# Patient Record
Sex: FEMALE | Race: WHITE | NOT HISPANIC OR LATINO | Employment: OTHER | URBAN - METROPOLITAN AREA
[De-identification: names, ages, dates, MRNs, and addresses within clinical notes are randomized per-mention and may not be internally consistent; named-entity substitution may affect disease eponyms.]

---

## 2023-08-23 ENCOUNTER — HOSPITAL ENCOUNTER (OUTPATIENT)
Dept: DATA CONVERSION | Facility: HOSPITAL | Age: 68
Discharge: HOME | End: 2023-08-23

## 2023-08-23 DIAGNOSIS — E11.65 TYPE 2 DIABETES MELLITUS WITH HYPERGLYCEMIA (MULTI): ICD-10-CM

## 2023-08-23 DIAGNOSIS — E78.5 HYPERLIPIDEMIA, UNSPECIFIED: ICD-10-CM

## 2023-08-23 LAB
25(OH)D3 SERPL-MCNC: 55 NG/ML (ref 31–100)
ALBUMIN SERPL-MCNC: 4.2 GM/DL (ref 3.5–5)
ALBUMIN/GLOB SERPL: 1.4 RATIO (ref 1.5–3)
ALP BLD-CCNC: 170 U/L (ref 35–125)
ALT SERPL-CCNC: 29 U/L (ref 5–40)
ANION GAP SERPL CALCULATED.3IONS-SCNC: 10 MMOL/L (ref 0–19)
APPEARANCE PLAS: CLEAR
AST SERPL-CCNC: 19 U/L (ref 5–40)
BASOPHILS # BLD AUTO: 0.05 K/UL (ref 0–0.22)
BASOPHILS NFR BLD AUTO: 1 % (ref 0–1)
BILIRUB SERPL-MCNC: 0.3 MG/DL (ref 0.1–1.2)
BUN SERPL-MCNC: 22 MG/DL (ref 8–25)
BUN/CREAT SERPL: 27.5 RATIO (ref 8–21)
CALCIUM SERPL-MCNC: 9.6 MG/DL (ref 8.5–10.4)
CHLORIDE SERPL-SCNC: 101 MMOL/L (ref 97–107)
CHOLEST SERPL-MCNC: 266 MG/DL (ref 133–200)
CHOLEST/HDLC SERPL: 4.6 RATIO
CO2 SERPL-SCNC: 26 MMOL/L (ref 24–31)
COLOR SPUN FLD: YELLOW
CREAT SERPL-MCNC: 0.8 MG/DL (ref 0.4–1.6)
DEPRECATED RDW RBC AUTO: 47 FL (ref 37–54)
DIFFERENTIAL METHOD BLD: ABNORMAL
EOSINOPHIL # BLD AUTO: 0.13 K/UL (ref 0–0.45)
EOSINOPHIL NFR BLD: 2.6 % (ref 0–3)
ERYTHROCYTE [DISTWIDTH] IN BLOOD BY AUTOMATED COUNT: 13.3 % (ref 11.7–15)
FASTING STATUS PATIENT QL REPORTED: ABNORMAL
GFR SERPL CREATININE-BSD FRML MDRD: 81 ML/MIN/1.73 M2
GLOBULIN SER-MCNC: 3 G/DL (ref 1.9–3.7)
GLUCOSE SERPL-MCNC: 86 MG/DL (ref 65–99)
HCT VFR BLD AUTO: 37.4 % (ref 36–44)
HDLC SERPL-MCNC: 58 MG/DL
HGB BLD-MCNC: 13 GM/DL (ref 12–15)
IMM GRANULOCYTES # BLD AUTO: 0.02 K/UL (ref 0–0.1)
LDLC SERPL CALC-MCNC: 165 MG/DL (ref 65–130)
LYMPHOCYTES # BLD AUTO: 1.81 K/UL (ref 1.2–3.2)
LYMPHOCYTES NFR BLD MANUAL: 36.5 % (ref 20–40)
MCH RBC QN AUTO: 33.2 PG (ref 26–34)
MCHC RBC AUTO-ENTMCNC: 34.8 % (ref 31–37)
MCV RBC AUTO: 95.4 FL (ref 80–100)
MONOCYTES # BLD AUTO: 0.38 K/UL (ref 0–0.8)
MONOCYTES NFR BLD MANUAL: 7.7 % (ref 0–8)
NEUTROPHILS # BLD AUTO: 2.57 K/UL
NEUTROPHILS # BLD AUTO: 2.57 K/UL (ref 1.8–7.7)
NEUTROPHILS.IMMATURE NFR BLD: 0.4 % (ref 0–1)
NEUTS SEG NFR BLD: 51.8 % (ref 50–70)
NRBC BLD-RTO: 0 /100 WBC
PLATELET # BLD AUTO: 254 K/UL (ref 150–450)
PMV BLD AUTO: 10.8 CU (ref 7–12.6)
POTASSIUM SERPL-SCNC: 5.7 MMOL/L (ref 3.4–5.1)
PROT SERPL-MCNC: 7.2 G/DL (ref 5.9–7.9)
RBC # BLD AUTO: 3.92 M/UL (ref 4–4.9)
SODIUM SERPL-SCNC: 137 MMOL/L (ref 133–145)
TRIGL SERPL-MCNC: 213 MG/DL (ref 40–150)
VIT B12 SERPL-MCNC: 2000 PG/ML (ref 211–946)
WBC # BLD AUTO: 5 K/UL (ref 4.5–11)

## 2023-09-05 ENCOUNTER — HOSPITAL ENCOUNTER (OUTPATIENT)
Dept: DATA CONVERSION | Facility: HOSPITAL | Age: 68
Discharge: HOME | End: 2023-09-05

## 2023-09-05 DIAGNOSIS — E11.65 TYPE 2 DIABETES MELLITUS WITH HYPERGLYCEMIA (MULTI): ICD-10-CM

## 2023-09-05 LAB
ALBUMIN SERPL-MCNC: 3.7 GM/DL (ref 3.5–5)
ALBUMIN/GLOB SERPL: 1.3 RATIO (ref 1.5–3)
ALP BLD-CCNC: 153 U/L (ref 35–125)
ALT SERPL-CCNC: 25 U/L (ref 5–40)
ANION GAP SERPL CALCULATED.3IONS-SCNC: 9 MMOL/L (ref 0–19)
AST SERPL-CCNC: 16 U/L (ref 5–40)
BILIRUB SERPL-MCNC: 0.2 MG/DL (ref 0.1–1.2)
BUN SERPL-MCNC: 27 MG/DL (ref 8–25)
BUN/CREAT SERPL: 38.6 RATIO (ref 8–21)
CALCIUM SERPL-MCNC: 9.2 MG/DL (ref 8.5–10.4)
CHLORIDE SERPL-SCNC: 103 MMOL/L (ref 97–107)
CO2 SERPL-SCNC: 26 MMOL/L (ref 24–31)
CREAT SERPL-MCNC: 0.7 MG/DL (ref 0.4–1.6)
GFR SERPL CREATININE-BSD FRML MDRD: 95 ML/MIN/1.73 M2
GGT SERPL-CCNC: 293 U/L (ref 5–55)
GLOBULIN SER-MCNC: 2.9 G/DL (ref 1.9–3.7)
GLUCOSE SERPL-MCNC: 149 MG/DL (ref 65–99)
POTASSIUM SERPL-SCNC: 5 MMOL/L (ref 3.4–5.1)
PROT SERPL-MCNC: 6.6 G/DL (ref 5.9–7.9)
SODIUM SERPL-SCNC: 138 MMOL/L (ref 133–145)

## 2023-09-08 LAB
ALP BONE SERPL-CCNC: ABNORMAL U/L
ALP ISOS SERPL HS-CCNC: ABNORMAL U/L
ALP LIVER SERPL-CCNC: ABNORMAL U/L
ALP SERPL-CCNC: ABNORMAL U/L

## 2023-09-15 ENCOUNTER — HOSPITAL ENCOUNTER (OUTPATIENT)
Dept: DATA CONVERSION | Facility: HOSPITAL | Age: 68
Discharge: HOME | End: 2023-09-15

## 2023-09-15 DIAGNOSIS — R74.01 ELEVATION OF LEVELS OF LIVER TRANSAMINASE LEVELS: ICD-10-CM

## 2023-09-18 PROBLEM — T85.9XXA: Status: ACTIVE | Noted: 2023-09-18

## 2023-09-18 PROBLEM — M19.90 ARTHRITIS: Status: ACTIVE | Noted: 2023-09-18

## 2023-09-18 PROBLEM — D86.0 PULMONARY SARCOIDOSIS (MULTI): Status: ACTIVE | Noted: 2023-09-18

## 2023-09-18 PROBLEM — E66.9 OBESITY WITH BODY MASS INDEX 30 OR GREATER: Status: ACTIVE | Noted: 2023-09-18

## 2023-09-18 PROBLEM — H04.129 TEAR FILM INSUFFICIENCY: Status: ACTIVE | Noted: 2023-09-18

## 2023-09-18 PROBLEM — E11.9 DM W/O COMPLICATION TYPE II (MULTI): Status: ACTIVE | Noted: 2023-09-18

## 2023-09-18 PROBLEM — I10 ESSENTIAL HYPERTENSION: Status: ACTIVE | Noted: 2023-09-18

## 2023-09-18 PROBLEM — G31.84 MCI (MILD COGNITIVE IMPAIRMENT): Status: ACTIVE | Noted: 2023-09-18

## 2023-09-18 PROBLEM — G25.0 ESSENTIAL TREMOR: Status: ACTIVE | Noted: 2023-09-18

## 2023-09-18 PROBLEM — E78.5 DYSLIPIDEMIA: Status: ACTIVE | Noted: 2023-09-18

## 2023-09-18 PROBLEM — M51.26 DISPLACEMENT OF LUMBAR INTERVERTEBRAL DISC WITHOUT MYELOPATHY: Status: ACTIVE | Noted: 2023-09-18

## 2023-09-18 PROBLEM — H40.9 GLAUCOMA: Status: ACTIVE | Noted: 2023-09-18

## 2023-09-18 PROBLEM — H40.1133 PRIMARY OPEN ANGLE GLAUCOMA (POAG) OF BOTH EYES, SEVERE STAGE: Status: ACTIVE | Noted: 2023-09-18

## 2023-09-18 PROBLEM — H02.831 DERMATOCHALASIS OF RIGHT UPPER EYELID: Status: ACTIVE | Noted: 2023-09-18

## 2023-09-18 PROBLEM — G47.00 INSOMNIA: Status: ACTIVE | Noted: 2023-09-18

## 2023-09-18 PROBLEM — F41.8 DEPRESSION WITH ANXIETY: Status: ACTIVE | Noted: 2023-09-18

## 2023-09-18 PROBLEM — E11.65 TYPE 2 DIABETES MELLITUS WITH HYPERGLYCEMIA (MULTI): Status: ACTIVE | Noted: 2023-09-18

## 2023-09-18 PROBLEM — E78.5 HYPERLIPIDEMIA: Status: ACTIVE | Noted: 2023-09-18

## 2023-09-18 PROBLEM — E11.3599 PROLIFERATIVE DIABETIC RETINOPATHY (MULTI): Status: ACTIVE | Noted: 2023-09-18

## 2023-09-18 PROBLEM — E11.3553: Status: ACTIVE | Noted: 2023-09-18

## 2023-09-18 PROBLEM — H02.834 DERMATOCHALASIS OF LEFT UPPER EYELID: Status: ACTIVE | Noted: 2023-09-18

## 2023-09-18 PROBLEM — Q15.9 EYE ABNORMALITIES: Status: ACTIVE | Noted: 2023-09-18

## 2023-09-18 PROBLEM — Z96.1 ARTIFICIAL LENS PRESENT: Status: ACTIVE | Noted: 2023-09-18

## 2023-09-18 RX ORDER — RIFAMPIN 300 MG/1
CAPSULE ORAL
COMMUNITY

## 2023-09-18 RX ORDER — CHLORHEXIDINE GLUCONATE ORAL RINSE 1.2 MG/ML
SOLUTION DENTAL
COMMUNITY
Start: 2022-11-11

## 2023-09-18 RX ORDER — CALCIUM CARBONATE 600 MG
TABLET ORAL
COMMUNITY

## 2023-09-18 RX ORDER — DULAGLUTIDE 1.5 MG/.5ML
INJECTION, SOLUTION SUBCUTANEOUS
COMMUNITY
Start: 2023-01-25

## 2023-09-18 RX ORDER — TALC
POWDER (GRAM) TOPICAL
COMMUNITY

## 2023-09-18 RX ORDER — OXYCODONE HYDROCHLORIDE 5 MG/1
TABLET ORAL
COMMUNITY
Start: 2018-08-10

## 2023-09-18 RX ORDER — QUINAPRIL 40 MG/1
TABLET ORAL
COMMUNITY
Start: 2016-04-01

## 2023-09-18 RX ORDER — LATANOPROST 50 UG/ML
SOLUTION/ DROPS OPHTHALMIC
COMMUNITY
End: 2023-12-14 | Stop reason: SDUPTHER

## 2023-09-18 RX ORDER — ASPIRIN 81 MG/1
1 TABLET ORAL DAILY
COMMUNITY

## 2023-09-18 RX ORDER — INSULIN DEGLUDEC 100 U/ML
INJECTION, SOLUTION SUBCUTANEOUS
COMMUNITY
End: 2024-06-04

## 2023-09-18 RX ORDER — HYDROCHLOROTHIAZIDE 25 MG/1
1 TABLET ORAL DAILY
COMMUNITY
Start: 2016-06-06

## 2023-09-18 RX ORDER — BLOOD-GLUCOSE METER
KIT MISCELLANEOUS
COMMUNITY
Start: 2023-06-29 | End: 2024-02-13 | Stop reason: SDUPTHER

## 2023-09-18 RX ORDER — LISINOPRIL 40 MG/1
20 TABLET ORAL DAILY
COMMUNITY
Start: 2023-02-10

## 2023-09-18 RX ORDER — DORZOLAMIDE HYDROCHLORIDE AND TIMOLOL MALEATE 20; 5 MG/ML; MG/ML
SOLUTION/ DROPS OPHTHALMIC
COMMUNITY
Start: 2023-08-09 | End: 2024-04-04 | Stop reason: SDUPTHER

## 2023-09-18 RX ORDER — MULTIVITAMIN
1 TABLET ORAL DAILY
COMMUNITY

## 2023-09-18 RX ORDER — DULAGLUTIDE 3 MG/.5ML
INJECTION, SOLUTION SUBCUTANEOUS
COMMUNITY

## 2023-09-18 RX ORDER — INSULIN DETEMIR 100 [IU]/ML
INJECTION, SOLUTION SUBCUTANEOUS
COMMUNITY
Start: 2016-06-14

## 2023-09-18 RX ORDER — PROPRANOLOL HYDROCHLORIDE 20 MG/1
TABLET ORAL
COMMUNITY
Start: 2023-08-30

## 2023-09-18 RX ORDER — GABAPENTIN 100 MG/1
CAPSULE ORAL
COMMUNITY
Start: 2019-05-17

## 2023-09-18 RX ORDER — BRIMONIDINE TARTRATE AND TIMOLOL MALEATE 2; 5 MG/ML; MG/ML
SOLUTION OPHTHALMIC
COMMUNITY
Start: 2018-03-06

## 2023-09-18 RX ORDER — ASCORBIC ACID 500 MG
TABLET ORAL
COMMUNITY

## 2023-09-18 RX ORDER — HYDROCODONE BITARTRATE AND ACETAMINOPHEN 5; 325 MG/1; MG/1
1 TABLET ORAL EVERY 6 HOURS PRN
COMMUNITY
Start: 2022-11-11

## 2023-09-18 RX ORDER — DORZOLAMIDE HCL 20 MG/ML
SOLUTION/ DROPS OPHTHALMIC
COMMUNITY

## 2023-09-18 RX ORDER — INSULIN LISPRO 100 [IU]/ML
INJECTION, SOLUTION INTRAVENOUS; SUBCUTANEOUS
COMMUNITY
Start: 2016-05-16

## 2023-09-18 RX ORDER — PRIMIDONE 250 MG/1
250 TABLET ORAL 3 TIMES DAILY
COMMUNITY

## 2023-09-18 RX ORDER — NAPROXEN SODIUM 220 MG
TABLET ORAL
COMMUNITY

## 2023-09-18 RX ORDER — PEN NEEDLE, DIABETIC 32GX 5/32"
NEEDLE, DISPOSABLE MISCELLANEOUS
COMMUNITY
Start: 2023-05-22

## 2023-12-14 DIAGNOSIS — H40.1133 PRIMARY OPEN-ANGLE GLAUCOMA, BILATERAL, SEVERE STAGE: Primary | ICD-10-CM

## 2023-12-14 RX ORDER — LATANOPROST 50 UG/ML
1 SOLUTION/ DROPS OPHTHALMIC NIGHTLY
Qty: 2.5 ML | Refills: 5 | Status: SHIPPED | OUTPATIENT
Start: 2023-12-14

## 2024-02-13 ENCOUNTER — TELEPHONE (OUTPATIENT)
Dept: ENDOCRINOLOGY | Facility: CLINIC | Age: 69
End: 2024-02-13
Payer: MEDICARE

## 2024-02-13 DIAGNOSIS — E11.3553: Primary | ICD-10-CM

## 2024-02-13 RX ORDER — BLOOD-GLUCOSE METER
KIT MISCELLANEOUS
Qty: 300 EACH | Refills: 2 | Status: SHIPPED | OUTPATIENT
Start: 2024-02-13

## 2024-02-13 NOTE — TELEPHONE ENCOUNTER
Requesting prior authorization for patient's Freestyle Lite Test strips to [facility]

## 2024-03-04 ENCOUNTER — APPOINTMENT (OUTPATIENT)
Dept: OPHTHALMOLOGY | Facility: CLINIC | Age: 69
End: 2024-03-04
Payer: MEDICARE

## 2024-03-06 ENCOUNTER — APPOINTMENT (OUTPATIENT)
Dept: OPHTHALMOLOGY | Facility: CLINIC | Age: 69
End: 2024-03-06
Payer: MEDICARE

## 2024-03-13 ENCOUNTER — CLINICAL SUPPORT (OUTPATIENT)
Dept: OPHTHALMOLOGY | Facility: CLINIC | Age: 69
End: 2024-03-13
Payer: MEDICARE

## 2024-03-13 ENCOUNTER — OFFICE VISIT (OUTPATIENT)
Dept: OPHTHALMOLOGY | Facility: CLINIC | Age: 69
End: 2024-03-13
Payer: MEDICARE

## 2024-03-13 DIAGNOSIS — H40.1133 PRIMARY OPEN-ANGLE GLAUCOMA, BILATERAL, SEVERE STAGE: Primary | ICD-10-CM

## 2024-03-13 DIAGNOSIS — H04.123 INSUFFICIENCY OF TEAR FILM OF BOTH EYES: ICD-10-CM

## 2024-03-13 DIAGNOSIS — Z96.1 ARTIFICIAL LENS PRESENT: ICD-10-CM

## 2024-03-13 DIAGNOSIS — E11.3553 TYPE 2 DIABETES MELLITUS WITH STABLE PROLIFERATIVE RETINOPATHY OF BOTH EYES, WITHOUT LONG-TERM CURRENT USE OF INSULIN (MULTI): ICD-10-CM

## 2024-03-13 DIAGNOSIS — H40.1133 PRIMARY OPEN ANGLE GLAUCOMA (POAG) OF BOTH EYES, SEVERE STAGE: ICD-10-CM

## 2024-03-13 DIAGNOSIS — E11.3553: ICD-10-CM

## 2024-03-13 PROBLEM — H40.9 GLAUCOMA: Status: RESOLVED | Noted: 2023-09-18 | Resolved: 2024-03-13

## 2024-03-13 PROBLEM — H02.831 DERMATOCHALASIS OF RIGHT UPPER EYELID: Status: RESOLVED | Noted: 2023-09-18 | Resolved: 2024-03-13

## 2024-03-13 PROBLEM — H02.831 DERMATOCHALASIS OF BOTH UPPER EYELIDS: Status: ACTIVE | Noted: 2023-09-18

## 2024-03-13 PROCEDURE — 99213 OFFICE O/P EST LOW 20 MIN: CPT | Performed by: OPHTHALMOLOGY

## 2024-03-13 PROCEDURE — 92134 CPTRZ OPH DX IMG PST SGM RTA: CPT | Performed by: OPHTHALMOLOGY

## 2024-03-13 ASSESSMENT — ENCOUNTER SYMPTOMS
ALLERGIC/IMMUNOLOGIC NEGATIVE: 0
PSYCHIATRIC NEGATIVE: 0
NEUROLOGICAL NEGATIVE: 0
HEMATOLOGIC/LYMPHATIC NEGATIVE: 0
DEPRESSION: 0
GASTROINTESTINAL NEGATIVE: 0
RESPIRATORY NEGATIVE: 0
LOSS OF SENSATION IN FEET: 0
MUSCULOSKELETAL NEGATIVE: 0
ENDOCRINE NEGATIVE: 0
CONSTITUTIONAL NEGATIVE: 0
CARDIOVASCULAR NEGATIVE: 0
EYES NEGATIVE: 0
OCCASIONAL FEELINGS OF UNSTEADINESS: 1

## 2024-03-13 ASSESSMENT — EXTERNAL EXAM - RIGHT EYE: OD_EXAM: BROW PTOSIS

## 2024-03-13 ASSESSMENT — EXTERNAL EXAM - LEFT EYE: OS_EXAM: BROW PTOSIS

## 2024-03-13 ASSESSMENT — SLIT LAMP EXAM - LIDS
COMMENTS: 1+ BLEPHARITIS, 2+ DERMATOCHALASIS - UPPER LID
COMMENTS: 1+ BLEPHARITIS, 2+ DERMATOCHALASIS - UPPER LID

## 2024-03-13 ASSESSMENT — VISUAL ACUITY
METHOD: SNELLEN - LINEAR
OD_SC+: -1
OS_PH_SC+: -3
OS_SC+: +1
OD_SC: 20/40
OS_SC: 20/100
OS_PH_SC: 20/40

## 2024-03-13 ASSESSMENT — PACHYMETRY
OS_CT(UM): 536
OD_CT(UM): 542

## 2024-03-13 ASSESSMENT — PATIENT HEALTH QUESTIONNAIRE - PHQ9
SUM OF ALL RESPONSES TO PHQ9 QUESTIONS 1 AND 2: 0
2. FEELING DOWN, DEPRESSED OR HOPELESS: NOT AT ALL
1. LITTLE INTEREST OR PLEASURE IN DOING THINGS: NOT AT ALL

## 2024-03-13 ASSESSMENT — PAIN SCALES - GENERAL: PAINLEVEL: 0-NO PAIN

## 2024-03-13 ASSESSMENT — TONOMETRY
OD_IOP_MMHG: 19
OS_IOP_MMHG: 18
IOP_METHOD: GOLDMANN APPLANATION

## 2024-03-13 NOTE — PROGRESS NOTES
Subjective   Patient ID: Narda Malloy is a 68 y.o. female.    Chief Complaint    Follow-up       HPI    Pt is unable to do [place] visual field (HVF) test today because she's not feeling well.    Glaucoma check.  Not feeling well GI and does not want to do a [place] visual field (HVF).  Vision unchanged.  Using drops (gtts) as directed.  No new problems or complaints.    Last edited by Jose Juan MD on 3/13/2024 11:04 AM.        Current Outpatient Medications (Ophthalmology pharm classes)   Medication Sig Dispense Refill    dorzolamide (Trusopt) 2 % ophthalmic solution 1 drop into affected eye Ophthalmic bid      latanoprost (Xalatan) 0.005 % ophthalmic solution Administer 1 drop into both eyes once daily at bedtime. 2.5 mL 5    brimonidine-timoloL (Combigan) 0.2-0.5 % ophthalmic solution INSTILL 1 DROP Twice daily      dorzolamide-timoloL (Cosopt) 22.3-6.8 mg/mL ophthalmic solution INSTILL ONE DROP IN BOTH EYES TWICE A DAY       Current Outpatient Medications (Other)   Medication Sig Dispense Refill    ascorbic acid (Vitamin C) 500 mg tablet as directed Orally      calcium carbonate 600 mg calcium (1,500 mg) tablet 1 tablet with meals Orally Twice a day      chlorhexidine (Peridex) 0.12 % solution RINSE MOUTH WITH 1/2 OUNCE THREE TIMES A DAY      dulaglutide (Trulicity) 1.5 mg/0.5 mL pen injector injection inject 1.5 mg Subcutaneously Once a week for 30 day(s)      FreeStyle Lite Strips strip USE TO TEST 3 TIMES A DAY AS DIRECTED 300 each 2    lisinopril 40 mg tablet Take 0.5 tablets (20 mg) by mouth once daily.      melatonin 3 mg tablet 1 tablet at bedtime as needed Orally Once a day for 30 day(s)      multivitamin tablet Take 1 tablet by mouth once daily.      naproxen sodium (Aleve) 220 mg tablet 1 tablet as needed Orally every 12 hrs      pen needle, diabetic (PEN NEEDLE MISC) BD Altagracia- 4 mm X 32 G needle - as directed 4x a day sc 4 times per day      primidone (Mysoline) 250 mg tablet Take 1 tablet  "(250 mg) by mouth 3 times a day.      propranolol (Inderal) 20 mg tablet TAKE 1 TABLET BY MOUTH ONCE A DAY FOR 2 WEEKS THEN INCREASE TO TWICE A DAY      Sure Comfort Pen Needle 32 gauge x 5/32\" needle AS DIRECTED DAILY FOR 90 DAYS      Tresiba FlexTouch U-100 100 unit/mL (3 mL) injection START WITH INJECTING 20 UNITS SUBCUTANEOUSLY DAILY  UP TO 60 UNITS DAILY AS DIRECTED      aspirin 81 mg EC tablet Take 1 tablet (81 mg) by mouth once daily.      gabapentin (Neurontin) 100 mg capsule take 1 cap po TID for one week, WEEK 2 take 2 cap po TID, WEEK 3 take 3 cap po TID and continue      hydroCHLOROthiazide (HYDRODiuril) 25 mg tablet Take 1 tablet (25 mg) by mouth once daily.      HYDROcodone-acetaminophen (Norco) 5-325 mg tablet Take 1 tablet by mouth every 6 hours if needed (FOR PAIN).      insulin detemir (Levemir FlexTouch U100 Insulin) 100 unit/mL (3 mL) pen INJECT 20 UNITS AT BEDTIME      insulin lispro (HumaLOG KwikPen Insulin) 100 unit/mL injection iNJECT 20 UNITS BEFORE BREAKFAST AND LUNCH AND 30 UNITS BEFORE DINNER.      oxyCODONE (Roxicodone) 5 mg immediate release tablet 1 tab(s) orally every 4 hours, As needed, Pain - Mod (4-6)T81.4      quinapril (Accupril) 40 mg tablet TAKE ONE TABLET BY MOUTH EVERY DAY for 90 days      rifAMPin (Rifadin) 300 mg capsule 2 cap(s) orally once a day, As Needed      Trulicity 3 mg/0.5 mL pen injector INJECT THE CONTENTS OF 1 PEN SUBCUTANEOUSLY EVERY WEEK for 28         Objective   Base Eye Exam       Visual Acuity (Snellen - Linear)         Right Left    Dist sc 20/40 -1 20/100 +1    Dist ph sc NI 20/40 -3              Tonometry (Goldmann Applanation, 11:12 AM)         Right Left    Pressure 19 18   Holding and squeezing OU.              Pupils         Dark Shape React APD    Right 5 Round Minimal None    Left 5 Round 2 None              Extraocular Movement         Right Left     Full Full                  Slit Lamp and Fundus Exam       External Exam         Right Left    " External Brow ptosis Brow ptosis              Slit Lamp Exam         Right Left    Lids/Lashes 1+ Blepharitis, 2+ Dermatochalasis - upper lid 1+ Blepharitis, 2+ Dermatochalasis - upper lid    Conjunctiva/Sclera normal bulbar and palepbral conjunctiva normal bulbar and palepbral conjunctiva    Cornea normal epi/stroma/endo and tear film normal epi/stroma/endo and tear film    Anterior Chamber ant. chamber deep and quiet ant. chamber deep and quiet    Iris iris normal iris normal    Lens PC IOL centered w/clear capsule S/P YAG capsulotomy, Centered posterior chamber intraocular lens    Anterior Vitreous Vitreous syneresis Vitreous syneresis                    Assessment/Plan   Problem List Items Addressed This Visit          Eye/Vision problems    Artificial lens present    Controlled type 2 diabetes mellitus with stable proliferative retinopathy of both eyes, without long-term current use of insulin (CMS/HCC)    Primary open-angle glaucoma, bilateral, severe stage - Primary     Cont current drops (gtts).  F/u 6 mos full with oct nerves.           Tear film insufficiency     Other Visit Diagnoses       Type 2 diabetes mellitus with stable proliferative retinopathy of both eyes, without long-term current use of insulin (CMS/HCC)        Relevant Orders    OCT, Retina - OU - Both Eyes (Completed)

## 2024-03-15 ENCOUNTER — APPOINTMENT (OUTPATIENT)
Dept: OPHTHALMOLOGY | Facility: CLINIC | Age: 69
End: 2024-03-15

## 2024-03-26 ENCOUNTER — APPOINTMENT (OUTPATIENT)
Dept: OBSTETRICS AND GYNECOLOGY | Facility: CLINIC | Age: 69
End: 2024-03-26
Payer: MEDICARE

## 2024-04-04 DIAGNOSIS — H40.1131 PRIMARY OPEN-ANGLE GLAUCOMA, BILATERAL, MILD STAGE: Primary | ICD-10-CM

## 2024-04-04 RX ORDER — DORZOLAMIDE HYDROCHLORIDE AND TIMOLOL MALEATE 20; 5 MG/ML; MG/ML
1 SOLUTION/ DROPS OPHTHALMIC 2 TIMES DAILY
Qty: 10 ML | Refills: 5 | Status: SHIPPED | OUTPATIENT
Start: 2024-04-04

## 2024-04-17 ENCOUNTER — APPOINTMENT (OUTPATIENT)
Dept: ENDOCRINOLOGY | Facility: CLINIC | Age: 69
End: 2024-04-17
Payer: MEDICARE

## 2024-04-19 ENCOUNTER — APPOINTMENT (OUTPATIENT)
Dept: ORTHOPEDIC SURGERY | Facility: CLINIC | Age: 69
End: 2024-04-19
Payer: MEDICARE

## 2024-04-22 ENCOUNTER — APPOINTMENT (OUTPATIENT)
Dept: OBSTETRICS AND GYNECOLOGY | Facility: CLINIC | Age: 69
End: 2024-04-22
Payer: MEDICARE

## 2024-04-23 ENCOUNTER — TELEPHONE (OUTPATIENT)
Dept: ENDOCRINOLOGY | Facility: CLINIC | Age: 69
End: 2024-04-23

## 2024-04-23 NOTE — TELEPHONE ENCOUNTER
Narda Malloy   1955   [id number]   [phone]       Called and spoke to patient in regards to moving down appt on 5/23/24 1015am to 3pm.

## 2024-04-25 ENCOUNTER — TRANSCRIBE ORDERS (OUTPATIENT)
Dept: ORTHOPEDIC SURGERY | Facility: HOSPITAL | Age: 69
End: 2024-04-25
Payer: MEDICARE

## 2024-04-25 DIAGNOSIS — H02.834 DERMATOCHALASIS OF BOTH UPPER EYELIDS: ICD-10-CM

## 2024-04-25 DIAGNOSIS — R29.701 NIHSS SCORE 1: ICD-10-CM

## 2024-04-25 DIAGNOSIS — H02.831 DERMATOCHALASIS OF BOTH UPPER EYELIDS: ICD-10-CM

## 2024-04-25 DIAGNOSIS — Z01.812 ENCOUNTER FOR PRE-OPERATIVE LABORATORY TESTING: ICD-10-CM

## 2024-04-25 DIAGNOSIS — M51.26 DISPLACEMENT OF LUMBAR INTERVERTEBRAL DISC WITHOUT MYELOPATHY: ICD-10-CM

## 2024-04-26 DIAGNOSIS — M51.26 DISPLACEMENT OF LUMBAR INTERVERTEBRAL DISC WITHOUT MYELOPATHY: Primary | ICD-10-CM

## 2024-04-26 DIAGNOSIS — F41.1 ANXIETY STATE: Primary | ICD-10-CM

## 2024-04-26 RX ORDER — DIAZEPAM 5 MG/1
TABLET ORAL
Qty: 2 TABLET | Refills: 0 | Status: SHIPPED | OUTPATIENT
Start: 2024-04-26

## 2024-05-02 ENCOUNTER — LAB (OUTPATIENT)
Dept: LAB | Facility: LAB | Age: 69
End: 2024-05-02
Payer: MEDICARE

## 2024-05-02 DIAGNOSIS — M51.26 DISPLACEMENT OF LUMBAR INTERVERTEBRAL DISC WITHOUT MYELOPATHY: ICD-10-CM

## 2024-05-02 DIAGNOSIS — Z01.812 ENCOUNTER FOR PRE-OPERATIVE LABORATORY TESTING: ICD-10-CM

## 2024-05-02 DIAGNOSIS — R29.701 NIHSS SCORE 1: ICD-10-CM

## 2024-05-02 LAB
CREAT SERPL-MCNC: 0.7 MG/DL (ref 0.4–1.6)
EGFRCR SERPLBLD CKD-EPI 2021: >90 ML/MIN/1.73M*2
ERYTHROCYTE [DISTWIDTH] IN BLOOD BY AUTOMATED COUNT: 13 % (ref 11.5–14.5)
HCT VFR BLD AUTO: 37.9 % (ref 36–46)
HGB BLD-MCNC: 12.4 G/DL (ref 12–16)
INR PPP: 1 (ref 0.9–1.2)
MCH RBC QN AUTO: 32.4 PG (ref 26–34)
MCHC RBC AUTO-ENTMCNC: 32.7 G/DL (ref 32–36)
MCV RBC AUTO: 99 FL (ref 80–100)
NRBC BLD-RTO: 0 /100 WBCS (ref 0–0)
PLATELET # BLD AUTO: 201 X10*3/UL (ref 150–450)
PROTHROMBIN TIME: 10.9 SECONDS (ref 9.3–12.7)
RBC # BLD AUTO: 3.83 X10*6/UL (ref 4–5.2)
WBC # BLD AUTO: 6.4 X10*3/UL (ref 4.4–11.3)

## 2024-05-02 PROCEDURE — 36415 COLL VENOUS BLD VENIPUNCTURE: CPT

## 2024-05-02 PROCEDURE — 85610 PROTHROMBIN TIME: CPT

## 2024-05-02 PROCEDURE — 82565 ASSAY OF CREATININE: CPT

## 2024-05-02 PROCEDURE — 85027 COMPLETE CBC AUTOMATED: CPT

## 2024-05-03 ENCOUNTER — APPOINTMENT (OUTPATIENT)
Dept: ORTHOPEDIC SURGERY | Facility: CLINIC | Age: 69
End: 2024-05-03
Payer: MEDICARE

## 2024-05-14 ENCOUNTER — HOSPITAL ENCOUNTER (OUTPATIENT)
Dept: RADIOLOGY | Facility: HOSPITAL | Age: 69
Discharge: HOME | End: 2024-05-14
Payer: MEDICARE

## 2024-05-14 ENCOUNTER — APPOINTMENT (OUTPATIENT)
Dept: RADIOLOGY | Facility: HOSPITAL | Age: 69
End: 2024-05-14
Payer: MEDICARE

## 2024-05-20 ENCOUNTER — HOSPITAL ENCOUNTER (OUTPATIENT)
Dept: RADIOLOGY | Facility: HOSPITAL | Age: 69
Discharge: HOME | End: 2024-05-20
Payer: MEDICARE

## 2024-05-20 ENCOUNTER — APPOINTMENT (OUTPATIENT)
Dept: OBSTETRICS AND GYNECOLOGY | Facility: CLINIC | Age: 69
End: 2024-05-20
Payer: MEDICARE

## 2024-05-20 VITALS
SYSTOLIC BLOOD PRESSURE: 133 MMHG | RESPIRATION RATE: 16 BRPM | OXYGEN SATURATION: 97 % | DIASTOLIC BLOOD PRESSURE: 56 MMHG | HEART RATE: 65 BPM | TEMPERATURE: 98.1 F

## 2024-05-20 DIAGNOSIS — M51.26 DISPLACEMENT OF LUMBAR INTERVERTEBRAL DISC WITHOUT MYELOPATHY: ICD-10-CM

## 2024-05-20 PROCEDURE — 72132 CT LUMBAR SPINE W/DYE: CPT | Performed by: RADIOLOGY

## 2024-05-20 PROCEDURE — 7100000009 HC PHASE TWO TIME - INITIAL BASE CHARGE

## 2024-05-20 PROCEDURE — 62304 MYELOGRAPHY LUMBAR INJECTION: CPT

## 2024-05-20 PROCEDURE — 72132 CT LUMBAR SPINE W/DYE: CPT

## 2024-05-20 PROCEDURE — 2550000001 HC RX 255 CONTRASTS: Performed by: ORTHOPAEDIC SURGERY

## 2024-05-20 PROCEDURE — 7100000010 HC PHASE TWO TIME - EACH INCREMENTAL 1 MINUTE

## 2024-05-20 PROCEDURE — 2500000005 HC RX 250 GENERAL PHARMACY W/O HCPCS: Performed by: ORTHOPAEDIC SURGERY

## 2024-05-20 PROCEDURE — 62304 MYELOGRAPHY LUMBAR INJECTION: CPT | Performed by: RADIOLOGY

## 2024-05-20 RX ORDER — LIDOCAINE HYDROCHLORIDE 10 MG/ML
1 INJECTION, SOLUTION EPIDURAL; INFILTRATION; INTRACAUDAL; PERINEURAL ONCE
Status: COMPLETED | OUTPATIENT
Start: 2024-05-20 | End: 2024-05-20

## 2024-05-20 RX ADMIN — IOHEXOL 10 ML: 240 INJECTION, SOLUTION INTRATHECAL; INTRAVASCULAR; INTRAVENOUS; ORAL at 10:25

## 2024-05-20 RX ADMIN — LIDOCAINE HYDROCHLORIDE 10 MG: 10 INJECTION, SOLUTION EPIDURAL; INFILTRATION; INTRACAUDAL; PERINEURAL at 11:00

## 2024-05-20 ASSESSMENT — PAIN - FUNCTIONAL ASSESSMENT
PAIN_FUNCTIONAL_ASSESSMENT: 0-10

## 2024-05-20 ASSESSMENT — PAIN SCALES - GENERAL
PAINLEVEL_OUTOF10: 0 - NO PAIN

## 2024-05-20 NOTE — DISCHARGE INSTRUCTIONS
You received moderate sedation:  - Do not drive a car, or operate any machinery or power tools of any kind.  - Do not drink any alcoholic drinks.  - Do not take any over the counter medications that may cause drowsiness.  - Do not make any important decisions or sign any legal documents.  - You need to have a responsible adult accompany you home.  - You may resume your normal diet.  - We strongly suggest that a responsible adult be with you for the rest of the day and also during the night. This is for your protection and safety.     For questions related to your procedure:  Please call [phone] between the hours of 7:00am-5:00pm Monday through Friday.  Please call [phone] after 5:00pm and on weekends and holidays.     In the event of an emergency call 911 or go to your nearest emergency room.

## 2024-05-23 ENCOUNTER — APPOINTMENT (OUTPATIENT)
Dept: ENDOCRINOLOGY | Facility: CLINIC | Age: 69
End: 2024-05-23
Payer: MEDICARE

## 2024-05-23 DIAGNOSIS — I10 ESSENTIAL HYPERTENSION: ICD-10-CM

## 2024-05-23 DIAGNOSIS — E11.65 TYPE 2 DIABETES MELLITUS WITH HYPERGLYCEMIA, UNSPECIFIED WHETHER LONG TERM INSULIN USE (MULTI): Primary | ICD-10-CM

## 2024-05-23 DIAGNOSIS — E78.5 DYSLIPIDEMIA: ICD-10-CM

## 2024-05-23 NOTE — PROGRESS NOTES
HPI             Current Outpatient Medications:     ascorbic acid (Vitamin C) 500 mg tablet, as directed Orally, Disp: , Rfl:     aspirin 81 mg EC tablet, Take 1 tablet (81 mg) by mouth once daily., Disp: , Rfl:     brimonidine-timoloL (Combigan) 0.2-0.5 % ophthalmic solution, INSTILL 1 DROP Twice daily, Disp: , Rfl:     calcium carbonate 600 mg calcium (1,500 mg) tablet, 1 tablet with meals Orally Twice a day, Disp: , Rfl:     chlorhexidine (Peridex) 0.12 % solution, RINSE MOUTH WITH 1/2 OUNCE THREE TIMES A DAY, Disp: , Rfl:     diazePAM (Valium) 5 mg tablet, Take 1 tab po 45min prior to testing; take 2nd tab as needed, Disp: 2 tablet, Rfl: 0    dorzolamide (Trusopt) 2 % ophthalmic solution, 1 drop into affected eye Ophthalmic bid, Disp: , Rfl:     dorzolamide-timoloL (Cosopt) 22.3-6.8 mg/mL ophthalmic solution, Administer 1 drop into both eyes 2 times a day., Disp: 10 mL, Rfl: 5    dulaglutide (Trulicity) 1.5 mg/0.5 mL pen injector injection, inject 1.5 mg Subcutaneously Once a week for 30 day(s), Disp: , Rfl:     FreeStyle Lite Strips strip, USE TO TEST 3 TIMES A DAY AS DIRECTED, Disp: 300 each, Rfl: 2    gabapentin (Neurontin) 100 mg capsule, take 1 cap po TID for one week, WEEK 2 take 2 cap po TID, WEEK 3 take 3 cap po TID and continue, Disp: , Rfl:     hydroCHLOROthiazide (HYDRODiuril) 25 mg tablet, Take 1 tablet (25 mg) by mouth once daily., Disp: , Rfl:     HYDROcodone-acetaminophen (Norco) 5-325 mg tablet, Take 1 tablet by mouth every 6 hours if needed (FOR PAIN)., Disp: , Rfl:     insulin detemir (Levemir FlexTouch U100 Insulin) 100 unit/mL (3 mL) pen, INJECT 20 UNITS AT BEDTIME, Disp: , Rfl:     insulin lispro (HumaLOG KwikPen Insulin) 100 unit/mL injection, iNJECT 20 UNITS BEFORE BREAKFAST AND LUNCH AND 30 UNITS BEFORE DINNER., Disp: , Rfl:     latanoprost (Xalatan) 0.005 % ophthalmic solution, Administer 1 drop into both eyes once daily at bedtime., Disp: 2.5 mL, Rfl: 5    lisinopril 40 mg tablet, Take  "0.5 tablets (20 mg) by mouth once daily., Disp: , Rfl:     melatonin 3 mg tablet, 1 tablet at bedtime as needed Orally Once a day for 30 day(s), Disp: , Rfl:     multivitamin tablet, Take 1 tablet by mouth once daily., Disp: , Rfl:     naproxen sodium (Aleve) 220 mg tablet, 1 tablet as needed Orally every 12 hrs, Disp: , Rfl:     oxyCODONE (Roxicodone) 5 mg immediate release tablet, 1 tab(s) orally every 4 hours, As needed, Pain - Mod (4-6)T81.4, Disp: , Rfl:     pen needle, diabetic (PEN NEEDLE MISC), BD Altagracia- 4 mm X 32 G needle - as directed 4x a day sc 4 times per day, Disp: , Rfl:     primidone (Mysoline) 250 mg tablet, Take 1 tablet (250 mg) by mouth 3 times a day., Disp: , Rfl:     propranolol (Inderal) 20 mg tablet, TAKE 1 TABLET BY MOUTH ONCE A DAY FOR 2 WEEKS THEN INCREASE TO TWICE A DAY, Disp: , Rfl:     quinapril (Accupril) 40 mg tablet, TAKE ONE TABLET BY MOUTH EVERY DAY for 90 days, Disp: , Rfl:     rifAMPin (Rifadin) 300 mg capsule, 2 cap(s) orally once a day, As Needed, Disp: , Rfl:     Sure Comfort Pen Needle 32 gauge x 5/32\" needle, AS DIRECTED DAILY FOR 90 DAYS, Disp: , Rfl:     Tresiba FlexTouch U-100 100 unit/mL (3 mL) injection, START WITH INJECTING 20 UNITS SUBCUTANEOUSLY DAILY  UP TO 60 UNITS DAILY AS DIRECTED, Disp: , Rfl:     Trulicity 3 mg/0.5 mL pen injector, INJECT THE CONTENTS OF 1 PEN SUBCUTANEOUSLY EVERY WEEK for 28, Disp: , Rfl:       Allergies as of 05/23/2024 - Reviewed 03/13/2024   Allergen Reaction Noted    Erythromycin Nausea And Vomiting 09/18/2023    Morphine Unknown 09/18/2023    Rosuvastatin Other 09/18/2023         Review of Systems   Cardiology: Lightheadedness-denies.  Chest pain-denies.  Leg edema-denies.  Palpitations-denies.  Respiratory: Cough-denies. Shortness of breath-denies.  Wheezing-denies.  Gastroenterology: Constipation-denies.  Diarrhea-denies.  Heartburn-denies.  Endocrinology: Cold intolerance-denies.  Heat intolerance-denies.  Sweats-denies.  Neurology: " Headache-denies.  Tremor-denies.  Neuropathy in extremities-denies.  Psychology: Low energy-denies.  Irritability-denies.  Sleep disturbances-denies.      There were no vitals taken for this visit.      Labs:  Lab Results   Component Value Date    WBC 6.4 05/02/2024    NRBC 0.0 05/02/2024    RBC 3.83 (L) 05/02/2024    HGB 12.4 05/02/2024    HCT 37.9 05/02/2024     05/02/2024     Lab Results   Component Value Date    CALCIUM 9.2 09/05/2023    AST 16 09/05/2023    ALKPHOS (H) 09/05/2023     152  Reference range: 40 to 120  Unit: U/L    Test performed at:  [address] [address]      ALKPHOS 153 (H) 09/05/2023    BILITOT 0.2 09/05/2023    PROT 6.6 09/05/2023    ALBUMIN 3.7 09/05/2023    GLOB 2.9 09/05/2023    AGR 1.3 (L) 09/05/2023     09/05/2023    K 5.0 09/05/2023     09/05/2023    CO2 26 09/05/2023    ANIONGAP 9 09/05/2023    BUN 27 (H) 09/05/2023    CREATININE 0.70 05/02/2024    UREACREAUR 38.6 (H) 09/05/2023    GLUCOSE 149 (H) 09/05/2023    ALT 25 09/05/2023    EGFR >90 05/02/2024     Lab Results   Component Value Date    CHOL 266 (H) 08/23/2023    TRIG 213 (H) 08/23/2023    HDL 58 08/23/2023    LDLCALC 165 (H) 08/23/2023     Lab Results   Component Value Date    MICROALBCREA 66.4 (H) 03/31/2022     Lab Results   Component Value Date    TSH 1.63 06/24/2022     Lab Results   Component Value Date    KJDCBTMZ07 2,000 (H) 08/23/2023     Lab Results   Component Value Date    HGBA1C 6.3 (H) 03/31/2022         Physical Exam   General Appearance: pleasant, cooperative, no acute distress  HEENT: no chemosis, no proptosis, no lid lag, no lid retraction  Neck: no lymphadenopathy, no thyromegaly, no dominant thyroid nodules  Heart: no murmurs, regular rate and rhythm, S1 and S2  Lungs: no wheezes, no rhonci, no rales  Extremities: no lower extremity swelling      Assessment/Plan   1. Type 2 diabetes mellitus with hyperglycemia, unspecified whether long term insulin use (Multi)  ***    2.  Essential hypertension  ***    3. Dyslipidemia  ***         Follow Up:      -labs/tests/notes reviewed  -reviewed and counseled patient on medication monitoring and side effects

## 2024-05-31 ENCOUNTER — OFFICE VISIT (OUTPATIENT)
Dept: ORTHOPEDIC SURGERY | Facility: CLINIC | Age: 69
End: 2024-05-31
Payer: MEDICARE

## 2024-05-31 ENCOUNTER — HOSPITAL ENCOUNTER (OUTPATIENT)
Dept: RADIOLOGY | Facility: CLINIC | Age: 69
Discharge: HOME | End: 2024-05-31
Payer: MEDICARE

## 2024-05-31 VITALS — HEIGHT: 69 IN | BODY MASS INDEX: 22.66 KG/M2 | WEIGHT: 153 LBS

## 2024-05-31 DIAGNOSIS — M41.9 SCOLIOSIS OF LUMBAR SPINE, UNSPECIFIED SCOLIOSIS TYPE: ICD-10-CM

## 2024-05-31 DIAGNOSIS — M40.15 OTHER SECONDARY KYPHOSIS, THORACOLUMBAR REGION: Primary | ICD-10-CM

## 2024-05-31 PROCEDURE — 1159F MED LIST DOCD IN RCRD: CPT | Performed by: ORTHOPAEDIC SURGERY

## 2024-05-31 PROCEDURE — 1157F ADVNC CARE PLAN IN RCRD: CPT | Performed by: ORTHOPAEDIC SURGERY

## 2024-05-31 PROCEDURE — 99215 OFFICE O/P EST HI 40 MIN: CPT | Performed by: ORTHOPAEDIC SURGERY

## 2024-05-31 PROCEDURE — 72081 X-RAY EXAM ENTIRE SPI 1 VW: CPT

## 2024-05-31 PROCEDURE — 72081 X-RAY EXAM ENTIRE SPI 1 VW: CPT | Performed by: RADIOLOGY

## 2024-05-31 PROCEDURE — 4010F ACE/ARB THERAPY RXD/TAKEN: CPT | Performed by: ORTHOPAEDIC SURGERY

## 2024-05-31 ASSESSMENT — PAIN - FUNCTIONAL ASSESSMENT: PAIN_FUNCTIONAL_ASSESSMENT: NO/DENIES PAIN

## 2024-06-03 NOTE — PROGRESS NOTES
HPI:Narda Malloy is a 69-year-old woman with past medical history significant for prior L3-S1 fusion performed by another surgeon.  She comes in today with increasing difficulty with being able to stand erect.  She finds herself falling forward.  She has as result, constant back pain.  She has had to use a walker more recently to help hold her self semierect.  At this point her quality of life has become increasingly intolerable.      ROS:  Reviewed on EMR and patient intake sheet.    PMH/SH:  Reviewed on EMR and patient intake sheet.    Exam:  Physical Exam    Constitutional: Well appearing; no acute distress  Eyes: pupils are equal and round  Psych: normal affect  Respiratory: non-labored breathing  Cardiovascular: regular rate and rhythm  GI: non-distended abdomen  Musculoskeletal: no pain with range of motion of the hips bilaterally  Neurologic: [4]/5 strength in the lower extremities bilaterally]; [negative] straight leg raise    Radiology:     CT myelogram demonstrates prior L3-S1 fusion.  Patient has developed some junctional narrowing at L2-3.  More impressive, is the junctional thoracolumbar kyphosis.  Patient does not have standing x-rays today.    CT scan demonstrates autofusion at the L2-3 disc space.  Severe degeneration at the L1-2 disc space.  Lumbar hardware from L3-S1 appears well fixed and fusion solid.    Standing scoliosis films were personally reviewed.  Patient has a 40 degree coronal curve from T12-L4 and a compensatory 38 degree curve from T4-T12.  She has 11 degrees of lumbar kyphosis and a pelvic incidence minus lumbar lordosis of +75 degrees.  She has 13 cm positive SVA.    Diagnosis:    Thoracolumbar kyphosis    Assessment and Plan:   69-year-old woman with a profound thoracolumbar kyphosis which is producing inability to stand erect as well as intractable pain.  This is a very difficult problem.  We had a long discussion about the potential for surgical intervention for this kind of  problem.  It would be a massive procedure requiring an osteotomy at the L1-2 level followed by revision thoracolumbar fusion.  We talked about the potential for perioperative complications in the excess of 30 to 40% with such a potential procedure.  Certainly, the surgery is not an option until the patient is completely nicotine free.  She did admit to some occasional smoking at this time.    An extended discussion was given on the importance of smoking cessation in regards to the overall disease course in the spine.  The patient expresses understanding that smoking affects the degenerative process in the spine, accentuating degenerative disease and the symptoms there of.  The patient understands the vital role smoking plays in the post operative healing course and that smoking places the patient at extremely high risk for non-union and wound healing problems.  The patient understands that smoking is a personal, patient specific, and disease modifiable choice and that any untoward consequences in regard to further, ongoing symptoms or problems with post-operative healing, in any way attributable to nicotine use, are the sole responsibility of the patient.  The patient expressed gratitude in regards to the education given today on this topic.    Nicotine testing was performed on July 17, 2024.  Lab testing is consistent with smoking cessation.  Patient is now a non-smoker    Patient will also need standing scoliosis films to assess her global sagittal and coronal alignment.  Those were ordered today.  If she is able to stop smoking, I can see her back to go over the x-rays and talk about the surgical intervention at more length.    The patient was in agreement with the plan. At the end of the visit today, the patient felt that all questions had been answered satisfactorily.  The patient was pleased with the visit and very appreciative for the care rendered.     Thank you very much for the kind referral.  It is a  privilege, and a pleasure, to partner with you in the care of your patients.  I would be delighted to assist you with any further consultations as needed.          Kermit Ventura MD    Chief of Spine Surgery, [facility]  Director of Spine Service, [facility]  , Department of Orthopaedics  [employer] [employer]  [address] [place] [address]  P: [phone]  [email or web address]    This note was dictated with voice recognition software.  It has not been proofread for grammatical errors, typographical mistakes or other semantic inconsistencies.    Addendum: Patient is now nicotine free.  Standing scoliosis films were obtained and measurements are listed above.  Patient at this time has been in a skilled nursing facility secondary to her inability to ambulate because of her deformity.  Her quality of life is terrible and she would like to proceed with surgery understanding the high risk potential of this potential operation.  The patient would be at risk for a variety of complications most notably wound complications, nonunion, and the risks of the surgery itself.  We discussed surgery today at length, including the specifics of the actual proposed procedure, the projected hospital course and post-operative recovery or rehabilitation, and the expected results of this surgery.  The potential benefits and risks of the proposed surgery include, but are not limited to those of infection, spinal fluid leaks, nerve injury or paralysis, whether that be complete or partial paralysis, foot drop, instrumentation failure, non-union or latent instability, future adjacent segment disease, epidural hematoma, wound dehiscence, reherniation, persistent pain or paresthesias, and the general risks of anaesthesia including, but not limited to those of stroke, heart attack, respiratory difficulties and death.  The  patient understands that there are no guarantees in regard to outcome or potential complications associated with the proposed procedure.  After this discussion, the patient articulated understanding of the topics covered and felt that all questions had been covered and answered satisfactorily.    Patient would require an L1 and L3 revision laminectomy, CPT codes 77480 followed by 11125 followed by an L1-L2 osteotomy, CPT codes 20465 and 59258 followed by revision thoracolumbar fusion T4-S1, and an interbody fusion at L1/L2, relative CPT codes including 33520 for the fusion at L2-3 followed by 19324 for the interbody fusion at L1-2, 87482 for the intervertebral expandable cage followed by 22614 x 7 for the posterior fusion from T4 down to L1 followed by revision instrumentation from T4-S1 CPT code 89638 followed by iliac crest bone graft to 13275.

## 2024-06-04 DIAGNOSIS — E11.3553: Primary | ICD-10-CM

## 2024-06-04 RX ORDER — INSULIN DEGLUDEC 100 U/ML
INJECTION, SOLUTION SUBCUTANEOUS
Qty: 15 ML | Refills: 0 | Status: SHIPPED | OUTPATIENT
Start: 2024-06-04

## 2024-06-10 ENCOUNTER — APPOINTMENT (OUTPATIENT)
Dept: OBSTETRICS AND GYNECOLOGY | Facility: CLINIC | Age: 69
End: 2024-06-10
Payer: MEDICARE

## 2024-06-14 NOTE — PROGRESS NOTES
HPI   70 yo with of Diabetes 2 (dx late 30's) (retinopathy/neuropathy/nephropathy), HTN, Dyslipidemia, tremor presents for follow up, last seen nearly 1 year ago. Last A1c-6.6% today.            Pt is testing sugars 3 times per day. Pt is having low sugars <1 times/week. Pt's typical blood sugars 100-130 on waking, 100-150 at lunch, 150-160's at dinner, low 100's bedtime. Pt is following a carb controlled diet and knows reasonable carb allowances. Pt is able to afford their medications. Pt is exercising, planning PT.           Tresiba 14 units every day.  Doesn't tolerate metformin, swelling with pioglitazone, gi upset trulicity.  -Sglt2-i/miller are future options           Taking lisinopril 40 (1/2) mg for htn and tolerating, no longer on hydrochlorothiazide.  -started propranolol with neurology           Not able to tolerate a statin due to aches/pains.           Taking vitamin D3 5,000IU over the counter given lower levels and slightly elevated alk phos level, seen GI and gen surgery.      Current Outpatient Medications:     propranolol LA (Inderal LA) 60 mg 24 hr capsule, Take 1 capsule (60 mg) by mouth early in the morning.., Disp: , Rfl:     ascorbic acid (Vitamin C) 500 mg tablet, as directed Orally, Disp: , Rfl:     aspirin 81 mg EC tablet, Take 1 tablet (81 mg) by mouth once daily., Disp: , Rfl:     brimonidine-timoloL (Combigan) 0.2-0.5 % ophthalmic solution, INSTILL 1 DROP Twice daily, Disp: , Rfl:     calcium carbonate 600 mg calcium (1,500 mg) tablet, 1 tablet with meals Orally Twice a day, Disp: , Rfl:     chlorhexidine (Peridex) 0.12 % solution, RINSE MOUTH WITH 1/2 OUNCE THREE TIMES A DAY, Disp: , Rfl:     diazePAM (Valium) 5 mg tablet, Take 1 tab po 45min prior to testing; take 2nd tab as needed, Disp: 2 tablet, Rfl: 0    dorzolamide (Trusopt) 2 % ophthalmic solution, 1 drop into affected eye Ophthalmic bid, Disp: , Rfl:     dorzolamide-timoloL (Cosopt) 22.3-6.8 mg/mL ophthalmic solution, Administer 1  "drop into both eyes 2 times a day., Disp: 10 mL, Rfl: 5    dulaglutide (Trulicity) 1.5 mg/0.5 mL pen injector injection, inject 1.5 mg Subcutaneously Once a week for 30 day(s), Disp: , Rfl:     FreeStyle Lite Strips strip, USE TO TEST 3 TIMES A DAY AS DIRECTED, Disp: 300 each, Rfl: 2    gabapentin (Neurontin) 100 mg capsule, take 1 cap po TID for one week, WEEK 2 take 2 cap po TID, WEEK 3 take 3 cap po TID and continue, Disp: , Rfl:     HYDROcodone-acetaminophen (Norco) 5-325 mg tablet, Take 1 tablet by mouth every 6 hours if needed (FOR PAIN)., Disp: , Rfl:     latanoprost (Xalatan) 0.005 % ophthalmic solution, Administer 1 drop into both eyes once daily at bedtime., Disp: 2.5 mL, Rfl: 5    lisinopril 40 mg tablet, Take 0.5 tablets (20 mg) by mouth once daily., Disp: , Rfl:     melatonin 3 mg tablet, 1 tablet at bedtime as needed Orally Once a day for 30 day(s), Disp: , Rfl:     multivitamin tablet, Take 1 tablet by mouth once daily., Disp: , Rfl:     naproxen sodium (Aleve) 220 mg tablet, 1 tablet as needed Orally every 12 hrs, Disp: , Rfl:     oxyCODONE (Roxicodone) 5 mg immediate release tablet, 1 tab(s) orally every 4 hours, As needed, Pain - Mod (4-6)    T81.4, Disp: , Rfl:     pen needle, diabetic (PEN NEEDLE MISC), BD Altagracia- 4 mm X 32 G needle - as directed 4x a day sc 4 times per day, Disp: , Rfl:     primidone (Mysoline) 250 mg tablet, Take 1 tablet (250 mg) by mouth 3 times a day., Disp: , Rfl:     propranolol (Inderal) 20 mg tablet, TAKE 1 TABLET BY MOUTH ONCE A DAY FOR 2 WEEKS THEN INCREASE TO TWICE A DAY, Disp: , Rfl:     quinapril (Accupril) 40 mg tablet, TAKE ONE TABLET BY MOUTH EVERY DAY for 90 days, Disp: , Rfl:     rifAMPin (Rifadin) 300 mg capsule, 2 cap(s) orally once a day, As Needed, Disp: , Rfl:     Sure Comfort Pen Needle 32 gauge x 5/32\" needle, AS DIRECTED DAILY FOR 90 DAYS, Disp: , Rfl:     Tresiba FlexTouch U-100 100 unit/mL (3 mL) injection, START WITH INJECTING 20 UNITS SUBCUTANEOUSLY " DAILY, UP TO 60 UNITS DAILY AS DIRECTED, Disp: 15 mL, Rfl: 0      Allergies as of 06/18/2024 - Reviewed 06/18/2024   Allergen Reaction Noted    Erythromycin Nausea And Vomiting 09/18/2023    Morphine Unknown 09/18/2023    Rosuvastatin Other and Myalgia 09/18/2023         Review of Systems   Cardiology: Lightheadedness-denies.  Chest pain-denies.  Leg edema-denies.  Palpitations-denies.  Respiratory: Cough-denies. Shortness of breath-denies.  Wheezing-denies.  Gastroenterology: Constipation-denies.  Diarrhea-denies.  Heartburn-denies.  Endocrinology: Cold intolerance-denies.  Heat intolerance-denies.  Sweats-denies.  Neurology: Headache-denies.  Tremor-denies.  Neuropathy in extremities-denies.  Psychology: Low energy-denies.  Irritability +  Sleep disturbances-denies.      /54 (BP Location: Right arm, Patient Position: Sitting)   Pulse 68   Wt 75.8 kg (167 lb 3.2 oz)   LMP  (LMP Unknown)   BMI 24.69 kg/m²       Labs:  Lab Results   Component Value Date    WBC 6.4 05/02/2024    NRBC 0.0 05/02/2024    RBC 3.83 (L) 05/02/2024    HGB 12.4 05/02/2024    HCT 37.9 05/02/2024     05/02/2024     Lab Results   Component Value Date    CALCIUM 9.2 09/05/2023    AST 16 09/05/2023    ALKPHOS (H) 09/05/2023     152  Reference range: 40 to 120  Unit: U/L    Test performed at:  [address] [address]      ALKPHOS 153 (H) 09/05/2023    BILITOT 0.2 09/05/2023    PROT 6.6 09/05/2023    ALBUMIN 3.7 09/05/2023    GLOB 2.9 09/05/2023    AGR 1.3 (L) 09/05/2023     09/05/2023    K 5.0 09/05/2023     09/05/2023    CO2 26 09/05/2023    ANIONGAP 9 09/05/2023    BUN 27 (H) 09/05/2023    CREATININE 0.70 05/02/2024    UREACREAUR 38.6 (H) 09/05/2023    GLUCOSE 149 (H) 09/05/2023    ALT 25 09/05/2023    EGFR >90 05/02/2024     Lab Results   Component Value Date    CHOL 266 (H) 08/23/2023    TRIG 213 (H) 08/23/2023    HDL 58 08/23/2023    LDLCALC 165 (H) 08/23/2023     Lab Results   Component Value Date     MICROALBCREA 66.4 (H) 03/31/2022     Lab Results   Component Value Date    TSH 1.63 06/24/2022     Lab Results   Component Value Date    JMTFWUDO23 2,000 (H) 08/23/2023     Lab Results   Component Value Date    HGBA1C 6.6 (A) 06/18/2024         Physical Exam   General Appearance: pleasant, cooperative, no acute distress  HEENT: no chemosis, no proptosis, no lid lag, no lid retraction  Neck: no lymphadenopathy, no thyromegaly, no dominant thyroid nodules  Heart: no murmurs, regular rate and rhythm, S1 and S2  Lungs: no wheezes, no rhonci, no rales  Extremities: no lower extremity swelling      Assessment/Plan   1. Type 2 diabetes mellitus with hyperglycemia, unspecified whether long term insulin use (Multi)  -A1c ordered and reviewed  -labs reviewed  -glycemic log reviewed    -titrate insulin in 2 unit intervals q3 days for am sugars 100-130 range  -prior to back surgery lower insulin by 4 units    2. Mixed hyperlipidemia  -has not tolerated statin, labs reviewed    3. Essential hypertension  -at target on therapy after resting, no change for now will follow         Follow Up:  Indra 6 months    Medical Decision Making  Complexity of problem: Chronic illness of diabetes mellitus uncontrolled, progressing  Data analyzed and reviewed: Reviewed prior notes, blood glucose data, labs including HgbA1c, lipids, serum chemistries.  Ordered tests.   Risk of complications and morbidities: Is definite because of use of insulin and risk of hypoglycemia.  Prescription medications reviewed and modifications made.  Compliance assessed.  Addressed social determinants of health including food insecurity.

## 2024-06-18 ENCOUNTER — APPOINTMENT (OUTPATIENT)
Dept: ENDOCRINOLOGY | Facility: CLINIC | Age: 69
End: 2024-06-18
Payer: MEDICARE

## 2024-06-18 VITALS
WEIGHT: 167.2 LBS | SYSTOLIC BLOOD PRESSURE: 112 MMHG | DIASTOLIC BLOOD PRESSURE: 54 MMHG | HEART RATE: 68 BPM | BODY MASS INDEX: 24.69 KG/M2

## 2024-06-18 DIAGNOSIS — I10 ESSENTIAL HYPERTENSION: ICD-10-CM

## 2024-06-18 DIAGNOSIS — E78.2 MIXED HYPERLIPIDEMIA: ICD-10-CM

## 2024-06-18 DIAGNOSIS — E11.65 TYPE 2 DIABETES MELLITUS WITH HYPERGLYCEMIA, UNSPECIFIED WHETHER LONG TERM INSULIN USE (MULTI): Primary | ICD-10-CM

## 2024-06-18 PROBLEM — Z79.4 TYPE 2 DIABETES MELLITUS WITH HYPERGLYCEMIA, WITH LONG-TERM CURRENT USE OF INSULIN (MULTI): Status: ACTIVE | Noted: 2024-06-18

## 2024-06-18 LAB — POC HEMOGLOBIN A1C: 6.6 % (ref 4.2–6.5)

## 2024-06-18 PROCEDURE — 1159F MED LIST DOCD IN RCRD: CPT | Performed by: INTERNAL MEDICINE

## 2024-06-18 PROCEDURE — 4010F ACE/ARB THERAPY RXD/TAKEN: CPT | Performed by: INTERNAL MEDICINE

## 2024-06-18 PROCEDURE — 99214 OFFICE O/P EST MOD 30 MIN: CPT | Performed by: INTERNAL MEDICINE

## 2024-06-18 PROCEDURE — 3074F SYST BP LT 130 MM HG: CPT | Performed by: INTERNAL MEDICINE

## 2024-06-18 PROCEDURE — 83036 HEMOGLOBIN GLYCOSYLATED A1C: CPT | Performed by: INTERNAL MEDICINE

## 2024-06-18 PROCEDURE — 3078F DIAST BP <80 MM HG: CPT | Performed by: INTERNAL MEDICINE

## 2024-06-18 PROCEDURE — 1125F AMNT PAIN NOTED PAIN PRSNT: CPT | Performed by: INTERNAL MEDICINE

## 2024-06-18 PROCEDURE — 1036F TOBACCO NON-USER: CPT | Performed by: INTERNAL MEDICINE

## 2024-06-18 PROCEDURE — 1157F ADVNC CARE PLAN IN RCRD: CPT | Performed by: INTERNAL MEDICINE

## 2024-06-18 RX ORDER — PEN NEEDLE, DIABETIC 32GX 5/32"
NEEDLE, DISPOSABLE MISCELLANEOUS
Qty: 100 EACH | Refills: 11 | Status: SHIPPED | OUTPATIENT
Start: 2024-06-18

## 2024-06-18 RX ORDER — PROPRANOLOL HYDROCHLORIDE 60 MG/1
1 CAPSULE, EXTENDED RELEASE ORAL
COMMUNITY
Start: 2024-04-22

## 2024-06-18 ASSESSMENT — ENCOUNTER SYMPTOMS: DEPRESSION: 0

## 2024-06-18 ASSESSMENT — PAIN SCALES - GENERAL: PAINLEVEL: 4

## 2024-06-24 ENCOUNTER — APPOINTMENT (OUTPATIENT)
Dept: OBSTETRICS AND GYNECOLOGY | Facility: CLINIC | Age: 69
End: 2024-06-24
Payer: MEDICARE

## 2024-06-26 DIAGNOSIS — E11.65 TYPE 2 DIABETES MELLITUS WITH HYPERGLYCEMIA, UNSPECIFIED WHETHER LONG TERM INSULIN USE (MULTI): ICD-10-CM

## 2024-06-26 RX ORDER — PEN NEEDLE, DIABETIC 32GX 5/32"
NEEDLE, DISPOSABLE MISCELLANEOUS
Qty: 100 EACH | Refills: 11 | Status: SHIPPED | OUTPATIENT
Start: 2024-06-26

## 2024-07-05 ENCOUNTER — TELEPHONE (OUTPATIENT)
Dept: ENDOCRINOLOGY | Facility: CLINIC | Age: 69
End: 2024-07-05
Payer: MEDICARE

## 2024-07-05 NOTE — TELEPHONE ENCOUNTER
Patient taking lisinopril while she has been on it she has a tingling in her upper arm she stopped for a week and started taking 20mg and she is experiencing weakness and tingling in her upper arm she wants to know if these are side effects

## 2024-07-11 ENCOUNTER — TELEPHONE (OUTPATIENT)
Dept: ENDOCRINOLOGY | Facility: CLINIC | Age: 69
End: 2024-07-11
Payer: MEDICARE

## 2024-07-11 NOTE — TELEPHONE ENCOUNTER
Patient called after hours 07/09/2024 questioning her leg weakness and if it was from her Lisinopril. She did go on the share that she is having back issues and anticipating surgery. I recommended she follow up with her Ortho/Neuro Surgery provider in the morning.

## 2024-07-12 ENCOUNTER — TELEPHONE (OUTPATIENT)
Dept: ENDOCRINOLOGY | Facility: CLINIC | Age: 69
End: 2024-07-12
Payer: MEDICARE

## 2024-07-12 ENCOUNTER — TRANSCRIBE ORDERS (OUTPATIENT)
Dept: ORTHOPEDIC SURGERY | Facility: HOSPITAL | Age: 69
End: 2024-07-12
Payer: MEDICARE

## 2024-07-12 DIAGNOSIS — Z01.812 ENCOUNTER FOR PRE-OPERATIVE LABORATORY TESTING: ICD-10-CM

## 2024-07-12 NOTE — TELEPHONE ENCOUNTER
Pharmacy wanting to know why she is not on a statin (has a lot of aches and pain) said to use G72.9 in note

## 2024-07-15 ENCOUNTER — HOSPITAL ENCOUNTER (OUTPATIENT)
Facility: HOSPITAL | Age: 69
Setting detail: OBSERVATION
Discharge: NURSING, REHAB OR LONG-TERM CARE | End: 2024-07-17
Attending: INTERNAL MEDICINE | Admitting: INTERNAL MEDICINE
Payer: MEDICARE

## 2024-07-15 ENCOUNTER — APPOINTMENT (OUTPATIENT)
Dept: RADIOLOGY | Facility: HOSPITAL | Age: 69
End: 2024-07-15
Payer: MEDICARE

## 2024-07-15 ENCOUNTER — TELEPHONE (OUTPATIENT)
Dept: PRIMARY CARE | Facility: CLINIC | Age: 69
End: 2024-07-15
Payer: MEDICARE

## 2024-07-15 ENCOUNTER — TELEPHONE (OUTPATIENT)
Dept: ORTHOPEDIC SURGERY | Facility: HOSPITAL | Age: 69
End: 2024-07-15
Payer: MEDICARE

## 2024-07-15 DIAGNOSIS — I10 ESSENTIAL HYPERTENSION: ICD-10-CM

## 2024-07-15 DIAGNOSIS — E11.3553: ICD-10-CM

## 2024-07-15 DIAGNOSIS — M48.061 SPINAL STENOSIS OF LUMBAR REGION, UNSPECIFIED WHETHER NEUROGENIC CLAUDICATION PRESENT: Primary | ICD-10-CM
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
## 2024-07-15 LAB
ALBUMIN SERPL-MCNC: 4 G/DL (ref 3.5–5)
ALP BLD-CCNC: 154 U/L (ref 35–125)
ALT SERPL-CCNC: 24 U/L (ref 5–40)
ANION GAP SERPL CALC-SCNC: 10 MMOL/L
APPEARANCE UR: CLEAR
AST SERPL-CCNC: 17 U/L (ref 5–40)
BASOPHILS # BLD AUTO: 0.03 X10*3/UL (ref 0–0.1)
BASOPHILS NFR BLD AUTO: 0.5 %
BILIRUB SERPL-MCNC: 0.2 MG/DL (ref 0.1–1.2)
BILIRUB UR STRIP.AUTO-MCNC: NEGATIVE MG/DL
BUN SERPL-MCNC: 27 MG/DL (ref 8–25)
CALCIUM SERPL-MCNC: 9 MG/DL (ref 8.5–10.4)
CHLORIDE SERPL-SCNC: 100 MMOL/L (ref 97–107)
CO2 SERPL-SCNC: 26 MMOL/L (ref 24–31)
COLOR UR: ABNORMAL
CREAT SERPL-MCNC: 0.6 MG/DL (ref 0.4–1.6)
EGFRCR SERPLBLD CKD-EPI 2021: >90 ML/MIN/1.73M*2
EOSINOPHIL # BLD AUTO: 0.08 X10*3/UL (ref 0–0.7)
EOSINOPHIL NFR BLD AUTO: 1.4 %
ERYTHROCYTE [DISTWIDTH] IN BLOOD BY AUTOMATED COUNT: 13 % (ref 11.5–14.5)
GLUCOSE BLD MANUAL STRIP-MCNC: 155 MG/DL (ref 74–99)
GLUCOSE SERPL-MCNC: 233 MG/DL (ref 65–99)
GLUCOSE UR STRIP.AUTO-MCNC: NORMAL MG/DL
HCT VFR BLD AUTO: 34.5 % (ref 36–46)
HGB BLD-MCNC: 12 G/DL (ref 12–16)
HYALINE CASTS #/AREA URNS AUTO: ABNORMAL /LPF
IMM GRANULOCYTES # BLD AUTO: 0.01 X10*3/UL (ref 0–0.7)
IMM GRANULOCYTES NFR BLD AUTO: 0.2 % (ref 0–0.9)
KETONES UR STRIP.AUTO-MCNC: NEGATIVE MG/DL
LEUKOCYTE ESTERASE UR QL STRIP.AUTO: NEGATIVE
LYMPHOCYTES # BLD AUTO: 1.05 X10*3/UL (ref 1.2–4.8)
LYMPHOCYTES NFR BLD AUTO: 17.8 %
MAGNESIUM SERPL-MCNC: 1.7 MG/DL (ref 1.6–3.1)
MCH RBC QN AUTO: 33 PG (ref 26–34)
MCHC RBC AUTO-ENTMCNC: 34.8 G/DL (ref 32–36)
MCV RBC AUTO: 95 FL (ref 80–100)
MONOCYTES # BLD AUTO: 0.37 X10*3/UL (ref 0.1–1)
MONOCYTES NFR BLD AUTO: 6.3 %
MUCOUS THREADS #/AREA URNS AUTO: ABNORMAL /LPF
NEUTROPHILS # BLD AUTO: 4.36 X10*3/UL (ref 1.2–7.7)
NEUTROPHILS NFR BLD AUTO: 73.8 %
NITRITE UR QL STRIP.AUTO: NEGATIVE
NRBC BLD-RTO: 0 /100 WBCS (ref 0–0)
PH UR STRIP.AUTO: 6.5 [PH]
PLATELET # BLD AUTO: 197 X10*3/UL (ref 150–450)
POTASSIUM SERPL-SCNC: 4.5 MMOL/L (ref 3.4–5.1)
PROT SERPL-MCNC: 6.8 G/DL (ref 5.9–7.9)
PROT UR STRIP.AUTO-MCNC: ABNORMAL MG/DL
RBC # BLD AUTO: 3.64 X10*6/UL (ref 4–5.2)
RBC # UR STRIP.AUTO: NEGATIVE /UL
RBC #/AREA URNS AUTO: ABNORMAL /HPF
SODIUM SERPL-SCNC: 136 MMOL/L (ref 133–145)
SP GR UR STRIP.AUTO: 1.02
UROBILINOGEN UR STRIP.AUTO-MCNC: NORMAL MG/DL
WBC # BLD AUTO: 5.9 X10*3/UL (ref 4.4–11.3)
WBC #/AREA URNS AUTO: ABNORMAL /HPF

## 2024-07-15 PROCEDURE — 2500000004 HC RX 250 GENERAL PHARMACY W/ HCPCS (ALT 636 FOR OP/ED): Performed by: INTERNAL MEDICINE

## 2024-07-15 PROCEDURE — 36415 COLL VENOUS BLD VENIPUNCTURE: CPT | Performed by: PHYSICIAN ASSISTANT

## 2024-07-15 PROCEDURE — 72131 CT LUMBAR SPINE W/O DYE: CPT

## 2024-07-15 PROCEDURE — G0378 HOSPITAL OBSERVATION PER HR: HCPCS

## 2024-07-15 PROCEDURE — 85025 COMPLETE CBC W/AUTO DIFF WBC: CPT | Performed by: PHYSICIAN ASSISTANT

## 2024-07-15 PROCEDURE — 80053 COMPREHEN METABOLIC PANEL: CPT | Performed by: PHYSICIAN ASSISTANT

## 2024-07-15 PROCEDURE — 83735 ASSAY OF MAGNESIUM: CPT | Performed by: PHYSICIAN ASSISTANT

## 2024-07-15 PROCEDURE — 96372 THER/PROPH/DIAG INJ SC/IM: CPT | Performed by: INTERNAL MEDICINE

## 2024-07-15 PROCEDURE — 96374 THER/PROPH/DIAG INJ IV PUSH: CPT

## 2024-07-15 PROCEDURE — 81001 URINALYSIS AUTO W/SCOPE: CPT | Performed by: PHYSICIAN ASSISTANT

## 2024-07-15 PROCEDURE — 82947 ASSAY GLUCOSE BLOOD QUANT: CPT | Mod: 59

## 2024-07-15 PROCEDURE — 99285 EMERGENCY DEPT VISIT HI MDM: CPT | Mod: 25

## 2024-07-15 PROCEDURE — 72131 CT LUMBAR SPINE W/O DYE: CPT | Performed by: RADIOLOGY

## 2024-07-15 PROCEDURE — 2500000001 HC RX 250 WO HCPCS SELF ADMINISTERED DRUGS (ALT 637 FOR MEDICARE OP): Performed by: INTERNAL MEDICINE

## 2024-07-15 RX ORDER — ASCORBIC ACID 500 MG
500 TABLET ORAL DAILY
Status: DISCONTINUED | OUTPATIENT
Start: 2024-07-16 | End: 2024-07-17 | Stop reason: HOSPADM

## 2024-07-15 RX ORDER — ONDANSETRON HYDROCHLORIDE 2 MG/ML
4 INJECTION, SOLUTION INTRAVENOUS EVERY 8 HOURS PRN
Status: DISCONTINUED | OUTPATIENT
Start: 2024-07-15 | End: 2024-07-17 | Stop reason: HOSPADM

## 2024-07-15 RX ORDER — GUAIFENESIN/DEXTROMETHORPHAN 100-10MG/5
5 SYRUP ORAL EVERY 4 HOURS PRN
Status: DISCONTINUED | OUTPATIENT
Start: 2024-07-15 | End: 2024-07-17 | Stop reason: HOSPADM

## 2024-07-15 RX ORDER — BRIMONIDINE TARTRATE 2 MG/ML
1 SOLUTION/ DROPS OPHTHALMIC 2 TIMES DAILY
Status: DISCONTINUED | OUTPATIENT
Start: 2024-07-15 | End: 2024-07-17 | Stop reason: HOSPADM

## 2024-07-15 RX ORDER — ACETAMINOPHEN 160 MG/5ML
650 SOLUTION ORAL EVERY 4 HOURS PRN
Status: DISCONTINUED | OUTPATIENT
Start: 2024-07-15 | End: 2024-07-17 | Stop reason: HOSPADM

## 2024-07-15 RX ORDER — PRIMIDONE 250 MG/1
125 TABLET ORAL 3 TIMES DAILY
Status: DISCONTINUED | OUTPATIENT
Start: 2024-07-15 | End: 2024-07-15

## 2024-07-15 RX ORDER — INSULIN LISPRO 100 [IU]/ML
0-5 INJECTION, SOLUTION INTRAVENOUS; SUBCUTANEOUS
Status: DISCONTINUED | OUTPATIENT
Start: 2024-07-15 | End: 2024-07-16

## 2024-07-15 RX ORDER — KETOROLAC TROMETHAMINE 30 MG/ML
15 INJECTION, SOLUTION INTRAMUSCULAR; INTRAVENOUS ONCE
Status: COMPLETED | OUTPATIENT
Start: 2024-07-15 | End: 2024-07-15

## 2024-07-15 RX ORDER — GUAIFENESIN 600 MG/1
600 TABLET, EXTENDED RELEASE ORAL EVERY 12 HOURS PRN
Status: DISCONTINUED | OUTPATIENT
Start: 2024-07-15 | End: 2024-07-17 | Stop reason: HOSPADM

## 2024-07-15 RX ORDER — ASPIRIN 81 MG/1
81 TABLET ORAL DAILY
Status: DISCONTINUED | OUTPATIENT
Start: 2024-07-16 | End: 2024-07-17 | Stop reason: HOSPADM

## 2024-07-15 RX ORDER — DORZOLAMIDE HCL 20 MG/ML
1 SOLUTION/ DROPS OPHTHALMIC 3 TIMES DAILY
Status: DISCONTINUED | OUTPATIENT
Start: 2024-07-16 | End: 2024-07-17 | Stop reason: HOSPADM

## 2024-07-15 RX ORDER — LIDOCAINE 560 MG/1
1 PATCH PERCUTANEOUS; TOPICAL; TRANSDERMAL NIGHTLY
Status: DISCONTINUED | OUTPATIENT
Start: 2024-07-15 | End: 2024-07-17 | Stop reason: HOSPADM

## 2024-07-15 RX ORDER — SODIUM CHLORIDE 9 MG/ML
75 INJECTION, SOLUTION INTRAVENOUS CONTINUOUS
Status: ACTIVE | OUTPATIENT
Start: 2024-07-15 | End: 2024-07-16

## 2024-07-15 RX ORDER — TALC
3 POWDER (GRAM) TOPICAL NIGHTLY PRN
Status: DISCONTINUED | OUTPATIENT
Start: 2024-07-15 | End: 2024-07-17 | Stop reason: HOSPADM

## 2024-07-15 RX ORDER — FAMOTIDINE 20 MG/1
20 TABLET, FILM COATED ORAL DAILY
Status: DISCONTINUED | OUTPATIENT
Start: 2024-07-16 | End: 2024-07-17 | Stop reason: HOSPADM

## 2024-07-15 RX ORDER — DEXTROSE 50 % IN WATER (D50W) INTRAVENOUS SYRINGE
12.5
Status: DISCONTINUED | OUTPATIENT
Start: 2024-07-15 | End: 2024-07-17 | Stop reason: HOSPADM

## 2024-07-15 RX ORDER — DEXTROSE 50 % IN WATER (D50W) INTRAVENOUS SYRINGE
25
Status: DISCONTINUED | OUTPATIENT
Start: 2024-07-15 | End: 2024-07-17 | Stop reason: HOSPADM

## 2024-07-15 RX ORDER — ENOXAPARIN SODIUM 100 MG/ML
40 INJECTION SUBCUTANEOUS DAILY
Status: DISCONTINUED | OUTPATIENT
Start: 2024-07-15 | End: 2024-07-17 | Stop reason: HOSPADM

## 2024-07-15 RX ORDER — LISINOPRIL 20 MG/1
20 TABLET ORAL DAILY
Status: DISCONTINUED | OUTPATIENT
Start: 2024-07-16 | End: 2024-07-17 | Stop reason: HOSPADM

## 2024-07-15 RX ORDER — LATANOPROST 50 UG/ML
1 SOLUTION/ DROPS OPHTHALMIC NIGHTLY
Status: DISCONTINUED | OUTPATIENT
Start: 2024-07-15 | End: 2024-07-17 | Stop reason: HOSPADM

## 2024-07-15 RX ORDER — POLYETHYLENE GLYCOL 3350 17 G/17G
17 POWDER, FOR SOLUTION ORAL DAILY PRN
Status: DISCONTINUED | OUTPATIENT
Start: 2024-07-15 | End: 2024-07-17 | Stop reason: HOSPADM

## 2024-07-15 RX ORDER — PROPRANOLOL HYDROCHLORIDE 60 MG/1
60 CAPSULE, EXTENDED RELEASE ORAL
Status: DISCONTINUED | OUTPATIENT
Start: 2024-07-16 | End: 2024-07-17 | Stop reason: HOSPADM

## 2024-07-15 RX ORDER — ACETAMINOPHEN 325 MG/1
650 TABLET ORAL EVERY 4 HOURS PRN
Status: DISCONTINUED | OUTPATIENT
Start: 2024-07-15 | End: 2024-07-17 | Stop reason: HOSPADM

## 2024-07-15 RX ORDER — ONDANSETRON 4 MG/1
4 TABLET, FILM COATED ORAL EVERY 8 HOURS PRN
Status: DISCONTINUED | OUTPATIENT
Start: 2024-07-15 | End: 2024-07-17 | Stop reason: HOSPADM

## 2024-07-15 RX ORDER — PRIMIDONE 50 MG/1
125 TABLET ORAL 3 TIMES DAILY
Status: DISCONTINUED | OUTPATIENT
Start: 2024-07-15 | End: 2024-07-17 | Stop reason: HOSPADM

## 2024-07-15 RX ORDER — TIMOLOL MALEATE 5 MG/ML
1 SOLUTION/ DROPS OPHTHALMIC 2 TIMES DAILY
Status: DISCONTINUED | OUTPATIENT
Start: 2024-07-15 | End: 2024-07-17 | Stop reason: HOSPADM

## 2024-07-15 RX ORDER — ACETAMINOPHEN 325 MG/1
975 TABLET ORAL ONCE
Status: COMPLETED | OUTPATIENT
Start: 2024-07-15 | End: 2024-07-15

## 2024-07-15 RX ORDER — BRIMONIDINE TARTRATE AND TIMOLOL MALEATE 2; 5 MG/ML; MG/ML
1 SOLUTION OPHTHALMIC EVERY 12 HOURS SCHEDULED
Status: DISCONTINUED | OUTPATIENT
Start: 2024-07-15 | End: 2024-07-15

## 2024-07-15 RX ORDER — PRIMIDONE 250 MG/1
250 TABLET ORAL 3 TIMES DAILY
Status: DISCONTINUED | OUTPATIENT
Start: 2024-07-15 | End: 2024-07-15

## 2024-07-15 RX ORDER — ACETAMINOPHEN 650 MG/1
650 SUPPOSITORY RECTAL EVERY 4 HOURS PRN
Status: DISCONTINUED | OUTPATIENT
Start: 2024-07-15 | End: 2024-07-17 | Stop reason: HOSPADM

## 2024-07-15 ASSESSMENT — COLUMBIA-SUICIDE SEVERITY RATING SCALE - C-SSRS
2. HAVE YOU ACTUALLY HAD ANY THOUGHTS OF KILLING YOURSELF?: NO
6. HAVE YOU EVER DONE ANYTHING, STARTED TO DO ANYTHING, OR PREPARED TO DO ANYTHING TO END YOUR LIFE?: NO
1. IN THE PAST MONTH, HAVE YOU WISHED YOU WERE DEAD OR WISHED YOU COULD GO TO SLEEP AND NOT WAKE UP?: NO

## 2024-07-15 ASSESSMENT — LIFESTYLE VARIABLES
TOTAL SCORE: 0
EVER FELT BAD OR GUILTY ABOUT YOUR DRINKING: NO
HAVE YOU EVER FELT YOU SHOULD CUT DOWN ON YOUR DRINKING: NO
HAVE PEOPLE ANNOYED YOU BY CRITICIZING YOUR DRINKING: NO
EVER HAD A DRINK FIRST THING IN THE MORNING TO STEADY YOUR NERVES TO GET RID OF A HANGOVER: NO

## 2024-07-15 ASSESSMENT — PAIN SCALES - GENERAL: PAINLEVEL_OUTOF10: 0 - NO PAIN

## 2024-07-15 ASSESSMENT — PAIN - FUNCTIONAL ASSESSMENT: PAIN_FUNCTIONAL_ASSESSMENT: 0-10

## 2024-07-15 NOTE — TELEPHONE ENCOUNTER
PT calling stating she needs a letter sent to [facility] stating that PT is homebound and that she needs home care or skilled nursing. PT states she fell again today 7/15/2024. EMS was called and PT states she was fine and not injured. PT states she has difficulty walking and caring for her and her .  is 71, and PT is 69. PT states she spoke with EMS and [facility] and was told that she would qualify for skilled nursing coverage or home health care. Please advise.

## 2024-07-15 NOTE — TELEPHONE ENCOUNTER
Patient called stating she fell and is having a lot of difficulty walking. Is currently homebound and want to talk to someone about what she can do.

## 2024-07-16 LAB
ANION GAP SERPL CALC-SCNC: 10 MMOL/L
BASOPHILS # BLD AUTO: 0.03 X10*3/UL (ref 0–0.1)
BASOPHILS NFR BLD AUTO: 0.6 %
BUN SERPL-MCNC: 29 MG/DL (ref 8–25)
CALCIUM SERPL-MCNC: 9.2 MG/DL (ref 8.5–10.4)
CHLORIDE SERPL-SCNC: 105 MMOL/L (ref 97–107)
CO2 SERPL-SCNC: 25 MMOL/L (ref 24–31)
CREAT SERPL-MCNC: 0.8 MG/DL (ref 0.4–1.6)
EGFRCR SERPLBLD CKD-EPI 2021: 80 ML/MIN/1.73M*2
EOSINOPHIL # BLD AUTO: 0.07 X10*3/UL (ref 0–0.7)
EOSINOPHIL NFR BLD AUTO: 1.3 %
ERYTHROCYTE [DISTWIDTH] IN BLOOD BY AUTOMATED COUNT: 13.1 % (ref 11.5–14.5)
GLUCOSE BLD MANUAL STRIP-MCNC: 144 MG/DL (ref 74–99)
GLUCOSE BLD MANUAL STRIP-MCNC: 163 MG/DL (ref 74–99)
GLUCOSE BLD MANUAL STRIP-MCNC: 172 MG/DL (ref 74–99)
GLUCOSE BLD MANUAL STRIP-MCNC: 211 MG/DL (ref 74–99)
GLUCOSE SERPL-MCNC: 193 MG/DL (ref 65–99)
HCT VFR BLD AUTO: 32.2 % (ref 36–46)
HGB BLD-MCNC: 10.9 G/DL (ref 12–16)
HOLD SPECIMEN: NORMAL
IMM GRANULOCYTES # BLD AUTO: 0.01 X10*3/UL (ref 0–0.7)
IMM GRANULOCYTES NFR BLD AUTO: 0.2 % (ref 0–0.9)
LYMPHOCYTES # BLD AUTO: 1.2 X10*3/UL (ref 1.2–4.8)
LYMPHOCYTES NFR BLD AUTO: 22.7 %
MCH RBC QN AUTO: 32.2 PG (ref 26–34)
MCHC RBC AUTO-ENTMCNC: 33.9 G/DL (ref 32–36)
MCV RBC AUTO: 95 FL (ref 80–100)
MONOCYTES # BLD AUTO: 0.48 X10*3/UL (ref 0.1–1)
MONOCYTES NFR BLD AUTO: 9.1 %
NEUTROPHILS # BLD AUTO: 3.5 X10*3/UL (ref 1.2–7.7)
NEUTROPHILS NFR BLD AUTO: 66.1 %
NRBC BLD-RTO: 0 /100 WBCS (ref 0–0)
PLATELET # BLD AUTO: 180 X10*3/UL (ref 150–450)
POTASSIUM SERPL-SCNC: 4.4 MMOL/L (ref 3.4–5.1)
RBC # BLD AUTO: 3.39 X10*6/UL (ref 4–5.2)
SODIUM SERPL-SCNC: 140 MMOL/L (ref 133–145)
TSH SERPL DL<=0.05 MIU/L-ACNC: 1.46 MIU/L (ref 0.27–4.2)
WBC # BLD AUTO: 5.3 X10*3/UL (ref 4.4–11.3)

## 2024-07-16 PROCEDURE — 2500000002 HC RX 250 W HCPCS SELF ADMINISTERED DRUGS (ALT 637 FOR MEDICARE OP, ALT 636 FOR OP/ED): Performed by: STUDENT IN AN ORGANIZED HEALTH CARE EDUCATION/TRAINING PROGRAM

## 2024-07-16 PROCEDURE — 97530 THERAPEUTIC ACTIVITIES: CPT | Mod: GO

## 2024-07-16 PROCEDURE — 97161 PT EVAL LOW COMPLEX 20 MIN: CPT | Mod: GP

## 2024-07-16 PROCEDURE — 2500000005 HC RX 250 GENERAL PHARMACY W/O HCPCS: Performed by: INTERNAL MEDICINE

## 2024-07-16 PROCEDURE — 2500000001 HC RX 250 WO HCPCS SELF ADMINISTERED DRUGS (ALT 637 FOR MEDICARE OP): Performed by: INTERNAL MEDICINE

## 2024-07-16 PROCEDURE — 2500000002 HC RX 250 W HCPCS SELF ADMINISTERED DRUGS (ALT 637 FOR MEDICARE OP, ALT 636 FOR OP/ED): Performed by: INTERNAL MEDICINE

## 2024-07-16 PROCEDURE — 2500000004 HC RX 250 GENERAL PHARMACY W/ HCPCS (ALT 636 FOR OP/ED): Performed by: INTERNAL MEDICINE

## 2024-07-16 PROCEDURE — G0378 HOSPITAL OBSERVATION PER HR: HCPCS

## 2024-07-16 PROCEDURE — 80048 BASIC METABOLIC PNL TOTAL CA: CPT | Performed by: INTERNAL MEDICINE

## 2024-07-16 PROCEDURE — 85025 COMPLETE CBC W/AUTO DIFF WBC: CPT | Performed by: INTERNAL MEDICINE

## 2024-07-16 PROCEDURE — 36415 COLL VENOUS BLD VENIPUNCTURE: CPT | Performed by: INTERNAL MEDICINE

## 2024-07-16 PROCEDURE — 97166 OT EVAL MOD COMPLEX 45 MIN: CPT | Mod: GO

## 2024-07-16 PROCEDURE — 82947 ASSAY GLUCOSE BLOOD QUANT: CPT | Mod: 59

## 2024-07-16 PROCEDURE — 82947 ASSAY GLUCOSE BLOOD QUANT: CPT

## 2024-07-16 PROCEDURE — 84443 ASSAY THYROID STIM HORMONE: CPT | Performed by: INTERNAL MEDICINE

## 2024-07-16 PROCEDURE — 97530 THERAPEUTIC ACTIVITIES: CPT | Mod: GP

## 2024-07-16 RX ORDER — INSULIN LISPRO 100 [IU]/ML
0-10 INJECTION, SOLUTION INTRAVENOUS; SUBCUTANEOUS
Status: DISCONTINUED | OUTPATIENT
Start: 2024-07-16 | End: 2024-07-17 | Stop reason: HOSPADM

## 2024-07-16 RX ORDER — TRAMADOL HYDROCHLORIDE 50 MG/1
50 TABLET ORAL EVERY 8 HOURS PRN
Status: COMPLETED | OUTPATIENT
Start: 2024-07-16 | End: 2024-07-16

## 2024-07-16 RX ORDER — FERROUS SULFATE, DRIED 160(50) MG
1 TABLET, EXTENDED RELEASE ORAL DAILY
COMMUNITY

## 2024-07-16 RX ORDER — BRIMONIDINE TARTRATE 2 MG/ML
1 SOLUTION/ DROPS OPHTHALMIC 2 TIMES DAILY
COMMUNITY

## 2024-07-16 RX ORDER — ACETAMINOPHEN 500 MG
1-2 TABLET ORAL EVERY 6 HOURS PRN
COMMUNITY

## 2024-07-16 SDOH — ECONOMIC STABILITY: FOOD INSECURITY: WITHIN THE PAST 12 MONTHS, THE FOOD YOU BOUGHT JUST DIDN'T LAST AND YOU DIDN'T HAVE MONEY TO GET MORE.: NEVER TRUE

## 2024-07-16 SDOH — ECONOMIC STABILITY: INCOME INSECURITY: HOW HARD IS IT FOR YOU TO PAY FOR THE VERY BASICS LIKE FOOD, HOUSING, MEDICAL CARE, AND HEATING?: NOT HARD AT ALL

## 2024-07-16 SDOH — ECONOMIC STABILITY: INCOME INSECURITY: IN THE PAST 12 MONTHS, HAS THE ELECTRIC, GAS, OIL, OR WATER COMPANY THREATENED TO SHUT OFF SERVICE IN YOUR HOME?: NO

## 2024-07-16 SDOH — SOCIAL STABILITY: SOCIAL INSECURITY
WITHIN THE LAST YEAR, HAVE YOU BEEN KICKED, HIT, SLAPPED, OR OTHERWISE PHYSICALLY HURT BY YOUR PARTNER OR EX-PARTNER?: NO

## 2024-07-16 SDOH — SOCIAL STABILITY: SOCIAL INSECURITY: DO YOU FEEL ANYONE HAS EXPLOITED OR TAKEN ADVANTAGE OF YOU FINANCIALLY OR OF YOUR PERSONAL PROPERTY?: NO

## 2024-07-16 SDOH — ECONOMIC STABILITY: TRANSPORTATION INSECURITY
IN THE PAST 12 MONTHS, HAS LACK OF TRANSPORTATION KEPT YOU FROM MEETINGS, WORK, OR FROM GETTING THINGS NEEDED FOR DAILY LIVING?: NO

## 2024-07-16 SDOH — ECONOMIC STABILITY: INCOME INSECURITY: IN THE LAST 12 MONTHS, WAS THERE A TIME WHEN YOU WERE NOT ABLE TO PAY THE MORTGAGE OR RENT ON TIME?: NO

## 2024-07-16 SDOH — ECONOMIC STABILITY: TRANSPORTATION INSECURITY
IN THE PAST 12 MONTHS, HAS THE LACK OF TRANSPORTATION KEPT YOU FROM MEDICAL APPOINTMENTS OR FROM GETTING MEDICATIONS?: NO

## 2024-07-16 SDOH — SOCIAL STABILITY: SOCIAL INSECURITY: WITHIN THE LAST YEAR, HAVE YOU BEEN HUMILIATED OR EMOTIONALLY ABUSED IN OTHER WAYS BY YOUR PARTNER OR EX-PARTNER?: NO

## 2024-07-16 SDOH — ECONOMIC STABILITY: FOOD INSECURITY: WITHIN THE PAST 12 MONTHS, YOU WORRIED THAT YOUR FOOD WOULD RUN OUT BEFORE YOU GOT MONEY TO BUY MORE.: NEVER TRUE

## 2024-07-16 SDOH — SOCIAL STABILITY: SOCIAL INSECURITY: DOES ANYONE TRY TO KEEP YOU FROM HAVING/CONTACTING OTHER FRIENDS OR DOING THINGS OUTSIDE YOUR HOME?: NO

## 2024-07-16 SDOH — SOCIAL STABILITY: SOCIAL INSECURITY: WERE YOU ABLE TO COMPLETE ALL THE BEHAVIORAL HEALTH SCREENINGS?: YES

## 2024-07-16 SDOH — SOCIAL STABILITY: SOCIAL INSECURITY: ABUSE: ADULT

## 2024-07-16 SDOH — SOCIAL STABILITY: SOCIAL INSECURITY
WITHIN THE LAST YEAR, HAVE TO BEEN RAPED OR FORCED TO HAVE ANY KIND OF SEXUAL ACTIVITY BY YOUR PARTNER OR EX-PARTNER?: NO

## 2024-07-16 SDOH — HEALTH STABILITY: MENTAL HEALTH
HOW OFTEN DO YOU NEED TO HAVE SOMEONE HELP YOU WHEN YOU READ INSTRUCTIONS, PAMPHLETS, OR OTHER WRITTEN MATERIAL FROM YOUR DOCTOR OR PHARMACY?: SOMETIMES

## 2024-07-16 SDOH — ECONOMIC STABILITY: HOUSING INSECURITY: AT ANY TIME IN THE PAST 12 MONTHS, WERE YOU HOMELESS OR LIVING IN A SHELTER (INCLUDING NOW)?: NO

## 2024-07-16 SDOH — SOCIAL STABILITY: SOCIAL INSECURITY: HAVE YOU HAD ANY THOUGHTS OF HARMING ANYONE ELSE?: NO

## 2024-07-16 SDOH — SOCIAL STABILITY: SOCIAL INSECURITY: HAS ANYONE EVER THREATENED TO HURT YOUR FAMILY OR YOUR PETS?: NO

## 2024-07-16 SDOH — SOCIAL STABILITY: SOCIAL INSECURITY: WITHIN THE LAST YEAR, HAVE YOU BEEN AFRAID OF YOUR PARTNER OR EX-PARTNER?: NO

## 2024-07-16 SDOH — ECONOMIC STABILITY: HOUSING INSECURITY: IN THE PAST 12 MONTHS, HOW MANY TIMES HAVE YOU MOVED WHERE YOU WERE LIVING?: 0

## 2024-07-16 SDOH — SOCIAL STABILITY: SOCIAL INSECURITY: HAVE YOU HAD THOUGHTS OF HARMING ANYONE ELSE?: NO

## 2024-07-16 SDOH — SOCIAL STABILITY: SOCIAL INSECURITY: ARE YOU OR HAVE YOU BEEN THREATENED OR ABUSED PHYSICALLY, EMOTIONALLY, OR SEXUALLY BY ANYONE?: NO

## 2024-07-16 SDOH — SOCIAL STABILITY: SOCIAL INSECURITY: ARE THERE ANY APPARENT SIGNS OF INJURIES/BEHAVIORS THAT COULD BE RELATED TO ABUSE/NEGLECT?: NO

## 2024-07-16 SDOH — SOCIAL STABILITY: SOCIAL INSECURITY: DO YOU FEEL UNSAFE GOING BACK TO THE PLACE WHERE YOU ARE LIVING?: NO

## 2024-07-16 ASSESSMENT — COGNITIVE AND FUNCTIONAL STATUS - GENERAL
TOILETING: TOTAL
TOILETING: A LOT
DRESSING REGULAR LOWER BODY CLOTHING: A LITTLE
MOVING FROM LYING ON BACK TO SITTING ON SIDE OF FLAT BED WITH BEDRAILS: A LITTLE
DRESSING REGULAR UPPER BODY CLOTHING: A LOT
EATING MEALS: A LITTLE
HELP NEEDED FOR BATHING: A LITTLE
MOBILITY SCORE: 15
PATIENT BASELINE BEDBOUND: NO
CLIMB 3 TO 5 STEPS WITH RAILING: TOTAL
PERSONAL GROOMING: A LOT
TOILETING: A LOT
MOVING FROM LYING ON BACK TO SITTING ON SIDE OF FLAT BED WITH BEDRAILS: A LITTLE
WALKING IN HOSPITAL ROOM: TOTAL
MOVING TO AND FROM BED TO CHAIR: A LITTLE
MOBILITY SCORE: 13
HELP NEEDED FOR BATHING: A LOT
DRESSING REGULAR UPPER BODY CLOTHING: A LITTLE
CLIMB 3 TO 5 STEPS WITH RAILING: TOTAL
MOVING FROM LYING ON BACK TO SITTING ON SIDE OF FLAT BED WITH BEDRAILS: A LITTLE
WALKING IN HOSPITAL ROOM: A LOT
MOVING FROM LYING ON BACK TO SITTING ON SIDE OF FLAT BED WITH BEDRAILS: A LITTLE
PERSONAL GROOMING: A LITTLE
MOBILITY SCORE: 9
MOVING TO AND FROM BED TO CHAIR: A LITTLE
WALKING IN HOSPITAL ROOM: A LOT
TURNING FROM BACK TO SIDE WHILE IN FLAT BAD: A LOT
DRESSING REGULAR UPPER BODY CLOTHING: A LITTLE
STANDING UP FROM CHAIR USING ARMS: A LOT
CLIMB 3 TO 5 STEPS WITH RAILING: A LOT
MOVING TO AND FROM BED TO CHAIR: A LOT
STANDING UP FROM CHAIR USING ARMS: A LOT
DAILY ACTIVITIY SCORE: 17
PERSONAL GROOMING: A LOT
STANDING UP FROM CHAIR USING ARMS: A LOT
DAILY ACTIVITIY SCORE: 17
DRESSING REGULAR LOWER BODY CLOTHING: A LITTLE
DRESSING REGULAR LOWER BODY CLOTHING: A LITTLE
HELP NEEDED FOR BATHING: A LITTLE
MOBILITY SCORE: 14
WALKING IN HOSPITAL ROOM: A LOT
DAILY ACTIVITIY SCORE: 13
TURNING FROM BACK TO SIDE WHILE IN FLAT BAD: A LITTLE
MOVING TO AND FROM BED TO CHAIR: A LITTLE
HELP NEEDED FOR BATHING: A LITTLE
DRESSING REGULAR LOWER BODY CLOTHING: A LITTLE
WALKING IN HOSPITAL ROOM: A LOT
DRESSING REGULAR UPPER BODY CLOTHING: A LITTLE
MOVING FROM LYING ON BACK TO SITTING ON SIDE OF FLAT BED WITH BEDRAILS: A LITTLE
TOILETING: A LITTLE
PERSONAL GROOMING: A LOT
MOVING TO AND FROM BED TO CHAIR: TOTAL
TURNING FROM BACK TO SIDE WHILE IN FLAT BAD: A LITTLE
TURNING FROM BACK TO SIDE WHILE IN FLAT BAD: A LITTLE
MOBILITY SCORE: 14
PERSONAL GROOMING: A LITTLE
TURNING FROM BACK TO SIDE WHILE IN FLAT BAD: A LITTLE
CLIMB 3 TO 5 STEPS WITH RAILING: TOTAL
DAILY ACTIVITIY SCORE: 17
DAILY ACTIVITIY SCORE: 19
STANDING UP FROM CHAIR USING ARMS: TOTAL
DRESSING REGULAR LOWER BODY CLOTHING: A LOT
CLIMB 3 TO 5 STEPS WITH RAILING: TOTAL
DRESSING REGULAR UPPER BODY CLOTHING: A LITTLE
STANDING UP FROM CHAIR USING ARMS: A LOT
TOILETING: A LOT
HELP NEEDED FOR BATHING: A LITTLE

## 2024-07-16 ASSESSMENT — ACTIVITIES OF DAILY LIVING (ADL)
ADLS_ADDRESSED: NO
DRESSING YOURSELF: NEEDS ASSISTANCE
ADL_ASSISTANCE: INDEPENDENT
GROOMING: NEEDS ASSISTANCE
PATIENT'S MEMORY ADEQUATE TO SAFELY COMPLETE DAILY ACTIVITIES?: YES
BATHING: NEEDS ASSISTANCE
FEEDING YOURSELF: NEEDS ASSISTANCE
HEARING - RIGHT EAR: FUNCTIONAL
JUDGMENT_ADEQUATE_SAFELY_COMPLETE_DAILY_ACTIVITIES: YES
HEARING - LEFT EAR: FUNCTIONAL
WALKS IN HOME: NEEDS ASSISTANCE
LACK_OF_TRANSPORTATION: NO
ADL_ASSISTANCE: INDEPENDENT
TOILETING: NEEDS ASSISTANCE
ADEQUATE_TO_COMPLETE_ADL: NO
BATHING_ASSISTANCE: NOT PERFORMED
ASSISTIVE_DEVICE: WALKER;EYEGLASSES;SHOWER CHAIR

## 2024-07-16 ASSESSMENT — PATIENT HEALTH QUESTIONNAIRE - PHQ9
SUM OF ALL RESPONSES TO PHQ9 QUESTIONS 1 & 2: 0
1. LITTLE INTEREST OR PLEASURE IN DOING THINGS: NOT AT ALL
2. FEELING DOWN, DEPRESSED OR HOPELESS: NOT AT ALL

## 2024-07-16 ASSESSMENT — PAIN SCALES - GENERAL
PAINLEVEL_OUTOF10: 2
PAINLEVEL_OUTOF10: 0 - NO PAIN
PAINLEVEL_OUTOF10: 0 - NO PAIN
PAINLEVEL_OUTOF10: 7
PAINLEVEL_OUTOF10: 3
PAINLEVEL_OUTOF10: 4
PAINLEVEL_OUTOF10: 7
PAINLEVEL_OUTOF10: 4
PAINLEVEL_OUTOF10: 8

## 2024-07-16 ASSESSMENT — PAIN DESCRIPTION - ORIENTATION
ORIENTATION: LOWER

## 2024-07-16 ASSESSMENT — PAIN DESCRIPTION - LOCATION
LOCATION: BACK

## 2024-07-16 ASSESSMENT — PAIN DESCRIPTION - DESCRIPTORS
DESCRIPTORS: ACHING

## 2024-07-16 ASSESSMENT — LIFESTYLE VARIABLES
HOW OFTEN DO YOU HAVE A DRINK CONTAINING ALCOHOL: NEVER
SKIP TO QUESTIONS 9-10: 1
SUBSTANCE_ABUSE_PAST_12_MONTHS: NO
PRESCIPTION_ABUSE_PAST_12_MONTHS: NO
HOW MANY STANDARD DRINKS CONTAINING ALCOHOL DO YOU HAVE ON A TYPICAL DAY: PATIENT DOES NOT DRINK
AUDIT-C TOTAL SCORE: 0
HOW OFTEN DO YOU HAVE 6 OR MORE DRINKS ON ONE OCCASION: NEVER
AUDIT-C TOTAL SCORE: 0

## 2024-07-16 ASSESSMENT — PAIN - FUNCTIONAL ASSESSMENT
PAIN_FUNCTIONAL_ASSESSMENT: 0-10

## 2024-07-16 NOTE — ED PROVIDER NOTES
HPI   Chief Complaint   Patient presents with    Extremity Weakness     Unable to walk or stand on legs, for 1 week.  Patient stated she is unable to walk, muscles in upper and lower weak.  Having great difficulty moving muscles.       This is a 69-year-old female presenting to the emergency department complaints of lower extremity weakness and frequent falls.  Patient states that she has a known history of significant degenerative disc disease and stenosis in her lumbar spine.  She sees orthopedic surgeon, Dr. Colón who states that she does need surgery.  She states that her gait has been disrupted due to her low back pain, and she does feel wobbly at times with walking.  States over the last few weeks the weakness in her lower extremities has progressed.  She states that is hard to do activities of daily living secondary to the weakness.  She has had 2 falls today at home.  Both fall she did not hit her head or lose consciousness.  Patient states that she does not feel like she can manage at home with the weakness in her legs.  No loss of bowel or bladder function, no saddle paresthesia, no tingling or numbness down her legs bilaterally.  She denies any trauma to her back.  No fevers or chills.       Please see HPI for pertinent positive and negative ROS.                   No data recorded                   Patient History   Past Medical History:   Diagnosis Date    Degenerative myopia, bilateral     Diabetes mellitus with stable proliferative retinopathy of both eyes, without long-term current use of insulin (Multi)     Dry eye syndrome of bilateral lacrimal glands     Encounter for other general examination     Podiatry visit, routine    Essential hypertension     Hyperlipidemia     Long term (current) use of insulin (Multi)     Personal history of diseases of the blood and blood-forming organs and certain disorders involving the immune mechanism     Personal history of sarcoidosis    Personal history of other  diseases of the circulatory system     History of hypertension    Personal history of other diseases of the nervous system and sense organs     History of neuropathy    Personal history of other endocrine, nutritional and metabolic disease     History of hyperlipidemia    Primary open angle glaucoma of both eyes, severe stage     Type 2 diabetes mellitus with hyperglycemia, with long-term current use of insulin (Multi)     Type 2 diabetes mellitus without complications (Multi)     Non-insulin dependent type 2 diabetes mellitus    Type 2 diabetes mellitus without complications (Multi)     Encounter for comprehensive diabetic foot examination, type 2 diabetes mellitus    Unspecified disorder of refraction      Past Surgical History:   Procedure Laterality Date    BACK SURGERY  03/21/2017    Back Surgery    CARPAL TUNNEL RELEASE  03/21/2017    Neuroplasty Median Nerve At Carpal Tunnel    CATARACT EXTRACTION W/  INTRAOCULAR LENS IMPLANT Bilateral     OD 08/04/2011 +8.5D,OS 08/04/2011 +8.50D    FOOT SURGERY      OTHER SURGICAL HISTORY Bilateral 07/2022    upper eyelid    OTHER SURGICAL HISTORY  05/2022    breast lift    PANRETINAL PHOTOCOAGULATION  2014    VITRECTOMY Right 2013     Family History   Problem Relation Name Age of Onset    Multiple myeloma Mother      Cancer Mother      Other (CABG) Father      Pulmonary embolism Father      Heart disease Father      Breast cancer Sister          Stage II    Hypertension Sister      Diabetes Sister      No Known Problems Sister          x5    No Known Problems Brother          x4    No Known Problems Daughter       Social History     Tobacco Use    Smoking status: Former     Types: Cigarettes     Passive exposure: Past    Smokeless tobacco: Never   Vaping Use    Vaping status: Never Used   Substance Use Topics    Alcohol use: Yes    Drug use: Not Currently       Physical Exam   ED Triage Vitals [07/15/24 1456]   Temperature Heart Rate Respirations BP   36.5 °C (97.7 °F) 69  20 (!) 160/92      Pulse Ox Temp Source Heart Rate Source Patient Position   97 % Tympanic Monitor Sitting      BP Location FiO2 (%)     Left arm --       Physical Exam  GENERAL APPEARANCE: Awake and alert. No acute [place] distress.   VITAL SIGNS: As per the nurses' triage record.  HEENT: Normocephalic, atraumatic. Extraocular muscles are intact. Conjunctiva are pink. Negative scleral icterus. Mucous membranes are moist.   NECK: Soft, nontender and supple, full gross ROM, no meningeal signs.  CHEST: Nontender to palpation. Clear to auscultation bilaterally. No rales, rhonchi, or wheezing. Symmetric rise and fall of chest wall.   HEART: Clear S1 and S2. Regular rate and rhythm. No murmurs appreciated on auscultation.  Strong and equal pulses in the extremities.  ABDOMEN: Soft, nontender, nondistended, positive bowel sounds, no palpable masses.  MUSCULOSKELETAL: The calves are nontender to palpation. Full gross active range of motion of lower extremities bilaterally.  She does have strength intact of lower extremities bilaterally.  Sensation is intact of lower extremities bilaterally.  Patient does not have any bony step-offs appreciated over the cervical, thoracic or lumbar spinous processes.  NEUROLOGICAL: Awake, alert and oriented x 3. Patient answering questions appropriately.  Patient is able to spread fingers apart, trunk sensation is intact bilaterally, inner thigh sensation intact bilaterally, she is able to adduct thighs to cross legs bilaterally, she is able to extend knees bilaterally, she is able to flex knees bilaterally, she can dorsiflex ankle up bilaterally, she is able to plantarflex her foot and toes bilaterally, she is able to point great toe up bilaterally, she has sensation in her groin and perineal region.   IMMUNOLOGICAL: No lymphatic streaking noted  DERMATOLOGIC: Warm and dry without petechiae, rashes, or ecchymosis noted on visible skin.   PYSCH: Cooperative with appropriate mood and  affect.  ED Course & MDM   Diagnoses as of 07/15/24 5494   Spinal stenosis of lumbar region, unspecified whether neurogenic claudication present       Medical Decision Making  Parts of this chart have been completed using voice recognition software. Please excuse any errors of transcription.  My thought process and reason for plan has been formulated from the time that I saw the patient until the time of disposition and is not specific to one specific moment during their visit and furthermore my MDM encompasses this entire chart and not only this text box.      HPI: Detailed above.    Exam: A medically appropriate exam performed, outlined above, given the known history and presentation.    History obtained from: Patient and patient's       Medications given during visit:  Medications   glucagon (Glucagen) injection 1 mg (has no administration in time range)   dextrose 50 % injection 25 g (has no administration in time range)   glucagon (Glucagen) injection 1 mg (has no administration in time range)   dextrose 50 % injection 12.5 g (has no administration in time range)   insulin lispro (HumaLOG) injection 0-5 Units (has no administration in time range)   ascorbic acid (Vitamin C) tablet 500 mg (has no administration in time range)   aspirin EC tablet 81 mg (has no administration in time range)   dorzolamide (Trusopt) 2 % ophthalmic solution 1 drop (has no administration in time range)   latanoprost (Xalatan) 0.005 % ophthalmic solution 1 drop (has no administration in time range)   lisinopril tablet 20 mg (has no administration in time range)   melatonin tablet 3 mg (has no administration in time range)   propranolol LA (Inderal LA) 24 hr capsule 60 mg (has no administration in time range)   enoxaparin (Lovenox) syringe 40 mg (has no administration in time range)   sodium chloride 0.9% infusion (has no administration in time range)   acetaminophen (Tylenol) tablet 650 mg (has no administration in time range)      Or   acetaminophen (Tylenol) oral liquid 650 mg (has no administration in time range)     Or   acetaminophen (Tylenol) suppository 650 mg (has no administration in time range)   ondansetron (Zofran) tablet 4 mg (has no administration in time range)     Or   ondansetron (Zofran) injection 4 mg (has no administration in time range)   polyethylene glycol (Glycolax, Miralax) packet 17 g (has no administration in time range)   benzocaine-menthol (Cepastat Sore Throat) lozenge 1 lozenge (has no administration in time range)   dextromethorphan-guaifenesin (Robitussin DM)  mg/5 mL oral liquid 5 mL (has no administration in time range)   guaiFENesin (Mucinex) 12 hr tablet 600 mg (has no administration in time range)   timolol (Timoptic) 0.5 % ophthalmic solution 1 drop (has no administration in time range)   brimonidine (AlphaGAN) 0.2 % ophthalmic solution 1 drop (has no administration in time range)   primidone (Mysoline) tablet 125 mg (has no administration in time range)   famotidine (Pepcid) tablet 20 mg (has no administration in time range)   lidocaine 4 % patch 1 patch (has no administration in time range)   ketorolac (Toradol) injection 15 mg (15 mg intravenous Given 7/15/24 2234)   acetaminophen (Tylenol) tablet 975 mg (975 mg oral Given 7/15/24 2235)        Diagnostic/tests  Labs Reviewed   CBC WITH AUTO DIFFERENTIAL - Abnormal       Result Value    WBC 5.9      nRBC 0.0      RBC 3.64 (*)     Hemoglobin 12.0      Hematocrit 34.5 (*)     MCV 95      MCH 33.0      MCHC 34.8      RDW 13.0      Platelets 197      Neutrophils % 73.8      Immature Granulocytes %, Automated 0.2      Lymphocytes % 17.8      Monocytes % 6.3      Eosinophils % 1.4      Basophils % 0.5      Neutrophils Absolute 4.36      Immature Granulocytes Absolute, Automated 0.01      Lymphocytes Absolute 1.05 (*)     Monocytes Absolute 0.37      Eosinophils Absolute 0.08      Basophils Absolute 0.03     COMPREHENSIVE METABOLIC PANEL - Abnormal     Glucose 233 (*)     Sodium 136      Potassium 4.5      Chloride 100      Bicarbonate 26      Urea Nitrogen 27 (*)     Creatinine 0.60      eGFR >90      Calcium 9.0      Albumin 4.0      Alkaline Phosphatase 154 (*)     Total Protein 6.8      AST 17      Bilirubin, Total 0.2      ALT 24      Anion Gap 10     URINALYSIS WITH REFLEX CULTURE AND MICROSCOPIC - Abnormal    Color, Urine Light-Yellow      Appearance, Urine Clear      Specific Gravity, Urine 1.021      pH, Urine 6.5      Protein, Urine 50 (1+) (*)     Glucose, Urine Normal      Blood, Urine NEGATIVE      Ketones, Urine NEGATIVE      Bilirubin, Urine NEGATIVE      Urobilinogen, Urine Normal      Nitrite, Urine NEGATIVE      Leukocyte Esterase, Urine NEGATIVE     POCT GLUCOSE - Abnormal    POCT Glucose 155 (*)    URINALYSIS MICROSCOPIC WITH REFLEX CULTURE - Abnormal    WBC, Urine 1-5      RBC, Urine 1-2      Mucus, Urine FEW      Hyaline Casts, Urine OCCASIONAL (*)    MAGNESIUM - Normal    Magnesium 1.70     URINALYSIS WITH REFLEX CULTURE AND MICROSCOPIC    Narrative:     The following orders were created for panel order Urinalysis with Reflex Culture and Microscopic.  Procedure                               Abnormality         Status                     ---------                               -----------         ------                     Urinalysis with Reflex C...[[id number]]  Abnormal            Final result               Extra Urine Gray Tube[[id number]]                            In process                   Please view results for these tests on the individual orders.   EXTRA URINE GRAY TUBE   CBC WITH AUTO DIFFERENTIAL   BASIC METABOLIC PANEL   TSH      CT lumbar spine wo IV contrast   Final Result   1. No acute fracture or significant interval change from the prior   study.        2. No significant interval change in Levoconvexity and multilevel   degenerative changes with mild spinal canal stenosis throughout the   entire lumbar spine.        3.  Moderate right neural foraminal stenosis at L3-L4.        4. Postsurgical changes of posterolateral fusion at L3 through S1.   Hardware appears intact without significant perihardware lucency to   suggest hardware complication.             Signed by: Sajan Beltrán 7/15/2024 5:25 PM   Dictation workstation:   [id number]           Considerations/further MDM:    CT scan of the lumbar spine without IV contrast was done due to patient's reported history of degenerative changes with worsening bilateral weakness in her lower extremities.  There is no acute fracture or significant interval change from the prior studies.  There is multilevel degenerative changes with mild spinal calcinosis throughout the entire spine.  Moderate right neuroforaminal stenosis of L3-L4.  Intact surgical hardware of posterior lateral fusion of L3-S1.    Laboratory evaluation was grossly unremarkable.  Patient was treated with oral Tylenol and IV Toradol.     Patient is not able to ambulate at home safely with walker due to increasing bilateral leg weakness.  Do not suspect malignant cause of atraumatic low back pain at this time such as osteomyelitis versus discitis versus spinal epidural abscess versus cauda equina syndrome as patient has no leukocytosis, fever, and her high-sensitivity neuroexam is unremarkable    The patient was evaluated in the emergency department and an etiology requiring emergent treatment or admission to hospital was identified.  The patient/family was counseled on clinical expression, expectations, and plan along with recommendations for admission.  All questions were answered and involved parties were understanding and agreeable to course of treatment.  Case was discussed with admitting physician, Dr. jose.  Bed type ED treatment and further ED work-up decided by joint decision making with admitting team and any consultants.  Patient stable for admission per my assessment and further management of patient will be  deferred to the inpatient setting.    Procedure  Procedures     Zoila Chacon PA-C  07/15/24 5661       Zoila Chacon PA-C  07/15/24 7364

## 2024-07-16 NOTE — PROGRESS NOTES
Physical Therapy    Physical Therapy Evaluation & Treatment    Patient Name: Narda Malloy  MRN: [id number]  Today's Date: 7/16/2024   Time Calculation  Start Time: 0851  Stop Time: 0912  Time Calculation (min): 21 min    Assessment/Plan   PT Assessment  PT Assessment Results: Decreased strength, Impaired balance, Decreased range of motion, Decreased endurance, Decreased mobility, Decreased coordination, Impaired judgement, Decreased safety awareness, Impaired vision, Impaired sensation, Impaired tone, Decreased skin integrity  Rehab Prognosis: Good  Evaluation/Treatment Tolerance: Patient limited by fatigue, Patient limited by pain  Medical Staff Made Aware: Yes  Strengths: Living arrangement secure  Barriers to Participation: Comorbidities  End of Session Communication: Bedside nurse  Assessment Comment: pt admitted after a fall at home and has been reporting increased difficulty with ambulation. pt with very poor funcitonal mobiltiy at this time. pt is unable to bear weight through her B LEs without her knees buckling. pt is demonstrating a significant decline in her function and will benefit from continued skilled therapy services to adresss her limitations with balance, strength, transfers, and ambualtion.  End of Session Patient Position: On cart, Bed, 2 rail up, Alarm off, not on at start of session (pt in hallway bed in the ED)   IP OR SWING BED PT PLAN  Inpatient or Swing Bed: Inpatient  PT Plan  Treatment/Interventions: Bed mobility, Transfer training, Gait training, Stair training, Balance training, Strengthening, Endurance training, Range of motion, Therapeutic exercise, Therapeutic activity  PT Plan: Ongoing PT  PT Frequency: 4 times per week  PT Discharge Recommendations: Moderate intensity level of continued care  Equipment Recommended upon Discharge: Wheeled walker  PT Recommended Transfer Status: Total assist, Assist x2  PT - OK to Discharge: Yes    Subjective     General Visit  Information:  General  Reason for Referral: Impaired Mobility  Referred By: Stiven Amezcua MD  Past Medical History Relevant to Rehab: DM, HLD, HTN, Essential Tremor, pulmonary sarcoidosis, OA, anxiety, depression, retinopathy, mild cognitive impairment, glaucoma.  Family/Caregiver Present: No  Co-Treatment: OT  Co-Treatment Reason: To optimizee safe handling of patient as patient needed 2 skilled staff this date. Initial evaluation in ED  Prior to Session Communication: Bedside nurse  Patient Position Received: On cart, Bed, 2 rail up, Alarm off, not on at start of session (pt in hallway bed in ED)  Preferred Learning Style: verbal  General Comment: 69 y.o female presents with a mechanical fall. Patient reports increased difficulty with walking.  Home Living:  Home Living  Type of Home: House  Lives With: Spouse  Home Adaptive Equipment: Walker rolling or standard, Cane  Home Layout: One level  Home Access: Stairs to enter with rails  Entrance Stairs-Rails: Right  Entrance Stairs-Number of Steps: 2  Bathroom Shower/Tub: Walk-in shower  Bathroom Toilet: Standard  Bathroom Equipment: Shower chair without back, Grab bars in shower  Home Living Comments: Patient [place] with her spouse.  Prior Level of Function:  Prior Function Per Pt/Caregiver Report  Level of [place]: Independent with ADLs and functional transfers (Per patient needs assist at times for toileting.)  Receives Help From: Family (spouse)  ADL Assistance: Independent (occasional assist with toielting and pulling pants this date.)  Homemaking Assistance:  (needs assistance)  Driving/Transportation: Family/Friend  Homemaking Assistance Comments: Per patient spouse does the I ADL's.  Ambulatory Assistance: Independent (with walker)  Vocational: Retired  Hand Dominance: Right  Prior Function Comments: Patient reports 1 fall that brought her to the ED this date.  Precautions:  Precautions  Hearing/Visual Limitations: wears glasses for  reading.  Medical Precautions: Fall precautions, Spinal precautions  Precautions Comment: pt to have spine surgery in August    Objective   Pain:  Pain Assessment  Pain Assessment: 0-10  0-10 (Numeric) Pain Score: 4  Pain Type: Chronic pain  Pain Location: Back  Patient's Stated Pain Goal: No pain  Pain Interventions: Repositioned (pt received pain medication earlier this am)  Response to Interventions: Patient reports decreased pain post medication and laying down this date.  Cognition:  Cognition  Overall Cognitive Status: Impaired  Orientation Level: Oriented X4  Cognition Comments: poor safety awareness and insight into limitations    General Assessments:  General Observation  General Observation: L LE edema, redness/erythema around left ankle    Activity Tolerance  Endurance: Decreased tolerance for upright activites  Activity Tolerance Comments: fair-, limited by pain/weakness  Rate of Perceived Exertion (RPE): 4    Sensation  Proprioception: Partial deficits in the RUE, Partial deficits in the LUE, Partial deficits in the RLE, Partial deficits in the LLE (poor trunk trunk control, unable tomaintain midline when sitting)  Sensation Comment: neuropathy B feet, intermittent tingling L hand    Strength  Strength Comments: very weak B LEs  Strength  Strength Comments: very weak B LEs    Coordination  Movements are Fluid and Coordinated: No  Upper Body Coordination: right hand tremors  Lower Body Coordination: poor deceleration B LEs duirng SLR, feet falling loudly onto bed  Trunk Coordination: multiplanar trunk sway and LOB  Coordination Comment: B UE supprt needed to maintain balance while seated on edge of cart    Postural Control  Posture Comment: forward flexion, rounded shoulders, head-down posture    Static Sitting Balance  Static Sitting-Balance Support: Bilateral upper extremity supported, Feet supported  Static Sitting-Level of Assistance: Minimum assistance  Static Sitting-Comment/Number of Minutes:  fair+ balance sitting on edge of cart  Dynamic Sitting Balance  Dynamic Sitting-Balance Support: Bilateral upper extremity supported, Feet supported  Dynamic Sitting-Balance: Trunk control activities  Dynamic Sitting-Comments: fair-, + multiplanar LOB when pt does not use UEs to support herself    Static Standing Balance  Static Standing-Balance Support: Bilateral upper extremity supported  Static Standing-Level of Assistance: Maximum assistance (x2)  Static Standing-Comment/Number of Minutes: poor, B hands on walker, verbal cues for knee/trunk/elbow extension. unable to support body weight without max A x 2 and blocking knees.  Dynamic Standing Balance  Dynamic Standing-Comments: very poor, unable to weight shift to try to take any steps.  Functional Assessments:  ADL  ADL's Addressed: No    Bed Mobility  Bed Mobility: Yes  Bed Mobility 1  Bed Mobility 1: Supine to sitting  Level of Assistance 1: Close supervision  Bed Mobility Comments 1: HOB elevated, increased time and effort to transfer to sitting.  Bed Mobility 2  Bed Mobility  2: Sitting to supine  Level of Assistance 2: Minimum assistance  Bed Mobility Comments 2: HOB laid flat, pt able to manage trunk, min A to lift B LEs onto the cart    Transfers  Transfer: Yes  Transfer 1  Transfer From 1: Bed to  Transfer to 1: Stand  Technique 1: Sit to stand  Transfer Device 1: Walker  Transfer Level of Assistance 1: Maximum assistance, +2  Trials/Comments 1: pt placing right hand on the walker, L hand pushing from the cart, max A x 2 to guide turnk up and block B knees. 2 trials performed.  Transfers 2  Transfer From 2: Stand to  Transfer to 2: Bed  Technique 2: Stand to sit  Transfer Device 2: Walker  Transfer Level of Assistance 2: Maximum assistance, +2  Trials/Comments 2: returned to sitting on EOB, no eccentric control in sitting, B knees buckling, max A x 2 for controlling descent to sit.    Ambulation/Gait Training  Ambulation/Gait Training Performed: No (pt  unable to bear weight effectively through B LEs in order to weight shfit and take any steps.)  Extremity/Trunk Assessments:  RLE   RLE : Exceptions to WFL (decreased full ankle dorsiflexion, proximal weakness, can hold SLR x 5 seconds only)  LLE   LLE : Exceptions to WFL (decreased full ankle dorsiflexion, can hold SLR x 7 seconds)  Treatments:  Balance/Neuromuscular Re-Education  Balance/Neuromuscular Re-Education Activity Performed: Yes  Balance/Neuromuscular Re-Education Activity 1: Seated balance activities performed, pt with poor posture and several losses of balance, especially when patient is removing one of her arms from the bed  Balance/Neuromuscular Re-Education Activity 2: Static standing with max A x 2, blocking B knees, verbal cues for posture and trunk extension, pt very flexed. unable to stand fully erect.    Other Activity  Other Activity Performed:  (pt educated on therapy in the hospital, questions answered.)  Outcome Measures:  [facility] Basic Mobility  Turning from your back to your side while in a flat bed without using bedrails: A little  Moving from lying on your back to sitting on the side of a flat bed without using bedrails: A lot  Moving to and from bed to chair (including a wheelchair): Total  Standing up from a chair using your arms (e.g. wheelchair or bedside chair): Total  To walk in hospital room: Total  Climbing 3-5 steps with railing: Total  Basic Mobility - Total Score: 9    Encounter Problems       Encounter Problems (Active)       Balance       LTG - Patient will maintain standing and sitting balance to allow for safe completion of functional mobility (Progressing)       Start:  07/16/24    Expected End:  08/18/24               Mobility       LTG - Patient will be able to go up and down a curb/step with the rolling walker and min A (Not Progressing)       Start:  07/16/24    Expected End:  08/18/24            STG - Patient will ambulate 40' with a rolling walker and min A (Not  Progressing)       Start:  07/16/24    Expected End:  08/18/24               PT Transfers       STG - Transfer from bed to chair with min A (Not Progressing)       Start:  07/16/24    Expected End:  08/18/24            STG - Patient to transfer to and from sit to supine with distant supervision (Progressing)       Start:  07/16/24    Expected End:  08/18/24            STG - Patient will perform bed mobility with distant supervision (Progressing)       Start:  07/16/24    Expected End:  08/18/24            STG - Patient will transfer sit to and from stand with min A (Progressing)       Start:  07/16/24    Expected End:  08/18/24                   Education Documentation  Mobility Training, taught by Kisha Hinton, PT at 7/16/2024  1:21 PM.  Learner: Patient  Readiness: Acceptance  Method: Explanation  Response: Verbalizes Understanding, Needs Reinforcement    Education Comments  No comments found.

## 2024-07-16 NOTE — PROGRESS NOTES
Occupational Therapy    Evaluation    Patient Name: Narda Malloy  MRN: [id number]  Today's Date: 7/16/2024  Time Calculation  Start Time: 0850  Stop Time: 0911  Time Calculation (min): 21 min        Assessment:  OT Assessment: 69 y.o female presents with a mechanical fall. Patient reports increased difficulty with walking .Patient at the evaluation was limited with pain, weakness in BLE with both knees buckling, poor standing balance, poor endurance ,decreased insight/judegement, is a high fall risk, is functioning below her baseline, will benefit from continued OT intervention and moderate intensity rehab  is recommended.  Prognosis: Good  Barriers to Discharge: None  Evaluation/Treatment Tolerance: Patient limited by pain, Patient limited by fatigue  Medical Staff Made Aware: Yes  End of Session Communication: Bedside nurse  End of Session Patient Position: On cart, Bed, 2 rail up  OT Assessment Results: Decreased ADL status, Decreased upper extremity strength, Decreased upper extremity range of motion, Decreased safe judgment during ADL, Decreased cognition, Decreased endurance, Decreased sensation, Decreased functional mobility, Decreased fine motor control, Decreased gross motor control, Decreased IADLs  Prognosis: Good  Barriers to Discharge: None  Evaluation/Treatment Tolerance: Patient limited by pain, Patient limited by fatigue  Medical Staff Made Aware: Yes  Strengths: Support of Caregivers, Premorbid level of function  Barriers to Participation: Comorbidities  Plan:  Treatment Interventions: ADL retraining, Functional transfer training, UE strengthening/ROM, Endurance training, Cognitive reorientation, Patient/family training, Equipment evaluation/education, Neuromuscular reeducation, Fine motor coordination activities, Compensatory technique education  OT Frequency: 4 times per week  OT Discharge Recommendations: Moderate intensity level of continued care  Equipment Recommended upon Discharge: Wheeled  walker  OT Recommended Transfer Status: Assist of 2  OT - OK to Discharge: Yes  Treatment Interventions: ADL retraining, Functional transfer training, UE strengthening/ROM, Endurance training, Cognitive reorientation, Patient/family training, Equipment evaluation/education, Neuromuscular reeducation, Fine motor coordination activities, Compensatory technique education    Subjective   Current Problem:  1. Spinal stenosis of lumbar region, unspecified whether neurogenic claudication present          General:  General  Reason for Referral: Decreased ADL function for  fall.  Referred By: Dr Amezcua  Past Medical History Relevant to Rehab: Accident due to mechanical fall without injury, initial encounter 7/15/2024    Displacement of lumbar intervertebral disc without myelopathy 9/18/2023    DM w/o complication type II (Multi) 9/18/2023    Dyslipidemia 9/18/2023    Essential hypertension 9/18/2023    Essential tremor 9/18/2023    Pulmonary sarcoidosis (Multi) 9/18/2023    Type 2 diabetes mellitus with hyperglycemia, with long-term current use of insulin (Multi) 6/18/2024      Non-Hospital Problem List       Noted  Arthritis 9/18/2023    Artificial lens present 9/18/2023    Depression with anxiety 9/18/2023    Dermatochalasis of both upper eyelids 9/18/2023    Controlled type 2 diabetes mellitus with stable proliferative retinopathy of both eyes, without long-term current use of insulin (Multi) 9/18/2023    Central nervous system device complication 9/18/2023    Eye abnormalities 9/18/2023    Hyperlipidemia 9/18/2023    Insomnia 9/18/2023    MCI (mild cognitive impairment) 9/18/2023    Obesity with body mass index 30 or greater 9/18/2023    Primary open-angle glaucoma, bilateral, severe stage 9/18/2023    Proliferative diabetic retinopathy (Multi) 9/18/2023    Tear film insufficiency 9/18/2023    Type 2 diabetes mellitus  Family/Caregiver Present: No  Co-Treatment: PT  Co-Treatment Reason: To optomise safe handling of  patient as patient needed 2 skilled staff this date.  Prior to Session Communication: Bedside nurse  Patient Position Received: On cart, Bed, 2 rail up  Preferred Learning Style: verbal  General Comment: Cleared by nurse to see patient for OT, patient agreeable to therapy. Patient met in the ED on cart this date.  Precautions:  Hearing/Visual Limitations: wears glasses for reading.  Medical Precautions: Fall precautions, Spinal precautions (spinal precautions to decreased back pain, per patient scheduled for back surgery in [place] 2024)  Precautions Comment: Patient is a high fall risk, recommend bed/chair alarm for safety.  Vital Signs:     Pain:  Pain Assessment  Pain Assessment: 0-10  0-10 (Numeric) Pain Score: 4  Pain Type: Chronic pain  Pain Location: Back  Patient's Stated Pain Goal: No pain  Pain Interventions: Repositioned, Emotional support (nurse donavan aware, per patient received pain medicaiton earlier.)  Response to Interventions: Patient reports decreased pain post medication and laying down this date.    Objective   Cognition:  Overall Cognitive Status: Impaired (for safety, insight/judgement.)  Attention: Within Functional Limits  Memory: Exceptions to WFL  Short-Term Memory: Impaired  Problem Solving: Exceptions to WFL  Complex Functional Tasks: Impaired  Safety/Judgement: Exceptions to WFL  Complex Functional Tasks: Moderate  Insight: Moderate  Impulsive: Moderately    Home Living:  Type of Home: House  Lives With: Spouse  Home Adaptive Equipment: Walker rolling or standard, Cane  Home Layout: One level  Home Access: Stairs to enter with rails  Entrance Stairs-Rails: Right  Entrance Stairs-Number of Steps: 2  Bathroom Shower/Tub: Walk-in shower  Bathroom Toilet: Standard  Bathroom Equipment: Shower chair without back, Grab bars in shower  Home Living Comments: Patient [place] with her spouse.  Prior Function:  Level of [place]: Independent with ADLs and functional transfers  (Per patient needs assist at times for toileting.)  Receives Help From: Family (spouse)  ADL Assistance: Independent (occasional assist with toielting and pulling pants this date.)  Homemaking Assistance: Needs assistance  Driving/Transportation: Family/Friend  Homemaking Assistance Comments: Per patient spouse does the I ADL's.  Ambulatory Assistance: Independent (with walker)  Vocational: Retired  Hand Dominance: Right  Prior Function Comments: Patient reports 1 fall that brought her to the ED this date.  IADL History:  Homemaking Responsibilities: No  Current License: Yes  Mode of Transportation: Family  ADL:  Eating Assistance: Independent  Eating Deficit: Setup (Assisted with ordering patients breakfast this date.)  Grooming Assistance: Independent  Grooming Deficit: Setup  Bathing Assistance: Not performed  Bathing Deficit:  (in the ED)  UE Dressing Assistance: Moderate  UE Dressing Deficit:  (atilio/doff gown)  LE Dressing Assistance: Minimal  LE Dressing Deficit: Don/doff L sock, Don/doff R sock, Steadying, Supervision/safety, Increased time to complete  Toileting Assistance with Device: Not performed  Functional Assistance:  (Assist x2 to stand, Poor balance.)  Activity Tolerance:  Endurance: Decreased tolerance for upright activites  Activity Tolerance Comments: Patient limited with pain, weakness, dereased endurance.  Bed Mobility/Transfers: Bed Mobility  Bed Mobility: Yes (Patient was able to get to the edge of the cart this date with the HOB raised with Close Supervision. Patient returned to the cart this date with minimal assist .)    Transfers  Transfer: Yes (Patient was able to stand from the cart with maximal assist x2x2 trials with the walker this date , poor balance, very weak, both knees buckling this date, moderate V/C to stand upright as kyphotic this date,high fall risk this date.)    Functional Mobility:  Functional Mobility  Functional Mobility Performed: No (Patient unable to take a step this  date, very weak BLE.)  Sitting Balance:  Static Sitting Balance  Static Sitting-Balance Support: Feet supported  Static Sitting-Level of Assistance: Close supervision  Static Sitting-Comment/Number of Minutes: 5-6min, F+/G  Dynamic Sitting Balance  Dynamic Sitting-Balance: Lateral lean, Forward lean, Reaching for objects  Dynamic Sitting-Comments: F/F+. 5-6min  Standing Balance:  Static Standing Balance  Static Standing-Level of Assistance: Dependent  Static Standing-Comment/Number of Minutes: Poor, 30-45 sec  Dynamic Standing Balance  Dynamic Standing-Comments: Poor, 30-45 sec     Vision:Vision - Basic Assessment  Current Vision: Wears glasses only for reading  Sensation:  Sensation Comment: Patient reports tingling/numbness in BUE abd BLE.  Strength:  Strength Comments: R shoulder 2+/5, other joints 3+/5, LUE 3+/5 overall  Perception:     Coordination:  Movements are Fluid and Coordinated: No  Upper Body Coordination: Patient limited with decreased ROM R Shoulder affecting coordination.   Hand Function:  Gross Grasp: Functional  Coordination: Impaired (Patient with intentional tremors in BUE/hands.)    Outcome Measures:[facility] Daily Activity  Putting on and taking off regular lower body clothing: A lot  Bathing (including washing, rinsing, drying): A lot  Putting on and taking off regular upper body clothing: A lot  Toileting, which includes using toilet, bedpan or urinal: Total  Taking care of personal grooming such as brushing teeth: A little  Eating Meals: A little  Daily Activity - Total Score: 13    Education Documentation  ADL Training, taught by Nadege Yen OT at 7/16/2024 11:12 AM.  Learner: Patient  Readiness: Acceptance  Method: Explanation  Response: Verbalizes Understanding, Demonstrated Understanding, Needs Reinforcement  Comment: Patient was instructed in safe functional transfers this date.    Education Comments  No comments found.        OP EDUCATION:       Goals:  Encounter Problems        Encounter Problems (Active)       OT Goals       Patient will be able to complete UB dressing, bathing, grooming with set up. (Progressing)       Start:  07/16/24    Expected End:  07/30/24            Patient will be able to complete LB dressing/bathing, toileting with set up/Close Supervision using AE as needed.  (Progressing)       Start:  07/16/24    Expected End:  07/30/24            Patient will be able to complete functional transfers/mobility with the walker moderate assist with G balance using good safety . (Progressing)       Start:  07/16/24    Expected End:  07/30/24            Patient will be able to tolerate 5-7min of standing with G balance with moderate assist in prep for ADL/transfers (Progressing)       Start:  07/16/24    Expected End:  07/30/24

## 2024-07-16 NOTE — TELEPHONE ENCOUNTER
Insurance will likely require a more recent face-to-face visit for this to be covered.  In my chart review, it looks like her last visit with Dr. Muñoz was on 11/30/2022, no-show to appointment with Dr. Ferreira yesterday.

## 2024-07-16 NOTE — H&P
History Of Present Illness        Narda Malloy is a 69 y.o. female presenting with a Mechanical Fall.  ED states patient had a fall earlier today that was mechanical in nature.  No episodes of loss of consciousness or syncope.  Patient is having lower back pain as well.  She has chronic lumbalgia from lumbar spinal stenosis.  Is having difficulty ambulating and currently wheelchair dependent.  ED requesting admission for PT/OT evaluation.      Patient's ED diagnostic workup noted for a CBC with differential without essentially unremarkable.  Her blood chemistry was noted for elevated glucose 233.  BUN was slightly elevated 27.  Alkaline phosphatase was slightly elevated 154.  Otherwise no electrolyte aberrancies.  Patient's urinalysis was bland.    CT lumbar spine without IV contrast was obtained.  This noted for the following:    IMPRESSION:    1. No acute fracture or significant interval change from the prior  study.      2. No significant interval change in Levoconvexity and multilevel  degenerative changes with mild spinal canal stenosis throughout the  entire lumbar spine.      3. Moderate right neural foraminal stenosis at L3-L4.      4. Postsurgical changes of posterolateral fusion at L3 through S1.  Hardware appears intact without significant perihardware lucency to  suggest hardware complication.    Vital signs the ED noted for Tmax 36.5, pulse rate 69, respiratory rate 20, BP initially 160/92 following 120/70.  Patient saturating her percent on room air.         Past Medical History        Past Medical History:   Diagnosis Date    Degenerative myopia, bilateral     Diabetes mellitus with stable proliferative retinopathy of both eyes, without long-term current use of insulin (Multi)     Dry eye syndrome of bilateral lacrimal glands     Encounter for other general examination     Podiatry visit, routine    Essential hypertension     Hyperlipidemia     Long term (current) use of insulin (Multi)     Personal  history of diseases of the blood and blood-forming organs and certain disorders involving the immune mechanism     Personal history of sarcoidosis    Personal history of other diseases of the circulatory system     History of hypertension    Personal history of other diseases of the nervous system and sense organs     History of neuropathy    Personal history of other endocrine, nutritional and metabolic disease     History of hyperlipidemia    Primary open angle glaucoma of both eyes, severe stage     Type 2 diabetes mellitus with hyperglycemia, with long-term current use of insulin (Multi)     Type 2 diabetes mellitus without complications (Multi)     Non-insulin dependent type 2 diabetes mellitus    Type 2 diabetes mellitus without complications (Multi)     Encounter for comprehensive diabetic foot examination, type 2 diabetes mellitus    Unspecified disorder of refraction          Surgical History        Past Surgical History:   Procedure Laterality Date    BACK SURGERY  03/21/2017    Back Surgery    CARPAL TUNNEL RELEASE  03/21/2017    Neuroplasty Median Nerve At Carpal Tunnel    CATARACT EXTRACTION W/  INTRAOCULAR LENS IMPLANT Bilateral     OD 08/04/2011 +8.5D,OS 08/04/2011 +8.50D    FOOT SURGERY      OTHER SURGICAL HISTORY Bilateral 07/2022    upper eyelid    OTHER SURGICAL HISTORY  05/2022    breast lift    PANRETINAL PHOTOCOAGULATION  2014    VITRECTOMY Right 2013          Social History    She reports that she has quit smoking. Her smoking use included cigarettes. She has been exposed to tobacco smoke. She has never used smokeless tobacco. She reports current alcohol use. She reports that she does not currently use drugs.      Family History      Family History   Problem Relation Name Age of Onset    Multiple myeloma Mother      Cancer Mother      Other (CABG) Father      Pulmonary embolism Father      Heart disease Father      Breast cancer Sister          Stage II    Hypertension Sister      Diabetes  Sister      No Known Problems Sister          x5    No Known Problems Brother          x4    No Known Problems Daughter            Allergies      Erythromycin, Morphine, and Rosuvastatin      Review of Systems    14-point ROS otherwise negative, as per HPI/Interval History.    General: No change in weight. No weakenss. No Fevers/Chills/Night Sweats   Skin: No skin/hair/nail changes. No rashes or sores.  Head:  No trauma. No Headache/nasuea/vomitting.   Eyes: No visual changes. No tearing. No itching.   Ears: No hearing loss. No tinnitus. No vertigo. No discharge.  Nose, Sinuses: No rhinorrhea, No nasal congestion. No epistaxis.  Mouth, Throat, Neck: No bleeding gums, hoarseness, sore throat or swollen neck  Cardiac: No palpitations. No BEAR. No PND. No Orthopnea.   Respiratory: No Shortness of Breath. No wheezing. No cough. No hemoptysis.   GI: No nausea/vomiting. No indigestion. No diarrhea. No constipation.   Extremities: No numbness or tingling. No paresthesias.   Urinary: No change in urinary frequency. No change in hesitancy. No hematuria. No incontinence.  Musculoskeletal: Back pain         Physical Exam        Constitutional:  Pleasant.  Appears weak  Eyes: PERRL, EOMI,   ENMT: mucous membranes moist  Head/Neck: Neck supple, No JVD,   Respiratory/Thorax: Patent airways, CTAB,   Cardiovascular: Regular, rate and rhythm, no murmurs  Gastrointestinal: Soft, non-distended, +BS.  Musculoskeletal: ROM intact, no joint swelling, normal strength  Extremities: peripheral pulses intact; chronic stasis dermatitis left lower extremity with little more swelling.  States swollen for years  Neurological: Alert and Oriented x 3; no focal deficits; gross motor and sensation intact; CN II-XII intact. No asterixis.  Psychological: Appropriate mood and behavior  Skin: No lesions, No rashes.         Last Recorded Vitals  Blood pressure 128/70, pulse 76, temperature 36.5 °C (97.7 °F), temperature source Tympanic, resp. rate 16,  "height 1.753 m (5' 9\"), weight 76.2 kg (168 lb), SpO2 100%.    Relevant Results    Lab Results   Component Value Date    WBC 5.9 07/15/2024    HGB 12.0 07/15/2024    HCT 34.5 (L) 07/15/2024    MCV 95 07/15/2024     07/15/2024       Lab Results   Component Value Date    GLUCOSE 233 (H) 07/15/2024    CALCIUM 9.0 07/15/2024     07/15/2024    K 4.5 07/15/2024    CO2 26 07/15/2024     07/15/2024    BUN 27 (H) 07/15/2024    CREATININE 0.60 07/15/2024       Lab Results   Component Value Date    HGBA1C 6.6 (A) 06/18/2024         No CT head results found for the past 12 months      Scheduled medications  acetaminophen, 975 mg, oral, Once  [START ON 7/16/2024] ascorbic acid, 500 mg, oral, Daily  [START ON 7/16/2024] aspirin, 81 mg, oral, Daily  brimonidine-timoloL, 1 drop, Both Eyes, q12h LUCA  dorzolamide, 1 drop, Both Eyes, TID  enoxaparin, 40 mg, subcutaneous, q24h  insulin lispro, 0-5 Units, subcutaneous, Before meals & nightly  ketorolac, 15 mg, intravenous, Once  latanoprost, 1 drop, Both Eyes, Nightly  [START ON 7/16/2024] lisinopril, 20 mg, oral, Daily  primidone, 250 mg, oral, TID  [START ON 7/16/2024] propranolol LA, 60 mg, oral, Daily      Continuous medications  sodium chloride 0.9%, 75 mL/hr      PRN medications  PRN medications: acetaminophen **OR** acetaminophen **OR** acetaminophen, benzocaine-menthol, dextromethorphan-guaifenesin, dextrose, dextrose, glucagon, glucagon, guaiFENesin, melatonin, ondansetron **OR** ondansetron, polyethylene glycol        Assessment/Plan   Principal Problem:    Accident due to mechanical fall without injury, initial encounter  Active Problems:    Displacement of lumbar intervertebral disc without myelopathy    DM w/o complication type II (Multi)    Dyslipidemia    Essential hypertension    Essential tremor    Pulmonary sarcoidosis (Multi)    Type 2 diabetes mellitus with hyperglycemia, with long-term current use of insulin (Multi)          Narda Malloy is a 69 " y.o. female presenting with a Mechanical Fall.  ED states patient had a fall earlier today that was mechanical in nature.  Patient admitted for further evaluation and management.        Mechanical Fall    Admit to RN F  Denies any LOC or head trauma  She is not on any blood thinners  U/A appreciated   AM TSH Ordered   Fall precautions/Aspiration Precautions   Up with Assist   PT/OT Evaluation   Gentle IVF  CT lumbar spine appreciated      Lumbar spinal stenosis    Management as above  Analgesia as needed  Lidocaine patch  Tramadol as needed  She is going to follow-up with a Dr. Kermit Ventura for orthopedic procedure  She is requesting a urine or blood test to evaluate for nicotine as she has quit over 6 weeks ago and states that Dr. Ventura says this must be negative prior to her surgery.  Request granted.      Ambulatory Dysfunction    Management as above      Diabetes mellitus Type 2    POCT every 6 hourly  Insulin sliding scale      Hypertension    Continue to monitor BP and adjust members medications accordingly      Glaucoma    Continue home intraocular pressure reducing agents      Essential Tremor    Continue home propranolol dose  Continue home primidone dose      Elevated alkaline phosphatase level    Incidental finding  Repeat as outpatient with PCP  Will consider right upper quadrant ultrasound if biliary colic develops      GI + DVT Prophylaxis    H2 blocker  Lovenox subcu            This Dictation was Transcribed using a Nuance Dragon Voice Recognition System Device (with Compatible Computer + Software) and as such may contain Grammatical Errors and Unintentional Typing Misprints.      I spent 32 minutes in the professional and overall care of this patient.      Stiven Amezcua MD

## 2024-07-16 NOTE — TELEPHONE ENCOUNTER
PT states that she is home bound and unable to get to the office for an in person appointment. Please advise.

## 2024-07-16 NOTE — PROGRESS NOTES
Narda Malloy is a 69 y.o. female on day 0 of admission presenting with Accident due to mechanical fall without injury, initial encounter.      Subjective   Seen while boarding in the ED.  present at bedside. Patient states she feels okay at the moment. Pain is under control with current medications. Denies any complaints. Tolerating PO. Has not had a BM in 2 days but does not feel constipated, feels like she might have a BM later today.        Objective     Last Recorded Vitals  /53 (BP Location: Right arm)   Pulse 62   Temp 36.5 °C (97.7 °F) (Tympanic)   Resp 18   Wt 76.2 kg (168 lb)   SpO2 96%   Intake/Output last 3 Shifts:  No intake or output data in the 24 hours ending 07/16/24 1426    Admission Weight  Weight: 76.2 kg (168 lb) (07/15/24 1456)    Daily Weight  07/15/24 : 76.2 kg (168 lb)    Image Results  CT lumbar spine wo IV contrast  Narrative: Interpreted By:  Sajan Beltrán,   STUDY:  CT LUMBAR SPINE WO IV CONTRAST; 7/15/2024 4:41 pm      INDICATION:  Signs/Symptoms:hx of degenerative disc disease, increased weakness;      COMPARISON:  05/20/2024      ACCESSION NUMBER(S):  [id number]      ORDERING CLINICIAN:  IMAN IYER      TECHNIQUE:  Contiguous axial images of the lumbar spine were performed. Coronal  and sagittal reformatted images were also obtained. All CT  examinations are performed with 1 or more of the following dose  reduction techniques: Automated exposure control, adjustment of mA  and/or kv according to patient's size, or use of iterative  reconstruction techniques.      FINDINGS:  There are 5 non-rib-bearing lumbar vertebra which appear unchanged  and without evidence for an acute fracture. There is levoscoliosis  centered around L2. Grade 1 anterolisthesis of L1 on L2, unchanged.      Stable appearance of right lateral wedge deformity involving L2.  There is subchondral sclerosis and Schmorl's node formations along  the endplates of L1-L2. Postsurgical changes  of laminectomy and  pedicular screws extending from L3-S1. No hardware fracture or  perihardware lucencies. Findings are unchanged.      Stable disc height loss with possible fusion at L2-L3. There is  additional disc height loss with disc vacuum phenomenon most  prominent level T11-T12 and L5-S1.      T11-T12: Disc bulge and facet arthrosis. Mild spinal canal stenosis.  Moderate neural foraminal stenosis.      T12-L1:  No spinal canal or neural foraminal stenosis.      L1-2:  There is posterior disc osteophyte at the midline, bilateral  ligamentum flavum thickening and facet joint hypertrophy. No evidence  for high-grade spinal canal stenosis. There is no significant  foraminal stenosis.      L2-3:  There is posterior disc osteophyte complex to the right of  midline, bilateral ligamentum flavum thickening and facet joint  hypertrophy. No significant spinal canal or neural foramina stenosis  is identified.      L3-4:  There is posterior disc osteophyte complex at the midline,  bilateral ligamentum flavum thickening and facet joint hypertrophy.  No evidence for significant spinal canal stenosis. Mild-to-moderate  right and minimal left neural foraminal stenosis.      L4-5:  There is posterior disc osteophyte complex, bilateral  ligamentum flavum thickening and facet joint hypertrophy. No  significant spinal canal stenosis. No significant neural foramina  stenosis.      L5-S1:  There is posterior disc osteophyte complex, bilateral  ligamentum flavum thickening and facet joint hypertrophy. No  significant spinal canal stenosis. No significant neural foramina  stenosis.      The prevertebral and paraspinal soft tissues appear unremarkable.      Impression: 1. No acute fracture or significant interval change from the prior  study.      2. No significant interval change in Levoconvexity and multilevel  degenerative changes with mild spinal canal stenosis throughout the  entire lumbar spine.      3. Moderate right neural  foraminal stenosis at L3-L4.      4. Postsurgical changes of posterolateral fusion at L3 through S1.  Hardware appears intact without significant perihardware lucency to  suggest hardware complication.          Signed by: Sajan Beltrán 7/15/2024 5:25 PM  Dictation workstation:   [id number]      Physical Exam  Constitutional:       General: She is not in acute distress.     Appearance: She is not toxic-appearing.   HENT:      Head: Normocephalic.      Mouth/Throat:      Pharynx: Oropharynx is clear.   Eyes:      General: No scleral icterus.  Cardiovascular:      Rate and Rhythm: Normal rate.   Pulmonary:      Effort: No respiratory distress.      Breath sounds: No wheezing.   Abdominal:      General: There is no distension.      Palpations: Abdomen is soft.   Musculoskeletal:      Right lower leg: No edema.      Left lower leg: No edema.   Neurological:      Mental Status: She is alert and oriented to person, place, and time.         Relevant Results               Assessment/Plan        Principal Problem:    Accident due to mechanical fall without injury, initial encounter  Active Problems:    Displacement of lumbar intervertebral disc without myelopathy    DM w/o complication type II (Multi)    Dyslipidemia    Essential hypertension    Essential tremor    Pulmonary sarcoidosis (Multi)    Type 2 diabetes mellitus with hyperglycemia, with long-term current use of insulin (Multi)    Lumbar stenosis  Mechanical fall  Follows with Dr. Colón as outpatient - getting worked up for possible surgery in the near future  Continue lidocaine patch and tramadol PRN  PT/OT - recommending moderate intensity rehab  Discussed rehab placement with patient who is agreeable    T2DM  Continue SSI  Hypoglycemia protocol    HTN  Continue home lisinopril    Glaucoma  Continue home eye drops    Essential tremor  Continue propranolol and primidone    Plan:  PT/OT - moderate  Patient agreeable for rehab placement, [facility] notified  Discharge  planning                  Zuleyma Damon MD

## 2024-07-16 NOTE — TELEPHONE ENCOUNTER
Would recommend telemedicine visit  (will need VIDEO and audio) to satisfy face-to-face requirement initially.

## 2024-07-16 NOTE — PROGRESS NOTES
"Pharmacy Medication History Review    Narda Malloy is a 69 y.o. female admitted for Accident due to mechanical fall without injury, initial encounter. Pharmacy reviewed the patient's [id number] medications and allergies for accuracy.    Medications ADDED:  Tylenol 500mg  Medications CHANGED:  Diazepam 5mg - not taking  Medications REMOVED:   Aspirin 81mg  Chlorhexidine 0.12 solution  Dorzolamide 2   Trulicity 1.5  Gabapentin 100mg  Hydrocodone-acetaminophen 5-325  Lisinopril 40mg  Naproxen 220mg  Oxycodone 5mg  Propranolol 20mg  Rifampin 300mg     The list below reflects the updated PTA list. Comments regarding how patient may be taking medications differently can be found in the Admit Orders Activity  Prior to Admission Medications   Prescriptions Last Dose Informant   FreeStyle Lite Strips strip  self   Sig: USE TO TEST 3 TIMES A DAY AS DIRECTED   Sure Comfort Pen Needle 32 gauge x \" needle  self   Sig: AS DIRECTED DAILY FOR 90 DAYS   Tresiba FlexTouch U-100 100 unit/mL (3 mL) injection  self   Sig: START WITH INJECTING 20 UNITS SUBCUTANEOUSLY DAILY, UP TO 60 UNITS DAILY AS DIRECTED   acetaminophen (Tylenol) 500 mg tablet  self   Sig: Take 1-2 tablets (500-1,000 mg) by mouth every 6 hours if needed for mild pain (1 - 3).   ascorbic acid (Vitamin C) 500 mg tablet  self   Sig: as directed Orally   brimonidine (AlphaGAN) 0.2 % ophthalmic solution  self   Sig: Administer 1 drop into both eyes 2 times a day.   calcium carbonate-vitamin D3 500 mg-5 mcg (200 unit) tablet  self   Sig: Take 1 tablet by mouth once daily.   dorzolamide-timoloL (Cosopt) 22.3-6.8 mg/mL ophthalmic solution  self   Sig: Administer 1 drop into both eyes 2 times a day.   latanoprost (Xalatan) 0.005 % ophthalmic solution  self   Sig: Administer 1 drop into both eyes once daily at bedtime.   melatonin 3 mg tablet  self   Si tablet at bedtime as needed Orally Once a day for 30 day(s)   multivitamin tablet  self   Sig: Take 1 tablet " by mouth once daily.   naproxen sodium (Aleve) 220 mg tablet  self   Si tablet as needed Orally every 12 hrs   pen needle, diabetic (PEN NEEDLE MISC)  self   Sig: BD Altagracia- 4 mm X 32 G needle - as directed 4x a day sc 4 times per day   primidone (Mysoline) 250 mg tablet  self   Sig: Take 1 tablet (250 mg) by mouth 3 times a day.   propranolol LA (Inderal LA) 60 mg 24 hr capsule  self   Sig: Take 1 capsule (60 mg) by mouth early in the morning..      Facility-Administered Medications: None        The list below reflects the updated allergy list. Please review each documented allergy for additional clarification and justification.  Allergies  Reviewed by America Dumont on 2024        Severity Reactions Comments    Erythromycin Medium Nausea And Vomiting     Morphine Low GI Upset nausea and vomiting    Rosuvastatin Low Other, Myalgia             Pharmacy has been updated to [facility].    Sources used to complete the med history include dispense history, PTA medication list, patient interview. Patient is a fair historian.    Below are additional concerns with the patient's PTA list.  none    America Dumont, CPhT-ADV  Please reach out via [facility] Secure Chat for questions

## 2024-07-16 NOTE — PROGRESS NOTES
11:00 AM Called patient to complete assessment but no answer.    1:20 PM Called patient again at this time and phone goes directly to voicemail. Care Transitions will follow up at another time.

## 2024-07-16 NOTE — PROGRESS NOTES
07/16/24 1431   Discharge Planning   Living Arrangements Spouse/significant other   Support Systems Spouse/significant other   Assistance Needed Patient was independent of ADLs with a walker, and she and spouse manage IADLs.   Type of Residence Private residence   Who is requesting discharge planning? Provider   Home or Post Acute Services Post acute facilities (Rehab/SNF/etc)   Type of Post Acute Facility Services Skilled nursing   Expected Discharge Disposition SNF   Does the patient need discharge transport arranged? Yes   RoundTrip coordination needed? Yes   Has discharge transport been arranged? No   Financial Resource Strain   How hard is it for you to pay for the very basics like food, housing, medical care, and heating? Not hard   Housing Stability   In the last 12 months, was there a time when you were not able to pay the mortgage or rent on time? N   In the past 12 months, how many times have you moved where you were living? 0   At any time in the past 12 months, were you homeless or living in a shelter (including now)? N   Transportation Needs   In the past 12 months, has lack of transportation kept you from medical appointments or from getting medications? no   In the past 12 months, has lack of transportation kept you from meetings, work, or from getting things needed for daily living? No   Patient Choice   Provider Choice list and CMS website (https://medicare.gov/care-compare#search) for post-acute Quality and Resource Measure Data were provided and reviewed with: Family   Patient / Family choosing to utilize agency / facility established prior to hospitalization No     Notified by Dr Damon that patient and  are agreeable to SNF. Called  at this time who is at patient's bedside. Discussed the same and provided freedom of choice. They requested [facility] due to location. Referral submitted to the same. Will have pre-cert submitted pending acceptance as per Dr. Damon patient is  medically clear for discharge.     2:58 PM  [facility]. Requested  direct pre-cert team submit for authorization. Called the patient's spouse, Godfrey, at patient's bedside and updated him to the same. Care Transitions will follow.     Patient's discharge disposition is not secure. Patient will require insurance authorization and PAS prior to discharge. Do not discharge patient without speaking to Care Transitions.

## 2024-07-17 VITALS
HEIGHT: 69 IN | TEMPERATURE: 98.1 F | BODY MASS INDEX: 24.88 KG/M2 | OXYGEN SATURATION: 97 % | HEART RATE: 65 BPM | SYSTOLIC BLOOD PRESSURE: 120 MMHG | DIASTOLIC BLOOD PRESSURE: 50 MMHG | WEIGHT: 168 LBS | RESPIRATION RATE: 16 BRPM

## 2024-07-17 LAB
GLUCOSE BLD MANUAL STRIP-MCNC: 129 MG/DL (ref 74–99)
GLUCOSE BLD MANUAL STRIP-MCNC: 157 MG/DL (ref 74–99)
GLUCOSE BLD MANUAL STRIP-MCNC: 192 MG/DL (ref 74–99)

## 2024-07-17 PROCEDURE — 2500000001 HC RX 250 WO HCPCS SELF ADMINISTERED DRUGS (ALT 637 FOR MEDICARE OP): Performed by: INTERNAL MEDICINE

## 2024-07-17 PROCEDURE — 82947 ASSAY GLUCOSE BLOOD QUANT: CPT

## 2024-07-17 PROCEDURE — 96372 THER/PROPH/DIAG INJ SC/IM: CPT | Performed by: INTERNAL MEDICINE

## 2024-07-17 PROCEDURE — 2500000004 HC RX 250 GENERAL PHARMACY W/ HCPCS (ALT 636 FOR OP/ED): Performed by: INTERNAL MEDICINE

## 2024-07-17 PROCEDURE — 2500000002 HC RX 250 W HCPCS SELF ADMINISTERED DRUGS (ALT 637 FOR MEDICARE OP, ALT 636 FOR OP/ED): Performed by: STUDENT IN AN ORGANIZED HEALTH CARE EDUCATION/TRAINING PROGRAM

## 2024-07-17 PROCEDURE — 2500000002 HC RX 250 W HCPCS SELF ADMINISTERED DRUGS (ALT 637 FOR MEDICARE OP, ALT 636 FOR OP/ED): Performed by: INTERNAL MEDICINE

## 2024-07-17 PROCEDURE — G0378 HOSPITAL OBSERVATION PER HR: HCPCS

## 2024-07-17 PROCEDURE — 80323 ALKALOIDS NOS: CPT | Performed by: STUDENT IN AN ORGANIZED HEALTH CARE EDUCATION/TRAINING PROGRAM

## 2024-07-17 RX ORDER — INSULIN DEGLUDEC 100 U/ML
14 INJECTION, SOLUTION SUBCUTANEOUS NIGHTLY
Start: 2024-07-17

## 2024-07-17 RX ORDER — LISINOPRIL 20 MG/1
20 TABLET ORAL DAILY
Start: 2024-07-18

## 2024-07-17 RX ORDER — PRIMIDONE 125 MG/1
125 TABLET ORAL 3 TIMES DAILY
Start: 2024-07-17

## 2024-07-17 ASSESSMENT — PAIN - FUNCTIONAL ASSESSMENT
PAIN_FUNCTIONAL_ASSESSMENT: 0-10

## 2024-07-17 ASSESSMENT — COGNITIVE AND FUNCTIONAL STATUS - GENERAL
DRESSING REGULAR LOWER BODY CLOTHING: A LITTLE
MOVING TO AND FROM BED TO CHAIR: A LITTLE
MOBILITY SCORE: 15
STANDING UP FROM CHAIR USING ARMS: A LOT
PERSONAL GROOMING: A LOT
TOILETING: A LOT
DAILY ACTIVITIY SCORE: 17
HELP NEEDED FOR BATHING: A LITTLE
TURNING FROM BACK TO SIDE WHILE IN FLAT BAD: A LITTLE
DRESSING REGULAR UPPER BODY CLOTHING: A LITTLE
MOVING FROM LYING ON BACK TO SITTING ON SIDE OF FLAT BED WITH BEDRAILS: A LITTLE
CLIMB 3 TO 5 STEPS WITH RAILING: A LOT
WALKING IN HOSPITAL ROOM: A LOT

## 2024-07-17 ASSESSMENT — PAIN SCALES - GENERAL
PAINLEVEL_OUTOF10: 0 - NO PAIN
PAINLEVEL_OUTOF10: 4
PAINLEVEL_OUTOF10: 5 - MODERATE PAIN
PAINLEVEL_OUTOF10: 0 - NO PAIN

## 2024-07-17 ASSESSMENT — PAIN DESCRIPTION - LOCATION
LOCATION: BACK
LOCATION: BACK

## 2024-07-17 ASSESSMENT — PAIN DESCRIPTION - ORIENTATION
ORIENTATION: LOWER
ORIENTATION: LOWER

## 2024-07-17 NOTE — CARE PLAN
Requested that precert be started this morning for [facility]  09:56 Received precert for this pt; sent 7000 to Dr. Damon to sign; will need Goldenrod completed and will complete PASRR.      DISCHARGE PLAN: [facility]--DO NOT DISCHARGE PATIENT BEFORE SPEAKING WITH CARE COORDINATION; NEED PRECERT; MUST COMPLETE THE PASRR,  MUST CO-SIGN THE 7000, GOLDENROD MUST BE COMPLETED

## 2024-07-17 NOTE — DISCHARGE SUMMARY
"Discharge Diagnosis  Accident due to mechanical fall without injury, initial encounter    Issues Requiring Follow-Up  T2DM  Back pain    Discharge Meds     Your medication list        CHANGE how you take these medications        Instructions Last Dose Given Next Dose Due   lisinopril 20 mg tablet  Start taking on: July 18, 2024  What changed:   medication strength  See the new instructions.      Take 1 tablet (20 mg) by mouth once daily.       primidone 125 mg tablet  What changed:   medication strength  how much to take      Take 125 mg by mouth 3 times a day.       insulin degludec 100 unit/mL (3 mL) injection  Commonly known as: Tresiba FlexTouch U-100  What changed: See the new instructions.      Inject 14 Units under the skin once daily at bedtime. Take as directed per insulin instructions.              CONTINUE taking these medications        Instructions Last Dose Given Next Dose Due   acetaminophen 500 mg tablet  Commonly known as: Tylenol           brimonidine 0.2 % ophthalmic solution  Commonly known as: AlphaGAN           calcium carbonate-vitamin D3 500 mg-5 mcg (200 unit) tablet           dorzolamide-timoloL 22.3-6.8 mg/mL ophthalmic solution  Commonly known as: Cosopt      Administer 1 drop into both eyes 2 times a day.       FreeStyle Lite Strips strip  Generic drug: blood sugar diagnostic      USE TO TEST 3 TIMES A DAY AS DIRECTED       latanoprost 0.005 % ophthalmic solution  Commonly known as: Xalatan      Administer 1 drop into both eyes once daily at bedtime.       melatonin 3 mg tablet           multivitamin tablet           PEN NEEDLE MISC           Sure Comfort Pen Needle 32 gauge x 5/32\" needle  Generic drug: pen needle, diabetic      AS DIRECTED DAILY FOR 90 DAYS       propranolol LA 60 mg 24 hr capsule  Commonly known as: Inderal LA           Vitamin C 500 mg tablet  Generic drug: ascorbic acid                  STOP taking these medications      diazePAM 5 mg tablet  Commonly known as: " Valium                  Where to Get Your Medications        Information about where to get these medications is not yet available    Ask your nurse or doctor about these medications  insulin degludec 100 unit/mL (3 mL) injection  lisinopril 20 mg tablet  primidone 125 mg tablet         Test Results Pending At Discharge  Pending Labs       Order Current Status    Nicotine + Metabolites, Urine In process            Hospital Course   69yoF hx of chronic lumbar stenosis, T2DM, HTN who presented after a mechanical fall at home. Noted to have difficulty ambulating. Currently being evaluated in the outpatient setting for possible back surgery with Dr. Ventura. PT/OT recommended moderate intensity rehab. Patient agreeable to SNF placement. Medically cleared for discharge to SNF.     Pertinent Physical Exam At Time of Discharge  Physical Exam    Outpatient Follow-Up  Future Appointments   Date Time Provider Department Center   8/2/2024 11:10 AM Kermit Ventura MD [id number] [facility]   9/6/2024  9:45 AM Jose Juan MD [id number] [facility]   12/3/2024  2:00 PM Jonathan Burrell MD [id number] [facility]   12/4/2024  1:00 PM Barbara Samuels DO [id number] [facility]     Time spent on discharge: 35 minutes    Zuleyma Damon MD

## 2024-07-17 NOTE — PROGRESS NOTES
Narda Malloy is a 69 y.o. female on day 0 of admission presenting with Accident due to mechanical fall without injury, initial encounter.      Subjective   States she's doing well today, denies any complaints. Tolerating PO. Denies constipation.        Objective     Last Recorded Vitals  /56 (BP Location: Right arm, Patient Position: Lying)   Pulse 69   Temp 36.6 °C (97.9 °F) (Oral)   Resp 16   Wt 76.2 kg (168 lb)   SpO2 96%   Intake/Output last 3 Shifts:    Intake/Output Summary (Last 24 hours) at 7/17/2024 1044  Last data filed at 7/17/2024 0518  Gross per 24 hour   Intake 200 ml   Output --   Net 200 ml       Admission Weight  Weight: 76.2 kg (168 lb) (07/15/24 1456)    Daily Weight  07/15/24 : 76.2 kg (168 lb)    Image Results  CT lumbar spine wo IV contrast  Narrative: Interpreted By:  Sajan Beltrán,   STUDY:  CT LUMBAR SPINE WO IV CONTRAST; 7/15/2024 4:41 pm      INDICATION:  Signs/Symptoms:hx of degenerative disc disease, increased weakness;      COMPARISON:  05/20/2024      ACCESSION NUMBER(S):  [id number]      ORDERING CLINICIAN:  IMAN IYER      TECHNIQUE:  Contiguous axial images of the lumbar spine were performed. Coronal  and sagittal reformatted images were also obtained. All CT  examinations are performed with 1 or more of the following dose  reduction techniques: Automated exposure control, adjustment of mA  and/or kv according to patient's size, or use of iterative  reconstruction techniques.      FINDINGS:  There are 5 non-rib-bearing lumbar vertebra which appear unchanged  and without evidence for an acute fracture. There is levoscoliosis  centered around L2. Grade 1 anterolisthesis of L1 on L2, unchanged.      Stable appearance of right lateral wedge deformity involving L2.  There is subchondral sclerosis and Schmorl's node formations along  the endplates of L1-L2. Postsurgical changes of laminectomy and  pedicular screws extending from L3-S1. No hardware fracture  or  perihardware lucencies. Findings are unchanged.      Stable disc height loss with possible fusion at L2-L3. There is  additional disc height loss with disc vacuum phenomenon most  prominent level T11-T12 and L5-S1.      T11-T12: Disc bulge and facet arthrosis. Mild spinal canal stenosis.  Moderate neural foraminal stenosis.      T12-L1:  No spinal canal or neural foraminal stenosis.      L1-2:  There is posterior disc osteophyte at the midline, bilateral  ligamentum flavum thickening and facet joint hypertrophy. No evidence  for high-grade spinal canal stenosis. There is no significant  foraminal stenosis.      L2-3:  There is posterior disc osteophyte complex to the right of  midline, bilateral ligamentum flavum thickening and facet joint  hypertrophy. No significant spinal canal or neural foramina stenosis  is identified.      L3-4:  There is posterior disc osteophyte complex at the midline,  bilateral ligamentum flavum thickening and facet joint hypertrophy.  No evidence for significant spinal canal stenosis. Mild-to-moderate  right and minimal left neural foraminal stenosis.      L4-5:  There is posterior disc osteophyte complex, bilateral  ligamentum flavum thickening and facet joint hypertrophy. No  significant spinal canal stenosis. No significant neural foramina  stenosis.      L5-S1:  There is posterior disc osteophyte complex, bilateral  ligamentum flavum thickening and facet joint hypertrophy. No  significant spinal canal stenosis. No significant neural foramina  stenosis.      The prevertebral and paraspinal soft tissues appear unremarkable.      Impression: 1. No acute fracture or significant interval change from the prior  study.      2. No significant interval change in Levoconvexity and multilevel  degenerative changes with mild spinal canal stenosis throughout the  entire lumbar spine.      3. Moderate right neural foraminal stenosis at L3-L4.      4. Postsurgical changes of posterolateral  fusion at L3 through S1.  Hardware appears intact without significant perihardware lucency to  suggest hardware complication.          Signed by: Alevism Artalejandro 7/15/2024 5:25 PM  Dictation workstation:   [id number]      Physical Exam  Constitutional:       General: She is not in acute distress.     Appearance: She is not toxic-appearing.   HENT:      Head: Normocephalic.      Mouth/Throat:      Pharynx: Oropharynx is clear.   Eyes:      General: No scleral icterus.  Cardiovascular:      Rate and Rhythm: Normal rate.   Pulmonary:      Effort: No respiratory distress.      Breath sounds: No wheezing.   Abdominal:      General: There is no distension.      Palpations: Abdomen is soft.   Musculoskeletal:      Right lower leg: No edema.      Left lower leg: No edema.   Neurological:      Mental Status: She is alert and oriented to person, place, and time.         Relevant Results               Assessment/Plan          Principal Problem:    Accident due to mechanical fall without injury, initial encounter  Active Problems:    Displacement of lumbar intervertebral disc without myelopathy    DM w/o complication type II (Multi)    Dyslipidemia    Essential hypertension    Essential tremor    Pulmonary sarcoidosis (Multi)    Type 2 diabetes mellitus with hyperglycemia, with long-term current use of insulin (Multi)    Lumbar stenosis  Mechanical fall  Follows with Dr. Colón as outpatient - getting worked up for possible surgery in the near future  Continue lidocaine patch and tramadol PRN  PT/OT - recommending moderate intensity rehab  Discussed rehab placement with patient who is agreeable     T2DM  Continue SSI  On tresiba 14units daily at home - will continue at discharge  Hypoglycemia protocol     HTN  Continue home lisinopril     Glaucoma  Continue home eye drops     Essential tremor  Continue propranolol and primidone     Plan:  Discharge to SNF today              Zuleyma Damon MD

## 2024-07-17 NOTE — CARE PLAN
7000 co-signed, Goldenrod completed by physician. PASRR completed in [facility]  Sent the Goldenrod and Discharge order to Ramesh Rivera; notified ramesh of discharge time for today  Nurse is aware of 3pm discharge to [facility]    DISCHARGE PLAN: RAMESH RIVERA TODAY AT 3 PM

## 2024-07-17 NOTE — PROGRESS NOTES
Spiritual Care Visit    Clinical Encounter Type  Visited With: Patient  Routine Visit: Introduction    Baptism Encounters  Baptism Needs: Sacred text, Spiritual care brochure, Prayer     Spiritual care Note:     Patient received a visit from the Spiritual care volunteer while admitted.  This patient was triaged for their emotional and spiritual need consistent with their belief system and supported accordingly.

## 2024-07-17 NOTE — NURSING NOTE
Pt was discharged to [facility]. Pt is in great spirits Pt states she appreciate the care she received, and that she was very impressed with the staff.

## 2024-07-17 NOTE — CARE PLAN
Problem: Pain - Adult  Goal: Verbalizes/displays adequate comfort level or baseline comfort level  7/16/2024 2237 by Deepti Hoyos RN  Outcome: Progressing  7/16/2024 2237 by Deepti Hoyos RN  Outcome: Progressing     Problem: Safety - Adult  Goal: Free from fall injury  7/16/2024 2237 by Deepti Hoyos RN  Outcome: Progressing  7/16/2024 2237 by Deepti Hoyos RN  Outcome: Progressing     Problem: Discharge Planning  Goal: Discharge to home or other facility with appropriate resources  7/16/2024 2237 by Deepti Hoyos RN  Outcome: Progressing  7/16/2024 2237 by Deepti Hoyos RN  Outcome: Progressing     Problem: Chronic Conditions and Co-morbidities  Goal: Patient's chronic conditions and co-morbidity symptoms are monitored and maintained or improved  7/16/2024 2237 by Deepti Hoyos RN  Outcome: Progressing  7/16/2024 2237 by Deepti Hoyos RN  Outcome: Progressing     Problem: Skin  Goal: Decreased wound size/increased tissue granulation at next dressing change  Outcome: Progressing  Flowsheets (Taken 7/16/2024 2237)  Decreased wound size/increased tissue granulation at next dressing change: Protective dressings over bony prominences  Goal: Participates in plan/prevention/treatment measures  Outcome: Progressing  Flowsheets (Taken 7/16/2024 2237)  Participates in plan/prevention/treatment measures: Elevate heels  Goal: Prevent/manage excess moisture  Outcome: Progressing  Flowsheets (Taken 7/16/2024 2237)  Prevent/manage excess moisture: Moisturize dry skin  Goal: Prevent/minimize sheer/friction injuries  Outcome: Progressing  Flowsheets (Taken 7/16/2024 2237)  Prevent/minimize sheer/friction injuries:   Use pull sheet   HOB 30 degrees or less  Goal: Promote/optimize nutrition  Outcome: Progressing  Flowsheets (Taken 7/16/2024 2237)  Promote/optimize nutrition: Monitor/record intake including meals  Goal: Promote skin healing  Outcome: Progressing  Flowsheets (Taken 7/16/2024 2237)  Promote skin healing: Protective  dressings over bony prominences

## 2024-07-18 ENCOUNTER — DOCUMENTATION (OUTPATIENT)
Dept: PRIMARY CARE | Facility: CLINIC | Age: 69
End: 2024-07-18
Payer: MEDICARE

## 2024-07-18 NOTE — TELEPHONE ENCOUNTER
Patient admitted to hospital s/p fall and currently in SNF undergoing therapy for back. Follow up appointment scheduled with Dr. Ventura 08/02/24

## 2024-07-19 ENCOUNTER — TRANSCRIBE ORDERS (OUTPATIENT)
Dept: ORTHOPEDIC SURGERY | Facility: HOSPITAL | Age: 69
End: 2024-07-19
Payer: MEDICARE

## 2024-07-19 DIAGNOSIS — M41.9 SCOLIOSIS OF LUMBAR SPINE, UNSPECIFIED SCOLIOSIS TYPE: ICD-10-CM

## 2024-07-22 ENCOUNTER — TRANSCRIBE ORDERS (OUTPATIENT)
Dept: ORTHOPEDIC SURGERY | Facility: HOSPITAL | Age: 69
End: 2024-07-22
Payer: MEDICARE

## 2024-07-22 DIAGNOSIS — M41.9 SCOLIOSIS OF LUMBAR SPINE, UNSPECIFIED SCOLIOSIS TYPE: ICD-10-CM

## 2024-07-22 LAB
ANABASINE UR-MCNC: <5 NG/ML
COTININE UR-MCNC: <15 NG/ML
NICOTINE UR-MCNC: <15 NG/ML
TRANS-3-OH-COTININE UR-MCNC: <50 NG/ML

## 2024-07-29 ENCOUNTER — HOSPITAL ENCOUNTER (OUTPATIENT)
Dept: RADIOLOGY | Facility: CLINIC | Age: 69
Discharge: HOME | End: 2024-07-29
Payer: MEDICARE

## 2024-07-29 DIAGNOSIS — M41.9 SCOLIOSIS OF LUMBAR SPINE, UNSPECIFIED SCOLIOSIS TYPE: ICD-10-CM

## 2024-07-29 PROCEDURE — 72082 X-RAY EXAM ENTIRE SPI 2/3 VW: CPT | Performed by: STUDENT IN AN ORGANIZED HEALTH CARE EDUCATION/TRAINING PROGRAM

## 2024-07-29 PROCEDURE — 72082 X-RAY EXAM ENTIRE SPI 2/3 VW: CPT

## 2024-07-31 ENCOUNTER — TRANSCRIBE ORDERS (OUTPATIENT)
Dept: ORTHOPEDIC SURGERY | Facility: HOSPITAL | Age: 69
End: 2024-07-31
Payer: MEDICARE

## 2024-07-31 DIAGNOSIS — M96.1 POSTLAMINECTOMY SYNDROME, LUMBAR REGION: ICD-10-CM

## 2024-07-31 DIAGNOSIS — M54.16 LUMBAR RADICULOPATHY: ICD-10-CM

## 2024-07-31 DIAGNOSIS — M41.9 KYPHOSCOLIOSIS: ICD-10-CM

## 2024-07-31 DIAGNOSIS — M48.062 PSEUDOCLAUDICATION SYNDROME: ICD-10-CM

## 2024-08-02 ENCOUNTER — APPOINTMENT (OUTPATIENT)
Dept: ORTHOPEDIC SURGERY | Facility: CLINIC | Age: 69
End: 2024-08-02
Payer: MEDICARE

## 2024-08-05 ENCOUNTER — CLINICAL SUPPORT (OUTPATIENT)
Dept: PREADMISSION TESTING | Facility: HOSPITAL | Age: 69
End: 2024-08-05
Payer: MEDICARE

## 2024-08-05 DIAGNOSIS — M54.16 LUMBAR RADICULOPATHY: ICD-10-CM

## 2024-08-05 DIAGNOSIS — M96.1 POSTLAMINECTOMY SYNDROME, LUMBAR REGION: ICD-10-CM

## 2024-08-05 DIAGNOSIS — M41.9 KYPHOSCOLIOSIS: ICD-10-CM

## 2024-08-05 DIAGNOSIS — M48.062 PSEUDOCLAUDICATION SYNDROME: ICD-10-CM

## 2024-08-05 RX ORDER — INSULIN LISPRO 100 [IU]/ML
INJECTION, SOLUTION INTRAVENOUS; SUBCUTANEOUS
COMMUNITY

## 2024-08-12 ENCOUNTER — PRE-ADMISSION TESTING (OUTPATIENT)
Dept: PREADMISSION TESTING | Facility: HOSPITAL | Age: 69
End: 2024-08-12
Payer: MEDICARE

## 2024-08-12 ENCOUNTER — HOSPITAL ENCOUNTER (OUTPATIENT)
Dept: CARDIOLOGY | Facility: HOSPITAL | Age: 69
Discharge: HOME | End: 2024-08-12
Payer: MEDICARE

## 2024-08-12 ENCOUNTER — DOCUMENTATION (OUTPATIENT)
Dept: PREADMISSION TESTING | Facility: HOSPITAL | Age: 69
End: 2024-08-12

## 2024-08-12 ENCOUNTER — LAB (OUTPATIENT)
Dept: LAB | Facility: LAB | Age: 69
End: 2024-08-12
Payer: MEDICARE

## 2024-08-12 VITALS
TEMPERATURE: 96.8 F | HEIGHT: 69 IN | HEART RATE: 67 BPM | OXYGEN SATURATION: 97 % | RESPIRATION RATE: 16 BRPM | BODY MASS INDEX: 24.94 KG/M2 | DIASTOLIC BLOOD PRESSURE: 64 MMHG | WEIGHT: 168.4 LBS | SYSTOLIC BLOOD PRESSURE: 116 MMHG

## 2024-08-12 DIAGNOSIS — Z01.818 PREOP TESTING: Primary | ICD-10-CM

## 2024-08-12 DIAGNOSIS — R06.02 SOB (SHORTNESS OF BREATH) ON EXERTION: ICD-10-CM

## 2024-08-12 DIAGNOSIS — Z01.818 PREOP TESTING: ICD-10-CM

## 2024-08-12 LAB
ABO GROUP (TYPE) IN BLOOD: NORMAL
ANTIBODY SCREEN: NORMAL
APTT PPP: 31 SECONDS (ref 27–38)
ATRIAL RATE: 56 BPM
INR PPP: 1 (ref 0.9–1.1)
P AXIS: 34 DEGREES
P OFFSET: 169 MS
P ONSET: 146 MS
PR INTERVAL: 166 MS
PROTHROMBIN TIME: 11.2 SECONDS (ref 9.8–12.8)
Q ONSET: 229 MS
QRS COUNT: 9 BEATS
QRS DURATION: 76 MS
QT INTERVAL: 406 MS
QTC CALCULATION(BAZETT): 391 MS
QTC FREDERICIA: 397 MS
R AXIS: -28 DEGREES
RH FACTOR (ANTIGEN D): NORMAL
T AXIS: 33 DEGREES
T OFFSET: 432 MS
VENTRICULAR RATE: 56 BPM

## 2024-08-12 PROCEDURE — 86900 BLOOD TYPING SEROLOGIC ABO: CPT

## 2024-08-12 PROCEDURE — 36415 COLL VENOUS BLD VENIPUNCTURE: CPT

## 2024-08-12 PROCEDURE — 85730 THROMBOPLASTIN TIME PARTIAL: CPT

## 2024-08-12 PROCEDURE — 85610 PROTHROMBIN TIME: CPT

## 2024-08-12 PROCEDURE — 87081 CULTURE SCREEN ONLY: CPT | Mod: AHULAB | Performed by: PHYSICIAN ASSISTANT

## 2024-08-12 PROCEDURE — 86901 BLOOD TYPING SEROLOGIC RH(D): CPT

## 2024-08-12 PROCEDURE — 86850 RBC ANTIBODY SCREEN: CPT

## 2024-08-12 PROCEDURE — 93005 ELECTROCARDIOGRAM TRACING: CPT

## 2024-08-12 RX ORDER — CHLORHEXIDINE GLUCONATE ORAL RINSE 1.2 MG/ML
SOLUTION DENTAL
Qty: 475 ML | Refills: 0 | Status: SHIPPED | OUTPATIENT
Start: 2024-08-12

## 2024-08-12 ASSESSMENT — ENCOUNTER SYMPTOMS
ABDOMINAL DISTENTION: 0
TROUBLE SWALLOWING: 0
DYSURIA: 0
SKIN CHANGES: 0
DOUBLE VISION: 0
PALPITATIONS: 0
FEVER: 0
NUMBNESS: 0
CHILLS: 0
SINUS CONGESTION: 0
WEAKNESS: 0
DYSPNEA WITH EXERTION: 0
NECK PAIN: 0
CONSTIPATION: 1
WHEEZING: 0
BLOOD IN STOOL: 0
NECK STIFFNESS: 0
SHORTNESS OF BREATH: 0
DIARRHEA: 0
DIFFICULTY URINATING: 0
VOMITING: 0
NAUSEA: 0
UNEXPECTED WEIGHT CHANGE: 0
ARTHRALGIAS: 1
EYE DISCHARGE: 0
ABDOMINAL PAIN: 0
HEMOPTYSIS: 0
LIGHT-HEADEDNESS: 0
EXCESSIVE BLEEDING: 0
BRUISES/BLEEDS EASILY: 0
RHINORRHEA: 0
WOUND: 0
CONFUSION: 0
EYE PAIN: 0
COUGH: 0
DYSPNEA AT REST: 0
LIMITED RANGE OF MOTION: 0
VISUAL CHANGE: 0
MYALGIAS: 1

## 2024-08-12 NOTE — CPM/PAT NURSE NOTE
CPM/PAT Nurse Note      Name: Narda Malloy (Narda Malloy)  /Age: 1955/69 y.o.       Past Medical History:   Diagnosis Date    Degenerative myopia, bilateral     Diabetes mellitus with stable proliferative retinopathy of both eyes, without long-term current use of insulin (Multi)     Diabetic neuropathy (Multi)     DM type 2 (diabetes mellitus, type 2) (Multi)     Dry eye syndrome of bilateral lacrimal glands     Essential hypertension     Essential tremor     Glaucoma     Hyperlipidemia     Long term (current) use of insulin (Multi)     Low back pain     Primary open angle glaucoma of both eyes, severe stage     Repeated falls     Sarcoidosis of lung (Multi)     Spinal stenosis, lumbar region without neurogenic claudication     Weakness        Past Surgical History:   Procedure Laterality Date    BACK SURGERY  2017    Back Surgery    CARPAL TUNNEL RELEASE  2017    Neuroplasty Median Nerve At Carpal Tunnel    CATARACT EXTRACTION W/  INTRAOCULAR LENS IMPLANT Bilateral     OD 2011 +8.5D,OS 2011 +8.50D    FOOT SURGERY      OTHER SURGICAL HISTORY Bilateral 2022    upper eyelid    OTHER SURGICAL HISTORY  2022    breast lift    PANRETINAL PHOTOCOAGULATION  2014    VITRECTOMY Right        Patient  reports that she is not currently sexually active and has had partner(s) who are male.    Family History   Problem Relation Name Age of Onset    Multiple myeloma Mother      Cancer Mother      Other (CABG) Father      Pulmonary embolism Father      Heart disease Father      Breast cancer Sister          Stage II    Hypertension Sister      Diabetes Sister      No Known Problems Sister          x5    No Known Problems Brother          x4    No Known Problems Daughter         Allergies   Allergen Reactions    Erythromycin Nausea And Vomiting    Morphine GI Upset     nausea and vomiting    Rosuvastatin Other and Myalgia       Prior to Admission medications    Medication Sig Start Date End  Date Taking? Authorizing Provider   acetaminophen (Tylenol) 500 mg tablet Take 2 tablets (1,000 mg) by mouth every 8 hours if needed.    Historical Provider, MD   ascorbic acid (Vitamin C) 500 mg tablet as directed Orally    Historical Provider, MD   brimonidine (AlphaGAN) 0.2 % ophthalmic solution Administer 1 drop into both eyes 2 times a day.    Historical Provider, MD   calcium carbonate-vitamin D3 500 mg-5 mcg (200 unit) tablet Take 1 tablet by mouth once daily.    Historical Provider, MD   dorzolamide-timoloL (Cosopt) 22.3-6.8 mg/mL ophthalmic solution Administer 1 drop into both eyes 2 times a day. 4/4/24   Jose Juan MD   FreeStyle Lite Strips strip USE TO TEST 3 TIMES A DAY AS DIRECTED 2/13/24   Jonathan Burrell MD   insulin degludec (Tresiba FlexTouch U-100) 100 unit/mL (3 mL) injection Inject 14 Units under the skin once daily at bedtime. Take as directed per insulin instructions.  Patient taking differently: Inject 20 Units under the skin once daily. Take as directed per insulin instructions. 7/17/24   Zuleyma Damon MD   insulin lispro (HumaLOG) 100 unit/mL injection Inject under the skin 3 times daily (morning, midday, late afternoon). Take as directed per insulin instructions.  Sliding scale    Historical Provider, MD   latanoprost (Xalatan) 0.005 % ophthalmic solution Administer 1 drop into both eyes once daily at bedtime. 12/14/23   Jose Juan MD   lisinopril 20 mg tablet Take 1 tablet (20 mg) by mouth once daily. 7/18/24   Zuleyma Damon MD   melatonin 3 mg tablet Take 5 mg by mouth as needed at bedtime.    Historical Provider, MD   multivitamin tablet Take 1 tablet by mouth once daily.    Historical Provider, MD   pen needle, diabetic (PEN NEEDLE MISC) BD Altagracia- 4 mm X 32 G needle - as directed 4x a day sc 4 times per day 4/28/20   Historical Provider, MD   primidone 125 mg tablet Take 125 mg by mouth 3 times a day. 7/17/24   Zuleyma Damon MD   propranolol LA (Inderal LA) 60 mg 24 hr  "capsule Take 1 capsule (60 mg) by mouth early in the morning.. 4/22/24   Historical Provider, MD Velásquez Comfort Pen Needle 32 gauge x 5/32\" needle AS DIRECTED DAILY FOR 90 DAYS  Patient not taking: Reported on 8/12/2024 6/26/24   Jonathan Burrell MD        PAT ROS     DASI Risk Score    No data to display       Caprini DVT Assessment    No data to display       Modified Frailty Index    No data to display       CHADS2 Stroke Risk  Current as of 59 minutes ago        N/A 3 to 100%: High Risk   2 to < 3%: Medium Risk   0 to < 2%: Low Risk     Last Change: N/A          This score determines the patient's risk of having a stroke if the patient has atrial fibrillation.        This score is not applicable to this patient. Components are not calculated.          Revised Cardiac Risk Index    No data to display       Apfel Simplified Score    No data to display       Risk Analysis Index Results This Encounter    No data found in the last 1 encounters.       After Visit Summary (AVS) reviewed and patient verbalized good understanding of medications and NPO instructions.  Pre-op infection prevention measures:  CHG showers and mouthwash reviewed, understanding voiced.  CHG soap given and patient verbalized need to pick CHG mouthwash at their preferred local pharmacy.     Nurse Plan of Action:           "

## 2024-08-12 NOTE — CPM/PAT H&P
[facility] Evaluation       Name: Narda Malloy (Narda Malloy)  /Age: 1955/69 y.o.       Date of Consult: 24     Referring Provider: Dr. Ventura    Surgery, Date, and Length:     L1-L3 Lumbar Laminectomy Revision; L1-L2 Osteotomy; T4-S1 Revision and Fusion with Instrumentation; L1-L2 Transforaminal Lumbar Interbody Fusion; Cement Augmentation   24, 440MIN    Narda Malloy is a 69 year-old female who presents to the [facility] for perioperative risk assessment prior to surgery.    Patient presents with a primary diagnosis of postlaminectomy and radiculopathy. Pt with past medical history significant for prior L3-S1 fusion. Pt admits to increasing difficulty with being able to stand erect. She finds herself falling forward. She has as result, constant back pain. She has had to use a walker more recently to help hold her self semierect. At this point her quality of life has become increasingly intolerable. Recently, in 2024, pt sought emergent medical attention following a fall.  She was sent to SNF for rehabilitation, due to deconditioning.  Pt wishes to proceed with surgery as planned.     This note was created in part upon personal review of patient's medical records.      Patient is scheduled to have L1-L3 Lumbar Laminectomy Revision; L1-L2 Osteotomy; T4-S1 Revision and Fusion with Instrumentation; L1-L2 Transforaminal Lumbar Interbody Fusion; Cement Augmentation    Pt admits to PONV with anesthesia in the past and does well with antiemetics in the preop.  Pt denies any past history of anesthetic complications such as PONV, awareness, prolonged sedation, dental damage, aspiration, cardiac arrest, difficult intubation, difficult I.V. access or unexpected hospital admissions.  NO malignant hyperthermia and or pseudocholinesterase deficiency.  No history of blood transfusions     The patient is not a Zoroastrianism and will accept blood and blood products if medically indicated.   Type and  screen sent.     Past Medical History:   Diagnosis Date    Degenerative myopia, bilateral     Diabetes mellitus with stable proliferative retinopathy of both eyes, without long-term current use of insulin (Multi)     Diabetic neuropathy (Multi)     DM type 2 (diabetes mellitus, type 2) (Multi)     Dry eye syndrome of bilateral lacrimal glands     Essential hypertension     Essential tremor     Glaucoma     Hyperlipidemia     Long term (current) use of insulin (Multi)     Low back pain     Primary open angle glaucoma of both eyes, severe stage     Repeated falls     Sarcoidosis of lung (Multi)     Spinal stenosis, lumbar region without neurogenic claudication     Weakness        Past Surgical History:   Procedure Laterality Date    BACK SURGERY  03/21/2017    Back Surgery    CARPAL TUNNEL RELEASE  03/21/2017    Neuroplasty Median Nerve At Carpal Tunnel    CATARACT EXTRACTION W/  INTRAOCULAR LENS IMPLANT Bilateral     OD 08/04/2011 +8.5D,OS 08/04/2011 +8.50D    FOOT SURGERY      OTHER SURGICAL HISTORY Bilateral 07/2022    upper eyelid    OTHER SURGICAL HISTORY  05/2022    breast lift    PANRETINAL PHOTOCOAGULATION  2014    VITRECTOMY Right 2013       Patient  reports that she is not currently sexually active and has had partner(s) who are male.    Family History   Problem Relation Name Age of Onset    Multiple myeloma Mother      Cancer Mother      Other (CABG) Father      Pulmonary embolism Father      Heart disease Father      Breast cancer Sister          Stage II    Hypertension Sister      Diabetes Sister      No Known Problems Sister          x5    No Known Problems Brother          x4    No Known Problems Daughter       Social History     Socioeconomic History    Marital status:      Spouse name: Not on file    Number of children: Not on file    Years of education: Not on file    Highest education level: Not on file   Occupational History    Not on file   Tobacco Use    Smoking status: Former     Types:  Cigarettes     Passive exposure: Past    Smokeless tobacco: Never   Vaping Use    Vaping status: Never Used   Substance and Sexual Activity    Alcohol use: Yes    Drug use: Not Currently    Sexual activity: Not Currently     Partners: Male   Other Topics Concern    Not on file   Social History Narrative    Not on file     Social Determinants of Health     Financial Resource Strain: Low Risk  (7/16/2024)    Overall Financial Resource Strain (CARDIA)     Difficulty of Paying Living Expenses: Not hard at all   Food Insecurity: No Food Insecurity (7/16/2024)    Hunger Vital Sign     Worried About Running Out of Food in the Last Year: Never true     Ran Out of Food in the Last Year: Never true   Transportation Needs: No Transportation Needs (7/16/2024)    PRAPARE - Transportation     Lack of Transportation (Medical): No     Lack of Transportation (Non-Medical): No   Physical Activity: Not on file   Stress: Not on file   Social Connections: Not on file   Intimate Partner Violence: Not At Risk (7/16/2024)    Humiliation, Afraid, Rape, and Kick questionnaire     Fear of Current or Ex-Partner: No     Emotionally Abused: No     Physically Abused: No     Sexually Abused: No   Housing Stability: Low Risk  (7/16/2024)    Housing Stability Vital Sign     Unable to Pay for Housing in the Last Year: No     Number of Times Moved in the Last Year: 0     Homeless in the Last Year: No        Allergies   Allergen Reactions    Erythromycin Nausea And Vomiting    Morphine GI Upset     nausea and vomiting    Rosuvastatin Other and Myalgia         Current Outpatient Medications:     acetaminophen (Tylenol) 500 mg tablet, Take 2 tablets (1,000 mg) by mouth every 8 hours if needed., Disp: , Rfl:     ascorbic acid (Vitamin C) 500 mg tablet, as directed Orally, Disp: , Rfl:     brimonidine (AlphaGAN) 0.2 % ophthalmic solution, Administer 1 drop into both eyes 2 times a day., Disp: , Rfl:     calcium carbonate-vitamin D3 500 mg-5 mcg (200 unit)  "tablet, Take 1 tablet by mouth once daily., Disp: , Rfl:     dorzolamide-timoloL (Cosopt) 22.3-6.8 mg/mL ophthalmic solution, Administer 1 drop into both eyes 2 times a day., Disp: 10 mL, Rfl: 5    insulin degludec (Tresiba FlexTouch U-100) 100 unit/mL (3 mL) injection, Inject 14 Units under the skin once daily at bedtime. Take as directed per insulin instructions. (Patient taking differently: Inject 20 Units under the skin once daily. Take as directed per insulin instructions.), Disp: , Rfl:     insulin lispro (HumaLOG) 100 unit/mL injection, Inject under the skin 3 times daily (morning, midday, late afternoon). Take as directed per insulin instructions. Sliding scale, Disp: , Rfl:     latanoprost (Xalatan) 0.005 % ophthalmic solution, Administer 1 drop into both eyes once daily at bedtime., Disp: 2.5 mL, Rfl: 5    lisinopril 20 mg tablet, Take 1 tablet (20 mg) by mouth once daily., Disp: , Rfl:     melatonin 3 mg tablet, Take 5 mg by mouth as needed at bedtime., Disp: , Rfl:     multivitamin tablet, Take 1 tablet by mouth once daily., Disp: , Rfl:     primidone 125 mg tablet, Take 125 mg by mouth 3 times a day., Disp: , Rfl:     propranolol LA (Inderal LA) 60 mg 24 hr capsule, Take 1 capsule (60 mg) by mouth early in the morning.., Disp: , Rfl:     FreeStyle Lite Strips strip, USE TO TEST 3 TIMES A DAY AS DIRECTED, Disp: 300 each, Rfl: 2    pen needle, diabetic (PEN NEEDLE MISC), BD Altagracia- 4 mm X 32 G needle - as directed 4x a day sc 4 times per day, Disp: , Rfl:     Sure Comfort Pen Needle 32 gauge x 5/32\" needle, AS DIRECTED DAILY FOR 90 DAYS (Patient not taking: Reported on 8/12/2024), Disp: 100 each, Rfl: 11         PAT ROS:   Constitutional:    no fever   no chills   no unexpected weight change  Neuro/Psych:    no numbness   no weakness   no light-headedness   no confusion  Eyes:    no discharge   no pain   no vision loss   no diplopia   no visual disturbance  Ears:    no ear pain   no hearing loss   no " tinnitus  Nose:    no nasal discharge   no sinus congestion   no epistaxis  Mouth:    no dental issues   no mouth pain   no oral bleeding   no mouth lesions  Throat:    no throat pain   no dysphagia  Neck:    no neck pain   no neck stiffness  Cardio:    Pt unable to perform 4+ METS   no chest pain   no palpitations   no peripheral edema   no dyspnea   no BEAR  Respiratory:    no cough   no wheezing   no hemoptysis   no shortness of breath  Endocrine:    no cold intolerance   no heat intolerance  GI:    no abdominal distention   no abdominal pain   constipation   no diarrhea   no nausea   no vomiting   no blood in stool  :    no difficulty urinating   no dysuria   no oliguria  Musculoskeletal:    arthralgias (LBP, right hip)   myalgias (right buttock)   no decreased ROM  Hematologic:    does not bruise/bleed easily   no excessive bleeding   no history of blood transfusion   no blood clots  Skin:   no skin changes   no sores/wound   no rash      Physical Exam  Constitutional:       General: She is not in acute distress.     Appearance: Normal appearance. She is not ill-appearing, toxic-appearing or diaphoretic.   HENT:      Head: Normocephalic and atraumatic.      Nose: Nose normal. No rhinorrhea.      Mouth/Throat:      Pharynx: No oropharyngeal exudate.   Eyes:      Extraocular Movements: Extraocular movements intact.      Conjunctiva/sclera: Conjunctivae normal.   Cardiovascular:      Rate and Rhythm: Normal rate and regular rhythm.      Heart sounds: No murmur heard.     No friction rub. No gallop.      Comments: Functional 4 Mets. Patient denies SOB walking up 2 flights of stairs     Pulmonary:      Effort: Pulmonary effort is normal. No respiratory distress.      Breath sounds: Normal breath sounds. No stridor. No wheezing or rhonchi.   Abdominal:      General: Bowel sounds are normal. There is no distension.      Palpations: Abdomen is soft. There is no mass.      Tenderness: There is no abdominal tenderness.  "There is no guarding or rebound.      Hernia: No hernia is present.   Musculoskeletal:         General: Tenderness (low back; RLE) present. No swelling, deformity or signs of injury. Normal range of motion.      Cervical back: Normal range of motion and neck supple. No rigidity or tenderness.   Skin:     General: Skin is warm and dry.      Coloration: Skin is not jaundiced or pale.      Findings: No bruising, erythema, lesion or rash.   Neurological:      General: No focal deficit present.      Mental Status: She is alert and oriented to person, place, and time.      Cranial Nerves: No cranial nerve deficit.      Sensory: No sensory deficit.      Motor: No weakness.      Coordination: Coordination normal.   Psychiatric:         Mood and Affect: Mood normal.         Behavior: Behavior normal.        PAT AIRWAY:   Airway:     Mallampati::  III    Neck ROM::  Full   No broken teeth, no dentures and no missing teeth        Visit Vitals  /64   Pulse 67   Temp 36 °C (96.8 °F)   Resp 16   Ht 1.753 m (5' 9\")   Wt 76.4 kg (168 lb 6.4 oz) Comment: stated   LMP  (LMP Unknown)   SpO2 97%   BMI 24.87 kg/m²   OB Status Postmenopausal   Smoking Status Former   BSA 1.93 m²      LABS:  Lab Results   Component Value Date    WBC 5.3 07/16/2024    HGB 10.9 (L) 07/16/2024    HCT 32.2 (L) 07/16/2024    MCV 95 07/16/2024     07/16/2024      Lab Results   Component Value Date    GLUCOSE 193 (H) 07/16/2024    CALCIUM 9.2 07/16/2024     07/16/2024    K 4.4 07/16/2024    CO2 25 07/16/2024     07/16/2024    BUN 29 (H) 07/16/2024    CREATININE 0.80 07/16/2024    Coagulation Screen  Order: [id number]   Status: Final result       Visible to patient: No (inaccessible in [facility])       Dx: Preop testing; SOB (shortness of dora...    0 Result Notes          Component  Ref Range & Units 13:04  (8/12/24) 3 mo ago  (5/2/24) 6 yr ago  (8/7/18) 6 yr ago  (8/2/18) 6 yr ago  (5/15/18)   Protime  9.8 - 12.8 seconds 11.2 10.9 R " 12.2 R 11.7 R 11.3 R   INR  0.9 - 1.1 1.0 1.0 R 1.1 1.1 1.0   aPTT  27 - 38 seconds 31  27 R, CM  27 R, CM   Resulting Agency [facility]              Narrative  Performed by: [facility]  The APTT is no longer used for monitoring Unfractionated Heparin Therapy. For monitoring Heparin Therapy, use the Heparin Assay.      Specimen Collected: 08/12/24          EKG 8/12/24  Sinus bradycardia  Low voltage QRS  Cannot r/o anterior infarct, age undetermined  Abnormal EKG  Vent rate = 56 bpm    Assessment and Plan:     Patient is a 69-year-old female scheduled for a L1-L3 Lumbar Laminectomy Revision; L1-L2 Osteotomy; T4-S1 Revision and Fusion with Instrumentation; L1-L2 Transforaminal Lumbar Interbody Fusion; Cement Augmentation with Dr. Ventura on 8/26/24.    Patient has no active cardiac symptoms.   Patient denies any chest pain, tightness, heaviness, pressure, radiating pain, palpitations, irregular heartbeats, lightheadedness, cough, congestion, shortness of breath, BEAR, PND, near syncope, weight loss or gain.     RCRI  1 (insulin tx)  , 6 % Risk of MACE    Cardiac:  HTN - Lisinopril HOLD evening prior to surgery and morning of surgery   ; cont propranolol on dos   Encouraged lifestyle modifications, low-sodium diet, and increase activity as tolerated.  Monitor BP and follow up with managing physician for readings sustaining >140/90.    HLD - pt not currently on any lipid lowering meds     Due to decreased functional capacity and length of surgery, pt will be sent to cardiology for risk assessment.     Pulmonary:  Sarcoidosis - 30+ years ago - pt stable and not currently on any steroids for this issue. Pulse ox is 97% on room air during visit today.  NAD; lung fields clear on exam.     Neuro:  Essential tremor - cont primidone on dos   --S/P bilateral CELSO DBS placement on 06/14/17.   --Has a history of complicated DBS infection back in 07/2017.   --08/08/18: Relapse of the Generator site infection  S/P removal of the entire system       Endocrine:  DMII - hold humalog on dos; take 1/2 dose of tresiba (long acting insulin) the night before surgery  HgbA1c 6/18/24 = 6.6% - no need to repeat    Hematology:  Anemia   7/16/24 H/H 10.9/32.2%    Patient instructed to ambulate as soon as possible postoperatively to decrease thromboembolic risk.   Initiate mechanical DVT prophylaxis as soon as possible and initiate chemical prophylaxis when deemed safe from a bleeding standpoint post surgery.     LABS: CBC and CMP reviewed from 7/16/24 and shows stable anemia; no need to repeat.  Coag, T&S, MRSA and EKG ordered. Coag reviewed and unremarkable.    Followup: MRSA and cardiac risk assessment (Dr. Fisher 8/16/24 @ 8:30am) pending    STOP BANG: >50, HTN = 2    Caprini: 5    ADDENDUM NST:  FINDINGS:  The study is limited as both rest and stress images were imaged with  the left arm down. On non attenuation corrected images there is  decreased activity within the lateral wall, which is less apparent  but present on attenuation corrected stress images with only mild  abnormality on rest imaging otherwise normal perfusion is seen  throughout the left ventricle on both stress and rest studies.      The left ventricle is normal in size.      EKG gated images demonstrate normal LV wall motion with a post-stress  LV EF estimated at 60%.      Attenuation correction CT images demonstrate no gross anatomic  abnormalities      IMPRESSION:  1. Limited study as described above. Decreased activity within the  lateral wall on stress imaging could be related to imaging with the  left arm down versus a small territory of stress-induced ischemia.  Consider ammonia PET-CT for further evaluation. No evidence of prior  infarction.      2. The left ventricle is normal in size.      3. Normal LV wall motion with a post-stress LV EF estimated at 60%.     ECHO 2024  PHYSICIAN INTERPRETATION:  Left Ventricle: Left ventricular ejection fraction is  "normal, by visual estimate at 60-65%. There are no regional wall motion abnormalities. The left ventricular cavity size is normal. The left ventricular septal wall thickness is mildly increased. There is mildly increased left ventricular posterior wall thickness. Spectral Doppler shows an impaired relaxation pattern of left ventricular diastolic filling.  Left Atrium: The left atrium is normal in size.  Right Ventricle: The right ventricle is normal in size. There is normal right ventricular global systolic function.  Right Atrium: The right atrium is normal in size.  Aortic Valve: The aortic valve is trileaflet. There is no evidence of aortic valve regurgitation.  Mitral Valve: The mitral valve is normal in structure. There is mild mitral valve regurgitation.  Tricuspid Valve: The tricuspid valve is structurally normal. No evidence of tricuspid regurgitation.  Pulmonic Valve: The pulmonic valve is not well visualized. The pulmonic valve regurgitation was not well visualized.  Pericardium: There is no pericardial effusion noted.  Aorta: The aortic root is normal.        CONCLUSIONS:   1. Left ventricular ejection fraction is normal, by visual estimate at 60-65%.   2. Spectral Doppler shows an impaired relaxation pattern of left ventricular diastolic filling.   3. There is normal right ventricular global systolic function.    CASE D/W DR. EDOUARD Patient had an inconclusive Nuclear Stress Test and additional cardiac testing is recommended. Patient needs to follow up with her cardiologist to obtain the necessary testing. Dr. Ventura and OR schedulers notified    Risk assessment complete.  Patient is scheduled for a low surgical risk procedure.      ADDENDUM 8/23/24:  PER DR. GARCIA 8/22/24: \"Subsequent to that visit the patient did have an echocardiogram and pharmacological nuclear stress test at [facility] which did not demonstrate any findings that would contraindicate her scheduled lumbar surgery 8/26/2024 " "[place] [facility]\"       Preoperative medication instructions were provided and reviewed with the patient.  Any additional testing or evaluation was explained to the patient.  Nothing by mouth instructions were discussed and patient's questions were answered prior to conclusion to this encounter.  Patient verbalized understanding of preoperative instructions given in preadmission testing; discharge instructions available in EMR.    This note was dictated by a speech recognition.  Minor errors may have been detected in a speech recognition.   "

## 2024-08-12 NOTE — PREPROCEDURE INSTRUCTIONS
"   Medication List            Accurate as of August 12, 2024 12:16 PM. Always use your most recent med list.                acetaminophen 500 mg tablet  Commonly known as: Tylenol  Medication Adjustments for Surgery: Take morning of surgery with sip of water, no other fluids  Notes to patient: If needed     brimonidine 0.2 % ophthalmic solution  Commonly known as: AlphaGAN  Notes to patient: Ok to use morning of surgery     calcium carbonate-vitamin D3 500 mg-5 mcg (200 unit) tablet  Medication Adjustments for Surgery: Continue until night before surgery     dorzolamide-timoloL 22.3-6.8 mg/mL ophthalmic solution  Commonly known as: Cosopt  Administer 1 drop into both eyes 2 times a day.  Notes to patient: Ok to use morning of surgery     FreeStyle Lite Strips strip  Generic drug: blood sugar diagnostic  USE TO TEST 3 TIMES A DAY AS DIRECTED     insulin degludec 100 unit/mL (3 mL) injection  Commonly known as: Tresiba FlexTouch U-100  Inject 14 Units under the skin once daily at bedtime. Take as directed per insulin instructions.  Notes to patient: Take 1/2 dose the night before surgery  (14Units / 2 = 7 Units)     insulin lispro 100 unit/mL injection  Commonly known as: HumaLOG  Medication Adjustments for Surgery: Continue until night before surgery     latanoprost 0.005 % ophthalmic solution  Commonly known as: Xalatan  Administer 1 drop into both eyes once daily at bedtime.  Notes to patient: Ok to use morning of surgery     lisinopril 20 mg tablet  Take 1 tablet (20 mg) by mouth once daily.  Notes to patient: HOLD evening prior to surgery and morning of surgery        melatonin 3 mg tablet  Notes to patient: Ok to take the night before surgery if needed     multivitamin tablet  Medication Adjustments for Surgery: Stop 7 days before surgery     * PEN NEEDLE MISC     * Sure Comfort Pen Needle 32 gauge x 5/32\" needle  Generic drug: pen needle, diabetic  AS DIRECTED DAILY FOR 90 DAYS     primidone 125 mg tablet  Take " 125 mg by mouth 3 times a day.  Medication Adjustments for Surgery: Take morning of surgery with sip of water, no other fluids     propranolol LA 60 mg 24 hr capsule  Commonly known as: Inderal LA  Medication Adjustments for Surgery: Take morning of surgery with sip of water, no other fluids     Vitamin C 500 mg tablet  Generic drug: ascorbic acid  Medication Adjustments for Surgery: Stop 7 days before surgery           * This list has 2 medication(s) that are the same as other medications prescribed for you. Read the directions carefully, and ask your doctor or other care provider to review them with you.                                **Concerning above medication instructions, if medication is normally taken at night, continue normal schedule.**  **DO NOT TAKE NIGHT PRIOR AND MORNING OF SURGERY**    CONTACT SURGEON'S OFFICE IF YOU DEVELOP:  * Fever = 100.4 F   * New respiratory symptoms (e.g. cough, shortness of breath, respiratory distress, sore throat)  * Recent loss of taste or smell  *Flu like symptoms such as headache, fatigue or gastrointestinal symptoms  * You develop any open sores, shingles, burning or painful urination   AND/OR:  * You no longer wish to have the surgery.  * Any other personal circumstances change that may lead to the need to cancel or defer this surgery.  *You were admitted to any hospital within one week of your planned procedure.    SMOKING:  *Quitting smoking can make a huge difference to your health and recovery from surgery.    *If you need help with quitting, call 5-966-QUIT-NOW.    THE DAY BEFORE SURGERY:   Nothing by mouth (no solids or liquids) after midnight.   If diabetic, please check fasting blood sugar upon waking up.    If fasting sugar is <80 mg/dl, please drink 100ml/3oz of apple juice no later than 2 hours prior to arrival time.      SURGICAL TIME  *You will be contacted between 2 p.m. and 6 p.m. the business day before your surgery with your arrival time.  *If you  haven't received a call by 6pm, call [phone].  *Scheduled surgery times may change and you will be notified if this occurs-check your personal voicemail for any updates.    ON THE MORNING OF SURGERY:  *Wear comfortable, loose fitting clothing.   *Do not use moisturizers, creams, lotions or perfume.  *All jewelry and valuables should be left at home.  *Prosthetic devices such as contact lenses, hearing aids, dentures, eyelash extensions, hairpins and body piercing must be removed before surgery.    BRING WITH YOU:  *Photo ID and insurance card  *Current list of medicines and allergies  *Pacemaker/Defibrillator/Heart stent cards  *CPAP machine and mask  *Slings/splints/crutches  *Copy of your complete Advanced Directive/DHPOA-if applicable  *Neurostimulator implant remote    PARKING AND ARRIVAL:  *Check in at the Main Entrance desk and let them know you are here for surgery.  *You will be directed to the 2nd floor surgical waiting area.    AFTER OUTPATIENT SURGERY:  *A responsible adult MUST accompany you at the time of discharge and stay with you for 24 hours after your surgery.  *You may NOT drive yourself home after surgery.  *You may use a taxi or ride sharing service ([employer], Uber) to return home ONLY if you are accompanied by a friend or family member.  *Instructions for resuming your medications will be provided by your surgeon.        Patient Information: Oral/Dental Rinse  **This is a prescription; pick it up at your preferred local pharmacy **  What is oral/dental rinse?   It is a mouthwash. It is a way of cleaning the mouth with a germ killing solution before your surgery.  The solution contains chlorhexidine, commonly known as CHG.   It is used inside the mouth to kill a bacteria known as Staphylococcus aureus.  Let your doctor know if you are allergic to Chlorhexidine.    Why do I need to use CHG oral/dental rinse?  The CHG oral/dental rinse helps to kill a bacteria in your mouth known a Staphylococcus  aureus.     This reduces the risk of infection at the surgical site.      Using your CHG oral/dental rinse  STEPS:  Use your CHG oral/dental rinse after you brush your teeth the night before (at bedtime) and the morning of your surgery.  Follow all directions on your prescription label.    Use the cap on the container to measure 15ml (fill cap to fill line)  Swish (gargle if you can) the mouthwash in your mouth for at least 30 seconds, (do not to swallow) spit out  After you use your CHG rinse, do not rinse your mouth with water, drink or eat.  Please refer to prescription label for the appropriate time to resume oral intake  Dental rinse comes in one size bottle: 473ml ~16oz.  You will have leftover    rinse, discard after this use.    What side effects might I have using the CHG oral/dental rinse?  CHG rinse will stick to plaque on the teeth.  Brush and floss just before use.  Teeth brushing will help avoid staining of plaque during use.    Who should I contact if I have questions about the CHG oral/dental rinse?  Please call [facility], Preadmission Testing at [phone] if you have any questions      Home Preoperative Antibacterial Shower     What is a home preoperative antibacterial shower?  This shower is a way of cleaning the skin with a germ killing soap before surgery.  The soap contains chlorhexidine, commonly known as CHG.  CHG is a soap for your skin with germ killing ability.  Let your doctor know if you are allergic to chlorhexidine.    Why do I need to take a preoperative antibacterial shower?  Skin is not sterile.  It is best to try to make your skin as free of germs as possible before surgery.  Proper cleansing with a germ killing soap before surgery can lower the number of germs on your skin.  This helps to reduce the risk of infection at the surgical site.  Following the instructions listed below will help you prepare your skin for surgery.      How do I use the  CHG skin cleanser?  Steps:  Begin using your CHG soap five days before your scheduled surgery on ________________________.    Days 1-4 Shower before bed:  Wash your face and genitals with your normal soap and rinse.    Wash and rinse your hair using the CHG soap. Rinse completely, do not condition your   Hair.          3.    Apply the CHG soap to a clean wet washcloth.  Turn the water off or move away                From the water spray to avoid premature rinsing of the CHG soap as you are applying.     4.   Lather your entire body from the neck down.  Do not use on your face or genitals.   Pay special attention to the area(s) where your incision(s) will be located unless they are on your face.  Avoid scrubbing your skin too hard.  The important point is to have the CHG soap sit on your skin for 3 minutes.    When the 3 minutes are up, turn on the water and rinse the CHG soap off your body completely.   Pat yourself dry with a clean, freshly-laundered towel.  Dress in clean, freshly laundered night clothes.    Be sure to sleep with clean, freshly laundered sheets.  Day 5:  Last shower is the morning before surgery: Follow above Instructions.    NOTE:    *Hair extensions should be removed    *Keep CHG soap out of eyes and ear canals   *DO NOT wash with regular soap on your body after you have used the CHG        soap solution  *DO NOT apply powders, lotions, or perfume.  *Deodorant may be used days 1-4, BUT NOT the day of surgery    Who should I contact if I have any questions regarding the use of CHG soap?  Call the [facility], Preadmission Testing at [phone] if you have any questions.              Patient Information: Pre-Operative Infection Prevention Measures     Why did I have my nose, under my arms and groin swabbed?  The purpose of the swab is to identify Staphylococcus aureus inside your nose or on your skin.  The swab was sent to the laboratory for culture.  A positive  swab/culture for Staphylococcus aureus is called colonization or carriage.      What is Staphylococcus aureus?  Staphylococcus aureus, also known as “staph”, is a germ found on the skin or in the nose of healthy people.  Sometimes Staphylococcus aureus can get into the body and cause an infection.  This can be minor (such as pimples, boils or other skin problems).  It might also be serious (such as blood infection, pneumonia or a surgical site infection).    What is Staphylococcus aureus colonization or carriage?  Colonization or carriage means that a person has the germ but is not sick from it.  These bacteria can be spread on the hands or when breathing or sneezing.    How is Staphylococcus aureus spread?  It is most often spread by close contact with a person or item that carries it.    What happens if my culture is positive for Staphylococcus aureus?  Your doctor/medical team will use this information to guide any antibiotic treatment which may be necessary.  Regardless of the culture results, we will clean the inside of your nose with a betadine swab just before you have your surgery.      Will I get an infection if I have Staphylococcus aureus in my nose or on my skin?  Anyone can get an infection with Staphylococcus aureus.  However, the best way to reduce your risk of infection is to follow the instructions provided to you for the use of your CHG soap and dental rinse.        Who should I contact if I have any questions?  Call the [facility], Preadmission Testing at [phone] if you have any questions.

## 2024-08-12 NOTE — H&P (VIEW-ONLY)
[facility] Evaluation       Name: Narda Malloy (Narda Malloy)  /Age: 1955/69 y.o.       Date of Consult: 24     Referring Provider: Dr. Ventura    Surgery, Date, and Length:     L1-L3 Lumbar Laminectomy Revision; L1-L2 Osteotomy; T4-S1 Revision and Fusion with Instrumentation; L1-L2 Transforaminal Lumbar Interbody Fusion; Cement Augmentation   24, 440MIN    Narda Malloy is a 69 year-old female who presents to the [facility] for perioperative risk assessment prior to surgery.    Patient presents with a primary diagnosis of postlaminectomy and radiculopathy. Pt with past medical history significant for prior L3-S1 fusion. Pt admits to increasing difficulty with being able to stand erect. She finds herself falling forward. She has as result, constant back pain. She has had to use a walker more recently to help hold her self semierect. At this point her quality of life has become increasingly intolerable. Recently, in 2024, pt sought emergent medical attention following a fall.  She was sent to SNF for rehabilitation, due to deconditioning.  Pt wishes to proceed with surgery as planned.     This note was created in part upon personal review of patient's medical records.      Patient is scheduled to have L1-L3 Lumbar Laminectomy Revision; L1-L2 Osteotomy; T4-S1 Revision and Fusion with Instrumentation; L1-L2 Transforaminal Lumbar Interbody Fusion; Cement Augmentation    Pt admits to PONV with anesthesia in the past and does well with antiemetics in the preop.  Pt denies any past history of anesthetic complications such as PONV, awareness, prolonged sedation, dental damage, aspiration, cardiac arrest, difficult intubation, difficult I.V. access or unexpected hospital admissions.  NO malignant hyperthermia and or pseudocholinesterase deficiency.  No history of blood transfusions     The patient is not a Faith and will accept blood and blood products if medically indicated.   Type and  screen sent.     Past Medical History:   Diagnosis Date    Degenerative myopia, bilateral     Diabetes mellitus with stable proliferative retinopathy of both eyes, without long-term current use of insulin (Multi)     Diabetic neuropathy (Multi)     DM type 2 (diabetes mellitus, type 2) (Multi)     Dry eye syndrome of bilateral lacrimal glands     Essential hypertension     Essential tremor     Glaucoma     Hyperlipidemia     Long term (current) use of insulin (Multi)     Low back pain     Primary open angle glaucoma of both eyes, severe stage     Repeated falls     Sarcoidosis of lung (Multi)     Spinal stenosis, lumbar region without neurogenic claudication     Weakness        Past Surgical History:   Procedure Laterality Date    BACK SURGERY  03/21/2017    Back Surgery    CARPAL TUNNEL RELEASE  03/21/2017    Neuroplasty Median Nerve At Carpal Tunnel    CATARACT EXTRACTION W/  INTRAOCULAR LENS IMPLANT Bilateral     OD 08/04/2011 +8.5D,OS 08/04/2011 +8.50D    FOOT SURGERY      OTHER SURGICAL HISTORY Bilateral 07/2022    upper eyelid    OTHER SURGICAL HISTORY  05/2022    breast lift    PANRETINAL PHOTOCOAGULATION  2014    VITRECTOMY Right 2013       Patient  reports that she is not currently sexually active and has had partner(s) who are male.    Family History   Problem Relation Name Age of Onset    Multiple myeloma Mother      Cancer Mother      Other (CABG) Father      Pulmonary embolism Father      Heart disease Father      Breast cancer Sister          Stage II    Hypertension Sister      Diabetes Sister      No Known Problems Sister          x5    No Known Problems Brother          x4    No Known Problems Daughter       Social History     Socioeconomic History    Marital status:      Spouse name: Not on file    Number of children: Not on file    Years of education: Not on file    Highest education level: Not on file   Occupational History    Not on file   Tobacco Use    Smoking status: Former     Types:  Cigarettes     Passive exposure: Past    Smokeless tobacco: Never   Vaping Use    Vaping status: Never Used   Substance and Sexual Activity    Alcohol use: Yes    Drug use: Not Currently    Sexual activity: Not Currently     Partners: Male   Other Topics Concern    Not on file   Social History Narrative    Not on file     Social Determinants of Health     Financial Resource Strain: Low Risk  (7/16/2024)    Overall Financial Resource Strain (CARDIA)     Difficulty of Paying Living Expenses: Not hard at all   Food Insecurity: No Food Insecurity (7/16/2024)    Hunger Vital Sign     Worried About Running Out of Food in the Last Year: Never true     Ran Out of Food in the Last Year: Never true   Transportation Needs: No Transportation Needs (7/16/2024)    PRAPARE - Transportation     Lack of Transportation (Medical): No     Lack of Transportation (Non-Medical): No   Physical Activity: Not on file   Stress: Not on file   Social Connections: Not on file   Intimate Partner Violence: Not At Risk (7/16/2024)    Humiliation, Afraid, Rape, and Kick questionnaire     Fear of Current or Ex-Partner: No     Emotionally Abused: No     Physically Abused: No     Sexually Abused: No   Housing Stability: Low Risk  (7/16/2024)    Housing Stability Vital Sign     Unable to Pay for Housing in the Last Year: No     Number of Times Moved in the Last Year: 0     Homeless in the Last Year: No        Allergies   Allergen Reactions    Erythromycin Nausea And Vomiting    Morphine GI Upset     nausea and vomiting    Rosuvastatin Other and Myalgia         Current Outpatient Medications:     acetaminophen (Tylenol) 500 mg tablet, Take 2 tablets (1,000 mg) by mouth every 8 hours if needed., Disp: , Rfl:     ascorbic acid (Vitamin C) 500 mg tablet, as directed Orally, Disp: , Rfl:     brimonidine (AlphaGAN) 0.2 % ophthalmic solution, Administer 1 drop into both eyes 2 times a day., Disp: , Rfl:     calcium carbonate-vitamin D3 500 mg-5 mcg (200 unit)  "tablet, Take 1 tablet by mouth once daily., Disp: , Rfl:     dorzolamide-timoloL (Cosopt) 22.3-6.8 mg/mL ophthalmic solution, Administer 1 drop into both eyes 2 times a day., Disp: 10 mL, Rfl: 5    insulin degludec (Tresiba FlexTouch U-100) 100 unit/mL (3 mL) injection, Inject 14 Units under the skin once daily at bedtime. Take as directed per insulin instructions. (Patient taking differently: Inject 20 Units under the skin once daily. Take as directed per insulin instructions.), Disp: , Rfl:     insulin lispro (HumaLOG) 100 unit/mL injection, Inject under the skin 3 times daily (morning, midday, late afternoon). Take as directed per insulin instructions. Sliding scale, Disp: , Rfl:     latanoprost (Xalatan) 0.005 % ophthalmic solution, Administer 1 drop into both eyes once daily at bedtime., Disp: 2.5 mL, Rfl: 5    lisinopril 20 mg tablet, Take 1 tablet (20 mg) by mouth once daily., Disp: , Rfl:     melatonin 3 mg tablet, Take 5 mg by mouth as needed at bedtime., Disp: , Rfl:     multivitamin tablet, Take 1 tablet by mouth once daily., Disp: , Rfl:     primidone 125 mg tablet, Take 125 mg by mouth 3 times a day., Disp: , Rfl:     propranolol LA (Inderal LA) 60 mg 24 hr capsule, Take 1 capsule (60 mg) by mouth early in the morning.., Disp: , Rfl:     FreeStyle Lite Strips strip, USE TO TEST 3 TIMES A DAY AS DIRECTED, Disp: 300 each, Rfl: 2    pen needle, diabetic (PEN NEEDLE MISC), BD Altagracia- 4 mm X 32 G needle - as directed 4x a day sc 4 times per day, Disp: , Rfl:     Sure Comfort Pen Needle 32 gauge x 5/32\" needle, AS DIRECTED DAILY FOR 90 DAYS (Patient not taking: Reported on 8/12/2024), Disp: 100 each, Rfl: 11         PAT ROS:   Constitutional:    no fever   no chills   no unexpected weight change  Neuro/Psych:    no numbness   no weakness   no light-headedness   no confusion  Eyes:    no discharge   no pain   no vision loss   no diplopia   no visual disturbance  Ears:    no ear pain   no hearing loss   no " tinnitus  Nose:    no nasal discharge   no sinus congestion   no epistaxis  Mouth:    no dental issues   no mouth pain   no oral bleeding   no mouth lesions  Throat:    no throat pain   no dysphagia  Neck:    no neck pain   no neck stiffness  Cardio:    Pt unable to perform 4+ METS   no chest pain   no palpitations   no peripheral edema   no dyspnea   no BEAR  Respiratory:    no cough   no wheezing   no hemoptysis   no shortness of breath  Endocrine:    no cold intolerance   no heat intolerance  GI:    no abdominal distention   no abdominal pain   constipation   no diarrhea   no nausea   no vomiting   no blood in stool  :    no difficulty urinating   no dysuria   no oliguria  Musculoskeletal:    arthralgias (LBP, right hip)   myalgias (right buttock)   no decreased ROM  Hematologic:    does not bruise/bleed easily   no excessive bleeding   no history of blood transfusion   no blood clots  Skin:   no skin changes   no sores/wound   no rash      Physical Exam  Constitutional:       General: She is not in acute distress.     Appearance: Normal appearance. She is not ill-appearing, toxic-appearing or diaphoretic.   HENT:      Head: Normocephalic and atraumatic.      Nose: Nose normal. No rhinorrhea.      Mouth/Throat:      Pharynx: No oropharyngeal exudate.   Eyes:      Extraocular Movements: Extraocular movements intact.      Conjunctiva/sclera: Conjunctivae normal.   Cardiovascular:      Rate and Rhythm: Normal rate and regular rhythm.      Heart sounds: No murmur heard.     No friction rub. No gallop.      Comments: Functional 4 Mets. Patient denies SOB walking up 2 flights of stairs     Pulmonary:      Effort: Pulmonary effort is normal. No respiratory distress.      Breath sounds: Normal breath sounds. No stridor. No wheezing or rhonchi.   Abdominal:      General: Bowel sounds are normal. There is no distension.      Palpations: Abdomen is soft. There is no mass.      Tenderness: There is no abdominal tenderness.  "There is no guarding or rebound.      Hernia: No hernia is present.   Musculoskeletal:         General: Tenderness (low back; RLE) present. No swelling, deformity or signs of injury. Normal range of motion.      Cervical back: Normal range of motion and neck supple. No rigidity or tenderness.   Skin:     General: Skin is warm and dry.      Coloration: Skin is not jaundiced or pale.      Findings: No bruising, erythema, lesion or rash.   Neurological:      General: No focal deficit present.      Mental Status: She is alert and oriented to person, place, and time.      Cranial Nerves: No cranial nerve deficit.      Sensory: No sensory deficit.      Motor: No weakness.      Coordination: Coordination normal.   Psychiatric:         Mood and Affect: Mood normal.         Behavior: Behavior normal.        PAT AIRWAY:   Airway:     Mallampati::  III    Neck ROM::  Full   No broken teeth, no dentures and no missing teeth        Visit Vitals  /64   Pulse 67   Temp 36 °C (96.8 °F)   Resp 16   Ht 1.753 m (5' 9\")   Wt 76.4 kg (168 lb 6.4 oz) Comment: stated   LMP  (LMP Unknown)   SpO2 97%   BMI 24.87 kg/m²   OB Status Postmenopausal   Smoking Status Former   BSA 1.93 m²      LABS:  Lab Results   Component Value Date    WBC 5.3 07/16/2024    HGB 10.9 (L) 07/16/2024    HCT 32.2 (L) 07/16/2024    MCV 95 07/16/2024     07/16/2024      Lab Results   Component Value Date    GLUCOSE 193 (H) 07/16/2024    CALCIUM 9.2 07/16/2024     07/16/2024    K 4.4 07/16/2024    CO2 25 07/16/2024     07/16/2024    BUN 29 (H) 07/16/2024    CREATININE 0.80 07/16/2024    Coagulation Screen  Order: [id number]   Status: Final result       Visible to patient: No (inaccessible in [facility])       Dx: Preop testing; SOB (shortness of dora...    0 Result Notes          Component  Ref Range & Units 13:04  (8/12/24) 3 mo ago  (5/2/24) 6 yr ago  (8/7/18) 6 yr ago  (8/2/18) 6 yr ago  (5/15/18)   Protime  9.8 - 12.8 seconds 11.2 10.9 R " 12.2 R 11.7 R 11.3 R   INR  0.9 - 1.1 1.0 1.0 R 1.1 1.1 1.0   aPTT  27 - 38 seconds 31  27 R, CM  27 R, CM   [facility]ing [facility]              Narrative  Performed by: [facility]  The APTT is no longer used for monitoring Unfractionated Heparin Therapy. For monitoring Heparin Therapy, use the Heparin Assay.      Specimen Collected: 08/12/24          EKG 8/12/24  Sinus bradycardia  Low voltage QRS  Cannot r/o anterior infarct, age undetermined  Abnormal EKG  Vent rate = 56 bpm    Assessment and Plan:     Patient is a 69-year-old female scheduled for a L1-L3 Lumbar Laminectomy Revision; L1-L2 Osteotomy; T4-S1 Revision and Fusion with Instrumentation; L1-L2 Transforaminal Lumbar Interbody Fusion; Cement Augmentation with Dr. Ventura on 8/26/24.    Patient has no active cardiac symptoms.   Patient denies any chest pain, tightness, heaviness, pressure, radiating pain, palpitations, irregular heartbeats, lightheadedness, cough, congestion, shortness of breath, BEAR, PND, near syncope, weight loss or gain.     RCRI  1 (insulin tx)  , 6 % Risk of MACE    Cardiac:  HTN - Lisinopril HOLD evening prior to surgery and morning of surgery   ; cont propranolol on dos   Encouraged lifestyle modifications, low-sodium diet, and increase activity as tolerated.  Monitor BP and follow up with managing physician for readings sustaining >140/90.    HLD - pt not currently on any lipid lowering meds     Due to decreased functional capacity and length of surgery, pt will be sent to cardiology for risk assessment.     Pulmonary:  Sarcoidosis - 30+ years ago - pt stable and not currently on any steroids for this issue. Pulse ox is 97% on room air during visit today.  NAD; lung fields clear on exam.     Neuro:  Essential tremor - cont primidone on dos   --S/P bilateral CELSO DBS placement on 06/14/17.   --Has a history of complicated DBS infection back in 07/2017.   --08/08/18: Relapse of the Generator site infection  S/P removal of the entire system       Endocrine:  DMII - hold humalog on dos; take 1/2 dose of tresiba (long acting insulin) the night before surgery  HgbA1c 6/18/24 = 6.6% - no need to repeat    Hematology:  Anemia   7/16/24 H/H 10.9/32.2%    Patient instructed to ambulate as soon as possible postoperatively to decrease thromboembolic risk.   Initiate mechanical DVT prophylaxis as soon as possible and initiate chemical prophylaxis when deemed safe from a bleeding standpoint post surgery.     LABS: CBC and CMP reviewed from 7/16/24 and shows stable anemia; no need to repeat.  Coag, T&S, MRSA and EKG ordered. Coag reviewed and unremarkable.    Followup: MRSA and cardiac risk assessment (Dr. Fisher 8/16/24 @ 8:30am) pending    STOP BANG: >50, HTN = 2    Caprini: 5    ADDENDUM NST:  FINDINGS:  The study is limited as both rest and stress images were imaged with  the left arm down. On non attenuation corrected images there is  decreased activity within the lateral wall, which is less apparent  but present on attenuation corrected stress images with only mild  abnormality on rest imaging otherwise normal perfusion is seen  throughout the left ventricle on both stress and rest studies.      The left ventricle is normal in size.      EKG gated images demonstrate normal LV wall motion with a post-stress  LV EF estimated at 60%.      Attenuation correction CT images demonstrate no gross anatomic  abnormalities      IMPRESSION:  1. Limited study as described above. Decreased activity within the  lateral wall on stress imaging could be related to imaging with the  left arm down versus a small territory of stress-induced ischemia.  Consider ammonia PET-CT for further evaluation. No evidence of prior  infarction.      2. The left ventricle is normal in size.      3. Normal LV wall motion with a post-stress LV EF estimated at 60%.     ECHO 2024  PHYSICIAN INTERPRETATION:  Left Ventricle: Left ventricular ejection fraction is  "normal, by visual estimate at 60-65%. There are no regional wall motion abnormalities. The left ventricular cavity size is normal. The left ventricular septal wall thickness is mildly increased. There is mildly increased left ventricular posterior wall thickness. Spectral Doppler shows an impaired relaxation pattern of left ventricular diastolic filling.  Left Atrium: The left atrium is normal in size.  Right Ventricle: The right ventricle is normal in size. There is normal right ventricular global systolic function.  Right Atrium: The right atrium is normal in size.  Aortic Valve: The aortic valve is trileaflet. There is no evidence of aortic valve regurgitation.  Mitral Valve: The mitral valve is normal in structure. There is mild mitral valve regurgitation.  Tricuspid Valve: The tricuspid valve is structurally normal. No evidence of tricuspid regurgitation.  Pulmonic Valve: The pulmonic valve is not well visualized. The pulmonic valve regurgitation was not well visualized.  Pericardium: There is no pericardial effusion noted.  Aorta: The aortic root is normal.        CONCLUSIONS:   1. Left ventricular ejection fraction is normal, by visual estimate at 60-65%.   2. Spectral Doppler shows an impaired relaxation pattern of left ventricular diastolic filling.   3. There is normal right ventricular global systolic function.    CASE D/W DR. EDOUARD Patient had an inconclusive Nuclear Stress Test and additional cardiac testing is recommended. Patient needs to follow up with her cardiologist to obtain the necessary testing. Dr. Ventura and OR schedulers notified    Risk assessment complete.  Patient is scheduled for a low surgical risk procedure.      ADDENDUM 8/23/24:  PER DR. GARCIA 8/22/24: \"Subsequent to that visit the patient did have an echocardiogram and pharmacological nuclear stress test at [facility], [place], operated by [facility] which did not demonstrate any findings that would contraindicate her scheduled lumbar surgery 8/26/2024 " "[facility]\"       Preoperative medication instructions were provided and reviewed with the patient.  Any additional testing or evaluation was explained to the patient.  Nothing by mouth instructions were discussed and patient's questions were answered prior to conclusion to this encounter.  Patient verbalized understanding of preoperative instructions given in preadmission testing; discharge instructions available in EMR.    This note was dictated by a speech recognition.  Minor errors may have been detected in a speech recognition.   "

## 2024-08-12 NOTE — CPM/PAT NURSE NOTE
CPM/PAT Nurse Note      Name: Narda Malloy (Narda Malloy)  /Age: 1955/69 y.o.       Past Medical History:   Diagnosis Date    Degenerative myopia, bilateral     Diabetes mellitus with stable proliferative retinopathy of both eyes, without long-term current use of insulin (Multi)     Diabetic neuropathy (Multi)     DM type 2 (diabetes mellitus, type 2) (Multi)     Dry eye syndrome of bilateral lacrimal glands     Essential hypertension     Essential tremor     Glaucoma     Hyperlipidemia     Long term (current) use of insulin (Multi)     Low back pain     Primary open angle glaucoma of both eyes, severe stage     Repeated falls     Sarcoidosis of lung (Multi)     Spinal stenosis, lumbar region without neurogenic claudication     Weakness        Past Surgical History:   Procedure Laterality Date    BACK SURGERY  2017    Back Surgery    CARPAL TUNNEL RELEASE  2017    Neuroplasty Median Nerve At Carpal Tunnel    CATARACT EXTRACTION W/  INTRAOCULAR LENS IMPLANT Bilateral     OD 2011 +8.5D,OS 2011 +8.50D    FOOT SURGERY      OTHER SURGICAL HISTORY Bilateral 2022    upper eyelid    OTHER SURGICAL HISTORY  2022    breast lift    PANRETINAL PHOTOCOAGULATION  2014    VITRECTOMY Right        Patient  reports that she is not currently sexually active and has had partner(s) who are male.    Family History   Problem Relation Name Age of Onset    Multiple myeloma Mother      Cancer Mother      Other (CABG) Father      Pulmonary embolism Father      Heart disease Father      Breast cancer Sister          Stage II    Hypertension Sister      Diabetes Sister      No Known Problems Sister          x5    No Known Problems Brother          x4    No Known Problems Daughter         Allergies   Allergen Reactions    Erythromycin Nausea And Vomiting    Morphine GI Upset     nausea and vomiting    Rosuvastatin Other and Myalgia       Prior to Admission medications    Medication Sig Start Date End  Date Taking? Authorizing Provider   acetaminophen (Tylenol) 500 mg tablet Take 2 tablets (1,000 mg) by mouth every 8 hours if needed.    Historical Provider, MD   ascorbic acid (Vitamin C) 500 mg tablet as directed Orally    Historical Provider, MD   brimonidine (AlphaGAN) 0.2 % ophthalmic solution Administer 1 drop into both eyes 2 times a day.    Historical Provider, MD   calcium carbonate-vitamin D3 500 mg-5 mcg (200 unit) tablet Take 1 tablet by mouth once daily.    Historical Provider, MD   dorzolamide-timoloL (Cosopt) 22.3-6.8 mg/mL ophthalmic solution Administer 1 drop into both eyes 2 times a day. 4/4/24   Jose Juan MD   FreeStyle Lite Strips strip USE TO TEST 3 TIMES A DAY AS DIRECTED 2/13/24   Jonathan Burrell MD   insulin degludec (Tresiba FlexTouch U-100) 100 unit/mL (3 mL) injection Inject 14 Units under the skin once daily at bedtime. Take as directed per insulin instructions.  Patient taking differently: Inject 20 Units under the skin once daily. Take as directed per insulin instructions. 7/17/24   Zuleyma Damon MD   insulin lispro (HumaLOG) 100 unit/mL injection Inject under the skin 3 times daily (morning, midday, late afternoon). Take as directed per insulin instructions.  Sliding scale    Historical Provider, MD   latanoprost (Xalatan) 0.005 % ophthalmic solution Administer 1 drop into both eyes once daily at bedtime. 12/14/23   Jose Juan MD   lisinopril 20 mg tablet Take 1 tablet (20 mg) by mouth once daily. 7/18/24   Zuleyma Damon MD   melatonin 3 mg tablet Take 5 mg by mouth as needed at bedtime.    Historical Provider, MD   multivitamin tablet Take 1 tablet by mouth once daily.    Historical Provider, MD   pen needle, diabetic (PEN NEEDLE MISC) BD Altagracia- 4 mm X 32 G needle - as directed 4x a day sc 4 times per day 4/28/20   Historical Provider, MD   primidone 125 mg tablet Take 125 mg by mouth 3 times a day. 7/17/24   Zuleyma Damon MD   propranolol LA (Inderal LA) 60 mg 24 hr  "capsule Take 1 capsule (60 mg) by mouth early in the morning.. 4/22/24   Historical Provider, MD Velásquez Comfort Pen Needle 32 gauge x 5/32\" needle AS DIRECTED DAILY FOR 90 DAYS  Patient not taking: Reported on 8/12/2024 6/26/24   Jonathan Burrell MD        PAT ROS     DASI Risk Score    No data to display       Caprini DVT Assessment    No data to display       Modified Frailty Index    No data to display       CHADS2 Stroke Risk  Current as of 59 minutes ago        N/A 3 to 100%: High Risk   2 to < 3%: Medium Risk   0 to < 2%: Low Risk     Last Change: N/A          This score determines the patient's risk of having a stroke if the patient has atrial fibrillation.        This score is not applicable to this patient. Components are not calculated.          Revised Cardiac Risk Index    No data to display       Apfel Simplified Score    No data to display       Risk Analysis Index Results This Encounter    No data found in the last 1 encounters.       After Visit Summary (AVS) reviewed and patient verbalized good understanding of medications and NPO instructions.     Nurse Plan of Action:           "

## 2024-08-14 ENCOUNTER — TRANSCRIBE ORDERS (OUTPATIENT)
Dept: ORTHOPEDIC SURGERY | Facility: HOSPITAL | Age: 69
End: 2024-08-14
Payer: MEDICARE

## 2024-08-14 DIAGNOSIS — M51.36 DEGENERATIVE DISC DISEASE, LUMBAR: ICD-10-CM

## 2024-08-14 LAB — STAPHYLOCOCCUS SPEC CULT: ABNORMAL

## 2024-08-16 ENCOUNTER — OFFICE VISIT (OUTPATIENT)
Dept: CARDIOLOGY | Facility: CLINIC | Age: 69
End: 2024-08-16
Payer: MEDICARE

## 2024-08-16 VITALS
BODY MASS INDEX: 24.88 KG/M2 | SYSTOLIC BLOOD PRESSURE: 104 MMHG | HEIGHT: 69 IN | HEART RATE: 62 BPM | OXYGEN SATURATION: 99 % | DIASTOLIC BLOOD PRESSURE: 56 MMHG | WEIGHT: 168 LBS

## 2024-08-16 DIAGNOSIS — E78.5 HYPERLIPIDEMIA, UNSPECIFIED HYPERLIPIDEMIA TYPE: ICD-10-CM

## 2024-08-16 DIAGNOSIS — I10 ESSENTIAL HYPERTENSION: Primary | ICD-10-CM

## 2024-08-16 DIAGNOSIS — I25.10 CORONARY ARTERY DISEASE INVOLVING NATIVE HEART, UNSPECIFIED VESSEL OR LESION TYPE, UNSPECIFIED WHETHER ANGINA PRESENT: ICD-10-CM

## 2024-08-16 DIAGNOSIS — Z01.818 PRE-OP EVALUATION: ICD-10-CM

## 2024-08-16 PROCEDURE — 1159F MED LIST DOCD IN RCRD: CPT | Performed by: INTERNAL MEDICINE

## 2024-08-16 PROCEDURE — 3078F DIAST BP <80 MM HG: CPT | Performed by: INTERNAL MEDICINE

## 2024-08-16 PROCEDURE — 1126F AMNT PAIN NOTED NONE PRSNT: CPT | Performed by: INTERNAL MEDICINE

## 2024-08-16 PROCEDURE — 99214 OFFICE O/P EST MOD 30 MIN: CPT | Performed by: INTERNAL MEDICINE

## 2024-08-16 PROCEDURE — 3074F SYST BP LT 130 MM HG: CPT | Performed by: INTERNAL MEDICINE

## 2024-08-16 PROCEDURE — 3008F BODY MASS INDEX DOCD: CPT | Performed by: INTERNAL MEDICINE

## 2024-08-16 PROCEDURE — 4010F ACE/ARB THERAPY RXD/TAKEN: CPT | Performed by: INTERNAL MEDICINE

## 2024-08-16 PROCEDURE — 1157F ADVNC CARE PLAN IN RCRD: CPT | Performed by: INTERNAL MEDICINE

## 2024-08-16 PROCEDURE — 99204 OFFICE O/P NEW MOD 45 MIN: CPT | Performed by: INTERNAL MEDICINE

## 2024-08-16 RX ORDER — EZETIMIBE 10 MG/1
10 TABLET ORAL DAILY
Qty: 90 TABLET | Refills: 3 | Status: SHIPPED | OUTPATIENT
Start: 2024-08-16 | End: 2025-08-16

## 2024-08-16 RX ORDER — AMINOPHYLLINE 25 MG/ML
125 INJECTION, SOLUTION INTRAVENOUS ONCE AS NEEDED
OUTPATIENT
Start: 2024-08-16

## 2024-08-16 RX ORDER — REGADENOSON 0.08 MG/ML
0.4 INJECTION, SOLUTION INTRAVENOUS
Status: CANCELLED | OUTPATIENT
Start: 2024-08-16

## 2024-08-16 ASSESSMENT — PATIENT HEALTH QUESTIONNAIRE - PHQ9
SUM OF ALL RESPONSES TO PHQ QUESTIONS 1-9: 10
10. IF YOU CHECKED OFF ANY PROBLEMS, HOW DIFFICULT HAVE THESE PROBLEMS MADE IT FOR YOU TO DO YOUR WORK, TAKE CARE OF THINGS AT HOME, OR GET ALONG WITH OTHER PEOPLE: VERY DIFFICULT
1. LITTLE INTEREST OR PLEASURE IN DOING THINGS: MORE THAN HALF THE DAYS
SUM OF ALL RESPONSES TO PHQ9 QUESTIONS 1 AND 2: 4
5. POOR APPETITE OR OVEREATING: SEVERAL DAYS
3. TROUBLE FALLING OR STAYING ASLEEP OR SLEEPING TOO MUCH: MORE THAN HALF THE DAYS
4. FEELING TIRED OR HAVING LITTLE ENERGY: MORE THAN HALF THE DAYS
9. THOUGHTS THAT YOU WOULD BE BETTER OFF DEAD, OR OF HURTING YOURSELF: NOT AT ALL
8. MOVING OR SPEAKING SO SLOWLY THAT OTHER PEOPLE COULD HAVE NOTICED. OR THE OPPOSITE, BEING SO FIGETY OR RESTLESS THAT YOU HAVE BEEN MOVING AROUND A LOT MORE THAN USUAL: SEVERAL DAYS
7. TROUBLE CONCENTRATING ON THINGS, SUCH AS READING THE NEWSPAPER OR WATCHING TELEVISION: NOT AT ALL
2. FEELING DOWN, DEPRESSED OR HOPELESS: MORE THAN HALF THE DAYS
6. FEELING BAD ABOUT YOURSELF - OR THAT YOU ARE A FAILURE OR HAVE LET YOURSELF OR YOUR FAMILY DOWN: NOT AT ALL

## 2024-08-16 ASSESSMENT — ENCOUNTER SYMPTOMS
OCCASIONAL FEELINGS OF UNSTEADINESS: 1
LOSS OF SENSATION IN FEET: 1
DEPRESSION: 1

## 2024-08-16 ASSESSMENT — COLUMBIA-SUICIDE SEVERITY RATING SCALE - C-SSRS
6. HAVE YOU EVER DONE ANYTHING, STARTED TO DO ANYTHING, OR PREPARED TO DO ANYTHING TO END YOUR LIFE?: NO
1. IN THE PAST MONTH, HAVE YOU WISHED YOU WERE DEAD OR WISHED YOU COULD GO TO SLEEP AND NOT WAKE UP?: NO
2. HAVE YOU ACTUALLY HAD ANY THOUGHTS OF KILLING YOURSELF?: NO

## 2024-08-16 ASSESSMENT — PAIN SCALES - GENERAL: PAINLEVEL: 0-NO PAIN

## 2024-08-16 NOTE — PATIENT INSTRUCTIONS
Follow up with Dr Fisher in 3 months  Start Zetia 10mg once daily.  Schedule echo to be done at [facility] office next week.  Schedule nuclear pharmacological stress test to be done next week at [facility], [place], operated by [facility].  Call to schedule a CT calcium score to be done at earliest convenience.

## 2024-08-20 ENCOUNTER — HOSPITAL ENCOUNTER (OUTPATIENT)
Dept: RADIOLOGY | Facility: HOSPITAL | Age: 69
Discharge: HOME | End: 2024-08-20
Payer: MEDICARE

## 2024-08-20 ENCOUNTER — HOSPITAL ENCOUNTER (OUTPATIENT)
Dept: CARDIOLOGY | Facility: HOSPITAL | Age: 69
Discharge: HOME | End: 2024-08-20
Payer: MEDICARE

## 2024-08-20 ENCOUNTER — LAB (OUTPATIENT)
Dept: LAB | Facility: LAB | Age: 69
End: 2024-08-20
Payer: MEDICARE

## 2024-08-20 ENCOUNTER — APPOINTMENT (OUTPATIENT)
Dept: RADIOLOGY | Facility: HOSPITAL | Age: 69
End: 2024-08-20
Payer: MEDICARE

## 2024-08-20 DIAGNOSIS — I10 ESSENTIAL HYPERTENSION: ICD-10-CM

## 2024-08-20 DIAGNOSIS — I25.10 CORONARY ARTERY DISEASE INVOLVING NATIVE HEART, UNSPECIFIED VESSEL OR LESION TYPE, UNSPECIFIED WHETHER ANGINA PRESENT: ICD-10-CM

## 2024-08-20 DIAGNOSIS — M51.36 DEGENERATIVE DISC DISEASE, LUMBAR: ICD-10-CM

## 2024-08-20 DIAGNOSIS — Z01.818 PRE-OP EVALUATION: ICD-10-CM

## 2024-08-20 LAB
EJECTION FRACTION APICAL 4 CHAMBER: 59.4
EJECTION FRACTION: 63 %
LEFT VENTRICLE INTERNAL DIMENSION DIASTOLE: 4.59 CM (ref 3.5–6)
LEFT VENTRICULAR OUTFLOW TRACT DIAMETER: 2.1 CM
LV EJECTION FRACTION BIPLANE: 54 %
MITRAL VALVE E/A RATIO: 0.76

## 2024-08-20 PROCEDURE — A9502 TC99M TETROFOSMIN: HCPCS | Performed by: INTERNAL MEDICINE

## 2024-08-20 PROCEDURE — 3430000001 HC RX 343 DIAGNOSTIC RADIOPHARMACEUTICALS: Performed by: INTERNAL MEDICINE

## 2024-08-20 PROCEDURE — 78452 HT MUSCLE IMAGE SPECT MULT: CPT

## 2024-08-20 PROCEDURE — 2500000004 HC RX 250 GENERAL PHARMACY W/ HCPCS (ALT 636 FOR OP/ED): Performed by: INTERNAL MEDICINE

## 2024-08-20 PROCEDURE — 93306 TTE W/DOPPLER COMPLETE: CPT

## 2024-08-20 PROCEDURE — 93017 CV STRESS TEST TRACING ONLY: CPT

## 2024-08-20 PROCEDURE — 93306 TTE W/DOPPLER COMPLETE: CPT | Performed by: INTERNAL MEDICINE

## 2024-08-20 RX ORDER — REGADENOSON 0.08 MG/ML
0.4 INJECTION, SOLUTION INTRAVENOUS
Status: COMPLETED | OUTPATIENT
Start: 2024-08-20 | End: 2024-08-20

## 2024-08-21 ENCOUNTER — TELEPHONE (OUTPATIENT)
Dept: CARDIOLOGY | Facility: HOSPITAL | Age: 69
End: 2024-08-21
Payer: MEDICARE

## 2024-08-22 NOTE — PROGRESS NOTES
Primary Care Physician: Matthew Muñoz MD  Date of Visit: 08/16/2024  8:30 AM EDT  [place] [facility] [place] [address]     Chief Complaint:   Chief Complaint   Patient presents with    Pre-op Clearance     HPI / Summary:   Narda Malloy is a 69 y.o. female presents for new patient    ROS    Medical History:   She has a past medical history of Degenerative myopia, bilateral, Diabetes mellitus with stable proliferative retinopathy of both eyes, without long-term current use of insulin (Multi), Diabetic neuropathy (Multi), DM type 2 (diabetes mellitus, type 2) (Multi), Dry eye syndrome of bilateral lacrimal glands, Essential hypertension, Essential tremor, Glaucoma, Hyperlipidemia, Long term (current) use of insulin (Multi), Low back pain, Primary open angle glaucoma of both eyes, severe stage, Repeated falls, Sarcoidosis of lung (Multi), Spinal stenosis, lumbar region without neurogenic claudication, and Weakness.  Surgical Hx:   She has a past surgical history that includes Carpal tunnel release (03/21/2017); Back surgery (03/21/2017); Vitrectomy (Right, 2013); Cataract extraction w/  intraocular lens implant (Bilateral); Panretinal photocoagulation (2014); Other surgical history (Bilateral, 07/2022); Other surgical history (05/2022); and Foot surgery.   Social Hx:   She reports that she has quit smoking. Her smoking use included cigarettes. She has been exposed to tobacco smoke. She has never used smokeless tobacco. She reports that she does not currently use alcohol. She reports that she does not currently use drugs.  Family Hx:   Her family history includes Breast cancer in her sister; CABG in her father; Cancer in her mother; Diabetes in her sister; Heart disease in her father; Hypertension in her sister; Multiple myeloma in her mother; No Known Problems in her brother, daughter, and sister; Pulmonary embolism in her father.   Allergies:  Allergies   Allergen Reactions    Erythromycin Nausea And Vomiting     "Morphine GI Upset     nausea and vomiting    Rosuvastatin Other and Myalgia     Outpatient Medications:  Current Outpatient Medications   Medication Instructions    acetaminophen (TYLENOL) 1,000 mg, oral, Every 8 hours PRN    ascorbic acid (Vitamin C) 500 mg tablet as directed Orally    brimonidine (AlphaGAN) 0.2 % ophthalmic solution 1 drop, Both Eyes, 2 times daily    calcium carbonate-vitamin D3 500 mg-5 mcg (200 unit) tablet 1 tablet, oral, Daily    chlorhexidine (Peridex) 0.12 % solution Swish for 30 seconds and spit 15mL of solution the night before and morning of surgery    dorzolamide-timoloL (Cosopt) 22.3-6.8 mg/mL ophthalmic solution 1 drop, Both Eyes, 2 times daily    ezetimibe (ZETIA) 10 mg, oral, Daily    FreeStyle Lite Strips strip USE TO TEST 3 TIMES A DAY AS DIRECTED    insulin degludec (TRESIBA FLEXTOUCH U-100) 14 Units, subcutaneous, Nightly, Take as directed per insulin instructions.    insulin lispro (HumaLOG) 100 unit/mL injection subcutaneous, 3 times daily (morning, midday, late afternoon), Take as directed per insulin instructions.<BR>Sliding scale     latanoprost (Xalatan) 0.005 % ophthalmic solution 1 drop, Both Eyes, Nightly    lisinopril 20 mg, oral, Daily    melatonin 3 mg tablet Take 5 mg by mouth as needed at bedtime.    multivitamin tablet 1 tablet, oral, Daily    pen needle, diabetic (PEN NEEDLE MISC) BD Altagracia- 4 mm X 32 G needle - as directed 4x a day sc 4 times per day    primidone 125 mg, oral, 3 times daily    propranolol LA (Inderal LA) 60 mg 24 hr capsule 1 capsule, oral, Daily (0630)    Sure Comfort Pen Needle 32 gauge x 5/32\" needle AS DIRECTED DAILY FOR 90 DAYS     Physical Exam:  Vitals:    08/16/24 0820 08/16/24 0858   BP: 105/67 104/56   BP Location: Left arm    Patient Position: Sitting    BP Cuff Size: Adult    Pulse: 62 62   SpO2: 99%    Weight: 76.2 kg (168 lb)    Height: 1.753 m (5' 9\")      Wt Readings from Last 5 Encounters:   08/16/24 76.2 kg (168 lb)   08/12/24 " 76.4 kg (168 lb 6.4 oz)   07/15/24 76.2 kg (168 lb)   06/18/24 75.8 kg (167 lb 3.2 oz)   05/31/24 69.4 kg (153 lb)     Physical Exam  JVP not elevated. Carotid impulses are 2+ without overlying bruit.   Chest exhibits fair to good air movement with completely clear breath sounds.   The cardiac rhythm is regular with no premature beats.   Normal S1 and S2. No gallop, murmur or rub, or click.   Abdomen is soft and benign without focal tenderness.   With no lower leg edema. The pedal pulses are intact.     Last Labs:  Lab on 08/12/2024   Component Date Value    Protime 08/12/2024 11.2     INR 08/12/2024 1.0     aPTT 08/12/2024 31     ABO TYPE 08/12/2024 A     Rh TYPE 08/12/2024 NEG     ANTIBODY SCREEN 08/12/2024 NEG    Pre-Admission Testing on 08/12/2024   Component Date Value    Staph/MRSA Screen Culture 08/12/2024 Isolated: Methicillin Resistant Staphylococcus aureus (MRSA) (A)    Admission on 07/15/2024, Discharged on 07/17/2024   Component Date Value    WBC 07/15/2024 5.9     nRBC 07/15/2024 0.0     RBC 07/15/2024 3.64 (L)     Hemoglobin 07/15/2024 12.0     Hematocrit 07/15/2024 34.5 (L)     MCV 07/15/2024 95     MCH 07/15/2024 33.0     MCHC 07/15/2024 34.8     RDW 07/15/2024 13.0     Platelets 07/15/2024 197     Neutrophils % 07/15/2024 73.8     Immature Granulocytes %,* 07/15/2024 0.2     Lymphocytes % 07/15/2024 17.8     Monocytes % 07/15/2024 6.3     Eosinophils % 07/15/2024 1.4     Basophils % 07/15/2024 0.5     Neutrophils Absolute 07/15/2024 4.36     Immature Granulocytes Ab* 07/15/2024 0.01     Lymphocytes Absolute 07/15/2024 1.05 (L)     Monocytes Absolute 07/15/2024 0.37     Eosinophils Absolute 07/15/2024 0.08     Basophils Absolute 07/15/2024 0.03     Glucose 07/15/2024 233 (H)     Sodium 07/15/2024 136     Potassium 07/15/2024 4.5     Chloride 07/15/2024 100     Bicarbonate 07/15/2024 26     Urea Nitrogen 07/15/2024 27 (H)     Creatinine 07/15/2024 0.60     eGFR 07/15/2024 >90     Calcium 07/15/2024  9.0     Albumin 07/15/2024 4.0     Alkaline Phosphatase 07/15/2024 154 (H)     Total Protein 07/15/2024 6.8     AST 07/15/2024 17     Bilirubin, Total 07/15/2024 0.2     ALT 07/15/2024 24     Anion Gap 07/15/2024 10     Magnesium 07/15/2024 1.70     Color, Urine 07/15/2024 Light-Yellow     Appearance, Urine 07/15/2024 Clear     Specific Gravity, Urine 07/15/2024 1.021     pH, Urine 07/15/2024 6.5     Protein, Urine 07/15/2024 50 (1+) (A)     Glucose, Urine 07/15/2024 Normal     Blood, Urine 07/15/2024 NEGATIVE     Ketones, Urine 07/15/2024 NEGATIVE     Bilirubin, Urine 07/15/2024 NEGATIVE     Urobilinogen, Urine 07/15/2024 Normal     Nitrite, Urine 07/15/2024 NEGATIVE     Leukocyte Esterase, Urine 07/15/2024 NEGATIVE     Extra Tube 07/15/2024 Hold for add-ons.     WBC, Urine 07/15/2024 1-5     RBC, Urine 07/15/2024 1-2     Mucus, Urine 07/15/2024 FEW     Hyaline Casts, Urine 07/15/2024 OCCASIONAL (A)     WBC 07/16/2024 5.3     nRBC 07/16/2024 0.0     RBC 07/16/2024 3.39 (L)     Hemoglobin 07/16/2024 10.9 (L)     Hematocrit 07/16/2024 32.2 (L)     MCV 07/16/2024 95     MCH 07/16/2024 32.2     MCHC 07/16/2024 33.9     RDW 07/16/2024 13.1     Platelets 07/16/2024 180     Neutrophils % 07/16/2024 66.1     Immature Granulocytes %,* 07/16/2024 0.2     Lymphocytes % 07/16/2024 22.7     Monocytes % 07/16/2024 9.1     Eosinophils % 07/16/2024 1.3     Basophils % 07/16/2024 0.6     Neutrophils Absolute 07/16/2024 3.50     Immature Granulocytes Ab* 07/16/2024 0.01     Lymphocytes Absolute 07/16/2024 1.20     Monocytes Absolute 07/16/2024 0.48     Eosinophils Absolute 07/16/2024 0.07     Basophils Absolute 07/16/2024 0.03     Glucose 07/16/2024 193 (H)     Sodium 07/16/2024 140     Potassium 07/16/2024 4.4     Chloride 07/16/2024 105     Bicarbonate 07/16/2024 25     Urea Nitrogen 07/16/2024 29 (H)     Creatinine 07/16/2024 0.80     eGFR 07/16/2024 80     Calcium 07/16/2024 9.2     Anion Gap 07/16/2024 10     POCT Glucose  07/15/2024 155 (H)     Thyroid Stimulating Horm* 07/16/2024 1.46     Ventricular Rate 08/12/2024 56     Atrial Rate 08/12/2024 56     [place] Interval 08/12/2024 166     QRS Duration 08/12/2024 76     QT Interval 08/12/2024 406     QTC Calculation(Bazett) 08/12/2024 391     P [place] 08/12/2024 34     R Axis 08/12/2024 -28     T Axis 08/12/2024 33     QRS Count 08/12/2024 9     Q Onset 08/12/2024 229     P Onset 08/12/2024 146     P Offset 08/12/2024 169     T Offset 08/12/2024 432     QTC Fredericia 08/12/2024 397     POCT Glucose 07/16/2024 163 (H)     POCT Glucose 07/16/2024 172 (H)     POCT Glucose 07/16/2024 211 (H)     POCT Glucose 07/16/2024 144 (H)     POCT Glucose 07/17/2024 129 (H)     Nicotine, Urine 07/17/2024 <15     Cotinine, Urine 07/17/2024 <15     3-OH-Cotinine, Urine 07/17/2024 <50     Anabasine, Urine 07/17/2024 <5     POCT Glucose 07/17/2024 157 (H)     POCT Glucose 07/17/2024 192 (H)    Office Visit on 06/18/2024   Component Date Value    POC HEMOGLOBIN A1c 06/18/2024 6.6 (A)    Lab on 05/02/2024   Component Date Value    Creatinine 05/02/2024 0.70     eGFR 05/02/2024 >90     Protime 05/02/2024 10.9     INR 05/02/2024 1.0     WBC 05/02/2024 6.4     nRBC 05/02/2024 0.0     RBC 05/02/2024 3.83 (L)     Hemoglobin 05/02/2024 12.4     Hematocrit 05/02/2024 37.9     MCV 05/02/2024 99     MCH 05/02/2024 32.4     MCHC 05/02/2024 32.7     RDW 05/02/2024 13.0     Platelets 05/02/2024 201         Assessment/Plan   1.  Hypertension.  Patient's blood pressure has been very well-controlled on lisinopril 20 mg daily along with a low-sodium diet.  2.  Type 2 diabetes.  The patient has had diabetes for approximately 28 years treated initially with metformin currently with insulin.  Lab work from 6/18/2024 consistent with good control glycohemoglobin of 6.6%.  Lab work from 7/16/2024 includes a creatinine of 0.80 TSH 1.46 hematocrit 32.2.  3.  Diabetic retinopathy.  4.  Diabetic peripheral neuropathy.  5.  Former  smoking.  The patient had been a long-term smoker of less than 1 pack/day but discontinued smoking several months ago.  6.  Hyperlipidemia.  Patient states that she has had adverse reactions to statins in the past.  She is followed by endocrinology.  She has lost approximately 70 pounds in preparation for lumbar surgery.  Her lab work from 8/23/2023 untreated lipid panel cholesterol 266  triglyceride 213.  In the short-term she will be started on Zetia 10 mg daily but if she has a high CT calcium score will most likely recommend a PCSK9 inhibitor.  7.?  Coronary artery disease.  This patient has multiple cardiac risk factors but never formally investigated for CAD.  The patient is anticipating a rather extensive prolonged lumbar surgery on 8/26/2024 at [facility] with neurosurgery.  She will be scheduled for CT calcium score echocardiogram and a pharmacological nuclear stress test.  Presuming these results did not show any significant LV dysfunction valvular disease or major ischemic perfusion defect she will be cleared for surgery.  She did have an EKG on 8/12/2024 that showed sinus rhythm left axis deviation.  8.  Essential tremor.  Patient currently on Inderal.  9.  Bilateral carpal tunnel release.  10.  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  11.  Remote lumbar surgery [facility] Dr. Soriano.  12.  Lumbosacral spinal DJD.  Patient currently scheduled for repeat lumbar [facility].  For preoperative clearance as noted above she will have a pharmacological nuclear stress test and echocardiogram ordered.  In the absence of any significant LV dysfunction valvular heart disease or major ischemic perfusion defect she will be permitted to have the operation performed.        Orders:  Orders Placed This Encounter   Procedures    CT cardiac scoring wo IV contrast    Transthoracic echo (TTE) complete      Followup Appts:  Future  Appointments   Date Time Provider Department Center   9/20/2024 10:00 AM Lianet Diaz PA-C [id number] [facility]   11/22/2024  8:45 AM Luiz Fisher MD [id number] [facility]   12/3/2024 11:00 AM Jonathan Burrell MD [id number] [facility]   12/4/2024  1:00 PM Barbara Samuels DO [id number] [facility]   1/17/2025 10:30 AM Jose Juan MD [id number] [facility]           ____________________________________________________________  Luiz Fisher MD  [facility] [place]  Assistant Clinical Professor, [employer]  [facility]

## 2024-08-23 ENCOUNTER — TELEPHONE (OUTPATIENT)
Dept: CARDIOLOGY | Facility: HOSPITAL | Age: 69
End: 2024-08-23

## 2024-08-24 ENCOUNTER — ANESTHESIA EVENT (OUTPATIENT)
Dept: OPERATING ROOM | Facility: HOSPITAL | Age: 69
End: 2024-08-24
Payer: MEDICARE

## 2024-08-25 LAB
COTININE SERPL-MCNC: <5 NG/ML
NICOTINE SERPL-MCNC: <5 NG/ML

## 2024-08-26 ENCOUNTER — HOSPITAL ENCOUNTER (INPATIENT)
Facility: HOSPITAL | Age: 69
LOS: 4 days | Discharge: NURSING, REHAB OR LONG-TERM CARE | DRG: 454 | End: 2024-08-30
Attending: ORTHOPAEDIC SURGERY | Admitting: ORTHOPAEDIC SURGERY
Payer: MEDICARE

## 2024-08-26 ENCOUNTER — ANESTHESIA (OUTPATIENT)
Dept: OPERATING ROOM | Facility: HOSPITAL | Age: 69
End: 2024-08-26
Payer: MEDICARE

## 2024-08-26 ENCOUNTER — APPOINTMENT (OUTPATIENT)
Dept: RADIOLOGY | Facility: HOSPITAL | Age: 69
DRG: 454 | End: 2024-08-26
Payer: MEDICARE

## 2024-08-26 DIAGNOSIS — M54.16 LUMBAR RADICULOPATHY: Primary | ICD-10-CM

## 2024-08-26 DIAGNOSIS — M48.062 PSEUDOCLAUDICATION SYNDROME: ICD-10-CM

## 2024-08-26 DIAGNOSIS — Z98.1 STATUS POST LUMBAR SPINAL FUSION: ICD-10-CM

## 2024-08-26 DIAGNOSIS — M41.9 KYPHOSCOLIOSIS: ICD-10-CM

## 2024-08-26 DIAGNOSIS — M96.1 POSTLAMINECTOMY SYNDROME, LUMBAR REGION: ICD-10-CM

## 2024-08-26 PROBLEM — D64.9 ANEMIA: Status: ACTIVE | Noted: 2024-08-26

## 2024-08-26 LAB
ABO GROUP (TYPE) IN BLOOD: NORMAL
ANTIBODY SCREEN: NORMAL
GLUCOSE BLD MANUAL STRIP-MCNC: 178 MG/DL (ref 74–99)
GLUCOSE BLD MANUAL STRIP-MCNC: 181 MG/DL (ref 74–99)
GLUCOSE BLD MANUAL STRIP-MCNC: 267 MG/DL (ref 74–99)
GLUCOSE BLD MANUAL STRIP-MCNC: 301 MG/DL (ref 74–99)
RH FACTOR (ANTIGEN D): NORMAL

## 2024-08-26 PROCEDURE — 22216 INCIS ADDL SPINE SEGMENT: CPT | Performed by: ORTHOPAEDIC SURGERY

## 2024-08-26 PROCEDURE — 2500000005 HC RX 250 GENERAL PHARMACY W/O HCPCS: Performed by: ORTHOPAEDIC SURGERY

## 2024-08-26 PROCEDURE — 2500000004 HC RX 250 GENERAL PHARMACY W/ HCPCS (ALT 636 FOR OP/ED): Performed by: ANESTHESIOLOGIST ASSISTANT

## 2024-08-26 PROCEDURE — 22853 INSJ BIOMECHANICAL DEVICE: CPT | Performed by: PHYSICIAN ASSISTANT

## 2024-08-26 PROCEDURE — 22214 INCIS 1 VERTEBRAL SEG LUMBAR: CPT | Performed by: PHYSICIAN ASSISTANT

## 2024-08-26 PROCEDURE — 22844 INSERT SPINE FIXATION DEVICE: CPT | Performed by: ORTHOPAEDIC SURGERY

## 2024-08-26 PROCEDURE — 01NB0ZZ RELEASE LUMBAR NERVE, OPEN APPROACH: ICD-10-PCS | Performed by: ORTHOPAEDIC SURGERY

## 2024-08-26 PROCEDURE — 3600000004 HC OR TIME - INITIAL BASE CHARGE - PROCEDURE LEVEL FOUR: Performed by: ORTHOPAEDIC SURGERY

## 2024-08-26 PROCEDURE — 63047 LAM FACETEC & FORAMOT LUMBAR: CPT | Performed by: ORTHOPAEDIC SURGERY

## 2024-08-26 PROCEDURE — 82947 ASSAY GLUCOSE BLOOD QUANT: CPT

## 2024-08-26 PROCEDURE — 0SG00AJ FUSION OF LUMBAR VERTEBRAL JOINT WITH INTERBODY FUSION DEVICE, POSTERIOR APPROACH, ANTERIOR COLUMN, OPEN APPROACH: ICD-10-PCS | Performed by: ORTHOPAEDIC SURGERY

## 2024-08-26 PROCEDURE — 3600000009 HC OR TIME - EACH INCREMENTAL 1 MINUTE - PROCEDURE LEVEL FOUR: Performed by: ORTHOPAEDIC SURGERY

## 2024-08-26 PROCEDURE — 87070 CULTURE OTHR SPECIMN AEROBIC: CPT | Mod: AHULAB | Performed by: ORTHOPAEDIC SURGERY

## 2024-08-26 PROCEDURE — 2500000005 HC RX 250 GENERAL PHARMACY W/O HCPCS: Performed by: ANESTHESIOLOGIST ASSISTANT

## 2024-08-26 PROCEDURE — 22216 INCIS ADDL SPINE SEGMENT: CPT | Performed by: PHYSICIAN ASSISTANT

## 2024-08-26 PROCEDURE — 22214 INCIS 1 VERTEBRAL SEG LUMBAR: CPT | Performed by: ORTHOPAEDIC SURGERY

## 2024-08-26 PROCEDURE — 63053 LAM FACTC/FRMT ARTHRD LUM EA: CPT | Performed by: ORTHOPAEDIC SURGERY

## 2024-08-26 PROCEDURE — 20930 SP BONE ALGRFT MORSEL ADD-ON: CPT | Performed by: ORTHOPAEDIC SURGERY

## 2024-08-26 PROCEDURE — 07DR3ZZ EXTRACTION OF ILIAC BONE MARROW, PERCUTANEOUS APPROACH: ICD-10-PCS | Performed by: ORTHOPAEDIC SURGERY

## 2024-08-26 PROCEDURE — 63048 LAM FACETEC &FORAMOT EA ADDL: CPT | Performed by: ORTHOPAEDIC SURGERY

## 2024-08-26 PROCEDURE — 76000 FLUOROSCOPY <1 HR PHYS/QHP: CPT

## 2024-08-26 PROCEDURE — 2500000001 HC RX 250 WO HCPCS SELF ADMINISTERED DRUGS (ALT 637 FOR MEDICARE OP): Performed by: ANESTHESIOLOGIST ASSISTANT

## 2024-08-26 PROCEDURE — 22844 INSERT SPINE FIXATION DEVICE: CPT | Performed by: PHYSICIAN ASSISTANT

## 2024-08-26 PROCEDURE — 86901 BLOOD TYPING SEROLOGIC RH(D): CPT | Performed by: ANESTHESIOLOGY

## 2024-08-26 PROCEDURE — 97606 NEG PRS WND THER DME>50 SQCM: CPT | Performed by: ORTHOPAEDIC SURGERY

## 2024-08-26 PROCEDURE — 2500000001 HC RX 250 WO HCPCS SELF ADMINISTERED DRUGS (ALT 637 FOR MEDICARE OP): Performed by: PHYSICIAN ASSISTANT

## 2024-08-26 PROCEDURE — 20937 SP BONE AGRFT MORSEL ADD-ON: CPT | Performed by: ORTHOPAEDIC SURGERY

## 2024-08-26 PROCEDURE — A22614 PR ARTHRODESIS POSTERIOR/POSTEROLATERAL EA ADDL: Performed by: ANESTHESIOLOGIST ASSISTANT

## 2024-08-26 PROCEDURE — 87102 FUNGUS ISOLATION CULTURE: CPT | Mod: AHULAB | Performed by: ORTHOPAEDIC SURGERY

## 2024-08-26 PROCEDURE — 86923 COMPATIBILITY TEST ELECTRIC: CPT

## 2024-08-26 PROCEDURE — 9420000001 HC RT PATIENT EDUCATION 5 MIN

## 2024-08-26 PROCEDURE — 2500000004 HC RX 250 GENERAL PHARMACY W/ HCPCS (ALT 636 FOR OP/ED): Performed by: ANESTHESIOLOGY

## 2024-08-26 PROCEDURE — 2500000004 HC RX 250 GENERAL PHARMACY W/ HCPCS (ALT 636 FOR OP/ED): Performed by: ORTHOPAEDIC SURGERY

## 2024-08-26 PROCEDURE — 63052 LAM FACETC/FRMT ARTHRD LUM 1: CPT | Performed by: PHYSICIAN ASSISTANT

## 2024-08-26 PROCEDURE — 0RG80K1 FUSION OF 8 OR MORE THORACIC VERTEBRAL JOINTS WITH NONAUTOLOGOUS TISSUE SUBSTITUTE, POSTERIOR APPROACH, POSTERIOR COLUMN, OPEN APPROACH: ICD-10-PCS | Performed by: ORTHOPAEDIC SURGERY

## 2024-08-26 PROCEDURE — 3700000002 HC GENERAL ANESTHESIA TIME - EACH INCREMENTAL 1 MINUTE: Performed by: ORTHOPAEDIC SURGERY

## 2024-08-26 PROCEDURE — 2500000004 HC RX 250 GENERAL PHARMACY W/ HCPCS (ALT 636 FOR OP/ED): Performed by: PHYSICIAN ASSISTANT

## 2024-08-26 PROCEDURE — 7100000001 HC RECOVERY ROOM TIME - INITIAL BASE CHARGE: Performed by: ORTHOPAEDIC SURGERY

## 2024-08-26 PROCEDURE — 0RGA071 FUSION OF THORACOLUMBAR VERTEBRAL JOINT WITH AUTOLOGOUS TISSUE SUBSTITUTE, POSTERIOR APPROACH, POSTERIOR COLUMN, OPEN APPROACH: ICD-10-PCS | Performed by: ORTHOPAEDIC SURGERY

## 2024-08-26 PROCEDURE — 2720000007 HC OR 272 NO HCPCS: Performed by: ORTHOPAEDIC SURGERY

## 2024-08-26 PROCEDURE — 3700000001 HC GENERAL ANESTHESIA TIME - INITIAL BASE CHARGE: Performed by: ORTHOPAEDIC SURGERY

## 2024-08-26 PROCEDURE — 1100000001 HC PRIVATE ROOM DAILY

## 2024-08-26 PROCEDURE — 2500000002 HC RX 250 W HCPCS SELF ADMINISTERED DRUGS (ALT 637 FOR MEDICARE OP, ALT 636 FOR OP/ED): Performed by: PHARMACIST

## 2024-08-26 PROCEDURE — 22633 ARTHRD CMBN 1NTRSPC LUMBAR: CPT | Performed by: ORTHOPAEDIC SURGERY

## 2024-08-26 PROCEDURE — 01N80ZZ RELEASE THORACIC NERVE, OPEN APPROACH: ICD-10-PCS | Performed by: ORTHOPAEDIC SURGERY

## 2024-08-26 PROCEDURE — 22633 ARTHRD CMBN 1NTRSPC LUMBAR: CPT | Performed by: PHYSICIAN ASSISTANT

## 2024-08-26 PROCEDURE — 22853 INSJ BIOMECHANICAL DEVICE: CPT | Performed by: ORTHOPAEDIC SURGERY

## 2024-08-26 PROCEDURE — 63048 LAM FACETEC &FORAMOT EA ADDL: CPT | Performed by: PHYSICIAN ASSISTANT

## 2024-08-26 PROCEDURE — C1713 ANCHOR/SCREW BN/BN,TIS/BN: HCPCS | Performed by: ORTHOPAEDIC SURGERY

## 2024-08-26 PROCEDURE — 7100000002 HC RECOVERY ROOM TIME - EACH INCREMENTAL 1 MINUTE: Performed by: ORTHOPAEDIC SURGERY

## 2024-08-26 PROCEDURE — 0PU43JZ SUPPLEMENT THORACIC VERTEBRA WITH SYNTHETIC SUBSTITUTE, PERCUTANEOUS APPROACH: ICD-10-PCS | Performed by: ORTHOPAEDIC SURGERY

## 2024-08-26 PROCEDURE — 36415 COLL VENOUS BLD VENIPUNCTURE: CPT | Performed by: ANESTHESIOLOGY

## 2024-08-26 PROCEDURE — 63047 LAM FACETEC & FORAMOT LUMBAR: CPT | Performed by: PHYSICIAN ASSISTANT

## 2024-08-26 PROCEDURE — 22614 ARTHRD PST TQ 1NTRSPC EA ADD: CPT | Performed by: ORTHOPAEDIC SURGERY

## 2024-08-26 PROCEDURE — 2780000003 HC OR 278 NO HCPCS: Performed by: ORTHOPAEDIC SURGERY

## 2024-08-26 PROCEDURE — 2500000005 HC RX 250 GENERAL PHARMACY W/O HCPCS: Performed by: ANESTHESIOLOGY

## 2024-08-26 PROCEDURE — 63053 LAM FACTC/FRMT ARTHRD LUM EA: CPT | Performed by: PHYSICIAN ASSISTANT

## 2024-08-26 PROCEDURE — 63052 LAM FACETC/FRMT ARTHRD LUM 1: CPT | Performed by: ORTHOPAEDIC SURGERY

## 2024-08-26 PROCEDURE — A22614 PR ARTHRODESIS POSTERIOR/POSTEROLATERAL EA ADDL: Performed by: ANESTHESIOLOGY

## 2024-08-26 PROCEDURE — 0SG1071 FUSION OF 2 OR MORE LUMBAR VERTEBRAL JOINTS WITH AUTOLOGOUS TISSUE SUBSTITUTE, POSTERIOR APPROACH, POSTERIOR COLUMN, OPEN APPROACH: ICD-10-PCS | Performed by: ORTHOPAEDIC SURGERY

## 2024-08-26 PROCEDURE — 22614 ARTHRD PST TQ 1NTRSPC EA ADD: CPT | Performed by: PHYSICIAN ASSISTANT

## 2024-08-26 PROCEDURE — 0PS43ZZ REPOSITION THORACIC VERTEBRA, PERCUTANEOUS APPROACH: ICD-10-PCS | Performed by: ORTHOPAEDIC SURGERY

## 2024-08-26 PROCEDURE — P9045 ALBUMIN (HUMAN), 5%, 250 ML: HCPCS | Mod: JZ | Performed by: ANESTHESIOLOGIST ASSISTANT

## 2024-08-26 DEVICE — BONE CHIPS, CANCELL 30CC 1-4MM: Type: IMPLANTABLE DEVICE | Site: SPINE LUMBAR | Status: FUNCTIONAL

## 2024-08-26 DEVICE — SCREW, SOLERA 5.5 MAS, 7.5X45: Type: IMPLANTABLE DEVICE | Site: SPINE LUMBAR | Status: FUNCTIONAL

## 2024-08-26 DEVICE — SCREW, SOLERA 5.5 MAS, 7.5X55: Type: IMPLANTABLE DEVICE | Site: SPINE LUMBAR | Status: FUNCTIONAL

## 2024-08-26 DEVICE — ORTHOBLAST II PUTTY, 10CC - DERIVED FROM SELECTED DONATED HUMAN BONE TISSUE THAT HAS BEEN PROCESSED INTO PARTICLES. THE PARTICLES ARE SUBSEQUENTLY DEMINERALIZED USING A HYDROCHLORIC ACID PROCESS. THE DEMINERALIZED BONE MATRIX (DBM) IS COMBINED WITH A REVERSE PHASE CARRIER, CANCELLOUS CHIPS FROM THE SAME DONOR, AND THEN FORMULATED TO A PASTE OR PUTTY-LIKE CONSISTENCY.
Type: IMPLANTABLE DEVICE | Site: SPINE LUMBAR | Status: FUNCTIONAL
Brand: ORTHOBLAST II PUTTY

## 2024-08-26 DEVICE — IMPLANTABLE DEVICE: Type: IMPLANTABLE DEVICE | Site: SPINE LUMBAR | Status: FUNCTIONAL

## 2024-08-26 DEVICE — SCREW, MAS 6.5 X 45 CC SOLERA: Type: IMPLANTABLE DEVICE | Site: SPINE LUMBAR | Status: FUNCTIONAL

## 2024-08-26 DEVICE — SCREW, MAS 6.5 X 40 CC SOLERA: Type: IMPLANTABLE DEVICE | Site: SPINE LUMBAR | Status: FUNCTIONAL

## 2024-08-26 DEVICE — SET SCREW, 5.5, TI NS BRK OFF: Type: IMPLANTABLE DEVICE | Site: SPINE LUMBAR | Status: FUNCTIONAL

## 2024-08-26 RX ORDER — SODIUM CHLORIDE 9 MG/ML
100 INJECTION, SOLUTION INTRAVENOUS CONTINUOUS
Status: DISCONTINUED | OUTPATIENT
Start: 2024-08-26 | End: 2024-08-29

## 2024-08-26 RX ORDER — LIDOCAINE HYDROCHLORIDE 10 MG/ML
0.1 INJECTION, SOLUTION EPIDURAL; INFILTRATION; INTRACAUDAL; PERINEURAL ONCE
Status: DISCONTINUED | OUTPATIENT
Start: 2024-08-26 | End: 2024-08-26 | Stop reason: HOSPADM

## 2024-08-26 RX ORDER — PHENYLEPHRINE 10 MG/250 ML(40 MCG/ML)IN 0.9 % SOD.CHLORIDE INTRAVENOUS
CONTINUOUS PRN
Status: DISCONTINUED | OUTPATIENT
Start: 2024-08-26 | End: 2024-08-26

## 2024-08-26 RX ORDER — DEXTROSE 50 % IN WATER (D50W) INTRAVENOUS SYRINGE
25
Status: DISCONTINUED | OUTPATIENT
Start: 2024-08-26 | End: 2024-08-30 | Stop reason: HOSPADM

## 2024-08-26 RX ORDER — PHENYLEPHRINE HCL IN 0.9% NACL 1 MG/10 ML
SYRINGE (ML) INTRAVENOUS AS NEEDED
Status: DISCONTINUED | OUTPATIENT
Start: 2024-08-26 | End: 2024-08-26

## 2024-08-26 RX ORDER — METHOCARBAMOL 500 MG/1
750 TABLET, FILM COATED ORAL 4 TIMES DAILY
Status: DISCONTINUED | OUTPATIENT
Start: 2024-08-26 | End: 2024-08-30 | Stop reason: HOSPADM

## 2024-08-26 RX ORDER — LISINOPRIL 20 MG/1
20 TABLET ORAL DAILY
Status: DISCONTINUED | OUTPATIENT
Start: 2024-08-27 | End: 2024-08-30 | Stop reason: HOSPADM

## 2024-08-26 RX ORDER — ALBUTEROL SULFATE 0.83 MG/ML
2.5 SOLUTION RESPIRATORY (INHALATION) ONCE AS NEEDED
Status: DISCONTINUED | OUTPATIENT
Start: 2024-08-26 | End: 2024-08-26 | Stop reason: HOSPADM

## 2024-08-26 RX ORDER — SODIUM CHLORIDE, SODIUM LACTATE, POTASSIUM CHLORIDE, CALCIUM CHLORIDE 600; 310; 30; 20 MG/100ML; MG/100ML; MG/100ML; MG/100ML
20 INJECTION, SOLUTION INTRAVENOUS CONTINUOUS
Status: DISCONTINUED | OUTPATIENT
Start: 2024-08-26 | End: 2024-08-28

## 2024-08-26 RX ORDER — ONDANSETRON HYDROCHLORIDE 2 MG/ML
4 INJECTION, SOLUTION INTRAVENOUS ONCE AS NEEDED
Status: DISCONTINUED | OUTPATIENT
Start: 2024-08-26 | End: 2024-08-26 | Stop reason: HOSPADM

## 2024-08-26 RX ORDER — DOCUSATE SODIUM 100 MG/1
100 CAPSULE, LIQUID FILLED ORAL 2 TIMES DAILY
Status: DISCONTINUED | OUTPATIENT
Start: 2024-08-26 | End: 2024-08-28

## 2024-08-26 RX ORDER — DEXTROSE 50 % IN WATER (D50W) INTRAVENOUS SYRINGE
12.5
Status: DISCONTINUED | OUTPATIENT
Start: 2024-08-26 | End: 2024-08-30 | Stop reason: HOSPADM

## 2024-08-26 RX ORDER — OXYCODONE HYDROCHLORIDE 5 MG/1
5 TABLET ORAL EVERY 4 HOURS PRN
Status: DISCONTINUED | OUTPATIENT
Start: 2024-08-26 | End: 2024-08-30 | Stop reason: HOSPADM

## 2024-08-26 RX ORDER — PROPRANOLOL HYDROCHLORIDE 60 MG/1
60 CAPSULE, EXTENDED RELEASE ORAL
Status: DISCONTINUED | OUTPATIENT
Start: 2024-08-27 | End: 2024-08-30 | Stop reason: HOSPADM

## 2024-08-26 RX ORDER — VANCOMYCIN HYDROCHLORIDE 1 G/20ML
INJECTION, POWDER, LYOPHILIZED, FOR SOLUTION INTRAVENOUS DAILY PRN
Status: DISCONTINUED | OUTPATIENT
Start: 2024-08-26 | End: 2024-08-26

## 2024-08-26 RX ORDER — DEXMEDETOMIDINE IN 0.9 % NACL 20 MCG/5ML
SYRINGE (ML) INTRAVENOUS AS NEEDED
Status: DISCONTINUED | OUTPATIENT
Start: 2024-08-26 | End: 2024-08-26

## 2024-08-26 RX ORDER — OXYCODONE HYDROCHLORIDE 5 MG/1
10 TABLET ORAL EVERY 4 HOURS PRN
Status: DISCONTINUED | OUTPATIENT
Start: 2024-08-26 | End: 2024-08-30 | Stop reason: HOSPADM

## 2024-08-26 RX ORDER — OXYCODONE HYDROCHLORIDE 5 MG/1
5 TABLET ORAL EVERY 4 HOURS PRN
Status: DISCONTINUED | OUTPATIENT
Start: 2024-08-26 | End: 2024-08-26 | Stop reason: HOSPADM

## 2024-08-26 RX ORDER — ACETAMINOPHEN 325 MG/1
650 TABLET ORAL EVERY 6 HOURS
Status: DISCONTINUED | OUTPATIENT
Start: 2024-08-26 | End: 2024-08-30 | Stop reason: HOSPADM

## 2024-08-26 RX ORDER — CEFAZOLIN 1 G/1
INJECTION, POWDER, FOR SOLUTION INTRAVENOUS AS NEEDED
Status: DISCONTINUED | OUTPATIENT
Start: 2024-08-26 | End: 2024-08-26

## 2024-08-26 RX ORDER — FENTANYL CITRATE 50 UG/ML
INJECTION, SOLUTION INTRAMUSCULAR; INTRAVENOUS AS NEEDED
Status: DISCONTINUED | OUTPATIENT
Start: 2024-08-26 | End: 2024-08-26

## 2024-08-26 RX ORDER — ROCURONIUM BROMIDE 10 MG/ML
INJECTION, SOLUTION INTRAVENOUS AS NEEDED
Status: DISCONTINUED | OUTPATIENT
Start: 2024-08-26 | End: 2024-08-26

## 2024-08-26 RX ORDER — PIPERACILLIN SODIUM, TAZOBACTAM SODIUM 3; .375 G/15ML; G/15ML
INJECTION, POWDER, LYOPHILIZED, FOR SOLUTION INTRAVENOUS AS NEEDED
Status: DISCONTINUED | OUTPATIENT
Start: 2024-08-26 | End: 2024-08-26 | Stop reason: HOSPADM

## 2024-08-26 RX ORDER — SODIUM CHLORIDE 0.9 G/100ML
IRRIGANT IRRIGATION AS NEEDED
Status: DISCONTINUED | OUTPATIENT
Start: 2024-08-26 | End: 2024-08-26 | Stop reason: HOSPADM

## 2024-08-26 RX ORDER — PROCHLORPERAZINE MALEATE 10 MG
10 TABLET ORAL EVERY 6 HOURS PRN
Status: DISCONTINUED | OUTPATIENT
Start: 2024-08-26 | End: 2024-08-30 | Stop reason: HOSPADM

## 2024-08-26 RX ORDER — SODIUM CHLORIDE, SODIUM LACTATE, POTASSIUM CHLORIDE, CALCIUM CHLORIDE 600; 310; 30; 20 MG/100ML; MG/100ML; MG/100ML; MG/100ML
INJECTION, SOLUTION INTRAVENOUS CONTINUOUS PRN
Status: DISCONTINUED | OUTPATIENT
Start: 2024-08-26 | End: 2024-08-26

## 2024-08-26 RX ORDER — IPRATROPIUM BROMIDE 0.5 MG/2.5ML
500 SOLUTION RESPIRATORY (INHALATION) ONCE
Status: DISCONTINUED | OUTPATIENT
Start: 2024-08-26 | End: 2024-08-26 | Stop reason: HOSPADM

## 2024-08-26 RX ORDER — MIDAZOLAM HYDROCHLORIDE 1 MG/ML
INJECTION INTRAMUSCULAR; INTRAVENOUS AS NEEDED
Status: DISCONTINUED | OUTPATIENT
Start: 2024-08-26 | End: 2024-08-26

## 2024-08-26 RX ORDER — SYRING-NEEDL,DISP,INSUL,0.3 ML 29 G X1/2"
297 SYRINGE, EMPTY DISPOSABLE MISCELLANEOUS ONCE AS NEEDED
Status: DISCONTINUED | OUTPATIENT
Start: 2024-08-26 | End: 2024-08-30 | Stop reason: HOSPADM

## 2024-08-26 RX ORDER — DROPERIDOL 2.5 MG/ML
0.62 INJECTION, SOLUTION INTRAMUSCULAR; INTRAVENOUS ONCE AS NEEDED
Status: DISCONTINUED | OUTPATIENT
Start: 2024-08-26 | End: 2024-08-26 | Stop reason: HOSPADM

## 2024-08-26 RX ORDER — HYDROMORPHONE HYDROCHLORIDE 0.2 MG/ML
0.2 INJECTION INTRAMUSCULAR; INTRAVENOUS; SUBCUTANEOUS EVERY 4 HOURS PRN
Status: DISCONTINUED | OUTPATIENT
Start: 2024-08-26 | End: 2024-08-30 | Stop reason: HOSPADM

## 2024-08-26 RX ORDER — ALBUMIN HUMAN 50 G/1000ML
SOLUTION INTRAVENOUS AS NEEDED
Status: DISCONTINUED | OUTPATIENT
Start: 2024-08-26 | End: 2024-08-26

## 2024-08-26 RX ORDER — VANCOMYCIN HYDROCHLORIDE 1 G/200ML
INJECTION, SOLUTION INTRAVENOUS AS NEEDED
Status: DISCONTINUED | OUTPATIENT
Start: 2024-08-26 | End: 2024-08-26

## 2024-08-26 RX ORDER — TALC
5 POWDER (GRAM) TOPICAL NIGHTLY PRN
Status: DISCONTINUED | OUTPATIENT
Start: 2024-08-26 | End: 2024-08-30 | Stop reason: HOSPADM

## 2024-08-26 RX ORDER — ALBUTEROL SULFATE 90 UG/1
INHALANT RESPIRATORY (INHALATION) AS NEEDED
Status: DISCONTINUED | OUTPATIENT
Start: 2024-08-26 | End: 2024-08-26

## 2024-08-26 RX ORDER — DIPHENHYDRAMINE HYDROCHLORIDE 50 MG/ML
12.5 INJECTION INTRAMUSCULAR; INTRAVENOUS EVERY 6 HOURS PRN
Status: DISCONTINUED | OUTPATIENT
Start: 2024-08-26 | End: 2024-08-30 | Stop reason: HOSPADM

## 2024-08-26 RX ORDER — VANCOMYCIN HYDROCHLORIDE 1 G/20ML
INJECTION, POWDER, LYOPHILIZED, FOR SOLUTION INTRAVENOUS AS NEEDED
Status: DISCONTINUED | OUTPATIENT
Start: 2024-08-26 | End: 2024-08-26 | Stop reason: HOSPADM

## 2024-08-26 RX ORDER — GLYCOPYRROLATE 0.2 MG/ML
INJECTION INTRAMUSCULAR; INTRAVENOUS AS NEEDED
Status: DISCONTINUED | OUTPATIENT
Start: 2024-08-26 | End: 2024-08-26

## 2024-08-26 RX ORDER — ONDANSETRON HYDROCHLORIDE 2 MG/ML
INJECTION, SOLUTION INTRAVENOUS AS NEEDED
Status: DISCONTINUED | OUTPATIENT
Start: 2024-08-26 | End: 2024-08-26

## 2024-08-26 RX ORDER — ACETAMINOPHEN 325 MG/1
650 TABLET ORAL EVERY 4 HOURS PRN
Status: DISCONTINUED | OUTPATIENT
Start: 2024-08-26 | End: 2024-08-26 | Stop reason: HOSPADM

## 2024-08-26 RX ORDER — SODIUM CHLORIDE, SODIUM LACTATE, POTASSIUM CHLORIDE, CALCIUM CHLORIDE 600; 310; 30; 20 MG/100ML; MG/100ML; MG/100ML; MG/100ML
100 INJECTION, SOLUTION INTRAVENOUS CONTINUOUS
Status: DISCONTINUED | OUTPATIENT
Start: 2024-08-26 | End: 2024-08-26 | Stop reason: HOSPADM

## 2024-08-26 RX ORDER — KETOROLAC TROMETHAMINE 30 MG/ML
INJECTION, SOLUTION INTRAMUSCULAR; INTRAVENOUS AS NEEDED
Status: DISCONTINUED | OUTPATIENT
Start: 2024-08-26 | End: 2024-08-26

## 2024-08-26 RX ORDER — BUPIVACAINE HCL/EPINEPHRINE 0.5-1:200K
VIAL (ML) INJECTION AS NEEDED
Status: DISCONTINUED | OUTPATIENT
Start: 2024-08-26 | End: 2024-08-26 | Stop reason: HOSPADM

## 2024-08-26 RX ORDER — PRIMIDONE 250 MG/1
250 TABLET ORAL 3 TIMES DAILY
Status: DISCONTINUED | OUTPATIENT
Start: 2024-08-26 | End: 2024-08-30 | Stop reason: HOSPADM

## 2024-08-26 RX ORDER — TRANEXAMIC ACID 100 MG/ML
INJECTION, SOLUTION INTRAVENOUS AS NEEDED
Status: DISCONTINUED | OUTPATIENT
Start: 2024-08-26 | End: 2024-08-26

## 2024-08-26 RX ORDER — LIDOCAINE HYDROCHLORIDE 20 MG/ML
INJECTION, SOLUTION INFILTRATION; PERINEURAL AS NEEDED
Status: DISCONTINUED | OUTPATIENT
Start: 2024-08-26 | End: 2024-08-26

## 2024-08-26 RX ORDER — ONDANSETRON HYDROCHLORIDE 2 MG/ML
4 INJECTION, SOLUTION INTRAVENOUS EVERY 8 HOURS PRN
Status: DISCONTINUED | OUTPATIENT
Start: 2024-08-26 | End: 2024-08-30 | Stop reason: HOSPADM

## 2024-08-26 RX ORDER — PROPOFOL 10 MG/ML
INJECTION, EMULSION INTRAVENOUS AS NEEDED
Status: DISCONTINUED | OUTPATIENT
Start: 2024-08-26 | End: 2024-08-26

## 2024-08-26 RX ORDER — METHOCARBAMOL 100 MG/ML
INJECTION, SOLUTION INTRAMUSCULAR; INTRAVENOUS AS NEEDED
Status: DISCONTINUED | OUTPATIENT
Start: 2024-08-26 | End: 2024-08-26

## 2024-08-26 RX ORDER — VANCOMYCIN HYDROCHLORIDE 750 MG/150ML
750 INJECTION, SOLUTION INTRAVENOUS EVERY 12 HOURS
Status: COMPLETED | OUTPATIENT
Start: 2024-08-27 | End: 2024-08-28

## 2024-08-26 RX ORDER — PROCHLORPERAZINE EDISYLATE 5 MG/ML
10 INJECTION INTRAMUSCULAR; INTRAVENOUS EVERY 6 HOURS PRN
Status: DISCONTINUED | OUTPATIENT
Start: 2024-08-26 | End: 2024-08-30 | Stop reason: HOSPADM

## 2024-08-26 RX ORDER — NALOXONE HYDROCHLORIDE 0.4 MG/ML
0.2 INJECTION, SOLUTION INTRAMUSCULAR; INTRAVENOUS; SUBCUTANEOUS EVERY 5 MIN PRN
Status: DISCONTINUED | OUTPATIENT
Start: 2024-08-26 | End: 2024-08-30 | Stop reason: HOSPADM

## 2024-08-26 RX ORDER — ONDANSETRON 4 MG/1
4 TABLET, FILM COATED ORAL EVERY 8 HOURS PRN
Status: DISCONTINUED | OUTPATIENT
Start: 2024-08-26 | End: 2024-08-30 | Stop reason: HOSPADM

## 2024-08-26 RX ORDER — HYDROMORPHONE HYDROCHLORIDE 1 MG/ML
INJECTION, SOLUTION INTRAMUSCULAR; INTRAVENOUS; SUBCUTANEOUS AS NEEDED
Status: DISCONTINUED | OUTPATIENT
Start: 2024-08-26 | End: 2024-08-26

## 2024-08-26 RX ORDER — DEXAMETHASONE SODIUM PHOSPHATE 10 MG/ML
10 INJECTION INTRAMUSCULAR; INTRAVENOUS EVERY 8 HOURS SCHEDULED
Status: COMPLETED | OUTPATIENT
Start: 2024-08-26 | End: 2024-08-27

## 2024-08-26 RX ORDER — HEPARIN SODIUM 5000 [USP'U]/ML
5000 INJECTION, SOLUTION INTRAVENOUS; SUBCUTANEOUS EVERY 8 HOURS
Status: DISCONTINUED | OUTPATIENT
Start: 2024-08-27 | End: 2024-08-30 | Stop reason: HOSPADM

## 2024-08-26 RX ORDER — GENTAMICIN 40 MG/ML
INJECTION, SOLUTION INTRAMUSCULAR; INTRAVENOUS AS NEEDED
Status: DISCONTINUED | OUTPATIENT
Start: 2024-08-26 | End: 2024-08-26

## 2024-08-26 SDOH — ECONOMIC STABILITY: HOUSING INSECURITY: IN THE PAST 12 MONTHS, HOW MANY TIMES HAVE YOU MOVED WHERE YOU WERE LIVING?: 0

## 2024-08-26 SDOH — SOCIAL STABILITY: SOCIAL INSECURITY: HAVE YOU HAD THOUGHTS OF HARMING ANYONE ELSE?: NO

## 2024-08-26 SDOH — ECONOMIC STABILITY: HOUSING INSECURITY: AT ANY TIME IN THE PAST 12 MONTHS, WERE YOU HOMELESS OR LIVING IN A SHELTER (INCLUDING NOW)?: NO

## 2024-08-26 SDOH — SOCIAL STABILITY: SOCIAL INSECURITY: DO YOU FEEL ANYONE HAS EXPLOITED OR TAKEN ADVANTAGE OF YOU FINANCIALLY OR OF YOUR PERSONAL PROPERTY?: NO

## 2024-08-26 SDOH — ECONOMIC STABILITY: INCOME INSECURITY: IN THE LAST 12 MONTHS, WAS THERE A TIME WHEN YOU WERE NOT ABLE TO PAY THE MORTGAGE OR RENT ON TIME?: NO

## 2024-08-26 SDOH — SOCIAL STABILITY: SOCIAL INSECURITY: HAVE YOU HAD ANY THOUGHTS OF HARMING ANYONE ELSE?: NO

## 2024-08-26 SDOH — SOCIAL STABILITY: SOCIAL INSECURITY: ARE THERE ANY APPARENT SIGNS OF INJURIES/BEHAVIORS THAT COULD BE RELATED TO ABUSE/NEGLECT?: NO

## 2024-08-26 SDOH — HEALTH STABILITY: PHYSICAL HEALTH: ON AVERAGE, HOW MANY DAYS PER WEEK DO YOU ENGAGE IN MODERATE TO STRENUOUS EXERCISE (LIKE A BRISK WALK)?: 0 DAYS

## 2024-08-26 SDOH — HEALTH STABILITY: PHYSICAL HEALTH: ON AVERAGE, HOW MANY MINUTES DO YOU ENGAGE IN EXERCISE AT THIS LEVEL?: 0 MIN

## 2024-08-26 SDOH — SOCIAL STABILITY: SOCIAL INSECURITY: DOES ANYONE TRY TO KEEP YOU FROM HAVING/CONTACTING OTHER FRIENDS OR DOING THINGS OUTSIDE YOUR HOME?: NO

## 2024-08-26 SDOH — SOCIAL STABILITY: SOCIAL INSECURITY: HAS ANYONE EVER THREATENED TO HURT YOUR FAMILY OR YOUR PETS?: NO

## 2024-08-26 SDOH — SOCIAL STABILITY: SOCIAL INSECURITY: ABUSE: ADULT

## 2024-08-26 SDOH — ECONOMIC STABILITY: INCOME INSECURITY: HOW HARD IS IT FOR YOU TO PAY FOR THE VERY BASICS LIKE FOOD, HOUSING, MEDICAL CARE, AND HEATING?: NOT HARD AT ALL

## 2024-08-26 SDOH — SOCIAL STABILITY: SOCIAL INSECURITY: DO YOU FEEL UNSAFE GOING BACK TO THE PLACE WHERE YOU ARE LIVING?: NO

## 2024-08-26 SDOH — SOCIAL STABILITY: SOCIAL INSECURITY: ARE YOU OR HAVE YOU BEEN THREATENED OR ABUSED PHYSICALLY, EMOTIONALLY, OR SEXUALLY BY ANYONE?: NO

## 2024-08-26 SDOH — SOCIAL STABILITY: SOCIAL INSECURITY: WERE YOU ABLE TO COMPLETE ALL THE BEHAVIORAL HEALTH SCREENINGS?: YES

## 2024-08-26 ASSESSMENT — ACTIVITIES OF DAILY LIVING (ADL)
ADEQUATE_TO_COMPLETE_ADL: YES
ASSISTIVE_DEVICE: WALKER
WALKS IN HOME: INDEPENDENT
TOILETING: NEEDS ASSISTANCE
PATIENT'S MEMORY ADEQUATE TO SAFELY COMPLETE DAILY ACTIVITIES?: YES
HEARING - RIGHT EAR: FUNCTIONAL
HEARING - LEFT EAR: FUNCTIONAL
GROOMING: NEEDS ASSISTANCE
FEEDING YOURSELF: INDEPENDENT
DRESSING YOURSELF: NEEDS ASSISTANCE
JUDGMENT_ADEQUATE_SAFELY_COMPLETE_DAILY_ACTIVITIES: YES
BATHING: NEEDS ASSISTANCE

## 2024-08-26 ASSESSMENT — LIFESTYLE VARIABLES
SUBSTANCE_ABUSE_PAST_12_MONTHS: NO
HOW MANY STANDARD DRINKS CONTAINING ALCOHOL DO YOU HAVE ON A TYPICAL DAY: PATIENT DOES NOT DRINK
AUDIT-C TOTAL SCORE: 0
HOW OFTEN DO YOU HAVE 6 OR MORE DRINKS ON ONE OCCASION: NEVER
HOW OFTEN DO YOU HAVE A DRINK CONTAINING ALCOHOL: NEVER
PRESCIPTION_ABUSE_PAST_12_MONTHS: NO
SKIP TO QUESTIONS 9-10: 1
AUDIT-C TOTAL SCORE: 0

## 2024-08-26 ASSESSMENT — COGNITIVE AND FUNCTIONAL STATUS - GENERAL
PERSONAL GROOMING: A LOT
HELP NEEDED FOR BATHING: A LOT
WALKING IN HOSPITAL ROOM: A LOT
DRESSING REGULAR UPPER BODY CLOTHING: A LOT
DRESSING REGULAR LOWER BODY CLOTHING: A LOT
TOILETING: A LOT
MOVING TO AND FROM BED TO CHAIR: TOTAL
DRESSING REGULAR LOWER BODY CLOTHING: A LOT
MOBILITY SCORE: 12
STANDING UP FROM CHAIR USING ARMS: TOTAL
PATIENT BASELINE BEDBOUND: NO
MOVING FROM LYING ON BACK TO SITTING ON SIDE OF FLAT BED WITH BEDRAILS: A LOT
STANDING UP FROM CHAIR USING ARMS: A LOT
MOBILITY SCORE: 8
MOVING FROM LYING ON BACK TO SITTING ON SIDE OF FLAT BED WITH BEDRAILS: A LOT
PERSONAL GROOMING: A LOT
DAILY ACTIVITIY SCORE: 13
MOVING TO AND FROM BED TO CHAIR: A LOT
TURNING FROM BACK TO SIDE WHILE IN FLAT BAD: A LOT
TURNING FROM BACK TO SIDE WHILE IN FLAT BAD: A LOT
EATING MEALS: A LITTLE
DAILY ACTIVITIY SCORE: 13
HELP NEEDED FOR BATHING: A LOT
EATING MEALS: A LITTLE
CLIMB 3 TO 5 STEPS WITH RAILING: TOTAL
CLIMB 3 TO 5 STEPS WITH RAILING: A LOT
DRESSING REGULAR UPPER BODY CLOTHING: A LOT
WALKING IN HOSPITAL ROOM: TOTAL
TOILETING: A LOT

## 2024-08-26 ASSESSMENT — PAIN SCALES - GENERAL
PAINLEVEL_OUTOF10: 8
PAINLEVEL_OUTOF10: 6
PAINLEVEL_OUTOF10: 7
PAINLEVEL_OUTOF10: 8
PAINLEVEL_OUTOF10: 6
PAINLEVEL_OUTOF10: 8
PAINLEVEL_OUTOF10: 7
PAINLEVEL_OUTOF10: 6
PAINLEVEL_OUTOF10: 2
PAINLEVEL_OUTOF10: 6
PAINLEVEL_OUTOF10: 10 - WORST POSSIBLE PAIN
PAINLEVEL_OUTOF10: 7

## 2024-08-26 ASSESSMENT — PAIN - FUNCTIONAL ASSESSMENT
PAIN_FUNCTIONAL_ASSESSMENT: UNABLE TO SELF-REPORT
PAIN_FUNCTIONAL_ASSESSMENT: 0-10
PAIN_FUNCTIONAL_ASSESSMENT: UNABLE TO SELF-REPORT
PAIN_FUNCTIONAL_ASSESSMENT: 0-10
PAIN_FUNCTIONAL_ASSESSMENT: 0-10
PAIN_FUNCTIONAL_ASSESSMENT: UNABLE TO SELF-REPORT
PAIN_FUNCTIONAL_ASSESSMENT: 0-10

## 2024-08-26 ASSESSMENT — PAIN DESCRIPTION - LOCATION
LOCATION: BACK
LOCATION: BACK

## 2024-08-26 ASSESSMENT — COLUMBIA-SUICIDE SEVERITY RATING SCALE - C-SSRS
1. IN THE PAST MONTH, HAVE YOU WISHED YOU WERE DEAD OR WISHED YOU COULD GO TO SLEEP AND NOT WAKE UP?: NO
6. HAVE YOU EVER DONE ANYTHING, STARTED TO DO ANYTHING, OR PREPARED TO DO ANYTHING TO END YOUR LIFE?: NO
2. HAVE YOU ACTUALLY HAD ANY THOUGHTS OF KILLING YOURSELF?: NO

## 2024-08-26 ASSESSMENT — PAIN DESCRIPTION - DESCRIPTORS: DESCRIPTORS: ACHING

## 2024-08-26 NOTE — ANESTHESIA PROCEDURE NOTES
Peripheral IV  Date/Time: 8/26/2024 7:45 AM  Inserted by: OMERO Noel    Placement  Needle size: 18 G  Laterality: right  Location: forearm  Local anesthetic: none  Site prep: alcohol  Attempts: 1

## 2024-08-26 NOTE — ANESTHESIA PROCEDURE NOTES
Airway  Date/Time: 8/26/2024 7:47 AM  Urgency: elective      Staffing  Performed: attending   Authorized by: Cesar Gold MD    Performed by: OMERO Noel  Patient location during procedure: OR    Indications and Patient Condition  Indications for airway management: anesthesia and airway protection  Spontaneous Ventilation: absent  Sedation level: deep  Preoxygenated: yes  Patient position: sniffing  Mask difficulty assessment: 2 - vent by mask + OA or adjuvant +/- NMBA  Planned trial extubation    Final Airway Details  Final airway type: endotracheal airway      Successful airway: ETT  Cuffed: yes   Successful intubation technique: direct laryngoscopy  Facilitating devices/methods: intubating stylet  Endotracheal tube insertion site: oral  Blade: Delphine  Blade size: #3  ETT size (mm): 7.0  Cormack-Lehane Classification: grade III - view of epiglottis only  Placement verified by: chest auscultation and capnometry   Measured from: teeth  ETT to teeth (cm): 21  Number of attempts at approach: 1    Additional Comments  Recommend glidescope in the future. Inflated ett cuff with 7mL 2% lidocaine

## 2024-08-26 NOTE — ANESTHESIA POSTPROCEDURE EVALUATION
Patient: Narda Malloy    Procedure Summary       Date: 08/26/24 Room / Location: [facility] A OR 07 / Virtual U A OR    Anesthesia Start: 0728 Anesthesia Stop: 1548    Procedure: L1-L3 Lumbar Laminectomy Revision; L1-L2 Osteotomy; Transforaminal Lumbar Interbody Fusion; T4-S1 Revision; Fusion; Instrumentation; Cement Augmentation (Spine Lumbar) Diagnosis:       Pseudoclaudication syndrome      Lumbar radiculopathy      Postlaminectomy syndrome, lumbar region      Kyphoscoliosis      (Pseudoclaudication syndrome [M48.062])      (Lumbar radiculopathy [M54.16])      (Postlaminectomy syndrome, lumbar region [M96.1])      (Kyphoscoliosis [M41.9])    Surgeons: Kermit Ventura MD Responsible Provider: Kalyan Griffin MD    Anesthesia Type: general ASA Status: 3            Anesthesia Type: general    Vitals Value Taken Time   /60 08/26/24 1801   Temp 36.6 °C (97.9 °F) 08/26/24 1645   Pulse 68 08/26/24 1806   Resp 15 08/26/24 1715   SpO2 100 % 08/26/24 1806   Vitals shown include unfiled device data.    Anesthesia Post Evaluation    Patient participation: complete - patient participated  Level of consciousness: awake  Pain management: satisfactory to patient  Airway patency: patent  Cardiovascular status: acceptable and hemodynamically stable  Respiratory status: acceptable and nonlabored ventilation  Hydration status: balanced  Postoperative Nausea and Vomiting: none        No notable events documented.

## 2024-08-26 NOTE — OP NOTE
L1-L3 Lumbar Laminectomy Revision; L1-L2 Osteotomy; Transforaminal Lumbar Interbody Fusion; T4-S1 Revision; Fusion; Instrumentation; Cement Augmentation Operative Note     Date: 2024  OR Location: [facility] A OR    Name: Narda Malloy, : 1955, Age: 69 y.o., MRN: [id number], Sex: female    Diagnosis  Pre-op Diagnosis      * Pseudoclaudication syndrome [M48.062]     * Lumbar radiculopathy [M54.16]     * Postlaminectomy syndrome, lumbar region [M96.1]     * Kyphoscoliosis [M41.9] Post-op Diagnosis     * Pseudoclaudication syndrome [M48.062]     * Lumbar radiculopathy [M54.16]     * Postlaminectomy syndrome, lumbar region [M96.1]     * Kyphoscoliosis [M41.9]     Procedures  Revision T12/L1-L2/L3 laminectomy; L1, L2 Blank-Villegas osteotomies; L1-L2 transforaminal lumbar interbody fusion with expandable interbody cage; T4-L3 fusion left iliac crest bone graft, allograft chips, demineralized bone matrix and T4-S1 instrumentation with Medtronic Solera instrumentation; cement augmentation with confidence cement T4-T5    Surgeons      * Kermit Ventura - Primary    Resident/Fellow/Other Assistant:  Surgeons and Role:     * Lianet Diaz PA-C - Assisting    Procedure Summary  Anesthesia: General  ASA: III  Anesthesia Staff: Anesthesiologist: Cesar Gold MD; Kalyan Griffin MD  CRNA: LEONEL Pina-CRNA  C-AA: OMERO Noel  Estimated Blood Loss: 750mL  Intra-op Medications:   Administrations occurring from 0730 to 1440 on 24:   Medication Name Total Dose   vancomycin (Vancocin) vial for injection 1 g   piperacillin-tazobactam (Zosyn) injection 3.375 g   thrombin (recombinant) (Recothrom) topical solution 20,000 Units   gelatin absorbable (Gelfoam) 100 sponge 1 each   BUPivacaine-EPINEPHrine (Marcaine w/EPI) 0.5 %-1:200,000 injection 30 mL   sodium chloride 0.9 % irrigation solution 9,000 mL   sodium chloride 0.9 % irrigation solution 1,000 mL              Anesthesia Record                Intraprocedure I/O Totals          Intake    Ketamine 0.00 mL    The total shown is the total volume documented since Anesthesia Start was filed.    Tranexamic Acid 0.00 mL    The total shown is the total volume documented since Anesthesia Start was filed.    Phenylephrine Drip 0.00 mL    The total shown is the total volume documented since Anesthesia Start was filed.    Total Intake 0 mL       Output    Urine 500 mL    Est. Blood Loss 500 mL    Total Output 1000 mL       Net    Net Volume -1000 mL          Specimen:   ID Type Source Tests Collected by Time   A : LUMBAR WOUND Swab SPINE FUNGAL CULTURE/SMEAR, TISSUE/WOUND CULTURE/SMEAR Kermit Ventura MD 8/26/2024 0855        Staff:   Circulator: Yana Joyner Person: Brayan  Relief Circulator: Joy  Relief Scrub: Margaret  Relief Scrub: Jules         Drains and/or Catheters:   Closed/Suction Drain 1 Inferior;Midline Back 7 Fr. (Active)       Urethral Catheter Non-latex 16 Fr. (Active)       Tourniquet Times:         Implants:  Implants       Type Name Action Serial No.       HIGH VISCOSITY SPINAL CEMENT Implanted       INTERBODY CAGE Implanted      Spinal Hardware PUTTY, ORTHOBLAST II 10ML - [id number] - [id number] Implanted [id number]     Graft BONE CHIPS, CANCELL 30CC 1-4MM - [id number] - [id number] Implanted [id number]     Spinal Hardware SCREW, MAS 6.5 X 40 CC SOLERA - [id number] Implanted       FENESTRATED SCREW Implanted      Spinal Hardware SCREW, MAS 6.5 X 45 CC SOLERA - [id number] Implanted      Spinal Hardware SCREW, SOLERA 5.5 MAS, 7.5X55 - [id number] Implanted      Spinal Hardware SCREW, SOLERA 5.5 MAS, 7.5X45 - [id number] Implanted      Spinal Hardware SCREW, MAS 6.5 X 45 CC SOLERA - [id number] [place]       JOSE GUADALUPE Implanted      Spinal Hardware JHONATAN, 5.5/6.0 SIDE/TOP TI NS - [id number] Implanted      Screw SET SCREW, 5.5, TI NS BRK OFF - [id number] Implanted                   Indications: Narda Malloy is an 69 y.o. female who is having  surgery for Pseudoclaudication syndrome [M48.062]  Lumbar radiculopathy [M54.16]  Postlaminectomy syndrome, lumbar region [M96.1]  Kyphoscoliosis [M41.9].     The patient was seen in the preoperative area. The risks, benefits, complications, treatment options, non-operative alternatives, expected recovery and outcomes were discussed with the patient. The possibilities of reaction to medication, pulmonary aspiration, injury to surrounding structures, bleeding, recurrent infection, the need for additional procedures, failure to diagnose a condition, and creating a complication requiring transfusion or operation were discussed with the patient. The patient concurred with the proposed plan, giving informed consent.  The site of surgery was properly noted/marked if necessary per policy. The patient has been actively warmed in preoperative area. Preoperative antibiotics have been ordered and given within 1 hours of incision. Venous thrombosis prophylaxis have been ordered including bilateral sequential compression devices    Procedure Details: Patient was taken to the operating room where a formal timeout was done according to hospital protocol.  Patient was intubated without difficulty.  Patient was given preoperative antibiotics.  Patient was positioned prone on Marc table.  Her thoracolumbar spine was then prepped and draped in usual fashion.  We began by harvesting left iliac crest bone graft from a separate skin and fascial incision.  Bone graft was collected in usual fashion and the incision was closed.  Attention was then turned to midline where we opened up her previous midline incision.  We exposed the pre-existing hardware from L3-S1.  Screws were solidly fixed.  We removed the caps and the bilateral rods.  We then irrigated with Betadine and pulsatile lavage.  We did take 1 intraoperative culture of a small seroma underneath the cross-link.  We then turned our attention to doing a revision laminectomy.  We  did a laminectomy from the L2-3 junction proximal to her fusion all the way up through the T12-L1 junction.  We then did bilateral Blank-Villegas osteotomies at L1 and L2 until the thoracolumbar spine was mobile.  We then placed pedicle screw instrumentation from T4 down to L2.  Screws had acceptable purchase.  Because of the patient's bone quality, we did cement augmentation at T4 and T5 with confidence cement.  Once we had placed instrumentation attention was then turned to the L1-L2 disc space where we did a transforaminal lumbar interbody fusion.  We dilated open the disc space and then supplied lordosis to the area.  We placed expandable interbody cage which was packed with iliac crest bone graft.  Cage was inserted the appropriate sagittal alignment.  We then irrigated another time with Betadine and pulsatile lavage.  We decorticated the transverse processes lateral border of the facet joints and packed in the iliac crest bone graft from T12-L3.  We then placed 2 parallel rods from T4 down to S1.  Screws were tightened to specifications.  We then placed a tertiary and quaternary rods across the osteotomy site after placing appropriate lordosis into the osteotomy.  Screws were tightened down.  X-ray confirmed satisfactory placement.  We then irrigated 1 final time with Irrisept and pulsatile lavage.  We decorticated the posterior elements from T4 down to T12 impacting allograft chips and demineralized bone matrix.  Floseal was then placed over the dura.  There was acceptable hemostasis.  Antibiotic powder was placed in subfascial wound.  We closed over 7 flat SUSAN drain brought through separate stab incision skin.  Wound was closed in layers in usual fashion.  Because of the revision tissue, we elected to place a incisional VAC dressing which was placed at 125 mm of low continuous suction.    I was present and scrubbed for the entire procedure.  The physician assistant was present, scrubbed and participated in  all portions of the procedure, as no qualified surgeon physician and/or resident surgeon was available to assist in the case.      Kermit Ventura MD    Chief of Spine Surgery, [facility]  Director of Spine Service, [facility]  , Department of Orthopaedics  [facility] [employer]  [address] [place] [id number]  P: [phone]    This note was dictated with voice recognition software.  It has not been proofread for grammatical errors, typographical mistakes or other semantic inconsistencies.      Complications:  None; patient tolerated the procedure well.    Disposition: PACU - hemodynamically stable.  Condition: stable             Attending Attestation:     Kermit Ventura  Phone Number: [phone]

## 2024-08-26 NOTE — POST-PROCEDURE NOTE
Patient name & assigned room # __724______, KIMMIE RODRIGUES  Procedure: L1-L3 Lumbar Laminectomy Revision; L1-L2 Osteotomy; Transforaminal Lumbar Interbody Fusion; T4-S1 Revision; Fusion; Instrumentation; Cement Augmentation     Anesthesia type (i.e. general, regional, MAC): GENERAL  Nerve block (if applicable):NONE  Estimated blood loss (EBL) in OR:750ML@OR  Relevant PMH: DM, HTN, HLD, TREMOR, LOW BACK PAIN SPINAL STENOSIS  Orientation/mental status: X4  Incision site(s)/location, surgical dressing(s), and drain type/location: BACK, PREVENA WOUND VAC, 7FR SUSAN DRAIN  Fluids given in OR/PACU, IV site(s), and drips/fluids currently infusinML LR@OR, 500ML ALBUMIN  PO status (last oral intake):water @PACU  Last set of vital signs & O2 requirements: 145/60, 71HR, 12RR, 100% NC/3L  Current pain level & last pain/nausea medication dose/time given: 0.5 dilaudid IV @1612,1634  Next antibiotic due @   Last tranexamic acid dose given @1459  Last void/Anders in place:  16FR anders @0745 @OR  Equipment sent with patient (i.e. Polar Cube, TENs unit, walker, crutches): wheelchair  SCDs on (yes/no):yes  Mode of transport (cart or bed):CART  Belongings sent with patient: CLOTHING  Emergency contact (name, relationship, phone number): RENALDO ()[phone]

## 2024-08-26 NOTE — ANESTHESIA PREPROCEDURE EVALUATION
Patient: Narda Malloy    Procedure Information       Date/Time: 08/26/24 3674    Procedure: L1-L3 Lumbar Laminectomy Revision; L1-L2 Osteotomy; Transforaminal Lumbar Interbody Fusion; T4-S1 Revision; Fusion; Instrumentation; Cement Augmentation (Spine Lumbar)    Location: [facility] OR 07 / [facility] OR    Surgeons: Kermit Ventura MD            Relevant Problems   Cardiac   (+) Essential hypertension   (+) Hyperlipidemia      Neuro   (+) Depression with anxiety   (+) Lumbar radiculopathy      Endocrine   (+) Controlled type 2 diabetes mellitus with stable proliferative retinopathy of both eyes, without long-term current use of insulin (Multi)   (+) DM w/o complication type II (Multi)   (+) Proliferative diabetic retinopathy (Multi)   (+) Type 2 diabetes mellitus with hyperglycemia (Multi)   (+) Type 2 diabetes mellitus with hyperglycemia, with long-term current use of insulin (Multi)      Hematology   (+) Anemia      Musculoskeletal   (+) Displacement of lumbar intervertebral disc without myelopathy   (+) Kyphoscoliosis      HEENT   (+) Primary open-angle glaucoma, bilateral, severe stage       Clinical information reviewed:                   NPO Detail:  No data recorded     Physical Exam    Airway  Mallampati: II     Cardiovascular   Rhythm: regular  Rate: normal     Dental    Pulmonary   Breath sounds clear to auscultation     Abdominal            Anesthesia Plan    History of general anesthesia?: yes  History of complications of general anesthesia?: no    ASA 3     general     intravenous induction   Anesthetic plan and risks discussed with patient.    Plan discussed with CAA.

## 2024-08-27 LAB
ANION GAP SERPL CALC-SCNC: 12 MMOL/L (ref 10–20)
BUN SERPL-MCNC: 32 MG/DL (ref 6–23)
CALCIUM SERPL-MCNC: 7.9 MG/DL (ref 8.6–10.3)
CHLORIDE SERPL-SCNC: 103 MMOL/L (ref 98–107)
CO2 SERPL-SCNC: 26 MMOL/L (ref 21–32)
CREAT SERPL-MCNC: 0.91 MG/DL (ref 0.5–1.05)
EGFRCR SERPLBLD CKD-EPI 2021: 69 ML/MIN/1.73M*2
ERYTHROCYTE [DISTWIDTH] IN BLOOD BY AUTOMATED COUNT: 12.9 % (ref 11.5–14.5)
GLUCOSE BLD MANUAL STRIP-MCNC: 167 MG/DL (ref 74–99)
GLUCOSE BLD MANUAL STRIP-MCNC: 203 MG/DL (ref 74–99)
GLUCOSE BLD MANUAL STRIP-MCNC: 209 MG/DL (ref 74–99)
GLUCOSE BLD MANUAL STRIP-MCNC: 210 MG/DL (ref 74–99)
GLUCOSE SERPL-MCNC: 223 MG/DL (ref 74–99)
HCT VFR BLD AUTO: 27.8 % (ref 36–46)
HGB BLD-MCNC: 9.6 G/DL (ref 12–16)
MCH RBC QN AUTO: 33.2 PG (ref 26–34)
MCHC RBC AUTO-ENTMCNC: 34.5 G/DL (ref 32–36)
MCV RBC AUTO: 96 FL (ref 80–100)
NRBC BLD-RTO: 0 /100 WBCS (ref 0–0)
PLATELET # BLD AUTO: 232 X10*3/UL (ref 150–450)
POTASSIUM SERPL-SCNC: 4.8 MMOL/L (ref 3.5–5.3)
RBC # BLD AUTO: 2.89 X10*6/UL (ref 4–5.2)
SODIUM SERPL-SCNC: 136 MMOL/L (ref 136–145)
WBC # BLD AUTO: 9.4 X10*3/UL (ref 4.4–11.3)

## 2024-08-27 PROCEDURE — 2500000001 HC RX 250 WO HCPCS SELF ADMINISTERED DRUGS (ALT 637 FOR MEDICARE OP): Performed by: PHYSICIAN ASSISTANT

## 2024-08-27 PROCEDURE — 1100000001 HC PRIVATE ROOM DAILY

## 2024-08-27 PROCEDURE — 2500000002 HC RX 250 W HCPCS SELF ADMINISTERED DRUGS (ALT 637 FOR MEDICARE OP, ALT 636 FOR OP/ED): Performed by: PHARMACIST

## 2024-08-27 PROCEDURE — 36415 COLL VENOUS BLD VENIPUNCTURE: CPT | Performed by: PHYSICIAN ASSISTANT

## 2024-08-27 PROCEDURE — 85027 COMPLETE CBC AUTOMATED: CPT | Performed by: PHYSICIAN ASSISTANT

## 2024-08-27 PROCEDURE — 97166 OT EVAL MOD COMPLEX 45 MIN: CPT | Mod: GO

## 2024-08-27 PROCEDURE — 80048 BASIC METABOLIC PNL TOTAL CA: CPT | Performed by: PHYSICIAN ASSISTANT

## 2024-08-27 PROCEDURE — 2500000004 HC RX 250 GENERAL PHARMACY W/ HCPCS (ALT 636 FOR OP/ED): Performed by: PHYSICIAN ASSISTANT

## 2024-08-27 PROCEDURE — 82947 ASSAY GLUCOSE BLOOD QUANT: CPT

## 2024-08-27 PROCEDURE — 97162 PT EVAL MOD COMPLEX 30 MIN: CPT | Mod: GP

## 2024-08-27 PROCEDURE — 9420000001 HC RT PATIENT EDUCATION 5 MIN

## 2024-08-27 SDOH — SOCIAL STABILITY: SOCIAL NETWORK
DO YOU BELONG TO ANY CLUBS OR ORGANIZATIONS SUCH AS CHURCH GROUPS UNIONS, FRATERNAL OR ATHLETIC GROUPS, OR SCHOOL GROUPS?: NO

## 2024-08-27 SDOH — SOCIAL STABILITY: SOCIAL INSECURITY: WITHIN THE LAST YEAR, HAVE YOU BEEN AFRAID OF YOUR PARTNER OR EX-PARTNER?: NO

## 2024-08-27 SDOH — ECONOMIC STABILITY: INCOME INSECURITY: IN THE PAST 12 MONTHS, HAS THE ELECTRIC, GAS, OIL, OR WATER COMPANY THREATENED TO SHUT OFF SERVICE IN YOUR HOME?: NO

## 2024-08-27 SDOH — ECONOMIC STABILITY: HOUSING INSECURITY: IN THE PAST 12 MONTHS, HOW MANY TIMES HAVE YOU MOVED WHERE YOU WERE LIVING?: 1

## 2024-08-27 SDOH — HEALTH STABILITY: MENTAL HEALTH: HOW OFTEN DO YOU HAVE A DRINK CONTAINING ALCOHOL?: NEVER

## 2024-08-27 SDOH — HEALTH STABILITY: MENTAL HEALTH
STRESS IS WHEN SOMEONE FEELS TENSE, NERVOUS, ANXIOUS, OR CAN'T SLEEP AT NIGHT BECAUSE THEIR MIND IS TROUBLED. HOW STRESSED ARE YOU?: ONLY A LITTLE

## 2024-08-27 SDOH — ECONOMIC STABILITY: INCOME INSECURITY: IN THE LAST 12 MONTHS, WAS THERE A TIME WHEN YOU WERE NOT ABLE TO PAY THE MORTGAGE OR RENT ON TIME?: NO

## 2024-08-27 SDOH — SOCIAL STABILITY: SOCIAL NETWORK: IN A TYPICAL WEEK, HOW MANY TIMES DO YOU TALK ON THE PHONE WITH FAMILY, FRIENDS, OR NEIGHBORS?: THREE TIMES A WEEK

## 2024-08-27 SDOH — HEALTH STABILITY: MENTAL HEALTH
HOW OFTEN DO YOU NEED TO HAVE SOMEONE HELP YOU WHEN YOU READ INSTRUCTIONS, PAMPHLETS, OR OTHER WRITTEN MATERIAL FROM YOUR DOCTOR OR PHARMACY?: NEVER

## 2024-08-27 SDOH — ECONOMIC STABILITY: HOUSING INSECURITY: AT ANY TIME IN THE PAST 12 MONTHS, WERE YOU HOMELESS OR LIVING IN A SHELTER (INCLUDING NOW)?: NO

## 2024-08-27 SDOH — SOCIAL STABILITY: SOCIAL INSECURITY: WITHIN THE LAST YEAR, HAVE YOU BEEN HUMILIATED OR EMOTIONALLY ABUSED IN OTHER WAYS BY YOUR PARTNER OR EX-PARTNER?: NO

## 2024-08-27 SDOH — HEALTH STABILITY: MENTAL HEALTH: HOW OFTEN DO YOU HAVE 6 OR MORE DRINKS ON ONE OCCASION?: NEVER

## 2024-08-27 SDOH — HEALTH STABILITY: PHYSICAL HEALTH: ON AVERAGE, HOW MANY DAYS PER WEEK DO YOU ENGAGE IN MODERATE TO STRENUOUS EXERCISE (LIKE A BRISK WALK)?: 0 DAYS

## 2024-08-27 SDOH — ECONOMIC STABILITY: FOOD INSECURITY: WITHIN THE PAST 12 MONTHS, YOU WORRIED THAT YOUR FOOD WOULD RUN OUT BEFORE YOU GOT MONEY TO BUY MORE.: NEVER TRUE

## 2024-08-27 SDOH — SOCIAL STABILITY: SOCIAL NETWORK: ARE YOU MARRIED, WIDOWED, DIVORCED, SEPARATED, NEVER MARRIED, OR LIVING WITH A PARTNER?: MARRIED

## 2024-08-27 SDOH — ECONOMIC STABILITY: FOOD INSECURITY: WITHIN THE PAST 12 MONTHS, THE FOOD YOU BOUGHT JUST DIDN'T LAST AND YOU DIDN'T HAVE MONEY TO GET MORE.: NEVER TRUE

## 2024-08-27 SDOH — SOCIAL STABILITY: SOCIAL NETWORK: HOW OFTEN DO YOU GET TOGETHER WITH FRIENDS OR RELATIVES?: ONCE A WEEK

## 2024-08-27 SDOH — ECONOMIC STABILITY: INCOME INSECURITY: HOW HARD IS IT FOR YOU TO PAY FOR THE VERY BASICS LIKE FOOD, HOUSING, MEDICAL CARE, AND HEATING?: NOT HARD AT ALL

## 2024-08-27 SDOH — HEALTH STABILITY: MENTAL HEALTH: HOW MANY STANDARD DRINKS CONTAINING ALCOHOL DO YOU HAVE ON A TYPICAL DAY?: PATIENT DOES NOT DRINK

## 2024-08-27 SDOH — SOCIAL STABILITY: SOCIAL NETWORK: HOW OFTEN DO YOU ATTENT MEETINGS OF THE CLUB OR ORGANIZATION YOU BELONG TO?: NEVER

## 2024-08-27 SDOH — HEALTH STABILITY: PHYSICAL HEALTH: ON AVERAGE, HOW MANY MINUTES DO YOU ENGAGE IN EXERCISE AT THIS LEVEL?: 0 MIN

## 2024-08-27 SDOH — SOCIAL STABILITY: SOCIAL NETWORK: HOW OFTEN DO YOU ATTEND CHURCH OR RELIGIOUS SERVICES?: NEVER

## 2024-08-27 ASSESSMENT — COGNITIVE AND FUNCTIONAL STATUS - GENERAL
TURNING FROM BACK TO SIDE WHILE IN FLAT BAD: A LOT
MOVING FROM LYING ON BACK TO SITTING ON SIDE OF FLAT BED WITH BEDRAILS: A LITTLE
MOBILITY SCORE: 9
WALKING IN HOSPITAL ROOM: TOTAL
MOVING TO AND FROM BED TO CHAIR: TOTAL
CLIMB 3 TO 5 STEPS WITH RAILING: TOTAL
MOVING FROM LYING ON BACK TO SITTING ON SIDE OF FLAT BED WITH BEDRAILS: A LITTLE
PERSONAL GROOMING: A LOT
MOVING FROM LYING ON BACK TO SITTING ON SIDE OF FLAT BED WITH BEDRAILS: A LITTLE
STANDING UP FROM CHAIR USING ARMS: TOTAL
DAILY ACTIVITIY SCORE: 14
HELP NEEDED FOR BATHING: A LOT
EATING MEALS: A LITTLE
STANDING UP FROM CHAIR USING ARMS: TOTAL
DRESSING REGULAR LOWER BODY CLOTHING: A LOT
DAILY ACTIVITIY SCORE: 14
CLIMB 3 TO 5 STEPS WITH RAILING: TOTAL
DRESSING REGULAR UPPER BODY CLOTHING: A LOT
DRESSING REGULAR LOWER BODY CLOTHING: A LOT
PERSONAL GROOMING: A LOT
PERSONAL GROOMING: A LITTLE
MOBILITY SCORE: 9
DRESSING REGULAR LOWER BODY CLOTHING: A LOT
TURNING FROM BACK TO SIDE WHILE IN FLAT BAD: A LOT
WALKING IN HOSPITAL ROOM: TOTAL
TOILETING: A LOT
MOVING TO AND FROM BED TO CHAIR: A LOT
HELP NEEDED FOR BATHING: A LOT
DRESSING REGULAR UPPER BODY CLOTHING: A LOT
WALKING IN HOSPITAL ROOM: TOTAL
TOILETING: A LOT
CLIMB 3 TO 5 STEPS WITH RAILING: TOTAL
MOBILITY SCORE: 11
DAILY ACTIVITIY SCORE: 14
MOVING TO AND FROM BED TO CHAIR: TOTAL
TURNING FROM BACK TO SIDE WHILE IN FLAT BAD: A LOT
TOILETING: A LOT
HELP NEEDED FOR BATHING: A LOT
STANDING UP FROM CHAIR USING ARMS: A LOT
DRESSING REGULAR UPPER BODY CLOTHING: A LOT

## 2024-08-27 ASSESSMENT — PAIN SCALES - GENERAL
PAINLEVEL_OUTOF10: 5 - MODERATE PAIN
PAINLEVEL_OUTOF10: 5 - MODERATE PAIN
PAINLEVEL_OUTOF10: 0 - NO PAIN
PAINLEVEL_OUTOF10: 7
PAINLEVEL_OUTOF10: 4
PAINLEVEL_OUTOF10: 5 - MODERATE PAIN
PAINLEVEL_OUTOF10: 10 - WORST POSSIBLE PAIN
PAINLEVEL_OUTOF10: 7

## 2024-08-27 ASSESSMENT — PAIN DESCRIPTION - DESCRIPTORS: DESCRIPTORS: SPASM

## 2024-08-27 ASSESSMENT — ACTIVITIES OF DAILY LIVING (ADL)
ADL_ASSISTANCE: INDEPENDENT
ADL_ASSISTANCE: INDEPENDENT

## 2024-08-27 ASSESSMENT — LIFESTYLE VARIABLES
AUDIT-C TOTAL SCORE: 0
SKIP TO QUESTIONS 9-10: 1

## 2024-08-27 ASSESSMENT — PAIN - FUNCTIONAL ASSESSMENT
PAIN_FUNCTIONAL_ASSESSMENT: 0-10

## 2024-08-27 NOTE — PROGRESS NOTES
Narda Malloy is a 69 y.o. female on day 1 of admission presenting with Lumbar radiculopathy.    Spoke with patient to discuss her preferences for care. Discussed how patient manages health at home. Lives home with spouse. Independent in all ADLS with walker,  drives and does shopping.  No home O2, dialysis. Patient denies any problems getting to appointments or obtaining/affording medications.  No additional resources or needs identified.    Plan: s/p laminectomy and fusion revision, patient states she is feeling very weak since surgery was all day yesterday, and states her plan is to go to [facility] prior to returning home. Did discuss the possibility of insurance not paying for this, and patient states she is aware and would be willing to pay privately for this. Asked for referral to be sent. Awaiting response.  Status: inpatient  Payor: [employer]  Disposition: SNF  Barrier: pain control, therapy evals  ADOD:   2-3 days    1431pm  AUTH started by DSC this afternoon. Patient medically stable in 1-2 days. Will DC to [facility] when AUTH approved.       08/27/24 1158   Discharge Planning   Living Arrangements Spouse/significant other   Support Systems Spouse/significant other   Assistance Needed uses walker at baseline at home,  drives and does grocery shopping   Type of Residence Private residence   Number of Stairs to Enter Residence 2   Number of Stairs Within Residence 0   Do you have animals or pets at home? Yes   Type of Animals or Pets cat   Home or Post Acute Services Post acute facilities (Rehab/SNF/etc)   Type of Post Acute Facility Services Skilled nursing   Expected Discharge Disposition SNF  ([facility])   Does the patient need discharge transport arranged? Yes   RoundTrip coordination needed? Yes   Has discharge transport been arranged? No   Financial Resource Strain   How hard is it for you to pay for the very basics like food, housing, medical  care, and heating? Not hard   Housing Stability   In the last 12 months, was there a time when you were not able to pay the mortgage or rent on time? N   In the past 12 months, how many times have you moved where you were living? 1   At any time in the past 12 months, were you homeless or living in a shelter (including now)? N   Transportation Needs   In the past 12 months, has lack of transportation kept you from medical appointments or from getting medications? no   In the past 12 months, has lack of transportation kept you from meetings, work, or from getting things needed for daily living? No         Melyssa Chisholm RN

## 2024-08-27 NOTE — NURSING NOTE
Interdisciplinary team present: NP, PT, NM, CC, SW, Orthopedic Coordinator, and bedside RN.  Pain - controlled  Nausea - none  Discharge barrier - high pain levels, does not want to move so far  Discharge plan - pending  Discharge date/time - pending

## 2024-08-27 NOTE — CARE PLAN
The patient's goals for the shift include  ambulate    The clinical goals for the shift include pt will report decrease in pain this shft    Over the shift, the patient did not make progress toward the following goals. Barriers to progression include na. Recommendations to address these barriers include na.

## 2024-08-27 NOTE — PROGRESS NOTES
Occupational Therapy    Evaluation    Patient Name: Narda Malloy  MRN: [id number]  Today's Date: 8/27/2024  Time Calculation  Start Time: 0920  Stop Time: 0949  Time Calculation (min): 29 min        Assessment:  OT Assessment: Pt presents with decrease endurance, balance, ROM, coordination and strength, impeding ADL performance and functional mobility. Pt would beneftt from skilled OT services to address these deficits and facilitate highest level of function.  Prognosis: Fair  Evaluation/Treatment Tolerance: Patient limited by pain, Patient limited by fatigue  Medical Staff Made Aware: Yes  End of Session Communication: Bedside nurse  End of Session Patient Position: Bed, 3 rail up, Alarm on (RN notified)  OT Assessment Results: Decreased ADL status, Decreased upper extremity range of motion, Decreased upper extremity strength, Decreased safe judgment during ADL, Decreased endurance, Decreased fine motor control, Decreased functional mobility, Decreased IADLs  Prognosis: Fair  Evaluation/Treatment Tolerance: Patient limited by pain, Patient limited by fatigue  Medical Staff Made Aware: Yes  Strengths: Attitude of self, Premorbid level of function, Support of extended family/friends  Barriers to Participation: Insight into problems, Comorbidities  Plan:  Treatment Interventions: ADL retraining, Functional transfer training, UE strengthening/ROM, Endurance training, Equipment evaluation/education, Neuromuscular reeducation, Fine motor coordination activities, Compensatory technique education  OT Frequency: 4 times per week  OT Discharge Recommendations: Moderate intensity level of continued care  OT Recommended Transfer Status: Assist of 2  OT - OK to Discharge: Yes (Per POC)  Treatment Interventions: ADL retraining, Functional transfer training, UE strengthening/ROM, Endurance training, Equipment evaluation/education, Neuromuscular reeducation, Fine motor coordination activities, Compensatory technique  education    Subjective     General:  General  Reason for Referral: Pt is a 70 yo female s/p L1-3 laminectomy revision, L1-2 osteotomy and fusion-revision of T4-S1  Referred By: Lianet Diaz PA-C  Past Medical History Relevant to Rehab:   Past Medical History:   Diagnosis Date    Degenerative myopia, bilateral     Diabetes mellitus with stable proliferative retinopathy of both eyes, without long-term current use of insulin (Multi)     Diabetic neuropathy (Multi)     DM type 2 (diabetes mellitus, type 2) (Multi)     Dry eye syndrome of bilateral lacrimal glands     Essential hypertension     Essential tremor     Glaucoma     Hyperlipidemia     Long term (current) use of insulin (Multi)     Low back pain     Primary open angle glaucoma of both eyes, severe stage     Repeated falls     Sarcoidosis of lung (Multi)     Spinal stenosis, lumbar region without neurogenic claudication     Weakness      Family/Caregiver Present: No  Co-Treatment: PT  Co-Treatment Reason: To maximize pt safety, performance and participation.  Prior to Session Communication: Bedside nurse  Patient Position Received: Bed, 3 rail up, Alarm on  Preferred Learning Style: auditory, kinesthetic, verbal, visual  General Comment: Pt pleasant and agreeable to OT/PT eval. Tx limited by dizziness, vitals taken.  Precautions:  Medical Precautions: Fall precautions  Post-Surgical Precautions: Spinal precautions  Vital Signs:  BP: (!) 111/34  MAP (mmHg): 53  BP Method: Automatic  Patient Position: Lying  Pain:  Pain Assessment  Pain Assessment: 0-10  Pain Type: Surgical pain  Pain Location: Back  Pain Interventions: Repositioned, Ambulation/increased activity    Objective   Cognition:  Orientation Level: Oriented X4 (Mild cognitive deficits)  Safety/Judgement: Exceptions to WFL  Insight: Mild  Impulsive: Mildly  Processing Speed: Delayed           Home Living:  Type of Home: House  Home Adaptive Equipment: Walker rolling or standard, Cane  Home Layout:  One level  Home Access: Stairs to enter with rails  Entrance Stairs-Rails: Right  Entrance Stairs-Number of Steps: 2  Bathroom Shower/Tub: Walk-in shower  Bathroom Toilet: Standard, Low-rise (Pt reports will be obtaining raised toilet adaptive seating)  Bathroom Equipment: Grab bars in shower, Shower chair without back  Prior Function:  Level of [place]: Independent with ADLs and functional transfers, Needs assistance with homemaking  Receives Help From: Family  ADL Assistance: Independent (Recently has been requiring prn assist)  Homemaking Assistance: Needs assistance ( completes iADLs)  Ambulatory Assistance: Independent (Walker)     ADL:  LE Dressing Assistance: Total  LE Dressing Deficit: Thread RLE into underwear, Thread LLE into underwear, Steadying, Supervision/safety  Activity Tolerance:  Endurance: Tolerates less than 10 min exercise with changes in vital signs  Bed Mobility/Transfers: Bed Mobility  Bed Mobility: Yes  Bed Mobility 1  Bed Mobility 1: Supine to sitting, Log roll  Level of Assistance 1: Moderate assistance  Bed Mobility Comments 1: Assist with trunk into elevation, cues for BLE sequencing  Bed Mobility 2  Bed Mobility  2: Scooting  Level of Assistance 2: Moderate assistance  Bed Mobility Comments 2: Toward EOB  Bed Mobility 3  Bed Mobility 3: Sitting to supine, Log roll  Level of Assistance 3: Maximum assistance  Bed Mobility Comments 3: Increase assist due to dizziness and change in vitals (RN notified)    Transfers  Transfer: Yes  Transfer 1  Technique 1: Sit to stand  Transfer Device 1: Walker, Gait belt  Transfer Level of Assistance 1: Moderate assistance, +2  Trials/Comments 1: x2 trials. Bed elevated, cues for safe hand placement and body positioning. 2nd trial, pt with limited response and immediately placed in supine position.      Functional Mobility:  Functional Mobility  Functional Mobility Performed: No (Attempted to take side steps from bed to chair with arms,  however, pt with limited respones and change in vital signs, requiring to return to supine in bed.)  Sitting Balance:  Static Sitting Balance  Static Sitting-Balance Support: Feet supported, Left upper extremity supported, Right upper extremity supported  Static Sitting-Level of Assistance: Contact guard, Close supervision  Static Sitting-Comment/Number of Minutes: Varying levels SBA-CGA  Dynamic Sitting Balance  Dynamic Sitting-Balance Support: Feet supported, Left upper extremity supported, Right upper extremity supported  Dynamic Sitting-Comments: Varying levels Min-Mod  Standing Balance:  Static Standing Balance  Static Standing-Balance Support: Bilateral upper extremity supported  Dynamic Standing Balance  Dynamic Standing-Balance Support: Bilateral upper extremity supported      Vision:Vision - Basic Assessment  Current Vision: No visual deficits     Strength:  Strength Comments: R limited shoulder flexion; WFL L shoulder flexion.  Perception:  Inattention/Neglect: Cues to maintain midline in sitting  Coordination:  Movements are Fluid and Coordinated: No  Trunk Coordination: Impaired trunk control, cues for midline positoin  Finger to Nose: Dyskinesia, Dysmetria  Alternating Toe Taps: Bradykinesia   Hand Function:  Gross Grasp:  (Decrease gross grasp)  Coordination: Impaired  Extremities: RUE   RUE : Exceptions to WFL (Grossly greater than or equal to 3/5 distally) and LUE   LUE: Exceptions to WFL (Grossly greater than or equal to 3/5 distally)    Outcome Measures:[facility] Daily Activity  Putting on and taking off regular lower body clothing: A lot  Bathing (including washing, rinsing, drying): A lot  Putting on and taking off regular upper body clothing: A lot  Toileting, which includes using toilet, bedpan or urinal: A lot  Taking care of personal grooming such as brushing teeth: A little  Eating Meals: A little  Daily Activity - Total Score: 14        Education Documentation  Handouts, taught by Nafisa  AIDEE Lucas at 8/27/2024 11:24 AM.  Learner: Patient  Readiness: Acceptance  Method: Explanation, Demonstration  Response: Needs Reinforcement    Body Mechanics, taught by Nafisa Lucas OT at 8/27/2024 11:24 AM.  Learner: Patient  Readiness: Acceptance  Method: Explanation, Demonstration  Response: Needs Reinforcement    Precautions, taught by Nafisa Lucas OT at 8/27/2024 11:24 AM.  Learner: Patient  Readiness: Acceptance  Method: Explanation, Demonstration  Response: Needs Reinforcement    ADL Training, taught by Nafisa Lucas OT at 8/27/2024 11:24 AM.  Learner: Patient  Readiness: Acceptance  Method: Explanation, Demonstration  Response: Needs Reinforcement    Education Comments  No comments found.        OP EDUCATION:       Goals:  Encounter Problems       Encounter Problems (Active)       ADLs       Patient will perform UB and LB sponge bathing with contact guard assist level of assistance edge of bed.       Start:  08/27/24    Expected End:  09/10/24            Patient with complete upper body dressing with stand by assist level of assistance donning and doffing all UE clothes with PRN adaptive equipment while edge of bed.       Start:  08/27/24    Expected End:  09/10/24            Patient with complete lower body dressing with minimal assist  level of assistance donning and doffing all LE clothes  with PRN adaptive equipment while edge of bed.       Start:  08/27/24    Expected End:  09/10/24            Patient will complete daily grooming tasks with stand by assist level of assistance and PRN adaptive equipment while edge of bed  and standing.       Start:  08/27/24    Expected End:  09/10/24            Patient will complete toileting including hygiene clothing management/hygiene with moderate assist level of assistance and bedside commode.       Start:  08/27/24    Expected End:  09/10/24               BALANCE       Pt will maintain static and dynamic standing balance during ADL task with contact  guard assist level of assistance in order to demonstrate decreased risk of falling and improved postural control.       Start:  08/27/24    Expected End:  09/10/24               COGNITION/SAFETY       Patient will recall and adhere to spinal precautions during all functional mobility/ADL tasks in order to demonstrate improved understanding and promote healing post op       Start:  08/27/24    Expected End:  09/10/24               MOBILITY       Patient will perform Functional mobility x Household distances/Community Distances with minimal assist  level of assistance and least restrictive device in order to improve safety and functional mobility.       Start:  08/27/24    Expected End:  09/10/24               TRANSFERS       Patient will perform bed mobility contact guard assist level of assistance and bed rails in order to improve safety and independence with mobility       Start:  08/27/24    Expected End:  09/10/24            Patient will complete functional transfers with least restrictive device with minimal assist  level of assistance.       Start:  08/27/24    Expected End:  09/10/24

## 2024-08-27 NOTE — PROGRESS NOTES
Physical Therapy    Physical Therapy Evaluation    Patient Name: Narda Malloy  MRN: [id number]  Today's Date: 8/27/2024   Time Calculation  Start Time: 0920  Stop Time: 0949  Time Calculation (min): 29 min    Assessment/Plan   PT Assessment  PT Assessment Results: Decreased strength, Decreased endurance, Impaired balance, Decreased mobility, Decreased coordination, Decreased skin integrity, Pain, Orthopedic restrictions  Rehab Prognosis: Good  Evaluation/Treatment Tolerance: Patient tolerated treatment well  Medical Staff Made Aware: Yes  Strengths: Attitude of self, Premorbid level of function, Support of extended family/friends  Barriers to Participation: Insight into problems, Comorbidities  End of Session Communication: Bedside nurse  Assessment Comment: Pt s/p lumbar spine revision surgery (POD #1) demonstrating deficits in generalized strength, coordination, endurance and balance as well as increased pain resulting in impaired functional mobility, gait and self care. Due to the impairments listed above, pt would benefit from skilled physical therapy services in acute care setting as well as additional therapy in MOD intensity setting with 24/7 supervision and assistance  End of Session Patient Position: Bed, 3 rail up, Alarm on (RN notified)  IP OR SWING BED PT PLAN  Inpatient or Swing Bed: Inpatient  PT Plan  Treatment/Interventions: Bed mobility, Transfer training, Gait training, Stair training, Balance training, Neuromuscular re-education, Strengthening, Endurance training, Therapeutic exercise, Therapeutic activity, Home exercise program  PT Plan: Ongoing PT  PT Frequency: Daily  PT Discharge Recommendations: Moderate intensity level of continued care  Equipment Recommended upon Discharge:  (TBD - owns FWW)  PT Recommended Transfer Status: Assist x2  PT - OK to Discharge: Yes (PT POC initiated this date)      Subjective   General Visit Information:  General  Reason for Referral: Pt is a 70 yo female s/p  L1-3 laminectomy revision, L1-2 osteotomy and fusion-revision of T4-S1  Referred By: Lianet Diaz PA-C  Past Medical History Relevant to Rehab:   Past Medical History:   Diagnosis Date    Degenerative myopia, bilateral     Diabetes mellitus with stable proliferative retinopathy of both eyes, without long-term current use of insulin (Multi)     Diabetic neuropathy (Multi)     DM type 2 (diabetes mellitus, type 2) (Multi)     Dry eye syndrome of bilateral lacrimal glands     Essential hypertension     Essential tremor     Glaucoma     Hyperlipidemia     Long term (current) use of insulin (Multi)     Low back pain     Primary open angle glaucoma of both eyes, severe stage     Repeated falls     Sarcoidosis of lung (Multi)     Spinal stenosis, lumbar region without neurogenic claudication     Weakness        Family/Caregiver Present: No  Co-Treatment: OT  Co-Treatment Reason: To maximize pt safety, performance and participation. (communicated with RN and ortho rounds team to determine that cotreat beneficial for this patient)  Prior to Session Communication: Bedside nurse  Patient Position Received: Bed, 3 rail up, Alarm on  Preferred Learning Style: auditory, kinesthetic, verbal, visual  General Comment: Pt pleasant and agreeable to OT/PT eval. Tx limited by dizziness, near syncopal episode - vitals taken and returned to baseline status in supine position. RN notified and assessing at end of session  Home Living:  Home Living  Type of Home: House  Lives With: Spouse  Home Adaptive Equipment: Walker rolling or standard, Cane  Home Layout: One level  Home Access: Stairs to enter with rails  Entrance Stairs-Rails: Right  Entrance Stairs-Number of Steps: 2  Bathroom Shower/Tub: Walk-in shower  Bathroom Toilet: Standard, Low-rise (Pt reports will be obtaining raised toilet adaptive seating)  Bathroom Equipment: Grab bars in shower, Shower chair without back  Prior Level of Function:  Prior Function Per Pt/Caregiver  Report  Level of [place]: Independent with ADLs and functional transfers, Needs assistance with homemaking  Receives Help From: Family  ADL Assistance: Independent (Recently has been requiring prn assist)  Homemaking Assistance: Needs assistance ( completes iADLs)  Ambulatory Assistance: Independent (Walker)  Precautions:  Precautions  Medical Precautions: Fall precautions  Post-Surgical Precautions: Spinal precautions  Precautions Comment: log roll sequencing  Vital Signs:  Vital Signs  BP: (!) 111/34  MAP (mmHg): 53  BP Location: Left arm  BP Method: Automatic  Patient Position: Lying (after standing trial)    Objective   Pain:  Pain Assessment  Pain Assessment: 0-10  0-10 (Numeric) Pain Score: 5 - Moderate pain  Pain Type: Surgical pain  Pain Location: Back  Pain Interventions: Repositioned, Ambulation/increased activity  Cognition:  Cognition  Overall Cognitive Status: Within Functional Limits (Mild cognitive deficits and anxiousness)  Orientation Level: Oriented X4  Safety/Judgement: Exceptions to WFL  Insight: Mild  Impulsive: Mildly  Processing Speed: Delayed    General Assessments:  Activity Tolerance  Endurance: Tolerates less than 10 min exercise with changes in vital signs    Perception  Inattention/Neglect: Cues to maintain midline in sitting (mild trunk ataxia noted)    Coordination  Movements are Fluid and Coordinated: No  Trunk Coordination: Impaired trunk control, cues for midline positoin  Finger to Nose: Dyskinesia, Dysmetria  Alternating Toe Taps: Bradykinesia (and mild dyskinesia)    Static Sitting Balance  Static Sitting-Balance Support: No upper extremity supported, Feet supported  Static Sitting-Level of Assistance: Contact guard, Minimum assistance  Dynamic Sitting Balance  Dynamic Sitting-Balance Support: Bilateral upper extremity supported  Dynamic Sitting-Level of Assistance: Moderate assistance (variable)  Dynamic Sitting-Balance:  (using reacher for donning of  depends)  Dynamic Sitting-Comments: posterior trunk LOB    Static Standing Balance  Static Standing-Balance Support: Bilateral upper extremity supported (FWW)  Static Standing-Level of Assistance: Moderate assistance  Static Standing-Comment/Number of Minutes: 30-60 sec per trial  Functional Assessments:  Bed Mobility  Bed Mobility: Yes  Bed Mobility 1  Bed Mobility 1: Supine to sitting, Log roll  Level of Assistance 1: Moderate assistance  Bed Mobility Comments 1: Assist with trunk into elevation, cues for BLE sequencing  Bed Mobility 2  Bed Mobility  2: Scooting  Level of Assistance 2: Moderate assistance  Bed Mobility Comments 2: Toward EOB  Bed Mobility 3  Bed Mobility 3: Sitting to supine, Log roll  Level of Assistance 3: Dependent  Bed Mobility Comments 3: Increase assist due to dizziness and change in vitals (RN notified)    Transfers  Transfer: Yes  Transfer 1  Technique 1: Sit to stand, Stand to sit  Transfer Device 1: Walker, Gait belt  Transfer Level of Assistance 1: Moderate assistance (x2)  Trials/Comments 1: x2 trials. Bed elevated, cues for safe hand placement and body positioning. 2nd trial, pt with limited response and immediately placed in supine position and vitals assessed. RN notified    Ambulation/Gait Training  Ambulation/Gait Training Performed: No (Attempted pivot towards bedside chair. Pt unable to clear RLE from bed using BUE support on FWW to take step. Mod A x2 provided during attempt. Upon return to sitting, unable to reattempt gait due to onset of near syncopal episode.)  Extremity/Trunk Assessments:  RLE   RLE : Exceptions to WFL (grossly 3+/5)  LLE   LLE : Exceptions to WFL (grossly 3+/5)  Outcome Measures:  [facility] Basic Mobility  Turning from your back to your side while in a flat bed without using bedrails: A little  Moving from lying on your back to sitting on the side of a flat bed without using bedrails: A lot  Moving to and from bed to chair (including a wheelchair): A  lot  Standing up from a chair using your arms (e.g. wheelchair or bedside chair): A lot  To walk in hospital room: Total  Climbing 3-5 steps with railing: Total  Basic Mobility - Total Score: 11    Encounter Problems       Encounter Problems (Active)       Mobility       LTG - Patient will navigate 2 steps with rails/device       Start:  08/27/24    Expected End:  09/10/24       Min A using R HR         STG - Patient will ambulate       Start:  08/27/24    Expected End:  09/10/24       SBA using FWW >15ft             PT Transfers       STG - Patient will perform bed mobility       Start:  08/27/24    Expected End:  09/10/24       SBA         STG - Patient will transfer sit to and from stand       Start:  08/27/24    Expected End:  09/10/24       SBA                Education Documentation  Handouts, taught by Dionicio Palacios, PT at 8/27/2024  1:05 PM.  Learner: Patient  Readiness: Acceptance  Method: Explanation, Handout, Demonstration  Response: Verbalizes Understanding    Precautions, taught by Dionicio Palacios, PT at 8/27/2024  1:05 PM.  Learner: Patient  Readiness: Acceptance  Method: Explanation, Handout, Demonstration  Response: Verbalizes Understanding    Body Mechanics, taught by Dionicio Palacios, PT at 8/27/2024  1:05 PM.  Learner: Patient  Readiness: Acceptance  Method: Explanation, Handout, Demonstration  Response: Verbalizes Understanding    Mobility Training, taught by Dionicio Palacios, PT at 8/27/2024  1:05 PM.  Learner: Patient  Readiness: Acceptance  Method: Explanation, Handout, Demonstration  Response: Verbalizes Understanding    Education Comments  No comments found.

## 2024-08-27 NOTE — PROGRESS NOTES
08/27/24 1306   Current Planned Discharge Disposition   Current Planned Discharge Disposition [facility]     [facility]  accepted  New  auth needed  once  therapy  notes are in   MARGARITO Tyler, ELY          8-27-24    0495  Auth pending   by   team  [id number] - pending   MARGARITO Tyler, ELY

## 2024-08-27 NOTE — PROGRESS NOTES
Narda Malloy is a 69 y.o. female on day 1 of admission presenting with Lumbar radiculopathy.       08/27/24 1158   Physical Activity   On average, how many days per week do you engage in moderate to strenuous exercise (like a brisk walk)? 0 days   On average, how many minutes do you engage in exercise at this level? 0 min   Financial Resource Strain   How hard is it for you to pay for the very basics like food, housing, medical care, and heating? Not hard   Housing Stability   In the last 12 months, was there a time when you were not able to pay the mortgage or rent on time? N   In the past 12 months, how many times have you moved where you were living? 1   At any time in the past 12 months, were you homeless or living in a shelter (including now)? N   Transportation Needs   In the past 12 months, has lack of transportation kept you from medical appointments or from getting medications? no   In the past 12 months, has lack of transportation kept you from meetings, work, or from getting things needed for daily living? No   Food Insecurity   Within the past 12 months, you worried that your food would run out before you got the money to buy more. Never true   Within the past 12 months, the food you bought just didn't last and you didn't have money to get more. Never true   Stress   Do you feel stress - tense, restless, nervous, or anxious, or unable to sleep at night because your mind is troubled all the time - these days? Only a littl   Social Connections   In a typical week, how many times do you talk on the phone with family, friends, or neighbors? Three   How often do you get together with friends or relatives? Once   How often do you attend Adventism or Nondenominational services? Never   Do you belong to any clubs or organizations such as Adventism groups, unions, fraternal or athletic groups, or school groups? No   How often do you attend meetings of the clubs or organizations you belong to? Never   Are you ,  , , , never , or living with a partner?    Intimate Partner Violence   Within the last year, have you been afraid of your partner or ex-partner? No   Within the last year, have you been humiliated or emotionally abused in other ways by your partner or ex-partner? No   Within the last year, have you been kicked, hit, slapped, or otherwise physically hurt by your partner or ex-partner? No   Within the last year, have you been raped or forced to have any kind of sexual activity by your partner or ex-partner? No   Alcohol Use   Q1: How often do you have a drink containing alcohol? Never   Q2: How many drinks containing alcohol do you have on a typical day when you are drinking? None   Q3: How often do you have six or more drinks on one occasion? Never   Utilities   In the past 12 months has the electric, gas, oil, or water company threatened to shut off services in your home? No   Health Literacy   How often do you need to have someone help you when you read instructions, pamphlets, or other written material from your doctor or pharmacy? Never         Melyssa Chisholm RN

## 2024-08-27 NOTE — NURSING NOTE
Assumed care for pt at this time. PT returned from pacu, noted to be lethargic/drowsy, responds to verbal stimulus. PT noted with SUSAN drain to midline back, Prevena wound vac to midline back. PT refusing to allow anders cath to be removed, states she does not want to get up to ambulate, and the external cath does not work for her.  NP Marc Hendricks made aware. New order to provide pt will Humulin based on sliding scale, ok to leave anders cath in place  until the morning.  PT showing no s,s of pain,distress,or discomfort.  Pt safety measures in place, will continue to monitor.     @0640 pt safety measures in place, pt continues to refuse to get up and ambulate at this time. Pt noted with continued c/o pain with repositioning. PT currently resting in bed with no s.s of pain,distress,or discomfort. PT safety measures in place will endorse to oncoming nurse to continue to monitor.

## 2024-08-27 NOTE — PROGRESS NOTES
Narda Malloy is a 69 y.o. female on day 1 of admission presenting with Lumbar radiculopathy.    Subjective   Patient complains of back pain this morning.  She is moving her legs just fine.  She has refused to get out of bed so far secondary to pain.  She was instructed in the importance of logrolling while in bed, and mobilization with physical therapy this morning.  Patient has promised me that she will get out of bed this morning with therapy.  Gibson will be removed this morning.       Objective     Physical Exam    Last Recorded Vitals  Blood pressure 106/63, pulse 80, temperature 37.3 °C (99.1 °F), temperature source Temporal, resp. rate 18, weight 76 kg (167 lb 8.8 oz), SpO2 96%.  Intake/Output last 3 Shifts:  I/O last 3 completed shifts:  In: 3866.7 (50.9 mL/kg) [I.V.:3866.7 (50.9 mL/kg)]  Out: 2390 (31.4 mL/kg) [Urine:1350 (0.5 mL/kg/hr); Drains:290; Blood:750]  Weight: 76 kg     Relevant Results      Scheduled medications  acetaminophen, 650 mg, oral, q6h  dexAMETHasone, 10 mg, intravenous, q8h LUCA  docusate sodium, 100 mg, oral, BID  heparin (porcine), 5,000 Units, subcutaneous, q8h  insulin regular, 0-20 Units, subcutaneous, TID  lisinopril, 20 mg, oral, Daily  methocarbamol, 750 mg, oral, 4x daily  primidone, 250 mg, oral, TID  propranolol LA, 60 mg, oral, Daily  vancomycin, 750 mg, intravenous, q12h      Continuous medications  lactated Ringer's, 20 mL/hr, Last Rate: Stopped (08/26/24 4743)  sodium chloride 0.9%, 100 mL/hr, Last Rate: 100 mL/hr (08/27/24 0629)      PRN medications  PRN medications: benzocaine-menthol, dextrose, dextrose, diphenhydrAMINE, glucagon, glucagon, HYDROmorphone, magnesium citrate, melatonin, naloxone, ondansetron **OR** ondansetron, oxyCODONE, oxyCODONE, oxygen, prochlorperazine **OR** prochlorperazine  Results for orders placed or performed during the hospital encounter of 08/26/24 (from the past 24 hour(s))   Tissue/Wound Culture/Smear    Specimen: SPINE; Swab   Result  Value Ref Range    Tissue/Wound Culture/Smear No growth to date    POCT GLUCOSE   Result Value Ref Range    POCT Glucose 178 (H) 74 - 99 mg/dL   POCT GLUCOSE   Result Value Ref Range    POCT Glucose 267 (H) 74 - 99 mg/dL   POCT GLUCOSE   Result Value Ref Range    POCT Glucose 301 (H) 74 - 99 mg/dL   CBC   Result Value Ref Range    WBC 9.4 4.4 - 11.3 x10*3/uL    nRBC 0.0 0.0 - 0.0 /100 WBCs    RBC 2.89 (L) 4.00 - 5.20 x10*6/uL    Hemoglobin 9.6 (L) 12.0 - 16.0 g/dL    Hematocrit 27.8 (L) 36.0 - 46.0 %    MCV 96 80 - 100 fL    MCH 33.2 26.0 - 34.0 pg    MCHC 34.5 32.0 - 36.0 g/dL    RDW 12.9 11.5 - 14.5 %    Platelets 232 150 - 450 x10*3/uL   Basic metabolic panel   Result Value Ref Range    Glucose 223 (H) 74 - 99 mg/dL    Sodium 136 136 - 145 mmol/L    Potassium 4.8 3.5 - 5.3 mmol/L    Chloride 103 98 - 107 mmol/L    Bicarbonate 26 21 - 32 mmol/L    Anion Gap 12 10 - 20 mmol/L    Urea Nitrogen 32 (H) 6 - 23 mg/dL    Creatinine 0.91 0.50 - 1.05 mg/dL    eGFR 69 >60 mL/min/1.73m*2    Calcium 7.9 (L) 8.6 - 10.3 mg/dL   POCT GLUCOSE   Result Value Ref Range    POCT Glucose 210 (H) 74 - 99 mg/dL                            Assessment/Plan   Assessment & Plan  Lumbar radiculopathy    Anemia    Pseudoclaudication syndrome    Postlaminectomy syndrome, lumbar region    Kyphoscoliosis    Postoperative day 1 status post lumbar laminectomy, osteotomy and thoracolumbar fusion  Out of bed this morning with physical therapy  Discontinue Gibson catheter  Continue perioperative antibiotics and DVT prophylaxis  Logroll while in bed  Pain control  Discharge planning             Kermit Ventura MD

## 2024-08-28 ENCOUNTER — APPOINTMENT (OUTPATIENT)
Dept: RADIOLOGY | Facility: HOSPITAL | Age: 69
DRG: 454 | End: 2024-08-28
Payer: MEDICARE

## 2024-08-28 LAB
ANION GAP SERPL CALC-SCNC: 12 MMOL/L (ref 10–20)
ANION GAP SERPL CALC-SCNC: 14 MMOL/L (ref 10–20)
APPEARANCE UR: CLEAR
BACTERIA SPEC CULT: NORMAL
BASOPHILS # BLD AUTO: 0.02 X10*3/UL (ref 0–0.1)
BASOPHILS NFR BLD AUTO: 0.3 %
BILIRUB UR STRIP.AUTO-MCNC: NEGATIVE MG/DL
BUN SERPL-MCNC: 34 MG/DL (ref 6–23)
BUN SERPL-MCNC: 38 MG/DL (ref 6–23)
CALCIUM SERPL-MCNC: 7.6 MG/DL (ref 8.6–10.3)
CALCIUM SERPL-MCNC: 7.6 MG/DL (ref 8.6–10.3)
CHLORIDE SERPL-SCNC: 105 MMOL/L (ref 98–107)
CHLORIDE SERPL-SCNC: 106 MMOL/L (ref 98–107)
CO2 SERPL-SCNC: 20 MMOL/L (ref 21–32)
CO2 SERPL-SCNC: 20 MMOL/L (ref 21–32)
COLOR UR: YELLOW
CREAT SERPL-MCNC: 0.73 MG/DL (ref 0.5–1.05)
CREAT SERPL-MCNC: 0.82 MG/DL (ref 0.5–1.05)
EGFRCR SERPLBLD CKD-EPI 2021: 78 ML/MIN/1.73M*2
EGFRCR SERPLBLD CKD-EPI 2021: 90 ML/MIN/1.73M*2
EOSINOPHIL # BLD AUTO: 0.01 X10*3/UL (ref 0–0.7)
EOSINOPHIL NFR BLD AUTO: 0.1 %
ERYTHROCYTE [DISTWIDTH] IN BLOOD BY AUTOMATED COUNT: 13.2 % (ref 11.5–14.5)
GLUCOSE BLD MANUAL STRIP-MCNC: 177 MG/DL (ref 74–99)
GLUCOSE BLD MANUAL STRIP-MCNC: 182 MG/DL (ref 74–99)
GLUCOSE BLD MANUAL STRIP-MCNC: 202 MG/DL (ref 74–99)
GLUCOSE BLD MANUAL STRIP-MCNC: 226 MG/DL (ref 74–99)
GLUCOSE BLD MANUAL STRIP-MCNC: 227 MG/DL (ref 74–99)
GLUCOSE SERPL-MCNC: 191 MG/DL (ref 74–99)
GLUCOSE SERPL-MCNC: 215 MG/DL (ref 74–99)
GLUCOSE UR STRIP.AUTO-MCNC: NORMAL MG/DL
GRAM STN SPEC: NORMAL
GRAM STN SPEC: NORMAL
HCT VFR BLD AUTO: 24.2 % (ref 36–46)
HGB BLD-MCNC: 8.2 G/DL (ref 12–16)
IMM GRANULOCYTES # BLD AUTO: 0.06 X10*3/UL (ref 0–0.7)
IMM GRANULOCYTES NFR BLD AUTO: 0.8 % (ref 0–0.9)
KETONES UR STRIP.AUTO-MCNC: NEGATIVE MG/DL
LACTATE SERPL-SCNC: 1.2 MMOL/L (ref 0.4–2)
LEUKOCYTE ESTERASE UR QL STRIP.AUTO: NEGATIVE
LYMPHOCYTES # BLD AUTO: 0.8 X10*3/UL (ref 1.2–4.8)
LYMPHOCYTES NFR BLD AUTO: 10.6 %
MCH RBC QN AUTO: 32.7 PG (ref 26–34)
MCHC RBC AUTO-ENTMCNC: 33.9 G/DL (ref 32–36)
MCV RBC AUTO: 96 FL (ref 80–100)
MONOCYTES # BLD AUTO: 0.69 X10*3/UL (ref 0.1–1)
MONOCYTES NFR BLD AUTO: 9.2 %
NEUTROPHILS # BLD AUTO: 5.94 X10*3/UL (ref 1.2–7.7)
NEUTROPHILS NFR BLD AUTO: 79 %
NITRITE UR QL STRIP.AUTO: NEGATIVE
NRBC BLD-RTO: 0 /100 WBCS (ref 0–0)
PH UR STRIP.AUTO: 5 [PH]
PLATELET # BLD AUTO: 200 X10*3/UL (ref 150–450)
POTASSIUM SERPL-SCNC: 3.9 MMOL/L (ref 3.5–5.3)
POTASSIUM SERPL-SCNC: 3.9 MMOL/L (ref 3.5–5.3)
PROT UR STRIP.AUTO-MCNC: NEGATIVE MG/DL
RBC # BLD AUTO: 2.51 X10*6/UL (ref 4–5.2)
RBC # UR STRIP.AUTO: NEGATIVE /UL
SODIUM SERPL-SCNC: 134 MMOL/L (ref 136–145)
SODIUM SERPL-SCNC: 135 MMOL/L (ref 136–145)
SP GR UR STRIP.AUTO: 1.02
UROBILINOGEN UR STRIP.AUTO-MCNC: NORMAL MG/DL
WBC # BLD AUTO: 7.5 X10*3/UL (ref 4.4–11.3)

## 2024-08-28 PROCEDURE — 2500000001 HC RX 250 WO HCPCS SELF ADMINISTERED DRUGS (ALT 637 FOR MEDICARE OP): Performed by: ORTHOPAEDIC SURGERY

## 2024-08-28 PROCEDURE — 2500000004 HC RX 250 GENERAL PHARMACY W/ HCPCS (ALT 636 FOR OP/ED): Performed by: HOSPITALIST

## 2024-08-28 PROCEDURE — 2500000001 HC RX 250 WO HCPCS SELF ADMINISTERED DRUGS (ALT 637 FOR MEDICARE OP): Performed by: HOSPITALIST

## 2024-08-28 PROCEDURE — 9420000001 HC RT PATIENT EDUCATION 5 MIN

## 2024-08-28 PROCEDURE — 51701 INSERT BLADDER CATHETER: CPT

## 2024-08-28 PROCEDURE — 85025 COMPLETE CBC W/AUTO DIFF WBC: CPT | Performed by: HOSPITALIST

## 2024-08-28 PROCEDURE — 2500000004 HC RX 250 GENERAL PHARMACY W/ HCPCS (ALT 636 FOR OP/ED): Performed by: ORTHOPAEDIC SURGERY

## 2024-08-28 PROCEDURE — 2500000002 HC RX 250 W HCPCS SELF ADMINISTERED DRUGS (ALT 637 FOR MEDICARE OP, ALT 636 FOR OP/ED): Performed by: PHARMACIST

## 2024-08-28 PROCEDURE — 80048 BASIC METABOLIC PNL TOTAL CA: CPT | Performed by: HOSPITALIST

## 2024-08-28 PROCEDURE — 82947 ASSAY GLUCOSE BLOOD QUANT: CPT

## 2024-08-28 PROCEDURE — 74018 RADEX ABDOMEN 1 VIEW: CPT | Mod: FOREIGN READ | Performed by: RADIOLOGY

## 2024-08-28 PROCEDURE — 80048 BASIC METABOLIC PNL TOTAL CA: CPT | Performed by: PHYSICIAN ASSISTANT

## 2024-08-28 PROCEDURE — 97530 THERAPEUTIC ACTIVITIES: CPT | Mod: GP,CQ | Performed by: PHYSICAL THERAPY ASSISTANT

## 2024-08-28 PROCEDURE — 81003 URINALYSIS AUTO W/O SCOPE: CPT | Performed by: HOSPITALIST

## 2024-08-28 PROCEDURE — 83605 ASSAY OF LACTIC ACID: CPT | Performed by: HOSPITALIST

## 2024-08-28 PROCEDURE — 36430 TRANSFUSION BLD/BLD COMPNT: CPT

## 2024-08-28 PROCEDURE — 36415 COLL VENOUS BLD VENIPUNCTURE: CPT | Performed by: HOSPITALIST

## 2024-08-28 PROCEDURE — 2500000004 HC RX 250 GENERAL PHARMACY W/ HCPCS (ALT 636 FOR OP/ED): Performed by: PHYSICIAN ASSISTANT

## 2024-08-28 PROCEDURE — 1100000001 HC PRIVATE ROOM DAILY

## 2024-08-28 PROCEDURE — 2500000002 HC RX 250 W HCPCS SELF ADMINISTERED DRUGS (ALT 637 FOR MEDICARE OP, ALT 636 FOR OP/ED): Performed by: HOSPITALIST

## 2024-08-28 PROCEDURE — P9016 RBC LEUKOCYTES REDUCED: HCPCS

## 2024-08-28 PROCEDURE — 2500000004 HC RX 250 GENERAL PHARMACY W/ HCPCS (ALT 636 FOR OP/ED)

## 2024-08-28 PROCEDURE — 99222 1ST HOSP IP/OBS MODERATE 55: CPT | Performed by: HOSPITALIST

## 2024-08-28 PROCEDURE — 2500000001 HC RX 250 WO HCPCS SELF ADMINISTERED DRUGS (ALT 637 FOR MEDICARE OP): Performed by: PHYSICIAN ASSISTANT

## 2024-08-28 PROCEDURE — 74018 RADEX ABDOMEN 1 VIEW: CPT

## 2024-08-28 RX ORDER — AMOXICILLIN 250 MG
1 CAPSULE ORAL 2 TIMES DAILY
Status: DISCONTINUED | OUTPATIENT
Start: 2024-08-28 | End: 2024-08-30 | Stop reason: HOSPADM

## 2024-08-28 RX ORDER — INSULIN GLARGINE 100 [IU]/ML
10 INJECTION, SOLUTION SUBCUTANEOUS NIGHTLY
Status: DISCONTINUED | OUTPATIENT
Start: 2024-08-28 | End: 2024-08-30 | Stop reason: HOSPADM

## 2024-08-28 RX ORDER — INSULIN LISPRO 100 [IU]/ML
0-10 INJECTION, SOLUTION INTRAVENOUS; SUBCUTANEOUS
Status: DISCONTINUED | OUTPATIENT
Start: 2024-08-28 | End: 2024-08-30 | Stop reason: HOSPADM

## 2024-08-28 RX ORDER — DOXYCYCLINE HYCLATE 100 MG
100 TABLET ORAL EVERY 12 HOURS SCHEDULED
Status: DISCONTINUED | OUTPATIENT
Start: 2024-08-28 | End: 2024-08-30 | Stop reason: HOSPADM

## 2024-08-28 ASSESSMENT — PAIN SCALES - PAIN ASSESSMENT IN ADVANCED DEMENTIA (PAINAD)
FACIALEXPRESSION: SMILING OR INEXPRESSIVE
BREATHING: NORMAL
CONSOLABILITY: NO NEED TO CONSOLE
TOTALSCORE: 0
BODYLANGUAGE: RELAXED

## 2024-08-28 ASSESSMENT — COGNITIVE AND FUNCTIONAL STATUS - GENERAL
CLIMB 3 TO 5 STEPS WITH RAILING: TOTAL
HELP NEEDED FOR BATHING: A LOT
MOBILITY SCORE: 7
MOVING TO AND FROM BED TO CHAIR: TOTAL
MOVING FROM LYING ON BACK TO SITTING ON SIDE OF FLAT BED WITH BEDRAILS: A LITTLE
TOILETING: A LOT
DRESSING REGULAR UPPER BODY CLOTHING: A LOT
TOILETING: A LOT
STANDING UP FROM CHAIR USING ARMS: TOTAL
MOVING FROM LYING ON BACK TO SITTING ON SIDE OF FLAT BED WITH BEDRAILS: A LOT
CLIMB 3 TO 5 STEPS WITH RAILING: TOTAL
WALKING IN HOSPITAL ROOM: TOTAL
CLIMB 3 TO 5 STEPS WITH RAILING: TOTAL
TURNING FROM BACK TO SIDE WHILE IN FLAT BAD: TOTAL
TOILETING: A LOT
WALKING IN HOSPITAL ROOM: A LOT
MOBILITY SCORE: 7
MOVING TO AND FROM BED TO CHAIR: TOTAL
PERSONAL GROOMING: A LOT
MOVING TO AND FROM BED TO CHAIR: TOTAL
PERSONAL GROOMING: A LOT
MOBILITY SCORE: 11
WALKING IN HOSPITAL ROOM: TOTAL
DAILY ACTIVITIY SCORE: 14
HELP NEEDED FOR BATHING: A LOT
DRESSING REGULAR LOWER BODY CLOTHING: A LOT
PERSONAL GROOMING: A LOT
DRESSING REGULAR LOWER BODY CLOTHING: A LOT
MOBILITY SCORE: 7
MOVING FROM LYING ON BACK TO SITTING ON SIDE OF FLAT BED WITH BEDRAILS: A LOT
WALKING IN HOSPITAL ROOM: TOTAL
TOILETING: A LOT
STANDING UP FROM CHAIR USING ARMS: TOTAL
MOVING TO AND FROM BED TO CHAIR: TOTAL
PERSONAL GROOMING: A LOT
WALKING IN HOSPITAL ROOM: TOTAL
STANDING UP FROM CHAIR USING ARMS: TOTAL
DAILY ACTIVITIY SCORE: 14
TURNING FROM BACK TO SIDE WHILE IN FLAT BAD: A LOT
CLIMB 3 TO 5 STEPS WITH RAILING: TOTAL
TURNING FROM BACK TO SIDE WHILE IN FLAT BAD: TOTAL
CLIMB 3 TO 5 STEPS WITH RAILING: TOTAL
MOBILITY SCORE: 9
DAILY ACTIVITIY SCORE: 14
HELP NEEDED FOR BATHING: A LOT
DRESSING REGULAR LOWER BODY CLOTHING: A LOT
DRESSING REGULAR UPPER BODY CLOTHING: A LOT
DRESSING REGULAR UPPER BODY CLOTHING: A LOT
MOVING FROM LYING ON BACK TO SITTING ON SIDE OF FLAT BED WITH BEDRAILS: A LOT
DRESSING REGULAR LOWER BODY CLOTHING: A LOT
HELP NEEDED FOR BATHING: A LOT
DAILY ACTIVITIY SCORE: 14
MOVING FROM LYING ON BACK TO SITTING ON SIDE OF FLAT BED WITH BEDRAILS: A LOT
STANDING UP FROM CHAIR USING ARMS: A LOT
TURNING FROM BACK TO SIDE WHILE IN FLAT BAD: TOTAL
MOVING TO AND FROM BED TO CHAIR: A LOT
TURNING FROM BACK TO SIDE WHILE IN FLAT BAD: A LOT
DRESSING REGULAR UPPER BODY CLOTHING: A LOT
STANDING UP FROM CHAIR USING ARMS: TOTAL

## 2024-08-28 ASSESSMENT — PAIN SCALES - WONG BAKER
WONGBAKER_NUMERICALRESPONSE: NO HURT

## 2024-08-28 ASSESSMENT — PAIN - FUNCTIONAL ASSESSMENT
PAIN_FUNCTIONAL_ASSESSMENT: 0-10
PAIN_FUNCTIONAL_ASSESSMENT: 0-10

## 2024-08-28 ASSESSMENT — PAIN SCALES - GENERAL
PAINLEVEL_OUTOF10: 0 - NO PAIN
PAINLEVEL_OUTOF10: 0 - NO PAIN
PAINLEVEL_OUTOF10: 3
PAINLEVEL_OUTOF10: 0 - NO PAIN
PAINLEVEL_OUTOF10: 6
PAINLEVEL_OUTOF10: 0 - NO PAIN
PAINLEVEL_OUTOF10: 3
PAINLEVEL_OUTOF10: 0 - NO PAIN

## 2024-08-28 ASSESSMENT — PAIN INTENSITY VAS: VAS_PAIN_BASICVITALS_IP: 0

## 2024-08-28 NOTE — NURSING NOTE
Interdisciplinary team present: NP, PT, NM, CC, SW, Orthopedic Coordinator, and bedside RN.  Pain - controlled  Nausea - none  Discharge barrier - low BP, extra IV fluids  Discharge plan - [facility]  Discharge date/time - pending auth and medical clearance

## 2024-08-28 NOTE — CARE PLAN
The patient's goals for the shift include      The clinical goals for the shift include pt safety to be maintained    Problem: Skin  Goal: Decreased wound size/increased tissue granulation at next dressing change  Outcome: Progressing  Goal: Participates in plan/prevention/treatment measures  Outcome: Progressing  Flowsheets (Taken 8/28/2024 1131)  Participates in plan/prevention/treatment measures:   Increase activity/out of bed for meals   Discuss with provider PT/OT consult  Goal: Prevent/manage excess moisture  Outcome: Progressing  Goal: Prevent/minimize sheer/friction injuries  Outcome: Progressing  Goal: Promote/optimize nutrition  Outcome: Progressing  Goal: Promote skin healing  Outcome: Progressing     Problem: Pain  Goal: Takes deep breaths with improved pain control throughout the shift  Outcome: Progressing  Goal: Turns in bed with improved pain control throughout the shift  Outcome: Progressing  Goal: Walks with improved pain control throughout the shift  Outcome: Progressing  Goal: Performs ADL's with improved pain control throughout shift  Outcome: Progressing  Goal: Participates in PT with improved pain control throughout the shift  Outcome: Progressing  Goal: Free from opioid side effects throughout the shift  Outcome: Progressing  Goal: Free from acute confusion related to pain meds throughout the shift  Outcome: Progressing

## 2024-08-28 NOTE — NURSING NOTE
Reported pt updated Bp to Dr. Edward at this time, pt repositioned to back per request. Pt Bp 124/71 map 96. No new orders at this time. Pt showing no s,s of pain,distress,or discomfort. PT safety measures in place. Will endorse to oncoming nurse to continue to monitor.

## 2024-08-28 NOTE — NURSING NOTE
Dr. Edward at bedside to further assess pt at this time, orders in place.PT noted with residual of 596 after attempt to use bedpan. Per jamel this nurse reached out to Surgical PA Marc Hendricks per Via secure chat to suggest order for Gibson Cath placement. Awaiting return response at this time. Will continue to monitor.

## 2024-08-28 NOTE — PROGRESS NOTES
Physical Therapy    Physical Therapy Treatment    Patient Name: Narda Malloy  MRN: [id number]  Today's Date: 8/28/2024  Time Calculation  Start Time: 1140  Stop Time: 1204  Time Calculation (min): 24 min         Assessment/Plan   PT Assessment  End of Session Communication: Bedside nurse  Assessment Comment:  (pt continues to have symptomatic low bp limiting general mobility,able to transfer to chair this date.)  End of Session Patient Position: Up in chair, Alarm on  PT Plan  Inpatient/Swing Bed or Outpatient: Inpatient  PT Plan  Treatment/Interventions: Bed mobility, Transfer training, Gait training, Therapeutic activity, Therapeutic exercise  PT Plan: Ongoing PT  PT Frequency: Daily  PT Discharge Recommendations: Moderate intensity level of continued care  Equipment Recommended upon Discharge:  (TBD - owns FWW)  PT Recommended Transfer Status: Assist x2  PT - OK to Discharge: Yes (per PT POC)      General Visit Information:   PT  Visit  PT Received On: 08/28/24  General  Reason for Referral: Pt is a 68 yo female s/p L1-3 laminectomy revision, L1-2 osteotomy and fusion-revision of T4-S1  Family/Caregiver Present: Yes  Caregiver Feedback:  (spouse in room)  Prior to Session Communication: Bedside nurse  Patient Position Received: Bed, 3 rail up, Alarm on  Preferred Learning Style: auditory, kinesthetic, verbal, visual  General Comment:  (pt agreeable to tx.)    Subjective   Precautions:  Precautions  Medical Precautions: Fall precautions  Post-Surgical Precautions: Spinal precautions            Objective   Pain:  Pain Assessment  Pain Assessment: 0-10  0-10 (Numeric) Pain Score: 3  Pain Type: Surgical pain  Pain Location: Back  Cognition:     Coordination:     Postural Control:     Extremity/Trunk Assessments:    Activity Tolerance:     Treatments:            Bed Mobility  Bed Mobility: Yes  Bed Mobility 1  Bed Mobility 1: Supine to sitting, Log roll  Level of Assistance 1: Moderate assistance  Bed Mobility  Comments 1:  (pt req min cues for proper hand placement and sequencing.)       Transfers  Transfer: Yes  Transfer 1  Transfer From 1: Bed to, Chair with drop arm to  Technique 1: Lateral (seated scooting)  Transfer Device 1: Walker  Transfer Level of Assistance 1: Moderate assistance, Moderate verbal cues, Moderate tactile cues  Trials/Comments 1:  (pt req increased cues and assistance to complete sequencing.)         Outcome Measures:  [facility] Basic Mobility  Turning from your back to your side while in a flat bed without using bedrails: A lot  Moving from lying on your back to sitting on the side of a flat bed without using bedrails: A lot  Moving to and from bed to chair (including a wheelchair): A lot  Standing up from a chair using your arms (e.g. wheelchair or bedside chair): A lot  To walk in hospital room: A lot  Climbing 3-5 steps with railing: Total  Basic Mobility - Total Score: 11    Education Documentation  Handouts, taught by Fabio Quinteros PTA at 8/28/2024  2:28 PM.  Learner: Patient  Readiness: Acceptance  Method: Explanation  Response: Needs Reinforcement    Precautions, taught by Fabio Quinteros PTA at 8/28/2024  2:28 PM.  Learner: Patient  Readiness: Acceptance  Method: Explanation  Response: Needs Reinforcement    Body Mechanics, taught by Fabio Quinteros PTA at 8/28/2024  2:28 PM.  Learner: Patient  Readiness: Acceptance  Method: Explanation  Response: Needs Reinforcement    Mobility Training, taught by Fabio Quinteros PTA at 8/28/2024  2:28 PM.  Learner: Patient  Readiness: Acceptance  Method: Explanation  Response: Needs Reinforcement    Education Comments  No comments found.        OP EDUCATION:       Encounter Problems       Encounter Problems (Active)       Mobility       LTG - Patient will navigate 2 steps with rails/device       Start:  08/27/24    Expected End:  09/10/24       Min A using R HR         STG - Patient will ambulate       Start:  08/27/24    Expected  End:  09/10/24       SBA using FWW >15ft             PT Transfers       STG - Patient will perform bed mobility (Progressing)       Start:  08/27/24    Expected End:  09/10/24       SBA         STG - Patient will transfer sit to and from stand (Progressing)       Start:  08/27/24    Expected End:  09/10/24       SBA

## 2024-08-28 NOTE — CONSULTS
"Consults    Reason For Consult  Hypotension    History Of Present Illness  Narda Malloy is a 69 y.o. female with a PMH of DM2, HTN, essential tremor, sarcoidosis, HLD, spinal stenosis, who is POD 2 revision T12/L1-L2/L3 laminectomy, L1.L2 Blank Olivo osteotomies, L1-L2 transforaminal lumbar interbody fusion, T4-L3 fusion left iliac crest bone graft, cement augmentation with cement T4-T5.   Medicine consulted for hypotension.     Ms Malloy had one episode of N/V following surgery. PO intake has been limited by pt due to her history of post-op nausea, and she is aware that she needs to increase her po intake.   Pt reports that she passed out while working with PT yesterday am. After she was stood up briefly she became light-headed, needed to sit, at which time she \"blanked out\" for one minute. It appears that no rapid was called or fluid bolus given.     Ms Malloy's IVF and BP medication history was reviewed with pharmacy. Lisinopril and propranolol were held yesterday am due to hypotension.   Her NS had been ordered 8/26/24 at 19:45 to be run at 200cc/hr, however were only run at 100ml/hr until 21:00 8/27. So she had been receiving only one-half of the ordered IVF.     Past Medical History  She has a past medical history of Degenerative myopia, bilateral, Diabetes mellitus with stable proliferative retinopathy of both eyes, without long-term current use of insulin (Multi), Diabetic neuropathy (Multi), DM type 2 (diabetes mellitus, type 2) (Multi), Dry eye syndrome of bilateral lacrimal glands, Essential hypertension, Essential tremor, Glaucoma, Hyperlipidemia, Long term (current) use of insulin (Multi), Low back pain, Primary open angle glaucoma of both eyes, severe stage, Repeated falls, Sarcoidosis of lung (Multi), Spinal stenosis, lumbar region without neurogenic claudication, and Weakness.    Surgical History  She has a past surgical history that includes Carpal tunnel release (03/21/2017); Back surgery " (03/21/2017); Vitrectomy (Right, 2013); Cataract extraction w/  intraocular lens implant (Bilateral); Panretinal photocoagulation (2014); Other surgical history (Bilateral, 07/2022); Other surgical history (05/2022); Foot surgery; and Insertion / removal cranial DBS generator.     Social History  She reports that she has quit smoking. Her smoking use included cigarettes. She has been exposed to tobacco smoke. She has never used smokeless tobacco. She reports that she does not currently use alcohol. She reports that she does not currently use drugs.    Family History  Family History   Problem Relation Name Age of Onset    Multiple myeloma Mother      Cancer Mother      Other (CABG) Father      Pulmonary embolism Father      Heart disease Father      Breast cancer Sister          Stage II    Hypertension Sister      Diabetes Sister      No Known Problems Sister          x5    No Known Problems Brother          x4    No Known Problems Daughter          Allergies  Erythromycin, Morphine, and Rosuvastatin    Review of Systems  10 point system reviewed, positive for N/V, light-headedness, difficulty urinating.     Physical Exam  Gen: Alert, no distress, able to converse and answer questions appropriately; she is asymptomatic, and in no distress  CVS: regular rate and rhythm  Pulm: lungs clear, no basilar crackles  Abdomen: non-distended, non-tender, +BS  Ext: moving both LE, no sig edema  Back: surgical dressing along the majority of her spine, no hematoma or bruising; drain in place with moderate amt of blood tinged fluid.  Neuro: grossly intact       Last Recorded Vitals  BP 95/56 (BP Location: Right arm)   Pulse 74   Temp 37.1 °C (98.8 °F) (Temporal)   Resp 17   Wt 75.8 kg (167 lb)   SpO2 94%     Relevant Results  Results for orders placed or performed during the hospital encounter of 08/26/24 (from the past 24 hour(s))   CBC   Result Value Ref Range    WBC 9.4 4.4 - 11.3 x10*3/uL    nRBC 0.0 0.0 - 0.0 /100 WBCs     RBC 2.89 (L) 4.00 - 5.20 x10*6/uL    Hemoglobin 9.6 (L) 12.0 - 16.0 g/dL    Hematocrit 27.8 (L) 36.0 - 46.0 %    MCV 96 80 - 100 fL    MCH 33.2 26.0 - 34.0 pg    MCHC 34.5 32.0 - 36.0 g/dL    RDW 12.9 11.5 - 14.5 %    Platelets 232 150 - 450 x10*3/uL   Basic metabolic panel   Result Value Ref Range    Glucose 223 (H) 74 - 99 mg/dL    Sodium 136 136 - 145 mmol/L    Potassium 4.8 3.5 - 5.3 mmol/L    Chloride 103 98 - 107 mmol/L    Bicarbonate 26 21 - 32 mmol/L    Anion Gap 12 10 - 20 mmol/L    Urea Nitrogen 32 (H) 6 - 23 mg/dL    Creatinine 0.91 0.50 - 1.05 mg/dL    eGFR 69 >60 mL/min/1.73m*2    Calcium 7.9 (L) 8.6 - 10.3 mg/dL   POCT GLUCOSE   Result Value Ref Range    POCT Glucose 210 (H) 74 - 99 mg/dL   POCT GLUCOSE   Result Value Ref Range    POCT Glucose 203 (H) 74 - 99 mg/dL   POCT GLUCOSE   Result Value Ref Range    POCT Glucose 209 (H) 74 - 99 mg/dL   POCT GLUCOSE   Result Value Ref Range    POCT Glucose 167 (H) 74 - 99 mg/dL          Assessment/Plan   Hypotension likely due to poor po intake in addition to inadequate IVF infusion.   - IVF ordered at 200cc/hr on 8/26, however has been receiving 100cc/hr until 9pm last night  - have ordered NS bolus 500cc over the next 2 hrs  - Continue to hold lisinopril and propranolol while hypotensive  - Pt reports normal BP at home of 120s/60s  - Lungs are clear, sat 98% on RA, LVEF on echo 8/16/24 60-65%, therefore can handle fluid boluses when hypotensive and symptomatic  - check H/H tomorrow am       Miriam Edward MD

## 2024-08-28 NOTE — PROGRESS NOTES
08/28/24 0909   Current Planned Discharge Disposition   Current Planned Discharge Disposition SNF     Has  auth  for  [facility].   Can admit until 11:59 pm on 9/2     MARGARITO Tyler, MILENAW

## 2024-08-28 NOTE — PROGRESS NOTES
08/28/24 0808   Discharge Planning   Home or Post Acute Services Post acute facilities (Rehab/SNF/etc)   Type of Post Acute Facility Services Skilled nursing     POD 2 spinal fusion. Patient hypotensive, receiving IV fluids.     Current DC plan [facility] started 8-27.     Addendum 1344- auth approved, can admit through 9-2. Message sent to Lianet Joe notifying her we have auth when she is ready. Gibson placed this afternoon due to bladder retention.

## 2024-08-28 NOTE — PROGRESS NOTES
"Narda Malloy is a 69 y.o. female on day 2 of admission presenting with Lumbar radiculopathy.    Subjective   Patient was seen today on rounds.  Overall, she is doing okay.  She has attempted getting out of bed with physical therapy and Occupational Therapy, however, she states her legs feel weak when she is standing and walking. She also has feelings of dizziness when standing. Her legs feel strong when laying in bed.  She is having expected postoperative back pain.  According to nursing staff, patient requested reinsertion of Gibson catheter.  I discussed with the patient that this will be removed in the morning and that she should be ambulating to the bathroom.  She was understanding of this. We also discussed receiving 1 unit of blood as H&h is trending down despite increase of fluids, she consented.     [facility] Discussed discharge with patient when hemodynamically stable.    Objective     Physical Exam  Laying in bed on arrival. Appears comfortable and alert & oriented. Moving lower extremities freely without impairment. Incisional vac functioning as intended.     Last Recorded Vitals  Blood pressure 123/70, pulse 72, temperature 36.3 °C (97.4 °F), temperature source Temporal, resp. rate 18, height 1.753 m (5' 9.02\"), weight 75.8 kg (167 lb), SpO2 94%.  Intake/Output last 3 Shifts:  I/O last 3 completed shifts:  In: 4247.3 (56.1 mL/kg) [I.V.:3447.3 (45.5 mL/kg); IV Piggyback:800]  Out: 1875 (24.8 mL/kg) [Urine:1200 (0.4 mL/kg/hr); Drains:675]  Weight: 75.7 kg     Relevant Results      Scheduled medications  acetaminophen, 650 mg, oral, q6h  doxycycline, 100 mg, oral, q12h LUCA  heparin (porcine), 5,000 Units, subcutaneous, q8h  insulin lispro, 0-10 Units, subcutaneous, TID  [Held by provider] lisinopril, 20 mg, oral, Daily  methocarbamol, 750 mg, oral, 4x daily  primidone, 250 mg, oral, TID  [Held by provider] propranolol LA, 60 mg, oral, Daily  sennosides-docusate sodium, 1 " tablet, oral, BID  vancomycin, 750 mg, intravenous, q12h      Continuous medications  sodium chloride 0.9%, 100 mL/hr, Last Rate: 100 mL/hr (08/28/24 1604)      PRN medications  PRN medications: benzocaine-menthol, dextrose, dextrose, diphenhydrAMINE, glucagon, glucagon, HYDROmorphone, magnesium citrate, melatonin, naloxone, ondansetron **OR** ondansetron, oxyCODONE, oxyCODONE, prochlorperazine **OR** prochlorperazine  Results for orders placed or performed during the hospital encounter of 08/26/24 (from the past 24 hour(s))   POCT GLUCOSE   Result Value Ref Range    POCT Glucose 167 (H) 74 - 99 mg/dL   Urinalysis with Reflex Microscopic   Result Value Ref Range    Color, Urine Yellow Light-Yellow, Yellow, Dark-Yellow    Appearance, Urine Clear Clear    Specific Gravity, Urine 1.025 1.005 - 1.035    pH, Urine 5.0 5.0, 5.5, 6.0, 6.5, 7.0, 7.5, 8.0    Protein, Urine NEGATIVE NEGATIVE, 10 (TRACE), 20 (TRACE) mg/dL    Glucose, Urine Normal Normal mg/dL    Blood, Urine NEGATIVE NEGATIVE    Ketones, Urine NEGATIVE NEGATIVE mg/dL    Bilirubin, Urine NEGATIVE NEGATIVE    Urobilinogen, Urine Normal Normal mg/dL    Nitrite, Urine NEGATIVE NEGATIVE    Leukocyte Esterase, Urine NEGATIVE NEGATIVE   Basic metabolic panel   Result Value Ref Range    Glucose 191 (H) 74 - 99 mg/dL    Sodium 135 (L) 136 - 145 mmol/L    Potassium 3.9 3.5 - 5.3 mmol/L    Chloride 105 98 - 107 mmol/L    Bicarbonate 20 (L) 21 - 32 mmol/L    Anion Gap 14 10 - 20 mmol/L    Urea Nitrogen 38 (H) 6 - 23 mg/dL    Creatinine 0.82 0.50 - 1.05 mg/dL    eGFR 78 >60 mL/min/1.73m*2    Calcium 7.6 (L) 8.6 - 10.3 mg/dL   CBC and Auto Differential   Result Value Ref Range    WBC 7.5 4.4 - 11.3 x10*3/uL    nRBC 0.0 0.0 - 0.0 /100 WBCs    RBC 2.51 (L) 4.00 - 5.20 x10*6/uL    Hemoglobin 8.2 (L) 12.0 - 16.0 g/dL    Hematocrit 24.2 (L) 36.0 - 46.0 %    MCV 96 80 - 100 fL    MCH 32.7 26.0 - 34.0 pg    MCHC 33.9 32.0 - 36.0 g/dL    RDW 13.2 11.5 - 14.5 %    Platelets 200 150  - 450 x10*3/uL    Neutrophils % 79.0 40.0 - 80.0 %    Immature Granulocytes %, Automated 0.8 0.0 - 0.9 %    Lymphocytes % 10.6 13.0 - 44.0 %    Monocytes % 9.2 2.0 - 10.0 %    Eosinophils % 0.1 0.0 - 6.0 %    Basophils % 0.3 0.0 - 2.0 %    Neutrophils Absolute 5.94 1.20 - 7.70 x10*3/uL    Immature Granulocytes Absolute, Automated 0.06 0.00 - 0.70 x10*3/uL    Lymphocytes Absolute 0.80 (L) 1.20 - 4.80 x10*3/uL    Monocytes Absolute 0.69 0.10 - 1.00 x10*3/uL    Eosinophils Absolute 0.01 0.00 - 0.70 x10*3/uL    Basophils Absolute 0.02 0.00 - 0.10 x10*3/uL   POCT GLUCOSE   Result Value Ref Range    POCT Glucose 182 (H) 74 - 99 mg/dL   POCT GLUCOSE   Result Value Ref Range    POCT Glucose 227 (H) 74 - 99 mg/dL   Prepare RBC: 1 Units   Result Value Ref Range    PRODUCT CODE P3000H42     Unit Number [id number]     Unit ABO A     Unit RH NEG     XM INTEP COMP     Dispense Status IS     Blood Expiration Date 9/26/2024 11:59:00 PM EDT     PRODUCT BLOOD TYPE 0600     UNIT VOLUME 350    POCT GLUCOSE   Result Value Ref Range    POCT Glucose 226 (H) 74 - 99 mg/dL                          Assessment/Plan   Assessment & Plan  Lumbar radiculopathy    Anemia    Pseudoclaudication syndrome    Postlaminectomy syndrome, lumbar region    Kyphoscoliosis    Status post lumbar fusion  -1 unit rbc for downtrending h&h  -OOB with PT/OT  -DVT prophylaxis; OOB, up in chair for meals, SCDs, subcu heparin  -continue pain regimen and abx  -[facility] planning [facility] to [facility]       I spent 30 minutes in the professional and overall care of this patient.      Marc Hendricks PA-C  4:48 PM  08/28/24

## 2024-08-29 ENCOUNTER — APPOINTMENT (OUTPATIENT)
Dept: CARDIOLOGY | Facility: CLINIC | Age: 69
End: 2024-08-29
Payer: MEDICARE

## 2024-08-29 LAB
ANION GAP SERPL CALC-SCNC: 11 MMOL/L (ref 10–20)
BUN SERPL-MCNC: 24 MG/DL (ref 6–23)
CALCIUM SERPL-MCNC: 7.6 MG/DL (ref 8.6–10.3)
CHLORIDE SERPL-SCNC: 106 MMOL/L (ref 98–107)
CO2 SERPL-SCNC: 22 MMOL/L (ref 21–32)
CREAT SERPL-MCNC: 0.6 MG/DL (ref 0.5–1.05)
EGFRCR SERPLBLD CKD-EPI 2021: >90 ML/MIN/1.73M*2
ERYTHROCYTE [DISTWIDTH] IN BLOOD BY AUTOMATED COUNT: 13.1 % (ref 11.5–14.5)
GLUCOSE BLD MANUAL STRIP-MCNC: 153 MG/DL (ref 74–99)
GLUCOSE BLD MANUAL STRIP-MCNC: 165 MG/DL (ref 74–99)
GLUCOSE BLD MANUAL STRIP-MCNC: 167 MG/DL (ref 74–99)
GLUCOSE BLD MANUAL STRIP-MCNC: 220 MG/DL (ref 74–99)
GLUCOSE SERPL-MCNC: 171 MG/DL (ref 74–99)
HCT VFR BLD AUTO: 28.4 % (ref 36–46)
HGB BLD-MCNC: 9.5 G/DL (ref 12–16)
MCH RBC QN AUTO: 32.5 PG (ref 26–34)
MCHC RBC AUTO-ENTMCNC: 33.5 G/DL (ref 32–36)
MCV RBC AUTO: 97 FL (ref 80–100)
NRBC BLD-RTO: 0 /100 WBCS (ref 0–0)
PLATELET # BLD AUTO: 209 X10*3/UL (ref 150–450)
POTASSIUM SERPL-SCNC: 4 MMOL/L (ref 3.5–5.3)
RBC # BLD AUTO: 2.92 X10*6/UL (ref 4–5.2)
SODIUM SERPL-SCNC: 135 MMOL/L (ref 136–145)
WBC # BLD AUTO: 6.1 X10*3/UL (ref 4.4–11.3)

## 2024-08-29 PROCEDURE — 2500000004 HC RX 250 GENERAL PHARMACY W/ HCPCS (ALT 636 FOR OP/ED): Performed by: PHYSICIAN ASSISTANT

## 2024-08-29 PROCEDURE — 97530 THERAPEUTIC ACTIVITIES: CPT | Mod: GP,CQ

## 2024-08-29 PROCEDURE — 36415 COLL VENOUS BLD VENIPUNCTURE: CPT | Performed by: HOSPITALIST

## 2024-08-29 PROCEDURE — 85027 COMPLETE CBC AUTOMATED: CPT | Performed by: HOSPITALIST

## 2024-08-29 PROCEDURE — 2500000001 HC RX 250 WO HCPCS SELF ADMINISTERED DRUGS (ALT 637 FOR MEDICARE OP): Performed by: ORTHOPAEDIC SURGERY

## 2024-08-29 PROCEDURE — 97110 THERAPEUTIC EXERCISES: CPT | Mod: GP,CQ

## 2024-08-29 PROCEDURE — 80048 BASIC METABOLIC PNL TOTAL CA: CPT | Performed by: HOSPITALIST

## 2024-08-29 PROCEDURE — 9420000001 HC RT PATIENT EDUCATION 5 MIN

## 2024-08-29 PROCEDURE — 2500000002 HC RX 250 W HCPCS SELF ADMINISTERED DRUGS (ALT 637 FOR MEDICARE OP, ALT 636 FOR OP/ED): Performed by: HOSPITALIST

## 2024-08-29 PROCEDURE — 2500000004 HC RX 250 GENERAL PHARMACY W/ HCPCS (ALT 636 FOR OP/ED): Performed by: HOSPITALIST

## 2024-08-29 PROCEDURE — 82947 ASSAY GLUCOSE BLOOD QUANT: CPT

## 2024-08-29 PROCEDURE — 97535 SELF CARE MNGMENT TRAINING: CPT | Mod: GO

## 2024-08-29 PROCEDURE — 2500000001 HC RX 250 WO HCPCS SELF ADMINISTERED DRUGS (ALT 637 FOR MEDICARE OP): Performed by: PHYSICIAN ASSISTANT

## 2024-08-29 PROCEDURE — 1100000001 HC PRIVATE ROOM DAILY

## 2024-08-29 PROCEDURE — 2500000001 HC RX 250 WO HCPCS SELF ADMINISTERED DRUGS (ALT 637 FOR MEDICARE OP): Performed by: HOSPITALIST

## 2024-08-29 PROCEDURE — 99232 SBSQ HOSP IP/OBS MODERATE 35: CPT | Performed by: HOSPITALIST

## 2024-08-29 RX ORDER — ACETAMINOPHEN 500 MG
1000 TABLET ORAL EVERY 6 HOURS PRN
Qty: 56 TABLET | Refills: 0 | Status: SHIPPED | OUTPATIENT
Start: 2024-08-29 | End: 2024-09-05

## 2024-08-29 RX ORDER — EZETIMIBE 10 MG/1
10 TABLET ORAL DAILY
Status: DISCONTINUED | OUTPATIENT
Start: 2024-08-30 | End: 2024-08-30 | Stop reason: HOSPADM

## 2024-08-29 RX ORDER — DOXYCYCLINE 100 MG/1
100 CAPSULE ORAL 2 TIMES DAILY
Qty: 84 CAPSULE | Refills: 0 | Status: SHIPPED | OUTPATIENT
Start: 2024-08-29 | End: 2024-10-10

## 2024-08-29 RX ORDER — DOCUSATE SODIUM 100 MG/1
100 CAPSULE, LIQUID FILLED ORAL 3 TIMES DAILY PRN
Qty: 21 CAPSULE | Refills: 0 | Status: SHIPPED | OUTPATIENT
Start: 2024-08-29 | End: 2024-09-05

## 2024-08-29 RX ORDER — CYCLOBENZAPRINE HCL 10 MG
10 TABLET ORAL 3 TIMES DAILY PRN
Qty: 30 TABLET | Refills: 0 | Status: SHIPPED | OUTPATIENT
Start: 2024-08-29 | End: 2024-09-08

## 2024-08-29 RX ORDER — KETOROLAC TROMETHAMINE 10 MG/1
10 TABLET, FILM COATED ORAL EVERY 6 HOURS PRN
Qty: 15 TABLET | Refills: 0 | Status: SHIPPED | OUTPATIENT
Start: 2024-08-29 | End: 2024-09-03

## 2024-08-29 RX ORDER — DORZOLAMIDE HYDROCHLORIDE AND TIMOLOL MALEATE 20; 5 MG/ML; MG/ML
1 SOLUTION/ DROPS OPHTHALMIC 2 TIMES DAILY
Status: DISCONTINUED | OUTPATIENT
Start: 2024-08-29 | End: 2024-08-29

## 2024-08-29 RX ORDER — INSULIN LISPRO 100 [IU]/ML
0-10 INJECTION, SOLUTION INTRAVENOUS; SUBCUTANEOUS
Status: DISCONTINUED | OUTPATIENT
Start: 2024-08-29 | End: 2024-08-29

## 2024-08-29 RX ORDER — BRIMONIDINE TARTRATE 2 MG/ML
1 SOLUTION/ DROPS OPHTHALMIC 2 TIMES DAILY
Status: DISCONTINUED | OUTPATIENT
Start: 2024-08-29 | End: 2024-08-29

## 2024-08-29 RX ORDER — OXYCODONE HYDROCHLORIDE 5 MG/1
5 TABLET ORAL EVERY 6 HOURS PRN
Qty: 28 TABLET | Refills: 0 | Status: SHIPPED | OUTPATIENT
Start: 2024-08-29 | End: 2024-09-05

## 2024-08-29 RX ORDER — DOXYCYCLINE HYCLATE 100 MG
100 TABLET ORAL
Status: DISCONTINUED | OUTPATIENT
Start: 2024-08-29 | End: 2024-08-29

## 2024-08-29 RX ORDER — LATANOPROST 50 UG/ML
1 SOLUTION/ DROPS OPHTHALMIC NIGHTLY
Status: DISCONTINUED | OUTPATIENT
Start: 2024-08-29 | End: 2024-08-30 | Stop reason: HOSPADM

## 2024-08-29 ASSESSMENT — PAIN SCALES - GENERAL
PAINLEVEL_OUTOF10: 7
PAINLEVEL_OUTOF10: 6
PAINLEVEL_OUTOF10: 4
PAINLEVEL_OUTOF10: 7
PAINLEVEL_OUTOF10: 8
PAINLEVEL_OUTOF10: 0 - NO PAIN
PAINLEVEL_OUTOF10: 4
PAINLEVEL_OUTOF10: 2

## 2024-08-29 ASSESSMENT — PAIN - FUNCTIONAL ASSESSMENT
PAIN_FUNCTIONAL_ASSESSMENT: 0-10

## 2024-08-29 ASSESSMENT — COGNITIVE AND FUNCTIONAL STATUS - GENERAL
MOVING TO AND FROM BED TO CHAIR: TOTAL
DRESSING REGULAR LOWER BODY CLOTHING: A LOT
EATING MEALS: A LITTLE
MOVING FROM LYING ON BACK TO SITTING ON SIDE OF FLAT BED WITH BEDRAILS: A LOT
CLIMB 3 TO 5 STEPS WITH RAILING: TOTAL
STANDING UP FROM CHAIR USING ARMS: TOTAL
DRESSING REGULAR UPPER BODY CLOTHING: A LOT
DRESSING REGULAR LOWER BODY CLOTHING: TOTAL
DRESSING REGULAR UPPER BODY CLOTHING: A LOT
MOBILITY SCORE: 8
WALKING IN HOSPITAL ROOM: TOTAL
DAILY ACTIVITIY SCORE: 14
DAILY ACTIVITIY SCORE: 13
PERSONAL GROOMING: A LITTLE
STANDING UP FROM CHAIR USING ARMS: TOTAL
CLIMB 3 TO 5 STEPS WITH RAILING: TOTAL
TURNING FROM BACK TO SIDE WHILE IN FLAT BAD: A LOT
HELP NEEDED FOR BATHING: A LOT
TOILETING: A LOT
TURNING FROM BACK TO SIDE WHILE IN FLAT BAD: TOTAL
MOVING FROM LYING ON BACK TO SITTING ON SIDE OF FLAT BED WITH BEDRAILS: A LOT
MOBILITY SCORE: 7
MOVING TO AND FROM BED TO CHAIR: TOTAL
HELP NEEDED FOR BATHING: A LOT
PERSONAL GROOMING: A LOT
TOILETING: A LOT
WALKING IN HOSPITAL ROOM: TOTAL

## 2024-08-29 ASSESSMENT — ACTIVITIES OF DAILY LIVING (ADL): HOME_MANAGEMENT_TIME_ENTRY: 22

## 2024-08-29 ASSESSMENT — PAIN SCALES - WONG BAKER: WONGBAKER_NUMERICALRESPONSE: HURTS WHOLE LOT

## 2024-08-29 ASSESSMENT — PAIN DESCRIPTION - ORIENTATION: ORIENTATION: LOWER

## 2024-08-29 ASSESSMENT — PAIN SCALES - PAIN ASSESSMENT IN ADVANCED DEMENTIA (PAINAD)
TOTALSCORE: MEDICATION (SEE MAR)
TOTALSCORE: MEDICATION (SEE MAR)

## 2024-08-29 ASSESSMENT — PAIN DESCRIPTION - LOCATION: LOCATION: BACK

## 2024-08-29 NOTE — PROGRESS NOTES
"Narda Malloy is a 69 y.o. female on day 3 of admission presenting with Lumbar radiculopathy.    Subjective   Patient is doing okay this morning.  She was out of bed minimally yesterday.  Moving legs fine.  She received a transfusion overnight for low hematocrit and symptomatic hypotension.       Objective     Physical Exam    Last Recorded Vitals  Blood pressure 125/59, pulse 79, temperature 37.4 °C (99.4 °F), temperature source Oral, resp. rate 18, height 1.753 m (5' 9.02\"), weight 75.8 kg (167 lb), SpO2 94%.  Intake/Output last 3 Shifts:  I/O last 3 completed shifts:  In: 5547 (73.2 mL/kg) [P.O.:480; I.V.:2685 (35.4 mL/kg); Blood:832; IV Piggyback:1550]  Out: 3220 (42.5 mL/kg) [Urine:2550 (0.9 mL/kg/hr); Drains:670]  Weight: 75.7 kg     Relevant Results      Scheduled medications  acetaminophen, 650 mg, oral, q6h  doxycycline, 100 mg, oral, q12h LUCA  heparin (porcine), 5,000 Units, subcutaneous, q8h  insulin glargine, 10 Units, subcutaneous, Nightly  insulin lispro, 0-10 Units, subcutaneous, TID  [Held by provider] lisinopril, 20 mg, oral, Daily  methocarbamol, 750 mg, oral, 4x daily  primidone, 250 mg, oral, TID  [Held by provider] propranolol LA, 60 mg, oral, Daily  sennosides-docusate sodium, 1 tablet, oral, BID      Continuous medications  sodium chloride 0.9%, 100 mL/hr, Last Rate: 100 mL/hr (08/28/24 5745)      PRN medications  PRN medications: benzocaine-menthol, dextrose, dextrose, diphenhydrAMINE, glucagon, glucagon, HYDROmorphone, magnesium citrate, melatonin, naloxone, ondansetron **OR** ondansetron, oxyCODONE, oxyCODONE, prochlorperazine **OR** prochlorperazine  Results for orders placed or performed during the hospital encounter of 08/26/24 (from the past 24 hour(s))   POCT GLUCOSE   Result Value Ref Range    POCT Glucose 182 (H) 74 - 99 mg/dL   POCT GLUCOSE   Result Value Ref Range    POCT Glucose 227 (H) 74 - 99 mg/dL   Prepare RBC: 1 Units   Result Value Ref Range    PRODUCT CODE E0311R88     " Unit Number [id number]     Unit ABO A     Unit RH NEG     XM INTEP COMP     Dispense Status IS     Blood Expiration Date 9/26/2024 11:59:00 PM EDT     PRODUCT BLOOD TYPE 0600     UNIT VOLUME 350    POCT GLUCOSE   Result Value Ref Range    POCT Glucose 226 (H) 74 - 99 mg/dL   Basic metabolic panel   Result Value Ref Range    Glucose 215 (H) 74 - 99 mg/dL    Sodium 134 (L) 136 - 145 mmol/L    Potassium 3.9 3.5 - 5.3 mmol/L    Chloride 106 98 - 107 mmol/L    Bicarbonate 20 (L) 21 - 32 mmol/L    Anion Gap 12 10 - 20 mmol/L    Urea Nitrogen 34 (H) 6 - 23 mg/dL    Creatinine 0.73 0.50 - 1.05 mg/dL    eGFR 90 >60 mL/min/1.73m*2    Calcium 7.6 (L) 8.6 - 10.3 mg/dL   Lactate   Result Value Ref Range    Lactate 1.2 0.4 - 2.0 mmol/L   POCT GLUCOSE   Result Value Ref Range    POCT Glucose 177 (H) 74 - 99 mg/dL   POCT GLUCOSE   Result Value Ref Range    POCT Glucose 202 (H) 74 - 99 mg/dL                            Assessment/Plan   Assessment & Plan  Lumbar radiculopathy    Anemia    Pseudoclaudication syndrome    Postlaminectomy syndrome, lumbar region    Kyphoscoliosis    Postoperative day 3 status post lumbar osteotomy and thoracolumbar fusion  Out of bed today with physical therapy  SUSAN drain discontinued  Plan for discharge to skilled nursing facility tomorrow             Kermit Ventura MD

## 2024-08-29 NOTE — NURSING NOTE
Interdisciplinary team present: NP, PT, NM, CC, SW, Orthopedic Coordinator, and bedside RN.  Pain - controlled  Nausea - none  Discharge barrier - medical (low bp, watch h/h)  Discharge plan - [facility]  Discharge date/time -  tomorrow

## 2024-08-29 NOTE — PROGRESS NOTES
Physical Therapy    Physical Therapy Treatment    Patient Name: Narda Malloy  MRN: [id number]  Today's Date: 8/29/2024  Time Calculation  Start Time: 1128  Stop Time: 1154  Time Calculation (min): 26 min         Assessment/Plan   PT Assessment  PT Assessment Results: Decreased strength, Decreased endurance, Impaired balance, Decreased mobility, Decreased coordination, Decreased skin integrity, Pain, Orthopedic restrictions  Rehab Prognosis: Good  Evaluation/Treatment Tolerance: Patient limited by fatigue, Patient limited by pain  Medical Staff Made Aware: Yes  Strengths: Premorbid level of function, Support of extended family/friends, Support and attitude of living partners  Barriers to Participation: Comorbidities  End of Session Communication: Bedside nurse  Assessment Comment: Pt standing activites limited by BLE, right UE, and general weakness.  Some progress made with transfer bed to chair today.  End of Session Patient Position: Up in chair, Alarm on  PT Plan  Inpatient/Swing Bed or Outpatient: Inpatient  PT Plan  Treatment/Interventions: Bed mobility, Transfer training, Strengthening, Endurance training, Therapeutic exercise, Therapeutic activity  PT Plan: Ongoing PT  PT Frequency: Daily  PT Discharge Recommendations: Moderate intensity level of continued care  Equipment Recommended upon Discharge:  (TBD - owns FWW)  PT Recommended Transfer Status: Assist x2  PT - OK to Discharge: Yes (per PT POC)      General Visit Information:   PT  Visit  PT Received On: 08/29/24  General  Reason for Referral: Pt is a 70 yo female s/p L1-3 laminectomy revision, L1-2 osteotomy and fusion-revision of T4-S1  Referred By: Lianet Diaz PA-C  Co-Treatment: OT  Co-Treatment Reason: For transfers only. To maximize pt safety, and performance.  Prior to Session Communication: Bedside nurse  Patient Position Received: Up in chair, Alarm on  Preferred Learning Style: auditory, kinesthetic, verbal  General Comment: Pt fully  participatory.    Subjective   Precautions:  Precautions  Medical Precautions: Fall precautions  Post-Surgical Precautions: Spinal precautions  Precautions Comment: log roll sequencing         Objective   Pain:  Pain Assessment  Pain Assessment: 0-10  0-10 (Numeric) Pain Score: 4  Pain Type: Surgical pain  Pain Location: Back  Pain Orientation: Lower  Cognition:  Cognition  Overall Cognitive Status: Within Functional Limits  Orientation Level: Oriented X4  Processing Speed: Delayed  Postural Control:  Static Sitting Balance  Static Sitting-Balance Support: No upper extremity supported, Feet supported  Static Sitting-Level of Assistance: Close supervision  Dynamic Sitting Balance  Dynamic Sitting-Balance Support: Bilateral upper extremity supported (one foot support with EOB LE exercise.)  Dynamic Sitting-Level of Assistance: Close supervision  Static Standing Balance  Static Standing-Balance Support: Bilateral upper extremity supported  Static Standing-Level of Assistance: Moderate assistance, Minimum assistance (of 2 people.)  Static Standing-Comment/Number of Minutes: FWW support and  Dynamic Standing Balance  Dynamic Standing-Balance Support: Bilateral upper extremity supported  Dynamic Standing-Level of Assistance: Maximum assistance (+ 2 persons)  Dynamic Standing-Balance: Turning (with transfer to chair.)  Dynamic Standing-Comments: FWW    Activity Tolerance:  Activity Tolerance  Endurance: Tolerates 10 - 20 min exercise with multiple rests  Treatments:  Therapeutic Exercise  Therapeutic Exercise Performed: Yes  Therapeutic Exercise Activity 1: Pt performed supine: GS, QS, AP, HS, B LE X 10 total each with rest as needed to complete sets. SEated LAQ, B LE x 10 each. Cues needed for  correct technique.         Bed Mobility  Bed Mobility: Yes  Bed Mobility 1  Bed Mobility 1: Supine to sitting, Log roll  Level of Assistance 1: Moderate assistance, Minimal verbal cues  Bed Mobility Comments 1: HOB  elevated.  toward right side of bed, cues needed for body positioning and hand placement.    Ambulation/Gait Training  Ambulation/Gait Training Performed: No  Ambulation/Gait Training 1  Assistance 1: Maximum assistance, Maximum verbal cues  Quality of Gait 1:  (Pt having difficulty moving right foot. Was able to do so periodically a very short distance.  PTA assisted otherwise with scooting of  foot into correct position.)  Transfers  Transfer: Yes  Transfer 1  Transfer From 1: Bed to  Transfer to 1: Chair with arms  Technique 1: Sit to stand, Stand to sit  Transfer Device 1: Gait belt (first attempt with wk not completed due to weak knees/B LE.  Trial done arm and arm with knee support and assist to move R LE with each step.)  Transfer Level of Assistance 1: Maximum assistance, Maximum verbal cues, +2  Transfers 2  Technique 2: Sit to stand, Stand to sit  Transfer Device 2: Walker, Gait belt  Transfer Level of Assistance 2: Moderate assistance, +2, Moderate verbal cues  Trials/Comments 2: x 2 trials from raised EOB.    Outcome Measures:  [facility] Basic Mobility  Turning from your back to your side while in a flat bed without using bedrails: A lot  Moving from lying on your back to sitting on the side of a flat bed without using bedrails: A lot  Moving to and from bed to chair (including a wheelchair): Total  Standing up from a chair using your arms (e.g. wheelchair or bedside chair): Total  To walk in hospital room: Total  Climbing 3-5 steps with railing: Total  Basic Mobility - Total Score: 8    Education Documentation  Handouts, taught by Maggy Painting PTA at 8/29/2024 12:53 PM.  Learner: Patient  Readiness: Acceptance  Method: Explanation, Demonstration  Response: Demonstrated Understanding, Needs Reinforcement    Precautions, taught by Maggy Painting PTA at 8/29/2024 12:53 PM.  Learner: Patient  Readiness: Acceptance  Method: Explanation, Demonstration  Response: Demonstrated Understanding, Needs Reinforcement    Body  Mechanics, taught by Maggy Painting PTA at 8/29/2024 12:53 PM.  Learner: Patient  Readiness: Acceptance  Method: Explanation, Demonstration  Response: Demonstrated Understanding, Needs Reinforcement    Mobility Training, taught by Maggy Painting PTA at 8/29/2024 12:53 PM.  Learner: Patient  Readiness: Acceptance  Method: Explanation, Demonstration  Response: Demonstrated Understanding, Needs Reinforcement    Education Comments  No comments found.        OP EDUCATION:       Encounter Problems       Encounter Problems (Active)       Mobility       LTG - Patient will navigate 2 steps with rails/device (Not Progressing)       Start:  08/27/24    Expected End:  09/10/24       Min A using R HR         STG - Patient will ambulate (Not Progressing)       Start:  08/27/24    Expected End:  09/10/24       SBA using FWW >15ft             PT Transfers       STG - Patient will perform bed mobility (Progressing)       Start:  08/27/24    Expected End:  09/10/24       SBA         STG - Patient will transfer sit to and from stand (Progressing)       Start:  08/27/24    Expected End:  09/10/24       SBA

## 2024-08-29 NOTE — CONSULTS
Wound Care Consult     Visit Date: 8/29/2024      Patient Name: Narda Malloy         MRN: [id number]           YOB: 1955     Reason for Consult: Assess 3 lower extremity wound and recommend care.         Wound History: Patient reports that the RLE (lateral side of leg) was bumped when she was being transported. Wound on right great toe and bottom of left foot have been present for some time. Ms. Malloy has diabetes and sees a podiatrist.     Pertinent Labs:   [place]n, Urine   Date Value Ref Range Status   03/31/2022 40 (H) 0 - 23 MG/L Final     Albumin   Date Value Ref Range Status   07/15/2024 4.0 3.5 - 5.0 g/dL Final       Wound Assessment:  Wound 08/26/24 Venous Ulcer Ankle Right;Anterior (Active)   Site Assessment Unable to assess 08/26/24 0734   Lucia-Wound Assessment Red 08/26/24 0734   Dressing Foam 08/27/24 0400   Dressing Changed Changed 08/27/24 2033   Dressing Status Clean;Dry 08/28/24 2015       Wound 08/26/24 Traumatic Leg Distal;Dorsal;Right (Active)   Site Assessment Clean;Dry;Pink 08/26/24 0736   Lucia-Wound Assessment Red 08/26/24 0736   Wound Length (cm) 4 cm 08/26/24 0736   Wound Width (cm) 2 cm 08/26/24 0736   Wound Surface Area (cm^2) 8 cm^2 08/26/24 0736   State of Healing Healing ridge 08/26/24 0736   Margins Attached edges 08/26/24 0736   Drainage Description None 08/26/24 0736   Dressing Other (Comment);Open to air 08/26/24 0736       Wound 08/26/24 Incision Back Medial (Active)   Site Assessment Clean;Dry;Intact 08/29/24 1200   Lucia-Wound Assessment Intact 08/26/24 1543   Sutures/Staple Line Approximated 08/26/24 1543   Dressing Changed New 08/26/24 1417   Dressing Status Clean;Dry;Occlusive 08/29/24 1200       Wound 08/26/24 Incision Hip Lateral;Left;Dorsal (Active)   Site Assessment Clean;Dry;Intact 08/29/24 1200   Closure Sutures 08/26/24 1519   Dressing Changed New 08/26/24 1519   Dressing Status Clean;Dry 08/29/24 1200       Wound 08/29/24 Diabetic Ulcer Dorsal  foot;Right (Active)       Wound 08/29/24 Diabetic Ulcer Other (Comment) Left (Active)           Measures 3 x 2.5 x 0.1; scant drainage from superior of wound that is deeper  Care: Site cleaned with NS. Medihoney alginate applied and secured with Mepilex dressing.  This care should be done every 3 days until healed.       Measures: 0.3 x 1.5 cm; dry/callused/crusty  Care: site painted with Povidone iodine swab. This should be done daily.       Measures: 0.3 x 0.2 x 0.2; dry, some erythema surrounding wound  Site was painted with povidone iodine swab. This should be done daily.    Wound Team Summary Assessment: Care provided as detailed above.      Wound Team Plan: Orders will be placed and bedside RN should do wound care per orders. Please contact wound care team with questions or if wound condition changes.      Florence Zaidi, BSN, RN, CWOCN   8/29/2024  5:12 PM

## 2024-08-29 NOTE — CARE PLAN
Problem: Skin  Goal: Decreased wound size/increased tissue granulation at next dressing change  Outcome: Progressing  Goal: Participates in plan/prevention/treatment measures  Outcome: Progressing  Goal: Prevent/manage excess moisture  Outcome: Progressing  Goal: Prevent/minimize sheer/friction injuries  Outcome: Progressing  Goal: Promote/optimize nutrition  Outcome: Progressing  Goal: Promote skin healing  Outcome: Progressing     Problem: Pain  Goal: Takes deep breaths with improved pain control throughout the shift  Outcome: Progressing  Goal: Turns in bed with improved pain control throughout the shift  Outcome: Progressing  Goal: Walks with improved pain control throughout the shift  Outcome: Progressing  Goal: Performs ADL's with improved pain control throughout shift  Outcome: Progressing  Goal: Participates in PT with improved pain control throughout the shift  Outcome: Progressing  Goal: Free from opioid side effects throughout the shift  Outcome: Progressing  Goal: Free from acute confusion related to pain meds throughout the shift  Outcome: Progressing   The patient's goals for the shift include      The clinical goals for the shift include Pt will remain hemodynamically stable throughout this shift.    Over the shift, the patient did not make progress toward the following goals. Barriers to progression include surgical complications. Recommendations to address these barriers include assessment and continuation of care.

## 2024-08-29 NOTE — PROGRESS NOTES
Between 7AM-7PM please message me via Epic Secure Chat.  After 7PM please page Nocturnist on call.    [facility] Hospitalist Progress Note      Narda Malloy    :  1955(69 y.o.)    MRN:  [id number]  Date: 24     Assessment and Plan:     Hypotension- hold home antihypertensives. Stop fluids and monitor BP trend, if BP drops off fluids would delay discharge.  Lumbar radiculopathy s/p lumbar laminectomy, osteotomy and thoracolumbar fusion- post op mgmt per primary team  ABLA- Hgb 9.6 -> 8.2; s/p 1 unit PRBC on . Hgb now 9.5. Monitor H&H  Urinary retention- anders placed, will need OP follow up with urology  T2DM- continue lantus 10 units nightly with SSI  Essential tremor- continue home primidone; inderal held due to soft bp  Sarcoidosis  HLD- continue zetia  Glaucoma- restart home eye drops    DVT Prophylaxis: subcutaneous Heparin    Disposition: continue to monitor inpatient, await consultant recommendations, await test results, and await clinical improvement    Electronically signed by iVshal Amezcua DO on 24 at 4:21 PM     Subjective:      Interval History:   Vitals and chart notes from overnight reviewed.   No acute issues overnight.   Patient seen and evaluated at bedside.   Nausea improved. Passing gas.     Review of Systems:   Other than patient's chronic conditions and those complaints in the history above, the rest of the 10 systems review were done and were negative.     Current medications:  Scheduled Meds:acetaminophen, 650 mg, oral, q6h  doxycycline, 100 mg, oral, q12h LUCA  heparin (porcine), 5,000 Units, subcutaneous, q8h  insulin glargine, 10 Units, subcutaneous, Nightly  insulin lispro, 0-10 Units, subcutaneous, TID  [Held by provider] lisinopril, 20 mg, oral, Daily  methocarbamol, 750 mg, oral, 4x daily  primidone, 250 mg, oral, TID  [Held by provider] propranolol LA, 60 mg, oral, Daily  sennosides-docusate sodium, 1 tablet, oral, BID      Continuous Infusions:sodium  chloride 0.9%, 100 mL/hr, Last Rate: 100 mL/hr (08/29/24 0911)      PRN Meds:PRN medications: benzocaine-menthol, dextrose, dextrose, diphenhydrAMINE, glucagon, glucagon, HYDROmorphone, magnesium citrate, melatonin, naloxone, ondansetron **OR** ondansetron, oxyCODONE, oxyCODONE, prochlorperazine **OR** prochlorperazine      Objective:     Heart Rate:  [79-93]   Temp:  [36.3 °C (97.3 °F)-37.4 °C (99.4 °F)]   Resp:  [18]   BP: ()/(52-72)   SpO2:  [94 %-97 %]     Oxygen Dose: *0 L/min    Physical Exam  Vitals and nursing note reviewed.   HENT:      Mouth/Throat:      Mouth: Mucous membranes are moist.      Pharynx: Oropharynx is clear.   Cardiovascular:      Rate and Rhythm: Normal rate and regular rhythm.   Pulmonary:      Effort: Pulmonary effort is normal.   Abdominal:      General: There is no distension.      Palpations: Abdomen is soft.      Tenderness: There is no abdominal tenderness.   Neurological:      Mental Status: She is alert and oriented to person, place, and time.         Labs:   Lab Results   Component Value Date     (L) 08/29/2024    K 4.0 08/29/2024     08/29/2024    CO2 22 08/29/2024    BUN 24 (H) 08/29/2024    CREATININE 0.60 08/29/2024    GLUCOSE 171 (H) 08/29/2024    CALCIUM 7.6 (L) 08/29/2024    PROT 6.8 07/15/2024    BILITOT 0.2 07/15/2024    ALKPHOS 154 (H) 07/15/2024    AST 17 07/15/2024    ALT 24 07/15/2024    GLOB 2.9 09/05/2023       Lab Results   Component Value Date    WBC 6.1 08/29/2024    HGB 9.5 (L) 08/29/2024    HCT 28.4 (L) 08/29/2024    MCV 97 08/29/2024     08/29/2024

## 2024-08-29 NOTE — PROGRESS NOTES
Occupational Therapy    OT Treatment    Patient Name: Narda Malloy  MRN: [id number]  Today's Date: 8/29/2024  Time Calculation  Start Time: 1147  Stop Time: 1213  Time Calculation (min): 26 min        Assessment:  OT Assessment:  (Pt demos decreased balance, endurance, ROM, coordination and strength, impeding ADL performance and functional mobility. Pt would benefit from continued therapy to maximize function.)  Prognosis: Fair  Evaluation/Treatment Tolerance: Patient tolerated treatment well  Medical Staff Made Aware: Yes  End of Session Communication: Bedside nurse  End of Session Patient Position: Up in chair, Alarm on  OT Assessment Results: Decreased ADL status, Decreased upper extremity range of motion, Decreased upper extremity strength, Decreased safe judgment during ADL, Decreased endurance, Decreased fine motor control, Decreased functional mobility, Decreased IADLs  Prognosis: Fair  Evaluation/Treatment Tolerance: Patient tolerated treatment well  Medical Staff Made Aware: Yes  Strengths: Attitude of self, Premorbid level of function, Support of extended family/friends  Barriers to Participation: Comorbidities, Insight into problems  Plan:  Treatment Interventions: ADL retraining, Functional transfer training, UE strengthening/ROM, Endurance training, Equipment evaluation/education, Neuromuscular reeducation, Fine motor coordination activities, Compensatory technique education  OT Frequency: 4 times per week  OT Discharge Recommendations: Moderate intensity level of continued care  OT Recommended Transfer Status: Assist of 2  OT - OK to Discharge: Yes  Treatment Interventions: ADL retraining, Functional transfer training, UE strengthening/ROM, Endurance training, Equipment evaluation/education, Neuromuscular reeducation, Fine motor coordination activities, Compensatory technique education    Subjective   Previous Visit Info:  OT Last Visit  OT Received On: 08/29/24  General:  General  Reason for Referral:  Pt is a 68 yo female s/p L1-3 laminectomy revision, L1-2 osteotomy and fusion-revision of T4-S1  Past Medical History Relevant to Rehab:   Past Medical History:   Diagnosis Date    Degenerative myopia, bilateral     Diabetes mellitus with stable proliferative retinopathy of both eyes, without long-term current use of insulin (Multi)     Diabetic neuropathy (Multi)     DM type 2 (diabetes mellitus, type 2) (Multi)     Dry eye syndrome of bilateral lacrimal glands     Essential hypertension     Essential tremor     Glaucoma     Hyperlipidemia     Long term (current) use of insulin (Multi)     Low back pain     Primary open angle glaucoma of both eyes, severe stage     Repeated falls     Sarcoidosis of lung (Multi)     Spinal stenosis, lumbar region without neurogenic claudication     Weakness        Family/Caregiver Present: Yes  Caregiver Feedback: spouse in room, supportive  Co-Treatment: PT  Co-Treatment Reason: To maximize pt safety, and performance.  Prior to Session Communication: Bedside nurse  Patient Position Received: Up in chair, Alarm on  Preferred Learning Style: auditory, kinesthetic, verbal  General Comment: pt sitting EOB with PT upon entry, agreeable to OT  Precautions:  Medical Precautions: Fall precautions  Post-Surgical Precautions: Spinal precautions    Vital Signs (Past 2hrs)                 Pain:  Pain Assessment  Pain Assessment: 0-10  0-10 (Numeric) Pain Score: 4  Pain Type: Surgical pain  Pain Location: Back  Pain Interventions: Repositioned    Objective    Cognition:  Cognition  Overall Cognitive Status: Within Functional Limits  Orientation Level: Oriented X4       Activities of Daily Living: Feeding  Feeding Level of Assistance: Close supervision  Feeding Where Assessed: Chair  Feeding Comments: SBA for fine motor aspects of opening containers as pt with decreased fine motor coordination    Grooming  Grooming Level of Assistance: Minimum assistance  Grooming Where Assessed: Chair  Grooming  Comments:  (combing hair, pt requiring assist with posterior aspects)    LE Dressing  LE Dressing:  (pt declined AE education at this time)       Bed Mobility/Transfers: Transfers  Transfer: Yes  Transfer 1  Transfer From 1: Bed to, Chair with drop arm to  Technique 1: Sit to stand, Stand to sit  Transfer Level of Assistance 1: Moderate assistance, Maximum verbal cues  Trials/Comments 1: x2 trials, initally using FWW, increased difficulty noted. Mod A x2 person for sit<>stand, without device with pt holding onto therapists arm. Required max A x2 for stand step transfer. pt required blocking bilateral knees due to buckling, VC for taking steps and positioning of BLE.                Outcome Measures:[facility] Daily Activity  Putting on and taking off regular lower body clothing: Total  Bathing (including washing, rinsing, drying): A lot  Putting on and taking off regular upper body clothing: A lot  Toileting, which includes using toilet, bedpan or urinal: A lot  Taking care of personal grooming such as brushing teeth: A little  Eating Meals: A little  Daily Activity - Total Score: 13        Education Documentation  Body Mechanics, taught by Arielle Gonzalez OT at 8/29/2024 12:28 PM.  Learner: Patient  Readiness: Acceptance  Method: Explanation  Response: Verbalizes Understanding, Needs Reinforcement    Precautions, taught by Arielle Gonzalez OT at 8/29/2024 12:28 PM.  Learner: Patient  Readiness: Acceptance  Method: Explanation  Response: Verbalizes Understanding, Needs Reinforcement    ADL Training, taught by Arielle Gonzalez OT at 8/29/2024 12:28 PM.  Learner: Patient  Readiness: Acceptance  Method: Explanation  Response: Verbalizes Understanding, Needs Reinforcement    Education Comments  No comments found.        OP EDUCATION:       Goals:  Encounter Problems       Encounter Problems (Active)       ADLs       Patient will perform UB and LB sponge bathing with contact guard assist level of assistance edge of bed. (Not  Progressing)       Start:  08/27/24    Expected End:  09/10/24            Patient with complete upper body dressing with stand by assist level of assistance donning and doffing all UE clothes with PRN adaptive equipment while edge of bed. (Progressing)       Start:  08/27/24    Expected End:  09/10/24            Patient with complete lower body dressing with minimal assist  level of assistance donning and doffing all LE clothes  with PRN adaptive equipment while edge of bed. (Not Progressing)       Start:  08/27/24    Expected End:  09/10/24            Patient will complete daily grooming tasks with stand by assist level of assistance and PRN adaptive equipment while edge of bed  and standing. (Progressing)       Start:  08/27/24    Expected End:  09/10/24            Patient will complete toileting including hygiene clothing management/hygiene with moderate assist level of assistance and bedside commode. (Progressing)       Start:  08/27/24    Expected End:  09/10/24               BALANCE       Pt will maintain static and dynamic standing balance during ADL task with contact guard assist level of assistance in order to demonstrate decreased risk of falling and improved postural control. (Progressing)       Start:  08/27/24    Expected End:  09/10/24               COGNITION/SAFETY       Patient will recall and adhere to spinal precautions during all functional mobility/ADL tasks in order to demonstrate improved understanding and promote healing post op (Progressing)       Start:  08/27/24    Expected End:  09/10/24               MOBILITY       Patient will perform Functional mobility x Household distances/Community Distances with minimal assist  level of assistance and least restrictive device in order to improve safety and functional mobility. (Progressing)       Start:  08/27/24    Expected End:  09/10/24               TRANSFERS       Patient will perform bed mobility contact guard assist level of assistance and  bed rails in order to improve safety and independence with mobility (Not Progressing)       Start:  08/27/24    Expected End:  09/10/24            Patient will complete functional transfers with least restrictive device with minimal assist  level of assistance. (Progressing)       Start:  08/27/24    Expected End:  09/10/24

## 2024-08-30 VITALS
WEIGHT: 167 LBS | SYSTOLIC BLOOD PRESSURE: 140 MMHG | TEMPERATURE: 97.8 F | BODY MASS INDEX: 24.73 KG/M2 | HEART RATE: 88 BPM | RESPIRATION RATE: 16 BRPM | HEIGHT: 69 IN | OXYGEN SATURATION: 95 % | DIASTOLIC BLOOD PRESSURE: 48 MMHG

## 2024-08-30 LAB
ANION GAP SERPL CALC-SCNC: 11 MMOL/L (ref 10–20)
BLOOD EXPIRATION DATE: NORMAL
BUN SERPL-MCNC: 16 MG/DL (ref 6–23)
CALCIUM SERPL-MCNC: 7.5 MG/DL (ref 8.6–10.3)
CHLORIDE SERPL-SCNC: 105 MMOL/L (ref 98–107)
CO2 SERPL-SCNC: 23 MMOL/L (ref 21–32)
CREAT SERPL-MCNC: 0.55 MG/DL (ref 0.5–1.05)
DISPENSE STATUS: NORMAL
EGFRCR SERPLBLD CKD-EPI 2021: >90 ML/MIN/1.73M*2
ERYTHROCYTE [DISTWIDTH] IN BLOOD BY AUTOMATED COUNT: 12.6 % (ref 11.5–14.5)
FUNGUS SPEC CULT: NORMAL
FUNGUS SPEC FUNGUS STN: NORMAL
GLUCOSE BLD MANUAL STRIP-MCNC: 133 MG/DL (ref 74–99)
GLUCOSE BLD MANUAL STRIP-MCNC: 148 MG/DL (ref 74–99)
GLUCOSE BLD MANUAL STRIP-MCNC: 169 MG/DL (ref 74–99)
GLUCOSE BLD MANUAL STRIP-MCNC: 170 MG/DL (ref 74–99)
GLUCOSE SERPL-MCNC: 155 MG/DL (ref 74–99)
HCT VFR BLD AUTO: 26.1 % (ref 36–46)
HGB BLD-MCNC: 9 G/DL (ref 12–16)
MCH RBC QN AUTO: 32.5 PG (ref 26–34)
MCHC RBC AUTO-ENTMCNC: 34.5 G/DL (ref 32–36)
MCV RBC AUTO: 94 FL (ref 80–100)
NRBC BLD-RTO: 0 /100 WBCS (ref 0–0)
PLATELET # BLD AUTO: 207 X10*3/UL (ref 150–450)
POTASSIUM SERPL-SCNC: 4 MMOL/L (ref 3.5–5.3)
PRODUCT BLOOD TYPE: 600
PRODUCT CODE: NORMAL
RBC # BLD AUTO: 2.77 X10*6/UL (ref 4–5.2)
SODIUM SERPL-SCNC: 135 MMOL/L (ref 136–145)
UNIT ABO: NORMAL
UNIT NUMBER: NORMAL
UNIT RH: NORMAL
UNIT VOLUME: 350
WBC # BLD AUTO: 6 X10*3/UL (ref 4.4–11.3)
XM INTEP: NORMAL

## 2024-08-30 PROCEDURE — 82947 ASSAY GLUCOSE BLOOD QUANT: CPT

## 2024-08-30 PROCEDURE — 2500000001 HC RX 250 WO HCPCS SELF ADMINISTERED DRUGS (ALT 637 FOR MEDICARE OP): Performed by: PHYSICIAN ASSISTANT

## 2024-08-30 PROCEDURE — 99231 SBSQ HOSP IP/OBS SF/LOW 25: CPT | Performed by: INTERNAL MEDICINE

## 2024-08-30 PROCEDURE — 36415 COLL VENOUS BLD VENIPUNCTURE: CPT | Performed by: HOSPITALIST

## 2024-08-30 PROCEDURE — 2500000002 HC RX 250 W HCPCS SELF ADMINISTERED DRUGS (ALT 637 FOR MEDICARE OP, ALT 636 FOR OP/ED): Performed by: HOSPITALIST

## 2024-08-30 PROCEDURE — 99024 POSTOP FOLLOW-UP VISIT: CPT

## 2024-08-30 PROCEDURE — 2500000001 HC RX 250 WO HCPCS SELF ADMINISTERED DRUGS (ALT 637 FOR MEDICARE OP): Performed by: HOSPITALIST

## 2024-08-30 PROCEDURE — 85027 COMPLETE CBC AUTOMATED: CPT | Performed by: HOSPITALIST

## 2024-08-30 PROCEDURE — 80048 BASIC METABOLIC PNL TOTAL CA: CPT | Performed by: HOSPITALIST

## 2024-08-30 PROCEDURE — 2500000001 HC RX 250 WO HCPCS SELF ADMINISTERED DRUGS (ALT 637 FOR MEDICARE OP): Performed by: INTERNAL MEDICINE

## 2024-08-30 PROCEDURE — 2500000004 HC RX 250 GENERAL PHARMACY W/ HCPCS (ALT 636 FOR OP/ED): Performed by: PHYSICIAN ASSISTANT

## 2024-08-30 PROCEDURE — 2500000001 HC RX 250 WO HCPCS SELF ADMINISTERED DRUGS (ALT 637 FOR MEDICARE OP): Performed by: ORTHOPAEDIC SURGERY

## 2024-08-30 PROCEDURE — 2500000004 HC RX 250 GENERAL PHARMACY W/ HCPCS (ALT 636 FOR OP/ED): Performed by: INTERNAL MEDICINE

## 2024-08-30 RX ORDER — DORZOLAMIDE HYDROCHLORIDE AND TIMOLOL MALEATE 20; 5 MG/ML; MG/ML
1 SOLUTION/ DROPS OPHTHALMIC 2 TIMES DAILY
Status: DISCONTINUED | OUTPATIENT
Start: 2024-08-30 | End: 2024-08-30 | Stop reason: HOSPADM

## 2024-08-30 RX ORDER — POLYETHYLENE GLYCOL 3350 17 G/17G
17 POWDER, FOR SOLUTION ORAL DAILY PRN
Status: DISCONTINUED | OUTPATIENT
Start: 2024-08-30 | End: 2024-08-30 | Stop reason: HOSPADM

## 2024-08-30 ASSESSMENT — PAIN - FUNCTIONAL ASSESSMENT
PAIN_FUNCTIONAL_ASSESSMENT: 0-10

## 2024-08-30 ASSESSMENT — COGNITIVE AND FUNCTIONAL STATUS - GENERAL
PERSONAL GROOMING: A LITTLE
WALKING IN HOSPITAL ROOM: A LITTLE
TURNING FROM BACK TO SIDE WHILE IN FLAT BAD: A LITTLE
MOVING FROM LYING ON BACK TO SITTING ON SIDE OF FLAT BED WITH BEDRAILS: A LITTLE
DRESSING REGULAR UPPER BODY CLOTHING: A LITTLE
MOBILITY SCORE: 18
HELP NEEDED FOR BATHING: A LITTLE
CLIMB 3 TO 5 STEPS WITH RAILING: A LITTLE
MOVING TO AND FROM BED TO CHAIR: A LITTLE
DAILY ACTIVITIY SCORE: 19
TOILETING: A LITTLE
DRESSING REGULAR LOWER BODY CLOTHING: A LITTLE
STANDING UP FROM CHAIR USING ARMS: A LITTLE

## 2024-08-30 ASSESSMENT — PAIN DESCRIPTION - LOCATION
LOCATION: BACK
LOCATION: BACK

## 2024-08-30 ASSESSMENT — PAIN SCALES - GENERAL
PAINLEVEL_OUTOF10: 3
PAINLEVEL_OUTOF10: 0 - NO PAIN
PAINLEVEL_OUTOF10: 7
PAINLEVEL_OUTOF10: 0 - NO PAIN

## 2024-08-30 NOTE — DISCHARGE SUMMARY
Discharge Diagnosis  Lumbar radiculopathy    Issues Requiring Follow-Up  Patient should follow up at scheduled 2-3 week post up visit unless:  Uncontrolled bleeding from incision site  Intractable pain in extremities  Signs and symptoms of infection  Inability to urinate/have a bowel movement      Test Results Pending At Discharge  Pending Labs       Order Current Status    Fungal Culture/Smear Preliminary result            Hospital Course  Patient was taken to the OR on 8/26/2024 forL1-L3 Lumbar Laminectomy Revision; L1-L2 Osteotomy; Transforaminal Lumbar Interbody Fusion; T4-S1 Revision; Fusion; Instrumentation . Procedure went as planned and without complications and patient was transferred to PACU and in stable condition was transferred to the floor. Patient has been working with PT and OT and improving.  During her stay, she experienced some lightheadedness and dizziness, as well as a drop in hemoglobin and hematocrit.  She received 1 unit of blood for this.  She also had increased fluids placed.  On discharge date patient was cleared by PT and orthopedic team and was determined safe for discharge to SNF. Daily discussion was had with the patient, nursing staff, orthopedic team, and family members if present. All questions were answered to the patient's satisfaction.      Pertinent Physical Exam At Time of Discharge  Physical Exam  Sitting in bed comfortably on arrival.  Alert and oriented.  Incisional VAC functioning as expected.  Able to move lower extremities freely without impairment.    Home Medications     Medication List      START taking these medications     cyclobenzaprine 10 mg tablet; Commonly known as: Flexeril; Take 1 tablet   (10 mg) by mouth 3 times a day as needed for muscle spasms for up to 10   days.   docusate sodium 100 mg capsule; Commonly known as: Colace; Take 1   capsule (100 mg) by mouth 3 times a day as needed for constipation for up   to 7 days.   doxycycline 100 mg capsule; Commonly  "known as: Monodox; Take 1 capsule   (100 mg) by mouth 2 times a day. Take with at least 8 ounces (large glass)   of water, do not lie down for 30 minutes after   ketorolac 10 mg tablet; Commonly known as: Toradol; Take 1 tablet (10   mg) by mouth every 6 hours if needed for moderate pain (4 - 6) for up to 5   days.   oxyCODONE 5 mg immediate release tablet; Commonly known as: Roxicodone;   Take 1 tablet (5 mg) by mouth every 6 hours if needed for severe pain (7 -   10) for up to 7 days.     CHANGE how you take these medications     acetaminophen 500 mg tablet; Commonly known as: Tylenol; Take 2 tablets   (1,000 mg) by mouth every 6 hours if needed for mild pain (1 - 3) for up   to 7 days.; What changed: when to take this, reasons to take this   insulin degludec 100 unit/mL (3 mL) injection; Commonly known as:   Tresiba FlexTouch U-100; Inject 14 Units under the skin once daily at   bedtime. Take as directed per insulin instructions.; What changed: how   much to take, when to take this     CONTINUE taking these medications     brimonidine 0.2 % ophthalmic solution; Commonly known as: AlphaGAN   calcium carbonate-vitamin D3 500 mg-5 mcg (200 unit) tablet   dorzolamide-timoloL 22.3-6.8 mg/mL ophthalmic solution; Commonly known   as: Cosopt; Administer 1 drop into both eyes 2 times a day.   ezetimibe 10 mg tablet; Commonly known as: Zetia; Take 1 tablet (10 mg)   by mouth once daily.   FreeStyle Lite Strips strip; Generic drug: blood sugar diagnostic; USE   TO TEST 3 TIMES A DAY AS DIRECTED   insulin lispro 100 unit/mL injection; Commonly known as: HumaLOG   latanoprost 0.005 % ophthalmic solution; Commonly known as: Xalatan;   Administer 1 drop into both eyes once daily at bedtime.   lisinopril 20 mg tablet; Take 1 tablet (20 mg) by mouth once daily.   melatonin 3 mg tablet   multivitamin tablet   * PEN NEEDLE MISC   * Sure Comfort Pen Needle 32 gauge x 5/32\" needle; Generic drug: pen   needle, diabetic; AS DIRECTED " DAILY FOR 90 DAYS   primidone 125 mg tablet; Take 125 mg by mouth 3 times a day.   propranolol LA 60 mg 24 hr capsule; Commonly known as: Inderal LA   Vitamin C 500 mg tablet; Generic drug: ascorbic acid  * This list has 2 medication(s) that are the same as other medications   prescribed for you. Read the directions carefully, and ask your doctor or   other care provider to review them with you.       Outpatient Follow-Up  Future Appointments   Date Time Provider Department Center   9/20/2024 10:00 AM Lianet Diaz PA-C [id number] [facility]   11/22/2024  8:45 AM Luiz Fisher MD [id number] [facility]   12/3/2024 11:00 AM Jonathan Burrell MD [id number] [facility]   12/4/2024  1:00 PM Barbara Samuels DO [id number] [facility]   1/17/2025 10:30 AM Jose Juan MD [id number] [facility]       Marc Hendricks PA-C  12:46 PM  08/30/24

## 2024-08-30 NOTE — HOSPITAL COURSE
Patient was taken to the OR on 8/26/2024 forL1-L3 Lumbar Laminectomy Revision; L1-L2 Osteotomy; Transforaminal Lumbar Interbody Fusion; T4-S1 Revision; Fusion; Instrumentation . Procedure went as planned and without complications and patient was transferred to PACU and in stable condition was transferred to the floor. Patient has been working with PT and OT and improving.  During her stay, she experienced some lightheadedness and dizziness, as well as a drop in hemoglobin and hematocrit.  She received 1 unit of blood for this.  She also had increased fluids placed.  On discharge date patient was cleared by PT and orthopedic team and was determined safe for discharge to SNF. Daily discussion was had with the patient, nursing staff, orthopedic team, and family members if present. All questions were answered to the patient's satisfaction.

## 2024-08-30 NOTE — PROGRESS NOTES
08/30/24 1141   Discharge Planning   Home or Post Acute Services Post acute facilities (Rehab/SNF/etc)   Type of Post Acute Facility Services Skilled nursing   Expected Discharge Disposition SNF   Does the patient need discharge transport arranged? Yes   RoundTrip coordination needed? Yes   What day is the transport expected? 08/30/24     Patient is medically cleared for discharge to [facility].  Nurse to call report to [phone]  Will request [facility] set up transport once provider certification is completed.  Adrianna Ryder RN     8/30/24 @ 4870  The [employer] Vehicle you requested for Narda ELMORE in unit/room 724 on 08/30/2024 is scheduled to arrive at 3:00pm EDT! [facility] is handling this ride and you can contact them at [phone].  Adrianna Ryder RN

## 2024-08-30 NOTE — PROGRESS NOTES
Narda Malloy is a 69 y.o. female on day 4 of admission presenting with Lumbar radiculopathy.      Subjective   Patient was seen and examined at bedside this morning, stated that she is tolerating her back pain since completing surgery, and seen PT OT, able to participate in sessions.  On review of systems, patient admitted to nausea, constipation and denies fever, chills, vomiting, chest pain or shortness of breath.  Patient reported that she does not have her over-the-counter eyedrops, explained to her that if medication is in the formulary she does not need to bring her own medications.       Objective     Last Recorded Vitals  /66   Pulse 84   Temp 37.2 °C (98.9 °F)   Resp 16   Wt 75.8 kg (167 lb)   SpO2 96%   Intake/Output last 3 Shifts:    Intake/Output Summary (Last 24 hours) at 8/30/2024 0814  Last data filed at 8/30/2024 0320  Gross per 24 hour   Intake --   Output 1050 ml   Net -1050 ml       Admission Weight  Weight: 76 kg (167 lb 8.8 oz) (08/26/24 0714)    Daily Weight  08/27/24 : 75.8 kg (167 lb)    Image Results  XR abdomen 1 view  Narrative: STUDY:  Abdomen Radiographs;  8/28/2024 3:26 PM  INDICATION:  Nausea, vomiting.  COMPARISON:  None Available.  ACCESSION NUMBER(S):  [id number]  ORDERING CLINICIAN:  TECHNIQUE:  One view (two images) of the abdomen.  FINDINGS:    Bowel gas pattern is normal without obstruction or ileus.  There are  no convincing calculi or abnormal calcifications.  No focal osseous  abnormalities.  Fusion hardware throughout the thoracic and lumbar  spine  Impression: No radiographic evidence of bowel obstruction or free air within  limitations of radiographic technique.  Signed by Jona Mohr MD      Physical Exam  Constitutional: Alert active, cooperative not in acute distress  Skin: Wound VAC in place with minimal aspiration  Eyes: PERRLA, clear sclera  ENMT: Moist mucosal membranes, no exudate  Head / Neck: Atraumatic, normocephalic, supple neck, JVP not  visualized  Lungs: Patent airways, CTABL  Heart: RRR, S1S2, no murmurs appreciated, palpable pulses in all extremities  GI: Soft, NT, ND, bowel sounds present in all quadrants  MSK: Moves all extremities freely, no restriction  of ROM, no joint edema  Extremities: Intact x 4, no peripheral edema  : Gibson catheter inserted  Breast: Deferred  Neurological: AAO x 3 to person, place and date, facial muscles symmetrical, sensation intact, strength 4/4, no acute focal neurological deficits appreciated  Psychological: Appropriate mood and behavior    Relevant Results             Scheduled medications  acetaminophen, 650 mg, oral, q6h  doxycycline, 100 mg, oral, q12h LUCA  ezetimibe, 10 mg, oral, Daily  heparin (porcine), 5,000 Units, subcutaneous, q8h  insulin glargine, 10 Units, subcutaneous, Nightly  insulin lispro, 0-10 Units, subcutaneous, TID  latanoprost, 1 drop, Both Eyes, Nightly  [Held by provider] lisinopril, 20 mg, oral, Daily  methocarbamol, 750 mg, oral, 4x daily  primidone, 250 mg, oral, TID  [Held by provider] propranolol LA, 60 mg, oral, Daily  sennosides-docusate sodium, 1 tablet, oral, BID      Continuous medications     PRN medications  PRN medications: benzocaine-menthol, dextrose, dextrose, diphenhydrAMINE, glucagon, glucagon, HYDROmorphone, artificial tears, magnesium citrate, melatonin, naloxone, ondansetron **OR** ondansetron, oxyCODONE, oxyCODONE, prochlorperazine **OR** prochlorperazine    Assessment/Plan          This patient has a urinary catheter   Reason for the urinary catheter remaining today? Neurogenic bladder        Hypotension: Resolved  - Resume home antihypertensives.     Lumbar radiculopathy s/p lumbar laminectomy, osteotomy and thoracolumbar fusion- post op mgmt per primary team  - ABLA- Hgb 9.6 -> 8.2; s/p 1 unit PRBC on 8/28. Hgb now 9.5. Monitor H&H    Urinary retention  - Gibson placed, will need OP follow up with urology    T2DM- continue lantus 10 units nightly with  SSI  -Lantus 10 unit at bedtime  -Humalog, lispro sliding scale mealtime    Essential tremor- continue home primidone; inderal held due to soft bp  -Continue home propranolol 60 mg    HLD- continue zetia    Glaucoma- restart home eye drops     DVT Prophylaxis: subcutaneous Heparin     Disposition: Medical consult for comorbidities management, presented with hypotension, now resolved, stable from medicine standpoint for discharge.    Thank you for your consult and allowing me to participate in your patient care.          Elmer Cardenas, DO

## 2024-08-30 NOTE — PROGRESS NOTES
Physical Therapy                 Therapy Communication Note    Patient Name: Narda Malloy  MRN: [id number]  Today's Date: 8/30/2024     Discipline: Physical Therapy    Missed Visit Reason: Missed Visit Reason: Patient refused/Discharging.  (Pt stating she's discharging soon and waiting for her ride. Not agreeable to particpate.  Pt pickup was scheduled for 15:00.)    Missed Time: Attempt

## 2024-08-30 NOTE — NURSING NOTE
Interdisciplinary team present: NP, PT, NM, CC, SW, Orthopedic Coordinator, and bedside RN.  Pain - controlled  Nausea - none  Discharge barrier - no BM since sunday  Discharge plan - [facility] ([facility])  Discharge date/time - pending medical clearance

## 2024-08-30 NOTE — PROGRESS NOTES
08/30/24 1349   Discharge Planning   Does the patient need discharge transport arranged? Yes   RoundTrip coordination needed? Yes   Has discharge transport been arranged? Yes   What day is the transport expected? 08/30/24   What time is the transport expected? 1500

## 2024-08-30 NOTE — CARE PLAN
Pt  informed  about  3 pm pickup time. She told family about the time    Diya Kaapdia, MARGARITO, MILENAW

## 2024-08-30 NOTE — CARE PLAN
The patient's goals for the shift include      The clinical goals for the shift include patient will remain safe and ring for assistance this shift    Problem: Skin  Goal: Decreased wound size/increased tissue granulation at next dressing change  8/30/2024 1456 by Lily Logan RN  Outcome: Progressing  Flowsheets (Taken 8/30/2024 1456)  Decreased wound size/increased tissue granulation at next dressing change: Protective dressings over bony prominences  8/30/2024 1455 by Lily Logan RN  Outcome: Progressing  Goal: Participates in plan/prevention/treatment measures  8/30/2024 1456 by Lily Logan RN  Outcome: Progressing  Flowsheets (Taken 8/30/2024 1456)  Participates in plan/prevention/treatment measures: Elevate heels  8/30/2024 1455 by Lily Logan RN  Outcome: Progressing  Goal: Prevent/manage excess moisture  8/30/2024 1456 by Lily Logan RN  Outcome: Progressing  Flowsheets (Taken 8/30/2024 1456)  Prevent/manage excess moisture:   Cleanse incontinence/protect with barrier cream   Moisturize dry skin  8/30/2024 1455 by Lily Logan RN  Outcome: Progressing  Goal: Prevent/minimize sheer/friction injuries  8/30/2024 1456 by Lily Logan RN  Outcome: Progressing  Flowsheets (Taken 8/30/2024 1456)  Prevent/minimize sheer/friction injuries:   Increase activity/out of bed for meals   HOB 30 degrees or less  8/30/2024 1455 by Lily Logan RN  Outcome: Progressing  Goal: Promote/optimize nutrition  8/30/2024 1456 by Lily Logan RN  Outcome: Progressing  Flowsheets (Taken 8/30/2024 1456)  Promote/optimize nutrition: Monitor/record intake including meals  8/30/2024 1455 by Lily Logan RN  Outcome: Progressing  Goal: Promote skin healing  8/30/2024 1456 by Lily Logan RN  Outcome: Progressing  Flowsheets (Taken 8/30/2024 1456)  Promote skin healing: Turn/reposition every 2 hours/use positioning/transfer devices  8/30/2024 1455 by Lily Logan RN  Outcome: Progressing     Problem: Pain  Goal:  Takes deep breaths with improved pain control throughout the shift  Outcome: Progressing  Goal: Turns in bed with improved pain control throughout the shift  Outcome: Progressing  Goal: Walks with improved pain control throughout the shift  Outcome: Progressing  Goal: Performs ADL's with improved pain control throughout shift  Outcome: Progressing  Goal: Participates in PT with improved pain control throughout the shift  Outcome: Progressing  Goal: Free from opioid side effects throughout the shift  Outcome: Progressing

## 2024-08-30 NOTE — PROGRESS NOTES
"Narda Malloy is a 69 y.o. female on day 4 of admission presenting with Lumbar radiculopathy.    Subjective   Patient doing well this morning.  Has been getting out of bed with physical therapy and Occupational Therapy.  Expected postoperative back pain.  Legs feel stable.  Her lightheadedness and dizziness has been decreasing.  Ready for discharge to facility today.       Objective     Physical Exam  Lying in bed on arrival.  Incisional VAC intact and functioning well.  Able to move lower extremities freely without impairment.    Last Recorded Vitals  Blood pressure 125/66, pulse 84, temperature 37.2 °C (98.9 °F), resp. rate 16, height 1.753 m (5' 9.02\"), weight 75.8 kg (167 lb), SpO2 96%.  Intake/Output last 3 Shifts:  I/O last 3 completed shifts:  In: 832 (11 mL/kg) [Blood:832]  Out: 2375 (31.4 mL/kg) [Urine:2300 (0.8 mL/kg/hr); Drains:75]  Weight: 75.7 kg     Relevant Results      Scheduled medications  acetaminophen, 650 mg, oral, q6h  dorzolamide-timolol (PF), 1 drop, Both Eyes, BID  doxycycline, 100 mg, oral, q12h ULCA  ezetimibe, 10 mg, oral, Daily  heparin (porcine), 5,000 Units, subcutaneous, q8h  insulin glargine, 10 Units, subcutaneous, Nightly  insulin lispro, 0-10 Units, subcutaneous, TID  latanoprost, 1 drop, Both Eyes, Nightly  [Held by provider] lisinopril, 20 mg, oral, Daily  methocarbamol, 750 mg, oral, 4x daily  primidone, 250 mg, oral, TID  [Held by provider] propranolol LA, 60 mg, oral, Daily  sennosides-docusate sodium, 1 tablet, oral, BID      Continuous medications     PRN medications  PRN medications: benzocaine-menthol, dextrose, dextrose, diphenhydrAMINE, glucagon, glucagon, HYDROmorphone, lubricating eye drops, magnesium citrate, melatonin, naloxone, ondansetron **OR** ondansetron, oxyCODONE, oxyCODONE, polyethylene glycol, prochlorperazine **OR** prochlorperazine  Results for orders placed or performed during the hospital encounter of 08/26/24 (from the past 24 hour(s))   POCT GLUCOSE "   Result Value Ref Range    POCT Glucose 165 (H) 74 - 99 mg/dL   POCT GLUCOSE   Result Value Ref Range    POCT Glucose 167 (H) 74 - 99 mg/dL   Basic metabolic panel   Result Value Ref Range    Glucose 155 (H) 74 - 99 mg/dL    Sodium 135 (L) 136 - 145 mmol/L    Potassium 4.0 3.5 - 5.3 mmol/L    Chloride 105 98 - 107 mmol/L    Bicarbonate 23 21 - 32 mmol/L    Anion Gap 11 10 - 20 mmol/L    Urea Nitrogen 16 6 - 23 mg/dL    Creatinine 0.55 0.50 - 1.05 mg/dL    eGFR >90 >60 mL/min/1.73m*2    Calcium 7.5 (L) 8.6 - 10.3 mg/dL   CBC   Result Value Ref Range    WBC 6.0 4.4 - 11.3 x10*3/uL    nRBC 0.0 0.0 - 0.0 /100 WBCs    RBC 2.77 (L) 4.00 - 5.20 x10*6/uL    Hemoglobin 9.0 (L) 12.0 - 16.0 g/dL    Hematocrit 26.1 (L) 36.0 - 46.0 %    MCV 94 80 - 100 fL    MCH 32.5 26.0 - 34.0 pg    MCHC 34.5 32.0 - 36.0 g/dL    RDW 12.6 11.5 - 14.5 %    Platelets 207 150 - 450 x10*3/uL   POCT GLUCOSE   Result Value Ref Range    POCT Glucose 148 (H) 74 - 99 mg/dL   POCT GLUCOSE   Result Value Ref Range    POCT Glucose 170 (H) 74 - 99 mg/dL               This patient has a urinary catheter   Reason for the urinary catheter remaining today? Urine catheter unnecessary, will be removed today               Assessment/Plan   Assessment & Plan  Lumbar radiculopathy    Anemia    Pseudoclaudication syndrome    Postlaminectomy syndrome, lumbar region    Kyphoscoliosis    Status post lumbar fusion, day 4  Discharged to facility today  Maintain incisional VAC until Monday  Pain prescriptions and antibiotics  Keep incision clean and dry  Follow-up in 2 to 3 weeks       I spent 30 minutes in the professional and overall care of this patient.      Marc Hendricks PA-C  12:35 PM  08/30/24

## 2024-09-03 LAB
FUNGUS SPEC CULT: NORMAL
FUNGUS SPEC FUNGUS STN: NORMAL

## 2024-09-06 ENCOUNTER — APPOINTMENT (OUTPATIENT)
Dept: OPHTHALMOLOGY | Facility: CLINIC | Age: 69
End: 2024-09-06
Payer: MEDICARE

## 2024-09-09 LAB
FUNGUS SPEC CULT: NORMAL
FUNGUS SPEC FUNGUS STN: NORMAL

## 2024-09-17 LAB
FUNGUS SPEC CULT: NORMAL
FUNGUS SPEC FUNGUS STN: NORMAL

## 2024-09-19 ENCOUNTER — TRANSCRIBE ORDERS (OUTPATIENT)
Dept: ORTHOPEDIC SURGERY | Facility: HOSPITAL | Age: 69
End: 2024-09-19
Payer: MEDICARE

## 2024-09-19 DIAGNOSIS — M41.9 SCOLIOSIS OF LUMBAR SPINE, UNSPECIFIED SCOLIOSIS TYPE: ICD-10-CM

## 2024-09-20 ENCOUNTER — APPOINTMENT (OUTPATIENT)
Dept: ORTHOPEDIC SURGERY | Facility: CLINIC | Age: 69
End: 2024-09-20
Payer: MEDICARE

## 2024-09-25 ENCOUNTER — HOSPITAL ENCOUNTER (INPATIENT)
Facility: HOSPITAL | Age: 69
LOS: 2 days | Discharge: ANOTHER HOSPITAL | End: 2024-09-27
Attending: EMERGENCY MEDICINE | Admitting: INTERNAL MEDICINE
Payer: MEDICARE

## 2024-09-25 ENCOUNTER — APPOINTMENT (OUTPATIENT)
Dept: RADIOLOGY | Facility: HOSPITAL | Age: 69
End: 2024-09-25
Payer: MEDICARE

## 2024-09-25 DIAGNOSIS — J96.01 ACUTE HYPOXIC RESPIRATORY FAILURE (MULTI): ICD-10-CM

## 2024-09-25 DIAGNOSIS — E16.2 HYPOGLYCEMIA: ICD-10-CM

## 2024-09-25 DIAGNOSIS — E11.3553 CONTROLLED TYPE 2 DIABETES MELLITUS WITH STABLE PROLIFERATIVE RETINOPATHY OF BOTH EYES, WITHOUT LONG-TERM CURRENT USE OF INSULIN: ICD-10-CM

## 2024-09-25 DIAGNOSIS — E11.65 TYPE 2 DIABETES MELLITUS WITH HYPERGLYCEMIA, UNSPECIFIED WHETHER LONG TERM INSULIN USE (MULTI): ICD-10-CM

## 2024-09-25 DIAGNOSIS — T81.49XA SURGICAL SITE INFECTION: ICD-10-CM

## 2024-09-25 DIAGNOSIS — T81.49XA POSTOPERATIVE WOUND INFECTION: ICD-10-CM

## 2024-09-25 DIAGNOSIS — K59.00 CONSTIPATION, UNSPECIFIED CONSTIPATION TYPE: ICD-10-CM

## 2024-09-25 DIAGNOSIS — D64.9 ANEMIA, UNSPECIFIED TYPE: ICD-10-CM

## 2024-09-25 DIAGNOSIS — Z74.09 MOBILITY IMPAIRED: ICD-10-CM

## 2024-09-25 DIAGNOSIS — T81.40XA POSTOPERATIVE INFECTION, UNSPECIFIED TYPE, INITIAL ENCOUNTER: Primary | ICD-10-CM

## 2024-09-25 DIAGNOSIS — R11.0 NAUSEA: ICD-10-CM

## 2024-09-25 DIAGNOSIS — Z79.4 TYPE 2 DIABETES MELLITUS WITH HYPERGLYCEMIA, WITH LONG-TERM CURRENT USE OF INSULIN: ICD-10-CM

## 2024-09-25 DIAGNOSIS — K21.9 GASTROESOPHAGEAL REFLUX DISEASE WITHOUT ESOPHAGITIS: ICD-10-CM

## 2024-09-25 DIAGNOSIS — I10 HYPERTENSION, UNSPECIFIED TYPE: ICD-10-CM

## 2024-09-25 DIAGNOSIS — E11.65 TYPE 2 DIABETES MELLITUS WITH HYPERGLYCEMIA, WITH LONG-TERM CURRENT USE OF INSULIN: ICD-10-CM

## 2024-09-25 LAB
ALBUMIN SERPL BCP-MCNC: 2.1 G/DL (ref 3.4–5)
ALP SERPL-CCNC: 183 U/L (ref 33–136)
ALT SERPL W P-5'-P-CCNC: 12 U/L (ref 7–45)
ANION GAP SERPL CALCULATED.3IONS-SCNC: 8 MMOL/L (ref 10–20)
APPEARANCE UR: ABNORMAL
AST SERPL W P-5'-P-CCNC: 11 U/L (ref 9–39)
BASOPHILS # BLD MANUAL: 0 X10*3/UL (ref 0–0.1)
BASOPHILS NFR BLD MANUAL: 0 %
BILIRUB SERPL-MCNC: 0.3 MG/DL (ref 0–1.2)
BILIRUB UR STRIP.AUTO-MCNC: NEGATIVE MG/DL
BNP SERPL-MCNC: 175 PG/ML (ref 0–99)
BUN SERPL-MCNC: 67 MG/DL (ref 6–23)
BURR CELLS BLD QL SMEAR: ABNORMAL
CALCIUM SERPL-MCNC: 7.7 MG/DL (ref 8.6–10.3)
CHLORIDE SERPL-SCNC: 106 MMOL/L (ref 98–107)
CO2 SERPL-SCNC: 24 MMOL/L (ref 21–32)
COLOR UR: YELLOW
CREAT SERPL-MCNC: 1.14 MG/DL (ref 0.5–1.05)
EGFRCR SERPLBLD CKD-EPI 2021: 53 ML/MIN/1.73M*2
EOSINOPHIL # BLD MANUAL: 0.1 X10*3/UL (ref 0–0.7)
EOSINOPHIL NFR BLD MANUAL: 1 %
ERYTHROCYTE [DISTWIDTH] IN BLOOD BY AUTOMATED COUNT: 13.3 % (ref 11.5–14.5)
EST. AVERAGE GLUCOSE BLD GHB EST-MCNC: 131 MG/DL
FERRITIN SERPL-MCNC: 932 NG/ML (ref 8–150)
GLUCOSE BLD MANUAL STRIP-MCNC: 105 MG/DL (ref 74–99)
GLUCOSE BLD MANUAL STRIP-MCNC: 98 MG/DL (ref 74–99)
GLUCOSE SERPL-MCNC: 46 MG/DL (ref 74–99)
GLUCOSE UR STRIP.AUTO-MCNC: NORMAL MG/DL
GRAN CASTS #/AREA UR COMP ASSIST: ABNORMAL /LPF
HBA1C MFR BLD: 6.2 %
HCT VFR BLD AUTO: 26.1 % (ref 36–46)
HGB BLD-MCNC: 8.5 G/DL (ref 12–16)
HOLD SPECIMEN: NORMAL
HYALINE CASTS #/AREA URNS AUTO: ABNORMAL /LPF
IMM GRANULOCYTES # BLD AUTO: 0.07 X10*3/UL (ref 0–0.7)
IMM GRANULOCYTES NFR BLD AUTO: 0.7 % (ref 0–0.9)
IRON SATN MFR SERPL: ABNORMAL %
IRON SERPL-MCNC: 16 UG/DL (ref 35–150)
KETONES UR STRIP.AUTO-MCNC: NEGATIVE MG/DL
LACTATE BLDV-SCNC: 0.8 MMOL/L (ref 0.4–2)
LEUKOCYTE ESTERASE UR QL STRIP.AUTO: ABNORMAL
LYMPHOCYTES # BLD MANUAL: 0.2 X10*3/UL (ref 1.2–4.8)
LYMPHOCYTES NFR BLD MANUAL: 2 %
MCH RBC QN AUTO: 31 PG (ref 26–34)
MCHC RBC AUTO-ENTMCNC: 32.6 G/DL (ref 32–36)
MCV RBC AUTO: 95 FL (ref 80–100)
MONOCYTES # BLD MANUAL: 0.2 X10*3/UL (ref 0.1–1)
MONOCYTES NFR BLD MANUAL: 2 %
MUCOUS THREADS #/AREA URNS AUTO: ABNORMAL /LPF
NEUTROPHILS # BLD MANUAL: 9.31 X10*3/UL (ref 1.2–7.7)
NEUTS BAND # BLD MANUAL: 0.69 X10*3/UL (ref 0–0.7)
NEUTS BAND NFR BLD MANUAL: 7 %
NEUTS SEG # BLD MANUAL: 8.62 X10*3/UL (ref 1.2–7)
NEUTS SEG NFR BLD MANUAL: 88 %
NITRITE UR QL STRIP.AUTO: NEGATIVE
NRBC BLD-RTO: 0 /100 WBCS (ref 0–0)
OVALOCYTES BLD QL SMEAR: ABNORMAL
PH UR STRIP.AUTO: 5 [PH]
PLATELET # BLD AUTO: 322 X10*3/UL (ref 150–450)
POTASSIUM SERPL-SCNC: 4.2 MMOL/L (ref 3.5–5.3)
PROT SERPL-MCNC: 5.1 G/DL (ref 6.4–8.2)
PROT UR STRIP.AUTO-MCNC: ABNORMAL MG/DL
RBC # BLD AUTO: 2.74 X10*6/UL (ref 4–5.2)
RBC # UR STRIP.AUTO: ABNORMAL /UL
RBC #/AREA URNS AUTO: >20 /HPF
RBC MORPH BLD: ABNORMAL
SODIUM SERPL-SCNC: 134 MMOL/L (ref 136–145)
SP GR UR STRIP.AUTO: 1.02
SQUAMOUS #/AREA URNS AUTO: ABNORMAL /HPF
TIBC SERPL-MCNC: ABNORMAL UG/DL
TOTAL CELLS COUNTED BLD: 100
UIBC SERPL-MCNC: <55 UG/DL (ref 110–370)
UROBILINOGEN UR STRIP.AUTO-MCNC: NORMAL MG/DL
WBC # BLD AUTO: 9.8 X10*3/UL (ref 4.4–11.3)
WBC #/AREA URNS AUTO: ABNORMAL /HPF

## 2024-09-25 PROCEDURE — 71045 X-RAY EXAM CHEST 1 VIEW: CPT

## 2024-09-25 PROCEDURE — 80053 COMPREHEN METABOLIC PANEL: CPT | Performed by: EMERGENCY MEDICINE

## 2024-09-25 PROCEDURE — 82947 ASSAY GLUCOSE BLOOD QUANT: CPT

## 2024-09-25 PROCEDURE — 2500000004 HC RX 250 GENERAL PHARMACY W/ HCPCS (ALT 636 FOR OP/ED): Performed by: NURSE PRACTITIONER

## 2024-09-25 PROCEDURE — 87070 CULTURE OTHR SPECIMN AEROBIC: CPT | Mod: WESLAB | Performed by: EMERGENCY MEDICINE

## 2024-09-25 PROCEDURE — 2500000004 HC RX 250 GENERAL PHARMACY W/ HCPCS (ALT 636 FOR OP/ED): Performed by: EMERGENCY MEDICINE

## 2024-09-25 PROCEDURE — 85027 COMPLETE CBC AUTOMATED: CPT | Performed by: EMERGENCY MEDICINE

## 2024-09-25 PROCEDURE — 2500000005 HC RX 250 GENERAL PHARMACY W/O HCPCS: Performed by: EMERGENCY MEDICINE

## 2024-09-25 PROCEDURE — 1100000001 HC PRIVATE ROOM DAILY

## 2024-09-25 PROCEDURE — 85007 BL SMEAR W/DIFF WBC COUNT: CPT | Performed by: EMERGENCY MEDICINE

## 2024-09-25 PROCEDURE — 96374 THER/PROPH/DIAG INJ IV PUSH: CPT

## 2024-09-25 PROCEDURE — 83540 ASSAY OF IRON: CPT | Performed by: NURSE PRACTITIONER

## 2024-09-25 PROCEDURE — 72131 CT LUMBAR SPINE W/O DYE: CPT

## 2024-09-25 PROCEDURE — 82728 ASSAY OF FERRITIN: CPT | Performed by: NURSE PRACTITIONER

## 2024-09-25 PROCEDURE — 2500000005 HC RX 250 GENERAL PHARMACY W/O HCPCS: Performed by: NURSE PRACTITIONER

## 2024-09-25 PROCEDURE — 36415 COLL VENOUS BLD VENIPUNCTURE: CPT | Performed by: EMERGENCY MEDICINE

## 2024-09-25 PROCEDURE — 81001 URINALYSIS AUTO W/SCOPE: CPT | Performed by: EMERGENCY MEDICINE

## 2024-09-25 PROCEDURE — 2500000001 HC RX 250 WO HCPCS SELF ADMINISTERED DRUGS (ALT 637 FOR MEDICARE OP): Performed by: NURSE PRACTITIONER

## 2024-09-25 PROCEDURE — 87077 CULTURE AEROBIC IDENTIFY: CPT | Mod: WESLAB | Performed by: EMERGENCY MEDICINE

## 2024-09-25 PROCEDURE — 83605 ASSAY OF LACTIC ACID: CPT | Performed by: EMERGENCY MEDICINE

## 2024-09-25 PROCEDURE — 83036 HEMOGLOBIN GLYCOSYLATED A1C: CPT | Mod: WESLAB | Performed by: NURSE PRACTITIONER

## 2024-09-25 PROCEDURE — 72131 CT LUMBAR SPINE W/O DYE: CPT | Performed by: RADIOLOGY

## 2024-09-25 PROCEDURE — 2500000001 HC RX 250 WO HCPCS SELF ADMINISTERED DRUGS (ALT 637 FOR MEDICARE OP): Performed by: EMERGENCY MEDICINE

## 2024-09-25 PROCEDURE — 71045 X-RAY EXAM CHEST 1 VIEW: CPT | Performed by: RADIOLOGY

## 2024-09-25 PROCEDURE — 96365 THER/PROPH/DIAG IV INF INIT: CPT | Mod: 59

## 2024-09-25 PROCEDURE — 96375 TX/PRO/DX INJ NEW DRUG ADDON: CPT

## 2024-09-25 PROCEDURE — 83880 ASSAY OF NATRIURETIC PEPTIDE: CPT | Mod: WESLAB | Performed by: NURSE PRACTITIONER

## 2024-09-25 PROCEDURE — 99285 EMERGENCY DEPT VISIT HI MDM: CPT

## 2024-09-25 PROCEDURE — 87086 URINE CULTURE/COLONY COUNT: CPT | Mod: WESLAB | Performed by: EMERGENCY MEDICINE

## 2024-09-25 RX ORDER — IBUPROFEN 400 MG/1
400 TABLET ORAL ONCE
Status: COMPLETED | OUTPATIENT
Start: 2024-09-25 | End: 2024-09-25

## 2024-09-25 RX ORDER — FERROUS SULFATE, DRIED 160(50) MG
1 TABLET, EXTENDED RELEASE ORAL DAILY
Status: DISCONTINUED | OUTPATIENT
Start: 2024-09-26 | End: 2024-09-27 | Stop reason: HOSPADM

## 2024-09-25 RX ORDER — LATANOPROST 50 UG/ML
1 SOLUTION/ DROPS OPHTHALMIC NIGHTLY
Status: DISCONTINUED | OUTPATIENT
Start: 2024-09-25 | End: 2024-09-27 | Stop reason: HOSPADM

## 2024-09-25 RX ORDER — ONDANSETRON HYDROCHLORIDE 2 MG/ML
4 INJECTION, SOLUTION INTRAVENOUS EVERY 6 HOURS PRN
Status: DISCONTINUED | OUTPATIENT
Start: 2024-09-25 | End: 2024-09-27 | Stop reason: HOSPADM

## 2024-09-25 RX ORDER — DOCUSATE SODIUM 100 MG/1
100 CAPSULE, LIQUID FILLED ORAL 2 TIMES DAILY
Status: DISCONTINUED | OUTPATIENT
Start: 2024-09-25 | End: 2024-09-27 | Stop reason: HOSPADM

## 2024-09-25 RX ORDER — VANCOMYCIN HYDROCHLORIDE 1 G/20ML
INJECTION, POWDER, LYOPHILIZED, FOR SOLUTION INTRAVENOUS DAILY PRN
Status: DISCONTINUED | OUTPATIENT
Start: 2024-09-25 | End: 2024-09-27 | Stop reason: HOSPADM

## 2024-09-25 RX ORDER — POLYETHYLENE GLYCOL 3350 17 G/17G
17 POWDER, FOR SOLUTION ORAL DAILY
Status: DISCONTINUED | OUTPATIENT
Start: 2024-09-25 | End: 2024-09-27 | Stop reason: HOSPADM

## 2024-09-25 RX ORDER — PROPRANOLOL HYDROCHLORIDE 60 MG/1
60 CAPSULE, EXTENDED RELEASE ORAL
Status: DISCONTINUED | OUTPATIENT
Start: 2024-09-26 | End: 2024-09-27 | Stop reason: HOSPADM

## 2024-09-25 RX ORDER — BRIMONIDINE TARTRATE 2 MG/ML
1 SOLUTION/ DROPS OPHTHALMIC 2 TIMES DAILY
Status: DISCONTINUED | OUTPATIENT
Start: 2024-09-25 | End: 2024-09-27 | Stop reason: HOSPADM

## 2024-09-25 RX ORDER — PANTOPRAZOLE SODIUM 40 MG/10ML
40 INJECTION, POWDER, LYOPHILIZED, FOR SOLUTION INTRAVENOUS
Status: DISCONTINUED | OUTPATIENT
Start: 2024-09-26 | End: 2024-09-27 | Stop reason: HOSPADM

## 2024-09-25 RX ORDER — PANTOPRAZOLE SODIUM 40 MG/1
40 TABLET, DELAYED RELEASE ORAL
Status: DISCONTINUED | OUTPATIENT
Start: 2024-09-26 | End: 2024-09-27 | Stop reason: HOSPADM

## 2024-09-25 RX ORDER — SODIUM CHLORIDE 9 MG/ML
100 INJECTION, SOLUTION INTRAVENOUS CONTINUOUS
Status: DISCONTINUED | OUTPATIENT
Start: 2024-09-25 | End: 2024-09-26

## 2024-09-25 RX ORDER — ACETAMINOPHEN 325 MG/1
975 TABLET ORAL ONCE
Status: COMPLETED | OUTPATIENT
Start: 2024-09-25 | End: 2024-09-25

## 2024-09-25 RX ORDER — BLACK COHOSH ROOT EXTRACT 80 MG
1 CAPSULE ORAL 2 TIMES DAILY
COMMUNITY

## 2024-09-25 RX ORDER — METHOCARBAMOL 500 MG/1
500 TABLET, FILM COATED ORAL 3 TIMES DAILY
COMMUNITY

## 2024-09-25 RX ORDER — ACETAMINOPHEN 160 MG/5ML
650 SOLUTION ORAL EVERY 4 HOURS PRN
Status: DISCONTINUED | OUTPATIENT
Start: 2024-09-25 | End: 2024-09-25

## 2024-09-25 RX ORDER — VANCOMYCIN 1.75 G/350ML
1250 INJECTION, SOLUTION INTRAVENOUS EVERY 24 HOURS
Status: DISCONTINUED | OUTPATIENT
Start: 2024-09-26 | End: 2024-09-26

## 2024-09-25 RX ORDER — ACETAMINOPHEN 500 MG
1000 TABLET ORAL 3 TIMES DAILY
COMMUNITY

## 2024-09-25 RX ORDER — PRIMIDONE 250 MG/1
125 TABLET ORAL 3 TIMES DAILY
Status: DISCONTINUED | OUTPATIENT
Start: 2024-09-25 | End: 2024-09-27 | Stop reason: HOSPADM

## 2024-09-25 RX ORDER — OXYCODONE HYDROCHLORIDE 5 MG/1
5 TABLET ORAL EVERY 6 HOURS PRN
COMMUNITY

## 2024-09-25 RX ORDER — TALC
3 POWDER (GRAM) TOPICAL NIGHTLY PRN
Status: DISCONTINUED | OUTPATIENT
Start: 2024-09-25 | End: 2024-09-25

## 2024-09-25 RX ORDER — DEXTROSE 50 % IN WATER (D50W) INTRAVENOUS SYRINGE
12.5
Status: DISCONTINUED | OUTPATIENT
Start: 2024-09-25 | End: 2024-09-27 | Stop reason: HOSPADM

## 2024-09-25 RX ORDER — ESOMEPRAZOLE MAGNESIUM 40 MG/1
40 GRANULE, DELAYED RELEASE ORAL
Status: DISCONTINUED | OUTPATIENT
Start: 2024-09-26 | End: 2024-09-27 | Stop reason: HOSPADM

## 2024-09-25 RX ORDER — SENNOSIDES 8.6 MG/1
1 TABLET ORAL EVERY 12 HOURS PRN
Status: DISCONTINUED | OUTPATIENT
Start: 2024-09-25 | End: 2024-09-27 | Stop reason: HOSPADM

## 2024-09-25 RX ORDER — L. ACIDOPHILUS/L.BULGARICUS 1MM CELL
1 TABLET ORAL
Status: DISCONTINUED | OUTPATIENT
Start: 2024-09-26 | End: 2024-09-27 | Stop reason: HOSPADM

## 2024-09-25 RX ORDER — SENNOSIDES 8.6 MG/1
1 TABLET ORAL EVERY 12 HOURS PRN
COMMUNITY

## 2024-09-25 RX ORDER — ASCORBIC ACID 500 MG
500 TABLET ORAL DAILY
Status: DISCONTINUED | OUTPATIENT
Start: 2024-09-26 | End: 2024-09-27 | Stop reason: HOSPADM

## 2024-09-25 RX ORDER — ACETAMINOPHEN 500 MG
5 TABLET ORAL NIGHTLY PRN
Status: DISCONTINUED | OUTPATIENT
Start: 2024-09-25 | End: 2024-09-27 | Stop reason: HOSPADM

## 2024-09-25 RX ORDER — DEXTROSE 50 % IN WATER (D50W) INTRAVENOUS SYRINGE
25 ONCE
Status: COMPLETED | OUTPATIENT
Start: 2024-09-25 | End: 2024-09-25

## 2024-09-25 RX ORDER — VANCOMYCIN 1 G/200ML
1000 INJECTION, SOLUTION INTRAVENOUS ONCE
Status: COMPLETED | OUTPATIENT
Start: 2024-09-25 | End: 2024-09-25

## 2024-09-25 RX ORDER — EZETIMIBE 10 MG/1
10 TABLET ORAL DAILY
Status: DISCONTINUED | OUTPATIENT
Start: 2024-09-26 | End: 2024-09-27 | Stop reason: HOSPADM

## 2024-09-25 RX ORDER — DEXTROSE 50 % IN WATER (D50W) INTRAVENOUS SYRINGE
25
Status: DISCONTINUED | OUTPATIENT
Start: 2024-09-25 | End: 2024-09-27 | Stop reason: HOSPADM

## 2024-09-25 RX ORDER — ACETAMINOPHEN 325 MG/1
650 TABLET ORAL EVERY 4 HOURS PRN
Status: DISCONTINUED | OUTPATIENT
Start: 2024-09-25 | End: 2024-09-25

## 2024-09-25 RX ORDER — DOCUSATE SODIUM 100 MG/1
100 CAPSULE, LIQUID FILLED ORAL 2 TIMES DAILY
COMMUNITY

## 2024-09-25 RX ORDER — ACETAMINOPHEN 500 MG
1000 TABLET ORAL 3 TIMES DAILY
Status: DISCONTINUED | OUTPATIENT
Start: 2024-09-25 | End: 2024-09-27 | Stop reason: HOSPADM

## 2024-09-25 RX ORDER — TRAZODONE HYDROCHLORIDE 50 MG/1
25 TABLET ORAL NIGHTLY
Status: DISCONTINUED | OUTPATIENT
Start: 2024-09-25 | End: 2024-09-27 | Stop reason: HOSPADM

## 2024-09-25 RX ORDER — METHOCARBAMOL 500 MG/1
500 TABLET, FILM COATED ORAL 3 TIMES DAILY
Status: DISCONTINUED | OUTPATIENT
Start: 2024-09-25 | End: 2024-09-27 | Stop reason: HOSPADM

## 2024-09-25 SDOH — SOCIAL STABILITY: SOCIAL INSECURITY: ABUSE: ADULT

## 2024-09-25 SDOH — SOCIAL STABILITY: SOCIAL NETWORK: ARE YOU MARRIED, WIDOWED, DIVORCED, SEPARATED, NEVER MARRIED, OR LIVING WITH A PARTNER?: MARRIED

## 2024-09-25 SDOH — HEALTH STABILITY: PHYSICAL HEALTH: ON AVERAGE, HOW MANY DAYS PER WEEK DO YOU ENGAGE IN MODERATE TO STRENUOUS EXERCISE (LIKE A BRISK WALK)?: 5 DAYS

## 2024-09-25 SDOH — ECONOMIC STABILITY: HOUSING INSECURITY: AT ANY TIME IN THE PAST 12 MONTHS, WERE YOU HOMELESS OR LIVING IN A SHELTER (INCLUDING NOW)?: NO

## 2024-09-25 SDOH — ECONOMIC STABILITY: HOUSING INSECURITY: IN THE PAST 12 MONTHS, HOW MANY TIMES HAVE YOU MOVED WHERE YOU WERE LIVING?: 0

## 2024-09-25 SDOH — SOCIAL STABILITY: SOCIAL INSECURITY: ARE YOU OR HAVE YOU BEEN THREATENED OR ABUSED PHYSICALLY, EMOTIONALLY, OR SEXUALLY BY ANYONE?: NO

## 2024-09-25 SDOH — ECONOMIC STABILITY: FOOD INSECURITY: WITHIN THE PAST 12 MONTHS, THE FOOD YOU BOUGHT JUST DIDN'T LAST AND YOU DIDN'T HAVE MONEY TO GET MORE.: NEVER TRUE

## 2024-09-25 SDOH — SOCIAL STABILITY: SOCIAL NETWORK: HOW OFTEN DO YOU ATTEND CHURCH OR RELIGIOUS SERVICES?: NEVER

## 2024-09-25 SDOH — ECONOMIC STABILITY: FOOD INSECURITY: WITHIN THE PAST 12 MONTHS, YOU WORRIED THAT YOUR FOOD WOULD RUN OUT BEFORE YOU GOT MONEY TO BUY MORE.: NEVER TRUE

## 2024-09-25 SDOH — SOCIAL STABILITY: SOCIAL INSECURITY: WITHIN THE LAST YEAR, HAVE YOU BEEN HUMILIATED OR EMOTIONALLY ABUSED IN OTHER WAYS BY YOUR PARTNER OR EX-PARTNER?: NO

## 2024-09-25 SDOH — HEALTH STABILITY: MENTAL HEALTH: HOW OFTEN DO YOU HAVE A DRINK CONTAINING ALCOHOL?: NEVER

## 2024-09-25 SDOH — HEALTH STABILITY: MENTAL HEALTH: HOW OFTEN DO YOU HAVE 6 OR MORE DRINKS ON ONE OCCASION?: NEVER

## 2024-09-25 SDOH — SOCIAL STABILITY: SOCIAL NETWORK
IN A TYPICAL WEEK, HOW MANY TIMES DO YOU TALK ON THE PHONE WITH FAMILY, FRIENDS, OR NEIGHBORS?: MORE THAN THREE TIMES A WEEK

## 2024-09-25 SDOH — SOCIAL STABILITY: SOCIAL INSECURITY: HAVE YOU HAD THOUGHTS OF HARMING ANYONE ELSE?: NO

## 2024-09-25 SDOH — ECONOMIC STABILITY: INCOME INSECURITY: IN THE PAST 12 MONTHS, HAS THE ELECTRIC, GAS, OIL, OR WATER COMPANY THREATENED TO SHUT OFF SERVICE IN YOUR HOME?: NO

## 2024-09-25 SDOH — SOCIAL STABILITY: SOCIAL INSECURITY: ARE THERE ANY APPARENT SIGNS OF INJURIES/BEHAVIORS THAT COULD BE RELATED TO ABUSE/NEGLECT?: NO

## 2024-09-25 SDOH — SOCIAL STABILITY: SOCIAL NETWORK: HOW OFTEN DO YOU GET TOGETHER WITH FRIENDS OR RELATIVES?: MORE THAN THREE TIMES A WEEK

## 2024-09-25 SDOH — SOCIAL STABILITY: SOCIAL INSECURITY: WITHIN THE LAST YEAR, HAVE YOU BEEN AFRAID OF YOUR PARTNER OR EX-PARTNER?: NO

## 2024-09-25 SDOH — SOCIAL STABILITY: SOCIAL INSECURITY: HAS ANYONE EVER THREATENED TO HURT YOUR FAMILY OR YOUR PETS?: NO

## 2024-09-25 SDOH — SOCIAL STABILITY: SOCIAL INSECURITY: DO YOU FEEL ANYONE HAS EXPLOITED OR TAKEN ADVANTAGE OF YOU FINANCIALLY OR OF YOUR PERSONAL PROPERTY?: NO

## 2024-09-25 SDOH — HEALTH STABILITY: MENTAL HEALTH: HOW MANY STANDARD DRINKS CONTAINING ALCOHOL DO YOU HAVE ON A TYPICAL DAY?: PATIENT DOES NOT DRINK

## 2024-09-25 SDOH — ECONOMIC STABILITY: INCOME INSECURITY: IN THE LAST 12 MONTHS, WAS THERE A TIME WHEN YOU WERE NOT ABLE TO PAY THE MORTGAGE OR RENT ON TIME?: NO

## 2024-09-25 SDOH — SOCIAL STABILITY: SOCIAL INSECURITY: DO YOU FEEL UNSAFE GOING BACK TO THE PLACE WHERE YOU ARE LIVING?: NO

## 2024-09-25 SDOH — SOCIAL STABILITY: SOCIAL INSECURITY: WERE YOU ABLE TO COMPLETE ALL THE BEHAVIORAL HEALTH SCREENINGS?: YES

## 2024-09-25 SDOH — HEALTH STABILITY: PHYSICAL HEALTH: ON AVERAGE, HOW MANY MINUTES DO YOU ENGAGE IN EXERCISE AT THIS LEVEL?: 30 MIN

## 2024-09-25 SDOH — SOCIAL STABILITY: SOCIAL INSECURITY: DOES ANYONE TRY TO KEEP YOU FROM HAVING/CONTACTING OTHER FRIENDS OR DOING THINGS OUTSIDE YOUR HOME?: NO

## 2024-09-25 SDOH — ECONOMIC STABILITY: INCOME INSECURITY: HOW HARD IS IT FOR YOU TO PAY FOR THE VERY BASICS LIKE FOOD, HOUSING, MEDICAL CARE, AND HEATING?: HARD

## 2024-09-25 SDOH — SOCIAL STABILITY: SOCIAL INSECURITY: HAVE YOU HAD ANY THOUGHTS OF HARMING ANYONE ELSE?: NO

## 2024-09-25 SDOH — SOCIAL STABILITY: SOCIAL NETWORK: HOW OFTEN DO YOU ATTENT MEETINGS OF THE CLUB OR ORGANIZATION YOU BELONG TO?: NEVER

## 2024-09-25 ASSESSMENT — COGNITIVE AND FUNCTIONAL STATUS - GENERAL
MOVING TO AND FROM BED TO CHAIR: A LITTLE
HELP NEEDED FOR BATHING: A LITTLE
DRESSING REGULAR LOWER BODY CLOTHING: A LITTLE
CLIMB 3 TO 5 STEPS WITH RAILING: A LITTLE
DAILY ACTIVITIY SCORE: 21
WALKING IN HOSPITAL ROOM: A LITTLE
TURNING FROM BACK TO SIDE WHILE IN FLAT BAD: A LITTLE
TOILETING: A LITTLE
MOBILITY SCORE: 19
PATIENT BASELINE BEDBOUND: NO
MOVING FROM LYING ON BACK TO SITTING ON SIDE OF FLAT BED WITH BEDRAILS: A LITTLE

## 2024-09-25 ASSESSMENT — LIFESTYLE VARIABLES
AUDIT-C TOTAL SCORE: 0
SKIP TO QUESTIONS 9-10: 1
AUDIT-C TOTAL SCORE: 0
SKIP TO QUESTIONS 9-10: 1
HOW OFTEN DO YOU HAVE 6 OR MORE DRINKS ON ONE OCCASION: NEVER
HOW OFTEN DO YOU HAVE A DRINK CONTAINING ALCOHOL: NEVER
AUDIT-C TOTAL SCORE: 0
HOW MANY STANDARD DRINKS CONTAINING ALCOHOL DO YOU HAVE ON A TYPICAL DAY: PATIENT DOES NOT DRINK

## 2024-09-25 ASSESSMENT — ACTIVITIES OF DAILY LIVING (ADL)
ADEQUATE_TO_COMPLETE_ADL: YES
WALKS IN HOME: INDEPENDENT
HEARING - LEFT EAR: FUNCTIONAL
BATHING: NEEDS ASSISTANCE
GROOMING: INDEPENDENT
DRESSING YOURSELF: INDEPENDENT
LACK_OF_TRANSPORTATION: NO
FEEDING YOURSELF: INDEPENDENT
LACK_OF_TRANSPORTATION: NO
TOILETING: NEEDS ASSISTANCE
ASSISTIVE_DEVICE: WALKER
JUDGMENT_ADEQUATE_SAFELY_COMPLETE_DAILY_ACTIVITIES: YES
PATIENT'S MEMORY ADEQUATE TO SAFELY COMPLETE DAILY ACTIVITIES?: YES
HEARING - RIGHT EAR: FUNCTIONAL

## 2024-09-25 ASSESSMENT — PAIN SCALES - GENERAL
PAINLEVEL_OUTOF10: 0 - NO PAIN
PAINLEVEL_OUTOF10: 3
PAINLEVEL_OUTOF10: 4

## 2024-09-25 ASSESSMENT — PATIENT HEALTH QUESTIONNAIRE - PHQ9
1. LITTLE INTEREST OR PLEASURE IN DOING THINGS: NOT AT ALL
SUM OF ALL RESPONSES TO PHQ9 QUESTIONS 1 & 2: 0
2. FEELING DOWN, DEPRESSED OR HOPELESS: NOT AT ALL

## 2024-09-25 ASSESSMENT — ENCOUNTER SYMPTOMS
MUSCULOSKELETAL NEGATIVE: 1
ALLERGIC/IMMUNOLOGIC NEGATIVE: 1
ACTIVITY CHANGE: 1
NEUROLOGICAL NEGATIVE: 1
PSYCHIATRIC NEGATIVE: 1
WOUND: 1
APPETITE CHANGE: 1
GASTROINTESTINAL NEGATIVE: 1
ENDOCRINE NEGATIVE: 1
HEMATOLOGIC/LYMPHATIC NEGATIVE: 1
EYES NEGATIVE: 1
RESPIRATORY NEGATIVE: 1
FATIGUE: 1

## 2024-09-25 ASSESSMENT — PAIN - FUNCTIONAL ASSESSMENT
PAIN_FUNCTIONAL_ASSESSMENT: 0-10

## 2024-09-25 ASSESSMENT — PAIN DESCRIPTION - LOCATION: LOCATION: BACK

## 2024-09-25 ASSESSMENT — PAIN DESCRIPTION - ORIENTATION: ORIENTATION: LOWER

## 2024-09-25 ASSESSMENT — PAIN DESCRIPTION - PAIN TYPE: TYPE: SURGICAL PAIN

## 2024-09-25 NOTE — PROGRESS NOTES
Occupational Therapy                 Therapy Communication Note    Patient Name: Narda Malloy  MRN: [id number]  Department: [facility] ED  Room: [place] [address]  Today's Date: 9/25/2024     Discipline: Occupational Therapy    Missed Visit Reason: Missed Visit Reason: Cancel    Missed Time: Cancel    Comment:  Patient admitted to the ED today and currently has a blood sugar of 46.

## 2024-09-25 NOTE — CONSULTS
Vancomycin Dosing by Pharmacy- INITIAL    Narda Malloy is a 69 y.o. year old female who Pharmacy has been consulted for vancomycin dosing for cellulitis, skin and soft tissue. Based on the patient's indication and renal status this patient will be dosed based on a goal AUC of 400-600.     Renal function is currently stable. Scr 1.14, will monitor    Visit Vitals  /61   Pulse 78   Temp 36.6 °C (97.9 °F) (Oral)   Resp (!) 21        Lab Results   Component Value Date    CREATININE 1.14 (H) 2024    CREATININE 0.55 2024    CREATININE 0.60 2024    CREATININE 0.73 2024        Patient weight is as follows:   Vitals:    24 0931   Weight: 74.8 kg (165 lb)       Cultures:  No results found for the encounter in last 14 days.        No intake/output data recorded.  I/O during current shift:  No intake/output data recorded.    Temp (24hrs), Av.6 °C (97.9 °F), Min:36.6 °C (97.9 °F), Max:36.6 °C (97.9 °F)         Assessment/Plan     Patient has already been given a loading dose of 1000 mg. In ED  Will initiate vancomycin maintenance,  1250 mg every 24 hours.    This dosing regimen is predicted by [employer] to result in the following pharmacokinetic parameters:    Regimen: 1250 mg IV every 24 hours.  Start time: 11:06 on 2024  Exposure target: AUC24 (range)400-600 mg/L.hr   MYS96-09: 423 mg/L.hr  AUC24,ss: 484 mg/L.hr  Probability of AUC24 > 400: 72 %  Ctrough,ss: 14.4 mg/L  Probability of Ctrough,ss > 20: 20 %      Follow-up level will be ordered on  at 0500 unless clinically indicated sooner.  Will continue to monitor renal function daily while on vancomycin and order serum creatinine at least every 48 hours if not already ordered.  Follow for continued vancomycin needs, clinical response, and signs/symptoms of toxicity.       Souleymane Mike [facility]

## 2024-09-25 NOTE — ED TRIAGE NOTES
Pt here via EMS from [facility] for post-op problem, pt had spinal fusion on 8/26, there is reported concern for surgical site infection, and also report of abnormal labs. It was not specified which labs the facility was referencing, however lab results sent over in paperwork seem to indicate worsening kidney function. Pt reports no fevers and minimal pain, has midline in place in LUE, pt states she receives antibiotics but does not know the name, no med list sent from facility. VSS, NAD.

## 2024-09-25 NOTE — CONSULTS
INFECTIOUS DISEASES CONSULTATION NOTE      Referred by KATIA Hurd MD    Reason For Consult  Postoperative wound infection    History Of Present Illness  Narda Malloy is a 69 y.o. female presenting with a postoperative wound infection.  She was hospitalized at [facility] last month, and on 8/26/2024 she underwent L1-L3 lumbar laminectomy revision, L1-L2 osteotomy, transforaminal lumbar interbody fusion, L4-S1 fusion.  She was transferred to a rehabilitation facility.  She has had difficulty with ambulation and also states that there were insurance issues at the facility.  Today she was transferred here because of a concern about discharge from the lower pole of her wound.  She does not had any constitutional symptoms.  Postoperative pain has been unchanged, with lower back pain particularly upon attempted standing or ambulation.  With concerns about the status of her wound, she was transferred here for further evaluation     Past Medical History  Diabetes mellitus with retinopathy and neuropathy  Hypertension  Essential tremor  Dyslipidemia  Sarcoidosis  Spinal stenosis     Social History  Tobacco use: Quit smoking several weeks ago  Alcohol use: Does not abuse alcohol    Family History  Not pertinent to the current question    Allergies  Erythromycin, Morphine, and Rosuvastatin     Review of Systems  Detailed review of systems completed.  No significant additional positives beyond what is mentioned above    Physical Exam  Vital signs:  Visit Vitals  /61   Pulse 82   Temp 36.6 °C (97.9 °F) (Oral)   Resp 17      General: Examined in the emergency room on a gurney, not toxic, no acute distress  HEENT:  No scleral icterus or conjunctival suffusion, oral mucosa moist  Nodes:  Negative  Lungs:  Clear to auscultation  Breasts:  Not examined  Heart:  S1, S2 normal, no pathologic murmur appreciated  Abdomen:  Soft, nontender. No palpable organs or masses  Back:  No CVA tenderness.  Examination of her back shows a long  midline incision with some dehiscence and exudate near the lower pole.  Minimal surrounding erythema  Genitalia:  Not examined  Extremities:  No cords, phlebitis, cellulitis  Neurologic:  Alert.  Grossly non-focal.    Relevant Results  Results from last 72 hours   Lab Units 09/25/24  0958   WBC AUTO x10*3/uL 9.8   HEMOGLOBIN g/dL 8.5*   HEMATOCRIT % 26.1*   PLATELETS AUTO x10*3/uL 322   LYMPHO PCT MAN % 2.0   MONO PCT MAN % 2.0   EOSINO PCT MAN % 1.0     Results from last 72 hours   Lab Units 09/25/24  0958   CREATININE mg/dL 1.14*   ANION GAP mmol/L 8*   EGFR mL/min/1.73m*2 53*     Results from last 72 hours   Lab Units 09/25/24  0958   AST U/L 11   ALT U/L 12   ALK PHOS U/L 183*   BILIRUBIN TOTAL mg/dL 0.3     Urinalysis: Mild pyuria  Microbiology:  Blood (9/25): Pending X2  Urine (9/25): Pending  Wound culture (9/25): Pending  Imaging:  CXR images personally reviewed: Increased perihilar infiltrates, to my eye not significantly different compared with a remote prior film  CT lumbar spine:  1. Marked limitations are noted due to extensive artifact from the  fixation devices. This limits the sensitivity and specificity of the  exam.  2. Postsurgical appearance of the spine as described.  3. It is difficult to definitively determine whether the erosive  changes at the L1-2 level are related to the previous degenerative  change or whether there could be superimposed infection at this disc  level, status post interbody spacer placement. MRI of the lumbar  spine with and without contrast may be more specific for assessment  of concern relative to possible postoperative infection.  4. Incidental note is made of what appears to be distended urinary  bladder personal included within the field of view.  5. Incidental note is made of the presence of bilateral pleural  effusions, larger on the left.    ASSESSMENT:  Postoperative wound infection  Patient is one month postoperative after extensive lumbar spine surgery with  fixation, and is admitted with an examination suggestive of at least a superficial wound infection.  Cultures are pending.  He is not systemically ill    PLANS:  -   Agree with empiric vancomycin and Zosyn (pharmacy dosing vancomycin) pending further observation and incubation of cultures  -   Consider possible transfer to [facility], location of her recent surgery       THANK YOU FOR ASKING ME TO ASSIST YOU IN THE CARE OF YOUR PATIENT    Andrew Malagon MD  [facility]  Office:  [phone]

## 2024-09-25 NOTE — PROGRESS NOTES
"Pharmacy Medication History Review    Narda Malloy is a 69 y.o. female admitted for Postoperative infection, unspecified type, initial encounter. Pharmacy reviewed the patient's [id number] medications and allergies for accuracy.    Medications ADDED:  Acetaminophen 1000 mg  Acidophilus   Docusate 100 mg  Methocarbamol 500 mg  Oxycodone 5 mg  Senna 8.6 mg  Trazodone 25 mg  Medications CHANGED:  Cyclobenzaprine 10 mg - NOT taking  Cosopt eye drops - NOT taking  Insulin Degludec - increased to 20 units QPM  Melatonin - takes 5 mg PRN insomnia  Medications REMOVED:   Insulin lispro      The list below reflects the updated PTA list. Comments regarding how patient may be taking medications differently can be found in the Admit Orders Activity  Prior to Admission Medications   Prescriptions Last Dose Informant   FreeStyle Lite Strips strip  Self   Sig: USE TO TEST 3 TIMES A DAY AS DIRECTED   Lactobacillus acidophilus 100 mg (1 billion cell) capsule  Other   Sig: Take 1 capsule by mouth 2 times a day.   Sure Comfort Pen Needle 32 gauge x 5/32\" needle  Self   Sig: AS DIRECTED DAILY FOR 90 DAYS   acetaminophen (Tylenol) 500 mg tablet  Other   Sig: Take 2 tablets (1,000 mg) by mouth 3 times a day.   ascorbic acid (Vitamin C) 500 mg tablet  Self   Sig: as directed Orally   brimonidine (AlphaGAN) 0.2 % ophthalmic solution  Self   Sig: Administer 1 drop into both eyes 2 times a day.   calcium carbonate-vitamin D3 500 mg-5 mcg (200 unit) tablet  Self   Sig: Take 1 tablet by mouth once daily.   cyclobenzaprine (Flexeril) 10 mg tablet     Sig: Take 1 tablet (10 mg) by mouth 3 times a day as needed for muscle spasms for up to 10 days.   docusate sodium (Colace) 100 mg capsule  Other   Sig: Take 1 capsule (100 mg) by mouth 2 times a day.   dorzolamide-timoloL (Cosopt) 22.3-6.8 mg/mL ophthalmic solution  Self   Sig: Administer 1 drop into both eyes 2 times a day.   doxycycline (Monodox) 100 mg capsule  Other   Sig: Take 1 " capsule (100 mg) by mouth 2 times a day. Take with at least 8 ounces (large glass) of water, do not lie down for 30 minutes after   ezetimibe (Zetia) 10 mg tablet  Other   Sig: Take 1 tablet (10 mg) by mouth once daily.   insulin degludec (Tresiba FlexTouch U-100) 100 unit/mL (3 mL) injection  Other   Sig: Inject 14 Units under the skin once daily at bedtime. Take as directed per insulin instructions.   Patient taking differently: Inject 20 Units under the skin once daily in the evening. Takes in the afternoon   latanoprost (Xalatan) 0.005 % ophthalmic solution  Self   Sig: Administer 1 drop into both eyes once daily at bedtime.   lisinopril 20 mg tablet  Other   Sig: Take 1 tablet (20 mg) by mouth once daily.   melatonin 5 mg tablet  Self   Sig: Take 1 tablet (5 mg) by mouth as needed at bedtime for sleep.   methocarbamol (Robaxin) 500 mg tablet  Other   Sig: Take 1 tablet (500 mg) by mouth 3 times a day.   multivitamin tablet  Self   Sig: Take 1 tablet by mouth once daily.   oxyCODONE (Roxicodone) 5 mg immediate release tablet  Other   Sig: Take 1 tablet (5 mg) by mouth every 6 hours if needed for severe pain (7 - 10) or moderate pain (4 - 6).   pen needle, diabetic (PEN NEEDLE MISC)  Self   Sig: BD Altagracia- 4 mm X 32 G needle - as directed 4x a day sc 4 times per day   primidone 125 mg tablet  Other   Sig: Take 125 mg by mouth 3 times a day.   propranolol LA (Inderal LA) 60 mg 24 hr capsule  Self   Sig: Take 1 capsule (60 mg) by mouth early in the morning..   sennosides (Senokot) 8.6 mg tablet  Other   Sig: Take 1 tablet (8.6 mg) by mouth every 12 hours if needed for constipation.   traZODone (Desyrel) 25 MG split tablet  Other   Sig: Take 1 half tablet (25 mg) by mouth once daily at bedtime.      Facility-Administered Medications: None        The list below reflects the updated allergy list. Please review each documented allergy for additional clarification and justification.  Allergies  Reviewed by Leona ZIMMER  LEONEL Flores-ANGEL on 9/25/2024        Severity Reactions Comments    Erythromycin Medium Nausea And Vomiting     Morphine Low GI Upset nausea and vomiting    Rosuvastatin Low Other, Myalgia           Sources used to complete the med history include facility medication list    Below are additional concerns with the patient's PTA list.  none    Damari Stephen, PharmD  Please reach out via [employer] Secure Chat for questions

## 2024-09-25 NOTE — PROGRESS NOTES
09/25/24 0915   Discharge Planning   Living Arrangements Spouse/significant other;Other (Comment)  (Pt is currently at [facility] for rehab)   Support Systems Spouse/significant other   Type of Residence Private residence;Skilled nursing facility  (Pt is currently from [facility] but normally lives at home with her )   Number of Stairs to Enter Residence 1   Number of Stairs Within Residence 0   Do you have animals or pets at home? No   Who is requesting discharge planning? Provider   Home or Post Acute Services Post acute facilities (Rehab/SNF/etc)   Type of Post Acute Facility Services Skilled nursing   Expected Discharge Disposition SNF   Does the patient need discharge transport arranged? Yes   RoundTrip coordination needed? Yes   Has discharge transport been arranged? No   Financial Resource Strain   How hard is it for you to pay for the very basics like food, housing, medical care, and heating? Hard   Housing Stability   In the last 12 months, was there a time when you were not able to pay the mortgage or rent on time? N   In the past 12 months, how many times have you moved where you were living? 0   At any time in the past 12 months, were you homeless or living in a shelter (including now)? N   Transportation Needs   In the past 12 months, has lack of transportation kept you from medical appointments or from getting medications? no   In the past 12 months, has lack of transportation kept you from meetings, work, or from getting things needed for daily living? No   Patient Choice   Provider Choice list and CMS website (https://medicare.gov/care-compare#search) for post-acute Quality and Resource Measure Data were provided and reviewed with: Family;Patient   Patient / Family choosing to utilize agency / facility established prior to hospitalization Yes

## 2024-09-25 NOTE — PROGRESS NOTES
09/25/24 [facility]7   [facility] Disability Status   Are you deaf or do you have serious difficulty hearing? N   Are you blind or do you have serious difficulty seeing, even when wearing glasses? N   Because of a physical, mental, or emotional condition, do you have serious difficulty concentrating, remembering, or making decisions? (5 years old or older) N   Do you have serious difficulty walking or climbing stairs? Y   Do you have serious difficulty dressing or bathing? Y   Because of a physical, mental, or emotional condition, do you have serious difficulty doing errands alone such as visiting the doctor? Y

## 2024-09-25 NOTE — CARE PLAN
Pts  is POA --not on file-document requested from  Godfrey Malloy [phone]  ADOD: 2-3 days    Pt is currently  at [facility] for rehab; pt has not been home since July 15th. Pt had multiple falls before July 15th due to her back. Came to [place] ED and then discharged to [facility].  Pt had back surgery in August at [facility] then discharged back to [facility].   Pt is here at [place] for a surgical infection. She wishes to return to [facility] to continue rehab  Pt said that she needs assistance with bathing and dressing, she is using a walker.  Pts  is not able to assist her at home  Referral placed to [facility]  Pt lives at home with her  in a ranch home with 1 step to enter the home.  Her  was doing the driving, shopping, cooking and cleaning.  She is not on home 02, no cpap/bipap. She wears glasses, no hearing aids.  She has a walk in shower at home with a shower stool, hand held shower, and a safety bar  Her PCP is Dr. Matthew Muñoz and she uses [facility] in [facility] for her meds; pt said that she can afford her meds.    DISCHARGE PLAN: RETURN TO [facility] FOR REHAB--DO NOT DISCHARGE PATIENT BEFORE SPEAKING WITH CARE COORDINATION; NEED TO MAKE SURE THAT [facility] CAN ACCEPT PT BACK

## 2024-09-25 NOTE — PROGRESS NOTES
09/25/24 1432   Physical Activity   On average, how many days per week do you engage in moderate to strenuous exercise (like a brisk walk)? 5 days  [facility]   On average, how many minutes do you engage in exercise at this level? 30 min   Financial Resource Strain   How hard is it for you to pay for the very basics like food, housing, medical care, and heating? Hard   Housing Stability   In the last 12 months, was there a time when you were not able to pay the mortgage or rent on time? N   In the past 12 months, how many times have you moved where you were living? 0   At any time in the past 12 months, were you homeless or living in a shelter (including now)? N   Transportation Needs   In the past 12 months, has lack of transportation kept you from medical appointments or from getting medications? no   In the past 12 months, has lack of transportation kept you from meetings, work, or from getting things needed for daily living? No   Food Insecurity   Within the past 12 months, you worried that your food would run out before you got the money to buy more. Never true   Within the past 12 months, the food you bought just didn't last and you didn't have money to get more. Never true   Stress   Do you feel stress - tense, restless, nervous, or anxious, or unable to sleep at night because your mind is troubled all the time - these days? Only a littl   Social Connections   In a typical week, how many times do you talk on the phone with family, friends, or neighbors? More than 3   How often do you get together with friends or relatives? More than 3  ( visits pt at Rehab; per pt, they do not get visitors at home)   How often do you attend Voodoo or Uatsdin services? Never  (Pt has not been home since July 15th)   Do you belong to any clubs or organizations such as Voodoo groups, unions, fraternal or athletic groups, or school groups? No   How often do you attend meetings of the clubs  or organizations you belong to? Never   Are you , , , , never , or living with a partner?    Intimate Partner Violence   Within the last year, have you been afraid of your partner or ex-partner? No   Within the last year, have you been humiliated or emotionally abused in other ways by your partner or ex-partner? No   Within the last year, have you been kicked, hit, slapped, or otherwise physically hurt by your partner or ex-partner? No   Within the last year, have you been raped or forced to have any kind of sexual activity by your partner or ex-partner? No   Alcohol Use   Q1: How often do you have a drink containing alcohol? Never   Q2: How many drinks containing alcohol do you have on a typical day when you are drinking? None   Q3: How often do you have six or more drinks on one occasion? Never   Utilities   In the past 12 months has the electric, gas, oil, or water company threatened to shut off services in your home? No   Health Literacy   How often do you need to have someone help you when you read instructions, pamphlets, or other written material from your doctor or pharmacy? Never

## 2024-09-25 NOTE — ED PROVIDER NOTES
HPI   Chief Complaint   Patient presents with    Post-op Problem       HPI        Patient History   Past Medical History:   Diagnosis Date    Degenerative myopia, bilateral     Diabetes mellitus with stable proliferative retinopathy of both eyes, without long-term current use of insulin (Multi)     Diabetic neuropathy (Multi)     DM type 2 (diabetes mellitus, type 2) (Multi)     Dry eye syndrome of bilateral lacrimal glands     Essential hypertension     Essential tremor     Glaucoma     Hyperlipidemia     Long term (current) use of insulin (Multi)     Low back pain     Primary open angle glaucoma of both eyes, severe stage     Repeated falls     Sarcoidosis of lung (Multi)     Spinal stenosis, lumbar region without neurogenic claudication     Weakness      Past Surgical History:   Procedure Laterality Date    BACK SURGERY  03/21/2017    Back Surgery    CARPAL TUNNEL RELEASE  03/21/2017    Neuroplasty Median Nerve At Carpal Tunnel    CATARACT EXTRACTION W/  INTRAOCULAR LENS IMPLANT Bilateral     OD 08/04/2011 +8.5D,OS 08/04/2011 +8.50D    FOOT SURGERY      INSERTION / REMOVAL CRANIAL DBS GENERATOR      part of wire left in head when everything removed    OTHER SURGICAL HISTORY Bilateral 07/2022    upper eyelid    OTHER SURGICAL HISTORY  05/2022    breast lift    PANRETINAL PHOTOCOAGULATION  2014    VITRECTOMY Right 2013     Family History   Problem Relation Name Age of Onset    Multiple myeloma Mother      Cancer Mother      Other (CABG) Father      Pulmonary embolism Father      Heart disease Father      Breast cancer Sister          Stage II    Hypertension Sister      Diabetes Sister      No Known Problems Sister          x5    No Known Problems Brother          x4    No Known Problems Daughter       Social History     Tobacco Use    Smoking status: Former     Types: Cigarettes     Passive exposure: Past    Smokeless tobacco: Never   Vaping Use    Vaping status: Never Used   Substance Use Topics    Alcohol use:  Not Currently    Drug use: Not Currently       Physical Exam   ED Triage Vitals [09/25/24 0931]   Temperature Heart Rate Respirations BP   36.6 °C (97.9 °F) 92 16 116/66      Pulse Ox Temp Source Heart Rate Source Patient Position   97 % Oral -- --      BP Location FiO2 (%)     -- --       Physical Exam      ED Course & MDM   Diagnoses as of 09/25/24 1153   Postoperative infection, unspecified type, initial encounter   Hypoglycemia   Anemia, unspecified type                 No data recorded     [place] Coma Scale Score: 15 (09/25/24 0933 : Abi Krishnan, JEAN MARIE)                           Medical Decision Making    The patient is a 69-year-old female presenting to the emergency department at the direction of her provider at the rehab facility, Dr. Cordoba, to be admitted for possible MRSA infection.  The patient had an L1-L3 lumbar laminectomy revision, L1-L2 osteotomy, transforaminal lumbar interbody fusion, L4-S1 fusion performed on 26 August 2024.  She has had persistent pain and difficulty walking and standing since her surgery.  She was sent to rehab from the hospital because of this.  She states that she has not had any new symptoms but the staff at the long-term care facility feel that she may have had some discharge from her wound so she was sent to the emergency room to be admitted for possible MRSA infection.  She denies any headache or visual changes.  No chest pain or shortness of breath.  No abdominal pain.  No nausea or vomiting.  No diarrhea or constipation.  No urinary complaints.  No fever or chills.  No cough or congestion.  No bowel or bladder incontinence.  No urinary retention.  She states that she has pain in her lower back whenever she tries to stand and walk but this has been going on since before her surgery and persisted after her surgery.  All pertinent positives and negatives are recorded above.  All other systems reviewed and otherwise negative.  Vital signs within normal limits.  Physical exam  with a well-nourished well-developed female in no acute distress.  HEENT exam within normal limits.  She has no evidence of airway compromise or respiratory distress.  Abdominal exam is benign.  She does not have any gross motor, neurologic or vascular deficits on exam.  Strength is 5/5 in all 4 extremities.  Sensation is intact.  Reflexes are intact.  Pulses are equal bilaterally.  She does have some partial dehiscence of his wound with some discharge from the wound on her back.  There is some mild erythema and warmth of the surrounding area but no palpable abscess.      Cultures were obtained and IV antibiotics were ordered      Diagnostic labs with anemia, electrolyte imbalance with hypoglycemia, and borderline renal insufficiency but otherwise unremarkable.      The patient was given juice and IV dextrose with improvement in her blood sugar.      Repeat blood sugar was 105      XR chest 1 view   Final Result   1. Patchy bilateral perihilar infiltrates along with bilateral   effusions. Effusions are better appreciated on the CT lumbar spine   exam.   2. Bilateral pedicle screw and bar fusion thoracic spine.        MACRO:   none        Signed by: Andrew Byrd 9/25/2024 11:08 AM   Dictation workstation:   [id number]      CT lumbar spine wo IV contrast   Final Result   1. Marked limitations are noted due to extensive artifact from the   fixation devices. This limits the sensitivity and specificity of the   exam.   2. Postsurgical appearance of the spine as described.   3. It is difficult to definitively determine whether the erosive   changes at the L1-2 level are related to the previous degenerative   change or whether there could be superimposed infection at this disc   level, status post interbody spacer placement. MRI of the lumbar   spine with and without contrast may be more specific for assessment   of concern relative to possible postoperative infection.   4. Incidental note is made of what appears to be  distended urinary   bladder personal included within the field of view.   5. Incidental note is made of the presence of bilateral pleural   effusions, larger on the left.        MACRO:   none        Signed by: Andrew Byrd 9/25/2024 11:03 AM   Dictation workstation:   [id number]           Chest x-ray with atelectasis but no evidence of obvious pneumonia.  No evidence of pneumothorax.  No widening of the mediastinum.  The pulses are equal bilaterally.  CT of the L-spine showed postsurgical changes but no obvious infection or hematoma.  There was an incidental finding of what appears to be a distended urinary bladder but the patient was able to urinate in the emergency room after the images were taken.  The patient does have evidence of bilateral pleural effusions seen on the x-ray result.      UA results were pending at the time of admission.      The patient was admitted for further management by Dr. Hurd at the request of the patient's referring provider, Dr. Cordoba      Impression/diagnosis  Wound dehiscence and postoperative infection  Low back pain, acute on chronic, postoperative  Hypoglycemia  Anemia, unspecified      Critical care time of 17 minutes billed for management of hypoglycemia with initiation of IV dextrose, monitoring the patient's telemetry, consultation with the admitting provider, consultation with the patient regarding her results and arrangement for admission.  This time excludes time for billable procedures.      I reviewed the results of the diagnostic labs and diagnostic imaging.  Formal radiology reading was completed by the radiologist      Procedure  Procedures     Cris Lucero MD  09/25/24 6624

## 2024-09-25 NOTE — H&P
Chief Complaint Generalized weakness, fatigue, acute kidney injury, concern for spinal wound infection    History Of Present Illness  Narda Malloy is a very pleasant 69 y.o. obese  female presenting with generalized weakness, fatigue, acute kidney injury, and concern for spinal wound infection.  She underwent lumbar surgery at [facility] on August 26, 2024.  She was discharged to [facility] rehabilitation [facility].  She states that she underwent rehabilitation.  She stopped receiving rehabilitation.  She states that she was feeling well up until recently when she developed generalized weakness especially to her lower extremities.  She denies any focal weakness.  She denies any numbness or tingling to the lower extremities.  She denies any falls.  She reports intermittent bowel incontinence since having surgery.  She reports decreased intake. 2 days ago, there was concern that the surgical incision was open with drainage.  She was treated with antibiotics and local skin care.  Laboratory data was obtained and there was concern for infection so she was sent to the hospital for evaluation.  Upon review of the record, outpatient laboratory data showed acute kidney injury with a creatinine above baseline.  8/16/2024 creatinine 0.8, 9/24/2024 creatinine 1.5.  She presented to the emergency department for evaluation.  In the emergency department, initial workup was done. Temperature 36.6 °C.  Pulse 92.  Respiration rate 16.  Blood pressure 116/66.Initial lab data showed glucose 46.  Sodium 134.  Potassium 4.2.  Bicarbonate 24.  Anion gap 8.  BUN 67.  Creatinine 1.14.  Calcium 7.7.  Albumin 2.1.  Alk phosphatase 183.  ALT 12.  AST 11.  Total bilirubin 0.3.  CBC showed a stable white blood count 9.8.  Hemoglobin 8.5.  Hematocrit 26.1.CT lumbar spine per radiology showed a limited examination, post-surgical appearance of the spine, difficulty to determine whether if changes at L1-L2 are related to  previous degenerative changes or superimposed infection at this disc level, status post interbody spacer placement, MRI lumbar spine with and without contrast may be more specific for assessment if concern for possible postoperative infection, incidental note of distended urinary bladder, incidental note of presence of bilateral pleural effusions, larger on the left.  The postoperative surgical incision was noted with dehiscence and drainage.  Wound cultures were obtained from the post-operative spinal wound drainage.  She was treated with broad-spectrum IV antibiotics and was admitted.  At the time of my evaluation, she is resting on the cart in the emergency department room for no acute distress.  Her spouse is present at bedside.  She is awake alert oriented x 3.  She feels overall fatigued and overall weak.  She denies any focal weakness.  Per her bedside nurse, her pulse oximetry was 88% per EMS, placed on 4 L nasal cannula.  She has since been titrated to 2 L nasal cannula.     Past Medical History  Past Medical History:   Diagnosis Date    Degenerative myopia, bilateral     Diabetes mellitus with stable proliferative retinopathy of both eyes, without long-term current use of insulin (Multi)     Diabetic neuropathy (Multi)     DM type 2 (diabetes mellitus, type 2) (Multi)     Dry eye syndrome of bilateral lacrimal glands     Essential hypertension     Essential tremor     Glaucoma     Hyperlipidemia     Long term (current) use of insulin (Multi)     Low back pain     Primary open angle glaucoma of both eyes, severe stage     Repeated falls     Sarcoidosis of lung (Multi)     Spinal stenosis, lumbar region without neurogenic claudication     Weakness        Surgical History  Past Surgical History:   Procedure Laterality Date    BACK SURGERY  03/21/2017    Back Surgery    CARPAL TUNNEL RELEASE  03/21/2017    Neuroplasty Median Nerve At Carpal Tunnel    CATARACT EXTRACTION W/  INTRAOCULAR LENS IMPLANT Bilateral      OD 08/04/2011 +8.5D,OS 08/04/2011 +8.50D    FOOT SURGERY      INSERTION / REMOVAL CRANIAL DBS GENERATOR      part of wire left in head when everything removed    OTHER SURGICAL HISTORY Bilateral 07/2022    upper eyelid    OTHER SURGICAL HISTORY  05/2022    breast lift    PANRETINAL PHOTOCOAGULATION  2014    VITRECTOMY Right 2013        Social History  She reports that she has quit smoking. Her smoking use included cigarettes. She has been exposed to tobacco smoke. She has never used smokeless tobacco. She reports that she does not currently use alcohol. She reports that she does not currently use drugs.    Family History  Family History   Problem Relation Name Age of Onset    Multiple myeloma Mother      Cancer Mother      Other (CABG) Father      Pulmonary embolism Father      Heart disease Father      Breast cancer Sister          Stage II    Hypertension Sister      Diabetes Sister      No Known Problems Sister          x5    No Known Problems Brother          x4    No Known Problems Daughter          Allergies  Erythromycin, Morphine, and Rosuvastatin    Review of Systems   Constitutional:  Positive for activity change, appetite change and fatigue.   HENT: Negative.     Eyes: Negative.    Respiratory: Negative.     Cardiovascular:  Positive for leg swelling.   Gastrointestinal: Negative.    Endocrine: Negative.    Genitourinary: Negative.    Musculoskeletal: Negative.    Skin:  Positive for wound.   Allergic/Immunologic: Negative.    Neurological: Negative.    Hematological: Negative.    Psychiatric/Behavioral: Negative.     All other systems reviewed and are negative.      Physical Exam  Vitals and nursing note reviewed.   Constitutional:       General: She is not in acute distress.     Appearance: Normal appearance. She is obese. She is not ill-appearing, toxic-appearing or diaphoretic.   HENT:      Head: Normocephalic and atraumatic.      Right Ear: External ear normal.      Left Ear: External ear normal.  "     Nose: Nose normal.      Mouth/Throat:      Mouth: Mucous membranes are moist.      Pharynx: Oropharynx is clear.   Eyes:      Extraocular Movements: Extraocular movements intact.      Conjunctiva/sclera: Conjunctivae normal.      Pupils: Pupils are equal, round, and reactive to light.   Cardiovascular:      Rate and Rhythm: Normal rate and regular rhythm.      Pulses: Normal pulses.      Heart sounds: Normal heart sounds. No murmur heard.  Pulmonary:      Effort: Pulmonary effort is normal. No respiratory distress.      Breath sounds: Normal breath sounds. No wheezing, rhonchi or rales.      Comments: 2 liters nasal cannula pulse oximetry 100%. No oxygen prior to admission.  Abdominal:      General: Bowel sounds are normal. There is no distension.      Palpations: Abdomen is soft.      Tenderness: There is no abdominal tenderness.   Genitourinary:     Comments: Deferred.  Musculoskeletal:         General: Swelling present. No tenderness. Normal range of motion.      Cervical back: Normal range of motion and neck supple.      Right lower le+ Pitting Edema present.      Left lower le+ Pitting Edema present.   Skin:     General: Skin is warm and dry.      Capillary Refill: Capillary refill takes less than 2 seconds.      Coloration: Skin is pale.      Comments: Postoperative spinal wound with dressing steri-strips, dry dressing intact removed, dehiscence noted, trace drainage, trace surrounding erythema.     Neurological:      General: No focal deficit present.      Mental Status: She is alert and oriented to person, place, and time.   Psychiatric:         Mood and Affect: Mood normal.         Behavior: Behavior normal.       Last Recorded Vitals  Blood pressure 107/61, pulse 78, temperature 36.6 °C (97.9 °F), temperature source Oral, resp. rate (!) 24, height 1.753 m (5' 9\"), weight 74.8 kg (165 lb), SpO2 100%.    Relevant Results  Results for orders placed or performed during the hospital encounter of " 09/25/24 (from the past 24 hour(s))   CBC and Auto Differential   Result Value Ref Range    WBC 9.8 4.4 - 11.3 x10*3/uL    nRBC 0.0 0.0 - 0.0 /100 WBCs    RBC 2.74 (L) 4.00 - 5.20 x10*6/uL    Hemoglobin 8.5 (L) 12.0 - 16.0 g/dL    Hematocrit 26.1 (L) 36.0 - 46.0 %    MCV 95 80 - 100 fL    MCH 31.0 26.0 - 34.0 pg    MCHC 32.6 32.0 - 36.0 g/dL    RDW 13.3 11.5 - 14.5 %    Platelets 322 150 - 450 x10*3/uL    Immature Granulocytes %, Automated 0.7 0.0 - 0.9 %    Immature Granulocytes Absolute, Automated 0.07 0.00 - 0.70 x10*3/uL   Comprehensive metabolic panel   Result Value Ref Range    Glucose 46 (LL) 74 - 99 mg/dL    Sodium 134 (L) 136 - 145 mmol/L    Potassium 4.2 3.5 - 5.3 mmol/L    Chloride 106 98 - 107 mmol/L    Bicarbonate 24 21 - 32 mmol/L    Anion Gap 8 (L) 10 - 20 mmol/L    Urea Nitrogen 67 (H) 6 - 23 mg/dL    Creatinine 1.14 (H) 0.50 - 1.05 mg/dL    eGFR 53 (L) >60 mL/min/1.73m*2    Calcium 7.7 (L) 8.6 - 10.3 mg/dL    Albumin 2.1 (L) 3.4 - 5.0 g/dL    Alkaline Phosphatase 183 (H) 33 - 136 U/L    Total Protein 5.1 (L) 6.4 - 8.2 g/dL    AST 11 9 - 39 U/L    Bilirubin, Total 0.3 0.0 - 1.2 mg/dL    ALT 12 7 - 45 U/L   Blood Gas Lactic Acid, Venous   Result Value Ref Range    POCT Lactate, Venous 0.8 0.4 - 2.0 mmol/L   Manual Differential   Result Value Ref Range    Neutrophils %, Manual 88.0 40.0 - 80.0 %    Bands %, Manual 7.0 0.0 - 5.0 %    Lymphocytes %, Manual 2.0 13.0 - 44.0 %    Monocytes %, Manual 2.0 2.0 - 10.0 %    Eosinophils %, Manual 1.0 0.0 - 6.0 %    Basophils %, Manual 0.0 0.0 - 2.0 %    Seg Neutrophils Absolute, Manual 8.62 (H) 1.20 - 7.00 x10*3/uL    Bands Absolute, Manual 0.69 0.00 - 0.70 x10*3/uL    Lymphocytes Absolute, Manual 0.20 (L) 1.20 - 4.80 x10*3/uL    Monocytes Absolute, Manual 0.20 0.10 - 1.00 x10*3/uL    Eosinophils Absolute, Manual 0.10 0.00 - 0.70 x10*3/uL    Basophils Absolute, Manual 0.00 0.00 - 0.10 x10*3/uL    Total Cells Counted 100     Neutrophils Absolute, Manual 9.31 (H)  1.20 - 7.70 x10*3/uL    RBC Morphology See Below     Ovalocytes Few     Moore Cells Few    Urinalysis with Reflex Culture and Microscopic   Result Value Ref Range    Color, Urine Yellow Light-Yellow, Yellow, Dark-Yellow    Appearance, Urine Turbid (N) Clear    Specific Gravity, Urine 1.020 1.005 - 1.035    pH, Urine 5.0 5.0, 5.5, 6.0, 6.5, 7.0, 7.5, 8.0    Protein, Urine 70 (1+) (A) NEGATIVE, 10 (TRACE), 20 (TRACE) mg/dL    Glucose, Urine Normal Normal mg/dL    Blood, Urine 0.2 (2+) (A) NEGATIVE    Ketones, Urine NEGATIVE NEGATIVE mg/dL    Bilirubin, Urine NEGATIVE NEGATIVE    Urobilinogen, Urine Normal Normal mg/dL    Nitrite, Urine NEGATIVE NEGATIVE    Leukocyte Esterase, Urine 75 Maria De Jesus/µL (A) NEGATIVE   Microscopic Only, Urine   Result Value Ref Range    WBC, Urine 11-20 (A) 1-5, NONE /HPF    RBC, Urine >20 (A) NONE, 1-2, 3-5 /HPF    Squamous Epithelial Cells, Urine 1-9 (SPARSE) Reference range not established. /HPF    Mucus, Urine FEW Reference range not established. /LPF    Hyaline Casts, Urine 1+ (A) NONE /LPF    Fine Granular Casts, Urine OCCASIONAL (A) NONE /LPF   POCT GLUCOSE   Result Value Ref Range    POCT Glucose 105 (H) 74 - 99 mg/dL     Susceptibility data from last 90 days.  Collected Specimen Info Organism   08/12/24 Swab from Nares/Axilla/Groin Methicillin Resistant Staphylococcus aureus (MRSA)     XR chest 1 view    Result Date: 9/25/2024  Interpreted By:  Andrew Byrd, STUDY: XR CHEST 1 VIEW; 9/25/2024 11:02 am   INDICATION: CLINICAL INFORMATION: Signs/Symptoms:malaise/fatigue.   COMPARISON: 05/15/2018   ACCESSION NUMBER(S): [id number]   ORDERING CLINICIAN: DALILA GEORGE   TECHNIQUE: Portable chest one view.   FINDINGS: The cardiac size is indeterminate in view of the AP projection. Dense material overlies the mid to upper thoracic spine suggesting previous vertebroplasty. Bilateral pedicle screw and bar fusion involves the thoracic spine. Is present consistent with remote granulomatous disease.   Patchy bilateral perihilar infiltrate suspected. Effusions noted on the CT lumbar spine are more difficult to appreciate on this plain film study.       1. Patchy bilateral perihilar infiltrates along with bilateral effusions. Effusions are better appreciated on the CT lumbar spine exam. 2. Bilateral pedicle screw and bar fusion thoracic spine.   MACRO: none   Signed by: Andrew Byrd 9/25/2024 11:08 AM Dictation workstation:   [id number]    CT lumbar spine wo IV contrast    Result Date: 9/25/2024  Interpreted By:  Andrew Byrd, STUDY: CT LUMBAR SPINE WO IV CONTRAST; 9/25/2024 10:27 am   INDICATION: Signs/Symptoms:r/o post operative infection. Patient is status post L1-2 L3 lumbar laminectomy revision and L1-L2 osteotomy an interbody fusion. L4 through S1 fusion performed 08/26/2024. Persistent pain and difficulty walking and standing since surgery.   COMPARISON: 07/15/2024   ACCESSION NUMBER(S): [id number]   ORDERING CLINICIAN: DALILA GEORGE   TECHNIQUE: A helical axial data set was obtained and multiplanar reconstructions performed.   One or more of the following dose reduction techniques were used: Automated exposure control Adjustment of the mA and/or kV according to patient size, and/or use of iterative reconstruction technique.   FINDINGS: There is a lumbar levoscoliosis. There is a bilateral pedicle screw and bar fusion extending from S1 to a least T10 with absent pedicle screws on the left at L1, T12, and T11 and on the right at L3. There is a surgical tract from a removed pedicle screw on the right at L3.   The fusion above the L3 level is new compared to the previous study from 07/15/2024. There is a new interbody spacer at L1-2 resulting in transformation of the kyphotic appearance of the spine at L1-2 to a more lordotic appearance at L1-2. There are erosive changes of the inferior endplate of L1 and superior endplate of L2 although it is difficult to determine if these are new compared to the severe  degenerative findings of the L1 and L2 vertebral bodies on the prior study. There appears to be some fragmentation of the right superolateral corner of L2 with the right L2 pedicle screw traversing this corner rather than being imbedded within the central portion of the vertebral body itself.     L1-L2: Spinal canal paraspinous soft tissues are difficult to assess at this level due to extensive artifact from the fixation devices.   L2-L3: Within limits of this study spinal canal appears capacious at this level along with the associated intervertebral foramina.   L3-L4: Within limits of this study spinal canal appears capacious at this level along with the associated intervertebral foramina.   L4-L5: Within limits of this study spinal canal appears capacious at this level along with the associated intervertebral foramina.   L5-S1: Within limits of this study spinal canal appears capacious at this level along with the associated intervertebral foramina.       1. Marked limitations are noted due to extensive artifact from the fixation devices. This limits the sensitivity and specificity of the exam. 2. Postsurgical appearance of the spine as described. 3. It is difficult to definitively determine whether the erosive changes at the L1-2 level are related to the previous degenerative change or whether there could be superimposed infection at this disc level, status post interbody spacer placement. MRI of the lumbar spine with and without contrast may be more specific for assessment of concern relative to possible postoperative infection. 4. Incidental note is made of what appears to be distended urinary bladder personal included within the field of view. 5. Incidental note is made of the presence of bilateral pleural effusions, larger on the left.   MACRO: none   Signed by: Andrew Byrd 9/25/2024 11:03 AM Dictation workstation:   [id number]     Scheduled medications  [START ON 9/26/2024] pantoprazole, 40 mg, oral,  Daily before breakfast   Or  [START ON 9/26/2024] esomeprazole, 40 mg, nasoduodenal tube, Daily before breakfast   Or  [START ON 9/26/2024] pantoprazole, 40 mg, intravenous, Daily before breakfast  oxygen, , inhalation, Continuous - Inhalation  piperacillin-tazobactam, 3.375 g, intravenous, q6h  polyethylene glycol, 17 g, oral, Daily      Continuous medications  sodium chloride 0.9%, 100 mL/hr      PRN medications  PRN medications: acetaminophen **OR** acetaminophen, dextrose, dextrose, glucagon, glucagon, melatonin, ondansetron, vancomycin      ASSESSMENT:  Suspected post-operative surgical wound infection  Spinal stenosis - status post lumbar spinal surgery   Fatigue  Generalized weakness  Acute kidney injury  Distended bladder  Acute hypoxic respiratory failure  Bilateral pleural effusions  Sarcoidosis  Type 2 diabetes mellitus  Hypertension  Obesity  Essential tremor  Glaucoma   Cataract    PLAN:  Follow-up wound and blood cultures.  Broad-spectrum IV antibiotics.  Zosyn, Vancomycin.  Pharmacy dosing Vancomycin.  Monitor vancomycin level and renal function.  Infectious disease consultation.  She may require transfer to [facility].  Follow-up infectious disease recommendations.  Labs reviewed.  Acute kidney injury.  Noted acute hypoxic respiratory failure with bilateral pleural effusions.  Check proBNP level.  Slow IV fluid hydration.  Follow-up.  Monitor renal function.  Monitor fluid volume status.  H&H noted.  No evidence of acute blood loss.  Patient reports receiving a blood transfusion postoperatively.  There is no evidence of acute blood loss at this time.  Check iron studies, ferritin, vitamin B12 and folic acid.  Replace accordingly.  Monitor H&H.  Transfuse as needed.  Hypoglycemic.  Asymptomatic.  Monitor point-of-care glucose AC/HS.  Hypoglycemia protocol.  Check hemoglobin A1c level.  PT/OT evaluation.  Fall precautions.  Up with assistance only.  Bed and chair alarm at all times.  Pressure ulcer  prevention measures.  Turn and reposition every 2 hours and as needed.  Heels off bed.  Offloading.  Supportive care..  Patient reassured.  Case management consultation for discharge planning.  We will take DVT, fall, aspiration and decubitus precautions during her stay in the hospital.  The plan is discussed the patient along with her spouse bedside.  They are agreeable.  Orders written per Dr. Hurd.  Any additional modifications to his discretion.    Discussed with Dr. Hurd.      Leona Flores, LEONEL-CNP

## 2024-09-26 ENCOUNTER — TRANSCRIBE ORDERS (OUTPATIENT)
Dept: ORTHOPEDIC SURGERY | Facility: HOSPITAL | Age: 69
End: 2024-09-26
Payer: MEDICARE

## 2024-09-26 ENCOUNTER — APPOINTMENT (OUTPATIENT)
Dept: RADIOLOGY | Facility: HOSPITAL | Age: 69
End: 2024-09-26
Payer: MEDICARE

## 2024-09-26 DIAGNOSIS — T81.49XA SURGICAL SITE INFECTION: ICD-10-CM

## 2024-09-26 LAB
ALBUMIN SERPL BCP-MCNC: 2.1 G/DL (ref 3.4–5)
ALP SERPL-CCNC: 172 U/L (ref 33–136)
ALT SERPL W P-5'-P-CCNC: 9 U/L (ref 7–45)
ANION GAP SERPL CALCULATED.3IONS-SCNC: 11 MMOL/L (ref 10–20)
AST SERPL W P-5'-P-CCNC: 10 U/L (ref 9–39)
BACTERIA UR CULT: ABNORMAL
BILIRUB SERPL-MCNC: 0.3 MG/DL (ref 0–1.2)
BUN SERPL-MCNC: 69 MG/DL (ref 6–23)
CALCIUM SERPL-MCNC: 7.7 MG/DL (ref 8.6–10.3)
CHLORIDE SERPL-SCNC: 105 MMOL/L (ref 98–107)
CK SERPL-CCNC: 20 U/L (ref 0–215)
CO2 SERPL-SCNC: 22 MMOL/L (ref 21–32)
CREAT SERPL-MCNC: 1.31 MG/DL (ref 0.5–1.05)
EGFRCR SERPLBLD CKD-EPI 2021: 44 ML/MIN/1.73M*2
ERYTHROCYTE [DISTWIDTH] IN BLOOD BY AUTOMATED COUNT: 13.5 % (ref 11.5–14.5)
FOLATE SERPL-MCNC: 9.2 NG/ML
GLUCOSE BLD MANUAL STRIP-MCNC: 129 MG/DL (ref 74–99)
GLUCOSE BLD MANUAL STRIP-MCNC: 179 MG/DL (ref 74–99)
GLUCOSE BLD MANUAL STRIP-MCNC: 203 MG/DL (ref 74–99)
GLUCOSE BLD MANUAL STRIP-MCNC: 233 MG/DL (ref 74–99)
GLUCOSE SERPL-MCNC: 140 MG/DL (ref 74–99)
HCT VFR BLD AUTO: 25.1 % (ref 36–46)
HGB BLD-MCNC: 8.2 G/DL (ref 12–16)
MCH RBC QN AUTO: 31.9 PG (ref 26–34)
MCHC RBC AUTO-ENTMCNC: 32.7 G/DL (ref 32–36)
MCV RBC AUTO: 98 FL (ref 80–100)
NRBC BLD-RTO: 0 /100 WBCS (ref 0–0)
PLATELET # BLD AUTO: 361 X10*3/UL (ref 150–450)
POTASSIUM SERPL-SCNC: 4.7 MMOL/L (ref 3.5–5.3)
PROT SERPL-MCNC: 4.9 G/DL (ref 6.4–8.2)
RBC # BLD AUTO: 2.57 X10*6/UL (ref 4–5.2)
SARS-COV-2 RNA RESP QL NAA+PROBE: NOT DETECTED
SODIUM SERPL-SCNC: 133 MMOL/L (ref 136–145)
VANCOMYCIN SERPL-MCNC: 8.4 UG/ML (ref 5–20)
VIT B12 SERPL-MCNC: 1559 PG/ML (ref 211–911)
WBC # BLD AUTO: 10.2 X10*3/UL (ref 4.4–11.3)

## 2024-09-26 PROCEDURE — 82947 ASSAY GLUCOSE BLOOD QUANT: CPT

## 2024-09-26 PROCEDURE — 80202 ASSAY OF VANCOMYCIN: CPT | Performed by: NURSE PRACTITIONER

## 2024-09-26 PROCEDURE — 2500000004 HC RX 250 GENERAL PHARMACY W/ HCPCS (ALT 636 FOR OP/ED): Mod: JZ | Performed by: NURSE PRACTITIONER

## 2024-09-26 PROCEDURE — 71250 CT THORAX DX C-: CPT

## 2024-09-26 PROCEDURE — 2500000005 HC RX 250 GENERAL PHARMACY W/O HCPCS: Performed by: NURSE PRACTITIONER

## 2024-09-26 PROCEDURE — 2500000002 HC RX 250 W HCPCS SELF ADMINISTERED DRUGS (ALT 637 FOR MEDICARE OP, ALT 636 FOR OP/ED): Performed by: NURSE PRACTITIONER

## 2024-09-26 PROCEDURE — 87635 SARS-COV-2 COVID-19 AMP PRB: CPT | Performed by: NURSE PRACTITIONER

## 2024-09-26 PROCEDURE — 80053 COMPREHEN METABOLIC PANEL: CPT | Performed by: NURSE PRACTITIONER

## 2024-09-26 PROCEDURE — 76770 US EXAM ABDO BACK WALL COMP: CPT

## 2024-09-26 PROCEDURE — 82550 ASSAY OF CK (CPK): CPT | Mod: WESLAB | Performed by: INTERNAL MEDICINE

## 2024-09-26 PROCEDURE — 93010 ELECTROCARDIOGRAM REPORT: CPT | Performed by: INTERNAL MEDICINE

## 2024-09-26 PROCEDURE — 2500000001 HC RX 250 WO HCPCS SELF ADMINISTERED DRUGS (ALT 637 FOR MEDICARE OP): Performed by: NURSE PRACTITIONER

## 2024-09-26 PROCEDURE — 76770 US EXAM ABDO BACK WALL COMP: CPT | Performed by: RADIOLOGY

## 2024-09-26 PROCEDURE — 82607 VITAMIN B-12: CPT | Mod: WESLAB | Performed by: NURSE PRACTITIONER

## 2024-09-26 PROCEDURE — 1100000001 HC PRIVATE ROOM DAILY

## 2024-09-26 PROCEDURE — 71250 CT THORAX DX C-: CPT | Performed by: RADIOLOGY

## 2024-09-26 PROCEDURE — 82746 ASSAY OF FOLIC ACID SERUM: CPT | Mod: WESLAB | Performed by: NURSE PRACTITIONER

## 2024-09-26 PROCEDURE — 85027 COMPLETE CBC AUTOMATED: CPT | Performed by: NURSE PRACTITIONER

## 2024-09-26 RX ORDER — DEXTROSE 50 % IN WATER (D50W) INTRAVENOUS SYRINGE
12.5
Start: 2024-09-26

## 2024-09-26 RX ORDER — PANTOPRAZOLE SODIUM 40 MG/10ML
40 INJECTION, POWDER, LYOPHILIZED, FOR SOLUTION INTRAVENOUS
Start: 2024-09-27

## 2024-09-26 RX ORDER — VANCOMYCIN 1 G/200ML
1 INJECTION, SOLUTION INTRAVENOUS EVERY 24 HOURS
Start: 2024-09-27 | End: 2024-10-04 | Stop reason: HOSPADM

## 2024-09-26 RX ORDER — ONDANSETRON HYDROCHLORIDE 2 MG/ML
4 INJECTION, SOLUTION INTRAVENOUS EVERY 6 HOURS PRN
Start: 2024-09-26

## 2024-09-26 RX ORDER — DEXTROSE 50 % IN WATER (D50W) INTRAVENOUS SYRINGE
25
Start: 2024-09-26

## 2024-09-26 RX ORDER — PANTOPRAZOLE SODIUM 40 MG/1
40 TABLET, DELAYED RELEASE ORAL
Start: 2024-09-27

## 2024-09-26 RX ORDER — PROPRANOLOL HYDROCHLORIDE 60 MG/1
60 CAPSULE, EXTENDED RELEASE ORAL
Start: 2024-09-26

## 2024-09-26 RX ORDER — POLYETHYLENE GLYCOL 3350 17 G/17G
17 POWDER, FOR SOLUTION ORAL DAILY
Start: 2024-09-27

## 2024-09-26 RX ORDER — VANCOMYCIN 1 G/200ML
1 INJECTION, SOLUTION INTRAVENOUS EVERY 24 HOURS
Status: DISCONTINUED | OUTPATIENT
Start: 2024-09-26 | End: 2024-09-27 | Stop reason: HOSPADM

## 2024-09-26 ASSESSMENT — COGNITIVE AND FUNCTIONAL STATUS - GENERAL
DRESSING REGULAR LOWER BODY CLOTHING: A LITTLE
HELP NEEDED FOR BATHING: A LITTLE
HELP NEEDED FOR BATHING: A LITTLE
WALKING IN HOSPITAL ROOM: A LITTLE
WALKING IN HOSPITAL ROOM: A LITTLE
TOILETING: A LITTLE
PERSONAL GROOMING: A LITTLE
TURNING FROM BACK TO SIDE WHILE IN FLAT BAD: A LITTLE
MOBILITY SCORE: 17
STANDING UP FROM CHAIR USING ARMS: A LITTLE
EATING MEALS: A LITTLE
PERSONAL GROOMING: A LITTLE
MOVING TO AND FROM BED TO CHAIR: A LITTLE
STANDING UP FROM CHAIR USING ARMS: A LITTLE
CLIMB 3 TO 5 STEPS WITH RAILING: A LOT
DRESSING REGULAR UPPER BODY CLOTHING: A LITTLE
TURNING FROM BACK TO SIDE WHILE IN FLAT BAD: A LITTLE
MOVING FROM LYING ON BACK TO SITTING ON SIDE OF FLAT BED WITH BEDRAILS: A LITTLE
MOBILITY SCORE: 17
TOILETING: A LITTLE
DRESSING REGULAR LOWER BODY CLOTHING: A LITTLE
DAILY ACTIVITIY SCORE: 19
CLIMB 3 TO 5 STEPS WITH RAILING: A LOT
MOVING FROM LYING ON BACK TO SITTING ON SIDE OF FLAT BED WITH BEDRAILS: A LITTLE
DAILY ACTIVITIY SCORE: 18
DRESSING REGULAR UPPER BODY CLOTHING: A LITTLE
MOVING TO AND FROM BED TO CHAIR: A LITTLE

## 2024-09-26 ASSESSMENT — PAIN SCALES - GENERAL
PAINLEVEL_OUTOF10: 0 - NO PAIN
PAINLEVEL_OUTOF10: 0 - NO PAIN

## 2024-09-26 ASSESSMENT — ENCOUNTER SYMPTOMS
POLYDIPSIA: 0
POLYPHAGIA: 0
HEADACHES: 0
MYALGIAS: 0
SHORTNESS OF BREATH: 0
FATIGUE: 1
DYSURIA: 0
HALLUCINATIONS: 0
SINUS PAIN: 0
FEVER: 0
HEMATURIA: 0
BACK PAIN: 1
COUGH: 0
DIZZINESS: 0
NAUSEA: 0
CONFUSION: 0
COLOR CHANGE: 0
CHILLS: 0
CHEST TIGHTNESS: 0
VOMITING: 0
NERVOUS/ANXIOUS: 1
WEAKNESS: 1
DIARRHEA: 1

## 2024-09-26 ASSESSMENT — PAIN - FUNCTIONAL ASSESSMENT: PAIN_FUNCTIONAL_ASSESSMENT: 0-10

## 2024-09-26 NOTE — NURSING NOTE
"2200 Patient was sleeping and I woke her up asking if she needed to use the bathroom and patient stated she used it a little while ago.    0130 I woke patient up to use the bathroom but again she said she used it a little while ago. I said she has not gone for a good while that she should try to use it. She said she has been peeing on the pads. Patient is not incontinent and said \"they told me to. The therapist said to since I am very weak on my feet and the bedpan is too narrow\" I told patient that we will use the bedpan that it is not narrow for her and that there are other options. Total bed change completed with Bath and CHG. Bladder scanned 354 ml left in bladder.    0300 I had patient use the bedpan 200 ml voided and 268 ml post void bladder scan.      "

## 2024-09-26 NOTE — PROGRESS NOTES
Care transitions continues to follow and will assist with plan for pt to return to [facility] for skilled rehab. A precert will be required for pt to return. PT evaluation pending placement of activity orders.      09/26/24 0894   Discharge Planning   Expected Discharge Disposition SNF  ([facility])

## 2024-09-26 NOTE — PROGRESS NOTES
Vancomycin Dosing by Pharmacy- FOLLOW UP    Narda Malloy is a 69 y.o. year old female who Pharmacy has been consulted for vancomycin dosing for cellulitis, skin and soft tissue. Based on the patient's indication and renal status this patient is being dosed based on a goal AUC of 400-600.     Renal function is currently declining.    Current vancomycin dose: 1250 mg given every 24 hours    Estimated vancomycin AUC on current dose: 599 mg/L.hr     Visit Vitals  /54 (BP Location: Right arm, Patient Position: Lying)   Pulse 66   Temp 36.5 °C (97.7 °F) (Oral)   Resp 18        Lab Results   Component Value Date    CREATININE 1.31 (H) 2024    CREATININE 1.14 (H) 2024    CREATININE 0.55 2024    CREATININE 0.60 2024        Patient weight is as follows:   Vitals:    24 0931   Weight: 74.8 kg (165 lb)       Cultures:  No results found for the encounter in last 14 days.       I/O last 3 completed shifts:  In: 50 (0.7 mL/kg) [IV Piggyback:50]  Out: 200 (2.7 mL/kg) [Urine:200 (0.1 mL/kg/hr)]  Weight: 74.8 kg   I/O during current shift:  I/O this shift:  In: -   Out: 250 [Urine:250]    Temp (24hrs), Av.4 °C (97.6 °F), Min:36.2 °C (97.2 °F), Max:36.6 °C (97.9 °F)      Assessment/Plan    Above goal AUC. Orders placed for new vancomcyin regimen of 1000 mg every 24 hours to begin at 10:00.    This dosing regimen is predicted by [employer] to result in the following pharmacokinetic parameters:  Loading dose: N/A  Regimen: 1000 mg IV every 24 hours.  Start time: 11:06 on 2024  Exposure target: AUC24 (range)400-600 mg/L.hr   UVP70-51: 422 mg/L.hr  AUC24,ss: 485 mg/L.hr  Probability of AUC24 > 400: 88 %  Ctrough,ss: 14.6 mg/L  Probability of Ctrough,ss > 20: 8 %      The next level will be obtained on  at 5:00. May be obtained sooner if clinically indicated.   Will continue to monitor renal function daily while on vancomycin and order serum creatinine at least every 48 hours if not  already ordered.  Follow for continued vancomycin needs, clinical response, and signs/symptoms of toxicity.       Yoel Moore, PharmD

## 2024-09-26 NOTE — CONSULTS
Pulmonary Consultation Note   Subjective      History of Present Illness: Narda Malloy is a 69 y.o. year old female patient known with recent lumbar surgery admitted on 9/25/2024 with generalized weakness, fatigue, acute kidney injury, and concern for spinal wound infection. Pulmonary consulted for left pleural effusion.    At this point she is being transferred to [facility] for surgical wound assessment and possible debridement.  Respiratory standpoint she is asymptomatic.  She denies any shortness of breath, cough, or sputum production.  She also denies any chest pain.  However, her activity level is very limited.  Past Medical History:  Suspected post-operative surgical wound infection, history of Bacteremia staphylococcus aureus, Spinal stenosis - status post lumbar spinal surgery, Mobility impairment,   Sarcoidosis per chart details of involvement unknown, Type 2 diabetes mellitus, Hypertension, Obesity  Social History: remote smoker    Meds    Scheduled medications  acetaminophen, 1,000 mg, oral, TID  ascorbic acid, 500 mg, oral, Daily  brimonidine, 1 drop, Both Eyes, BID  calcium carbonate-vitamin D3, 1 tablet, oral, Daily  docusate sodium, 100 mg, oral, BID  pantoprazole, 40 mg, oral, Daily before breakfast   Or  esomeprazole, 40 mg, nasoduodenal tube, Daily before breakfast   Or  pantoprazole, 40 mg, intravenous, Daily before breakfast  ezetimibe, 10 mg, oral, Daily  lactobacillus acidophilus, 1 tablet, oral, BID  latanoprost, 1 drop, Both Eyes, Nightly  methocarbamol, 500 mg, oral, TID  oxygen, , inhalation, Continuous - Inhalation  piperacillin-tazobactam, 3.375 g, intravenous, q6h  polyethylene glycol, 17 g, oral, Daily  primidone, 125 mg, oral, TID  propranolol LA, 60 mg, oral, Daily  traZODone, 25 mg, oral, Nightly  vancomycin, 1 g, intravenous, q24h    PRN medications  PRN medications: dextrose, dextrose, glucagon, glucagon, melatonin, ondansetron, sennosides, vancomycin     Objective    Blood pressure  "(!) 100/44, pulse 89, temperature 36 °C (96.8 °F), temperature source Oral, resp. rate 18, height 1.753 m (5' 9\"), weight 74.8 kg (165 lb), SpO2 98%.   Physical Exam   GENERAL: normal appearance. well nourished. No respiratory distress  HEAD/SINUSES: no sinus tenderness  OROPHARYNX: Moist mucosa, no thrush or lesions - Mallampati IV (only hard palate visible)  NECK: no JVD, midline trachea without stridor.    LUNGS: clear lung fields anteriorly  CARDIAC: Regular rate and rhythm  EXTREMITIES: ++ edema, no varicose veins  NEURO: grossly normal mental status, CN reflexes and motor strength.   SKIN: Skin turgor normal. No rashes or lesions.   PSYCH: Normal affect    Intake/Output Summary (Last 24 hours) at 9/26/2024 1602  Last data filed at 9/26/2024 1452  Gross per 24 hour   Intake 1505 ml   Output 665 ml   Net 840 ml     Labs:   Results from last 72 hours   Lab Units 09/26/24  0537 09/25/24  0958   SODIUM mmol/L 133* 134*   POTASSIUM mmol/L 4.7 4.2   CHLORIDE mmol/L 105 106   CO2 mmol/L 22 24   BUN mg/dL 69* 67*   CREATININE mg/dL 1.31* 1.14*   GLUCOSE mg/dL 140* 46*   CALCIUM mg/dL 7.7* 7.7*   ANION GAP mmol/L 11 8*   EGFR mL/min/1.73m*2 44* 53*      Results from last 72 hours   Lab Units 09/26/24  0907 09/25/24  0958   WBC AUTO x10*3/uL 10.2 9.8   HEMOGLOBIN g/dL 8.2* 8.5*   HEMATOCRIT % 25.1* 26.1*   PLATELETS AUTO x10*3/uL 361 322   LYMPHO PCT MAN %  --  2.0   MONO PCT MAN %  --  2.0   EOSINO PCT MAN %  --  1.0      Micro/ID:   Lab Results   Component Value Date    URINECULTURE (A) 09/25/2024     Multiple organisms present, probable contamination. Repeat culture if clinically indicated.    BLOODCULT Loaded on Instrument - Culture in progress 09/25/2024     Summary of key imaging results from the last 24 hours  Small left pleural effusion    Impression   Narda Malloy is a 69 y.o. year old female patient is being seen by the pulmonary service for   Atelectasis in the bases L > R  Left pleural effusion   Total body " fluid over load  hypoalbuminemia    Recommendations   As follows:  Improve nutritional status  Gentle diuresis  Incentive spirometry  Thoracentesis if effusion persists  Being transferred to Katherine Baker MD   09/26/24 at 4:02 PM     Disclaimer: Documentation completed with the information available at the time of input. Parts of this note may have been scribed or generated using voice dictation software, Dragon.  Homophonic errors may exist.  Please contact me directly if clarification is needed. The times in the chart may not be reflective of actual patient care times, interventions, or procedures. Documentation occurs after the physical care of the patient.

## 2024-09-26 NOTE — PROGRESS NOTES
Narda Malloy is a 69 y.o. female on day 1 of admission presenting with Postoperative infection, unspecified type, initial encounter.    Subjective   Patient seen and examined.  Resting in bed in no acute distress.  Spouse bedside.  Awake alert oriented x 3.  No new complaints.  No pain.  No fevers chills or sweats.    Objective     Physical Exam  Vitals and nursing note reviewed.   Constitutional:       General: She is not in acute distress.     Appearance: Normal appearance. She is normal weight. She is not ill-appearing, toxic-appearing or diaphoretic.   HENT:      Head: Normocephalic and atraumatic.      Right Ear: External ear normal.      Left Ear: External ear normal.      Nose: Nose normal.      Mouth/Throat:      Mouth: Mucous membranes are moist.      Pharynx: Oropharynx is clear.   Eyes:      Extraocular Movements: Extraocular movements intact.      Conjunctiva/sclera: Conjunctivae normal.      Pupils: Pupils are equal, round, and reactive to light.   Cardiovascular:      Rate and Rhythm: Normal rate and regular rhythm.      Pulses: Normal pulses.      Heart sounds: Normal heart sounds. No murmur heard.  Pulmonary:      Effort: Pulmonary effort is normal. No respiratory distress.      Breath sounds: Normal breath sounds. No wheezing, rhonchi or rales.      Comments: 2 liters nasal cannula. No oxygen baseline.  Abdominal:      General: Bowel sounds are normal. There is no distension.      Palpations: Abdomen is soft.      Tenderness: There is no abdominal tenderness.   Genitourinary:     Comments: Deferred.  Musculoskeletal:         General: Swelling present. No tenderness. Normal range of motion.      Cervical back: Normal range of motion and neck supple.      Right lower le+ Pitting Edema present.      Left lower le+ Pitting Edema present.   Skin:     General: Skin is warm and dry.      Capillary Refill: Capillary refill takes less than 2 seconds.      Coloration: Skin is pale.      Comments:  "Bilateral lower extremities with knee high compression stockings in place. Wounds not observed.   Neurological:      General: No focal deficit present.      Mental Status: She is alert and oriented to person, place, and time.      Comments: Generalized weakness.  No focal weakness.   Psychiatric:         Mood and Affect: Mood normal.         Behavior: Behavior normal.       Last Recorded Vitals  Blood pressure 106/54, pulse 66, temperature 36.5 °C (97.7 °F), temperature source Oral, resp. rate 18, height 1.753 m (5' 9\"), weight 74.8 kg (165 lb), SpO2 100%.    Intake/Output last 3 Shifts:  I/O last 3 completed shifts:  In: 50 (0.7 mL/kg) [IV Piggyback:50]  Out: 200 (2.7 mL/kg) [Urine:200 (0.1 mL/kg/hr)]  Weight: 74.8 kg     Telemetry -    Relevant Results  Results for orders placed or performed during the hospital encounter of 09/25/24 (from the past 24 hour(s))   Blood Culture    Specimen: Peripheral Venipuncture; Blood culture   Result Value Ref Range    Blood Culture Loaded on Instrument - Culture in progress    Tissue/Wound Culture/Smear    Specimen: Skin Lesion; Tissue/Biopsy   Result Value Ref Range    Tissue/Wound Culture/Smear Culture in progress    Urinalysis with Reflex Culture and Microscopic   Result Value Ref Range    Color, Urine Yellow Light-Yellow, Yellow, Dark-Yellow    Appearance, Urine Turbid (N) Clear    Specific Gravity, Urine 1.020 1.005 - 1.035    pH, Urine 5.0 5.0, 5.5, 6.0, 6.5, 7.0, 7.5, 8.0    Protein, Urine 70 (1+) (A) NEGATIVE, 10 (TRACE), 20 (TRACE) mg/dL    Glucose, Urine Normal Normal mg/dL    Blood, Urine 0.2 (2+) (A) NEGATIVE    Ketones, Urine NEGATIVE NEGATIVE mg/dL    Bilirubin, Urine NEGATIVE NEGATIVE    Urobilinogen, Urine Normal Normal mg/dL    Nitrite, Urine NEGATIVE NEGATIVE    Leukocyte Esterase, Urine 75 Maria De Jesus/µL (A) NEGATIVE   Extra Urine Gray Tube   Result Value Ref Range    Extra Tube Hold for add-ons.    Microscopic Only, Urine   Result Value Ref Range    WBC, Urine 11-20 " (A) 1-5, NONE /HPF    RBC, Urine >20 (A) NONE, 1-2, 3-5 /HPF    Squamous Epithelial Cells, Urine 1-9 (SPARSE) Reference range not established. /HPF    Mucus, Urine FEW Reference range not established. /LPF    Hyaline Casts, Urine 1+ (A) NONE /LPF    Fine Granular Casts, Urine OCCASIONAL (A) NONE /LPF   POCT GLUCOSE   Result Value Ref Range    POCT Glucose 105 (H) 74 - 99 mg/dL   POCT GLUCOSE   Result Value Ref Range    POCT Glucose 98 74 - 99 mg/dL   Comprehensive Metabolic Panel   Result Value Ref Range    Glucose 140 (H) 74 - 99 mg/dL    Sodium 133 (L) 136 - 145 mmol/L    Potassium 4.7 3.5 - 5.3 mmol/L    Chloride 105 98 - 107 mmol/L    Bicarbonate 22 21 - 32 mmol/L    Anion Gap 11 10 - 20 mmol/L    Urea Nitrogen 69 (H) 6 - 23 mg/dL    Creatinine 1.31 (H) 0.50 - 1.05 mg/dL    eGFR 44 (L) >60 mL/min/1.73m*2    Calcium 7.7 (L) 8.6 - 10.3 mg/dL    Albumin 2.1 (L) 3.4 - 5.0 g/dL    Alkaline Phosphatase 172 (H) 33 - 136 U/L    Total Protein 4.9 (L) 6.4 - 8.2 g/dL    AST 10 9 - 39 U/L    Bilirubin, Total 0.3 0.0 - 1.2 mg/dL    ALT 9 7 - 45 U/L   Vancomycin   Result Value Ref Range    Vancomycin 8.4 5.0 - 20.0 ug/mL   POCT GLUCOSE   Result Value Ref Range    POCT Glucose 129 (H) 74 - 99 mg/dL   CBC   Result Value Ref Range    WBC 10.2 4.4 - 11.3 x10*3/uL    nRBC 0.0 0.0 - 0.0 /100 WBCs    RBC 2.57 (L) 4.00 - 5.20 x10*6/uL    Hemoglobin 8.2 (L) 12.0 - 16.0 g/dL    Hematocrit 25.1 (L) 36.0 - 46.0 %    MCV 98 80 - 100 fL    MCH 31.9 26.0 - 34.0 pg    MCHC 32.7 32.0 - 36.0 g/dL    RDW 13.5 11.5 - 14.5 %    Platelets 361 150 - 450 x10*3/uL     Susceptibility data from last 90 days.  Collected Specimen Info Organism   08/12/24 Swab from Nares/Axilla/Groin Methicillin Resistant Staphylococcus aureus (MRSA)     XR chest 1 view    Result Date: 9/25/2024  Interpreted By:  Andrew Byrd, STUDY: XR CHEST 1 VIEW; 9/25/2024 11:02 am   INDICATION: CLINICAL INFORMATION: Signs/Symptoms:malaise/fatigue.   COMPARISON: 05/15/2018    ACCESSION NUMBER(S): [id number]   ORDERING CLINICIAN: DALILA GEORGE   TECHNIQUE: Portable chest one view.   FINDINGS: The cardiac size is indeterminate in view of the AP projection. Dense material overlies the mid to upper thoracic spine suggesting previous vertebroplasty. Bilateral pedicle screw and bar fusion involves the thoracic spine. Is present consistent with remote granulomatous disease.  Patchy bilateral perihilar infiltrate suspected. Effusions noted on the CT lumbar spine are more difficult to appreciate on this plain film study.       1. Patchy bilateral perihilar infiltrates along with bilateral effusions. Effusions are better appreciated on the CT lumbar spine exam. 2. Bilateral pedicle screw and bar fusion thoracic spine.   MACRO: none   Signed by: Andrew Byrd 9/25/2024 11:08 AM Dictation workstation:   [id number]    CT lumbar spine wo IV contrast    Result Date: 9/25/2024  Interpreted By:  Andrew Byrd, STUDY: CT LUMBAR SPINE WO IV CONTRAST; 9/25/2024 10:27 am   INDICATION: Signs/Symptoms:r/o post operative infection. Patient is status post L1-2 L3 lumbar laminectomy revision and L1-L2 osteotomy an interbody fusion. L4 through S1 fusion performed 08/26/2024. Persistent pain and difficulty walking and standing since surgery.   COMPARISON: 07/15/2024   ACCESSION NUMBER(S): [id number]   ORDERING CLINICIAN: DALILA GEORGE   TECHNIQUE: A helical axial data set was obtained and multiplanar reconstructions performed.   One or more of the following dose reduction techniques were used: Automated exposure control Adjustment of the mA and/or kV according to patient size, and/or use of iterative reconstruction technique.   FINDINGS: There is a lumbar levoscoliosis. There is a bilateral pedicle screw and bar fusion extending from S1 to a least T10 with absent pedicle screws on the left at L1, T12, and T11 and on the right at L3. There is a surgical tract from a removed pedicle screw on the right at L3.   The  fusion above the L3 level is new compared to the previous study from 07/15/2024. There is a new interbody spacer at L1-2 resulting in transformation of the kyphotic appearance of the spine at L1-2 to a more lordotic appearance at L1-2. There are erosive changes of the inferior endplate of L1 and superior endplate of L2 although it is difficult to determine if these are new compared to the severe degenerative findings of the L1 and L2 vertebral bodies on the prior study. There appears to be some fragmentation of the right superolateral corner of L2 with the right L2 pedicle screw traversing this corner rather than being imbedded within the central portion of the vertebral body itself.     L1-L2: Spinal canal paraspinous soft tissues are difficult to assess at this level due to extensive artifact from the fixation devices.   L2-L3: Within limits of this study spinal canal appears capacious at this level along with the associated intervertebral foramina.   L3-L4: Within limits of this study spinal canal appears capacious at this level along with the associated intervertebral foramina.   L4-L5: Within limits of this study spinal canal appears capacious at this level along with the associated intervertebral foramina.   L5-S1: Within limits of this study spinal canal appears capacious at this level along with the associated intervertebral foramina.       1. Marked limitations are noted due to extensive artifact from the fixation devices. This limits the sensitivity and specificity of the exam. 2. Postsurgical appearance of the spine as described. 3. It is difficult to definitively determine whether the erosive changes at the L1-2 level are related to the previous degenerative change or whether there could be superimposed infection at this disc level, status post interbody spacer placement. MRI of the lumbar spine with and without contrast may be more specific for assessment of concern relative to possible postoperative  infection. 4. Incidental note is made of what appears to be distended urinary bladder personal included within the field of view. 5. Incidental note is made of the presence of bilateral pleural effusions, larger on the left.   MACRO: none   Signed by: Andrew Byrd 9/25/2024 11:03 AM Dictation workstation:   [id number]     Scheduled medications  acetaminophen, 1,000 mg, oral, TID  ascorbic acid, 500 mg, oral, Daily  brimonidine, 1 drop, Both Eyes, BID  calcium carbonate-vitamin D3, 1 tablet, oral, Daily  docusate sodium, 100 mg, oral, BID  pantoprazole, 40 mg, oral, Daily before breakfast   Or  esomeprazole, 40 mg, nasoduodenal tube, Daily before breakfast   Or  pantoprazole, 40 mg, intravenous, Daily before breakfast  ezetimibe, 10 mg, oral, Daily  lactobacillus acidophilus, 1 tablet, oral, BID  latanoprost, 1 drop, Both Eyes, Nightly  methocarbamol, 500 mg, oral, TID  oxygen, , inhalation, Continuous - Inhalation  piperacillin-tazobactam, 3.375 g, intravenous, q6h  polyethylene glycol, 17 g, oral, Daily  primidone, 125 mg, oral, TID  propranolol LA, 60 mg, oral, Daily  traZODone, 25 mg, oral, Nightly  vancomycin, 1 g, intravenous, q24h      Continuous medications     PRN medications  PRN medications: dextrose, dextrose, glucagon, glucagon, melatonin, ondansetron, sennosides, vancomycin      ASSESSMENT:  Suspected post-operative surgical wound infection  Spinal stenosis - status post lumbar spinal surgery  Fatigue  Generalized weakness  Mobility impairment  Wounds  Pressure ulcer risk  Hyponatremia  Acute kidney injury, worsening  Distended bladder rule out urinary retention  Pyuria rule out UTI  Acute hypoxic respiratory failure   Pulmonary infiltrates  Bilateral pleural effusions  Sarcoidosis  Type 2 diabetes mellitus  Hypertension  Obesity  Essential Tremor  Glaucoma   Cataract    PLAN:  Infectious disease input appreciated.  Recommendations reviewed.  Consider transfer to [facility] (spinal surgery 8/26/2024).   Discussed with patient and spouse, they would prefer to stay at [facility] though they are in agreement if transfer recommended by team Infectious disease and Dr. Hurd.  Follow-up.  9/25/2024 2/2 blood cultures in progress.  9/25/2024 spinal wound culture pending.  Urine culture pending.  Follow-up.  Monitor temperature and white blood cell count.  Continue broad-spectrum IV antibiotics per Infectious disease.  IV Zosyn.  IV Vancomycin.  Pharmacy dosing Vancomycin.  Renal dosing.  Monitor Vancomycin level and renal function.  Labs reviewed.  Acute kidney injury worsening.  Post-void residual bladder scan performed last shift: voided 200, 268 mL post-void.  Repeat post-void residual bladder scan.  Follow-up.  Consult Nephrology.  Respiratory status consult oximetry stable 100% on 2 L nasal cannula.  Noted pulmonary infiltrates, bilateral pleural effusions on chest x-ray, pleural effusions noted on CT lumbar spine.  Pro BNP stable 175.  Continue oxygen.  Titrate to room air as saturations allow.  Monitor respiratory status and pulse oximetry.  Monitor fluid volume status.  H&H noted.  No evidence of acute blood loss.  History of anemia requiring a blood transfusion postoperatively.  There is no evidence of acute blood loss at this time.  Iron deficient.  Ferritin elevated.  IV Venofer.  Monitor H&H.  Hemoglobin A1c 6.2.  Point-of-care glucose reviewed.  Hypoglycemia improved.  Continue to monitor point-of-care glucose AC/HS.  Hypoglycemia protocol.  May add sliding scale insulin if needed for glycemic control.  PT/OT evaluation.  Clarify activity restrictions, if any.  Follow-up.  Fall precautions.  Up with assistance only.  Bed and chair alarm at all times.  Pressure ulcer prevention measures.  Turn and reposition every 2 hours and as needed.  Heels off bed.  Offloading.  Wound care nursing evaluation.  Supportive care.  Patient reassured.  Case management following for discharge planning.  Anticipate  discharge to facility.  Discussed with patient, spouse, nursing.  Discussed with Dr. Hurd.    ADDENDUM:  Consultations input appreciated.  CT chest without contrast, Pulmonology consultation for evaluation of infiltrates, effusions per Nephrology.  Discussed with Dr. Hurd,  spoke with patient and spouse, agreeable to transfer to [facility].  Transfer order request placed.  Spoke with transfer center.  Awaiting call back.  Per nursing, blood cultures + gram positive cocci clusters.  Infectious disease updated.  Continue current antibiotics.      ADDENDUM:  Spoke with transfer center, Dr. Patel Hospitalist, accepted patient with consultation to Ortho surgery.  Spoke with Dr. Ventura Ortho surgery, also agreeable to transfer.  Okay for physical therapy.  Transfer to [facility] when bed available.  Updated team.      LEONEL Alonzo-CNP

## 2024-09-26 NOTE — NURSING NOTE
Assumed patient care. Patient just arrived to unit. NS running at 100 ml. 20 G RAC and a midline to LUE. Infected surgical incision to middle of back dressing is occlusive with a small amount purulent drainage. Heels elevated off bed. Sores to right big toe, right shin, and left sole.  is at bedside.

## 2024-09-26 NOTE — PROGRESS NOTES
Seeing patient for postoperative wound infection of lumbar spine.  Patient is resting comfortably at this time.  No new issues overnight.  This afternoon 1 of 2 sets of admission blood cultures returned positive for gram-positive cocci.    Zosyn D1  Vanco D1    36.5    36.5  Lung: Clear to auscultation bilaterally  Cardio: RRR, S1-S2 normal  Abdomen: NABS, soft, nontender nondistended.    WBC: 10.2  Creatinine: 1.31    Urine culture (9/25): Mixed neva  Blood culture (9/25): 1/2 sets gram-positive cocci clusters  Wound culture (9/25): Gram stain: No WBCs, mixed gram-positive and gram-negative organisms.  Culture: Pending    Impression:  1.  Postoperative wound infection lumbar spine  2.  Gram-positive cocci bloodstream infection  --Question real versus contaminant culture.  Need to wait final ID and susceptibility.    Plan:  1.  Continue Zosyn.  Monitor for adverse antibiotic vents.  2.  Continue vancomycin.  Pharmacy to dose.  Monitor creatinine and vancomycin levels for toxicity.  3.  Await final ID and susceptibility of blood isolate.  4.  Await wound culture results  5.  Agree with plans for transfer to [facility] for evaluation by her neurosurgeon.  Transfer is in process.  Discussed with Mohini Flores CNP.  6. Following.    Simón Beaulieu MD  [facility]  [phone]

## 2024-09-26 NOTE — PROGRESS NOTES
PT/OT ordered, will need notes to initiate precert to attempt to get pt back to [facility] for skilled rehab.      09/26/24 1202   Discharge Planning   Expected Discharge Disposition SNF  ([facility])

## 2024-09-26 NOTE — CONSULTS
Consults    Reason For Consult  Acute Kidney Injury    History Of Present Illness  Narda Malloy is a 69 y.o. female with a past medical history of diabetes mellitus type 2, hypertension who presented to the hospital with weakness, fatigue, she is status post back surgery and had a wound which became infected.  She reports her weakness improved after back surgery.  She has been having some diarrhea and anxiety as well.  She reports that she has been a heavy NSAID user has been taking Aleve daily for years.  We are consulted as she went into acute kidney injury creatinine now up to 1.3.  Baseline creatinine 0.6-0.8.  She was found notably to have pleural effusions and pulmonary infiltrates on chest x-ray.  We are consulted for management of acute kidney injury.     Past Medical History  She has a past medical history of Degenerative myopia, bilateral, Diabetes mellitus with stable proliferative retinopathy of both eyes, without long-term current use of insulin (Multi), Diabetic neuropathy (Multi), DM type 2 (diabetes mellitus, type 2) (Multi), Dry eye syndrome of bilateral lacrimal glands, Essential hypertension, Essential tremor, Glaucoma, Hyperlipidemia, Long term (current) use of insulin (Multi), Low back pain, Primary open angle glaucoma of both eyes, severe stage, Repeated falls, Sarcoidosis of lung (Multi), Spinal stenosis, lumbar region without neurogenic claudication, and Weakness.    Surgical History  She has a past surgical history that includes Carpal tunnel release (03/21/2017); Back surgery (03/21/2017); Vitrectomy (Right, 2013); Cataract extraction w/  intraocular lens implant (Bilateral); Panretinal photocoagulation (2014); Other surgical history (Bilateral, 07/2022); Other surgical history (05/2022); Foot surgery; and Insertion / removal cranial DBS generator.     Social History  She reports that she has quit smoking. Her smoking use included cigarettes. She has been exposed to tobacco smoke. She has  never used smokeless tobacco. She reports that she does not currently use alcohol. She reports that she does not currently use drugs.    Family History  Family History   Problem Relation Name Age of Onset    Multiple myeloma Mother      Cancer Mother      Other (CABG) Father      Pulmonary embolism Father      Heart disease Father      Breast cancer Sister          Stage II    Hypertension Sister      Diabetes Sister      No Known Problems Sister          x5    No Known Problems Brother          x4    No Known Problems Daughter          Allergies  Erythromycin, Morphine, and Rosuvastatin    Review of Systems   Constitutional:  Positive for fatigue. Negative for chills and fever.   HENT:  Negative for congestion and sinus pain.    Respiratory:  Negative for cough, chest tightness and shortness of breath.    Cardiovascular:  Negative for chest pain and leg swelling.   Gastrointestinal:  Positive for diarrhea. Negative for nausea and vomiting.   Endocrine: Negative for polydipsia, polyphagia and polyuria.   Genitourinary:  Negative for dysuria and hematuria.   Musculoskeletal:  Positive for back pain. Negative for myalgias.   Skin:  Negative for color change and rash.   Neurological:  Positive for weakness. Negative for dizziness, syncope and headaches.   Psychiatric/Behavioral:  Negative for confusion and hallucinations. The patient is nervous/anxious.           Physical Exam  Constitutional:       General: She is awake. She is not in acute distress.     Appearance: She is not toxic-appearing.   HENT:      Head: Normocephalic and atraumatic.      Mouth/Throat:      Mouth: Mucous membranes are moist.   Eyes:      General: No scleral icterus.     Comments: Conjunctiva clear   Neck:      Vascular: No JVD.   Cardiovascular:      Rate and Rhythm: Regular rhythm.      Heart sounds:      No friction rub.   Pulmonary:      Effort: Pulmonary effort is normal.      Breath sounds: Normal breath sounds.   Abdominal:      General:  "Bowel sounds are normal.      Palpations: Abdomen is soft.      Tenderness: There is no guarding or rebound.   Musculoskeletal:      Cervical back: Neck supple.      Comments: 1+ edema, no cyanosis   Skin:     General: Skin is warm and dry.   Neurological:      Mental Status: She is alert.      Comments: Cooperative with exam   Psychiatric:         Mood and Affect: Mood and affect normal.            Last Recorded Vitals  Blood pressure (!) 100/44, pulse 89, temperature 36 °C (96.8 °F), temperature source Oral, resp. rate 18, height 1.753 m (5' 9\"), weight 74.8 kg (165 lb), SpO2 98%.    Relevant Results  Results for orders placed or performed during the hospital encounter of 09/25/24 (from the past 24 hour(s))   POCT GLUCOSE   Result Value Ref Range    POCT Glucose 98 74 - 99 mg/dL   Vitamin B12   Result Value Ref Range    Vitamin B12 1,559 (H) 211 - 911 pg/mL   Folate   Result Value Ref Range    Folate, Serum 9.2 >5.0 ng/mL   Comprehensive Metabolic Panel   Result Value Ref Range    Glucose 140 (H) 74 - 99 mg/dL    Sodium 133 (L) 136 - 145 mmol/L    Potassium 4.7 3.5 - 5.3 mmol/L    Chloride 105 98 - 107 mmol/L    Bicarbonate 22 21 - 32 mmol/L    Anion Gap 11 10 - 20 mmol/L    Urea Nitrogen 69 (H) 6 - 23 mg/dL    Creatinine 1.31 (H) 0.50 - 1.05 mg/dL    eGFR 44 (L) >60 mL/min/1.73m*2    Calcium 7.7 (L) 8.6 - 10.3 mg/dL    Albumin 2.1 (L) 3.4 - 5.0 g/dL    Alkaline Phosphatase 172 (H) 33 - 136 U/L    Total Protein 4.9 (L) 6.4 - 8.2 g/dL    AST 10 9 - 39 U/L    Bilirubin, Total 0.3 0.0 - 1.2 mg/dL    ALT 9 7 - 45 U/L   Vancomycin   Result Value Ref Range    Vancomycin 8.4 5.0 - 20.0 ug/mL   Creatine Kinase   Result Value Ref Range    Creatine Kinase 20 0 - 215 U/L   POCT GLUCOSE   Result Value Ref Range    POCT Glucose 129 (H) 74 - 99 mg/dL   CBC   Result Value Ref Range    WBC 10.2 4.4 - 11.3 x10*3/uL    nRBC 0.0 0.0 - 0.0 /100 WBCs    RBC 2.57 (L) 4.00 - 5.20 x10*6/uL    Hemoglobin 8.2 (L) 12.0 - 16.0 g/dL    " Hematocrit 25.1 (L) 36.0 - 46.0 %    MCV 98 80 - 100 fL    MCH 31.9 26.0 - 34.0 pg    MCHC 32.7 32.0 - 36.0 g/dL    RDW 13.5 11.5 - 14.5 %    Platelets 361 150 - 450 x10*3/uL   POCT GLUCOSE   Result Value Ref Range    POCT Glucose 179 (H) 74 - 99 mg/dL          Assessment/Plan   Acute kidney injury in the setting of heavy NSAID use, lisinopril and infection  2. Pleural effusions  3. Hypertension  4. History of sarcoidosis  5. Diabetes mellitus type 2  6. Skin Wound    Plan: Check renal ultrasound and post void bladder scan. Counseled on avoiding NSAIDs. Hold ace inhibitor. Stop IV fluids given concern for volume overload/pleural effusions. Discussed with primary team, recommend checking CT non-contrast of chest and will need to be re-established with Pulmonologist. Monitor culture results. Thank you for your consultation.     Nelia Cheung MD

## 2024-09-26 NOTE — CARE PLAN
The patient's goals for the shift include      The clinical goals for the shift include IV ATB monitor vitals manage pain      Problem: Skin  Goal: Decreased wound size/increased tissue granulation at next dressing change  Outcome: Progressing  Flowsheets (Taken 9/26/2024 1411)  Decreased wound size/increased tissue granulation at next dressing change:   Promote sleep for wound healing   Protective dressings over bony prominences   Utilize specialty bed per algorithm  Goal: Participates in plan/prevention/treatment measures  Outcome: Progressing  Flowsheets (Taken 9/26/2024 1411)  Participates in plan/prevention/treatment measures:   Discuss with provider PT/OT consult   Elevate heels   Increase activity/out of bed for meals  Goal: Prevent/manage excess moisture  Outcome: Progressing  Flowsheets (Taken 9/26/2024 1411)  Prevent/manage excess moisture:   Cleanse incontinence/protect with barrier cream   Monitor for/manage infection if present   Follow provider orders for dressing changes   Use wicking fabric (obtain order)   Moisturize dry skin  Goal: Prevent/minimize sheer/friction injuries  Outcome: Progressing  Flowsheets (Taken 9/26/2024 1411)  Prevent/minimize sheer/friction injuries:   Complete micro-shifts as needed if patient unable. Adjust patient position to relieve pressure points, not a full turn   Increase activity/out of bed for meals   Use pull sheet   HOB 30 degrees or less   Turn/reposition every 2 hours/use positioning/transfer devices   Utilize specialty bed per algorithm  Goal: Promote/optimize nutrition  Outcome: Progressing  Flowsheets (Taken 9/26/2024 1411)  Promote/optimize nutrition:   Assist with feeding   Monitor/record intake including meals   Consume > 50% meals/supplements   Offer water/supplements/favorite foods  Goal: Promote skin healing  Outcome: Progressing  Flowsheets (Taken 9/26/2024 1411)  Promote skin healing:   Assess skin/pad under line(s)/device(s)   Protective dressings over  bony prominences   Turn/reposition every 2 hours/use positioning/transfer devices   Ensure correct size (line/device) and apply per  instructions   Rotate device position/do not position patient on device     Problem: Pain - Adult  Goal: Verbalizes/displays adequate comfort level or baseline comfort level  Outcome: Progressing     Problem: Safety - Adult  Goal: Free from fall injury  Outcome: Progressing     Problem: Discharge Planning  Goal: Discharge to home or other facility with appropriate resources  Outcome: Progressing     Problem: Diabetes  Goal: I will have no complications due to diabetes  Outcome: Progressing

## 2024-09-26 NOTE — PROGRESS NOTES
Occupational Therapy                 Therapy Communication Note    Patient Name: Narda Malloy  MRN: [id number]  Department: [facility] E  Room: [facility]444-A  Today's Date: 9/26/2024     Discipline: Occupational Therapy    Missed Visit Reason: Missed Visit Reason: Cancel (pt is admitted from SNF following extensive spine surgery. OT evaluation pending placement of activity orders.)    Missed Time: Cancel    Comment:   <-- Click to add NO significant Past Surgical History

## 2024-09-26 NOTE — PROGRESS NOTES
Physical Therapy                 Therapy Communication Note    Patient Name: Narda Malloy  MRN: [id number]  Department: [facility] E  Room: [facility]444-A  Today's Date: 9/26/2024     Discipline: Physical Therapy    Missed Visit Reason: Missed Visit Reason: Cancel (pt is admitted from SNF following extensive spine surgery. PT evaluation pending placement of activity orders.)    Missed Time: Cancel    Comment: PT eval to be completed after activity orders have been placed by primary team.

## 2024-09-27 ENCOUNTER — TRANSCRIBE ORDERS (OUTPATIENT)
Dept: ORTHOPEDIC SURGERY | Facility: HOSPITAL | Age: 69
End: 2024-09-27
Payer: MEDICARE

## 2024-09-27 ENCOUNTER — HOSPITAL ENCOUNTER (OUTPATIENT)
Facility: HOSPITAL | Age: 69
Setting detail: SURGERY ADMIT
End: 2024-09-27
Attending: ORTHOPAEDIC SURGERY | Admitting: ORTHOPAEDIC SURGERY
Payer: MEDICARE

## 2024-09-27 ENCOUNTER — HOSPITAL ENCOUNTER (INPATIENT)
Facility: HOSPITAL | Age: 69
End: 2024-09-27
Attending: INTERNAL MEDICINE | Admitting: HOSPITALIST
Payer: MEDICARE

## 2024-09-27 ENCOUNTER — ANESTHESIA EVENT (OUTPATIENT)
Dept: OPERATING ROOM | Facility: HOSPITAL | Age: 69
End: 2024-09-27
Payer: MEDICARE

## 2024-09-27 VITALS
BODY MASS INDEX: 24.44 KG/M2 | HEIGHT: 69 IN | SYSTOLIC BLOOD PRESSURE: 110 MMHG | DIASTOLIC BLOOD PRESSURE: 47 MMHG | TEMPERATURE: 98.1 F | OXYGEN SATURATION: 93 % | RESPIRATION RATE: 20 BRPM | HEART RATE: 71 BPM | WEIGHT: 165 LBS

## 2024-09-27 DIAGNOSIS — T81.49XA SURGICAL SITE INFECTION: ICD-10-CM

## 2024-09-27 DIAGNOSIS — T81.40XA POSTOPERATIVE INFECTION, UNSPECIFIED TYPE, INITIAL ENCOUNTER: ICD-10-CM

## 2024-09-27 DIAGNOSIS — R78.81 BACTEREMIA: ICD-10-CM

## 2024-09-27 DIAGNOSIS — T81.49XA SURGICAL WOUND INFECTION: Primary | ICD-10-CM

## 2024-09-27 LAB
ALBUMIN SERPL BCP-MCNC: 1.9 G/DL (ref 3.4–5)
ANION GAP SERPL CALCULATED.3IONS-SCNC: 11 MMOL/L (ref 10–20)
BACTERIA SPEC CULT: ABNORMAL
BUN SERPL-MCNC: 66 MG/DL (ref 6–23)
CALCIUM SERPL-MCNC: 7.6 MG/DL (ref 8.6–10.3)
CHLORIDE SERPL-SCNC: 106 MMOL/L (ref 98–107)
CO2 SERPL-SCNC: 22 MMOL/L (ref 21–32)
CREAT SERPL-MCNC: 1.17 MG/DL (ref 0.5–1.05)
EGFRCR SERPLBLD CKD-EPI 2021: 51 ML/MIN/1.73M*2
ERYTHROCYTE [DISTWIDTH] IN BLOOD BY AUTOMATED COUNT: 13.4 % (ref 11.5–14.5)
GLUCOSE BLD MANUAL STRIP-MCNC: 187 MG/DL (ref 74–99)
GLUCOSE BLD MANUAL STRIP-MCNC: 199 MG/DL (ref 74–99)
GLUCOSE BLD MANUAL STRIP-MCNC: 222 MG/DL (ref 74–99)
GLUCOSE BLD MANUAL STRIP-MCNC: 248 MG/DL (ref 74–99)
GLUCOSE SERPL-MCNC: 225 MG/DL (ref 74–99)
GRAM STN SPEC: ABNORMAL
GRAM STN SPEC: ABNORMAL
HCT VFR BLD AUTO: 25.1 % (ref 36–46)
HGB BLD-MCNC: 8.3 G/DL (ref 12–16)
MCH RBC QN AUTO: 31.7 PG (ref 26–34)
MCHC RBC AUTO-ENTMCNC: 33.1 G/DL (ref 32–36)
MCV RBC AUTO: 96 FL (ref 80–100)
NRBC BLD-RTO: 0 /100 WBCS (ref 0–0)
PHOSPHATE SERPL-MCNC: 3.3 MG/DL (ref 2.5–4.9)
PLATELET # BLD AUTO: 369 X10*3/UL (ref 150–450)
POTASSIUM SERPL-SCNC: 4.5 MMOL/L (ref 3.5–5.3)
RBC # BLD AUTO: 2.62 X10*6/UL (ref 4–5.2)
SODIUM SERPL-SCNC: 134 MMOL/L (ref 136–145)
VANCOMYCIN SERPL-MCNC: 11.6 UG/ML (ref 5–20)
WBC # BLD AUTO: 8.3 X10*3/UL (ref 4.4–11.3)

## 2024-09-27 PROCEDURE — 97162 PT EVAL MOD COMPLEX 30 MIN: CPT | Mod: GP

## 2024-09-27 PROCEDURE — 82947 ASSAY GLUCOSE BLOOD QUANT: CPT

## 2024-09-27 PROCEDURE — 1100000001 HC PRIVATE ROOM DAILY

## 2024-09-27 PROCEDURE — 2500000004 HC RX 250 GENERAL PHARMACY W/ HCPCS (ALT 636 FOR OP/ED): Performed by: NURSE PRACTITIONER

## 2024-09-27 PROCEDURE — 2500000005 HC RX 250 GENERAL PHARMACY W/O HCPCS: Performed by: NURSE PRACTITIONER

## 2024-09-27 PROCEDURE — 2500000001 HC RX 250 WO HCPCS SELF ADMINISTERED DRUGS (ALT 637 FOR MEDICARE OP): Performed by: NURSE PRACTITIONER

## 2024-09-27 PROCEDURE — 87040 BLOOD CULTURE FOR BACTERIA: CPT | Mod: WESLAB | Performed by: INTERNAL MEDICINE

## 2024-09-27 PROCEDURE — 85027 COMPLETE CBC AUTOMATED: CPT | Performed by: INTERNAL MEDICINE

## 2024-09-27 PROCEDURE — 36415 COLL VENOUS BLD VENIPUNCTURE: CPT | Performed by: INTERNAL MEDICINE

## 2024-09-27 PROCEDURE — 97535 SELF CARE MNGMENT TRAINING: CPT | Mod: GO

## 2024-09-27 PROCEDURE — 80069 RENAL FUNCTION PANEL: CPT | Performed by: INTERNAL MEDICINE

## 2024-09-27 PROCEDURE — 97165 OT EVAL LOW COMPLEX 30 MIN: CPT | Mod: GO

## 2024-09-27 PROCEDURE — 2500000004 HC RX 250 GENERAL PHARMACY W/ HCPCS (ALT 636 FOR OP/ED): Mod: JZ | Performed by: INTERNAL MEDICINE

## 2024-09-27 PROCEDURE — 2500000002 HC RX 250 W HCPCS SELF ADMINISTERED DRUGS (ALT 637 FOR MEDICARE OP, ALT 636 FOR OP/ED): Performed by: NURSE PRACTITIONER

## 2024-09-27 PROCEDURE — 80202 ASSAY OF VANCOMYCIN: CPT | Performed by: NURSE PRACTITIONER

## 2024-09-27 PROCEDURE — 2500000002 HC RX 250 W HCPCS SELF ADMINISTERED DRUGS (ALT 637 FOR MEDICARE OP, ALT 636 FOR OP/ED): Performed by: INTERNAL MEDICINE

## 2024-09-27 RX ORDER — INSULIN LISPRO 100 [IU]/ML
0-15 INJECTION, SOLUTION INTRAVENOUS; SUBCUTANEOUS
Status: DISCONTINUED | OUTPATIENT
Start: 2024-09-27 | End: 2024-09-27 | Stop reason: HOSPADM

## 2024-09-27 RX ORDER — HEPARIN SODIUM 5000 [USP'U]/ML
5000 INJECTION, SOLUTION INTRAVENOUS; SUBCUTANEOUS EVERY 8 HOURS
Start: 2024-09-27

## 2024-09-27 RX ORDER — INSULIN LISPRO 100 [IU]/ML
0-15 INJECTION, SOLUTION INTRAVENOUS; SUBCUTANEOUS
Start: 2024-09-27

## 2024-09-27 RX ORDER — CEFAZOLIN SODIUM 2 G/100ML
2 INJECTION, SOLUTION INTRAVENOUS EVERY 8 HOURS
Start: 2024-09-27 | End: 2024-10-04 | Stop reason: HOSPADM

## 2024-09-27 RX ORDER — CEFAZOLIN SODIUM 2 G/100ML
2 INJECTION, SOLUTION INTRAVENOUS EVERY 8 HOURS
Status: DISCONTINUED | OUTPATIENT
Start: 2024-09-27 | End: 2024-09-27 | Stop reason: HOSPADM

## 2024-09-27 RX ORDER — HEPARIN SODIUM 5000 [USP'U]/ML
5000 INJECTION, SOLUTION INTRAVENOUS; SUBCUTANEOUS EVERY 8 HOURS SCHEDULED
Status: DISCONTINUED | OUTPATIENT
Start: 2024-09-27 | End: 2024-09-27 | Stop reason: HOSPADM

## 2024-09-27 SDOH — ECONOMIC STABILITY: INCOME INSECURITY: HOW HARD IS IT FOR YOU TO PAY FOR THE VERY BASICS LIKE FOOD, HOUSING, MEDICAL CARE, AND HEATING?: HARD

## 2024-09-27 SDOH — ECONOMIC STABILITY: FOOD INSECURITY: WITHIN THE PAST 12 MONTHS, THE FOOD YOU BOUGHT JUST DIDN'T LAST AND YOU DIDN'T HAVE MONEY TO GET MORE.: NEVER TRUE

## 2024-09-27 SDOH — HEALTH STABILITY: MENTAL HEALTH: HOW OFTEN DO YOU HAVE 6 OR MORE DRINKS ON ONE OCCASION?: NEVER

## 2024-09-27 SDOH — HEALTH STABILITY: MENTAL HEALTH: HOW OFTEN DO YOU HAVE A DRINK CONTAINING ALCOHOL?: NEVER

## 2024-09-27 SDOH — SOCIAL STABILITY: SOCIAL INSECURITY: WITHIN THE LAST YEAR, HAVE YOU BEEN HUMILIATED OR EMOTIONALLY ABUSED IN OTHER WAYS BY YOUR PARTNER OR EX-PARTNER?: NO

## 2024-09-27 SDOH — SOCIAL STABILITY: SOCIAL INSECURITY: HAVE YOU HAD THOUGHTS OF HARMING ANYONE ELSE?: NO

## 2024-09-27 SDOH — SOCIAL STABILITY: SOCIAL INSECURITY: HAS ANYONE EVER THREATENED TO HURT YOUR FAMILY OR YOUR PETS?: NO

## 2024-09-27 SDOH — HEALTH STABILITY: MENTAL HEALTH: HOW MANY STANDARD DRINKS CONTAINING ALCOHOL DO YOU HAVE ON A TYPICAL DAY?: PATIENT DOES NOT DRINK

## 2024-09-27 SDOH — ECONOMIC STABILITY: HOUSING INSECURITY: AT ANY TIME IN THE PAST 12 MONTHS, WERE YOU HOMELESS OR LIVING IN A SHELTER (INCLUDING NOW)?: NO

## 2024-09-27 SDOH — SOCIAL STABILITY: SOCIAL INSECURITY: WITHIN THE LAST YEAR, HAVE YOU BEEN AFRAID OF YOUR PARTNER OR EX-PARTNER?: NO

## 2024-09-27 SDOH — SOCIAL STABILITY: SOCIAL INSECURITY: ABUSE: ADULT

## 2024-09-27 SDOH — SOCIAL STABILITY: SOCIAL INSECURITY: DO YOU FEEL UNSAFE GOING BACK TO THE PLACE WHERE YOU ARE LIVING?: NO

## 2024-09-27 SDOH — SOCIAL STABILITY: SOCIAL NETWORK: HOW OFTEN DO YOU GET TOGETHER WITH FRIENDS OR RELATIVES?: MORE THAN THREE TIMES A WEEK

## 2024-09-27 SDOH — SOCIAL STABILITY: SOCIAL NETWORK: ARE YOU MARRIED, WIDOWED, DIVORCED, SEPARATED, NEVER MARRIED, OR LIVING WITH A PARTNER?: MARRIED

## 2024-09-27 SDOH — SOCIAL STABILITY: SOCIAL INSECURITY: WERE YOU ABLE TO COMPLETE ALL THE BEHAVIORAL HEALTH SCREENINGS?: YES

## 2024-09-27 SDOH — SOCIAL STABILITY: SOCIAL INSECURITY: ARE THERE ANY APPARENT SIGNS OF INJURIES/BEHAVIORS THAT COULD BE RELATED TO ABUSE/NEGLECT?: NO

## 2024-09-27 SDOH — ECONOMIC STABILITY: FOOD INSECURITY: WITHIN THE PAST 12 MONTHS, YOU WORRIED THAT YOUR FOOD WOULD RUN OUT BEFORE YOU GOT MONEY TO BUY MORE.: NEVER TRUE

## 2024-09-27 SDOH — ECONOMIC STABILITY: INCOME INSECURITY: IN THE LAST 12 MONTHS, WAS THERE A TIME WHEN YOU WERE NOT ABLE TO PAY THE MORTGAGE OR RENT ON TIME?: NO

## 2024-09-27 SDOH — ECONOMIC STABILITY: INCOME INSECURITY: IN THE PAST 12 MONTHS, HAS THE ELECTRIC, GAS, OIL, OR WATER COMPANY THREATENED TO SHUT OFF SERVICE IN YOUR HOME?: NO

## 2024-09-27 SDOH — SOCIAL STABILITY: SOCIAL INSECURITY: DOES ANYONE TRY TO KEEP YOU FROM HAVING/CONTACTING OTHER FRIENDS OR DOING THINGS OUTSIDE YOUR HOME?: NO

## 2024-09-27 SDOH — SOCIAL STABILITY: SOCIAL NETWORK: HOW OFTEN DO YOU ATTEND CHURCH OR RELIGIOUS SERVICES?: NEVER

## 2024-09-27 SDOH — HEALTH STABILITY: PHYSICAL HEALTH: ON AVERAGE, HOW MANY MINUTES DO YOU ENGAGE IN EXERCISE AT THIS LEVEL?: 30 MIN

## 2024-09-27 SDOH — ECONOMIC STABILITY: HOUSING INSECURITY: IN THE PAST 12 MONTHS, HOW MANY TIMES HAVE YOU MOVED WHERE YOU WERE LIVING?: 0

## 2024-09-27 SDOH — HEALTH STABILITY: PHYSICAL HEALTH: ON AVERAGE, HOW MANY DAYS PER WEEK DO YOU ENGAGE IN MODERATE TO STRENUOUS EXERCISE (LIKE A BRISK WALK)?: 5 DAYS

## 2024-09-27 SDOH — SOCIAL STABILITY: SOCIAL INSECURITY: ARE YOU OR HAVE YOU BEEN THREATENED OR ABUSED PHYSICALLY, EMOTIONALLY, OR SEXUALLY BY ANYONE?: NO

## 2024-09-27 SDOH — SOCIAL STABILITY: SOCIAL INSECURITY: HAVE YOU HAD ANY THOUGHTS OF HARMING ANYONE ELSE?: NO

## 2024-09-27 SDOH — SOCIAL STABILITY: SOCIAL INSECURITY: DO YOU FEEL ANYONE HAS EXPLOITED OR TAKEN ADVANTAGE OF YOU FINANCIALLY OR OF YOUR PERSONAL PROPERTY?: NO

## 2024-09-27 SDOH — SOCIAL STABILITY: SOCIAL NETWORK: HOW OFTEN DO YOU ATTENT MEETINGS OF THE CLUB OR ORGANIZATION YOU BELONG TO?: NEVER

## 2024-09-27 ASSESSMENT — COGNITIVE AND FUNCTIONAL STATUS - GENERAL
MOVING FROM LYING ON BACK TO SITTING ON SIDE OF FLAT BED WITH BEDRAILS: A LOT
DRESSING REGULAR UPPER BODY CLOTHING: A LITTLE
TURNING FROM BACK TO SIDE WHILE IN FLAT BAD: A LOT
DRESSING REGULAR LOWER BODY CLOTHING: A LITTLE
TURNING FROM BACK TO SIDE WHILE IN FLAT BAD: A LOT
HELP NEEDED FOR BATHING: A LITTLE
DAILY ACTIVITIY SCORE: 20
DRESSING REGULAR UPPER BODY CLOTHING: A LITTLE
CLIMB 3 TO 5 STEPS WITH RAILING: A LOT
PERSONAL GROOMING: A LITTLE
TOILETING: A LITTLE
PATIENT BASELINE BEDBOUND: NO
MOVING TO AND FROM BED TO CHAIR: A LOT
MOBILITY SCORE: 13
HELP NEEDED FOR BATHING: A LITTLE
TURNING FROM BACK TO SIDE WHILE IN FLAT BAD: A LOT
DRESSING REGULAR UPPER BODY CLOTHING: A LOT
STANDING UP FROM CHAIR USING ARMS: A LOT
DRESSING REGULAR LOWER BODY CLOTHING: A LITTLE
MOVING TO AND FROM BED TO CHAIR: A LOT
MOBILITY SCORE: 13
MOBILITY SCORE: 8
EATING MEALS: A LITTLE
STANDING UP FROM CHAIR USING ARMS: A LOT
DAILY ACTIVITIY SCORE: 19
PERSONAL GROOMING: A LOT
TOILETING: A LITTLE
DRESSING REGULAR LOWER BODY CLOTHING: A LITTLE
DRESSING REGULAR UPPER BODY CLOTHING: A LITTLE
HELP NEEDED FOR BATHING: A LOT
HELP NEEDED FOR BATHING: A LITTLE
TOILETING: TOTAL
WALKING IN HOSPITAL ROOM: A LOT
PERSONAL GROOMING: A LITTLE
DAILY ACTIVITIY SCORE: 19
CLIMB 3 TO 5 STEPS WITH RAILING: A LOT
WALKING IN HOSPITAL ROOM: A LOT
MOVING TO AND FROM BED TO CHAIR: A LOT
WALKING IN HOSPITAL ROOM: TOTAL
MOVING FROM LYING ON BACK TO SITTING ON SIDE OF FLAT BED WITH BEDRAILS: A LITTLE
MOVING FROM LYING ON BACK TO SITTING ON SIDE OF FLAT BED WITH BEDRAILS: A LITTLE
TURNING FROM BACK TO SIDE WHILE IN FLAT BAD: A LOT
STANDING UP FROM CHAIR USING ARMS: A LOT
DRESSING REGULAR LOWER BODY CLOTHING: TOTAL
MOBILITY SCORE: 13
CLIMB 3 TO 5 STEPS WITH RAILING: A LOT
CLIMB 3 TO 5 STEPS WITH RAILING: TOTAL
STANDING UP FROM CHAIR USING ARMS: TOTAL
TOILETING: A LITTLE
WALKING IN HOSPITAL ROOM: A LOT
MOVING TO AND FROM BED TO CHAIR: TOTAL
MOVING FROM LYING ON BACK TO SITTING ON SIDE OF FLAT BED WITH BEDRAILS: A LITTLE
DAILY ACTIVITIY SCORE: 11

## 2024-09-27 ASSESSMENT — PAIN - FUNCTIONAL ASSESSMENT
PAIN_FUNCTIONAL_ASSESSMENT: 0-10

## 2024-09-27 ASSESSMENT — LIFESTYLE VARIABLES
SKIP TO QUESTIONS 9-10: 1
AUDIT-C TOTAL SCORE: 0
AUDIT-C TOTAL SCORE: 0
HOW OFTEN DO YOU HAVE 6 OR MORE DRINKS ON ONE OCCASION: NEVER
AUDIT-C TOTAL SCORE: 0
SKIP TO QUESTIONS 9-10: 1
HOW OFTEN DO YOU HAVE A DRINK CONTAINING ALCOHOL: NEVER
HOW MANY STANDARD DRINKS CONTAINING ALCOHOL DO YOU HAVE ON A TYPICAL DAY: PATIENT DOES NOT DRINK

## 2024-09-27 ASSESSMENT — ACTIVITIES OF DAILY LIVING (ADL)
BATHING: NEEDS ASSISTANCE
ADL_ASSISTANCE: INDEPENDENT
PATIENT'S MEMORY ADEQUATE TO SAFELY COMPLETE DAILY ACTIVITIES?: YES
WALKS IN HOME: NEEDS ASSISTANCE
FEEDING YOURSELF: INDEPENDENT
BATHING_ASSISTANCE: MAXIMAL
DRESSING YOURSELF: NEEDS ASSISTANCE
ADL_ASSISTANCE: INDEPENDENT
HOME_MANAGEMENT_TIME_ENTRY: 10
ASSISTIVE_DEVICE: WALKER
HEARING - RIGHT EAR: FUNCTIONAL
ADEQUATE_TO_COMPLETE_ADL: YES
LACK_OF_TRANSPORTATION: NO
TOILETING: NEEDS ASSISTANCE
JUDGMENT_ADEQUATE_SAFELY_COMPLETE_DAILY_ACTIVITIES: YES
HEARING - LEFT EAR: FUNCTIONAL
GROOMING: NEEDS ASSISTANCE

## 2024-09-27 ASSESSMENT — PAIN SCALES - GENERAL
PAINLEVEL_OUTOF10: 5 - MODERATE PAIN
PAINLEVEL_OUTOF10: 0 - NO PAIN
PAINLEVEL_OUTOF10: 5 - MODERATE PAIN
PAINLEVEL_OUTOF10: 0 - NO PAIN
PAINLEVEL_OUTOF10: 0 - NO PAIN

## 2024-09-27 ASSESSMENT — PATIENT HEALTH QUESTIONNAIRE - PHQ9
SUM OF ALL RESPONSES TO PHQ9 QUESTIONS 1 & 2: 0
2. FEELING DOWN, DEPRESSED OR HOPELESS: NOT AT ALL
1. LITTLE INTEREST OR PLEASURE IN DOING THINGS: NOT AT ALL

## 2024-09-27 NOTE — CONSULTS
Wound Care Consult     Visit Date: 9/27/2024      Patient Name: Narda Malloy         MRN: [id number]           YOB: 1955     Reason for Consult: wounds multiple, diabetic and surgical        Wound History: deferred to patient's chart      Pertinent Labs:   [place], Urine   Date Value Ref Range Status   03/31/2022 40 (H) 0 - 23 MG/L Final     Albumin   Date Value Ref Range Status   09/27/2024 1.9 (L) 3.4 - 5.0 g/dL Final       Wound Assessment:  Wound 08/26/24 Traumatic Leg Distal;Dorsal;Right (Active)   Wound Image   09/26/24 0420       Wound 08/26/24 Incision Back Medial (Active)   Site Assessment Unable to assess 09/26/24 0838   Lucia-Wound Assessment Clean;Dry;Intact 09/26/24 2300   Drainage Amount None 09/26/24 2300   Dressing Dry dressing 09/27/24 0953   Dressing Status Clean;Dry 09/26/24 2300       Wound 08/26/24 Incision Hip Lateral;Left;Dorsal (Active)       Wound 08/29/24 Diabetic Ulcer Dorsal foot;Right (Active)   Wound Image   09/26/24 0425       Wound 08/29/24 Diabetic Ulcer Other (Comment) Left (Active)       Wound 09/25/24 Venous Ulcer Heel Right (Active)   Wound Image   09/26/24 0426       Wound Team Summary Assessment: defer wound care at this time: patient being followed by surgery. Patient likely to be discharged to [facility].     Wound Team Plan: Provide assistance with wound care as needed.     Paolo Delaney RN  9/27/2024  3:07 PM

## 2024-09-27 NOTE — CARE PLAN
Patient well known to Dr. Ventura for prior L1-L3 Lumbar Laminectomy Revision; L1-L2 Osteotomy; Transforaminal Lumbar Interbody Fusion; T4-S1 Revision; Fusion; Instrumentation; Cement Augmentation on 08/26/24.     Plans for transfer to [facility] to be admitted to medicine service, with consult placed to Dr. Ventura for evaluation.     Given the positive blood cultures plan for OR wound washout tomorrow 09/28/24.   Please make patient NPO at midnight tonight.  No need for additional spine imaging at this time.     Please EPIC chat with questions/concerns.     Lianet Diaz PA-C  Department of Orthopaedic Surgery, Spine    [address] [place] [address]    Voicemail: [phone]   Appts: [phone]  Fax: [phone]

## 2024-09-27 NOTE — CARE PLAN
The patient's goals for the shift include      The clinical goals for the shift include pain control, increased mobility

## 2024-09-27 NOTE — NURSING NOTE
Visitors at bedside throughout day. Pt pleasant and cooperate. T&R, heels elevated. Monitoring urine output, purewick in place, pt stating not having to void. States she's always been like that her whole life.   No suprapubic tenderness. Fluids encouraged.    ---

## 2024-09-27 NOTE — PROGRESS NOTES
Occupational Therapy    Evaluation/Treatment    Patient Name: Narda Malloy  MRN: [id number]  Department: [facility] E  Room: [facility]444-A  Today's Date: 09/27/24  Time Calculation  Start Time: 0815  Stop Time: 0840  Time Calculation (min): 25 min       Assessment:  OT Assessment: OT order received, chart reviewed, evaluation completed. Pt demonstrated impaired ADLs, functional transfers and functional mobility and would benefit from acute OT services to facilitate return to PLOF  Prognosis: Fair  Barriers to Discharge: Decreased caregiver support, Inaccessible home environment  Evaluation/Treatment Tolerance: Patient limited by pain, Patient limited by fatigue  Medical Staff Made Aware: Yes  End of Session Communication: Bedside nurse  End of Session Patient Position: Bed, 3 rail up, Alarm on  OT Assessment Results: Decreased ADL status, Decreased safe judgment during ADL, Decreased cognition, Decreased endurance, Decreased sensation, Decreased fine motor control, Decreased functional mobility, Decreased gross motor control, Decreased IADLs, Decreased upper extremity strength, Decreased upper extremity range of motion  Prognosis: Fair  Barriers to Discharge: Decreased caregiver support, Inaccessible home environment  Evaluation/Treatment Tolerance: Patient limited by pain, Patient limited by fatigue  Medical Staff Made Aware: Yes  Strengths: Attitude of self  Barriers to Participation: Comorbidities  Plan:  Treatment Interventions: ADL retraining, Functional transfer training, Endurance training, UE strengthening/ROM, Cognitive reorientation, Patient/family training, Equipment evaluation/education, Compensatory technique education, Fine motor coordination activities, Neuromuscular reeducation  OT Frequency: 5 times per week  OT Discharge Recommendations: High intensity level of continued care  Equipment Recommended upon Discharge: Wheeled walker  OT Recommended Transfer Status: Maximum assist, Assist of 2  OT - OK to  Discharge: Yes  Treatment Interventions: ADL retraining, Functional transfer training, Endurance training, UE strengthening/ROM, Cognitive reorientation, Patient/family training, Equipment evaluation/education, Compensatory technique education, Fine motor coordination activities, Neuromuscular reeducation    Subjective     General:   OT Received On: 09/27/24  General  Reason for Referral: Pt is a 68 yo woman admit fro [facility] with spinal postoperative infection. Pt to transfer to [facility]. Per Mohini Flores NP, cleared for therapy no activity restrictions.  Referred By: Mohini Flores NP  Past Medical History Relevant to Rehab: TENA myopia, diabetes, retinopathy, neuropathy, HTN, essential tremor, TENA glaucoma, low back pain, sarcoidoisis of lung, spine sx  Missed Visit: Yes  Missed Visit Reason: Cancel (pt is admitted from SNF following extensive spine surgery. OT evaluation pending placement of activity orders.)  Family/Caregiver Present: No  Co-Treatment: PT  Co-Treatment Reason: Need for second skilled therapist for safe patient handling  Prior to Session Communication: Bedside nurse  Patient Position Received: Bed, 3 rail up, Alarm off, not on at start of session  Preferred Learning Style: verbal  General Comment: Pt cleared by nursing, agreeable to therapy evaluation  Precautions:  Hearing/Visual Limitations: glasses prn, h/o visual deficits  Medical Precautions: Fall precautions  Post-Surgical Precautions: Spinal precautions  Precautions Comment: reviewed spinal precautions  Pain:  Pain Assessment  Pain Assessment: 0-10  0-10 (Numeric) Pain Score: 5 - Moderate pain  Pain Type: Surgical pain  Pain Location: Back  Pain Interventions: Repositioned (nsg notified and provided medication at end of session)    Objective   Cognition:  Overall Cognitive Status: Within Functional Limits  Orientation Level: Oriented X4  Following Commands: Follows one step commands with increased time  Insight: Moderate  Processing Speed: Delayed         Home Living:  Type of Home: House  Lives With: Spouse  Home Adaptive Equipment: Walker rolling or standard  Home Layout: One level  Home Access: Stairs to enter with rails  Entrance Stairs-Rails:  (unilateral)  Entrance Stairs-Number of Steps: 2  Bathroom Shower/Tub: Walk-in shower  Bathroom Toilet: Standard  Bathroom Equipment: Shower chair without back  Home Living Comments: pt has been at SNF since initial spine sx  Prior Function:  Level of [place]: Independent with ADLs and functional transfers, Needs assistance with homemaking  Receives Help From: Family  ADL Assistance: Independent (pt requiring assist at SNF)  Homemaking Assistance: Needs assistance (Needs assistance  spouse assists)  Ambulatory Assistance: Independent (with RW prior to sx; assist with all mobility at SNF)  Vocational: Retired  Prior Function Comments: Pt has been at [facility] since spinal sx    ADL:  Eating Deficit: Setup  Grooming Assistance: Maximal  Grooming Deficit: Brushing hair (pt with matting, unable to comb own hair due to TENA UE weakness, assist to comb and place into ponytail)  Bathing Assistance: Maximal  Bathing Deficit:  (p)  UE Dressing Assistance: Maximal  UE Dressing Deficit: Thread LUE, Thread RUE, Pull around back (donning gown)  LE Dressing Assistance: Total  LE Dressing Deficit: Don/doff R sock, Don/doff L sock  Toileting Assistance with Device: Total  Toileting Deficit: Incontinent (incontinent of urine, dep for hygiene/clothing management)    Activity Tolerance:  Endurance: Decreased tolerance for upright activites  Activity Tolerance Comments: impaired due to pain and fatigue  Rate of Perceived Exertion (RPE): 6/10    Bed Mobility/Transfers: Bed Mobility  Bed Mobility: Yes  Bed Mobility 1  Bed Mobility 1: Supine to sitting  Level of Assistance 1: Maximum assistance, +2  Bed Mobility Comments 1: VCs for log roll technique max A x2 for B LEs and trunk up required mod A to maintain static sitting balance at EOB due to  severe retropulsion.    Transfers  Transfer: Yes  Transfer 1  Transfer From 1: Bed to, Stand to  Transfer to 1: Stand, Bed  Technique 1: Sit to stand, Stand to sit  Transfer Device 1: Walker  Transfer Level of Assistance 1: Maximum assistance, +2  Trials/Comments 1: initial attempt no B LE activiation, second attempt able to stand with max A x2 unable to maintain upright posture more than 5 seconds, returned to EOB max A x2    Functional Mobility:  Functional Mobility  Functional Mobility Performed: No    Sensation:  Light Touch: Partial deficits in the RUE, Partial deficits in the LUE, Partial deficits in the LLE, Partial deficits in the RLE  Sensation Comment: decreased generalized sensation to all extremities  Strength:  Strength Comments: B UEs 2/5 overall, significant weakness, shoulder flexion to 90 degrees with effort.    Coordination:  Movements are Fluid and Coordinated: No  Upper Body Coordination: delayed rate and accuracy of movements   Hand Function:  Hand Function  Gross Grasp: Impaired  Coordination: Impaired  Extremities: RUE   RUE :  (B UEs 2/5 overall, significant weakness, shoulder flexion to 90 degrees with effort.) and LUE   LUE:  (B UEs 2/5 overall, significant weakness, shoulder flexion to 90 degrees with effort.)    Outcome Measures: [facility] Daily Activity  Putting on and taking off regular lower body clothing: Total  Bathing (including washing, rinsing, drying): A lot  Putting on and taking off regular upper body clothing: A lot  Toileting, which includes using toilet, bedpan or urinal: Total  Taking care of personal grooming such as brushing teeth: A lot  Eating Meals: A little  Daily Activity - Total Score: 11        Education Documentation  Home Exercise Program, taught by Leona May OT at 9/27/2024 10:37 AM.  Learner: Patient  Readiness: Acceptance  Method: Explanation  Response: Verbalizes Understanding  Comment: Pt edu on OT POC    Handouts, taught by Leona May OT at 9/27/2024  10:37 AM.  Learner: Patient  Readiness: Acceptance  Method: Explanation  Response: Verbalizes Understanding  Comment: Pt edu on OT POC    Body Mechanics, taught by Leona May OT at 9/27/2024 10:37 AM.  Learner: Patient  Readiness: Acceptance  Method: Explanation  Response: Verbalizes Understanding  Comment: Pt edu on OT POC    Precautions, taught by Leona May OT at 9/27/2024 10:37 AM.  Learner: Patient  Readiness: Acceptance  Method: Explanation  Response: Verbalizes Understanding  Comment: Pt edu on OT POC    ADL Training, taught by Leona May OT at 9/27/2024 10:37 AM.  Learner: Patient  Readiness: Acceptance  Method: Explanation  Response: Verbalizes Understanding  Comment: Pt edu on OT POC    Education Comments  Pt educated on occupational therapy plan of care, safety/fall precautions, call light use.     Goals:  Encounter Problems       Encounter Problems (Active)       OT Goals       ADLs (Progressing)       Start:  09/27/24    Expected End:  10/18/24       Pt will complete ADL tasks with Min A, using AE as needed, in order to complete self-care tasks.           Functional transfers (Progressing)       Start:  09/27/24    Expected End:  10/18/24       Pt will perform functional transfers at min A with RW           Functional mobility (Progressing)       Start:  09/27/24    Expected End:  10/18/24       Pt will perform functional mobility short household distance at min A level with RW

## 2024-09-27 NOTE — PROGRESS NOTES
Physical Therapy    Physical Therapy Evaluation    Patient Name: Narda Malloy  MRN: [id number]  Department: [facility] E  Room: [facility]44-A  Today's Date: 9/27/2024   Time Calculation  Start Time: 0816  Stop Time: 0837  Time Calculation (min): 21 min    Assessment/Plan   PT Assessment  PT Assessment Results: Decreased strength, Decreased range of motion, Decreased endurance, Impaired balance, Decreased mobility, Decreased coordination, Decreased cognition, Impaired judgement, Decreased safety awareness, Impaired vision, Impaired hearing, Impaired sensation, Impaired tone, Obesity, Decreased skin integrity, Orthopedic restrictions, Pain  Rehab Prognosis: Good  Barriers to Discharge: medical status  Evaluation/Treatment Tolerance: Patient limited by fatigue, Patient limited by pain  Medical Staff Made Aware: Yes  Strengths: Attitude of self  Barriers to Participation: Comorbidities  End of Session Communication: Bedside nurse  Assessment Comment: The patient is a 69 y.o. female admitted to the hospital for post-op infection. The patient currently requires maxAx2 for mobility and transfers. The pt has experienced a functional decline and would continue to benefit from skilled therapy services to address functional deficits. The patient would benefit from moderate intensity rehab services on DC.  End of Session Patient Position: Bed, 3 rail up, Alarm on  IP OR SWING BED PT PLAN  Inpatient or Swing Bed: Inpatient  PT Plan  Treatment/Interventions: Bed mobility, Transfer training, Gait training, Balance training, Neuromuscular re-education, Strengthening, Endurance training, Range of motion, Therapeutic activity, Therapeutic exercise, Home exercise program  PT Plan: Ongoing PT  PT Frequency: 4 times per week  PT Discharge Recommendations: Moderate intensity level of continued care  Equipment Recommended upon Discharge: Wheeled walker, Wheelchair  PT Recommended Transfer Status: Assist x2  PT - OK to Discharge:  Yes      Subjective   General Visit Information:  General  Reason for Referral: Pt is a 70 yo woman admit from SNF with spinal postoperative infection. Pt to transfer to [facility]. Per Mohini Flores NP, cleared for therapy no activity restrictions.  Referred By: Mohini Flores NP  Past Medical History Relevant to Rehab: TENA myopia, diabetes, retinopathy, neuropathy, HTN, essential tremor, TENA glaucoma, low back pain, sarcoidoisis of lung, spine sx  Family/Caregiver Present: No  Co-Treatment: OT  Co-Treatment Reason: Need for second skilled therapist for safe patient handling  Prior to Session Communication: Bedside nurse  Patient Position Received: Bed, 3 rail up, Alarm off, not on at start of session  Preferred Learning Style: verbal  General Comment: Pt cleared by nursing, agreeable to therapy evaluation  Home Living:  Home Living  Type of Home: House  Lives With: Spouse  Home Adaptive Equipment: Walker rolling or standard  Home Layout: One level  Home Access: Stairs to enter with rails  Entrance Stairs-Rails:  (unilateral)  Entrance Stairs-Number of Steps: 2  Bathroom Shower/Tub: Walk-in shower  Bathroom Toilet: Standard  Bathroom Equipment: Grab bars in shower, Shower chair without back  Home Living Comments: pt has been at SNF since initial spine sx  Prior Level of Function:  Prior Function Per Pt/Caregiver Report  Level of [place]: Independent with ADLs and functional transfers, Needs assistance with homemaking  Receives Help From: Family  ADL Assistance: Independent (pt requiring assist at SNF)  Homemaking Assistance: Needs assistance (spouse assists)  Ambulatory Assistance: Independent (with RW prior to sx; assist with all mobility at SNF)  Vocational: Retired  Prior Function Comments: Pt has been at SNF since spinal sx  Precautions:  Precautions  Hearing/Visual Limitations: glasses prn, h/o visual deficits  Medical Precautions: Fall precautions, Spinal precautions  Post-Surgical Precautions: Spinal  precautions  Precautions Comment: reviewed spinal precautions    Objective   Pain:  Pain Assessment  Pain Assessment: 0-10  0-10 (Numeric) Pain Score: 5 - Moderate pain  Pain Type: Surgical pain  Pain Location: Back  Pain Orientation: Mid, Lower  Pain Interventions: Repositioned, Ambulation/increased activity (nsg aware and in room)  Cognition:  Cognition  Overall Cognitive Status: Within Functional Limits  Orientation Level: Oriented X4  Following Commands: Follows one step commands with increased time  Insight: Moderate  Processing Speed: Delayed    General Assessments:  General Observation  General Observation: pleasant; incision covered; nsg in to change lower dressing due to urine saturation; new linens provided    Activity Tolerance  Endurance: Decreased tolerance for upright activites  Activity Tolerance Comments: limited due to pain and fatigue  Rate of Perceived Exertion (RPE): 6/10    Sensation  Light Touch: Partial deficits in the RUE, Partial deficits in the LUE, Partial deficits in the RLE, Partial deficits in the LLE  Sensation Comment: decreased generalized sensation to all extremities    Strength  Strength Comments: BLE grossly 2-2+/5  Coordination  Alternating Toe Taps: Impaired  Coordination Comment: Increasing Lt trunk lean with increasing fatigue    Postural Control  Posture Comment: forward flexed trunk in sitting; Lt trunk lean; MaxAx2 for standing attempt    Static Sitting Balance  Static Sitting-Balance Support: Feet supported, Bilateral upper extremity supported  Static Sitting-Level of Assistance: Minimum assistance  Static Sitting-Comment/Number of Minutes: EOB for approx. 10 min  Functional Assessments:  Bed Mobility  Bed Mobility: Yes  Bed Mobility 1  Bed Mobility 1: Supine to sitting, Sitting to supine, Log roll  Level of Assistance 1: Maximum assistance, +2  Bed Mobility Comments 1: VCs for safe sequencing    Transfers  Transfer: Yes  Transfer 1  Transfer From 1: Bed to, Stand  to  Transfer to 1: Stand, Bed  Technique 1: Sit to stand, Stand to sit  Transfer Device 1: Walker  Transfer Level of Assistance 1: Maximum assistance, +2  Trials/Comments 1: VCs for safe sequencing; high difficulty initiating hip ext to achieve full standing posture    Ambulation/Gait Training  Ambulation/Gait Training Performed: No (unable to safely initiate steps)  Extremity/Trunk Assessments:  RLE   RLE :  (grossly 2-2+/5; high level of weakness; muscle atrophy.)  LLE   LLE :  (grossly 2-2+/5; high level of weakness; muscle atrophy.)  Outcome Measures:  [facility] Basic Mobility  Turning from your back to your side while in a flat bed without using bedrails: A lot  Moving from lying on your back to sitting on the side of a flat bed without using bedrails: A lot  Moving to and from bed to chair (including a wheelchair): Total  Standing up from a chair using your arms (e.g. wheelchair or bedside chair): Total  To walk in hospital room: Total  Climbing 3-5 steps with railing: Total  Basic Mobility - Total Score: 8    Encounter Problems       Encounter Problems (Active)       PT Problem       Strength (Progressing)       Start:  09/27/24    Expected End:  10/27/24       The patient will demonstrate an overall strength of 4/5 in BLE to assist with completion of functional mobility.           Spinal Precautions (Progressing)       Start:  09/27/24    Expected End:  10/27/24       The patient will be able to recall spinal precautions and log roll technique with minimal Vcs.         Bed Mobility (Progressing)       Start:  09/27/24    Expected End:  10/27/24       The patient will be able to complete bed mobility at a close supervision level with use of bed rails.           Sit <> Stand Transfer (Progressing)       Start:  09/27/24    Expected End:  10/27/24       The patient will be able to complete safe transfer of sit <> stand with minimal VC at a Phani level.           Ambulation (Not Progressing)       Start:  09/27/24     Expected End:  10/27/24       The patient will be able to ambulate at a Phani level for >15ftx1 with RW.          Seated Balance (Progressing)       Start:  09/27/24    Expected End:  10/27/24       The patient will be able to complete dynamic seated tasks with no UE support for >10 min at a distant supervision level.            Pain - Adult              Education Documentation  Precautions, taught by Hali Yu PT at 9/27/2024  9:16 AM.  Learner: Patient  Readiness: Acceptance  Method: Explanation  Response: Verbalizes Understanding, Needs Reinforcement  Comment: VCs for safe sequencing of mobility; education on spinal precautions.    Mobility Training, taught by Hali Yu PT at 9/27/2024  9:16 AM.  Learner: Patient  Readiness: Acceptance  Method: Explanation  Response: Verbalizes Understanding, Needs Reinforcement  Comment: VCs for safe sequencing of mobility; education on spinal precautions.    Education Comments  No comments found.

## 2024-09-27 NOTE — PROGRESS NOTES
Seeing patient for postoperative wound infection of lumbar spine.  Patient is resting comfortably at this time.  No new issues overnight.  Admission blood cultures and are growing Staph aureus in both sets with pending susceptibilities.     Zosyn D2  Vanco D2     36.8   36.1  Lung: Clear to auscultation bilaterally  Cardio: RRR, S1-S2 normal  Abdomen: NABS, soft, nontender nondistended.  Back: Superficial wound dehiscence in several segments.  Wound is slightly macerated.  No gross purulence can be expressed.  Minimal surrounding erythema.  No fluctuance noted.  Extremity: Patient does have a PICC line in the left arm that was placed this admission.     WBC: 8.3  Creatinine: 1.17     Urine culture (9/25): Mixed neva  Blood culture (9/25): 2/2 sets Staph aureus-pending susceptibilities  Wound culture (9/25): Gram stain: No WBCs, mixed gram-positive and gram-negative organisms.  Culture: Pending     Impression:  1.  Postoperative wound infection lumbar spine  2.  Staph aureus septicemia  -- Concerned surgical wound is source.     Plan:  1.  Will stop Zosyn  2.  Continue vancomycin.  Pharmacy to dose.  Monitor creatinine and vancomycin levels for toxicity.  3.  Will add cefazolin 2 g IV every 8 hours.  Monitor for adverse antibiotic events.    4.  Await final susceptibilities of blood isolate.  5.  Await wound culture results.  6.  Agree with plans for transfer to [facility] for evaluation by her neurosurgeon.  Transfer is in process.  Discussed with Mohini Flores CNP.  7.  Patient states she cannot have MRI due to piece of metal in her head.  She clearly would benefit from more precise imaging to rule out deep space abscess at surgical site.  8.  Will check surveillance blood cultures x 2 sets today.  9.  Patient will need TTE to rule out endocarditis.    Call this weekend with specific ID issues.  I will be covering.     Simón Beaulieu MD  [facility]  [phone]

## 2024-09-27 NOTE — PROGRESS NOTES
Pt anticipated to transfer to [facility].     09/27/24 6621   Discharge Planning   Expected Discharge Disposition Short Term A

## 2024-09-27 NOTE — PROGRESS NOTES
Vancomycin Dosing by Pharmacy- FOLLOW UP    Narda Malloy is a 69 y.o. year old female who Pharmacy has been consulted for vancomycin dosing for cellulitis, skin and soft tissue. Based on the patient's indication and renal status this patient is being dosed based on a goal AUC of 400-600.     Renal function is currently improving.    Current vancomycin dose: 1000 mg given every 24 hours    Estimated vancomycin AUC on current dose: 457 mg/L.hr     Visit Vitals  /51 (BP Location: Right arm, Patient Position: Lying)   Pulse 69   Temp 36.2 °C (97.2 °F) (Oral)   Resp 16        Lab Results   Component Value Date    CREATININE 1.17 (H) 2024    CREATININE 1.31 (H) 2024    CREATININE 1.14 (H) 2024    CREATININE 0.55 2024        Patient weight is as follows:   Vitals:    24 0931   Weight: 74.8 kg (165 lb)       Cultures:  No results found for the encounter in last 14 days.       I/O last 3 completed shifts:  In: 1505 (20.1 mL/kg) [P.O.:480; I.V.:725 (9.7 mL/kg); IV Piggyback:300]  Out: 940 (12.6 mL/kg) [Urine:940 (0.3 mL/kg/hr)]  Weight: 74.8 kg   I/O during current shift:  No intake/output data recorded.    Temp (24hrs), Av.5 °C (97.7 °F), Min:36 °C (96.8 °F), Max:36.8 °C (98.2 °F)      Assessment/Plan    Within goal AUC range. Continue current vancomycin regimen.    This dosing regimen is predicted by [employer] to result in the following pharmacokinetic parameters:  Loading dose: N/A  Regimen: 1000 mg IV every 24 hours.  Start time: 10:05 on 2024  Exposure target: AUC24 (range)400-600 mg/L.hr   [id number]: 431 mg/L.hr  AUC24,ss: 457 mg/L.hr  Probability of AUC24 > 400: 83 %  Ctrough,ss: 13.3 mg/L  Probability of Ctrough,ss > 20: 4 %      The next level will be obtained on  at 5:00. May be obtained sooner if clinically indicated.   Will continue to monitor renal function daily while on vancomycin and order serum creatinine at least every 48 hours if not already  ordered.  Follow for continued vancomycin needs, clinical response, and signs/symptoms of toxicity.       Yoel Moore, PharmD

## 2024-09-27 NOTE — PROGRESS NOTES
Narda Malloy is a 69 y.o. female on day 2 of admission presenting with Postoperative infection, unspecified type, initial encounter.    Subjective   Patient seen and examined.  Resting bed in no acute distress.  Awake alert oriented x 3.  No new complaints.  Pain earlier when up with therapy.  Improved.  No weakness.  No shortness of breath, cough.    Objective     Physical Exam  Vitals and nursing note reviewed.   Constitutional:       General: She is not in acute distress.     Appearance: Normal appearance. She is normal weight. She is not ill-appearing, toxic-appearing or diaphoretic.   HENT:      Head: Normocephalic and atraumatic.      Right Ear: External ear normal.      Left Ear: External ear normal.      Nose: Nose normal.      Mouth/Throat:      Mouth: Mucous membranes are moist.      Pharynx: Oropharynx is clear.   Eyes:      Extraocular Movements: Extraocular movements intact.      Conjunctiva/sclera: Conjunctivae normal.      Pupils: Pupils are equal, round, and reactive to light.   Cardiovascular:      Rate and Rhythm: Normal rate and regular rhythm.      Pulses: Normal pulses.      Heart sounds: Normal heart sounds. No murmur heard.     Comments: Decreased edema.  Pulmonary:      Effort: Pulmonary effort is normal. No respiratory distress.      Breath sounds: Normal breath sounds. No wheezing, rhonchi or rales.      Comments: Room air.  Abdominal:      General: Bowel sounds are normal. There is no distension.      Palpations: Abdomen is soft.      Tenderness: There is no abdominal tenderness.   Genitourinary:     Comments: Deferred.  Musculoskeletal:         General: Swelling present. No tenderness. Normal range of motion.      Cervical back: Normal range of motion and neck supple.      Right lower le+ Pitting Edema present.      Left lower le+ Pitting Edema present.   Skin:     General: Skin is warm and dry.      Capillary Refill: Capillary refill takes less than 2 seconds.      Coloration:  "Skin is pale.      Comments: Bilateral lower extremities with knee high compression stockings in place. Back dressing clean dry intact, not removed. Wounds not observed.   Neurological:      General: No focal deficit present.      Mental Status: She is alert and oriented to person, place, and time.      Comments: Generalized weakness.  No focal weakness.   Psychiatric:         Mood and Affect: Mood normal.         Behavior: Behavior normal.       Last Recorded Vitals  Blood pressure (!) 92/43, pulse 64, temperature 36.9 °C (98.4 °F), temperature source Oral, resp. rate 18, height 1.753 m (5' 9\"), weight 74.8 kg (165 lb), SpO2 96%.    Intake/Output last 3 Shifts:  I/O last 3 completed shifts:  In: 1505 (20.1 mL/kg) [P.O.:480; I.V.:725 (9.7 mL/kg); IV Piggyback:300]  Out: 940 (12.6 mL/kg) [Urine:940 (0.3 mL/kg/hr)]  Weight: 74.8 kg     Relevant Results  Results for orders placed or performed during the hospital encounter of 09/25/24 (from the past 24 hour(s))   Sars-CoV-2 PCR   Result Value Ref Range    Coronavirus 2019, PCR Not Detected Not Detected   POCT GLUCOSE   Result Value Ref Range    POCT Glucose 203 (H) 74 - 99 mg/dL   POCT GLUCOSE   Result Value Ref Range    POCT Glucose 233 (H) 74 - 99 mg/dL   Vancomycin   Result Value Ref Range    Vancomycin 11.6 5.0 - 20.0 ug/mL   Renal Function Panel   Result Value Ref Range    Glucose 225 (H) 74 - 99 mg/dL    Sodium 134 (L) 136 - 145 mmol/L    Potassium 4.5 3.5 - 5.3 mmol/L    Chloride 106 98 - 107 mmol/L    Bicarbonate 22 21 - 32 mmol/L    Anion Gap 11 10 - 20 mmol/L    Urea Nitrogen 66 (H) 6 - 23 mg/dL    Creatinine 1.17 (H) 0.50 - 1.05 mg/dL    eGFR 51 (L) >60 mL/min/1.73m*2    Calcium 7.6 (L) 8.6 - 10.3 mg/dL    Phosphorus 3.3 2.5 - 4.9 mg/dL    Albumin 1.9 (L) 3.4 - 5.0 g/dL   CBC   Result Value Ref Range    WBC 8.3 4.4 - 11.3 x10*3/uL    nRBC 0.0 0.0 - 0.0 /100 WBCs    RBC 2.62 (L) 4.00 - 5.20 x10*6/uL    Hemoglobin 8.3 (L) 12.0 - 16.0 g/dL    Hematocrit 25.1 (L) " 36.0 - 46.0 %    MCV 96 80 - 100 fL    MCH 31.7 26.0 - 34.0 pg    MCHC 33.1 32.0 - 36.0 g/dL    RDW 13.4 11.5 - 14.5 %    Platelets 369 150 - 450 x10*3/uL   POCT GLUCOSE   Result Value Ref Range    POCT Glucose 199 (H) 74 - 99 mg/dL     Susceptibility data from last 90 days.  Collected Specimen Info Organism   09/25/24 Tissue/Biopsy from Skin Lesion Mixed Gram-Positive and Gram-Negative Bacteria   09/25/24 Blood culture from Peripheral Venipuncture Staphylococcus aureus   09/25/24 Blood culture from Peripheral Venipuncture Staphylococcus aureus   08/12/24 Swab from Nares/Axilla/Groin Methicillin Resistant Staphylococcus aureus (MRSA)     US renal complete    Result Date: 9/26/2024  Interpreted By:  Sean Alfred, STUDY: US RENAL COMPLETE   INDICATION: Signs/Symptoms:AMY   COMPARISON: None.   ACCESSION NUMBER(S): [id number]   ORDERING CLINICIAN: BLAKE NAIK   TECHNIQUE: Real-time renal ultrasound was performed.   FINDINGS: The right kidney measures 12.3 cm and the left kidney 11.9 cm in maximal length. There is normal thickness and echogenicity of the renal cortex bilaterally. No renal calculi are identified. There is no hydronephrosis. No solid renal masses are seen. No cysts are visualized.   Images of the bladder were also obtained. No bladder mass, stone or debris is identified. Bilateral ureteral jets were demonstrated.   There is incidental finding of cholelithiasis, with borderline prominent common bile duct measuring up to 6 mm.       Normal kidneys and urinary bladder. Incidental finding of cholelithiasis and borderline prominence of the common bile duct.   Signed by: Sean Alfred 9/26/2024 4:59 PM Dictation workstation:   [id number]    CT chest wo IV contrast    Result Date: 9/26/2024  Interpreted By:  Andrew Byrd, STUDY: CT CHEST WO IV CONTRAST; 9/26/2024 4:26 pm   INDICATION: Signs/Symptoms:Bilateral pleural effusions. Postoperative infection. Status post lumbar surgery.   COMPARISON: None.    ACCESSION NUMBER(S): [id number]   ORDERING CLINICIAN: MIYA YAP   TECHNIQUE: The study was performed without intravenous contrast material as requested. A helical acquisition data was obtained with multiplanar reconstructions.   One or more of the following dose reduction techniques were used: Automated exposure control Adjustment of the mA and/or kV according to patient size, and/or use of iterative reconstruction technique.   FINDINGS: Exam is limited by motion artifact and lack of intravenous contrast material as well as artifact arising from the patient's spinal fixation devices.   There is extensive calcification involving mediastinal and hilar lymph nodes bilaterally. Bilateral pleural effusions are present, small on the right and moderate in size on the left.   Pulmonary parenchyma assessment is limited by motion. Areas of atelectasis are identified within the upper lobes bilaterally as well as within both lower lobes. Focal infiltrate is more difficult to exclude at the pulmonary bases due to the confluence atelectasis within both lower lobes.   Chest Wall: Anasarca is present.   Skeletal System: No fractures or destructive lesions are identified. Pedicle screw and bar fixation extends from T4 into the lumbar region. Vertebroplasty cement is present within the T4 and T5 vertebral bodies.   Upper Abdomen: Gallbladder is distended measuring 6 cm in diameter.       1. Bilateral effusions are present as above, greater on the left. 2. Bilateral atelectasis is present more marked at the bases. 3. Distended appearance of the gallbladder which measures up to 6 cm in diameter, partially included within the field of view.   MACRO: none   Signed by: Andrew Byrd 9/26/2024 4:37 PM Dictation workstation:   [id number]     Scheduled medications  acetaminophen, 1,000 mg, oral, TID  ascorbic acid, 500 mg, oral, Daily  brimonidine, 1 drop, Both Eyes, BID  calcium carbonate-vitamin D3, 1 tablet, oral,  Daily  ceFAZolin, 2 g, intravenous, q8h  docusate sodium, 100 mg, oral, BID  pantoprazole, 40 mg, oral, Daily before breakfast   Or  esomeprazole, 40 mg, nasoduodenal tube, Daily before breakfast   Or  pantoprazole, 40 mg, intravenous, Daily before breakfast  ezetimibe, 10 mg, oral, Daily  insulin lispro, 0-15 Units, subcutaneous, TID  lactobacillus acidophilus, 1 tablet, oral, BID  latanoprost, 1 drop, Both Eyes, Nightly  methocarbamol, 500 mg, oral, TID  oxygen, , inhalation, Continuous - Inhalation  polyethylene glycol, 17 g, oral, Daily  primidone, 125 mg, oral, TID  propranolol LA, 60 mg, oral, Daily  traZODone, 25 mg, oral, Nightly  vancomycin, 1 g, intravenous, q24h      Continuous medications     PRN medications  PRN medications: dextrose, dextrose, glucagon, glucagon, melatonin, ondansetron, sennosides, vancomycin      ASSESSMENT:  Suspected post-operative surgical wound infection  Bacteremia staphylococcus aureus  Spinal stenosis - status post lumbar spinal surgery  Fatigue  Generalized weakness  Mobility impairment  Wounds  Pressure ulcer risk  Hyponatremia  Acute kidney injury, worsening  Distended bladder rule out urinary retention  Pyuria rule out UTI  Acute hypoxic respiratory failure   Pulmonary infiltrates  Bilateral pleural effusions  Sarcoidosis  Type 2 diabetes mellitus  Hypertension  Obesity  Essential Tremor  Glaucoma   Cataract    PLAN:  Patient has been accepted to [facility] [place] for evaluation by spinal surgeon, awaiting bed availability and transfer.  Continue current treatment pending transfer.  Pain control.  Continue Tylenol as needed.  Robaxin scheduled.  Monitor symptoms.  Cultures reviewed.  9/25/2024 2/2 blood cultures pending staphylococcus aureus.  9/25/2024 spinal wound culture pending.  9/25/2024 urine culture multiple organisms, probable contamination.  Follow-up cultures.  IV antibiotics per Infectious disease.  Renal dosing.  Monitor Vancomycin level and renal function.   Renal function stable.  Renal ultrasound radiology report reviewed, no acute findings, cholelithiasis - asymptomatic.  Nephrology following.  Input appreciated.  Continue to monitor renal function, management per Nephrology's recommendations.  Monitor I's and O's.  Monitor renal function.  Blood pressures reviewed.  Continue beta blocker with parameters.  Monitor blood pressure.  CT chest radiology report reviewed.  Pleural effusions noted.  Asymptomatic.  Respiratory status, pulse oximetry stable on room air.  Pulmonology consultation.  Follow-up recommendations.  Monitor respiratory status and pulse oximetry.  Encourage incentive spirometer use 10 times per hour while awake.  H&H stable.  No evidence of acute blood loss.  Iron deficient.  Ferritin elevated.  IV Venofer.  Monitor H&H.  Point of care glucose reviewed.  Hyperglycemic.  Sliding scale insulin added.  Monitor point-of-care glucose AC/HS.  Adjust insulin as needed for glycemic control.  Hypoglycemia protocol.  Wound care nursing consultation.  Local wound care.  PT/OT.  Fall precautions.  Up with assistance only.  Bed and chair alarm at all times.  Pressure ulcer prevention measures.  Turn and reposition every 2 hours and as needed.  Heels off bed.  Offloading.  Wound care nursing evaluation.  DVT prophylaxis.  Heparin subcutaneous.  GI prophylaxis.  PPI Protonix.  Supportive care.  Patient reassured.  PT/OT recommend moderate intensity level of continued care.  Case management following for discharge planning.  Transfer to [facility] [place] for evaluation by spinal surgeon, when bed available.  Discussed with patient and Dr. Hurd.  Discussed with Infectious disease Dr. Mazariegos.      Leona Flores, APRN-CNP

## 2024-09-27 NOTE — PROGRESS NOTES
"Narda Malloy is a 69 y.o. female on day 2 of admission presenting with Postoperative infection, unspecified type, initial encounter.      Subjective   Patient is being seen for acute kidney injury. Denies nausea, vomiting, diarrhea, abdominal pain, urinary symptoms or changes in appetite. Has no complains today       Objective      Physical Exam  Constitutional:       General: She is awake. She is not in acute distress.     Appearance: She is not toxic-appearing.   HENT:      Head: Normocephalic and atraumatic.      Mouth/Throat:      Mouth: Mucous membranes are moist.   Eyes:      General: No scleral icterus.     Comments: Conjunctiva clear   Neck:      Vascular: No JVD.   Cardiovascular:      Rate and Rhythm: Regular rhythm.      Heart sounds:      No friction rub.   Pulmonary:      Effort: Pulmonary effort is normal.      Breath sounds: Normal breath sounds.   Abdominal:      General: Bowel sounds are normal.      Palpations: Abdomen is soft.      Tenderness: There is no guarding or rebound.   Musculoskeletal:      Cervical back: Neck supple.      Comments: 1+ edema, no cyanosis   Skin:     General: Skin is warm and dry.   Neurological:      Mental Status: She is alert.      Comments: Cooperative with exam   Psychiatric:         Mood and Affect: Mood and affect normal.      Visit Vitals  /60 (BP Location: Right arm, Patient Position: Lying)   Pulse 65   Temp 36.1 °C (97 °F) (Oral)   Resp 18   Ht 1.753 m (5' 9\")   Wt 74.8 kg (165 lb)   LMP  (LMP Unknown)   SpO2 97%   BMI 24.37 kg/m²   OB Status Postmenopausal   Smoking Status Former   BSA 1.91 m²        Intake/Output last 3 Shifts:    Intake/Output Summary (Last 24 hours) at 9/27/2024 1057  Last data filed at 9/27/2024 0900  Gross per 24 hour   Intake 1455 ml   Output 490 ml   Net 965 ml       Current Facility-Administered Medications:     acetaminophen (Tylenol) tablet 1,000 mg, 1,000 mg, oral, TID, RUTH Alonzo, 1,000 mg at 09/27/24 0842    " ascorbic acid (Vitamin C) tablet 500 mg, 500 mg, oral, Daily, LEONEL Alonzo-CNP, 500 mg at 09/27/24 0841    brimonidine (AlphaGAN) 0.2 % ophthalmic solution 1 drop, 1 drop, Both Eyes, BID, LEONEL Alonzo-CNP, 1 drop at 09/27/24 0837    calcium carbonate-vitamin D3 500 mg-5 mcg (200 unit) per tablet 1 tablet, 1 tablet, oral, Daily, RUTH Alonzo, 1 tablet at 09/27/24 0841    ceFAZolin (Ancef) 2 g in dextrose (iso)  mL, 2 g, intravenous, q8h, Simón Beaulieu MD    dextrose 50 % injection 12.5 g, 12.5 g, intravenous, q15 min PRN, RUTH Alonzo    dextrose 50 % injection 25 g, 25 g, intravenous, q15 min PRN, RUTH Alonzo    docusate sodium (Colace) capsule 100 mg, 100 mg, oral, BID, LEONEL Alonzo-CNP, 100 mg at 09/27/24 0843    pantoprazole (ProtoNix) EC tablet 40 mg, 40 mg, oral, Daily before breakfast, 40 mg at 09/27/24 0603 **OR** esomeprazole (NexIUM) suspension 40 mg, 40 mg, nasoduodenal tube, Daily before breakfast **OR** pantoprazole (ProtoNix) injection 40 mg, 40 mg, intravenous, Daily before breakfast, RUTH Alonzo    ezetimibe (Zetia) tablet 10 mg, 10 mg, oral, Daily, LEONEL Alonzo-CNP, 10 mg at 09/27/24 0842    glucagon (Glucagen) injection 1 mg, 1 mg, intramuscular, q15 min PRN, RUTH Alonzo    glucagon (Glucagen) injection 1 mg, 1 mg, intramuscular, q15 min PRN, RUTH Alonzo    insulin lispro (HumaLOG) injection 0-15 Units, 0-15 Units, subcutaneous, TID, RUTH Alonzo, 3 Units at 09/27/24 0902    lactobacillus acidophilus tablet 1 tablet, 1 tablet, oral, BID, RUTH Alonzo, 1 tablet at 09/27/24 0842    latanoprost (Xalatan) 0.005 % ophthalmic solution 1 drop, 1 drop, Both Eyes, Nightly, RUTH Alonzo, 1 drop at 09/26/24 2053    melatonin tablet 5 mg, 5 mg, oral, Nightly PRN, RUTH Alonzo    methocarbamol (Robaxin) tablet 500 mg,  500 mg, oral, TID, LEONEL Alonzo-CNP, 500 mg at 09/27/24 0838    ondansetron (Zofran) injection 4 mg, 4 mg, intravenous, q6h PRN, LEONEL Alonzo-CNP    oxygen (O2) therapy, , inhalation, Continuous - Inhalation, LEONEL Alonzo-CNP, 21 percent at 09/26/24 2057    polyethylene glycol (Glycolax, Miralax) packet 17 g, 17 g, oral, Daily, Leona Flores APRN-CNP, 17 g at 09/27/24 0843    primidone (Mysoline) tablet 125 mg, 125 mg, oral, TID, LEONEL Alonzo-CNP, 125 mg at 09/27/24 0837    propranolol LA (Inderal LA) 24 hr capsule 60 mg, 60 mg, oral, Daily, Nelia Cheung MD, 60 mg at 09/27/24 0603    sennosides (Senokot) tablet 8.6 mg, 1 tablet, oral, q12h PRN, LEONEL Alonzo-CNP    traZODone (Desyrel) tablet 25 mg, 25 mg, oral, Nightly, LEONEL Alonzo-CNP, 25 mg at 09/26/24 2051    vancomycin (Vancocin) pharmacy to dose - pharmacy monitoring, , miscellaneous, Daily PRN, LEONEL Alonzo-CNP    vancomycin (Xellia) 1 g in diluent combination  mL, 1 g, intravenous, q24h, LEONEL Alonzo-CNP, Stopped at 09/27/24 1036      Results for orders placed or performed during the hospital encounter of 09/25/24 (from the past 24 hour(s))   POCT GLUCOSE   Result Value Ref Range    POCT Glucose 179 (H) 74 - 99 mg/dL   Sars-CoV-2 PCR   Result Value Ref Range    Coronavirus 2019, PCR Not Detected Not Detected   POCT GLUCOSE   Result Value Ref Range    POCT Glucose 203 (H) 74 - 99 mg/dL   POCT GLUCOSE   Result Value Ref Range    POCT Glucose 233 (H) 74 - 99 mg/dL   Vancomycin   Result Value Ref Range    Vancomycin 11.6 5.0 - 20.0 ug/mL   Renal Function Panel   Result Value Ref Range    Glucose 225 (H) 74 - 99 mg/dL    Sodium 134 (L) 136 - 145 mmol/L    Potassium 4.5 3.5 - 5.3 mmol/L    Chloride 106 98 - 107 mmol/L    Bicarbonate 22 21 - 32 mmol/L    Anion Gap 11 10 - 20 mmol/L    Urea Nitrogen 66 (H) 6 - 23 mg/dL    Creatinine 1.17 (H) 0.50 - 1.05 mg/dL    eGFR 51 (L) >60  mL/min/1.73m*2    Calcium 7.6 (L) 8.6 - 10.3 mg/dL    Phosphorus 3.3 2.5 - 4.9 mg/dL    Albumin 1.9 (L) 3.4 - 5.0 g/dL   CBC   Result Value Ref Range    WBC 8.3 4.4 - 11.3 x10*3/uL    nRBC 0.0 0.0 - 0.0 /100 WBCs    RBC 2.62 (L) 4.00 - 5.20 x10*6/uL    Hemoglobin 8.3 (L) 12.0 - 16.0 g/dL    Hematocrit 25.1 (L) 36.0 - 46.0 %    MCV 96 80 - 100 fL    MCH 31.7 26.0 - 34.0 pg    MCHC 33.1 32.0 - 36.0 g/dL    RDW 13.4 11.5 - 14.5 %    Platelets 369 150 - 450 x10*3/uL   POCT GLUCOSE   Result Value Ref Range    POCT Glucose 199 (H) 74 - 99 mg/dL      Assessment/Plan      Assessment/Plan  Acute kidney injury in the setting of heavy NSAID use, lisinopril and infection- creatine continues to improve today 1.17 today, baseline is 0.6-0.8. Avoid NSAIDS, avoid nephrotoxins, hold ace inhibitors and IV fluids were stopped with concerns of volume overload.   2. Pleural effusions- re establish with pulmonologist for need for CT chest non contrast  3. Hypertension-controlled  4. History of sarcoidosis  5. Diabetes mellitus type 2  6. Skin Wound- ID following           LEONEL Andrea-CNP

## 2024-09-27 NOTE — CONSULTS
"Nutrition Initial Assessment:   Nutrition Assessment    Reason for Assessment: Provider consult order    Patient is a 69 y.o. female presenting with generalized weakness, fatigue, AMY, and concerns for spinal wound infection s/p lumbar surgery on 8/26/24. PMH of T2DM, HTN, HLD.      Nutrition History:  Energy Intake: Good > 75 %  Food and Nutrient History: Attempted to call pt x 2 - no answer. 100% x 4 meals documented since admit.  Food Allergies/Intolerances:  None  GI Symptoms:  none noted  Oral Problems:  none noted     Anthropometrics:  Height: 175.3 cm (5' 9.02\")   Weight: 74.8 kg (165 lb)   BMI (Calculated): 24.36     Weight Change %:  Weight History / % Weight Change: 76.2 kg on 7/15/24  Significant Weight Loss: No    Nutrition Focused Physical Exam Findings:  defer: remote assessment  Edema:  Unspecified, non-pitting edema to all extremities  Physical Findings:  Venous ulcer to right heel  Surgical incision to medial back  Wound to right leg  Surgical incision to left hip  Diabetic foot ulcer to right foot    Nutrition Significant Labs:  BMP Trend:   Results from last 7 days   Lab Units 09/27/24  0422 09/26/24  0537 09/25/24  0958   GLUCOSE mg/dL 225* 140* 46*   CALCIUM mg/dL 7.6* 7.7* 7.7*   SODIUM mmol/L 134* 133* 134*   POTASSIUM mmol/L 4.5 4.7 4.2   CO2 mmol/L 22 22 24   CHLORIDE mmol/L 106 105 106   BUN mg/dL 66* 69* 67*   CREATININE mg/dL 1.17* 1.31* 1.14*    , A1C:  Lab Results   Component Value Date    HGBA1C 6.2 (H) 09/25/2024   , BG POCT trend:   Results from last 7 days   Lab Units 09/27/24  1128 09/27/24  0757 09/26/24  2050 09/26/24  1610 09/26/24  1141   POCT GLUCOSE mg/dL 187* 199* 233* 203* 179*    , Vit B12:   Lab Results   Component Value Date    [id number] 1,559 (H) 09/26/2024      Nutrition Specific Medications:  Noted vitamin C, calcium + vitamin D3, protonix, SSI, probiotic, zofran and senokot PRN, miralax    I/O:    ; Stool Appearance: Formed (09/27/24 1609)    Dietary Orders (From " admission, onward)       Start     Ordered    09/28/24 0001  NPO Diet; Effective midnight  Diet effective midnight         09/27/24 1249    09/26/24 1009  Adult diet Consistent Carb, 2-3 grams sodium; CCD 75 gm/meal  Diet effective now        Question Answer Comment   Diet type Consistent Carb    Diet type 2-3 grams sodium    Carb diet selection: CCD 75 gm/meal        09/26/24 1008                     Estimated Needs:   Total Energy Estimated Needs (kCal): 2244 kCal  Method for Estimating Needs: 25-30 kcal/kg actual body weight  Total Protein Estimated Needs (g): 112 g  Method for Estimating Needs: 1.2-1.5 gm/kg actual body weight  Total Fluid Estimated Needs (mL): 2244 mL  Method for Estimating Needs: 30 mL/kg actual body weight        Nutrition Diagnosis        Nutrition Diagnosis  Patient has Nutrition Diagnosis: Yes  Diagnosis Status (1): New  Nutrition Diagnosis 1: Altered nutrition related to laboratory values  Related to (1): type 2 diabetes  As Evidenced by (1): blood glucose of 225 mg/dL on 9/27/24       Nutrition Interventions/Recommendations         Nutrition Prescription:  Individualized Nutrition Prescription Provided for : Recommend long acting insulin to better manage blood glucose leves        Nutrition Interventions:   Interventions: Medical food supplement, Vitamin supplement therapy  Goal: Meet % of estimated nutrition needs via PO intakes  Medical Food Supplement: Commercial beverage  Goal: Recommend Ensure High Protein, once daily with breakfast, for wound healing  Vitamin Supplement Therapy: Other (Comment) (MVI with minerals)  Goal: Recommend MVI with minerals to aid wound healing    Nutrition Monitoring and Evaluation   Food/Nutrient Related History Monitoring  Monitoring and Evaluation Plan: Amount of food  Amount of Food: Estimated amout of food    Body Composition/Growth/Weight History  Monitoring and Evaluation Plan: Weight  Weight: Measured weight    Biochemical Data, Medical Tests  and Procedures  Monitoring and Evaluation Plan: Electrolyte/renal panel, Glucose/endocrine profile      09/27/24 at 3:49 PM - VERNON BRANDON RDN, AISHA    Time Spent (min): 30 minutes

## 2024-09-27 NOTE — CARE PLAN
The patient's goals for the shift include      The clinical goals for the shift include IV ATB. pain control

## 2024-09-28 ENCOUNTER — ANESTHESIA (OUTPATIENT)
Dept: OPERATING ROOM | Facility: HOSPITAL | Age: 69
End: 2024-09-28
Payer: MEDICARE

## 2024-09-28 PROBLEM — T81.49XA SURGICAL WOUND INFECTION: Status: ACTIVE | Noted: 2024-09-28

## 2024-09-28 LAB
ABO GROUP (TYPE) IN BLOOD: NORMAL
ALBUMIN SERPL BCP-MCNC: 1.9 G/DL (ref 3.4–5)
ALP SERPL-CCNC: 147 U/L (ref 33–136)
ALT SERPL W P-5'-P-CCNC: 8 U/L (ref 7–45)
ANION GAP SERPL CALC-SCNC: 11 MMOL/L (ref 10–20)
ANTIBODY SCREEN: NORMAL
AST SERPL W P-5'-P-CCNC: 8 U/L (ref 9–39)
BASOPHILS # BLD AUTO: 0.03 X10*3/UL (ref 0–0.1)
BASOPHILS NFR BLD AUTO: 0.3 %
BILIRUB SERPL-MCNC: 0.2 MG/DL (ref 0–1.2)
BUN SERPL-MCNC: 62 MG/DL (ref 6–23)
CALCIUM SERPL-MCNC: 7.4 MG/DL (ref 8.6–10.3)
CHLORIDE SERPL-SCNC: 104 MMOL/L (ref 98–107)
CO2 SERPL-SCNC: 21 MMOL/L (ref 21–32)
CREAT SERPL-MCNC: 1.32 MG/DL (ref 0.5–1.05)
EGFRCR SERPLBLD CKD-EPI 2021: 44 ML/MIN/1.73M*2
EOSINOPHIL # BLD AUTO: 0.15 X10*3/UL (ref 0–0.7)
EOSINOPHIL NFR BLD AUTO: 1.6 %
ERYTHROCYTE [DISTWIDTH] IN BLOOD BY AUTOMATED COUNT: 13.5 % (ref 11.5–14.5)
GLUCOSE BLD MANUAL STRIP-MCNC: 158 MG/DL (ref 74–99)
GLUCOSE BLD MANUAL STRIP-MCNC: 172 MG/DL (ref 74–99)
GLUCOSE BLD MANUAL STRIP-MCNC: 185 MG/DL (ref 74–99)
GLUCOSE SERPL-MCNC: 185 MG/DL (ref 74–99)
HCT VFR BLD AUTO: 27.2 % (ref 36–46)
HGB BLD-MCNC: 8.6 G/DL (ref 12–16)
IMM GRANULOCYTES # BLD AUTO: 0.17 X10*3/UL (ref 0–0.7)
IMM GRANULOCYTES NFR BLD AUTO: 1.8 % (ref 0–0.9)
INR PPP: 1 (ref 0.9–1.1)
LYMPHOCYTES # BLD AUTO: 1.82 X10*3/UL (ref 1.2–4.8)
LYMPHOCYTES NFR BLD AUTO: 19.8 %
MAGNESIUM SERPL-MCNC: 1.9 MG/DL (ref 1.6–2.4)
MCH RBC QN AUTO: 31.6 PG (ref 26–34)
MCHC RBC AUTO-ENTMCNC: 31.6 G/DL (ref 32–36)
MCV RBC AUTO: 100 FL (ref 80–100)
MONOCYTES # BLD AUTO: 0.43 X10*3/UL (ref 0.1–1)
MONOCYTES NFR BLD AUTO: 4.7 %
NEUTROPHILS # BLD AUTO: 6.6 X10*3/UL (ref 1.2–7.7)
NEUTROPHILS NFR BLD AUTO: 71.8 %
NRBC BLD-RTO: 0 /100 WBCS (ref 0–0)
PLATELET # BLD AUTO: 429 X10*3/UL (ref 150–450)
POTASSIUM SERPL-SCNC: 4.7 MMOL/L (ref 3.5–5.3)
PROT SERPL-MCNC: 4.8 G/DL (ref 6.4–8.2)
PROTHROMBIN TIME: 11.3 SECONDS (ref 9.8–12.8)
RBC # BLD AUTO: 2.72 X10*6/UL (ref 4–5.2)
RH FACTOR (ANTIGEN D): NORMAL
SODIUM SERPL-SCNC: 131 MMOL/L (ref 136–145)
WBC # BLD AUTO: 9.2 X10*3/UL (ref 4.4–11.3)

## 2024-09-28 PROCEDURE — 2500000004 HC RX 250 GENERAL PHARMACY W/ HCPCS (ALT 636 FOR OP/ED): Performed by: ORTHOPAEDIC SURGERY

## 2024-09-28 PROCEDURE — 7100000001 HC RECOVERY ROOM TIME - INITIAL BASE CHARGE: Performed by: ORTHOPAEDIC SURGERY

## 2024-09-28 PROCEDURE — 36430 TRANSFUSION BLD/BLD COMPNT: CPT

## 2024-09-28 PROCEDURE — 3700000002 HC GENERAL ANESTHESIA TIME - EACH INCREMENTAL 1 MINUTE: Performed by: ORTHOPAEDIC SURGERY

## 2024-09-28 PROCEDURE — 3600000008 HC OR TIME - EACH INCREMENTAL 1 MINUTE - PROCEDURE LEVEL THREE: Performed by: ORTHOPAEDIC SURGERY

## 2024-09-28 PROCEDURE — 86923 COMPATIBILITY TEST ELECTRIC: CPT

## 2024-09-28 PROCEDURE — 10180 I&D COMPLEX PO WOUND INFCTJ: CPT | Performed by: ORTHOPAEDIC SURGERY

## 2024-09-28 PROCEDURE — 2500000002 HC RX 250 W HCPCS SELF ADMINISTERED DRUGS (ALT 637 FOR MEDICARE OP, ALT 636 FOR OP/ED): Performed by: PHYSICIAN ASSISTANT

## 2024-09-28 PROCEDURE — 82947 ASSAY GLUCOSE BLOOD QUANT: CPT

## 2024-09-28 PROCEDURE — 2500000004 HC RX 250 GENERAL PHARMACY W/ HCPCS (ALT 636 FOR OP/ED): Performed by: INTERNAL MEDICINE

## 2024-09-28 PROCEDURE — P9016 RBC LEUKOCYTES REDUCED: HCPCS

## 2024-09-28 PROCEDURE — 0KBG0ZZ EXCISION OF LEFT TRUNK MUSCLE, OPEN APPROACH: ICD-10-PCS | Performed by: ORTHOPAEDIC SURGERY

## 2024-09-28 PROCEDURE — 2500000002 HC RX 250 W HCPCS SELF ADMINISTERED DRUGS (ALT 637 FOR MEDICARE OP, ALT 636 FOR OP/ED): Performed by: HOSPITALIST

## 2024-09-28 PROCEDURE — 1100000001 HC PRIVATE ROOM DAILY

## 2024-09-28 PROCEDURE — 2500000004 HC RX 250 GENERAL PHARMACY W/ HCPCS (ALT 636 FOR OP/ED): Performed by: HOSPITALIST

## 2024-09-28 PROCEDURE — 3700000001 HC GENERAL ANESTHESIA TIME - INITIAL BASE CHARGE: Performed by: ORTHOPAEDIC SURGERY

## 2024-09-28 PROCEDURE — 2720000007 HC OR 272 NO HCPCS: Performed by: ORTHOPAEDIC SURGERY

## 2024-09-28 PROCEDURE — 2500000001 HC RX 250 WO HCPCS SELF ADMINISTERED DRUGS (ALT 637 FOR MEDICARE OP): Performed by: PHYSICIAN ASSISTANT

## 2024-09-28 PROCEDURE — 2500000004 HC RX 250 GENERAL PHARMACY W/ HCPCS (ALT 636 FOR OP/ED): Performed by: ANESTHESIOLOGY

## 2024-09-28 PROCEDURE — 99223 1ST HOSP IP/OBS HIGH 75: CPT | Performed by: HOSPITALIST

## 2024-09-28 PROCEDURE — 86901 BLOOD TYPING SEROLOGIC RH(D): CPT | Performed by: HOSPITALIST

## 2024-09-28 PROCEDURE — 2500000004 HC RX 250 GENERAL PHARMACY W/ HCPCS (ALT 636 FOR OP/ED): Performed by: PHYSICIAN ASSISTANT

## 2024-09-28 PROCEDURE — 2500000001 HC RX 250 WO HCPCS SELF ADMINISTERED DRUGS (ALT 637 FOR MEDICARE OP): Performed by: HOSPITALIST

## 2024-09-28 PROCEDURE — 7100000002 HC RECOVERY ROOM TIME - EACH INCREMENTAL 1 MINUTE: Performed by: ORTHOPAEDIC SURGERY

## 2024-09-28 PROCEDURE — 84443 ASSAY THYROID STIM HORMONE: CPT | Performed by: STUDENT IN AN ORGANIZED HEALTH CARE EDUCATION/TRAINING PROGRAM

## 2024-09-28 PROCEDURE — 83735 ASSAY OF MAGNESIUM: CPT | Performed by: HOSPITALIST

## 2024-09-28 PROCEDURE — 0KBF0ZZ EXCISION OF RIGHT TRUNK MUSCLE, OPEN APPROACH: ICD-10-PCS | Performed by: ORTHOPAEDIC SURGERY

## 2024-09-28 PROCEDURE — 85610 PROTHROMBIN TIME: CPT | Performed by: HOSPITALIST

## 2024-09-28 PROCEDURE — 2500000001 HC RX 250 WO HCPCS SELF ADMINISTERED DRUGS (ALT 637 FOR MEDICARE OP): Performed by: ORTHOPAEDIC SURGERY

## 2024-09-28 PROCEDURE — 2500000005 HC RX 250 GENERAL PHARMACY W/O HCPCS: Performed by: ANESTHESIOLOGIST ASSISTANT

## 2024-09-28 PROCEDURE — 80053 COMPREHEN METABOLIC PANEL: CPT | Performed by: HOSPITALIST

## 2024-09-28 PROCEDURE — 85025 COMPLETE CBC W/AUTO DIFF WBC: CPT | Performed by: HOSPITALIST

## 2024-09-28 PROCEDURE — 2500000005 HC RX 250 GENERAL PHARMACY W/O HCPCS: Performed by: ANESTHESIOLOGY

## 2024-09-28 PROCEDURE — 2500000004 HC RX 250 GENERAL PHARMACY W/ HCPCS (ALT 636 FOR OP/ED): Performed by: ANESTHESIOLOGIST ASSISTANT

## 2024-09-28 PROCEDURE — 87077 CULTURE AEROBIC IDENTIFY: CPT | Mod: AHULAB | Performed by: ORTHOPAEDIC SURGERY

## 2024-09-28 PROCEDURE — 3600000003 HC OR TIME - INITIAL BASE CHARGE - PROCEDURE LEVEL THREE: Performed by: ORTHOPAEDIC SURGERY

## 2024-09-28 PROCEDURE — 2500000005 HC RX 250 GENERAL PHARMACY W/O HCPCS: Performed by: ORTHOPAEDIC SURGERY

## 2024-09-28 RX ORDER — HYDROMORPHONE HYDROCHLORIDE 1 MG/ML
0.2 INJECTION, SOLUTION INTRAMUSCULAR; INTRAVENOUS; SUBCUTANEOUS
Status: ACTIVE | OUTPATIENT
Start: 2024-09-28

## 2024-09-28 RX ORDER — METHOCARBAMOL 500 MG/1
500 TABLET, FILM COATED ORAL 3 TIMES DAILY
Status: DISCONTINUED | OUTPATIENT
Start: 2024-09-28 | End: 2024-09-29

## 2024-09-28 RX ORDER — ONDANSETRON HYDROCHLORIDE 2 MG/ML
4 INJECTION, SOLUTION INTRAVENOUS EVERY 8 HOURS PRN
Status: ACTIVE | OUTPATIENT
Start: 2024-09-28

## 2024-09-28 RX ORDER — INSULIN LISPRO 100 [IU]/ML
0-10 INJECTION, SOLUTION INTRAVENOUS; SUBCUTANEOUS EVERY 4 HOURS
Status: DISPENSED | OUTPATIENT
Start: 2024-09-28

## 2024-09-28 RX ORDER — PANTOPRAZOLE SODIUM 40 MG/1
40 TABLET, DELAYED RELEASE ORAL
Status: DISPENSED | OUTPATIENT
Start: 2024-09-28

## 2024-09-28 RX ORDER — BRIMONIDINE TARTRATE 2 MG/ML
1 SOLUTION/ DROPS OPHTHALMIC 2 TIMES DAILY
Status: DISPENSED | OUTPATIENT
Start: 2024-09-28

## 2024-09-28 RX ORDER — LIDOCAINE HYDROCHLORIDE 20 MG/ML
INJECTION, SOLUTION INFILTRATION; PERINEURAL AS NEEDED
Status: DISCONTINUED | OUTPATIENT
Start: 2024-09-28 | End: 2024-09-28

## 2024-09-28 RX ORDER — HYDROMORPHONE HYDROCHLORIDE 1 MG/ML
0.2 INJECTION, SOLUTION INTRAMUSCULAR; INTRAVENOUS; SUBCUTANEOUS
Status: DISCONTINUED | OUTPATIENT
Start: 2024-09-28 | End: 2024-09-28

## 2024-09-28 RX ORDER — ALBUTEROL SULFATE 0.83 MG/ML
2.5 SOLUTION RESPIRATORY (INHALATION) ONCE AS NEEDED
Status: DISCONTINUED | OUTPATIENT
Start: 2024-09-28 | End: 2024-09-28

## 2024-09-28 RX ORDER — ACETAMINOPHEN 325 MG/1
650 TABLET ORAL EVERY 4 HOURS PRN
Status: ACTIVE | OUTPATIENT
Start: 2024-09-28

## 2024-09-28 RX ORDER — PRIMIDONE 50 MG/1
125 TABLET ORAL 3 TIMES DAILY
Status: DISPENSED | OUTPATIENT
Start: 2024-09-28

## 2024-09-28 RX ORDER — LATANOPROST 50 UG/ML
1 SOLUTION/ DROPS OPHTHALMIC NIGHTLY
Status: DISPENSED | OUTPATIENT
Start: 2024-09-28

## 2024-09-28 RX ORDER — BACITRACIN ZINC 500 UNIT/G
OINTMENT IN PACKET (EA) TOPICAL AS NEEDED
Status: DISCONTINUED | OUTPATIENT
Start: 2024-09-28 | End: 2024-09-28 | Stop reason: HOSPADM

## 2024-09-28 RX ORDER — VANCOMYCIN HYDROCHLORIDE 1 G/20ML
INJECTION, POWDER, LYOPHILIZED, FOR SOLUTION INTRAVENOUS DAILY PRN
Status: DISPENSED | OUTPATIENT
Start: 2024-09-28

## 2024-09-28 RX ORDER — PIPERACILLIN SODIUM, TAZOBACTAM SODIUM 3; .375 G/15ML; G/15ML
INJECTION, POWDER, LYOPHILIZED, FOR SOLUTION INTRAVENOUS AS NEEDED
Status: DISCONTINUED | OUTPATIENT
Start: 2024-09-28 | End: 2024-09-28 | Stop reason: HOSPADM

## 2024-09-28 RX ORDER — PROPRANOLOL HYDROCHLORIDE 60 MG/1
60 CAPSULE, EXTENDED RELEASE ORAL
Status: DISPENSED | OUTPATIENT
Start: 2024-09-28

## 2024-09-28 RX ORDER — ONDANSETRON 4 MG/1
4 TABLET, ORALLY DISINTEGRATING ORAL EVERY 8 HOURS PRN
Status: ACTIVE | OUTPATIENT
Start: 2024-09-28

## 2024-09-28 RX ORDER — CEFAZOLIN SODIUM 2 G/100ML
2 INJECTION, SOLUTION INTRAVENOUS EVERY 8 HOURS
Status: DISCONTINUED | OUTPATIENT
Start: 2024-09-28 | End: 2024-09-28

## 2024-09-28 RX ORDER — POLYETHYLENE GLYCOL 3350 17 G/17G
17 POWDER, FOR SOLUTION ORAL DAILY
Status: DISPENSED | OUTPATIENT
Start: 2024-09-28

## 2024-09-28 RX ORDER — OXYCODONE HYDROCHLORIDE 5 MG/1
10 TABLET ORAL EVERY 6 HOURS PRN
Status: DISPENSED | OUTPATIENT
Start: 2024-09-28

## 2024-09-28 RX ORDER — ONDANSETRON HYDROCHLORIDE 2 MG/ML
4 INJECTION, SOLUTION INTRAVENOUS ONCE AS NEEDED
Status: DISCONTINUED | OUTPATIENT
Start: 2024-09-28 | End: 2024-09-28

## 2024-09-28 RX ORDER — OXYCODONE HYDROCHLORIDE 5 MG/1
5 TABLET ORAL EVERY 6 HOURS PRN
Status: DISPENSED | OUTPATIENT
Start: 2024-09-28

## 2024-09-28 RX ORDER — EZETIMIBE 10 MG/1
10 TABLET ORAL DAILY
Status: DISPENSED | OUTPATIENT
Start: 2024-09-28

## 2024-09-28 RX ORDER — OXYCODONE HYDROCHLORIDE 5 MG/1
5 TABLET ORAL EVERY 4 HOURS PRN
Status: DISCONTINUED | OUTPATIENT
Start: 2024-09-28 | End: 2024-09-28

## 2024-09-28 RX ORDER — VANCOMYCIN HYDROCHLORIDE 1 G/20ML
INJECTION, POWDER, LYOPHILIZED, FOR SOLUTION INTRAVENOUS AS NEEDED
Status: DISCONTINUED | OUTPATIENT
Start: 2024-09-28 | End: 2024-09-28 | Stop reason: HOSPADM

## 2024-09-28 RX ORDER — PHENYLEPHRINE HCL IN 0.9% NACL 1 MG/10 ML
SYRINGE (ML) INTRAVENOUS AS NEEDED
Status: DISCONTINUED | OUTPATIENT
Start: 2024-09-28 | End: 2024-09-28

## 2024-09-28 RX ORDER — VANCOMYCIN HYDROCHLORIDE 1 G/200ML
1000 INJECTION, SOLUTION INTRAVENOUS EVERY 24 HOURS
Status: DISCONTINUED | OUTPATIENT
Start: 2024-09-28 | End: 2024-09-29

## 2024-09-28 RX ORDER — SODIUM CHLORIDE, SODIUM LACTATE, POTASSIUM CHLORIDE, CALCIUM CHLORIDE 600; 310; 30; 20 MG/100ML; MG/100ML; MG/100ML; MG/100ML
100 INJECTION, SOLUTION INTRAVENOUS CONTINUOUS
Status: DISCONTINUED | OUTPATIENT
Start: 2024-09-28 | End: 2024-09-28

## 2024-09-28 RX ORDER — SODIUM CHLORIDE 9 MG/ML
75 INJECTION, SOLUTION INTRAVENOUS CONTINUOUS
Status: ACTIVE | OUTPATIENT
Start: 2024-09-28

## 2024-09-28 RX ORDER — SENNOSIDES 8.6 MG/1
1 TABLET ORAL EVERY 12 HOURS PRN
Status: ACTIVE | OUTPATIENT
Start: 2024-09-28

## 2024-09-28 RX ORDER — PANTOPRAZOLE SODIUM 40 MG/10ML
40 INJECTION, POWDER, LYOPHILIZED, FOR SOLUTION INTRAVENOUS
Status: DISCONTINUED | OUTPATIENT
Start: 2024-09-29 | End: 2024-09-28

## 2024-09-28 RX ORDER — TALC
5 POWDER (GRAM) TOPICAL NIGHTLY PRN
Status: ACTIVE | OUTPATIENT
Start: 2024-09-28

## 2024-09-28 RX ORDER — SODIUM CHLORIDE 0.9 G/100ML
IRRIGANT IRRIGATION AS NEEDED
Status: DISCONTINUED | OUTPATIENT
Start: 2024-09-28 | End: 2024-09-28 | Stop reason: HOSPADM

## 2024-09-28 RX ORDER — PROPOFOL 10 MG/ML
INJECTION, EMULSION INTRAVENOUS AS NEEDED
Status: DISCONTINUED | OUTPATIENT
Start: 2024-09-28 | End: 2024-09-28

## 2024-09-28 RX ORDER — METHOCARBAMOL 100 MG/ML
INJECTION, SOLUTION INTRAMUSCULAR; INTRAVENOUS AS NEEDED
Status: DISCONTINUED | OUTPATIENT
Start: 2024-09-28 | End: 2024-09-28

## 2024-09-28 RX ORDER — NORETHINDRONE AND ETHINYL ESTRADIOL 0.5-0.035
KIT ORAL AS NEEDED
Status: DISCONTINUED | OUTPATIENT
Start: 2024-09-28 | End: 2024-09-28

## 2024-09-28 RX ORDER — SCOLOPAMINE TRANSDERMAL SYSTEM 1 MG/1
PATCH, EXTENDED RELEASE TRANSDERMAL AS NEEDED
Status: DISCONTINUED | OUTPATIENT
Start: 2024-09-28 | End: 2024-09-28

## 2024-09-28 RX ORDER — SODIUM CHLORIDE, SODIUM LACTATE, POTASSIUM CHLORIDE, CALCIUM CHLORIDE 600; 310; 30; 20 MG/100ML; MG/100ML; MG/100ML; MG/100ML
INJECTION, SOLUTION INTRAVENOUS CONTINUOUS PRN
Status: DISCONTINUED | OUTPATIENT
Start: 2024-09-28 | End: 2024-09-28

## 2024-09-28 RX ORDER — ROCURONIUM BROMIDE 10 MG/ML
INJECTION, SOLUTION INTRAVENOUS AS NEEDED
Status: DISCONTINUED | OUTPATIENT
Start: 2024-09-28 | End: 2024-09-28

## 2024-09-28 RX ORDER — POVIDONE-IODINE 10 %
SOLUTION, NON-ORAL TOPICAL AS NEEDED
Status: DISCONTINUED | OUTPATIENT
Start: 2024-09-28 | End: 2024-09-28 | Stop reason: HOSPADM

## 2024-09-28 RX ORDER — FENTANYL CITRATE 50 UG/ML
INJECTION, SOLUTION INTRAMUSCULAR; INTRAVENOUS AS NEEDED
Status: DISCONTINUED | OUTPATIENT
Start: 2024-09-28 | End: 2024-09-28

## 2024-09-28 RX ORDER — GLYCOPYRROLATE 0.2 MG/ML
INJECTION INTRAMUSCULAR; INTRAVENOUS AS NEEDED
Status: DISCONTINUED | OUTPATIENT
Start: 2024-09-28 | End: 2024-09-28

## 2024-09-28 RX ORDER — OXYCODONE HYDROCHLORIDE 10 MG/1
10 TABLET ORAL EVERY 4 HOURS PRN
Status: DISCONTINUED | OUTPATIENT
Start: 2024-09-28 | End: 2024-09-28

## 2024-09-28 RX ORDER — TRAZODONE HYDROCHLORIDE 50 MG/1
25 TABLET ORAL NIGHTLY
Status: DISPENSED | OUTPATIENT
Start: 2024-09-28

## 2024-09-28 ASSESSMENT — PAIN - FUNCTIONAL ASSESSMENT
PAIN_FUNCTIONAL_ASSESSMENT: 0-10
PAIN_FUNCTIONAL_ASSESSMENT: UNABLE TO SELF-REPORT
PAIN_FUNCTIONAL_ASSESSMENT: 0-10
PAIN_FUNCTIONAL_ASSESSMENT: UNABLE TO SELF-REPORT
PAIN_FUNCTIONAL_ASSESSMENT: 0-10
PAIN_FUNCTIONAL_ASSESSMENT: UNABLE TO SELF-REPORT
PAIN_FUNCTIONAL_ASSESSMENT: UNABLE TO SELF-REPORT

## 2024-09-28 ASSESSMENT — ENCOUNTER SYMPTOMS
CARDIOVASCULAR NEGATIVE: 1
CONSTITUTIONAL NEGATIVE: 1
ALLERGIC/IMMUNOLOGIC NEGATIVE: 1
VOMITING: 0
GASTROINTESTINAL NEGATIVE: 1
PSYCHIATRIC NEGATIVE: 1
DIARRHEA: 0
SHORTNESS OF BREATH: 0
HEMATOLOGIC/LYMPHATIC NEGATIVE: 1
NAUSEA: 0
EYES NEGATIVE: 1
FEVER: 0
BACK PAIN: 1
NEUROLOGICAL NEGATIVE: 1
CHILLS: 0
RESPIRATORY NEGATIVE: 1
COUGH: 0

## 2024-09-28 ASSESSMENT — PAIN SCALES - GENERAL
PAINLEVEL_OUTOF10: 6
PAINLEVEL_OUTOF10: 0 - NO PAIN
PAINLEVEL_OUTOF10: 4
PAINLEVEL_OUTOF10: 6

## 2024-09-28 ASSESSMENT — PAIN DESCRIPTION - ORIENTATION: ORIENTATION: MID;LOWER

## 2024-09-28 ASSESSMENT — PAIN DESCRIPTION - LOCATION: LOCATION: BACK

## 2024-09-28 NOTE — PROGRESS NOTES
"  INFECTIOUS DISEASE DAILY PROGRESS NOTE    SUBJECTIVE:    Patient transferred from [facility] and my partners were seeing for MRSA bacteremia and lumbar post-op spine infection. Has been on IV Vancomycin and Cefazolin. Sent here for neurosurgery evaluation. She is feeling OK this morning with no specific complaints. Denies pain. No n/v/c/d. No fevers/chills.    OBJECTIVE:  VITALS (Last 24 Hours)  BP 99/63 (BP Location: Right arm, Patient Position: Lying)   Pulse 69   Temp 36.5 °C (97.7 °F) (Temporal)   Resp 20   Ht 1.753 m (5' 9.02\")   Wt 78.4 kg (172 lb 14.9 oz)   LMP  (LMP Unknown)   SpO2 94%   BMI 25.53 kg/m²     PHYSICAL EXAM:  Gen - NAD, laying in bed  Heart - RRR  Back - dressings over surgical wounds present  Skin - no rash  Misc - LUE PICC line present    ABX: IV Vanc/Cefazolin    LABS:  Lab Results   Component Value Date    WBC 8.3 09/27/2024    HGB 8.3 (L) 09/27/2024    HCT 25.1 (L) 09/27/2024    MCV 96 09/27/2024     09/27/2024     Lab Results   Component Value Date    GLUCOSE 185 (H) 09/28/2024    CALCIUM 7.4 (L) 09/28/2024     (L) 09/28/2024    K 4.7 09/28/2024    CO2 21 09/28/2024     09/28/2024    BUN 62 (H) 09/28/2024    CREATININE 1.32 (H) 09/28/2024     Results from last 72 hours   Lab Units 09/28/24  0137   ALK PHOS U/L 147*   BILIRUBIN TOTAL mg/dL 0.2   PROTEIN TOTAL g/dL 4.8*   ALT U/L 8   AST U/L 8*   ALBUMIN g/dL 1.9*     Estimated Creatinine Clearance: 42.6 mL/min (A) (by C-G formula based on SCr of 1.32 mg/dL (H)).    CRP   Date/Time Value Ref Range Status   10/23/2018 11:41 AM 0.29 mg/dL Final     Comment:     REF VALUE  < 1.00         ASSESSMENT/PLAN:    Lumbar Surgical Site Infection - MRSA and mixed GPB/GNB  MRSA Bacteremia  Acute Renal Failure - CrCl is 42, affects abx dosing. Higher risk for nephrotoxicity with IV Vanc.    IV Vancomycin still given MRSA. Changed IV Cefazolin to IV CTX 2g Q24H for continued coverage of GNB noted from surgical site.    Repeat " blood cx x2 pending. Will need TTE to assess for endocarditis.    Neurosurgery evaluation pending.    Monitoring for adverse effects of abx such as rash/itching/diarrhea.  Monitoring Vancomycin levels and renal function.    Will follow. Thanks!    Brant Juarez MD  [facility]  Office #[phone]

## 2024-09-28 NOTE — CONSULTS
Vancomycin Dosing by Pharmacy- INITIAL    Narda Malloy is a 69 y.o. year old female who Pharmacy has been consulted for vancomycin dosing for cellulitis, skin and soft tissue. Based on the patient's indication and renal status this patient will be dosed based on a goal AUC of 400-600.     Renal function is currently stable.    Visit Vitals  BP 99/60 (BP Location: Right arm, Patient Position: Lying)   Pulse 65   Temp 36.6 °C (97.9 °F) (Temporal)   Resp 20        Lab Results   Component Value Date    CREATININE 1.17 (H) 2024    CREATININE 1.31 (H) 2024    CREATININE 1.14 (H) 2024    CREATININE 0.55 2024        Patient weight is as follows:   Vitals:    24 2345   Weight: 78.4 kg (172 lb 14.9 oz)       Cultures:  No results found for the encounter in last 14 days.        No intake/output data recorded.  I/O during current shift:  No intake/output data recorded.    Temp (24hrs), Av.4 °C (97.6 °F), Min:36 °C (96.8 °F), Max:36.9 °C (98.4 °F)         Assessment/Plan     Patient will not be given a loading dose.  Will initiate vancomycin maintenance,  1000 mg every 24 hours, continuation of therapy from [facility].    This dosing regimen is predicted by [employer] to result in the following pharmacokinetic parameters:  Exposure target: AUC24 (range)400-600 mg/L.hr   [id number]: 440 mg/L.hr  AUC24,ss: 453 mg/L.hr  Probability of AUC24 > 400: 79 %  Ctrough,ss: 13.2 mg/L  Probability of Ctrough,ss > 20: 4 %    Follow-up level will be ordered on 24 at 05:00 unless clinically indicated sooner.  Will continue to monitor renal function daily while on vancomycin and order serum creatinine at least every 48 hours if not already ordered.  Follow for continued vancomycin needs, clinical response, and signs/symptoms of toxicity.       Ynes Church, PharmD

## 2024-09-28 NOTE — OP NOTE
LUMBAR Wound I & D Operative Note     Date: 2024 - 2024  OR Location: [facility]    Name: Narda Malloy, : 1955, Age: 69 y.o., MRN: [id number], Sex: female    Diagnosis  Pre-op Diagnosis      * Surgical site infection [T81.49XA] Post-op Diagnosis     * Surgical site infection [T81.49XA]     Procedures  Complex incisional irrigation and excisional debridement of thoracolumbar spine    Surgeons      * Kermit Ventura - Primary    Resident/Fellow/Other Assistant:  Surgeons and Role:  * No surgeons found with a matching role *    Procedure Summary  Anesthesia: General  ASA: III  Anesthesia Staff: Anesthesiologist: Abhinav Herrera MD  C-AA: OMERO Noel  Estimated Blood Loss: 50 mL  Intra-op Medications:   Administrations occurring from 0800 to 0930 on 24:   Medication Name Total Dose   sodium chloride 0.9 % irrigation solution 6,000 mL   povidone-iodine (Betadine) 10 % topical solution 1 Application   piperacillin-tazobactam (Zosyn) injection 3.375 g   vancomycin (Vancocin) vial for injection 1 g   cefTRIAXone (Rocephin) in dextrose 5% IV 2 g Cannot be calculated   insulin lispro (HumaLOG) injection 0-10 Units Cannot be calculated   vancomycin (Vancocin) 1,000 mg in dextrose 5%  mL Cannot be calculated              Anesthesia Record               Intraprocedure I/O Totals          Intake    Propofol Drip 0.00 mL    The total shown is the total volume documented since Anesthesia Start was filed.    Total Intake 0 mL          Specimen:   ID Type Source Tests Collected by Time   A : LUMBAR WOUND Swab ABSCESS TISSUE/WOUND CULTURE/SMEAR Kermit Ventura MD 2024 0856        Staff:   Circulator: Sherri  Scrub Person: Arielle  Scrub Person: Gabriel  Circulator:   Scrub Person:          Drains and/or Catheters:   Closed/Suction Drain 1 Medial Back Accordion 10 Fr. (Active)       Tourniquet Times:         Implants:         Indications: Narda Malloy is an 69 y.o. female who is having surgery  for Surgical site infection [T81.49XA].     The patient was seen in the preoperative area. The risks, benefits, complications, treatment options, non-operative alternatives, expected recovery and outcomes were discussed with the patient. The possibilities of reaction to medication, pulmonary aspiration, injury to surrounding structures, bleeding, recurrent infection, the need for additional procedures, failure to diagnose a condition, and creating a complication requiring transfusion or operation were discussed with the patient. The patient concurred with the proposed plan, giving informed consent.  The site of surgery was properly noted/marked if necessary per policy. The patient has been actively warmed in preoperative area. Preoperative antibiotics have been ordered and given within 1 hours of incision. Venous thrombosis prophylaxis have been ordered including bilateral sequential compression devices    Procedure Details: Patient was taken to the operating room where a formal timeout was done according to hospital protocol.  Patient was intubated without difficulty.  Patient was given preoperative antibiotics.  Patient was positioned prone on the Marc table and the lumbar spine was prepped and draped in usual fashion.  The midportion of the incision was slightly dehisced.  We opened up the remainder of the incision.  Upon entering into the subfascial space a large amount of purulent fluid was noted.  Intraoperative cultures were obtained.  We opened up the subfascial incision completely.  We then did an excisional debridement of the nonviable tissue including the muscle and fascia.  We then did copious irrigation with a total of 9 L of pulsatile lavage in addition to Betadine solution.  We then did Irrisept as well.  Antibiotic powder was then placed in the subfascial wound.  We closed over a Hemovac drain.  The incision was loosely closed.  Plan is to return to the operating room in 2 days time for repeat  irrigation and debridement and more formal closure of the wound.  She will be continued on antibiotics per infectious disease and according to her cultures.    I was present and scrubbed for the entire procedure.  The physician assistant was present, scrubbed and participated in all portions of the procedure, as no qualified surgeon physician and/or resident surgeon was available to assist in the case.      Kermit Ventura MD    Chief of Spine Surgery, [facility]  Director of Spine Service, [facility]  , Department of Orthopaedics  [facility]  [id number] [place] [address]  P: [phone]    This note was dictated with voice recognition software.  It has not been proofread for grammatical errors, typographical mistakes or other semantic inconsistencies.    Complications:  None; patient tolerated the procedure well.    Disposition: PACU - hemodynamically stable.  Condition: stable             Attending Attestation:     Kermit Ventura  Phone Number: [phone]

## 2024-09-28 NOTE — ANESTHESIA POSTPROCEDURE EVALUATION
Patient: Narda Malloy    Procedure Summary       Date: 09/28/24 Room / Location: [facility] OR 07 / Virtual [facility] OR    Anesthesia Start: 0807 Anesthesia Stop: 0948    Procedure: LUMBAR Wound I & D (Spine Lumbar) Diagnosis:       Surgical site infection      (Surgical site infection [T81.49XA])    Surgeons: Kermit Ventura MD Responsible Provider: Abhinav Herrera MD    Anesthesia Type: general ASA Status: 3            Anesthesia Type: general    Vitals Value Taken Time   BP 97/67 09/28/24 1231   Temp 36.2 °C (97.2 °F) 09/28/24 1145   Pulse 67 09/28/24 1233   Resp 16 09/28/24 1230   SpO2 100 % 09/28/24 1233   Vitals shown include unfiled device data.    Anesthesia Post Evaluation    Patient location during evaluation: PACU  Patient participation: complete - patient participated  Level of consciousness: awake and alert  Pain management: adequate  Airway patency: patent  Cardiovascular status: acceptable and hemodynamically stable  Respiratory status: acceptable, spontaneous ventilation and nonlabored ventilation  Hydration status: acceptable  Postoperative Nausea and Vomiting: none  Comments: BP soft in PACU. Given 500 ml LR bolus, 1 unit PRBC.  BP stable 90s systolic.         There were no known notable events for this encounter.

## 2024-09-28 NOTE — H&P
History Of Present Illness  Narda Malloy is a 69 y.o. female with a PMH of DM2, sarcoidosis, HTN, HLD, essential tremor, transferred from [facility] with post-op infection.    Ms Malloy underwent L1-L3 lumbar laminectomy revision, L1-L2 osteotomy, transforaminal lumbar interbody fusion, T4-S1 revision, fusion on 8/26/24. No noted post-op complications. She was discharged to SNF 8/30.   Pt has experienced persistent back pain and difficulty standing since the surgery. Has essentially been bed bound since then, got up once yesterday, per pt.   She was sent to the ED at [facility] from the SNF on 9/25 for infection of the surgical site. It was noted that she had a discharge from the surgical wound, and they were concerned for a MRSA infection.   The ED note mentions partial wound dehiscence with purulent drainage seen.   Blood cultures were obtained and grew MRSA.   Culture of the wound positive for 4+ abundant mixed Gram positive and Gram neg bacteria.   Pt was admitted, placed initially on Vanc and Zosyn. She was seen by ID who later stopped Zosyn and added Ceftriaxone 2g every 8 hrs. TTE was recommended to r/o endocarditis.   She was also seen by Nephrology for AMY. AMY was thought to be secondary to infection, heavy NSAID use and lisinopril. ACE-I was held. She was also receiving IVF for this, however, fluid was held with concern for fluid overload.   Dr Ventura was contacted by [facility], and pt is to go to surgery today.          Past Medical History  Past Medical History:   Diagnosis Date    Degenerative myopia, bilateral     Diabetic neuropathy (Multi)     Difficult intubation 08/26/2024    Mac 3, grade 3, 1 attempt.  Glidescope/videolaryngoscopy recommended for future attempts.    DM type 2 (diabetes mellitus, type 2) (Multi)     Dry eye syndrome of bilateral lacrimal glands     Essential hypertension     Essential tremor     Glaucoma     Hyperlipidemia     Long term (current) use of insulin (Multi)     Low  back pain     Primary open angle glaucoma of both eyes, severe stage     Repeated falls     Sarcoidosis of lung (Multi)     Spinal stenosis, lumbar region without neurogenic claudication     Weakness        Surgical History  Past Surgical History:   Procedure Laterality Date    BLEPHAROPLASTY  07/2022    BREAST SURGERY  05/20/2022    Breast lift    CARPAL TUNNEL RELEASE      CATARACT EXTRACTION W/  INTRAOCULAR LENS IMPLANT Bilateral     OD 08/04/2011 +8.5D,OS 08/04/2011 +8.50D    FOOT SURGERY      INSERTION / REMOVAL CRANIAL DBS GENERATOR      Placed 2017.  Removed 2018. part of wire left in head when everything removed    LUMBAR FUSION      L3-S1    PANRETINAL PHOTOCOAGULATION  2014    THORACIC FUSION  08/26/2024    T4-S1 fusion    VITRECTOMY Right 2013        Social History  She reports that she has quit smoking. Her smoking use included cigarettes. She has been exposed to tobacco smoke. She has never used smokeless tobacco. She reports that she does not currently use alcohol. She reports that she does not currently use drugs.    Family History  Family History   Problem Relation Name Age of Onset    Multiple myeloma Mother      Cancer Mother      Other (CABG) Father      Pulmonary embolism Father      Heart disease Father      Breast cancer Sister          Stage II    Hypertension Sister      Diabetes Sister      No Known Problems Sister          x5    No Known Problems Brother          x4    No Known Problems Daughter          Allergies  Erythromycin, Morphine, and Rosuvastatin    Review of Systems   Constitutional: Negative.  Negative for chills and fever.   HENT: Negative.     Eyes: Negative.    Respiratory: Negative.  Negative for cough and shortness of breath.    Cardiovascular: Negative.    Gastrointestinal: Negative.  Negative for diarrhea, nausea and vomiting.   Genitourinary: Negative.    Musculoskeletal:  Positive for back pain.   Skin: Negative.    Allergic/Immunologic: Negative.    Neurological:  "Negative.    Hematological: Negative.    Psychiatric/Behavioral: Negative.          Physical Exam  Constitutional:       Comments: Obese elderly female, alert, oriented, answering questions appropriately.   Flat affect, slightly odd mannerisms.    Musculoskeletal:      Comments: Legs have compression hose on. Both feet are cold, palpable DP pulses bilaterally, weak.    Skin:     Comments: Erythema along surgical wound on the back. There is a stretch of approx 14-15cm of wound dehiscence starting in the lower thoracic area extending distally. There is foul smelling purulent drainage leaking from this area.          Last Recorded Vitals  Blood pressure 99/60, pulse 65, temperature 36.6 °C (97.9 °F), temperature source Temporal, resp. rate 20, height 1.753 m (5' 9.02\"), weight 78.4 kg (172 lb 14.9 oz), SpO2 94%.    Relevant Results        Results for orders placed or performed during the hospital encounter of 09/27/24 (from the past 24 hour(s))   Magnesium   Result Value Ref Range    Magnesium 1.90 1.60 - 2.40 mg/dL   Protime-INR   Result Value Ref Range    Protime 11.3 9.8 - 12.8 seconds    INR 1.0 0.9 - 1.1   Type And Screen   Result Value Ref Range    ABO TYPE A     Rh TYPE NEG     ANTIBODY SCREEN NEG    Comprehensive metabolic panel   Result Value Ref Range    Glucose 185 (H) 74 - 99 mg/dL    Sodium 131 (L) 136 - 145 mmol/L    Potassium 4.7 3.5 - 5.3 mmol/L    Chloride 104 98 - 107 mmol/L    Bicarbonate 21 21 - 32 mmol/L    Anion Gap 11 10 - 20 mmol/L    Urea Nitrogen 62 (H) 6 - 23 mg/dL    Creatinine 1.32 (H) 0.50 - 1.05 mg/dL    eGFR 44 (L) >60 mL/min/1.73m*2    Calcium 7.4 (L) 8.6 - 10.3 mg/dL    Albumin 1.9 (L) 3.4 - 5.0 g/dL    Alkaline Phosphatase 147 (H) 33 - 136 U/L    Total Protein 4.8 (L) 6.4 - 8.2 g/dL    AST 8 (L) 9 - 39 U/L    Bilirubin, Total 0.2 0.0 - 1.2 mg/dL    ALT 8 7 - 45 U/L      renal complete    Result Date: 9/26/2024  Interpreted By:  Sean Alfred, STUDY: US RENAL COMPLETE   INDICATION: " Signs/Symptoms:AMY   COMPARISON: None.   ACCESSION NUMBER(S): [id number]   ORDERING CLINICIAN: BLAKE NAIK   TECHNIQUE: Real-time renal ultrasound was performed.   FINDINGS: The right kidney measures 12.3 cm and the left kidney 11.9 cm in maximal length. There is normal thickness and echogenicity of the renal cortex bilaterally. No renal calculi are identified. There is no hydronephrosis. No solid renal masses are seen. No cysts are visualized.   Images of the bladder were also obtained. No bladder mass, stone or debris is identified. Bilateral ureteral jets were demonstrated.   There is incidental finding of cholelithiasis, with borderline prominent common bile duct measuring up to 6 mm.       Normal kidneys and urinary bladder. Incidental finding of cholelithiasis and borderline prominence of the common bile duct.   Signed by: Sean Alfred 9/26/2024 4:59 PM Dictation workstation:   [id number]    CT chest wo IV contrast    Result Date: 9/26/2024  Interpreted By:  Andrew Byrd, STUDY: CT CHEST WO IV CONTRAST; 9/26/2024 4:26 pm   INDICATION: Signs/Symptoms:Bilateral pleural effusions. Postoperative infection. Status post lumbar surgery.   COMPARISON: None.   ACCESSION NUMBER(S): [id number]   ORDERING CLINICIAN: MIYA YAP   TECHNIQUE: The study was performed without intravenous contrast material as requested. A helical acquisition data was obtained with multiplanar reconstructions.   One or more of the following dose reduction techniques were used: Automated exposure control Adjustment of the mA and/or kV according to patient size, and/or use of iterative reconstruction technique.   FINDINGS: Exam is limited by motion artifact and lack of intravenous contrast material as well as artifact arising from the patient's spinal fixation devices.   There is extensive calcification involving mediastinal and hilar lymph nodes bilaterally. Bilateral pleural effusions are present, small on the right and moderate in  size on the left.   Pulmonary parenchyma assessment is limited by motion. Areas of atelectasis are identified within the upper lobes bilaterally as well as within both lower lobes. Focal infiltrate is more difficult to exclude at the pulmonary bases due to the confluence atelectasis within both lower lobes.   Chest Wall: Anasarca is present.   Skeletal System: No fractures or destructive lesions are identified. Pedicle screw and bar fixation extends from T4 into the lumbar region. Vertebroplasty cement is present within the T4 and T5 vertebral bodies.   Upper Abdomen: Gallbladder is distended measuring 6 cm in diameter.       1. Bilateral effusions are present as above, greater on the left. 2. Bilateral atelectasis is present more marked at the bases. 3. Distended appearance of the gallbladder which measures up to 6 cm in diameter, partially included within the field of view.   MACRO: none   Signed by: Andrew Byrd 9/26/2024 4:37 PM Dictation workstation:   [id number]    XR chest 1 view    Result Date: 9/25/2024  Interpreted By:  Andrew Byrd, STUDY: XR CHEST 1 VIEW; 9/25/2024 11:02 am   INDICATION: CLINICAL INFORMATION: Signs/Symptoms:malaise/fatigue.   COMPARISON: 05/15/2018   ACCESSION NUMBER(S): [id number]   ORDERING CLINICIAN: DALILA GEORGE   TECHNIQUE: Portable chest one view.   FINDINGS: The cardiac size is indeterminate in view of the AP projection. Dense material overlies the mid to upper thoracic spine suggesting previous vertebroplasty. Bilateral pedicle screw and bar fusion involves the thoracic spine. Is present consistent with remote granulomatous disease.  Patchy bilateral perihilar infiltrate suspected. Effusions noted on the CT lumbar spine are more difficult to appreciate on this plain film study.       1. Patchy bilateral perihilar infiltrates along with bilateral effusions. Effusions are better appreciated on the CT lumbar spine exam. 2. Bilateral pedicle screw and bar fusion thoracic spine.    MACRO: none   Signed by: Andrew Byrd 9/25/2024 11:08 AM Dictation workstation:   [id number]    CT lumbar spine wo IV contrast    Result Date: 9/25/2024  Interpreted By:  Andrew Byrd, STUDY: CT LUMBAR SPINE WO IV CONTRAST; 9/25/2024 10:27 am   INDICATION: Signs/Symptoms:r/o post operative infection. Patient is status post L1-2 L3 lumbar laminectomy revision and L1-L2 osteotomy an interbody fusion. L4 through S1 fusion performed 08/26/2024. Persistent pain and difficulty walking and standing since surgery.   COMPARISON: 07/15/2024   ACCESSION NUMBER(S): [id number]   ORDERING CLINICIAN: DALILA GEORGE   TECHNIQUE: A helical axial data set was obtained and multiplanar reconstructions performed.   One or more of the following dose reduction techniques were used: Automated exposure control Adjustment of the mA and/or kV according to patient size, and/or use of iterative reconstruction technique.   FINDINGS: There is a lumbar levoscoliosis. There is a bilateral pedicle screw and bar fusion extending from S1 to a least T10 with absent pedicle screws on the left at L1, T12, and T11 and on the right at L3. There is a surgical tract from a removed pedicle screw on the right at L3.   The fusion above the L3 level is new compared to the previous study from 07/15/2024. There is a new interbody spacer at L1-2 resulting in transformation of the kyphotic appearance of the spine at L1-2 to a more lordotic appearance at L1-2. There are erosive changes of the inferior endplate of L1 and superior endplate of L2 although it is difficult to determine if these are new compared to the severe degenerative findings of the L1 and L2 vertebral bodies on the prior study. There appears to be some fragmentation of the right superolateral corner of L2 with the right L2 pedicle screw traversing this corner rather than being imbedded within the central portion of the vertebral body itself.     L1-L2: Spinal canal paraspinous soft tissues are  difficult to assess at this level due to extensive artifact from the fixation devices.   L2-L3: Within limits of this study spinal canal appears capacious at this level along with the associated intervertebral foramina.   L3-L4: Within limits of this study spinal canal appears capacious at this level along with the associated intervertebral foramina.   L4-L5: Within limits of this study spinal canal appears capacious at this level along with the associated intervertebral foramina.   L5-S1: Within limits of this study spinal canal appears capacious at this level along with the associated intervertebral foramina.       1. Marked limitations are noted due to extensive artifact from the fixation devices. This limits the sensitivity and specificity of the exam. 2. Postsurgical appearance of the spine as described. 3. It is difficult to definitively determine whether the erosive changes at the L1-2 level are related to the previous degenerative change or whether there could be superimposed infection at this disc level, status post interbody spacer placement. MRI of the lumbar spine with and without contrast may be more specific for assessment of concern relative to possible postoperative infection. 4. Incidental note is made of what appears to be distended urinary bladder personal included within the field of view. 5. Incidental note is made of the presence of bilateral pleural effusions, larger on the left.   MACRO: none   Signed by: Andrew Byrd 9/25/2024 11:03 AM Dictation workstation:   [id number]        Assessment/Plan   Assessment & Plan  Surgical wound infection  - Blood cultures positive for MRSA  - wound culture grew abundant mixed Gram positive and Gram negative bacteria.   - Continue vanc and Ceftriaxone, as recommended by ID at [facility]  - ID consult  - Dr Ventura to take pt to surgery this am  - echo ordered per ID rec to r/o endocarditis.     AMY  - Cr decreasing  - seen by Nephrology who felt this was due to  infection, ACE-I and heavy use of NSAIDs.     Distended bladder on admission 9/25  - pt states she underwent multiple bladder scans at [facility], no need for anders  - urinating normally per pt.     Pleural effusions on CT  - Pulmonary consulted at [facility], however, note is incomplete, so no recs made.   - pt without any abnormal breath sounds, saturating well, not SOB  - CT for further evaluation if needed    DM2  - ISS, npo insulin    HTN  - hold ACE-I  - currently on no antihypertensives, BP low.     Code status  - FULL           I spent 70 minutes in the professional and overall care of this patient.      Miiram Edward MD

## 2024-09-28 NOTE — ANESTHESIA PROCEDURE NOTES
Airway  Date/Time: 9/28/2024 8:28 AM  Urgency: elective      Staffing  Performed: OMERO   Authorized by: Abhinav Herrera MD    Performed by: OMERO Noel  Patient location during procedure: OR    Indications and Patient Condition  Indications for airway management: anesthesia and airway protection  Spontaneous Ventilation: absent  Sedation level: deep  Preoxygenated: yes  Patient position: sniffing  Mask difficulty assessment: 1 - vent by mask  Planned trial extubation    Final Airway Details  Final airway type: endotracheal airway      Successful airway: ETT  Cuffed: yes   Successful intubation technique: video laryngoscopy  Facilitating devices/methods: intubating stylet  Blade: Delphine  Blade size: #3  ETT size (mm): 7.0  Cormack-Lehane Classification: grade I - full view of glottis  Placement verified by: chest auscultation and capnometry   Measured from: teeth  ETT to teeth (cm): 22  Number of attempts at approach: 1

## 2024-09-28 NOTE — NURSING NOTE
Pt received a bed at [facility]. Spouse notified. Report called to [facility] but no answer. Will try again later.

## 2024-09-28 NOTE — CARE PLAN
The patient's goals for the shift include      The clinical goals for the shift include pain control, safety    Problem: Pain - Adult  Goal: Verbalizes/displays adequate comfort level or baseline comfort level  Outcome: Progressing     Problem: Safety - Adult  Goal: Free from fall injury  9/28/2024 0340 by Eneida Menendez RN  Outcome: Progressing  9/28/2024 0335 by Eneida Menendez RN  Outcome: Progressing

## 2024-09-28 NOTE — ASSESSMENT & PLAN NOTE
- Blood cultures positive for MRSA  - wound culture grew abundant mixed Gram positive and Gram negative bacteria.   - Continue vanc and Ceftriaxone, as recommended by ID at [facility]  - ID consult  - Dr Ventura to take pt to surgery this am  - echo ordered per ID rec to r/o endocarditis.     AMY  - Cr decreasing  - seen by Nephrology who felt this was due to infection, ACE-I and heavy use of NSAIDs.     Distended bladder on admission 9/25  - pt states she underwent multiple bladder scans at [facility], no need for anders  - urinating normally per pt.     Pleural effusions on CT  - Pulmonary consulted at [facility], however, note is incomplete, so no recs made.   - pt without any abnormal breath sounds, saturating well, not SOB  - CT for further evaluation if needed    DM2  - ISS, npo insulin    HTN  - hold ACE-I  - currently on no antihypertensives, BP low.     Code status  - FULL

## 2024-09-28 NOTE — ANESTHESIA PREPROCEDURE EVALUATION
Patient: Narda Malloy    Procedure Information       Date/Time: 09/28/24 0800    Procedure: Wound I & D (Spine Lumbar)    Location: [facility] / [facility]    Surgeons: Kermit Ventura MD            Relevant Problems   Cardiac   (+) Essential hypertension   (+) Hyperlipidemia      Neuro   (+) Depression with anxiety   (+) Lumbar radiculopathy      Endocrine   (+) Controlled type 2 diabetes mellitus with stable proliferative retinopathy of both eyes, without long-term current use of insulin   (+) DM w/o complication type II (Multi)   (+) Proliferative diabetic retinopathy (Multi)   (+) Type 2 diabetes mellitus with hyperglycemia (Multi)   (+) Type 2 diabetes mellitus with hyperglycemia, with long-term current use of insulin      Hematology   (+) Anemia      Musculoskeletal   (+) Displacement of lumbar intervertebral disc without myelopathy   (+) Kyphoscoliosis      HEENT   (+) Primary open-angle glaucoma, bilateral, severe stage      ID   (+) Postoperative infection, unspecified type, initial encounter   (+) Surgical site infection   (+) Surgical wound infection       Clinical information reviewed:    Allergies  Meds                Past Medical History:   Diagnosis Date    Degenerative myopia, bilateral     Diabetic neuropathy (Multi)     Difficult intubation 08/26/2024    Mac 3, grade 3, 1 attempt.  Glidescope/videolaryngoscopy recommended for future attempts.    DM type 2 (diabetes mellitus, type 2) (Multi)     Dry eye syndrome of bilateral lacrimal glands     Essential hypertension     Essential tremor     Glaucoma     Hyperlipidemia     Long term (current) use of insulin (Multi)     Low back pain     Primary open angle glaucoma of both eyes, severe stage     Repeated falls     Sarcoidosis of lung (Multi)     Spinal stenosis, lumbar region without neurogenic claudication     Weakness       Past Surgical History:   Procedure Laterality Date    BLEPHAROPLASTY  07/2022    BREAST SURGERY  05/20/2022     Breast lift    CARPAL TUNNEL RELEASE      CATARACT EXTRACTION W/  INTRAOCULAR LENS IMPLANT Bilateral     OD 08/04/2011 +8.5D,OS 08/04/2011 +8.50D    FOOT SURGERY      INSERTION / REMOVAL CRANIAL DBS GENERATOR      Placed 2017.  Removed 2018. part of wire left in head when everything removed    LUMBAR FUSION      L3-S1    PANRETINAL PHOTOCOAGULATION  2014    THORACIC FUSION  08/26/2024    T4-S1 fusion    VITRECTOMY Right 2013     Social History     Tobacco Use    Smoking status: Former     Types: Cigarettes     Passive exposure: Past    Smokeless tobacco: Never   Vaping Use    Vaping status: Never Used   Substance Use Topics    Alcohol use: Not Currently    Drug use: Not Currently      Current Outpatient Medications   Medication Instructions    acetaminophen (TYLENOL) 1,000 mg, oral, 3 times daily    ascorbic acid (Vitamin C) 500 mg tablet as directed Orally    brimonidine (AlphaGAN) 0.2 % ophthalmic solution 1 drop, Both Eyes, 2 times daily    calcium carbonate-vitamin D3 500 mg-5 mcg (200 unit) tablet 1 tablet, oral, Daily    ceFAZolin (Ancef) IVPB 2 g, intravenous, Every 8 hours    dextrose 12.5 g, intravenous, Every 15 min PRN    dextrose 25 g, intravenous, Every 15 min PRN    docusate sodium (COLACE) 100 mg, oral, 2 times daily    ezetimibe (ZETIA) 10 mg, oral, Daily    FreeStyle Lite Strips strip USE TO TEST 3 TIMES A DAY AS DIRECTED    glucagon (GLUCAGEN) 1 mg, intramuscular, Every 15 min PRN    glucagon (GLUCAGEN) 1 mg, intramuscular, Every 15 min PRN    heparin (porcine) 5,000 Units, subcutaneous, Every 8 hours    insulin lispro (HUMALOG) 0-15 Units, subcutaneous, 3 times daily (morning, midday, late afternoon), Take as directed per insulin instructions.Do not hold when patient is not eating, continue order as scheduled for hyperglycemia management.<BR>Insulin Lispro Corrective Scale #3 <BR><BR>Hypoglycemia protocol Call LIP unit(s) if Blood Glucose is between 0 - 70 mg/dL<BR><BR> 0 unit(s) if Blood glucose  "is between <BR> 3 unit(s) if Blood glucose is between 151-200<BR> 6 unit(s) if Blood glucose is between 201-250<BR> 9 unit(s) if Blood glucose is between 251-300<BR> 12 unit(s) if Blood glucose is between 301-350<BR> 15 unit(s) if Blood glucose is between 351-400<BR><BR>Notify provider unit(s) if Blood Glucose is greater than 400 mg/dL    Lactobacillus acidophilus 100 mg (1 billion cell) capsule 1 capsule, oral, 2 times daily    latanoprost (Xalatan) 0.005 % ophthalmic solution 1 drop, Both Eyes, Nightly    melatonin 5 mg tablet Take 1 tablet (5 mg) by mouth as needed at bedtime for sleep.    methocarbamol (ROBAXIN) 500 mg, oral, 3 times daily    multivitamin tablet 1 tablet, oral, Daily    ondansetron (ZOFRAN) 4 mg, intravenous, Every 6 hours PRN    oxyCODONE (ROXICODONE) 5 mg, oral, Every 6 hours PRN    oxygen (O2) gas therapy 1 each, inhalation, Continuous    pantoprazole (PROTONIX) 40 mg, oral, Daily before breakfast, Do not crush, chew, or split.    pantoprazole (PROTONIX) 40 mg, intravenous, Daily before breakfast, If unable to take PO.    pen needle, diabetic (PEN NEEDLE MISC) BD Altagracia- 4 mm X 32 G needle - as directed 4x a day sc 4 times per day    polyethylene glycol (GLYCOLAX, MIRALAX) 17 g, oral, Daily    primidone 125 mg, oral, 3 times daily    propranolol LA (INDERAL LA) 60 mg, oral, Daily (0630), Hold for SBP < 110 mmhg, HR < 60 bpm.    sennosides (Senokot) 8.6 mg tablet 1 tablet, oral, Every 12 hours PRN    Sure Comfort Pen Needle 32 gauge x 5/32\" needle AS DIRECTED DAILY FOR 90 DAYS    traZODone (DESYREL) 25 mg, oral, Nightly    vancomycin (Xellia) IVPB 1 g, intravenous, Every 24 hours, Pharmacy dosing.      Allergies   Allergen Reactions    Erythromycin Nausea And Vomiting    Morphine GI Upset     nausea and vomiting    Rosuvastatin Other and Myalgia        Chemistry    Lab Results   Component Value Date/Time     (L) 09/28/2024 0137    K 4.7 09/28/2024 0137     09/28/2024 0137    " "CO2 21 09/28/2024 0137    BUN 62 (H) 09/28/2024 0137    CREATININE 1.32 (H) 09/28/2024 0137    Lab Results   Component Value Date/Time    CALCIUM 7.4 (L) 09/28/2024 0137    ALKPHOS 147 (H) 09/28/2024 0137    AST 8 (L) 09/28/2024 0137    ALT 8 09/28/2024 0137    BILITOT 0.2 09/28/2024 0137          Lab Results   Component Value Date    HGBA1C 6.2 (H) 09/25/2024     Lab Results   Component Value Date/Time    WBC 8.3 09/27/2024 0422    HGB 8.3 (L) 09/27/2024 0422    HCT 25.1 (L) 09/27/2024 0422     09/27/2024 0422     Lab Results   Component Value Date/Time    PROTIME 11.3 09/28/2024 0137    INR 1.0 09/28/2024 0137     No results found for: \"ABORH\"  Encounter Date: 07/15/24   Electrocardiogram, 12-lead PRN ACS symptoms   Result Value    Ventricular Rate 56    Atrial Rate 56    AL Interval 166    QRS Duration 76    QT Interval 406    QTC Calculation(Bazett) 391    P Axis 34    R Axis -28    T Axis 33    QRS Count 9    Q Onset 229    P Onset 146    P Offset 169    T Offset 432    QTC Fredericia 397    Narrative    Sinus bradycardia  Low voltage QRS  Cannot rule out Anterior infarct , age undetermined  Abnormal ECG  When compared with ECG of 15-MAY-2018 16:28,  Minimal criteria for Anterior infarct are now Present  Confirmed by Pop Michael ([id number]) on 8/12/2024 11:52:33 PM     No results found for this or any previous visit from the past 1095 days.   Nuclear stress 8/20/2024:  IMPRESSION:  1. Limited study as described above. Decreased activity within the  lateral wall on stress imaging could be related to imaging with the  left arm down versus a small territory of stress-induced ischemia.  Consider ammonia PET-CT for further evaluation. No evidence of prior  infarction.      2. The left ventricle is normal in size.      3. Normal LV wall motion with a post-stress LV EF estimated at 60%.    Echo 8/20/2024:  Left Ventricle: Left ventricular ejection fraction is normal, by visual estimate at 60-65%. There are no " "regional wall motion abnormalities. The left ventricular cavity size is normal. The left ventricular septal wall thickness is mildly increased. There is mildly increased left ventricular posterior wall thickness. Spectral Doppler shows an impaired relaxation pattern of left ventricular diastolic filling.  Left Atrium: The left atrium is normal in size.  Right Ventricle: The right ventricle is normal in size. There is normal right ventricular global systolic function.  Right Atrium: The right atrium is normal in size.  Aortic Valve: The aortic valve is trileaflet. There is no evidence of aortic valve regurgitation.  Mitral Valve: The mitral valve is normal in structure. There is mild mitral valve regurgitation.  Tricuspid Valve: The tricuspid valve is structurally normal. No evidence of tricuspid regurgitation.  Pulmonic Valve: The pulmonic valve is not well visualized. The pulmonic valve regurgitation was not well visualized.  Pericardium: There is no pericardial effusion noted.  Aorta: The aortic root is normal.     CONCLUSIONS:   1. Left ventricular ejection fraction is normal, by visual estimate at 60-65%.   2. Spectral Doppler shows an impaired relaxation pattern of left ventricular diastolic filling.   3. There is normal right ventricular global systolic function.    Visit Vitals  BP 99/63 (BP Location: Right arm, Patient Position: Lying)   Pulse 69   Temp 36.5 °C (97.7 °F) (Temporal)   Resp 20   Ht 1.753 m (5' 9.02\")   Wt 78.4 kg (172 lb 14.9 oz)   LMP  (LMP Unknown)   SpO2 94%   BMI 25.53 kg/m²   OB Status Postmenopausal   Smoking Status Former   BSA 1.95 m²     No data recorded    Physical Exam    Airway  Mallampati: III  TM distance: >3 FB  Neck ROM: full     Cardiovascular - normal exam  Rhythm: regular  Rate: normal     Dental    Pulmonary - normal exam     Abdominal - normal exam             Anesthesia Plan    History of general anesthesia?: yes  History of complications of general anesthesia?: no    ASA " 3     general   (General with ETT. Standard ASA monitoring. scopolamine)  intravenous induction   Postoperative administration of opioids is intended.  Anesthetic plan and risks discussed with patient.    Plan discussed with CAA and CRNA.

## 2024-09-28 NOTE — CARE PLAN
The patient's goals for the shift include      The clinical goals for the shift include pain control, safety

## 2024-09-29 ENCOUNTER — ANESTHESIA EVENT (OUTPATIENT)
Dept: OPERATING ROOM | Facility: HOSPITAL | Age: 69
End: 2024-09-29
Payer: MEDICARE

## 2024-09-29 VITALS
SYSTOLIC BLOOD PRESSURE: 98 MMHG | HEART RATE: 77 BPM | DIASTOLIC BLOOD PRESSURE: 88 MMHG | BODY MASS INDEX: 25.61 KG/M2 | HEIGHT: 69 IN | TEMPERATURE: 96.6 F | OXYGEN SATURATION: 99 % | WEIGHT: 172.93 LBS | RESPIRATION RATE: 18 BRPM

## 2024-09-29 LAB
ANION GAP SERPL CALC-SCNC: 13 MMOL/L (ref 10–20)
BACTERIA BLD AEROBE CULT: ABNORMAL
BACTERIA BLD CULT: ABNORMAL
BACTERIA BLD CULT: NORMAL
BACTERIA BLD CULT: NORMAL
BLOOD EXPIRATION DATE: NORMAL
BUN SERPL-MCNC: 60 MG/DL (ref 6–23)
CALCIUM SERPL-MCNC: 7.2 MG/DL (ref 8.6–10.3)
CHLORIDE SERPL-SCNC: 105 MMOL/L (ref 98–107)
CO2 SERPL-SCNC: 19 MMOL/L (ref 21–32)
CORTIS AM PEAK SERPL-MSCNC: 32 UG/DL (ref 5–20)
CREAT SERPL-MCNC: 1.56 MG/DL (ref 0.5–1.05)
DISPENSE STATUS: NORMAL
EGFRCR SERPLBLD CKD-EPI 2021: 36 ML/MIN/1.73M*2
ERYTHROCYTE [DISTWIDTH] IN BLOOD BY AUTOMATED COUNT: 14.6 % (ref 11.5–14.5)
GLUCOSE BLD MANUAL STRIP-MCNC: 154 MG/DL (ref 74–99)
GLUCOSE BLD MANUAL STRIP-MCNC: 164 MG/DL (ref 74–99)
GLUCOSE BLD MANUAL STRIP-MCNC: 167 MG/DL (ref 74–99)
GLUCOSE BLD MANUAL STRIP-MCNC: 206 MG/DL (ref 74–99)
GLUCOSE BLD MANUAL STRIP-MCNC: 218 MG/DL (ref 74–99)
GLUCOSE BLD MANUAL STRIP-MCNC: 223 MG/DL (ref 74–99)
GLUCOSE SERPL-MCNC: 183 MG/DL (ref 74–99)
GRAM STN SPEC: ABNORMAL
GRAM STN SPEC: NORMAL
GRAM STN SPEC: NORMAL
HCT VFR BLD AUTO: 26.5 % (ref 36–46)
HGB BLD-MCNC: 8.7 G/DL (ref 12–16)
HOLD SPECIMEN: NORMAL
LACTATE SERPL-SCNC: 1.5 MMOL/L (ref 0.4–2)
MCH RBC QN AUTO: 30.7 PG (ref 26–34)
MCHC RBC AUTO-ENTMCNC: 32.8 G/DL (ref 32–36)
MCV RBC AUTO: 94 FL (ref 80–100)
NRBC BLD-RTO: 0 /100 WBCS (ref 0–0)
PLATELET # BLD AUTO: 433 X10*3/UL (ref 150–450)
POTASSIUM SERPL-SCNC: 4.7 MMOL/L (ref 3.5–5.3)
PRODUCT BLOOD TYPE: 600
PRODUCT CODE: NORMAL
RBC # BLD AUTO: 2.83 X10*6/UL (ref 4–5.2)
SODIUM SERPL-SCNC: 132 MMOL/L (ref 136–145)
TSH SERPL-ACNC: 2.9 MIU/L (ref 0.44–3.98)
UNIT ABO: NORMAL
UNIT NUMBER: NORMAL
UNIT RH: NORMAL
UNIT VOLUME: 292
UNIT VOLUME: 350
UNIT VOLUME: 350
VANCOMYCIN SERPL-MCNC: 28.8 UG/ML (ref 5–20)
WBC # BLD AUTO: 16 X10*3/UL (ref 4.4–11.3)
XM INTEP: NORMAL

## 2024-09-29 PROCEDURE — 2500000004 HC RX 250 GENERAL PHARMACY W/ HCPCS (ALT 636 FOR OP/ED): Performed by: PHYSICIAN ASSISTANT

## 2024-09-29 PROCEDURE — 2060000001 HC INTERMEDIATE ICU ROOM DAILY

## 2024-09-29 PROCEDURE — 99232 SBSQ HOSP IP/OBS MODERATE 35: CPT | Performed by: STUDENT IN AN ORGANIZED HEALTH CARE EDUCATION/TRAINING PROGRAM

## 2024-09-29 PROCEDURE — 82947 ASSAY GLUCOSE BLOOD QUANT: CPT

## 2024-09-29 PROCEDURE — P9016 RBC LEUKOCYTES REDUCED: HCPCS

## 2024-09-29 PROCEDURE — 82533 TOTAL CORTISOL: CPT | Mod: AHULAB | Performed by: STUDENT IN AN ORGANIZED HEALTH CARE EDUCATION/TRAINING PROGRAM

## 2024-09-29 PROCEDURE — 2500000004 HC RX 250 GENERAL PHARMACY W/ HCPCS (ALT 636 FOR OP/ED): Performed by: HOSPITALIST

## 2024-09-29 PROCEDURE — 36430 TRANSFUSION BLD/BLD COMPNT: CPT

## 2024-09-29 PROCEDURE — 2500000002 HC RX 250 W HCPCS SELF ADMINISTERED DRUGS (ALT 637 FOR MEDICARE OP, ALT 636 FOR OP/ED): Performed by: PHYSICIAN ASSISTANT

## 2024-09-29 PROCEDURE — 83605 ASSAY OF LACTIC ACID: CPT | Performed by: STUDENT IN AN ORGANIZED HEALTH CARE EDUCATION/TRAINING PROGRAM

## 2024-09-29 PROCEDURE — 80048 BASIC METABOLIC PNL TOTAL CA: CPT | Performed by: PHYSICIAN ASSISTANT

## 2024-09-29 PROCEDURE — 2500000001 HC RX 250 WO HCPCS SELF ADMINISTERED DRUGS (ALT 637 FOR MEDICARE OP): Performed by: PHYSICIAN ASSISTANT

## 2024-09-29 PROCEDURE — 2500000002 HC RX 250 W HCPCS SELF ADMINISTERED DRUGS (ALT 637 FOR MEDICARE OP, ALT 636 FOR OP/ED): Performed by: HOSPITALIST

## 2024-09-29 PROCEDURE — 80202 ASSAY OF VANCOMYCIN: CPT | Performed by: PHYSICIAN ASSISTANT

## 2024-09-29 PROCEDURE — 85027 COMPLETE CBC AUTOMATED: CPT | Performed by: PHYSICIAN ASSISTANT

## 2024-09-29 RX ORDER — METHOCARBAMOL 500 MG/1
750 TABLET, FILM COATED ORAL EVERY 8 HOURS SCHEDULED
Status: DISPENSED | OUTPATIENT
Start: 2024-09-29

## 2024-09-29 RX ORDER — TAMSULOSIN HYDROCHLORIDE 0.4 MG/1
0.4 CAPSULE ORAL DAILY
Status: DISPENSED | OUTPATIENT
Start: 2024-09-29

## 2024-09-29 ASSESSMENT — COGNITIVE AND FUNCTIONAL STATUS - GENERAL
EATING MEALS: A LITTLE
TOILETING: A LOT
DAILY ACTIVITIY SCORE: 20
MOVING FROM LYING ON BACK TO SITTING ON SIDE OF FLAT BED WITH BEDRAILS: A LITTLE
CLIMB 3 TO 5 STEPS WITH RAILING: A LOT
TOILETING: A LITTLE
HELP NEEDED FOR BATHING: A LITTLE
MOVING FROM LYING ON BACK TO SITTING ON SIDE OF FLAT BED WITH BEDRAILS: A LITTLE
WALKING IN HOSPITAL ROOM: A LOT
WALKING IN HOSPITAL ROOM: A LOT
MOBILITY SCORE: 13
DRESSING REGULAR LOWER BODY CLOTHING: A LITTLE
TURNING FROM BACK TO SIDE WHILE IN FLAT BAD: A LOT
MOVING TO AND FROM BED TO CHAIR: A LOT
STANDING UP FROM CHAIR USING ARMS: A LOT
DRESSING REGULAR LOWER BODY CLOTHING: A LOT
DRESSING REGULAR UPPER BODY CLOTHING: A LITTLE
CLIMB 3 TO 5 STEPS WITH RAILING: A LOT
DAILY ACTIVITIY SCORE: 14
STANDING UP FROM CHAIR USING ARMS: A LOT
PERSONAL GROOMING: A LITTLE
MOVING TO AND FROM BED TO CHAIR: A LOT
HELP NEEDED FOR BATHING: A LOT
DRESSING REGULAR UPPER BODY CLOTHING: A LOT
MOBILITY SCORE: 13
TURNING FROM BACK TO SIDE WHILE IN FLAT BAD: A LOT

## 2024-09-29 ASSESSMENT — ENCOUNTER SYMPTOMS
WEAKNESS: 1
WOUND: 1
FATIGUE: 1
ABDOMINAL DISTENTION: 0
BACK PAIN: 1
FEVER: 0
SHORTNESS OF BREATH: 0
ABDOMINAL PAIN: 0
VOMITING: 0

## 2024-09-29 ASSESSMENT — PAIN SCALES - GENERAL
PAINLEVEL_OUTOF10: 0 - NO PAIN
PAINLEVEL_OUTOF10: 0 - NO PAIN

## 2024-09-29 ASSESSMENT — ACTIVITIES OF DAILY LIVING (ADL): LACK_OF_TRANSPORTATION: NO

## 2024-09-29 ASSESSMENT — PAIN - FUNCTIONAL ASSESSMENT: PAIN_FUNCTIONAL_ASSESSMENT: 0-10

## 2024-09-29 NOTE — CONSULTS
Reason For Consult  Surgical site infection    History Of Present Illness  Narda Malloy is a 69 y.o. female presenting with bacteremia, surgical site infection.  Prior L1-L3 Lumbar Laminectomy Revision; L1-L2 Osteotomy; Transforaminal Lumbar Interbody Fusion; T4-S1 Revision; Fusion; Instrumentation; Cement Augmentation on 08/26/24. Underwent wound washout with hemovac placement yesterday 09/28/24 with plans to return to OR tomorrow 09/3024 for another washout and wound closure.     Reports expected postop pain, muscles spasms. ID and medicine service following.      Past Medical History  She has a past medical history of Degenerative myopia, bilateral, Diabetic neuropathy (Multi), Difficult intubation (08/26/2024), DM type 2 (diabetes mellitus, type 2) (Multi), Dry eye syndrome of bilateral lacrimal glands, Essential hypertension, Essential tremor, Glaucoma, Hyperlipidemia, Long term (current) use of insulin (Multi), Low back pain, PONV (postoperative nausea and vomiting), Primary open angle glaucoma of both eyes, severe stage, Repeated falls, Sarcoidosis of lung (Multi), Spinal stenosis, lumbar region without neurogenic claudication, and Weakness.    Surgical History  She has a past surgical history that includes Carpal tunnel release; Vitrectomy (Right, 2013); Cataract extraction w/  intraocular lens implant (Bilateral); Panretinal photocoagulation (2014); Foot surgery; Insertion / removal cranial DBS generator; Thoracic Fusion (08/26/2024); Lumbar fusion; Blepharoplasty (07/2022); and Breast surgery (05/20/2022).     Social History  She reports that she has quit smoking. Her smoking use included cigarettes. She has been exposed to tobacco smoke. She has never used smokeless tobacco. She reports that she does not currently use alcohol. She reports that she does not currently use drugs.    Family History  Family History   Problem Relation Name Age of Onset    Multiple myeloma Mother      Cancer Mother      Other  "(CABG) Father      Pulmonary embolism Father      Heart disease Father      Breast cancer Sister          Stage II    Hypertension Sister      Diabetes Sister      No Known Problems Sister          x5    No Known Problems Brother          x4    No Known Problems Daughter          Allergies  Erythromycin, Morphine, and Rosuvastatin    Review of Systems  As noted in the HPI     Physical Exam  Resting in bed  Hemovac in place  PICC In place  Moving all extremities x 4 appropriately       Last Recorded Vitals  Blood pressure (!) 107/42, pulse 81, temperature 36.9 °C (98.4 °F), temperature source Axillary, resp. rate 18, height 1.753 m (5' 9.02\"), weight 78.4 kg (172 lb 14.9 oz), SpO2 100%.    Relevant Results      Scheduled medications  brimonidine, 1 drop, Both Eyes, BID  cefTRIAXone, 2 g, intravenous, q24h  ezetimibe, 10 mg, oral, Daily  insulin lispro, 0-10 Units, subcutaneous, q4h  lactobacillus acidophilus, 1 capsule, oral, Daily  latanoprost, 1 drop, Both Eyes, Nightly  methocarbamol, 750 mg, oral, q8h LUCA  pantoprazole, 40 mg, oral, Daily before breakfast  polyethylene glycol, 17 g, oral, Daily  primidone, 125 mg, oral, TID  [Held by provider] propranolol LA, 60 mg, oral, Daily  [Held by provider] tamsulosin, 0.4 mg, oral, Daily  traZODone, 25 mg, oral, Nightly      Continuous medications  sodium chloride 0.9%, 75 mL/hr, Last Rate: 75 mL/hr (09/29/24 0246)      PRN medications  PRN medications: acetaminophen, acetaminophen, HYDROmorphone, melatonin, ondansetron ODT **OR** ondansetron, oxyCODONE, oxyCODONE, sennosides, vancomycin  Results for orders placed or performed during the hospital encounter of 09/27/24 (from the past 24 hour(s))   POCT GLUCOSE   Result Value Ref Range    POCT Glucose 154 (H) 74 - 99 mg/dL   POCT GLUCOSE   Result Value Ref Range    POCT Glucose 167 (H) 74 - 99 mg/dL   Basic metabolic panel   Result Value Ref Range    Glucose 183 (H) 74 - 99 mg/dL    Sodium 132 (L) 136 - 145 mmol/L    " Potassium 4.7 3.5 - 5.3 mmol/L    Chloride 105 98 - 107 mmol/L    Bicarbonate 19 (L) 21 - 32 mmol/L    Anion Gap 13 10 - 20 mmol/L    Urea Nitrogen 60 (H) 6 - 23 mg/dL    Creatinine 1.56 (H) 0.50 - 1.05 mg/dL    eGFR 36 (L) >60 mL/min/1.73m*2    Calcium 7.2 (L) 8.6 - 10.3 mg/dL   Vancomycin   Result Value Ref Range    Vancomycin 28.8 (H) 5.0 - 20.0 ug/mL   CBC   Result Value Ref Range    WBC 16.0 (H) 4.4 - 11.3 x10*3/uL    nRBC 0.0 0.0 - 0.0 /100 WBCs    RBC 2.83 (L) 4.00 - 5.20 x10*6/uL    Hemoglobin 8.7 (L) 12.0 - 16.0 g/dL    Hematocrit 26.5 (L) 36.0 - 46.0 %    MCV 94 80 - 100 fL    MCH 30.7 26.0 - 34.0 pg    MCHC 32.8 32.0 - 36.0 g/dL    RDW 14.6 (H) 11.5 - 14.5 %    Platelets 433 150 - 450 x10*3/uL   Lactate   Result Value Ref Range    Lactate 1.5 0.4 - 2.0 mmol/L   Lavender Top   Result Value Ref Range    Extra Tube Hold for add-ons.    Prepare RBC: 1 Units   Result Value Ref Range    PRODUCT CODE U6375D82     Unit Number [id number]     Unit ABO A     Unit RH NEG     XM INTEP COMP     Dispense Status XM     Blood Expiration Date 10/29/2024 11:59:00 PM EDT     PRODUCT BLOOD TYPE 0600     UNIT VOLUME 350    POCT GLUCOSE   Result Value Ref Range    POCT Glucose 164 (H) 74 - 99 mg/dL        Assessment/Plan     Pain control - continue current regimen (tylenol, robaxin scheduled; dilaudid, oxycodone prn)  Diet - regular; NPO at midnight tonight   PT/OT consulted - appreciate recommendations  DVT prophylaxis - SCDs, encourage ambulation and up to chair for all meals; anticoagulation per medicine team  Antibiotics per ID, appreciate recs   Continue Hemovac      Plan to return to OR tomorrow 09/30/24 for second wound washout with Dr. Audrey   Will eventually need dc back to facility when medically stable     Please EPIC chat with any questions/concerns      Lianet Diaz PA-C

## 2024-09-29 NOTE — H&P (VIEW-ONLY)
Reason For Consult  Surgical site infection    History Of Present Illness  Narda Malloy is a 69 y.o. female presenting with bacteremia, surgical site infection.  Prior L1-L3 Lumbar Laminectomy Revision; L1-L2 Osteotomy; Transforaminal Lumbar Interbody Fusion; T4-S1 Revision; Fusion; Instrumentation; Cement Augmentation on 08/26/24. Underwent wound washout with hemovac placement yesterday 09/28/24 with plans to return to OR tomorrow 09/3024 for another washout and wound closure.     Reports expected postop pain, muscles spasms. ID and medicine service following.      Past Medical History  She has a past medical history of Degenerative myopia, bilateral, Diabetic neuropathy (Multi), Difficult intubation (08/26/2024), DM type 2 (diabetes mellitus, type 2) (Multi), Dry eye syndrome of bilateral lacrimal glands, Essential hypertension, Essential tremor, Glaucoma, Hyperlipidemia, Long term (current) use of insulin (Multi), Low back pain, PONV (postoperative nausea and vomiting), Primary open angle glaucoma of both eyes, severe stage, Repeated falls, Sarcoidosis of lung (Multi), Spinal stenosis, lumbar region without neurogenic claudication, and Weakness.    Surgical History  She has a past surgical history that includes Carpal tunnel release; Vitrectomy (Right, 2013); Cataract extraction w/  intraocular lens implant (Bilateral); Panretinal photocoagulation (2014); Foot surgery; Insertion / removal cranial DBS generator; Thoracic Fusion (08/26/2024); Lumbar fusion; Blepharoplasty (07/2022); and Breast surgery (05/20/2022).     Social History  She reports that she has quit smoking. Her smoking use included cigarettes. She has been exposed to tobacco smoke. She has never used smokeless tobacco. She reports that she does not currently use alcohol. She reports that she does not currently use drugs.    Family History  Family History   Problem Relation Name Age of Onset    Multiple myeloma Mother      Cancer Mother      Other  "(CABG) Father      Pulmonary embolism Father      Heart disease Father      Breast cancer Sister          Stage II    Hypertension Sister      Diabetes Sister      No Known Problems Sister          x5    No Known Problems Brother          x4    No Known Problems Daughter          Allergies  Erythromycin, Morphine, and Rosuvastatin    Review of Systems  As noted in the HPI     Physical Exam  Resting in bed  Hemovac in place  PICC In place  Moving all extremities x 4 appropriately       Last Recorded Vitals  Blood pressure (!) 107/42, pulse 81, temperature 36.9 °C (98.4 °F), temperature source Axillary, resp. rate 18, height 1.753 m (5' 9.02\"), weight 78.4 kg (172 lb 14.9 oz), SpO2 100%.    Relevant Results      Scheduled medications  brimonidine, 1 drop, Both Eyes, BID  cefTRIAXone, 2 g, intravenous, q24h  ezetimibe, 10 mg, oral, Daily  insulin lispro, 0-10 Units, subcutaneous, q4h  lactobacillus acidophilus, 1 capsule, oral, Daily  latanoprost, 1 drop, Both Eyes, Nightly  methocarbamol, 750 mg, oral, q8h LUCA  pantoprazole, 40 mg, oral, Daily before breakfast  polyethylene glycol, 17 g, oral, Daily  primidone, 125 mg, oral, TID  [Held by provider] propranolol LA, 60 mg, oral, Daily  [Held by provider] tamsulosin, 0.4 mg, oral, Daily  traZODone, 25 mg, oral, Nightly      Continuous medications  sodium chloride 0.9%, 75 mL/hr, Last Rate: 75 mL/hr (09/29/24 0246)      PRN medications  PRN medications: acetaminophen, acetaminophen, HYDROmorphone, melatonin, ondansetron ODT **OR** ondansetron, oxyCODONE, oxyCODONE, sennosides, vancomycin  Results for orders placed or performed during the hospital encounter of 09/27/24 (from the past 24 hour(s))   POCT GLUCOSE   Result Value Ref Range    POCT Glucose 154 (H) 74 - 99 mg/dL   POCT GLUCOSE   Result Value Ref Range    POCT Glucose 167 (H) 74 - 99 mg/dL   Basic metabolic panel   Result Value Ref Range    Glucose 183 (H) 74 - 99 mg/dL    Sodium 132 (L) 136 - 145 mmol/L    " Potassium 4.7 3.5 - 5.3 mmol/L    Chloride 105 98 - 107 mmol/L    Bicarbonate 19 (L) 21 - 32 mmol/L    Anion Gap 13 10 - 20 mmol/L    Urea Nitrogen 60 (H) 6 - 23 mg/dL    Creatinine 1.56 (H) 0.50 - 1.05 mg/dL    eGFR 36 (L) >60 mL/min/1.73m*2    Calcium 7.2 (L) 8.6 - 10.3 mg/dL   Vancomycin   Result Value Ref Range    Vancomycin 28.8 (H) 5.0 - 20.0 ug/mL   CBC   Result Value Ref Range    WBC 16.0 (H) 4.4 - 11.3 x10*3/uL    nRBC 0.0 0.0 - 0.0 /100 WBCs    RBC 2.83 (L) 4.00 - 5.20 x10*6/uL    Hemoglobin 8.7 (L) 12.0 - 16.0 g/dL    Hematocrit 26.5 (L) 36.0 - 46.0 %    MCV 94 80 - 100 fL    MCH 30.7 26.0 - 34.0 pg    MCHC 32.8 32.0 - 36.0 g/dL    RDW 14.6 (H) 11.5 - 14.5 %    Platelets 433 150 - 450 x10*3/uL   Lactate   Result Value Ref Range    Lactate 1.5 0.4 - 2.0 mmol/L   Lavender Top   Result Value Ref Range    Extra Tube Hold for add-ons.    Prepare RBC: 1 Units   Result Value Ref Range    PRODUCT CODE X4585T92     Unit Number [id number]     Unit ABO A     Unit RH NEG     XM INTEP COMP     Dispense Status XM     Blood Expiration Date 10/29/2024 11:59:00 PM EDT     PRODUCT BLOOD TYPE 0600     UNIT VOLUME 350    POCT GLUCOSE   Result Value Ref Range    POCT Glucose 164 (H) 74 - 99 mg/dL        Assessment/Plan     Pain control - continue current regimen (tylenol, robaxin scheduled; dilaudid, oxycodone prn)  Diet - regular; NPO at midnight tonight   PT/OT consulted - appreciate recommendations  DVT prophylaxis - SCDs, encourage ambulation and up to chair for all meals; anticoagulation per medicine team  Antibiotics per ID, appreciate recs   Continue Hemovac      Plan to return to OR tomorrow 09/30/24 for second wound washout with Dr. Audrey   Will eventually need dc back to facility when medically stable     Please EPIC chat with any questions/concerns      Lianet Diaz PA-C

## 2024-09-29 NOTE — PROGRESS NOTES
Vancomycin Dosing by Pharmacy- FOLLOW UP    Narda Malloy is a 69 y.o. year old female who Pharmacy has been consulted for vancomycin dosing for other MRSA Bacteremia . Based on the patient's indication and renal status this patient is being dosed based on a goal trough/random level of 15-20.     Renal function is currently declining. In AMY will change to level dosing    Current vancomycin dose: 1000 mg given every 24 hours    Most recent random level: 28.8 mcg/mL 932    Visit Vitals  BP (!) 84/48 (BP Location: Right arm, Patient Position: Lying) Comment:  made aware   Pulse 79   Temp 36.2 °C (97.2 °F) (Temporal)   Resp 16        Lab Results   Component Value Date    CREATININE 1.56 (H) 2024    CREATININE 1.32 (H) 2024    CREATININE 1.17 (H) 2024    CREATININE 1.31 (H) 2024        Patient weight is as follows:   Vitals:    24 2345   Weight: 78.4 kg (172 lb 14.9 oz)       Cultures:  No results found for the encounter in last 14 days.       I/O last 3 completed shifts:  In: 5408.3 (68.9 mL/kg) [P.O.:100; I.V.:2164.9 (27.6 mL/kg); Blood:293.3; IV Piggyback:2850]  Out: 760 (9.7 mL/kg) [Urine:550 (0.2 mL/kg/hr); Drains:200; Blood:10]  Weight: 78.4 kg   I/O during current shift:  I/O this shift:  In: 250 [IV Piggyback:250]  Out: -     Temp (24hrs), Av.1 °C (96.9 °F), Min:35.4 °C (95.7 °F), Max:36.6 °C (97.9 °F)      Assessment/Plan    Above goal random/trough level. Will obtain another level tomorrow morning and re-dose from there. Level was suppose to be obtained at 0500 however not collected until 0932 and dose of vanco was hung at 0813  The next level will be obtained on  at 0500. May be obtained sooner if clinically indicated.   Will continue to monitor renal function daily while on vancomycin and order serum creatinine at least every 48 hours if not already ordered.  Follow for continued vancomycin needs, clinical response, and signs/symptoms of toxicity.       Kaleb  DAVIDE Summers, PharmD

## 2024-09-29 NOTE — PROGRESS NOTES
"  INFECTIOUS DISEASE DAILY PROGRESS NOTE    SUBJECTIVE:    Tired this morning. Surgery went well yesterday. No new complaints. Afebrile.    OBJECTIVE:  VITALS (Last 24 Hours)  BP 98/50   Pulse 70   Temp 36.6 °C (97.9 °F) (Temporal)   Resp 16   Ht 1.753 m (5' 9.02\")   Wt 78.4 kg (172 lb 14.9 oz)   LMP  (LMP Unknown)   SpO2 99%   BMI 25.53 kg/m²     PHYSICAL EXAM:  Gen - NAD, laying in bed  Heart - RRR  Back - surgical dressings, drain with serosang output  Skin - no rash  Misc - LUE PICC line present    ABX: IV Vanc/CTX    LABS:  Lab Results   Component Value Date    WBC 9.2 09/28/2024    HGB 8.6 (L) 09/28/2024    HCT 27.2 (L) 09/28/2024     09/28/2024     09/28/2024     Lab Results   Component Value Date    GLUCOSE 185 (H) 09/28/2024    CALCIUM 7.4 (L) 09/28/2024     (L) 09/28/2024    K 4.7 09/28/2024    CO2 21 09/28/2024     09/28/2024    BUN 62 (H) 09/28/2024    CREATININE 1.32 (H) 09/28/2024     Results from last 72 hours   Lab Units 09/28/24  0137   ALK PHOS U/L 147*   BILIRUBIN TOTAL mg/dL 0.2   PROTEIN TOTAL g/dL 4.8*   ALT U/L 8   AST U/L 8*   ALBUMIN g/dL 1.9*     Estimated Creatinine Clearance: 42.6 mL/min (A) (by C-G formula based on SCr of 1.32 mg/dL (H)).    CRP   Date/Time Value Ref Range Status   10/23/2018 11:41 AM 0.29 mg/dL Final     Comment:     REF VALUE  < 1.00         ASSESSMENT/PLAN:    Lumbar Surgical Site Infection - MRSA and mixed GPB/GNB. S/p OR 9/28 for washout - purulence below the fascia noted.  MRSA Bacteremia  Acute Renal Failure - CrCl is 42, affects abx dosing. Higher risk for nephrotoxicity with IV Vanc.    IV Vancomycin and IV CTX 2g Q24H.    Repeat blood cx x2 NGTD. TTE ordered and pending.    Anticipate prolonged IV abx and PICC line.    Monitoring for adverse effects of abx such as rash/itching/diarrhea - none.  Monitoring Vancomycin levels and renal function.    Will follow. Thanks!    Brant Juarez MD  [facility]  Office " #[phone]

## 2024-09-29 NOTE — CARE PLAN
The patient's goals for the shift include      The clinical goals for the shift include Pt will remain HDS and safe throughout shift

## 2024-09-29 NOTE — PROGRESS NOTES
[facility] Hospitalist Progress Note        Between 7AM-7PM please message me via Epic Secure Chat.  After 7PM please page Nocturnist on call.        Assessment/Plan     Recent L1-L3 lumbar laminectomy revision, L1-L2 osteotomy, transforaminal lumbar interbody fusion, T4-S1 revision, fusion on 8/26/24   Lumbar surgical site infection  MRSA bacteremia  Hypotension  AMY  Hx HFpEF  Anemia  Hyponatremia  DM2  Glaucoma  Question of urinary retention, anders was placed but only 500mL urine return  Bilateral pleural effusions of uncertain significance   Physical deconditioning  Respiratory insufficiency     - ID following -> IV Vanc and CTX for now  - Ortho following -> plan for return to OR likely Monday for another washout  - s/p about 2L IVF bolus over last 24h, BP still on softer side. Check AM labs CBC, BMP, lactate, cortisol, TSH. Making adequate urine, good cap refill. Will move to stepdown unit for now and continue to monitor  - will give 1 U PRBC to see if helps with hemodynamics. Reportedly had required midodrine prior hospital stay. May need to temporarily start her back on this  - ECHO is ordered for endocarditis eval  - PT/OT consults  - voiding trial prior to DC, hold off on flomax I am not sure she was actually having urinary retention vs just lower urine output    DVT Prophylaxis:  SCDs      Discharge Planning: anticipate she will need return to SNF once med ready      I personally examined the patient and reviewed chart.  Plan of care was discussed with patient, all questions answered.    Total time spent: At least 38 minutes, providing counseling or in coordination of care. Total time on this day of visit includes record and documentation review before and after visit including documentation and time not explicitly included on EMR time stamp.      Subjective     Narda Malloy is a 69 y.o. female on day 2 of admission presenting with Surgical wound infection.    NAEON. BP soft, mentating ok. Making urine. Good  "cap refill    Review of Systems   Constitutional:  Positive for fatigue. Negative for fever.   Respiratory:  Negative for shortness of breath.    Cardiovascular:  Negative for chest pain.   Gastrointestinal:  Negative for abdominal distention, abdominal pain and vomiting.   Musculoskeletal:  Positive for back pain.   Skin:  Positive for wound.   Neurological:  Positive for weakness.       Objective     Physical Exam  Constitutional:       Appearance: She is ill-appearing.   Cardiovascular:      Rate and Rhythm: Normal rate and regular rhythm.   Pulmonary:      Effort: Pulmonary effort is normal.      Breath sounds: Normal breath sounds.   Abdominal:      General: There is no distension.      Palpations: Abdomen is soft.   Genitourinary:     Comments:   Gibson  Musculoskeletal:      Comments:   Lumbar drain x1 noted w/ pressure dressing over incision   Neurological:      Mental Status: She is alert.      Comments:   Appears slightly slow to respond         Last Recorded Vitals  Blood pressure (!) 84/48, pulse 79, temperature 36.2 °C (97.2 °F), temperature source Temporal, resp. rate 16, height 1.753 m (5' 9.02\"), weight 78.4 kg (172 lb 14.9 oz), SpO2 96%.  Intake/Output last 3 Shifts:  I/O last 3 completed shifts:  In: 5408.3 (68.9 mL/kg) [P.O.:100; I.V.:2164.9 (27.6 mL/kg); Blood:293.3; IV Piggyback:2850]  Out: 760 (9.7 mL/kg) [Urine:550 (0.2 mL/kg/hr); Drains:200; Blood:10]  Weight: 78.4 kg     Relevant Results  Results for orders placed or performed during the hospital encounter of 09/27/24 (from the past 24 hour(s))   POCT GLUCOSE   Result Value Ref Range    POCT Glucose 158 (H) 74 - 99 mg/dL   POCT GLUCOSE   Result Value Ref Range    POCT Glucose 185 (H) 74 - 99 mg/dL   POCT GLUCOSE   Result Value Ref Range    POCT Glucose 154 (H) 74 - 99 mg/dL   POCT GLUCOSE   Result Value Ref Range    POCT Glucose 167 (H) 74 - 99 mg/dL   Basic metabolic panel   Result Value Ref Range    Glucose 183 (H) 74 - 99 mg/dL    Sodium " 132 (L) 136 - 145 mmol/L    Potassium 4.7 3.5 - 5.3 mmol/L    Chloride 105 98 - 107 mmol/L    Bicarbonate 19 (L) 21 - 32 mmol/L    Anion Gap 13 10 - 20 mmol/L    Urea Nitrogen 60 (H) 6 - 23 mg/dL    Creatinine 1.56 (H) 0.50 - 1.05 mg/dL    eGFR 36 (L) >60 mL/min/1.73m*2    Calcium 7.2 (L) 8.6 - 10.3 mg/dL   Vancomycin   Result Value Ref Range    Vancomycin 28.8 (H) 5.0 - 20.0 ug/mL   CBC   Result Value Ref Range    WBC 16.0 (H) 4.4 - 11.3 x10*3/uL    nRBC 0.0 0.0 - 0.0 /100 WBCs    RBC 2.83 (L) 4.00 - 5.20 x10*6/uL    Hemoglobin 8.7 (L) 12.0 - 16.0 g/dL    Hematocrit 26.5 (L) 36.0 - 46.0 %    MCV 94 80 - 100 fL    MCH 30.7 26.0 - 34.0 pg    MCHC 32.8 32.0 - 36.0 g/dL    RDW 14.6 (H) 11.5 - 14.5 %    Platelets 433 150 - 450 x10*3/uL       Imaging Results  No results found.     Medications:  brimonidine, 1 drop, Both Eyes, BID  cefTRIAXone, 2 g, intravenous, q24h  ezetimibe, 10 mg, oral, Daily  insulin lispro, 0-10 Units, subcutaneous, q4h  lactobacillus acidophilus, 1 capsule, oral, Daily  latanoprost, 1 drop, Both Eyes, Nightly  methocarbamol, 500 mg, oral, TID  pantoprazole, 40 mg, oral, Daily before breakfast  polyethylene glycol, 17 g, oral, Daily  primidone, 125 mg, oral, TID  [Held by provider] propranolol LA, 60 mg, oral, Daily  [Held by provider] tamsulosin, 0.4 mg, oral, Daily  traZODone, 25 mg, oral, Nightly  vancomycin, 1,000 mg, intravenous, q24h       PRN medications: acetaminophen, acetaminophen, HYDROmorphone, melatonin, ondansetron ODT **OR** ondansetron, oxyCODONE, oxyCODONE, sennosides, vancomycin       Nelia Rabago MD  [facility]

## 2024-09-29 NOTE — PROGRESS NOTES
09/29/24 1116   Discharge Planning   Living Arrangements Other (Comment)  (formerly [facility])   Support Systems Spouse/significant other   Type of Residence Skilled nursing facility   Do you have animals or pets at home? No   Who is requesting discharge planning? Patient   Home or Post Acute Services Post acute facilities (Rehab/SNF/etc)   Type of Post Acute Facility Services Skilled nursing   Expected Discharge Disposition SNF   Does the patient need discharge transport arranged? Yes   RoundTrip coordination needed? Yes   Has discharge transport been arranged? No   Financial Resource Strain   How hard is it for you to pay for the very basics like food, housing, medical care, and heating? Not very   Housing Stability   In the last 12 months, was there a time when you were not able to pay the mortgage or rent on time? N   At any time in the past 12 months, were you homeless or living in a shelter (including now)? N   Transportation Needs   In the past 12 months, has lack of transportation kept you from medical appointments or from getting medications? no   In the past 12 months, has lack of transportation kept you from meetings, work, or from getting things needed for daily living? No   Patient Choice   Provider Choice list and CMS website (https://medicare.gov/care-compare#search) for post-acute Quality and Resource Measure Data were provided and reviewed with: Patient     Per previous notes patient was discharged to [facility] SNF, I did call the patient's  Godfrey at [phone] and left a message to discuss discharge planning, per notes ID on consult, for MRSA Bactermia , lumbar surgical site infection, on IV Rocephin and Vanco, Echo pending, PT/OT carolyn will send referral to [facility] for return of care.

## 2024-09-30 ENCOUNTER — APPOINTMENT (OUTPATIENT)
Dept: CARDIOLOGY | Facility: HOSPITAL | Age: 69
End: 2024-09-30
Payer: MEDICARE

## 2024-09-30 ENCOUNTER — ANESTHESIA (OUTPATIENT)
Dept: OPERATING ROOM | Facility: HOSPITAL | Age: 69
End: 2024-09-30
Payer: MEDICARE

## 2024-09-30 LAB
ANION GAP SERPL CALC-SCNC: 13 MMOL/L (ref 10–20)
ATRIAL RATE: 64 BPM
BUN SERPL-MCNC: 62 MG/DL (ref 6–23)
CALCIUM SERPL-MCNC: 7.3 MG/DL (ref 8.6–10.3)
CHLORIDE SERPL-SCNC: 106 MMOL/L (ref 98–107)
CO2 SERPL-SCNC: 19 MMOL/L (ref 21–32)
CREAT SERPL-MCNC: 1.29 MG/DL (ref 0.5–1.05)
EGFRCR SERPLBLD CKD-EPI 2021: 45 ML/MIN/1.73M*2
GLUCOSE BLD MANUAL STRIP-MCNC: 136 MG/DL (ref 74–99)
GLUCOSE BLD MANUAL STRIP-MCNC: 139 MG/DL (ref 74–99)
GLUCOSE BLD MANUAL STRIP-MCNC: 144 MG/DL (ref 74–99)
GLUCOSE BLD MANUAL STRIP-MCNC: 147 MG/DL (ref 74–99)
GLUCOSE BLD MANUAL STRIP-MCNC: 160 MG/DL (ref 74–99)
GLUCOSE SERPL-MCNC: 158 MG/DL (ref 74–99)
P AXIS: 26 DEGREES
P OFFSET: 196 MS
P ONSET: 134 MS
POTASSIUM SERPL-SCNC: 4.5 MMOL/L (ref 3.5–5.3)
PR INTERVAL: 172 MS
Q ONSET: 220 MS
QRS COUNT: 11 BEATS
QRS DURATION: 88 MS
QT INTERVAL: 422 MS
QTC CALCULATION(BAZETT): 435 MS
QTC FREDERICIA: 431 MS
R AXIS: 13 DEGREES
SODIUM SERPL-SCNC: 133 MMOL/L (ref 136–145)
T AXIS: 60 DEGREES
T OFFSET: 431 MS
VANCOMYCIN SERPL-MCNC: 23.8 UG/ML (ref 5–20)
VANCOMYCIN SERPL-MCNC: 25.4 UG/ML (ref 5–20)
VENTRICULAR RATE: 64 BPM

## 2024-09-30 PROCEDURE — 10180 I&D COMPLEX PO WOUND INFCTJ: CPT | Performed by: ORTHOPAEDIC SURGERY

## 2024-09-30 PROCEDURE — 2500000004 HC RX 250 GENERAL PHARMACY W/ HCPCS (ALT 636 FOR OP/ED): Performed by: PHYSICIAN ASSISTANT

## 2024-09-30 PROCEDURE — 3700000002 HC GENERAL ANESTHESIA TIME - EACH INCREMENTAL 1 MINUTE: Performed by: ORTHOPAEDIC SURGERY

## 2024-09-30 PROCEDURE — 0KBF0ZZ EXCISION OF RIGHT TRUNK MUSCLE, OPEN APPROACH: ICD-10-PCS | Performed by: ORTHOPAEDIC SURGERY

## 2024-09-30 PROCEDURE — 2720000007 HC OR 272 NO HCPCS: Performed by: ORTHOPAEDIC SURGERY

## 2024-09-30 PROCEDURE — 2500000002 HC RX 250 W HCPCS SELF ADMINISTERED DRUGS (ALT 637 FOR MEDICARE OP, ALT 636 FOR OP/ED): Performed by: INTERNAL MEDICINE

## 2024-09-30 PROCEDURE — A10180 PR COMPLEX DRAINAGE, WOUND: Performed by: ANESTHESIOLOGY

## 2024-09-30 PROCEDURE — 7100000002 HC RECOVERY ROOM TIME - EACH INCREMENTAL 1 MINUTE: Performed by: ORTHOPAEDIC SURGERY

## 2024-09-30 PROCEDURE — 99233 SBSQ HOSP IP/OBS HIGH 50: CPT | Performed by: INTERNAL MEDICINE

## 2024-09-30 PROCEDURE — 2500000004 HC RX 250 GENERAL PHARMACY W/ HCPCS (ALT 636 FOR OP/ED): Performed by: ORTHOPAEDIC SURGERY

## 2024-09-30 PROCEDURE — 2500000001 HC RX 250 WO HCPCS SELF ADMINISTERED DRUGS (ALT 637 FOR MEDICARE OP): Performed by: INTERNAL MEDICINE

## 2024-09-30 PROCEDURE — 2780000003 HC OR 278 NO HCPCS: Performed by: ORTHOPAEDIC SURGERY

## 2024-09-30 PROCEDURE — 80202 ASSAY OF VANCOMYCIN: CPT | Performed by: PHARMACIST

## 2024-09-30 PROCEDURE — 97166 OT EVAL MOD COMPLEX 45 MIN: CPT | Mod: GO

## 2024-09-30 PROCEDURE — C1713 ANCHOR/SCREW BN/BN,TIS/BN: HCPCS | Performed by: ORTHOPAEDIC SURGERY

## 2024-09-30 PROCEDURE — 82374 ASSAY BLOOD CARBON DIOXIDE: CPT | Performed by: PHARMACIST

## 2024-09-30 PROCEDURE — 2500000001 HC RX 250 WO HCPCS SELF ADMINISTERED DRUGS (ALT 637 FOR MEDICARE OP): Performed by: PHYSICIAN ASSISTANT

## 2024-09-30 PROCEDURE — 2500000005 HC RX 250 GENERAL PHARMACY W/O HCPCS: Performed by: ANESTHESIOLOGY

## 2024-09-30 PROCEDURE — 82947 ASSAY GLUCOSE BLOOD QUANT: CPT

## 2024-09-30 PROCEDURE — 3600000008 HC OR TIME - EACH INCREMENTAL 1 MINUTE - PROCEDURE LEVEL THREE: Performed by: ORTHOPAEDIC SURGERY

## 2024-09-30 PROCEDURE — 2500000004 HC RX 250 GENERAL PHARMACY W/ HCPCS (ALT 636 FOR OP/ED): Performed by: ANESTHESIOLOGY

## 2024-09-30 PROCEDURE — 1200000002 HC GENERAL ROOM WITH TELEMETRY DAILY

## 2024-09-30 PROCEDURE — A10180 PR COMPLEX DRAINAGE, WOUND: Performed by: STUDENT IN AN ORGANIZED HEALTH CARE EDUCATION/TRAINING PROGRAM

## 2024-09-30 PROCEDURE — 3600000003 HC OR TIME - INITIAL BASE CHARGE - PROCEDURE LEVEL THREE: Performed by: ORTHOPAEDIC SURGERY

## 2024-09-30 PROCEDURE — 30233N1 TRANSFUSION OF NONAUTOLOGOUS RED BLOOD CELLS INTO PERIPHERAL VEIN, PERCUTANEOUS APPROACH: ICD-10-PCS | Performed by: ORTHOPAEDIC SURGERY

## 2024-09-30 PROCEDURE — 7100000001 HC RECOVERY ROOM TIME - INITIAL BASE CHARGE: Performed by: ORTHOPAEDIC SURGERY

## 2024-09-30 PROCEDURE — 3700000001 HC GENERAL ANESTHESIA TIME - INITIAL BASE CHARGE: Performed by: ORTHOPAEDIC SURGERY

## 2024-09-30 PROCEDURE — 2500000002 HC RX 250 W HCPCS SELF ADMINISTERED DRUGS (ALT 637 FOR MEDICARE OP, ALT 636 FOR OP/ED): Performed by: PHYSICIAN ASSISTANT

## 2024-09-30 PROCEDURE — 97535 SELF CARE MNGMENT TRAINING: CPT | Mod: GO

## 2024-09-30 PROCEDURE — 2500000005 HC RX 250 GENERAL PHARMACY W/O HCPCS: Performed by: ORTHOPAEDIC SURGERY

## 2024-09-30 PROCEDURE — 0KBG0ZZ EXCISION OF LEFT TRUNK MUSCLE, OPEN APPROACH: ICD-10-PCS | Performed by: ORTHOPAEDIC SURGERY

## 2024-09-30 DEVICE — SET SCREW, 5.5, TI NS BRK OFF: Type: IMPLANTABLE DEVICE | Site: SPINE LUMBAR | Status: FUNCTIONAL

## 2024-09-30 DEVICE — IMPLANTABLE DEVICE: Type: IMPLANTABLE DEVICE | Site: SPINE LUMBAR | Status: FUNCTIONAL

## 2024-09-30 RX ORDER — SODIUM CHLORIDE, SODIUM LACTATE, POTASSIUM CHLORIDE, CALCIUM CHLORIDE 600; 310; 30; 20 MG/100ML; MG/100ML; MG/100ML; MG/100ML
100 INJECTION, SOLUTION INTRAVENOUS CONTINUOUS
Status: DISCONTINUED | OUTPATIENT
Start: 2024-09-30 | End: 2024-09-30 | Stop reason: HOSPADM

## 2024-09-30 RX ORDER — ONDANSETRON HYDROCHLORIDE 2 MG/ML
4 INJECTION, SOLUTION INTRAVENOUS ONCE AS NEEDED
Status: DISCONTINUED | OUTPATIENT
Start: 2024-09-30 | End: 2024-09-30 | Stop reason: HOSPADM

## 2024-09-30 RX ORDER — FENTANYL CITRATE 50 UG/ML
INJECTION, SOLUTION INTRAMUSCULAR; INTRAVENOUS AS NEEDED
Status: DISCONTINUED | OUTPATIENT
Start: 2024-09-30 | End: 2024-09-30

## 2024-09-30 RX ORDER — AMINOPHYLLINE 25 MG/ML
125 INJECTION, SOLUTION INTRAVENOUS ONCE AS NEEDED
Status: DISCONTINUED | OUTPATIENT
Start: 2024-09-30 | End: 2024-10-04 | Stop reason: HOSPADM

## 2024-09-30 RX ORDER — SODIUM CHLORIDE 0.9 G/100ML
IRRIGANT IRRIGATION AS NEEDED
Status: DISCONTINUED | OUTPATIENT
Start: 2024-09-30 | End: 2024-09-30 | Stop reason: HOSPADM

## 2024-09-30 RX ORDER — CEFAZOLIN 1 G/1
INJECTION, POWDER, FOR SOLUTION INTRAVENOUS AS NEEDED
Status: DISCONTINUED | OUTPATIENT
Start: 2024-09-30 | End: 2024-09-30

## 2024-09-30 RX ORDER — OXYCODONE HYDROCHLORIDE 5 MG/1
5 TABLET ORAL
Status: DISCONTINUED | OUTPATIENT
Start: 2024-09-30 | End: 2024-09-30 | Stop reason: HOSPADM

## 2024-09-30 RX ORDER — HYDRALAZINE HYDROCHLORIDE 20 MG/ML
5 INJECTION INTRAMUSCULAR; INTRAVENOUS EVERY 30 MIN PRN
Status: DISCONTINUED | OUTPATIENT
Start: 2024-09-30 | End: 2024-09-30 | Stop reason: HOSPADM

## 2024-09-30 RX ORDER — LIDOCAINE HYDROCHLORIDE 20 MG/ML
INJECTION, SOLUTION INFILTRATION; PERINEURAL AS NEEDED
Status: DISCONTINUED | OUTPATIENT
Start: 2024-09-30 | End: 2024-09-30

## 2024-09-30 RX ORDER — PHENYLEPHRINE 10 MG/250 ML(40 MCG/ML)IN 0.9 % SOD.CHLORIDE INTRAVENOUS
CONTINUOUS PRN
Status: DISCONTINUED | OUTPATIENT
Start: 2024-09-30 | End: 2024-09-30

## 2024-09-30 RX ORDER — ROCURONIUM BROMIDE 10 MG/ML
INJECTION, SOLUTION INTRAVENOUS AS NEEDED
Status: DISCONTINUED | OUTPATIENT
Start: 2024-09-30 | End: 2024-09-30

## 2024-09-30 RX ORDER — PHENYLEPHRINE HCL IN 0.9% NACL 1 MG/10 ML
SYRINGE (ML) INTRAVENOUS AS NEEDED
Status: DISCONTINUED | OUTPATIENT
Start: 2024-09-30 | End: 2024-09-30

## 2024-09-30 RX ORDER — DROPERIDOL 2.5 MG/ML
0.62 INJECTION, SOLUTION INTRAMUSCULAR; INTRAVENOUS ONCE AS NEEDED
Status: DISCONTINUED | OUTPATIENT
Start: 2024-09-30 | End: 2024-09-30 | Stop reason: HOSPADM

## 2024-09-30 RX ORDER — SODIUM CHLORIDE, SODIUM LACTATE, POTASSIUM CHLORIDE, CALCIUM CHLORIDE 600; 310; 30; 20 MG/100ML; MG/100ML; MG/100ML; MG/100ML
20 INJECTION, SOLUTION INTRAVENOUS CONTINUOUS
Status: DISCONTINUED | OUTPATIENT
Start: 2024-09-30 | End: 2024-09-30

## 2024-09-30 RX ORDER — VANCOMYCIN HYDROCHLORIDE 1 G/20ML
INJECTION, POWDER, LYOPHILIZED, FOR SOLUTION INTRAVENOUS AS NEEDED
Status: DISCONTINUED | OUTPATIENT
Start: 2024-09-30 | End: 2024-09-30 | Stop reason: HOSPADM

## 2024-09-30 RX ORDER — PROPOFOL 10 MG/ML
INJECTION, EMULSION INTRAVENOUS AS NEEDED
Status: DISCONTINUED | OUTPATIENT
Start: 2024-09-30 | End: 2024-09-30

## 2024-09-30 RX ORDER — PROCHLORPERAZINE EDISYLATE 5 MG/ML
5 INJECTION INTRAMUSCULAR; INTRAVENOUS ONCE AS NEEDED
Status: DISCONTINUED | OUTPATIENT
Start: 2024-09-30 | End: 2024-09-30 | Stop reason: HOSPADM

## 2024-09-30 RX ORDER — MIDAZOLAM HYDROCHLORIDE 1 MG/ML
INJECTION INTRAMUSCULAR; INTRAVENOUS AS NEEDED
Status: DISCONTINUED | OUTPATIENT
Start: 2024-09-30 | End: 2024-09-30

## 2024-09-30 ASSESSMENT — ACTIVITIES OF DAILY LIVING (ADL)
HOME_MANAGEMENT_TIME_ENTRY: 25
BATHING_WHERE_ASSESSED: BED LEVEL
ADL_ASSISTANCE: NEEDS ASSISTANCE
BATHING_ASSISTANCE: MAXIMAL
BATHING_LEVEL_OF_ASSISTANCE: DEPENDENT

## 2024-09-30 ASSESSMENT — COGNITIVE AND FUNCTIONAL STATUS - GENERAL
DRESSING REGULAR UPPER BODY CLOTHING: TOTAL
MOVING TO AND FROM BED TO CHAIR: TOTAL
MOVING FROM LYING ON BACK TO SITTING ON SIDE OF FLAT BED WITH BEDRAILS: TOTAL
MOVING FROM LYING ON BACK TO SITTING ON SIDE OF FLAT BED WITH BEDRAILS: TOTAL
TURNING FROM BACK TO SIDE WHILE IN FLAT BAD: TOTAL
WALKING IN HOSPITAL ROOM: TOTAL
HELP NEEDED FOR BATHING: TOTAL
TURNING FROM BACK TO SIDE WHILE IN FLAT BAD: TOTAL
EATING MEALS: TOTAL
MOBILITY SCORE: 6
EATING MEALS: TOTAL
DRESSING REGULAR LOWER BODY CLOTHING: TOTAL
STANDING UP FROM CHAIR USING ARMS: TOTAL
DAILY ACTIVITIY SCORE: 6
DRESSING REGULAR LOWER BODY CLOTHING: TOTAL
STANDING UP FROM CHAIR USING ARMS: TOTAL
CLIMB 3 TO 5 STEPS WITH RAILING: TOTAL
DRESSING REGULAR LOWER BODY CLOTHING: TOTAL
DAILY ACTIVITIY SCORE: 6
TOILETING: TOTAL
TOILETING: TOTAL
DAILY ACTIVITIY SCORE: 8
HELP NEEDED FOR BATHING: TOTAL
PERSONAL GROOMING: A LOT
MOBILITY SCORE: 6
EATING MEALS: A LOT
WALKING IN HOSPITAL ROOM: TOTAL
TOILETING: TOTAL
CLIMB 3 TO 5 STEPS WITH RAILING: TOTAL
DRESSING REGULAR UPPER BODY CLOTHING: TOTAL
HELP NEEDED FOR BATHING: TOTAL
DRESSING REGULAR UPPER BODY CLOTHING: TOTAL
PERSONAL GROOMING: TOTAL
PERSONAL GROOMING: TOTAL
MOVING TO AND FROM BED TO CHAIR: TOTAL

## 2024-09-30 ASSESSMENT — PAIN - FUNCTIONAL ASSESSMENT
PAIN_FUNCTIONAL_ASSESSMENT: UNABLE TO SELF-REPORT
PAIN_FUNCTIONAL_ASSESSMENT: UNABLE TO SELF-REPORT
PAIN_FUNCTIONAL_ASSESSMENT: 0-10
PAIN_FUNCTIONAL_ASSESSMENT: UNABLE TO SELF-REPORT
PAIN_FUNCTIONAL_ASSESSMENT: UNABLE TO SELF-REPORT
PAIN_FUNCTIONAL_ASSESSMENT: 0-10
PAIN_FUNCTIONAL_ASSESSMENT: UNABLE TO SELF-REPORT
PAIN_FUNCTIONAL_ASSESSMENT: 0-10

## 2024-09-30 ASSESSMENT — PAIN SCALES - GENERAL
PAINLEVEL_OUTOF10: 0 - NO PAIN
PAINLEVEL_OUTOF10: 3
PAINLEVEL_OUTOF10: 0 - NO PAIN
PAINLEVEL_OUTOF10: 8

## 2024-09-30 NOTE — PROGRESS NOTES
09/30/24 1338   Discharge Planning   Expected Discharge Disposition SNF         Patient was transferred to SDU yesterday due to hypotension. Plan is for patient to go to OR today for lumbar surgical site infection wound washout. ID is following for IV abx. She will also need TTE. Patient will need PT/OT. When medically ready will go back to SNF. Will continue to follow for discharge needs.

## 2024-09-30 NOTE — ANESTHESIA PREPROCEDURE EVALUATION
Patient: Narda Malloy    Procedure Information       Date/Time: 09/30/24 1700    Procedure: Wound I & D (Spine Lumbar)    Location: [facility] OR 07 / [facility]    Surgeons: Kermit Ventura MD            Relevant Problems   Cardiac   (+) Essential hypertension   (+) Hyperlipidemia      Neuro   (+) Depression with anxiety   (+) Lumbar radiculopathy      Endocrine   (+) Controlled type 2 diabetes mellitus with stable proliferative retinopathy of both eyes, without long-term current use of insulin   (+) DM w/o complication type II (Multi)   (+) Proliferative diabetic retinopathy (Multi)   (+) Type 2 diabetes mellitus with hyperglycemia (Multi)   (+) Type 2 diabetes mellitus with hyperglycemia, with long-term current use of insulin      Hematology   (+) Anemia      Musculoskeletal   (+) Displacement of lumbar intervertebral disc without myelopathy   (+) Kyphoscoliosis      HEENT   (+) Primary open-angle glaucoma, bilateral, severe stage      ID   (+) Postoperative infection, unspecified type, initial encounter   (+) Surgical site infection   (+) Surgical wound infection       Clinical information reviewed:    Allergies  Meds               NPO Detail:  NPO/Void Status  Carbohydrate Drink Given Prior to Surgery? : N  Date of Last Liquid: 09/30/24  Time of Last Liquid: 0000  Date of Last Solid: 09/30/24  Time of Last Solid: 0000  Last Intake Type: Clear fluids (water)  Time of Last Void: 1400         Physical Exam    Airway  Mallampati: II  TM distance: >3 FB  Neck ROM: full     Cardiovascular   Rhythm: regular  Rate: normal     Dental    Pulmonary   Breath sounds clear to auscultation     Abdominal            Anesthesia Plan    History of general anesthesia?: yes  History of complications of general anesthesia?: no    ASA 3     general     intravenous induction   Anesthetic plan and risks discussed with patient.    Plan discussed with CRNA.

## 2024-09-30 NOTE — PROGRESS NOTES
"Narda Malloy is a 69 y.o. female on day 3 of admission presenting with Surgical wound infection.    Subjective   Feeling better today.  No acute events overnight. Lying in bed. Ready to go back to the OR today.        Objective     VITALS  Blood pressure 146/65, pulse 76, temperature 36.5 °C (97.7 °F), temperature source Temporal, resp. rate 16, height 1.753 m (5' 9.02\"), weight 78.4 kg (172 lb 14.9 oz), SpO2 99%.  Physical Exam  Constitutional:       Appearance: She is ill-appearing.   Cardiovascular:      Rate and Rhythm: Normal rate and regular rhythm.   Pulmonary:      Effort: Pulmonary effort is normal.      Breath sounds: Normal breath sounds.   Abdominal:      General: There is no distension.      Palpations: Abdomen is soft.   Genitourinary:     Comments:   Arthur  Musculoskeletal:      Comments:   Lumbar drain x1 noted w/ pressure dressing over incision   Neurological:      General: No focal deficit present.      Mental Status: She is alert and oriented to person, place, and time.           Intake/Output last 3 Shifts:  I/O last 3 completed shifts:  In: 2390 (30.5 mL/kg) [P.O.:100; I.V.:525 (6.7 mL/kg); Blood:1015; IV Piggyback:750]  Out: 1795 (22.9 mL/kg) [Urine:1400 (0.5 mL/kg/hr); Drains:395]  Weight: 78.4 kg     Relevant Results  Results for orders placed or performed during the hospital encounter of 09/27/24 (from the past 24 hour(s))   POCT GLUCOSE   Result Value Ref Range    POCT Glucose 206 (H) 74 - 99 mg/dL   POCT GLUCOSE   Result Value Ref Range    POCT Glucose 223 (H) 74 - 99 mg/dL   POCT GLUCOSE   Result Value Ref Range    POCT Glucose 218 (H) 74 - 99 mg/dL   POCT GLUCOSE   Result Value Ref Range    POCT Glucose 144 (H) 74 - 99 mg/dL   Vancomycin   Result Value Ref Range    Vancomycin 25.4 (H) 5.0 - 20.0 ug/mL   Basic Metabolic Panel   Result Value Ref Range    Glucose 158 (H) 74 - 99 mg/dL    Sodium 133 (L) 136 - 145 mmol/L    Potassium 4.5 3.5 - 5.3 mmol/L    Chloride 106 98 - 107 mmol/L    " Bicarbonate 19 (L) 21 - 32 mmol/L    Anion Gap 13 10 - 20 mmol/L    Urea Nitrogen 62 (H) 6 - 23 mg/dL    Creatinine 1.29 (H) 0.50 - 1.05 mg/dL    eGFR 45 (L) >60 mL/min/1.73m*2    Calcium 7.3 (L) 8.6 - 10.3 mg/dL   POCT GLUCOSE   Result Value Ref Range    POCT Glucose 147 (H) 74 - 99 mg/dL       Imaging Results  Transthoracic Echo (TTE) Complete    (Results Pending)       Medications:  brimonidine, 1 drop, Both Eyes, BID  cefTRIAXone, 2 g, intravenous, q24h  ezetimibe, 10 mg, oral, Daily  insulin lispro, 0-10 Units, subcutaneous, q4h  lactobacillus acidophilus, 1 capsule, oral, Daily  latanoprost, 1 drop, Both Eyes, Nightly  methocarbamol, 750 mg, oral, q8h LUCA  pantoprazole, 40 mg, oral, Daily before breakfast  polyethylene glycol, 17 g, oral, Daily  primidone, 125 mg, oral, TID  [Held by provider] propranolol LA, 60 mg, oral, Daily  [Held by provider] tamsulosin, 0.4 mg, oral, Daily  traZODone, 25 mg, oral, Nightly       PRN medications: acetaminophen, acetaminophen, HYDROmorphone, melatonin, ondansetron ODT **OR** ondansetron, oxyCODONE, oxyCODONE, sennosides, vancomycin        Assessment/Plan          Principal Problem:    Surgical wound infection  Active Problems:    Surgical site infection    Surgical site infection   - Blood cultures positive for MRSA  - wound culture grew abundant mixed Gram positive and Gram negative bacteria.   - Continue vanc and Ceftriaxone  - ID consult  - Dr Ventura to take pt back to OR today  - echo ordered and pending for concern for possible endocardititis      AMY  - Cr decreasing     Distended bladder on admission 9/25  - urinating normally per pt.      DM2  - Corrective insulin      HTN  - hold ACE-I  - currently on no antihypertensives, BP low.     DVT Prophylaxis:  NA On hold for surgery     Disposition:  To OR today     Jonathan Santos, [facility]

## 2024-09-30 NOTE — CARE PLAN
The patient's goals for the shift include  remaining comfortable     The clinical goals for the shift include patient will remain HDS througghout shift    Over the shift, the patient did not make progress toward the following goals. Barriers to progression include. Recommendations to address these barriers include.

## 2024-09-30 NOTE — OP NOTE
Wound I & D Operative Note     Date: 2024 - 2024  OR Location: [facility]    Name: Narda Malloy, : 1955, Age: 69 y.o., MRN: [id number], Sex: female    Diagnosis  Pre-op Diagnosis      * Surgical site infection [T81.49XA] Post-op Diagnosis     * Surgical site infection [T81.49XA]     Procedures  Complex incisional irrigation and excisional debridement of thoracolumbar spine and wound closure    Surgeons      * Kermit Ventura - Primary    Resident/Fellow/Other Assistant:  Marc Hendricks PA-C    Procedure Summary  Anesthesia: Anesthesia type not filed in the log.  ASA: III  Anesthesia Staff: Anesthesiologist: Otilio Jamison MD  CRNA: LEONEL Oscar-CRNA  Estimated Blood Loss: 50mL  Intra-op Medications:   Administrations occurring from 1700 to 1830 on 24:   Medication Name Total Dose   insulin lispro (HumaLOG) injection 0-10 Units Cannot be calculated              Anesthesia Record               Intraprocedure I/O Totals          Intake    Phenylephrine Drip 0.00 mL    The total shown is the total volume documented since Anesthesia Start was filed.    Total Intake 0 mL          Specimen: No specimens collected     Staff:   Circulator: Hailey Joyner Person: Devorah  Circulator: Yana         Drains and/or Catheters:   Closed/Suction Drain 1 Inferior;Midline Back 15 Fr. (Active)       Urethral Catheter (Active)   Site Assessment Clean;Skin intact 24 1515   Collection Container Standard drainage bag 24 1515   Securement Method Securing device (Describe) 24 1515   Reason for Continuing Urinary Catheterization surgical procedures: urological/gynecological, pelvic oncology, anal, prolonged surgical procedure 24 1515   Output (mL) 125 mL 24 1430       Tourniquet Times:         Implants:  Implants       Type Name Action Serial No.      Spinal Hardware JOSE GUADALUPE, 5.5 Tl AL NX STRT LN 500MM - [id number] Implanted      Spinal Hardware JHONATAN, 5.5/6.0 SIDE/TOP TI NS -  [id number] Implanted      Screw SET SCREW, 5.5, TI NS BRK OFF - [id number] Implanted                   Indications: Narda Malloy is an 69 y.o. female who is having surgery for Surgical site infection [T81.49XA].     The patient was seen in the preoperative area. The risks, benefits, complications, treatment options, non-operative alternatives, expected recovery and outcomes were discussed with the patient. The possibilities of reaction to medication, pulmonary aspiration, injury to surrounding structures, bleeding, recurrent infection, the need for additional procedures, failure to diagnose a condition, and creating a complication requiring transfusion or operation were discussed with the patient. The patient concurred with the proposed plan, giving informed consent.  The site of surgery was properly noted/marked if necessary per policy. The patient has been actively warmed in preoperative area. Preoperative antibiotics have been ordered and given within 1 hours of incision. Venous thrombosis prophylaxis have been ordered including bilateral sequential compression devices    Procedure Details: Patient was taken to the operating room where a formal timeout was done according to hospital protocol.  Patient was intubated without difficulty.  Patient was given preoperative antibiotics.  Patient was positioned in the prone position with the Marc table.  Thoracolumbar spine was prepped and draped.  Suture from the previous irrigation and debridement on 9/28/24 was removed.  We opened up the subcutaneous and subfascial tissues.  We then copiously irrigated with pulsatile lavage with a total of 6 L of solution as well as Betadine solution.  Once we had completed the irrigation, we excisionally debrided some of the remaining nonviable tissue in the muscle and fascial area.  Antibiotic powder was placed in the wound after placing 5th wong across the thoracal lumbar junction and osteotomy site.  We closed over a Hemovac  drain.  Wound was closed in layers in usual fashion.  Sterile dressing was applied.    I was present and scrubbed for the entire procedure.  The physician assistant was present, scrubbed and participated in all portions of the procedure, as no qualified surgeon physician and/or resident surgeon was available to assist in the case.      Kermit Ventura MD    Chief of Spine Surgery, [facility]  Director of Spine Service, [facility]  , Department of Orthopaedics  [employer]  [id number] [place] [address]  P: [phone]    This note was dictated with voice recognition software.  It has not been proofread for grammatical errors, typographical mistakes or other semantic inconsistencies.    Complications:  None; patient tolerated the procedure well.    Disposition: PACU - hemodynamically stable.  Condition: stable             Attending Attestation:     Kermit Ventura  Phone Number: [phone]

## 2024-09-30 NOTE — PROGRESS NOTES
"  INFECTIOUS DISEASE DAILY PROGRESS NOTE    SUBJECTIVE:    Moved to step down unit for hypotension yesterday. Plans for additional washout today. No fevers. Overall she says is feeling OK.    OBJECTIVE:  VITALS (Last 24 Hours)  /65 (BP Location: Left arm, Patient Position: Lying)   Pulse 76   Temp 36.5 °C (97.7 °F) (Temporal)   Resp 16   Ht 1.753 m (5' 9.02\")   Wt 78.4 kg (172 lb 14.9 oz)   LMP  (LMP Unknown)   SpO2 99%   BMI 25.53 kg/m²     PHYSICAL EXAM:  Gen - NAD, laying in bed  Back - surgical dressings, drain with serosang output  Misc - LUE PICC line present    ABX: IV Vanc/CTX    LABS:  Lab Results   Component Value Date    WBC 16.0 (H) 09/29/2024    HGB 8.7 (L) 09/29/2024    HCT 26.5 (L) 09/29/2024    MCV 94 09/29/2024     09/29/2024     Lab Results   Component Value Date    GLUCOSE 158 (H) 09/30/2024    CALCIUM 7.3 (L) 09/30/2024     (L) 09/30/2024    K 4.5 09/30/2024    CO2 19 (L) 09/30/2024     09/30/2024    BUN 62 (H) 09/30/2024    CREATININE 1.29 (H) 09/30/2024     Results from last 72 hours   Lab Units 09/28/24  0137   ALK PHOS U/L 147*   BILIRUBIN TOTAL mg/dL 0.2   PROTEIN TOTAL g/dL 4.8*   ALT U/L 8   AST U/L 8*   ALBUMIN g/dL 1.9*     Estimated Creatinine Clearance: 43.6 mL/min (A) (by C-G formula based on SCr of 1.29 mg/dL (H)).    CRP   Date/Time Value Ref Range Status   10/23/2018 11:41 AM 0.29 mg/dL Final     Comment:     REF VALUE  < 1.00         ASSESSMENT/PLAN:    Lumbar Surgical Site Infection - MRSA and mixed GPB/GNB. S/p OR 9/28 for washout - purulence below the fascia noted.  MRSA Bacteremia  Acute Renal Failure - CrCl is 43, affects abx dosing. Higher risk for nephrotoxicity with IV Vanc.    IV Vancomycin and IV CTX 2g Q24H.    Repeat OR today. TTE ordered and pending.    Monitoring for adverse effects of abx such as rash/itching/diarrhea - none. Monitoring Vancomycin levels and renal function.    Will follow. Thanks!    Brant Juarez MD  [facility] " [facility]  Office #[phone]

## 2024-09-30 NOTE — PROGRESS NOTES
Occupational Therapy    Evaluation/Treatment    Patient Name: Narda Malloy  MRN: [id number]  Department: [facility]   Room: [address] [place] [address]  Today's Date: 09/30/24  Time Calculation  Start Time: 1113  Stop Time: 1148  Time Calculation (min): 35 min       Assessment:  OT Assessment: Impaired ADLs/IADLs'  Prognosis: Fair  Barriers to Discharge: Decreased caregiver support, Inaccessible home environment  Evaluation/Treatment Tolerance: Patient limited by fatigue, Patient limited by pain  Medical Staff Made Aware: Yes  End of Session Communication: Bedside nurse, PCT/NA/CTA  End of Session Patient Position: Bed, 3 rail up, Alarm on  OT Assessment Results: Decreased ADL status, Decreased safe judgment during ADL, Decreased cognition, Decreased endurance, Decreased sensation, Decreased fine motor control, Decreased functional mobility, Decreased gross motor control, Decreased IADLs, Decreased upper extremity strength, Decreased upper extremity range of motion  Prognosis: Fair  Barriers to Discharge: Decreased caregiver support, Inaccessible home environment  Evaluation/Treatment Tolerance: Patient limited by fatigue, Patient limited by pain  Medical Staff Made Aware: Yes  Strengths: Attitude of self, Insight into problems  Barriers to Participation: Comorbidities  Plan:  Treatment Interventions: ADL retraining, Functional transfer training, Endurance training  OT Frequency: 3 times per week  OT Discharge Recommendations: Moderate intensity level of continued care  Equipment Recommended upon Discharge:  (TBD)  OT Recommended Transfer Status: Maximum assist, Assist of 2  OT - OK to Discharge: Yes  Treatment Interventions: ADL retraining, Functional transfer training, Endurance training    Subjective   Current Problem:  1. Surgical wound infection        2. Bacteremia  Transthoracic Echo (TTE) Complete    Transthoracic Echo (TTE) Complete      3. Surgical site infection  Tissue/Wound Culture/Smear    Tissue/Wound Culture/Smear         General:   OT Received On: 09/30/24  General  Reason for Referral: Pt is a 70 yo woman admit from SNF with spinal postoperative infection. Pt to transfer to [facility].  Referred By: Lianet Diaz PA-C  Past Medical History Relevant to Rehab:   Past Medical History:   Diagnosis Date    Degenerative myopia, bilateral     Diabetic neuropathy (Multi)     Difficult intubation 08/26/2024    Mac 3, grade 3, 1 attempt.  Glidescope/videolaryngoscopy recommended for future attempts.    DM type 2 (diabetes mellitus, type 2) (Multi)     Dry eye syndrome of bilateral lacrimal glands     Essential hypertension     Essential tremor     Glaucoma     Hyperlipidemia     Long term (current) use of insulin (Multi)     Low back pain     PONV (postoperative nausea and vomiting)     Primary open angle glaucoma of both eyes, severe stage     Repeated falls     Sarcoidosis of lung (Multi)     Spinal stenosis, lumbar region without neurogenic claudication     Weakness      Past Surgical History:   Procedure Laterality Date    BLEPHAROPLASTY  07/2022    BREAST SURGERY  05/20/2022    Breast lift    CARPAL TUNNEL RELEASE      CATARACT EXTRACTION W/  INTRAOCULAR LENS IMPLANT Bilateral     OD 08/04/2011 +8.5D,OS 08/04/2011 +8.50D    FOOT SURGERY      INSERTION / REMOVAL CRANIAL DBS GENERATOR      Placed 2017.  Removed 2018. part of wire left in head when everything removed    LUMBAR FUSION      L3-S1    PANRETINAL PHOTOCOAGULATION  2014    THORACIC FUSION  08/26/2024    T4-S1 fusion    VITRECTOMY Right 2013     Family/Caregiver Present: No  Prior to Session Communication: Bedside nurse, PCT/NA/CTA  Patient Position Received: Bed, 3 rail up, Alarm off, not on at start of session  Preferred Learning Style: verbal, kinesthetic  General Comment: Pt cleared by nursing, agreeable to therapy evaluation, francisco anders, PIV.  Precautions:  Hearing/Visual Limitations: WFL  Medical Precautions: Fall precautions, Spinal precautions  Post-Surgical  Precautions: Spinal precautions  Pain:  Pain Assessment  Pain Assessment: 0-10  0-10 (Numeric) Pain Score: 3  Pain Type: Acute pain  Pain Location: Back  Pain Orientation: Mid, Lower  Pain Frequency: Intermittent    Objective   Cognition:  Overall Cognitive Status: Within Functional Limits  Orientation Level: Oriented X4  Following Commands: Follows one step commands without difficulty  Insight: Moderate  Impulsive: Within functional limits  Processing Speed: Delayed    Home Living:  Type of Home: House  Lives With: Spouse  Home Adaptive Equipment: Walker rolling or standard  Home Layout: One level  Home Access: Stairs to enter with rails  Entrance Stairs-Rails:  (single rail)  Entrance Stairs-Number of Steps: 2  Bathroom Shower/Tub: Walk-in shower  Bathroom Toilet: Standard  Bathroom Equipment: Shower chair without back  Home Living Comments: Pt. most recently at SNF, pt. home for a fewdays prior to admit to hospital  Prior Function:  Level of [place]: Independent with ADLs and functional transfers, Needs assistance with homemaking  Receives Help From: Family  ADL Assistance: Needs assistance (Most recently post sx)  Hand Dominance: Right  IADL History:  Current License: Yes  Mode of Transportation: Car  ADL:  Bathing Assistance: Maximal  UE Dressing Assistance: Maximal  UE Dressing Deficit: Thread RUE, Thread LUE  LE Dressing Assistance: Total  LE Dressing Deficit: Don/doff R sock, Don/doff L sock  Activities of Daily Living: UE Bathing  UE Bathing Level of Assistance: Maximum assistance  UE Bathing Where Assessed: Bed level  UE Bathing Comments: Pt. requried Max A to wash upper body, pt. attemtped to grasp wash cloth d/y weakness and increased swelling in UEs    LE Bathing  LE Bathing Level of Assistance: Dependent  LE Bathing Where Assessed: Bed level  LE Bathing Comments: Pt. requried dep A to complete LB bathing while in supine    UE Dressing  UE Dressing Level of Assistance: Maximum assistance  UE  Dressing Where Assessed: Bed level  UE Dressing Comments: Pt. required Max A to doff/don clean gown while supine in bed to thread  B UE through gown    LE Dressing  LE Dressing: Yes  Sock Level of Assistance: Dependent  LE Dressing Where Assessed: Bed level  LE Dressing Comments: Pt. requried dep A to doff/don socks while in supine, pt. verbalized increased pain and lethargy  Activity Tolerance:  Endurance: Decreased tolerance for upright activites    Bed Mobility/Transfers: Bed Mobility  Bed Mobility: Yes  Bed Mobility 1  Bed Mobility 1: Rolling right, Rolling left (x2 trials)  Level of Assistance 1: Dependent  Bed Mobility Comments 1: Pt. required max verbal cues for appropriate hand placement and use of draw sheet, pt. grimacing, did not provide numeric pain rating. (x3 trials each side)  Bed Mobility 2  Bed Mobility  2: Scooting  Level of Assistance 2: Dependent  Bed Mobility Comments 2: Pt. requried dep A to scoot up in bed with use of drawsheet.    Coordination:  Movements are Fluid and Coordinated: No   Hand Function:  Hand Function  Gross Grasp: Impaired (Pt. B UE swollen, difficult to complete full grasp)  Coordination: Impaired  Extremities: RUE   RUE : Exceptions to WFL (2+/5 significant weakness noted) and LUE   LUE: Exceptions to WFL (2+/5 significant weakness noted)    Outcome Measures: [facility] Daily Activity  Putting on and taking off regular lower body clothing: Total  Bathing (including washing, rinsing, drying): Total  Putting on and taking off regular upper body clothing: Total  Toileting, which includes using toilet, bedpan or urinal: Total  Taking care of personal grooming such as brushing teeth: A lot  Eating Meals: A lot  Daily Activity - Total Score: 8        Education Documentation  Handouts, taught by Lyndsay Pineda OT at 9/30/2024  1:54 PM.  Learner: Patient  Readiness: Acceptance  Method: Explanation, Demonstration  Response: Verbalizes Understanding, Demonstrated Understanding, Needs  Reinforcement, No Evidence of Learning    Body Mechanics, taught by Lyndsay Pineda OT at 9/30/2024  1:54 PM.  Learner: Patient  Readiness: Acceptance  Method: Explanation, Demonstration  Response: Verbalizes Understanding, Demonstrated Understanding, Needs Reinforcement, No Evidence of Learning    Precautions, taught by Lyndsay Pineda OT at 9/30/2024  1:54 PM.  Learner: Patient  Readiness: Acceptance  Method: Explanation, Demonstration  Response: Verbalizes Understanding, Demonstrated Understanding, Needs Reinforcement, No Evidence of Learning    ADL Training, taught by Lyndsay Pineda OT at 9/30/2024  1:54 PM.  Learner: Patient  Readiness: Acceptance  Method: Explanation, Demonstration  Response: Verbalizes Understanding, Demonstrated Understanding, Needs Reinforcement, No Evidence of Learning    Education Comments  No comments found.        OP EDUCATION:  Education  Individual(s) Educated: Patient  Education Provided: Diagnosis & Precautions, Fall precautons, Risk and benefits of OT discussed with patient or other, POC discussed and agreed upon  Risk and Benefits Discussed with Patient/Caregiver/Other: yes  Patient/Caregiver Demonstrated Understanding: yes  Plan of Care Discussed and Agreed Upon: yes  Patient Response to Education: Patient/Caregiver Verbalized Understanding of Information, Patient/Caregiver Performed Return Demonstration of Exercises/Activities, Patient/Caregiver Asked Appropriate Questions    Goals:  Encounter Problems       Encounter Problems (Active)       ADLs       Patient with complete upper body dressing with minimal assist  level of assistance donning and doffing all UE clothes with no adaptive equipment while supported sitting       Start:  09/30/24    Expected End:  10/14/24            Patient with complete lower body dressing with moderate assist level of assistance donning and doffing all LE clothes  with PRN adaptive equipment while supported sitting       Start:  09/30/24     Expected End:  10/14/24            Patient will complete daily grooming tasks brushing teeth and washing face/hair with minimal assist  level of assistance and PRN adaptive equipment while supported sitting.       Start:  09/30/24    Expected End:  10/14/24            Patient will complete toileting including hygiene clothing management/hygiene with maximal assist level of assistance and bedside commode.       Start:  09/30/24    Expected End:  10/14/24               TRANSFERS       Patient will perform bed mobility maximal assist level of assistance and bed rails in order to improve safety and independence with mobility       Start:  09/30/24    Expected End:  10/14/24            Patient will complete sit to stand transfer with maximal assist level of assistance and least restrictive device in order to improve safety and prepare for out of bed mobility.       Start:  09/30/24    Expected End:  10/14/24

## 2024-09-30 NOTE — ANESTHESIA PROCEDURE NOTES
Airway  Date/Time: 9/30/2024 4:13 PM  Urgency: elective    Airway not difficult    Staffing  Performed: CRNA   Authorized by: Otilio Jamison MD    Performed by: LEONEL Oscar-CARLITA  Patient location during procedure: OR    Indications and Patient Condition  Indications for airway management: anesthesia  Sedation level: deep  Preoxygenated: yes  Patient position: sniffing  Mask difficulty assessment: 1 - vent by mask    Final Airway Details  Final airway type: endotracheal airway      Successful airway: ETT  Cuffed: yes   Successful intubation technique: video laryngoscopy  Facilitating devices/methods: intubating stylet  Endotracheal tube insertion site: oral  Blade: Delphine  Blade size: #4  ETT size (mm): 7.0  Cormack-Lehane Classification: grade I - full view of glottis  Placement verified by: chest auscultation   Measured from: lips  ETT to lips (cm): 22  Number of attempts at approach: 1  Number of other approaches attempted: 0    Additional Comments  Atraumatic placement. Teeth intact. Glidescope lopro3.

## 2024-09-30 NOTE — PERIOPERATIVE NURSING NOTE
1813 Pt arrived to pacu bay at this time, report received from OR and anesthesia staff. Phase 1 care started.   1815 pt weaned to 4L NC, remains sedated without s/sx of distress.   1830 pt responsive to voice.   1845 attempting to call pt  for update. Pt resting quietly asleep in bed.   1900 pt resting quietly asleep in bed.   1915 pt responsive to voice. Alert & oriented, able to follow commands. Remains drowsy, no reports of pain.   1921 report to room 416 RN via secure chat   1930 pt resting quietly asleep in bed.   1945 Pt taken up to room via transport at this time in stable condition. Report previously sent to receiving RN. All belongings taken with pt to room, family updated on pt status.

## 2024-09-30 NOTE — PROGRESS NOTES
Vancomycin Dosing by Pharmacy- FOLLOW UP    Narda Malloy is a 69 y.o. year old female who Pharmacy has been consulted for vancomycin dosing for line infections. Based on the patient's indication and renal status this patient is being dosed based on a goal trough/random level of 15-20.     Renal function is currently improving.    Most recent random level: 25.4 mcg/mL    Visit Vitals  /57 (BP Location: Right arm, Patient Position: Lying)   Pulse 75   Temp 36.6 °C (97.9 °F) (Temporal)   Resp 16        Lab Results   Component Value Date    CREATININE 1.29 (H) 2024    CREATININE 1.56 (H) 2024    CREATININE 1.32 (H) 2024    CREATININE 1.17 (H) 2024        Patient weight is as follows:   Vitals:    24 2345   Weight: 78.4 kg (172 lb 14.9 oz)       Cultures:  No results found for the encounter in last 14 days.       I/O last 3 completed shifts:  In: 2390 (30.5 mL/kg) [P.O.:100; I.V.:525 (6.7 mL/kg); Blood:1015; IV Piggyback:750]  Out: 1795 (22.9 mL/kg) [Urine:1400 (0.5 mL/kg/hr); Drains:395]  Weight: 78.4 kg   I/O during current shift:  I/O this shift:  In: 125 [I.V.:75; IV Piggyback:50]  Out: -     Temp (24hrs), Av.4 °C (97.6 °F), Min:35.9 °C (96.6 °F), Max:36.9 °C (98.4 °F)      Assessment/Plan    Above goal, will continue to hold. The next level will be obtained on  at 2100. May be obtained sooner if clinically indicated.    continue to monitor renal function daily while on vancomycin and order serum creatinine at least every 48 hours if not already ordered.  Follow for continued vancomycin needs, clinical response, and signs/symptoms of toxicity.       Dennise Orourke, PharmD

## 2024-09-30 NOTE — PROGRESS NOTES
Physical Therapy                 Therapy Communication Note    Patient Name: Narda Malloy  MRN: [id number]  Department: [place] [address]  Room: [address] [place], [place] [address]  Today's Date: 9/30/2024     Discipline: Physical Therapy    Missed Visit Reason: Spoke with RN and pt is scheduled for OR for washout today.  Will hold for now and attempt again later post-op as appropriate.     Missed Time: Attempt

## 2024-09-30 NOTE — ANESTHESIA POSTPROCEDURE EVALUATION
Patient: Narda Malloy    Procedure Summary       Date: 09/30/24 Room / Location: [facility] OR 07 / Virtual [facility] OR    Anesthesia Start: 1604 Anesthesia Stop: 1822    Procedure: Lumbar Spine I & D (Spine Lumbar) Diagnosis:       Surgical site infection      (Surgical site infection [T81.49XA])    Surgeons: Kermit Ventura MD Responsible Provider: Otilio Jamison MD    Anesthesia Type: general ASA Status: 3            Anesthesia Type: general    Vitals Value Taken Time   /54 09/30/24 1846   Temp 36 °C (96.8 °F) 09/30/24 1813   Pulse 77 09/30/24 1854   Resp 16 09/30/24 1830   SpO2 98 % 09/30/24 1854   Vitals shown include unfiled device data.    Anesthesia Post Evaluation    Patient location during evaluation: bedside  Patient participation: complete - patient participated  Level of consciousness: awake  Pain management: adequate  Multimodal analgesia pain management approach  Airway patency: patent  Cardiovascular status: stable  Respiratory status: spontaneous ventilation and unassisted  Hydration status: acceptable  Postoperative Nausea and Vomiting: none  Comments: No significant PONV.      No notable events documented.

## 2024-10-01 ENCOUNTER — APPOINTMENT (OUTPATIENT)
Dept: CARDIOLOGY | Facility: HOSPITAL | Age: 69
End: 2024-10-01
Payer: MEDICARE

## 2024-10-01 LAB
ANION GAP SERPL CALC-SCNC: 12 MMOL/L (ref 10–20)
BACTERIA BLD AEROBE CULT: ABNORMAL
BACTERIA BLD AEROBE CULT: ABNORMAL
BACTERIA BLD CULT: ABNORMAL
BACTERIA BLD CULT: ABNORMAL
BACTERIA BLD CULT: NORMAL
BACTERIA BLD CULT: NORMAL
BUN SERPL-MCNC: 56 MG/DL (ref 6–23)
CALCIUM SERPL-MCNC: 7.4 MG/DL (ref 8.6–10.3)
CHLORIDE SERPL-SCNC: 106 MMOL/L (ref 98–107)
CO2 SERPL-SCNC: 20 MMOL/L (ref 21–32)
CREAT SERPL-MCNC: 0.99 MG/DL (ref 0.5–1.05)
EGFRCR SERPLBLD CKD-EPI 2021: 62 ML/MIN/1.73M*2
ERYTHROCYTE [DISTWIDTH] IN BLOOD BY AUTOMATED COUNT: 14.8 % (ref 11.5–14.5)
GLUCOSE BLD MANUAL STRIP-MCNC: 155 MG/DL (ref 74–99)
GLUCOSE BLD MANUAL STRIP-MCNC: 159 MG/DL (ref 74–99)
GLUCOSE BLD MANUAL STRIP-MCNC: 162 MG/DL (ref 74–99)
GLUCOSE BLD MANUAL STRIP-MCNC: 172 MG/DL (ref 74–99)
GLUCOSE BLD MANUAL STRIP-MCNC: 173 MG/DL (ref 74–99)
GLUCOSE BLD MANUAL STRIP-MCNC: 201 MG/DL (ref 74–99)
GLUCOSE SERPL-MCNC: 173 MG/DL (ref 74–99)
GRAM STN SPEC: ABNORMAL
GRAM STN SPEC: ABNORMAL
HCT VFR BLD AUTO: 31.1 % (ref 36–46)
HGB BLD-MCNC: 9.6 G/DL (ref 12–16)
MCH RBC QN AUTO: 29.6 PG (ref 26–34)
MCHC RBC AUTO-ENTMCNC: 30.9 G/DL (ref 32–36)
MCV RBC AUTO: 96 FL (ref 80–100)
NRBC BLD-RTO: 0 /100 WBCS (ref 0–0)
PLATELET # BLD AUTO: 401 X10*3/UL (ref 150–450)
POTASSIUM SERPL-SCNC: 4.7 MMOL/L (ref 3.5–5.3)
RBC # BLD AUTO: 3.24 X10*6/UL (ref 4–5.2)
SODIUM SERPL-SCNC: 133 MMOL/L (ref 136–145)
VANCOMYCIN SERPL-MCNC: 21.8 UG/ML (ref 5–20)
WBC # BLD AUTO: 13.4 X10*3/UL (ref 4.4–11.3)

## 2024-10-01 PROCEDURE — 2500000002 HC RX 250 W HCPCS SELF ADMINISTERED DRUGS (ALT 637 FOR MEDICARE OP, ALT 636 FOR OP/ED): Performed by: INTERNAL MEDICINE

## 2024-10-01 PROCEDURE — 2500000004 HC RX 250 GENERAL PHARMACY W/ HCPCS (ALT 636 FOR OP/ED): Performed by: INTERNAL MEDICINE

## 2024-10-01 PROCEDURE — 82947 ASSAY GLUCOSE BLOOD QUANT: CPT

## 2024-10-01 PROCEDURE — 85027 COMPLETE CBC AUTOMATED: CPT | Performed by: INTERNAL MEDICINE

## 2024-10-01 PROCEDURE — 97162 PT EVAL MOD COMPLEX 30 MIN: CPT | Mod: GP

## 2024-10-01 PROCEDURE — 80202 ASSAY OF VANCOMYCIN: CPT | Performed by: PHARMACIST

## 2024-10-01 PROCEDURE — 2500000004 HC RX 250 GENERAL PHARMACY W/ HCPCS (ALT 636 FOR OP/ED)

## 2024-10-01 PROCEDURE — 99233 SBSQ HOSP IP/OBS HIGH 50: CPT | Performed by: INTERNAL MEDICINE

## 2024-10-01 PROCEDURE — 2500000001 HC RX 250 WO HCPCS SELF ADMINISTERED DRUGS (ALT 637 FOR MEDICARE OP): Performed by: INTERNAL MEDICINE

## 2024-10-01 PROCEDURE — 1100000001 HC PRIVATE ROOM DAILY

## 2024-10-01 PROCEDURE — 80048 BASIC METABOLIC PNL TOTAL CA: CPT | Performed by: INTERNAL MEDICINE

## 2024-10-01 PROCEDURE — 36415 COLL VENOUS BLD VENIPUNCTURE: CPT | Performed by: INTERNAL MEDICINE

## 2024-10-01 PROCEDURE — 93306 TTE W/DOPPLER COMPLETE: CPT | Performed by: INTERNAL MEDICINE

## 2024-10-01 PROCEDURE — 97112 NEUROMUSCULAR REEDUCATION: CPT | Mod: GP

## 2024-10-01 PROCEDURE — 93306 TTE W/DOPPLER COMPLETE: CPT

## 2024-10-01 RX ORDER — INSULIN LISPRO 100 [IU]/ML
0-10 INJECTION, SOLUTION INTRAVENOUS; SUBCUTANEOUS
Status: DISCONTINUED | OUTPATIENT
Start: 2024-10-01 | End: 2024-10-04 | Stop reason: HOSPADM

## 2024-10-01 RX ORDER — HEPARIN SODIUM 5000 [USP'U]/ML
5000 INJECTION, SOLUTION INTRAVENOUS; SUBCUTANEOUS EVERY 8 HOURS
Status: DISCONTINUED | OUTPATIENT
Start: 2024-10-01 | End: 2024-10-04 | Stop reason: HOSPADM

## 2024-10-01 ASSESSMENT — COGNITIVE AND FUNCTIONAL STATUS - GENERAL
DRESSING REGULAR LOWER BODY CLOTHING: TOTAL
TOILETING: TOTAL
MOVING TO AND FROM BED TO CHAIR: TOTAL
MOBILITY SCORE: 7
TURNING FROM BACK TO SIDE WHILE IN FLAT BAD: TOTAL
EATING MEALS: TOTAL
MOVING TO AND FROM BED TO CHAIR: TOTAL
PERSONAL GROOMING: TOTAL
STANDING UP FROM CHAIR USING ARMS: TOTAL
WALKING IN HOSPITAL ROOM: TOTAL
DAILY ACTIVITIY SCORE: 6
STANDING UP FROM CHAIR USING ARMS: TOTAL
CLIMB 3 TO 5 STEPS WITH RAILING: TOTAL
DRESSING REGULAR LOWER BODY CLOTHING: TOTAL
HELP NEEDED FOR BATHING: TOTAL
MOVING FROM LYING ON BACK TO SITTING ON SIDE OF FLAT BED WITH BEDRAILS: A LOT
EATING MEALS: TOTAL
TOILETING: TOTAL
HELP NEEDED FOR BATHING: TOTAL
MOBILITY SCORE: 6
MOVING TO AND FROM BED TO CHAIR: TOTAL
MOVING FROM LYING ON BACK TO SITTING ON SIDE OF FLAT BED WITH BEDRAILS: TOTAL
CLIMB 3 TO 5 STEPS WITH RAILING: TOTAL
CLIMB 3 TO 5 STEPS WITH RAILING: TOTAL
TURNING FROM BACK TO SIDE WHILE IN FLAT BAD: TOTAL
DRESSING REGULAR UPPER BODY CLOTHING: TOTAL
PERSONAL GROOMING: TOTAL
WALKING IN HOSPITAL ROOM: TOTAL
MOVING FROM LYING ON BACK TO SITTING ON SIDE OF FLAT BED WITH BEDRAILS: TOTAL
WALKING IN HOSPITAL ROOM: TOTAL
DAILY ACTIVITIY SCORE: 6
DRESSING REGULAR UPPER BODY CLOTHING: TOTAL
STANDING UP FROM CHAIR USING ARMS: TOTAL
MOBILITY SCORE: 6
TURNING FROM BACK TO SIDE WHILE IN FLAT BAD: TOTAL

## 2024-10-01 ASSESSMENT — PAIN SCALES - GENERAL
PAINLEVEL_OUTOF10: 0 - NO PAIN
PAINLEVEL_OUTOF10: 4
PAINLEVEL_OUTOF10: 6
PAINLEVEL_OUTOF10: 0 - NO PAIN

## 2024-10-01 ASSESSMENT — PAIN DESCRIPTION - LOCATION: LOCATION: BACK

## 2024-10-01 ASSESSMENT — PAIN - FUNCTIONAL ASSESSMENT
PAIN_FUNCTIONAL_ASSESSMENT: 0-10

## 2024-10-01 ASSESSMENT — ACTIVITIES OF DAILY LIVING (ADL)
ADL_ASSISTANCE: NEEDS ASSISTANCE
EFFECT OF PAIN ON DAILY ACTIVITIES: PT TO TOLERANCE

## 2024-10-01 ASSESSMENT — PAIN DESCRIPTION - DESCRIPTORS: DESCRIPTORS: ACHING;DISCOMFORT;SORE

## 2024-10-01 NOTE — PROGRESS NOTES
"  INFECTIOUS DISEASE DAILY PROGRESS NOTE    SUBJECTIVE:    OR yesterday for repeat washout - no mention of additional purulence. Subcutaneous tissue and subfascial tissues all irrigated again. Afebrile. WBC is coming back down from 16 to 13.    OBJECTIVE:  VITALS (Last 24 Hours)  /52 (BP Location: Right arm, Patient Position: Lying)   Pulse 78   Temp 36.4 °C (97.5 °F) (Temporal)   Resp 18   Ht 1.753 m (5' 9.02\")   Wt 99.7 kg (219 lb 12.8 oz)   LMP  (LMP Unknown)   SpO2 100%   BMI 32.44 kg/m²     PHYSICAL EXAM:  Gen - NAD, laying in bed  Back - surgical dressings, drain with serosang output  Misc - LUE PICC line present    ABX: IV Vanc/CTX    LABS:  Lab Results   Component Value Date    WBC 13.4 (H) 10/01/2024    HGB 9.6 (L) 10/01/2024    HCT 31.1 (L) 10/01/2024    MCV 96 10/01/2024     10/01/2024     Lab Results   Component Value Date    GLUCOSE 173 (H) 10/01/2024    CALCIUM 7.4 (L) 10/01/2024     (L) 10/01/2024    K 4.7 10/01/2024    CO2 20 (L) 10/01/2024     10/01/2024    BUN 56 (H) 10/01/2024    CREATININE 0.99 10/01/2024           Estimated Creatinine Clearance: 68.3 mL/min (by C-G formula based on SCr of 0.99 mg/dL).    CRP   Date/Time Value Ref Range Status   10/23/2018 11:41 AM 0.29 mg/dL Final     Comment:     REF VALUE  < 1.00         ASSESSMENT/PLAN:    Lumbar Surgical Site Infection - MRSA and mixed GPB/GNB. S/p OR 9/28 for washout - purulence below the fascia noted. Proteus also present. Repeat washout on 9/30 done in OR.  MRSA Bacteremia - repeat blood cx 9/27 NGTD.  Acute Renal Failure - resolved, CrCl >60 now    IV Vancomycin and IV CTX 2g Q24H.    TTE ordered and pending for endocarditis evaluation. I suspect we will not need a GREG but depends on TTE findings.    Monitoring for adverse effects of abx such as rash/itching/diarrhea - none. Monitoring Vancomycin levels and renal function.    Will follow. Thanks!    Brant Juarez MD  [facility]" [place]  Office #[phone]

## 2024-10-01 NOTE — PROGRESS NOTES
"Narda Malloy is a 69 y.o. female on day 4 of admission presenting with Surgical wound infection.    Subjective   Pretty sleepy this AM. Pain well controlled. Updated  at the bedside.        Objective     VITALS  Blood pressure 110/71, pulse 78, temperature 37.2 °C (98.9 °F), temperature source Temporal, resp. rate 18, height 1.753 m (5' 9.02\"), weight 99.7 kg (219 lb 12.8 oz), SpO2 98%.  Physical Exam  Constitutional:       Appearance: She is ill-appearing.   Cardiovascular:      Rate and Rhythm: Normal rate and regular rhythm.   Pulmonary:      Effort: Pulmonary effort is normal.      Breath sounds: Normal breath sounds.   Abdominal:      General: There is no distension.      Palpations: Abdomen is soft.   Genitourinary:     Comments:   [place]  Musculoskeletal:      Comments: Hemovac noted with serosanguinous drainage.    Neurological:      General: No focal deficit present.      Mental Status: She is alert and oriented to person, place, and time.           Intake/Output last 3 Shifts:  I/O last 3 completed shifts:  In: 1350 (13.5 mL/kg) [I.V.:600 (6 mL/kg); IV Piggyback:750]  Out: 1695 (17 mL/kg) [Urine:1400 (0.4 mL/kg/hr); Drains:290; Blood:5]  Weight: 99.7 kg     Relevant Results  Results for orders placed or performed during the hospital encounter of 09/27/24 (from the past 24 hour(s))   POCT GLUCOSE   Result Value Ref Range    POCT Glucose 136 (H) 74 - 99 mg/dL   POCT GLUCOSE   Result Value Ref Range    POCT Glucose 139 (H) 74 - 99 mg/dL   Vancomycin   Result Value Ref Range    Vancomycin 23.8 (H) 5.0 - 20.0 ug/mL   POCT GLUCOSE   Result Value Ref Range    POCT Glucose 160 (H) 74 - 99 mg/dL   POCT GLUCOSE   Result Value Ref Range    POCT Glucose 173 (H) 74 - 99 mg/dL   Basic Metabolic Panel   Result Value Ref Range    Glucose 173 (H) 74 - 99 mg/dL    Sodium 133 (L) 136 - 145 mmol/L    Potassium 4.7 3.5 - 5.3 mmol/L    Chloride 106 98 - 107 mmol/L    Bicarbonate 20 (L) 21 - 32 mmol/L    Anion Gap 12 10 " - 20 mmol/L    Urea Nitrogen 56 (H) 6 - 23 mg/dL    Creatinine 0.99 0.50 - 1.05 mg/dL    eGFR 62 >60 mL/min/1.73m*2    Calcium 7.4 (L) 8.6 - 10.3 mg/dL   CBC   Result Value Ref Range    WBC 13.4 (H) 4.4 - 11.3 x10*3/uL    nRBC 0.0 0.0 - 0.0 /100 WBCs    RBC 3.24 (L) 4.00 - 5.20 x10*6/uL    Hemoglobin 9.6 (L) 12.0 - 16.0 g/dL    Hematocrit 31.1 (L) 36.0 - 46.0 %    MCV 96 80 - 100 fL    MCH 29.6 26.0 - 34.0 pg    MCHC 30.9 (L) 32.0 - 36.0 g/dL    RDW 14.8 (H) 11.5 - 14.5 %    Platelets 401 150 - 450 x10*3/uL   Vancomycin   Result Value Ref Range    Vancomycin 21.8 (H) 5.0 - 20.0 ug/mL   POCT GLUCOSE   Result Value Ref Range    POCT Glucose 159 (H) 74 - 99 mg/dL   POCT GLUCOSE   Result Value Ref Range    POCT Glucose 155 (H) 74 - 99 mg/dL   POCT GLUCOSE   Result Value Ref Range    POCT Glucose 172 (H) 74 - 99 mg/dL       Imaging Results  Transthoracic Echo (TTE) Complete    (Results Pending)       Medications:  brimonidine, 1 drop, Both Eyes, BID  cefTRIAXone, 2 g, intravenous, q24h  ezetimibe, 10 mg, oral, Daily  heparin, 5,000 Units, subcutaneous, q8h  insulin lispro, 0-10 Units, subcutaneous, TID  lactobacillus acidophilus, 1 capsule, oral, Daily  latanoprost, 1 drop, Both Eyes, Nightly  methocarbamol, 750 mg, oral, q8h LUCA  pantoprazole, 40 mg, oral, Daily before breakfast  perflutren lipid microspheres, 0.5-10 mL of dilution, intravenous, Once in imaging  perflutren lipid microspheres, 0.5-10 mL of dilution, intravenous, Once in imaging  perflutren protein A microsphere, 0.5 mL, intravenous, Once in imaging  perflutren protein A microsphere, 0.5 mL, intravenous, Once in imaging  polyethylene glycol, 17 g, oral, Daily  primidone, 125 mg, oral, TID  [Held by provider] propranolol LA, 60 mg, oral, Daily  sulfur hexafluoride microsphr, 2 mL, intravenous, Once in imaging  sulfur hexafluoride microsphr, 2 mL, intravenous, Once in imaging  [Held by provider] tamsulosin, 0.4 mg, oral, Daily  traZODone, 25 mg, oral,  Nightly       PRN medications: acetaminophen, acetaminophen, aminophylline, HYDROmorphone, melatonin, ondansetron ODT **OR** ondansetron, oxyCODONE, oxyCODONE, sennosides, vancomycin        Assessment/Plan          Principal Problem:    Surgical wound infection  Active Problems:    Surgical site infection    Surgical site infection   - Blood cultures positive for MRSA  - wound culture grew abundant mixed Gram positive and Gram negative bacteria.   - Continue vanc and Ceftriaxone  - ID consult  - echo ordered and pending for concern for possible endocardititis      AMY  - Cr decreasing     Distended bladder on admission 9/25  - urinating normally per pt.      DM2  - Corrective insulin      HTN  - hold ACE-I  - currently on no antihypertensives, BP low.     DVT Prophylaxis:  NA On hold for surgery     Disposition:  Awaiting echo though suspect getting close to DC.     Jonathan Santos, [facility]

## 2024-10-01 NOTE — PROGRESS NOTES
Vancomycin Dosing by Pharmacy- FOLLOW UP    Narda Malloy is a 69 y.o. year old female who Pharmacy has been consulted for vancomycin dosing for other MRSA Bacteremia . Based on the patient's indication and renal status this patient is being dosed based on a goal trough/random level of 15-20.     Renal function is currently improving. AMY Improving    Current vancomycin dose: 1 g dosing by level. Last dose intra-op  @ 1640.    Most recent random level: 21.8 mcg/mL    Visit Vitals  BP (!) 110/48 (BP Location: Right arm, Patient Position: Lying)   Pulse 78   Temp 37.1 °C (98.8 °F) (Temporal)   Resp 18        Lab Results   Component Value Date    CREATININE 0.99 10/01/2024    CREATININE 1.29 (H) 2024    CREATININE 1.56 (H) 2024    CREATININE 1.32 (H) 2024        Patient weight is as follows:   Vitals:    10/01/24 0308   Weight: 99.7 kg (219 lb 12.8 oz)       Cultures:  Susceptibility data for the encounter in last 14 days.  Collected Specimen Info Organism Ampicillin Cefazolin Ciprofloxacin Gentamicin Levofloxacin Piperacillin/Tazobactam Tetracycline Trimethoprim/Sulfamethoxazole   24 Swab from ABSCESS Proteus mirabilis  S  S  S  S  S  S  R  S        I/O last 3 completed shifts:  In: 1350 (13.5 mL/kg) [I.V.:600 (6 mL/kg); IV Piggyback:750]  Out: 1695 (17 mL/kg) [Urine:1400 (0.4 mL/kg/hr); Drains:290; Blood:5]  Weight: 99.7 kg   I/O during current shift:  I/O this shift:  In: 50 [IV Piggyback:50]  Out: -     Temp (24hrs), Av.3 °C (97.4 °F), Min:35.8 °C (96.5 °F), Max:37.1 °C (98.8 °F)      Assessment/Plan    Above goal random level 15-20. Will hold dose today and check level in AM. Given unstable renal function, may be beneficial to switch to another agent if long-term abx planned. Will discuss with Dr. Juarez.  The next level will be obtained on 10/2 at 0500. May be obtained sooner if clinically indicated.   Will continue to monitor renal function daily while on vancomycin and order  serum creatinine at least every 48 hours if not already ordered.  Follow for continued vancomycin needs, clinical response, and signs/symptoms of toxicity.       Karen Casey, PharmD

## 2024-10-01 NOTE — PROGRESS NOTES
Physical Therapy    Physical Therapy Evaluation & Treatment    Patient Name: Narda Malloy  MRN: [id number]  Department: [place] [address]  Room[facility]  Today's Date: 10/1/2024   Time Calculation  Start Time: 1625  Stop Time: 1653  Time Calculation (min): 28 min    Assessment/Plan   PT Assessment  PT Assessment Results: Decreased strength, Decreased range of motion, Decreased endurance, Impaired balance, Decreased mobility, Decreased coordination, Impaired sensation, Decreased skin integrity, Orthopedic restrictions, Pain  Rehab Prognosis: Good  Evaluation/Treatment Tolerance: Patient limited by fatigue  Medical Staff Made Aware: Yes  Strengths: Attitude of self, Ability to acquire knowledge, Premorbid level of function, Rehab experience, Support and attitude of living partners  Barriers to Participation: Comorbidities  End of Session Communication: Bedside nurse, PCT/NA/CTA  Assessment Comment: Patient is a 69 y.o. female, who was transferred to [facility] s/p recent spine surgery.  She required 2 visits to OR for management of spinal infection and wound closure.  The patient demonstrated signficant impairments with mobility, balance, activity tolerance, AROM/strength, sensation, coordination, and pain; she has postop spine precautions and falls precautions.  She will benefit from ongoing PT in house and MOD intensity PT following DC from acute [facility] hospital.  End of Session Patient Position: Bed, 3 rail up, Alarm on   IP OR SWING BED PT PLAN  Inpatient or Swing Bed: Inpatient  PT Plan  Treatment/Interventions: Bed mobility, Transfer training, Gait training, Balance training, Neuromuscular re-education, Strengthening, Endurance training, Therapeutic exercise, Range of motion, Therapeutic activity, Home exercise program, Positioning, Postural re-education  PT Plan: Ongoing PT  PT Frequency: 5 times per week  PT Discharge Recommendations: Moderate intensity level of continued care  Equipment Recommended upon Discharge:  Wheelchair, Wheeled walker  PT Recommended Transfer Status: Total assist  PT - OK to Discharge: Yes      Subjective     General Visit Information:  General  Reason for Referral: Patient is a 69 y.o. admitted from SNF and transferred to [facility] for postoperative spinal infection. S/p washout 9/28 and I&D with wound closure 9/30.  Referred By: COBY Hendricks  Past Medical History Relevant to Rehab: Patient's history most notable for Prior L1-L3 Lumbar Laminectomy Revision; L1-L2 Osteotomy; Transforaminal Lumbar Interbody Fusion; T4-S1 Revision; Fusion; Instrumentation; Cement Augmentation on 08/26/24.  PMH: TENA myopia, diabetes, retinopathy, neuropathy, HTN, essential tremor, TENA glaucoma, low back pain, sarcoidoisis of lung  Family/Caregiver Present: Yes  Caregiver Feedback:  Godfrey present and supportive  Prior to Session Communication: Bedside nurse  Patient Position Received: Bed, 3 rail up, Alarm on  Preferred Learning Style: auditory, kinesthetic, verbal  General Comment: patient premedicated for pain by nursing prior to PT's arrival, drowsy but cooperative. chago, KATE, tele, O2 NC.  Home Living:  Home Living  Type of Home: House  Lives With: Spouse  Home Adaptive Equipment: Walker rolling or standard  Home Layout: One level  Home Access: Stairs to enter with rails  Entrance Stairs-Rails:  (single HR support)  Entrance Stairs-Number of Steps: 2  Bathroom Shower/Tub: Walk-in shower  Bathroom Toilet: Standard  Bathroom Equipment: Shower chair without back  Home Living Comments: patient was at SNF recently; had been home for a few days prior to hospitalization  Prior Level of Function:  Prior Function Per Pt/Caregiver Report  Level of [place]: Independent with ADLs and functional transfers, Needs assistance with homemaking  Receives Help From: Family  ADL Assistance: Needs assistance (recently postop spine sx)  Homemaking Assistance: Needs assistance ( assists pt)  Ambulatory Assistance:  (indep with  FWW prior to surgery; required assistance with mobility at SNF postop)  Vocational: Retired  Hand Dominance: Right  Precautions:  Precautions  Medical Precautions: Fall precautions, Oxygen therapy device and L/min, Spinal precautions  Post-Surgical Precautions: Spinal precautions  Precautions Comment: patient aware of spinal precautions, logroll with cues and assistance from PT during mobility    Vital Signs (Past 2hrs)                Objective   Pain:  Pain Assessment  Pain Assessment: 0-10  0-10 (Numeric) Pain Score: 6  Pain Type: Surgical pain, Acute pain  Pain Location: Back  Pain Orientation: Mid, Lower  Pain Descriptors: Aching, Discomfort, Sore  Pain Frequency: Intermittent  Pain Onset: Progressive  Clinical Progression: Rapidly improving  Effect of Pain on Daily Activities: PT to tolerance  Pain Interventions: Medication (See MAR), Repositioned, Ambulation/increased activity, Elevated, Distraction, Emotional support, Rest  Response to Interventions: patient with pain 0/10 pre and post mobility. was premedicated for pain by nursing prior to PT session.  able to complete EOB activities with moderate pain rating. improved once supine. positioned for comfort. RN aware.  Cognition:  Cognition  Overall Cognitive Status:  (patient drowsy, very pleasant and motivated. improved with upright positioning.)  Orientation Level: Oriented X4    General Assessments:  General Observation  General Observation: patient completed EOB and supine activities with instruction. good effort despite physical impairments.  patient will require extensive physical therapy to regain her functional mobility               Activity Tolerance  Endurance: Decreased tolerance for upright activites  Activity Tolerance Comments: fair - drowsy s/p receipt of pain meds    Sensation  Light Touch: Partial deficits in the RUE, Partial deficits in the LUE, Partial deficits in the RLE, Partial deficits in the LLE  Sharp/Dull: Partial deficits in the  RUE, Partial deficits in the LUE, Partial deficits in the RLE, Partial deficits in the LLE  Sensation Comment: all extremities with decreased global sensation    Strength  Strength Comments: BUE 2+/5, and BLE with impairment in strength 2/5 throughout; trunk strength fair-  Strength  Strength Comments: BUE 2+/5, and BLE with impairment in strength 2/5 throughout; trunk strength fair-           Coordination  Movements are Fluid and Coordinated: No  Coordination Comment: patient with impaired FMC/GMC of BUE and BLE    Postural Control  Postural Control: Impaired  Posture Comment: patient with impaired midline/righting despite effort in sitting, max A to mod A x1 to maintain sitting    Static Sitting Balance  Static Sitting-Balance Support: Bilateral upper extremity supported, Feet supported  Static Sitting-Level of Assistance: Moderate assistance, Maximum assistance  Static Sitting-Comment/Number of Minutes: EOB ~10 mins  Dynamic Sitting Balance  Dynamic Sitting-Balance Support: Bilateral upper extremity supported, Feet supported  Dynamic Sitting-Level of Assistance: Maximum assistance  Dynamic Sitting-Balance: Trunk control activities  Dynamic Sitting-Comments: heavy cues and A for correction of R and anterior lean/instability       Functional Assessments:  Bed Mobility  Bed Mobility: Yes  Bed Mobility 1  Bed Mobility 1: Rolling right, Rolling left  Level of Assistance 1: Maximum assistance, Maximum verbal cues, Maximum tactile cues  Bed Mobility Comments 1: patient assisted with supine repositoining [place] following EOB activity, bringing BLE to semi-hooklying position and attempting to use extremities to assist with rolling and boosting. required dependent A x2 (PT and PCNA) with draw sheet to boost up to HOB  Bed Mobility 2  Bed Mobility  2: Side lying right to sit  Level of Assistance 2: Maximum assistance, Maximum verbal cues, Maximum tactile cues  Bed Mobility Comments 2: HOB elevated (extended time and  "instruction, hands on facilitation at BLE, trunk and pelvis for safe transfer to EOB)  Bed Mobility 3  Bed Mobility 3: Sitting to supine, Log roll  Level of Assistance 3: Maximum assistance, +2, Maximum tactile cues, Maximum verbal cues  Bed Mobility Comments 3: patient assisted with return to supine using slightly elevated HOB, support to guide into R sidelying then to roll onto back with PCNA assisting BLE and PT supporting trunk.    Transfers  Transfer: No (emphasis on sitting balance/trunk stability. deferred 2/2 progressive fatigue and drowsiness intermittently.)    Ambulation/Gait Training  Ambulation/Gait Training Performed: No    Stairs  Stairs: No  Extremity/Trunk Assessments:     RUE   RUE : Exceptions to WFL (patient with 2 to 2+/5 strength demonstrated. significant weakness globally.)  LUE   LUE: Exceptions to WFL (patient with 2 to 2+/5 strength demonstrated. significant weakness globally.)  RLE   RLE : Exceptions to WFL (patient with 2 to 2+/5 strength demonstrated. significant weakness globally.)  LLE   LLE : Exceptions to WFL (patient with 2 to 2+/5 strength demonstrated. significant weakness globally.)  Treatments:  Therapeutic Activity  Therapeutic Activity Performed: Yes  Therapeutic Activity 1: provided multimodal instruction to engage patient in bed level and EOB activities, with great attention to maintaining spinal precautions and emphasis on upright positioning/mobility. patient and spouse receptive to information provided.    Balance/Neuromuscular Re-Education  Balance/Neuromuscular Re-Education Activity Performed: Yes  Balance/Neuromuscular Re-Education Activity 1: performed proactive/reactive sitting balance tasks with PT providing mod to max A at EOB, repositioning of R>L UEs and physical repositioning of BLE d/t \"windswept\" positioning of feet; patient tending to demonstrate increased R and anterior trunk leaning, verbal and tactile cues to correct and improved after ~1-2 minutes of " sitting. total time EOB ~10 mins.    Bed Mobility  Bed Mobility: Yes  Bed Mobility 1  Bed Mobility 1: Rolling right, Rolling left  Level of Assistance 1: Maximum assistance, Maximum verbal cues, Maximum tactile cues  Bed Mobility Comments 1: patient assisted with supine repositoining [place] following EOB activity, bringing BLE to semi-hooklying position and attempting to use extremities to assist with rolling and boosting. required dependent A x2 (PT and PCNA) with draw sheet to boost up to HOB  Bed Mobility 2  Bed Mobility  2: Side lying right to sit  Level of Assistance 2: Maximum assistance, Maximum verbal cues, Maximum tactile cues  Bed Mobility Comments 2: HOB elevated (extended time and instruction, hands on facilitation at BLE, trunk and pelvis for safe transfer to EOB)  Bed Mobility 3  Bed Mobility 3: Sitting to supine, Log roll  Level of Assistance 3: Maximum assistance, +2, Maximum tactile cues, Maximum verbal cues  Bed Mobility Comments 3: patient assisted with return to supine using slightly elevated HOB, support to guide into R sidelying then to roll onto back with PCNA assisting BLE and PT supporting trunk.  Outcome Measures:  [facility] Basic Mobility  Turning from your back to your side while in a flat bed without using bedrails: A lot  Moving from lying on your back to sitting on the side of a flat bed without using bedrails: Total  Moving to and from bed to chair (including a wheelchair): Total  Standing up from a chair using your arms (e.g. wheelchair or bedside chair): Total  To walk in hospital room: Total  Climbing 3-5 steps with railing: Total  Basic Mobility - Total Score: 7    Encounter Problems       Encounter Problems (Active)       Balance       STG - Maintains static sitting balance with upper extremity support (Progressing)       Start:  10/01/24    Expected End:  10/15/24       With min A x1 in midline >20 minutes with fair balance, maintaining spinal precautions          STG - Maintains  dynamic sitting balance with upper extremity support (Progressing)       Start:  10/01/24    Expected End:  10/15/24       With mod A x1 and fair- balance >10 minutes, maintaining spinal precautions             PT Transfers       STG - Patient will perform bed mobility (Progressing)       Start:  10/01/24    Expected End:  10/15/24       With mod A x1 via logroll, maintaining spinal precautions          STG - Patient will transfer sit to and from stand (Progressing)       Start:  10/01/24    Expected End:  10/15/24       With max A x1 and LRAD, maintaining spinal precautions         Patient will perform 20 reps of A/AROM of BLE to strengthen for participation in skilled mobility with >+1/2 muscle grade improvement. (Progressing)       Start:  10/01/24    Expected End:  10/15/24               Pain - Adult              Education Documentation  Handouts, taught by Elis Payne PT at 10/1/2024  6:14 PM.  Learner: Significant Other, Patient  Readiness: Acceptance  Method: Explanation, Demonstration  Response: Demonstrated Understanding, Verbalizes Understanding    Precautions, taught by Elis Payne PT at 10/1/2024  6:14 PM.  Learner: Significant Other, Patient  Readiness: Acceptance  Method: Explanation, Demonstration  Response: Demonstrated Understanding, Verbalizes Understanding    Body Mechanics, taught by Elis Payne PT at 10/1/2024  6:14 PM.  Learner: Significant Other, Patient  Readiness: Acceptance  Method: Explanation, Demonstration  Response: Demonstrated Understanding, Verbalizes Understanding    Mobility Training, taught by Elis Payne PT at 10/1/2024  6:14 PM.  Learner: Significant Other, Patient  Readiness: Acceptance  Method: Explanation, Demonstration  Response: Demonstrated Understanding, Verbalizes Understanding    Education Comments  No comments found.

## 2024-10-01 NOTE — DOCUMENTATION CLARIFICATION NOTE
"    PATIENT:               KIMMIE RODRIGUES  ACCT #:                  [id number]  MRN:                       [id number]  :                       1955  ADMIT DATE:       2024 9:23 AM  DISCH DATE:        2024 10:42 PM  RESPONDING PROVIDER #:        [id number]          PROVIDER RESPONSE TEXT:    Gram positive sepsis 2/2 surgical site infection with acute hypoxic respiratory failure, acute kidney injury    CDI QUERY TEXT:    Clarification    Instruction:    Based on your assessment of the patient and the clinical information, please provide the requested documentation by clicking on the appropriate radio button and enter any additional information if prompted.    Question: Is there a diagnosis indicative of a patient meeting SIRS criteria and with organ dysfunction in the setting of post-operative surgical site infection    When answering this query, please exercise your independent professional judgment. The fact that a question is being asked, does not imply that any particular answer is desired or expected.    The patient's clinical indicators include:  Clinical Information: 69y.o. F presents via EMS with concern for infected incision site. Per H/P, admitted with suspected post-op surgical wound infection, Spinal Stenosis s/p lumbar spinal surgery, fatigue, generalized weakness, AMY, Bilateral pleural effusions and Acute hypoxic resp. failure.   VS: 36.6, 92, 16, 116/66, 97 percent on RA     H/P: \" The postoperative surgical incision was noted with dehiscence and drainage.  Wound cultures were obtained from the post-operative spinal wound drainage.  She was treated with broad-spectrum IV antibiotics and was admitted. Postoperative spinal wound with dressing steri-strips, dry dressing intact removed, dehiscence noted, trace drainage, trace surrounding erythema. \"     ID Consult: \" Patient is one month postoperative after extensive lumbar spine surgery with fixation and is admitted with an examination " "suggestive of at least a superficial wound infection. Cultures are pending. He is not systemically ill \"    9/26 Progress Note: \" Continue broad-spectrum IV antibiotics per Infectious disease.  IV Zosyn. Acute kidney injury worsening. Noted pulmonary infiltrates, bilateral pleural effusions on chest x-ray, pleural effusions noted on CT lumbar spine. \"    Clinical Indicators:  9/25 HR: 92, 94, 91, 92  9/25 RR: 22, 24, 21, 24, 23, 22, 24, 21, 23    9/25 Blood Culture: Gram positive cocci, clusters  9/25 Wound Culture: Abundant mixed Gram positive and Gram negative bacteria  9/25 Urine Culture: Multiple organisms present    9/25 H/H 8.5/26.1, WBC   9.8, Plts 322  9/26 H/H 8.2/25.1, WBC 10.2, Plts 361    9/25 Creatinine 1.14, GFR 53, BUN 67, Ca 7.7, Na 134, Alkaline 183, Glucose  46  9/26 Creatinine 1.31, GFR 44, BUN 69, Ca 7.7, Na 133, Alkaline 172, Glucose 140    Treatment:  9/25-9/26 NS x2L  9/25-9/26 Zosyn 3.375gm IV x4 doses  9/25-9/26 Vancomycin 1gm IV x2 doses    Risk Factors: PMHx: DM2 w/retinopathy and neuropathy, HTN, Essential tremor, HLD, Spinal Stenosis, former smoker.  BMI: 24.37  Options provided:  -- Gram positive Sepsis 2/2 surgical site infection with renal organ dysfunction of AMY  -- Gram positive Sepsis 2/2 surgical site infection with respiratory organ dysfunction of Acute hypoxic respiratory failure  -- Gram positive Sepsis 2/2 surgical site infection with other organ dysfunction, Please specify sepsis associated organ dysfunction below  -- Patient treated for surgical site infection without Sepsis  -- Other - I will add my own diagnosis  -- Refer to Clinical Documentation Reviewer    Query created by: Lianet Frey on 9/26/2024 3:49 PM      Electronically signed by:  MIYA CASTANEDA-CNP 10/1/2024 8:34 AM          "

## 2024-10-01 NOTE — CARE PLAN
The patient's goals for the shift include  pain management  Problem: Pain - Adult  Goal: Verbalizes/displays adequate comfort level or baseline comfort level  Outcome: Progressing     Problem: Safety - Adult  Goal: Free from fall injury  Outcome: Progressing     Problem: Discharge Planning  Goal: Discharge to home or other facility with appropriate resources  Outcome: Progressing     Problem: Chronic Conditions and Co-morbidities  Goal: Patient's chronic conditions and co-morbidity symptoms are monitored and maintained or improved  Outcome: Progressing     Problem: Diabetes  Goal: Achieve decreasing blood glucose levels by end of shift  Outcome: Progressing  Goal: Increase stability of blood glucose readings by end of shift  Outcome: Progressing  Goal: Decrease in ketones present in urine by end of shift  Outcome: Progressing  Goal: Maintain electrolyte levels within acceptable range throughout shift  Outcome: Progressing  Goal: Maintain glucose levels >70mg/dl to <250mg/dl throughout shift  Outcome: Progressing  Goal: No changes in neurological exam by end of shift  Outcome: Progressing  Goal: Learn about and adhere to nutrition recommendations by end of shift  Outcome: Progressing  Goal: Vital signs within normal range for age by end of shift  Outcome: Progressing  Goal: Increase self care and/or family involovement by end of shift  Outcome: Progressing  Goal: Receive DSME education by end of shift  Outcome: Progressing     Problem: Skin  Goal: Decreased wound size/increased tissue granulation at next dressing change  Outcome: Progressing  Goal: Participates in plan/prevention/treatment measures  Outcome: Progressing  Goal: Prevent/manage excess moisture  Outcome: Progressing  Goal: Prevent/minimize sheer/friction injuries  Outcome: Progressing  Goal: Promote/optimize nutrition  Outcome: Progressing  Goal: Promote skin healing  Outcome: Progressing     Problem: Fall/Injury  Goal: Not fall by end of shift  Outcome:  Progressing  Goal: Be free from injury by end of the shift  Outcome: Progressing  Goal: Verbalize understanding of personal risk factors for fall in the hospital  Outcome: Progressing  Goal: Verbalize understanding of risk factor reduction measures to prevent injury from fall in the home  Outcome: Progressing  Goal: Use assistive devices by end of the shift  Outcome: Progressing  Goal: Pace activities to prevent fatigue by end of the shift  Outcome: Progressing       The clinical goals for the shift include patient will manage pain with proper medication and repositioning    Over the shift, the patient did make progress toward the following goals.

## 2024-10-01 NOTE — CARE PLAN
Problem: Pain - Adult  Goal: Verbalizes/displays adequate comfort level or baseline comfort level  Outcome: Progressing     Problem: Safety - Adult  Goal: Free from fall injury  Outcome: Progressing     Problem: Discharge Planning  Goal: Discharge to home or other facility with appropriate resources  Outcome: Progressing     Problem: Chronic Conditions and Co-morbidities  Goal: Patient's chronic conditions and co-morbidity symptoms are monitored and maintained or improved  Outcome: Progressing     Problem: Diabetes  Goal: Achieve decreasing blood glucose levels by end of shift  Outcome: Progressing  Goal: Increase stability of blood glucose readings by end of shift  Outcome: Progressing  Goal: Decrease in ketones present in urine by end of shift  Outcome: Progressing  Goal: Maintain electrolyte levels within acceptable range throughout shift  Outcome: Progressing  Goal: Maintain glucose levels >70mg/dl to <250mg/dl throughout shift  Outcome: Progressing  Goal: No changes in neurological exam by end of shift  Outcome: Progressing  Goal: Learn about and adhere to nutrition recommendations by end of shift  Outcome: Progressing  Goal: Vital signs within normal range for age by end of shift  Outcome: Progressing  Goal: Increase self care and/or family involovement by end of shift  Outcome: Progressing  Goal: Receive DSME education by end of shift  Outcome: Progressing     Problem: Skin  Goal: Decreased wound size/increased tissue granulation at next dressing change  Outcome: Progressing  Flowsheets (Taken 9/30/2024 2246)  Decreased wound size/increased tissue granulation at next dressing change: Promote sleep for wound healing  Goal: Participates in plan/prevention/treatment measures  Outcome: Progressing  Goal: Prevent/manage excess moisture  Outcome: Progressing  Goal: Prevent/minimize sheer/friction injuries  Outcome: Progressing  Flowsheets (Taken 9/30/2024 2246)  Prevent/minimize sheer/friction injuries:   HOB 30  degrees or less   Turn/reposition every 2 hours/use positioning/transfer devices   Use pull sheet  Goal: Promote/optimize nutrition  Outcome: Progressing  Goal: Promote skin healing  Outcome: Progressing     Problem: Fall/Injury  Goal: Not fall by end of shift  Outcome: Progressing  Goal: Be free from injury by end of the shift  Outcome: Progressing  Goal: Verbalize understanding of personal risk factors for fall in the hospital  Outcome: Progressing  Goal: Verbalize understanding of risk factor reduction measures to prevent injury from fall in the home  Outcome: Progressing  Goal: Use assistive devices by end of the shift  Outcome: Progressing  Goal: Pace activities to prevent fatigue by end of the shift  Outcome: Progressing   The patient's goals for the shift include      The clinical goals for the shift include manage pain

## 2024-10-01 NOTE — NURSING NOTE
Four eye skin check completed with Shaea CTA, skin intact besides surgical wound on back, protective mepilex on coccyx.

## 2024-10-01 NOTE — PROGRESS NOTES
Vancomycin Dosing by Pharmacy- FOLLOW UP    Narda Malloy is a 69 y.o. year old female who Pharmacy has been consulted for vancomycin dosing for cellulitis, skin and soft tissue. Based on the patient's indication and renal status this patient is being dosed based on a goal trough/random level of 10-15.     Renal function is currently declining.    Current vancomycin dose: dose per level as pt was in AMY -received 1 g intra-op today @1640    Most recent random level: 23.8 mcg/mL on  @    Visit Vitals  BP (!) 110/49   Pulse 74   Temp 35.8 °C (96.5 °F) (Temporal)   Resp 18        Lab Results   Component Value Date    CREATININE 1.29 (H) 2024    CREATININE 1.56 (H) 2024    CREATININE 1.32 (H) 2024    CREATININE 1.17 (H) 2024        Patient weight is as follows:   Vitals:    24 2345   Weight: 78.4 kg (172 lb 14.9 oz)       Cultures:  Susceptibility data for the encounter in last 14 days.  Collected Specimen Info Organism Ampicillin Cefazolin Ciprofloxacin Gentamicin Levofloxacin Piperacillin/Tazobactam Tetracycline Trimethoprim/Sulfamethoxazole   24 Swab from ABSCESS Proteus mirabilis  S  S  S  S  S  S  R  S        I/O last 3 completed shifts:  In: 2615 (33.3 mL/kg) [I.V.:600 (7.6 mL/kg); Blood:1015; IV Piggyback:1000]  Out: 1450 (18.5 mL/kg) [Urine:1225 (0.4 mL/kg/hr); Drains:220; Blood:5]  Weight: 78.4 kg   I/O during current shift:  No intake/output data recorded.    Temp (24hrs), Av.3 °C (97.3 °F), Min:35.8 °C (96.5 °F), Max:36.7 °C (98.1 °F)      Assessment/Plan    Above goal random/trough level. Orders placed for new vancomycin regimen of hold dose tonight every no dose tonight hours to begin at   . Will check a level in 12 hours since pt got a dose at 4pm and level 5 hours later is only 23.8    The next level will be obtained on 10/1 at 1000. May be obtained sooner if clinically indicated.   Will continue to monitor renal function daily while on vancomycin and order  serum creatinine at least every 48 hours if not already ordered.  Follow for continued vancomycin needs, clinical response, and signs/symptoms of toxicity.       Lorri Flores, PharmD

## 2024-10-01 NOTE — PROGRESS NOTES
".  Narda Malloy is a 69 y.o. female on day 4 of admission presenting with Surgical wound infection.    Subjective   Patient was seen this morning on rounds.  Overall, is feeling better than previously.  Has expected postsurgical back pain.  No pain, numbness and tingling in the legs.  Denies fevers, chills, malaise.       Objective     Physical Exam  Lying in bed on arrival.  Alert and oriented.  Cooperative and conservative.  Moving lower extremities spontaneously.  Thoracolumbar dressing intact.  Drain functioning as intended.    Last Recorded Vitals  Blood pressure 110/71, pulse 78, temperature 37.2 °C (98.9 °F), temperature source Temporal, resp. rate 18, height 1.753 m (5' 9.02\"), weight 99.7 kg (219 lb 12.8 oz), SpO2 98%.  Intake/Output last 3 Shifts:  I/O last 3 completed shifts:  In: 1350 (13.5 mL/kg) [I.V.:600 (6 mL/kg); IV Piggyback:750]  Out: 1695 (17 mL/kg) [Urine:1400 (0.4 mL/kg/hr); Drains:290; Blood:5]  Weight: 99.7 kg     Relevant Results      Scheduled medications  brimonidine, 1 drop, Both Eyes, BID  cefTRIAXone, 2 g, intravenous, q24h  ezetimibe, 10 mg, oral, Daily  heparin, 5,000 Units, subcutaneous, q8h  insulin lispro, 0-10 Units, subcutaneous, TID  lactobacillus acidophilus, 1 capsule, oral, Daily  latanoprost, 1 drop, Both Eyes, Nightly  methocarbamol, 750 mg, oral, q8h LUCA  pantoprazole, 40 mg, oral, Daily before breakfast  perflutren lipid microspheres, 0.5-10 mL of dilution, intravenous, Once in imaging  perflutren lipid microspheres, 0.5-10 mL of dilution, intravenous, Once in imaging  perflutren protein A microsphere, 0.5 mL, intravenous, Once in imaging  perflutren protein A microsphere, 0.5 mL, intravenous, Once in imaging  polyethylene glycol, 17 g, oral, Daily  primidone, 125 mg, oral, TID  [Held by provider] propranolol LA, 60 mg, oral, Daily  sulfur hexafluoride microsphr, 2 mL, intravenous, Once in imaging  sulfur hexafluoride microsphr, 2 mL, intravenous, Once in imaging  [Held " by provider] tamsulosin, 0.4 mg, oral, Daily  traZODone, 25 mg, oral, Nightly      Continuous medications  sodium chloride 0.9%, 75 mL/hr, Last Rate: 75 mL/hr (10/01/24 1133)      PRN medications  PRN medications: acetaminophen, acetaminophen, aminophylline, HYDROmorphone, melatonin, ondansetron ODT **OR** ondansetron, oxyCODONE, oxyCODONE, sennosides, vancomycin  Results for orders placed or performed during the hospital encounter of 09/27/24 (from the past 24 hour(s))   POCT GLUCOSE   Result Value Ref Range    POCT Glucose 136 (H) 74 - 99 mg/dL   POCT GLUCOSE   Result Value Ref Range    POCT Glucose 139 (H) 74 - 99 mg/dL   Vancomycin   Result Value Ref Range    Vancomycin 23.8 (H) 5.0 - 20.0 ug/mL   POCT GLUCOSE   Result Value Ref Range    POCT Glucose 160 (H) 74 - 99 mg/dL   POCT GLUCOSE   Result Value Ref Range    POCT Glucose 173 (H) 74 - 99 mg/dL   Basic Metabolic Panel   Result Value Ref Range    Glucose 173 (H) 74 - 99 mg/dL    Sodium 133 (L) 136 - 145 mmol/L    Potassium 4.7 3.5 - 5.3 mmol/L    Chloride 106 98 - 107 mmol/L    Bicarbonate 20 (L) 21 - 32 mmol/L    Anion Gap 12 10 - 20 mmol/L    Urea Nitrogen 56 (H) 6 - 23 mg/dL    Creatinine 0.99 0.50 - 1.05 mg/dL    eGFR 62 >60 mL/min/1.73m*2    Calcium 7.4 (L) 8.6 - 10.3 mg/dL   CBC   Result Value Ref Range    WBC 13.4 (H) 4.4 - 11.3 x10*3/uL    nRBC 0.0 0.0 - 0.0 /100 WBCs    RBC 3.24 (L) 4.00 - 5.20 x10*6/uL    Hemoglobin 9.6 (L) 12.0 - 16.0 g/dL    Hematocrit 31.1 (L) 36.0 - 46.0 %    MCV 96 80 - 100 fL    MCH 29.6 26.0 - 34.0 pg    MCHC 30.9 (L) 32.0 - 36.0 g/dL    RDW 14.8 (H) 11.5 - 14.5 %    Platelets 401 150 - 450 x10*3/uL   Vancomycin   Result Value Ref Range    Vancomycin 21.8 (H) 5.0 - 20.0 ug/mL   POCT GLUCOSE   Result Value Ref Range    POCT Glucose 159 (H) 74 - 99 mg/dL   POCT GLUCOSE   Result Value Ref Range    POCT Glucose 155 (H) 74 - 99 mg/dL               This patient has a urinary catheter   Reason for the urinary catheter remaining  today?              Assessment/Plan   Assessment & Plan  Surgical wound infection    Surgical site infection    -ID consulted; appreciate recommendations for antibiotic dosing  -DVT prophylaxis; out of bed as tolerated, SCDs, up in chair for meals, start heparin 5000 units 3 times daily; appreciate hospitalist recommendations  -PT/OT consulted  -Continue current pain regimen, pain is currently controlled  -Dressing changes as needed  -Continue lumbar drain  -Progress diet as tolerated       I spent 30 minutes in the professional and overall care of this patient.      Marc Hendricks PA-C  12:24 PM  10/01/24

## 2024-10-02 LAB
ANION GAP SERPL CALC-SCNC: 13 MMOL/L (ref 10–20)
AORTIC VALVE MEAN GRADIENT: 4.1 MMHG
AORTIC VALVE PEAK VELOCITY: 1.4 M/S
AV PEAK GRADIENT: 7.8 MMHG
AVA (PEAK VEL): 3.26 CM2
AVA (VTI): 3.6 CM2
BLOOD EXPIRATION DATE: NORMAL
BUN SERPL-MCNC: 52 MG/DL (ref 6–23)
CALCIUM SERPL-MCNC: 7.3 MG/DL (ref 8.6–10.3)
CHLORIDE SERPL-SCNC: 106 MMOL/L (ref 98–107)
CO2 SERPL-SCNC: 19 MMOL/L (ref 21–32)
CREAT SERPL-MCNC: 0.89 MG/DL (ref 0.5–1.05)
DISPENSE STATUS: NORMAL
EGFRCR SERPLBLD CKD-EPI 2021: 71 ML/MIN/1.73M*2
EJECTION FRACTION APICAL 4 CHAMBER: 50.6
EJECTION FRACTION: 53 %
GLUCOSE BLD MANUAL STRIP-MCNC: 158 MG/DL (ref 74–99)
GLUCOSE BLD MANUAL STRIP-MCNC: 166 MG/DL (ref 74–99)
GLUCOSE BLD MANUAL STRIP-MCNC: 194 MG/DL (ref 74–99)
GLUCOSE BLD MANUAL STRIP-MCNC: 249 MG/DL (ref 74–99)
GLUCOSE SERPL-MCNC: 172 MG/DL (ref 74–99)
HOLD SPECIMEN: NORMAL
LEFT VENTRICLE INTERNAL DIMENSION DIASTOLE: 3.38 CM (ref 3.5–6)
LEFT VENTRICULAR OUTFLOW TRACT DIAMETER: 2.19 CM
MITRAL VALVE E/A RATIO: 0.78
POTASSIUM SERPL-SCNC: 4.6 MMOL/L (ref 3.5–5.3)
PRODUCT BLOOD TYPE: 600
PRODUCT CODE: NORMAL
RIGHT VENTRICLE PEAK SYSTOLIC PRESSURE: 40.4 MMHG
SODIUM SERPL-SCNC: 133 MMOL/L (ref 136–145)
UNIT ABO: NORMAL
UNIT NUMBER: NORMAL
UNIT RH: NORMAL
UNIT VOLUME: 350
VANCOMYCIN SERPL-MCNC: 17.3 UG/ML (ref 5–20)
XM INTEP: NORMAL

## 2024-10-02 PROCEDURE — 99232 SBSQ HOSP IP/OBS MODERATE 35: CPT | Performed by: INTERNAL MEDICINE

## 2024-10-02 PROCEDURE — 2500000004 HC RX 250 GENERAL PHARMACY W/ HCPCS (ALT 636 FOR OP/ED)

## 2024-10-02 PROCEDURE — 82947 ASSAY GLUCOSE BLOOD QUANT: CPT

## 2024-10-02 PROCEDURE — 97535 SELF CARE MNGMENT TRAINING: CPT | Mod: GO

## 2024-10-02 PROCEDURE — 2500000004 HC RX 250 GENERAL PHARMACY W/ HCPCS (ALT 636 FOR OP/ED): Performed by: INTERNAL MEDICINE

## 2024-10-02 PROCEDURE — 97110 THERAPEUTIC EXERCISES: CPT | Mod: GP,CQ

## 2024-10-02 PROCEDURE — 97530 THERAPEUTIC ACTIVITIES: CPT | Mod: GP,CQ

## 2024-10-02 PROCEDURE — 2500000001 HC RX 250 WO HCPCS SELF ADMINISTERED DRUGS (ALT 637 FOR MEDICARE OP): Performed by: INTERNAL MEDICINE

## 2024-10-02 PROCEDURE — 1100000001 HC PRIVATE ROOM DAILY

## 2024-10-02 PROCEDURE — 97110 THERAPEUTIC EXERCISES: CPT | Mod: GO

## 2024-10-02 PROCEDURE — 80048 BASIC METABOLIC PNL TOTAL CA: CPT | Performed by: INTERNAL MEDICINE

## 2024-10-02 PROCEDURE — 80202 ASSAY OF VANCOMYCIN: CPT

## 2024-10-02 PROCEDURE — 2500000002 HC RX 250 W HCPCS SELF ADMINISTERED DRUGS (ALT 637 FOR MEDICARE OP, ALT 636 FOR OP/ED): Performed by: INTERNAL MEDICINE

## 2024-10-02 RX ORDER — VANCOMYCIN HYDROCHLORIDE 1 G/200ML
1000 INJECTION, SOLUTION INTRAVENOUS ONCE
Status: COMPLETED | OUTPATIENT
Start: 2024-10-02 | End: 2024-10-02

## 2024-10-02 ASSESSMENT — COGNITIVE AND FUNCTIONAL STATUS - GENERAL
MOVING TO AND FROM BED TO CHAIR: A LOT
MOBILITY SCORE: 6
DRESSING REGULAR LOWER BODY CLOTHING: A LOT
PERSONAL GROOMING: A LOT
TURNING FROM BACK TO SIDE WHILE IN FLAT BAD: A LOT
MOVING FROM LYING ON BACK TO SITTING ON SIDE OF FLAT BED WITH BEDRAILS: TOTAL
TOILETING: A LOT
STANDING UP FROM CHAIR USING ARMS: TOTAL
STANDING UP FROM CHAIR USING ARMS: A LOT
EATING MEALS: A LITTLE
TURNING FROM BACK TO SIDE WHILE IN FLAT BAD: A LOT
DRESSING REGULAR LOWER BODY CLOTHING: A LOT
CLIMB 3 TO 5 STEPS WITH RAILING: A LOT
DRESSING REGULAR UPPER BODY CLOTHING: A LOT
WALKING IN HOSPITAL ROOM: A LOT
PERSONAL GROOMING: A LOT
DRESSING REGULAR LOWER BODY CLOTHING: TOTAL
MOVING TO AND FROM BED TO CHAIR: TOTAL
TOILETING: TOTAL
MOBILITY SCORE: 12
PERSONAL GROOMING: A LITTLE
EATING MEALS: A LOT
DAILY ACTIVITIY SCORE: 10
MOVING FROM LYING ON BACK TO SITTING ON SIDE OF FLAT BED WITH BEDRAILS: A LOT
WALKING IN HOSPITAL ROOM: TOTAL
MOVING FROM LYING ON BACK TO SITTING ON SIDE OF FLAT BED WITH BEDRAILS: A LOT
DRESSING REGULAR UPPER BODY CLOTHING: A LOT
TOILETING: A LOT
HELP NEEDED FOR BATHING: A LOT
DAILY ACTIVITIY SCORE: 13
STANDING UP FROM CHAIR USING ARMS: A LOT
CLIMB 3 TO 5 STEPS WITH RAILING: A LOT
DAILY ACTIVITIY SCORE: 14
DRESSING REGULAR UPPER BODY CLOTHING: A LOT
MOVING TO AND FROM BED TO CHAIR: A LOT
HELP NEEDED FOR BATHING: A LOT
CLIMB 3 TO 5 STEPS WITH RAILING: TOTAL
TURNING FROM BACK TO SIDE WHILE IN FLAT BAD: TOTAL
EATING MEALS: A LITTLE
HELP NEEDED FOR BATHING: A LOT
WALKING IN HOSPITAL ROOM: A LOT
MOBILITY SCORE: 12

## 2024-10-02 ASSESSMENT — PAIN - FUNCTIONAL ASSESSMENT
PAIN_FUNCTIONAL_ASSESSMENT: 0-10

## 2024-10-02 ASSESSMENT — PAIN SCALES - GENERAL
PAINLEVEL_OUTOF10: 3
PAINLEVEL_OUTOF10: 3
PAINLEVEL_OUTOF10: 0 - NO PAIN
PAINLEVEL_OUTOF10: 3
PAINLEVEL_OUTOF10: 3

## 2024-10-02 ASSESSMENT — ACTIVITIES OF DAILY LIVING (ADL): HOME_MANAGEMENT_TIME_ENTRY: 8

## 2024-10-02 NOTE — CARE PLAN
The patient's goals for the shift include chi rest     The clinical goals for the shift include turned and repositioned q2    Problem: Skin  Goal: Decreased wound size/increased tissue granulation at next dressing change  Flowsheets (Taken 10/1/2024 2150)  Decreased wound size/increased tissue granulation at next dressing change:   Promote sleep for wound healing   Utilize specialty bed per algorithm   Protective dressings over bony prominences  Goal: Participates in plan/prevention/treatment measures  Flowsheets (Taken 10/1/2024 2150)  Participates in plan/prevention/treatment measures:   Discuss with provider PT/OT consult   Elevate heels   Increase activity/out of bed for meals  Goal: Prevent/manage excess moisture  Flowsheets (Taken 10/1/2024 2150)  Prevent/manage excess moisture:   Cleanse incontinence/protect with barrier cream   Moisturize dry skin   Use wicking fabric (obtain order)   Monitor for/manage infection if present   Follow provider orders for dressing changes  Goal: Prevent/minimize sheer/friction injuries  Flowsheets (Taken 10/1/2024 2150)  Prevent/minimize sheer/friction injuries:   Complete micro-shifts as needed if patient unable. Adjust patient position to relieve pressure points, not a full turn   Increase activity/out of bed for meals   Use pull sheet   Utilize specialty bed per algorithm   Turn/reposition every 2 hours/use positioning/transfer devices   HOB 30 degrees or less  Goal: Promote/optimize nutrition  Flowsheets (Taken 10/1/2024 2150)  Promote/optimize nutrition:   Assist with feeding   Monitor/record intake including meals   Offer water/supplements/favorite foods   Consume > 50% meals/supplements   Reassess MST if dietician not consulted   Discuss with provider if NPO > 2 days  Goal: Promote skin healing  Flowsheets (Taken 10/1/2024 2150)  Promote skin healing:   Assess skin/pad under line(s)/device(s)   Protective dressings over bony prominences   Turn/reposition every 2  hours/use positioning/transfer devices   Rotate device position/do not position patient on device   Ensure correct size (line/device) and apply per  instructions

## 2024-10-02 NOTE — PROGRESS NOTES
Vancomycin Dosing by Pharmacy- FOLLOW UP    Narda Malloy is a 69 y.o. female for whom Pharmacy has been consulted to dose vancomycin for cellulitis, skin and soft tissue/MRSA bacteremia. Based on the patient's indication and renal status this patient is being dosed based on a goal random level of 15-20.    Renal function is currently improving.  AMY potentially resolved.  Consider switching to AUC dosing if renal function remains stable.    Last vancomycin dose: 1000 mg given 24 @ 0813.    Most recent random level: 17.3 mcg/mL    Visit Vitals  /55   Pulse 78   Temp 37 °C (98.6 °F)   Resp 22        Lab Results   Component Value Date    CREATININE 0.89 10/02/2024    CREATININE 0.99 10/01/2024    CREATININE 1.29 (H) 2024    CREATININE 1.56 (H) 2024        Patient weight is as follows:   Vitals:    10/01/24 0308   Weight: 99.7 kg (219 lb 12.8 oz)       Cultures:  Susceptibility data for the encounter in last 14 days.  Collected Specimen Info Organism Ampicillin Cefazolin Ciprofloxacin Gentamicin Levofloxacin Piperacillin/Tazobactam Tetracycline Trimethoprim/Sulfamethoxazole   24 Swab from ABSCESS Proteus mirabilis  S  S  S  S  S  S  R  S        I/O last 3 completed shifts:  In: 491.3 (4.9 mL/kg) [I.V.:441.3 (4.4 mL/kg); IV Piggyback:50]  Out: 1685 (16.9 mL/kg) [Urine:1375 (0.4 mL/kg/hr); Drains:310]  Weight: 99.7 kg   I/O during current shift:  No intake/output data recorded.    Temp (24hrs), Av.8 °C (98.3 °F), Min:36.2 °C (97.1 °F), Max:37.2 °C (98.9 °F)      Assessment/Plan    Within goal random/trough level. Re-dose with 1000 mg x1 today.  The next level will be obtained on 10/3/24 at 0500. May be obtained sooner if clinically indicated.   Will continue to monitor renal function daily while on vancomycin and order serum creatinine at least every 48 hours if not already ordered.  Follow for continued vancomycin needs, clinical response, and signs/symptoms of toxicity.       Romana A  Kuchta, PharmD

## 2024-10-02 NOTE — CARE PLAN
The patient's goals for the shift include  to remain stable     The clinical goals for the shift include turned and repositioned q2

## 2024-10-02 NOTE — CARE PLAN
Problem: Pain - Adult  Goal: Verbalizes/displays adequate comfort level or baseline comfort level  10/2/2024 1217 by Florence Molina RN  Outcome: Progressing  10/2/2024 1211 by Florence Molina RN  Outcome: Progressing     Problem: Safety - Adult  Goal: Free from fall injury  10/2/2024 1217 by Florence Molina RN  Outcome: Progressing  10/2/2024 1211 by Florence Molina RN  Outcome: Progressing     Problem: Discharge Planning  Goal: Discharge to home or other facility with appropriate resources  10/2/2024 1217 by Florence Molina RN  Outcome: Progressing  10/2/2024 1211 by Florence Molina RN  Outcome: Progressing     Problem: Chronic Conditions and Co-morbidities  Goal: Patient's chronic conditions and co-morbidity symptoms are monitored and maintained or improved  10/2/2024 1217 by Florence Molina RN  Outcome: Progressing  10/2/2024 1211 by Florence Molina RN  Outcome: Progressing     Problem: Diabetes  Goal: Achieve decreasing blood glucose levels by end of shift  10/2/2024 1217 by Floernce Molina RN  Outcome: Progressing  10/2/2024 1211 by Florence Molina RN  Outcome: Progressing  Goal: Increase stability of blood glucose readings by end of shift  10/2/2024 1217 by Florence Molina RN  Outcome: Progressing  10/2/2024 1211 by Florence Molina RN  Outcome: Progressing  Goal: Decrease in ketones present in urine by end of shift  10/2/2024 1217 by Florence Molina RN  Outcome: Progressing  10/2/2024 1211 by Florence Molina RN  Outcome: Progressing  Goal: Maintain electrolyte levels within acceptable range throughout shift  10/2/2024 1217 by Florence Molina RN  Outcome: Progressing  10/2/2024 1211 by Florence Molina RN  Outcome: Progressing  Goal: Maintain glucose levels >70mg/dl to <250mg/dl throughout shift  10/2/2024 1217 by Florence Molina RN  Outcome: Progressing  10/2/2024 1211 by Florence Molina RN  Outcome: Progressing  Goal: No changes in neurological exam by end of shift  10/2/2024 1217 by Florence  JEAN MARIE Molina  Outcome: Progressing  10/2/2024 1211 by Florence Molina RN  Outcome: Progressing  Goal: Learn about and adhere to nutrition recommendations by end of shift  10/2/2024 1217 by Florence Molina RN  Outcome: Progressing  10/2/2024 1211 by Florence Molina RN  Outcome: Progressing  Goal: Vital signs within normal range for age by end of shift  10/2/2024 1217 by Florence Molina RN  Outcome: Progressing  10/2/2024 1211 by Florence Molina RN  Outcome: Progressing  Goal: Increase self care and/or family involovement by end of shift  10/2/2024 1217 by Florence Molina RN  Outcome: Progressing  10/2/2024 1211 by Florence Molina RN  Outcome: Progressing  Goal: Receive DSME education by end of shift  10/2/2024 1217 by Florence Molina RN  Outcome: Progressing  10/2/2024 1211 by Florence Molina RN  Outcome: Progressing     Problem: Skin  Goal: Decreased wound size/increased tissue granulation at next dressing change  10/2/2024 1217 by Florence Molina RN  Outcome: Progressing  10/2/2024 1211 by Florence Molina RN  Outcome: Progressing  Goal: Participates in plan/prevention/treatment measures  10/2/2024 1217 by Florence Molina RN  Outcome: Progressing  10/2/2024 1211 by Florence Molina RN  Outcome: Progressing  Goal: Prevent/manage excess moisture  10/2/2024 1217 by Florence Molina RN  Outcome: Progressing  10/2/2024 1211 by Florence Molina RN  Outcome: Progressing  Goal: Prevent/minimize sheer/friction injuries  10/2/2024 1217 by Florence Molina RN  Outcome: Progressing  10/2/2024 1211 by Florence Molina RN  Outcome: Progressing  Goal: Promote/optimize nutrition  10/2/2024 1217 by Florence Molina RN  Outcome: Progressing  10/2/2024 1211 by Flroence Molina RN  Outcome: Progressing  Goal: Promote skin healing  10/2/2024 1217 by Florence Molina RN  Outcome: Progressing  10/2/2024 1211 by Florence Molina, RN  Outcome: Progressing     Problem: Fall/Injury  Goal: Not fall by end of shift  10/2/2024 1217  by Florence Molina RN  Outcome: Progressing  10/2/2024 1211 by Florence Molina RN  Outcome: Progressing  Goal: Be free from injury by end of the shift  10/2/2024 1217 by Florence Molina RN  Outcome: Progressing  10/2/2024 1211 by Florence Molina RN  Outcome: Progressing  Goal: Verbalize understanding of personal risk factors for fall in the hospital  10/2/2024 1217 by Florence Molina RN  Outcome: Progressing  10/2/2024 1211 by Florence Molina RN  Outcome: Progressing  Goal: Verbalize understanding of risk factor reduction measures to prevent injury from fall in the home  10/2/2024 1217 by Florence Molina RN  Outcome: Progressing  10/2/2024 1211 by Florence Molina RN  Outcome: Progressing  Goal: Use assistive devices by end of the shift  10/2/2024 1217 by Florence Molina RN  Outcome: Progressing  10/2/2024 1211 by Florence Molina RN  Outcome: Progressing  Goal: Pace activities to prevent fatigue by end of the shift  10/2/2024 1217 by Florence Molina RN  Outcome: Progressing  10/2/2024 1211 by Florence Molina RN  Outcome: Progressing   The patient's goals for the shift include      The clinical goals for the shift include pt to remain hemodynamically stable

## 2024-10-02 NOTE — PROGRESS NOTES
"Narda Malloy is a 69 y.o. female on day 5 of admission presenting with Surgical wound infection.    Subjective   Patient seen this morning arounds.  States she feels okay.  States she was out of bed yesterday with physical therapy in the room, but not yet today.  She has been turning side-to-side in the bed.  Has some postoperative back pain.  No pain, numbness or tingling in the legs.  Denies fever, chills, or malaise.       Objective     Physical Exam  Sitting in bed comfortably on arrival.  Alert and oriented.  Cooperative, conversive.  Moving lower extremities spontaneously.  Thoracolumbar dressing intact.  Drain functioning as intended.  Last Recorded Vitals  Blood pressure 139/53, pulse 78, temperature 36.7 °C (98 °F), temperature source Temporal, resp. rate 22, height 1.753 m (5' 9.02\"), weight 99.7 kg (219 lb 12.8 oz), SpO2 97%.  Intake/Output last 3 Shifts:  I/O last 3 completed shifts:  In: 491.3 (4.9 mL/kg) [I.V.:441.3 (4.4 mL/kg); IV Piggyback:50]  Out: 1685 (16.9 mL/kg) [Urine:1375 (0.4 mL/kg/hr); Drains:310]  Weight: 99.7 kg     Relevant Results      Scheduled medications  brimonidine, 1 drop, Both Eyes, BID  cefTRIAXone, 2 g, intravenous, q24h  ezetimibe, 10 mg, oral, Daily  heparin, 5,000 Units, subcutaneous, q8h  insulin lispro, 0-10 Units, subcutaneous, TID  lactobacillus acidophilus, 1 capsule, oral, Daily  latanoprost, 1 drop, Both Eyes, Nightly  methocarbamol, 750 mg, oral, q8h LUCA  pantoprazole, 40 mg, oral, Daily before breakfast  perflutren lipid microspheres, 0.5-10 mL of dilution, intravenous, Once in imaging  perflutren lipid microspheres, 10 mL of dilution, intravenous, Once in imaging  perflutren protein A microsphere, 0.5 mL, intravenous, Once in imaging  perflutren protein A microsphere, 0.5 mL, intravenous, Once in imaging  polyethylene glycol, 17 g, oral, Daily  primidone, 125 mg, oral, TID  [Held by provider] propranolol LA, 60 mg, oral, Daily  sulfur hexafluoride microsphr, 2 mL, " intravenous, Once in imaging  sulfur hexafluoride microsphr, 2 mL, intravenous, Once in imaging  [Held by provider] tamsulosin, 0.4 mg, oral, Daily  traZODone, 25 mg, oral, Nightly      Continuous medications     PRN medications  PRN medications: acetaminophen, acetaminophen, aminophylline, HYDROmorphone, melatonin, ondansetron ODT **OR** ondansetron, oxyCODONE, oxyCODONE, sennosides, vancomycin  Results for orders placed or performed during the hospital encounter of 09/27/24 (from the past 24 hour(s))   POCT GLUCOSE   Result Value Ref Range    POCT Glucose 172 (H) 74 - 99 mg/dL   Transthoracic Echo (TTE) Complete   Result Value Ref Range    AV pk varinder 1.40 m/s    AV mn grad 4.1 mmHg    LVOT diam 2.19 cm    MV E/A ratio 0.78     LV EF 53 %    LVIDd 3.38 cm    RVSP 40.4 mmHg    Aortic Valve Area by Continuity of VTI 3.60 cm2    Aortic Valve Area by Continuity of Peak Velocity 3.26 cm2    AV pk grad 7.8 mmHg    LV A4C EF 50.6    POCT GLUCOSE   Result Value Ref Range    POCT Glucose 201 (H) 74 - 99 mg/dL   POCT GLUCOSE   Result Value Ref Range    POCT Glucose 162 (H) 74 - 99 mg/dL   Basic Metabolic Panel   Result Value Ref Range    Glucose 172 (H) 74 - 99 mg/dL    Sodium 133 (L) 136 - 145 mmol/L    Potassium 4.6 3.5 - 5.3 mmol/L    Chloride 106 98 - 107 mmol/L    Bicarbonate 19 (L) 21 - 32 mmol/L    Anion Gap 13 10 - 20 mmol/L    Urea Nitrogen 52 (H) 6 - 23 mg/dL    Creatinine 0.89 0.50 - 1.05 mg/dL    eGFR 71 >60 mL/min/1.73m*2    Calcium 7.3 (L) 8.6 - 10.3 mg/dL   Vancomycin   Result Value Ref Range    Vancomycin 17.3 5.0 - 20.0 ug/mL   Lavender Top   Result Value Ref Range    Extra Tube Hold for add-ons.    POCT GLUCOSE   Result Value Ref Range    POCT Glucose 166 (H) 74 - 99 mg/dL               This patient has a urinary catheter   Reason for the urinary catheter remaining today?                Assessment/Plan   Assessment & Plan  Surgical wound infection    Surgical site infection    Status post I&D thoracolumbar  incision day #2   -ID consulted for antibiotic recommendations, TTE done awaiting results  -DVT prophylaxis, out of bed as tolerated, SCDs, up in chair for meals, continue heparin 5000 units 3 times per day, appreciate hospitalist recommendations  -Out of bed as tolerated, out of bed with PT/OT  -Continue current pain regimen, pain currently controlled  -Dressing changes as needed  -Continue lumbar drain  -Progress diet as tolerated  -Pending acute rehab placement after TTE/antibiotic recommendations    I spent 30 minutes in the professional and overall care of this patient.      Marc Hendricks PA-C

## 2024-10-02 NOTE — PROGRESS NOTES
"  INFECTIOUS DISEASE DAILY PROGRESS NOTE    SUBJECTIVE:    No overnight events. No new complaints. Afebrile. No rash/itching/diarrhea.    OBJECTIVE:  VITALS (Last 24 Hours)  /55   Pulse 78   Temp 37 °C (98.6 °F)   Resp 22   Ht 1.753 m (5' 9.02\")   Wt 99.7 kg (219 lb 12.8 oz)   LMP  (LMP Unknown)   SpO2 95%   BMI 32.44 kg/m²     PHYSICAL EXAM:  Gen - NAD  Heart - RRR  Abd - soft, no ttp, BS present  Back - surgical dressings  Misc - LUE PICC line present    ABX: IV Vanc/CTX    LABS:  Lab Results   Component Value Date    WBC 13.4 (H) 10/01/2024    HGB 9.6 (L) 10/01/2024    HCT 31.1 (L) 10/01/2024    MCV 96 10/01/2024     10/01/2024     Lab Results   Component Value Date    GLUCOSE 172 (H) 10/02/2024    CALCIUM 7.3 (L) 10/02/2024     (L) 10/02/2024    K 4.6 10/02/2024    CO2 19 (L) 10/02/2024     10/02/2024    BUN 52 (H) 10/02/2024    CREATININE 0.89 10/02/2024           Estimated Creatinine Clearance: 76 mL/min (by C-G formula based on SCr of 0.89 mg/dL).    CRP   Date/Time Value Ref Range Status   10/23/2018 11:41 AM 0.29 mg/dL Final     Comment:     REF VALUE  < 1.00         ASSESSMENT/PLAN:    Lumbar Surgical Site Infection - MRSA and mixed GPB/GNB. S/p OR 9/28 for washout - purulence below the fascia noted. Proteus also present. Repeat washout on 9/30 done in OR.  MRSA Bacteremia - repeat blood cx 9/27 NGTD.  Acute Renal Failure - resolved, CrCl >60 now    IV Vancomycin 1g today and checking level again tomorrow. IV CTX 2g Q24H.    TTE without obvious endocarditis or valvular issues. No need for GREG as will be on prolonged abx as is and this won't  as nothing surgical to be done on the heart is identified.    Monitoring for adverse effects of abx such as rash/itching/diarrhea - none. Monitoring Vancomycin levels and renal function.    Will follow. Thanks!    Brant Juarez MD  [facility]  Office #[phone]      "

## 2024-10-02 NOTE — PROGRESS NOTES
Occupational Therapy    Occupational Therapy Treatment    Name: Narda Malloy  MRN: [id number]  Department: U A 5  Room: [address] [place] [address]  Date: 10/02/24  Time Calculation  Start Time: 1555  Stop Time: 1620  Time Calculation (min): 25 min    Assessment:  End of Session Communication: Bedside nurse  End of Session Patient Position: Alarm on, Bed, 3 rail up  Plan:  Treatment Interventions: ADL retraining, Functional transfer training, Endurance training  OT Frequency: 3 times per week  OT Discharge Recommendations: Moderate intensity level of continued care  Equipment Recommended upon Discharge:  (TBD)  OT Recommended Transfer Status: Maximum assist, Assist of 2  OT - OK to Discharge: Yes    Subjective   Previous Visit Info:  OT Last Visit  OT Received On: 10/02/24  General:  General  Family/Caregiver Present: Yes  Caregiver Feedback:  Godfrey present and supportive  Patient Position Received: Bed, 3 rail up, Alarm on  Preferred Learning Style: auditory, kinesthetic, verbal  General Comment: pt very spmnolent, arouses to verbal cues though lethargic throughout sesison; agreeable to supine ther ex and repositioning, declined attempt to sit EOB again. Pt with severely edematous BUE's, educated pt and spouse regarding UE elevation when supine and for support when feeding self  Precautions:  Medical Precautions: Fall precautions  Post-Surgical Precautions: Spinal precautions    Pain Assessment:  Pain Assessment  Pain Assessment: 0-10  0-10 (Numeric) Pain Score: 3  Pain Type: Acute pain, Surgical pain  Pain Location: Back     Objective   Cognition:  Overall Cognitive Status: Within Functional Limits  Orientation Level: Oriented X4  Following Commands: Follows one step commands with repetition (due to lethargy)  Activities of Daily Living: Feeding  Feeding Comments: discussed with pt and spouse regarding pt difficulty with feeding due to UE swelling/weakness-- pt able to perform cylidrical grasp though wth increased effort;  provided with built up foam utensils, educated pt spouse on use     Bed Mobility/Transfers: Bed Mobility  Bed Mobility: Yes  Bed Mobility 1  Bed Mobility 1:  (repositioning trunk)  Level of Assistance 1: Maximum assistance, Maximum verbal cues, Maximum tactile cues  Bed Mobility Comments 1: 1 person assist to reposition trunk to midline of bed    Transfers  Transfer: No      Therapy/Activity: Therapeutic Exercise  Therapeutic Exercise Performed: Yes  Therapeutic Exercise Activity 1: bilateral tricep extension, antigravity 1x10 reps. shoulder supported max assist in 90 degrees flexion to perform; max cues for smooth control and eccentric lowering  Therapeutic Exercise Activity 2: bilateral elbow flexion with .5 lb resistance 1x10 reps, R UE requires tactile cues to correct  Therapeutic Exercise Activity 3: bilateral pronation/supination [id number]  Therapeutic Exercise Activity 4: gross grasp 1x10 reps with pink foam sponge-- vended to pt today, educated on use    Strength:  Strength  Strength Comments: R shoulder: 1/5 strength, L shouler: 3-/5 strength; distal BUEs grossly 3/5 with rapid fatigue    Outcome Measures:  [facility] Daily Activity  Putting on and taking off regular lower body clothing: Total  Bathing (including washing, rinsing, drying): A lot  Putting on and taking off regular upper body clothing: A lot  Toileting, which includes using toilet, bedpan or urinal: Total  Taking care of personal grooming such as brushing teeth: A lot  Eating Meals: A lot  Daily Activity - Total Score: 10      Education Documentation  Home Exercise Program, taught by Suman Medina OT at 10/2/2024  4:53 PM.  Learner: Patient  Readiness: Acceptance  Method: Explanation  Response: Needs Reinforcement    ADL Training, taught by Suman Medina OT at 10/2/2024  4:53 PM.  Learner: Patient  Readiness: Acceptance  Method: Explanation  Response: Needs Reinforcement    Education Comments  No comments found.      Goals:  Encounter  Problems       Encounter Problems (Active)       ADLs       Patient with complete upper body dressing with minimal assist  level of assistance donning and doffing all UE clothes with no adaptive equipment while supported sitting       Start:  09/30/24    Expected End:  10/14/24            Patient with complete lower body dressing with moderate assist level of assistance donning and doffing all LE clothes  with PRN adaptive equipment while supported sitting       Start:  09/30/24    Expected End:  10/14/24            Patient will complete daily grooming tasks brushing teeth and washing face/hair with minimal assist  level of assistance and PRN adaptive equipment while supported sitting.       Start:  09/30/24    Expected End:  10/14/24            Patient will complete toileting including hygiene clothing management/hygiene with maximal assist level of assistance and bedside commode.       Start:  09/30/24    Expected End:  10/14/24               ADLs       Patient will feed self with modified independent level of assistance and verbal cues using PRN adaptive equipment.       Start:  10/02/24    Expected End:  10/16/24               EXERCISE/STRENGTHENING       Patient with increase BUE to 3+/5 strength throughout        Start:  10/02/24    Expected End:  10/16/24               TRANSFERS       Patient will perform bed mobility maximal assist level of assistance and bed rails in order to improve safety and independence with mobility       Start:  09/30/24    Expected End:  10/14/24            Patient will complete sit to stand transfer with maximal assist level of assistance and least restrictive device in order to improve safety and prepare for out of bed mobility.       Start:  09/30/24    Expected End:  10/14/24

## 2024-10-02 NOTE — DISCHARGE SUMMARY
Discharge Diagnosis  Suspected post-operative surgical wound infection  Bacteremia staphylococcus aureus  Spinal stenosis - status post lumbar spinal surgery  Fatigue  Generalized weakness  Mobility impairment  Wounds  Pressure ulcer risk  Hyponatremia  Acute kidney injury, worsening  Distended bladder rule out urinary retention  Pyuria rule out UTI  Acute hypoxic respiratory failure   Pulmonary infiltrates  Bilateral pleural effusions  Sarcoidosis  Type 2 diabetes mellitus  Hypertension  Obesity  Essential Tremor  Glaucoma   Cataract    Issues Requiring Follow-Up  Above    Discharge Meds     Medication List      START taking these medications     ceFAZolin IVPB; Commonly known as: Ancef; Infuse 100 mL (2 g) over 30   minutes into a venous catheter every 8 hours.   * dextrose 50 % injection; Infuse 25 mL (12.5 g) into a venous catheter   every 15 minutes if needed (For blood glucose 41 to 70 mg/dL).   * dextrose 50 % injection; Infuse 50 mL (25 g) into a venous catheter   every 15 minutes if needed (For blood glucose less than or equal to 40   mg/dL).   * glucagon 1 mg injection; Commonly known as: Glucagen; Inject 1 mg into   the muscle every 15 minutes if needed for low blood sugar - see comments   (Hypoglycemia).   * glucagon 1 mg injection; Commonly known as: Glucagen; Inject 1 mg into   the muscle every 15 minutes if needed for low blood sugar - see comments   (Hypoglycemia).   heparin (porcine) 5,000 unit/mL injection; Inject 1 mL (5,000 Units)   under the skin every 8 hours.   insulin lispro 100 unit/mL injection; Commonly known as: HumaLOG; Inject   0-15 Units under the skin 3 times daily (morning, midday, late afternoon).   Take as directed per insulin instructions.Do not hold when patient is not   eating, continue order as scheduled for hyperglycemia management. Insulin   Lispro Corrective Scale #3   Hypoglycemia protocol Call LIP unit(s) if   Blood Glucose is between 0 - 70 mg/dL   0 unit(s) if Blood  glucose is   between   3 unit(s) if Blood glucose is between 151-200  6 unit(s)   if Blood glucose is between 201-250  9 unit(s) if Blood glucose is between   251-300  12 unit(s) if Blood glucose is between 301-350  15 unit(s) if   Blood glucose is between 351-400  Notify provider unit(s) if Blood Glucose   is greater than 400 mg/dL   ondansetron 4 mg/2 mL injection; Commonly known as: Zofran; Infuse 2 mL   (4 mg) into a venous catheter every 6 hours if needed for nausea or   vomiting.   oxygen gas therapy; Commonly known as: O2; Inhale 1 each continuously.   * pantoprazole 40 mg EC tablet; Commonly known as: ProtoNix; Take 1   tablet (40 mg) by mouth once daily in the morning. Take before meals. Do   not crush, chew, or split.   * pantoprazole 40 mg injection; Commonly known as: ProtoNix; Infuse 40   mg into a venous catheter once daily in the morning. Take before meals. If   unable to take PO.   polyethylene glycol 17 gram packet; Commonly known as: Glycolax,   Miralax; Take 17 g by mouth once daily.   vancomycin IVPB; Commonly known as: Xellia; Infuse 200 mL (1 g) at 200   mL/hr over 60 minutes into a venous catheter once every 24 hours. Pharmacy   dosing.  * This list has 6 medication(s) that are the same as other medications   prescribed for you. Read the directions carefully, and ask your doctor or   other care provider to review them with you.     CHANGE how you take these medications     propranolol LA 60 mg 24 hr capsule; Commonly known as: Inderal LA; Take   1 capsule (60 mg) by mouth early in the morning.. Hold for SBP < 110 mmhg,   HR < 60 bpm.; What changed: additional instructions     CONTINUE taking these medications     acetaminophen 500 mg tablet; Commonly known as: Tylenol   brimonidine 0.2 % ophthalmic solution; Commonly known as: AlphaGAN   calcium carbonate-vitamin D3 500 mg-5 mcg (200 unit) tablet   docusate sodium 100 mg capsule; Commonly known as: Colace   ezetimibe 10 mg tablet;  "Commonly known as: Zetia; Take 1 tablet (10 mg)   by mouth once daily.   FreeStyle Lite Strips strip; Generic drug: blood sugar diagnostic; USE   TO TEST 3 TIMES A DAY AS DIRECTED   Lactobacillus acidophilus 100 mg (1 billion cell) capsule   latanoprost 0.005 % ophthalmic solution; Commonly known as: Xalatan;   Administer 1 drop into both eyes once daily at bedtime.   melatonin 5 mg tablet   methocarbamol 500 mg tablet; Commonly known as: Robaxin   multivitamin tablet   oxyCODONE 5 mg immediate release tablet; Commonly known as: Roxicodone   * PEN NEEDLE MISC   * Sure Comfort Pen Needle 32 gauge x 5/32\" needle; Generic drug: pen   needle, diabetic; AS DIRECTED DAILY FOR 90 DAYS   primidone 125 mg tablet; Take 125 mg by mouth 3 times a day.   sennosides 8.6 mg tablet; Commonly known as: Senokot   traZODone 25 MG split tablet; Commonly known as: Desyrel   Vitamin C 500 mg tablet; Generic drug: ascorbic acid  * This list has 2 medication(s) that are the same as other medications   prescribed for you. Read the directions carefully, and ask your doctor or   other care provider to review them with you.     STOP taking these medications     cyclobenzaprine 10 mg tablet; Commonly known as: Flexeril   dorzolamide-timoloL 22.3-6.8 mg/mL ophthalmic solution; Commonly known   as: Cosopt   doxycycline 100 mg capsule; Commonly known as: Monodox   insulin degludec 100 unit/mL (3 mL) injection; Commonly known as:   Tresiba FlexTouch U-100   lisinopril 20 mg tablet       Test Results Pending At Discharge  Pending Labs       No current pending labs.            Hospital Course  This is a very pleasant 69-year-old obese  female presenting with generalized weakness, fatigue, acute kidney injury and concern for post-operative spinal wound infection.  She underwent lumbar surgery at [facility] August 26, 2024.  She was discharged to [facility].  She states she underwent " rehabilitation, then she stopped receiving rehabilitation.  She states that she was feeling well up until recently when she developed generalized weakness, especially to her lower extremities.  She denies any focal weakness.  She denies any numbness or tingling to the lower extremities.  She denies any falls.  She reports intermittent bowel incontinence since having surgery when receiving the analgesics.  She reports decreased oral intake.  2 days prior to presentation, there was concern that the surgical incision was open and draining.  She was treated outpatient with antibiotics and local skin care.  Per the record, she was receiving oral Doxycycline 100 milligrams p.o twice daily.  Outpatient laboratory data obtained showed acute kidney injury.  Due to the abnormal labs and concern for infection, she was sent to the emergency department for evaluation.  In the emergency department, initial workup was done.  CBC showed a stable white blood cell count of 9.8.  CT lumbar spine per radiology showed a limited examination, post-surgical appearance of the spine, difficult to determine whether if changes at L1-L2 are related to previous degenerative changes or superimposed infection at the disc level, status post interbody spacer placement, MRI lumbar spine with and without contrast may be more specific for assessment of concern for possible post-operative infection, incidental note of distended urinary bladder, incidental note of presence of bilateral pleural effusions larger on the left.  The post-operative surgical incision was noted with dehiscence and drainage.  Wound cultures were obtained from the post-operative spinal wound drainage site.  She was treated with broad-spectrum IV antibiotics and was admitted.  She was evaluated and treated by Infectious disease.  She was treated with broad-spectrum IV antibiotics Zosyn and Vancomycin, pharmacy dosing, renal dosing.  Infectious disease recommended transfer to [facility] for evaluation by her Neurosurgery.  The acute kidney injury worsened.  Bladder scans unremarkable.  She was evaluated treated by Nephrology.  CT chest showed pleural effusions.  Pulmonology was consulted.  Her respiratory status, pulse oximetry was stable on room air.  Note 9/25/2024 2 out of 2 blood cultures pending Staphylococcus aureus.  9/25/2024 spinal wound culture pending.  9/25/2024 urine culture growing multiple organisms, probable contamination.  She was continued on IV antibiotics per Infectious disease.  The patient agreed to transfer to [facility] per recommendations.  I spoke with the transfer center Dr. Patel Hospitalist accepted patient with consultation to Orthopedic Spinal surgery.  I spoke with Dr. Ventura Orthopedic Spinal surgery, also agreeable to transfer.  She was transferred to [facility] in stable condition.    Pertinent Physical Exam At Time of Discharge  See physical examination.    Outpatient Follow-Up  Future Appointments   Date Time Provider Department Center   11/22/2024  8:45 AM Luiz Fisher MD [id number] [facility]   12/3/2024 11:00 AM Jonathan Burrell MD [id number] [facility]   12/4/2024  1:00 PM Barbara Samuels DO [id number] [facility]   1/17/2025 10:30 AM Jose Juan MD [id number] [facility]     Follow-up with primary care provider in 1 week.    Follow-up with [facility] providers.      Leona Flores, APRN-CNP

## 2024-10-02 NOTE — PROGRESS NOTES
Vancomycin Dosing by Pharmacy- FOLLOW UP    Narda Malloy is a 69 y.o. female for whom Pharmacy has been consulted to dose vancomycin dosing for cellulitis, skin and soft tissue. Based on the patient's indication and renal status this patient is being dosed based on a goal AUC of 400-600.     Renal function is currently improving.    Last vancomycin dose: 1000 mg given 24 @ 0813.    Most recent random level: 17.3 mcg/mL    Visit Vitals  /55   Pulse 78   Temp 37 °C (98.6 °F)   Resp 22        Lab Results   Component Value Date    CREATININE 0.89 10/02/2024    CREATININE 0.99 10/01/2024    CREATININE 1.29 (H) 2024    CREATININE 1.56 (H) 2024        Patient weight is as follows:   Vitals:    10/01/24 0308   Weight: 99.7 kg (219 lb 12.8 oz)       Cultures:  Susceptibility data for the encounter in last 14 days.  Collected Specimen Info Organism Ampicillin Cefazolin Ciprofloxacin Gentamicin Levofloxacin Piperacillin/Tazobactam Tetracycline Trimethoprim/Sulfamethoxazole   24 Swab from ABSCESS Proteus mirabilis  S  S  S  S  S  S  R  S        I/O last 3 completed shifts:  In: 491.3 (4.9 mL/kg) [I.V.:441.3 (4.4 mL/kg); IV Piggyback:50]  Out: 1685 (16.9 mL/kg) [Urine:1375 (0.4 mL/kg/hr); Drains:310]  Weight: 99.7 kg   I/O during current shift:  No intake/output data recorded.    Temp (24hrs), Av.8 °C (98.3 °F), Min:36.2 °C (97.1 °F), Max:37.2 °C (98.9 °F)      Assessment/Plan    Within goal random/trough level. Re-dose with 1000 mg x1 today.  The next level will be obtained on 10/3/24 at 0500. May be obtained sooner if clinically indicated.   Will continue to monitor renal function daily while on vancomycin and order serum creatinine at least every 48 hours if not already ordered.  Follow for continued vancomycin needs, clinical response, and signs/symptoms of toxicity.       Romana A Kuchta, PharmD

## 2024-10-02 NOTE — PROGRESS NOTES
"Narda Malloy is a 69 y.o. female on day 5 of admission presenting with Surgical wound infection.    Subjective   Pain well controlled. No acute events overnight. Echo without obvious vegetation. Anxious to be moving on to next step and therapy.        Objective     VITALS  Blood pressure 139/53, pulse 78, temperature 36.7 °C (98 °F), temperature source Temporal, resp. rate 22, height 1.753 m (5' 9.02\"), weight 99.7 kg (219 lb 12.8 oz), SpO2 97%.  Physical Exam  Constitutional:       Appearance: She is ill-appearing.   Cardiovascular:      Rate and Rhythm: Normal rate and regular rhythm.   Pulmonary:      Effort: Pulmonary effort is normal.      Breath sounds: Normal breath sounds.   Abdominal:      General: There is no distension.      Palpations: Abdomen is soft.   Genitourinary:     Comments:   Arthur  Neurological:      General: No focal deficit present.      Mental Status: She is alert and oriented to person, place, and time.           Intake/Output last 3 Shifts:  I/O last 3 completed shifts:  In: 491.3 (4.9 mL/kg) [I.V.:441.3 (4.4 mL/kg); IV Piggyback:50]  Out: 1685 (16.9 mL/kg) [Urine:1375 (0.4 mL/kg/hr); Drains:310]  Weight: 99.7 kg     Relevant Results  Results for orders placed or performed during the hospital encounter of 09/27/24 (from the past 24 hour(s))   Transthoracic Echo (TTE) Complete   Result Value Ref Range    AV pk varinder 1.40 m/s    AV mn grad 4.1 mmHg    LVOT diam 2.19 cm    MV E/A ratio 0.78     LV EF 53 %    LVIDd 3.38 cm    RVSP 40.4 mmHg    Aortic Valve Area by Continuity of VTI 3.60 cm2    Aortic Valve Area by Continuity of Peak Velocity 3.26 cm2    AV pk grad 7.8 mmHg    LV A4C EF 50.6    POCT GLUCOSE   Result Value Ref Range    POCT Glucose 201 (H) 74 - 99 mg/dL   POCT GLUCOSE   Result Value Ref Range    POCT Glucose 162 (H) 74 - 99 mg/dL   Basic Metabolic Panel   Result Value Ref Range    Glucose 172 (H) 74 - 99 mg/dL    Sodium 133 (L) 136 - 145 mmol/L    Potassium 4.6 3.5 - 5.3 mmol/L "    Chloride 106 98 - 107 mmol/L    Bicarbonate 19 (L) 21 - 32 mmol/L    Anion Gap 13 10 - 20 mmol/L    Urea Nitrogen 52 (H) 6 - 23 mg/dL    Creatinine 0.89 0.50 - 1.05 mg/dL    eGFR 71 >60 mL/min/1.73m*2    Calcium 7.3 (L) 8.6 - 10.3 mg/dL   Vancomycin   Result Value Ref Range    Vancomycin 17.3 5.0 - 20.0 ug/mL   Lavender Top   Result Value Ref Range    Extra Tube Hold for add-ons.    POCT GLUCOSE   Result Value Ref Range    POCT Glucose 166 (H) 74 - 99 mg/dL   POCT GLUCOSE   Result Value Ref Range    POCT Glucose 158 (H) 74 - 99 mg/dL       Imaging Results  Transthoracic Echo (TTE) Complete   Final Result          Medications:  brimonidine, 1 drop, Both Eyes, BID  cefTRIAXone, 2 g, intravenous, q24h  ezetimibe, 10 mg, oral, Daily  heparin, 5,000 Units, subcutaneous, q8h  insulin lispro, 0-10 Units, subcutaneous, TID  lactobacillus acidophilus, 1 capsule, oral, Daily  latanoprost, 1 drop, Both Eyes, Nightly  methocarbamol, 750 mg, oral, q8h LUCA  pantoprazole, 40 mg, oral, Daily before breakfast  perflutren lipid microspheres, 0.5-10 mL of dilution, intravenous, Once in imaging  perflutren lipid microspheres, 10 mL of dilution, intravenous, Once in imaging  perflutren protein A microsphere, 0.5 mL, intravenous, Once in imaging  perflutren protein A microsphere, 0.5 mL, intravenous, Once in imaging  polyethylene glycol, 17 g, oral, Daily  primidone, 125 mg, oral, TID  [Held by provider] propranolol LA, 60 mg, oral, Daily  sulfur hexafluoride microsphr, 2 mL, intravenous, Once in imaging  sulfur hexafluoride microsphr, 2 mL, intravenous, Once in imaging  [Held by provider] tamsulosin, 0.4 mg, oral, Daily  traZODone, 25 mg, oral, Nightly       PRN medications: acetaminophen, acetaminophen, aminophylline, HYDROmorphone, melatonin, ondansetron ODT **OR** ondansetron, oxyCODONE, oxyCODONE, sennosides, vancomycin        Assessment/Plan          Principal Problem:    Surgical wound infection  Active Problems:    Surgical  site infection    Surgical site infection   - Blood cultures positive for MRSA  - wound culture grew abundant mixed Gram positive and Gram negative bacteria.   - Continue vanc and Ceftriaxone  - ID consult, anticipate extended course of abx   - TTE without obvious vegetation      AMY  - Resolved      Distended bladder on admission 9/25  - urinating normally per pt.      DM2  - Corrective insulin      HTN  - hold ACE-I  - currently on no antihypertensives, BP low.     DVT Prophylaxis:  Heparin subq would continue this at the SNF      Disposition:  Awaiting final ID recs and SNF auth     Jonathan Santos, [facility]

## 2024-10-02 NOTE — PROGRESS NOTES
Physical Therapy    Physical Therapy Treatment    Patient Name: Narda Malloy  MRN: [id number]  Department: [place] [address]  Room: [address] [place] [address]  Today's Date: 10/2/2024  Time Calculation  Start Time: 1324  Stop Time: 1356  Time Calculation (min): 32 min    Assessment/Plan   PT Assessment  End of Session Communication: Bedside nurse  Assessment Comment: Pt is motivated to participate however demo's limited UE strength and mobility, limiting ability to complete bed mobility independently.  End of Session Patient Position: Bed, 3 rail up, Alarm off, not on at start of session (B SCDs in place, spouse present, call light and needs in reach)  PT Plan  Inpatient/Swing Bed or Outpatient: Inpatient  PT Plan  Treatment/Interventions: Bed mobility, Therapeutic exercise  PT Plan: Ongoing PT  PT Frequency: 5 times per week  PT Discharge Recommendations: Moderate intensity level of continued care  Equipment Recommended upon Discharge: Wheelchair, Wheeled walker  PT Recommended Transfer Status: Total assist  PT - OK to Discharge: Yes (per POC)      General Visit Information:   PT  Visit  PT Received On: 10/02/24  General  Reason for Referral: Patient is a 69 y.o. admitted from SNF and transferred to [facility] for postoperative spinal infection. S/p washout 9/28 and I&D with wound closure 9/30.  Referred By: COBY Hendricks  Past Medical History Relevant to Rehab: Patient's history most notable for Prior L1-L3 Lumbar Laminectomy Revision; L1-L2 Osteotomy; Transforaminal Lumbar Interbody Fusion; T4-S1 Revision; Fusion; Instrumentation; Cement Augmentation on 08/26/24.  PMH: TENA myopia, diabetes, retinopathy, neuropathy, HTN, essential tremor, TENA glaucoma, low back pain, sarcoidoisis of lung  Family/Caregiver Present: Yes  Caregiver Feedback:  Godfrey present and supportive  Prior to Session Communication: Bedside nurse  Patient Position Received: Alarm off, not on at start of session, Bed, 4 rail up  Preferred Learning Style: auditory,  kinesthetic, verbal  General Comment: Pt received semi-supine in bed on arrival    Subjective   Precautions:  Precautions  Medical Precautions: Fall precautions, Oxygen therapy device and L/min, Spinal precautions  Post-Surgical Precautions: Spinal precautions  Precautions Comment: NC not on upon arrival, SpO2 at 90%- NC donned and increased to 95%      Objective   Pain:  Pain Assessment  Pain Assessment: 0-10  0-10 (Numeric) Pain Score: 3  Pain Type: Acute pain, Surgical pain  Pain Location: Back  Cognition:  Cognition  Overall Cognitive Status: Within Functional Limits  Coordination:  Movements are Fluid and Coordinated: Yes  Postural Control:  Postural Control  Postural Control: Impaired  Static Sitting Balance  Static Sitting-Balance Support: Bilateral upper extremity supported, Feet supported  Static Sitting-Level of Assistance: Contact guard  Static Sitting-Comment/Number of Minutes: x5 min at EOB    Activity Tolerance:  Activity Tolerance  Endurance: Tolerates 10 - 20 min exercise with multiple rests  Treatments:  Therapeutic Exercise  Therapeutic Exercise Performed: Yes  Therapeutic Exercise Activity 1: Pt instructed in B LE ther-ex: supine AP, SAQ and hip ABD with AAROM on R LE x10 reps. Seated LAQ x10 reps. Rest as needed to manage fatigue. Done to improve muscular strength and endurance to facilitate increased independence with balance, transfers, ambulation, and participation in ADLs.  Verbal/nonverbal (demo/gestures) cuing for execution of exercises and for proper form to obtain maximal benefits.    Therapeutic Activity  Therapeutic Activity Performed: Yes  Therapeutic Activity 1: To increase sitting balance, core strength and functional endurance needed for increased independence with mobility and transfers, pt performs static/dynamic sitting at EOB x5 min duration with CGA x1.    Bed Mobility 1  Bed Mobility 1: Scooting  Bed Mobility Comments 1: 2-person dependent scoot toward [facility] for better  positioning in supine via TAPS.  Bed Mobility 2  Bed Mobility  2: Supine to sitting, Sitting to supine, Log roll  Level of Assistance 2: Maximum assistance  Bed Mobility Comments 2: Verbal cuing in logroll sequencing and assist at B LEs  Bed Mobility 3  Bed Mobility 3: Rolling right, Rolling left  Level of Assistance 3: Maximum assistance  Bed Mobility Comments 3: use of bed accessories    Transfers  Transfer: No (Unable to complete at this time 2/2 B LE weakness R>L)    Outcome Measures:  [facility] Basic Mobility  Turning from your back to your side while in a flat bed without using bedrails: Total  Moving from lying on your back to sitting on the side of a flat bed without using bedrails: Total  Moving to and from bed to chair (including a wheelchair): Total  Standing up from a chair using your arms (e.g. wheelchair or bedside chair): Total  To walk in hospital room: Total  Climbing 3-5 steps with railing: Total  Basic Mobility - Total Score: 6      Encounter Problems       Encounter Problems (Active)       Balance       STG - Maintains static sitting balance with upper extremity support (Progressing)       Start:  10/01/24    Expected End:  10/15/24       With min A x1 in midline >20 minutes with fair balance, maintaining spinal precautions          STG - Maintains dynamic sitting balance with upper extremity support (Progressing)       Start:  10/01/24    Expected End:  10/15/24       With mod A x1 and fair- balance >10 minutes, maintaining spinal precautions             PT Transfers       STG - Patient will perform bed mobility (Progressing)       Start:  10/01/24    Expected End:  10/15/24       With mod A x1 via logroll, maintaining spinal precautions          STG - Patient will transfer sit to and from stand (Not Progressing)       Start:  10/01/24    Expected End:  10/15/24       With max A x1 and LRAD, maintaining spinal precautions         Patient will perform 20 reps of A/AROM of BLE to strengthen for  participation in skilled mobility with >+1/2 muscle grade improvement. (Progressing)       Start:  10/01/24    Expected End:  10/15/24               Pain - Adult

## 2024-10-03 LAB
ANION GAP SERPL CALC-SCNC: 11 MMOL/L (ref 10–20)
BACTERIA SPEC CULT: ABNORMAL
BACTERIA SPEC CULT: ABNORMAL
BUN SERPL-MCNC: 45 MG/DL (ref 6–23)
CALCIUM SERPL-MCNC: 7.5 MG/DL (ref 8.6–10.3)
CHLORIDE SERPL-SCNC: 109 MMOL/L (ref 98–107)
CO2 SERPL-SCNC: 19 MMOL/L (ref 21–32)
CREAT SERPL-MCNC: 0.71 MG/DL (ref 0.5–1.05)
EGFRCR SERPLBLD CKD-EPI 2021: >90 ML/MIN/1.73M*2
GLUCOSE BLD MANUAL STRIP-MCNC: 161 MG/DL (ref 74–99)
GLUCOSE BLD MANUAL STRIP-MCNC: 167 MG/DL (ref 74–99)
GLUCOSE BLD MANUAL STRIP-MCNC: 168 MG/DL (ref 74–99)
GLUCOSE BLD MANUAL STRIP-MCNC: 183 MG/DL (ref 74–99)
GLUCOSE SERPL-MCNC: 170 MG/DL (ref 74–99)
GRAM STN SPEC: ABNORMAL
GRAM STN SPEC: ABNORMAL
HOLD SPECIMEN: NORMAL
POTASSIUM SERPL-SCNC: 5.1 MMOL/L (ref 3.5–5.3)
SODIUM SERPL-SCNC: 134 MMOL/L (ref 136–145)
VANCOMYCIN SERPL-MCNC: 18.6 UG/ML (ref 5–20)

## 2024-10-03 PROCEDURE — 2500000002 HC RX 250 W HCPCS SELF ADMINISTERED DRUGS (ALT 637 FOR MEDICARE OP, ALT 636 FOR OP/ED): Performed by: INTERNAL MEDICINE

## 2024-10-03 PROCEDURE — 2500000001 HC RX 250 WO HCPCS SELF ADMINISTERED DRUGS (ALT 637 FOR MEDICARE OP): Performed by: INTERNAL MEDICINE

## 2024-10-03 PROCEDURE — 82947 ASSAY GLUCOSE BLOOD QUANT: CPT

## 2024-10-03 PROCEDURE — 80048 BASIC METABOLIC PNL TOTAL CA: CPT | Performed by: INTERNAL MEDICINE

## 2024-10-03 PROCEDURE — 99232 SBSQ HOSP IP/OBS MODERATE 35: CPT | Performed by: INTERNAL MEDICINE

## 2024-10-03 PROCEDURE — 2500000004 HC RX 250 GENERAL PHARMACY W/ HCPCS (ALT 636 FOR OP/ED)

## 2024-10-03 PROCEDURE — 97530 THERAPEUTIC ACTIVITIES: CPT | Mod: GP,CQ | Performed by: PHYSICAL THERAPY ASSISTANT

## 2024-10-03 PROCEDURE — 80202 ASSAY OF VANCOMYCIN: CPT | Performed by: INTERNAL MEDICINE

## 2024-10-03 PROCEDURE — 2500000004 HC RX 250 GENERAL PHARMACY W/ HCPCS (ALT 636 FOR OP/ED): Performed by: INTERNAL MEDICINE

## 2024-10-03 PROCEDURE — 1100000001 HC PRIVATE ROOM DAILY

## 2024-10-03 RX ORDER — VANCOMYCIN HCL IN 5 % DEXTROSE 1G/250ML
1 PLASTIC BAG, INJECTION (ML) INTRAVENOUS EVERY 12 HOURS
Qty: 20000 ML | Refills: 0 | Status: ON HOLD | OUTPATIENT
Start: 2024-10-03 | End: 2024-11-12

## 2024-10-03 RX ORDER — VANCOMYCIN HYDROCHLORIDE 1 G/200ML
1000 INJECTION, SOLUTION INTRAVENOUS EVERY 24 HOURS
Status: DISCONTINUED | OUTPATIENT
Start: 2024-10-03 | End: 2024-10-04 | Stop reason: HOSPADM

## 2024-10-03 ASSESSMENT — COGNITIVE AND FUNCTIONAL STATUS - GENERAL
MOVING FROM LYING ON BACK TO SITTING ON SIDE OF FLAT BED WITH BEDRAILS: A LOT
STANDING UP FROM CHAIR USING ARMS: A LOT
MOBILITY SCORE: 10
TURNING FROM BACK TO SIDE WHILE IN FLAT BAD: A LOT
CLIMB 3 TO 5 STEPS WITH RAILING: TOTAL
WALKING IN HOSPITAL ROOM: TOTAL
MOVING TO AND FROM BED TO CHAIR: A LOT

## 2024-10-03 ASSESSMENT — PAIN - FUNCTIONAL ASSESSMENT
PAIN_FUNCTIONAL_ASSESSMENT: 0-10

## 2024-10-03 ASSESSMENT — PAIN SCALES - GENERAL
PAINLEVEL_OUTOF10: 4
PAINLEVEL_OUTOF10: 0 - NO PAIN
PAINLEVEL_OUTOF10: 0 - NO PAIN

## 2024-10-03 NOTE — PROGRESS NOTES
10/03/24 0922   Discharge Planning   Expected Discharge Disposition SNF     Auth pend [facility] [place] 10/3.

## 2024-10-03 NOTE — PROGRESS NOTES
"Narda Malloy is a 69 y.o. female on day 6 of admission presenting with Surgical wound infection.    Subjective   Patient doing better this morning.  She was out of bed yesterday.  She seems motivated to improve with physical therapy.  Plan for upcoming discharge to skilled nursing facility.       Objective     Physical Exam    Last Recorded Vitals  Blood pressure 146/73, pulse 77, temperature 36.3 °C (97.4 °F), temperature source Oral, resp. rate 18, height 1.753 m (5' 9.02\"), weight 99.7 kg (219 lb 12.8 oz), SpO2 99%.  Intake/Output last 3 Shifts:  I/O last 3 completed shifts:  In: 256.3 (2.6 mL/kg) [P.O.:240; I.V.:16.3 (0.2 mL/kg)]  Out: 1625 (16.3 mL/kg) [Urine:1450 (0.4 mL/kg/hr); Drains:175]  Weight: 99.7 kg     Relevant Results      Scheduled medications  brimonidine, 1 drop, Both Eyes, BID  cefTRIAXone, 2 g, intravenous, q24h  ezetimibe, 10 mg, oral, Daily  heparin, 5,000 Units, subcutaneous, q8h  insulin lispro, 0-10 Units, subcutaneous, TID  lactobacillus acidophilus, 1 capsule, oral, Daily  latanoprost, 1 drop, Both Eyes, Nightly  methocarbamol, 750 mg, oral, q8h LUCA  pantoprazole, 40 mg, oral, Daily before breakfast  perflutren lipid microspheres, 0.5-10 mL of dilution, intravenous, Once in imaging  perflutren lipid microspheres, 10 mL of dilution, intravenous, Once in imaging  polyethylene glycol, 17 g, oral, Daily  primidone, 125 mg, oral, TID  [Held by provider] propranolol LA, 60 mg, oral, Daily  [Held by provider] tamsulosin, 0.4 mg, oral, Daily  traZODone, 25 mg, oral, Nightly      Continuous medications     PRN medications  PRN medications: acetaminophen, acetaminophen, aminophylline, HYDROmorphone, melatonin, ondansetron ODT **OR** ondansetron, oxyCODONE, oxyCODONE, sennosides, vancomycin  Results for orders placed or performed during the hospital encounter of 09/27/24 (from the past 24 hour(s))   Lavender Top   Result Value Ref Range    Extra Tube Hold for add-ons.    POCT GLUCOSE   Result " Value Ref Range    POCT Glucose 166 (H) 74 - 99 mg/dL   POCT GLUCOSE   Result Value Ref Range    POCT Glucose 158 (H) 74 - 99 mg/dL   POCT GLUCOSE   Result Value Ref Range    POCT Glucose 249 (H) 74 - 99 mg/dL   POCT GLUCOSE   Result Value Ref Range    POCT Glucose 194 (H) 74 - 99 mg/dL               This patient has a urinary catheter   Reason for the urinary catheter remaining today? perioperative use for selected surgical procedures               Assessment/Plan   Assessment & Plan  Surgical wound infection    Surgical site infection    Status post irrigation debridement of thoracolumbar spine  Out of bed with physical and Occupational Therapy; encourage ambulation  DVT prophylaxis  Will discontinue SUSAN drain later today with anticipation of discharge to skilled nursing facility once final antibiotic recommendations are obtained and patient stable from medical standpoint  patient can follow-up with us in 2 to 3 weeks             Kermit Ventura MD

## 2024-10-03 NOTE — PROGRESS NOTES
10/03/24 1411   Discharge Planning   Expected Discharge Disposition SNF     TCC met with pt  at bedside. TCC answered all questions to the best of my ability.

## 2024-10-03 NOTE — NURSING NOTE
New blisters developed around midline dressing edges. Blisters are only found in that area. Poli Santos made aware

## 2024-10-03 NOTE — PROGRESS NOTES
"  INFECTIOUS DISEASE DAILY PROGRESS NOTE    SUBJECTIVE:    No new issues. Feels OK. No fever.    OBJECTIVE:  VITALS (Last 24 Hours)  /57 (BP Location: Right arm, Patient Position: Lying)   Pulse 77   Temp 36.6 °C (97.9 °F) (Temporal)   Resp 18   Ht 1.753 m (5' 9.02\")   Wt 99.7 kg (219 lb 12.8 oz)   LMP  (LMP Unknown)   SpO2 99%   BMI 32.44 kg/m²     PHYSICAL EXAM:  Gen - NAD  Heart - RRR  Abd - soft, no ttp, BS present  Back - surgical dressings  Misc - LUE PICC line present    ABX: IV Vanc/CTX    LABS:  Lab Results   Component Value Date    WBC 13.4 (H) 10/01/2024    HGB 9.6 (L) 10/01/2024    HCT 31.1 (L) 10/01/2024    MCV 96 10/01/2024     10/01/2024     Lab Results   Component Value Date    GLUCOSE 170 (H) 10/03/2024    CALCIUM 7.5 (L) 10/03/2024     (L) 10/03/2024    K 5.1 10/03/2024    CO2 19 (L) 10/03/2024     (H) 10/03/2024    BUN 45 (H) 10/03/2024    CREATININE 0.71 10/03/2024           Estimated Creatinine Clearance: 95.3 mL/min (by C-G formula based on SCr of 0.71 mg/dL).    CRP   Date/Time Value Ref Range Status   10/23/2018 11:41 AM 0.29 mg/dL Final     Comment:     REF VALUE  < 1.00         ASSESSMENT/PLAN:    Lumbar Surgical Site Infection - MRSA and mixed GPB/GNB. S/p OR 9/28 for washout - purulence below the fascia noted. Proteus also present. Repeat washout on 9/30 done in OR.  MRSA Bacteremia - repeat blood cx 9/27 NGTD.  Acute Renal Failure - resolved    IV Vancomycin 1g Q24H and IV CTX 2g Q24H both through 11/12/24 for 6 more weeks. Might need extension past this, I will follow-up with patient and help determine this.    Has PICC line already.    Once weekly labs CBC/diff, CMP, Vanc trough ESR, CRP fax to Dr. Juarez [phone].    Monitoring for adverse effects of abx such as rash/itching/diarrhea - none. Monitoring Vancomycin levels and renal function - stable.    Will sign off. Please call back with questions. Thanks!    Brant Juarez MD  [facility] " [facility]  Office #[phone]

## 2024-10-03 NOTE — PROGRESS NOTES
"Narda Malloy is a 69 y.o. female on day 6 of admission presenting with Surgical wound infection.    Subjective   A bit more alert today.  at the bedside. Anxious to get moving on to the next phase of her care.        Objective     VITALS  Blood pressure 125/57, pulse 77, temperature 36.6 °C (97.9 °F), temperature source Temporal, resp. rate 18, height 1.753 m (5' 9.02\"), weight 99.7 kg (219 lb 12.8 oz), SpO2 99%.  Physical Exam  Constitutional:       Appearance: She is obese.   Cardiovascular:      Rate and Rhythm: Normal rate and regular rhythm.   Pulmonary:      Effort: Pulmonary effort is normal.      Breath sounds: Normal breath sounds.   Abdominal:      General: There is no distension.      Palpations: Abdomen is soft.   Genitourinary:     Comments:   Arthur  Neurological:      General: No focal deficit present.      Mental Status: She is alert and oriented to person, place, and time.           Intake/Output last 3 Shifts:  I/O last 3 completed shifts:  In: 256.3 (2.6 mL/kg) [P.O.:240; I.V.:16.3 (0.2 mL/kg)]  Out: 1625 (16.3 mL/kg) [Urine:1450 (0.4 mL/kg/hr); Drains:175]  Weight: 99.7 kg     Relevant Results  Results for orders placed or performed during the hospital encounter of 09/27/24 (from the past 24 hour(s))   POCT GLUCOSE   Result Value Ref Range    POCT Glucose 158 (H) 74 - 99 mg/dL   POCT GLUCOSE   Result Value Ref Range    POCT Glucose 249 (H) 74 - 99 mg/dL   POCT GLUCOSE   Result Value Ref Range    POCT Glucose 194 (H) 74 - 99 mg/dL   Lavender Top   Result Value Ref Range    Extra Tube Hold for add-ons.    Vancomycin   Result Value Ref Range    Vancomycin 18.6 5.0 - 20.0 ug/mL   Basic Metabolic Panel   Result Value Ref Range    Glucose 170 (H) 74 - 99 mg/dL    Sodium 134 (L) 136 - 145 mmol/L    Potassium 5.1 3.5 - 5.3 mmol/L    Chloride 109 (H) 98 - 107 mmol/L    Bicarbonate 19 (L) 21 - 32 mmol/L    Anion Gap 11 10 - 20 mmol/L    Urea Nitrogen 45 (H) 6 - 23 mg/dL    Creatinine 0.71 0.50 - " 1.05 mg/dL    eGFR >90 >60 mL/min/1.73m*2    Calcium 7.5 (L) 8.6 - 10.3 mg/dL   POCT GLUCOSE   Result Value Ref Range    POCT Glucose 161 (H) 74 - 99 mg/dL   POCT GLUCOSE   Result Value Ref Range    POCT Glucose 183 (H) 74 - 99 mg/dL       Imaging Results  Transthoracic Echo (TTE) Complete   Final Result          Medications:  brimonidine, 1 drop, Both Eyes, BID  cefTRIAXone, 2 g, intravenous, q24h  ezetimibe, 10 mg, oral, Daily  heparin, 5,000 Units, subcutaneous, q8h  insulin lispro, 0-10 Units, subcutaneous, TID  lactobacillus acidophilus, 1 capsule, oral, Daily  latanoprost, 1 drop, Both Eyes, Nightly  methocarbamol, 750 mg, oral, q8h LUCA  pantoprazole, 40 mg, oral, Daily before breakfast  perflutren lipid microspheres, 0.5-10 mL of dilution, intravenous, Once in imaging  perflutren lipid microspheres, 10 mL of dilution, intravenous, Once in imaging  polyethylene glycol, 17 g, oral, Daily  primidone, 125 mg, oral, TID  [Held by provider] propranolol LA, 60 mg, oral, Daily  [Held by provider] tamsulosin, 0.4 mg, oral, Daily  traZODone, 25 mg, oral, Nightly       PRN medications: acetaminophen, acetaminophen, aminophylline, HYDROmorphone, melatonin, ondansetron ODT **OR** ondansetron, oxyCODONE, oxyCODONE, sennosides, vancomycin        Assessment/Plan          Principal Problem:    Surgical wound infection  Active Problems:    Surgical site infection    Surgical site infection   - Blood cultures positive for MRSA  - wound culture grew abundant mixed Gram positive and Gram negative bacteria.   - Continue vanc and Ceftriaxone through 11/12/24  - ID consult, appreciate their recs   - TTE without obvious vegetation      AMY  - Resolved      Distended bladder on admission 9/25  - urinating normally per pt.      DM2  - Corrective insulin      HTN  - hold ACE-I  - currently on no antihypertensives, BP low.     DVT Prophylaxis:  Heparin subq would continue this at the SNF      Disposition:  Medically ready for DC,  awaiting auth for SNF.     Jonathan Santos, [facility]

## 2024-10-03 NOTE — PROGRESS NOTES
Physical Therapy    Physical Therapy Treatment    Patient Name: Narda Malloy  MRN: [id number]  Department: [place] [address]  Room: [address] [place] [address]  Today's Date: 10/3/2024  Time Calculation  Start Time: 1445  Stop Time: 1510  Time Calculation (min): 25 min         Assessment/Plan   PT Assessment  End of Session Communication: Bedside nurse  Assessment Comment:  (pt demo fair tolerance to increased activity)  End of Session Patient Position: Bed, 3 rail up, Alarm on  PT Plan  Inpatient/Swing Bed or Outpatient: Inpatient  PT Plan  Treatment/Interventions: Bed mobility, Transfer training, Gait training, Therapeutic activity  PT Plan: Ongoing PT  PT Frequency: 5 times per week  PT Discharge Recommendations: Moderate intensity level of continued care  Equipment Recommended upon Discharge: Wheelchair, Wheeled walker  PT Recommended Transfer Status: Total assist  PT - OK to Discharge: Yes (per PT POC)      General Visit Information:   PT  Visit  PT Received On: 10/03/24  General  Reason for Referral: Patient is a 69 y.o. admitted from SNF and transferred to [facility] for postoperative spinal infection. S/p washout 9/28 and I&D with wound closure 9/30.  Family/Caregiver Present: Yes  Caregiver Feedback:  Godfrey present and supportive  Prior to Session Communication: Bedside nurse  Patient Position Received: Bed, 3 rail up, Alarm on  Preferred Learning Style: auditory, kinesthetic, verbal  General Comment:  (pt agreeable to tx.)    Subjective   Precautions:       Vital Signs (Past 2hrs)                 Objective   Pain:  Pain Assessment  Pain Assessment: 0-10  0-10 (Numeric) Pain Score: 4  Pain Type: Surgical pain  Pain Location: Back  Cognition:  Cognition  Overall Cognitive Status: Within Functional Limits  Coordination:     Postural Control:  Static Sitting Balance  Static Sitting-Balance Support: Bilateral upper extremity supported, Feet supported  Static Sitting-Level of Assistance: Contact guard  Dynamic Sitting Balance  Dynamic  Sitting-Balance Support: Bilateral upper extremity supported, Feet supported  Dynamic Sitting-Level of Assistance: Moderate assistance  Dynamic Sitting-Balance: Trunk control activities  Extremity/Trunk Assessments:    Activity Tolerance:     Treatments:            Bed Mobility  Bed Mobility: Yes  Bed Mobility 1  Bed Mobility 1: Scooting  Level of Assistance 1: Maximum assistance, Maximum verbal cues, Maximum tactile cues  Bed Mobility 2  Bed Mobility  2: Supine to sitting, Sitting to supine, Log roll  Level of Assistance 2: Maximum assistance  Bed Mobility Comments 2:  (pt req increased time and assistance to complete sequencing.)                 Outcome Measures:  [facility] Basic Mobility  Turning from your back to your side while in a flat bed without using bedrails: A lot  Moving from lying on your back to sitting on the side of a flat bed without using bedrails: A lot  Moving to and from bed to chair (including a wheelchair): A lot  Standing up from a chair using your arms (e.g. wheelchair or bedside chair): A lot  To walk in hospital room: Total  Climbing 3-5 steps with railing: Total  Basic Mobility - Total Score: 10    Education Documentation  Handouts, taught by Fabio Quinteros PTA at 10/3/2024  4:14 PM.  Learner: Patient  Readiness: Acceptance  Method: Explanation  Response: Needs Reinforcement    Precautions, taught by Fabio Quinteros PTA at 10/3/2024  4:14 PM.  Learner: Patient  Readiness: Acceptance  Method: Explanation  Response: Needs Reinforcement    Body Mechanics, taught by Fabio Quinteros PTA at 10/3/2024  4:14 PM.  Learner: Patient  Readiness: Acceptance  Method: Explanation  Response: Needs Reinforcement    Mobility Training, taught by Fabio Quinteros PTA at 10/3/2024  4:14 PM.  Learner: Patient  Readiness: Acceptance  Method: Explanation  Response: Needs Reinforcement    Education Comments  No comments found.        OP EDUCATION:       Encounter Problems       Encounter  Problems (Active)       Balance       STG - Maintains static sitting balance with upper extremity support (Progressing)       Start:  10/01/24    Expected End:  10/15/24       With min A x1 in midline >20 minutes with fair balance, maintaining spinal precautions          STG - Maintains dynamic sitting balance with upper extremity support (Progressing)       Start:  10/01/24    Expected End:  10/15/24       With mod A x1 and fair- balance >10 minutes, maintaining spinal precautions             PT Transfers       STG - Patient will perform bed mobility (Progressing)       Start:  10/01/24    Expected End:  10/15/24       With mod A x1 via logroll, maintaining spinal precautions          STG - Patient will transfer sit to and from stand (Progressing)       Start:  10/01/24    Expected End:  10/15/24       With max A x1 and LRAD, maintaining spinal precautions         Patient will perform 20 reps of A/AROM of BLE to strengthen for participation in skilled mobility with >+1/2 muscle grade improvement. (Progressing)       Start:  10/01/24    Expected End:  10/15/24               Pain - Adult

## 2024-10-04 ENCOUNTER — APPOINTMENT (OUTPATIENT)
Dept: ORTHOPEDIC SURGERY | Facility: CLINIC | Age: 69
End: 2024-10-04
Payer: MEDICARE

## 2024-10-04 VITALS
WEIGHT: 219.8 LBS | SYSTOLIC BLOOD PRESSURE: 121 MMHG | RESPIRATION RATE: 16 BRPM | DIASTOLIC BLOOD PRESSURE: 55 MMHG | BODY MASS INDEX: 32.56 KG/M2 | TEMPERATURE: 98.4 F | HEART RATE: 80 BPM | HEIGHT: 69 IN | OXYGEN SATURATION: 99 %

## 2024-10-04 DIAGNOSIS — M41.9 SCOLIOSIS OF LUMBAR SPINE, UNSPECIFIED SCOLIOSIS TYPE: Primary | ICD-10-CM

## 2024-10-04 LAB
ANION GAP SERPL CALC-SCNC: 8 MMOL/L (ref 10–20)
BUN SERPL-MCNC: 36 MG/DL (ref 6–23)
CALCIUM SERPL-MCNC: 7.4 MG/DL (ref 8.6–10.3)
CHLORIDE SERPL-SCNC: 108 MMOL/L (ref 98–107)
CO2 SERPL-SCNC: 23 MMOL/L (ref 21–32)
CREAT SERPL-MCNC: 0.66 MG/DL (ref 0.5–1.05)
EGFRCR SERPLBLD CKD-EPI 2021: >90 ML/MIN/1.73M*2
GLUCOSE BLD MANUAL STRIP-MCNC: 186 MG/DL (ref 74–99)
GLUCOSE BLD MANUAL STRIP-MCNC: 190 MG/DL (ref 74–99)
GLUCOSE SERPL-MCNC: 169 MG/DL (ref 74–99)
HOLD SPECIMEN: NORMAL
POTASSIUM SERPL-SCNC: 5.1 MMOL/L (ref 3.5–5.3)
SODIUM SERPL-SCNC: 134 MMOL/L (ref 136–145)
VANCOMYCIN SERPL-MCNC: 20.3 UG/ML (ref 5–20)

## 2024-10-04 PROCEDURE — 80202 ASSAY OF VANCOMYCIN: CPT

## 2024-10-04 PROCEDURE — 2500000004 HC RX 250 GENERAL PHARMACY W/ HCPCS (ALT 636 FOR OP/ED)

## 2024-10-04 PROCEDURE — 80048 BASIC METABOLIC PNL TOTAL CA: CPT | Performed by: INTERNAL MEDICINE

## 2024-10-04 PROCEDURE — 2500000004 HC RX 250 GENERAL PHARMACY W/ HCPCS (ALT 636 FOR OP/ED): Performed by: INTERNAL MEDICINE

## 2024-10-04 PROCEDURE — 82947 ASSAY GLUCOSE BLOOD QUANT: CPT

## 2024-10-04 PROCEDURE — 2500000002 HC RX 250 W HCPCS SELF ADMINISTERED DRUGS (ALT 637 FOR MEDICARE OP, ALT 636 FOR OP/ED): Performed by: INTERNAL MEDICINE

## 2024-10-04 PROCEDURE — 2500000001 HC RX 250 WO HCPCS SELF ADMINISTERED DRUGS (ALT 637 FOR MEDICARE OP): Performed by: INTERNAL MEDICINE

## 2024-10-04 PROCEDURE — 97530 THERAPEUTIC ACTIVITIES: CPT | Mod: GP,CQ | Performed by: PHYSICAL THERAPY ASSISTANT

## 2024-10-04 PROCEDURE — 99239 HOSP IP/OBS DSCHRG MGMT >30: CPT | Performed by: INTERNAL MEDICINE

## 2024-10-04 ASSESSMENT — COGNITIVE AND FUNCTIONAL STATUS - GENERAL
TURNING FROM BACK TO SIDE WHILE IN FLAT BAD: A LOT
DRESSING REGULAR LOWER BODY CLOTHING: A LOT
WALKING IN HOSPITAL ROOM: TOTAL
TURNING FROM BACK TO SIDE WHILE IN FLAT BAD: A LOT
STANDING UP FROM CHAIR USING ARMS: TOTAL
MOVING FROM LYING ON BACK TO SITTING ON SIDE OF FLAT BED WITH BEDRAILS: A LOT
DAILY ACTIVITIY SCORE: 13
CLIMB 3 TO 5 STEPS WITH RAILING: TOTAL
CLIMB 3 TO 5 STEPS WITH RAILING: TOTAL
EATING MEALS: A LITTLE
MOVING TO AND FROM BED TO CHAIR: A LOT
STANDING UP FROM CHAIR USING ARMS: TOTAL
HELP NEEDED FOR BATHING: A LOT
PERSONAL GROOMING: A LITTLE
MOVING FROM LYING ON BACK TO SITTING ON SIDE OF FLAT BED WITH BEDRAILS: A LOT
WALKING IN HOSPITAL ROOM: TOTAL
TOILETING: TOTAL
DRESSING REGULAR UPPER BODY CLOTHING: A LOT
MOVING TO AND FROM BED TO CHAIR: TOTAL
MOBILITY SCORE: 8
MOBILITY SCORE: 9

## 2024-10-04 ASSESSMENT — PAIN SCALES - GENERAL
PAINLEVEL_OUTOF10: 0 - NO PAIN
PAINLEVEL_OUTOF10: 6
PAINLEVEL_OUTOF10: 6

## 2024-10-04 ASSESSMENT — PAIN DESCRIPTION - DESCRIPTORS: DESCRIPTORS: DISCOMFORT;ACHING

## 2024-10-04 ASSESSMENT — PAIN - FUNCTIONAL ASSESSMENT
PAIN_FUNCTIONAL_ASSESSMENT: 0-10

## 2024-10-04 NOTE — CARE PLAN
Problem: Pain - Adult  Goal: Verbalizes/displays adequate comfort level or baseline comfort level  Outcome: Progressing     Problem: Safety - Adult  Goal: Free from fall injury  Outcome: Progressing     Problem: Discharge Planning  Goal: Discharge to home or other facility with appropriate resources  Outcome: Progressing     Problem: Chronic Conditions and Co-morbidities  Goal: Patient's chronic conditions and co-morbidity symptoms are monitored and maintained or improved  Outcome: Progressing     Problem: Diabetes  Goal: Achieve decreasing blood glucose levels by end of shift  Outcome: Progressing  Goal: Increase stability of blood glucose readings by end of shift  Outcome: Progressing  Goal: Decrease in ketones present in urine by end of shift  Outcome: Progressing  Goal: Maintain electrolyte levels within acceptable range throughout shift  Outcome: Progressing  Goal: Maintain glucose levels >70mg/dl to <250mg/dl throughout shift  Outcome: Progressing  Goal: No changes in neurological exam by end of shift  Outcome: Progressing  Goal: Learn about and adhere to nutrition recommendations by end of shift  Outcome: Progressing  Goal: Vital signs within normal range for age by end of shift  Outcome: Progressing  Goal: Increase self care and/or family involovement by end of shift  Outcome: Progressing  Goal: Receive DSME education by end of shift  Outcome: Progressing     Problem: Skin  Goal: Decreased wound size/increased tissue granulation at next dressing change  Outcome: Progressing  Goal: Participates in plan/prevention/treatment measures  Outcome: Progressing  Goal: Prevent/manage excess moisture  Outcome: Progressing  Goal: Prevent/minimize sheer/friction injuries  Outcome: Progressing  Goal: Promote/optimize nutrition  Outcome: Progressing  Goal: Promote skin healing  Outcome: Progressing     Problem: Fall/Injury  Goal: Not fall by end of shift  Outcome: Progressing  Goal: Be free from injury by end of the  shift  Outcome: Progressing  Goal: Verbalize understanding of personal risk factors for fall in the hospital  Outcome: Progressing  Goal: Verbalize understanding of risk factor reduction measures to prevent injury from fall in the home  Outcome: Progressing  Goal: Use assistive devices by end of the shift  Outcome: Progressing  Goal: Pace activities to prevent fatigue by end of the shift  Outcome: Progressing   The patient's goals for the shift include: patient will remain comfortable during shift.       The clinical goals for the shift include pt will remain safe and comfortable throughout the shift

## 2024-10-04 NOTE — PROGRESS NOTES
Vancomycin Dosing by Pharmacy- FOLLOW UP    Narda Malloy is a 69 y.o. year old female who Pharmacy has been consulted for vancomycin dosing for line infections. Based on the patient's indication and renal status this patient is being dosed based on a goal AUC of 400-600.     Renal function is currently stable.    Current vancomycin dose: 1000 mg given every 24 hours    Estimated vancomycin AUC on current dose: 501 mg/L.hr     Visit Vitals  /61 (BP Location: Right arm, Patient Position: Lying)   Pulse 80   Temp 36.7 °C (98 °F) (Temporal)   Resp 20        Lab Results   Component Value Date    CREATININE 0.66 10/04/2024    CREATININE 0.71 10/03/2024    CREATININE 0.89 10/02/2024    CREATININE 0.99 10/01/2024        Patient weight is as follows:   Vitals:    10/01/24 0308   Weight: 99.7 kg (219 lb 12.8 oz)       Cultures:  Susceptibility data for the encounter in last 14 days.  Collected Specimen Info Organism Ampicillin Cefazolin Ciprofloxacin Clindamycin Erythromycin Gentamicin Levofloxacin Oxacillin Piperacillin/Tazobactam Tetracycline Trimethoprim/Sulfamethoxazole Vancomycin   24 Swab from ABSCESS Proteus mirabilis  S  S  S    S  S   S  R  S      Methicillin Resistant Staphylococcus aureus (MRSA)     S  R    R   S  R  S        I/O last 3 completed shifts:  In: 480 (4.8 mL/kg) [P.O.:480]  Out: 2245 (22.5 mL/kg) [Urine:2200 (0.6 mL/kg/hr); Drains:45]  Weight: 99.7 kg   I/O during current shift:  No intake/output data recorded.    Temp (24hrs), Av.7 °C (98 °F), Min:36.4 °C (97.5 °F), Max:36.9 °C (98.5 °F)      Assessment/Plan    Within goal AUC range. Continue current vancomycin regimen.    This dosing regimen is predicted by [employer] to result in the following pharmacokinetic parameters:  Exposure target: AUC24 (range)400-600 mg/L.hr   [id number]: 484 mg/L.hr  AUC24,ss: 501 mg/L.hr  Probability of AUC24 > 400: 100 %  Ctrough,ss: 17.6 mg/L  Probability of Ctrough,ss > 20: 10 %  The next level will be  obtained on 10/6 at 0500. May be obtained sooner if clinically indicated.   Will continue to monitor renal function daily while on vancomycin and order serum creatinine at least every 48 hours if not already ordered.  Follow for continued vancomycin needs, clinical response, and signs/symptoms of toxicity.       Dennise Orourke, PharmD

## 2024-10-04 NOTE — PROGRESS NOTES
Physical Therapy    Physical Therapy Treatment    Patient Name: Narda Malloy  MRN: [id number]  Department: [place] [address]  Room: [address] [place] [address]  Today's Date: 10/4/2024  Time Calculation  Start Time: 1142  Stop Time: 1157  Time Calculation (min): 15 min         Assessment/Plan   PT Assessment  End of Session Communication: Bedside nurse  Assessment Comment:  (pt demo fair - rené to activity,seated at EOB approx 10 min)  End of Session Patient Position: Bed, 3 rail up, Alarm on  PT Plan  Inpatient/Swing Bed or Outpatient: Inpatient  PT Plan  Treatment/Interventions: Bed mobility, Transfer training, Therapeutic activity  PT Plan: Ongoing PT  PT Frequency: 5 times per week  PT Discharge Recommendations: Moderate intensity level of continued care  Equipment Recommended upon Discharge: Wheelchair, Wheeled walker  PT Recommended Transfer Status: Total assist  PT - OK to Discharge: Yes (per PT POC)      General Visit Information:   PT  Visit  PT Received On: 10/04/24  General  Reason for Referral: Patient is a 69 y.o. admitted from SNF and transferred to [facility] for postoperative spinal infection. S/p washout 9/28 and I&D with wound closure 9/30.  Family/Caregiver Present: Yes  Caregiver Feedback:  Godfrey present and supportive  Prior to Session Communication: Bedside nurse  Patient Position Received: Bed, 3 rail up, Alarm on  Preferred Learning Style: auditory, kinesthetic, verbal  General Comment:  (pt agreeable to tx.)    Subjective   Precautions:       Vital Signs (Past 2hrs)                 Objective   Pain:  Pain Assessment  Pain Assessment: 0-10  0-10 (Numeric) Pain Score: 6  Pain Type: Surgical pain  Pain Location: Back  Cognition:  Cognition  Overall Cognitive Status: Within Functional Limits  Orientation Level: Oriented X4  Coordination:     Postural Control:  Static Sitting Balance  Static Sitting-Balance Support: Bilateral upper extremity supported, Feet supported  Static Sitting-Level of Assistance: Contact  guard  Dynamic Sitting Balance  Dynamic Sitting-Balance Support: Bilateral upper extremity supported, Feet supported  Dynamic Sitting-Level of Assistance: Moderate assistance  Dynamic Sitting-Balance: Trunk control activities  Extremity/Trunk Assessments:    Activity Tolerance:     Treatments:            Bed Mobility  Bed Mobility: Yes  Bed Mobility 1  Bed Mobility 1: Supine to sitting, Sitting to supine, Log roll  Level of Assistance 1: Maximum assistance, Moderate verbal cues, Moderate tactile cues  Bed Mobility Comments 1:  (pt able to achieve full upright sitting at EOB, min to CGA for balance but req increased assistance to complete mobility. fair - rené seated at EOB)  Bed Mobility 2  Bed Mobility  2: Scooting  Level of Assistance 2: Dependent, +2 (to HOB via transfer sheet)  Bed Mobility Comments 2:  (pt req increased time and assistance to complete sequencing.)                 Outcome Measures:  [facility] Basic Mobility  Turning from your back to your side while in a flat bed without using bedrails: A lot  Moving from lying on your back to sitting on the side of a flat bed without using bedrails: A lot  Moving to and from bed to chair (including a wheelchair): A lot  Standing up from a chair using your arms (e.g. wheelchair or bedside chair): Total  To walk in hospital room: Total  Climbing 3-5 steps with railing: Total  Basic Mobility - Total Score: 9    Education Documentation  Handouts, taught by Fabio Quinteros PTA at 10/4/2024  1:20 PM.  Learner: Patient  Readiness: Acceptance  Method: Explanation  Response: Needs Reinforcement    Precautions, taught by Fabio Quinteros PTA at 10/4/2024  1:20 PM.  Learner: Patient  Readiness: Acceptance  Method: Explanation  Response: Needs Reinforcement    Body Mechanics, taught by Fabio Quinteros PTA at 10/4/2024  1:20 PM.  Learner: Patient  Readiness: Acceptance  Method: Explanation  Response: Needs Reinforcement    Mobility Training, taught by Fabio ROSALES  Aldair, MAXINE at 10/4/2024  1:20 PM.  Learner: Patient  Readiness: Acceptance  Method: Explanation  Response: Needs Reinforcement    Education Comments  No comments found.        OP EDUCATION:       Encounter Problems       Encounter Problems (Active)       Balance       STG - Maintains static sitting balance with upper extremity support (Progressing)       Start:  10/01/24    Expected End:  10/15/24       With min A x1 in midline >20 minutes with fair balance, maintaining spinal precautions          STG - Maintains dynamic sitting balance with upper extremity support (Progressing)       Start:  10/01/24    Expected End:  10/15/24       With mod A x1 and fair- balance >10 minutes, maintaining spinal precautions             PT Transfers       STG - Patient will perform bed mobility (Progressing)       Start:  10/01/24    Expected End:  10/15/24       With mod A x1 via logroll, maintaining spinal precautions          STG - Patient will transfer sit to and from stand (Progressing)       Start:  10/01/24    Expected End:  10/15/24       With max A x1 and LRAD, maintaining spinal precautions         Patient will perform 20 reps of A/AROM of BLE to strengthen for participation in skilled mobility with >+1/2 muscle grade improvement. (Progressing)       Start:  10/01/24    Expected End:  10/15/24               Pain - Adult

## 2024-10-04 NOTE — DISCHARGE SUMMARY
Discharge Diagnosis  Surgical wound infection    Issues Requiring Follow-Up  Ortho spine follow up   PCP follow up   ID follow up     Once weekly labs CBC/diff, CMP, Vanc trough ESR, CRP fax to Dr. Juarez [phone].     Discharge Meds     Your medication list        START taking these medications        Instructions Last Dose Given Next Dose Due   cefTRIAXone 2 gram/50 mL IV  Commonly known as: Rocephin      Infuse 50 mL (2 g) at 100 mL/hr over 30 minutes into a venous catheter once every 24 hours. Once weekly labs CBC/diff, CMP, Vanc trough ESR, CRP fax to Dr. Juarez [phone]. Stop date 11/12/24.       vancomycin 1 gram/250 mL solution  Commonly known as: Vancocin      Infuse 250 mL (1 g) at 250 mL/hr over 60 minutes into a venous catheter every 12 hours. Once weekly labs CBC/diff, CMP, Vanc trough ESR, CRP fax to Dr. Juarez [phone]. Stop date 11/12/24.              CONTINUE taking these medications        Instructions Last Dose Given Next Dose Due   acetaminophen 500 mg tablet  Commonly known as: Tylenol           brimonidine 0.2 % ophthalmic solution  Commonly known as: AlphaGAN           calcium carbonate-vitamin D3 500 mg-5 mcg (200 unit) tablet           dextrose 50 % injection      Infuse 25 mL (12.5 g) into a venous catheter every 15 minutes if needed (For blood glucose 41 to 70 mg/dL).       dextrose 50 % injection      Infuse 50 mL (25 g) into a venous catheter every 15 minutes if needed (For blood glucose less than or equal to 40 mg/dL).       docusate sodium 100 mg capsule  Commonly known as: Colace           ezetimibe 10 mg tablet  Commonly known as: Zetia      Take 1 tablet (10 mg) by mouth once daily.       FreeStyle Lite Strips strip  Generic drug: blood sugar diagnostic      USE TO TEST 3 TIMES A DAY AS DIRECTED       glucagon 1 mg injection  Commonly known as: Glucagen      Inject 1 mg into the muscle every 15 minutes if needed for low blood sugar - see comments (Hypoglycemia).        glucagon 1 mg injection  Commonly known as: Glucagen      Inject 1 mg into the muscle every 15 minutes if needed for low blood sugar - see comments (Hypoglycemia).       heparin (porcine) 5,000 unit/mL injection      Inject 1 mL (5,000 Units) under the skin every 8 hours.       insulin lispro 100 unit/mL injection  Commonly known as: HumaLOG      Inject 0-15 Units under the skin 3 times daily (morning, midday, late afternoon). Take as directed per insulin instructions.Do not hold when patient is not eating, continue order as scheduled for hyperglycemia management.  Insulin Lispro Corrective Scale #3     Hypoglycemia protocol Call LIP unit(s) if Blood Glucose is between 0 - 70 mg/dL     0 unit(s) if Blood glucose is between    3 unit(s) if Blood glucose is between 151-200   6 unit(s) if Blood glucose is between 201-250   9 unit(s) if Blood glucose is between 251-300   12 unit(s) if Blood glucose is between 301-350   15 unit(s) if Blood glucose is between 351-400    Notify provider unit(s) if Blood Glucose is greater than 400 mg/dL       Lactobacillus acidophilus 100 mg (1 billion cell) capsule           latanoprost 0.005 % ophthalmic solution  Commonly known as: Xalatan      Administer 1 drop into both eyes once daily at bedtime.       melatonin 5 mg tablet           methocarbamol 500 mg tablet  Commonly known as: Robaxin           multivitamin tablet           ondansetron 4 mg/2 mL injection  Commonly known as: Zofran      Infuse 2 mL (4 mg) into a venous catheter every 6 hours if needed for nausea or vomiting.       oxyCODONE 5 mg immediate release tablet  Commonly known as: Roxicodone           oxygen gas therapy  Commonly known as: O2      Inhale 1 each continuously.       pantoprazole 40 mg EC tablet  Commonly known as: ProtoNix      Take 1 tablet (40 mg) by mouth once daily in the morning. Take before meals. Do not crush, chew, or split.       pantoprazole 40 mg injection  Commonly known as: ProtoNix    "   Infuse 40 mg into a venous catheter once daily in the morning. Take before meals. If unable to take PO.       PEN NEEDLE [facility]           Sure Comfort Pen Needle 32 gauge x 5/32\" needle  Generic drug: pen needle, diabetic      AS DIRECTED DAILY FOR 90 DAYS       polyethylene glycol 17 gram packet  Commonly known as: Glycolax, Miralax      Take 17 g by mouth once daily.       primidone 125 mg tablet      Take 125 mg by mouth 3 times a day.       propranolol LA 60 mg 24 hr capsule  Commonly known as: Inderal LA      Take 1 capsule (60 mg) by mouth early in the morning.. Hold for SBP < 110 mmhg, HR < 60 bpm.       sennosides 8.6 mg tablet  Commonly known as: Senokot           traZODone 25 MG split tablet  Commonly known as: Desyrel           Vitamin C 500 mg tablet  Generic drug: ascorbic acid                  STOP taking these medications      ceFAZolin IVPB  Commonly known as: Ancef        vancomycin IVPB  Commonly known as: Xellia                  Where to Get Your Medications        You can get these medications from any pharmacy    Bring a paper prescription for each of these medications  cefTRIAXone 2 gram/50 mL IV  vancomycin 1 gram/250 mL solution         Test Results Pending At Discharge  Pending Labs       No current pending labs.            Procedures  Procedure(s):  Lumbar Spine I & D     Hospital Course   Narda was admitted to hospital with a surgical site infection.  She was evaluated by the orthospine team and taken to the OR for I&D of these area x 2.  She tolerated procedure well.  Cultures did MRSA as well as mixed gram-positive's and gram-negative bacilli.  She was valuated by the infectious disease team and will need to finish an extended course of IV vancomycin as well as ceftriaxone.  She does have a PICC line.  She will be going to a skilled nursing facility for some rehab as well as IV antibiotics.  At this time she is otherwise hemodynamically stable and appropriate for discharge.  She will be " "going to the skilled nursing facility.  This plan is discussed with her  and herself.  They are both in agreement.  All questions were answered.    Blood pressure 121/55, pulse 80, temperature 36.9 °C (98.4 °F), temperature source Temporal, resp. rate 16, height 1.753 m (5' 9.02\"), weight 99.7 kg (219 lb 12.8 oz), SpO2 99%.  Pertinent Physical Exam At Time of Discharge  Physical Exam  Constitutional:       Appearance: She is obese.   Cardiovascular:      Rate and Rhythm: Normal rate and regular rhythm.   Pulmonary:      Effort: Pulmonary effort is normal.      Breath sounds: Normal breath sounds.   Abdominal:      General: There is no distension.      Palpations: Abdomen is soft.   Genitourinary:     Comments:   Gibson  Neurological:      General: No focal deficit present.      Mental Status: She is alert and oriented to person, place, and time.         Outpatient Follow-Up  Future Appointments   Date Time Provider Department Center   11/22/2024  8:45 AM Luiz Fisher MD [id number] [facility]   12/3/2024 11:00 AM Jonathan Burrell MD [id number] [facility]   12/4/2024  1:00 PM Barbara Samuels DO [id number] [facility]   1/17/2025 10:30 AM Jose Juan MD [id number] [facility]         Jonathan Santos DO FACP     Time spent during this discharge: >30 min        "

## 2024-10-09 ENCOUNTER — DOCUMENTATION (OUTPATIENT)
Dept: ORTHOPEDIC SURGERY | Facility: CLINIC | Age: 69
End: 2024-10-09
Payer: MEDICARE

## 2024-10-09 NOTE — PROGRESS NOTES
Nursing facility called requesting instructions for postop dressing changes. Instructed to change daily with ABD application and paper tape. Cleanse wound daily with warm soapy water, pat dry, do not scrub.     Advised to let us know if any drainage or issues with the wound.     Follow up with ID as instructed. Follow up in office with Dr. Ventura/PA 3 weeks postop for wound check and imaging.

## 2024-10-12 ENCOUNTER — APPOINTMENT (OUTPATIENT)
Dept: RADIOLOGY | Facility: HOSPITAL | Age: 69
End: 2024-10-12
Payer: MEDICARE

## 2024-10-12 ENCOUNTER — APPOINTMENT (OUTPATIENT)
Dept: CARDIOLOGY | Facility: HOSPITAL | Age: 69
End: 2024-10-12
Payer: MEDICARE

## 2024-10-12 ENCOUNTER — HOSPITAL ENCOUNTER (INPATIENT)
Facility: HOSPITAL | Age: 69
End: 2024-10-12
Attending: EMERGENCY MEDICINE | Admitting: INTERNAL MEDICINE
Payer: MEDICARE

## 2024-10-12 DIAGNOSIS — I95.9 HYPOTENSION, UNSPECIFIED HYPOTENSION TYPE: ICD-10-CM

## 2024-10-12 DIAGNOSIS — M48.062 PSEUDOCLAUDICATION SYNDROME: ICD-10-CM

## 2024-10-12 DIAGNOSIS — T81.49XA SURGICAL SITE INFECTION: ICD-10-CM

## 2024-10-12 DIAGNOSIS — J96.02 ACUTE RESPIRATORY FAILURE WITH HYPOXIA AND HYPERCAPNIA (MULTI): ICD-10-CM

## 2024-10-12 DIAGNOSIS — M79.89 PAIN AND SWELLING OF UPPER EXTREMITY, LEFT: ICD-10-CM

## 2024-10-12 DIAGNOSIS — T81.40XA POSTOPERATIVE INFECTION, UNSPECIFIED TYPE, INITIAL ENCOUNTER: ICD-10-CM

## 2024-10-12 DIAGNOSIS — M79.89 SWELLING OF UPPER ARM: ICD-10-CM

## 2024-10-12 DIAGNOSIS — R60.1 ANASARCA: ICD-10-CM

## 2024-10-12 DIAGNOSIS — R41.89 UNRESPONSIVE: Primary | ICD-10-CM

## 2024-10-12 DIAGNOSIS — R53.83 LETHARGY: ICD-10-CM

## 2024-10-12 DIAGNOSIS — I79.8 OTHER DISORDERS OF ARTERIES, ARTERIOLES AND CAPILLARIES IN DISEASES CLASSIFIED ELSEWHERE: ICD-10-CM

## 2024-10-12 DIAGNOSIS — J96.01 ACUTE RESPIRATORY FAILURE WITH HYPOXIA AND HYPERCAPNIA (MULTI): ICD-10-CM

## 2024-10-12 DIAGNOSIS — I82.621 ACUTE DEEP VEIN THROMBOSIS (DVT) OF OTHER VEIN OF RIGHT UPPER EXTREMITY: ICD-10-CM

## 2024-10-12 DIAGNOSIS — S37.009A INJURY OF KIDNEY, UNSPECIFIED LATERALITY, INITIAL ENCOUNTER: ICD-10-CM

## 2024-10-12 DIAGNOSIS — M79.602 PAIN AND SWELLING OF UPPER EXTREMITY, LEFT: ICD-10-CM

## 2024-10-12 DIAGNOSIS — I82.C29 CHRONIC DEEP VEIN THROMBOSIS (DVT) OF INTERNAL JUGULAR VEIN (MULTI): ICD-10-CM

## 2024-10-12 DIAGNOSIS — E46 PROTEIN-CALORIE MALNUTRITION, UNSPECIFIED SEVERITY (MULTI): ICD-10-CM

## 2024-10-12 DIAGNOSIS — D64.9 ANEMIA, UNSPECIFIED TYPE: ICD-10-CM

## 2024-10-12 LAB
ALBUMIN SERPL BCP-MCNC: 1.9 G/DL (ref 3.4–5)
ALBUMIN SERPL BCP-MCNC: 2 G/DL (ref 3.4–5)
ALP SERPL-CCNC: 230 U/L (ref 33–136)
ALT SERPL W P-5'-P-CCNC: 8 U/L (ref 7–45)
AMPHETAMINES UR QL SCN: ABNORMAL
ANION GAP BLDA CALCULATED.4IONS-SCNC: 11 MMO/L (ref 10–25)
ANION GAP SERPL CALCULATED.3IONS-SCNC: 10 MMOL/L (ref 10–20)
ANION GAP SERPL CALCULATED.3IONS-SCNC: 13 MMOL/L (ref 10–20)
APPEARANCE UR: ABNORMAL
ARTERIAL PATENCY WRIST A: POSITIVE
AST SERPL W P-5'-P-CCNC: 9 U/L (ref 9–39)
BACTERIA #/AREA URNS AUTO: ABNORMAL /HPF
BARBITURATES UR QL SCN: ABNORMAL
BASE EXCESS BLDA CALC-SCNC: -7.7 MMOL/L (ref -2–3)
BASOPHILS # BLD AUTO: 0.02 X10*3/UL (ref 0–0.1)
BASOPHILS NFR BLD AUTO: 0.3 %
BENZODIAZ UR QL SCN: ABNORMAL
BILIRUB SERPL-MCNC: 0.2 MG/DL (ref 0–1.2)
BILIRUB UR STRIP.AUTO-MCNC: NEGATIVE MG/DL
BODY TEMPERATURE: 37 DEGREES CELSIUS
BUN SERPL-MCNC: 41 MG/DL (ref 6–23)
BUN SERPL-MCNC: 42 MG/DL (ref 6–23)
BZE UR QL SCN: ABNORMAL
CA-I BLDA-SCNC: 1.2 MMOL/L (ref 1.1–1.33)
CALCIUM SERPL-MCNC: 7.5 MG/DL (ref 8.6–10.3)
CALCIUM SERPL-MCNC: 8.1 MG/DL (ref 8.6–10.3)
CANNABINOIDS UR QL SCN: ABNORMAL
CARDIAC TROPONIN I PNL SERPL HS: 7 NG/L (ref 0–13)
CHLORIDE BLDA-SCNC: 113 MMOL/L (ref 98–107)
CHLORIDE SERPL-SCNC: 110 MMOL/L (ref 98–107)
CHLORIDE SERPL-SCNC: 112 MMOL/L (ref 98–107)
CO2 SERPL-SCNC: 18 MMOL/L (ref 21–32)
CO2 SERPL-SCNC: 24 MMOL/L (ref 21–32)
COLOR UR: YELLOW
CREAT SERPL-MCNC: 1.34 MG/DL (ref 0.5–1.05)
CREAT SERPL-MCNC: 1.46 MG/DL (ref 0.5–1.05)
EGFRCR SERPLBLD CKD-EPI 2021: 39 ML/MIN/1.73M*2
EGFRCR SERPLBLD CKD-EPI 2021: 43 ML/MIN/1.73M*2
EOSINOPHIL # BLD AUTO: 0.01 X10*3/UL (ref 0–0.7)
EOSINOPHIL NFR BLD AUTO: 0.1 %
ERYTHROCYTE [DISTWIDTH] IN BLOOD BY AUTOMATED COUNT: 17.2 % (ref 11.5–14.5)
ETHANOL SERPL-MCNC: <10 MG/DL
FENTANYL+NORFENTANYL UR QL SCN: ABNORMAL
GLUCOSE BLD MANUAL STRIP-MCNC: 160 MG/DL (ref 74–99)
GLUCOSE BLD MANUAL STRIP-MCNC: 160 MG/DL (ref 74–99)
GLUCOSE BLDA-MCNC: 167 MG/DL (ref 74–99)
GLUCOSE SERPL-MCNC: 159 MG/DL (ref 74–99)
GLUCOSE SERPL-MCNC: 174 MG/DL (ref 74–99)
GLUCOSE SERPL-MCNC: 184 MG/DL (ref 74–99)
GLUCOSE UR STRIP.AUTO-MCNC: NORMAL MG/DL
HCO3 BLDA-SCNC: 18.3 MMOL/L (ref 22–26)
HCT VFR BLD AUTO: 33.1 % (ref 36–46)
HCT VFR BLD EST: 28 % (ref 36–46)
HGB BLD-MCNC: 10.4 G/DL (ref 12–16)
HGB BLDA-MCNC: 9.2 G/DL (ref 12–16)
HYALINE CASTS #/AREA URNS AUTO: ABNORMAL /LPF
IMM GRANULOCYTES # BLD AUTO: 0.09 X10*3/UL (ref 0–0.7)
IMM GRANULOCYTES NFR BLD AUTO: 1.3 % (ref 0–0.9)
INHALED O2 CONCENTRATION: 50 %
KETONES UR STRIP.AUTO-MCNC: NEGATIVE MG/DL
LACTATE BLDA-SCNC: 1 MMOL/L (ref 0.4–2)
LACTATE SERPL-SCNC: 0.7 MMOL/L (ref 0.4–2)
LEUKOCYTE ESTERASE UR QL STRIP.AUTO: ABNORMAL
LYMPHOCYTES # BLD AUTO: 0.83 X10*3/UL (ref 1.2–4.8)
LYMPHOCYTES NFR BLD AUTO: 11.8 %
MCH RBC QN AUTO: 30.7 PG (ref 26–34)
MCHC RBC AUTO-ENTMCNC: 31.4 G/DL (ref 32–36)
MCV RBC AUTO: 98 FL (ref 80–100)
METHADONE UR QL SCN: ABNORMAL
MONOCYTES # BLD AUTO: 0.23 X10*3/UL (ref 0.1–1)
MONOCYTES NFR BLD AUTO: 3.3 %
MUCOUS THREADS #/AREA URNS AUTO: ABNORMAL /LPF
NEUTROPHILS # BLD AUTO: 5.85 X10*3/UL (ref 1.2–7.7)
NEUTROPHILS NFR BLD AUTO: 83.2 %
NITRITE UR QL STRIP.AUTO: NEGATIVE
NRBC BLD-RTO: 0 /100 WBCS (ref 0–0)
OPIATES UR QL SCN: ABNORMAL
OXYCODONE+OXYMORPHONE UR QL SCN: ABNORMAL
OXYHGB MFR BLDA: 96 % (ref 94–98)
PCO2 BLDA: 38 MM HG (ref 38–42)
PCP UR QL SCN: ABNORMAL
PEEP CMH2O: 5 CM H2O
PH BLDA: 7.29 PH (ref 7.38–7.42)
PH UR STRIP.AUTO: 5 [PH]
PHOSPHATE SERPL-MCNC: 5.5 MG/DL (ref 2.5–4.9)
PLATELET # BLD AUTO: 434 X10*3/UL (ref 150–450)
PO2 BLDA: 88 MM HG (ref 85–95)
POTASSIUM BLDA-SCNC: 5.4 MMOL/L (ref 3.5–5.3)
POTASSIUM SERPL-SCNC: 4.6 MMOL/L (ref 3.5–5.3)
POTASSIUM SERPL-SCNC: 5.5 MMOL/L (ref 3.5–5.3)
POTASSIUM SERPL-SCNC: 6 MMOL/L (ref 3.5–5.3)
PROT SERPL-MCNC: 5.7 G/DL (ref 6.4–8.2)
PROT UR STRIP.AUTO-MCNC: ABNORMAL MG/DL
RBC # BLD AUTO: 3.39 X10*6/UL (ref 4–5.2)
RBC # UR STRIP.AUTO: ABNORMAL /UL
RBC #/AREA URNS AUTO: >20 /HPF
SAO2 % BLDA: 99 % (ref 94–100)
SARS-COV-2 RNA RESP QL NAA+PROBE: NOT DETECTED
SODIUM BLDA-SCNC: 137 MMOL/L (ref 136–145)
SODIUM SERPL-SCNC: 137 MMOL/L (ref 136–145)
SODIUM SERPL-SCNC: 138 MMOL/L (ref 136–145)
SP GR UR STRIP.AUTO: 1.02
SPECIMEN DRAWN FROM PATIENT: ABNORMAL
SQUAMOUS #/AREA URNS AUTO: ABNORMAL /HPF
T4 FREE SERPL-MCNC: 0.64 NG/DL (ref 0.61–1.12)
TIDAL VOLUME: 450 ML
TSH SERPL-ACNC: 4.78 MIU/L (ref 0.44–3.98)
UROBILINOGEN UR STRIP.AUTO-MCNC: NORMAL MG/DL
VANCOMYCIN SERPL-MCNC: 49.7 UG/ML (ref 5–20)
VANCOMYCIN SERPL-MCNC: 53.9 UG/ML (ref 5–20)
VENTILATOR MODE: ABNORMAL
VENTILATOR RATE: 18 BPM
WBC # BLD AUTO: 7 X10*3/UL (ref 4.4–11.3)
WBC #/AREA URNS AUTO: >50 /HPF
WBC CLUMPS #/AREA URNS AUTO: ABNORMAL /HPF
YEAST BUDDING #/AREA UR COMP ASSIST: PRESENT /HPF

## 2024-10-12 PROCEDURE — 71045 X-RAY EXAM CHEST 1 VIEW: CPT

## 2024-10-12 PROCEDURE — 84132 ASSAY OF SERUM POTASSIUM: CPT | Performed by: PHYSICIAN ASSISTANT

## 2024-10-12 PROCEDURE — 87086 URINE CULTURE/COLONY COUNT: CPT | Mod: WESLAB

## 2024-10-12 PROCEDURE — 84075 ASSAY ALKALINE PHOSPHATASE: CPT

## 2024-10-12 PROCEDURE — 2500000001 HC RX 250 WO HCPCS SELF ADMINISTERED DRUGS (ALT 637 FOR MEDICARE OP)

## 2024-10-12 PROCEDURE — 99291 CRITICAL CARE FIRST HOUR: CPT

## 2024-10-12 PROCEDURE — 84439 ASSAY OF FREE THYROXINE: CPT | Performed by: EMERGENCY MEDICINE

## 2024-10-12 PROCEDURE — 2500000005 HC RX 250 GENERAL PHARMACY W/O HCPCS: Performed by: EMERGENCY MEDICINE

## 2024-10-12 PROCEDURE — 99291 CRITICAL CARE FIRST HOUR: CPT | Mod: 25

## 2024-10-12 PROCEDURE — 93005 ELECTROCARDIOGRAM TRACING: CPT

## 2024-10-12 PROCEDURE — 84132 ASSAY OF SERUM POTASSIUM: CPT

## 2024-10-12 PROCEDURE — 94640 AIRWAY INHALATION TREATMENT: CPT

## 2024-10-12 PROCEDURE — 9420000001 HC RT PATIENT EDUCATION 5 MIN

## 2024-10-12 PROCEDURE — 82077 ASSAY SPEC XCP UR&BREATH IA: CPT

## 2024-10-12 PROCEDURE — 84484 ASSAY OF TROPONIN QUANT: CPT | Performed by: EMERGENCY MEDICINE

## 2024-10-12 PROCEDURE — 87040 BLOOD CULTURE FOR BACTERIA: CPT | Mod: WESLAB

## 2024-10-12 PROCEDURE — 70450 CT HEAD/BRAIN W/O DYE: CPT | Performed by: RADIOLOGY

## 2024-10-12 PROCEDURE — 5A1945Z RESPIRATORY VENTILATION, 24-96 CONSECUTIVE HOURS: ICD-10-PCS | Performed by: ANESTHESIOLOGY

## 2024-10-12 PROCEDURE — 3E043XZ INTRODUCTION OF VASOPRESSOR INTO CENTRAL VEIN, PERCUTANEOUS APPROACH: ICD-10-PCS | Performed by: ANESTHESIOLOGY

## 2024-10-12 PROCEDURE — 2500000004 HC RX 250 GENERAL PHARMACY W/ HCPCS (ALT 636 FOR OP/ED): Performed by: EMERGENCY MEDICINE

## 2024-10-12 PROCEDURE — 82947 ASSAY GLUCOSE BLOOD QUANT: CPT

## 2024-10-12 PROCEDURE — 72131 CT LUMBAR SPINE W/O DYE: CPT

## 2024-10-12 PROCEDURE — 93010 ELECTROCARDIOGRAM REPORT: CPT | Performed by: INTERNAL MEDICINE

## 2024-10-12 PROCEDURE — 82947 ASSAY GLUCOSE BLOOD QUANT: CPT | Performed by: PHYSICIAN ASSISTANT

## 2024-10-12 PROCEDURE — 2500000004 HC RX 250 GENERAL PHARMACY W/ HCPCS (ALT 636 FOR OP/ED)

## 2024-10-12 PROCEDURE — 85025 COMPLETE CBC W/AUTO DIFF WBC: CPT

## 2024-10-12 PROCEDURE — 36600 WITHDRAWAL OF ARTERIAL BLOOD: CPT

## 2024-10-12 PROCEDURE — 72131 CT LUMBAR SPINE W/O DYE: CPT | Performed by: RADIOLOGY

## 2024-10-12 PROCEDURE — 87635 SARS-COV-2 COVID-19 AMP PRB: CPT

## 2024-10-12 PROCEDURE — 96365 THER/PROPH/DIAG IV INF INIT: CPT | Mod: 59

## 2024-10-12 PROCEDURE — 84132 ASSAY OF SERUM POTASSIUM: CPT | Performed by: EMERGENCY MEDICINE

## 2024-10-12 PROCEDURE — 2020000001 HC ICU ROOM DAILY

## 2024-10-12 PROCEDURE — 70450 CT HEAD/BRAIN W/O DYE: CPT

## 2024-10-12 PROCEDURE — 94799 UNLISTED PULMONARY SVC/PX: CPT

## 2024-10-12 PROCEDURE — 36415 COLL VENOUS BLD VENIPUNCTURE: CPT

## 2024-10-12 PROCEDURE — 81001 URINALYSIS AUTO W/SCOPE: CPT

## 2024-10-12 PROCEDURE — 2500000005 HC RX 250 GENERAL PHARMACY W/O HCPCS

## 2024-10-12 PROCEDURE — 31500 INSERT EMERGENCY AIRWAY: CPT

## 2024-10-12 PROCEDURE — 80202 ASSAY OF VANCOMYCIN: CPT

## 2024-10-12 PROCEDURE — 2500000002 HC RX 250 W HCPCS SELF ADMINISTERED DRUGS (ALT 637 FOR MEDICARE OP, ALT 636 FOR OP/ED)

## 2024-10-12 PROCEDURE — 2500000005 HC RX 250 GENERAL PHARMACY W/O HCPCS: Performed by: PHYSICIAN ASSISTANT

## 2024-10-12 PROCEDURE — 84443 ASSAY THYROID STIM HORMONE: CPT | Performed by: EMERGENCY MEDICINE

## 2024-10-12 PROCEDURE — 94002 VENT MGMT INPAT INIT DAY: CPT

## 2024-10-12 PROCEDURE — 96361 HYDRATE IV INFUSION ADD-ON: CPT | Mod: 59

## 2024-10-12 PROCEDURE — 71045 X-RAY EXAM CHEST 1 VIEW: CPT | Performed by: RADIOLOGY

## 2024-10-12 PROCEDURE — 2500000004 HC RX 250 GENERAL PHARMACY W/ HCPCS (ALT 636 FOR OP/ED): Performed by: INTERNAL MEDICINE

## 2024-10-12 PROCEDURE — 94681 O2 UPTK CO2 OUTP % O2 XTRC: CPT

## 2024-10-12 PROCEDURE — 0BH17EZ INSERTION OF ENDOTRACHEAL AIRWAY INTO TRACHEA, VIA NATURAL OR ARTIFICIAL OPENING: ICD-10-PCS | Performed by: ANESTHESIOLOGY

## 2024-10-12 PROCEDURE — 83605 ASSAY OF LACTIC ACID: CPT

## 2024-10-12 PROCEDURE — 2500000002 HC RX 250 W HCPCS SELF ADMINISTERED DRUGS (ALT 637 FOR MEDICARE OP, ALT 636 FOR OP/ED): Performed by: PHYSICIAN ASSISTANT

## 2024-10-12 PROCEDURE — 80307 DRUG TEST PRSMV CHEM ANLYZR: CPT

## 2024-10-12 PROCEDURE — 96374 THER/PROPH/DIAG INJ IV PUSH: CPT | Mod: 59

## 2024-10-12 RX ORDER — DEXTROSE 50 % IN WATER (D50W) INTRAVENOUS SYRINGE
12.5
Status: DISCONTINUED | OUTPATIENT
Start: 2024-10-12 | End: 2024-11-14 | Stop reason: HOSPADM

## 2024-10-12 RX ORDER — DEXTROSE 50 % IN WATER (D50W) INTRAVENOUS SYRINGE
25
Status: DISCONTINUED | OUTPATIENT
Start: 2024-10-12 | End: 2024-11-14 | Stop reason: HOSPADM

## 2024-10-12 RX ORDER — PROPOFOL 10 MG/ML
0-20 INJECTION, EMULSION INTRAVENOUS CONTINUOUS
Status: DISCONTINUED | OUTPATIENT
Start: 2024-10-12 | End: 2024-10-15

## 2024-10-12 RX ORDER — PROPRANOLOL HYDROCHLORIDE 60 MG/1
60 CAPSULE, EXTENDED RELEASE ORAL
Status: DISCONTINUED | OUTPATIENT
Start: 2024-10-12 | End: 2024-11-14 | Stop reason: HOSPADM

## 2024-10-12 RX ORDER — MIDAZOLAM HYDROCHLORIDE 1 MG/ML
.5-2 INJECTION, SOLUTION INTRAVENOUS CONTINUOUS
Status: DISCONTINUED | OUTPATIENT
Start: 2024-10-12 | End: 2024-10-12

## 2024-10-12 RX ORDER — PANTOPRAZOLE SODIUM 40 MG/10ML
40 INJECTION, POWDER, LYOPHILIZED, FOR SOLUTION INTRAVENOUS
Status: DISCONTINUED | OUTPATIENT
Start: 2024-10-13 | End: 2024-10-15

## 2024-10-12 RX ORDER — ALBUTEROL SULFATE 0.83 MG/ML
20 SOLUTION RESPIRATORY (INHALATION) ONCE
Status: COMPLETED | OUTPATIENT
Start: 2024-10-12 | End: 2024-10-12

## 2024-10-12 RX ORDER — DOCUSATE SODIUM 100 MG/1
100 CAPSULE, LIQUID FILLED ORAL 2 TIMES DAILY
Status: DISCONTINUED | OUTPATIENT
Start: 2024-10-12 | End: 2024-10-14

## 2024-10-12 RX ORDER — LATANOPROST 50 UG/ML
1 SOLUTION/ DROPS OPHTHALMIC NIGHTLY
Status: DISCONTINUED | OUTPATIENT
Start: 2024-10-12 | End: 2024-11-14 | Stop reason: HOSPADM

## 2024-10-12 RX ORDER — INSULIN LISPRO 100 [IU]/ML
0-15 INJECTION, SOLUTION INTRAVENOUS; SUBCUTANEOUS EVERY 4 HOURS
Status: DISCONTINUED | OUTPATIENT
Start: 2024-10-12 | End: 2024-10-15

## 2024-10-12 RX ORDER — HEPARIN SODIUM 5000 [USP'U]/ML
5000 INJECTION, SOLUTION INTRAVENOUS; SUBCUTANEOUS EVERY 8 HOURS
Status: DISCONTINUED | OUTPATIENT
Start: 2024-10-12 | End: 2024-10-29

## 2024-10-12 RX ORDER — NOREPINEPHRINE BITARTRATE/D5W 8 MG/250ML
0-.2 PLASTIC BAG, INJECTION (ML) INTRAVENOUS CONTINUOUS
Status: DISCONTINUED | OUTPATIENT
Start: 2024-10-12 | End: 2024-10-13

## 2024-10-12 RX ORDER — ETOMIDATE 2 MG/ML
INJECTION INTRAVENOUS CODE/TRAUMA/SEDATION MEDICATION
Status: COMPLETED | OUTPATIENT
Start: 2024-10-12 | End: 2024-10-12

## 2024-10-12 RX ORDER — PANTOPRAZOLE SODIUM 40 MG/1
40 TABLET, DELAYED RELEASE ORAL
Status: DISCONTINUED | OUTPATIENT
Start: 2024-10-13 | End: 2024-10-15

## 2024-10-12 RX ORDER — SODIUM CHLORIDE, SODIUM LACTATE, POTASSIUM CHLORIDE, CALCIUM CHLORIDE 600; 310; 30; 20 MG/100ML; MG/100ML; MG/100ML; MG/100ML
100 INJECTION, SOLUTION INTRAVENOUS CONTINUOUS
Status: ACTIVE | OUTPATIENT
Start: 2024-10-12 | End: 2024-10-13

## 2024-10-12 RX ORDER — VANCOMYCIN HYDROCHLORIDE 1 G/20ML
INJECTION, POWDER, LYOPHILIZED, FOR SOLUTION INTRAVENOUS DAILY PRN
Status: DISCONTINUED | OUTPATIENT
Start: 2024-10-12 | End: 2024-10-21

## 2024-10-12 RX ORDER — DEXTROSE 50 % IN WATER (D50W) INTRAVENOUS SYRINGE
25 ONCE
Status: COMPLETED | OUTPATIENT
Start: 2024-10-12 | End: 2024-10-12

## 2024-10-12 RX ORDER — PRIMIDONE 250 MG/1
125 TABLET ORAL 3 TIMES DAILY
Status: DISCONTINUED | OUTPATIENT
Start: 2024-10-12 | End: 2024-10-18

## 2024-10-12 RX ORDER — SUCCINYLCHOLINE CHLORIDE 20 MG/ML
INJECTION INTRAMUSCULAR; INTRAVENOUS CODE/TRAUMA/SEDATION MEDICATION
Status: COMPLETED | OUTPATIENT
Start: 2024-10-12 | End: 2024-10-12

## 2024-10-12 RX ORDER — DEXTROSE MONOHYDRATE 100 MG/ML
50 INJECTION, SOLUTION INTRAVENOUS CONTINUOUS
Status: DISCONTINUED | OUTPATIENT
Start: 2024-10-12 | End: 2024-10-12

## 2024-10-12 RX ORDER — BRIMONIDINE TARTRATE 2 MG/ML
1 SOLUTION/ DROPS OPHTHALMIC 2 TIMES DAILY
Status: DISCONTINUED | OUTPATIENT
Start: 2024-10-12 | End: 2024-11-14 | Stop reason: HOSPADM

## 2024-10-12 RX ORDER — EZETIMIBE 10 MG/1
10 TABLET ORAL DAILY
Status: DISCONTINUED | OUTPATIENT
Start: 2024-10-12 | End: 2024-11-14 | Stop reason: HOSPADM

## 2024-10-12 RX ORDER — FERROUS SULFATE, DRIED 160(50) MG
1 TABLET, EXTENDED RELEASE ORAL DAILY
Status: DISCONTINUED | OUTPATIENT
Start: 2024-10-13 | End: 2024-11-14 | Stop reason: HOSPADM

## 2024-10-12 RX ORDER — CEFTRIAXONE 2 G/50ML
2 INJECTION, SOLUTION INTRAVENOUS EVERY 24 HOURS
Status: DISCONTINUED | OUTPATIENT
Start: 2024-10-12 | End: 2024-10-13

## 2024-10-12 RX ORDER — NALOXONE HYDROCHLORIDE 0.4 MG/ML
0.4 INJECTION, SOLUTION INTRAMUSCULAR; INTRAVENOUS; SUBCUTANEOUS ONCE
Status: COMPLETED | OUTPATIENT
Start: 2024-10-12 | End: 2024-10-12

## 2024-10-12 RX ADMIN — MIDAZOLAM HYDROCHLORIDE 0.5 MG/HR: 1 INJECTION, SOLUTION INTRAVENOUS at 15:40

## 2024-10-12 RX ADMIN — CEFTRIAXONE SODIUM 2 G: 2 INJECTION, SOLUTION INTRAVENOUS at 20:30

## 2024-10-12 RX ADMIN — NALOXONE HYDROCHLORIDE 0.4 MG: 0.4 INJECTION, SOLUTION INTRAMUSCULAR; INTRAVENOUS; SUBCUTANEOUS at 13:53

## 2024-10-12 RX ADMIN — SODIUM CHLORIDE 3000 ML: 900 INJECTION, SOLUTION INTRAVENOUS at 14:53

## 2024-10-12 RX ADMIN — BRIMONIDINE TARTRATE 1 DROP: 2 SOLUTION OPHTHALMIC at 20:48

## 2024-10-12 RX ADMIN — SODIUM CHLORIDE, SODIUM LACTATE, POTASSIUM CHLORIDE, AND CALCIUM CHLORIDE 100 ML/HR: 600; 310; 30; 20 INJECTION, SOLUTION INTRAVENOUS at 18:58

## 2024-10-12 RX ADMIN — PROPOFOL 10 MCG/KG/MIN: 10 INJECTION, EMULSION INTRAVENOUS at 18:58

## 2024-10-12 RX ADMIN — HEPARIN SODIUM 5000 UNITS: 5000 INJECTION, SOLUTION INTRAVENOUS; SUBCUTANEOUS at 22:19

## 2024-10-12 RX ADMIN — Medication 50 PERCENT: at 20:45

## 2024-10-12 RX ADMIN — DEXTROSE MONOHYDRATE 25 G: 25 INJECTION, SOLUTION INTRAVENOUS at 21:27

## 2024-10-12 RX ADMIN — ALBUTEROL SULFATE 20 MG: 2.5 SOLUTION RESPIRATORY (INHALATION) at 22:03

## 2024-10-12 RX ADMIN — SUCCINYLCHOLINE CHLORIDE 100 MG: 20 INJECTION, SOLUTION INTRAMUSCULAR; INTRAVENOUS at 14:32

## 2024-10-12 RX ADMIN — ETOMIDATE 20 MG: 2 INJECTION, SOLUTION INTRAVENOUS at 14:30

## 2024-10-12 RX ADMIN — INSULIN HUMAN 10 UNITS: 100 INJECTION, SOLUTION PARENTERAL at 21:25

## 2024-10-12 RX ADMIN — INSULIN LISPRO 3 UNITS: 100 INJECTION, SOLUTION INTRAVENOUS; SUBCUTANEOUS at 20:37

## 2024-10-12 RX ADMIN — PRIMIDONE 125 MG: 250 TABLET ORAL at 20:44

## 2024-10-12 RX ADMIN — LATANOPROST 1 DROP: 50 SOLUTION OPHTHALMIC at 20:48

## 2024-10-12 RX ADMIN — CEFEPIME 1 G: 1 INJECTION, POWDER, FOR SOLUTION INTRAMUSCULAR; INTRAVENOUS at 14:39

## 2024-10-12 RX ADMIN — SODIUM CHLORIDE 1000 ML: 900 INJECTION, SOLUTION INTRAVENOUS at 14:13

## 2024-10-12 RX ADMIN — Medication 50 PERCENT: at 14:45

## 2024-10-12 RX ADMIN — SODIUM ZIRCONIUM CYCLOSILICATE 10 G: 10 POWDER, FOR SUSPENSION ORAL at 21:40

## 2024-10-12 RX ADMIN — NOREPINEPHRINE BITARTRATE 0.01 MCG/KG/MIN: 8 INJECTION, SOLUTION INTRAVENOUS at 16:24

## 2024-10-12 SDOH — SOCIAL STABILITY: SOCIAL INSECURITY
WITHIN THE LAST YEAR, HAVE YOU BEEN KICKED, HIT, SLAPPED, OR OTHERWISE PHYSICALLY HURT BY YOUR PARTNER OR EX-PARTNER?: PATIENT UNABLE TO ANSWER

## 2024-10-12 SDOH — SOCIAL STABILITY: SOCIAL NETWORK: HOW OFTEN DO YOU GET TOGETHER WITH FRIENDS OR RELATIVES?: PATIENT UNABLE TO ANSWER

## 2024-10-12 SDOH — SOCIAL STABILITY: SOCIAL NETWORK: HOW OFTEN DO YOU ATTENT MEETINGS OF THE CLUB OR ORGANIZATION YOU BELONG TO?: PATIENT UNABLE TO ANSWER

## 2024-10-12 SDOH — SOCIAL STABILITY: SOCIAL INSECURITY: HAVE YOU HAD THOUGHTS OF HARMING ANYONE ELSE?: UNABLE TO ASSESS

## 2024-10-12 SDOH — ECONOMIC STABILITY: INCOME INSECURITY
IN THE PAST 12 MONTHS, HAS THE ELECTRIC, GAS, OIL, OR WATER COMPANY THREATENED TO SHUT OFF SERVICE IN YOUR HOME?: PATIENT UNABLE TO ANSWER

## 2024-10-12 SDOH — SOCIAL STABILITY: SOCIAL INSECURITY: WITHIN THE LAST YEAR, HAVE YOU BEEN AFRAID OF YOUR PARTNER OR EX-PARTNER?: PATIENT UNABLE TO ANSWER

## 2024-10-12 SDOH — ECONOMIC STABILITY: TRANSPORTATION INSECURITY
IN THE PAST 12 MONTHS, HAS THE LACK OF TRANSPORTATION KEPT YOU FROM MEDICAL APPOINTMENTS OR FROM GETTING MEDICATIONS?: PATIENT UNABLE TO ANSWER

## 2024-10-12 SDOH — SOCIAL STABILITY: SOCIAL NETWORK: HOW OFTEN DO YOU ATTEND CHURCH OR RELIGIOUS SERVICES?: PATIENT UNABLE TO ANSWER

## 2024-10-12 SDOH — SOCIAL STABILITY: SOCIAL INSECURITY
WITHIN THE LAST YEAR, HAVE YOU BEEN HUMILIATED OR EMOTIONALLY ABUSED IN OTHER WAYS BY YOUR PARTNER OR EX-PARTNER?: PATIENT UNABLE TO ANSWER

## 2024-10-12 SDOH — ECONOMIC STABILITY: HOUSING INSECURITY: IN THE PAST 12 MONTHS, HOW MANY TIMES HAVE YOU MOVED WHERE YOU WERE LIVING?: 0

## 2024-10-12 SDOH — HEALTH STABILITY: MENTAL HEALTH
DO YOU FEEL STRESS - TENSE, RESTLESS, NERVOUS, OR ANXIOUS, OR UNABLE TO SLEEP AT NIGHT BECAUSE YOUR MIND IS TROUBLED ALL THE TIME - THESE DAYS?: PATIENT UNABLE TO ANSWER

## 2024-10-12 SDOH — SOCIAL STABILITY: SOCIAL INSECURITY
WITHIN THE LAST YEAR, HAVE YOU BEEN RAPED OR FORCED TO HAVE ANY KIND OF SEXUAL ACTIVITY BY YOUR PARTNER OR EX-PARTNER?: PATIENT UNABLE TO ANSWER

## 2024-10-12 SDOH — ECONOMIC STABILITY: FOOD INSECURITY
WITHIN THE PAST 12 MONTHS, YOU WORRIED THAT YOUR FOOD WOULD RUN OUT BEFORE YOU GOT THE MONEY TO BUY MORE.: PATIENT UNABLE TO ANSWER

## 2024-10-12 SDOH — ECONOMIC STABILITY: HOUSING INSECURITY: AT ANY TIME IN THE PAST 12 MONTHS, WERE YOU HOMELESS OR LIVING IN A SHELTER (INCLUDING NOW)?: PATIENT UNABLE TO ANSWER

## 2024-10-12 SDOH — ECONOMIC STABILITY: FOOD INSECURITY
WITHIN THE PAST 12 MONTHS, THE FOOD YOU BOUGHT JUST DIDN'T LAST AND YOU DIDN'T HAVE MONEY TO GET MORE.: PATIENT UNABLE TO ANSWER

## 2024-10-12 SDOH — SOCIAL STABILITY: SOCIAL NETWORK: IN A TYPICAL WEEK, HOW MANY TIMES DO YOU TALK ON THE PHONE WITH FAMILY, FRIENDS, OR NEIGHBORS?: PATIENT UNABLE TO ANSWER

## 2024-10-12 SDOH — SOCIAL STABILITY: SOCIAL INSECURITY: DO YOU FEEL ANYONE HAS EXPLOITED OR TAKEN ADVANTAGE OF YOU FINANCIALLY OR OF YOUR PERSONAL PROPERTY?: UNABLE TO ASSESS

## 2024-10-12 SDOH — SOCIAL STABILITY: SOCIAL INSECURITY: WERE YOU ABLE TO COMPLETE ALL THE BEHAVIORAL HEALTH SCREENINGS?: NO

## 2024-10-12 SDOH — SOCIAL STABILITY: SOCIAL INSECURITY: ARE YOU OR HAVE YOU BEEN THREATENED OR ABUSED PHYSICALLY, EMOTIONALLY, OR SEXUALLY BY ANYONE?: UNABLE TO ASSESS

## 2024-10-12 SDOH — ECONOMIC STABILITY: INCOME INSECURITY
IN THE LAST 12 MONTHS, WAS THERE A TIME WHEN YOU WERE NOT ABLE TO PAY THE MORTGAGE OR RENT ON TIME?: PATIENT UNABLE TO ANSWER

## 2024-10-12 SDOH — SOCIAL STABILITY: SOCIAL INSECURITY
WITHIN THE LAST YEAR, HAVE TO BEEN RAPED OR FORCED TO HAVE ANY KIND OF SEXUAL ACTIVITY BY YOUR PARTNER OR EX-PARTNER?: PATIENT UNABLE TO ANSWER

## 2024-10-12 SDOH — ECONOMIC STABILITY: TRANSPORTATION INSECURITY
IN THE PAST 12 MONTHS, HAS LACK OF TRANSPORTATION KEPT YOU FROM MEETINGS, WORK, OR FROM GETTING THINGS NEEDED FOR DAILY LIVING?: PATIENT UNABLE TO ANSWER

## 2024-10-12 SDOH — SOCIAL STABILITY: SOCIAL INSECURITY: DOES ANYONE TRY TO KEEP YOU FROM HAVING/CONTACTING OTHER FRIENDS OR DOING THINGS OUTSIDE YOUR HOME?: UNABLE TO ASSESS

## 2024-10-12 SDOH — SOCIAL STABILITY: SOCIAL INSECURITY: HAVE YOU HAD ANY THOUGHTS OF HARMING ANYONE ELSE?: UNABLE TO ASSESS

## 2024-10-12 SDOH — SOCIAL STABILITY: SOCIAL INSECURITY: ARE THERE ANY APPARENT SIGNS OF INJURIES/BEHAVIORS THAT COULD BE RELATED TO ABUSE/NEGLECT?: UNABLE TO ASSESS

## 2024-10-12 SDOH — SOCIAL STABILITY: SOCIAL INSECURITY: HAS ANYONE EVER THREATENED TO HURT YOUR FAMILY OR YOUR PETS?: UNABLE TO ASSESS

## 2024-10-12 SDOH — SOCIAL STABILITY: SOCIAL INSECURITY: DO YOU FEEL UNSAFE GOING BACK TO THE PLACE WHERE YOU ARE LIVING?: UNABLE TO ASSESS

## 2024-10-12 SDOH — ECONOMIC STABILITY: TRANSPORTATION INSECURITY
IN THE PAST 12 MONTHS, HAS LACK OF TRANSPORTATION KEPT YOU FROM MEDICAL APPOINTMENTS OR FROM GETTING MEDICATIONS?: PATIENT UNABLE TO ANSWER

## 2024-10-12 SDOH — ECONOMIC STABILITY: INCOME INSECURITY
IN THE PAST 12 MONTHS HAS THE ELECTRIC, GAS, OIL, OR WATER COMPANY THREATENED TO SHUT OFF SERVICES IN YOUR HOME?: PATIENT UNABLE TO ANSWER

## 2024-10-12 SDOH — SOCIAL STABILITY: SOCIAL INSECURITY: ABUSE: ADULT

## 2024-10-12 SDOH — SOCIAL STABILITY: SOCIAL NETWORK: ARE YOU MARRIED, WIDOWED, DIVORCED, SEPARATED, NEVER MARRIED, OR LIVING WITH A PARTNER?: PATIENT UNABLE TO ANSWER

## 2024-10-12 SDOH — SOCIAL STABILITY: SOCIAL INSECURITY: ARE YOU MARRIED, WIDOWED, DIVORCED, SEPARATED, NEVER MARRIED, OR LIVING WITH A PARTNER?: PATIENT UNABLE TO ANSWER

## 2024-10-12 SDOH — SOCIAL STABILITY: SOCIAL NETWORK
DO YOU BELONG TO ANY CLUBS OR ORGANIZATIONS SUCH AS CHURCH GROUPS UNIONS, FRATERNAL OR ATHLETIC GROUPS, OR SCHOOL GROUPS?: PATIENT UNABLE TO ANSWER

## 2024-10-12 SDOH — ECONOMIC STABILITY: FOOD INSECURITY
WITHIN THE PAST 12 MONTHS, YOU WORRIED THAT YOUR FOOD WOULD RUN OUT BEFORE YOU GOT MONEY TO BUY MORE.: PATIENT UNABLE TO ANSWER

## 2024-10-12 SDOH — SOCIAL STABILITY: SOCIAL NETWORK: HOW OFTEN DO YOU ATTEND MEETINGS OF THE CLUBS OR ORGANIZATIONS YOU BELONG TO?: PATIENT UNABLE TO ANSWER

## 2024-10-12 SDOH — HEALTH STABILITY: MENTAL HEALTH
STRESS IS WHEN SOMEONE FEELS TENSE, NERVOUS, ANXIOUS, OR CAN'T SLEEP AT NIGHT BECAUSE THEIR MIND IS TROUBLED. HOW STRESSED ARE YOU?: PATIENT UNABLE TO ANSWER

## 2024-10-12 SDOH — SOCIAL STABILITY: SOCIAL NETWORK
DO YOU BELONG TO ANY CLUBS OR ORGANIZATIONS SUCH AS CHURCH GROUPS, UNIONS, FRATERNAL OR ATHLETIC GROUPS, OR SCHOOL GROUPS?: PATIENT UNABLE TO ANSWER

## 2024-10-12 ASSESSMENT — ACTIVITIES OF DAILY LIVING (ADL)
HEARING - LEFT EAR: FUNCTIONAL
PATIENT'S MEMORY ADEQUATE TO SAFELY COMPLETE DAILY ACTIVITIES?: UNABLE TO ASSESS
TOILETING: UNABLE TO ASSESS
JUDGMENT_ADEQUATE_SAFELY_COMPLETE_DAILY_ACTIVITIES: UNABLE TO ASSESS
ADEQUATE_TO_COMPLETE_ADL: UNABLE TO ASSESS
DRESSING YOURSELF: UNABLE TO ASSESS
WALKS IN HOME: UNABLE TO ASSESS
BATHING: UNABLE TO ASSESS
HEARING - RIGHT EAR: FUNCTIONAL
GROOMING: UNABLE TO ASSESS
LACK_OF_TRANSPORTATION: PATIENT UNABLE TO ANSWER
FEEDING YOURSELF: UNABLE TO ASSESS
ASSISTIVE_DEVICE: WALKER

## 2024-10-12 ASSESSMENT — PAIN - FUNCTIONAL ASSESSMENT
PAIN_FUNCTIONAL_ASSESSMENT: WONG-BAKER FACES
PAIN_FUNCTIONAL_ASSESSMENT: CPOT (CRITICAL CARE PAIN OBSERVATION TOOL)

## 2024-10-12 ASSESSMENT — LIFESTYLE VARIABLES
AUDIT-C TOTAL SCORE: -1
EVER FELT BAD OR GUILTY ABOUT YOUR DRINKING: NO
HOW OFTEN DO YOU HAVE 6 OR MORE DRINKS ON ONE OCCASION: PATIENT UNABLE TO ANSWER
HAVE PEOPLE ANNOYED YOU BY CRITICIZING YOUR DRINKING: NO
HAVE YOU EVER FELT YOU SHOULD CUT DOWN ON YOUR DRINKING: NO
AUDIT-C TOTAL SCORE: -1
HOW OFTEN DO YOU HAVE A DRINK CONTAINING ALCOHOL: PATIENT UNABLE TO ANSWER
EVER HAD A DRINK FIRST THING IN THE MORNING TO STEADY YOUR NERVES TO GET RID OF A HANGOVER: NO
TOTAL SCORE: 0
HOW MANY STANDARD DRINKS CONTAINING ALCOHOL DO YOU HAVE ON A TYPICAL DAY: PATIENT UNABLE TO ANSWER
SKIP TO QUESTIONS 9-10: 0

## 2024-10-12 ASSESSMENT — COLUMBIA-SUICIDE SEVERITY RATING SCALE - C-SSRS
2. HAVE YOU ACTUALLY HAD ANY THOUGHTS OF KILLING YOURSELF?: NO
1. IN THE PAST MONTH, HAVE YOU WISHED YOU WERE DEAD OR WISHED YOU COULD GO TO SLEEP AND NOT WAKE UP?: NO
6. HAVE YOU EVER DONE ANYTHING, STARTED TO DO ANYTHING, OR PREPARED TO DO ANYTHING TO END YOUR LIFE?: NO

## 2024-10-12 ASSESSMENT — COGNITIVE AND FUNCTIONAL STATUS - GENERAL: PATIENT BASELINE BEDBOUND: YES

## 2024-10-12 NOTE — Clinical Note
23cm palindrome placed to right chest by dr feng.  The catheters were dwelled with 1000units heparin/ml to each catheter.  Chg dsg applied over top of catheters.  Pt tolerated procedure well.

## 2024-10-12 NOTE — SIGNIFICANT EVENT
Patient intubated by Barbara Jacobs PA-C at 14:35. ETT 8.0/24 @lip. Placement confirmed by bilateral breath sounds and positive color change by end tidal. Patient placed on Mechanical ventilator.

## 2024-10-12 NOTE — CONSULTS
Vancomycin Dosing by Pharmacy- [facility] INITIAL    Narda Malloy is a 69 y.o. year old female who Pharmacy has been consulted for vancomycin dosing for Vancomycin Indications: Other: Surgical Wound Infection . Based on the patient's indication and renal status this patient will be dosed based on a target level of SSTI/UTI: 10-15 mcg/mL    Patient is currently in [facility].    Initial Dosing:  -Patient receiving Vancomycin at OSH. Current level upon admission is 53.9    Will HOLD initiating Vancomycin  and re-check level tomorrow AM    Visit Vitals  /76   Pulse 65   Temp 35.3 °C (95.5 °F)   Resp 20           Lab Results   Component Value Date    CREATININE 1.46 (H) 10/12/2024    CREATININE 0.66 10/04/2024    CREATININE 0.71 10/03/2024    CREATININE 0.89 10/02/2024       No intake/output data recorded.    Lab Results   Component Value Date    PATIENTTEMP 37.0 10/12/2024          Assessment/Plan     Random level within 24 hours of first dose will be obtained on 10/13 at 0500. May be obtained sooner if clinically indicated.  Will continue to monitor renal function daily while on vancomycin and order serum creatinine at least every 48 hours if not already ordered.  Follow for continued vancomycin needs, clinical response, and signs/symptoms of toxicity.     Souleymane Mike formerly [facility]

## 2024-10-12 NOTE — H&P
[facility] Critical Care Medicine       Date:  10/12/2024  Patient:  Narda Malloy  YOB: 1955  MRN:  [id number]   Admit Date:  10/12/2024    Chief Complaint   Patient presents with    Altered Mental Status         History of Present Illness:  Narda Malloy is a 69 y.o. year old female patient with past medical history of L1-L3 lumbar laminectomy, T4-S1 revision, and fusion August 26th, T2DM, HTN, essential tremor, HLD, glaucoma, sarcoidosis of the lung who presented to  ED 10/12 after being found essentially unresponsive at her nursing facility [facility]. Per report from her , she has had a significant decline in her health since July 15th when she had a fall and became significantly weak. She has also had multiple infection complications since her back surgery in August requiring multiple I&Ds and long term antibiotic therapy. She went to the OR most recently on 9/28 for lumbar site infection wash out. Per chart review, she was discharged on IV vancomycin 1g and IV ceftriaxone 2d q24hrs through 11/12.      ED Course: Initial vital signs: /104 (109), HR 68, RR 20, SpO2 95% on 6L NC, temp 34.5C. Give 0.4mg of narcan with no improvement in mentation. Lab work-up remarkable for mild hyperkalemia (5.5), AMY 42/1.46, elevated alk phos, normocytic anemia 10.4/33, turbid appearing urine with mild hematuria and proteinuria and + leuk esterase, >50 WBCs. Urine drug screen positive for barbiturates. Triggered sepsis timer so she was given 3L NS. She was intubated for airway protection with 20mg etomidate and 100mg succinylcholine. BP dropped post-intubated and fluid resuscitation and she was subsequently started on levophed. CT head and lumbar spine pending.       Interval ICU Events:  10/12: Pt arrived to ICU intubated and lightly sedated on low-dose versed. Eyes open, minimally responsive.     Objective     Medical History:  Past Medical History:   Diagnosis Date    Degenerative  myopia, bilateral     Diabetic neuropathy (Multi)     Difficult intubation 08/26/2024    Mac 3, grade 3, 1 attempt.  Glidescope/videolaryngoscopy recommended for future attempts.    DM type 2 (diabetes mellitus, type 2) (Multi)     Dry eye syndrome of bilateral lacrimal glands     Essential hypertension     Essential tremor     Glaucoma     Hyperlipidemia     Long term (current) use of insulin (Multi)     Low back pain     PONV (postoperative nausea and vomiting)     Primary open angle glaucoma of both eyes, severe stage     Repeated falls     Sarcoidosis of lung (Multi)     Spinal stenosis, lumbar region without neurogenic claudication     Weakness      Past Surgical History:   Procedure Laterality Date    BLEPHAROPLASTY  07/2022    BREAST SURGERY  05/20/2022    Breast lift    CARPAL TUNNEL RELEASE      CATARACT EXTRACTION W/  INTRAOCULAR LENS IMPLANT Bilateral     OD 08/04/2011 +8.5D,OS 08/04/2011 +8.50D    FOOT SURGERY      INSERTION / REMOVAL CRANIAL DBS GENERATOR      Placed 2017.  Removed 2018. part of wire left in head when everything removed    LUMBAR FUSION      L3-S1    PANRETINAL PHOTOCOAGULATION  2014    THORACIC FUSION  08/26/2024    T4-S1 fusion    VITRECTOMY Right 2013     (Not in a hospital admission)    Erythromycin, Morphine, and Rosuvastatin  Social History     Tobacco Use    Smoking status: Former     Types: Cigarettes     Passive exposure: Past    Smokeless tobacco: Never   Vaping Use    Vaping status: Never Used   Substance Use Topics    Alcohol use: Not Currently    Drug use: Not Currently     Family History   Problem Relation Name Age of Onset    Multiple myeloma Mother      Cancer Mother      Other (CABG) Father      Pulmonary embolism Father      Heart disease Father      Breast cancer Sister          Stage II    Hypertension Sister      Diabetes Sister      No Known Problems Sister          x5    No Known Problems Brother          x4    No Known Problems Daughter         Hospital  Medications:    midazolam, 0.5-20 mg/hr          Current Facility-Administered Medications:     cefepime (Maxipime) in dextrose 5% 50 mL IV 1 g, 1 g, intravenous, q8h, Cris Lucero MD, 1 g at 10/12/24 1439    midazolam in NS (Versed) 1 mg/mL infusion solution, 0.5-20 mg/hr, intravenous, Continuous, Cris Lucero MD    oxygen (O2) therapy, , inhalation, Continuous - Inhalation, Cris Lucero MD, 50 percent at 10/12/24 1445    sodium chloride 0.9 % bolus 2,979 mL, 30 mL/kg, intravenous, Once, Cris Lucero MD, Last Rate: 2,979 mL/hr at 10/12/24 1453, 3,000 mL at 10/12/24 1453    Current Outpatient Medications:     acetaminophen (Tylenol) 500 mg tablet, Take 2 tablets (1,000 mg) by mouth 3 times a day., Disp: , Rfl:     ascorbic acid (Vitamin C) 500 mg tablet, as directed Orally, Disp: , Rfl:     brimonidine (AlphaGAN) 0.2 % ophthalmic solution, Administer 1 drop into both eyes 2 times a day., Disp: , Rfl:     calcium carbonate-vitamin D3 500 mg-5 mcg (200 unit) tablet, Take 1 tablet by mouth once daily., Disp: , Rfl:     cefTRIAXone (Rocephin) 2 gram/50 mL IV, Infuse 50 mL (2 g) at 100 mL/hr over 30 minutes into a venous catheter once every 24 hours. Once weekly labs CBC/diff, CMP, Vanc trough ESR, CRP fax to Dr. Juarez [phone]. Stop date 11/12/24., Disp: 1950 mL, Rfl: 0    dextrose 50 % injection, Infuse 25 mL (12.5 g) into a venous catheter every 15 minutes if needed (For blood glucose 41 to 70 mg/dL)., Disp: , Rfl:     dextrose 50 % injection, Infuse 50 mL (25 g) into a venous catheter every 15 minutes if needed (For blood glucose less than or equal to 40 mg/dL)., Disp: , Rfl:     docusate sodium (Colace) 100 mg capsule, Take 1 capsule (100 mg) by mouth 2 times a day., Disp: , Rfl:     ezetimibe (Zetia) 10 mg tablet, Take 1 tablet (10 mg) by mouth once daily., Disp: 90 tablet, Rfl: 3    FreeStyle Lite Strips strip, USE TO TEST 3 TIMES A DAY AS DIRECTED, Disp: 300 each, Rfl: 2    glucagon (Glucagen) 1 mg  injection, Inject 1 mg into the muscle every 15 minutes if needed for low blood sugar - see comments (Hypoglycemia)., Disp: , Rfl:     glucagon (Glucagen) 1 mg injection, Inject 1 mg into the muscle every 15 minutes if needed for low blood sugar - see comments (Hypoglycemia)., Disp: , Rfl:     heparin sodium,porcine (heparin, porcine,) 5,000 unit/mL injection, Inject 1 mL (5,000 Units) under the skin every 8 hours., Disp: , Rfl:     insulin lispro (HumaLOG) 100 unit/mL injection, Inject 0-15 Units under the skin 3 times daily (morning, midday, late afternoon). Take as directed per insulin instructions.Do not hold when patient is not eating, continue order as scheduled for hyperglycemia management. Insulin Lispro Corrective Scale #3   Hypoglycemia protocol Call LIP unit(s) if Blood Glucose is between 0 - 70 mg/dL   0 unit(s) if Blood glucose is between   3 unit(s) if Blood glucose is between 151-200  6 unit(s) if Blood glucose is between 201-250  9 unit(s) if Blood glucose is between 251-300  12 unit(s) if Blood glucose is between 301-350  15 unit(s) if Blood glucose is between 351-400  Notify provider unit(s) if Blood Glucose is greater than 400 mg/dL, Disp: , Rfl:     Lactobacillus acidophilus 100 mg (1 billion cell) capsule, Take 1 capsule by mouth 2 times a day., Disp: , Rfl:     latanoprost (Xalatan) 0.005 % ophthalmic solution, Administer 1 drop into both eyes once daily at bedtime., Disp: 2.5 mL, Rfl: 5    melatonin 5 mg tablet, Take 1 tablet (5 mg) by mouth as needed at bedtime for sleep., Disp: , Rfl:     methocarbamol (Robaxin) 500 mg tablet, Take 1 tablet (500 mg) by mouth 3 times a day., Disp: , Rfl:     multivitamin tablet, Take 1 tablet by mouth once daily., Disp: , Rfl:     ondansetron (Zofran) 4 mg/2 mL injection, Infuse 2 mL (4 mg) into a venous catheter every 6 hours if needed for nausea or vomiting., Disp: , Rfl:     oxyCODONE (Roxicodone) 5 mg immediate release tablet, Take 1 tablet (5 mg)  "by mouth every 6 hours if needed for severe pain (7 - 10) or moderate pain (4 - 6)., Disp: , Rfl:     oxygen (O2) gas therapy, Inhale 1 each continuously., Disp: , Rfl:     pantoprazole (ProtoNix) 40 mg EC tablet, Take 1 tablet (40 mg) by mouth once daily in the morning. Take before meals. Do not crush, chew, or split., Disp: , Rfl:     pantoprazole (ProtoNix) 40 mg injection, Infuse 40 mg into a venous catheter once daily in the morning. Take before meals. If unable to take PO., Disp: , Rfl:     pen needle, diabetic (PEN NEEDLE MISC), BD Altagracia- 4 mm X 32 G needle - as directed 4x a day sc 4 times per day, Disp: , Rfl:     polyethylene glycol (Glycolax, Miralax) 17 gram packet, Take 17 g by mouth once daily., Disp: , Rfl:     primidone 125 mg tablet, Take 125 mg by mouth 3 times a day., Disp: , Rfl:     propranolol LA (Inderal LA) 60 mg 24 hr capsule, Take 1 capsule (60 mg) by mouth early in the morning.. Hold for SBP < 110 mmhg, HR < 60 bpm., Disp: , Rfl:     sennosides (Senokot) 8.6 mg tablet, Take 1 tablet (8.6 mg) by mouth every 12 hours if needed for constipation., Disp: , Rfl:     Sure Comfort Pen Needle 32 gauge x 5/32\" needle, AS DIRECTED DAILY FOR 90 DAYS, Disp: 100 each, Rfl: 11    traZODone (Desyrel) 25 MG split tablet, Take 1 half tablet (25 mg) by mouth once daily at bedtime., Disp: , Rfl:     vancomycin (Vancocin) 1 gram/250 mL solution, Infuse 250 mL (1 g) at 250 mL/hr over 60 minutes into a venous catheter every 12 hours. Once weekly labs CBC/diff, CMP, Vanc trough ESR, CRP fax to Dr. Juarez [phone]. Stop date 11/12/24., Disp: 33161 mL, Rfl: 0    Review of Systems:  14 point review of systems was completed and negative except for those specially mention in my HPI    Physical Exam:    Heart Rate:  [58-75]   Temperature:  [34.5 °C (94.1 °F)]   Respirations:  [17-28]   BP: ()/()   Height:  [175.3 cm (5' 9\")]   Weight:  [99.3 kg (219 lb)]   Pulse Ox:  [93 %-100 %]     Physical " Exam  Constitutional:       General: She is not in acute distress.     Appearance: She is toxic-appearing. She is not ill-appearing.      Interventions: She is intubated and restrained.   HENT:      Head: Normocephalic and atraumatic.      Mouth/Throat:      Mouth: Mucous membranes are dry.   Eyes:      Conjunctiva/sclera: Conjunctivae normal.      Pupils: Pupils are equal, round, and reactive to light.   Cardiovascular:      Rate and Rhythm: Normal rate and regular rhythm.      Heart sounds: Normal heart sounds.   Pulmonary:      Effort: No respiratory distress. She is intubated.      Breath sounds: Rhonchi present.   Abdominal:      General: Bowel sounds are normal. There is no distension.      Palpations: Abdomen is soft.      Tenderness: There is no abdominal tenderness.   Genitourinary:     Comments: Gibson catheter to gravity drainage   Musculoskeletal:      Right upper arm: Swelling present.      Left upper arm: Swelling present.      Cervical back: Neck supple.      Right lower leg: No edema.      Left lower leg: No edema.      Comments: RUE PICC line    Skin:     General: Skin is dry.   Neurological:      Mental Status: She is disoriented.         Objective:    I have reviewed all medications, laboratory results, and imaging pertinent for today's encounter.    Vent Mode: Pressure regulated volume control/assist control  S RR:  [18] 18  S VT:  [450 mL] 450 mL  PEEP/CPAP (cm H2O):  [5 cm H20] 5 cm H20  MAP (cm H2O):  [11] 11    No intake or output data in the 24 hours ending 10/12/24 9700         Assessment/Plan:    I am currently managing this critically ill patient for the following problems:    Neuro/Psych/Pain Ctrl/Sedation:   Acute metabolic vs toxic encephalopathy - etiology unclear   Unresponsiveness  Essential tremor   Hypothermia   - Pain Control: acetaminophen PRN  - Sedation/Analgesia: propofol ordered, will keep sedation off to assess mental status and start if needed for vent compliance   - Goal  RASS: 0 to +1  - Continue home primidone  -- Urine tox positive for barbiturates consistent with primidone intake   - CT head pending   - Continue bear hugger PRN until normal temp   - CAM ICU qshift, sleep-wake hygiene, delirium precautions     Respiratory/ENT:  Intubated for airway protection  - Supplemental O2: /18/5/50%  - Wean FiO2 as tolerated to maintain SpO2 >92%  - VAP precautions   - Continuous pulse ox monitoring   - Pulm hygiene    Cardiovascular:   Undifferentiated shock - hypovolemic vs sepsis ??  Bilateral upper extremity swelling - new over last week according to   Hx HTN  Hx HLD  - Wean levophed gtt to maintain MAPs >65  - Hold home propranolol  - Bilateral duplex US upper extremities to r/o DVT  - Continuous cardiac monitoring per ICU protocol  - EKGs PRN for ACS symptoms, arrhythmias     GI:  Hx GERD  - Diet: NPO  - BR: colace  - GI Prophylaxis: PPI    Renal/Volume Status/Electrolytes:  Acute kidney injury - possibly ATN vs medication-induced   - Baseline Cr 0.8-1.0  - Maintain anders catheter  - Maintain urine output 0.5-1.0cc/kg/hr  - Gentle IVF hydration d/t AMY  - Hourly I/O's  - Replete electrolytes to maintain K >4.0 and Mg >2.0  - Daily BMP, Mg, Phos    Endocrine:  T2DM  - SSI q4hrs while NPO   - TSH: midly elevated, T4 pending   - Hypoglycemia protocol PRN     Infectious Disease:  Recent MRSA lumbar surgical site infection  - Antibiotics: continue vancomycin and ceftriaxone (until 11/12)  - Blood cultures: pending   - Urine culture: pending   - CT lumbar spine pending to r/o abscess   - Will consider removing picc line pending infectious w/u  - Monitor SIRS criteria    Heme/Onc:  Normocytic anemia   - Monitor for s/sx of anemia such as bleeding and bruising   - Transfuse if Hgb <7.0   - Daily CBC    MSK:  No active concerns   - PT/OT when appropriate    Derm:  - ICU skin protocol  - Q2hr turns  - Padded pressure points     Ethics/Code Status:  Full code - discussed this   at bedside     :  DVT Prophylaxis: SQH  GI Prophylaxis: PPI  Bowel Regimen: colace  Diet: NPO  CVC: R picc  Wanda: none  Gibson: yes, replaced 10/12  Restraints: yes  Disposition: ICU    Restraints indicated to maintain lines/tubes.  Alternative therapies have been attempted and have been ineffective.  Restrain with soft wrist restraints and side rails up x4 until medical devices discontinued and/or patient able to participate with plan of care.       Critical Care Time:  52 minutes spent in preparing to see patient (I.e. labs, imaging, etc.), documentation, discussion plan of care with patient/family/caregiver, and/or coordination of care with multidisciplinary team including the attending. Time does not include completion of procedure time.     Kaleb Lam PA-C  Pulmonary & Critical Care Medicine   [facility]

## 2024-10-12 NOTE — PROGRESS NOTES
Narda Malloy is a 69 y.o. female on day 0 of admission presenting with Unresponsive.    Patient is a readmission.   She was admitted to [facility] 09/25-09/27/2024, transferred to [facility].  She has been at [facility] since July d/t back pain/surgery.  Patient returns today, unresponsive. She is currently intubated. RNCC met with patient's  Godfrey. Best case scenario, patient will return to [facility].   RNCC will follow for updates.     Jewell Natarajan RN

## 2024-10-12 NOTE — PROGRESS NOTES
Attestation/Supervisory note for KASSIE Jacobs      The patient is a 69-year-old female presenting to the emergency department by EMS from the [facility] where she currently resides for unresponsiveness.  The patient reportedly recently came to their facility after having back surgery.  The staff at the [facility] report that she pretty much has been in the same status since she arrived.  They report that she has been largely unresponsive and very edematous since she was brought to their facility.  She sometimes will answer questions with yes or no responses but will not converse or provide any other further details.  They report that they sent her to the emergency room today because she seemed more unresponsive than usual and that she does not respond to any questions at all.  Unknown how long she has been this way.  There is no clear last known well time per the staff at the [facility] and EMS.  The patient is not able to provide any details regarding HPI or review of systems as she is largely unresponsive at this time.  The patient does take Percocet daily for her chronic back pain.  Vital signs with hypotension but otherwise unremarkable.  Physical exam with disheveled appearance and morbid obesity.  HEENT exam with dry mucous membranes but otherwise unremarkable.  She does not have any evidence of distress but she is minimally responsive to painful stimuli.  Abdominal exam is benign.  There is no palpable masses.  She does have anasarca on exam.  She does not have any visible or palpable bony deformity.  She does not cooperate with neurologic testing.  NIH stroke scale score of 19 given at this time.      tPA/TNK is not indicated given the nursing staff at the [facility] cannot provide a last known well time.  It is unclear at if her last known well time was today or within the past several days.  They also report that the patient does have chronic symptoms and  has been lethargic throughout her stay at the long-term care [facility].      EKG with normal sinus rhythm at 60 bpm, normal axis, normal voltage, normal ST segment, and a slight flattening of the T waves in the inferior leads      IV Narcan given without improvement in her symptoms.      Cultures were obtained and IV fluids and IV antibiotics ordered for the sepsis bundle.      Diagnostic labs with evidence of barbiturates on the urine tox screen, elevated TSH, evidence of urinary tract infection, mild renal insufficiency, electrolyte imbalance, but otherwise unremarkable.      Initial lactic 0.7.  repeat lactic pending at the time of admission      Initial troponin was pending at the time of admission      XR chest 1 view   Final Result   The tip of the nasogastric tube not definitively visualized but the   curvature of the tubing looks to read along the gastric body with the   tip at least distal to the gastric body.        Signed by: Parmjit Mancini 10/12/2024 5:35 PM   Dictation workstation:   [id number]      XR chest 1 view   Final Result        Nasogastric tube tip looks to be centered roughly at the level of the   gastroesophageal junction.        Left effusion and basilar airspace disease similar to previous        Signed by: Parmjit Mancini 10/12/2024 5:34 PM   Dictation workstation:   [id number]      XR chest 1 view   Final Result   Nasogastric tube tip not definitively confirmed, slightly obscured by   the patient's spinal fusion hardware. It may be reflected upon itself   in the distal esophagus.        Signed by: Parmjit Mancini 10/12/2024 3:57 PM   Dictation workstation:   [id number]      XR chest 1 view   Final Result   1. Limited visualization of the ET tube with its tip approximately   2.5 cm above the michi   2. Extensive left lung airspace disease and a large left effusion,   worsening from the prior.                  MACRO:   None        Signed by: Ildefonso Herring 10/12/2024 3:43 PM   Dictation  workstation:   [id number]      CT head wo IV contrast    (Results Pending)   CT lumbar spine wo IV contrast    (Results Pending)        Chest x-ray with no evidence of extensive left lung airspace disease and left pleural effusion.  It appears worsening from prior examination.  The EKG does not show evidence of STEMI.  Initial troponin pending at the time of admission.  The patient does have evidence of urinary tract infection and was mildly hypotensive when she arrived to the emergency room.  She is well-perfused but she does have evidence of anasarca.  The patient was not protecting her airway upon arrival.  She was intubated for protection of her airway.  tPA/TNK is not indicated given the patient's last known well time of several days ago according to the spouse.  The CT of the head and lumbar spine were pending at the time of admission.  The patient was taken to CT scan and then to the ICU.  The ICU team will follow for the results of the studies.        Impression/diagnosis:  1.  Lethargy, acute on chronic  2.  Pneumonia, left-sided  3.  Acute lower urinary tract infection        Critical care time of 32 minutes billed for management of the sepsis bundle with initiation of IV antibiotics, initiation of IV fluids, obtainment of cultures, monitoring the patient on telemetry, consultation with the patient's spouse regarding her results, consultation with the admitting provider and arrangement for ICU admission..  This time excludes time for billable procedures.      critical care time billed for by me is non concurrent with time billed for by KASSIE Jacobs      I personally saw the patient and made/approve the management plan and take responsibility for the patient management.      I independently interpreted the following study (S) EKG and diagnostic labs      I personally discussed the patient's management with the patient's spouse and the admitting team      I reviewed the results of the diagnostic labs and  diagnostic imaging.  Formal radiology read was completed by the radiologist.      Cris Lucero MD

## 2024-10-12 NOTE — ED PROVIDER NOTES
HPI   Chief Complaint   Patient presents with    Altered Mental Status       Patient is a 69-year-old female presenting to the emergency department for evaluation of unresponsiveness.  Patient is coming from skilled nursing facility.  Patient reportedly recently had back surgery and has been residing at the facility since her surgery.  Facility reportedly said patient has been minimally responsive since being admitted there and will occasionally answer yes or no questions.  Today patient was found to be more unresponsive than normal and therefore sent in for further evaluation.  Patient unable to contribute any information to the HPI at this time.              Patient History   Past Medical History:   Diagnosis Date    Degenerative myopia, bilateral     Diabetic neuropathy (Multi)     Difficult intubation 08/26/2024    Mac 3, grade 3, 1 attempt.  Glidescope/videolaryngoscopy recommended for future attempts.    DM type 2 (diabetes mellitus, type 2) (Multi)     Dry eye syndrome of bilateral lacrimal glands     Essential hypertension     Essential tremor     Glaucoma     Hyperlipidemia     Long term (current) use of insulin (Multi)     Low back pain     PONV (postoperative nausea and vomiting)     Primary open angle glaucoma of both eyes, severe stage     Repeated falls     Sarcoidosis of lung (Multi)     Spinal stenosis, lumbar region without neurogenic claudication     Weakness      Past Surgical History:   Procedure Laterality Date    BLEPHAROPLASTY  07/2022    BREAST SURGERY  05/20/2022    Breast lift    CARPAL TUNNEL RELEASE      CATARACT EXTRACTION W/  INTRAOCULAR LENS IMPLANT Bilateral     OD 08/04/2011 +8.5D,OS 08/04/2011 +8.50D    FOOT SURGERY      INSERTION / REMOVAL CRANIAL DBS GENERATOR      Placed 2017.  Removed 2018. part of wire left in head when everything removed    LUMBAR FUSION      L3-S1    PANRETINAL PHOTOCOAGULATION  2014    THORACIC FUSION  08/26/2024    T4-S1 fusion    VITRECTOMY Right 2013      Family History   Problem Relation Name Age of Onset    Multiple myeloma Mother      Cancer Mother      Other (CABG) Father      Pulmonary embolism Father      Heart disease Father      Breast cancer Sister          Stage II    Hypertension Sister      Diabetes Sister      No Known Problems Sister          x5    No Known Problems Brother          x4    No Known Problems Daughter       Social History     Tobacco Use    Smoking status: Former     Types: Cigarettes     Passive exposure: Past    Smokeless tobacco: Never   Vaping Use    Vaping status: Never Used   Substance Use Topics    Alcohol use: Not Currently    Drug use: Not Currently       Physical Exam   ED Triage Vitals   Temperature Heart Rate Respirations BP   10/12/24 1401 10/12/24 1347 10/12/24 1347 10/12/24 1347   34.5 °C (94.1 °F) 68 20 (!) 115/104      Pulse Ox Temp src Heart Rate Source Patient Position   10/12/24 1347 -- -- --   95 %         BP Location FiO2 (%)     -- --             Physical Exam  Vitals and nursing note reviewed.   Constitutional:       Appearance: She is obese.      Comments: Disheveled appearing   HENT:      Head: Normocephalic and atraumatic.      Nose: Nose normal.      Mouth/Throat:      Mouth: Mucous membranes are dry.   Eyes:      Pupils: Pupils are equal, round, and reactive to light.   Cardiovascular:      Rate and Rhythm: Normal rate and regular rhythm.   Pulmonary:      Effort: Pulmonary effort is normal.      Breath sounds: Normal breath sounds. No wheezing, rhonchi or rales.   Abdominal:      Palpations: Abdomen is soft.   Musculoskeletal:      Cervical back: Normal range of motion.      Right lower leg: Edema present.      Left lower leg: Edema present.   Skin:     General: Skin is warm and dry.      Comments: anasarca   Neurological:      Mental Status: She is unresponsive.      GCS: GCS eye subscore is 2. GCS verbal subscore is 2. GCS motor subscore is 2.   Psychiatric:         Mood and Affect: Mood normal.          Behavior: Behavior normal.           ED Course & MDM   Diagnoses as of 10/29/24 1520   Unresponsive   Lethargy   Pseudoclaudication syndrome   Swelling of upper arm                 No data recorded     [place] Coma Scale Score: 10 (10/29/24 0800 : Erica Baxter RN)                           Medical Decision Making  **Disclaimer parts of this chart have been completed using voice recognition software. Please excuse any errors of transcription.     Patient seen in conjunction with attending physician .     HPI: Detailed above.    Exam: A medically appropriate exam performed, outlined above, given the known history and presentation.    History obtained from: Patient    EKG: Reviewed and interpreted by my attending physician    EMERGENCY DEPARTMENT COURSE and DIFFERENTIAL DIAGNOSIS/MDM:  Patient is a 69-year-old female presenting to the emergency department from outside skilled nursing facility for evaluation of unresponsiveness.  On physical exam vital signs remarkable for hypotension but otherwise stable and patient is in no acute distress.  She is securing her airway well however is minimally responsive to painful stimuli.  GCS is approximately a 6 therefore we feel patient likely needs to be intubated.  NIH reported by Dr. Lucero as 19.  Patient has anasarca which is reportedly chronic since being admitted to the skilled nursing facility.  Narcan was tried to help with unresponsiveness however no improvement in symptoms.  Septic workup initiated as well as CT of the head to rule out any intracranial bleed.  Patient does not negative for COVID.  ABG showed a pH of 7.29.  Urine showed evidence of barbiturate.  Urine showed 500 leukocyte esterase with budding yeast and 1+ bacteria.  CMP remarkable for a potassium of 5.5, chloride of 110, creatinine of 1.46 as well as an EGFR of 39.  Calcium was 8.1.  Alcohol level normal.  Lactate 0.7.  CBC showed no leukocytosis but was remarkable for an anemia.  Chest  x-ray showed NG tube tip not definitively confirmed slightly obscured by the patient's spinal fusion hardware may be reflected upon itself in the distal esophagus.  There is limited visualization of the ET tube with its tip approximately 2.5 cm above the michi extensive lung airspace disease and large left effusion worsening from prior.  ICU was consulted and agreed to admission for altered mental status and lethargy.    Patient had a high probability of life-threatening deterioration due to injuries or symptoms concerning for altered mental status and unresponsiveness requiring intubation requiring my direct attention, intervention and management. I spent 42 minutes of critical care time with this patient involving bedside care, chart review, interpreting labs and diagnostics, and consultations.  Excluding separately billable procedures.    I performed a sepsis reperfusion exam on Narda Malloy on 10/12/2024 at 1550    A targeted fluid bolus of 2979mL was given.     Barbara Jacobs PA-C      Vitals:    Vitals:    10/29/24 1300 10/29/24 1330 10/29/24 1400 10/29/24 1500   BP: 105/53 104/53 115/55 (!) 101/44   Pulse: 62 60 77 60   Resp: 15 14 14 15   Temp:       TempSrc:       SpO2: 97% 98% 98% 96%   Weight:       Height:         History Limited by:    Altered Mental Status/Confusion    Independent history obtained from:    None    External records reviewed:    Inpatient Notes/Discharge Summary from previous hospitalization discharge summary on 10/4/2024    Diagnostics interpreted by me:    Xrays - see my independent interpretation in MDM    Discussions with other clinicians:    Hospitalist/Admitting Team Dr. Ralph and intensivist team    Chronic conditions impacting care:    None    Social determinants of health affecting care:    None    Diagnostic tests considered but not performed: None    Prescription drugs considered:    None    Screenings:              Procedure  Intubation    Performed by: Barbara Jacobs  COBY  Authorized by: Cris Lucero MD    Consent:     Consent obtained:  Emergent situation    Risks discussed:  Dental trauma, laryngeal injury, pneumothorax, hypoxia, death, bleeding, aspiration and brain injury    Alternatives discussed:  No treatment  Universal protocol:     Test results available: yes      Imaging studies available: yes      Immediately prior to procedure, a time out was called: yes      Patient identity confirmed:  Arm band  Pre-procedure details:     Indications: airway protection and altered consciousness      Patient status:  Unresponsive    Look externally: no concerns      Mouth opening - incisor distance:  3 or more finger widths    Mallampati score:  II    Obstruction: none      Neck mobility: normal      Induction agents:  Etomidate    Paralytics:  Succinylcholine  Procedure details:     Preoxygenation:  Bag valve mask    CPR in progress: no      Number of attempts:  1  Successful intubation attempt details:     Intubation method:  Oral    Intubation technique: video assisted      Laryngoscope blade:  Mac 4    Tube size (mm):  8.0    Tube visualized through cords: yes    Placement assessment:     Tube secured with:  ETT hinojosa    Breath sounds:  Equal    Placement verification: chest rise and CXR verification      CXR findings:  Appropriate position  Post-procedure details:     Procedure completion:  Tolerated well, no immediate complications         Barbara Jacobs PA-C  10/29/24 1521

## 2024-10-13 ENCOUNTER — APPOINTMENT (OUTPATIENT)
Dept: RADIOLOGY | Facility: HOSPITAL | Age: 69
End: 2024-10-13

## 2024-10-13 ENCOUNTER — APPOINTMENT (OUTPATIENT)
Dept: RADIOLOGY | Facility: HOSPITAL | Age: 69
End: 2024-10-13
Payer: MEDICARE

## 2024-10-13 VITALS
OXYGEN SATURATION: 96 % | TEMPERATURE: 97.5 F | HEIGHT: 70 IN | RESPIRATION RATE: 18 BRPM | WEIGHT: 220.24 LBS | SYSTOLIC BLOOD PRESSURE: 112 MMHG | DIASTOLIC BLOOD PRESSURE: 56 MMHG | HEART RATE: 63 BPM | BODY MASS INDEX: 31.53 KG/M2

## 2024-10-13 LAB
ALBUMIN SERPL BCP-MCNC: 1.9 G/DL (ref 3.4–5)
ALBUMIN SERPL BCP-MCNC: 2.3 G/DL (ref 3.4–5)
ANION GAP BLDA CALCULATED.4IONS-SCNC: 12 MMO/L (ref 10–25)
ANION GAP SERPL CALCULATED.3IONS-SCNC: 13 MMOL/L (ref 10–20)
ANION GAP SERPL CALCULATED.3IONS-SCNC: 15 MMOL/L (ref 10–20)
ARTERIAL PATENCY WRIST A: POSITIVE
BACTERIA BLD CULT: NORMAL
BACTERIA BLD CULT: NORMAL
BASE EXCESS BLDA CALC-SCNC: -7.6 MMOL/L (ref -2–3)
BASOPHILS # BLD AUTO: 0.02 X10*3/UL (ref 0–0.1)
BASOPHILS NFR BLD AUTO: 0.1 %
BODY TEMPERATURE: 37 DEGREES CELSIUS
BUN SERPL-MCNC: 42 MG/DL (ref 6–23)
BUN SERPL-MCNC: 43 MG/DL (ref 6–23)
CA-I BLDA-SCNC: 1.18 MMOL/L (ref 1.1–1.33)
CALCIUM SERPL-MCNC: 7.7 MG/DL (ref 8.6–10.3)
CALCIUM SERPL-MCNC: 7.8 MG/DL (ref 8.6–10.3)
CHLORIDE BLDA-SCNC: 110 MMOL/L (ref 98–107)
CHLORIDE SERPL-SCNC: 112 MMOL/L (ref 98–107)
CHLORIDE SERPL-SCNC: 112 MMOL/L (ref 98–107)
CO2 SERPL-SCNC: 16 MMOL/L (ref 21–32)
CO2 SERPL-SCNC: 18 MMOL/L (ref 21–32)
CREAT SERPL-MCNC: 1.33 MG/DL (ref 0.5–1.05)
CREAT SERPL-MCNC: 1.43 MG/DL (ref 0.5–1.05)
EGFRCR SERPLBLD CKD-EPI 2021: 40 ML/MIN/1.73M*2
EGFRCR SERPLBLD CKD-EPI 2021: 44 ML/MIN/1.73M*2
EOSINOPHIL # BLD AUTO: 0.01 X10*3/UL (ref 0–0.7)
EOSINOPHIL NFR BLD AUTO: 0.1 %
ERYTHROCYTE [DISTWIDTH] IN BLOOD BY AUTOMATED COUNT: 17.2 % (ref 11.5–14.5)
GLUCOSE BLD MANUAL STRIP-MCNC: 115 MG/DL (ref 74–99)
GLUCOSE BLD MANUAL STRIP-MCNC: 128 MG/DL (ref 74–99)
GLUCOSE BLD MANUAL STRIP-MCNC: 134 MG/DL (ref 74–99)
GLUCOSE BLD MANUAL STRIP-MCNC: 139 MG/DL (ref 74–99)
GLUCOSE BLD MANUAL STRIP-MCNC: 144 MG/DL (ref 74–99)
GLUCOSE BLD MANUAL STRIP-MCNC: 147 MG/DL (ref 74–99)
GLUCOSE BLDA-MCNC: 168 MG/DL (ref 74–99)
GLUCOSE SERPL-MCNC: 143 MG/DL (ref 74–99)
GLUCOSE SERPL-MCNC: 143 MG/DL (ref 74–99)
GLUCOSE SERPL-MCNC: 171 MG/DL (ref 74–99)
HCO3 BLDA-SCNC: 17.3 MMOL/L (ref 22–26)
HCT VFR BLD AUTO: 30.1 % (ref 36–46)
HCT VFR BLD EST: 27 % (ref 36–46)
HGB BLD-MCNC: 9.7 G/DL (ref 12–16)
HGB BLDA-MCNC: 9.1 G/DL (ref 12–16)
HOLD SPECIMEN: NORMAL
IMM GRANULOCYTES # BLD AUTO: 0.18 X10*3/UL (ref 0–0.7)
IMM GRANULOCYTES NFR BLD AUTO: 1.1 % (ref 0–0.9)
INHALED O2 CONCENTRATION: 50 %
LACTATE BLDA-SCNC: 0.7 MMOL/L (ref 0.4–2)
LACTATE SERPL-SCNC: 1.4 MMOL/L (ref 0.4–2)
LYMPHOCYTES # BLD AUTO: 1.84 X10*3/UL (ref 1.2–4.8)
LYMPHOCYTES NFR BLD AUTO: 11.5 %
MAGNESIUM SERPL-MCNC: 1.91 MG/DL (ref 1.6–2.4)
MCH RBC QN AUTO: 30.4 PG (ref 26–34)
MCHC RBC AUTO-ENTMCNC: 32.2 G/DL (ref 32–36)
MCV RBC AUTO: 94 FL (ref 80–100)
MONOCYTES # BLD AUTO: 0.66 X10*3/UL (ref 0.1–1)
MONOCYTES NFR BLD AUTO: 4.1 %
NEUTROPHILS # BLD AUTO: 13.23 X10*3/UL (ref 1.2–7.7)
NEUTROPHILS NFR BLD AUTO: 83.1 %
NRBC BLD-RTO: 0.3 /100 WBCS (ref 0–0)
OXYHGB MFR BLDA: 97.6 % (ref 94–98)
PCO2 BLDA: 32 MM HG (ref 38–42)
PEEP CMH2O: 5 CM H2O
PH BLDA: 7.34 PH (ref 7.38–7.42)
PHOSPHATE SERPL-MCNC: 4.3 MG/DL (ref 2.5–4.9)
PHOSPHATE SERPL-MCNC: 4.4 MG/DL (ref 2.5–4.9)
PLATELET # BLD AUTO: 480 X10*3/UL (ref 150–450)
PO2 BLDA: 141 MM HG (ref 85–95)
POTASSIUM BLDA-SCNC: 4.5 MMOL/L (ref 3.5–5.3)
POTASSIUM SERPL-SCNC: 4.5 MMOL/L (ref 3.5–5.3)
POTASSIUM SERPL-SCNC: 5 MMOL/L (ref 3.5–5.3)
RBC # BLD AUTO: 3.19 X10*6/UL (ref 4–5.2)
SAO2 % BLDA: 100 % (ref 94–100)
SODIUM BLDA-SCNC: 135 MMOL/L (ref 136–145)
SODIUM SERPL-SCNC: 138 MMOL/L (ref 136–145)
SODIUM SERPL-SCNC: 138 MMOL/L (ref 136–145)
SPECIMEN DRAWN FROM PATIENT: ABNORMAL
TIDAL VOLUME: 450 ML
VANCOMYCIN SERPL-MCNC: 53.9 UG/ML (ref 5–20)
VENTILATOR MODE: ABNORMAL
VENTILATOR RATE: 18 BPM
WBC # BLD AUTO: 15.9 X10*3/UL (ref 4.4–11.3)

## 2024-10-13 PROCEDURE — 2500000002 HC RX 250 W HCPCS SELF ADMINISTERED DRUGS (ALT 637 FOR MEDICARE OP, ALT 636 FOR OP/ED): Performed by: PHYSICIAN ASSISTANT

## 2024-10-13 PROCEDURE — 82947 ASSAY GLUCOSE BLOOD QUANT: CPT | Performed by: PHYSICIAN ASSISTANT

## 2024-10-13 PROCEDURE — 82947 ASSAY GLUCOSE BLOOD QUANT: CPT

## 2024-10-13 PROCEDURE — 2500000005 HC RX 250 GENERAL PHARMACY W/O HCPCS

## 2024-10-13 PROCEDURE — 94003 VENT MGMT INPAT SUBQ DAY: CPT

## 2024-10-13 PROCEDURE — 37799 UNLISTED PX VASCULAR SURGERY: CPT

## 2024-10-13 PROCEDURE — 80202 ASSAY OF VANCOMYCIN: CPT

## 2024-10-13 PROCEDURE — 71045 X-RAY EXAM CHEST 1 VIEW: CPT

## 2024-10-13 PROCEDURE — 80069 RENAL FUNCTION PANEL: CPT

## 2024-10-13 PROCEDURE — P9045 ALBUMIN (HUMAN), 5%, 250 ML: HCPCS | Mod: JZ

## 2024-10-13 PROCEDURE — 2500000005 HC RX 250 GENERAL PHARMACY W/O HCPCS: Performed by: EMERGENCY MEDICINE

## 2024-10-13 PROCEDURE — 2500000004 HC RX 250 GENERAL PHARMACY W/ HCPCS (ALT 636 FOR OP/ED): Performed by: PHYSICIAN ASSISTANT

## 2024-10-13 PROCEDURE — 99291 CRITICAL CARE FIRST HOUR: CPT

## 2024-10-13 PROCEDURE — 84132 ASSAY OF SERUM POTASSIUM: CPT

## 2024-10-13 PROCEDURE — 83735 ASSAY OF MAGNESIUM: CPT

## 2024-10-13 PROCEDURE — 71045 X-RAY EXAM CHEST 1 VIEW: CPT | Performed by: STUDENT IN AN ORGANIZED HEALTH CARE EDUCATION/TRAINING PROGRAM

## 2024-10-13 PROCEDURE — 2500000004 HC RX 250 GENERAL PHARMACY W/ HCPCS (ALT 636 FOR OP/ED): Performed by: INTERNAL MEDICINE

## 2024-10-13 PROCEDURE — 2500000004 HC RX 250 GENERAL PHARMACY W/ HCPCS (ALT 636 FOR OP/ED)

## 2024-10-13 PROCEDURE — 94640 AIRWAY INHALATION TREATMENT: CPT

## 2024-10-13 PROCEDURE — 2500000001 HC RX 250 WO HCPCS SELF ADMINISTERED DRUGS (ALT 637 FOR MEDICARE OP)

## 2024-10-13 PROCEDURE — 2500000002 HC RX 250 W HCPCS SELF ADMINISTERED DRUGS (ALT 637 FOR MEDICARE OP, ALT 636 FOR OP/ED)

## 2024-10-13 PROCEDURE — 2020000001 HC ICU ROOM DAILY

## 2024-10-13 PROCEDURE — 84145 PROCALCITONIN (PCT): CPT | Mod: WESLAB

## 2024-10-13 PROCEDURE — 85025 COMPLETE CBC W/AUTO DIFF WBC: CPT

## 2024-10-13 PROCEDURE — 36415 COLL VENOUS BLD VENIPUNCTURE: CPT

## 2024-10-13 PROCEDURE — 83605 ASSAY OF LACTIC ACID: CPT

## 2024-10-13 PROCEDURE — 87077 CULTURE AEROBIC IDENTIFY: CPT | Mod: WESLAB | Performed by: INTERNAL MEDICINE

## 2024-10-13 RX ORDER — SODIUM CHLORIDE, SODIUM LACTATE, POTASSIUM CHLORIDE, CALCIUM CHLORIDE 600; 310; 30; 20 MG/100ML; MG/100ML; MG/100ML; MG/100ML
100 INJECTION, SOLUTION INTRAVENOUS CONTINUOUS
Status: DISCONTINUED | OUTPATIENT
Start: 2024-10-13 | End: 2024-10-13

## 2024-10-13 RX ORDER — ALBUMIN HUMAN 50 G/1000ML
12.5 SOLUTION INTRAVENOUS ONCE
Status: COMPLETED | OUTPATIENT
Start: 2024-10-13 | End: 2024-10-13

## 2024-10-13 RX ORDER — NOREPINEPHRINE BITARTRATE/D5W 8 MG/250ML
0-.2 PLASTIC BAG, INJECTION (ML) INTRAVENOUS CONTINUOUS
Status: DISCONTINUED | OUTPATIENT
Start: 2024-10-13 | End: 2024-10-15

## 2024-10-13 RX ORDER — FUROSEMIDE 10 MG/ML
40 INJECTION INTRAMUSCULAR; INTRAVENOUS ONCE
Status: COMPLETED | OUTPATIENT
Start: 2024-10-13 | End: 2024-10-13

## 2024-10-13 RX ORDER — IPRATROPIUM BROMIDE AND ALBUTEROL SULFATE 2.5; .5 MG/3ML; MG/3ML
3 SOLUTION RESPIRATORY (INHALATION)
Status: DISCONTINUED | OUTPATIENT
Start: 2024-10-13 | End: 2024-10-17

## 2024-10-13 RX ADMIN — DOCUSATE SODIUM 100 MG: 100 CAPSULE, LIQUID FILLED ORAL at 20:36

## 2024-10-13 RX ADMIN — IPRATROPIUM BROMIDE AND ALBUTEROL SULFATE 3 ML: 2.5; .5 SOLUTION RESPIRATORY (INHALATION) at 15:17

## 2024-10-13 RX ADMIN — NOREPINEPHRINE BITARTRATE 0.02 MCG/KG/MIN: 8 INJECTION, SOLUTION INTRAVENOUS at 13:06

## 2024-10-13 RX ADMIN — Medication 1 TABLET: at 09:47

## 2024-10-13 RX ADMIN — FUROSEMIDE 40 MG: 10 INJECTION, SOLUTION INTRAMUSCULAR; INTRAVENOUS at 13:40

## 2024-10-13 RX ADMIN — HEPARIN SODIUM 5000 UNITS: 5000 INJECTION, SOLUTION INTRAVENOUS; SUBCUTANEOUS at 13:41

## 2024-10-13 RX ADMIN — Medication 50 PERCENT: at 08:12

## 2024-10-13 RX ADMIN — PRIMIDONE 125 MG: 250 TABLET ORAL at 20:36

## 2024-10-13 RX ADMIN — ALBUMIN (HUMAN) 12.5 G: 12.5 INJECTION, SOLUTION INTRAVENOUS at 09:00

## 2024-10-13 RX ADMIN — BRIMONIDINE TARTRATE 1 DROP: 2 SOLUTION OPHTHALMIC at 20:41

## 2024-10-13 RX ADMIN — CEFEPIME 2 G: 2 INJECTION, POWDER, FOR SOLUTION INTRAVENOUS at 09:00

## 2024-10-13 RX ADMIN — LATANOPROST 1 DROP: 50 SOLUTION OPHTHALMIC at 20:36

## 2024-10-13 RX ADMIN — PRIMIDONE 125 MG: 250 TABLET ORAL at 16:24

## 2024-10-13 RX ADMIN — SODIUM ZIRCONIUM CYCLOSILICATE 10 G: 10 POWDER, FOR SUSPENSION ORAL at 05:55

## 2024-10-13 RX ADMIN — PANTOPRAZOLE SODIUM 40 MG: 40 INJECTION, POWDER, FOR SOLUTION INTRAVENOUS at 06:00

## 2024-10-13 RX ADMIN — SODIUM ZIRCONIUM CYCLOSILICATE 10 G: 10 POWDER, FOR SUSPENSION ORAL at 13:07

## 2024-10-13 RX ADMIN — PROPOFOL 10 MCG/KG/MIN: 10 INJECTION, EMULSION INTRAVENOUS at 21:47

## 2024-10-13 RX ADMIN — PRIMIDONE 125 MG: 250 TABLET ORAL at 09:47

## 2024-10-13 RX ADMIN — CEFEPIME 2 G: 2 INJECTION, POWDER, FOR SOLUTION INTRAVENOUS at 20:36

## 2024-10-13 RX ADMIN — IPRATROPIUM BROMIDE AND ALBUTEROL SULFATE 3 ML: 2.5; .5 SOLUTION RESPIRATORY (INHALATION) at 10:57

## 2024-10-13 RX ADMIN — EZETIMIBE 10 MG: 10 TABLET ORAL at 09:52

## 2024-10-13 RX ADMIN — Medication 35 PERCENT: at 21:04

## 2024-10-13 RX ADMIN — NOREPINEPHRINE BITARTRATE 0.02 MCG/KG/MIN: 8 INJECTION, SOLUTION INTRAVENOUS at 17:01

## 2024-10-13 RX ADMIN — SODIUM CHLORIDE, SODIUM LACTATE, POTASSIUM CHLORIDE, AND CALCIUM CHLORIDE 100 ML/HR: 600; 310; 30; 20 INJECTION, SOLUTION INTRAVENOUS at 06:23

## 2024-10-13 RX ADMIN — HEPARIN SODIUM 5000 UNITS: 5000 INJECTION, SOLUTION INTRAVENOUS; SUBCUTANEOUS at 21:45

## 2024-10-13 RX ADMIN — BRIMONIDINE TARTRATE 1 DROP: 2 SOLUTION OPHTHALMIC at 09:48

## 2024-10-13 RX ADMIN — ALBUMIN HUMAN 12.5 G: 0.05 INJECTION, SOLUTION INTRAVENOUS at 11:19

## 2024-10-13 RX ADMIN — HEPARIN SODIUM 5000 UNITS: 5000 INJECTION, SOLUTION INTRAVENOUS; SUBCUTANEOUS at 06:27

## 2024-10-13 RX ADMIN — IPRATROPIUM BROMIDE AND ALBUTEROL SULFATE 3 ML: 2.5; .5 SOLUTION RESPIRATORY (INHALATION) at 17:14

## 2024-10-13 RX ADMIN — DOCUSATE SODIUM 100 MG: 100 CAPSULE, LIQUID FILLED ORAL at 09:47

## 2024-10-13 RX ADMIN — SODIUM ZIRCONIUM CYCLOSILICATE 10 G: 10 POWDER, FOR SUSPENSION ORAL at 20:36

## 2024-10-13 ASSESSMENT — PAIN - FUNCTIONAL ASSESSMENT
PAIN_FUNCTIONAL_ASSESSMENT: CPOT (CRITICAL CARE PAIN OBSERVATION TOOL)

## 2024-10-13 ASSESSMENT — PAIN SCALES - GENERAL: PAINLEVEL_OUTOF10: 0 - NO PAIN

## 2024-10-13 NOTE — CARE PLAN
Problem: Safety - Medical Restraint  Goal: Remains free of injury from restraints (Restraint for Interference with Medical Device)  10/13/2024 0804 by Nick Blackman RN  Outcome: Progressing  10/13/2024 0804 by Nick Blackman RN  Outcome: Progressing  Goal: Free from restraint(s) (Restraint for Interference with Medical Device)  10/13/2024 0804 by Nick Blackman RN  Outcome: Progressing  10/13/2024 0804 by Nick Blackman RN  Outcome: Progressing     Problem: Pain - Adult  Goal: Verbalizes/displays adequate comfort level or baseline comfort level  10/13/2024 0804 by Nick Blackman RN  Outcome: Progressing  10/13/2024 0804 by Nick Blackman RN  Outcome: Progressing     Problem: Safety - Adult  Goal: Free from fall injury  10/13/2024 0804 by Nick Blackman RN  Outcome: Progressing  10/13/2024 0804 by Nick Blackman RN  Outcome: Progressing     Problem: Discharge Planning  Goal: Discharge to home or other facility with appropriate resources  10/13/2024 0804 by Nick Blackman RN  Outcome: Progressing  10/13/2024 0804 by Nick Blackman RN  Outcome: Progressing     Problem: Chronic Conditions and Co-morbidities  Goal: Patient's chronic conditions and co-morbidity symptoms are monitored and maintained or improved  10/13/2024 0804 by Nick Blackman RN  Outcome: Progressing  10/13/2024 0804 by Nick Blackman RN  Outcome: Progressing     Problem: Diabetes  Goal: Achieve decreasing blood glucose levels by end of shift  10/13/2024 0804 by Nick Blackman RN  Outcome: Progressing  10/13/2024 0804 by Nick Blackman RN  Outcome: Progressing  Goal: Increase stability of blood glucose readings by end of shift  10/13/2024 0804 by Nick Blackman RN  Outcome: Progressing  10/13/2024 0804 by Nick Blackman RN  Outcome: Progressing  Goal: Decrease in ketones present in urine by end of shift  10/13/2024 0804 by Nick LINDSEY  JEAN MARIE Blackman  Outcome: Progressing  10/13/2024 0804 by Nick Blackman RN  Outcome: Progressing  Goal: Maintain electrolyte levels within acceptable range throughout shift  10/13/2024 0804 by Nick Blackman RN  Outcome: Progressing  10/13/2024 0804 by Nick Blackman RN  Outcome: Progressing  Goal: Maintain glucose levels >70mg/dl to <250mg/dl throughout shift  10/13/2024 0804 by Nick Blackman RN  Outcome: Progressing  10/13/2024 0804 by Nick Blackman RN  Outcome: Progressing  Goal: No changes in neurological exam by end of shift  10/13/2024 0804 by Nick Blackman RN  Outcome: Progressing  10/13/2024 0804 by Nick Blackman RN  Outcome: Progressing  Goal: Learn about and adhere to nutrition recommendations by end of shift  10/13/2024 0804 by Nick Blackman RN  Outcome: Progressing  10/13/2024 0804 by Nick Blackman RN  Outcome: Progressing  Goal: Vital signs within normal range for age by end of shift  10/13/2024 0804 by Nick Blackman RN  Outcome: Progressing  10/13/2024 0804 by Nick Blackman RN  Outcome: Progressing  Goal: Increase self care and/or family involovement by end of shift  10/13/2024 0804 by Nick Blackman RN  Outcome: Progressing  10/13/2024 0804 by Nick Blackman RN  Outcome: Progressing  Goal: Receive DSME education by end of shift  10/13/2024 0804 by Nick Blackman RN  Outcome: Progressing  10/13/2024 0804 by Nick Blackman RN  Outcome: Progressing     Problem: Knowledge Deficit  Goal: Patient/family/caregiver demonstrates understanding of disease process, treatment plan, medications, and discharge instructions  10/13/2024 0804 by Nick Blackman RN  Outcome: Progressing  10/13/2024 0804 by Nick Blackman RN  Outcome: Progressing     Problem: Mechanical Ventilation  Goal: Patient Will Maintain Patent Airway  10/13/2024 0804 by Nick Blackman RN  Outcome:  Progressing  10/13/2024 0804 by Nick Blackman RN  Outcome: Progressing  Goal: Oral health is maintained or improved  10/13/2024 0804 by Nick Blackman RN  Outcome: Progressing  10/13/2024 0804 by Nick Blackman RN  Outcome: Progressing  Goal: ET tube will be managed safely  10/13/2024 0804 by Nick Blackman RN  Outcome: Progressing  10/13/2024 0804 by Nick Blackman RN  Outcome: Progressing  Goal: Ability to express needs and understand communication  10/13/2024 0804 by Nick Blackman RN  Outcome: Progressing  10/13/2024 0804 by Nick Blackman RN  Outcome: Progressing  Goal: Mobility/activity is maintained at optimum level for patient  10/13/2024 0804 by Nick Blackman RN  Outcome: Progressing  10/13/2024 0804 by Nick Blackman RN  Outcome: Progressing     Problem: Skin  Goal: Decreased wound size/increased tissue granulation at next dressing change  10/13/2024 0804 by Nick Blackman RN  Outcome: Progressing  10/13/2024 0804 by Nick Blackman RN  Outcome: Progressing  Goal: Participates in plan/prevention/treatment measures  10/13/2024 0804 by Nick Blackman RN  Outcome: Progressing  10/13/2024 0804 by Nick Blackman RN  Outcome: Progressing  Goal: Prevent/manage excess moisture  10/13/2024 0804 by Nick Blackman RN  Outcome: Progressing  10/13/2024 0804 by Nick Blackman RN  Outcome: Progressing  Goal: Prevent/minimize sheer/friction injuries  10/13/2024 0804 by Nick Blackman RN  Outcome: Progressing  10/13/2024 0804 by Nick Blackman RN  Outcome: Progressing  Goal: Promote/optimize nutrition  10/13/2024 0804 by Nick Blackman RN  Outcome: Progressing  10/13/2024 0804 by Nick Blackman RN  Outcome: Progressing  Goal: Promote skin healing  10/13/2024 0804 by Nick Blackman RN  Outcome: Progressing  10/13/2024 0804 by Nick Blackman RN  Outcome: Progressing     Problem:  Nutrition  Goal: Less than 5 days NPO/clear liquids  10/13/2024 0804 by Nick Blackman RN  Outcome: Progressing  10/13/2024 0804 by Nick Blackman RN  Outcome: Progressing  Goal: Oral intake greater than 50%  10/13/2024 0804 by Nick Blackman RN  Outcome: Progressing  10/13/2024 0804 by Nick Blackman RN  Outcome: Progressing  Goal: Oral intake greater 75%  10/13/2024 0804 by Nick Blackman RN  Outcome: Progressing  10/13/2024 0804 by Nick Blackman RN  Outcome: Progressing  Goal: Consume prescribed supplement  10/13/2024 0804 by Nick Blackman RN  Outcome: Progressing  10/13/2024 0804 by Nick Blackman RN  Outcome: Progressing  Goal: Adequate PO fluid intake  10/13/2024 0804 by Nick Blackman RN  Outcome: Progressing  10/13/2024 0804 by Nick Blackman RN  Outcome: Progressing  Goal: Nutrition support goals are met within 48 hrs  10/13/2024 0804 by Nick Blackman RN  Outcome: Progressing  10/13/2024 0804 by Nick Blackman RN  Outcome: Progressing  Goal: Nutrition support is meeting 75% of nutrient needs  10/13/2024 0804 by Nick Blackman RN  Outcome: Progressing  10/13/2024 0804 by Nick Blackman RN  Outcome: Progressing  Goal: Tube feed tolerance  10/13/2024 0804 by Nick Blackman RN  Outcome: Progressing  10/13/2024 0804 by Nick Blackman RN  Outcome: Progressing  Goal: BG  mg/dL  10/13/2024 0804 by Nick Blackman RN  Outcome: Progressing  10/13/2024 0804 by Nick Blackman RN  Outcome: Progressing  Goal: Lab values WNL  10/13/2024 0804 by Nick Blackman RN  Outcome: Progressing  10/13/2024 0804 by Nick Blackman RN  Outcome: Progressing  Goal: Electrolytes WNL  10/13/2024 0804 by Nick S Hiral, RN  Outcome: Progressing  10/13/2024 0804 by Nick Blackman RN  Outcome: Progressing  Goal: Promote healing  10/13/2024 0804 by Nick Blackman RN  Outcome:  Progressing  10/13/2024 0804 by Nick Blackman RN  Outcome: Progressing  Goal: Maintain stable weight  10/13/2024 0804 by Nick Blackman RN  Outcome: Progressing  10/13/2024 0804 by Nick Blackman RN  Outcome: Progressing  Goal: Reduce weight from edema/fluid  10/13/2024 0804 by Nick Blackman RN  Outcome: Progressing  10/13/2024 0804 by Nick Blackman RN  Outcome: Progressing   The patient's goals for the shift include      The clinical goals for the shift include wean pressors    Over the shift, the patient did not make progress toward the following goals. Barriers to progression include  . Recommendations to address these barriers include  .

## 2024-10-13 NOTE — CARE PLAN
Problem: Safety - Medical Restraint  Goal: Remains free of injury from restraints (Restraint for Interference with Medical Device)  Outcome: Progressing  Goal: Free from restraint(s) (Restraint for Interference with Medical Device)  Outcome: Progressing     Problem: Pain - Adult  Goal: Verbalizes/displays adequate comfort level or baseline comfort level  Outcome: Progressing     Problem: Safety - Adult  Goal: Free from fall injury  Outcome: Progressing     Problem: Mechanical Ventilation  Goal: Patient Will Maintain Patent Airway  Outcome: Progressing  Goal: Oral health is maintained or improved  Outcome: Progressing  Goal: ET tube will be managed safely  Outcome: Progressing  Goal: Ability to express needs and understand communication  Outcome: Progressing  Goal: Mobility/activity is maintained at optimum level for patient  Outcome: Progressing     Problem: Skin  Goal: Decreased wound size/increased tissue granulation at next dressing change  Outcome: Progressing  Flowsheets (Taken 10/13/2024 1944)  Decreased wound size/increased tissue granulation at next dressing change: Protective dressings over bony prominences  Goal: Participates in plan/prevention/treatment measures  Outcome: Progressing  Flowsheets (Taken 10/13/2024 1944)  Participates in plan/prevention/treatment measures: Elevate heels  Goal: Prevent/manage excess moisture  Outcome: Progressing  Flowsheets (Taken 10/13/2024 1944)  Prevent/manage excess moisture:   Moisturize dry skin   Monitor for/manage infection if present  Goal: Prevent/minimize sheer/friction injuries  Outcome: Progressing  Flowsheets (Taken 10/13/2024 1944)  Prevent/minimize sheer/friction injuries:   HOB 30 degrees or less   Turn/reposition every 2 hours/use positioning/transfer devices   Use pull sheet  Goal: Promote/optimize nutrition  Outcome: Progressing  Flowsheets (Taken 10/13/2024 1944)  Promote/optimize nutrition: Monitor/record intake including meals  Goal: Promote skin  healing  Outcome: Progressing  Flowsheets (Taken 10/13/2024 1944)  Promote skin healing: Turn/reposition every 2 hours/use positioning/transfer devices   The patient's goals for the shift include      The clinical goals for the shift include keep comfortable, wean off sedation and pressors

## 2024-10-13 NOTE — PROGRESS NOTES
Vancomycin Dosing by Pharmacy- [facility]    Narda Malloy is a 69 y.o. year old female who Pharmacy has been consulted for vancomycin dosing for Vancomycin Indications: Other: Surgical Wound Infection . Based on the patient's indication and renal status this patient will be dosed based on a target level of SSTI/UTI: 10-15 mcg/mL    Patient is currently in [facility]    Last vancomycin dose (incl. date and time): Patient receiving vancomycin at [facility] (unknown date/time of last dose)    Vancomycin level (incl. date and time): 53.9 mcg/mL on 10/13 at 0500    Lab Results   Component Value Date    VANCORANDOM 53.9 (HH) 10/13/2024    VANCOTROUGH 11.8 08/08/2018       Visit Vitals  BP 92/63   Pulse 66   Temp 37.1 °C (98.8 °F) (Esophageal)   Resp 26           Lab Results   Component Value Date    CREATININE 1.43 (H) 10/13/2024    CREATININE 1.34 (H) 10/12/2024    CREATININE 1.46 (H) 10/12/2024    CREATININE 0.66 10/04/2024       I/O last 3 completed shifts:  In: 4003.3 (40.1 mL/kg) [I.V.:1260.4 (12.6 mL/kg); NG/GT:210; IV Piggyback:2532.9]  Out: 125 (1.3 mL/kg) [Urine:125 (0 mL/kg/hr)]  Weight: 99.9 kg     Assessment/Plan     Level is above target trough goal.   hold vancomycin.  Random level within 24 hours will be obtained on 10/14 at 0500. May be obtained sooner if clinically indicated.   Will continue to monitor renal function daily while on vancomycin and order serum creatinine at least every 48 hours if not already ordered.  Follow for continued vancomycin needs, clinical response, and signs/symptoms of toxicity.     Megan Yates, PharmD

## 2024-10-13 NOTE — CONSULTS
Infectious Diseases Initial Consultation    Referred by: Venancio Ralph  Physician seeing patient: Rachel Barraza MD: Matthew Muñoz MD  Reason For Consult: Concern for sepsis     History Of Present Illness  Narda Malloy is a 69 y.o. female with a past medical history of type 2 DM and L1-L3 lumbar laminectomy revision, L1-L2 osteotomy, transforaminal lumbar interbody fusion, L4-S1 fusion in Aug 2024. This was complicated by MRSA and Proteus infection. Patient had OR washout 9/28 with purulence below fascia. She was discharged on IV vancomycin and IV ceftriaxone. Patient has been at a facility. She was reportedly found unresponsive at facility, and she was transferred here to [facility], [place]. She is intubated in the ICU. She is requiring levophed. She was initially hypothermic. WBC count 15. Blood and urine cultures obtained. Has a PICC line. CT L spine showed post-surgical changes, similar to prior.      Past Medical History  She has a past medical history of Degenerative myopia, bilateral, Diabetic neuropathy (Multi), Difficult intubation (08/26/2024), DM type 2 (diabetes mellitus, type 2) (Multi), Dry eye syndrome of bilateral lacrimal glands, Essential hypertension, Essential tremor, Glaucoma, Hyperlipidemia, Long term (current) use of insulin (Multi), Low back pain, PONV (postoperative nausea and vomiting), Primary open angle glaucoma of both eyes, severe stage, Repeated falls, Sarcoidosis of lung (Multi), Spinal stenosis, lumbar region without neurogenic claudication, and Weakness.    Surgical History  She has a past surgical history that includes Carpal tunnel release; Vitrectomy (Right, 2013); Cataract extraction w/  intraocular lens implant (Bilateral); Panretinal photocoagulation (2014); Foot surgery; Insertion / removal cranial DBS generator; Thoracic Fusion (08/26/2024); Lumbar fusion; Blepharoplasty (07/2022); and Breast surgery (05/20/2022).     Social History  She reports that she has quit  smoking. Her smoking use included cigarettes. She has been exposed to tobacco smoke. She has never used smokeless tobacco. She reports that she does not currently use alcohol. She reports that she does not currently use drugs.   Travel Screening       Question Response    Do you have any of the following new or worsening symptoms? None of these    In the last 10 days, have you been in contact with someone who was confirmed or suspected to have Coronavirus/COVID-19? No / Unsure    Have you had a COVID-19 viral test in the last 10 days? No    Have you traveled internationally or domestically in the last month? No          Travel History   Travel since 09/13/24    No documented travel since 09/13/24         Family History  Family History   Problem Relation Name Age of Onset    Multiple myeloma Mother      Cancer Mother      Other (CABG) Father      Pulmonary embolism Father      Heart disease Father      Breast cancer Sister          Stage II    Hypertension Sister      Diabetes Sister      No Known Problems Sister          x5    No Known Problems Brother          x4    No Known Problems Daughter         Allergies  Erythromycin, Morphine, and Rosuvastatin   Immunization History   Administered Date(s) Administered    Flu vaccine, quadrivalent, high-dose, preservative free, age 65y+ (FLUZONE) 11/29/2021, 11/30/2022    Influenza, injectable, quadrivalent 10/09/2017, 10/23/2018, 10/28/2019, 11/19/2020    Influenza, seasonal, injectable 10/14/2009, 10/20/2011, 10/26/2012, 10/29/2013, 10/20/2014, 10/12/2015, 09/28/2016    Moderna SARS-CoV-2 Vaccination 04/30/2021, 06/22/2021    Pneumococcal conjugate vaccine, 13-valent (PREVNAR 13) 11/29/2021    Pneumococcal polysaccharide vaccine, 23-valent, age 2 years and older (PNEUMOVAX 23) 10/20/2014    Tdap vaccine, age 7 year and older (BOOSTRIX, ADACEL) 10/29/2013, 11/29/2021    Zoster, live 09/28/2016       Review of Systems  Unable to obtain, intubated      Physical Exam  Vitals:  "Reviewed   General: intubated  CV: RRR  Resp: diminished at lung bases, + ET tube   Abd: soft, nondistended  Ext: no joint swelling, extremities are cold to touch  Skin: no rashes     Range of Vitals (last 24 hours)  Heart Rate:  [52-81]   Temp:  [34.5 °C (94.1 °F)-37.3 °C (99.1 °F)]   Resp:  [12-28]   BP: ()/()   Height:  [175.3 cm (5' 9\")-177.8 cm (5' 10\")]   Weight:  [98.8 kg (217 lb 13 oz)-99.9 kg (220 lb 3.8 oz)]   SpO2:  [93 %-100 %]     Relevant Results  Lab Results   Component Value Date    WBC 15.9 (H) 10/13/2024    HGB 9.7 (L) 10/13/2024    HCT 30.1 (L) 10/13/2024    MCV 94 10/13/2024     (H) 10/13/2024      Results from last 72 hours   Lab Units 10/13/24  0515   SODIUM mmol/L 138   POTASSIUM mmol/L 5.0   CHLORIDE mmol/L 112*   CO2 mmol/L 18*   BUN mg/dL 42*   CREATININE mg/dL 1.43*   GLUCOSE mg/dL 143*  143*   CALCIUM mg/dL 7.8*   ANION GAP mmol/L 13   EGFR mL/min/1.73m*2 40*     Results from last 72 hours   Lab Units 10/13/24  0515 10/12/24  2017 10/12/24  1404   ALK PHOS U/L  --   --  230*   BILIRUBIN TOTAL mg/dL  --   --  0.2   PROTEIN TOTAL g/dL  --   --  5.7*   ALT U/L  --   --  8   AST U/L  --   --  9   ALBUMIN g/dL 1.9*   < > 2.0*    < > = values in this interval not displayed.     Estimated Creatinine Clearance: 48.2 mL/min (A) (by C-G formula based on SCr of 1.43 mg/dL (H)).    Cultures/Micro  9/28 OR culture L spine + MRSA, Proteus   10/12 blood cultures x 2 negative to date  10/12 urine culture pending    Imaging:  10/12 CT L Spine:  Similar-appearing extensive postsurgical changes related to fusion of  the thoracic and lumbar spine redemonstrated which appears to be  essentially unchanged compared to imaging 09/25/2024. Again this  study is markedly limited in its evaluation of the central canal and  posterior paraspinal soft tissues. This limits evaluation for  infection. Stable erosive appearing changes at the L1-L2 level  surrounding the intervertebral cage, again " infection at this site not  excluded.    10/12 CXR:  1. Limited visualization of the ET tube with its tip approximately  2.5 cm above the michi  2. Extensive left lung airspace disease and a large left effusion,  worsening from the prior.    Assessment:  Sepsis - possibly due to pneumonia, present on admission. Patient already on IV vancomycin and IV ceftriaxone at time of this admission for lumbar spinal infection. Rule out bacteremia/PICC line infection  Lumbar spine surgical site infection s/p I&D 9/28. Cultures + MRSA, Proteus. On vanco/ceftriaxone through 11/12. Followed by Dr. Juarez    Plan/Recommendations:  Continue IV vancomycin - check Cr daily. Pharmacy to dose and monitor  Change IV ceftriaxone to IV cefepime for broader empiric coverage while cultures/work-up pending  Check sputum culture if feasible    Dr. Malagon to assume care 10/14    Rachel Canales MD  [facility]  Phone: [phone]  Fax: [phone]

## 2024-10-13 NOTE — CARE PLAN
Problem: Safety - Medical Restraint  Goal: Remains free of injury from restraints (Restraint for Interference with Medical Device)  Outcome: Progressing  Goal: Free from restraint(s) (Restraint for Interference with Medical Device)  Outcome: Progressing     Problem: Pain - Adult  Goal: Verbalizes/displays adequate comfort level or baseline comfort level  Outcome: Progressing     Problem: Safety - Adult  Goal: Free from fall injury  Outcome: Progressing     Problem: Discharge Planning  Goal: Discharge to home or other facility with appropriate resources  Outcome: Progressing     Problem: Chronic Conditions and Co-morbidities  Goal: Patient's chronic conditions and co-morbidity symptoms are monitored and maintained or improved  Outcome: Progressing     Problem: Diabetes  Goal: Achieve decreasing blood glucose levels by end of shift  Outcome: Progressing  Goal: Increase stability of blood glucose readings by end of shift  Outcome: Progressing  Goal: Decrease in ketones present in urine by end of shift  Outcome: Progressing  Goal: Maintain electrolyte levels within acceptable range throughout shift  Outcome: Progressing  Goal: Maintain glucose levels >70mg/dl to <250mg/dl throughout shift  Outcome: Progressing  Goal: No changes in neurological exam by end of shift  Outcome: Progressing  Goal: Learn about and adhere to nutrition recommendations by end of shift  Outcome: Progressing  Goal: Vital signs within normal range for age by end of shift  Outcome: Progressing  Goal: Increase self care and/or family involovement by end of shift  Outcome: Progressing  Goal: Receive DSME education by end of shift  Outcome: Progressing     Problem: Knowledge Deficit  Goal: Patient/family/caregiver demonstrates understanding of disease process, treatment plan, medications, and discharge instructions  Outcome: Progressing     Problem: Mechanical Ventilation  Goal: Patient Will Maintain Patent Airway  Outcome: Progressing  Goal: Oral  health is maintained or improved  Outcome: Progressing  Goal: ET tube will be managed safely  Outcome: Progressing  Goal: Ability to express needs and understand communication  Outcome: Progressing  Goal: Mobility/activity is maintained at optimum level for patient  Outcome: Progressing     Problem: Skin  Goal: Decreased wound size/increased tissue granulation at next dressing change  Outcome: Progressing  Goal: Participates in plan/prevention/treatment measures  Outcome: Progressing  Goal: Prevent/manage excess moisture  Outcome: Progressing  Goal: Prevent/minimize sheer/friction injuries  Outcome: Progressing  Goal: Promote/optimize nutrition  Outcome: Progressing  Goal: Promote skin healing  Outcome: Progressing     Problem: Nutrition  Goal: Less than 5 days NPO/clear liquids  Outcome: Progressing  Goal: Oral intake greater than 50%  Outcome: Progressing  Goal: Oral intake greater 75%  Outcome: Progressing  Goal: Consume prescribed supplement  Outcome: Progressing  Goal: Adequate PO fluid intake  Outcome: Progressing  Goal: Nutrition support goals are met within 48 hrs  Outcome: Progressing  Goal: Nutrition support is meeting 75% of nutrient needs  Outcome: Progressing  Goal: Tube feed tolerance  Outcome: Progressing  Goal: BG  mg/dL  Outcome: Progressing  Goal: Lab values WNL  Outcome: Progressing  Goal: Electrolytes WNL  Outcome: Progressing  Goal: Promote healing  Outcome: Progressing  Goal: Maintain stable weight  Outcome: Progressing  Goal: Reduce weight from edema/fluid  Outcome: Progressing   The patient's goals for the shift include      The clinical goals for the shift include wean pressors    Over the shift, the patient did not make progress toward the following goals. Barriers to progression include . Recommendations to address these barriers include .

## 2024-10-13 NOTE — NURSING NOTE
Patient repositioned to back after portable chest xray. Head of bed elevated 35 degrees. ROM to upper extremities prior to restraint re-application.

## 2024-10-13 NOTE — SIGNIFICANT EVENT
10/13/24 0605   Pre-Procedure Checklist   Emergent Line Insertion Yes   Type of Line to be Placed Introducer   Consent Obtained Yes   Emergency Medication Necessary Yes (order prior to procedure)   Patient Identified with 2 Independent Identifiers Yes   Review of Allergies, Anticoagulation, Relevant Labs, ECG/Telemetry Yes   Risks/Benefits/Alternatives Discussed with Patient/POA/Legal Representative Yes   Stop Sign on Door Yes   Time Out Performed Yes   Catheter Exchange No   Positioning and Preparation Checklist   All People, Including Patient, in the Room with Cap and Mask Yes   Fluoroscopy Used to Identify Vessel and Guide Insertion; Sterile Cover Used No   Ultrasound Used to Identify Vessel and Guide Insertion; Sterile Cover Used Yes   Full Barrier Precautions Followed (Mask, Cap, Gown, Gloves) Yes   Hands Washed Yes   Monitors Attached with Sound Alarms On Yes   Full Body Sterile Drape (Head-to-Toe) Used to Cover Patient Yes   [place] Position (For IJ and Subclavian) No   CHG Skin Prep Used and Allowed to Air Dry Prior to Skin Procedure Yes   Procedure Checklist   Blood Aspirated From All Lumens, All Ports Subsequently Flushed Yes   Catheter Caps Placed On All Lumens; Lumens Clamped Yes   Maintain Guidewire Control Throughout, Ensuring Guidewire Removal Yes   Maintain Sterile Field Throughout Insertion Yes   Catheter Secured Yes   Confirmatory Test of Venous Placement Longitudinal ultrasound   Post-Procedure Checklist   Date and Time Written on Dressing Yes   Sharp and Wire Count and Safe Disposal of all Sharps/Wires Yes   Sterile Dressing Applied Per Protocol Yes   X-ray Ordered or ECG Image No

## 2024-10-13 NOTE — PROGRESS NOTES
[facility] Critical Care Medicine       Date:  10/13/2024  Patient:  Narda Malloy  YOB: 1955  MRN:  [id number]   Admit Date:  10/12/2024    Chief Complaint   Patient presents with    Altered Mental Status         History of Present Illness:  Narda Malloy is a 69 y.o. year old female patient with past medical history of  L1-L3 lumbar laminectomy, T4-S1 revision, and fusion August 26th, T2DM, HTN, essential tremor, HLD, glaucoma, sarcoidosis of the lung who presented to  ED 10/12 after being found essentially unresponsive at her nursing facility [facility]. Per report from her , she has had a significant decline in her health since July 15th when she had a fall and became significantly weak. She has also had multiple infection complications since her back surgery in August requiring multiple I&Ds and long term antibiotic therapy. She went to the OR most recently on 9/28 for lumbar site infection wash out. Per chart review, she was discharged on IV vancomycin 1g and IV ceftriaxone 2d q24hrs through 11/12.        ED Course: Initial vital signs: /104 (109), HR 68, RR 20, SpO2 95% on 6L NC, temp 34.5C. Give 0.4mg of narcan with no improvement in mentation. Lab work-up remarkable for mild hyperkalemia (5.5), AMY 42/1.46, elevated alk phos, normocytic anemia 10.4/33, turbid appearing urine with mild hematuria and proteinuria and + leuk esterase, >50 WBCs. Urine drug screen positive for barbiturates. Triggered sepsis timer so she was given 3L NS. She was intubated for airway protection with 20mg etomidate and 100mg succinylcholine. BP dropped post-intubated and fluid resuscitation and she was subsequently started on levophed. CT head and lumbar spine pending.         Interval ICU Events:  10/12: Pt arrived to ICU intubated and lightly sedated on low-dose versed. Eyes open, minimally responsive.     10/13: Received K cocktail last night for K 6.0, corrected appropriately. Levophed  requirements mildly up, UOP decreasing. Ordered albumin x2 this am with improvements in UOP and SBP. Will likely trial lasix this afternoon d/t hypervolemia.    Objective     Medical History:  Past Medical History:   Diagnosis Date    Degenerative myopia, bilateral     Diabetic neuropathy (Multi)     Difficult intubation 08/26/2024    Mac 3, grade 3, 1 attempt.  Glidescope/videolaryngoscopy recommended for future attempts.    DM type 2 (diabetes mellitus, type 2) (Multi)     Dry eye syndrome of bilateral lacrimal glands     Essential hypertension     Essential tremor     Glaucoma     Hyperlipidemia     Long term (current) use of insulin (Multi)     Low back pain     PONV (postoperative nausea and vomiting)     Primary open angle glaucoma of both eyes, severe stage     Repeated falls     Sarcoidosis of lung (Multi)     Spinal stenosis, lumbar region without neurogenic claudication     Weakness      Past Surgical History:   Procedure Laterality Date    BLEPHAROPLASTY  07/2022    BREAST SURGERY  05/20/2022    Breast lift    CARPAL TUNNEL RELEASE      CATARACT EXTRACTION W/  INTRAOCULAR LENS IMPLANT Bilateral     OD 08/04/2011 +8.5D,OS 08/04/2011 +8.50D    FOOT SURGERY      INSERTION / REMOVAL CRANIAL DBS GENERATOR      Placed 2017.  Removed 2018. part of wire left in head when everything removed    LUMBAR FUSION      L3-S1    PANRETINAL PHOTOCOAGULATION  2014    THORACIC FUSION  08/26/2024    T4-S1 fusion    VITRECTOMY Right 2013     Medications Prior to Admission   Medication Sig Dispense Refill Last Dose    acetaminophen (Tylenol) 500 mg tablet Take 2 tablets (1,000 mg) by mouth 3 times a day.   Unknown    ascorbic acid (Vitamin C) 500 mg tablet as directed Orally   Unknown    brimonidine (AlphaGAN) 0.2 % ophthalmic solution Administer 1 drop into both eyes 2 times a day.   Unknown    calcium carbonate-vitamin D3 500 mg-5 mcg (200 unit) tablet Take 1 tablet by mouth once daily.   Unknown    cefTRIAXone (Rocephin) 2  gram/50 mL IV Infuse 50 mL (2 g) at 100 mL/hr over 30 minutes into a venous catheter once every 24 hours. Once weekly labs CBC/diff, CMP, Vanc trough ESR, CRP fax to Dr. Juarez [phone]. Stop date 11/12/24. 1950 mL 0     dextrose 50 % injection Infuse 25 mL (12.5 g) into a venous catheter every 15 minutes if needed (For blood glucose 41 to 70 mg/dL).       dextrose 50 % injection Infuse 50 mL (25 g) into a venous catheter every 15 minutes if needed (For blood glucose less than or equal to 40 mg/dL).       docusate sodium (Colace) 100 mg capsule Take 1 capsule (100 mg) by mouth 2 times a day.   Unknown    ezetimibe (Zetia) 10 mg tablet Take 1 tablet (10 mg) by mouth once daily. 90 tablet 3 Unknown    FreeStyle Lite Strips strip USE TO TEST 3 TIMES A DAY AS DIRECTED 300 each 2     glucagon (Glucagen) 1 mg injection Inject 1 mg into the muscle every 15 minutes if needed for low blood sugar - see comments (Hypoglycemia).       glucagon (Glucagen) 1 mg injection Inject 1 mg into the muscle every 15 minutes if needed for low blood sugar - see comments (Hypoglycemia).       heparin sodium,porcine (heparin, porcine,) 5,000 unit/mL injection Inject 1 mL (5,000 Units) under the skin every 8 hours.       insulin lispro (HumaLOG) 100 unit/mL injection Inject 0-15 Units under the skin 3 times daily (morning, midday, late afternoon). Take as directed per insulin instructions.Do not hold when patient is not eating, continue order as scheduled for hyperglycemia management.  Insulin Lispro Corrective Scale #3     Hypoglycemia protocol Call LIP unit(s) if Blood Glucose is between 0 - 70 mg/dL     0 unit(s) if Blood glucose is between    3 unit(s) if Blood glucose is between 151-200   6 unit(s) if Blood glucose is between 201-250   9 unit(s) if Blood glucose is between 251-300   12 unit(s) if Blood glucose is between 301-350   15 unit(s) if Blood glucose is between 351-400    Notify provider unit(s) if Blood Glucose is  "greater than 400 mg/dL       Lactobacillus acidophilus 100 mg (1 billion cell) capsule Take 1 capsule by mouth 2 times a day.   Unknown    latanoprost (Xalatan) 0.005 % ophthalmic solution Administer 1 drop into both eyes once daily at bedtime. 2.5 mL 5 Unknown    melatonin 5 mg tablet Take 1 tablet (5 mg) by mouth as needed at bedtime for sleep.   Unknown    methocarbamol (Robaxin) 500 mg tablet Take 1 tablet (500 mg) by mouth 3 times a day.   Unknown    multivitamin tablet Take 1 tablet by mouth once daily.   Unknown    ondansetron (Zofran) 4 mg/2 mL injection Infuse 2 mL (4 mg) into a venous catheter every 6 hours if needed for nausea or vomiting.       oxyCODONE (Roxicodone) 5 mg immediate release tablet Take 1 tablet (5 mg) by mouth every 6 hours if needed for severe pain (7 - 10) or moderate pain (4 - 6).       oxygen (O2) gas therapy Inhale 1 each continuously.       pantoprazole (ProtoNix) 40 mg EC tablet Take 1 tablet (40 mg) by mouth once daily in the morning. Take before meals. Do not crush, chew, or split.       pantoprazole (ProtoNix) 40 mg injection Infuse 40 mg into a venous catheter once daily in the morning. Take before meals. If unable to take PO.       pen needle, diabetic (PEN NEEDLE MISC) BD Altagracia- 4 mm X 32 G needle - as directed 4x a day sc 4 times per day       polyethylene glycol (Glycolax, Miralax) 17 gram packet Take 17 g by mouth once daily.   Unknown    primidone 125 mg tablet Take 125 mg by mouth 3 times a day.   Unknown    propranolol LA (Inderal LA) 60 mg 24 hr capsule Take 1 capsule (60 mg) by mouth early in the morning.. Hold for SBP < 110 mmhg, HR < 60 bpm.   Unknown    sennosides (Senokot) 8.6 mg tablet Take 1 tablet (8.6 mg) by mouth every 12 hours if needed for constipation.   Unknown    Sure Comfort Pen Needle 32 gauge x 5/32\" needle AS DIRECTED DAILY FOR 90 DAYS 100 each 11 Unknown    traZODone (Desyrel) 25 MG split tablet Take 1 half tablet (25 mg) by mouth once daily at " bedtime.   Unknown    vancomycin (Vancocin) 1 gram/250 mL solution Infuse 250 mL (1 g) at 250 mL/hr over 60 minutes into a venous catheter every 12 hours. Once weekly labs CBC/diff, CMP, Vanc trough ESR, CRP fax to Dr. Juarez [phone]. Stop date 11/12/24. 64512 mL 0      Erythromycin, Morphine, and Rosuvastatin  Social History     Tobacco Use    Smoking status: Former     Types: Cigarettes     Passive exposure: Past    Smokeless tobacco: Never   Vaping Use    Vaping status: Never Used   Substance Use Topics    Alcohol use: Not Currently    Drug use: Not Currently     Family History   Problem Relation Name Age of Onset    Multiple myeloma Mother      Cancer Mother      Other (CABG) Father      Pulmonary embolism Father      Heart disease Father      Breast cancer Sister          Stage II    Hypertension Sister      Diabetes Sister      No Known Problems Sister          x5    No Known Problems Brother          x4    No Known Problems Daughter         Hospital Medications:    lactated Ringer's, 100 mL/hr, Last Rate: 100 mL/hr (10/13/24 0700)  norepinephrine, 0-0.2 mcg/kg/min, Last Rate: 0.07 mcg/kg/min (10/13/24 0700)  propofol, 0-20 mcg/kg/min, Last Rate: 10 mcg/kg/min (10/13/24 0700)          Current Facility-Administered Medications:     brimonidine (AlphaGAN) 0.2 % ophthalmic solution 1 drop, 1 drop, Both Eyes, BID, Kaleb Lam PA-C, 1 drop at 10/12/24 2048    calcium carbonate-vitamin D3 500 mg-5 mcg (200 unit) per tablet 1 tablet, 1 tablet, oral, Daily, Kaleb Lam PA-C    cefepime (Maxipime) 2 g in dextrose 5% 100 mL IV, 2 g, intravenous, q12h, Rachel Canales MD MPH    dextrose 50 % injection 12.5 g, 12.5 g, intravenous, q15 min PRN, Kaleb Lam PA-C    dextrose 50 % injection 25 g, 25 g, intravenous, q15 min PRN, Kaleb Lam PA-C    docusate sodium (Colace) capsule 100 mg, 100 mg, oral, BID, Kaleb Lam PA-C    ezetimibe (Zetia) tablet 10 mg, 10 mg, oral, Daily, Kaleb Lam  COBY    glucagon (Glucagen) injection 1 mg, 1 mg, intramuscular, q15 min PRN, Kaleb Lam PA-C    glucagon (Glucagen) injection 1 mg, 1 mg, intramuscular, q15 min PRN, Kaleb Lam PA-C    heparin (porcine) injection 5,000 Units, 5,000 Units, subcutaneous, q8h, Kaleb Lam PA-C, 5,000 Units at 10/13/24 0627    insulin lispro (HumaLOG) injection 0-15 Units, 0-15 Units, subcutaneous, q4h, Kaleb Lam PA-C, 3 Units at 10/12/24 2037    lactated Ringer's infusion, 100 mL/hr, intravenous, Continuous, Francesco Carbajal PA-C, Last Rate: 100 mL/hr at 10/13/24 0700, 100 mL/hr at 10/13/24 0700    latanoprost (Xalatan) 0.005 % ophthalmic solution 1 drop, 1 drop, Both Eyes, Nightly, Kaleb Lam PA-C, 1 drop at 10/12/24 2048    norepinephrine (Levophed) 8 mg in dextrose 5% 250 mL (0.032 mg/mL) infusion (premix), 0-0.2 mcg/kg/min, intravenous, Continuous, Kaleb Lam PA-C, Last Rate: 13.03 mL/hr at 10/13/24 0700, 0.07 mcg/kg/min at 10/13/24 0700    oxygen (O2) therapy, , inhalation, Continuous - Inhalation, Cris Lucero MD, 50 percent at 10/12/24 2045    pantoprazole (ProtoNix) EC tablet 40 mg, 40 mg, oral, Daily before breakfast **OR** pantoprazole (ProtoNix) injection 40 mg, 40 mg, intravenous, Daily before breakfast, Kaleb Lam PA-C, 40 mg at 10/13/24 0600    primidone (Mysoline) tablet 125 mg, 125 mg, oral, TID, Kaleb Lam PA-C, 125 mg at 10/12/24 2044    propofol (Diprivan) infusion, 0-20 mcg/kg/min, intravenous, Continuous, Venancio Ralph MD, Last Rate: 5.96 mL/hr at 10/13/24 0700, 10 mcg/kg/min at 10/13/24 0700    [Held by provider] propranolol LA (Inderal LA) 24 hr capsule 60 mg, 60 mg, oral, Daily, Kaleb Lam PA-C    sodium zirconium cyclosilicate (Lokelma) packet 10 g, 10 g, oral, q8h, Francesco Carbajal PA-C, 10 g at 10/13/24 0555    vancomycin (Vancocin) pharmacy to dose - pharmacy monitoring, , miscellaneous, Daily PRN, Kaleb Lam PA-C    Review of Systems:  14  "point review of systems was completed and negative except for those specially mention in my HPI    Physical Exam:    Heart Rate:  [52-81]   Temp:  [34.5 °C (94.1 °F)-37.3 °C (99.1 °F)]   Resp:  [12-28]   BP: ()/()   Height:  [175.3 cm (5' 9\")-177.8 cm (5' 10\")]   Weight:  [98.8 kg (217 lb 13 oz)-99.9 kg (220 lb 3.8 oz)]   SpO2:  [93 %-100 %]     Physical Exam  Constitutional:       General: She is not in acute distress.     Appearance: She is well-developed. She is obese. She is ill-appearing. She is not diaphoretic.      Interventions: She is sedated, intubated and restrained.   HENT:      Head: Normocephalic and atraumatic.   Eyes:      Conjunctiva/sclera: Conjunctivae normal.      Pupils: Pupils are equal, round, and reactive to light.   Cardiovascular:      Rate and Rhythm: Normal rate and regular rhythm.      Pulses: Normal pulses.   Pulmonary:      Effort: Pulmonary effort is normal. No respiratory distress. She is intubated.      Breath sounds: Rhonchi present.   Abdominal:      General: There is distension.      Palpations: Abdomen is soft.      Tenderness: There is no abdominal tenderness.   Musculoskeletal:      Right upper arm: Edema present.      Left upper arm: Edema present.      Cervical back: Neck supple. No tenderness.      Right lower leg: No edema.      Left lower leg: No edema.   Skin:     General: Skin is warm.      Capillary Refill: Capillary refill takes less than 2 seconds.      Coloration: Skin is pale.   Neurological:      Mental Status: She is disoriented.      Comments: Unable to fully assess, awakens to pain, sometimes to voice, does not follow commands. Cough and gag intact    Psychiatric:         Behavior: Behavior is uncooperative.      Comments: DWAYNE         Objective:    I have reviewed all medications, laboratory results, and imaging pertinent for today's encounter.    Vent Mode: Pressure regulated volume control/assist control  FiO2 (%):  [50 %] 50 %  S RR:  [18] 18  S " VT:  [450 mL] 450 mL  PEEP/CPAP (cm H2O):  [5 cm H20] 5 cm H20  MAP (cm H2O):  [8.8-11] 8.8      Intake/Output Summary (Last 24 hours) at 10/13/2024 0743  Last data filed at 10/13/2024 0700  Gross per 24 hour   Intake 4072.51 ml   Output 135 ml   Net 3937.51 ml            Assessment/Plan:    I am currently managing this critically ill patient for the following problems:    Neuro/Psych/Pain Ctrl/Sedation:   Acute metabolic vs toxic encephalopathy - etiology unclear   Unresponsiveness  Essential tremor   Hypothermia - resolved  - Pain Control: acetaminophen PRN  - Sedation/Analgesia: propofol ordered, will keep sedation off to assess mental status and start if needed for vent compliance   - Goal RASS: 0 to +1  - Continue home primidone  -- Urine tox positive for barbiturates consistent with primidone intake   - CT head: negative for acute findings   - Continue bear hugger PRN until normal temp   - CAM ICU qshift, sleep-wake hygiene, delirium precautions      Respiratory/ENT:  Intubated for airway protection  - Supplemental O2: /18/5/50%  - Wean FiO2 as tolerated to maintain SpO2 >92%  - VAP precautions   - Continuous pulse ox monitoring   - Pulm hygiene     Cardiovascular:   Undifferentiated shock - hypovolemic vs sepsis ??  Bilateral upper extremity swelling - new over last week according to , worsening this am  Hx HTN  Hx HLD  - Wean levophed gtt to maintain MAPs >65  - Hold home propranolol  - Bilateral duplex US upper extremities to r/o DVT  -- Will ace wrap after duplex completed   - Continuous cardiac monitoring per ICU protocol  - EKGs PRN for ACS symptoms, arrhythmias      GI:  Hx GERD  - Diet: NPO, will start enteral feeding via NG  - BR: colace  - GI Prophylaxis: PPI     Renal/Volume Status/Electrolytes:  Acute kidney injury - possibly ATN vs medication-induced (vancomycin??)  - Baseline Cr 0.8-1.0  - Vanc to be dosed per pharmacy for AMY  - Consider neph consult if oliguria, hyperkalemia, AMY  persists   -- Would recommend further volume resuscitation with albumin for now as UOP and pressor requirements greatly improved with albumin compared to LR   - Maintain anders catheter  - Maintain urine output 0.5-1.0cc/kg/hr  - Gentle IVF hydration d/t AMY  - Hourly I/O's  - Replete electrolytes to maintain K >4.0 and Mg >2.0  - Daily BMP, Mg, Phos    Endocrine:  T2DM  - SSI q4hrs while NPO   - TSH: midly elevated, T4 pending   - Hypoglycemia protocol PRN      Infectious Disease:  Recent MRSA lumbar surgical site infection  - Antibiotics: continue vancomycin and cefepime   - ID consulted, appreciate assistance   - Blood cultures: pending   - Urine culture: pending   - CT lumbar spine: unable to r/o abscess   -- Pt has implanted spinal hardware so unable to obtain MRI  - Will consider removing picc line pending infectious w/u  - Monitor SIRS criteria     Heme/Onc:  Normocytic anemia   - Monitor for s/sx of anemia such as bleeding and bruising   - Transfuse if Hgb <7.0   - Daily CBC     MSK:  No active concerns   - PT/OT when appropriate     Derm:  - ICU skin protocol  - Q2hr turns  - Padded pressure points      Ethics/Code Status:  Full code - discussed this  at bedside      :  DVT Prophylaxis: SQH  GI Prophylaxis: PPI  Bowel Regimen: colace  Diet: NPO, start enteral feeding  CVC: R picc  Romulus: none  Anders: yes, replaced 10/12  Restraints: yes  Disposition: ICU    Critical Care Time:  53 minutes spent in preparing to see patient (I.e. labs, imaging, etc.), documentation, discussion plan of care with patient/family/caregiver, and/or coordination of care with multidisciplinary team including the attending. Time does not include completion of procedure time.     Plan discussed with Dr. Ralph.     ION TorresC  Pulmonary & Critical Care Medicine   [facility]

## 2024-10-13 NOTE — NURSING NOTE
Patient turned and repositioned to left side with head of bed elevated 35 degrees. Oral cavity suctioned and clear fluid noted. Spouse remains at bedside and updated per MARIE.

## 2024-10-13 NOTE — CARE PLAN
The patient's goals for the shift include  DWAYNE    The clinical goals for the shift include Achieve and maintain hemodynamic stability      Problem: Safety - Medical Restraint  Goal: Remains free of injury from restraints (Restraint for Interference with Medical Device)  Outcome: Progressing  Goal: Free from restraint(s) (Restraint for Interference with Medical Device)  Outcome: Progressing     Problem: Pain - Adult  Goal: Verbalizes/displays adequate comfort level or baseline comfort level  Outcome: Progressing     Problem: Safety - Adult  Goal: Free from fall injury  Outcome: Progressing     Problem: Discharge Planning  Goal: Discharge to home or other facility with appropriate resources  Outcome: Progressing     Problem: Chronic Conditions and Co-morbidities  Goal: Patient's chronic conditions and co-morbidity symptoms are monitored and maintained or improved  Outcome: Progressing     Problem: Diabetes  Goal: Achieve decreasing blood glucose levels by end of shift  Outcome: Progressing  Goal: Increase stability of blood glucose readings by end of shift  Outcome: Progressing  Goal: Decrease in ketones present in urine by end of shift  Outcome: Progressing  Goal: Maintain electrolyte levels within acceptable range throughout shift  Outcome: Progressing  Goal: Maintain glucose levels >70mg/dl to <250mg/dl throughout shift  Outcome: Progressing  Goal: No changes in neurological exam by end of shift  Outcome: Progressing  Goal: Learn about and adhere to nutrition recommendations by end of shift  Outcome: Progressing  Goal: Vital signs within normal range for age by end of shift  Outcome: Progressing  Goal: Increase self care and/or family involovement by end of shift  Outcome: Progressing  Goal: Receive DSME education by end of shift  Outcome: Progressing     Problem: Knowledge Deficit  Goal: Patient/family/caregiver demonstrates understanding of disease process, treatment plan, medications, and discharge  instructions  Outcome: Progressing     Problem: Mechanical Ventilation  Goal: Patient Will Maintain Patent Airway  Outcome: Progressing  Goal: Oral health is maintained or improved  Outcome: Progressing  Goal: ET tube will be managed safely  Outcome: Progressing  Goal: Ability to express needs and understand communication  Outcome: Progressing  Goal: Mobility/activity is maintained at optimum level for patient  Outcome: Progressing

## 2024-10-13 NOTE — PROCEDURES
Arterial line placement - Right radial artery    A time out was performed identifying the correct procedure and correct location with nursing staff.  The right wrist/forearm was prepped with 2% Chlorhexidine and draped with a sterile sheet in the usual fashion. 1% lidocaine was not necessary as patient under general anesthesia. The artery was visualized on ultrasound and cannulated with the seeker needle with a return of pulsatile blood, and a guidewire was threaded easily into the seeker needle. After removal of the needle, the arterial line catheter was then threaded over the wire and into the blood vessel with minimal resistance. Following removal of the guidewire a return of pulsatile red blood was again noted from the catheter. The pressure tubing was attached and the catheter was then sutured in place and a sterile dressing was applied to the site. The catheter was attached to a monitor which revealed an appropriate arterial waveform.     The procedure was completed without complications.    MARY KAY Ma, M,Ed., PA-C  Pulmonary/Critical Care, [facility]  [employer]

## 2024-10-13 NOTE — NURSING NOTE
Patient turned and repositioned to right side. Slide assist sheet with full length chux utilized. ROM to upper extremities performed passively. HOB elevated after positioning to 35 degrees. Tolerated well with no distress being supine.

## 2024-10-14 ENCOUNTER — APPOINTMENT (OUTPATIENT)
Dept: RADIOLOGY | Facility: HOSPITAL | Age: 69
End: 2024-10-14
Payer: MEDICARE

## 2024-10-14 LAB
ALBUMIN SERPL BCP-MCNC: 2.1 G/DL (ref 3.4–5)
ANION GAP SERPL CALCULATED.3IONS-SCNC: 13 MMOL/L (ref 10–20)
BACTERIA UR CULT: ABNORMAL
BASOPHILS # BLD AUTO: 0.02 X10*3/UL (ref 0–0.1)
BASOPHILS NFR BLD AUTO: 0.3 %
BUN SERPL-MCNC: 42 MG/DL (ref 6–23)
CALCIUM SERPL-MCNC: 7.7 MG/DL (ref 8.6–10.3)
CHLORIDE SERPL-SCNC: 113 MMOL/L (ref 98–107)
CO2 SERPL-SCNC: 17 MMOL/L (ref 21–32)
CREAT SERPL-MCNC: 1.19 MG/DL (ref 0.5–1.05)
EGFRCR SERPLBLD CKD-EPI 2021: 50 ML/MIN/1.73M*2
EOSINOPHIL # BLD AUTO: 0.04 X10*3/UL (ref 0–0.7)
EOSINOPHIL NFR BLD AUTO: 0.6 %
ERYTHROCYTE [DISTWIDTH] IN BLOOD BY AUTOMATED COUNT: 17.3 % (ref 11.5–14.5)
GLUCOSE BLD MANUAL STRIP-MCNC: 128 MG/DL (ref 74–99)
GLUCOSE BLD MANUAL STRIP-MCNC: 139 MG/DL (ref 74–99)
GLUCOSE BLD MANUAL STRIP-MCNC: 141 MG/DL (ref 74–99)
GLUCOSE BLD MANUAL STRIP-MCNC: 146 MG/DL (ref 74–99)
GLUCOSE BLD MANUAL STRIP-MCNC: 149 MG/DL (ref 74–99)
GLUCOSE BLD MANUAL STRIP-MCNC: 156 MG/DL (ref 74–99)
GLUCOSE SERPL-MCNC: 150 MG/DL (ref 74–99)
HCT VFR BLD AUTO: 24.8 % (ref 36–46)
HGB BLD-MCNC: 7.9 G/DL (ref 12–16)
IMM GRANULOCYTES # BLD AUTO: 0.05 X10*3/UL (ref 0–0.7)
IMM GRANULOCYTES NFR BLD AUTO: 0.8 % (ref 0–0.9)
LYMPHOCYTES # BLD AUTO: 1.2 X10*3/UL (ref 1.2–4.8)
LYMPHOCYTES NFR BLD AUTO: 18.1 %
MAGNESIUM SERPL-MCNC: 1.97 MG/DL (ref 1.6–2.4)
MCH RBC QN AUTO: 30 PG (ref 26–34)
MCHC RBC AUTO-ENTMCNC: 31.9 G/DL (ref 32–36)
MCV RBC AUTO: 94 FL (ref 80–100)
MONOCYTES # BLD AUTO: 0.5 X10*3/UL (ref 0.1–1)
MONOCYTES NFR BLD AUTO: 7.6 %
NEUTROPHILS # BLD AUTO: 4.81 X10*3/UL (ref 1.2–7.7)
NEUTROPHILS NFR BLD AUTO: 72.6 %
NRBC BLD-RTO: 0 /100 WBCS (ref 0–0)
PHOSPHATE SERPL-MCNC: 4.2 MG/DL (ref 2.5–4.9)
PLATELET # BLD AUTO: 347 X10*3/UL (ref 150–450)
POTASSIUM SERPL-SCNC: 4 MMOL/L (ref 3.5–5.3)
PROCALCITONIN SERPL-MCNC: 1.39 NG/ML
Q ONSET: 217 MS
QRS COUNT: 12 BEATS
QRS DURATION: 88 MS
QT INTERVAL: 380 MS
QTC CALCULATION(BAZETT): 416 MS
QTC FREDERICIA: 404 MS
R AXIS: 10 DEGREES
RBC # BLD AUTO: 2.63 X10*6/UL (ref 4–5.2)
SODIUM SERPL-SCNC: 139 MMOL/L (ref 136–145)
T AXIS: 103 DEGREES
T OFFSET: 407 MS
VANCOMYCIN SERPL-MCNC: 49.2 UG/ML (ref 5–20)
VENTRICULAR RATE: 72 BPM
WBC # BLD AUTO: 6.6 X10*3/UL (ref 4.4–11.3)

## 2024-10-14 PROCEDURE — 82947 ASSAY GLUCOSE BLOOD QUANT: CPT

## 2024-10-14 PROCEDURE — 2500000001 HC RX 250 WO HCPCS SELF ADMINISTERED DRUGS (ALT 637 FOR MEDICARE OP)

## 2024-10-14 PROCEDURE — 85025 COMPLETE CBC W/AUTO DIFF WBC: CPT

## 2024-10-14 PROCEDURE — 2500000005 HC RX 250 GENERAL PHARMACY W/O HCPCS: Performed by: ANESTHESIOLOGY

## 2024-10-14 PROCEDURE — 99291 CRITICAL CARE FIRST HOUR: CPT | Performed by: ANESTHESIOLOGY

## 2024-10-14 PROCEDURE — 2500000004 HC RX 250 GENERAL PHARMACY W/ HCPCS (ALT 636 FOR OP/ED)

## 2024-10-14 PROCEDURE — 94640 AIRWAY INHALATION TREATMENT: CPT

## 2024-10-14 PROCEDURE — 93970 EXTREMITY STUDY: CPT

## 2024-10-14 PROCEDURE — 2500000004 HC RX 250 GENERAL PHARMACY W/ HCPCS (ALT 636 FOR OP/ED): Performed by: INTERNAL MEDICINE

## 2024-10-14 PROCEDURE — 2500000002 HC RX 250 W HCPCS SELF ADMINISTERED DRUGS (ALT 637 FOR MEDICARE OP, ALT 636 FOR OP/ED)

## 2024-10-14 PROCEDURE — 80069 RENAL FUNCTION PANEL: CPT

## 2024-10-14 PROCEDURE — 2500000004 HC RX 250 GENERAL PHARMACY W/ HCPCS (ALT 636 FOR OP/ED): Performed by: ANESTHESIOLOGY

## 2024-10-14 PROCEDURE — 2500000001 HC RX 250 WO HCPCS SELF ADMINISTERED DRUGS (ALT 637 FOR MEDICARE OP): Performed by: ANESTHESIOLOGY

## 2024-10-14 PROCEDURE — 83735 ASSAY OF MAGNESIUM: CPT

## 2024-10-14 PROCEDURE — 37799 UNLISTED PX VASCULAR SURGERY: CPT

## 2024-10-14 PROCEDURE — 2020000001 HC ICU ROOM DAILY

## 2024-10-14 PROCEDURE — 2500000002 HC RX 250 W HCPCS SELF ADMINISTERED DRUGS (ALT 637 FOR MEDICARE OP, ALT 636 FOR OP/ED): Performed by: PHYSICIAN ASSISTANT

## 2024-10-14 PROCEDURE — 80202 ASSAY OF VANCOMYCIN: CPT

## 2024-10-14 PROCEDURE — 94003 VENT MGMT INPAT SUBQ DAY: CPT

## 2024-10-14 PROCEDURE — 94664 DEMO&/EVAL PT USE INHALER: CPT

## 2024-10-14 PROCEDURE — 2500000005 HC RX 250 GENERAL PHARMACY W/O HCPCS: Performed by: EMERGENCY MEDICINE

## 2024-10-14 PROCEDURE — 93971 EXTREMITY STUDY: CPT | Performed by: RADIOLOGY

## 2024-10-14 RX ORDER — METOLAZONE 5 MG/1
5 TABLET ORAL ONCE
Status: COMPLETED | OUTPATIENT
Start: 2024-10-14 | End: 2024-10-14

## 2024-10-14 RX ORDER — MAGNESIUM SULFATE HEPTAHYDRATE 40 MG/ML
2 INJECTION, SOLUTION INTRAVENOUS ONCE
Status: COMPLETED | OUTPATIENT
Start: 2024-10-14 | End: 2024-10-14

## 2024-10-14 RX ORDER — BUMETANIDE 0.25 MG/ML
1 INJECTION, SOLUTION INTRAMUSCULAR; INTRAVENOUS EVERY 8 HOURS
Status: DISCONTINUED | OUTPATIENT
Start: 2024-10-14 | End: 2024-10-15

## 2024-10-14 RX ORDER — SODIUM BICARBONATE 1 MEQ/ML
50 SYRINGE (ML) INTRAVENOUS ONCE
Status: COMPLETED | OUTPATIENT
Start: 2024-10-14 | End: 2024-10-14

## 2024-10-14 RX ORDER — DOCUSATE SODIUM 50 MG/5ML
100 LIQUID ORAL 2 TIMES DAILY
Status: DISCONTINUED | OUTPATIENT
Start: 2024-10-14 | End: 2024-10-21

## 2024-10-14 RX ORDER — FUROSEMIDE 10 MG/ML
40 INJECTION INTRAMUSCULAR; INTRAVENOUS EVERY 8 HOURS
Status: DISCONTINUED | OUTPATIENT
Start: 2024-10-14 | End: 2024-10-14

## 2024-10-14 RX ADMIN — IPRATROPIUM BROMIDE AND ALBUTEROL SULFATE 3 ML: 2.5; .5 SOLUTION RESPIRATORY (INHALATION) at 07:27

## 2024-10-14 RX ADMIN — PANTOPRAZOLE SODIUM 40 MG: 40 INJECTION, POWDER, FOR SOLUTION INTRAVENOUS at 06:03

## 2024-10-14 RX ADMIN — HEPARIN SODIUM 5000 UNITS: 5000 INJECTION, SOLUTION INTRAVENOUS; SUBCUTANEOUS at 22:18

## 2024-10-14 RX ADMIN — HEPARIN SODIUM 5000 UNITS: 5000 INJECTION, SOLUTION INTRAVENOUS; SUBCUTANEOUS at 06:03

## 2024-10-14 RX ADMIN — Medication 1 TABLET: at 08:53

## 2024-10-14 RX ADMIN — Medication 40 PERCENT: at 19:46

## 2024-10-14 RX ADMIN — BUMETANIDE 1 MG: 0.25 INJECTION INTRAMUSCULAR; INTRAVENOUS at 15:19

## 2024-10-14 RX ADMIN — IPRATROPIUM BROMIDE AND ALBUTEROL SULFATE 3 ML: 2.5; .5 SOLUTION RESPIRATORY (INHALATION) at 04:41

## 2024-10-14 RX ADMIN — BRIMONIDINE TARTRATE 1 DROP: 2 SOLUTION OPHTHALMIC at 08:52

## 2024-10-14 RX ADMIN — DOCUSATE SODIUM 100 MG: 50 LIQUID ORAL at 09:11

## 2024-10-14 RX ADMIN — CEFEPIME 2 G: 2 INJECTION, POWDER, FOR SOLUTION INTRAVENOUS at 19:45

## 2024-10-14 RX ADMIN — FUROSEMIDE 40 MG: 10 INJECTION, SOLUTION INTRAMUSCULAR; INTRAVENOUS at 09:25

## 2024-10-14 RX ADMIN — IPRATROPIUM BROMIDE AND ALBUTEROL SULFATE 3 ML: 2.5; .5 SOLUTION RESPIRATORY (INHALATION) at 19:45

## 2024-10-14 RX ADMIN — PRIMIDONE 125 MG: 250 TABLET ORAL at 08:53

## 2024-10-14 RX ADMIN — PROPOFOL 10 MCG/KG/MIN: 10 INJECTION, EMULSION INTRAVENOUS at 16:26

## 2024-10-14 RX ADMIN — IPRATROPIUM BROMIDE AND ALBUTEROL SULFATE 3 ML: 2.5; .5 SOLUTION RESPIRATORY (INHALATION) at 00:45

## 2024-10-14 RX ADMIN — DOCUSATE SODIUM 100 MG: 50 LIQUID ORAL at 20:07

## 2024-10-14 RX ADMIN — Medication 35 PERCENT: at 07:27

## 2024-10-14 RX ADMIN — MAGNESIUM SULFATE HEPTAHYDRATE 2 G: 40 INJECTION, SOLUTION INTRAVENOUS at 06:03

## 2024-10-14 RX ADMIN — CEFEPIME 2 G: 2 INJECTION, POWDER, FOR SOLUTION INTRAVENOUS at 08:53

## 2024-10-14 RX ADMIN — METOLAZONE 5 MG: 5 TABLET ORAL at 19:01

## 2024-10-14 RX ADMIN — BRIMONIDINE TARTRATE 1 DROP: 2 SOLUTION OPHTHALMIC at 20:07

## 2024-10-14 RX ADMIN — SODIUM ZIRCONIUM CYCLOSILICATE 10 G: 10 POWDER, FOR SUSPENSION ORAL at 04:55

## 2024-10-14 RX ADMIN — HEPARIN SODIUM 5000 UNITS: 5000 INJECTION, SOLUTION INTRAVENOUS; SUBCUTANEOUS at 14:49

## 2024-10-14 RX ADMIN — IPRATROPIUM BROMIDE AND ALBUTEROL SULFATE 3 ML: 2.5; .5 SOLUTION RESPIRATORY (INHALATION) at 14:47

## 2024-10-14 RX ADMIN — BUMETANIDE 1 MG: 0.25 INJECTION INTRAMUSCULAR; INTRAVENOUS at 22:18

## 2024-10-14 RX ADMIN — LATANOPROST 1 DROP: 50 SOLUTION OPHTHALMIC at 20:07

## 2024-10-14 RX ADMIN — SODIUM BICARBONATE 50 MEQ: 84 INJECTION INTRAVENOUS at 09:56

## 2024-10-14 RX ADMIN — IPRATROPIUM BROMIDE AND ALBUTEROL SULFATE 3 ML: 2.5; .5 SOLUTION RESPIRATORY (INHALATION) at 11:33

## 2024-10-14 RX ADMIN — EZETIMIBE 10 MG: 10 TABLET ORAL at 08:53

## 2024-10-14 ASSESSMENT — PAIN SCALES - GENERAL
PAINLEVEL_OUTOF10: 0 - NO PAIN

## 2024-10-14 ASSESSMENT — PAIN - FUNCTIONAL ASSESSMENT
PAIN_FUNCTIONAL_ASSESSMENT: CPOT (CRITICAL CARE PAIN OBSERVATION TOOL)

## 2024-10-14 NOTE — PROGRESS NOTES
Vancomycin Dosing by Pharmacy- [facility]    Narda Malloy is a 69 y.o. year old female who Pharmacy has been consulted for vancomycin dosing for Vancomycin Indications: Other: surgical site infection . Based on the patient's indication and renal status this patient will be dosed based on a target level of SSTI/UTI: 10-15 mcg/mL    Patient is currently in AMY    Last vancomycin dose (incl. date and time): 1g q12h at OSH.    Vancomycin level (incl. date and time): 49.2 mcg/mL on 10/14 at 0450    Lab Results   Component Value Date    VANCORANDOM 49.2 (H) 10/14/2024    VANCOTROUGH 11.8 08/08/2018       Visit Vitals  /50   Pulse 55   Temp 36.7 °C (98.1 °F) (Axillary)   Resp 18           Lab Results   Component Value Date    CREATININE 1.19 (H) 10/14/2024    CREATININE 1.33 (H) 10/13/2024    CREATININE 1.43 (H) 10/13/2024    CREATININE 1.34 (H) 10/12/2024       I/O last 3 completed shifts:  In: 5429.6 (54.4 mL/kg) [I.V.:1816.7 (18.2 mL/kg); Blood:500; NG/GT:430; IV Piggyback:2682.9]  Out: 945 (9.5 mL/kg) [Urine:945 (0.3 mL/kg/hr)]  Weight: 99.9 kg     Assessment/Plan     Level is above target trough goal.   hold vancomycin.  Random level within 24 hours will be obtained on 10/15 at 0500. May be obtained sooner if clinically indicated.   Will continue to monitor renal function daily while on vancomycin and order serum creatinine at least every 48 hours if not already ordered.  Follow for continued vancomycin needs, clinical response, and signs/symptoms of toxicity.     Cornelius Altamirano, PharmD

## 2024-10-14 NOTE — CARE PLAN
Problem: Safety - Medical Restraint  Goal: Remains free of injury from restraints (Restraint for Interference with Medical Device)  Outcome: Progressing  Goal: Free from restraint(s) (Restraint for Interference with Medical Device)  Outcome: Progressing     Problem: Pain - Adult  Goal: Verbalizes/displays adequate comfort level or baseline comfort level  Outcome: Progressing     Problem: Safety - Adult  Goal: Free from fall injury  Outcome: Progressing     Problem: Discharge Planning  Goal: Discharge to home or other facility with appropriate resources  Outcome: Progressing     Problem: Diabetes  Goal: Achieve decreasing blood glucose levels by end of shift  Outcome: Progressing  Goal: Increase stability of blood glucose readings by end of shift  Outcome: Progressing  Goal: Decrease in ketones present in urine by end of shift  Outcome: Progressing  Goal: Maintain electrolyte levels within acceptable range throughout shift  Outcome: Progressing  Goal: Maintain glucose levels >70mg/dl to <250mg/dl throughout shift  Outcome: Progressing  Goal: No changes in neurological exam by end of shift  Outcome: Progressing  Goal: Learn about and adhere to nutrition recommendations by end of shift  Outcome: Progressing  Goal: Vital signs within normal range for age by end of shift  Outcome: Progressing  Goal: Increase self care and/or family involovement by end of shift  Outcome: Progressing  Goal: Receive DSME education by end of shift  Outcome: Progressing     Problem: Knowledge Deficit  Goal: Patient/family/caregiver demonstrates understanding of disease process, treatment plan, medications, and discharge instructions  Outcome: Progressing     Problem: Skin  Goal: Decreased wound size/increased tissue granulation at next dressing change  Outcome: Progressing  Flowsheets (Taken 10/14/2024 1932)  Decreased wound size/increased tissue granulation at next dressing change: Protective dressings over bony prominences  Goal:  Participates in plan/prevention/treatment measures  Outcome: Progressing  Flowsheets (Taken 10/14/2024 1932)  Participates in plan/prevention/treatment measures: Elevate heels  Goal: Prevent/manage excess moisture  Outcome: Progressing  Flowsheets (Taken 10/14/2024 1932)  Prevent/manage excess moisture:   Moisturize dry skin   Monitor for/manage infection if present  Goal: Prevent/minimize sheer/friction injuries  Outcome: Progressing  Flowsheets (Taken 10/14/2024 1932)  Prevent/minimize sheer/friction injuries:   HOB 30 degrees or less   Turn/reposition every 2 hours/use positioning/transfer devices   Use pull sheet  Goal: Promote/optimize nutrition  Outcome: Progressing  Flowsheets (Taken 10/14/2024 1932)  Promote/optimize nutrition: Monitor/record intake including meals  Goal: Promote skin healing  Outcome: Progressing  Flowsheets (Taken 10/14/2024 1932)  Promote skin healing: Turn/reposition every 2 hours/use positioning/transfer devices     Problem: Nutrition  Goal: Less than 5 days NPO/clear liquids  Outcome: Progressing  Goal: Oral intake greater than 50%  Outcome: Progressing  Goal: Oral intake greater 75%  Outcome: Progressing  Goal: Consume prescribed supplement  Outcome: Progressing  Goal: Adequate PO fluid intake  Outcome: Progressing  Goal: Nutrition support goals are met within 48 hrs  Outcome: Progressing  Goal: Nutrition support is meeting 75% of nutrient needs  Outcome: Progressing  Goal: Tube feed tolerance  Outcome: Progressing  Goal: BG  mg/dL  Outcome: Progressing  Goal: Lab values WNL  Outcome: Progressing  Goal: Electrolytes WNL  Outcome: Progressing  Goal: Promote healing  Outcome: Progressing  Goal: Maintain stable weight  Outcome: Progressing  Goal: Reduce weight from edema/fluid  Outcome: Progressing   The patient's goals for the shift include      The clinical goals for the shift include keep comfortable, sedated, vitally stable

## 2024-10-14 NOTE — CONSULTS
"Nutrition Assessement Note    Nutrition Assessment    Reason for Assessment: Tube feeding assessment and order    Reason for Hospital Admission:  Narda Malloy is a 69 y.o. female who is admitted for Admitted from SNF unresponsive. Intubated/sedated 10/12. Spoke with MD. Will initiate and manage tube feed at this time.    Past Medical History:   Diagnosis Date    Degenerative myopia, bilateral     Diabetic neuropathy (Multi)     Difficult intubation 08/26/2024    Mac 3, grade 3, 1 attempt.  Glidescope/videolaryngoscopy recommended for future attempts.    DM type 2 (diabetes mellitus, type 2) (Multi)     Dry eye syndrome of bilateral lacrimal glands     Essential hypertension     Essential tremor     Glaucoma     Hyperlipidemia     Long term (current) use of insulin (Multi)     Low back pain     PONV (postoperative nausea and vomiting)     Primary open angle glaucoma of both eyes, severe stage     Repeated falls     Sarcoidosis of lung (Multi)     Spinal stenosis, lumbar region without neurogenic claudication     Weakness       Past Surgical History:   Procedure Laterality Date    BLEPHAROPLASTY  07/2022    BREAST SURGERY  05/20/2022    Breast lift    CARPAL TUNNEL RELEASE      CATARACT EXTRACTION W/  INTRAOCULAR LENS IMPLANT Bilateral     OD 08/04/2011 +8.5D,OS 08/04/2011 +8.50D    FOOT SURGERY      INSERTION / REMOVAL CRANIAL DBS GENERATOR      Placed 2017.  Removed 2018. part of wire left in head when everything removed    LUMBAR FUSION      L3-S1    PANRETINAL PHOTOCOAGULATION  2014    THORACIC FUSION  08/26/2024    T4-S1 fusion    VITRECTOMY Right 2013       Nutrition History:  Food and Nutrient History: NPO/Vent        Food Allergies/Intolerances:  None  GI Symptoms: None  Oral Problems: None    Anthropometrics:  Ht: 177.8 cm (5' 10\"), Wt: 103 kg (227 lb 1.2 oz), BMI: 32.58  IBW/kg (Dietitian Calculated): 68.18 kg  Percent of IBW: 147 %       Weight Change:  Daily Weight  10/14/24 : 103 kg (227 lb 1.2 " oz)  10/01/24 : 99.7 kg (219 lb 12.8 oz)  09/25/24 : 74.8 kg (165 lb)  08/27/24 : 75.8 kg (167 lb)  08/16/24 : 76.2 kg (168 lb)  08/12/24 : 76.4 kg (168 lb 6.4 oz)  07/15/24 : 76.2 kg (168 lb)  06/18/24 : 75.8 kg (167 lb 3.2 oz)  05/31/24 : 69.4 kg (153 lb)  09/29/23 : 69.4 kg (153 lb)     Weight History / % Weight Change: records indicat 60# weight gain in past month, question accuracy  Significant Weight Loss: No     Nutrition Focused Physical Exam Findings: defer: vent     Nutrition Significant Labs:  Lab Results   Component Value Date    WBC 6.6 10/14/2024    HGB 7.9 (L) 10/14/2024    HCT 24.8 (L) 10/14/2024     10/14/2024    CHOL 266 (H) 08/23/2023    TRIG 213 (H) 08/23/2023    HDL 58 08/23/2023    ALT 8 10/12/2024    AST 9 10/12/2024     10/14/2024    K 4.0 10/14/2024     (H) 10/14/2024    CREATININE 1.19 (H) 10/14/2024    BUN 42 (H) 10/14/2024    CO2 17 (L) 10/14/2024    TSH 4.78 (H) 10/12/2024    INR 1.0 09/28/2024    HGBA1C 6.2 (H) 09/25/2024    ALBUR 40 (H) 03/31/2022     Nutrition Specific Medications:  brimonidine, 1 drop, Both Eyes, BID  calcium carbonate-vitamin D3, 1 tablet, oral, Daily  cefepime, 2 g, intravenous, q12h  docusate sodium, 100 mg, oral, BID  ezetimibe, 10 mg, oral, Daily  furosemide, 40 mg, intravenous, q8h  heparin (porcine), 5,000 Units, subcutaneous, q8h  insulin lispro, 0-15 Units, subcutaneous, q4h  ipratropium-albuteroL, 3 mL, nebulization, q4h  latanoprost, 1 drop, Both Eyes, Nightly  oxygen, , inhalation, Continuous - Inhalation  pantoprazole, 40 mg, oral, Daily before breakfast   Or  pantoprazole, 40 mg, intravenous, Daily before breakfast  [Held by provider] primidone, 125 mg, oral, TID  [Held by provider] propranolol LA, 60 mg, oral, Daily      norepinephrine, 0-0.2 mcg/kg/min, Last Rate: Stopped (10/14/24 0617)  propofol, 0-20 mcg/kg/min, Last Rate: Stopped (10/14/24 1006)      Propofol @ 5.96 ml/hr provides: 157 kcals/day    Dietary Orders (From  admission, onward)       Start     Ordered    10/14/24 1159  Enteral feeding with NPO 50 (Start @20 mL/Hr and increase by 10 mL Q4H until goal); 120; Water; Tap water; Every 4 hours  Diet effective now        Question Answer Comment   Tube feeding formula: Vital AF 1.2    Tube feeding continuous rate (mL/hr): 50 Start @20 mL/Hr and increase by 10 mL Q4H until goal   Tube feeding flush (mL): 120    Flush type: Water    Water type: Tap water    Flush frequency: Every 4 hours        10/14/24 1200                   Estimated Needs:   Estimated Energy Needs  Total Energy Estimated Needs (kCal): 1700 kCal  Total Estimated Energy Need per Day (kCal/kg): 25 kCal/kg  Method for Estimating Needs: IBW    Estimated Protein Needs  Total Protein Estimated Needs (g):  ()  Total Protein Estimated Needs (g/kg):  (1.2-2.0)  Method for Estimating Needs: IBW    Estimated Fluid Needs  Method for Estimating Needs: <2000 ml/d or per MD      Nutrition Diagnosis   Nutrition Diagnosis:     Nutrition Diagnosis  Patient has Nutrition Diagnosis: Yes  Diagnosis Status (1): New  Nutrition Diagnosis 1: Inadequate energy intake  Related to (1): decreased ability to consume sufficient energy  As Evidenced by (1): NPO/Mechanical Ventilation       Nutrition Interventions/Recommendations   Nutrition Interventions and Recommendations:    Nutrition Prescription:  Individualized Nutrition Prescription Provided for : 1700 calories,  gm protien to be provided via enteral nutrition    Nutrition Interventions:   Food and/or Nutrient Delivery Interventions  Interventions: Enteral intake  Enteral Intake: Modify composition of enteral nutrition, Modify rate of enteral nutrition  Goal: Initiate tube feed: Vital AF 1.2 goal rate @50 mL/Hr to provide 1440 calories, 1597 kcals with current rate of sedation), 90 gm protein, 973 mL free water    Education Documentation  No documentation found.         Nutrition Monitoring and Evaluation    Monitoring/Evaluation:   Food/Nutrient Related History Monitoring  Monitoring and Evaluation Plan: Enteral and parenteral nutrition intake  Enteral and Parenteral Nutrition Intake: Enteral nutrition intake  Criteria: monitoring tube feed tolerance       Time Spent/Follow-up:   Follow Up  Time Spent (min): 30 minutes  Last Date of Nutrition Visit: 10/14/24  Nutrition Follow-Up Needed?: 3-5 days  Follow up Comment: 10/17/24

## 2024-10-14 NOTE — PROGRESS NOTES
INFECTIOUS DISEASES PROGRESS NOTE    Consulted / following patient for:  Respiratory failure/pneumonia  Recent polymicrobial complicated postoperative lumbar wound infection, MRSA and Proteus  Acute kidney injury    Subjective   Interval History:   (Sedated on the ventilator)     Objective   PHYSICAL EXAMINATION  Vital signs:  Visit Vitals  /56   Pulse 58   Temp 37.3 °C (99.1 °F) (Oral)   Resp 18   Off norepinephrine infusion  General: Sedated on the ventilator, does not appear toxic  HEENT:  No scleral icterus or conjunctival suffusion, ET tube with minimal purulent secretions  Lungs: Transmitted coarse upper airway sounds, diminished  Heart:  S1, S2 normal, no pathologic murmur appreciated  Abdomen:  Soft, obese, no guarding. No palpable organs or masses  Back: Intact midline suture line without cellulitis, involving upper thoracic to lower lumbar regions except a several centimeter area in the upper third of the incision which has some exudate and slight separation of the edges  Genitalia:  Not examined.  Gibson catheter draining clear urine  Extremities:  No cords, phlebitis, cellulitis.  Midline catheter left arm  Neurologic:  Grossly non-focal.  No meningismus  Skin: No significant dermatitis or cellulitis    Relevant Results  WBC: 15,900 --> 6600  Creatinine (baseline 0.66): 1.46 ---> 1.43 ---> 1.33 ---> 1.19    Results from last 72 hours   Lab Units 10/12/24  1404   AST U/L 9   ALT U/L 8   ALK PHOS U/L 230*   BILIRUBIN TOTAL mg/dL 0.2     Microbiology:  Blood (10/12): Negative X2  Sputum: Stain with moderate GNB and moderate PMN, culture pending  Urine: Moderate colony count Candida lusitaniae    Imaging:  CXR images personally reviewed: Persistent left pleural effusion and left lung airspace disease      Assessment:  Sepsis - possibly due to pneumonia, present on admission. Patient already on IV vancomycin and IV ceftriaxone at time of this admission for lumbar spinal infection.  Thus far no evidence  for vascular catheter infection or recurrent bacteremia.  GNR in the sputum, remains on the ventilator  Lumbar spine surgical site infection s/p I&D 9/28. Cultures + MRSA, Proteus. On vanco/ceftriaxone through 11/12. Followed by Dr. Brant Juarez.  1 slightly suspicious area of separation of the wound edges in the superior portion of the incision     Plan/Recommendations:  Continue IV vancomycin - check Cr daily. Pharmacy to dose and monitor  Empiric cefepime for broader empiric coverage while cultures/work-up pending  Await identification of GNR in sputum    Andrew Malagon MD  [facility]  Office:  [phone]

## 2024-10-14 NOTE — PROGRESS NOTES
Patient not medically clear. Patient intubated. At this time there is not a safe discharge plan in place. Will follow.      10/14/24 8750   Discharge Planning   Home or Post Acute Services Other (Comment)  (TBD)   Expected Discharge Disposition [place]  (TBD)   Does the patient need discharge transport arranged? Yes   RoundTrip coordination needed? Yes

## 2024-10-14 NOTE — PROGRESS NOTES
Critical Care Medicine Progress Note    Admitted on:     10/12/2024  Length of Stay: 2 day(s)     Interval History     68 yo F with PMH of essential tremor, glaucoma, HTN, HLD, pulmonary sarcoidosis, T2DM recent thoracolumbar surgery (8/2024) c/b surgical site infection requiring multiple surgical debridements presented to the ED from SNF on 10/12 after being found unresponsive.  Intubated in the ED for airway protection.  CXR notable for extensive left lung airspace disease and a pleural effusion.  UA with inflammatory changes and bacteriuria.  Treated with IV fluids and antibiotics.  Started on Levophed.  Admitted to the ICU for septic shock with encephalopathy and AMY.  ID consulted.    In regards to her recent surgical history, she underwent a large thoracolumbar spinal surgery with hardware at [facility] on 8/26.  Developed surgical site infection.  Blood cultures on 9/25 grew MRSA.  She was taken back to the OR for a complex I&D on 9/28 and again on 9/30.  Discharged to SNF on 10/4 on an extended course of IV antibiotics (Rocephin and vancomycin) via midline in her LUE to be given through 11/12.    Last echo done 10/1 notable for EF 50-55%, mildly elevated RVSP (40.4 mm Hg).    Sedated on propofol.  Opened eyes and grimaced to noxious stimuli on my exam but did not follow commands.  However, RN reports that she did weakly follow commands, moved all extremities.  Maintaining adequate MAPs on Levophed @ 0.01 mcg/kg/min.  Vent AC 18/250/35%/5    Anasarca on exam.  2+ to 3+ pitting edema of bilateral upper and lower extremities.  Given a dose of Lasix yesterday.  24 hr UOP ~1.5 L.  Positive fluid balance.  Serum Cr down to 1.19 (baseline WNL).  Was 1.46 on admission.    WBC 15.9 -> 6.6.  No fevers.  Cultures NTD.      Objective   Objective     Vitals:    10/13/24 0800   Weight: 99.9 kg (220 lb 3.8 oz)   Body mass index is 31.6 kg/m².        10/14/2024     5:15 AM 10/14/2024     5:30 AM 10/14/2024     5:45 AM 10/14/2024      6:00 AM 10/14/2024     6:15 AM 10/14/2024     7:00 AM 10/14/2024     8:00 AM   Vitals   Systolic 122 112 103 110 109 107 112   Diastolic 50 52 51 47 55 58 50   Heart Rate 56 56 56 55 55 59 55   Temp      36.7 °C (98.1 °F)    Resp 18 18 18 18 18 18 18        Vent settings:  Vent Mode: Pressure regulated volume control/assist control  FiO2 (%):  [35 %-50 %] 35 %  S RR:  [18] 18  S VT:  [450 mL-480 mL] 450 mL  PEEP/CPAP (cm H2O):  [5 cm H20] 5 cm H20  MAP (cm H2O):  [8.3-10] 9.7    Intake/Output Summary (Last 24 hours) at 10/14/2024 0840  Last data filed at 10/14/2024 0811  Gross per 24 hour   Intake 1289.44 ml   Output 865 ml   Net 424.44 ml       Physical Exam  Neuro: See HPI.  Eyes: PERRL, clear sclerae.  CV: Normal S1, S2.  RRR.  No m/r/g.  Resp: Intubated.  Diminished but clear on auscultation.  GI: +BS, abd soft, NT, ND.  Ext: See HPI.  Skin: Warm and dry.  Psych: Unable to assess.    Medications     Scheduled Medications:   brimonidine, 1 drop, Both Eyes, BID  calcium carbonate-vitamin D3, 1 tablet, oral, Daily  cefepime, 2 g, intravenous, q12h  docusate sodium, 100 mg, oral, BID  ezetimibe, 10 mg, oral, Daily  heparin (porcine), 5,000 Units, subcutaneous, q8h  insulin lispro, 0-15 Units, subcutaneous, q4h  ipratropium-albuteroL, 3 mL, nebulization, q4h  latanoprost, 1 drop, Both Eyes, Nightly  oxygen, , inhalation, Continuous - Inhalation  pantoprazole, 40 mg, oral, Daily before breakfast   Or  pantoprazole, 40 mg, intravenous, Daily before breakfast  primidone, 125 mg, oral, TID  [Held by provider] propranolol LA, 60 mg, oral, Daily  sodium zirconium cyclosilicate, 10 g, oral, q8h       Continuous Medications:   norepinephrine, 0-0.2 mcg/kg/min, Last Rate: Stopped (10/14/24 0617)  propofol, 0-20 mcg/kg/min, Last Rate: 10 mcg/kg/min (10/14/24 0618)       PRN Medications:     Labs     Results from last 72 hours   Lab Units 10/14/24  0449 10/13/24  1313 10/13/24  0515   GLUCOSE mg/dL 150* 171* 143*  143*    SODIUM mmol/L 139 138 138   POTASSIUM mmol/L 4.0 4.5 5.0   CHLORIDE mmol/L 113* 112* 112*   CO2 mmol/L 17* 16* 18*   BUN mg/dL 42* 43* 42*   CREATININE mg/dL 1.19* 1.33* 1.43*   EGFR mL/min/1.73m*2 50* 44* 40*   CALCIUM mg/dL 7.7* 7.7* 7.8*   ALBUMIN g/dL 2.1* 2.3* 1.9*   MAGNESIUM mg/dL 1.97  --  1.91   PHOSPHORUS mg/dL 4.2 4.3 4.4     Results from last 72 hours   Lab Units 10/12/24  1404   ALK PHOS U/L 230*   ALT U/L 8   AST U/L 9   BILIRUBIN TOTAL mg/dL 0.2   PROTEIN TOTAL g/dL 5.7*     Results from last 72 hours   Lab Units 10/12/24  1404   TROPHS ng/L 7     Results from last 72 hours   Lab Units 10/13/24  0515 10/12/24  1404   LACTATE mmol/L 1.4 0.7     Results from last 72 hours   Lab Units 10/14/24  0449 10/13/24  0515 10/12/24  1404   WBC AUTO x10*3/uL 6.6 15.9* 7.0   NRBC AUTO /100 WBCs 0.0 0.3* 0.0   RBC AUTO x10*6/uL 2.63* 3.19* 3.39*   HEMOGLOBIN g/dL 7.9* 9.7* 10.4*   HEMATOCRIT % 24.8* 30.1* 33.1*   MCV fL 94 94 98   MCH pg 30.0 30.4 30.7   MCHC g/dL 31.9* 32.2 31.4*   RDW % 17.3* 17.2* 17.2*   PLATELETS AUTO x10*3/uL 347 480* 434     Results from last 72 hours   Lab Units 10/13/24  1453 10/12/24  1603   POCT PH, ARTERIAL pH 7.34* 7.29*   POCT PCO2, ARTERIAL mm Hg 32* 38   POCT PO2, ARTERIAL mm Hg 141* 88   POCT HCO3 CALCULATED, ARTERIAL mmol/L 17.3* 18.3*   POCT LACTATE, ARTERIAL mmol/L 0.7 1.0   POCT BASE EXCESS, ARTERIAL mmol/L -7.6* -7.7*   FIO2 % 50 50     Lab Results   Component Value Date    BLOODCULT No growth at 1 day 10/12/2024    BLOODCULT No growth at 1 day 10/12/2024    GRAMSTAIN (3+) Moderate Polymorphonuclear leukocytes (A) 10/13/2024    GRAMSTAIN (3+) Moderate Gram negative bacilli (A) 10/13/2024     Lab Results   Component Value Date    URINECULTURE (A) 09/25/2024     Multiple organisms present, probable contamination. Repeat culture if clinically indicated.       Imaging and Diagnostic Studies     Recent imaging and diagnostic studies reviewed.       Assessment / Plan     #Septic  shock  #Healthcare acquired pneumonia  Thoracolumbar surgical site s/p I&D x 2 with MRSA bacteremia on an extended course of IV antibiotics  -Ongoing vasopressor and mechanical vent support.  -Continue cefepime and vancomycin per ID.    #Acute encephalopathy  #Essential tremor  -Hold home primidone.  -Dietician consult pending.  Plan to start tube feeds.    #Acute kidney injury  #Anasarca  -Gentle diuresis today for goal fluid balance of 1 to 2 L negative.      Kalyan Leblanc MD    This patient is critically ill/injured due to acute impairment in one or more vital organ systems, such that there is a probably of imminent or life-threatening deterioration of the patient's condition.  I spent 65 minutes in the direct delivery of medical care that involves high complexity decision making to treat single or multiple vital organ system failure and/or prevent further life-threatening deterioration of the patient's condition.  This time does not include separately billable procedures.

## 2024-10-14 NOTE — NURSING NOTE
Vascular access note    Patient with Lt arm single lumen 3 fr midline, present on admission. Flowsheet documents it was placed 9/25. Dressing D&I dated 10/7, infusing without difficulty, dressing change done using sterile technique, ext catheter at 0 cm, no redness, drainage or swelling noted. Blue cap changed, no leaking noted with flushing, no blood return noted, IVF resumed. Bilateral arms noted to be edematous.

## 2024-10-14 NOTE — CARE PLAN
The patient's goals for the shift include      The clinical goals for the shift include keep comfortable, wean off sedation and pressors      Problem: Safety - Medical Restraint  Goal: Remains free of injury from restraints (Restraint for Interference with Medical Device)  Outcome: Progressing  Goal: Free from restraint(s) (Restraint for Interference with Medical Device)  Outcome: Progressing     Problem: Pain - Adult  Goal: Verbalizes/displays adequate comfort level or baseline comfort level  Outcome: Progressing     Problem: Safety - Adult  Goal: Free from fall injury  Outcome: Progressing     Problem: Discharge Planning  Goal: Discharge to home or other facility with appropriate resources  Outcome: Progressing     Problem: Chronic Conditions and Co-morbidities  Goal: Patient's chronic conditions and co-morbidity symptoms are monitored and maintained or improved  Outcome: Progressing     Problem: Diabetes  Goal: Achieve decreasing blood glucose levels by end of shift  Outcome: Progressing  Goal: Increase stability of blood glucose readings by end of shift  Outcome: Progressing  Goal: Decrease in ketones present in urine by end of shift  Outcome: Progressing  Goal: Maintain electrolyte levels within acceptable range throughout shift  Outcome: Progressing  Goal: Maintain glucose levels >70mg/dl to <250mg/dl throughout shift  Outcome: Progressing  Goal: No changes in neurological exam by end of shift  Outcome: Progressing  Goal: Learn about and adhere to nutrition recommendations by end of shift  Outcome: Progressing  Goal: Vital signs within normal range for age by end of shift  Outcome: Progressing  Goal: Increase self care and/or family involovement by end of shift  Outcome: Progressing  Goal: Receive DSME education by end of shift  Outcome: Progressing     Problem: Knowledge Deficit  Goal: Patient/family/caregiver demonstrates understanding of disease process, treatment plan, medications, and discharge  instructions  Outcome: Progressing     Problem: Mechanical Ventilation  Goal: Patient Will Maintain Patent Airway  Outcome: Progressing  Goal: Oral health is maintained or improved  Outcome: Progressing  Goal: ET tube will be managed safely  Outcome: Progressing  Goal: Ability to express needs and understand communication  Outcome: Progressing  Goal: Mobility/activity is maintained at optimum level for patient  Outcome: Progressing     Problem: Skin  Goal: Decreased wound size/increased tissue granulation at next dressing change  Outcome: Progressing  Flowsheets (Taken 10/14/2024 0840)  Decreased wound size/increased tissue granulation at next dressing change: Protective dressings over bony prominences  Goal: Participates in plan/prevention/treatment measures  Outcome: Progressing  Flowsheets (Taken 10/14/2024 0840)  Participates in plan/prevention/treatment measures: Elevate heels  Goal: Prevent/manage excess moisture  Outcome: Progressing  Flowsheets (Taken 10/14/2024 0840)  Prevent/manage excess moisture:   Moisturize dry skin   Monitor for/manage infection if present  Goal: Prevent/minimize sheer/friction injuries  Outcome: Progressing  Flowsheets (Taken 10/14/2024 0840)  Prevent/minimize sheer/friction injuries:   HOB 30 degrees or less   Turn/reposition every 2 hours/use positioning/transfer devices   Use pull sheet  Goal: Promote/optimize nutrition  Outcome: Progressing  Flowsheets (Taken 10/14/2024 0840)  Promote/optimize nutrition: Monitor/record intake including meals  Goal: Promote skin healing  Outcome: Progressing  Flowsheets (Taken 10/14/2024 0840)  Promote skin healing: Turn/reposition every 2 hours/use positioning/transfer devices     Problem: Nutrition  Goal: Less than 5 days NPO/clear liquids  Outcome: Progressing  Goal: Oral intake greater than 50%  Outcome: Progressing  Goal: Oral intake greater 75%  Outcome: Progressing  Goal: Consume prescribed supplement  Outcome: Progressing  Goal: Adequate  PO fluid intake  Outcome: Progressing  Goal: Nutrition support goals are met within 48 hrs  Outcome: Progressing  Goal: Nutrition support is meeting 75% of nutrient needs  Outcome: Progressing  Goal: Tube feed tolerance  Outcome: Progressing  Goal: BG  mg/dL  Outcome: Progressing  Goal: Lab values WNL  Outcome: Progressing  Goal: Electrolytes WNL  Outcome: Progressing  Goal: Promote healing  Outcome: Progressing  Goal: Maintain stable weight  Outcome: Progressing  Goal: Reduce weight from edema/fluid  Outcome: Progressing

## 2024-10-15 LAB
ALBUMIN SERPL BCP-MCNC: 2 G/DL (ref 3.4–5)
ANION GAP SERPL CALCULATED.3IONS-SCNC: 11 MMOL/L (ref 10–20)
ANION GAP SERPL CALCULATED.3IONS-SCNC: 12 MMOL/L (ref 10–20)
BASOPHILS # BLD AUTO: 0.02 X10*3/UL (ref 0–0.1)
BASOPHILS NFR BLD AUTO: 0.3 %
BUN SERPL-MCNC: 40 MG/DL (ref 6–23)
BUN SERPL-MCNC: 42 MG/DL (ref 6–23)
CALCIUM SERPL-MCNC: 7.3 MG/DL (ref 8.6–10.3)
CALCIUM SERPL-MCNC: 7.6 MG/DL (ref 8.6–10.3)
CHLORIDE SERPL-SCNC: 111 MMOL/L (ref 98–107)
CHLORIDE SERPL-SCNC: 113 MMOL/L (ref 98–107)
CO2 SERPL-SCNC: 19 MMOL/L (ref 21–32)
CO2 SERPL-SCNC: 19 MMOL/L (ref 21–32)
CREAT SERPL-MCNC: 1.17 MG/DL (ref 0.5–1.05)
CREAT SERPL-MCNC: 1.18 MG/DL (ref 0.5–1.05)
EGFRCR SERPLBLD CKD-EPI 2021: 50 ML/MIN/1.73M*2
EGFRCR SERPLBLD CKD-EPI 2021: 51 ML/MIN/1.73M*2
EOSINOPHIL # BLD AUTO: 0.03 X10*3/UL (ref 0–0.7)
EOSINOPHIL NFR BLD AUTO: 0.5 %
ERYTHROCYTE [DISTWIDTH] IN BLOOD BY AUTOMATED COUNT: 17.6 % (ref 11.5–14.5)
GLUCOSE BLD MANUAL STRIP-MCNC: 172 MG/DL (ref 74–99)
GLUCOSE BLD MANUAL STRIP-MCNC: 180 MG/DL (ref 74–99)
GLUCOSE BLD MANUAL STRIP-MCNC: 186 MG/DL (ref 74–99)
GLUCOSE BLD MANUAL STRIP-MCNC: 208 MG/DL (ref 74–99)
GLUCOSE BLD MANUAL STRIP-MCNC: 224 MG/DL (ref 74–99)
GLUCOSE SERPL-MCNC: 183 MG/DL (ref 74–99)
GLUCOSE SERPL-MCNC: 220 MG/DL (ref 74–99)
HCT VFR BLD AUTO: 25 % (ref 36–46)
HGB BLD-MCNC: 7.9 G/DL (ref 12–16)
IMM GRANULOCYTES # BLD AUTO: 0.06 X10*3/UL (ref 0–0.7)
IMM GRANULOCYTES NFR BLD AUTO: 1 % (ref 0–0.9)
LYMPHOCYTES # BLD AUTO: 0.92 X10*3/UL (ref 1.2–4.8)
LYMPHOCYTES NFR BLD AUTO: 15.7 %
MAGNESIUM SERPL-MCNC: 2.07 MG/DL (ref 1.6–2.4)
MAGNESIUM SERPL-MCNC: 2.28 MG/DL (ref 1.6–2.4)
MCH RBC QN AUTO: 30 PG (ref 26–34)
MCHC RBC AUTO-ENTMCNC: 31.6 G/DL (ref 32–36)
MCV RBC AUTO: 95 FL (ref 80–100)
MONOCYTES # BLD AUTO: 0.51 X10*3/UL (ref 0.1–1)
MONOCYTES NFR BLD AUTO: 8.7 %
NEUTROPHILS # BLD AUTO: 4.33 X10*3/UL (ref 1.2–7.7)
NEUTROPHILS NFR BLD AUTO: 73.8 %
NRBC BLD-RTO: 0 /100 WBCS (ref 0–0)
PHOSPHATE SERPL-MCNC: 3.8 MG/DL (ref 2.5–4.9)
PLATELET # BLD AUTO: 301 X10*3/UL (ref 150–450)
POTASSIUM SERPL-SCNC: 3.4 MMOL/L (ref 3.5–5.3)
POTASSIUM SERPL-SCNC: 3.6 MMOL/L (ref 3.5–5.3)
RBC # BLD AUTO: 2.63 X10*6/UL (ref 4–5.2)
SODIUM SERPL-SCNC: 139 MMOL/L (ref 136–145)
SODIUM SERPL-SCNC: 139 MMOL/L (ref 136–145)
VANCOMYCIN SERPL-MCNC: 41.2 UG/ML (ref 5–20)
WBC # BLD AUTO: 5.9 X10*3/UL (ref 4.4–11.3)

## 2024-10-15 PROCEDURE — 80048 BASIC METABOLIC PNL TOTAL CA: CPT | Mod: CCI

## 2024-10-15 PROCEDURE — 2500000001 HC RX 250 WO HCPCS SELF ADMINISTERED DRUGS (ALT 637 FOR MEDICARE OP)

## 2024-10-15 PROCEDURE — 2500000004 HC RX 250 GENERAL PHARMACY W/ HCPCS (ALT 636 FOR OP/ED)

## 2024-10-15 PROCEDURE — 37799 UNLISTED PX VASCULAR SURGERY: CPT

## 2024-10-15 PROCEDURE — 99291 CRITICAL CARE FIRST HOUR: CPT | Performed by: ANESTHESIOLOGY

## 2024-10-15 PROCEDURE — 2500000005 HC RX 250 GENERAL PHARMACY W/O HCPCS: Performed by: EMERGENCY MEDICINE

## 2024-10-15 PROCEDURE — 2500000004 HC RX 250 GENERAL PHARMACY W/ HCPCS (ALT 636 FOR OP/ED): Performed by: INTERNAL MEDICINE

## 2024-10-15 PROCEDURE — 94640 AIRWAY INHALATION TREATMENT: CPT

## 2024-10-15 PROCEDURE — 2020000001 HC ICU ROOM DAILY

## 2024-10-15 PROCEDURE — 83735 ASSAY OF MAGNESIUM: CPT

## 2024-10-15 PROCEDURE — 92610 EVALUATE SWALLOWING FUNCTION: CPT | Mod: GN | Performed by: SPEECH-LANGUAGE PATHOLOGIST

## 2024-10-15 PROCEDURE — 2500000005 HC RX 250 GENERAL PHARMACY W/O HCPCS: Performed by: ANESTHESIOLOGY

## 2024-10-15 PROCEDURE — 2500000002 HC RX 250 W HCPCS SELF ADMINISTERED DRUGS (ALT 637 FOR MEDICARE OP, ALT 636 FOR OP/ED)

## 2024-10-15 PROCEDURE — 2500000002 HC RX 250 W HCPCS SELF ADMINISTERED DRUGS (ALT 637 FOR MEDICARE OP, ALT 636 FOR OP/ED): Performed by: ANESTHESIOLOGY

## 2024-10-15 PROCEDURE — 9420000001 HC RT PATIENT EDUCATION 5 MIN

## 2024-10-15 PROCEDURE — 94003 VENT MGMT INPAT SUBQ DAY: CPT

## 2024-10-15 PROCEDURE — 80202 ASSAY OF VANCOMYCIN: CPT | Performed by: PHARMACIST

## 2024-10-15 PROCEDURE — 80069 RENAL FUNCTION PANEL: CPT

## 2024-10-15 PROCEDURE — 94664 DEMO&/EVAL PT USE INHALER: CPT

## 2024-10-15 PROCEDURE — 82947 ASSAY GLUCOSE BLOOD QUANT: CPT

## 2024-10-15 PROCEDURE — 85025 COMPLETE CBC W/AUTO DIFF WBC: CPT

## 2024-10-15 PROCEDURE — 36415 COLL VENOUS BLD VENIPUNCTURE: CPT

## 2024-10-15 PROCEDURE — 2500000004 HC RX 250 GENERAL PHARMACY W/ HCPCS (ALT 636 FOR OP/ED): Performed by: ANESTHESIOLOGY

## 2024-10-15 RX ORDER — TALC
6 POWDER (GRAM) TOPICAL NIGHTLY
Status: DISCONTINUED | OUTPATIENT
Start: 2024-10-15 | End: 2024-10-18

## 2024-10-15 RX ORDER — INSULIN LISPRO 100 [IU]/ML
0-15 INJECTION, SOLUTION INTRAVENOUS; SUBCUTANEOUS
Status: DISCONTINUED | OUTPATIENT
Start: 2024-10-15 | End: 2024-10-19

## 2024-10-15 RX ORDER — SODIUM BICARBONATE 1 MEQ/ML
50 SYRINGE (ML) INTRAVENOUS ONCE
Status: COMPLETED | OUTPATIENT
Start: 2024-10-15 | End: 2024-10-15

## 2024-10-15 RX ORDER — POTASSIUM CHLORIDE 1.5 G/1.58G
40 POWDER, FOR SOLUTION ORAL ONCE
Status: COMPLETED | OUTPATIENT
Start: 2024-10-15 | End: 2024-10-15

## 2024-10-15 RX ORDER — POTASSIUM CHLORIDE 14.9 MG/ML
20 INJECTION INTRAVENOUS ONCE
Status: COMPLETED | OUTPATIENT
Start: 2024-10-15 | End: 2024-10-15

## 2024-10-15 RX ADMIN — Medication 6 L/MIN: at 19:56

## 2024-10-15 RX ADMIN — IPRATROPIUM BROMIDE AND ALBUTEROL SULFATE 3 ML: 2.5; .5 SOLUTION RESPIRATORY (INHALATION) at 11:13

## 2024-10-15 RX ADMIN — BRIMONIDINE TARTRATE 1 DROP: 2 SOLUTION OPHTHALMIC at 08:08

## 2024-10-15 RX ADMIN — IPRATROPIUM BROMIDE AND ALBUTEROL SULFATE 3 ML: 2.5; .5 SOLUTION RESPIRATORY (INHALATION) at 04:21

## 2024-10-15 RX ADMIN — POTASSIUM CHLORIDE 20 MEQ: 14.9 INJECTION, SOLUTION INTRAVENOUS at 06:43

## 2024-10-15 RX ADMIN — INSULIN LISPRO 6 UNITS: 100 INJECTION, SOLUTION INTRAVENOUS; SUBCUTANEOUS at 17:06

## 2024-10-15 RX ADMIN — DOCUSATE SODIUM 100 MG: 50 LIQUID ORAL at 20:20

## 2024-10-15 RX ADMIN — EZETIMIBE 10 MG: 10 TABLET ORAL at 08:08

## 2024-10-15 RX ADMIN — POTASSIUM CHLORIDE 40 MEQ: 1.5 FOR SOLUTION ORAL at 06:44

## 2024-10-15 RX ADMIN — LATANOPROST 1 DROP: 50 SOLUTION OPHTHALMIC at 20:25

## 2024-10-15 RX ADMIN — SODIUM BICARBONATE 50 MEQ: 84 INJECTION INTRAVENOUS at 09:07

## 2024-10-15 RX ADMIN — CEFEPIME 2 G: 2 INJECTION, POWDER, FOR SOLUTION INTRAVENOUS at 08:08

## 2024-10-15 RX ADMIN — Medication 1 TABLET: at 08:08

## 2024-10-15 RX ADMIN — DOCUSATE SODIUM 100 MG: 50 LIQUID ORAL at 08:08

## 2024-10-15 RX ADMIN — PANTOPRAZOLE SODIUM 40 MG: 40 INJECTION, POWDER, FOR SOLUTION INTRAVENOUS at 06:10

## 2024-10-15 RX ADMIN — HEPARIN SODIUM 5000 UNITS: 5000 INJECTION, SOLUTION INTRAVENOUS; SUBCUTANEOUS at 22:11

## 2024-10-15 RX ADMIN — BRIMONIDINE TARTRATE 1 DROP: 2 SOLUTION OPHTHALMIC at 20:20

## 2024-10-15 RX ADMIN — HEPARIN SODIUM 5000 UNITS: 5000 INJECTION, SOLUTION INTRAVENOUS; SUBCUTANEOUS at 13:33

## 2024-10-15 RX ADMIN — CEFEPIME 2 G: 2 INJECTION, POWDER, FOR SOLUTION INTRAVENOUS at 20:20

## 2024-10-15 RX ADMIN — INSULIN LISPRO 3 UNITS: 100 INJECTION, SOLUTION INTRAVENOUS; SUBCUTANEOUS at 04:23

## 2024-10-15 RX ADMIN — Medication 40 PERCENT: at 07:11

## 2024-10-15 RX ADMIN — BUMETANIDE 0.5 MG/HR: 0.25 INJECTION INTRAMUSCULAR; INTRAVENOUS at 08:59

## 2024-10-15 RX ADMIN — Medication 6 MG: at 20:21

## 2024-10-15 RX ADMIN — HEPARIN SODIUM 5000 UNITS: 5000 INJECTION, SOLUTION INTRAVENOUS; SUBCUTANEOUS at 06:10

## 2024-10-15 RX ADMIN — INSULIN LISPRO 3 UNITS: 100 INJECTION, SOLUTION INTRAVENOUS; SUBCUTANEOUS at 08:08

## 2024-10-15 RX ADMIN — PROPOFOL 10 MCG/KG/MIN: 10 INJECTION, EMULSION INTRAVENOUS at 06:55

## 2024-10-15 RX ADMIN — BUMETANIDE 1 MG: 0.25 INJECTION INTRAMUSCULAR; INTRAVENOUS at 06:10

## 2024-10-15 RX ADMIN — IPRATROPIUM BROMIDE AND ALBUTEROL SULFATE 3 ML: 2.5; .5 SOLUTION RESPIRATORY (INHALATION) at 15:07

## 2024-10-15 RX ADMIN — IPRATROPIUM BROMIDE AND ALBUTEROL SULFATE 3 ML: 2.5; .5 SOLUTION RESPIRATORY (INHALATION) at 00:20

## 2024-10-15 RX ADMIN — IPRATROPIUM BROMIDE AND ALBUTEROL SULFATE 3 ML: 2.5; .5 SOLUTION RESPIRATORY (INHALATION) at 19:49

## 2024-10-15 RX ADMIN — IPRATROPIUM BROMIDE AND ALBUTEROL SULFATE 3 ML: 2.5; .5 SOLUTION RESPIRATORY (INHALATION) at 07:10

## 2024-10-15 RX ADMIN — IPRATROPIUM BROMIDE AND ALBUTEROL SULFATE 3 ML: 2.5; .5 SOLUTION RESPIRATORY (INHALATION) at 23:25

## 2024-10-15 RX ADMIN — INSULIN LISPRO 6 UNITS: 100 INJECTION, SOLUTION INTRAVENOUS; SUBCUTANEOUS at 20:20

## 2024-10-15 RX ADMIN — INSULIN LISPRO 3 UNITS: 100 INJECTION, SOLUTION INTRAVENOUS; SUBCUTANEOUS at 13:33

## 2024-10-15 ASSESSMENT — PAIN - FUNCTIONAL ASSESSMENT
PAIN_FUNCTIONAL_ASSESSMENT: CPOT (CRITICAL CARE PAIN OBSERVATION TOOL)
PAIN_FUNCTIONAL_ASSESSMENT: CPOT (CRITICAL CARE PAIN OBSERVATION TOOL)
PAIN_FUNCTIONAL_ASSESSMENT: 0-10
PAIN_FUNCTIONAL_ASSESSMENT: CPOT (CRITICAL CARE PAIN OBSERVATION TOOL)
PAIN_FUNCTIONAL_ASSESSMENT: 0-10

## 2024-10-15 ASSESSMENT — PAIN SCALES - GENERAL
PAINLEVEL_OUTOF10: 0 - NO PAIN

## 2024-10-15 NOTE — PROGRESS NOTES
Vancomycin Dosing by Pharmacy- [facility]    Narda Malloy is a 69 y.o. year old female who Pharmacy has been consulted for vancomycin dosing for Vancomycin Indications: Skin & Soft Tissue. Based on the patient's indication and renal status this patient will be dosed based on a target level of SSTI/UTI: 10-15 mcg/mL    Patient's scr has improved to around baseline. Vanco level still elevated.    Last vancomycin dose (incl. date and time): 1g q12h at OSH    Vancomycin level (incl. date and time): 41.2 mcg/mL on 10/15 at 0430    Lab Results   Component Value Date    VANCORANDOM 41.2 (H) 10/15/2024    VANCOTROUGH 11.8 08/08/2018       Visit Vitals  /68   Pulse 68   Temp 37.1 °C (98.8 °F) (Axillary)   Resp 18           Lab Results   Component Value Date    CREATININE 1.17 (H) 10/15/2024    CREATININE 1.19 (H) 10/14/2024    CREATININE 1.33 (H) 10/13/2024    CREATININE 1.43 (H) 10/13/2024       I/O last 3 completed shifts:  In: 1185.6 (11.5 mL/kg) [I.V.:295.6 (2.9 mL/kg); NG/GT:590; IV Piggyback:300]  Out: 1875 (18.2 mL/kg) [Urine:1875 (0.5 mL/kg/hr)]  Weight: 103 kg     Assessment/Plan     Level is above target trough goal.   hold vancomycin. Can resume AUC dosing when vanco level falls to target range.  Random level within 24 hours will be obtained on 10/16 at 0500. May be obtained sooner if clinically indicated.   Will continue to monitor renal function daily while on vancomycin and order serum creatinine at least every 48 hours if not already ordered.  Follow for continued vancomycin needs, clinical response, and signs/symptoms of toxicity.     Cornelius Altamirano, LissetD

## 2024-10-15 NOTE — CARE PLAN
Problem: Mechanical Ventilation  Goal: Patient Will Maintain Patent Airway  Outcome: Progressing  Goal: Oral health is maintained or improved  Outcome: Progressing  Goal: ET tube will be managed safely  Outcome: Progressing  Goal: Ability to express needs and understand communication  Outcome: Progressing  Goal: Mobility/activity is maintained at optimum level for patient  Outcome: Progressing

## 2024-10-15 NOTE — PROGRESS NOTES
INFECTIOUS DISEASES PROGRESS NOTE    Consulted / following patient for:  Respiratory failure/pneumonia  Recent polymicrobial complicated postoperative lumbar wound infection, MRSA and Proteus  Acute kidney injury    Subjective   Interval History:   (Sedated on the ventilator)     Objective   PHYSICAL EXAMINATION  Vital signs:  Visit Vitals  /68   Pulse 68   Temp 37.1 °C (98.8 °F) (Axillary)   Resp 18   No vasopressor agents in use  General: Sedated on the ventilator, does not appear toxic  Lungs: Clear, coarse, diminished  Heart:  S1, S2 normal, no pathologic murmur appreciated  Abdomen:  Soft, obese, no guarding.   Extremities:  No cords, phlebitis, cellulitis.    Neurologic:  Grossly non-focal.      Relevant Results  WBC: 15,900 --> 6600 ---> 5900  Creatinine (baseline 0.66): 1.46 ---> 1.43 ---> 1.33 ---> 1.19 ---> 1.17    Results from last 72 hours   Lab Units 10/12/24  1404   AST U/L 9   ALT U/L 8   ALK PHOS U/L 230*   BILIRUBIN TOTAL mg/dL 0.2     Microbiology:  Blood (10/12): Negative X2  Sputum: Stain with moderate GNB and moderate PMN, culture pending  Urine: Moderate colony count Candida lusitaniae    Imaging:  (No new images)      Assessment:  Sepsis - possibly due to pneumonia, present on admission. Patient already on IV vancomycin and IV ceftriaxone at time of this admission for lumbar spinal infection.  Thus far no evidence for vascular catheter infection or recurrent bacteremia.  GNR in the sputum, remains on the ventilator  Lumbar spine surgical site infection s/p I&D 9/28. Cultures + MRSA, Proteus. On vanco/ceftriaxone through 11/12. Followed by Dr. Brant Juarez.  1 slightly suspicious area of separation of the wound edges in the superior portion of the incision     Plan/Recommendations:  Continue IV vancomycin - check Cr daily. Pharmacy to dose and monitor  Continue empiric cefepime pending identification and susceptibilities of GNR in sputum    Andrew Malagon MD  [facility]  Office:  [phone]

## 2024-10-15 NOTE — CARE PLAN
The patient's goals for the shift include      The clinical goals for the shift include keep comfortable, sedated, vitally stable      Problem: Safety - Medical Restraint  Goal: Remains free of injury from restraints (Restraint for Interference with Medical Device)  Outcome: Progressing  Goal: Free from restraint(s) (Restraint for Interference with Medical Device)  Outcome: Progressing     Problem: Pain - Adult  Goal: Verbalizes/displays adequate comfort level or baseline comfort level  Outcome: Progressing     Problem: Safety - Adult  Goal: Free from fall injury  Outcome: Progressing     Problem: Discharge Planning  Goal: Discharge to home or other facility with appropriate resources  Outcome: Progressing     Problem: Chronic Conditions and Co-morbidities  Goal: Patient's chronic conditions and co-morbidity symptoms are monitored and maintained or improved  Outcome: Progressing     Problem: Diabetes  Goal: Achieve decreasing blood glucose levels by end of shift  Outcome: Progressing  Goal: Increase stability of blood glucose readings by end of shift  Outcome: Progressing  Goal: Decrease in ketones present in urine by end of shift  Outcome: Progressing  Goal: Maintain electrolyte levels within acceptable range throughout shift  Outcome: Progressing  Goal: Maintain glucose levels >70mg/dl to <250mg/dl throughout shift  Outcome: Progressing  Goal: No changes in neurological exam by end of shift  Outcome: Progressing  Goal: Learn about and adhere to nutrition recommendations by end of shift  Outcome: Progressing  Goal: Vital signs within normal range for age by end of shift  Outcome: Progressing  Goal: Increase self care and/or family involovement by end of shift  Outcome: Progressing  Goal: Receive DSME education by end of shift  Outcome: Progressing     Problem: Knowledge Deficit  Goal: Patient/family/caregiver demonstrates understanding of disease process, treatment plan, medications, and discharge  instructions  Outcome: Progressing     Problem: Mechanical Ventilation  Goal: Patient Will Maintain Patent Airway  Outcome: Progressing  Goal: Oral health is maintained or improved  Outcome: Progressing  Goal: ET tube will be managed safely  Outcome: Progressing  Goal: Ability to express needs and understand communication  Outcome: Progressing  Goal: Mobility/activity is maintained at optimum level for patient  Outcome: Progressing     Problem: Skin  Goal: Decreased wound size/increased tissue granulation at next dressing change  Outcome: Progressing  Flowsheets (Taken 10/15/2024 1011)  Decreased wound size/increased tissue granulation at next dressing change: Protective dressings over bony prominences  Goal: Participates in plan/prevention/treatment measures  Outcome: Progressing  Flowsheets (Taken 10/15/2024 1011)  Participates in plan/prevention/treatment measures: Elevate heels  Goal: Prevent/manage excess moisture  Outcome: Progressing  Flowsheets (Taken 10/15/2024 1011)  Prevent/manage excess moisture:   Moisturize dry skin   Monitor for/manage infection if present  Goal: Prevent/minimize sheer/friction injuries  Outcome: Progressing  Flowsheets (Taken 10/15/2024 1011)  Prevent/minimize sheer/friction injuries:   HOB 30 degrees or less   Turn/reposition every 2 hours/use positioning/transfer devices   Use pull sheet  Goal: Promote/optimize nutrition  Outcome: Progressing  Flowsheets (Taken 10/15/2024 1011)  Promote/optimize nutrition: Monitor/record intake including meals  Goal: Promote skin healing  Outcome: Progressing  Flowsheets (Taken 10/15/2024 1011)  Promote skin healing: Turn/reposition every 2 hours/use positioning/transfer devices     Problem: Nutrition  Goal: Less than 5 days NPO/clear liquids  Outcome: Progressing  Goal: Oral intake greater than 50%  Outcome: Progressing  Goal: Oral intake greater 75%  Outcome: Progressing  Goal: Consume prescribed supplement  Outcome: Progressing  Goal: Adequate  PO fluid intake  Outcome: Progressing  Goal: Nutrition support goals are met within 48 hrs  Outcome: Progressing  Goal: Nutrition support is meeting 75% of nutrient needs  Outcome: Progressing  Goal: Tube feed tolerance  Outcome: Progressing  Goal: BG  mg/dL  Outcome: Progressing  Goal: Lab values WNL  Outcome: Progressing  Goal: Electrolytes WNL  Outcome: Progressing  Goal: Promote healing  Outcome: Progressing  Goal: Maintain stable weight  Outcome: Progressing  Goal: Reduce weight from edema/fluid  Outcome: Progressing

## 2024-10-15 NOTE — CONSULTS
Wound Care Consult     Visit Date: 10/18/2024      Patient Name: Nrada Malloy         MRN: [id number]           YOB: 1955     Reason for Consult: multiple wounds: mid back , bilateral feet, sacrum , and right lower leg        Wound History: deferred to patient's chart     Pertinent Labs:   [place], Urine   Date Value Ref Range Status   03/31/2022 40 (H) 0 - 23 MG/L Final     Albumin   Date Value Ref Range Status   10/18/2024 2.1 (L) 3.4 - 5.0 g/dL Final       Wound Assessment:  Wound 08/29/24 Diabetic Ulcer Foot Left;Plantar (Active)   Wound Image   10/16/24 0400   Site Assessment Eschar 10/17/24 0803   Lucia-Wound Assessment Dry;Fragile;Friable 10/16/24 0400   Non-staged Wound Description Not applicable 10/16/24 0400   Shape round 10/16/24 0400   Wound Length (cm) 1 cm 10/16/24 0400   Wound Width (cm) 0.8 cm 10/16/24 0400   Wound Surface Area (cm^2) 0.8 cm^2 10/16/24 0400   State of Healing Eschar 10/16/24 0400   Wound Bed Eschar (%) 99 % 10/16/24 0400   Margins Well-defined edges;Unable to assess 10/15/24 0730   Drainage Description None 10/16/24 0400   Drainage Amount None 10/13/24 0012   Dressing Kerlix/rolled gauze;Dry dressing;Unna boot 10/18/24 0730       Wound 09/25/24 Abrasion Leg Lower (Active)   Wound Image   10/16/24 0400   Site Assessment Non-blanchable erythema 10/17/24 0803   Lucia-Wound Assessment Pink;Non-blanchable erythema 10/17/24 0803   Non-staged Wound Description Partial thickness 10/16/24 0400   Shape round 10/16/24 0400   Wound Length (cm) 1.6 cm 10/16/24 0400   Wound Width (cm) 1.4 cm 10/16/24 0400   Wound Surface Area (cm^2) 2.24 cm^2 10/16/24 0400   Wound Depth (cm) 0.01 cm 10/16/24 0400   Wound Volume (cm^3) 0.0224 cm^3 10/16/24 0400   Wound Healing % 25 10/16/24 0400   State of Healing Early/partial granulation 10/16/24 0400   Margins Poorly defined 10/15/24 0730   Treatments Cleansed;Site care 10/16/24 0400   Drainage Description Purulent 10/16/24 0400   Drainage Amount  Scant 10/16/24 0400   Dressing Dry dressing;Kerlix/rolled gauze;Xeroform 10/18/24 0730   Dressing Changed Changed 10/16/24 0400   Dressing Status Clean;Dry;Occlusive 10/18/24 0730       Wound 09/28/24 Incision Back Medial;Mid;Upper (Active)   Wound Image   10/17/24 1036   Site Assessment Dry;Red;Swelling 10/17/24 1036   Lucia-Wound Assessment Blanchable erythema;Fragile 10/16/24 0400   Shape linear 10/17/24 1036   Wound Length (cm) 38 cm 10/16/24 0400   Wound Width (cm) 1.2 cm 10/16/24 0400   Wound Surface Area (cm^2) 45.6 cm^2 10/16/24 0400   Wound Depth (cm) 0.1 cm 10/16/24 0400   Wound Volume (cm^3) 4.56 cm^3 10/16/24 0400   State of Healing Closed wound edges 10/17/24 1036   Margins Attached edges 10/17/24 1036   Closure Approximated;Sutures 10/17/24 1036   Treatments Site care 10/17/24 1036   Sutures/Staple Line Approximated 10/17/24 1036   Drainage Description None 10/17/24 1036   Drainage Amount None 10/17/24 1036   Dressing Dry dressing 10/18/24 0730   Dressing Changed Changed 10/17/24 1036   Dressing Status Clean;Dry;Occlusive 10/18/24 0730   Adhesive Closure Strips Intact 10/18/24 0730       Wound 10/12/24 Pressure Injury Sacrum (Active)   Wound Image   10/16/24 0400   Site Assessment Red;Purple;Necrotic 10/17/24 0803   Lucia-Wound Assessment Fragile;Friable 10/17/24 0803   Non-staged Wound Description Not applicable 10/17/24 0803   Pressure Injury Stage U 10/17/24 0803   Shape irregular 10/17/24 0803   Wound Length (cm) 3.5 cm 10/17/24 0803   Wound Width (cm) 4.1 cm 10/17/24 0803   Wound Surface Area (cm^2) 14.35 cm^2 10/17/24 0803   Wound Depth (cm) 0.1 cm 10/16/24 0400   Wound Volume (cm^3) 0.528 cm^3 10/16/24 0400   State of Healing Eschar;Early/partial granulation 10/17/24 0803   Wound Bed Eschar (%) 99 % 10/15/24 0730   Margins Poorly defined 10/15/24 0730   Drainage Description None 10/16/24 0400   Drainage Amount None 10/16/24 0400   Dressing Foam 10/18/24 0730   Dressing Changed Changed 10/16/24  0400   Dressing Status Clean;Dry 10/18/24 0730       Wound 10/12/24 Diabetic Ulcer Toe D1, great Right (Active)   Wound Image   10/16/24 0400   Site Assessment Eschar 10/17/24 0803   Non-staged Wound Description Not applicable 10/16/24 0400   Shape round 10/16/24 0400   Wound Length (cm) 1.1 cm 10/16/24 0400   Wound Width (cm) 0.8 cm 10/16/24 0400   Wound Surface Area (cm^2) 0.88 cm^2 10/16/24 0400   State of Healing Eschar 10/16/24 0400   Wound Bed Eschar (%) 99 % 10/15/24 0730   Margins Attached edges;Well-defined edges 10/15/24 0730   Drainage Description None 10/16/24 0400   Drainage Amount None 10/16/24 0400   Dressing Dry dressing;Kerlix/rolled gauze;Unna boot 10/18/24 0730   Dressing Changed Changed 10/16/24 0400   Dressing Status Clean;Dry 10/16/24 0000       Wound Team Summary Assessment: Wound care recommendations:  Foam Padding to all bony prominences  Evens-view boots to bilateral feet  Measures to mitigate moisture related skin break down  Envision 700 pressure off-loading mattress  Frequent turning and repositioning with microshifts  Monitor all devices in contact with patient's skin to mitigate possible pressure related  injuries.  Use of wedges for turning and positioning   Wound Sacrum: wash wound and area with soap and water and pat dry, then apply medihoney wound bed and cover with foam sacral dressing.  Wound of Surgical incision mid back, wash the wound and area with soap and water , and pat dry, then apply medihoney to wound and cover with dry sterile dressing  Wound abrasion , right outer calf : wash wound and area with soap and water and pat dry, then apply a single layer of xeroform petroleum dressing to wound and cover with dry sterile dressing and roll gauze.  Wound of Right great toe : wash wound and area with soap and water and pat dry, then apply medihoney to wound bed and cover with dry sterile dressing secured with roll gauze.  Wound of Plantar , medial left foot:  wash wound and area  with soap and water and pat dry, then apply medihoney to wound bed and cover with dry sterile dressing secured with roll gauze.    Wound Team Plan: follow up with wound care consult and evaluation of measures, Continue to support the patient and care staff in management of patient's wound care needs     Paolo Delaney RN  10/18/2024  9:59 AM

## 2024-10-15 NOTE — PROGRESS NOTES
Critical Care Medicine Progress Note    Admitted on:     10/12/2024  Length of Stay: 3 day(s)     Interval History     Yesterday:  Weaned off Levophed.  BUE ultrasound was done, demonstrated a thrombus in the left cephalic vein adjacent to patient's midline catheter.  Gave a dose of bicarb for hyperchloremic acidosis.  Tube feeds started per dietician recs.  No acute events overnight.    Today:  Maintaining adequate MAPs without vasopressor support.  Vent 18/450/40%/5 (FiO2 up from 35% yesterday but settings otherwise unchanged).    24 hr UOP only ~1.5 L despite all the diuretics given yesterday and overnight (Lasix 40 mg, Bumex 1 mg x 3, and metolazone 5 mg).  24 hr fluid balance ~500 mL negative.  Serum Cr 1.19 -> 1.17    Bicarb 17 -> 19  Anion gap 9 (my calculation)  Cl 113 -> 111    No fevers or leukocytosis.  Sputum culture from 10/13 (yesterday) grew 3+ GNB.  Urine culture from 10/12 grew 20-80k Candida lusitaniae.    Awake on exam this AM, following commands, nodding appropriately.  Started an SBT.  Informed RT.      Objective   Objective     Vitals:    10/14/24 0847   Weight: 103 kg (227 lb 1.2 oz)   Body mass index is 32.58 kg/m².        10/15/2024     4:00 AM 10/15/2024     4:23 AM 10/15/2024     5:00 AM 10/15/2024     6:00 AM 10/15/2024     7:00 AM 10/15/2024     7:11 AM 10/15/2024     7:30 AM   Vitals   Systolic 117  121 136   105   Diastolic 53  62 54   68   Heart Rate 58 56 65 57 61 67 68   Temp 36.1 °C (97 °F)     37.1 °C (98.8 °F)    Resp 18 18 18 18 18 19 18        Vent settings:  Vent Mode: Pressure support  FiO2 (%):  [35 %-40 %] 40 %  S RR:  [18] 18  S VT:  [450 mL] 450 mL  PEEP/CPAP (cm H2O):  [5 cm H20] 5 cm H20  TN SUP:  [8 cm H20] 8 cm H20  MAP (cm H2O):  [8.4-10] 9.5    Intake/Output Summary (Last 24 hours) at 10/15/2024 0824  Last data filed at 10/15/2024 0700  Gross per 24 hour   Intake 1013.34 ml   Output 1335 ml   Net -321.66 ml       Physical Exam  Following commands as mentioned  above.  Exam otherwise unchanged.    Medications     Scheduled Medications:   brimonidine, 1 drop, Both Eyes, BID  calcium carbonate-vitamin D3, 1 tablet, oral, Daily  cefepime, 2 g, intravenous, q12h  docusate sodium, 100 mg, oral, BID  ezetimibe, 10 mg, oral, Daily  heparin (porcine), 5,000 Units, subcutaneous, q8h  insulin lispro, 0-15 Units, subcutaneous, q4h  ipratropium-albuteroL, 3 mL, nebulization, q4h  latanoprost, 1 drop, Both Eyes, Nightly  oxygen, , inhalation, Continuous - Inhalation  pantoprazole, 40 mg, oral, Daily before breakfast   Or  pantoprazole, 40 mg, intravenous, Daily before breakfast  potassium chloride, 20 mEq, intravenous, Once  [Held by provider] primidone, 125 mg, oral, TID  [Held by provider] propranolol LA, 60 mg, oral, Daily       Continuous Medications:   bumetanide, 0.5 mg/hr  norepinephrine, 0-0.2 mcg/kg/min, Last Rate: Stopped (10/14/24 0617)  propofol, 0-20 mcg/kg/min, Last Rate: Stopped (10/15/24 0807)       PRN Medications:     Labs     Results from last 72 hours   Lab Units 10/15/24  0435 10/14/24  0449 10/13/24  1313 10/13/24  0515   GLUCOSE mg/dL 183* 150* 171* 143*  143*   SODIUM mmol/L 139 139 138 138   POTASSIUM mmol/L 3.4* 4.0 4.5 5.0   CHLORIDE mmol/L 111* 113* 112* 112*   CO2 mmol/L 19* 17* 16* 18*   BUN mg/dL 40* 42* 43* 42*   CREATININE mg/dL 1.17* 1.19* 1.33* 1.43*   EGFR mL/min/1.73m*2 51* 50* 44* 40*   CALCIUM mg/dL 7.6* 7.7* 7.7* 7.8*   ALBUMIN g/dL 2.0* 2.1* 2.3* 1.9*   MAGNESIUM mg/dL 2.07 1.97  --  1.91   PHOSPHORUS mg/dL 3.8 4.2 4.3 4.4     Results from last 72 hours   Lab Units 10/12/24  1404   ALK PHOS U/L 230*   ALT U/L 8   AST U/L 9   BILIRUBIN TOTAL mg/dL 0.2   PROTEIN TOTAL g/dL 5.7*     Results from last 72 hours   Lab Units 10/12/24  1404   TROPHS ng/L 7     Results from last 72 hours   Lab Units 10/13/24  0515 10/12/24  1404   LACTATE mmol/L 1.4 0.7     Results from last 72 hours   Lab Units 10/15/24  0435 10/14/24  0449 10/13/24  0515   WBC AUTO  x10*3/uL 5.9 6.6 15.9*   NRBC AUTO /100 WBCs 0.0 0.0 0.3*   RBC AUTO x10*6/uL 2.63* 2.63* 3.19*   HEMOGLOBIN g/dL 7.9* 7.9* 9.7*   HEMATOCRIT % 25.0* 24.8* 30.1*   MCV fL 95 94 94   MCH pg 30.0 30.0 30.4   MCHC g/dL 31.6* 31.9* 32.2   RDW % 17.6* 17.3* 17.2*   PLATELETS AUTO x10*3/uL 301 347 480*     Results from last 72 hours   Lab Units 10/13/24  1453 10/12/24  1603   POCT PH, ARTERIAL pH 7.34* 7.29*   POCT PCO2, ARTERIAL mm Hg 32* 38   POCT PO2, ARTERIAL mm Hg 141* 88   POCT HCO3 CALCULATED, ARTERIAL mmol/L 17.3* 18.3*   POCT LACTATE, ARTERIAL mmol/L 0.7 1.0   POCT BASE EXCESS, ARTERIAL mmol/L -7.6* -7.7*   FIO2 % 50 50     Lab Results   Component Value Date    BLOODCULT No growth at 2 days 10/12/2024    BLOODCULT No growth at 2 days 10/12/2024    GRAMSTAIN (3+) Moderate Polymorphonuclear leukocytes (A) 10/13/2024    GRAMSTAIN (3+) Moderate Gram negative bacilli (A) 10/13/2024     Lab Results   Component Value Date    URINECULTURE 20,000 - 80,000 Candida lusitaniae (A) 10/12/2024       Imaging and Diagnostic Studies     Recent imaging and diagnostic studies reviewed.       Assessment / Plan     #Sepsis secondary to HCAP  #Thoracolumbar surgical site infection s/p I&D x 2 with MRSA bacteremia on an extended course of IV antibiotics  -Following commands, weaned off vasopressor support.  -Consider wean to extubate.  -Antibiotics per ID.    #Anasarca  #AMY  #Hyperchloremic acidosis  -Start Bumex gtt.  -Decrease FWFs from 120 mL -> 50 mL Q4h to reduce fluid intake.  -Additional bicarb.      Kalyan Leblanc MD    This patient is critically ill/injured due to acute impairment in one or more vital organ systems, such that there is a probably of imminent or life-threatening deterioration of the patient's condition.  I spent 55 minutes in the direct delivery of medical care that involves high complexity decision making to treat single or multiple vital organ system failure and/or prevent further life-threatening  deterioration of the patient's condition.  This time does not include separately billable procedures.

## 2024-10-15 NOTE — PROGRESS NOTES
Speech-Language Pathology    Speech-Language Pathology Clinical Swallow Evaluation    Patient Name: Narda Malloy  MRN: [id number]  : 1955  Today's Date: 10/15/24  Start Time: 1442  Stop Time: 1506  Time Calculation (min): 24 min      ASSESSMENT  Impressions:  Functional oral phase and  suspected pharyngeal phase dysphagia based on clinical swallow evaluation. Pt would benefit from skilled ST to minimize aspiration risk and ensure ongoing safety with the least restrictive diet.     PLAN  Recommendations:  Is MBSS recommended? SLP will continue to monitor to determine if MBSS is clinically warranted.  Solid consistency: Soft/bite-sized (IDDSI level 6)  Liquid consistency: Thin (IDDSI 0)  Medication administration: Whole, in thin liquid  Compensatory swallow strategies:  - Upright positioning for all PO intake  - Slow rate of intake  - Small bites  - Small sips  - Alternate bites and sips  - Total assist for feeding    Recommended frequency/duration:  Skilled SLP services recommended: Yes  Frequency: 2x/week  Duration: 2 weeks  Discharge recommendation: Unable to determine at this time; please see follow-up notes for DC recommendation.     Strengths: Motivation and Family/caregiver support  Barriers to participation in tx: N/A    Goals:  In 2 weeks...  Pt will consume prescribed diet (current diet is soft bite sized) without overt s/sx aspiration/penetration in 95% of observed trials.  Pt will consume trials of upgraded textures without overt s/sx aspiration in order for consideration of diet texture upgrade.  Pt will demonstrate follow-through of trained compensatory strategies during a meal/snack with 90% acc independently.   GOAL START: 10/15/2024   GOAL END: 10/29/2024   Status: Goal initiated this date   Progress this date: CSE completed    SUBJECTIVE    PMHx relevant to rehab: Principal Problem:    Unresponsive  patient with past medical history of L1-L3 lumbar laminectomy, T4-S1 revision, and fusion  August 26th, T2DM, HTN, essential tremor, HLD, glaucoma, sarcoidosis of the lung   Chief complaint: Pt was admitted on 10/12/24 due to   Chief Complaint   Patient presents with    Altered Mental Status   . She was found to have Unresponsive.  10/12: Pt arrived to ICU intubated and lightly sedated on low-dose versed. Eyes open, minimally responsive.      Relevant imaging results:  10/13/24  IMPRESSION:  1. Persistent layering left-sided pleural effusion with multifocal  airspace opacity/atelectasis. Persistent perihilar vascular  congestion.  2. Advanced enteric feeding tube likely terminating now in the  proximal stomach although difficult evaluation given thoracolumbar  spine hardware.  General Visit Information:                    RN cleared pt to participate in session and reported pt extubated approx 11:00 this morning, tolerating regular and nectar thick liquids without s/s aspiration, cough is weak and non productive    Pt reported no h/o dysphagia, currently has no difficulty swallowing              Status at time of evaluation:  Pain Assessment  Pain Assessment: 0-10  Pain Score: 0 - No pain    Pt was alert, pleasant, and cooperative for session.  Orientation: Oriented to situation and Ox4  Ability to follow functional commands: WFL  Nutritional status: Appears well-nourished/no concerns    Respiratory status: Supplemental oxygen via NC. Weak cough, soft voice post exubation           Patient positioning: Upright in bed  OBJECTIVE  Clinical swallow evaluation completed and consisted of interview, oral motor assessment, and PO trials (8 oz thin liquids via straw , 2 oz applesauce, 1 ester with and without  applesauce to moisten ).  ORAL PHASE: Natural dentition in good condition. Oral mucosa were pink, moist, and free of obvious lesions. Lingual strength and ROM were WFL. Labial strength/ROM were WFL. Labial seal was adequate. Mastication of regular solids was WFL A/P transit and oral clearance were  WFL.  PHARYNGEAL PHASE: Laryngeal elevation was visualized or palpated with all trials, however adequacy of hyolaryngeal elevation/excursion cannot be determined at bedside. No immediate weak throat clear cough after regular solid trials x 2, no immediate  s/sx aspiration/penetration were observed with softened solids, puree and thin liquids, Pt requested thin liquids after coughing with regular solids ,suspect pharyngeal weakness/residues    Was 3oz challenge administered: Yes; pt drank 3oz of thin liquid in one attempt, without breaking, with no overt s/sx aspiration/penetration observed.      Treatment/Education:  Results and recommendations were relayed to: Patient and Bedside nurse  Education provided: Yes   Learner: Patient and Significant other   Barriers to learning: None   Method of teaching: Verbal   Topic: role of ST, results of assessment, risk for aspiration, recommended diet modifications, and recommended safe swallow strategies   Outcome of teaching: Pt/family demonstrated good understanding  Treatment provided: No

## 2024-10-15 NOTE — PROGRESS NOTES
Patient not medically clear. Patient extubated today. At this time there is not a safe discharge plan in place. Patient did come from [facility]. Will follow.      10/15/24 5672   Discharge Planning   Home or Post Acute Services Other (Comment)  (TBD)   Expected Discharge Disposition [facility]  (TBD)   Does the patient need discharge transport arranged? Yes   RoundTrip coordination needed? Yes

## 2024-10-16 ENCOUNTER — APPOINTMENT (OUTPATIENT)
Dept: RADIOLOGY | Facility: HOSPITAL | Age: 69
End: 2024-10-16
Payer: MEDICARE

## 2024-10-16 LAB
ALBUMIN SERPL BCP-MCNC: 2 G/DL (ref 3.4–5)
ANION GAP BLDA CALCULATED.4IONS-SCNC: 11 MMO/L (ref 10–25)
ANION GAP SERPL CALCULATED.3IONS-SCNC: 9 MMOL/L (ref 10–20)
APPARATUS: ABNORMAL
ARTERIAL PATENCY WRIST A: POSITIVE
BACTERIA BLD CULT: NORMAL
BACTERIA BLD CULT: NORMAL
BACTERIA SPEC RESP CULT: ABNORMAL
BASE EXCESS BLDA CALC-SCNC: -5 MMOL/L (ref -2–3)
BASOPHILS # BLD AUTO: 0.01 X10*3/UL (ref 0–0.1)
BASOPHILS NFR BLD AUTO: 0.1 %
BODY TEMPERATURE: 37 DEGREES CELSIUS
BUN SERPL-MCNC: 41 MG/DL (ref 6–23)
CA-I BLDA-SCNC: 1.15 MMOL/L (ref 1.1–1.33)
CALCIUM SERPL-MCNC: 7.5 MG/DL (ref 8.6–10.3)
CHLORIDE BLDA-SCNC: 111 MMOL/L (ref 98–107)
CHLORIDE SERPL-SCNC: 112 MMOL/L (ref 98–107)
CO2 SERPL-SCNC: 22 MMOL/L (ref 21–32)
CREAT SERPL-MCNC: 1.23 MG/DL (ref 0.5–1.05)
EGFRCR SERPLBLD CKD-EPI 2021: 48 ML/MIN/1.73M*2
EOSINOPHIL # BLD AUTO: 0.05 X10*3/UL (ref 0–0.7)
EOSINOPHIL NFR BLD AUTO: 0.7 %
ERYTHROCYTE [DISTWIDTH] IN BLOOD BY AUTOMATED COUNT: 18.2 % (ref 11.5–14.5)
FLOW: 40 LPM
GLUCOSE BLD MANUAL STRIP-MCNC: 131 MG/DL (ref 74–99)
GLUCOSE BLD MANUAL STRIP-MCNC: 136 MG/DL (ref 74–99)
GLUCOSE BLD MANUAL STRIP-MCNC: 140 MG/DL (ref 74–99)
GLUCOSE BLD MANUAL STRIP-MCNC: 221 MG/DL (ref 74–99)
GLUCOSE BLDA-MCNC: 233 MG/DL (ref 74–99)
GLUCOSE SERPL-MCNC: 169 MG/DL (ref 74–99)
GRAM STN SPEC: ABNORMAL
GRAM STN SPEC: ABNORMAL
HCO3 BLDA-SCNC: 22.3 MMOL/L (ref 22–26)
HCT VFR BLD AUTO: 24.8 % (ref 36–46)
HCT VFR BLD EST: 26 % (ref 36–46)
HGB BLD-MCNC: 7.7 G/DL (ref 12–16)
HGB BLDA-MCNC: 8.7 G/DL (ref 12–16)
IMM GRANULOCYTES # BLD AUTO: 0.04 X10*3/UL (ref 0–0.7)
IMM GRANULOCYTES NFR BLD AUTO: 0.6 % (ref 0–0.9)
INHALED O2 CONCENTRATION: 100 %
LACTATE BLDA-SCNC: 1.5 MMOL/L (ref 0.4–2)
LYMPHOCYTES # BLD AUTO: 0.83 X10*3/UL (ref 1.2–4.8)
LYMPHOCYTES NFR BLD AUTO: 12.1 %
MAGNESIUM SERPL-MCNC: 2.13 MG/DL (ref 1.6–2.4)
MCH RBC QN AUTO: 30.6 PG (ref 26–34)
MCHC RBC AUTO-ENTMCNC: 31 G/DL (ref 32–36)
MCV RBC AUTO: 98 FL (ref 80–100)
MONOCYTES # BLD AUTO: 0.61 X10*3/UL (ref 0.1–1)
MONOCYTES NFR BLD AUTO: 8.9 %
NEUTROPHILS # BLD AUTO: 5.33 X10*3/UL (ref 1.2–7.7)
NEUTROPHILS NFR BLD AUTO: 77.6 %
NRBC BLD-RTO: 0 /100 WBCS (ref 0–0)
OXYHGB MFR BLDA: 95.7 % (ref 94–98)
PCO2 BLDA: 52 MM HG (ref 38–42)
PH BLDA: 7.24 PH (ref 7.38–7.42)
PHOSPHATE SERPL-MCNC: 3.8 MG/DL (ref 2.5–4.9)
PLATELET # BLD AUTO: 274 X10*3/UL (ref 150–450)
PO2 BLDA: 118 MM HG (ref 85–95)
POTASSIUM BLDA-SCNC: 4 MMOL/L (ref 3.5–5.3)
POTASSIUM SERPL-SCNC: 3.7 MMOL/L (ref 3.5–5.3)
RBC # BLD AUTO: 2.52 X10*6/UL (ref 4–5.2)
SAO2 % BLDA: 100 % (ref 94–100)
SODIUM BLDA-SCNC: 140 MMOL/L (ref 136–145)
SODIUM SERPL-SCNC: 139 MMOL/L (ref 136–145)
SPECIMEN DRAWN FROM PATIENT: ABNORMAL
VANCOMYCIN SERPL-MCNC: 34.2 UG/ML (ref 5–20)
WBC # BLD AUTO: 6.9 X10*3/UL (ref 4.4–11.3)

## 2024-10-16 PROCEDURE — 2500000002 HC RX 250 W HCPCS SELF ADMINISTERED DRUGS (ALT 637 FOR MEDICARE OP, ALT 636 FOR OP/ED): Performed by: ANESTHESIOLOGY

## 2024-10-16 PROCEDURE — 2500000001 HC RX 250 WO HCPCS SELF ADMINISTERED DRUGS (ALT 637 FOR MEDICARE OP)

## 2024-10-16 PROCEDURE — 84132 ASSAY OF SERUM POTASSIUM: CPT

## 2024-10-16 PROCEDURE — 2500000004 HC RX 250 GENERAL PHARMACY W/ HCPCS (ALT 636 FOR OP/ED): Performed by: INTERNAL MEDICINE

## 2024-10-16 PROCEDURE — 2500000002 HC RX 250 W HCPCS SELF ADMINISTERED DRUGS (ALT 637 FOR MEDICARE OP, ALT 636 FOR OP/ED)

## 2024-10-16 PROCEDURE — 2500000004 HC RX 250 GENERAL PHARMACY W/ HCPCS (ALT 636 FOR OP/ED)

## 2024-10-16 PROCEDURE — 2500000004 HC RX 250 GENERAL PHARMACY W/ HCPCS (ALT 636 FOR OP/ED): Performed by: ANESTHESIOLOGY

## 2024-10-16 PROCEDURE — 80202 ASSAY OF VANCOMYCIN: CPT | Performed by: PHARMACIST

## 2024-10-16 PROCEDURE — 9420000001 HC RT PATIENT EDUCATION 5 MIN

## 2024-10-16 PROCEDURE — 71045 X-RAY EXAM CHEST 1 VIEW: CPT

## 2024-10-16 PROCEDURE — 80069 RENAL FUNCTION PANEL: CPT

## 2024-10-16 PROCEDURE — 36600 WITHDRAWAL OF ARTERIAL BLOOD: CPT

## 2024-10-16 PROCEDURE — 99233 SBSQ HOSP IP/OBS HIGH 50: CPT | Performed by: ANESTHESIOLOGY

## 2024-10-16 PROCEDURE — 2020000001 HC ICU ROOM DAILY

## 2024-10-16 PROCEDURE — 76937 US GUIDE VASCULAR ACCESS: CPT

## 2024-10-16 PROCEDURE — 85025 COMPLETE CBC W/AUTO DIFF WBC: CPT

## 2024-10-16 PROCEDURE — 82947 ASSAY GLUCOSE BLOOD QUANT: CPT

## 2024-10-16 PROCEDURE — 2500000005 HC RX 250 GENERAL PHARMACY W/O HCPCS: Performed by: ANESTHESIOLOGY

## 2024-10-16 PROCEDURE — 2500000005 HC RX 250 GENERAL PHARMACY W/O HCPCS: Performed by: EMERGENCY MEDICINE

## 2024-10-16 PROCEDURE — 94640 AIRWAY INHALATION TREATMENT: CPT

## 2024-10-16 PROCEDURE — 2500000005 HC RX 250 GENERAL PHARMACY W/O HCPCS

## 2024-10-16 PROCEDURE — 99291 CRITICAL CARE FIRST HOUR: CPT | Performed by: ANESTHESIOLOGY

## 2024-10-16 PROCEDURE — 83735 ASSAY OF MAGNESIUM: CPT

## 2024-10-16 PROCEDURE — 31720 CLEARANCE OF AIRWAYS: CPT

## 2024-10-16 PROCEDURE — 92526 ORAL FUNCTION THERAPY: CPT | Mod: GN | Performed by: SPEECH-LANGUAGE PATHOLOGIST

## 2024-10-16 PROCEDURE — 71045 X-RAY EXAM CHEST 1 VIEW: CPT | Performed by: RADIOLOGY

## 2024-10-16 RX ORDER — CEFTRIAXONE 2 G/50ML
2 INJECTION, SOLUTION INTRAVENOUS EVERY 24 HOURS
Status: COMPLETED | OUTPATIENT
Start: 2024-10-23 | End: 2024-11-12

## 2024-10-16 RX ORDER — POTASSIUM CHLORIDE 20 MEQ/1
40 TABLET, EXTENDED RELEASE ORAL ONCE
Status: DISCONTINUED | OUTPATIENT
Start: 2024-10-16 | End: 2024-10-16

## 2024-10-16 RX ORDER — POTASSIUM CHLORIDE 14.9 MG/ML
20 INJECTION INTRAVENOUS
Status: DISCONTINUED | OUTPATIENT
Start: 2024-10-16 | End: 2024-10-16

## 2024-10-16 RX ORDER — POTASSIUM CHLORIDE 20 MEQ/1
20 TABLET, EXTENDED RELEASE ORAL ONCE
Status: COMPLETED | OUTPATIENT
Start: 2024-10-16 | End: 2024-10-16

## 2024-10-16 RX ORDER — ACETYLCYSTEINE 200 MG/ML
3 SOLUTION ORAL; RESPIRATORY (INHALATION)
Status: DISCONTINUED | OUTPATIENT
Start: 2024-10-16 | End: 2024-10-17

## 2024-10-16 RX ORDER — POTASSIUM CHLORIDE 20 MEQ/1
40 TABLET, EXTENDED RELEASE ORAL ONCE
Status: COMPLETED | OUTPATIENT
Start: 2024-10-16 | End: 2024-10-16

## 2024-10-16 RX ADMIN — LATANOPROST 1 DROP: 50 SOLUTION OPHTHALMIC at 20:38

## 2024-10-16 RX ADMIN — IPRATROPIUM BROMIDE AND ALBUTEROL SULFATE 3 ML: 2.5; .5 SOLUTION RESPIRATORY (INHALATION) at 11:20

## 2024-10-16 RX ADMIN — IPRATROPIUM BROMIDE AND ALBUTEROL SULFATE 3 ML: 2.5; .5 SOLUTION RESPIRATORY (INHALATION) at 20:30

## 2024-10-16 RX ADMIN — IPRATROPIUM BROMIDE AND ALBUTEROL SULFATE 3 ML: 2.5; .5 SOLUTION RESPIRATORY (INHALATION) at 07:31

## 2024-10-16 RX ADMIN — Medication 40 L/MIN: at 10:05

## 2024-10-16 RX ADMIN — CEFEPIME 2 G: 2 INJECTION, POWDER, FOR SOLUTION INTRAVENOUS at 20:38

## 2024-10-16 RX ADMIN — ACETYLCYSTEINE 600 MG: 200 SOLUTION ORAL; RESPIRATORY (INHALATION) at 20:30

## 2024-10-16 RX ADMIN — IPRATROPIUM BROMIDE AND ALBUTEROL SULFATE 3 ML: 2.5; .5 SOLUTION RESPIRATORY (INHALATION) at 03:33

## 2024-10-16 RX ADMIN — IPRATROPIUM BROMIDE AND ALBUTEROL SULFATE 3 ML: 2.5; .5 SOLUTION RESPIRATORY (INHALATION) at 22:34

## 2024-10-16 RX ADMIN — Medication 4 L/MIN: at 07:31

## 2024-10-16 RX ADMIN — BRIMONIDINE TARTRATE 1 DROP: 2 SOLUTION OPHTHALMIC at 20:38

## 2024-10-16 RX ADMIN — EZETIMIBE 10 MG: 10 TABLET ORAL at 09:23

## 2024-10-16 RX ADMIN — HEPARIN SODIUM 5000 UNITS: 5000 INJECTION, SOLUTION INTRAVENOUS; SUBCUTANEOUS at 22:24

## 2024-10-16 RX ADMIN — BRIMONIDINE TARTRATE 1 DROP: 2 SOLUTION OPHTHALMIC at 09:23

## 2024-10-16 RX ADMIN — ACETYLCYSTEINE 600 MG: 200 SOLUTION ORAL; RESPIRATORY (INHALATION) at 11:20

## 2024-10-16 RX ADMIN — HEPARIN SODIUM 5000 UNITS: 5000 INJECTION, SOLUTION INTRAVENOUS; SUBCUTANEOUS at 06:06

## 2024-10-16 RX ADMIN — POTASSIUM CHLORIDE 40 MEQ: 1500 TABLET, EXTENDED RELEASE ORAL at 03:10

## 2024-10-16 RX ADMIN — Medication 40 L/MIN: at 20:30

## 2024-10-16 RX ADMIN — IPRATROPIUM BROMIDE AND ALBUTEROL SULFATE 3 ML: 2.5; .5 SOLUTION RESPIRATORY (INHALATION) at 15:19

## 2024-10-16 RX ADMIN — BUMETANIDE 0.5 MG/HR: 0.25 INJECTION INTRAMUSCULAR; INTRAVENOUS at 23:29

## 2024-10-16 RX ADMIN — INSULIN LISPRO 6 UNITS: 100 INJECTION, SOLUTION INTRAVENOUS; SUBCUTANEOUS at 12:02

## 2024-10-16 RX ADMIN — DOCUSATE SODIUM 100 MG: 50 LIQUID ORAL at 09:23

## 2024-10-16 RX ADMIN — HEPARIN SODIUM 5000 UNITS: 5000 INJECTION, SOLUTION INTRAVENOUS; SUBCUTANEOUS at 14:07

## 2024-10-16 RX ADMIN — CEFEPIME 2 G: 2 INJECTION, POWDER, FOR SOLUTION INTRAVENOUS at 09:23

## 2024-10-16 RX ADMIN — ACETYLCYSTEINE 600 MG: 200 SOLUTION ORAL; RESPIRATORY (INHALATION) at 15:19

## 2024-10-16 RX ADMIN — POTASSIUM CHLORIDE 20 MEQ: 14.9 INJECTION, SOLUTION INTRAVENOUS at 00:43

## 2024-10-16 RX ADMIN — POTASSIUM CHLORIDE 20 MEQ: 1500 TABLET, EXTENDED RELEASE ORAL at 06:06

## 2024-10-16 ASSESSMENT — PAIN - FUNCTIONAL ASSESSMENT
PAIN_FUNCTIONAL_ASSESSMENT: 0-10

## 2024-10-16 ASSESSMENT — PAIN SCALES - GENERAL
PAINLEVEL_OUTOF10: 0 - NO PAIN

## 2024-10-16 NOTE — PROGRESS NOTES
Critical Care Medicine Progress Note    Admitted on:     10/12/2024  Length of Stay: 4 day(s)     Interval History     Extubated yesterday AM.  Maintaining adequate SpO2 on NC since that time.    Seen by speech therapy yesterday following extubation.  Started on diet with thin liquids.  Began drinking excess amounts of H2O, which complicates efforts at achieving a negative fluid balance.  Put on 1.5 L fluid restriction for this reason.    No acute events overnight.  Has remained off Levophed for > 48 hrs.  Currently on 4 L O2 by NC.    24 hr UOP ~2.2 L on Bumex gtt @ 0.5 mg/hr.  24 hr fluid balance ~1 L negative.  Serum Cr 1.17 -> 1.23    Sputum culture from 10/13 growing Pseudomonas.  No leukocytosis or fevers.    Awake and alert on exam.  Denies chest pain or pressure, SOB, abd pain, N/V.  Remains quite edematous and she acknowledges this.  Clear voice.  Weak, wet cough.  Encouraged deep breathing and coughing.    Objective   Objective     Vitals:    10/16/24 0600   Weight: 101 kg (223 lb 1.7 oz)   Body mass index is 32.01 kg/m².        10/16/2024     1:00 AM 10/16/2024     2:00 AM 10/16/2024     3:00 AM 10/16/2024     4:00 AM 10/16/2024     5:00 AM 10/16/2024     6:00 AM 10/16/2024     7:31 AM   Vitals   Systolic 105 110 114 96 109 123    Diastolic 68 67 58 56 57 60    Heart Rate 85 94 96 93 81 79    Temp    36.6 °C (97.9 °F)   36.8 °C (98.2 °F)   Resp 23 27 24 23 23 23    Weight (lb)      223.11    BMI      32.01 kg/m2    BSA (m2)      2.23 m2         Vent settings:  FiO2 (%):  [36 %-44 %] 36 %    Intake/Output Summary (Last 24 hours) at 10/16/2024 0856  Last data filed at 10/16/2024 0600  Gross per 24 hour   Intake 1106.51 ml   Output 2160 ml   Net -1053.49 ml       Physical Exam  Neuro: AAOx2-3.  Moves all extremities.  Eyes: PERRL, clear sclerae.  CV: Normal S1, S2.  RRR.  No m/r/g.  Resp: Diminished but clear on auscultation.  GI: +BS, abd soft, NT, ND.  Ext: Pitting edema of bilateral upper and lower  extremities.  Skin: Warm and dry.  No rashes or lesions.  Psych: Appropriate mood and affect.     Medications     Scheduled Medications:   brimonidine, 1 drop, Both Eyes, BID  calcium carbonate-vitamin D3, 1 tablet, oral, Daily  cefepime, 2 g, intravenous, q12h  docusate sodium, 100 mg, oral, BID  ezetimibe, 10 mg, oral, Daily  heparin (porcine), 5,000 Units, subcutaneous, q8h  insulin lispro, 0-15 Units, subcutaneous, Before meals & nightly  ipratropium-albuteroL, 3 mL, nebulization, q4h  latanoprost, 1 drop, Both Eyes, Nightly  melatonin, 6 mg, oral, Nightly  oxygen, , inhalation, Continuous - Inhalation  [Held by provider] primidone, 125 mg, oral, TID  [Held by provider] propranolol LA, 60 mg, oral, Daily       Continuous Medications:   bumetanide, 0.5 mg/hr, Last Rate: 0.5 mg/hr (10/16/24 0446)       PRN Medications:     Labs     Results from last 72 hours   Lab Units 10/16/24  0421 10/15/24  2305 10/15/24  0435 10/14/24  0449   GLUCOSE mg/dL 169* 220* 183* 150*   SODIUM mmol/L 139 139 139 139   POTASSIUM mmol/L 3.7 3.6 3.4* 4.0   CHLORIDE mmol/L 112* 113* 111* 113*   CO2 mmol/L 22 19* 19* 17*   BUN mg/dL 41* 42* 40* 42*   CREATININE mg/dL 1.23* 1.18* 1.17* 1.19*   EGFR mL/min/1.73m*2 48* 50* 51* 50*   CALCIUM mg/dL 7.5* 7.3* 7.6* 7.7*   ALBUMIN g/dL 2.0*  --  2.0* 2.1*   MAGNESIUM mg/dL 2.13 2.28 2.07 1.97   PHOSPHORUS mg/dL 3.8  --  3.8 4.2                 Results from last 72 hours   Lab Units 10/16/24  0421 10/15/24  0435 10/14/24  0449   WBC AUTO x10*3/uL 6.9 5.9 6.6   NRBC AUTO /100 WBCs 0.0 0.0 0.0   RBC AUTO x10*6/uL 2.52* 2.63* 2.63*   HEMOGLOBIN g/dL 7.7* 7.9* 7.9*   HEMATOCRIT % 24.8* 25.0* 24.8*   MCV fL 98 95 94   MCH pg 30.6 30.0 30.0   MCHC g/dL 31.0* 31.6* 31.9*   RDW % 18.2* 17.6* 17.3*   PLATELETS AUTO x10*3/uL 274 301 347     Results from last 72 hours   Lab Units 10/13/24  1453   POCT PH, ARTERIAL pH 7.34*   POCT PCO2, ARTERIAL mm Hg 32*   POCT PO2, ARTERIAL mm Hg 141*   POCT HCO3 CALCULATED,  ARTERIAL mmol/L 17.3*   POCT LACTATE, ARTERIAL mmol/L 0.7   POCT BASE EXCESS, ARTERIAL mmol/L -7.6*   FIO2 % 50     Lab Results   Component Value Date    BLOODCULT No growth at 3 days 10/12/2024    BLOODCULT No growth at 3 days 10/12/2024    GRAMSTAIN (3+) Moderate Polymorphonuclear leukocytes (A) 10/13/2024    GRAMSTAIN (3+) Moderate Gram negative bacilli (A) 10/13/2024     Lab Results   Component Value Date    URINECULTURE 20,000 - 80,000 Candida lusitaniae (A) 10/12/2024       Imaging and Diagnostic Studies     Recent imaging and diagnostic studies reviewed.       Assessment / Plan     #Healthcare acquired pneumonia  #Acute hypoxemic respiratory failure  #Thoracolumbar surgical site infection s/p I&D x 2 with MRSA bacteremia on an extended course of IV antibiotics  #Acute kidney injury  #Fluid overload    -Sputum culture 10/13 growing Pseudomonas.  Antibiotics per ID.  -Wet cough.  Ordered chest PT.  -Continue diuresis with Bumex gtt for goal fluid balance of 1 to 2 L negative.  -Seen by speech therapy.  Started on a diet with thin liquids.  -Put on 1.5 L fluid restriction d/t excessive water intake interfering with goal of negative fluid balance.      Kalyan Leblanc MD

## 2024-10-16 NOTE — PROGRESS NOTES
Physical Therapy                 Therapy Communication Note    Patient Name: Narda Malloy  MRN: [id number]  Department: [address] ICU  Room: 09/09-A  Today's Date: 10/16/2024     Discipline: Physical Therapy    Missed Visit Reason: Patient placed on medical hold. Order received & chart review. This 69 y.o. F sent to ED from [facility] for unresponsiveness was admitted for encephalopathy, sepsis 2° to HCAP, AMY, and thoracolumbar spine surgical site infection (s/p I&D x2) with MRSA bacteremia. Intubated 10/12-10/15/24; extubated to NC. Per OT per RN, pt now on HFNC (FiO2 100%) due to L lung mucous plug and was lethargic. RN requested a medical hold on eval at this time.    Missed Time: Cancelled at 11:05.

## 2024-10-16 NOTE — CARE PLAN
The patient's goals for the shift include      The clinical goals for the shift include Keep vitally and hemodynamically stable      Problem: Safety - Medical Restraint  Goal: Remains free of injury from restraints (Restraint for Interference with Medical Device)  Outcome: Progressing  Goal: Free from restraint(s) (Restraint for Interference with Medical Device)  Outcome: Progressing     Problem: Pain - Adult  Goal: Verbalizes/displays adequate comfort level or baseline comfort level  Outcome: Progressing     Problem: Safety - Adult  Goal: Free from fall injury  Outcome: Progressing     Problem: Discharge Planning  Goal: Discharge to home or other facility with appropriate resources  Outcome: Progressing     Problem: Chronic Conditions and Co-morbidities  Goal: Patient's chronic conditions and co-morbidity symptoms are monitored and maintained or improved  Outcome: Progressing     Problem: Diabetes  Goal: Achieve decreasing blood glucose levels by end of shift  Outcome: Progressing  Goal: Increase stability of blood glucose readings by end of shift  Outcome: Progressing  Goal: Decrease in ketones present in urine by end of shift  Outcome: Progressing  Goal: Maintain electrolyte levels within acceptable range throughout shift  Outcome: Progressing  Goal: Maintain glucose levels >70mg/dl to <250mg/dl throughout shift  Outcome: Progressing  Goal: No changes in neurological exam by end of shift  Outcome: Progressing  Goal: Learn about and adhere to nutrition recommendations by end of shift  Outcome: Progressing  Goal: Vital signs within normal range for age by end of shift  Outcome: Progressing  Goal: Increase self care and/or family involovement by end of shift  Outcome: Progressing  Goal: Receive DSME education by end of shift  Outcome: Progressing     Problem: Knowledge Deficit  Goal: Patient/family/caregiver demonstrates understanding of disease process, treatment plan, medications, and discharge  instructions  Outcome: Progressing     Problem: Mechanical Ventilation  Goal: Patient Will Maintain Patent Airway  Outcome: Progressing  Goal: Oral health is maintained or improved  Outcome: Progressing  Goal: ET tube will be managed safely  Outcome: Progressing  Goal: Ability to express needs and understand communication  Outcome: Progressing  Goal: Mobility/activity is maintained at optimum level for patient  Outcome: Progressing     Problem: Skin  Goal: Decreased wound size/increased tissue granulation at next dressing change  Outcome: Progressing  Flowsheets (Taken 10/16/2024 1143)  Decreased wound size/increased tissue granulation at next dressing change:   Protective dressings over bony prominences   Promote sleep for wound healing   Utilize specialty bed per algorithm  Goal: Participates in plan/prevention/treatment measures  Outcome: Progressing  Flowsheets (Taken 10/16/2024 1143)  Participates in plan/prevention/treatment measures:   Discuss with provider PT/OT consult   Elevate heels  Goal: Prevent/manage excess moisture  Outcome: Progressing  Flowsheets (Taken 10/16/2024 1143)  Prevent/manage excess moisture:   Moisturize dry skin   Monitor for/manage infection if present   Follow provider orders for dressing changes  Goal: Prevent/minimize sheer/friction injuries  Outcome: Progressing  Flowsheets (Taken 10/16/2024 1143)  Prevent/minimize sheer/friction injuries:   Complete micro-shifts as needed if patient unable. Adjust patient position to relieve pressure points, not a full turn   Use pull sheet   Turn/reposition every 2 hours/use positioning/transfer devices   Utilize specialty bed per algorithm  Goal: Promote/optimize nutrition  Outcome: Progressing  Flowsheets (Taken 10/16/2024 1143)  Promote/optimize nutrition:   Monitor/record intake including meals   Assist with feeding  Goal: Promote skin healing  Outcome: Progressing  Flowsheets (Taken 10/16/2024 1143)  Promote skin healing:   Assess skin/pad  under line(s)/device(s)   Protective dressings over bony prominences   Turn/reposition every 2 hours/use positioning/transfer devices   Ensure correct size (line/device) and apply per  instructions   Rotate device position/do not position patient on device     Problem: Nutrition  Goal: Less than 5 days NPO/clear liquids  Outcome: Progressing  Goal: Oral intake greater than 50%  Outcome: Progressing  Goal: Oral intake greater 75%  Outcome: Progressing  Goal: Consume prescribed supplement  Outcome: Progressing  Goal: Adequate PO fluid intake  Outcome: Progressing  Goal: Nutrition support goals are met within 48 hrs  Outcome: Progressing  Goal: Nutrition support is meeting 75% of nutrient needs  Outcome: Progressing  Goal: Tube feed tolerance  Outcome: Progressing  Goal: BG  mg/dL  Outcome: Progressing  Goal: Lab values WNL  Outcome: Progressing  Goal: Electrolytes WNL  Outcome: Progressing  Goal: Promote healing  Outcome: Progressing  Goal: Maintain stable weight  Outcome: Progressing  Goal: Reduce weight from edema/fluid  Outcome: Progressing     Problem: Respiratory  Goal: No signs of respiratory distress (eg. Use of accessory muscles. Peds grunting)  Outcome: Progressing

## 2024-10-16 NOTE — PROGRESS NOTES
Patient not medically clear. Patient extubated yesterday and is on high flow oxygen. At this time there is not a safe discharge plan in place. Patient was at [facility] prior to admission. Will follow.      10/16/24 2441   Discharge Planning   Home or Post Acute Services Other (Comment)  (TBD)   Expected Discharge Disposition [place]  (TBD)   Does the patient need discharge transport arranged? Yes   RoundTrip coordination needed? Yes

## 2024-10-16 NOTE — SIGNIFICANT EVENT
[facility] Progress Note    Acutely worsening hypoxemia.  Put on 100% O2 by face mask.  NT suctioned with return of copious thick, yellow secretions.    Now maintaining SpO2 94% on Airvo @ 40 L/min with FiO2 100%.  Breathing appears a mildly labored.  On auscultation, decreased breath sounds on the left compared to earlier this AM.  CXR shows new left lower lobe opacity c/w lobar collapse (my interpretation).    A/P    HCAP  Acute hypoxemic respiratory failure  Mucus plugging with lobar atelectasis  AMY  Fluid overload    -Chest PT, nebulized mucolytic and bronchodilators.  -Consider NIPPV.  May need to be re-intubated.  -NPO.  -Continue diuresis and antibiotics.      Kalyan Leblanc MD    This patient is critically ill/injured due to acute impairment in one or more vital organ systems, such that there is a probability of imminent or life-threatening deterioration of the patient's condition.  I spent 35 minutes in the direct delivery of medical care that involves high complexity decision making to treat single or multiple vital organ system failure and/or prevent further life-threatening deterioration of the patient's condition.  This time does not include separately billable procedures.

## 2024-10-16 NOTE — PROGRESS NOTES
Speech-Language Pathology    Speech-Language Pathology Clinical Swallow Treatment    Patient Name: Narda Malloy  MRN: [id number]  : 1955  Today's Date: 10/16/24  Start Time: 928  Stop Time: 940  Time Calculation (min): 12 min    ASSESSMENT  SLP TX Intervention Outcome: Making Progress Towards Goals  Treatment Tolerance: Patient tolerated treatment well    Impressions: Pt safely tolerated thin liquids and regular solids without s/s aspiration or suspected  pharyngeal stage dysphagia. Pt would benefit from ongoing skilled ST to minimize aspiration risk and ensure ongoing safety with the least restrictive diet.    PLAN  Is MBSS recommended? No; no pharyngeal dysphagia suspected.  Recommended solid consistency: Regular (IDDSI level 7)  Recommended liquid consistency: Thin (IDDSI 0)  Recommended medication administration: Whole, in thin liquid  Safe swallow strategies:  - Upright positioning for all PO intake  - Small bites  - Small sips  - Alternate bites and sips  - Total assist for feeding    Discharge recommendation: Unable to determine at this time; please see follow-up notes for DC recommendation.  Inpatient/Swing Bed or Outpatient: Inpatient  SLP TX Plan: Continue Plan of Care  SLP Plan: Skilled SLP  SLP Frequency: 2x per week     Next Treatment Priority: diet rené, strat,educ          SUBJECTIVE  Prior to Session Communication: Bedside nurse  RN cleared pt to participate in session and reported pt coughed after taking colase, she felt this was because she had just woken up   Respiratory status: Supplemental oxygen via NC  Positioning: Upright in bed  Pt was alert, pleasant, and cooperative for session.    Pain Assessment  Pain Assessment: 0-10  0-10 (Numeric) Pain Score: 0 - No pain       Orientation: Oriented to location, Oriented to month, Oriented to year, and Oriented to situation  Ability to follow functional commands: WFL    OBJECTIVE  Therapeutic Swallow  Therapeutic Swallow Intervention : PO  Trials, Compensatory Strategies  Goals:  In 2 weeks...  Pt will consume prescribed diet (current diet is soft bite sized) without overt s/sx aspiration/penetration in 95% of observed trials.   GOAL START: 10/15/2024    GOAL END:10/29/24   Status: Progressing   Progress this date: Pt reasessed with the following trials:   4 oz thin juice via straw, and 1/2 ester cracker, mastication WFL, adequate oral clearance, no s/s aspiration  Pt will consume trials of upgraded textures without overt s/sx aspiration in order for consideration of diet texture upgrade.   GOAL START: 10/15/2024    GOAL END:10/29/24   Status: Goal met   Progress this date: Pt tolerate regular solids without s/s aspiration    Pt will demonstrate follow-through of trained compensatory strategies during a meal/snack with 90% acc independently.   GOAL START: 10/15/2024    GOAL END:10/29/24   Status: Progressing   Progress this date: Pt seated upright in bed when SLP arrived, verbalizes importance of upright posture when eating,fed by SLP, able to demonstrate single sips, bites  with 100% acc. Still unable to feed self d/t UE weakness    Treatment/Education:  Results and recommendations were relayed to: Patient and Bedside nurse  Education provided: Yes   Learner: Patient   Barriers to learning: None   Method of teaching: Verbal   Topic: role of ST, results of assessment, and recommended safe swallow strategies   Outcome of teaching: Pt verbalized understanding

## 2024-10-16 NOTE — NURSING NOTE
Vascular access note    Patient with Lt arm midline, placed prior to admission, dressing D&I, infusing without difficulty.  Based on facility paperwork it appears line was placed on 9/23. Discussed with RN and dr will need to determine whether line is needed for discharge and current line will need removed next week.

## 2024-10-16 NOTE — PROGRESS NOTES
Vancomycin Dosing by Pharmacy- [facility]    Narda Malloy is a 69 y.o. year old female who Pharmacy has been consulted for vancomycin dosing for Vancomycin Indications: Skin & Soft Tissue. Based on the patient's indication and renal status this patient will be dosed based on a target level of SSTI/UTI: 10-15 mcg/mL    Patient is currently in AMY    Last vancomycin dose (incl. date and time): 1g q12h at OSH    Vancomycin level (incl. date and time): 34.2 mcg/mL on 10/16 at 0420    Lab Results   Component Value Date    VANCORANDOM 34.2 (H) 10/16/2024    VANCOTROUGH 11.8 08/08/2018       Visit Vitals  /60   Pulse 79   Temp 36.8 °C (98.2 °F) (Oral)   Resp 23           Lab Results   Component Value Date    CREATININE 1.23 (H) 10/16/2024    CREATININE 1.18 (H) 10/15/2024    CREATININE 1.17 (H) 10/15/2024    CREATININE 1.19 (H) 10/14/2024       I/O last 3 completed shifts:  In: 1783.8 (17.6 mL/kg) [P.O.:650; I.V.:123.8 (1.2 mL/kg); NG/GT:410; IV Piggyback:600]  Out: 2890 (28.6 mL/kg) [Urine:2890 (0.8 mL/kg/hr)]  Weight: 101.2 kg     Assessment/Plan     Level is above target trough goal.   hold vancomycin.  Random level within 24 hours will be obtained on 10/17 at 0500. May be obtained sooner if clinically indicated.   Will continue to monitor renal function daily while on vancomycin and order serum creatinine at least every 48 hours if not already ordered.  Follow for continued vancomycin needs, clinical response, and signs/symptoms of toxicity.     Cornelius Altamirano, LissetD

## 2024-10-16 NOTE — CARE PLAN
The patient's goals for the shift include      The clinical goals for the shift include keep vitally and hemodynamically stable  Problem: Diabetes  Goal: Achieve decreasing blood glucose levels by end of shift  Outcome: Progressing  Goal: Increase stability of blood glucose readings by end of shift  Outcome: Progressing  Goal: Decrease in ketones present in urine by end of shift  Outcome: Progressing  Goal: Maintain electrolyte levels within acceptable range throughout shift  Outcome: Progressing  Goal: Maintain glucose levels >70mg/dl to <250mg/dl throughout shift  Outcome: Progressing  Goal: No changes in neurological exam by end of shift  Outcome: Progressing  Goal: Learn about and adhere to nutrition recommendations by end of shift  Outcome: Progressing  Goal: Vital signs within normal range for age by end of shift  Outcome: Progressing  Goal: Increase self care and/or family involovement by end of shift  Outcome: Progressing  Goal: Receive DSME education by end of shift  Outcome: Progressing     Problem: Skin  Goal: Decreased wound size/increased tissue granulation at next dressing change  Outcome: Progressing  Flowsheets (Taken 10/15/2024 2154)  Decreased wound size/increased tissue granulation at next dressing change:   Protective dressings over bony prominences   Promote sleep for wound healing   Utilize specialty bed per algorithm  Goal: Participates in plan/prevention/treatment measures  Outcome: Progressing  Flowsheets (Taken 10/15/2024 2154)  Participates in plan/prevention/treatment measures:   Discuss with provider PT/OT consult   Elevate heels   Increase activity/out of bed for meals  Goal: Prevent/manage excess moisture  Outcome: Progressing  Flowsheets (Taken 10/15/2024 2154)  Prevent/manage excess moisture:   Moisturize dry skin   Monitor for/manage infection if present   Cleanse incontinence/protect with barrier cream  Goal: Prevent/minimize sheer/friction injuries  Outcome:  Progressing  Flowsheets (Taken 10/15/2024 2154)  Prevent/minimize sheer/friction injuries:   Increase activity/out of bed for meals   Use pull sheet   HOB 30 degrees or less   Turn/reposition every 2 hours/use positioning/transfer devices   Utilize specialty bed per algorithm  Goal: Promote/optimize nutrition  Outcome: Progressing  Flowsheets (Taken 10/15/2024 2154)  Promote/optimize nutrition:   Assist with feeding   Monitor/record intake including meals   Offer water/supplements/favorite foods  Goal: Promote skin healing  Outcome: Progressing  Flowsheets (Taken 10/15/2024 2154)  Promote skin healing:   Assess skin/pad under line(s)/device(s)   Protective dressings over bony prominences   Turn/reposition every 2 hours/use positioning/transfer devices   Ensure correct size (line/device) and apply per  instructions   Rotate device position/do not position patient on device     Problem: Nutrition  Goal: Less than 5 days NPO/clear liquids  Outcome: Progressing  Goal: Oral intake greater than 50%  Outcome: Progressing  Goal: Oral intake greater 75%  Outcome: Progressing  Goal: Consume prescribed supplement  Outcome: Progressing  Goal: Adequate PO fluid intake  Outcome: Progressing  Goal: Nutrition support goals are met within 48 hrs  Outcome: Progressing  Goal: Nutrition support is meeting 75% of nutrient needs  Outcome: Progressing  Goal: Tube feed tolerance  Outcome: Progressing  Goal: BG  mg/dL  Outcome: Progressing  Goal: Lab values WNL  Outcome: Progressing  Goal: Electrolytes WNL  Outcome: Progressing  Goal: Promote healing  Outcome: Progressing  Goal: Maintain stable weight  Outcome: Progressing  Goal: Reduce weight from edema/fluid  Outcome: Progressing       Over the shift, the patient did not make progress toward the following goals. Barriers to progression include . Recommendations to address these barriers include .

## 2024-10-16 NOTE — PROGRESS NOTES
Occupational Therapy                 Therapy Communication Note    Patient Name: Narda Malloy  MRN: [id number]  Department: [address] ICU  Room: 09/09-A  Today's Date: 10/16/2024     Discipline: Occupational Therapy    Missed Visit Reason: Missed Visit Reason: Patient placed on medical hold (Order received, chart reviewed. 69yoF presenting from SNF upresponsive, hypotensive, poor airway protection. Intubated, now extubated to HFNC. RN requesting therapy hold this date d/t desat event, mucous plug L lung resulting in HFNC to 100% and lethargy) Will follow up as schedule permits    Missed Time: Cancel

## 2024-10-16 NOTE — PROGRESS NOTES
INFECTIOUS DISEASES PROGRESS NOTE    Consulted / following patient for:  Respiratory failure/pneumonia  Recent polymicrobial complicated postoperative lumbar wound infection, MRSA and Proteus  Acute kidney injury    Subjective   Interval History:   Complaining of difficulty hearing, denies acute respiratory difficulty    Objective   PHYSICAL EXAMINATION  Vital signs:  Visit Vitals  /63   Pulse 75   Temp 36.8 °C (98.2 °F) (Axillary)   Resp 24   No vasopressor agents in use  General: Extubated, responsive, does not appear toxic  Lungs: Clear, coarse, diminished  Heart:  S1, S2 normal  Abdomen:  Soft, obese, no guarding.   Extremities:  No cords, phlebitis, cellulitis.       Relevant Results  WBC: 15,900 --> ---> ---> 6900   Creatinine (baseline 0.66): 1.46 ---> 1.43 ---> 1.33 ---> 1.19 ---> 1.17 ---> 1.23    Microbiology:  Blood (10/12): Negative X2  Sputum: Stain with moderate GNB and moderate PMN, culture Pseudomonas aeruginosa, susceptible to Zosyn and cefepime  Urine: Moderate colony count Candida lusitaniae    Imaging:  CXR images (10/16) personally reviewed: She has now been extubated, there is increased bilateral pleural effusion and an area of infiltrate in the left upper lobe which may be accentuated now by pleural effusion and technique      Assessment:  Sepsis -likely due to pneumonia, present on admission. Patient already on IV vancomycin and IV ceftriaxone at time of this admission for lumbar spinal infection.  Thus far no evidence for vascular catheter infection or recurrent bacteremia.  Liberated from the ventilator.  Pseudomonas in the sputum  Lumbar spine surgical site infection s/p I&D 9/28. Cultures + MRSA, Proteus. On vanco/ceftriaxone through 11/12. Followed by Dr. Brant Juarez.  1 slightly suspicious area of separation of the wound edges in the superior portion of the incision     Plan/Recommendations:  Continue IV vancomycin - check Cr daily. Pharmacy to dose and monitor  Continue cefepime  through 10/22, then revert to ceftriaxone as planned for lumbar infection, until 11/12  Midline catheter will need to be replaced with a new midline catheter or PICC prior to her discharge    Andrew Malagon MD  [id number]  Office:  [phone]

## 2024-10-17 ENCOUNTER — APPOINTMENT (OUTPATIENT)
Dept: RADIOLOGY | Facility: HOSPITAL | Age: 69
End: 2024-10-17
Payer: MEDICARE

## 2024-10-17 LAB
ALBUMIN SERPL BCP-MCNC: 1.9 G/DL (ref 3.4–5)
ANION GAP BLDA CALCULATED.4IONS-SCNC: 8 MMO/L (ref 10–25)
ANION GAP SERPL CALCULATED.3IONS-SCNC: 11 MMOL/L (ref 10–20)
APPARATUS: ABNORMAL
ARTERIAL PATENCY WRIST A: POSITIVE
BASE EXCESS BLDA CALC-SCNC: -3 MMOL/L (ref -2–3)
BASOPHILS # BLD AUTO: 0.02 X10*3/UL (ref 0–0.1)
BASOPHILS NFR BLD AUTO: 0.2 %
BASOPHILS NFR FLD MANUAL: 0 %
BLASTS NFR FLD MANUAL: 0 %
BNP SERPL-MCNC: 262 PG/ML (ref 0–99)
BODY TEMPERATURE: 37 DEGREES CELSIUS
BUN SERPL-MCNC: 44 MG/DL (ref 6–23)
CA-I BLDA-SCNC: 1.21 MMOL/L (ref 1.1–1.33)
CALCIUM SERPL-MCNC: 7.4 MG/DL (ref 8.6–10.3)
CHLORIDE BLDA-SCNC: 111 MMOL/L (ref 98–107)
CHLORIDE SERPL-SCNC: 111 MMOL/L (ref 98–107)
CLARITY FLD: CLEAR
CO2 SERPL-SCNC: 23 MMOL/L (ref 21–32)
COLOR FLD: NORMAL
CREAT SERPL-MCNC: 1.38 MG/DL (ref 0.5–1.05)
EGFRCR SERPLBLD CKD-EPI 2021: 42 ML/MIN/1.73M*2
EOSINOPHIL # BLD AUTO: 0.01 X10*3/UL (ref 0–0.7)
EOSINOPHIL NFR BLD AUTO: 0.1 %
EOSINOPHIL NFR FLD MANUAL: 0 %
ERYTHROCYTE [DISTWIDTH] IN BLOOD BY AUTOMATED COUNT: 18.5 % (ref 11.5–14.5)
FLOW: 40 LPM
GLUCOSE BLD MANUAL STRIP-MCNC: 128 MG/DL (ref 74–99)
GLUCOSE BLD MANUAL STRIP-MCNC: 152 MG/DL (ref 74–99)
GLUCOSE BLD MANUAL STRIP-MCNC: 155 MG/DL (ref 74–99)
GLUCOSE BLD MANUAL STRIP-MCNC: 164 MG/DL (ref 74–99)
GLUCOSE BLDA-MCNC: 169 MG/DL (ref 74–99)
GLUCOSE FLD-MCNC: 202 MG/DL
GLUCOSE SERPL-MCNC: 158 MG/DL (ref 74–99)
HCO3 BLDA-SCNC: 23.2 MMOL/L (ref 22–26)
HCT VFR BLD AUTO: 25.9 % (ref 36–46)
HCT VFR BLD EST: 28 % (ref 36–46)
HGB BLD-MCNC: 8 G/DL (ref 12–16)
HGB BLDA-MCNC: 9.2 G/DL (ref 12–16)
HOLD SPECIMEN: NORMAL
IMM GRANULOCYTES # BLD AUTO: 0.03 X10*3/UL (ref 0–0.7)
IMM GRANULOCYTES NFR BLD AUTO: 0.3 % (ref 0–0.9)
IMMATURE GRANULOCYTES IN FLUID: 0 %
INHALED O2 CONCENTRATION: 80 %
LACTATE BLDA-SCNC: 0.7 MMOL/L (ref 0.4–2)
LDH FLD L TO P-CCNC: 97 U/L
LYMPHOCYTES # BLD AUTO: 0.73 X10*3/UL (ref 1.2–4.8)
LYMPHOCYTES NFR BLD AUTO: 7.4 %
LYMPHOCYTES NFR FLD MANUAL: 21 %
MAGNESIUM SERPL-MCNC: 1.98 MG/DL (ref 1.6–2.4)
MCH RBC QN AUTO: 30.7 PG (ref 26–34)
MCHC RBC AUTO-ENTMCNC: 30.9 G/DL (ref 32–36)
MCV RBC AUTO: 99 FL (ref 80–100)
MONOCYTES # BLD AUTO: 0.59 X10*3/UL (ref 0.1–1)
MONOCYTES NFR BLD AUTO: 6 %
MONOS+MACROS NFR FLD MANUAL: 22 %
NEUTROPHILS # BLD AUTO: 8.48 X10*3/UL (ref 1.2–7.7)
NEUTROPHILS NFR BLD AUTO: 86 %
NEUTROPHILS NFR FLD MANUAL: 56 %
NRBC BLD-RTO: 0 /100 WBCS (ref 0–0)
OTHER CELLS NFR FLD MANUAL: 1 %
OXYHGB MFR BLDA: 93.5 % (ref 94–98)
PCO2 BLDA: 46 MM HG (ref 38–42)
PH BLDA: 7.31 PH (ref 7.38–7.42)
PH FLD: 7 [PH]
PHOSPHATE SERPL-MCNC: 4 MG/DL (ref 2.5–4.9)
PLASMA CELLS NFR FLD MANUAL: 0 %
PLATELET # BLD AUTO: 261 X10*3/UL (ref 150–450)
PO2 BLDA: 66 MM HG (ref 85–95)
POTASSIUM BLDA-SCNC: 3.6 MMOL/L (ref 3.5–5.3)
POTASSIUM SERPL-SCNC: 3.9 MMOL/L (ref 3.5–5.3)
PROT FLD-MCNC: 1.8 G/DL
RBC # BLD AUTO: 2.61 X10*6/UL (ref 4–5.2)
RBC # FLD AUTO: 1000 /UL
SAO2 % BLDA: 96 % (ref 94–100)
SODIUM BLDA-SCNC: 139 MMOL/L (ref 136–145)
SODIUM SERPL-SCNC: 141 MMOL/L (ref 136–145)
SPECIMEN DRAWN FROM PATIENT: ABNORMAL
TOTAL CELLS COUNTED FLD: 100
TRIGL FLD-MCNC: 18 MG/DL
VANCOMYCIN SERPL-MCNC: 30.3 UG/ML (ref 5–20)
WBC # BLD AUTO: 9.9 X10*3/UL (ref 4.4–11.3)
WBC # FLD AUTO: 197 /UL

## 2024-10-17 PROCEDURE — 83735 ASSAY OF MAGNESIUM: CPT | Performed by: ANESTHESIOLOGY

## 2024-10-17 PROCEDURE — 80069 RENAL FUNCTION PANEL: CPT | Performed by: ANESTHESIOLOGY

## 2024-10-17 PROCEDURE — 2500000005 HC RX 250 GENERAL PHARMACY W/O HCPCS

## 2024-10-17 PROCEDURE — 2500000004 HC RX 250 GENERAL PHARMACY W/ HCPCS (ALT 636 FOR OP/ED)

## 2024-10-17 PROCEDURE — 94668 MNPJ CHEST WALL SBSQ: CPT

## 2024-10-17 PROCEDURE — 71045 X-RAY EXAM CHEST 1 VIEW: CPT

## 2024-10-17 PROCEDURE — 88112 CYTOPATH CELL ENHANCE TECH: CPT | Mod: TC

## 2024-10-17 PROCEDURE — 87116 MYCOBACTERIA CULTURE: CPT | Mod: WESLAB

## 2024-10-17 PROCEDURE — 2500000004 HC RX 250 GENERAL PHARMACY W/ HCPCS (ALT 636 FOR OP/ED): Performed by: INTERNAL MEDICINE

## 2024-10-17 PROCEDURE — 85025 COMPLETE CBC W/AUTO DIFF WBC: CPT | Performed by: ANESTHESIOLOGY

## 2024-10-17 PROCEDURE — 2500000005 HC RX 250 GENERAL PHARMACY W/O HCPCS: Performed by: ANESTHESIOLOGY

## 2024-10-17 PROCEDURE — 94640 AIRWAY INHALATION TREATMENT: CPT

## 2024-10-17 PROCEDURE — 88112 CYTOPATH CELL ENHANCE TECH: CPT | Performed by: PATHOLOGY

## 2024-10-17 PROCEDURE — 2500000004 HC RX 250 GENERAL PHARMACY W/ HCPCS (ALT 636 FOR OP/ED): Performed by: ANESTHESIOLOGY

## 2024-10-17 PROCEDURE — 87102 FUNGUS ISOLATION CULTURE: CPT | Mod: WESLAB

## 2024-10-17 PROCEDURE — 0W9B30Z DRAINAGE OF LEFT PLEURAL CAVITY WITH DRAINAGE DEVICE, PERCUTANEOUS APPROACH: ICD-10-PCS | Performed by: INTERNAL MEDICINE

## 2024-10-17 PROCEDURE — 84157 ASSAY OF PROTEIN OTHER: CPT | Mod: WESLAB

## 2024-10-17 PROCEDURE — 88104 CYTOPATH FL NONGYN SMEARS: CPT | Performed by: PATHOLOGY

## 2024-10-17 PROCEDURE — P9045 ALBUMIN (HUMAN), 5%, 250 ML: HCPCS | Mod: JZ

## 2024-10-17 PROCEDURE — 2500000001 HC RX 250 WO HCPCS SELF ADMINISTERED DRUGS (ALT 637 FOR MEDICARE OP): Performed by: ANESTHESIOLOGY

## 2024-10-17 PROCEDURE — 84478 ASSAY OF TRIGLYCERIDES: CPT | Mod: WESLAB

## 2024-10-17 PROCEDURE — 80202 ASSAY OF VANCOMYCIN: CPT | Performed by: ANESTHESIOLOGY

## 2024-10-17 PROCEDURE — 2020000001 HC ICU ROOM DAILY

## 2024-10-17 PROCEDURE — 82947 ASSAY GLUCOSE BLOOD QUANT: CPT

## 2024-10-17 PROCEDURE — 92526 ORAL FUNCTION THERAPY: CPT | Mod: GN | Performed by: SPEECH-LANGUAGE PATHOLOGIST

## 2024-10-17 PROCEDURE — 83880 ASSAY OF NATRIURETIC PEPTIDE: CPT

## 2024-10-17 PROCEDURE — 32551 INSERTION OF CHEST TUBE: CPT | Performed by: INTERNAL MEDICINE

## 2024-10-17 PROCEDURE — 83986 ASSAY PH BODY FLUID NOS: CPT

## 2024-10-17 PROCEDURE — 9420000001 HC RT PATIENT EDUCATION 5 MIN

## 2024-10-17 PROCEDURE — 2500000002 HC RX 250 W HCPCS SELF ADMINISTERED DRUGS (ALT 637 FOR MEDICARE OP, ALT 636 FOR OP/ED)

## 2024-10-17 PROCEDURE — 36415 COLL VENOUS BLD VENIPUNCTURE: CPT | Performed by: ANESTHESIOLOGY

## 2024-10-17 PROCEDURE — 2500000002 HC RX 250 W HCPCS SELF ADMINISTERED DRUGS (ALT 637 FOR MEDICARE OP, ALT 636 FOR OP/ED): Performed by: ANESTHESIOLOGY

## 2024-10-17 PROCEDURE — 71045 X-RAY EXAM CHEST 1 VIEW: CPT | Performed by: RADIOLOGY

## 2024-10-17 PROCEDURE — 82945 GLUCOSE OTHER FLUID: CPT | Mod: WESLAB

## 2024-10-17 PROCEDURE — 88305 TISSUE EXAM BY PATHOLOGIST: CPT | Performed by: PATHOLOGY

## 2024-10-17 PROCEDURE — 36600 WITHDRAWAL OF ARTERIAL BLOOD: CPT

## 2024-10-17 PROCEDURE — 89051 BODY FLUID CELL COUNT: CPT

## 2024-10-17 PROCEDURE — 84132 ASSAY OF SERUM POTASSIUM: CPT

## 2024-10-17 PROCEDURE — 99291 CRITICAL CARE FIRST HOUR: CPT

## 2024-10-17 PROCEDURE — 87070 CULTURE OTHR SPECIMN AEROBIC: CPT | Mod: WESLAB

## 2024-10-17 PROCEDURE — 83615 LACTATE (LD) (LDH) ENZYME: CPT | Mod: WESLAB

## 2024-10-17 RX ORDER — IPRATROPIUM BROMIDE AND ALBUTEROL SULFATE 2.5; .5 MG/3ML; MG/3ML
3 SOLUTION RESPIRATORY (INHALATION)
Status: DISCONTINUED | OUTPATIENT
Start: 2024-10-17 | End: 2024-11-02

## 2024-10-17 RX ORDER — ALBUMIN HUMAN 50 G/1000ML
25 SOLUTION INTRAVENOUS ONCE
Status: COMPLETED | OUTPATIENT
Start: 2024-10-17 | End: 2024-10-17

## 2024-10-17 RX ORDER — SODIUM CHLORIDE FOR INHALATION 3 %
3 VIAL, NEBULIZER (ML) INHALATION
Status: DISCONTINUED | OUTPATIENT
Start: 2024-10-17 | End: 2024-11-11

## 2024-10-17 RX ORDER — POTASSIUM CHLORIDE 14.9 MG/ML
20 INJECTION INTRAVENOUS ONCE
Status: COMPLETED | OUTPATIENT
Start: 2024-10-17 | End: 2024-10-17

## 2024-10-17 RX ADMIN — BRIMONIDINE TARTRATE 1 DROP: 2 SOLUTION OPHTHALMIC at 11:08

## 2024-10-17 RX ADMIN — INSULIN LISPRO 3 UNITS: 100 INJECTION, SOLUTION INTRAVENOUS; SUBCUTANEOUS at 08:52

## 2024-10-17 RX ADMIN — EZETIMIBE 10 MG: 10 TABLET ORAL at 11:09

## 2024-10-17 RX ADMIN — LATANOPROST 1 DROP: 50 SOLUTION OPHTHALMIC at 21:41

## 2024-10-17 RX ADMIN — ALBUMIN (HUMAN) 25 G: 12.5 INJECTION, SOLUTION INTRAVENOUS at 14:55

## 2024-10-17 RX ADMIN — Medication 40 L/MIN: at 19:37

## 2024-10-17 RX ADMIN — CEFEPIME 2 G: 2 INJECTION, POWDER, FOR SOLUTION INTRAVENOUS at 08:50

## 2024-10-17 RX ADMIN — BRIMONIDINE TARTRATE 1 DROP: 2 SOLUTION OPHTHALMIC at 21:41

## 2024-10-17 RX ADMIN — IPRATROPIUM BROMIDE AND ALBUTEROL SULFATE 3 ML: 2.5; .5 SOLUTION RESPIRATORY (INHALATION) at 08:04

## 2024-10-17 RX ADMIN — IPRATROPIUM BROMIDE AND ALBUTEROL SULFATE 3 ML: .5; 3 SOLUTION RESPIRATORY (INHALATION) at 19:37

## 2024-10-17 RX ADMIN — CEFEPIME 2 G: 2 INJECTION, POWDER, FOR SOLUTION INTRAVENOUS at 20:23

## 2024-10-17 RX ADMIN — INSULIN LISPRO 3 UNITS: 100 INJECTION, SOLUTION INTRAVENOUS; SUBCUTANEOUS at 16:23

## 2024-10-17 RX ADMIN — HEPARIN SODIUM 5000 UNITS: 5000 INJECTION, SOLUTION INTRAVENOUS; SUBCUTANEOUS at 21:41

## 2024-10-17 RX ADMIN — POTASSIUM CHLORIDE 20 MEQ: 14.9 INJECTION, SOLUTION INTRAVENOUS at 06:05

## 2024-10-17 RX ADMIN — DOCUSATE SODIUM 100 MG: 50 LIQUID ORAL at 11:08

## 2024-10-17 RX ADMIN — Medication 40 L/MIN: at 08:02

## 2024-10-17 RX ADMIN — Medication 3 ML: at 19:37

## 2024-10-17 RX ADMIN — Medication 3 ML: at 15:42

## 2024-10-17 RX ADMIN — Medication 1 TABLET: at 11:10

## 2024-10-17 RX ADMIN — Medication 40 L/MIN: at 08:03

## 2024-10-17 RX ADMIN — IPRATROPIUM BROMIDE AND ALBUTEROL SULFATE 3 ML: .5; 3 SOLUTION RESPIRATORY (INHALATION) at 15:43

## 2024-10-17 RX ADMIN — IPRATROPIUM BROMIDE AND ALBUTEROL SULFATE 3 ML: 2.5; .5 SOLUTION RESPIRATORY (INHALATION) at 02:26

## 2024-10-17 RX ADMIN — HEPARIN SODIUM 5000 UNITS: 5000 INJECTION, SOLUTION INTRAVENOUS; SUBCUTANEOUS at 06:05

## 2024-10-17 RX ADMIN — Medication 3 ML: at 13:29

## 2024-10-17 RX ADMIN — IPRATROPIUM BROMIDE AND ALBUTEROL SULFATE 3 ML: .5; 3 SOLUTION RESPIRATORY (INHALATION) at 13:20

## 2024-10-17 RX ADMIN — INSULIN LISPRO 3 UNITS: 100 INJECTION, SOLUTION INTRAVENOUS; SUBCUTANEOUS at 11:44

## 2024-10-17 ASSESSMENT — PAIN SCALES - GENERAL
PAINLEVEL_OUTOF10: 0 - NO PAIN

## 2024-10-17 ASSESSMENT — PAIN - FUNCTIONAL ASSESSMENT
PAIN_FUNCTIONAL_ASSESSMENT: 0-10

## 2024-10-17 NOTE — PROGRESS NOTES
Vancomycin Dosing by Pharmacy- [facility]    Narda Malloy is a 69 y.o. year old female who Pharmacy has been consulted for vancomycin dosing for Vancomycin Indications: Skin & Soft Tissue. Based on the patient's indication and renal status this patient will be dosed based on a target level of SSTI/UTI: 10-15 mcg/mL    Patient is currently in AMY    Last vancomycin dose (incl. date and time): 1g q12h at OSH    Vancomycin level (incl. date and time): 30.3 mcg/mL on 10/17 at 1330    Vancomycin levels since admission:   10/12: 53.9, 49.7  10/13: 53.9  10/14: 49.2  10/15: 41.2  10/16: 34.2  10/17: 30.3    Lab Results   Component Value Date    VANCORANDOM 30.3 (H) 10/17/2024    VANCOTROUGH 11.8 08/08/2018       Visit Vitals  /52   Pulse 76   Temp 36.7 °C (98.1 °F) (Axillary)   Resp 25           Lab Results   Component Value Date    CREATININE 1.38 (H) 10/17/2024    CREATININE 1.23 (H) 10/16/2024    CREATININE 1.18 (H) 10/15/2024    CREATININE 1.17 (H) 10/15/2024       I/O last 3 completed shifts:  In: 424.7 (4.2 mL/kg) [P.O.:50; I.V.:74.7 (0.7 mL/kg); IV Piggyback:300]  Out: 1920 (18.8 mL/kg) [Urine:1920 (0.5 mL/kg/hr)]  Weight: 102.2 kg     Assessment/Plan     Level is above target trough goal.   hold vancomycin.  Random level within 24 hours will be obtained on 10/18 at 0500. May be obtained sooner if clinically indicated.   Will continue to monitor renal function daily while on vancomycin and order serum creatinine at least every 48 hours if not already ordered.  Follow for continued vancomycin needs, clinical response, and signs/symptoms of toxicity.     Cornelius Altamirano, PharmD

## 2024-10-17 NOTE — PROGRESS NOTES
"Nutrition Follow up Note    Nutrition Assessment      Extubated 10/15. NPO on HFNC. Spoke with RN. Awaiting speech consult to resume oral diet. Will continue to follow and monitor nutrition needs.    Nutrition History:  Food and Nutrient History: NPO     Food Allergies/Intolerances:  None  GI Symptoms: None  Oral Problems: Swallowing difficulty    Anthropometrics:  Ht: 177.8 cm (5' 10\"), Wt: 102 kg (225 lb 5 oz), BMI: 32.33  IBW/kg (Dietitian Calculated): 68.18 kg  Percent of IBW: 147 %       Weight Change:  Daily Weight  10/16/24 : 102 kg (225 lb 5 oz)  10/01/24 : 99.7 kg (219 lb 12.8 oz)  09/25/24 : 74.8 kg (165 lb)  08/27/24 : 75.8 kg (167 lb)  08/16/24 : 76.2 kg (168 lb)  08/12/24 : 76.4 kg (168 lb 6.4 oz)  07/15/24 : 76.2 kg (168 lb)  06/18/24 : 75.8 kg (167 lb 3.2 oz)  05/31/24 : 69.4 kg (153 lb)  09/29/23 : 69.4 kg (153 lb)     Nutrition Focused Physical Exam Findings:      Nutrition Significant Labs:  Lab Results   Component Value Date    WBC 9.9 10/17/2024    HGB 8.0 (L) 10/17/2024    HCT 25.9 (L) 10/17/2024     10/17/2024    CHOL 266 (H) 08/23/2023    TRIG 213 (H) 08/23/2023    HDL 58 08/23/2023    ALT 8 10/12/2024    AST 9 10/12/2024     10/17/2024    K 3.9 10/17/2024     (H) 10/17/2024    CREATININE 1.38 (H) 10/17/2024    BUN 44 (H) 10/17/2024    CO2 23 10/17/2024    TSH 4.78 (H) 10/12/2024    INR 1.0 09/28/2024    HGBA1C 6.2 (H) 09/25/2024    ALBUR 40 (H) 03/31/2022     Nutrition Specific Medications:  brimonidine, 1 drop, Both Eyes, BID  calcium carbonate-vitamin D3, 1 tablet, oral, Daily  cefepime, 2 g, intravenous, q12h  [START ON 10/23/2024] cefTRIAXone, 2 g, intravenous, q24h  docusate sodium, 100 mg, oral, BID  ezetimibe, 10 mg, oral, Daily  heparin (porcine), 5,000 Units, subcutaneous, q8h  insulin lispro, 0-15 Units, subcutaneous, Before meals & nightly  ipratropium-albuteroL, 3 mL, nebulization, 4x daily  latanoprost, 1 drop, Both Eyes, Nightly  melatonin, 6 mg, oral, " Nightly  oxygen, , inhalation, Continuous - Inhalation  oxygen, , inhalation, Continuous - Inhalation  [Held by provider] primidone, 125 mg, oral, TID  [Held by provider] propranolol LA, 60 mg, oral, Daily  sodium chloride, 3 mL, nebulization, 4x daily         Dietary Orders (From admission, onward)       Start     Ordered    10/16/24 1035  NPO Diet; Effective now  Diet effective now         10/16/24 1034    10/16/24 0448  May Participate in Room Service With Assistance  ( ROOM SERVICE MAY PARTICIPATE WITH ASSISTANCE)  Once        Question:  .  Answer:  Yes    10/16/24 0447                      Estimated Needs:   Estimated Energy Needs  Total Energy Estimated Needs (kCal): 1700 kCal  Total Estimated Energy Need per Day (kCal/kg): 25 kCal/kg  Method for Estimating Needs: IBW    Estimated Protein Needs  Total Protein Estimated Needs (g): 68 g  Total Protein Estimated Needs (g/kg): 1 g/kg  Method for Estimating Needs: IBW    Estimated Fluid Needs  Total Fluid Estimated Needs (mL): 1700 mL  Total Fluid Estimated Needs (mL/kg): 25 mL/kg  Method for Estimating Needs: 1 mL/kcal      Nutrition Diagnosis   Nutrition Diagnosis:     Nutrition Diagnosis  Patient has Nutrition Diagnosis: Yes  Diagnosis Status (1): Ongoing  Nutrition Diagnosis 1: Inadequate energy intake  Related to (1): decreased ability to consume sufficient energy  As Evidenced by (1): NPO/Mechanical Ventilation       Nutrition Interventions/Recommendations   Nutrition Interventions and Recommendations:    Nutrition Prescription:  Individualized Nutrition Prescription Provided for : 1700 calories, 68 gm protien to be provided via when diet resumes    Nutrition Interventions:   Food and/or Nutrient Delivery Interventions  Interventions: Meals and snacks  Meals and Snacks: Texture-modified diet  Goal: per SLP recs    Education Documentation  No documentation found.         Nutrition Monitoring and Evaluation   Monitoring/Evaluation:   Food/Nutrient Related  History Monitoring  Monitoring and Evaluation Plan: Energy intake  Energy Intake: Estimated energy intake  Criteria: monitoring for diet order       Time Spent/Follow-up:   Follow Up  Time Spent (min): 25 minutes  Last Date of Nutrition Visit: 10/17/24  Nutrition Follow-Up Needed?: 3-5 days  Follow up Comment: 10/22/24

## 2024-10-17 NOTE — PROGRESS NOTES
[facility] Critical Care Medicine       Date:  10/17/2024  Patient:  Narda Malloy  YOB: 1955  MRN:  [id number]   Admit Date:  10/12/2024    Chief Complaint   Patient presents with    Altered Mental Status         History of Present Illness:  Narda Malloy is a 69 y.o. year old female patient with past medical history of L1-L3 lumbar laminectomy, T4-S1 revision, and fusion August 26th, T2DM, HTN, essential tremor, HLD, glaucoma, sarcoidosis of the lung who presented to  ED 10/12 after being found essentially unresponsive at her nursing facility [facility]. Per report from her , she has had a significant decline in her health since July 15th when she had a fall and became significantly weak. She has also had multiple infection complications since her back surgery in August requiring multiple I&Ds and long term antibiotic therapy. She went to the OR most recently on 9/28 for lumbar site infection wash out. Per chart review, she was discharged on IV vancomycin 1g and IV ceftriaxone 2d q24hrs through 11/12.        ED Course: Initial vital signs: /104 (109), HR 68, RR 20, SpO2 95% on 6L NC, temp 34.5C. Give 0.4mg of narcan with no improvement in mentation. Lab work-up remarkable for mild hyperkalemia (5.5), AMY 42/1.46, elevated alk phos, normocytic anemia 10.4/33, turbid appearing urine with mild hematuria and proteinuria and + leuk esterase, >50 WBCs. Urine drug screen positive for barbiturates. Triggered sepsis timer so she was given 3L NS. She was intubated for airway protection with 20mg etomidate and 100mg succinylcholine. BP dropped post-intubated and fluid resuscitation and she was subsequently started on levophed. CT head and lumbar spine pending.         Interval ICU Events:  10/12: Pt arrived to ICU intubated and lightly sedated on low-dose versed. Eyes open, minimally responsive.      10/13: Received K cocktail last night for K 6.0, corrected appropriately. Levophed  requirements mildly up, UOP decreasing. Ordered albumin x2 this am with improvements in UOP and SBP. Will likely trial lasix this afternoon d/t hypervolemia.    10/14: Remains intubated with decreased mentation, only responsive to noxious stimuli. Trialed lasix TID for volume overload. Remains on levophed 0.01.    10/15: Mentation much improved. SAT/SBT successful so extubated. Given lasix x3, bumex x1, metolazone x1 with net negative fluid balance of 500mL -> started on bumex gtt.     10/16: O2 requirements significantly increased, NRB -> HFNC 40L 100% likely 2/2 mucus plugging.    10/17: No acute events overnight. Bumex gtt increased to 1mg/hr. CXR this am showing complete opacification of left hemithorax related to atelectasis vs pleural effusion. Remains on HFNC 40L/80%. Pigtail catheter placed with 1.2L drained immediately. Given albumin for hypotension.     Objective     Medical History:  Past Medical History:   Diagnosis Date    Degenerative myopia, bilateral     Diabetic neuropathy (Multi)     Difficult intubation 08/26/2024    Mac 3, grade 3, 1 attempt.  Glidescope/videolaryngoscopy recommended for future attempts.    DM type 2 (diabetes mellitus, type 2) (Multi)     Dry eye syndrome of bilateral lacrimal glands     Essential hypertension     Essential tremor     Glaucoma     Hyperlipidemia     Long term (current) use of insulin (Multi)     Low back pain     PONV (postoperative nausea and vomiting)     Primary open angle glaucoma of both eyes, severe stage     Repeated falls     Sarcoidosis of lung (Multi)     Spinal stenosis, lumbar region without neurogenic claudication     Weakness      Past Surgical History:   Procedure Laterality Date    BLEPHAROPLASTY  07/2022    BREAST SURGERY  05/20/2022    Breast lift    CARPAL TUNNEL RELEASE      CATARACT EXTRACTION W/  INTRAOCULAR LENS IMPLANT Bilateral     OD 08/04/2011 +8.5D,OS 08/04/2011 +8.50D    FOOT SURGERY      INSERTION / REMOVAL CRANIAL DBS GENERATOR       Placed 2017.  Removed 2018. part of wire left in head when everything removed    LUMBAR FUSION      L3-S1    PANRETINAL PHOTOCOAGULATION  2014    THORACIC FUSION  08/26/2024    T4-S1 fusion    VITRECTOMY Right 2013     Medications Prior to Admission   Medication Sig Dispense Refill Last Dose/Taking    acetaminophen (Tylenol) 500 mg tablet Take 2 tablets (1,000 mg) by mouth 3 times a day.   Unknown    ascorbic acid (Vitamin C) 500 mg tablet as directed Orally   Unknown    brimonidine (AlphaGAN) 0.2 % ophthalmic solution Administer 1 drop into both eyes 2 times a day.   Unknown    calcium carbonate-vitamin D3 500 mg-5 mcg (200 unit) tablet Take 1 tablet by mouth once daily.   Unknown    cefTRIAXone (Rocephin) 2 gram/50 mL IV Infuse 50 mL (2 g) at 100 mL/hr over 30 minutes into a venous catheter once every 24 hours. Once weekly labs CBC/diff, CMP, Vanc trough ESR, CRP fax to Dr. Juarez [phone]. Stop date 11/12/24. 1950 mL 0     dextrose 50 % injection Infuse 25 mL (12.5 g) into a venous catheter every 15 minutes if needed (For blood glucose 41 to 70 mg/dL).       dextrose 50 % injection Infuse 50 mL (25 g) into a venous catheter every 15 minutes if needed (For blood glucose less than or equal to 40 mg/dL).       docusate sodium (Colace) 100 mg capsule Take 1 capsule (100 mg) by mouth 2 times a day.   Unknown    ezetimibe (Zetia) 10 mg tablet Take 1 tablet (10 mg) by mouth once daily. 90 tablet 3 Unknown    FreeStyle Lite Strips strip USE TO TEST 3 TIMES A DAY AS DIRECTED 300 each 2     glucagon (Glucagen) 1 mg injection Inject 1 mg into the muscle every 15 minutes if needed for low blood sugar - see comments (Hypoglycemia).       glucagon (Glucagen) 1 mg injection Inject 1 mg into the muscle every 15 minutes if needed for low blood sugar - see comments (Hypoglycemia).       heparin sodium,porcine (heparin, porcine,) 5,000 unit/mL injection Inject 1 mL (5,000 Units) under the skin every 8 hours.       insulin  lispro (HumaLOG) 100 unit/mL injection Inject 0-15 Units under the skin 3 times daily (morning, midday, late afternoon). Take as directed per insulin instructions.Do not hold when patient is not eating, continue order as scheduled for hyperglycemia management.  Insulin Lispro Corrective Scale #3     Hypoglycemia protocol Call LIP unit(s) if Blood Glucose is between 0 - 70 mg/dL     0 unit(s) if Blood glucose is between    3 unit(s) if Blood glucose is between 151-200   6 unit(s) if Blood glucose is between 201-250   9 unit(s) if Blood glucose is between 251-300   12 unit(s) if Blood glucose is between 301-350   15 unit(s) if Blood glucose is between 351-400    Notify provider unit(s) if Blood Glucose is greater than 400 mg/dL       Lactobacillus acidophilus 100 mg (1 billion cell) capsule Take 1 capsule by mouth 2 times a day.   Unknown    latanoprost (Xalatan) 0.005 % ophthalmic solution Administer 1 drop into both eyes once daily at bedtime. 2.5 mL 5 Unknown    melatonin 5 mg tablet Take 1 tablet (5 mg) by mouth as needed at bedtime for sleep.   Unknown    methocarbamol (Robaxin) 500 mg tablet Take 1 tablet (500 mg) by mouth 3 times a day.   Unknown    multivitamin tablet Take 1 tablet by mouth once daily.   Unknown    ondansetron (Zofran) 4 mg/2 mL injection Infuse 2 mL (4 mg) into a venous catheter every 6 hours if needed for nausea or vomiting.       oxyCODONE (Roxicodone) 5 mg immediate release tablet Take 1 tablet (5 mg) by mouth every 6 hours if needed for severe pain (7 - 10) or moderate pain (4 - 6).       oxygen (O2) gas therapy Inhale 1 each continuously.       pantoprazole (ProtoNix) 40 mg EC tablet Take 1 tablet (40 mg) by mouth once daily in the morning. Take before meals. Do not crush, chew, or split.       pantoprazole (ProtoNix) 40 mg injection Infuse 40 mg into a venous catheter once daily in the morning. Take before meals. If unable to take PO.       pen needle, diabetic (PEN NEEDLE MISC)  "BD Altagracia- 4 mm X 32 G needle - as directed 4x a day sc 4 times per day       polyethylene glycol (Glycolax, Miralax) 17 gram packet Take 17 g by mouth once daily.   Unknown    primidone 125 mg tablet Take 125 mg by mouth 3 times a day.   Unknown    propranolol LA (Inderal LA) 60 mg 24 hr capsule Take 1 capsule (60 mg) by mouth early in the morning.. Hold for SBP < 110 mmhg, HR < 60 bpm.   Unknown    sennosides (Senokot) 8.6 mg tablet Take 1 tablet (8.6 mg) by mouth every 12 hours if needed for constipation.   Unknown    Sure Comfort Pen Needle 32 gauge x 5/32\" needle AS DIRECTED DAILY FOR 90 DAYS 100 each 11 Unknown    traZODone (Desyrel) 25 MG split tablet Take 1 half tablet (25 mg) by mouth once daily at bedtime.   Unknown    vancomycin (Vancocin) 1 gram/250 mL solution Infuse 250 mL (1 g) at 250 mL/hr over 60 minutes into a venous catheter every 12 hours. Once weekly labs CBC/diff, CMP, Vanc trough ESR, CRP fax to Dr. Juarez [phone]. Stop date 11/12/24. 96345 mL 0      Erythromycin, Morphine, and Rosuvastatin  Social History     Tobacco Use    Smoking status: Former     Types: Cigarettes     Passive exposure: Past    Smokeless tobacco: Never   Vaping Use    Vaping status: Never Used   Substance Use Topics    Alcohol use: Not Currently    Drug use: Not Currently     Family History   Problem Relation Name Age of Onset    Multiple myeloma Mother      Cancer Mother      Other (CABG) Father      Pulmonary embolism Father      Heart disease Father      Breast cancer Sister          Stage II    Hypertension Sister      Diabetes Sister      No Known Problems Sister          x5    No Known Problems Brother          x4    No Known Problems Daughter         Hospital Medications:    bumetanide, 1 mg/hr, Last Rate: 1 mg/hr (10/17/24 0647)          Current Facility-Administered Medications:     acetylcysteine (Mucomyst) 200 mg/mL (20 %) nebulizer solution 600 mg, 3 mL, nebulization, 4x daily, Nimco Weathers PA-C, 600 mg " at 10/16/24 2030    brimonidine (AlphaGAN) 0.2 % ophthalmic solution 1 drop, 1 drop, Both Eyes, BID, Kalyan Leblanc MD, 1 drop at 10/16/24 2038    bumetanide (Bumex) 25 mg in 100 mL (0.25 mg/mL) infusion, 1 mg/hr, intravenous, Continuous, Jalyn Lopez PA-C, Last Rate: 4 mL/hr at 10/17/24 0647, 1 mg/hr at 10/17/24 0647    calcium carbonate-vitamin D3 500 mg-5 mcg (200 unit) per tablet 1 tablet, 1 tablet, oral, Daily, Kalyan Leblanc MD, 1 tablet at 10/15/24 0808    cefepime (Maxipime) 2 g in dextrose 5% 100 mL IV, 2 g, intravenous, q12h, Andrew Malagon MD, Stopped at 10/16/24 2108    [START ON 10/23/2024] cefTRIAXone (Rocephin) 2 g in dextrose (iso) IV 50 mL, 2 g, intravenous, q24h, Andrew Malagon MD    dextrose 50 % injection 12.5 g, 12.5 g, intravenous, q15 min PRN, Kalyan Leblanc MD    dextrose 50 % injection 25 g, 25 g, intravenous, q15 min PRN, Kalyan Leblanc MD    docusate sodium (Colace) oral liquid 100 mg, 100 mg, oral, BID, Kalyan Leblanc MD, 100 mg at 10/16/24 0923    ezetimibe (Zetia) tablet 10 mg, 10 mg, oral, Daily, Kalyan Leblanc MD, 10 mg at 10/16/24 0923    glucagon (Glucagen) injection 1 mg, 1 mg, intramuscular, q15 min PRN, Kalyan Leblanc MD    glucagon (Glucagen) injection 1 mg, 1 mg, intramuscular, q15 min PRN, Kalyan Leblanc MD    heparin (porcine) injection 5,000 Units, 5,000 Units, subcutaneous, q8h, Kalyan Leblanc MD, 5,000 Units at 10/17/24 0605    insulin lispro (HumaLOG) injection 0-15 Units, 0-15 Units, subcutaneous, Before meals & nightly, Kalyan Leblanc MD, 6 Units at 10/16/24 1202    ipratropium-albuteroL (Duo-Neb) 0.5-2.5 mg/3 mL nebulizer solution 3 mL, 3 mL, nebulization, q4h, Kalyan Leblanc MD, 3 mL at 10/17/24 0226    latanoprost (Xalatan) 0.005 % ophthalmic solution 1 drop, 1 drop, Both Eyes, Nightly, Kalyan Leblanc MD, 1 drop at 10/16/24 2038    melatonin tablet 6 mg, 6 mg, oral, Nightly, Kalyan Leblanc MD, 6 mg at 10/15/24 2021    oxygen (O2) therapy, , inhalation,  Continuous - Inhalation, Kalyan Leblanc MD, 40 L/min at 10/16/24 2030    oxygen (O2) therapy, , inhalation, Continuous - Inhalation, Kalyan Leblanc MD, 40 L/min at 10/16/24 2030    potassium chloride 20 mEq in sterile water for injection 100 mL, 20 mEq, intravenous, Once, Jalyn Lopez PA-C, Last Rate: 50 mL/hr at 10/17/24 0605, 20 mEq at 10/17/24 0605    [Held by provider] primidone (Mysoline) tablet 125 mg, 125 mg, oral, TID, Kalyan Leblanc MD, 125 mg at 10/14/24 0853    [Held by provider] propranolol LA (Inderal LA) 24 hr capsule 60 mg, 60 mg, oral, Daily, Kaylan Leblanc MD    vancomycin (Vancocin) pharmacy to dose - pharmacy monitoring, , miscellaneous, Daily PRN, Kalyan Leblanc MD    Review of Systems:  14 point review of systems was completed and negative except for those specially mention in my HPI    Physical Exam:    Heart Rate:  []   Temp:  [36.5 °C (97.7 °F)-36.9 °C (98.4 °F)]   Resp:  [16-30]   BP: (102-135)/(47-72)   Weight:  [102 kg (225 lb 5 oz)]   SpO2:  [81 %-97 %]     Physical Exam  Constitutional:       General: She is not in acute distress.     Appearance: She is ill-appearing. She is not diaphoretic.   HENT:      Head: Normocephalic and atraumatic.      Mouth/Throat:      Mouth: Mucous membranes are dry.   Eyes:      Pupils: Pupils are equal, round, and reactive to light.   Cardiovascular:      Rate and Rhythm: Normal rate and regular rhythm.      Pulses: Normal pulses.      Heart sounds: Normal heart sounds.   Pulmonary:      Breath sounds: Decreased air movement present. Examination of the left-upper field reveals decreased breath sounds. Examination of the left-middle field reveals decreased breath sounds. Examination of the left-lower field reveals decreased breath sounds. Decreased breath sounds and rhonchi present.   Abdominal:      General: There is no distension.      Palpations: Abdomen is soft.      Tenderness: There is no abdominal tenderness.   Skin:     General: Skin  is warm and dry.      Capillary Refill: Capillary refill takes less than 2 seconds.      Coloration: Skin is pale.   Neurological:      General: No focal deficit present.      Mental Status: She is alert. Mental status is at baseline.      Motor: Weakness present.         Objective:    I have reviewed all medications, laboratory results, and imaging pertinent for today's encounter.    FiO2 (%):  [80 %-100 %] 80 %      Intake/Output Summary (Last 24 hours) at 10/17/2024 0730  Last data filed at 10/17/2024 0647  Gross per 24 hour   Intake 152.58 ml   Output 1035 ml   Net -882.42 ml            Assessment/Plan:    I am currently managing this critically ill patient for the following problems:    Neuro/Psych/Pain Ctrl/Sedation:   Acute encephalopathy - etiology unclear, improved   Unresponsiveness  Essential tremor   Hypothermia - resolved  - Pain Control: acetaminophen PRN  - Hold home primidone  -- Urine tox positive for barbiturates consistent with primidone intake   - CT head: negative for acute findings   - CAM ICU qshift, sleep-wake hygiene, delirium precautions      Respiratory/ENT:  Intubated for airway protection - resolved  Acute hypoxic respiratory failure   Healthcare-associated pneumonia   Atelectasis 2/2 mucus plugging   - Supplemental O2: HFNC 40L/80%  - Wean O2 as tolerated to maintain SpO2 >92%  - Duonebs and mucomyst QID   - Chest vest per RT TID  - CXR 10/17: complete opacification of left hemithorax   -- S/p pigtail placement   -- Cytology and specimens sent   - Continuous pulse ox monitoring   - Pulm hygiene     Cardiovascular:   Septic shock - resolved  Bilateral upper extremity swelling - new over last week according to , worsening this am  Hx HTN  Hx HLD  Acute on chronic diastolic heart failure   - Off levophed since 10/14  - TTE 10/1: diastolic dysfunction, LVEF 50-55%, mildly elevated RVSP (40)  - Hold home propranolol  - Bilateral duplex US upper extremities: left cephalic thrombus  adjacent to PICC line   - Continuous cardiac monitoring per ICU protocol  - EKGs PRN for ACS symptoms, arrhythmias      GI:  Hx GERD  - Diet: regular with thin liquids, bite sized  - BR: colace  - GI Prophylaxis: PPI     Renal/Volume Status/Electrolytes:  Acute kidney injury - possibly ATN vs medication-induced (vancomycin??)  Anasarca   Hyperchloremic metabolic acidosis   Hypoalbuminemia   - Baseline Cr 0.8-1.0  - Vanc to be dosed per pharmacy for AMY  - Discontinue bumex gtt   - Maintain anders catheter  - Maintain urine output 0.5-1.0cc/kg/hr  - Hourly I/O's  - Replete electrolytes to maintain K >4.0 and Mg >2.0  - Daily BMP, Mg, Phos    Endocrine:  T2DM  - SSI q4hrs while NPO   - TSH: midly elevated, T4 WNL  - Hypoglycemia protocol PRN      Infectious Disease:  Thoracolumbar surgical site infection s/p I&D x 2 with MRSA bacteremia on an extended course of IV antibiotics (vanc and rocephin -> 11/12)  Healthcare-associated pneumonia   - Antibiotics: continue vancomycin and cefepime   -- Rocephin to resume 10/23 in place of cefepime   - ID consulted, appreciate assistance   - Blood cultures: negative   - Urine culture: negative   - Spinal wound culture: growing proteus and MRSA  - Sputum growing: pseudomonas   - CT lumbar spine: unable to r/o abscess   -- Pt has implanted spinal hardware so unable to obtain MRI  - Will consider removing picc line pending infectious w/u  - Monitor SIRS criteria     Heme/Onc:  Normocytic anemia   - Monitor for s/sx of anemia such as bleeding and bruising   - Transfuse if Hgb <7.0   - Daily CBC     MSK:  No active concerns   - PT/OT when appropriate     Derm:  - ICU skin protocol  - Q2hr turns  - Padded pressure points      Ethics/Code Status:  Full code - discussed this  at bedside      :  DVT Prophylaxis: SQH  GI Prophylaxis: PPI  Bowel Regimen: colace  Diet: regular  CVC: R picc  Sidney: none  Anders: yes, replaced 10/12  Restraints: yes  Disposition:  ICU    Critical Care Time:  54 minutes spent in preparing to see patient (I.e. labs, imaging, etc.), documentation, discussion plan of care with patient/family/caregiver, and/or coordination of care with multidisciplinary team including the attending. Time does not include completion of procedure time.     Kaleb Lam PA-C  Pulmonary & Critical Care Medicine   [facility]

## 2024-10-17 NOTE — CARE PLAN
The patient's goals for the shift include      The clinical goals for the shift include Wean oxygen as able    Over the shift, the patient did not make progress toward the following goals. Barriers to progression include fluid volume balance, respiratory status, labs and vitals.

## 2024-10-17 NOTE — PROGRESS NOTES
Physical Therapy                 Therapy Communication Note    Patient Name: Narda Malloy  MRN: [id number]  Department: [address]  Room: 09/09-A  Today's Date: 10/17/2024     Discipline: Physical Therapy    Missed Visit Reason: Missed Visit Reason: Cancel (pt with worsening pleural effusion, likely to receive a thoracentesis. pt also without activity order. RN requesting no therapy evaluation due to medical status.)    Missed Time: Cancel    Comment: PT eval deferred this date.

## 2024-10-17 NOTE — PROGRESS NOTES
Speech-Language Pathology    Speech-Language Pathology Clinical Swallow Treatment    Patient Name: Narda Malloy  MRN: [id number]  : 1955  Today's Date: 10/17/24  Start Time: 928  Stop Time: 950  Time Calculation (min): 22 min    ASSESSMENT  SLP TX Intervention Outcome: Making Progress Towards Goals  Treatment Tolerance: Patient tolerated treatment well    Impressions: Reasessed swallow function @ bedside, per order. Pt currently NPO and on HFNC, demonstrates mild oral stage, no suspected s/s pharyngeal stage. Will downgrade to soft bite sized. Pt would benefit from ongoing skilled ST to minimize aspiration risk and ensure ongoing safety with the least restrictive diet.    PLAN  Is MBSS recommended? No; no pharyngeal dysphagia suspected.  Recommended solid consistency: Soft/bite-sized (IDDSI level 6)  Recommended liquid consistency: Thin (IDDSI 0)  Recommended medication administration: Whole, in thin liquid, in puree  Safe swallow strategies:  - Upright positioning for all PO intake  - Slow rate of intake  - Small bites  - Alternate bites and sips  - Total assist for feeding    Discharge recommendation: Unable to determine at this time; please see follow-up notes for DC recommendation.  Inpatient/Swing Bed or Outpatient: Inpatient  SLP TX Plan: Continue Plan of Care  SLP Plan: Skilled SLP  SLP Frequency: 2x per week     Next Treatment Priority: diet rené, reg trials, strat, educ        SUBJECTIVE  Prior to Session Communication: Bedside nurse  RN cleared pt to participate in session and reported pt experienced a drop in 02 sats requiring 100% NRB, weaned to HFNC, CXR showed a mucous plug in left lung, she is now NPO until reasessment of swallow  Respiratory status: Supplemental oxygen via HFNC  Positioning: Upright in bed  Pt was alert, pleasant, and cooperative for session.    Pain Assessment  Pain Assessment: 0-10  0-10 (Numeric) Pain Score: 0 - No pain       Orientation: Oriented to self, Oriented to  location, Oriented to month, Oriented to year, Partially oriented to situation, and Not oriented to date  Ability to follow functional commands: WFL    OBJECTIVE  Therapeutic Swallow  Therapeutic Swallow Intervention : PO Trials, compensatory strategies  Goals:  In 2 weeks...  Pt will consume prescribed diet (current diet is soft bite sized) without overt s/sx aspiration/penetration in 95% of observed trials.              GOAL START:   10/15/2024                                          GOAL END:10/29/24              Status: decline in status              Progress this date: Pt currently NPO reasessed with the following trials:   4 oz thin juice via straw, 2 oz applesauce and 1 bryan doon cookie, mastication slow effortful, pt requesting tsp puree to clear, stating she couldn't swallow the whole piece, eventually cleared with tsp puree and sip of liquid, SLP gave pt smaller piece coated in puree x 2 trials, pt with improved mastication and clearance,  s/s aspiration 100% observed trials  Pt will consume trials of upgraded textures without overt s/sx aspiration in order for consideration of diet texture upgrade.              GOAL START:   10/15/2024                                          GOAL END:10/29/24              Status: decline in status              Progress this date: Pt unable to adequately masticate cookie requiring mositure and thin chaser    Pt will demonstrate follow-through of trained compensatory strategies during a meal/snack with 90% acc independently.              GOAL START:   10/15/2024                                          GOAL END:10/29/24              Status: Progressing              Progress this date: Pt seated upright in bed when SLP arrived, verbalizes importance of upright posture when eating,fed by SLP, able to demonstrate single sips, with 100% acc. Still unable to feed self d/t UE weakness  Treatment/Education:  Results and recommendations were relayed to: Patient and Bedside  nurse  Education provided: Yes   Learner: Patient   Barriers to learning: Cognitive limitations barrier and Hearing impairment barrier   Method of teaching: Verbal   Topic: role of ST, results of assessment, recommended diet modifications, and recommended safe swallow strategies   Outcome of teaching: Pt demonstrated partial understanding and Needs reinforcement

## 2024-10-17 NOTE — PROCEDURES
Chest Tube Placement  [facility] ICU  Location:  ICU room 11    Indication:   Pleural effusion, left    Proceduralist:   Fidel Baker MD  First Assistant: Kaleb Lam    Local Anesthesia:   The proposed entry site was infiltrated with 4 mL of 1% lidocaine.     Chest Tube Placement:       After obtaining informed consent, the patient was placed in a supine position.  Using ultrasound with images captured and saved in the EMR, the left hemithorax was examined revealing a moderate sized pleural fluid collection.  Ultrasound imaging showed: simple fluid collection.    The best entry site was marked.  The area was prepped and draped in the usual sterile fashion.  Fluid was aspirated using a finder needle.  A 14 [place]er chest tube was inserted in the left lateral chest.  The tube was connected to a pleural drainage system.  The catheter was secured with 2-0 silk anchoring sutures.  1000 mL of clear fluid was initially drained.  The fluid was sent for analysis, including: cell count and differential, LDH, total protein, glucose, triglycerides, bacterial culture, fungal smear and culture, AFB smear and culture, cytology.      Dressing: The site was dressed with Tegaderm.    Impression:  left chest tube placed, as described above.    Recommendations:  Stay in ICU  Chest tube to water seal.

## 2024-10-17 NOTE — PROGRESS NOTES
Patient not medically clear. Patient remains on high flow oxygen. Patient to have thoracentesis. At this time there is not a safe discharge plan in place. Patient was at [facility] prior to admission. Will follow.      10/17/24 1421   Discharge Planning   Home or Post Acute Services Other (Comment)  (TBD)   Expected Discharge Disposition [place]  (TBD)   Does the patient need discharge transport arranged? Yes   RoundTrip coordination needed? Yes

## 2024-10-17 NOTE — CARE PLAN
Problem: Safety - Medical Restraint  Goal: Remains free of injury from restraints (Restraint for Interference with Medical Device)  Outcome: Met  Goal: Free from restraint(s) (Restraint for Interference with Medical Device)  Outcome: Met     Problem: Pain - Adult  Goal: Verbalizes/displays adequate comfort level or baseline comfort level  Outcome: Progressing     Problem: Safety - Adult  Goal: Free from fall injury  Outcome: Progressing     Problem: Discharge Planning  Goal: Discharge to home or other facility with appropriate resources  Outcome: Progressing     Problem: Chronic Conditions and Co-morbidities  Goal: Patient's chronic conditions and co-morbidity symptoms are monitored and maintained or improved  Outcome: Progressing     Problem: Mechanical Ventilation  Goal: Patient Will Maintain Patent Airway  Outcome: Met  Goal: Oral health is maintained or improved  Outcome: Met  Goal: ET tube will be managed safely  Outcome: Met  Goal: Ability to express needs and understand communication  Outcome: Met  Goal: Mobility/activity is maintained at optimum level for patient  Outcome: Met     Problem: Skin  Goal: Decreased wound size/increased tissue granulation at next dressing change  Outcome: Progressing  Flowsheets (Taken 10/16/2024 2008)  Decreased wound size/increased tissue granulation at next dressing change: Protective dressings over bony prominences  Goal: Participates in plan/prevention/treatment measures  Flowsheets (Taken 10/16/2024 2008)  Participates in plan/prevention/treatment measures: Elevate heels  Goal: Prevent/manage excess moisture  Outcome: Progressing  Flowsheets (Taken 10/16/2024 2008)  Prevent/manage excess moisture:   Moisturize dry skin   Monitor for/manage infection if present  Goal: Prevent/minimize sheer/friction injuries  Outcome: Progressing  Flowsheets (Taken 10/16/2024 2008)  Prevent/minimize sheer/friction injuries:   HOB 30 degrees or less   Turn/reposition every 2 hours/use  positioning/transfer devices   Use pull sheet  Goal: Promote/optimize nutrition  Outcome: Progressing  Flowsheets (Taken 10/16/2024 2008)  Promote/optimize nutrition: Monitor/record intake including meals  Goal: Promote skin healing  Outcome: Progressing  Flowsheets (Taken 10/16/2024 2008)  Promote skin healing: Turn/reposition every 2 hours/use positioning/transfer devices     Problem: Nutrition  Goal: Less than 5 days NPO/clear liquids  Outcome: Progressing  Goal: Oral intake greater than 50%  Outcome: Progressing  Goal: Oral intake greater 75%  Outcome: Progressing  Goal: Consume prescribed supplement  Outcome: Progressing  Goal: Adequate PO fluid intake  Outcome: Progressing  Goal: Nutrition support goals are met within 48 hrs  Outcome: Progressing  Goal: Nutrition support is meeting 75% of nutrient needs  Outcome: Progressing  Goal: Tube feed tolerance  Outcome: Progressing  Goal: BG  mg/dL  Outcome: Progressing  Goal: Lab values WNL  Outcome: Progressing  Goal: Electrolytes WNL  Outcome: Progressing  Goal: Promote healing  Outcome: Progressing  Goal: Maintain stable weight  Outcome: Progressing  Goal: Reduce weight from edema/fluid  Outcome: Progressing   The patient's goals for the shift include      The clinical goals for the shift include wean oxygen, control pain, turning, vitally stable

## 2024-10-18 ENCOUNTER — APPOINTMENT (OUTPATIENT)
Dept: RADIOLOGY | Facility: HOSPITAL | Age: 69
End: 2024-10-18
Payer: MEDICARE

## 2024-10-18 LAB
ALBUMIN SERPL BCP-MCNC: 2.1 G/DL (ref 3.4–5)
AMORPH CRY #/AREA UR COMP ASSIST: ABNORMAL /HPF
ANION GAP SERPL CALCULATED.3IONS-SCNC: 12 MMOL/L (ref 10–20)
APPEARANCE UR: ABNORMAL
BASOPHILS # BLD AUTO: 0.02 X10*3/UL (ref 0–0.1)
BASOPHILS NFR BLD AUTO: 0.3 %
BILIRUB UR STRIP.AUTO-MCNC: NEGATIVE MG/DL
BUN SERPL-MCNC: 46 MG/DL (ref 6–23)
CALCIUM SERPL-MCNC: 7.7 MG/DL (ref 8.6–10.3)
CHLORIDE SERPL-SCNC: 113 MMOL/L (ref 98–107)
CHLORIDE UR-SCNC: 43 MMOL/L
CHLORIDE/CREATININE (MMOL/G) IN URINE: 56 MMOL/G CREAT (ref 38–318)
CO2 SERPL-SCNC: 21 MMOL/L (ref 21–32)
COLOR UR: YELLOW
CREAT SERPL-MCNC: 1.62 MG/DL (ref 0.5–1.05)
CREAT UR-MCNC: 76.8 MG/DL (ref 20–320)
EGFRCR SERPLBLD CKD-EPI 2021: 34 ML/MIN/1.73M*2
EOSINOPHIL # BLD AUTO: 0.05 X10*3/UL (ref 0–0.7)
EOSINOPHIL NFR BLD AUTO: 0.7 %
ERYTHROCYTE [DISTWIDTH] IN BLOOD BY AUTOMATED COUNT: 19 % (ref 11.5–14.5)
GLUCOSE BLD MANUAL STRIP-MCNC: 148 MG/DL (ref 74–99)
GLUCOSE BLD MANUAL STRIP-MCNC: 154 MG/DL (ref 74–99)
GLUCOSE BLD MANUAL STRIP-MCNC: 202 MG/DL (ref 74–99)
GLUCOSE BLD MANUAL STRIP-MCNC: 219 MG/DL (ref 74–99)
GLUCOSE BLD MANUAL STRIP-MCNC: 272 MG/DL (ref 74–99)
GLUCOSE SERPL-MCNC: 122 MG/DL (ref 74–99)
GLUCOSE UR STRIP.AUTO-MCNC: NORMAL MG/DL
HCT VFR BLD AUTO: 26.8 % (ref 36–46)
HGB BLD-MCNC: 8.2 G/DL (ref 12–16)
HOLD SPECIMEN: NORMAL
IMM GRANULOCYTES # BLD AUTO: 0.03 X10*3/UL (ref 0–0.7)
IMM GRANULOCYTES NFR BLD AUTO: 0.4 % (ref 0–0.9)
KETONES UR STRIP.AUTO-MCNC: ABNORMAL MG/DL
LDH SERPL L TO P-CCNC: 224 U/L (ref 84–246)
LEUKOCYTE ESTERASE UR QL STRIP.AUTO: ABNORMAL
LYMPHOCYTES # BLD AUTO: 0.69 X10*3/UL (ref 1.2–4.8)
LYMPHOCYTES NFR BLD AUTO: 10.3 %
MAGNESIUM SERPL-MCNC: 2 MG/DL (ref 1.6–2.4)
MCH RBC QN AUTO: 30.6 PG (ref 26–34)
MCHC RBC AUTO-ENTMCNC: 30.6 G/DL (ref 32–36)
MCV RBC AUTO: 100 FL (ref 80–100)
MONOCYTES # BLD AUTO: 0.52 X10*3/UL (ref 0.1–1)
MONOCYTES NFR BLD AUTO: 7.8 %
MUCOUS THREADS #/AREA URNS AUTO: ABNORMAL /LPF
NEUTROPHILS # BLD AUTO: 5.38 X10*3/UL (ref 1.2–7.7)
NEUTROPHILS NFR BLD AUTO: 80.5 %
NITRITE UR QL STRIP.AUTO: NEGATIVE
NRBC BLD-RTO: 0 /100 WBCS (ref 0–0)
PATH REVIEW-CELL CT,FLUID: NORMAL
PH UR STRIP.AUTO: 5 [PH]
PHOSPHATE SERPL-MCNC: 4.2 MG/DL (ref 2.5–4.9)
PLATELET # BLD AUTO: 262 X10*3/UL (ref 150–450)
POTASSIUM SERPL-SCNC: 3.9 MMOL/L (ref 3.5–5.3)
POTASSIUM UR-SCNC: 40 MMOL/L
POTASSIUM/CREAT UR-RTO: 52 MMOL/G CREAT
PROT SERPL-MCNC: 4.8 G/DL (ref 6.4–8.2)
PROT UR STRIP.AUTO-MCNC: ABNORMAL MG/DL
RBC # BLD AUTO: 2.68 X10*6/UL (ref 4–5.2)
RBC # UR STRIP.AUTO: ABNORMAL /UL
RBC #/AREA URNS AUTO: >20 /HPF
SODIUM SERPL-SCNC: 142 MMOL/L (ref 136–145)
SODIUM UR-SCNC: 32 MMOL/L
SODIUM/CREAT UR-RTO: 42 MMOL/G CREAT
SP GR UR STRIP.AUTO: 1.02
SQUAMOUS #/AREA URNS AUTO: ABNORMAL /HPF
UROBILINOGEN UR STRIP.AUTO-MCNC: NORMAL MG/DL
VANCOMYCIN SERPL-MCNC: 31.7 UG/ML (ref 5–20)
WBC # BLD AUTO: 6.7 X10*3/UL (ref 4.4–11.3)
WBC #/AREA URNS AUTO: >50 /HPF
YEAST BUDDING #/AREA UR COMP ASSIST: PRESENT /HPF

## 2024-10-18 PROCEDURE — 2020000001 HC ICU ROOM DAILY

## 2024-10-18 PROCEDURE — 2500000002 HC RX 250 W HCPCS SELF ADMINISTERED DRUGS (ALT 637 FOR MEDICARE OP, ALT 636 FOR OP/ED)

## 2024-10-18 PROCEDURE — 81001 URINALYSIS AUTO W/SCOPE: CPT

## 2024-10-18 PROCEDURE — P9047 ALBUMIN (HUMAN), 25%, 50ML: HCPCS

## 2024-10-18 PROCEDURE — 71045 X-RAY EXAM CHEST 1 VIEW: CPT

## 2024-10-18 PROCEDURE — 2500000005 HC RX 250 GENERAL PHARMACY W/O HCPCS

## 2024-10-18 PROCEDURE — 80202 ASSAY OF VANCOMYCIN: CPT

## 2024-10-18 PROCEDURE — 2500000004 HC RX 250 GENERAL PHARMACY W/ HCPCS (ALT 636 FOR OP/ED)

## 2024-10-18 PROCEDURE — 9420000001 HC RT PATIENT EDUCATION 5 MIN

## 2024-10-18 PROCEDURE — 85025 COMPLETE CBC W/AUTO DIFF WBC: CPT

## 2024-10-18 PROCEDURE — 37799 UNLISTED PX VASCULAR SURGERY: CPT

## 2024-10-18 PROCEDURE — 82947 ASSAY GLUCOSE BLOOD QUANT: CPT

## 2024-10-18 PROCEDURE — 80069 RENAL FUNCTION PANEL: CPT

## 2024-10-18 PROCEDURE — 2500000001 HC RX 250 WO HCPCS SELF ADMINISTERED DRUGS (ALT 637 FOR MEDICARE OP)

## 2024-10-18 PROCEDURE — 83735 ASSAY OF MAGNESIUM: CPT

## 2024-10-18 PROCEDURE — 94668 MNPJ CHEST WALL SBSQ: CPT

## 2024-10-18 PROCEDURE — P9045 ALBUMIN (HUMAN), 5%, 250 ML: HCPCS | Mod: JZ

## 2024-10-18 PROCEDURE — 94640 AIRWAY INHALATION TREATMENT: CPT

## 2024-10-18 PROCEDURE — 92526 ORAL FUNCTION THERAPY: CPT | Mod: GN | Performed by: SPEECH-LANGUAGE PATHOLOGIST

## 2024-10-18 PROCEDURE — 83615 LACTATE (LD) (LDH) ENZYME: CPT

## 2024-10-18 PROCEDURE — 82436 ASSAY OF URINE CHLORIDE: CPT

## 2024-10-18 PROCEDURE — 84155 ASSAY OF PROTEIN SERUM: CPT

## 2024-10-18 PROCEDURE — 71045 X-RAY EXAM CHEST 1 VIEW: CPT | Performed by: RADIOLOGY

## 2024-10-18 PROCEDURE — 99291 CRITICAL CARE FIRST HOUR: CPT

## 2024-10-18 RX ORDER — OXYMETAZOLINE HCL 0.05 %
2 SPRAY, NON-AEROSOL (ML) NASAL EVERY 12 HOURS PRN
Status: DISPENSED | OUTPATIENT
Start: 2024-10-18 | End: 2024-10-21

## 2024-10-18 RX ORDER — ACETAMINOPHEN 325 MG/1
975 TABLET ORAL EVERY 6 HOURS PRN
Status: DISCONTINUED | OUTPATIENT
Start: 2024-10-18 | End: 2024-10-28

## 2024-10-18 RX ORDER — POTASSIUM CHLORIDE 20 MEQ/1
20 TABLET, EXTENDED RELEASE ORAL ONCE
Status: COMPLETED | OUTPATIENT
Start: 2024-10-18 | End: 2024-10-18

## 2024-10-18 RX ORDER — FENTANYL CITRATE 50 UG/ML
25 INJECTION, SOLUTION INTRAMUSCULAR; INTRAVENOUS ONCE
Status: DISCONTINUED | OUTPATIENT
Start: 2024-10-18 | End: 2024-10-18

## 2024-10-18 RX ORDER — TRAMADOL HYDROCHLORIDE 50 MG/1
50 TABLET ORAL EVERY 8 HOURS PRN
Status: DISCONTINUED | OUTPATIENT
Start: 2024-10-18 | End: 2024-11-03

## 2024-10-18 RX ORDER — ALBUMIN HUMAN 250 G/1000ML
50 SOLUTION INTRAVENOUS ONCE
Status: COMPLETED | OUTPATIENT
Start: 2024-10-18 | End: 2024-10-18

## 2024-10-18 RX ORDER — ALBUMIN HUMAN 50 G/1000ML
12.5 SOLUTION INTRAVENOUS ONCE
Status: COMPLETED | OUTPATIENT
Start: 2024-10-18 | End: 2024-10-18

## 2024-10-18 RX ORDER — ACETAMINOPHEN 325 MG/1
650 TABLET ORAL EVERY 6 HOURS PRN
Status: DISCONTINUED | OUTPATIENT
Start: 2024-10-18 | End: 2024-10-18

## 2024-10-18 RX ORDER — PRIMIDONE 250 MG/1
125 TABLET ORAL NIGHTLY
Status: DISCONTINUED | OUTPATIENT
Start: 2024-10-18 | End: 2024-11-11

## 2024-10-18 RX ORDER — LIDOCAINE HYDROCHLORIDE 20 MG/ML
1 JELLY TOPICAL ONCE
Status: COMPLETED | OUTPATIENT
Start: 2024-10-18 | End: 2024-10-18

## 2024-10-18 RX ADMIN — HEPARIN SODIUM 5000 UNITS: 5000 INJECTION, SOLUTION INTRAVENOUS; SUBCUTANEOUS at 14:06

## 2024-10-18 RX ADMIN — INSULIN LISPRO 6 UNITS: 100 INJECTION, SOLUTION INTRAVENOUS; SUBCUTANEOUS at 22:20

## 2024-10-18 RX ADMIN — Medication 40 L/MIN: at 21:09

## 2024-10-18 RX ADMIN — BRIMONIDINE TARTRATE 1 DROP: 2 SOLUTION OPHTHALMIC at 09:28

## 2024-10-18 RX ADMIN — IPRATROPIUM BROMIDE AND ALBUTEROL SULFATE 3 ML: .5; 3 SOLUTION RESPIRATORY (INHALATION) at 11:49

## 2024-10-18 RX ADMIN — INSULIN LISPRO 9 UNITS: 100 INJECTION, SOLUTION INTRAVENOUS; SUBCUTANEOUS at 18:54

## 2024-10-18 RX ADMIN — Medication 1 TABLET: at 09:30

## 2024-10-18 RX ADMIN — Medication 40 L/MIN: at 08:09

## 2024-10-18 RX ADMIN — Medication 3 ML: at 19:29

## 2024-10-18 RX ADMIN — CEFEPIME 2 G: 2 INJECTION, POWDER, FOR SOLUTION INTRAVENOUS at 22:07

## 2024-10-18 RX ADMIN — Medication 50 PERCENT: at 08:09

## 2024-10-18 RX ADMIN — BRIMONIDINE TARTRATE 1 DROP: 2 SOLUTION OPHTHALMIC at 22:00

## 2024-10-18 RX ADMIN — ACETAMINOPHEN 325MG 650 MG: 325 TABLET ORAL at 06:34

## 2024-10-18 RX ADMIN — IPRATROPIUM BROMIDE AND ALBUTEROL SULFATE 3 ML: .5; 3 SOLUTION RESPIRATORY (INHALATION) at 08:08

## 2024-10-18 RX ADMIN — DOCUSATE SODIUM 100 MG: 50 LIQUID ORAL at 09:30

## 2024-10-18 RX ADMIN — IPRATROPIUM BROMIDE AND ALBUTEROL SULFATE 3 ML: .5; 3 SOLUTION RESPIRATORY (INHALATION) at 16:08

## 2024-10-18 RX ADMIN — EZETIMIBE 10 MG: 10 TABLET ORAL at 10:02

## 2024-10-18 RX ADMIN — CEFEPIME 2 G: 2 INJECTION, POWDER, FOR SOLUTION INTRAVENOUS at 09:26

## 2024-10-18 RX ADMIN — Medication 3 ML: at 16:08

## 2024-10-18 RX ADMIN — ACETAMINOPHEN 325MG 975 MG: 325 TABLET ORAL at 14:05

## 2024-10-18 RX ADMIN — HEPARIN SODIUM 5000 UNITS: 5000 INJECTION, SOLUTION INTRAVENOUS; SUBCUTANEOUS at 06:29

## 2024-10-18 RX ADMIN — HEPARIN SODIUM 5000 UNITS: 5000 INJECTION, SOLUTION INTRAVENOUS; SUBCUTANEOUS at 22:14

## 2024-10-18 RX ADMIN — ALBUMIN HUMAN 12.5 G: 0.05 INJECTION, SOLUTION INTRAVENOUS at 15:13

## 2024-10-18 RX ADMIN — POTASSIUM CHLORIDE 20 MEQ: 1500 TABLET, EXTENDED RELEASE ORAL at 06:34

## 2024-10-18 RX ADMIN — LATANOPROST 1 DROP: 50 SOLUTION OPHTHALMIC at 22:00

## 2024-10-18 RX ADMIN — ALBUMIN HUMAN 50 G: 0.25 SOLUTION INTRAVENOUS at 18:53

## 2024-10-18 RX ADMIN — IPRATROPIUM BROMIDE AND ALBUTEROL SULFATE 3 ML: .5; 3 SOLUTION RESPIRATORY (INHALATION) at 19:29

## 2024-10-18 RX ADMIN — Medication 3 ML: at 08:08

## 2024-10-18 RX ADMIN — Medication 3 ML: at 11:49

## 2024-10-18 RX ADMIN — LIDOCAINE HYDROCHLORIDE 1 APPLICATION: 20 JELLY TOPICAL at 15:49

## 2024-10-18 ASSESSMENT — PAIN SCALES - GENERAL
PAINLEVEL_OUTOF10: 5 - MODERATE PAIN
PAINLEVEL_OUTOF10: 0 - NO PAIN
PAINLEVEL_OUTOF10: 6
PAINLEVEL_OUTOF10: 0 - NO PAIN
PAINLEVEL_OUTOF10: 6
PAINLEVEL_OUTOF10: 0 - NO PAIN

## 2024-10-18 ASSESSMENT — PAIN DESCRIPTION - DESCRIPTORS
DESCRIPTORS: ACHING;DISCOMFORT;NAGGING
DESCRIPTORS: ACHING;DISCOMFORT;NAGGING

## 2024-10-18 ASSESSMENT — PAIN SCALES - WONG BAKER: WONGBAKER_NUMERICALRESPONSE: NO HURT

## 2024-10-18 ASSESSMENT — PAIN - FUNCTIONAL ASSESSMENT
PAIN_FUNCTIONAL_ASSESSMENT: 0-10
PAIN_FUNCTIONAL_ASSESSMENT: 0-10
PAIN_FUNCTIONAL_ASSESSMENT: FLACC (FACE, LEGS, ACTIVITY, CRY, CONSOLABILITY)
PAIN_FUNCTIONAL_ASSESSMENT: 0-10

## 2024-10-18 ASSESSMENT — PAIN DESCRIPTION - ORIENTATION
ORIENTATION: MID
ORIENTATION: LOWER;MID;UPPER

## 2024-10-18 ASSESSMENT — PAIN DESCRIPTION - LOCATION
LOCATION: BACK
LOCATION: BACK

## 2024-10-18 NOTE — CONSULTS
Nutrition Consult/Progress Note    Consult received for tube feed recommendations    Spoke with PA. Patient PO intake is poor. May need to place NGT and initiate tube feed if oral intake does not improve.    If tube feed is initiated, recommend: Glucerna 1.5 goal rate @50 mL/Hr to provide 1800 calories, 99 gm protein, 911 mL free water. Suggest start @10 mL/Hr and increase by 10 mL Q4H until goal. Recommend additional 150 mL water flushes Q4H to meet estimated hydration needs.

## 2024-10-18 NOTE — PROGRESS NOTES
Physical Therapy                 Therapy Communication Note    Patient Name: Narda Malloy  MRN: [id number]  Department: [facility] ICU  Room: 11/11-A  Today's Date: 10/18/2024     Discipline: Physical Therapy    Missed Visit Reason: Other (Comment). Conflict of services: SLP at bedside for tx.    Missed Time: Attempted at 13:55.

## 2024-10-18 NOTE — PROGRESS NOTES
Speech-Language Pathology    Speech-Language Pathology Clinical Swallow Treatment    Patient Name: Narda Malloy  MRN: [id number]  : 1955  Today's Date: 10/18/24  Start Time: 1345  Stop Time: 1400  Time Calculation (min): 15 min    ASSESSMENT  SLP TX Intervention Outcome: Making Progress Towards Goals  Treatment Tolerance: Patient tolerated treatment well    Impressions: Improved from last session. Functional oral stage, no s/s aspiration with regular solids and thin liquids. Pt would benefit from ongoing skilled ST to minimize aspiration risk and ensure ongoing safety with the least restrictive diet.    PLAN  Is MBSS recommended? No; no pharyngeal dysphagia suspected.  Recommended solid consistency: Regular (IDDSI level 7)  Recommended liquid consistency: Thin (IDDSI 0)  Recommended medication administration: Whole, in thin liquid  Safe swallow strategies:  Safe swallow strategies:  - Upright positioning for all PO intake  - Slow rate of intake  - Small bites  - Alternate bites and sips  - Total assist for feeding    Discharge recommendation: Unable to determine at this time; please see follow-up notes for DC recommendation.  Inpatient/Swing Bed or Outpatient: Inpatient  SLP TX Plan: Continue Plan of Care  SLP Plan: Skilled SLP  SLP Frequency: 2x per week     Next Treatment Priority: diet rené, strat, educ          SUBJECTIVE  Prior to Session Communication: Bedside nurse  RN cleared pt to participate in session and reported po intake poor, trying to encourage PO to avoid NGT  Respiratory status: Supplemental oxygen via HFNC  Positioning: Upright in bed  Pt was alert, pleasant, and cooperative for session.    Pain Assessment  Pain Assessment: 0-10  0-10 (Numeric) Pain Score: 5  Pain Type: Surgical pain  Pain Location: Back       Orientation: Oriented to self, Oriented to location, Oriented to month, and Oriented to year  Ability to follow functional commands: WFL    OBJECTIVE  Therapeutic Swallow  Therapeutic  Swallow Intervention : PO Trials, Compensatory Strategies, Caregiver Education  Goals:  In 2 weeks...  Pt will consume prescribed diet (current diet is soft bite sized) without overt s/sx aspiration/penetration in 95% of observed trials.              GOAL START:   10/15/2024                                          GOAL END:10/29/24              Status: progressing              Progress this date: pt consumed the following trials:  6 oz thin juice via straw, 2 bryan doon cookie, mastication adequate, more efficient compared to last session, complete oral clearance, pt occasionally asking for sip of liquid,  no s/s aspiration 100% observed trials  Pt will consume trials of upgraded textures without overt s/sx aspiration in order for consideration of diet texture upgrade.              GOAL START:   10/15/2024                                          GOAL END:10/29/24              Status: Progressing              Progress this date: Pt  adequately masticated cookie without difficulty, adequate oral clearance, rec upgrade to regular solids.    Pt will demonstrate follow-through of trained compensatory strategies during a meal/snack with 90% acc independently.              GOAL START:   10/15/2024                                          GOAL END:10/29/24              Status: Progressing              Progress this date: Pt seated upright in bed when SLP arrived, verbalizes importance of upright posture when eating,fed by SLP, able to demonstrate single sips, with 100% acc. Still unable to feed self d/t UE weakness  Treatment/Education:  Results and recommendations were relayed to: Patient, Family, and Bedside nurse  Education provided: Yes   Learner: Patient and Significant other   Barriers to learning: None   Method of teaching: Verbal   Topic: role of ST and recommended safe swallow strategies   Outcome of teaching: Pt/family demonstrated good understanding and Needs reinforcement

## 2024-10-18 NOTE — CARE PLAN
The patient's goals for the shift include  get sleep tonight    The clinical goals for the shift include Wean O2 as tolerated; maintain hemodynamic stabilty      Problem: Pain - Adult  Goal: Verbalizes/displays adequate comfort level or baseline comfort level  Outcome: Progressing     Problem: Safety - Adult  Goal: Free from fall injury  Outcome: Progressing     Problem: Discharge Planning  Goal: Discharge to home or other facility with appropriate resources  Outcome: Progressing     Problem: Chronic Conditions and Co-morbidities  Goal: Patient's chronic conditions and co-morbidity symptoms are monitored and maintained or improved  Outcome: Progressing     Problem: Diabetes  Goal: Achieve decreasing blood glucose levels by end of shift  Outcome: Progressing  Goal: Increase stability of blood glucose readings by end of shift  Outcome: Progressing  Goal: Decrease in ketones present in urine by end of shift  Outcome: Progressing  Goal: Maintain electrolyte levels within acceptable range throughout shift  Outcome: Progressing  Goal: Maintain glucose levels >70mg/dl to <250mg/dl throughout shift  Outcome: Progressing  Goal: No changes in neurological exam by end of shift  Outcome: Progressing  Goal: Learn about and adhere to nutrition recommendations by end of shift  Outcome: Progressing  Goal: Vital signs within normal range for age by end of shift  Outcome: Progressing  Goal: Increase self care and/or family involovement by end of shift  Outcome: Progressing  Goal: Receive DSME education by end of shift  Outcome: Progressing     Problem: Knowledge Deficit  Goal: Patient/family/caregiver demonstrates understanding of disease process, treatment plan, medications, and discharge instructions  Outcome: Progressing     Problem: Skin  Goal: Decreased wound size/increased tissue granulation at next dressing change  Outcome: Progressing  Flowsheets (Taken 10/17/2024 8264)  Decreased wound size/increased tissue granulation at  next dressing change:   Promote sleep for wound healing   Protective dressings over bony prominences   Utilize specialty bed per algorithm  Goal: Participates in plan/prevention/treatment measures  Outcome: Progressing  Flowsheets (Taken 10/17/2024 2312)  Participates in plan/prevention/treatment measures:   Discuss with provider PT/OT consult   Elevate heels  Goal: Prevent/manage excess moisture  Outcome: Progressing  Flowsheets (Taken 10/17/2024 2312)  Prevent/manage excess moisture:   Cleanse incontinence/protect with barrier cream   Moisturize dry skin   Follow provider orders for dressing changes   Monitor for/manage infection if present  Goal: Prevent/minimize sheer/friction injuries  Outcome: Progressing  Flowsheets (Taken 10/17/2024 2312)  Prevent/minimize sheer/friction injuries:   Complete micro-shifts as needed if patient unable. Adjust patient position to relieve pressure points, not a full turn   Increase activity/out of bed for meals   Use pull sheet   HOB 30 degrees or less   Turn/reposition every 2 hours/use positioning/transfer devices   Utilize specialty bed per algorithm  Goal: Promote/optimize nutrition  Outcome: Progressing  Flowsheets (Taken 10/17/2024 2312)  Promote/optimize nutrition:   Monitor/record intake including meals   Discuss with provider if NPO > 2 days  Goal: Promote skin healing  Outcome: Progressing  Flowsheets (Taken 10/17/2024 2312)  Promote skin healing:   Assess skin/pad under line(s)/device(s)   Protective dressings over bony prominences   Turn/reposition every 2 hours/use positioning/transfer devices   Ensure correct size (line/device) and apply per  instructions   Rotate device position/do not position patient on device     Problem: Nutrition  Goal: Less than 5 days NPO/clear liquids  Outcome: Progressing  Goal: Oral intake greater than 50%  Outcome: Progressing  Goal: Oral intake greater 75%  Outcome: Progressing  Goal: Consume prescribed supplement  Outcome:  Progressing  Goal: Adequate PO fluid intake  Outcome: Progressing  Goal: Nutrition support goals are met within 48 hrs  Outcome: Progressing  Goal: Nutrition support is meeting 75% of nutrient needs  Outcome: Progressing    Problem: Respiratory  Goal: No signs of respiratory distress (eg. Use of accessory muscles. Peds grunting)  Outcome: Progressing   Goal: BG  mg/dL  Outcome: Progressing  Goal: Lab values WNL  Outcome: Progressing  Goal: Electrolytes WNL  Outcome: Progressing  Goal: Promote healing  Outcome: Progressing  Goal: Maintain stable weight  Outcome: Progressing  Goal: Reduce weight from edema/fluid  Outcome: Progressing

## 2024-10-18 NOTE — PROGRESS NOTES
[facility] Critical Care Medicine       Date:  10/18/2024  Patient:  Narda Malloy  YOB: 1955  MRN:  [id number]   Admit Date:  10/12/2024    Chief Complaint   Patient presents with    Altered Mental Status         History of Present Illness:  Narda Malloy is a 69 y.o. year old female patient with past medical history of L1-L3 lumbar laminectomy, T4-S1 revision, and fusion August 26th, T2DM, HTN, essential tremor, HLD, glaucoma, sarcoidosis of the lung who presented to  ED 10/12 after [facility]. Per report from her , she has had a significant decline in her health since July 15th when she had a fall and became significantly weak. She has also had multiple infection complications since her back surgery in August requiring multiple I&Ds and long term antibiotic therapy. She went to the OR most recently on 9/28 for lumbar site infection wash out. Per chart review, she was discharged on IV vancomycin 1g and IV ceftriaxone 2d q24hrs through 11/12.        ED Course: Initial vital signs: /104 (109), HR 68, RR 20, SpO2 95% on 6L NC, temp 34.5C. Give 0.4mg of narcan with no improvement in mentation. Lab work-up remarkable for mild hyperkalemia (5.5), AMY 42/1.46, elevated alk phos, normocytic anemia 10.4/33, turbid appearing urine with mild hematuria and proteinuria and + leuk esterase, >50 WBCs. Urine drug screen positive for barbiturates. Triggered sepsis timer so she was given 3L NS. She was intubated for airway protection with 20mg etomidate and 100mg succinylcholine. BP dropped post-intubated and fluid resuscitation and she was subsequently started on levophed. CT head and lumbar spine pending.         Interval ICU Events:  10/12: Pt arrived to ICU intubated and lightly sedated on low-dose versed. Eyes open, minimally responsive.      10/13: Received K cocktail last night for K 6.0, corrected appropriately. Levophed  requirements mildly up, UOP decreasing. Ordered albumin x2 this am with improvements in UOP and SBP. Will likely trial lasix this afternoon d/t hypervolemia.    10/14: Remains intubated with decreased mentation, only responsive to noxious stimuli. Trialed lasix TID for volume overload. Remains on levophed 0.01.    10/15: Mentation much improved. SAT/SBT successful so extubated. Given lasix x3, bumex x1, metolazone x1 with net negative fluid balance of 500mL -> started on bumex gtt.     10/16: O2 requirements significantly increased, NRB -> HFNC 40L 100% likely 2/2 mucus plugging.    10/17: No acute events overnight. Bumex gtt increased to 1mg/hr. CXR this am showing complete opacification of left hemithorax related to atelectasis vs pleural effusion. Remains on HFNC 40L/80%. Pigtail catheter placed with 1.2L drained immediately. Given albumin for hypotension.     10/18: ~2L output from left sided pigtail catheter since placement. Kidney function worsening and UOP declining, about 20cc/hr overnight. Will place NG tube today and start enteral nutrition and appetite and oral intake remains poor.     Objective     Medical History:  Past Medical History:   Diagnosis Date    Degenerative myopia, bilateral     Diabetic neuropathy (Multi)     Difficult intubation 08/26/2024    Mac 3, grade 3, 1 attempt.  Glidescope/videolaryngoscopy recommended for future attempts.    DM type 2 (diabetes mellitus, type 2) (Multi)     Dry eye syndrome of bilateral lacrimal glands     Essential hypertension     Essential tremor     Glaucoma     Hyperlipidemia     Long term (current) use of insulin (Multi)     Low back pain     PONV (postoperative nausea and vomiting)     Primary open angle glaucoma of both eyes, severe stage     Repeated falls     Sarcoidosis of lung (Multi)     Spinal stenosis, lumbar region without neurogenic claudication     Weakness      Past Surgical History:   Procedure Laterality Date    BLEPHAROPLASTY  07/2022     BREAST SURGERY  05/20/2022    Breast lift    CARPAL TUNNEL RELEASE      CATARACT EXTRACTION W/  INTRAOCULAR LENS IMPLANT Bilateral     OD 08/04/2011 +8.5D,OS 08/04/2011 +8.50D    FOOT SURGERY      INSERTION / REMOVAL CRANIAL DBS GENERATOR      Placed 2017.  Removed 2018. part of wire left in head when everything removed    LUMBAR FUSION      L3-S1    PANRETINAL PHOTOCOAGULATION  2014    THORACIC FUSION  08/26/2024    T4-S1 fusion    VITRECTOMY Right 2013     Medications Prior to Admission   Medication Sig Dispense Refill Last Dose/Taking    acetaminophen (Tylenol) 500 mg tablet Take 2 tablets (1,000 mg) by mouth 3 times a day.   Unknown    ascorbic acid (Vitamin C) 500 mg tablet as directed Orally   Unknown    brimonidine (AlphaGAN) 0.2 % ophthalmic solution Administer 1 drop into both eyes 2 times a day.   Unknown    calcium carbonate-vitamin D3 500 mg-5 mcg (200 unit) tablet Take 1 tablet by mouth once daily.   Unknown    cefTRIAXone (Rocephin) 2 gram/50 mL IV Infuse 50 mL (2 g) at 100 mL/hr over 30 minutes into a venous catheter once every 24 hours. Once weekly labs CBC/diff, CMP, Vanc trough ESR, CRP fax to Dr. Juarez [phone]. Stop date 11/12/24. 1950 mL 0     dextrose 50 % injection Infuse 25 mL (12.5 g) into a venous catheter every 15 minutes if needed (For blood glucose 41 to 70 mg/dL).       dextrose 50 % injection Infuse 50 mL (25 g) into a venous catheter every 15 minutes if needed (For blood glucose less than or equal to 40 mg/dL).       docusate sodium (Colace) 100 mg capsule Take 1 capsule (100 mg) by mouth 2 times a day.   Unknown    ezetimibe (Zetia) 10 mg tablet Take 1 tablet (10 mg) by mouth once daily. 90 tablet 3 Unknown    FreeStyle Lite Strips strip USE TO TEST 3 TIMES A DAY AS DIRECTED 300 each 2     glucagon (Glucagen) 1 mg injection Inject 1 mg into the muscle every 15 minutes if needed for low blood sugar - see comments (Hypoglycemia).       glucagon (Glucagen) 1 mg injection Inject 1  mg into the muscle every 15 minutes if needed for low blood sugar - see comments (Hypoglycemia).       heparin sodium,porcine (heparin, porcine,) 5,000 unit/mL injection Inject 1 mL (5,000 Units) under the skin every 8 hours.       insulin lispro (HumaLOG) 100 unit/mL injection Inject 0-15 Units under the skin 3 times daily (morning, midday, late afternoon). Take as directed per insulin instructions.Do not hold when patient is not eating, continue order as scheduled for hyperglycemia management.  Insulin Lispro Corrective Scale #3     Hypoglycemia protocol Call LIP unit(s) if Blood Glucose is between 0 - 70 mg/dL     0 unit(s) if Blood glucose is between    3 unit(s) if Blood glucose is between 151-200   6 unit(s) if Blood glucose is between 201-250   9 unit(s) if Blood glucose is between 251-300   12 unit(s) if Blood glucose is between 301-350   15 unit(s) if Blood glucose is between 351-400    Notify provider unit(s) if Blood Glucose is greater than 400 mg/dL       Lactobacillus acidophilus 100 mg (1 billion cell) capsule Take 1 capsule by mouth 2 times a day.   Unknown    latanoprost (Xalatan) 0.005 % ophthalmic solution Administer 1 drop into both eyes once daily at bedtime. 2.5 mL 5 Unknown    melatonin 5 mg tablet Take 1 tablet (5 mg) by mouth as needed at bedtime for sleep.   Unknown    methocarbamol (Robaxin) 500 mg tablet Take 1 tablet (500 mg) by mouth 3 times a day.   Unknown    multivitamin tablet Take 1 tablet by mouth once daily.   Unknown    ondansetron (Zofran) 4 mg/2 mL injection Infuse 2 mL (4 mg) into a venous catheter every 6 hours if needed for nausea or vomiting.       oxyCODONE (Roxicodone) 5 mg immediate release tablet Take 1 tablet (5 mg) by mouth every 6 hours if needed for severe pain (7 - 10) or moderate pain (4 - 6).       oxygen (O2) gas therapy Inhale 1 each continuously.       pantoprazole (ProtoNix) 40 mg EC tablet Take 1 tablet (40 mg) by mouth once daily in the morning. Take  "before meals. Do not crush, chew, or split.       pantoprazole (ProtoNix) 40 mg injection Infuse 40 mg into a venous catheter once daily in the morning. Take before meals. If unable to take PO.       pen needle, diabetic (PEN NEEDLE MISC) BD Altagracia- 4 mm X 32 G needle - as directed 4x a day sc 4 times per day       polyethylene glycol (Glycolax, Miralax) 17 gram packet Take 17 g by mouth once daily.   Unknown    primidone 125 mg tablet Take 125 mg by mouth 3 times a day.   Unknown    propranolol LA (Inderal LA) 60 mg 24 hr capsule Take 1 capsule (60 mg) by mouth early in the morning.. Hold for SBP < 110 mmhg, HR < 60 bpm.   Unknown    sennosides (Senokot) 8.6 mg tablet Take 1 tablet (8.6 mg) by mouth every 12 hours if needed for constipation.   Unknown    Sure Comfort Pen Needle 32 gauge x 5/32\" needle AS DIRECTED DAILY FOR 90 DAYS 100 each 11 Unknown    traZODone (Desyrel) 25 MG split tablet Take 1 half tablet (25 mg) by mouth once daily at bedtime.   Unknown    vancomycin (Vancocin) 1 gram/250 mL solution Infuse 250 mL (1 g) at 250 mL/hr over 60 minutes into a venous catheter every 12 hours. Once weekly labs CBC/diff, CMP, Vanc trough ESR, CRP fax to Dr. Juarez [phone]. Stop date 11/12/24. 31223 mL 0      Erythromycin, Morphine, and Rosuvastatin  Social History     Tobacco Use    Smoking status: Former     Types: Cigarettes     Passive exposure: Past    Smokeless tobacco: Never   Vaping Use    Vaping status: Never Used   Substance Use Topics    Alcohol use: Not Currently    Drug use: Not Currently     Family History   Problem Relation Name Age of Onset    Multiple myeloma Mother      Cancer Mother      Other (CABG) Father      Pulmonary embolism Father      Heart disease Father      Breast cancer Sister          Stage II    Hypertension Sister      Diabetes Sister      No Known Problems Sister          x5    No Known Problems Brother          x4    No Known Problems Daughter         Hospital Medications:     "         Current Facility-Administered Medications:     acetaminophen (Tylenol) tablet 650 mg, 650 mg, oral, q6h PRN, Jalyn Lopez PA-C, 650 mg at 10/18/24 0634    brimonidine (AlphaGAN) 0.2 % ophthalmic solution 1 drop, 1 drop, Both Eyes, BID, Kaleb Lam PA-C, 1 drop at 10/17/24 2141    calcium carbonate-vitamin D3 500 mg-5 mcg (200 unit) per tablet 1 tablet, 1 tablet, oral, Daily, Kaleb Lam PA-C, 1 tablet at 10/17/24 1110    cefepime (Maxipime) 2 g in dextrose 5% 100 mL IV, 2 g, intravenous, q12h, Kaleb Lam PA-C, Stopped at 10/17/24 2054    [START ON 10/23/2024] cefTRIAXone (Rocephin) 2 g in dextrose (iso) IV 50 mL, 2 g, intravenous, q24h, Kaleb Lam PA-C    dextrose 50 % injection 12.5 g, 12.5 g, intravenous, q15 min PRN, Kaleb Lam PA-C    dextrose 50 % injection 25 g, 25 g, intravenous, q15 min PRN, Kaleb Lam PA-C    docusate sodium (Colace) oral liquid 100 mg, 100 mg, oral, BID, Kaleb Lam PA-C, 100 mg at 10/17/24 1108    ezetimibe (Zetia) tablet 10 mg, 10 mg, oral, Daily, Kaleb Lam PA-C, 10 mg at 10/17/24 1109    glucagon (Glucagen) injection 1 mg, 1 mg, intramuscular, q15 min PRN, Kaleb Lam PA-C    glucagon (Glucagen) injection 1 mg, 1 mg, intramuscular, q15 min PRN, Kaleb Lam PA-C    heparin (porcine) injection 5,000 Units, 5,000 Units, subcutaneous, q8h, Kaleb Lam PA-C, 5,000 Units at 10/18/24 0629    insulin lispro (HumaLOG) injection 0-15 Units, 0-15 Units, subcutaneous, Before meals & nightly, Kaleb Lam PA-C, 3 Units at 10/17/24 1623    ipratropium-albuteroL (Duo-Neb) 0.5-2.5 mg/3 mL nebulizer solution 3 mL, 3 mL, nebulization, 4x daily, Kaleb Lam PA-C, 3 mL at 10/18/24 0808    latanoprost (Xalatan) 0.005 % ophthalmic solution 1 drop, 1 drop, Both Eyes, Nightly, Kaleb Lam PA-C, 1 drop at 10/17/24 2141    melatonin tablet 6 mg, 6 mg, oral, Nightly, Kaleb Lam PA-C, 6 mg at 10/15/24 2021    oxygen (O2)  therapy, , inhalation, Continuous - Inhalation, Kaleb Lam PA-C, 50 percent at 10/18/24 0809    oxygen (O2) therapy, , inhalation, Continuous - Inhalation, Kaleb Lam PA-C, 40 L/min at 10/18/24 0809    [Held by provider] primidone (Mysoline) tablet 125 mg, 125 mg, oral, TID, Kaleb Lam PA-C, 125 mg at 10/14/24 0853    [Held by provider] propranolol LA (Inderal LA) 24 hr capsule 60 mg, 60 mg, oral, Daily, Kaleb Lam PA-C    sodium chloride 3 % nebulizer solution 3 mL, 3 mL, nebulization, 4x daily, Kaleb Lam PA-C, 3 mL at 10/18/24 0808    vancomycin (Vancocin) pharmacy to dose - pharmacy monitoring, , miscellaneous, Daily PRN, Kaleb Lam PA-C    Review of Systems:  14 point review of systems was completed and negative except for those specially mention in my HPI    Physical Exam:    Heart Rate:  [67-85]   Temp:  [36.1 °C (97 °F)-36.8 °C (98.2 °F)]   Resp:  [21-32]   BP: ()/(45-70)   Weight:  [126 kg (276 lb 14.4 oz)]   SpO2:  [90 %-99 %]     Physical Exam  Constitutional:       General: She is not in acute distress.     Appearance: She is ill-appearing. She is not diaphoretic.   HENT:      Head: Normocephalic and atraumatic.      Mouth/Throat:      Mouth: Mucous membranes are dry.   Eyes:      Pupils: Pupils are equal, round, and reactive to light.   Cardiovascular:      Rate and Rhythm: Normal rate and regular rhythm.      Pulses: Normal pulses.      Heart sounds: Normal heart sounds.   Pulmonary:      Breath sounds: Decreased air movement present. Examination of the left-upper field reveals decreased breath sounds. Examination of the left-middle field reveals decreased breath sounds. Examination of the left-lower field reveals decreased breath sounds. Decreased breath sounds and rhonchi present.   Abdominal:      General: There is no distension.      Palpations: Abdomen is soft.      Tenderness: There is no abdominal tenderness.   Skin:     General: Skin is warm and dry.       Capillary Refill: Capillary refill takes less than 2 seconds.      Coloration: Skin is pale.   Neurological:      General: No focal deficit present.      Mental Status: She is alert. Mental status is at baseline.      Motor: Weakness present.         Objective:    I have reviewed all medications, laboratory results, and imaging pertinent for today's encounter.    FiO2 (%):  [40 %-80 %] 60 %      Intake/Output Summary (Last 24 hours) at 10/18/2024 0828  Last data filed at 10/18/2024 0730  Gross per 24 hour   Intake 1257.53 ml   Output 1520 ml   Net -262.47 ml            Assessment/Plan:    I am currently managing this critically ill patient for the following problems:    Neuro/Psych/Pain Ctrl/Sedation:   Acute encephalopathy - etiology unclear, improved   Unresponsiveness  Essential tremor   Hypothermia - resolved  - Pain Control: acetaminophen PRN  - Hold home primidone  -- Urine tox positive for barbiturates consistent with primidone intake   - CT head: negative for acute findings   - CAM ICU qshift, sleep-wake hygiene, delirium precautions      Respiratory/ENT:  Intubated for airway protection - resolved  Acute hypoxic respiratory failure   Healthcare-associated pneumonia   Atelectasis 2/2 mucus plugging   - Supplemental O2: HFNC 30L/60%  - Wean O2 as tolerated to maintain SpO2 >92%  - Duonebs and mucomyst QID   - IPV per RT TID  - CXR 10/17: complete opacification of left hemithorax   -- S/p left-sided pigtail placement, 2L out  -- Cytology and specimens sent: appears transudative  - Will consider right-sided thoracentesis   - Continuous pulse ox monitoring   - Pulm hygiene     Cardiovascular:   Septic shock - resolved  Bilateral upper extremity swelling - new over last week according to , worsening this am  Hx HTN  Hx HLD  Acute on chronic diastolic heart failure   - Off levophed since 10/14  - TTE 10/1: diastolic dysfunction, LVEF 50-55%, mildly elevated RVSP (40)  - Hold home propranolol  - Bilateral  duplex US upper extremities: left cephalic thrombus adjacent to PICC line   - Continuous cardiac monitoring per ICU protocol  - EKGs PRN for ACS symptoms, arrhythmias      GI:  Hx GERD  - Diet: regular with thin liquids, bite sized  - BR: colace  - GI Prophylaxis: PPI     Renal/Volume Status/Electrolytes:  Acute kidney injury - possibly ATN vs medication-induced (vancomycin??)  Anasarca   Hyperchloremic metabolic acidosis   Hypoalbuminemia   - Baseline Cr 0.8-1.0  - Vanc to be dosed per pharmacy for AMY  - Discontinued bumex gtt   - Urine electrolytes indicative of pre-renal AMY likely 2/2 intravascular hypovolemia   - Maintain anders catheter  - Maintain urine output 0.5-1.0cc/kg/hr  - Hourly I/O's  - Replete electrolytes to maintain K >4.0 and Mg >2.0  - Daily BMP, Mg, Phos    Endocrine:  T2DM  - SSI ACHS  - TSH: midly elevated, T4 WNL  - Hypoglycemia protocol PRN      Infectious Disease:  Thoracolumbar surgical site infection s/p I&D x 2 with MRSA bacteremia on an extended course of IV antibiotics (vanc and rocephin -> 11/12)  Healthcare-associated pneumonia   - Antibiotics: continue vancomycin and cefepime   -- Rocephin to resume 10/23 in place of cefepime   - ID consulted, appreciate assistance   - Blood cultures: negative   - Urine culture: negative   - Spinal wound culture: proteus and MRSA  - Sputum: pseudomonas   - CT lumbar spine: unable to r/o abscess   -- Pt has implanted spinal hardware so unable to obtain MRI  - Will consider removing picc line pending infectious w/u  - Monitor SIRS criteria     Heme/Onc:  Normocytic anemia   - Monitor for s/sx of anemia such as bleeding and bruising   - Transfuse if Hgb <7.0   - Daily CBC     MSK:  No active concerns   - PT/OT when appropriate     Derm:  - ICU skin protocol  - Q2hr turns  - Padded pressure points      Ethics/Code Status:  Full code - discussed this with  at bedside      :  DVT Prophylaxis: SQH  GI Prophylaxis: PPI  Bowel Regimen:  colace  Diet: regular  CVC: R picc  [place]: none  [place]: yes, replaced 10/12  Restraints: yes  Disposition: ICU    Critical Care Time:  59 minutes spent in preparing to see patient (I.e. labs, imaging, etc.), documentation, discussion plan of care with patient/family/caregiver, and/or coordination of care with multidisciplinary team including the attending. Time does not include completion of procedure time.     Kaleb Lam PA-C  Pulmonary & Critical Care Medicine   [facility]

## 2024-10-18 NOTE — PROGRESS NOTES
Vancom[facility] by Pharmacy[facility]    Narda Malloy is a 69 y.o. year old female who Pharmacy has been consulted for vancomycin dosing for Vancomycin Indications: Skin & Soft Tissue. Based on the patient's indication and renal status this patient will be dosed based on a target level of SSTI/UTI: 10-15 mcg/mL    Patient is currently in AMY    Last vancomycin dose (incl. date and time): 1000mg q12h at OSH    Vancomycin level (incl. date and time): 31.7 mcg/mL on 10/18 at 0420 (an increase from 30.3 on 10/17 - scr also up to 1.6)    Lab Results   Component Value Date    VANCORANDOM 31.7 (H) 10/18/2024    VANCOTROUGH 11.8 08/08/2018       Visit Vitals  BP 98/57   Pulse 73   Temp 36.8 °C (98.2 °F) (Axillary)   Resp 25           Lab Results   Component Value Date    CREATININE 1.62 (H) 10/18/2024    CREATININE 1.38 (H) 10/17/2024    CREATININE 1.23 (H) 10/16/2024    CREATININE 1.18 (H) 10/15/2024       I/O last 3 completed shifts:  In: 1388 (11.1 mL/kg) [P.O.:240; I.V.:48 (0.4 mL/kg); IV Piggyback:1100]  Out: 1745 (13.9 mL/kg) [Urine:855 (0.2 mL/kg/hr); Chest Tube:890]  Weight: 125.6 kg     Assessment/Plan     Level is above target trough goal.   hold vancomycin.  Random level within 24 hours will be obtained on 10/19 at 0500. May be obtained sooner if clinically indicated.   Will continue to monitor renal function daily while on vancomycin and order serum creatinine at least every 48 hours if not already ordered.  Follow for continued vancomycin needs, clinical response, and signs/symptoms of toxicity.     Cornelius Altamirano, LissetD

## 2024-10-18 NOTE — PROGRESS NOTES
atient not medically clear. Patient on 60% high flow oxygen. At this time there is not a safe discharge plan in place. Therapy to evaluate patient. Patient was at [facility] prior to admission. If she will return then a precert will be needed. Will follow.      10/18/24 8939   Discharge Planning   Home or Post Acute Services Other (Comment)  (TBD)   Expected Discharge Disposition Othe  (TBD)   Does the patient need discharge transport arranged? Yes   RoundTrip coordination needed? Yes

## 2024-10-18 NOTE — PROGRESS NOTES
"INFECTIOUS DISEASES PROGRESS NOTE    Consulted / following patient for:  Respiratory failure/pneumonia  Recent polymicrobial complicated postoperative lumbar wound infection, MRSA and Proteus  Acute kidney injury    Subjective   Interval History:   Says she thinks she feels poorly, but recognizes that she is breathing better than she was yesterday.  Asks \"am I going to make it?\"    Objective   PHYSICAL EXAMINATION  Vital signs:  Visit Vitals  BP 99/53   Pulse 70   Temp 36.8 °C (98.2 °F) (Axillary)   Resp 24   No vasopressor agents in use  Oxygen saturation 94% on high-flow nasal oxygen, down to 60%  General: weak, does not appear dyspneic or toxic  Lungs: Diminished, clear.  Left chest tube draining serous fluid  Heart:  S1, S2 normal  Abdomen:  Soft, obese, no guarding.   Extremities:  No cords, phlebitis, cellulitis.  Edematous    Relevant Results  WBC: 15,900 --> ---> ---> 9900   Creatinine (baseline 0.66): 1.46 ---> 1.43 ---> 1.33 ---> 1.19 ---> 1.17 ---> 1.23 ---> 1.38 ---> 1.62  Pleural fluid: Transudate with 197 WBC, 56% neutrophils, protein 1.8, LDH 97, glucose 202  Microbiology:  Blood (10/12): Negative X2  Sputum: Stain with moderate GNB and moderate PMN, culture Pseudomonas aeruginosa, susceptible to Zosyn and cefepime  Urine: Moderate colony count Candida lusitaniae  Pleural fluid (10/17): Negative    Imaging:  CXR images (10/18) personally reviewed: Since yesterday's films she has undergone large-volume thoracentesis, with much improved aeration of the left lung    Assessment:  Sepsis -likely due to pneumonia, present on admission. Patient already on IV vancomycin and IV ceftriaxone at time of this admission for lumbar spinal infection.  Thus far no evidence for vascular catheter infection or recurrent bacteremia.  Liberated from the ventilator.  Pseudomonas in the sputum.  Improving after thoracentesis of transudative effusion  Lumbar spine surgical site infection s/p I&D 9/28. Cultures + MRSA, " Proteus.  Was to be on vanco/ceftriaxone through 11/12. Followed by Dr. Brant Juarez.  1 slightly suspicious area of separation of the wound edges in the superior portion of the incision     Plan/Recommendations:  Continue IV vancomycin - check Cr daily. Pharmacy to dose and monitor.  If the AMY worsens significantly, would consider switching vancomycin to daptomycin  Continue cefepime through 10/22, then revert to ceftriaxone as planned for lumbar infection, until 11/12  3.   Midline catheter will need to be replaced with a new midline catheter or PICC      prior to her discharge    Discussed with Ms. Genaro TURNER COVERING 10/19-10/20 AND WILL SEE PRN YOUR CALL    Andrew Malagon MD  [employer] [facility] [employer]  Office:  [phone]

## 2024-10-18 NOTE — PROGRESS NOTES
Occupational Therapy                 Therapy Communication Note    Patient Name: Narda Malloy  MRN: [id number]  Department: [facility] 3 S ICU  Room: 11/11-A  Today's Date: 10/18/2024     Discipline: Occupational Therapy    Missed Visit Reason: Missed Visit Reason:  (OT eval received and chart reviewed. Pt declined therapy eval, reporting she was fatigued and would like to rest.)    Missed Time: Attempt

## 2024-10-19 ENCOUNTER — APPOINTMENT (OUTPATIENT)
Dept: RADIOLOGY | Facility: HOSPITAL | Age: 69
End: 2024-10-19
Payer: MEDICARE

## 2024-10-19 LAB
ACID FAST STN SPEC: NORMAL
ALBUMIN SERPL BCP-MCNC: 2.7 G/DL (ref 3.4–5)
ANION GAP BLDA CALCULATED.4IONS-SCNC: 11 MMO/L (ref 10–25)
ANION GAP BLDA CALCULATED.4IONS-SCNC: 12 MMO/L (ref 10–25)
ANION GAP BLDA CALCULATED.4IONS-SCNC: 12 MMO/L (ref 10–25)
ANION GAP BLDA CALCULATED.4IONS-SCNC: 13 MMO/L (ref 10–25)
ANION GAP SERPL CALCULATED.3IONS-SCNC: 13 MMOL/L (ref 10–20)
APPARATUS: ABNORMAL
APPARATUS: ABNORMAL
ARTERIAL PATENCY WRIST A: ABNORMAL
ARTERIAL PATENCY WRIST A: NEGATIVE
ARTERIAL PATENCY WRIST A: POSITIVE
ARTERIAL PATENCY WRIST A: POSITIVE
BASE EXCESS BLDA CALC-SCNC: -5.6 MMOL/L (ref -2–3)
BASE EXCESS BLDA CALC-SCNC: -6.1 MMOL/L (ref -2–3)
BASE EXCESS BLDA CALC-SCNC: -6.3 MMOL/L (ref -2–3)
BASE EXCESS BLDA CALC-SCNC: -6.4 MMOL/L (ref -2–3)
BASOPHILS # BLD AUTO: 0.02 X10*3/UL (ref 0–0.1)
BASOPHILS NFR BLD AUTO: 0.3 %
BODY TEMPERATURE: 37 DEGREES CELSIUS
BUN SERPL-MCNC: 49 MG/DL (ref 6–23)
CA-I BLDA-SCNC: 1.15 MMOL/L (ref 1.1–1.33)
CA-I BLDA-SCNC: 1.19 MMOL/L (ref 1.1–1.33)
CA-I BLDA-SCNC: 1.23 MMOL/L (ref 1.1–1.33)
CA-I BLDA-SCNC: 1.24 MMOL/L (ref 1.1–1.33)
CALCIUM SERPL-MCNC: 7.8 MG/DL (ref 8.6–10.3)
CHLORIDE BLDA-SCNC: 109 MMOL/L (ref 98–107)
CHLORIDE BLDA-SCNC: 110 MMOL/L (ref 98–107)
CHLORIDE SERPL-SCNC: 112 MMOL/L (ref 98–107)
CO2 SERPL-SCNC: 21 MMOL/L (ref 21–32)
CREAT SERPL-MCNC: 2.01 MG/DL (ref 0.5–1.05)
CRITICAL CALL TIME: 823
CRITICAL CALL TIME: 912
CRITICAL CALLED BY: ABNORMAL
CRITICAL CALLED BY: ABNORMAL
CRITICAL CALLED TO: ABNORMAL
CRITICAL CALLED TO: ABNORMAL
CRITICAL READ BACK: ABNORMAL
CRITICAL READ BACK: ABNORMAL
EGFRCR SERPLBLD CKD-EPI 2021: 27 ML/MIN/1.73M*2
EOSINOPHIL # BLD AUTO: 0.05 X10*3/UL (ref 0–0.7)
EOSINOPHIL NFR BLD AUTO: 0.7 %
EPAP CMH2O: 5 CM H2O
EPAP CMH2O: 5 CM H2O
ERYTHROCYTE [DISTWIDTH] IN BLOOD BY AUTOMATED COUNT: 19.1 % (ref 11.5–14.5)
FREQUENCY (BPM): 24 BPM
FUNGUS SPEC FUNGUS STN: NORMAL
GLUCOSE BLD MANUAL STRIP-MCNC: 134 MG/DL (ref 74–99)
GLUCOSE BLD MANUAL STRIP-MCNC: 138 MG/DL (ref 74–99)
GLUCOSE BLD MANUAL STRIP-MCNC: 149 MG/DL (ref 74–99)
GLUCOSE BLD MANUAL STRIP-MCNC: 160 MG/DL (ref 74–99)
GLUCOSE BLD MANUAL STRIP-MCNC: 187 MG/DL (ref 74–99)
GLUCOSE BLDA-MCNC: 144 MG/DL (ref 74–99)
GLUCOSE BLDA-MCNC: 156 MG/DL (ref 74–99)
GLUCOSE BLDA-MCNC: 161 MG/DL (ref 74–99)
GLUCOSE BLDA-MCNC: 195 MG/DL (ref 74–99)
GLUCOSE SERPL-MCNC: 121 MG/DL (ref 74–99)
HCO3 BLDA-SCNC: 19.2 MMOL/L (ref 22–26)
HCO3 BLDA-SCNC: 20.2 MMOL/L (ref 22–26)
HCO3 BLDA-SCNC: 21.8 MMOL/L (ref 22–26)
HCO3 BLDA-SCNC: 21.8 MMOL/L (ref 22–26)
HCT VFR BLD AUTO: 25.2 % (ref 36–46)
HCT VFR BLD EST: 25 % (ref 36–46)
HCT VFR BLD EST: 27 % (ref 36–46)
HCT VFR BLD EST: 29 % (ref 36–46)
HCT VFR BLD EST: 32 % (ref 36–46)
HGB BLD-MCNC: 7.7 G/DL (ref 12–16)
HGB BLDA-MCNC: 10.5 G/DL (ref 12–16)
HGB BLDA-MCNC: 8.4 G/DL (ref 12–16)
HGB BLDA-MCNC: 8.9 G/DL (ref 12–16)
HGB BLDA-MCNC: 9.8 G/DL (ref 12–16)
IMM GRANULOCYTES # BLD AUTO: 0.06 X10*3/UL (ref 0–0.7)
IMM GRANULOCYTES NFR BLD AUTO: 0.8 % (ref 0–0.9)
INHALED O2 CONCENTRATION: 40 %
INHALED O2 CONCENTRATION: 50 %
INHALED O2 CONCENTRATION: 70 %
INHALED O2 CONCENTRATION: 70 %
IPAP CMH2O: 15 CM H2O
IPAP CMH2O: 16 CM H2O
LACTATE BLDA-SCNC: 0.4 MMOL/L (ref 0.4–2)
LACTATE BLDA-SCNC: 0.5 MMOL/L (ref 0.4–2)
LACTATE BLDA-SCNC: 0.5 MMOL/L (ref 0.4–2)
LACTATE BLDA-SCNC: 0.8 MMOL/L (ref 0.4–2)
LYMPHOCYTES # BLD AUTO: 0.68 X10*3/UL (ref 1.2–4.8)
LYMPHOCYTES NFR BLD AUTO: 9.5 %
MAGNESIUM SERPL-MCNC: 2.09 MG/DL (ref 1.6–2.4)
MCH RBC QN AUTO: 30.9 PG (ref 26–34)
MCHC RBC AUTO-ENTMCNC: 30.6 G/DL (ref 32–36)
MCV RBC AUTO: 101 FL (ref 80–100)
MONOCYTES # BLD AUTO: 0.55 X10*3/UL (ref 0.1–1)
MONOCYTES NFR BLD AUTO: 7.7 %
MYCOBACTERIUM SPEC CULT: NORMAL
NEUTROPHILS # BLD AUTO: 5.78 X10*3/UL (ref 1.2–7.7)
NEUTROPHILS NFR BLD AUTO: 81 %
NRBC BLD-RTO: 0 /100 WBCS (ref 0–0)
OXYHGB MFR BLDA: 95.5 % (ref 94–98)
OXYHGB MFR BLDA: 95.5 % (ref 94–98)
OXYHGB MFR BLDA: 95.6 % (ref 94–98)
OXYHGB MFR BLDA: 96.8 % (ref 94–98)
PCO2 BLDA: 34 MM HG (ref 38–42)
PCO2 BLDA: 42 MM HG (ref 38–42)
PCO2 BLDA: 57 MM HG (ref 38–42)
PCO2 BLDA: 57 MM HG (ref 38–42)
PEEP CMH2O: 5 CM H2O
PH BLDA: 7.19 PH (ref 7.38–7.42)
PH BLDA: 7.19 PH (ref 7.38–7.42)
PH BLDA: 7.29 PH (ref 7.38–7.42)
PH BLDA: 7.36 PH (ref 7.38–7.42)
PHOSPHATE SERPL-MCNC: 4 MG/DL (ref 2.5–4.9)
PLATELET # BLD AUTO: 254 X10*3/UL (ref 150–450)
PO2 BLDA: 119 MM HG (ref 85–95)
PO2 BLDA: 77 MM HG (ref 85–95)
PO2 BLDA: 84 MM HG (ref 85–95)
PO2 BLDA: 87 MM HG (ref 85–95)
POTASSIUM BLDA-SCNC: 4.2 MMOL/L (ref 3.5–5.3)
POTASSIUM SERPL-SCNC: 4 MMOL/L (ref 3.5–5.3)
RBC # BLD AUTO: 2.49 X10*6/UL (ref 4–5.2)
SAO2 % BLDA: 98 % (ref 94–100)
SAO2 % BLDA: 98 % (ref 94–100)
SAO2 % BLDA: 99 % (ref 94–100)
SAO2 % BLDA: 99 % (ref 94–100)
SODIUM BLDA-SCNC: 136 MMOL/L (ref 136–145)
SODIUM BLDA-SCNC: 138 MMOL/L (ref 136–145)
SODIUM BLDA-SCNC: 139 MMOL/L (ref 136–145)
SODIUM BLDA-SCNC: 139 MMOL/L (ref 136–145)
SODIUM SERPL-SCNC: 142 MMOL/L (ref 136–145)
SPECIMEN DRAWN FROM PATIENT: ABNORMAL
SPONTANEOUS TIDAL VOLUME: 429 ML
TIDAL VOLUME: 450 ML
VANCOMYCIN SERPL-MCNC: 28.4 UG/ML (ref 5–20)
VENTILATOR MODE: ABNORMAL
VENTILATOR RATE: 24 BPM
WBC # BLD AUTO: 7.1 X10*3/UL (ref 4.4–11.3)

## 2024-10-19 PROCEDURE — 80202 ASSAY OF VANCOMYCIN: CPT | Performed by: PHARMACIST

## 2024-10-19 PROCEDURE — 2500000005 HC RX 250 GENERAL PHARMACY W/O HCPCS

## 2024-10-19 PROCEDURE — 2500000004 HC RX 250 GENERAL PHARMACY W/ HCPCS (ALT 636 FOR OP/ED)

## 2024-10-19 PROCEDURE — 74018 RADEX ABDOMEN 1 VIEW: CPT

## 2024-10-19 PROCEDURE — 71045 X-RAY EXAM CHEST 1 VIEW: CPT

## 2024-10-19 PROCEDURE — 36556 INSERT NON-TUNNEL CV CATH: CPT | Performed by: NURSE PRACTITIONER

## 2024-10-19 PROCEDURE — 82947 ASSAY GLUCOSE BLOOD QUANT: CPT

## 2024-10-19 PROCEDURE — 99291 CRITICAL CARE FIRST HOUR: CPT | Performed by: NURSE PRACTITIONER

## 2024-10-19 PROCEDURE — 71045 X-RAY EXAM CHEST 1 VIEW: CPT | Performed by: RADIOLOGY

## 2024-10-19 PROCEDURE — 84132 ASSAY OF SERUM POTASSIUM: CPT

## 2024-10-19 PROCEDURE — 87081 CULTURE SCREEN ONLY: CPT | Mod: WESLAB

## 2024-10-19 PROCEDURE — 2020000001 HC ICU ROOM DAILY

## 2024-10-19 PROCEDURE — 31720 CLEARANCE OF AIRWAYS: CPT

## 2024-10-19 PROCEDURE — 31500 INSERT EMERGENCY AIRWAY: CPT

## 2024-10-19 PROCEDURE — 84132 ASSAY OF SERUM POTASSIUM: CPT | Performed by: NURSE PRACTITIONER

## 2024-10-19 PROCEDURE — 36600 WITHDRAWAL OF ARTERIAL BLOOD: CPT

## 2024-10-19 PROCEDURE — 2500000002 HC RX 250 W HCPCS SELF ADMINISTERED DRUGS (ALT 637 FOR MEDICARE OP, ALT 636 FOR OP/ED)

## 2024-10-19 PROCEDURE — 5A1955Z RESPIRATORY VENTILATION, GREATER THAN 96 CONSECUTIVE HOURS: ICD-10-PCS | Performed by: ANESTHESIOLOGY

## 2024-10-19 PROCEDURE — 2500000001 HC RX 250 WO HCPCS SELF ADMINISTERED DRUGS (ALT 637 FOR MEDICARE OP): Performed by: NURSE PRACTITIONER

## 2024-10-19 PROCEDURE — 94640 AIRWAY INHALATION TREATMENT: CPT

## 2024-10-19 PROCEDURE — 80069 RENAL FUNCTION PANEL: CPT

## 2024-10-19 PROCEDURE — 37799 UNLISTED PX VASCULAR SURGERY: CPT

## 2024-10-19 PROCEDURE — 36620 INSERTION CATHETER ARTERY: CPT | Performed by: NURSE PRACTITIONER

## 2024-10-19 PROCEDURE — 94660 CPAP INITIATION&MGMT: CPT

## 2024-10-19 PROCEDURE — 0BH17EZ INSERTION OF ENDOTRACHEAL AIRWAY INTO TRACHEA, VIA NATURAL OR ARTIFICIAL OPENING: ICD-10-PCS | Performed by: ANESTHESIOLOGY

## 2024-10-19 PROCEDURE — 2500000005 HC RX 250 GENERAL PHARMACY W/O HCPCS: Performed by: NURSE PRACTITIONER

## 2024-10-19 PROCEDURE — 74018 RADEX ABDOMEN 1 VIEW: CPT | Performed by: RADIOLOGY

## 2024-10-19 PROCEDURE — 94668 MNPJ CHEST WALL SBSQ: CPT

## 2024-10-19 PROCEDURE — 2500000005 HC RX 250 GENERAL PHARMACY W/O HCPCS: Performed by: INTERNAL MEDICINE

## 2024-10-19 PROCEDURE — 31500 INSERT EMERGENCY AIRWAY: CPT | Performed by: NURSE PRACTITIONER

## 2024-10-19 PROCEDURE — 2500000004 HC RX 250 GENERAL PHARMACY W/ HCPCS (ALT 636 FOR OP/ED): Performed by: NURSE PRACTITIONER

## 2024-10-19 PROCEDURE — 9420000001 HC RT PATIENT EDUCATION 5 MIN

## 2024-10-19 PROCEDURE — 94002 VENT MGMT INPAT INIT DAY: CPT

## 2024-10-19 PROCEDURE — 83735 ASSAY OF MAGNESIUM: CPT

## 2024-10-19 PROCEDURE — 85025 COMPLETE CBC W/AUTO DIFF WBC: CPT

## 2024-10-19 PROCEDURE — 2500000001 HC RX 250 WO HCPCS SELF ADMINISTERED DRUGS (ALT 637 FOR MEDICARE OP)

## 2024-10-19 RX ORDER — HEPARIN SODIUM,PORCINE/PF 10 UNIT/ML
10 SYRINGE (ML) INTRAVENOUS AS NEEDED
Status: DISCONTINUED | OUTPATIENT
Start: 2024-10-19 | End: 2024-10-19

## 2024-10-19 RX ORDER — PANTOPRAZOLE SODIUM 40 MG/10ML
40 INJECTION, POWDER, LYOPHILIZED, FOR SOLUTION INTRAVENOUS
Status: DISCONTINUED | OUTPATIENT
Start: 2024-10-20 | End: 2024-11-14 | Stop reason: HOSPADM

## 2024-10-19 RX ORDER — FUROSEMIDE 10 MG/ML
80 INJECTION INTRAMUSCULAR; INTRAVENOUS ONCE
Status: COMPLETED | OUTPATIENT
Start: 2024-10-19 | End: 2024-10-19

## 2024-10-19 RX ORDER — FUROSEMIDE 10 MG/ML
40 INJECTION INTRAMUSCULAR; INTRAVENOUS ONCE
Status: DISCONTINUED | OUTPATIENT
Start: 2024-10-19 | End: 2024-10-19

## 2024-10-19 RX ORDER — FENTANYL CITRATE 50 UG/ML
25 INJECTION, SOLUTION INTRAMUSCULAR; INTRAVENOUS EVERY 2 HOUR PRN
Status: DISCONTINUED | OUTPATIENT
Start: 2024-10-19 | End: 2024-10-23

## 2024-10-19 RX ORDER — ALBUMIN HUMAN 250 G/1000ML
50 SOLUTION INTRAVENOUS ONCE
Status: COMPLETED | OUTPATIENT
Start: 2024-10-19 | End: 2024-10-20

## 2024-10-19 RX ORDER — ETOMIDATE 2 MG/ML
20 INJECTION INTRAVENOUS ONCE
Status: COMPLETED | OUTPATIENT
Start: 2024-10-19 | End: 2024-10-19

## 2024-10-19 RX ORDER — PROPOFOL 10 MG/ML
0-50 INJECTION, EMULSION INTRAVENOUS CONTINUOUS
Status: DISCONTINUED | OUTPATIENT
Start: 2024-10-19 | End: 2024-10-29

## 2024-10-19 RX ORDER — NOREPINEPHRINE BITARTRATE/D5W 8 MG/250ML
0-.5 PLASTIC BAG, INJECTION (ML) INTRAVENOUS CONTINUOUS
Status: DISCONTINUED | OUTPATIENT
Start: 2024-10-19 | End: 2024-10-21

## 2024-10-19 RX ORDER — HEPARIN SODIUM,PORCINE/PF 10 UNIT/ML
50 SYRINGE (ML) INTRAVENOUS AS NEEDED
Status: DISCONTINUED | OUTPATIENT
Start: 2024-10-19 | End: 2024-11-14 | Stop reason: HOSPADM

## 2024-10-19 RX ORDER — LIDOCAINE HYDROCHLORIDE 20 MG/ML
1 JELLY TOPICAL ONCE
Status: COMPLETED | OUTPATIENT
Start: 2024-10-19 | End: 2024-10-19

## 2024-10-19 RX ORDER — NOREPINEPHRINE BITARTRATE/D5W 8 MG/250ML
0-.5 PLASTIC BAG, INJECTION (ML) INTRAVENOUS CONTINUOUS
Status: DISCONTINUED | OUTPATIENT
Start: 2024-10-19 | End: 2024-10-19

## 2024-10-19 RX ORDER — FENTANYL CITRATE 50 UG/ML
50 INJECTION, SOLUTION INTRAMUSCULAR; INTRAVENOUS ONCE
Status: COMPLETED | OUTPATIENT
Start: 2024-10-19 | End: 2024-10-19

## 2024-10-19 RX ORDER — METOLAZONE 5 MG/1
5 TABLET ORAL ONCE
Status: COMPLETED | OUTPATIENT
Start: 2024-10-19 | End: 2024-10-19

## 2024-10-19 RX ORDER — ROCURONIUM BROMIDE 10 MG/ML
40 INJECTION, SOLUTION INTRAVENOUS ONCE
Status: COMPLETED | OUTPATIENT
Start: 2024-10-19 | End: 2024-10-19

## 2024-10-19 RX ORDER — INSULIN LISPRO 100 [IU]/ML
0-15 INJECTION, SOLUTION INTRAVENOUS; SUBCUTANEOUS EVERY 6 HOURS
Status: DISCONTINUED | OUTPATIENT
Start: 2024-10-19 | End: 2024-10-22

## 2024-10-19 RX ORDER — MIDAZOLAM HYDROCHLORIDE 1 MG/ML
4 INJECTION, SOLUTION INTRAMUSCULAR; INTRAVENOUS ONCE
Status: COMPLETED | OUTPATIENT
Start: 2024-10-19 | End: 2024-10-19

## 2024-10-19 RX ORDER — PANTOPRAZOLE SODIUM 40 MG/1
40 TABLET, DELAYED RELEASE ORAL
Status: DISCONTINUED | OUTPATIENT
Start: 2024-10-20 | End: 2024-11-14 | Stop reason: HOSPADM

## 2024-10-19 RX ADMIN — BRIMONIDINE TARTRATE 1 DROP: 2 SOLUTION OPHTHALMIC at 23:47

## 2024-10-19 RX ADMIN — Medication 3 ML: at 15:24

## 2024-10-19 RX ADMIN — LIDOCAINE HYDROCHLORIDE 1 APPLICATION: 20 JELLY TOPICAL at 16:00

## 2024-10-19 RX ADMIN — Medication 70 PERCENT: at 07:48

## 2024-10-19 RX ADMIN — FENTANYL CITRATE 50 MCG: 50 INJECTION INTRAMUSCULAR; INTRAVENOUS at 17:23

## 2024-10-19 RX ADMIN — HEPARIN SODIUM 5000 UNITS: 5000 INJECTION, SOLUTION INTRAVENOUS; SUBCUTANEOUS at 15:18

## 2024-10-19 RX ADMIN — Medication 3 ML: at 11:19

## 2024-10-19 RX ADMIN — NOREPINEPHRINE BITARTRATE 0.02 MCG/KG/MIN: 8 INJECTION, SOLUTION INTRAVENOUS at 08:13

## 2024-10-19 RX ADMIN — HEPARIN SODIUM 5000 UNITS: 5000 INJECTION, SOLUTION INTRAVENOUS; SUBCUTANEOUS at 06:10

## 2024-10-19 RX ADMIN — BRIMONIDINE TARTRATE 1 DROP: 2 SOLUTION OPHTHALMIC at 10:10

## 2024-10-19 RX ADMIN — IPRATROPIUM BROMIDE AND ALBUTEROL SULFATE 3 ML: .5; 3 SOLUTION RESPIRATORY (INHALATION) at 11:19

## 2024-10-19 RX ADMIN — HEPARIN SODIUM 5000 UNITS: 5000 INJECTION, SOLUTION INTRAVENOUS; SUBCUTANEOUS at 23:46

## 2024-10-19 RX ADMIN — INSULIN LISPRO 3 UNITS: 100 INJECTION, SOLUTION INTRAVENOUS; SUBCUTANEOUS at 12:15

## 2024-10-19 RX ADMIN — ROCURONIUM BROMIDE 40 MG: 10 INJECTION, SOLUTION INTRAVENOUS at 17:24

## 2024-10-19 RX ADMIN — LATANOPROST 1 DROP: 50 SOLUTION OPHTHALMIC at 23:46

## 2024-10-19 RX ADMIN — FUROSEMIDE 10 MG/HR: 10 INJECTION, SOLUTION INTRAMUSCULAR; INTRAVENOUS at 10:09

## 2024-10-19 RX ADMIN — IPRATROPIUM BROMIDE AND ALBUTEROL SULFATE 3 ML: .5; 3 SOLUTION RESPIRATORY (INHALATION) at 15:24

## 2024-10-19 RX ADMIN — Medication 3 ML: at 07:47

## 2024-10-19 RX ADMIN — CEFEPIME 2 G: 2 INJECTION, POWDER, FOR SOLUTION INTRAVENOUS at 10:09

## 2024-10-19 RX ADMIN — PROPOFOL 5 MCG/KG/MIN: 10 INJECTION, EMULSION INTRAVENOUS at 17:33

## 2024-10-19 RX ADMIN — PRIMIDONE 125 MG: 250 TABLET ORAL at 23:45

## 2024-10-19 RX ADMIN — IPRATROPIUM BROMIDE AND ALBUTEROL SULFATE 3 ML: .5; 3 SOLUTION RESPIRATORY (INHALATION) at 19:46

## 2024-10-19 RX ADMIN — HYDROMORPHONE HYDROCHLORIDE 0.4 MG: 1 INJECTION, SOLUTION INTRAMUSCULAR; INTRAVENOUS; SUBCUTANEOUS at 05:20

## 2024-10-19 RX ADMIN — CEFEPIME 2 G: 2 INJECTION, POWDER, FOR SOLUTION INTRAVENOUS at 23:38

## 2024-10-19 RX ADMIN — Medication 70 PERCENT: at 19:47

## 2024-10-19 RX ADMIN — IPRATROPIUM BROMIDE AND ALBUTEROL SULFATE 3 ML: .5; 3 SOLUTION RESPIRATORY (INHALATION) at 07:47

## 2024-10-19 RX ADMIN — ETOMIDATE 20 MG: 2 INJECTION, SOLUTION INTRAVENOUS at 17:23

## 2024-10-19 RX ADMIN — Medication 3 ML: at 19:41

## 2024-10-19 RX ADMIN — PROPRANOLOL HYDROCHLORIDE 60 MG: 60 CAPSULE, EXTENDED RELEASE ORAL at 06:43

## 2024-10-19 RX ADMIN — Medication 40 PERCENT: at 10:19

## 2024-10-19 RX ADMIN — PROPOFOL 10 MCG/KG/MIN: 10 INJECTION, EMULSION INTRAVENOUS at 22:03

## 2024-10-19 RX ADMIN — METOLAZONE 5 MG: 5 TABLET ORAL at 23:45

## 2024-10-19 RX ADMIN — Medication 100 PERCENT: at 17:31

## 2024-10-19 RX ADMIN — HEPARIN, PORCINE (PF) 10 UNIT/ML INTRAVENOUS SYRINGE 50 UNITS: at 23:13

## 2024-10-19 RX ADMIN — Medication 70 PERCENT: at 19:46

## 2024-10-19 RX ADMIN — MIDAZOLAM 4 MG: 1 INJECTION INTRAMUSCULAR; INTRAVENOUS at 17:23

## 2024-10-19 RX ADMIN — FUROSEMIDE 80 MG: 10 INJECTION, SOLUTION INTRAMUSCULAR; INTRAVENOUS at 14:00

## 2024-10-19 ASSESSMENT — PAIN SCALES - GENERAL
PAINLEVEL_OUTOF10: 0 - NO PAIN

## 2024-10-19 ASSESSMENT — PAIN - FUNCTIONAL ASSESSMENT
PAIN_FUNCTIONAL_ASSESSMENT: WONG-BAKER FACES
PAIN_FUNCTIONAL_ASSESSMENT: 0-10
PAIN_FUNCTIONAL_ASSESSMENT: CPOT (CRITICAL CARE PAIN OBSERVATION TOOL)
PAIN_FUNCTIONAL_ASSESSMENT: 0-10
PAIN_FUNCTIONAL_ASSESSMENT: 0-10

## 2024-10-19 ASSESSMENT — PAIN DESCRIPTION - LOCATION: LOCATION: BACK

## 2024-10-19 ASSESSMENT — PAIN DESCRIPTION - ORIENTATION: ORIENTATION: MID;LOWER;UPPER

## 2024-10-19 ASSESSMENT — PAIN SCALES - WONG BAKER
WONGBAKER_NUMERICALRESPONSE: NO HURT

## 2024-10-19 ASSESSMENT — PAIN DESCRIPTION - DESCRIPTORS: DESCRIPTORS: OTHER (COMMENT)

## 2024-10-19 NOTE — PROCEDURES
TEMPORARY HEMODIALYSIS CATHETER PLACEMENT     Indication: Acute renal failure with associated or anticipated acid-base and/or electrolyte abnormality. Need for central access.     Site:  - Right internal jugular vein     Catheter:  -Curved 14 Fr, 3 lumen, 15 cm        Sedation: Propofol  Patient positioned supine +/- slight Trendelenburg.  Sterility: Chlorhexidine skin prep. Hat and mask on myself. Antiseptic hand foam. Sterile gown, gloves and drape.  Local anesthesia: 4 mL of 1% lidocaine subcutaneously.  Ultrasound-guided insertion:  -YES (with sterile ultrasound probe cover)     Seldinger technique with 18 Ga x 2.5 in introducer needle. Guidewire thread easily through introducer needle.  -Needle position confirmed to be intravenous by fall of blood to gravity within pressure transduction tubing.  -Guidewire position confirmed to be intravenous by visualization on ultrasound in short and long axis views.  Small skin incision adjacent to guidewire with #11 scalpel.  -Serial tissue dilation with 12 Fr and 14 Fr tissue dilators over guidewire.  Catheter thread easily over guidewire and guidewire removed. All ports aspirated for complete removal of air, then flushed with sterile NS. Catheter secured with sutures.  Transparent film dressing with CHG.  Sterility maintained throughout procedure. No complications. Patient tolerated well.     Chest x-ray  Done. Catheter in acceptable central venous position. No PTX. (My read).     Other details: None.     ARROW Hemodialysis Catheterization Kit    LEONEL Salas-ANGEL  7:38 PM

## 2024-10-19 NOTE — PROGRESS NOTES
[facility] Critical Care Medicine       Date:  10/19/2024  Patient:  Narda Malloy  YOB: 1955  MRN:  [id number]   Admit Date:  10/12/2024      Chief Complaint   Patient presents with    Altered Mental Status     History of Present Illness:  Narda Malloy is a 69 y.o. year old female patient with past medical history of L1-L3 lumbar laminectomy, T4-S1 revision, and fusion August 26th, T2DM, HTN, essential tremor, HLD, glaucoma, sarcoidosis of the lung who presented to  ED 10/12 after [facility] Per report from her , she has had a significant decline in her health since July 15th when she had a fall and became significantly weak. She has also had multiple infection complications since her back surgery in August requiring multiple I&Ds and long term antibiotic therapy. She went to the OR most recently on 9/28 for lumbar site infection wash out. Per chart review, she was discharged on IV vancomycin 1g and IV ceftriaxone 2d q24hrs through 11/12.     ED Course: Initial vital signs: /104 (109), HR 68, RR 20, SpO2 95% on 6L NC, temp 34.5C. Give 0.4mg of narcan with no improvement in mentation. Lab work-up remarkable for mild hyperkalemia (5.5), AMY 42/1.46, elevated alk phos, normocytic anemia 10.4/33, turbid appearing urine with mild hematuria and proteinuria and + leuk esterase, >50 WBCs. Urine drug screen positive for barbiturates. Triggered sepsis timer so she was given 3L NS. She was intubated for airway protection with 20mg etomidate and 100mg succinylcholine. BP dropped post-intubated and fluid resuscitation and she was subsequently started on levophed.       Interval ICU Events:  10/12: Pt arrived to ICU intubated and lightly sedated on low-dose versed. Eyes open, minimally responsive.      10/13: Received K cocktail last night for K 6.0, corrected appropriately. Levophed requirements mildly up, UOP decreasing.  Ordered albumin x2 this am with improvements in UOP and SBP. Will likely trial lasix this afternoon d/t hypervolemia.     10/14: Remains intubated with decreased mentation, only responsive to noxious stimuli. Trialed lasix TID for volume overload. Remains on levophed 0.01.     10/15: Mentation much improved. SAT/SBT successful so extubated. Given lasix x3, bumex x1, metolazone x1 with net negative fluid balance of 500mL -> started on bumex gtt.      10/16: O2 requirements significantly increased, NRB -> HFNC 40L 100% likely 2/2 mucus plugging.     10/17: No acute events overnight. Bumex gtt increased to 1mg/hr. CXR this am showing complete opacification of left hemithorax related to atelectasis vs pleural effusion. Remains on HFNC 40L/80%. Pigtail catheter placed with 1.2L drained immediately. Given albumin for hypotension.      10/18: ~2L output from left sided pigtail catheter since placement. Kidney function worsening and UOP declining, about 20cc/hr overnight. Will place NG tube today and start enteral nutrition and appetite and oral intake remains poor.      10/19: Patient with worsening hypoxic, hypercapnic respiratory failure - now requiring BIPAP support. Remains grossly volume overloaded with low UO. Started on vasopressors to augment BP for diuresis. 40 IV lasix + gtt started. Remains with poor nutritional status. Will re attempt NG later if respiratory status improves.     Medical History:  Past Medical History:   Diagnosis Date    Degenerative myopia, bilateral     Diabetic neuropathy (Multi)     Difficult intubation 08/26/2024    Mac 3, grade 3, 1 attempt.  Glidescope/videolaryngoscopy recommended for future attempts.    DM type 2 (diabetes mellitus, type 2) (Multi)     Dry eye syndrome of bilateral lacrimal glands     Essential hypertension     Essential tremor     Glaucoma     Hyperlipidemia     Long term (current) use of insulin (Multi)     Low back pain     PONV (postoperative nausea and vomiting)      Primary open angle glaucoma of both eyes, severe stage     Repeated falls     Sarcoidosis of lung (Multi)     Spinal stenosis, lumbar region without neurogenic claudication     Weakness      Past Surgical History:   Procedure Laterality Date    BLEPHAROPLASTY  07/2022    BREAST SURGERY  05/20/2022    Breast lift    CARPAL TUNNEL RELEASE      CATARACT EXTRACTION W/  INTRAOCULAR LENS IMPLANT Bilateral     OD 08/04/2011 +8.5D,OS 08/04/2011 +8.50D    FOOT SURGERY      INSERTION / REMOVAL CRANIAL DBS GENERATOR      Placed 2017.  Removed 2018. part of wire left in head when everything removed    LUMBAR FUSION      L3-S1    PANRETINAL PHOTOCOAGULATION  2014    THORACIC FUSION  08/26/2024    T4-S1 fusion    VITRECTOMY Right 2013     Medications Prior to Admission   Medication Sig Dispense Refill Last Dose/Taking    acetaminophen (Tylenol) 500 mg tablet Take 2 tablets (1,000 mg) by mouth 3 times a day.   Unknown    ascorbic acid (Vitamin C) 500 mg tablet as directed Orally   Unknown    brimonidine (AlphaGAN) 0.2 % ophthalmic solution Administer 1 drop into both eyes 2 times a day.   Unknown    calcium carbonate-vitamin D3 500 mg-5 mcg (200 unit) tablet Take 1 tablet by mouth once daily.   Unknown    cefTRIAXone (Rocephin) 2 gram/50 mL IV Infuse 50 mL (2 g) at 100 mL/hr over 30 minutes into a venous catheter once every 24 hours. Once weekly labs CBC/diff, CMP, Vanc trough ESR, CRP fax to Dr. Juarez [phone]. Stop date 11/12/24. 1950 mL 0     dextrose 50 % injection Infuse 25 mL (12.5 g) into a venous catheter every 15 minutes if needed (For blood glucose 41 to 70 mg/dL).       dextrose 50 % injection Infuse 50 mL (25 g) into a venous catheter every 15 minutes if needed (For blood glucose less than or equal to 40 mg/dL).       docusate sodium (Colace) 100 mg capsule Take 1 capsule (100 mg) by mouth 2 times a day.   Unknown    ezetimibe (Zetia) 10 mg tablet Take 1 tablet (10 mg) by mouth once daily. 90 tablet 3 Unknown     FreeStyle Lite Strips strip USE TO TEST 3 TIMES A DAY AS DIRECTED 300 each 2     glucagon (Glucagen) 1 mg injection Inject 1 mg into the muscle every 15 minutes if needed for low blood sugar - see comments (Hypoglycemia).       glucagon (Glucagen) 1 mg injection Inject 1 mg into the muscle every 15 minutes if needed for low blood sugar - see comments (Hypoglycemia).       heparin sodium,porcine (heparin, porcine,) 5,000 unit/mL injection Inject 1 mL (5,000 Units) under the skin every 8 hours.       insulin lispro (HumaLOG) 100 unit/mL injection Inject 0-15 Units under the skin 3 times daily (morning, midday, late afternoon). Take as directed per insulin instructions.Do not hold when patient is not eating, continue order as scheduled for hyperglycemia management.  Insulin Lispro Corrective Scale #3     Hypoglycemia protocol Call LIP unit(s) if Blood Glucose is between 0 - 70 mg/dL     0 unit(s) if Blood glucose is between    3 unit(s) if Blood glucose is between 151-200   6 unit(s) if Blood glucose is between 201-250   9 unit(s) if Blood glucose is between 251-300   12 unit(s) if Blood glucose is between 301-350   15 unit(s) if Blood glucose is between 351-400    Notify provider unit(s) if Blood Glucose is greater than 400 mg/dL       Lactobacillus acidophilus 100 mg (1 billion cell) capsule Take 1 capsule by mouth 2 times a day.   Unknown    latanoprost (Xalatan) 0.005 % ophthalmic solution Administer 1 drop into both eyes once daily at bedtime. 2.5 mL 5 Unknown    melatonin 5 mg tablet Take 1 tablet (5 mg) by mouth as needed at bedtime for sleep.   Unknown    methocarbamol (Robaxin) 500 mg tablet Take 1 tablet (500 mg) by mouth 3 times a day.   Unknown    multivitamin tablet Take 1 tablet by mouth once daily.   Unknown    ondansetron (Zofran) 4 mg/2 mL injection Infuse 2 mL (4 mg) into a venous catheter every 6 hours if needed for nausea or vomiting.       oxyCODONE (Roxicodone) 5 mg immediate release  "tablet Take 1 tablet (5 mg) by mouth every 6 hours if needed for severe pain (7 - 10) or moderate pain (4 - 6).       oxygen (O2) gas therapy Inhale 1 each continuously.       pantoprazole (ProtoNix) 40 mg EC tablet Take 1 tablet (40 mg) by mouth once daily in the morning. Take before meals. Do not crush, chew, or split.       pantoprazole (ProtoNix) 40 mg injection Infuse 40 mg into a venous catheter once daily in the morning. Take before meals. If unable to take PO.       pen needle, diabetic (PEN NEEDLE MISC) BD Altagracia- 4 mm X 32 G needle - as directed 4x a day sc 4 times per day       polyethylene glycol (Glycolax, Miralax) 17 gram packet Take 17 g by mouth once daily.   Unknown    primidone 125 mg tablet Take 125 mg by mouth 3 times a day.   Unknown    propranolol LA (Inderal LA) 60 mg 24 hr capsule Take 1 capsule (60 mg) by mouth early in the morning.. Hold for SBP < 110 mmhg, HR < 60 bpm.   Unknown    sennosides (Senokot) 8.6 mg tablet Take 1 tablet (8.6 mg) by mouth every 12 hours if needed for constipation.   Unknown    Sure Comfort Pen Needle 32 gauge x 5/32\" needle AS DIRECTED DAILY FOR 90 DAYS 100 each 11 Unknown    traZODone (Desyrel) 25 MG split tablet Take 1 half tablet (25 mg) by mouth once daily at bedtime.   Unknown    vancomycin (Vancocin) 1 gram/250 mL solution Infuse 250 mL (1 g) at 250 mL/hr over 60 minutes into a venous catheter every 12 hours. Once weekly labs CBC/diff, CMP, Vanc trough ESR, CRP fax to Dr. Juarez [phone]. Stop date 11/12/24. 62769 mL 0      Erythromycin, Morphine, and Rosuvastatin  Social History     Tobacco Use    Smoking status: Former     Types: Cigarettes     Passive exposure: Past    Smokeless tobacco: Never   Vaping Use    Vaping status: Never Used   Substance Use Topics    Alcohol use: Not Currently    Drug use: Not Currently     Family History   Problem Relation Name Age of Onset    Multiple myeloma Mother      Cancer Mother      Other (CABG) Father      Pulmonary " embolism Father      Heart disease Father      Breast cancer Sister          Stage II    Hypertension Sister      Diabetes Sister      No Known Problems Sister          x5    No Known Problems Brother          x4    No Known Problems Daughter         Hospital Medications:           Current Facility-Administered Medications:     acetaminophen (Tylenol) tablet 975 mg, 975 mg, oral, q6h PRN, Kaleb Lam PA-C, 975 mg at 10/18/24 1405    brimonidine (AlphaGAN) 0.2 % ophthalmic solution 1 drop, 1 drop, Both Eyes, BID, Kaleb Lam PA-C, 1 drop at 10/18/24 2200    calcium carbonate-vitamin D3 500 mg-5 mcg (200 unit) per tablet 1 tablet, 1 tablet, oral, Daily, Kaleb Lam PA-C, 1 tablet at 10/18/24 0930    cefepime (Maxipime) 2 g in dextrose 5% 100 mL IV, 2 g, intravenous, q12h, Kaleb Lam PA-C, Stopped at 10/18/24 2331    [START ON 10/23/2024] cefTRIAXone (Rocephin) 2 g in dextrose (iso) IV 50 mL, 2 g, intravenous, q24h, Kaleb Lam PA-C    dextrose 50 % injection 12.5 g, 12.5 g, intravenous, q15 min PRN, Kaleb Lam PA-C    dextrose 50 % injection 25 g, 25 g, intravenous, q15 min PRN, Kaleb Lam PA-C    docusate sodium (Colace) oral liquid 100 mg, 100 mg, oral, BID, Kaleb Lam PA-C, 100 mg at 10/18/24 0930    ezetimibe (Zetia) tablet 10 mg, 10 mg, oral, Daily, Kaleb Lam PA-C, 10 mg at 10/18/24 1002    glucagon (Glucagen) injection 1 mg, 1 mg, intramuscular, q15 min PRN, Kaleb Lam PA-C    glucagon (Glucagen) injection 1 mg, 1 mg, intramuscular, q15 min PRN, Kaleb Lam PA-C    heparin (porcine) injection 5,000 Units, 5,000 Units, subcutaneous, q8h, Kaleb Lam PA-C, 5,000 Units at 10/19/24 0610    HYDROmorphone (Dilaudid) injection 0.4 mg, 0.4 mg, intravenous, q2h PRN, Sabino Matos, APRN-CNP, 0.4 mg at 10/19/24 0520    insulin lispro (HumaLOG) injection 0-15 Units, 0-15 Units, subcutaneous, Before meals & nightly, Kaleb Lam PA-C, 6 Units at 10/18/24  2220    ipratropium-albuteroL (Duo-Neb) 0.5-2.5 mg/3 mL nebulizer solution 3 mL, 3 mL, nebulization, 4x daily, Kaleb Lam PA-C, 3 mL at 10/18/24 1929    latanoprost (Xalatan) 0.005 % ophthalmic solution 1 drop, 1 drop, Both Eyes, Nightly, Kaleb Lam PA-C, 1 drop at 10/18/24 2200    oxygen (O2) therapy, , inhalation, Continuous - Inhalation, Kaleb Lam PA-C, 40 L/min at 10/18/24 2109    oxygen (O2) therapy, , inhalation, Continuous - Inhalation, Kaleb Lam PA-C, 40 L/min at 10/18/24 2109    oxymetazoline (Afrin) 0.05 % nasal spray 2 spray, 2 spray, Each Nostril, q12h PRN, RUTH Salgado    primidone (Mysoline) tablet 125 mg, 125 mg, oral, Nightly, Kaleb Lam PA-C    propranolol LA (Inderal LA) 24 hr capsule 60 mg, 60 mg, oral, Daily, Kaleb Lam PA-C, 60 mg at 10/19/24 0643    sodium chloride 3 % nebulizer solution 3 mL, 3 mL, nebulization, 4x daily, Kaleb Lam PA-C, 3 mL at 10/18/24 1929    traMADol (Ultram) tablet 50 mg, 50 mg, oral, q8h PRN, Kaleb Lam PA-C    vancomycin (Vancocin) pharmacy to dose - pharmacy monitoring, , miscellaneous, Daily PRN, Kaleb Lam PA-C    Review of Systems:  14 point review of systems was completed and negative except for those specially mention in my HPI    Physical Exam:    Heart Rate:  [70-99]   Temp:  [36.4 °C (97.5 °F)-36.7 °C (98.1 °F)]   Resp:  [0-32]   BP: ()/(50-62)   Weight:  [126 kg (276 lb 14.4 oz)]   SpO2:  [92 %-99 %]     Physical Exam  Vitals and nursing note reviewed.   Constitutional:       Comments: Lethargic, arouses to physical stimuli but difficulty maintaining alertness. Tachypnic.    HENT:      Head: Normocephalic.   Eyes:      Extraocular Movements: Extraocular movements intact.      Pupils: Pupils are equal, round, and reactive to light.   Cardiovascular:      Rate and Rhythm: Normal rate and regular rhythm.      Pulses:           Radial pulses are 1+ on the right side and 1+ on the left side.         Dorsalis pedis pulses are 1+ on the right side and 1+ on the left side.      Heart sounds: Normal heart sounds, S1 normal and S2 normal.      Comments: Anasarca present  Pulmonary:      Effort: Tachypnea and accessory muscle usage present.      Breath sounds: Rhonchi and rales present. No wheezing.   Chest:      Comments: L chest pigtail catheter in place draining straw colored output.  Abdominal:      General: Abdomen is flat. Bowel sounds are normal.      Palpations: Abdomen is soft.      Tenderness: There is no abdominal tenderness.   Genitourinary:     Comments: Gibson in place draining minimal straw colored urine  Musculoskeletal:      Cervical back: Neck supple.      Right lower le+ Edema present.      Left lower le+ Edema present.   Skin:     General: Skin is warm.      Capillary Refill: Capillary refill takes less than 2 seconds.      Coloration: Skin is not cyanotic, jaundiced or mottled.   Neurological:      General: No focal deficit present.      Mental Status: She is lethargic.      GCS: GCS eye subscore is 3. GCS verbal subscore is 4. GCS motor subscore is 6.      Motor: Weakness present.       Objective:    I have reviewed all medications, laboratory results, and imaging pertinent for today's encounter.           Intake/Output Summary (Last 24 hours) at 10/19/2024 0737  Last data filed at 10/18/2024 2331  Gross per 24 hour   Intake 400 ml   Output 345 ml   Net 55 ml       Daily Labs:  CBC:   Results from last 7 days   Lab Units 10/19/24  0409   WBC AUTO x10*3/uL 7.1   HEMOGLOBIN g/dL 7.7*   HEMATOCRIT % 25.2*   MCV fL 101*     BMP:    Results from last 7 days   Lab Units 10/19/24  0409   SODIUM mmol/L 142   POTASSIUM mmol/L 4.0   CHLORIDE mmol/L 112*   CO2 mmol/L 21   BUN mg/dL 49*   CREATININE mg/dL 2.01*   CALCIUM mg/dL 7.8*   GLUCOSE mg/dL 121*   MAGNESIUM mg/dL 2.09       Assessment/Plan:    Narda Malloy is a 69 y.o. year old female patient with past medical history of L1-L3 lumbar  laminectomy, T4-S1 revision, and fusion August 26th, T2DM, HTN, essential tremor, HLD, glaucoma, sarcoidosis of the lung who presented to  ED 10/12 after being found unresponsive at her nursing facility. Initially intubated and admitted to ICU for septic shock. Extubated 10/15. Has been on and off and now back on levophed. Currently requiring Vapotherm vs BIPAP.     I am currently managing this critically ill patient for the following problems:     Neuro/Psych/Pain Ctrl/Sedation:   Acute encephalopathy - likely secondary to hypercapnea  Hx Essential tremor   CT head: Negative for acute findings   Urine tox positive for barbiturates consistent with primidone intake   - Addressing underlying causes  - Pain Control: Acetaminophen PRN  - Home primidone continued. Will hold propanolol d/t hypotension  - CAM ICU qshift, sleep-wake hygiene, delirium precautions      Respiratory/ENT:  Acute hypoxic/hypercapnic respiratory failure - likely multifactorial and 2/2 HCAP, pl effusions, volume overload  Healthcare-associated pneumonia   Atelectasis - likely 2/2 mucus plugging   CXR 10/19: Bilateral atelectasis and pleural effusions with very poor aeration of the lungs, unchanged  ABG this am with mixed respiratory and metabolic acidosis: 7.19-57-77-21  S/p left-sided pigtail placement 10/17, 200 out overnight. Transudative.   - Supplemental O2: Desaturating on Vapotherm  40L/70% --> transitioned to BIPAP 18/5 50%  - Repeat ABG to ensure improvement with BIPAP therapy. May require intubation if worsened or unimproved.  - Will wean O2 as tolerated to maintain SpO2 >92%  - Add lasix 40 mg IV + gtt at 10 mg/hr  - Duonebs and mucomyst QID   - IPV per RT TID  - Will consider right-sided thoracentesis   - Continuous pulse ox monitoring   - Pulm hygiene     Cardiovascular:   Septic shock - improving  Hx HTN, HLD  Acute on chronic diastolic heart failure  TTE 10/1: diastolic dysfunction, LVEF 50-55%, mildly elevated RVSP (40)  Bilateral  duplex US upper extremities:  Thrombosis within the left cephalic vein, which is part of the superficial venous system of the upper extremity, adjacent to PICC line. No deep venous thrombosis in the upper extremities.  - Holding home propranolol (on for tremors)  - Continue home Zetia  - Continuous cardiac monitoring per ICU protocol  - EKGs PRN for ACS symptoms, arrhythmias      GI:  Hx GERD  - Diet: Currently NPO on continuous BIPAP. Will re attempt NG later if respiratory status improves  - BR: Colace  - GI Prophylaxis: PPI     Renal/Volume Status/Electrolytes:  Acute kidney injury - possibly ATN +/- medication-induced (vancomycin)  Anasarca   Mixed respiratory and metabolic acidosis  Hypoalbuminemia   Baseline Cr 0.8-1.0. BUN Cr today 49/2.01  Vanco levels remain elevated: 28.4 this am. Pharmacy dosing and holding d/t high levels.   - Remains grossly volume overloaded and oliguric. Will give 40 IV lasix + gtt.   - Maintain anders catheter  - Hourly I/O's. Goal urine output 0.5-1.0cc/kg/hr however has been oliguric with UO 0.1 ml/kg/hr.  - Replete electrolytes to maintain K >4.0 and Mg >2.0  - Daily BMP, Mg, Phos    Endocrine:  T2DM  - SSI Q 6 while NPO  - TSH: midly elevated, T4 WNL  - Hypoglycemia protocol PRN      Infectious Disease:  Thoracolumbar surgical site infection - s/p I&D x 2. Recent MRSA bacteremia on extended course of IV antibiotics (vanc and rocephin -> 11/12)  Healthcare-associated pneumonia   CT lumbar spine 10/12: Unable to r/o abscess. Unable to do MRI d/t spinal hardware.  Blood cultures 10/16: Negative  Urine culture 10/14: Negative  Sputum culture 10/16: + pseudomonas   - Antibiotics: Continue cefepime until 10/22 per ID, then will be on Ceftriaxone until 11/12.   - Vanco hold today d/t continued high levels. Dosing of vanco per pharmacy.   - ID consulted, appreciate assistance   - Replace midline prior to discharge per ID recs  - Monitor SIRS criteria     Heme/Onc:  Normocytic anemia   H/H  similar to previous: 7.7/25.2. No active signs of bleeding.  - Monitor for s/sx of bleeding   - Plan to transfuse if Hgb <7.0   - Daily CBC     MSK:  No active concerns   - PT/OT when appropriate     Derm:  Surgical wound with infection  - Wound care consult  - ICU skin protocol  - Q2hr turns  - Padded pressure points      Ethics/Code Status:  Full code - discussed this with  at bedside      :  DVT Prophylaxis: SQH  GI Prophylaxis: PPI  Bowel Regimen: Colace  Diet: NPO  CVC: Midline  Harper: None  Gibson: yes, replaced 10/12  Restraints: No  Disposition: ICU  I have reviewed all medications, laboratory results, and imaging pertinent for today's encounter.  Plan Discussed with Dr. Baker,  at bedside, patient, nursing  Critical Care Time: 45 minutes  Critical Care [place] Edward CNP

## 2024-10-19 NOTE — PROGRESS NOTES
Occupational Therapy                 Therapy Communication Note    Patient Name: Narda Malloy  MRN: [id number]  Department: [facility] 3 S ICU  Room: 11/11-A  Today's Date: 10/19/2024     Discipline: Occupational Therapy    Missed Visit Reason: Missed Visit Reason: Cancel    Missed Time: Cancel    Comment:  Patient is a cancel per nursing request (Jaylin Hickman) due to decreased respiratory status and currently on the the Bipap

## 2024-10-19 NOTE — CARE PLAN
Problem: Respiratory  Goal: Patent airway maintained this shift  Outcome: Progressing  Goal: Verbalize decreased shortness of breath this shift  Outcome: Progressing  Goal: Wean oxygen to maintain O2 saturation per order/standard this shift  Outcome: Progressing

## 2024-10-19 NOTE — CARE PLAN
The patient's goals for the shift include      The clinical goals for the shift include patient will remain hemodynamically stable    Over the shift, the patient did not make progress toward the following goals. Barriers to progression include . Recommendations to address these barriers include .

## 2024-10-19 NOTE — PROCEDURES
Insert arterial line    Date/Time: 10/19/2024 5:55 PM    Performed by: RUTH Salas  Authorized by: RUTH Salas    Consent:     Consent obtained:  Emergent situation  Universal protocol:     Procedure explained and questions answered to patient or proxy's satisfaction: yes      Test results available: yes      Immediately prior to procedure, a time out was called: yes      Patient identity confirmed:  Hospital-assigned identification number and arm band  Indications:     Indications: hemodynamic monitoring and multiple ABGs    Pre-procedure details:     Skin preparation:  Chlorhexidine  Sedation:     Sedation type:  Moderate sedation  Anesthesia:     Anesthesia method:  Local infiltration    Local anesthetic:  Lidocaine 1% w/o epi  Procedure details:     Location:  R radial    Bill's test performed: yes      Bill's test abnormal: no      Needle gauge:  20 G    Placement technique:  Seldinger    Number of attempts:  1    Transducer: waveform confirmed    Post-procedure details:     Post-procedure:  Sterile dressing applied and sutured    Procedure completion:  Tolerated well, no immediate complications

## 2024-10-19 NOTE — SIGNIFICANT EVENT
At pt bedside for rounds. Pt is currently on continuous bipap and will be trilled off of bipap this afternoon.

## 2024-10-19 NOTE — PROGRESS NOTES
Physical Therapy                 Therapy Communication Note    Patient Name: Narda Malloy  MRN: [id number]  Department: [address] ICU  Room: 11/11-A  Today's Date: 10/19/2024     Discipline: Physical Therapy    Missed Visit Reason: Missed Visit Reason: Cancel    Missed Time: Cancel    Comment: Patient is a cancel per nursing request (Jaylin Hickman) due to decreased respiratory status and currently on the the Bipap.

## 2024-10-19 NOTE — PROCEDURES
Intubation    Date/Time: 10/19/2024 5:30 PM    Performed by: RUTH Salas  Authorized by: RUTH Salas    Consent:     Consent obtained:  Emergent situation    Consent given by:  Spouse    Risks, benefits, and alternatives were discussed: yes      Risks discussed:  Hypoxia and death    Alternatives discussed:  No treatment and observation  Universal protocol:     Procedure explained and questions answered to patient or proxy's satisfaction: yes      Relevant documents present and verified: yes      Test results available: yes      Imaging studies available: yes      Required blood products, implants, devices, and special equipment available: yes      Site/side marked: no      Immediately prior to procedure, a time out was called: yes      Patient identity confirmed:  Hospital-assigned identification number and arm band  Pre-procedure details:     Indications: altered consciousness and respiratory failure      Patient status:  Altered mental status    Look externally: no concerns      Mouth opening - incisor distance:  2 finger widths    Hyoid-mental distance: 2 finger widths      Hyoid-thyroid distance: 2 or more finger widths      Obstruction: none      Neck mobility: normal      Pharmacologic strategy: RSI      Induction agents:  Etomidate    Paralytics:  Rocuronium  Procedure details:     Preoxygenation:  BiLevel    CPR in progress: no      Number of attempts:  1  Successful intubation attempt details:     Intubation method:  Oral    Intubation technique: video assisted      Laryngoscope blade:  Mac 3 and hypercurved    Bougie used: no      Grade view: I      Tube size (mm):  7.5    Tube type:  Cuffed    Tube visualized through cords: yes    Placement assessment:     ETT at teeth/gumline (cm):  23    Tube secured with:  ETT hinojosa    Breath sounds:  Equal    Placement verification: chest rise, colorimetric ETCO2, CXR verification, direct visualization and equal breath sounds      CXR  findings:  Appropriate position  Post-procedure details:     Procedure completion:  Tolerated well, no immediate complications

## 2024-10-19 NOTE — PROGRESS NOTES
[facility]    Narda Malloy is a 69 y.o. year old female who Pharmacy has been consulted for vancomycin dosing for Vancomycin Indications: Skin & Soft Tissue. Based on the patient's indication and renal status this patient will be dosed based on a target level of SSTI/UTI: 10-15 mcg/mL    Patient is currently in [facility]    Last vancomycin dose (incl. date and time): 1000 mg q12h at OSH    Vancomycin level (incl. date and time): 28.4 mcg/mL on 10/19 at 4:09    Lab Results   Component Value Date    VANCORANDOM 28.4 (H) 10/19/2024    VANCOTROUGH 11.8 08/08/2018       Visit Vitals  BP 99/53   Pulse 90   Temp 36.6 °C (97.9 °F) (Temporal)   Resp (!) 0           Lab Results   Component Value Date    CREATININE 2.01 (H) 10/19/2024    CREATININE 1.62 (H) 10/18/2024    CREATININE 1.38 (H) 10/17/2024    CREATININE 1.23 (H) 10/16/2024       I/O last 3 completed shifts:  In: 500 (4 mL/kg) [Blood:200; IV Piggyback:300]  Out: 1535 (12.2 mL/kg) [Urine:355 (0.1 mL/kg/hr); Chest Tube:1180]  Weight: 125.6 kg     Assessment/Plan     Level is above target trough goal.   hold vancomycin.  Random level within 24 hours will be obtained on 10/20 at 5:00. May be obtained sooner if clinically indicated.   Will continue to monitor renal function daily while on vancomycin and order serum creatinine at least every 48 hours if not already ordered.  Follow for continued vancomycin needs, clinical response, and signs/symptoms of toxicity.     Yoel Moore, PharmD

## 2024-10-20 VITALS
SYSTOLIC BLOOD PRESSURE: 118 MMHG | RESPIRATION RATE: 23 BRPM | HEIGHT: 70 IN | DIASTOLIC BLOOD PRESSURE: 60 MMHG | OXYGEN SATURATION: 96 % | BODY MASS INDEX: 38.57 KG/M2 | HEART RATE: 77 BPM | TEMPERATURE: 98.1 F | WEIGHT: 269.4 LBS

## 2024-10-20 LAB
ALBUMIN SERPL BCP-MCNC: 2.9 G/DL (ref 3.4–5)
ALBUMIN SERPL BCP-MCNC: 2.9 G/DL (ref 3.4–5)
ALP SERPL-CCNC: 98 U/L (ref 33–136)
ALT SERPL W P-5'-P-CCNC: 5 U/L (ref 7–45)
ANION GAP BLDA CALCULATED.4IONS-SCNC: 13 MMO/L (ref 10–25)
ANION GAP SERPL CALCULATED.3IONS-SCNC: 15 MMOL/L (ref 10–20)
APPARATUS: ABNORMAL
ARTERIAL PATENCY WRIST A: ABNORMAL
AST SERPL W P-5'-P-CCNC: 7 U/L (ref 9–39)
BACTERIA FLD CULT: NORMAL
BASE EXCESS BLDA CALC-SCNC: -6 MMOL/L (ref -2–3)
BASOPHILS # BLD AUTO: 0.02 X10*3/UL (ref 0–0.1)
BASOPHILS NFR BLD AUTO: 0.2 %
BILIRUB DIRECT SERPL-MCNC: 0.1 MG/DL (ref 0–0.3)
BILIRUB SERPL-MCNC: 0.5 MG/DL (ref 0–1.2)
BODY TEMPERATURE: 37 DEGREES CELSIUS
BUN SERPL-MCNC: 52 MG/DL (ref 6–23)
CA-I BLDA-SCNC: 1.16 MMOL/L (ref 1.1–1.33)
CALCIUM SERPL-MCNC: 7.8 MG/DL (ref 8.6–10.3)
CHLORIDE BLDA-SCNC: 110 MMOL/L (ref 98–107)
CHLORIDE SERPL-SCNC: 111 MMOL/L (ref 98–107)
CO2 SERPL-SCNC: 19 MMOL/L (ref 21–32)
CREAT SERPL-MCNC: 2.41 MG/DL (ref 0.5–1.05)
EGFRCR SERPLBLD CKD-EPI 2021: 21 ML/MIN/1.73M*2
EOSINOPHIL # BLD AUTO: 0.1 X10*3/UL (ref 0–0.7)
EOSINOPHIL NFR BLD AUTO: 1.1 %
ERYTHROCYTE [DISTWIDTH] IN BLOOD BY AUTOMATED COUNT: 18.9 % (ref 11.5–14.5)
GLUCOSE BLD MANUAL STRIP-MCNC: 168 MG/DL (ref 74–99)
GLUCOSE BLD MANUAL STRIP-MCNC: 171 MG/DL (ref 74–99)
GLUCOSE BLD MANUAL STRIP-MCNC: 178 MG/DL (ref 74–99)
GLUCOSE BLD MANUAL STRIP-MCNC: 181 MG/DL (ref 74–99)
GLUCOSE BLDA-MCNC: 164 MG/DL (ref 74–99)
GLUCOSE SERPL-MCNC: 160 MG/DL (ref 74–99)
GRAM STN SPEC: NORMAL
GRAM STN SPEC: NORMAL
HCO3 BLDA-SCNC: 18.2 MMOL/L (ref 22–26)
HCT VFR BLD AUTO: 23.6 % (ref 36–46)
HCT VFR BLD EST: 24 % (ref 36–46)
HGB BLD-MCNC: 7.6 G/DL (ref 12–16)
HGB BLDA-MCNC: 8 G/DL (ref 12–16)
IMM GRANULOCYTES # BLD AUTO: 0.12 X10*3/UL (ref 0–0.7)
IMM GRANULOCYTES NFR BLD AUTO: 1.3 % (ref 0–0.9)
INHALED O2 CONCENTRATION: 60 %
LACTATE BLDA-SCNC: 0.8 MMOL/L (ref 0.4–2)
LYMPHOCYTES # BLD AUTO: 1.11 X10*3/UL (ref 1.2–4.8)
LYMPHOCYTES NFR BLD AUTO: 11.8 %
MAGNESIUM SERPL-MCNC: 2.06 MG/DL (ref 1.6–2.4)
MCH RBC QN AUTO: 30.9 PG (ref 26–34)
MCHC RBC AUTO-ENTMCNC: 32.2 G/DL (ref 32–36)
MCV RBC AUTO: 96 FL (ref 80–100)
MONOCYTES # BLD AUTO: 0.83 X10*3/UL (ref 0.1–1)
MONOCYTES NFR BLD AUTO: 8.8 %
NEUTROPHILS # BLD AUTO: 7.26 X10*3/UL (ref 1.2–7.7)
NEUTROPHILS NFR BLD AUTO: 76.8 %
NRBC BLD-RTO: 0 /100 WBCS (ref 0–0)
OXYHGB MFR BLDA: 97.5 % (ref 94–98)
PCO2 BLDA: 30 MM HG (ref 38–42)
PEEP CMH2O: 5 CM H2O
PH BLDA: 7.39 PH (ref 7.38–7.42)
PHOSPHATE SERPL-MCNC: 3.5 MG/DL (ref 2.5–4.9)
PLATELET # BLD AUTO: 320 X10*3/UL (ref 150–450)
PO2 BLDA: 165 MM HG (ref 85–95)
POTASSIUM BLDA-SCNC: 3.8 MMOL/L (ref 3.5–5.3)
POTASSIUM SERPL-SCNC: 3.9 MMOL/L (ref 3.5–5.3)
PROT SERPL-MCNC: 5.1 G/DL (ref 6.4–8.2)
RBC # BLD AUTO: 2.46 X10*6/UL (ref 4–5.2)
SAO2 % BLDA: 100 % (ref 94–100)
SODIUM BLDA-SCNC: 137 MMOL/L (ref 136–145)
SODIUM SERPL-SCNC: 141 MMOL/L (ref 136–145)
SPECIMEN DRAWN FROM PATIENT: ABNORMAL
TIDAL VOLUME: 450 ML
VANCOMYCIN SERPL-MCNC: 27.2 UG/ML (ref 5–20)
VENTILATOR MODE: ABNORMAL
VENTILATOR RATE: 24 BPM
WBC # BLD AUTO: 9.4 X10*3/UL (ref 4.4–11.3)

## 2024-10-20 PROCEDURE — P9047 ALBUMIN (HUMAN), 25%, 50ML: HCPCS

## 2024-10-20 PROCEDURE — 36600 WITHDRAWAL OF ARTERIAL BLOOD: CPT

## 2024-10-20 PROCEDURE — 2500000002 HC RX 250 W HCPCS SELF ADMINISTERED DRUGS (ALT 637 FOR MEDICARE OP, ALT 636 FOR OP/ED)

## 2024-10-20 PROCEDURE — 2500000004 HC RX 250 GENERAL PHARMACY W/ HCPCS (ALT 636 FOR OP/ED)

## 2024-10-20 PROCEDURE — 84132 ASSAY OF SERUM POTASSIUM: CPT

## 2024-10-20 PROCEDURE — 37799 UNLISTED PX VASCULAR SURGERY: CPT

## 2024-10-20 PROCEDURE — 2500000004 HC RX 250 GENERAL PHARMACY W/ HCPCS (ALT 636 FOR OP/ED): Performed by: NURSE PRACTITIONER

## 2024-10-20 PROCEDURE — 80202 ASSAY OF VANCOMYCIN: CPT

## 2024-10-20 PROCEDURE — 94668 MNPJ CHEST WALL SBSQ: CPT

## 2024-10-20 PROCEDURE — 2020000001 HC ICU ROOM DAILY

## 2024-10-20 PROCEDURE — 31720 CLEARANCE OF AIRWAYS: CPT

## 2024-10-20 PROCEDURE — 2500000002 HC RX 250 W HCPCS SELF ADMINISTERED DRUGS (ALT 637 FOR MEDICARE OP, ALT 636 FOR OP/ED): Performed by: NURSE PRACTITIONER

## 2024-10-20 PROCEDURE — 2500000004 HC RX 250 GENERAL PHARMACY W/ HCPCS (ALT 636 FOR OP/ED): Performed by: INTERNAL MEDICINE

## 2024-10-20 PROCEDURE — 82040 ASSAY OF SERUM ALBUMIN: CPT

## 2024-10-20 PROCEDURE — 2500000005 HC RX 250 GENERAL PHARMACY W/O HCPCS

## 2024-10-20 PROCEDURE — 99291 CRITICAL CARE FIRST HOUR: CPT | Performed by: NURSE PRACTITIONER

## 2024-10-20 PROCEDURE — 83735 ASSAY OF MAGNESIUM: CPT

## 2024-10-20 PROCEDURE — 2500000001 HC RX 250 WO HCPCS SELF ADMINISTERED DRUGS (ALT 637 FOR MEDICARE OP)

## 2024-10-20 PROCEDURE — 82947 ASSAY GLUCOSE BLOOD QUANT: CPT

## 2024-10-20 PROCEDURE — 94640 AIRWAY INHALATION TREATMENT: CPT

## 2024-10-20 PROCEDURE — 2500000005 HC RX 250 GENERAL PHARMACY W/O HCPCS: Performed by: NURSE PRACTITIONER

## 2024-10-20 PROCEDURE — 85025 COMPLETE CBC W/AUTO DIFF WBC: CPT

## 2024-10-20 PROCEDURE — 94003 VENT MGMT INPAT SUBQ DAY: CPT

## 2024-10-20 RX ORDER — POTASSIUM CHLORIDE 1.5 G/1.58G
20 POWDER, FOR SOLUTION ORAL ONCE
Status: COMPLETED | OUTPATIENT
Start: 2024-10-20 | End: 2024-10-20

## 2024-10-20 RX ORDER — FUROSEMIDE 10 MG/ML
60 INJECTION INTRAMUSCULAR; INTRAVENOUS EVERY 12 HOURS
Status: DISCONTINUED | OUTPATIENT
Start: 2024-10-20 | End: 2024-10-21

## 2024-10-20 RX ADMIN — BRIMONIDINE TARTRATE 1 DROP: 2 SOLUTION OPHTHALMIC at 20:17

## 2024-10-20 RX ADMIN — EZETIMIBE 10 MG: 10 TABLET ORAL at 08:01

## 2024-10-20 RX ADMIN — Medication 40 PERCENT: at 07:22

## 2024-10-20 RX ADMIN — PRIMIDONE 125 MG: 250 TABLET ORAL at 20:17

## 2024-10-20 RX ADMIN — PROPOFOL 10 MCG/KG/MIN: 10 INJECTION, EMULSION INTRAVENOUS at 08:28

## 2024-10-20 RX ADMIN — Medication 3 ML: at 07:22

## 2024-10-20 RX ADMIN — LATANOPROST 1 DROP: 50 SOLUTION OPHTHALMIC at 20:17

## 2024-10-20 RX ADMIN — DOCUSATE SODIUM 100 MG: 50 LIQUID ORAL at 08:01

## 2024-10-20 RX ADMIN — IPRATROPIUM BROMIDE AND ALBUTEROL SULFATE 3 ML: .5; 3 SOLUTION RESPIRATORY (INHALATION) at 07:22

## 2024-10-20 RX ADMIN — Medication 3 ML: at 19:05

## 2024-10-20 RX ADMIN — INSULIN LISPRO 3 UNITS: 100 INJECTION, SOLUTION INTRAVENOUS; SUBCUTANEOUS at 13:12

## 2024-10-20 RX ADMIN — CEFEPIME 2 G: 2 INJECTION, POWDER, FOR SOLUTION INTRAVENOUS at 07:56

## 2024-10-20 RX ADMIN — BRIMONIDINE TARTRATE 1 DROP: 2 SOLUTION OPHTHALMIC at 08:01

## 2024-10-20 RX ADMIN — ALBUMIN HUMAN 50 G: 0.25 SOLUTION INTRAVENOUS at 00:17

## 2024-10-20 RX ADMIN — FUROSEMIDE 10 MG/HR: 10 INJECTION, SOLUTION INTRAMUSCULAR; INTRAVENOUS at 00:20

## 2024-10-20 RX ADMIN — IPRATROPIUM BROMIDE AND ALBUTEROL SULFATE 3 ML: .5; 3 SOLUTION RESPIRATORY (INHALATION) at 14:44

## 2024-10-20 RX ADMIN — DOCUSATE SODIUM 100 MG: 50 LIQUID ORAL at 20:17

## 2024-10-20 RX ADMIN — CEFEPIME 2 G: 2 INJECTION, POWDER, FOR SOLUTION INTRAVENOUS at 20:17

## 2024-10-20 RX ADMIN — HEPARIN SODIUM 5000 UNITS: 5000 INJECTION, SOLUTION INTRAVENOUS; SUBCUTANEOUS at 06:17

## 2024-10-20 RX ADMIN — INSULIN LISPRO 3 UNITS: 100 INJECTION, SOLUTION INTRAVENOUS; SUBCUTANEOUS at 05:30

## 2024-10-20 RX ADMIN — Medication 3 ML: at 11:03

## 2024-10-20 RX ADMIN — NOREPINEPHRINE BITARTRATE 0.03 MCG/KG/MIN: 8 INJECTION, SOLUTION INTRAVENOUS at 06:09

## 2024-10-20 RX ADMIN — Medication 3 ML: at 14:44

## 2024-10-20 RX ADMIN — IPRATROPIUM BROMIDE AND ALBUTEROL SULFATE 3 ML: .5; 3 SOLUTION RESPIRATORY (INHALATION) at 11:03

## 2024-10-20 RX ADMIN — PANTOPRAZOLE SODIUM 40 MG: 40 INJECTION, POWDER, FOR SOLUTION INTRAVENOUS at 06:17

## 2024-10-20 RX ADMIN — INSULIN LISPRO 3 UNITS: 100 INJECTION, SOLUTION INTRAVENOUS; SUBCUTANEOUS at 18:01

## 2024-10-20 RX ADMIN — POTASSIUM CHLORIDE 20 MEQ: 1.5 FOR SOLUTION ORAL at 07:59

## 2024-10-20 RX ADMIN — INSULIN LISPRO 3 UNITS: 100 INJECTION, SOLUTION INTRAVENOUS; SUBCUTANEOUS at 23:35

## 2024-10-20 RX ADMIN — Medication 40 PERCENT: at 19:11

## 2024-10-20 RX ADMIN — FUROSEMIDE 60 MG: 10 INJECTION, SOLUTION INTRAMUSCULAR; INTRAVENOUS at 13:14

## 2024-10-20 RX ADMIN — HEPARIN SODIUM 5000 UNITS: 5000 INJECTION, SOLUTION INTRAVENOUS; SUBCUTANEOUS at 13:15

## 2024-10-20 RX ADMIN — IPRATROPIUM BROMIDE AND ALBUTEROL SULFATE 3 ML: .5; 3 SOLUTION RESPIRATORY (INHALATION) at 19:10

## 2024-10-20 RX ADMIN — HEPARIN SODIUM 5000 UNITS: 5000 INJECTION, SOLUTION INTRAVENOUS; SUBCUTANEOUS at 23:35

## 2024-10-20 ASSESSMENT — PAIN - FUNCTIONAL ASSESSMENT
PAIN_FUNCTIONAL_ASSESSMENT: CPOT (CRITICAL CARE PAIN OBSERVATION TOOL)

## 2024-10-20 NOTE — CARE PLAN
The clinical goals for the shift include Remain hemodynamically stable and monitor vital signs    Over the shift, the patient did make progress toward the following goals.       Problem: Pain - Adult  Goal: Verbalizes/displays adequate comfort level or baseline comfort level  Outcome: Progressing     Problem: Safety - Adult  Goal: Free from fall injury  Outcome: Progressing     Problem: Skin  Goal: Decreased wound size/increased tissue granulation at next dressing change  Outcome: Progressing  Flowsheets (Taken 10/20/2024 0601)  Decreased wound size/increased tissue granulation at next dressing change:   Promote sleep for wound healing   Protective dressings over bony prominences   Utilize specialty bed per algorithm  Goal: Participates in plan/prevention/treatment measures  Outcome: Progressing  Flowsheets (Taken 10/20/2024 0601)  Participates in plan/prevention/treatment measures: Elevate heels  Goal: Prevent/manage excess moisture  Outcome: Progressing  Flowsheets (Taken 10/20/2024 0601)  Prevent/manage excess moisture:   Cleanse incontinence/protect with barrier cream   Moisturize dry skin   Follow provider orders for dressing changes   Monitor for/manage infection if present  Goal: Prevent/minimize sheer/friction injuries  Outcome: Progressing  Flowsheets (Taken 10/20/2024 0601)  Prevent/minimize sheer/friction injuries:   Complete micro-shifts as needed if patient unable. Adjust patient position to relieve pressure points, not a full turn   HOB 30 degrees or less   Increase activity/out of bed for meals   Turn/reposition every 2 hours/use positioning/transfer devices   Use pull sheet   Utilize specialty bed per algorithm  Goal: Promote/optimize nutrition  Outcome: Progressing  Flowsheets (Taken 10/20/2024 0601)  Promote/optimize nutrition: Monitor/record intake including meals  Goal: Promote skin healing  Outcome: Progressing  Flowsheets (Taken 10/20/2024 0601)  Promote skin healing:   Assess skin/pad under  line(s)/device(s)   Ensure correct size (line/device) and apply per  instructions   Protective dressings over bony prominences   Rotate device position/do not position patient on device   Turn/reposition every 2 hours/use positioning/transfer devices

## 2024-10-20 NOTE — CONSULTS
.Reason For Consult  Acute kidney injury low urine output and anasarca    History Of Present Illness  Narda Malloy is a 69 y.o. female with a very complicated medical and surgical history she is known to have history of diabetes mellitus type 2, history of peripheral neuropathy, hypertension, history of recent laminectomy complicated with infection, history of sarcoidosis who initially presented to the emergency department because of an episode of unresponsiveness since then she had a very traumatic hospitalization complicated with hypotension requiring pressors acute respiratory failure requiring intubation and mechanical ventilation also she had vancomycin toxicity with a level up to 53.9, also she had a chest tube placed for pleural effusion I was asked to see the patient today on renal consultation because of a new onset of acute kidney injury with rising creatinine level with diminished renal output Niccoli the patient is intubated on the vent she is on a Lasix drip also she is on sedation as well as norepinephrine drip to support blood pressure.     Review of Systems  Not able to obtain because of the ventilator status    Past Medical History  She has a past medical history of Degenerative myopia, bilateral, Diabetic neuropathy (Multi), Difficult intubation (08/26/2024), DM type 2 (diabetes mellitus, type 2) (Multi), Dry eye syndrome of bilateral lacrimal glands, Essential hypertension, Essential tremor, Glaucoma, Hyperlipidemia, Long term (current) use of insulin (Multi), Low back pain, PONV (postoperative nausea and vomiting), Primary open angle glaucoma of both eyes, severe stage, Repeated falls, Sarcoidosis of lung (Multi), Spinal stenosis, lumbar region without neurogenic claudication, and Weakness.    Surgical History  She has a past surgical history that includes Carpal tunnel release; Vitrectomy (Right, 2013); Cataract extraction w/  intraocular lens implant (Bilateral); Panretinal photocoagulation  (2014); Foot surgery; Insertion / removal cranial DBS generator; Thoracic Fusion (08/26/2024); Lumbar fusion; Blepharoplasty (07/2022); and Breast surgery (05/20/2022).     Social History  She reports that she has quit smoking. Her smoking use included cigarettes. She has been exposed to tobacco smoke. She has never used smokeless tobacco. She reports that she does not currently use alcohol. She reports that she does not currently use drugs.    Family History  Family History   Problem Relation Name Age of Onset    Multiple myeloma Mother      Cancer Mother      Other (CABG) Father      Pulmonary embolism Father      Heart disease Father      Breast cancer Sister          Stage II    Hypertension Sister      Diabetes Sister      No Known Problems Sister          x5    No Known Problems Brother          x4    No Known Problems Daughter          Current Facility-Administered Medications:     acetaminophen (Tylenol) tablet 975 mg, 975 mg, oral, q6h PRN, Kaleb Lam PA-C, 975 mg at 10/18/24 1405    alteplase (Cathflo Activase) injection 2 mg, 2 mg, intra-catheter, PRN, Kaleb Lam PA-C    brimonidine (AlphaGAN) 0.2 % ophthalmic solution 1 drop, 1 drop, Both Eyes, BID, Kaleb Lam PA-C, 1 drop at 10/20/24 0801    [Held by provider] calcium carbonate-vitamin D3 500 mg-5 mcg (200 unit) per tablet 1 tablet, 1 tablet, oral, Daily, Kaleb Lam PA-C, 1 tablet at 10/18/24 0930    cefepime (Maxipime) 2 g in dextrose 5% 100 mL IV, 2 g, intravenous, q12h, Kaleb Lam PA-C, Stopped at 10/20/24 0828    [START ON 10/23/2024] cefTRIAXone (Rocephin) 2 g in dextrose (iso) IV 50 mL, 2 g, intravenous, q24h, Kaleb Lam PA-C    dextrose 50 % injection 12.5 g, 12.5 g, intravenous, q15 min PRN, Kaleb Lam PA-C    dextrose 50 % injection 25 g, 25 g, intravenous, q15 min PRN, Kaleb Lam PA-C    docusate sodium (Colace) oral liquid 100 mg, 100 mg, oral, BID, Kaleb Lam PA-C, 100 mg at 10/20/24  0801    ezetimibe (Zetia) tablet 10 mg, 10 mg, oral, Daily, Kaleb Lam PA-C, 10 mg at 10/20/24 0801    fentaNYL PF (Sublimaze) injection 25 mcg, 25 mcg, intravenous, q2h PRN, Kaleb Lam PA-C    furosemide (Lasix) 500 mg in 50 mL (10 mg/mL) infusion, 10 mg/hr, intravenous, Continuous, RUTH Salas, Last Rate: 1 mL/hr at 10/20/24 1102, 10 mg/hr at 10/20/24 1102    glucagon (Glucagen) injection 1 mg, 1 mg, intramuscular, q15 min PRN, Kaleb Lam PA-C    glucagon (Glucagen) injection 1 mg, 1 mg, intramuscular, q15 min PRN, Kaleb Lam PA-C    heparin (porcine) injection 5,000 Units, 5,000 Units, subcutaneous, q8h, Kaleb Lam PA-C, 5,000 Units at 10/20/24 0617    heparin flush 10 unit/mL syringe 50 Units, 50 Units, intra-catheter, PRN, Kaleb Lam PA-C, 50 Units at 10/19/24 2313    [Held by provider] HYDROmorphone (Dilaudid) injection 0.4 mg, 0.4 mg, intravenous, q2h PRN, RUTH Salgado, 0.4 mg at 10/19/24 0520    insulin lispro (HumaLOG) injection 0-15 Units, 0-15 Units, subcutaneous, q6h, RUTH Salas, 3 Units at 10/20/24 0530    ipratropium-albuteroL (Duo-Neb) 0.5-2.5 mg/3 mL nebulizer solution 3 mL, 3 mL, nebulization, 4x daily, Kaleb Lam PA-C, 3 mL at 10/20/24 1103    latanoprost (Xalatan) 0.005 % ophthalmic solution 1 drop, 1 drop, Both Eyes, Nightly, Kaleb Lam PA-C, 1 drop at 10/19/24 2346    norepinephrine (Levophed) 8 mg in dextrose 5% 250 mL (0.032 mg/mL) infusion (premix), 0-0.5 mcg/kg/min, intravenous, Continuous, LEONEL Salas-CNP, Last Rate: 7.09 mL/hr at 10/20/24 1102, 0.03 mcg/kg/min at 10/20/24 1102    oxygen (O2) therapy, , inhalation, Continuous - Inhalation, Kaleb Lam PA-C, 40 percent at 10/20/24 0722    oxymetazoline (Afrin) 0.05 % nasal spray 2 spray, 2 spray, Each Nostril, q12h PRN, LEONEL Salgado-CNP    pantoprazole (ProtoNix) EC tablet 40 mg, 40 mg, oral, Daily before breakfast **OR** pantoprazole  (ProtoNix) injection 40 mg, 40 mg, intravenous, Daily before breakfast, Resha GILLIAN Vatkurt, APRN-CNP, 40 mg at 10/20/24 0617    primidone (Mysoline) tablet 125 mg, 125 mg, oral, Nightly, Kaleb Lam PA-C, 125 mg at 10/19/24 2345    propofol (Diprivan) infusion, 0-50 mcg/kg/min, intravenous, Continuous, Resha R Vatty, APRN-CNP, Last Rate: 7.56 mL/hr at 10/20/24 1102, 10 mcg/kg/min at 10/20/24 1102    [Held by provider] propranolol LA (Inderal LA) 24 hr capsule 60 mg, 60 mg, oral, Daily, Kaleb Lam PA-C, 60 mg at 10/19/24 0643    sodium chloride 3 % nebulizer solution 3 mL, 3 mL, nebulization, 4x daily, Kaleb Lam PA-C, 3 mL at 10/20/24 1103    [Held by provider] traMADol (Ultram) tablet 50 mg, 50 mg, oral, q8h PRN, Kaleb Lam PA-C    vancomycin (Vancocin) pharmacy to dose - pharmacy monitoring, , miscellaneous, Daily PRN, Kaleb Lam PA-C   Allergies  Erythromycin, Morphine, and Rosuvastatin         Physical Exam  Physical Exam  Constitutional:       General: She is not in acute distress.     Appearance: She is not toxic-appearing.      Comments: The patient is intubated on the vent and sedated   HENT:      Head: Normocephalic and atraumatic.   Eyes:      Extraocular Movements: Extraocular movements intact.      Pupils: Pupils are equal, round, and reactive to light.   Neck:      Vascular: No carotid bruit.   Cardiovascular:      Rate and Rhythm: Normal rate and regular rhythm.   Pulmonary:      Effort: No respiratory distress.      Breath sounds: No stridor. No wheezing, rhonchi or rales.   Chest:      Chest wall: No tenderness.   Abdominal:      General: There is no distension.      Palpations: There is no mass.      Tenderness: There is no abdominal tenderness. There is no right CVA tenderness, left CVA tenderness or guarding.      Hernia: No hernia is present.   Musculoskeletal:         General: Swelling present. No tenderness.      Cervical back: No rigidity.      Right lower leg: Edema  (3+ edema) present.      Left lower leg: Edema (3+ edema) present.   Lymphadenopathy:      Cervical: No cervical adenopathy.   Skin:     General: Skin is warm and dry.      Coloration: Skin is not jaundiced or pale.      Findings: No bruising or erythema.   Neurological:      Mental Status: She is alert.      Comments: Patient is sedated and on the vent              I&O 24HR    Intake/Output Summary (Last 24 hours) at 10/20/2024 1146  Last data filed at 10/20/2024 1102  Gross per 24 hour   Intake 1121.82 ml   Output 882 ml   Net 239.82 ml       Vitals 24HR  Heart Rate:  []   Temp:  [36.6 °C (97.9 °F)-36.7 °C (98.1 °F)]   Resp:  [0-31]   BP: ()/(40-77)   Weight:  [122 kg (269 lb 6.4 oz)]   SpO2:  [84 %-100 %]     Relevant Results        Results for orders placed or performed during the hospital encounter of 10/12/24 (from the past 96 hours)   POCT GLUCOSE   Result Value Ref Range    POCT Glucose 140 (H) 74 - 99 mg/dL   POCT GLUCOSE   Result Value Ref Range    POCT Glucose 131 (H) 74 - 99 mg/dL   CBC and Auto Differential   Result Value Ref Range    WBC 9.9 4.4 - 11.3 x10*3/uL    nRBC 0.0 0.0 - 0.0 /100 WBCs    RBC 2.61 (L) 4.00 - 5.20 x10*6/uL    Hemoglobin 8.0 (L) 12.0 - 16.0 g/dL    Hematocrit 25.9 (L) 36.0 - 46.0 %    MCV 99 80 - 100 fL    MCH 30.7 26.0 - 34.0 pg    MCHC 30.9 (L) 32.0 - 36.0 g/dL    RDW 18.5 (H) 11.5 - 14.5 %    Platelets 261 150 - 450 x10*3/uL    Neutrophils % 86.0 40.0 - 80.0 %    Immature Granulocytes %, Automated 0.3 0.0 - 0.9 %    Lymphocytes % 7.4 13.0 - 44.0 %    Monocytes % 6.0 2.0 - 10.0 %    Eosinophils % 0.1 0.0 - 6.0 %    Basophils % 0.2 0.0 - 2.0 %    Neutrophils Absolute 8.48 (H) 1.20 - 7.70 x10*3/uL    Immature Granulocytes Absolute, Automated 0.03 0.00 - 0.70 x10*3/uL    Lymphocytes Absolute 0.73 (L) 1.20 - 4.80 x10*3/uL    Monocytes Absolute 0.59 0.10 - 1.00 x10*3/uL    Eosinophils Absolute 0.01 0.00 - 0.70 x10*3/uL    Basophils Absolute 0.02 0.00 - 0.10 x10*3/uL    Renal function panel   Result Value Ref Range    Glucose 158 (H) 74 - 99 mg/dL    Sodium 141 136 - 145 mmol/L    Potassium 3.9 3.5 - 5.3 mmol/L    Chloride 111 (H) 98 - 107 mmol/L    Bicarbonate 23 21 - 32 mmol/L    Anion Gap 11 10 - 20 mmol/L    Urea Nitrogen 44 (H) 6 - 23 mg/dL    Creatinine 1.38 (H) 0.50 - 1.05 mg/dL    eGFR 42 (L) >60 mL/min/1.73m*2    Calcium 7.4 (L) 8.6 - 10.3 mg/dL    Phosphorus 4.0 2.5 - 4.9 mg/dL    Albumin 1.9 (L) 3.4 - 5.0 g/dL   Magnesium   Result Value Ref Range    Magnesium 1.98 1.60 - 2.40 mg/dL   Vancomycin   Result Value Ref Range    Vancomycin 30.3 (H) 5.0 - 20.0 ug/mL   B-type natriuretic peptide   Result Value Ref Range     (H) 0 - 99 pg/mL   POCT GLUCOSE   Result Value Ref Range    POCT Glucose 152 (H) 74 - 99 mg/dL   Blood Gas Arterial Full Panel   Result Value Ref Range    POCT pH, Arterial 7.31 (L) 7.38 - 7.42 pH    POCT pCO2, Arterial 46 (H) 38 - 42 mm Hg    POCT pO2, Arterial 66 (L) 85 - 95 mm Hg    POCT SO2, Arterial 96 94 - 100 %    POCT Oxy Hemoglobin, Arterial 93.5 (L) 94.0 - 98.0 %    POCT Hematocrit Calculated, Arterial 28.0 (L) 36.0 - 46.0 %    POCT Sodium, Arterial 139 136 - 145 mmol/L    POCT Potassium, Arterial 3.6 3.5 - 5.3 mmol/L    POCT Chloride, Arterial 111 (H) 98 - 107 mmol/L    POCT Ionized Calcium, Arterial 1.21 1.10 - 1.33 mmol/L    POCT Glucose, Arterial 169 (H) 74 - 99 mg/dL    POCT Lactate, Arterial 0.7 0.4 - 2.0 mmol/L    POCT Base Excess, Arterial -3.0 (L) -2.0 - 3.0 mmol/L    POCT HCO3 Calculated, Arterial 23.2 22.0 - 26.0 mmol/L    POCT Hemoglobin, Arterial 9.2 (L) 12.0 - 16.0 g/dL    POCT Anion Gap, Arterial 8 (L) 10 - 25 mmo/L    Patient Temperature 37.0 degrees Celsius    FiO2 80 %    Apparatus HIGH FLOW CANNULA     Flow 40.0 LPM    Site of Arterial Puncture Radial Right     Bill's Test Positive    POCT GLUCOSE   Result Value Ref Range    POCT Glucose 155 (H) 74 - 99 mg/dL   Body Fluid Cell Count   Result Value Ref Range    Color,  Fluid Straw Colorless, Straw, Yellow    Clarity, Fluid Clear Clear    WBC, Fluid 197 See Comment /uL    RBC, Fluid 1,000 0  /uL /uL   Body Fluid Differential   Result Value Ref Range    Neutrophils %, Manual, Fluid 56 <25 % %    Lymphocytes %, Manual, Fluid 21 <75 % %    Mono/Macrophages %, Manual, Fluid 22 <70 % %    Eosinophils %, Manual, Fluid 0 0 % %    Basophils %, Manual, Fluid 0 0 % %    Immature Granulocytes %, Manual, Fluid 0 0 % %    Blasts %, Manual, Fluid 0 0 % %    Unclassified Cells %, Manual, Fluid 1 (H) 0 % %    Plasma Cells %, Manual, Fluid 0 0 % %    Total Cells Counted, Fluid 100    Pathologist Review-Cell Count,Fluid   Result Value Ref Range    Pathologist Review-Cell Count, Fluid       Negative for malignant cells. The specimen includes neutrophils, macrophages and mesothelial cells. See also cytology report [id number].   Sterile Fluid Culture/Smear    Specimen: Pleural; Fluid   Result Value Ref Range    Sterile Fluid Culture/Smear No growth to date     Gram Stain (3+) Moderate Polymorphonuclear leukocytes     Gram Stain No organisms seen    AFB Culture/Smear    Specimen: Pleural; Fluid   Result Value Ref Range    AFB Culture       Culture in progress and will be examined weekly. A result will be issued either when positive or after 8 weeks incubation.    AFB Stain No acid fast bacilli seen    Fungal Culture/Smear    Specimen: Pleural; Fluid   Result Value Ref Range    Fungal Smear No fungal elements seen    Lactate Dehydrogenase, Fluid   Result Value Ref Range    LD, Fluid 97 Not established. U/L   Glucose, Fluid   Result Value Ref Range    Glucose, Fluid 202 Not established mg/dL   Protein, Total Fluid   Result Value Ref Range    Protein, Total Fluid 1.8 Not established g/dL   Triglycerides, Fluid   Result Value Ref Range    Triglycerides, Fluid 18 No established mg/dL   Lavender Top   Result Value Ref Range    Extra Tube Hold for add-ons.    AFB Processed   Result Value Ref Range    Extra Tube  Hold for add-ons.    pH, Body Fluid   Result Value Ref Range    pH, Fluid 7.00 See Below   POCT GLUCOSE   Result Value Ref Range    POCT Glucose 164 (H) 74 - 99 mg/dL   POCT GLUCOSE   Result Value Ref Range    POCT Glucose 128 (H) 74 - 99 mg/dL   Renal function panel   Result Value Ref Range    Glucose 122 (H) 74 - 99 mg/dL    Sodium 142 136 - 145 mmol/L    Potassium 3.9 3.5 - 5.3 mmol/L    Chloride 113 (H) 98 - 107 mmol/L    Bicarbonate 21 21 - 32 mmol/L    Anion Gap 12 10 - 20 mmol/L    Urea Nitrogen 46 (H) 6 - 23 mg/dL    Creatinine 1.62 (H) 0.50 - 1.05 mg/dL    eGFR 34 (L) >60 mL/min/1.73m*2    Calcium 7.7 (L) 8.6 - 10.3 mg/dL    Phosphorus 4.2 2.5 - 4.9 mg/dL    Albumin 2.1 (L) 3.4 - 5.0 g/dL   Magnesium   Result Value Ref Range    Magnesium 2.00 1.60 - 2.40 mg/dL   Vancomycin   Result Value Ref Range    Vancomycin 31.7 (H) 5.0 - 20.0 ug/mL   Lactate dehydrogenase   Result Value Ref Range     84 - 246 U/L   Protein, total   Result Value Ref Range    Total Protein 4.8 (L) 6.4 - 8.2 g/dL   CBC and Auto Differential   Result Value Ref Range    WBC 6.7 4.4 - 11.3 x10*3/uL    nRBC 0.0 0.0 - 0.0 /100 WBCs    RBC 2.68 (L) 4.00 - 5.20 x10*6/uL    Hemoglobin 8.2 (L) 12.0 - 16.0 g/dL    Hematocrit 26.8 (L) 36.0 - 46.0 %     80 - 100 fL    MCH 30.6 26.0 - 34.0 pg    MCHC 30.6 (L) 32.0 - 36.0 g/dL    RDW 19.0 (H) 11.5 - 14.5 %    Platelets 262 150 - 450 x10*3/uL    Neutrophils % 80.5 40.0 - 80.0 %    Immature Granulocytes %, Automated 0.4 0.0 - 0.9 %    Lymphocytes % 10.3 13.0 - 44.0 %    Monocytes % 7.8 2.0 - 10.0 %    Eosinophils % 0.7 0.0 - 6.0 %    Basophils % 0.3 0.0 - 2.0 %    Neutrophils Absolute 5.38 1.20 - 7.70 x10*3/uL    Immature Granulocytes Absolute, Automated 0.03 0.00 - 0.70 x10*3/uL    Lymphocytes Absolute 0.69 (L) 1.20 - 4.80 x10*3/uL    Monocytes Absolute 0.52 0.10 - 1.00 x10*3/uL    Eosinophils Absolute 0.05 0.00 - 0.70 x10*3/uL    Basophils Absolute 0.02 0.00 - 0.10 x10*3/uL   POCT GLUCOSE    Result Value Ref Range    POCT Glucose 154 (H) 74 - 99 mg/dL   Urinalysis with Reflex Microscopic   Result Value Ref Range    Color, Urine Yellow Light-Yellow, Yellow, Dark-Yellow    Appearance, Urine Ex.Turbid (N) Clear    Specific Gravity, Urine 1.019 1.005 - 1.035    pH, Urine 5.0 5.0, 5.5, 6.0, 6.5, 7.0, 7.5, 8.0    Protein, Urine 100 (2+) (A) NEGATIVE, 10 (TRACE), 20 (TRACE) mg/dL    Glucose, Urine Normal Normal mg/dL    Blood, Urine 1.0 (3+) (A) NEGATIVE    Ketones, Urine 10 (1+) (A) NEGATIVE mg/dL    Bilirubin, Urine NEGATIVE NEGATIVE    Urobilinogen, Urine Normal Normal mg/dL    Nitrite, Urine NEGATIVE NEGATIVE    Leukocyte Esterase, Urine 500 Maria De Jesus/µL (A) NEGATIVE   Urine electrolytes   Result Value Ref Range    Sodium, Urine Random 32 mmol/L    Sodium/Creatinine Ratio 42 Not established. mmol/g Creat    Potassium, Urine Random 40 mmol/L    Potassium/Creatinine Ratio 52 Not established mmol/g Creat    Chloride, Urine Random 43 mmol/L    Chloride/Creatinine Ratio 56 38 - 318 mmol/g creat    Creatinine, Urine Random 76.8 20.0 - 320.0 mg/dL   Microscopic Only, Urine   Result Value Ref Range    WBC, Urine >50 (A) 1-5, NONE /HPF    RBC, Urine >20 (A) NONE, 1-2, 3-5 /HPF    Squamous Epithelial Cells, Urine 1-9 (SPARSE) Reference range not established. /HPF    Budding Yeast, Urine PRESENT (A) NONE /HPF    Mucus, Urine 1+ Reference range not established. /LPF    Amorphous Crystals, Urine 1+ NONE, 1+, 2+ /HPF   POCT GLUCOSE   Result Value Ref Range    POCT Glucose 148 (H) 74 - 99 mg/dL   POCT GLUCOSE   Result Value Ref Range    POCT Glucose 219 (H) 74 - 99 mg/dL   POCT GLUCOSE   Result Value Ref Range    POCT Glucose 272 (H) 74 - 99 mg/dL   POCT GLUCOSE   Result Value Ref Range    POCT Glucose 202 (H) 74 - 99 mg/dL   CBC and Auto Differential   Result Value Ref Range    WBC 7.1 4.4 - 11.3 x10*3/uL    nRBC 0.0 0.0 - 0.0 /100 WBCs    RBC 2.49 (L) 4.00 - 5.20 x10*6/uL    Hemoglobin 7.7 (L) 12.0 - 16.0 g/dL     Hematocrit 25.2 (L) 36.0 - 46.0 %     (H) 80 - 100 fL    MCH 30.9 26.0 - 34.0 pg    MCHC 30.6 (L) 32.0 - 36.0 g/dL    RDW 19.1 (H) 11.5 - 14.5 %    Platelets 254 150 - 450 x10*3/uL    Neutrophils % 81.0 40.0 - 80.0 %    Immature Granulocytes %, Automated 0.8 0.0 - 0.9 %    Lymphocytes % 9.5 13.0 - 44.0 %    Monocytes % 7.7 2.0 - 10.0 %    Eosinophils % 0.7 0.0 - 6.0 %    Basophils % 0.3 0.0 - 2.0 %    Neutrophils Absolute 5.78 1.20 - 7.70 x10*3/uL    Immature Granulocytes Absolute, Automated 0.06 0.00 - 0.70 x10*3/uL    Lymphocytes Absolute 0.68 (L) 1.20 - 4.80 x10*3/uL    Monocytes Absolute 0.55 0.10 - 1.00 x10*3/uL    Eosinophils Absolute 0.05 0.00 - 0.70 x10*3/uL    Basophils Absolute 0.02 0.00 - 0.10 x10*3/uL   Renal function panel   Result Value Ref Range    Glucose 121 (H) 74 - 99 mg/dL    Sodium 142 136 - 145 mmol/L    Potassium 4.0 3.5 - 5.3 mmol/L    Chloride 112 (H) 98 - 107 mmol/L    Bicarbonate 21 21 - 32 mmol/L    Anion Gap 13 10 - 20 mmol/L    Urea Nitrogen 49 (H) 6 - 23 mg/dL    Creatinine 2.01 (H) 0.50 - 1.05 mg/dL    eGFR 27 (L) >60 mL/min/1.73m*2    Calcium 7.8 (L) 8.6 - 10.3 mg/dL    Phosphorus 4.0 2.5 - 4.9 mg/dL    Albumin 2.7 (L) 3.4 - 5.0 g/dL   Magnesium   Result Value Ref Range    Magnesium 2.09 1.60 - 2.40 mg/dL   Vancomycin   Result Value Ref Range    Vancomycin 28.4 (H) 5.0 - 20.0 ug/mL   POCT GLUCOSE   Result Value Ref Range    POCT Glucose 138 (H) 74 - 99 mg/dL   POCT GLUCOSE   Result Value Ref Range    POCT Glucose 149 (H) 74 - 99 mg/dL   Blood Gas Arterial Full Panel   Result Value Ref Range    POCT pH, Arterial 7.19 (LL) 7.38 - 7.42 pH    POCT pCO2, Arterial 57 (H) 38 - 42 mm Hg    POCT pO2, Arterial 77 (L) 85 - 95 mm Hg    POCT SO2, Arterial 98 94 - 100 %    POCT Oxy Hemoglobin, Arterial 95.6 94.0 - 98.0 %    POCT Hematocrit Calculated, Arterial 25.0 (L) 36.0 - 46.0 %    POCT Sodium, Arterial 139 136 - 145 mmol/L    POCT Potassium, Arterial 4.2 3.5 - 5.3 mmol/L    POCT  Chloride, Arterial 109 (H) 98 - 107 mmol/L    POCT Ionized Calcium, Arterial 1.23 1.10 - 1.33 mmol/L    POCT Glucose, Arterial 144 (H) 74 - 99 mg/dL    POCT Lactate, Arterial 0.5 0.4 - 2.0 mmol/L    POCT Base Excess, Arterial -6.3 (L) -2.0 - 3.0 mmol/L    POCT HCO3 Calculated, Arterial 21.8 (L) 22.0 - 26.0 mmol/L    POCT Hemoglobin, Arterial 8.4 (L) 12.0 - 16.0 g/dL    POCT Anion Gap, Arterial 12 10 - 25 mmo/L    Patient Temperature 37.0 degrees Celsius    FiO2 70 %    Apparatus HIGH FLOW CANNULA     Critical Called By KELSEA JAY RRT     Critical Called To CARLOTA MASON     Critical Call Time 823     Critical Read Back Y     Site of Arterial Puncture Arterial Line     Bill's Test Positive    Blood Gas Arterial Full Panel   Result Value Ref Range    POCT pH, Arterial 7.19 (LL) 7.38 - 7.42 pH    POCT pCO2, Arterial 57 (H) 38 - 42 mm Hg    POCT pO2, Arterial 84 (L) 85 - 95 mm Hg    POCT SO2, Arterial 98 94 - 100 %    POCT Oxy Hemoglobin, Arterial 95.5 94.0 - 98.0 %    POCT Hematocrit Calculated, Arterial 27.0 (L) 36.0 - 46.0 %    POCT Sodium, Arterial 139 136 - 145 mmol/L    POCT Potassium, Arterial 4.2 3.5 - 5.3 mmol/L    POCT Chloride, Arterial 109 (H) 98 - 107 mmol/L    POCT Ionized Calcium, Arterial 1.24 1.10 - 1.33 mmol/L    POCT Glucose, Arterial 156 (H) 74 - 99 mg/dL    POCT Lactate, Arterial 0.5 0.4 - 2.0 mmol/L    POCT Base Excess, Arterial -6.4 (L) -2.0 - 3.0 mmol/L    POCT HCO3 Calculated, Arterial 21.8 (L) 22.0 - 26.0 mmol/L    POCT Hemoglobin, Arterial 8.9 (L) 12.0 - 16.0 g/dL    POCT Anion Gap, Arterial 12 10 - 25 mmo/L    Patient Temperature 37.0 degrees Celsius    FiO2 50 %    Ipap CMH2O 15.0 cm H2O    Epap CMH2O 5.0 cm H2O    Critical Called By EDIE RRT     Critical Called To DR. MASON     Critical Call Time 912     Critical Read Back Y     Site of Arterial Puncture Radial Right     Bill's Test Positive    POCT GLUCOSE   Result Value Ref Range    POCT Glucose 187 (H) 74 - 99 mg/dL   Blood Gas  Arterial Full Panel   Result Value Ref Range    POCT pH, Arterial 7.29 (L) 7.38 - 7.42 pH    POCT pCO2, Arterial 42 38 - 42 mm Hg    POCT pO2, Arterial 87 85 - 95 mm Hg    POCT SO2, Arterial 99 94 - 100 %    POCT Oxy Hemoglobin, Arterial 95.5 94.0 - 98.0 %    POCT Hematocrit Calculated, Arterial 32.0 (L) 36.0 - 46.0 %    POCT Sodium, Arterial 136 136 - 145 mmol/L    POCT Potassium, Arterial 4.2 3.5 - 5.3 mmol/L    POCT Chloride, Arterial 109 (H) 98 - 107 mmol/L    POCT Ionized Calcium, Arterial 1.19 1.10 - 1.33 mmol/L    POCT Glucose, Arterial 195 (H) 74 - 99 mg/dL    POCT Lactate, Arterial 0.8 0.4 - 2.0 mmol/L    POCT Base Excess, Arterial -6.1 (L) -2.0 - 3.0 mmol/L    POCT HCO3 Calculated, Arterial 20.2 (L) 22.0 - 26.0 mmol/L    POCT Hemoglobin, Arterial 10.5 (L) 12.0 - 16.0 g/dL    POCT Anion Gap, Arterial 11 10 - 25 mmo/L    Patient Temperature 37.0 degrees Celsius    FiO2 40 %    Spontaneous Tidal Volume 429 mL    Frequency (BPM) 24 bpm    Ipap CMH2O 16.0 cm H2O    Epap CMH2O 5.0 cm H2O    Site of Arterial Puncture Brachial Right     Bill's Test Negative    POCT GLUCOSE   Result Value Ref Range    POCT Glucose 160 (H) 74 - 99 mg/dL   Blood Gas Arterial Full Panel   Result Value Ref Range    POCT pH, Arterial 7.36 (L) 7.38 - 7.42 pH    POCT pCO2, Arterial 34 (L) 38 - 42 mm Hg    POCT pO2, Arterial 119 (H) 85 - 95 mm Hg    POCT SO2, Arterial 99 94 - 100 %    POCT Oxy Hemoglobin, Arterial 96.8 94.0 - 98.0 %    POCT Hematocrit Calculated, Arterial 29.0 (L) 36.0 - 46.0 %    POCT Sodium, Arterial 138 136 - 145 mmol/L    POCT Potassium, Arterial 4.2 3.5 - 5.3 mmol/L    POCT Chloride, Arterial 110 (H) 98 - 107 mmol/L    POCT Ionized Calcium, Arterial 1.15 1.10 - 1.33 mmol/L    POCT Glucose, Arterial 161 (H) 74 - 99 mg/dL    POCT Lactate, Arterial 0.4 0.4 - 2.0 mmol/L    POCT Base Excess, Arterial -5.6 (L) -2.0 - 3.0 mmol/L    POCT HCO3 Calculated, Arterial 19.2 (L) 22.0 - 26.0 mmol/L    POCT Hemoglobin, Arterial 9.8  (L) 12.0 - 16.0 g/dL    POCT Anion Gap, Arterial 13 10 - 25 mmo/L    Patient Temperature 37.0 degrees Celsius    FiO2 70 %    Apparatus      Ventilator Mode      Ventilator Rate 24 bpm    Tidal Volume 450 mL    Peep CHM2O 5.0 cm H2O    Site of Arterial Puncture Arterial Line     Bill's Test     POCT GLUCOSE   Result Value Ref Range    POCT Glucose 134 (H) 74 - 99 mg/dL   CBC and Auto Differential   Result Value Ref Range    WBC 9.4 4.4 - 11.3 x10*3/uL    nRBC 0.0 0.0 - 0.0 /100 WBCs    RBC 2.46 (L) 4.00 - 5.20 x10*6/uL    Hemoglobin 7.6 (L) 12.0 - 16.0 g/dL    Hematocrit 23.6 (L) 36.0 - 46.0 %    MCV 96 80 - 100 fL    MCH 30.9 26.0 - 34.0 pg    MCHC 32.2 32.0 - 36.0 g/dL    RDW 18.9 (H) 11.5 - 14.5 %    Platelets 320 150 - 450 x10*3/uL    Neutrophils % 76.8 40.0 - 80.0 %    Immature Granulocytes %, Automated 1.3 (H) 0.0 - 0.9 %    Lymphocytes % 11.8 13.0 - 44.0 %    Monocytes % 8.8 2.0 - 10.0 %    Eosinophils % 1.1 0.0 - 6.0 %    Basophils % 0.2 0.0 - 2.0 %    Neutrophils Absolute 7.26 1.20 - 7.70 x10*3/uL    Immature Granulocytes Absolute, Automated 0.12 0.00 - 0.70 x10*3/uL    Lymphocytes Absolute 1.11 (L) 1.20 - 4.80 x10*3/uL    Monocytes Absolute 0.83 0.10 - 1.00 x10*3/uL    Eosinophils Absolute 0.10 0.00 - 0.70 x10*3/uL    Basophils Absolute 0.02 0.00 - 0.10 x10*3/uL   Renal function panel   Result Value Ref Range    Glucose 160 (H) 74 - 99 mg/dL    Sodium 141 136 - 145 mmol/L    Potassium 3.9 3.5 - 5.3 mmol/L    Chloride 111 (H) 98 - 107 mmol/L    Bicarbonate 19 (L) 21 - 32 mmol/L    Anion Gap 15 10 - 20 mmol/L    Urea Nitrogen 52 (H) 6 - 23 mg/dL    Creatinine 2.41 (H) 0.50 - 1.05 mg/dL    eGFR 21 (L) >60 mL/min/1.73m*2    Calcium 7.8 (L) 8.6 - 10.3 mg/dL    Phosphorus 3.5 2.5 - 4.9 mg/dL    Albumin 2.9 (L) 3.4 - 5.0 g/dL   Magnesium   Result Value Ref Range    Magnesium 2.06 1.60 - 2.40 mg/dL   Vancomycin   Result Value Ref Range    Vancomycin 27.2 (H) 5.0 - 20.0 ug/mL   Hepatic function panel   Result  Value Ref Range    Albumin 2.9 (L) 3.4 - 5.0 g/dL    Bilirubin, Total 0.5 0.0 - 1.2 mg/dL    Bilirubin, Direct 0.1 0.0 - 0.3 mg/dL    Alkaline Phosphatase 98 33 - 136 U/L    ALT 5 (L) 7 - 45 U/L    AST 7 (L) 9 - 39 U/L    Total Protein 5.1 (L) 6.4 - 8.2 g/dL   Blood Gas Arterial Full Panel   Result Value Ref Range    POCT pH, Arterial 7.39 7.38 - 7.42 pH    POCT pCO2, Arterial 30 (L) 38 - 42 mm Hg    POCT pO2, Arterial 165 (H) 85 - 95 mm Hg    POCT SO2, Arterial 100 94 - 100 %    POCT Oxy Hemoglobin, Arterial 97.5 94.0 - 98.0 %    POCT Hematocrit Calculated, Arterial 24.0 (L) 36.0 - 46.0 %    POCT Sodium, Arterial 137 136 - 145 mmol/L    POCT Potassium, Arterial 3.8 3.5 - 5.3 mmol/L    POCT Chloride, Arterial 110 (H) 98 - 107 mmol/L    POCT Ionized Calcium, Arterial 1.16 1.10 - 1.33 mmol/L    POCT Glucose, Arterial 164 (H) 74 - 99 mg/dL    POCT Lactate, Arterial 0.8 0.4 - 2.0 mmol/L    POCT Base Excess, Arterial -6.0 (L) -2.0 - 3.0 mmol/L    POCT HCO3 Calculated, Arterial 18.2 (L) 22.0 - 26.0 mmol/L    POCT Hemoglobin, Arterial 8.0 (L) 12.0 - 16.0 g/dL    POCT Anion Gap, Arterial 13 10 - 25 mmo/L    Patient Temperature 37.0 degrees Celsius    FiO2 60 %    Apparatus      Ventilator Mode      Ventilator Rate 24 bpm    Tidal Volume 450 mL    Peep CHM2O 5.0 cm H2O    Site of Arterial Puncture Arterial Line     Bill's Test     POCT GLUCOSE   Result Value Ref Range    POCT Glucose 168 (H) 74 - 99 mg/dL          Assessment/Plan     XR chest 1 view    Result Date: 10/19/2024  Interpreted By:  Parmjit Mancini, STUDY: XR CHEST 1 VIEW   INDICATION: Signs/Symptoms:line placement.   COMPARISON: October 19, 2024 earlier the same day   ACCESSION NUMBER(S): [id number]   ORDERING CLINICIAN: SERINA BUTCHER   FINDINGS: Again the right-sided central line slightly overlies the soft tissues of the chest. The appearance is similar to the previous study. It is potentially in the SVC which may be projecting more laterally given the patient  rotation but. Definitive intravascular location can not be confirmed. Correlate with line function.   Basilar edema.   No evidence of pneumothorax.       Again the right-sided central line slightly overlies the soft tissues of the chest. The appearance is similar to the previous study. It is potentially in the SVC which may be projecting more laterally given the patient rotation but. Definitive intravascular location can not be confirmed. Correlate with line function.   Signed by: Parmjit Mancini 10/19/2024 9:55 PM Dictation workstation:   [id number]    XR abdomen 1 view    Result Date: 10/19/2024  Interpreted By:  Parmjit Mancini, STUDY: XR ABDOMEN 1 VIEW   INDICATION: Signs/Symptoms:NG tube placement.   COMPARISON: Earlier same day   ACCESSION NUMBER(S): [id number]   ORDERING CLINICIAN: DUSTY RICO   FINDINGS: Nasogastric tube over the gastric body.   Bowel-gas pattern not fully assessed.       Nasogastric tube over the gastric body.   Signed by: Parmjit Mancini 10/19/2024 8:02 PM Dictation workstation:   [id number]    XR chest 1 view    Result Date: 10/19/2024  Interpreted By:  Parmjit Mancini, STUDY: XR CHEST 1 VIEW   INDICATION: Signs/Symptoms:ett placed.   COMPARISON: Earlier the same day 5:32 a.m.   ACCESSION NUMBER(S): [id number]   ORDERING CLINICIAN: DUSTY RICO   FINDINGS: Interval intubation with the endotracheal tube an approximate 4 cm proximal to the michi. Bilateral pleural effusions right worse than left with basilar edema, slightly worsened from prior.   Left-sided thoracostomy tube.   No evidence of pneumothorax.   Right-sided central line overlies the right chest near the apex. This is potentially within the SVC but is slightly lateral and its definitive intravascular location not confirmed.       Right-sided central line overlies the right chest near the apex. This is potentially within the SVC but is slightly lateral and its definitive intravascular location not confirmed. Correlate with function.    Interval intubation.       Signed by: Parmjit Mancini 10/19/2024 8:02 PM Dictation workstation:   [id number]    XR chest 1 view    Result Date: 10/19/2024  Interpreted By:  Parmjit Mancini, STUDY: XR CHEST 1 VIEW   INDICATION: Signs/Symptoms:sob, ng tube placement.   COMPARISON: Earlier the same day   ACCESSION NUMBER(S): [id number]   ORDERING CLINICIAN: CHRISTIANO BRICENO   FINDINGS: Nasogastric tube overlies the gastric fundus.   Basilar edema and effusions similar to prior exam       Nasogastric tube overlies the gastric fundus.   Signed by: Parmjit Mancini 10/19/2024 5:34 PM Dictation workstation:   [id number]    XR chest 1 view    Result Date: 10/19/2024  Interpreted By:  Fern Mckeon, STUDY: XR CHEST 1 VIEW;  10/19/2024 5:51 am   INDICATION: Signs/Symptoms:assess effusions.     COMPARISON: 10/18/2024   ACCESSION NUMBER(S): [id number]   ORDERING CLINICIAN: SERINA BUTCHER   TECHNIQUE: Portable AP semi upright   FINDINGS: Spinal fixation hardware appears unchanged. Pigtail pleural catheter mid to lower left hemithorax projects laterally and is unchanged in position.   Heart is partially obscured by pleuroparenchymal opacities but is grossly unchanged in size. There is poor aeration of the lungs with considerable bilateral dependent atelectasis and bilateral pleural effusions. No pneumothorax is identified. Overall appearance is similar to the prior study. Osseous structures are grossly unchanged.       Bilateral atelectasis and pleural effusions with very poor aeration of the lungs, unchanged   MACRO: None.   Signed by: Fern Mckeon 10/19/2024 12:20 PM Dictation workstation:   [id number]     Impression:  Acute kidney injury I believe the patient most likely has acute tubular necrosis from multiple factors including shock hypotension as well as vancomycin toxicity  Acute respiratory failure  Severe anasarca  Septic shock  Pneumonia  Diabetes mellitus type 2  Malnutrition  Lower back surgery site  infection    Recommendations:  Since urine output is slowly improving I would recommend continue with diuresis however I would like to discontinue the Lasix drip and give her intermittent doses of Lasix with the concern of autotoxicity especially the patient is vancomycin toxic  Ultrasound of the kidneys  Urine protein to creatinine ratio  Since the patient started making urine but continue with intermittent diuresis and reevaluate tomorrow if the patient renal function worse and remains anasarcic and urine output does not improve significantly she will need renal replacement therapy temporarily in her case prefer to do continuous renal replacement therapy.    discussed the case with the intensive care unit team  Mili Vanegas

## 2024-10-20 NOTE — PROGRESS NOTES
Physical Therapy                 Therapy Communication Note    Patient Name: Narda Malloy  MRN: [id number]  Department: [address] ICU  Room: 11/11-A  Today's Date: 10/20/2024     Discipline: Physical Therapy    Missed Visit Reason: Missed Visit Reason: Other (Comment) (Patient intubated 10/19/24 evening;  patient will require new physical therapy orders due to change in medical status.)    Missed Time: Cancel

## 2024-10-20 NOTE — CARE PLAN
The patient's goals for the shift include      The clinical goals for the shift include Remain hemodynamically stable and monitor vital signs    Over the shift, the patient did not make progress toward the following goals. Barriers to progression include . Recommendations to address these barriers include .

## 2024-10-20 NOTE — PROGRESS NOTES
[facility] Critical Care Medicine       Date:  10/20/2024  Patient:  Narda Malloy  YOB: 1955  MRN:  [id number]   Admit Date:  10/12/2024      Chief Complaint   Patient presents with    Altered Mental Status     History of Present Illness:  Narda Malloy is a 69 y.o. year old female patient with past medical history of L1-L3 lumbar laminectomy, T4-S1 revision, and fusion August 26th, T2DM, HTN, essential tremor, HLD, glaucoma, sarcoidosis of the lung who presented to  ED 10/12 after being found essentially unresponsive at her nursing facility [facility]. Per report from her , she has had a significant decline in her health since July 15th when she had a fall and became significantly weak. She has also had multiple infection complications since her back surgery in August requiring multiple I&Ds and long term antibiotic therapy. She went to the OR most recently on 9/28 for lumbar site infection wash out. Per chart review, she was discharged on IV vancomycin 1g and IV ceftriaxone 2d q24hrs through 11/12.     ED Course: Initial vital signs: /104 (109), HR 68, RR 20, SpO2 95% on 6L NC, temp 34.5C. Give 0.4mg of narcan with no improvement in mentation. Lab work-up remarkable for mild hyperkalemia (5.5), AMY 42/1.46, elevated alk phos, normocytic anemia 10.4/33, turbid appearing urine with mild hematuria and proteinuria and + leuk esterase, >50 WBCs. Urine drug screen positive for barbiturates. Triggered sepsis timer so she was given 3L NS. She was intubated for airway protection with 20mg etomidate and 100mg succinylcholine. BP dropped post-intubated and fluid resuscitation and she was subsequently started on levophed.       Interval ICU Events:  10/12: Pt arrived to ICU intubated and lightly sedated on low-dose versed. Eyes open, minimally responsive.      10/13: Received K cocktail last night for K 6.0, corrected appropriately. Levophed requirements mildly up, UOP decreasing.  Ordered albumin x2 this am with improvements in UOP and SBP. Will likely trial lasix this afternoon d/t hypervolemia.     10/14: Remains intubated with decreased mentation, only responsive to noxious stimuli. Trialed lasix TID for volume overload. Remains on levophed 0.01.     10/15: Mentation much improved. SAT/SBT successful so extubated. Given lasix x3, bumex x1, metolazone x1 with net negative fluid balance of 500mL -> started on bumex gtt.      10/16: O2 requirements significantly increased, NRB -> HFNC 40L 100% likely 2/2 mucus plugging.     10/17: No acute events overnight. Bumex gtt increased to 1mg/hr. CXR this am showing complete opacification of left hemithorax related to atelectasis vs pleural effusion. Remains on HFNC 40L/80%. Pigtail catheter placed with 1.2L drained immediately. Given albumin for hypotension.      10/18: ~2L output from left sided pigtail catheter since placement. Kidney function worsening and UOP declining, about 20cc/hr overnight. Will place NG tube today and start enteral nutrition and appetite and oral intake remains poor.      10/19: Patient with worsening hypoxic, hypercapnic respiratory failure - now requiring BIPAP support. Remains grossly volume overloaded with low UO. Started on vasopressors to augment BP for diuresis. 40 IV lasix + gtt started. Remains with poor nutritional status. Will re attempt NG later if respiratory status improves.     10/20: Patient stable on vent. Nephrology consulted for renal failure. Cr continues to uptrend however UO is increasing. No issues overnight.    Medical History:  Past Medical History:   Diagnosis Date    Degenerative myopia, bilateral     Diabetic neuropathy (Multi)     Difficult intubation 08/26/2024    Mac 3, grade 3, 1 attempt.  Glidescope/videolaryngoscopy recommended for future attempts.    DM type 2 (diabetes mellitus, type 2) (Multi)     Dry eye syndrome of bilateral lacrimal glands     Essential hypertension     Essential  tremor     Glaucoma     Hyperlipidemia     Long term (current) use of insulin (Multi)     Low back pain     PONV (postoperative nausea and vomiting)     Primary open angle glaucoma of both eyes, severe stage     Repeated falls     Sarcoidosis of lung (Multi)     Spinal stenosis, lumbar region without neurogenic claudication     Weakness      Past Surgical History:   Procedure Laterality Date    BLEPHAROPLASTY  07/2022    BREAST SURGERY  05/20/2022    Breast lift    CARPAL TUNNEL RELEASE      CATARACT EXTRACTION W/  INTRAOCULAR LENS IMPLANT Bilateral     OD 08/04/2011 +8.5D,OS 08/04/2011 +8.50D    FOOT SURGERY      INSERTION / REMOVAL CRANIAL DBS GENERATOR      Placed 2017.  Removed 2018. part of wire left in head when everything removed    LUMBAR FUSION      L3-S1    PANRETINAL PHOTOCOAGULATION  2014    THORACIC FUSION  08/26/2024    T4-S1 fusion    VITRECTOMY Right 2013     Medications Prior to Admission   Medication Sig Dispense Refill Last Dose/Taking    acetaminophen (Tylenol) 500 mg tablet Take 2 tablets (1,000 mg) by mouth 3 times a day.   Unknown    ascorbic acid (Vitamin C) 500 mg tablet as directed Orally   Unknown    brimonidine (AlphaGAN) 0.2 % ophthalmic solution Administer 1 drop into both eyes 2 times a day.   Unknown    calcium carbonate-vitamin D3 500 mg-5 mcg (200 unit) tablet Take 1 tablet by mouth once daily.   Unknown    cefTRIAXone (Rocephin) 2 gram/50 mL IV Infuse 50 mL (2 g) at 100 mL/hr over 30 minutes into a venous catheter once every 24 hours. Once weekly labs CBC/diff, CMP, Vanc trough ESR, CRP fax to Dr. Juarez [phone]. Stop date 11/12/24. 1950 mL 0     dextrose 50 % injection Infuse 25 mL (12.5 g) into a venous catheter every 15 minutes if needed (For blood glucose 41 to 70 mg/dL).       dextrose 50 % injection Infuse 50 mL (25 g) into a venous catheter every 15 minutes if needed (For blood glucose less than or equal to 40 mg/dL).       docusate sodium (Colace) 100 mg capsule Take 1  capsule (100 mg) by mouth 2 times a day.   Unknown    ezetimibe (Zetia) 10 mg tablet Take 1 tablet (10 mg) by mouth once daily. 90 tablet 3 Unknown    FreeStyle Lite Strips strip USE TO TEST 3 TIMES A DAY AS DIRECTED 300 each 2     glucagon (Glucagen) 1 mg injection Inject 1 mg into the muscle every 15 minutes if needed for low blood sugar - see comments (Hypoglycemia).       glucagon (Glucagen) 1 mg injection Inject 1 mg into the muscle every 15 minutes if needed for low blood sugar - see comments (Hypoglycemia).       heparin sodium,porcine (heparin, porcine,) 5,000 unit/mL injection Inject 1 mL (5,000 Units) under the skin every 8 hours.       insulin lispro (HumaLOG) 100 unit/mL injection Inject 0-15 Units under the skin 3 times daily (morning, midday, late afternoon). Take as directed per insulin instructions.Do not hold when patient is not eating, continue order as scheduled for hyperglycemia management.  Insulin Lispro Corrective Scale #3     Hypoglycemia protocol Call LIP unit(s) if Blood Glucose is between 0 - 70 mg/dL     0 unit(s) if Blood glucose is between    3 unit(s) if Blood glucose is between 151-200   6 unit(s) if Blood glucose is between 201-250   9 unit(s) if Blood glucose is between 251-300   12 unit(s) if Blood glucose is between 301-350   15 unit(s) if Blood glucose is between 351-400    Notify provider unit(s) if Blood Glucose is greater than 400 mg/dL       Lactobacillus acidophilus 100 mg (1 billion cell) capsule Take 1 capsule by mouth 2 times a day.   Unknown    latanoprost (Xalatan) 0.005 % ophthalmic solution Administer 1 drop into both eyes once daily at bedtime. 2.5 mL 5 Unknown    melatonin 5 mg tablet Take 1 tablet (5 mg) by mouth as needed at bedtime for sleep.   Unknown    methocarbamol (Robaxin) 500 mg tablet Take 1 tablet (500 mg) by mouth 3 times a day.   Unknown    multivitamin tablet Take 1 tablet by mouth once daily.   Unknown    ondansetron (Zofran) 4 mg/2 mL  "injection Infuse 2 mL (4 mg) into a venous catheter every 6 hours if needed for nausea or vomiting.       oxyCODONE (Roxicodone) 5 mg immediate release tablet Take 1 tablet (5 mg) by mouth every 6 hours if needed for severe pain (7 - 10) or moderate pain (4 - 6).       oxygen (O2) gas therapy Inhale 1 each continuously.       pantoprazole (ProtoNix) 40 mg EC tablet Take 1 tablet (40 mg) by mouth once daily in the morning. Take before meals. Do not crush, chew, or split.       pantoprazole (ProtoNix) 40 mg injection Infuse 40 mg into a venous catheter once daily in the morning. Take before meals. If unable to take PO.       pen needle, diabetic (PEN NEEDLE MISC) BD Altagracia- 4 mm X 32 G needle - as directed 4x a day sc 4 times per day       polyethylene glycol (Glycolax, Miralax) 17 gram packet Take 17 g by mouth once daily.   Unknown    primidone 125 mg tablet Take 125 mg by mouth 3 times a day.   Unknown    propranolol LA (Inderal LA) 60 mg 24 hr capsule Take 1 capsule (60 mg) by mouth early in the morning.. Hold for SBP < 110 mmhg, HR < 60 bpm.   Unknown    sennosides (Senokot) 8.6 mg tablet Take 1 tablet (8.6 mg) by mouth every 12 hours if needed for constipation.   Unknown    Sure Comfort Pen Needle 32 gauge x 5/32\" needle AS DIRECTED DAILY FOR 90 DAYS 100 each 11 Unknown    traZODone (Desyrel) 25 MG split tablet Take 1 half tablet (25 mg) by mouth once daily at bedtime.   Unknown    vancomycin (Vancocin) 1 gram/250 mL solution Infuse 250 mL (1 g) at 250 mL/hr over 60 minutes into a venous catheter every 12 hours. Once weekly labs CBC/diff, CMP, Vanc trough ESR, CRP fax to Dr. Juarez [phone]. Stop date 11/12/24. 41675 mL 0      Erythromycin, Morphine, and Rosuvastatin  Social History     Tobacco Use    Smoking status: Former     Types: Cigarettes     Passive exposure: Past    Smokeless tobacco: Never   Vaping Use    Vaping status: Never Used   Substance Use Topics    Alcohol use: Not Currently    Drug use: Not " Currently     Family History   Problem Relation Name Age of Onset    Multiple myeloma Mother      Cancer Mother      Other (CABG) Father      Pulmonary embolism Father      Heart disease Father      Breast cancer Sister          Stage II    Hypertension Sister      Diabetes Sister      No Known Problems Sister          x5    No Known Problems Brother          x4    No Known Problems Daughter         Hospital Medications:    furosemide, 10 mg/hr, Last Rate: 10 mg/hr (10/20/24 0600)  norepinephrine, 0-0.5 mcg/kg/min, Last Rate: 0.03 mcg/kg/min (10/20/24 0609)  propofol, 0-50 mcg/kg/min, Last Rate: 10 mcg/kg/min (10/20/24 0600)          Current Facility-Administered Medications:     acetaminophen (Tylenol) tablet 975 mg, 975 mg, oral, q6h PRN, Kaleb Lam PA-C, 975 mg at 10/18/24 1405    alteplase (Cathflo Activase) injection 2 mg, 2 mg, intra-catheter, PRN, Kaleb Lam PA-C    brimonidine (AlphaGAN) 0.2 % ophthalmic solution 1 drop, 1 drop, Both Eyes, BID, Kaleb aLm PA-C, 1 drop at 10/19/24 2347    [Held by provider] calcium carbonate-vitamin D3 500 mg-5 mcg (200 unit) per tablet 1 tablet, 1 tablet, oral, Daily, Kaleb Lam PA-C, 1 tablet at 10/18/24 0930    cefepime (Maxipime) 2 g in dextrose 5% 100 mL IV, 2 g, intravenous, q12h, Kaleb Lam PA-C, Stopped at 10/20/24 0021    [START ON 10/23/2024] cefTRIAXone (Rocephin) 2 g in dextrose (iso) IV 50 mL, 2 g, intravenous, q24h, Kaleb Lam PA-C    dextrose 50 % injection 12.5 g, 12.5 g, intravenous, q15 min PRN, Kaleb Lam PA-C    dextrose 50 % injection 25 g, 25 g, intravenous, q15 min PRN, Kaleb Lam PA-C    docusate sodium (Colace) oral liquid 100 mg, 100 mg, oral, BID, Kaleb Lam PA-C, 100 mg at 10/18/24 0930    ezetimibe (Zetia) tablet 10 mg, 10 mg, oral, Daily, Kaleb Lam PA-C, 10 mg at 10/18/24 1002    fentaNYL PF (Sublimaze) injection 25 mcg, 25 mcg, intravenous, q2h PRN, Kaleb Lam PA-C    furosemide  (Lasix) 500 mg in 50 mL (10 mg/mL) infusion, 10 mg/hr, intravenous, Continuous, LEONEL Salas-CNP, Last Rate: 1 mL/hr at 10/20/24 0600, 10 mg/hr at 10/20/24 0600    glucagon (Glucagen) injection 1 mg, 1 mg, intramuscular, q15 min PRN, Kaleb Lam PA-C    glucagon (Glucagen) injection 1 mg, 1 mg, intramuscular, q15 min PRN, Kaleb Lam PA-C    heparin (porcine) injection 5,000 Units, 5,000 Units, subcutaneous, q8h, Kaleb Lam PA-C, 5,000 Units at 10/20/24 0617    heparin flush 10 unit/mL syringe 50 Units, 50 Units, intra-catheter, PRN, Kaleb Lam PA-C, 50 Units at 10/19/24 2313    [Held by provider] HYDROmorphone (Dilaudid) injection 0.4 mg, 0.4 mg, intravenous, q2h PRN, LEONEL Salgado-CNP, 0.4 mg at 10/19/24 0520    insulin lispro (HumaLOG) injection 0-15 Units, 0-15 Units, subcutaneous, q6h, RUTH Salas, 3 Units at 10/20/24 0530    ipratropium-albuteroL (Duo-Neb) 0.5-2.5 mg/3 mL nebulizer solution 3 mL, 3 mL, nebulization, 4x daily, Kaleb Lam PA-C, 3 mL at 10/20/24 0722    latanoprost (Xalatan) 0.005 % ophthalmic solution 1 drop, 1 drop, Both Eyes, Nightly, Kaleb Lam PA-C, 1 drop at 10/19/24 2346    norepinephrine (Levophed) 8 mg in dextrose 5% 250 mL (0.032 mg/mL) infusion (premix), 0-0.5 mcg/kg/min, intravenous, Continuous, LEONEL Salas-CNP, Last Rate: 7.09 mL/hr at 10/20/24 0609, 0.03 mcg/kg/min at 10/20/24 0609    oxygen (O2) therapy, , inhalation, Continuous - Inhalation, Kaleb Lam PA-C, 40 percent at 10/20/24 0722    oxymetazoline (Afrin) 0.05 % nasal spray 2 spray, 2 spray, Each Nostril, q12h PRN, Sabino Matos, APRN-CNP    pantoprazole (ProtoNix) EC tablet 40 mg, 40 mg, oral, Daily before breakfast **OR** pantoprazole (ProtoNix) injection 40 mg, 40 mg, intravenous, Daily before breakfast, Alonso Yancey, LEONEL-CNP, 40 mg at 10/20/24 0617    potassium chloride (Klor-Con) packet 20 mEq, 20 mEq, oral, Once, Kaleb Lam PA-C     primidone (Mysoline) tablet 125 mg, 125 mg, oral, Nightly, Kaleb Lam PA-C, 125 mg at 10/19/24 2345    propofol (Diprivan) infusion, 0-50 mcg/kg/min, intravenous, Continuous, LEONEL Salas-CNP, Last Rate: 7.56 mL/hr at 10/20/24 0600, 10 mcg/kg/min at 10/20/24 0600    [Held by provider] propranolol LA (Inderal LA) 24 hr capsule 60 mg, 60 mg, oral, Daily, Kaleb Lam PA-C, 60 mg at 10/19/24 0643    sodium chloride 3 % nebulizer solution 3 mL, 3 mL, nebulization, 4x daily, Kaleb Lam PA-C, 3 mL at 10/20/24 0722    [Held by provider] traMADol (Ultram) tablet 50 mg, 50 mg, oral, q8h PRN, Kaleb Lam PA-C    vancomycin (Vancocin) pharmacy to dose - pharmacy monitoring, , miscellaneous, Daily PRN, Kaleb Lam PA-C    Review of Systems:  14 point review of systems was completed and negative except for those specially mention in my HPI    Physical Exam:    Heart Rate:  []   Temp:  [36.5 °C (97.7 °F)-36.7 °C (98.1 °F)]   Resp:  [0-31]   BP: ()/(40-77)   Weight:  [122 kg (269 lb 6.4 oz)-126 kg (277 lb 9 oz)]   SpO2:  [84 %-100 %]     Physical Exam  Vitals and nursing note reviewed.   Constitutional:       Comments: Lethargic, arouses to physical stimuli but difficulty maintaining alertness. Tachypnic.    HENT:      Head: Normocephalic.   Eyes:      Extraocular Movements: Extraocular movements intact.      Pupils: Pupils are equal, round, and reactive to light.   Cardiovascular:      Rate and Rhythm: Normal rate and regular rhythm.      Pulses:           Radial pulses are 1+ on the right side and 1+ on the left side.        Dorsalis pedis pulses are 1+ on the right side and 1+ on the left side.      Heart sounds: Normal heart sounds, S1 normal and S2 normal.      Comments: Anasarca present  Pulmonary:      Effort: Tachypnea and accessory muscle usage present.      Breath sounds: Rhonchi and rales present. No wheezing.   Chest:      Comments: L chest pigtail catheter in place draining  straw colored output.  Abdominal:      General: Abdomen is flat. Bowel sounds are normal.      Palpations: Abdomen is soft.      Tenderness: There is no abdominal tenderness.   Genitourinary:     Comments: Gibson in place draining minimal straw colored urine  Musculoskeletal:      Cervical back: Neck supple.      Right lower le+ Edema present.      Left lower le+ Edema present.   Skin:     General: Skin is warm.      Capillary Refill: Capillary refill takes less than 2 seconds.      Coloration: Skin is not cyanotic, jaundiced or mottled.   Neurological:      General: No focal deficit present.      Mental Status: She is lethargic.      GCS: GCS eye subscore is 3. GCS verbal subscore is 4. GCS motor subscore is 6.      Motor: Weakness present.       Objective:    I have reviewed all medications, laboratory results, and imaging pertinent for today's encounter.    Vent Mode: Pressure regulated volume control/assist control  FiO2 (%):  [50 %-80 %] 50 %  S RR:  [18-24] 24  S VT:  [450 mL] 450 mL  PEEP/CPAP (cm H2O):  [5 cm H20] 5 cm H20  MAP (cm H2O):  [11-14] 11      Intake/Output Summary (Last 24 hours) at 10/20/2024 0730  Last data filed at 10/20/2024 0628  Gross per 24 hour   Intake 916.82 ml   Output 816 ml   Net 100.82 ml       Daily Labs:  CBC:   Results from last 7 days   Lab Units 10/20/24  0503   WBC AUTO x10*3/uL 9.4   HEMOGLOBIN g/dL 7.6*   HEMATOCRIT % 23.6*   MCV fL 96     BMP:    Results from last 7 days   Lab Units 10/20/24  0503   SODIUM mmol/L 141   POTASSIUM mmol/L 3.9   CHLORIDE mmol/L 111*   CO2 mmol/L 19*   BUN mg/dL 52*   CREATININE mg/dL 2.41*   CALCIUM mg/dL 7.8*   GLUCOSE mg/dL 160*   MAGNESIUM mg/dL 2.06       Assessment/Plan:    Narda Malloy is a 69 y.o. year old female patient with past medical history of L1-L3 lumbar laminectomy, T4-S1 revision, and fusion , T2DM, HTN, essential tremor, HLD, glaucoma, sarcoidosis of the lung who presented to  ED 10/12 after being found  unresponsive at her nursing facility. Initially intubated and admitted to ICU for septic shock. Extubated 10/15. Re-intubated 10/19 for hypoxic/hypercapnic respiratory failure. Has been on and off and now back on levophed.    I am currently managing this critically ill patient for the following problems:     Neuro/Psych/Pain Ctrl/Sedation:   Acute encephalopathy - likely secondary to hypercapnia, infection  Hx Essential tremor   CT head: Negative for acute findings   Urine tox positive for barbiturates consistent with primidone intake   - Addressing underlying causes  - Pain Control: Acetaminophen PRN  - Sedation: Propofol  - Home primidone continued. Will hold propanolol d/t hypotension  - CAM ICU qshift, sleep-wake hygiene, delirium precautions      Respiratory/ENT:  Acute hypoxic/hypercapnic respiratory failure - likely multifactorial and 2/2 HCAP, pl effusions, volume overload  Healthcare-associated pneumonia   Atelectasis - likely 2/2 mucus plugging   CXR 10/19: Bilateral atelectasis and pleural effusions with very poor aeration of the lungs, unchanged  ABG this am: 7.37--18  S/p left-sided pigtail placement 10/17, 90 out overnight. Transudative.   - Intubated 10/18. Vent setting: -20-5 40%  - Will wean O2 as tolerated to maintain SpO2 >92%  - Continues on lasix gtt at 10 mg/hr   - Duonebs and mucomyst QID   - IPV per RT TID  - Will consider right-sided thoracentesis   - Continuous pulse ox monitoring   - Pulm hygiene     Cardiovascular:   Septic shock - improving  Hx HTN, HLD  Acute on chronic diastolic heart failure  TTE 10/1: diastolic dysfunction, LVEF 50-55%, mildly elevated RVSP (40)  Bilateral duplex US upper extremities:  Thrombosis within the left cephalic vein, which is part of the superficial venous system of the upper extremity, adjacent to PICC line. No deep venous thrombosis in the upper extremities.  - On levophed gtt, Goal MAP > 70 for renal perfusion to enhance diuresis  - Holding  home propranolol (on for tremors)  - Continue home Zetia  - Continuous cardiac monitoring per ICU protocol  - EKGs PRN for ACS symptoms, arrhythmias      GI:  Hx GERD  - Diet: Tolerating TF @ goal  - BR: Colace  - GI Prophylaxis: PPI     Renal/Volume Status/Electrolytes:  Acute kidney injury - possibly ATN +/- medication-induced (vancomycin)  Anasarca   Mixed respiratory and metabolic acidosis - improving on vent  Hypoalbuminemia   Baseline Cr 0.8-1.0. BUN Cr continues to uptrend today 52/2.4  Vanco levels remain elevated: 27 this am. Pharmacy dosing and holding d/t high levels.   - Remains grossly volume overloaded. UO is increasing on lasix gtt.   - Nephrology consulted, input appreciated  - Maintain anders catheter  - Hourly I/O's. Goal urine output 0.5-1.0cc/kg/hr.  - Replete electrolytes to maintain K >4.0 and Mg >2.0  - Daily BMP, Mg, Phos    Endocrine:  T2DM  - SSI Q 6 while NPO  - TSH: midly elevated, T4 WNL  - Hypoglycemia protocol PRN      Infectious Disease:  Thoracolumbar surgical site infection - s/p I&D x 2. Recent MRSA bacteremia on extended course of IV antibiotics (vanc and rocephin -> 11/12)  Healthcare-associated pneumonia   CT lumbar spine 10/12: Unable to r/o abscess. Unable to do MRI d/t spinal hardware.  Blood cultures 10/16: Negative  Urine culture 10/14: Negative  Sputum culture 10/16: + pseudomonas   - Antibiotics: Continue cefepime until 10/22 per ID, then will be on Ceftriaxone until 11/12.   - Vanco hold today d/t continued high levels (27 today). Dosing of vanco per pharmacy.   - ID consulted, appreciate assistance   - Replace midline prior to discharge per ID recs  - Monitor SIRS criteria     Heme/Onc:  Normocytic anemia   H/H similar to previous: 7.7/25.2 --> 7.6/23.6 . No active signs of bleeding.  - Monitor for s/sx of bleeding   - Plan to transfuse if Hgb <7.0   - Daily CBC     MSK:  No active concerns   - PT/OT when appropriate     Derm:  Surgical wound with infection  - Wound care  consult  - ICU skin protocol  - Q2hr turns  - Padded pressure points      Ethics/Code Status:  Full code - discussed this with  at bedside      :  DVT Prophylaxis: SQH  GI Prophylaxis: PPI  Bowel Regimen: Colace  Diet: TF  CVC: Midline, Trialysis R IJ  [place]: Right radial placed 10/19, removed 10/20 d/t malfunction  Gibson: yes, replaced 10/12  Restraints: Yes  Disposition: ICU  I have reviewed all medications, laboratory results, and imaging pertinent for today's encounter.  Plan Discussed with Dr. Baker,  at bedside, patient, nursing  Critical Care Time: 45 minutes  Critical Care [facility]  Alonso Yancey CNP

## 2024-10-20 NOTE — PROGRESS NOTES
Vancomycin Dosing by Pharmacy- [facility]    Narda Malloy is a 69 y.o. year old female who Pharmacy has been consulted for vancomycin dosing for Vancomycin Indications: Skin & Soft Tissue. Based on the patient's indication and renal status this patient will be dosed based on a target level of SSTI/UTI: 10-15 mcg/mL    Patient is currently in AMY    Last vancomycin dose (incl. date and time): 1000 mg q12h at OSH    Vancomycin level (incl. date and time): 27.2 mcg/mL on 10/20 at 5:00    Lab Results   Component Value Date    VANCORANDOM 27.2 (H) 10/20/2024    VANCOTROUGH 11.8 08/08/2018       Visit Vitals  BP (!) 112/46   Pulse 68   Temp 36.6 °C (97.9 °F) (Oral)   Resp 24           Lab Results   Component Value Date    CREATININE 2.41 (H) 10/20/2024    CREATININE 2.01 (H) 10/19/2024    CREATININE 1.62 (H) 10/18/2024    CREATININE 1.38 (H) 10/17/2024       I/O last 3 completed shifts:  In: 1216.8 (10 mL/kg) [I.V.:316.8 (2.6 mL/kg); Blood:400; IV Piggyback:500]  Out: 1319 (10.8 mL/kg) [Urine:679 (0.2 mL/kg/hr); Chest Tube:640]  Weight: 122.2 kg     Assessment/Plan     Level is above target trough goal.   hold vancomycin.  Random level within 24 hours will be obtained on 10/21 at 5:00. May be obtained sooner if clinically indicated.   Will continue to monitor renal function daily while on vancomycin and order serum creatinine at least every 48 hours if not already ordered.  Follow for continued vancomycin needs, clinical response, and signs/symptoms of toxicity.     Yoel Moore, LissetD

## 2024-10-21 ENCOUNTER — APPOINTMENT (OUTPATIENT)
Dept: RADIOLOGY | Facility: HOSPITAL | Age: 69
End: 2024-10-21
Payer: MEDICARE

## 2024-10-21 LAB
ALBUMIN SERPL BCP-MCNC: 2.4 G/DL (ref 3.4–5)
ANION GAP BLDA CALCULATED.4IONS-SCNC: 11 MMO/L (ref 10–25)
ANION GAP SERPL CALCULATED.3IONS-SCNC: 11 MMOL/L (ref 10–20)
ARTERIAL PATENCY WRIST A: POSITIVE
BACTERIA FLD CULT: NORMAL
BASE EXCESS BLDA CALC-SCNC: -5.6 MMOL/L (ref -2–3)
BASOPHILS # BLD AUTO: 0.03 X10*3/UL (ref 0–0.1)
BASOPHILS NFR BLD AUTO: 0.3 %
BODY TEMPERATURE: 37 DEGREES CELSIUS
BUN SERPL-MCNC: 58 MG/DL (ref 6–23)
CA-I BLDA-SCNC: 1.15 MMOL/L (ref 1.1–1.33)
CALCIUM SERPL-MCNC: 7.5 MG/DL (ref 8.6–10.3)
CHLORIDE BLDA-SCNC: 109 MMOL/L (ref 98–107)
CHLORIDE SERPL-SCNC: 110 MMOL/L (ref 98–107)
CK SERPL-CCNC: <10 U/L (ref 0–215)
CO2 SERPL-SCNC: 22 MMOL/L (ref 21–32)
CREAT SERPL-MCNC: 2.75 MG/DL (ref 0.5–1.05)
CREAT UR-MCNC: 27.5 MG/DL (ref 20–320)
EGFRCR SERPLBLD CKD-EPI 2021: 18 ML/MIN/1.73M*2
EOSINOPHIL # BLD AUTO: 0.15 X10*3/UL (ref 0–0.7)
EOSINOPHIL NFR BLD AUTO: 1.5 %
ERYTHROCYTE [DISTWIDTH] IN BLOOD BY AUTOMATED COUNT: 19 % (ref 11.5–14.5)
GLUCOSE BLD MANUAL STRIP-MCNC: 174 MG/DL (ref 74–99)
GLUCOSE BLD MANUAL STRIP-MCNC: 188 MG/DL (ref 74–99)
GLUCOSE BLD MANUAL STRIP-MCNC: 208 MG/DL (ref 74–99)
GLUCOSE BLD MANUAL STRIP-MCNC: 216 MG/DL (ref 74–99)
GLUCOSE BLDA-MCNC: 189 MG/DL (ref 74–99)
GLUCOSE SERPL-MCNC: 178 MG/DL (ref 74–99)
GRAM STN SPEC: NORMAL
GRAM STN SPEC: NORMAL
HCO3 BLDA-SCNC: 19.1 MMOL/L (ref 22–26)
HCT VFR BLD AUTO: 24.1 % (ref 36–46)
HCT VFR BLD EST: 25 % (ref 36–46)
HGB BLD-MCNC: 7.8 G/DL (ref 12–16)
HGB BLDA-MCNC: 8.4 G/DL (ref 12–16)
IMM GRANULOCYTES # BLD AUTO: 0.18 X10*3/UL (ref 0–0.7)
IMM GRANULOCYTES NFR BLD AUTO: 1.8 % (ref 0–0.9)
INHALED O2 CONCENTRATION: 40 %
LACTATE BLDA-SCNC: 1.2 MMOL/L (ref 0.4–2)
LYMPHOCYTES # BLD AUTO: 1.04 X10*3/UL (ref 1.2–4.8)
LYMPHOCYTES NFR BLD AUTO: 10.6 %
MAGNESIUM SERPL-MCNC: 1.98 MG/DL (ref 1.6–2.4)
MCH RBC QN AUTO: 30.8 PG (ref 26–34)
MCHC RBC AUTO-ENTMCNC: 32.4 G/DL (ref 32–36)
MCV RBC AUTO: 95 FL (ref 80–100)
MONOCYTES # BLD AUTO: 0.69 X10*3/UL (ref 0.1–1)
MONOCYTES NFR BLD AUTO: 7 %
NEUTROPHILS # BLD AUTO: 7.7 X10*3/UL (ref 1.2–7.7)
NEUTROPHILS NFR BLD AUTO: 78.8 %
NRBC BLD-RTO: 0 /100 WBCS (ref 0–0)
OXYHGB MFR BLDA: 95.9 % (ref 94–98)
PCO2 BLDA: 33 MM HG (ref 38–42)
PEEP CMH2O: 5 CM H2O
PH BLDA: 7.37 PH (ref 7.38–7.42)
PHOSPHATE SERPL-MCNC: 3.3 MG/DL (ref 2.5–4.9)
PLATELET # BLD AUTO: 282 X10*3/UL (ref 150–450)
PO2 BLDA: 82 MM HG (ref 85–95)
POTASSIUM BLDA-SCNC: 4.3 MMOL/L (ref 3.5–5.3)
POTASSIUM SERPL-SCNC: 4.1 MMOL/L (ref 3.5–5.3)
PROT UR-MCNC: 173 MG/DL (ref 5–24)
PROT/CREAT UR: 6.29 MG/MG CREAT (ref 0–0.17)
RBC # BLD AUTO: 2.53 X10*6/UL (ref 4–5.2)
SAO2 % BLDA: 98 % (ref 94–100)
SODIUM BLDA-SCNC: 135 MMOL/L (ref 136–145)
SODIUM SERPL-SCNC: 139 MMOL/L (ref 136–145)
SPECIMEN DRAWN FROM PATIENT: ABNORMAL
TIDAL VOLUME: 450 ML
VANCOMYCIN SERPL-MCNC: 26.6 UG/ML (ref 5–20)
VENTILATOR RATE: 20 BPM
WBC # BLD AUTO: 9.8 X10*3/UL (ref 4.4–11.3)

## 2024-10-21 PROCEDURE — 82947 ASSAY GLUCOSE BLOOD QUANT: CPT

## 2024-10-21 PROCEDURE — 2500000004 HC RX 250 GENERAL PHARMACY W/ HCPCS (ALT 636 FOR OP/ED): Performed by: INTERNAL MEDICINE

## 2024-10-21 PROCEDURE — 36600 WITHDRAWAL OF ARTERIAL BLOOD: CPT

## 2024-10-21 PROCEDURE — 71045 X-RAY EXAM CHEST 1 VIEW: CPT

## 2024-10-21 PROCEDURE — 94668 MNPJ CHEST WALL SBSQ: CPT

## 2024-10-21 PROCEDURE — 2500000004 HC RX 250 GENERAL PHARMACY W/ HCPCS (ALT 636 FOR OP/ED)

## 2024-10-21 PROCEDURE — 2500000001 HC RX 250 WO HCPCS SELF ADMINISTERED DRUGS (ALT 637 FOR MEDICARE OP): Performed by: NURSE PRACTITIONER

## 2024-10-21 PROCEDURE — 76770 US EXAM ABDO BACK WALL COMP: CPT

## 2024-10-21 PROCEDURE — 85025 COMPLETE CBC W/AUTO DIFF WBC: CPT

## 2024-10-21 PROCEDURE — 2020000001 HC ICU ROOM DAILY

## 2024-10-21 PROCEDURE — 82570 ASSAY OF URINE CREATININE: CPT | Performed by: INTERNAL MEDICINE

## 2024-10-21 PROCEDURE — 99291 CRITICAL CARE FIRST HOUR: CPT | Performed by: NURSE PRACTITIONER

## 2024-10-21 PROCEDURE — 83735 ASSAY OF MAGNESIUM: CPT

## 2024-10-21 PROCEDURE — 37799 UNLISTED PX VASCULAR SURGERY: CPT | Performed by: INTERNAL MEDICINE

## 2024-10-21 PROCEDURE — 2500000001 HC RX 250 WO HCPCS SELF ADMINISTERED DRUGS (ALT 637 FOR MEDICARE OP)

## 2024-10-21 PROCEDURE — 2500000004 HC RX 250 GENERAL PHARMACY W/ HCPCS (ALT 636 FOR OP/ED): Performed by: NURSE PRACTITIONER

## 2024-10-21 PROCEDURE — 71045 X-RAY EXAM CHEST 1 VIEW: CPT | Performed by: RADIOLOGY

## 2024-10-21 PROCEDURE — 84132 ASSAY OF SERUM POTASSIUM: CPT | Performed by: NURSE PRACTITIONER

## 2024-10-21 PROCEDURE — 80202 ASSAY OF VANCOMYCIN: CPT

## 2024-10-21 PROCEDURE — 82550 ASSAY OF CK (CPK): CPT | Performed by: INTERNAL MEDICINE

## 2024-10-21 PROCEDURE — 2500000002 HC RX 250 W HCPCS SELF ADMINISTERED DRUGS (ALT 637 FOR MEDICARE OP, ALT 636 FOR OP/ED): Performed by: NURSE PRACTITIONER

## 2024-10-21 PROCEDURE — 2500000002 HC RX 250 W HCPCS SELF ADMINISTERED DRUGS (ALT 637 FOR MEDICARE OP, ALT 636 FOR OP/ED)

## 2024-10-21 PROCEDURE — P9047 ALBUMIN (HUMAN), 25%, 50ML: HCPCS

## 2024-10-21 PROCEDURE — 94003 VENT MGMT INPAT SUBQ DAY: CPT

## 2024-10-21 PROCEDURE — 80069 RENAL FUNCTION PANEL: CPT

## 2024-10-21 PROCEDURE — 94640 AIRWAY INHALATION TREATMENT: CPT

## 2024-10-21 PROCEDURE — 87205 SMEAR GRAM STAIN: CPT | Mod: WESLAB

## 2024-10-21 PROCEDURE — 2500000005 HC RX 250 GENERAL PHARMACY W/O HCPCS

## 2024-10-21 PROCEDURE — 76770 US EXAM ABDO BACK WALL COMP: CPT | Performed by: RADIOLOGY

## 2024-10-21 RX ORDER — DAPTOMYCIN IN SODIUM CHLORIDE 700 MG/100ML
700 INJECTION, SOLUTION INTRAVENOUS
Status: DISCONTINUED | OUTPATIENT
Start: 2024-10-21 | End: 2024-10-22

## 2024-10-21 RX ORDER — DAPTOMYCIN IN SODIUM CHLORIDE 700 MG/100ML
700 INJECTION, SOLUTION INTRAVENOUS
Status: DISCONTINUED | OUTPATIENT
Start: 2024-10-21 | End: 2024-10-21

## 2024-10-21 RX ORDER — NOREPINEPHRINE BITARTRATE/D5W 8 MG/250ML
0-.5 PLASTIC BAG, INJECTION (ML) INTRAVENOUS CONTINUOUS
Status: DISCONTINUED | OUTPATIENT
Start: 2024-10-21 | End: 2024-10-24

## 2024-10-21 RX ORDER — ALBUMIN HUMAN 250 G/1000ML
50 SOLUTION INTRAVENOUS ONCE
Status: COMPLETED | OUTPATIENT
Start: 2024-10-21 | End: 2024-10-21

## 2024-10-21 RX ORDER — METOLAZONE 5 MG/1
5 TABLET ORAL ONCE
Status: COMPLETED | OUTPATIENT
Start: 2024-10-21 | End: 2024-10-21

## 2024-10-21 RX ORDER — DOCUSATE SODIUM 50 MG/5ML
100 LIQUID ORAL 2 TIMES DAILY PRN
Status: DISCONTINUED | OUTPATIENT
Start: 2024-10-21 | End: 2024-10-24

## 2024-10-21 RX ORDER — FUROSEMIDE 10 MG/ML
100 INJECTION INTRAMUSCULAR; INTRAVENOUS ONCE
Status: COMPLETED | OUTPATIENT
Start: 2024-10-21 | End: 2024-10-21

## 2024-10-21 RX ADMIN — EZETIMIBE 10 MG: 10 TABLET ORAL at 08:07

## 2024-10-21 RX ADMIN — INSULIN LISPRO 3 UNITS: 100 INJECTION, SOLUTION INTRAVENOUS; SUBCUTANEOUS at 23:36

## 2024-10-21 RX ADMIN — Medication 3 ML: at 19:44

## 2024-10-21 RX ADMIN — HYDROCORTISONE SODIUM SUCCINATE 100 MG: 100 INJECTION, POWDER, FOR SOLUTION INTRAMUSCULAR; INTRAVENOUS at 12:41

## 2024-10-21 RX ADMIN — ALBUMIN HUMAN 50 G: 0.25 SOLUTION INTRAVENOUS at 20:37

## 2024-10-21 RX ADMIN — HEPARIN SODIUM 5000 UNITS: 5000 INJECTION, SOLUTION INTRAVENOUS; SUBCUTANEOUS at 06:32

## 2024-10-21 RX ADMIN — IPRATROPIUM BROMIDE AND ALBUTEROL SULFATE 3 ML: .5; 3 SOLUTION RESPIRATORY (INHALATION) at 10:35

## 2024-10-21 RX ADMIN — DAPTOMYCIN IN SODIUM CHLORIDE 700 MG: 700 INJECTION, SOLUTION INTRAVENOUS at 12:54

## 2024-10-21 RX ADMIN — BRIMONIDINE TARTRATE 1 DROP: 2 SOLUTION OPHTHALMIC at 08:06

## 2024-10-21 RX ADMIN — FENTANYL CITRATE 25 MCG: 0.05 INJECTION, SOLUTION INTRAMUSCULAR; INTRAVENOUS at 08:59

## 2024-10-21 RX ADMIN — Medication 40 PERCENT: at 07:30

## 2024-10-21 RX ADMIN — IPRATROPIUM BROMIDE AND ALBUTEROL SULFATE 3 ML: .5; 3 SOLUTION RESPIRATORY (INHALATION) at 07:26

## 2024-10-21 RX ADMIN — FUROSEMIDE 100 MG: 10 INJECTION, SOLUTION INTRAMUSCULAR; INTRAVENOUS at 17:24

## 2024-10-21 RX ADMIN — HYDROCORTISONE SODIUM SUCCINATE 100 MG: 100 INJECTION, POWDER, FOR SOLUTION INTRAMUSCULAR; INTRAVENOUS at 20:26

## 2024-10-21 RX ADMIN — IPRATROPIUM BROMIDE AND ALBUTEROL SULFATE 3 ML: .5; 3 SOLUTION RESPIRATORY (INHALATION) at 19:44

## 2024-10-21 RX ADMIN — Medication 3 ML: at 10:35

## 2024-10-21 RX ADMIN — INSULIN LISPRO 6 UNITS: 100 INJECTION, SOLUTION INTRAVENOUS; SUBCUTANEOUS at 17:31

## 2024-10-21 RX ADMIN — INSULIN LISPRO 3 UNITS: 100 INJECTION, SOLUTION INTRAVENOUS; SUBCUTANEOUS at 06:36

## 2024-10-21 RX ADMIN — HEPARIN SODIUM 5000 UNITS: 5000 INJECTION, SOLUTION INTRAVENOUS; SUBCUTANEOUS at 14:50

## 2024-10-21 RX ADMIN — FUROSEMIDE 60 MG: 10 INJECTION, SOLUTION INTRAMUSCULAR; INTRAVENOUS at 12:42

## 2024-10-21 RX ADMIN — PRIMIDONE 125 MG: 250 TABLET ORAL at 20:27

## 2024-10-21 RX ADMIN — FUROSEMIDE 60 MG: 10 INJECTION, SOLUTION INTRAMUSCULAR; INTRAVENOUS at 01:43

## 2024-10-21 RX ADMIN — PROPOFOL 10 MCG/KG/MIN: 10 INJECTION, EMULSION INTRAVENOUS at 20:53

## 2024-10-21 RX ADMIN — LATANOPROST 1 DROP: 50 SOLUTION OPHTHALMIC at 20:27

## 2024-10-21 RX ADMIN — Medication 40 PERCENT: at 19:45

## 2024-10-21 RX ADMIN — Medication 3 ML: at 07:26

## 2024-10-21 RX ADMIN — CEFEPIME 2 G: 2 INJECTION, POWDER, FOR SOLUTION INTRAVENOUS at 08:02

## 2024-10-21 RX ADMIN — FENTANYL CITRATE 25 MCG: 0.05 INJECTION, SOLUTION INTRAMUSCULAR; INTRAVENOUS at 20:36

## 2024-10-21 RX ADMIN — IPRATROPIUM BROMIDE AND ALBUTEROL SULFATE 3 ML: .5; 3 SOLUTION RESPIRATORY (INHALATION) at 15:33

## 2024-10-21 RX ADMIN — PANTOPRAZOLE SODIUM 40 MG: 40 INJECTION, POWDER, FOR SOLUTION INTRAVENOUS at 06:32

## 2024-10-21 RX ADMIN — METOLAZONE 5 MG: 5 TABLET ORAL at 17:00

## 2024-10-21 RX ADMIN — CEFEPIME 1 G: 1 INJECTION, POWDER, FOR SOLUTION INTRAMUSCULAR; INTRAVENOUS at 20:26

## 2024-10-21 RX ADMIN — DOCUSATE SODIUM 100 MG: 50 LIQUID ORAL at 08:07

## 2024-10-21 RX ADMIN — INSULIN LISPRO 6 UNITS: 100 INJECTION, SOLUTION INTRAVENOUS; SUBCUTANEOUS at 12:42

## 2024-10-21 RX ADMIN — HEPARIN SODIUM 5000 UNITS: 5000 INJECTION, SOLUTION INTRAVENOUS; SUBCUTANEOUS at 21:53

## 2024-10-21 RX ADMIN — BRIMONIDINE TARTRATE 1 DROP: 2 SOLUTION OPHTHALMIC at 20:27

## 2024-10-21 ASSESSMENT — PAIN - FUNCTIONAL ASSESSMENT

## 2024-10-21 ASSESSMENT — PAIN SCALES - PAIN ASSESSMENT IN ADVANCED DEMENTIA (PAINAD): TOTALSCORE: MEDICATION (SEE MAR)

## 2024-10-21 ASSESSMENT — PAIN SCALES - GENERAL
PAINLEVEL_OUTOF10: 1
PAINLEVEL_OUTOF10: 4
PAINLEVEL_OUTOF10: 1
PAINLEVEL_OUTOF10: 4

## 2024-10-21 ASSESSMENT — PAIN DESCRIPTION - LOCATION: LOCATION: BACK

## 2024-10-21 NOTE — PROGRESS NOTES
Vancomycin Dosing by Pharmacy- [facility]    Narda Malloy is a 69 y.o. year old female who Pharmacy has been consulted for vancomycin dosing for Vancomycin Indications: Skin & Soft Tissue. Based on the patient's indication and renal status this patient will be dosed based on a target level of SSTI/UTI: 10-15 mcg/mL    Patient is currently in [facility]    Last vancomycin dose (incl. date and time): 1000mg q12h at OSH. No vanco since admission 10/12.    Vancomycin level (incl. date and time): 26.56 mcg/mL on 10/21 at 0513    Lab Results   Component Value Date    VANCORANDOM 26.6 (H) 10/21/2024    VANCOTROUGH 11.8 08/08/2018       Visit Vitals  BP 93/55   Pulse 63   Temp 36.7 °C (98.1 °F) (Axillary)   Resp 24           Lab Results   Component Value Date    CREATININE 2.75 (H) 10/21/2024    CREATININE 2.41 (H) 10/20/2024    CREATININE 2.01 (H) 10/19/2024    CREATININE 1.62 (H) 10/18/2024       I/O last 3 completed shifts:  In: 2341.2 (18.8 mL/kg) [I.V.:491.2 (3.9 mL/kg); Blood:200; NG/GT:1150; IV Piggyback:500]  Out: 1035 (8.3 mL/kg) [Urine:895 (0.2 mL/kg/hr); Chest Tube:140]  Weight: 124.8 kg     Assessment/Plan     Level is above target trough goal.   hold vancomycin.  Random level within 24 hours will be obtained on 10/22 at 0500. May be obtained sooner if clinically indicated.   Will continue to monitor renal function daily while on vancomycin and order serum creatinine at least every 48 hours if not already ordered.  Follow for continued vancomycin needs, clinical response, and signs/symptoms of toxicity.     Cornelius Altamirano, LissetD

## 2024-10-21 NOTE — CARE PLAN
The clinical goals for the shift include Remain hemodynamically stable and wean off pressors    Over the shift, the patient did make progress toward the following goals.       Problem: Pain - Adult  Goal: Verbalizes/displays adequate comfort level or baseline comfort level  Outcome: Progressing     Problem: Safety - Adult  Goal: Free from fall injury  Outcome: Progressing     Problem: Skin  Goal: Decreased wound size/increased tissue granulation at next dressing change  Outcome: Progressing  Flowsheets (Taken 10/21/2024 0205)  Decreased wound size/increased tissue granulation at next dressing change:   Promote sleep for wound healing   Protective dressings over bony prominences   Utilize specialty bed per algorithm  Goal: Participates in plan/prevention/treatment measures  Outcome: Progressing  Flowsheets (Taken 10/21/2024 0205)  Participates in plan/prevention/treatment measures: Elevate heels  Goal: Prevent/manage excess moisture  Outcome: Progressing  Flowsheets (Taken 10/21/2024 0205)  Prevent/manage excess moisture:   Cleanse incontinence/protect with barrier cream   Follow provider orders for dressing changes   Moisturize dry skin   Monitor for/manage infection if present  Goal: Prevent/minimize sheer/friction injuries  Outcome: Progressing  Flowsheets (Taken 10/21/2024 0205)  Prevent/minimize sheer/friction injuries:   Complete micro-shifts as needed if patient unable. Adjust patient position to relieve pressure points, not a full turn   Increase activity/out of bed for meals   Use pull sheet   HOB 30 degrees or less   Turn/reposition every 2 hours/use positioning/transfer devices  Goal: Promote/optimize nutrition  Outcome: Progressing  Flowsheets (Taken 10/21/2024 0205)  Promote/optimize nutrition: Monitor/record intake including meals  Goal: Promote skin healing  Outcome: Progressing  Flowsheets (Taken 10/21/2024 0205)  Promote skin healing:   Assess skin/pad under line(s)/device(s)   Protective dressings  over bony prominences   Ensure correct size (line/device) and apply per  instructions   Rotate device position/do not position patient on device   Turn/reposition every 2 hours/use positioning/transfer devices

## 2024-10-21 NOTE — PROGRESS NOTES
Narda Malloy is a 69 y.o. female on day 9 of admission presenting with Unresponsive.      Subjective   Patient remains intubated on the ventilator, per the nurse her FiO2 is stable at 40%, she is on a low-dose of Levophed.  Her creatinine is trending up to 2.75.  She was net +984 cc yesterday.       Objective          Vitals 24HR  Heart Rate:  [53-97]   Temp:  [36.5 °C (97.7 °F)-36.7 °C (98.1 °F)]   Resp:  [6-25]   BP: ()/(53-80)   Weight:  [125 kg (275 lb 2.2 oz)]   SpO2:  [83 %-98 %]       Intake/Output last 3 Shifts:    Intake/Output Summary (Last 24 hours) at 10/21/2024 1517  Last data filed at 10/21/2024 1400  Gross per 24 hour   Intake 1433.95 ml   Output 465 ml   Net 968.95 ml       Physical Exam  Constitutional:       Comments: Intubated and sedated   Cardiovascular:      Heart sounds:      No friction rub.   Pulmonary:      Comments: Mechanically ventilated, mildly diminished breath sounds  Abdominal:      General: Bowel sounds are normal.      Palpations: Abdomen is soft.   Musculoskeletal:      Comments: Upper extremities are edematous, has 1+ edema of the lower extremities         Relevant Results  Results for orders placed or performed during the hospital encounter of 10/12/24 (from the past 24 hours)   POCT GLUCOSE   Result Value Ref Range    POCT Glucose 171 (H) 74 - 99 mg/dL   POCT GLUCOSE   Result Value Ref Range    POCT Glucose 178 (H) 74 - 99 mg/dL   CBC and Auto Differential   Result Value Ref Range    WBC 9.8 4.4 - 11.3 x10*3/uL    nRBC 0.0 0.0 - 0.0 /100 WBCs    RBC 2.53 (L) 4.00 - 5.20 x10*6/uL    Hemoglobin 7.8 (L) 12.0 - 16.0 g/dL    Hematocrit 24.1 (L) 36.0 - 46.0 %    MCV 95 80 - 100 fL    MCH 30.8 26.0 - 34.0 pg    MCHC 32.4 32.0 - 36.0 g/dL    RDW 19.0 (H) 11.5 - 14.5 %    Platelets 282 150 - 450 x10*3/uL    Neutrophils % 78.8 40.0 - 80.0 %    Immature Granulocytes %, Automated 1.8 (H) 0.0 - 0.9 %    Lymphocytes % 10.6 13.0 - 44.0 %    Monocytes % 7.0 2.0 - 10.0 %    Eosinophils  % 1.5 0.0 - 6.0 %    Basophils % 0.3 0.0 - 2.0 %    Neutrophils Absolute 7.70 1.20 - 7.70 x10*3/uL    Immature Granulocytes Absolute, Automated 0.18 0.00 - 0.70 x10*3/uL    Lymphocytes Absolute 1.04 (L) 1.20 - 4.80 x10*3/uL    Monocytes Absolute 0.69 0.10 - 1.00 x10*3/uL    Eosinophils Absolute 0.15 0.00 - 0.70 x10*3/uL    Basophils Absolute 0.03 0.00 - 0.10 x10*3/uL   Renal function panel   Result Value Ref Range    Glucose 178 (H) 74 - 99 mg/dL    Sodium 139 136 - 145 mmol/L    Potassium 4.1 3.5 - 5.3 mmol/L    Chloride 110 (H) 98 - 107 mmol/L    Bicarbonate 22 21 - 32 mmol/L    Anion Gap 11 10 - 20 mmol/L    Urea Nitrogen 58 (H) 6 - 23 mg/dL    Creatinine 2.75 (H) 0.50 - 1.05 mg/dL    eGFR 18 (L) >60 mL/min/1.73m*2    Calcium 7.5 (L) 8.6 - 10.3 mg/dL    Phosphorus 3.3 2.5 - 4.9 mg/dL    Albumin 2.4 (L) 3.4 - 5.0 g/dL   Magnesium   Result Value Ref Range    Magnesium 1.98 1.60 - 2.40 mg/dL   Vancomycin   Result Value Ref Range    Vancomycin 26.6 (H) 5.0 - 20.0 ug/mL   Creatine Kinase   Result Value Ref Range    Creatine Kinase <10 0 - 215 U/L   POCT GLUCOSE   Result Value Ref Range    POCT Glucose 174 (H) 74 - 99 mg/dL   Blood Gas Arterial Full Panel   Result Value Ref Range    POCT pH, Arterial 7.37 (L) 7.38 - 7.42 pH    POCT pCO2, Arterial 33 (L) 38 - 42 mm Hg    POCT pO2, Arterial 82 (L) 85 - 95 mm Hg    POCT SO2, Arterial 98 94 - 100 %    POCT Oxy Hemoglobin, Arterial 95.9 94.0 - 98.0 %    POCT Hematocrit Calculated, Arterial 25.0 (L) 36.0 - 46.0 %    POCT Sodium, Arterial 135 (L) 136 - 145 mmol/L    POCT Potassium, Arterial 4.3 3.5 - 5.3 mmol/L    POCT Chloride, Arterial 109 (H) 98 - 107 mmol/L    POCT Ionized Calcium, Arterial 1.15 1.10 - 1.33 mmol/L    POCT Glucose, Arterial 189 (H) 74 - 99 mg/dL    POCT Lactate, Arterial 1.2 0.4 - 2.0 mmol/L    POCT Base Excess, Arterial -5.6 (L) -2.0 - 3.0 mmol/L    POCT HCO3 Calculated, Arterial 19.1 (L) 22.0 - 26.0 mmol/L    POCT Hemoglobin, Arterial 8.4 (L) 12.0 - 16.0  g/dL    POCT Anion Gap, Arterial 11 10 - 25 mmo/L    Patient Temperature 37.0 degrees Celsius    FiO2 40 %    Ventilator Rate 20 bpm    Tidal Volume 450 mL    Peep CHM2O 5.0 cm H2O    Site of Arterial Puncture Radial Right     Bill's Test Positive    POCT GLUCOSE   Result Value Ref Range    POCT Glucose 216 (H) 74 - 99 mg/dL     *Note: Due to a large number of results and/or encounters for the requested time period, some results have not been displayed. A complete set of results can be found in Results Review.            Assessment/Plan   Impression:  Acute kidney injury likely from acute tubular necrosis from multiple factors including shock hypotension as well as vancomycin toxicity  Acute respiratory failure  Edema  Septic shock  Pneumonia  Diabetes mellitus type 2  Malnutrition  Lower back surgery site infection     Recommendations:  Because the patient's respiratory status on the ventilator is stable and although she does have some edema, I do not see any acute indication to warrant beginning renal replacement therapy today however will monitor closely each day and if her renal function continues to decline or if she does not respond to diuretics and has worsening volume overload she will eventually need renal replacement therapy in the next couple of days.   If her systolic blood pressure is above 100 and remains only on low-dose Levophed then can try a higher dose of Lasix 80 mg IV to see if she responds better to this. Discussed case with the ICU team.   Check ultrasound of the kidneys  Check urine protein to creatinine ratio    Nelia Cheung MD

## 2024-10-21 NOTE — PROGRESS NOTES
Patient not medically clear. Patient intubated. At this time there is not a safe discharge plan in place. Therapy to evaluate patient. Patient was at [facility] prior to admission. If she will return then a precert will be needed. Will follow.      10/21/24 1029   Discharge Planning   Home or Post Acute Services Other (Comment)   Expected Discharge Disposition [place]  (TBD)   Does the patient need discharge transport arranged? Yes   RoundTrip coordination needed? Yes

## 2024-10-21 NOTE — PROGRESS NOTES
[facility] Critical Care Medicine       Date:  10/21/2024  Patient:  Narda Malloy  YOB: 1955  MRN:  [id number]   Admit Date:  10/12/2024      Chief Complaint   Patient presents with    Altered Mental Status     History of Present Illness:  Narda Malloy is a 69 y.o. year old female patient with past medical history of L1-L3 lumbar laminectomy, T4-S1 revision, and fusion August 26th, T2DM, HTN, essential tremor, HLD, glaucoma, sarcoidosis of the lung who presented to  ED 10/12 after being found essentially unresponsive at her nursing facility [facility]. Per report from her , she has had a significant decline in her health since July 15th when she had a fall and became significantly weak. She has also had multiple infection complications since her back surgery in August requiring multiple I&Ds and long term antibiotic therapy. She went to the OR most recently on 9/28 for lumbar site infection wash out. Per chart review, she was discharged on IV vancomycin 1g and IV ceftriaxone 2d q24hrs through 11/12.     ED Course: Initial vital signs: /104 (109), HR 68, RR 20, SpO2 95% on 6L NC, temp 34.5C. Give 0.4mg of narcan with no improvement in mentation. Lab work-up remarkable for mild hyperkalemia (5.5), AMY 42/1.46, elevated alk phos, normocytic anemia 10.4/33, turbid appearing urine with mild hematuria and proteinuria and + leuk esterase, >50 WBCs. Urine drug screen positive for barbiturates. Triggered sepsis timer so she was given 3L NS. She was intubated for airway protection with 20mg etomidate and 100mg succinylcholine. BP dropped post-intubated and fluid resuscitation and she was subsequently started on levophed.       Interval ICU Events:  10/12: Pt arrived to ICU intubated and lightly sedated on low-dose versed. Eyes open, minimally responsive.      10/13: Received K cocktail last night for K 6.0, corrected appropriately. Levophed requirements mildly up, UOP decreasing.  Ordered albumin x2 this am with improvements in UOP and SBP. Will likely trial lasix this afternoon d/t hypervolemia.     10/14: Remains intubated with decreased mentation, only responsive to noxious stimuli. Trialed lasix TID for volume overload. Remains on levophed 0.01.     10/15: Mentation much improved. SAT/SBT successful so extubated. Given lasix x3, bumex x1, metolazone x1 with net negative fluid balance of 500mL -> started on bumex gtt.      10/16: O2 requirements significantly increased, NRB -> HFNC 40L 100% likely 2/2 mucus plugging.     10/17: No acute events overnight. Bumex gtt increased to 1mg/hr. CXR this am showing complete opacification of left hemithorax related to atelectasis vs pleural effusion. Remains on HFNC 40L/80%. Pigtail catheter placed with 1.2L drained immediately. Given albumin for hypotension.      10/18: ~2L output from left sided pigtail catheter since placement. Kidney function worsening and UOP declining, about 20cc/hr overnight. Will place NG tube today and start enteral nutrition and appetite and oral intake remains poor.      10/19: Patient with worsening hypoxic, hypercapnic respiratory failure - now requiring BIPAP support. Remains grossly volume overloaded with low UO. Started on vasopressors to augment BP for diuresis. 40 IV lasix + gtt started. Remains with poor nutritional status. Will re attempt NG later if respiratory status improves.     10/20: Patient stable on vent. Nephrology consulted for renal failure. Cr continues to uptrend however UO is increasing. No issues overnight.    10/21: No issues overnight. Remains stable on vent. Levo down to 0.01 mcg/kg/min. UO remains low. Nephrology following. Possible CRRT today?    Medical History:  Past Medical History:   Diagnosis Date    Degenerative myopia, bilateral     Diabetic neuropathy (Multi)     Difficult intubation 08/26/2024    Mac 3, grade 3, 1 attempt.  Glidescope/videolaryngoscopy recommended for future attempts.     DM type 2 (diabetes mellitus, type 2) (Multi)     Dry eye syndrome of bilateral lacrimal glands     Essential hypertension     Essential tremor     Glaucoma     Hyperlipidemia     Long term (current) use of insulin (Multi)     Low back pain     PONV (postoperative nausea and vomiting)     Primary open angle glaucoma of both eyes, severe stage     Repeated falls     Sarcoidosis of lung (Multi)     Spinal stenosis, lumbar region without neurogenic claudication     Weakness      Past Surgical History:   Procedure Laterality Date    BLEPHAROPLASTY  07/2022    BREAST SURGERY  05/20/2022    Breast lift    CARPAL TUNNEL RELEASE      CATARACT EXTRACTION W/  INTRAOCULAR LENS IMPLANT Bilateral     OD 08/04/2011 +8.5D,OS 08/04/2011 +8.50D    FOOT SURGERY      INSERTION / REMOVAL CRANIAL DBS GENERATOR      Placed 2017.  Removed 2018. part of wire left in head when everything removed    LUMBAR FUSION      L3-S1    PANRETINAL PHOTOCOAGULATION  2014    THORACIC FUSION  08/26/2024    T4-S1 fusion    VITRECTOMY Right 2013     Medications Prior to Admission   Medication Sig Dispense Refill Last Dose/Taking    acetaminophen (Tylenol) 500 mg tablet Take 2 tablets (1,000 mg) by mouth 3 times a day.   Unknown    ascorbic acid (Vitamin C) 500 mg tablet as directed Orally   Unknown    brimonidine (AlphaGAN) 0.2 % ophthalmic solution Administer 1 drop into both eyes 2 times a day.   Unknown    calcium carbonate-vitamin D3 500 mg-5 mcg (200 unit) tablet Take 1 tablet by mouth once daily.   Unknown    cefTRIAXone (Rocephin) 2 gram/50 mL IV Infuse 50 mL (2 g) at 100 mL/hr over 30 minutes into a venous catheter once every 24 hours. Once weekly labs CBC/diff, CMP, Vanc trough ESR, CRP fax to Dr. Juarez [phone]. Stop date 11/12/24. 1950 mL 0     dextrose 50 % injection Infuse 25 mL (12.5 g) into a venous catheter every 15 minutes if needed (For blood glucose 41 to 70 mg/dL).       dextrose 50 % injection Infuse 50 mL (25 g) into a venous  catheter every 15 minutes if needed (For blood glucose less than or equal to 40 mg/dL).       docusate sodium (Colace) 100 mg capsule Take 1 capsule (100 mg) by mouth 2 times a day.   Unknown    ezetimibe (Zetia) 10 mg tablet Take 1 tablet (10 mg) by mouth once daily. 90 tablet 3 Unknown    FreeStyle Lite Strips strip USE TO TEST 3 TIMES A DAY AS DIRECTED 300 each 2     glucagon (Glucagen) 1 mg injection Inject 1 mg into the muscle every 15 minutes if needed for low blood sugar - see comments (Hypoglycemia).       glucagon (Glucagen) 1 mg injection Inject 1 mg into the muscle every 15 minutes if needed for low blood sugar - see comments (Hypoglycemia).       heparin sodium,porcine (heparin, porcine,) 5,000 unit/mL injection Inject 1 mL (5,000 Units) under the skin every 8 hours.       insulin lispro (HumaLOG) 100 unit/mL injection Inject 0-15 Units under the skin 3 times daily (morning, midday, late afternoon). Take as directed per insulin instructions.Do not hold when patient is not eating, continue order as scheduled for hyperglycemia management.  Insulin Lispro Corrective Scale #3     Hypoglycemia protocol Call LIP unit(s) if Blood Glucose is between 0 - 70 mg/dL     0 unit(s) if Blood glucose is between    3 unit(s) if Blood glucose is between 151-200   6 unit(s) if Blood glucose is between 201-250   9 unit(s) if Blood glucose is between 251-300   12 unit(s) if Blood glucose is between 301-350   15 unit(s) if Blood glucose is between 351-400    Notify provider unit(s) if Blood Glucose is greater than 400 mg/dL       Lactobacillus acidophilus 100 mg (1 billion cell) capsule Take 1 capsule by mouth 2 times a day.   Unknown    latanoprost (Xalatan) 0.005 % ophthalmic solution Administer 1 drop into both eyes once daily at bedtime. 2.5 mL 5 Unknown    melatonin 5 mg tablet Take 1 tablet (5 mg) by mouth as needed at bedtime for sleep.   Unknown    methocarbamol (Robaxin) 500 mg tablet Take 1 tablet (500 mg) by  "mouth 3 times a day.   Unknown    multivitamin tablet Take 1 tablet by mouth once daily.   Unknown    ondansetron (Zofran) 4 mg/2 mL injection Infuse 2 mL (4 mg) into a venous catheter every 6 hours if needed for nausea or vomiting.       oxyCODONE (Roxicodone) 5 mg immediate release tablet Take 1 tablet (5 mg) by mouth every 6 hours if needed for severe pain (7 - 10) or moderate pain (4 - 6).       oxygen (O2) gas therapy Inhale 1 each continuously.       pantoprazole (ProtoNix) 40 mg EC tablet Take 1 tablet (40 mg) by mouth once daily in the morning. Take before meals. Do not crush, chew, or split.       pantoprazole (ProtoNix) 40 mg injection Infuse 40 mg into a venous catheter once daily in the morning. Take before meals. If unable to take PO.       pen needle, diabetic (PEN NEEDLE MISC) BD Altagracia- 4 mm X 32 G needle - as directed 4x a day sc 4 times per day       polyethylene glycol (Glycolax, Miralax) 17 gram packet Take 17 g by mouth once daily.   Unknown    primidone 125 mg tablet Take 125 mg by mouth 3 times a day.   Unknown    propranolol LA (Inderal LA) 60 mg 24 hr capsule Take 1 capsule (60 mg) by mouth early in the morning.. Hold for SBP < 110 mmhg, HR < 60 bpm.   Unknown    sennosides (Senokot) 8.6 mg tablet Take 1 tablet (8.6 mg) by mouth every 12 hours if needed for constipation.   Unknown    Sure Comfort Pen Needle 32 gauge x 5/32\" needle AS DIRECTED DAILY FOR 90 DAYS 100 each 11 Unknown    traZODone (Desyrel) 25 MG split tablet Take 1 half tablet (25 mg) by mouth once daily at bedtime.   Unknown    vancomycin (Vancocin) 1 gram/250 mL solution Infuse 250 mL (1 g) at 250 mL/hr over 60 minutes into a venous catheter every 12 hours. Once weekly labs CBC/diff, CMP, Vanc trough ESR, CRP fax to Dr. Juarez [phone]. Stop date 11/12/24. 51720 mL 0      Erythromycin, Morphine, and Rosuvastatin  Social History     Tobacco Use    Smoking status: Former     Types: Cigarettes     Passive exposure: Past    " Smokeless tobacco: Never   Vaping Use    Vaping status: Never Used   Substance Use Topics    Alcohol use: Not Currently    Drug use: Not Currently     Family History   Problem Relation Name Age of Onset    Multiple myeloma Mother      Cancer Mother      Other (CABG) Father      Pulmonary embolism Father      Heart disease Father      Breast cancer Sister          Stage II    Hypertension Sister      Diabetes Sister      No Known Problems Sister          x5    No Known Problems Brother          x4    No Known Problems Daughter         Hospital Medications:    norepinephrine, 0-0.5 mcg/kg/min, Last Rate: 0.01 mcg/kg/min (10/21/24 0600)  propofol, 0-50 mcg/kg/min, Last Rate: 5 mcg/kg/min (10/21/24 0600)          Current Facility-Administered Medications:     acetaminophen (Tylenol) tablet 975 mg, 975 mg, oral, q6h PRN, Kaleb Lam PA-C, 975 mg at 10/18/24 1405    alteplase (Cathflo Activase) injection 2 mg, 2 mg, intra-catheter, PRN, Kaleb Lam PA-C    brimonidine (AlphaGAN) 0.2 % ophthalmic solution 1 drop, 1 drop, Both Eyes, BID, Kaleb Lam PA-C, 1 drop at 10/20/24 2017    [Held by provider] calcium carbonate-vitamin D3 500 mg-5 mcg (200 unit) per tablet 1 tablet, 1 tablet, oral, Daily, Kaleb Lam PA-C, 1 tablet at 10/18/24 0930    cefepime (Maxipime) 2 g in dextrose 5% 100 mL IV, 2 g, intravenous, q12h, Kaleb Lam PA-C, Stopped at 10/20/24 2059    [START ON 10/23/2024] cefTRIAXone (Rocephin) 2 g in dextrose (iso) IV 50 mL, 2 g, intravenous, q24h, Kaleb Lam PA-C    dextrose 50 % injection 12.5 g, 12.5 g, intravenous, q15 min PRN, Kaleb Lam PA-C    dextrose 50 % injection 25 g, 25 g, intravenous, q15 min PRN, Kaleb Lam PA-C    docusate sodium (Colace) oral liquid 100 mg, 100 mg, oral, BID, Kaleb Lam PA-C, 100 mg at 10/20/24 2017    ezetimibe (Zetia) tablet 10 mg, 10 mg, oral, Daily, Kaleb Lam PA-C, 10 mg at 10/20/24 0801    fentaNYL KATHE (Sublimaze)  injection 25 mcg, 25 mcg, intravenous, q2h PRN, Kaleb Lam PA-C    furosemide (Lasix) injection 60 mg, 60 mg, intravenous, q12h, Imer Cheung MD, 60 mg at 10/21/24 0143    glucagon (Glucagen) injection 1 mg, 1 mg, intramuscular, q15 min PRN, Kaleb Lam PA-C    glucagon (Glucagen) injection 1 mg, 1 mg, intramuscular, q15 min PRN, Kaleb Lam PA-C    heparin (porcine) injection 5,000 Units, 5,000 Units, subcutaneous, q8h, Kaleb Lam PA-C, 5,000 Units at 10/21/24 0632    heparin flush 10 unit/mL syringe 50 Units, 50 Units, intra-catheter, PRN, Kaleb Lam PA-C, 50 Units at 10/19/24 2313    [Held by provider] HYDROmorphone (Dilaudid) injection 0.4 mg, 0.4 mg, intravenous, q2h PRN, RUTH Salgado, 0.4 mg at 10/19/24 0520    insulin lispro (HumaLOG) injection 0-15 Units, 0-15 Units, subcutaneous, q6h, RUTH Salas, 3 Units at 10/21/24 0636    ipratropium-albuteroL (Duo-Neb) 0.5-2.5 mg/3 mL nebulizer solution 3 mL, 3 mL, nebulization, 4x daily, Kaleb Lam PA-C, 3 mL at 10/21/24 0726    latanoprost (Xalatan) 0.005 % ophthalmic solution 1 drop, 1 drop, Both Eyes, Nightly, Kaleb Lam PA-C, 1 drop at 10/20/24 2017    norepinephrine (Levophed) 8 mg in dextrose 5% 250 mL (0.032 mg/mL) infusion (premix), 0-0.5 mcg/kg/min, intravenous, Continuous, RUTH Salas, Last Rate: 2.36 mL/hr at 10/21/24 0600, 0.01 mcg/kg/min at 10/21/24 0600    oxygen (O2) therapy, , inhalation, Continuous - Inhalation, Kaleb Lam PA-C, 40 percent at 10/21/24 0730    oxymetazoline (Afrin) 0.05 % nasal spray 2 spray, 2 spray, Each Nostril, q12h PRN, RUTH Salgado    pantoprazole (ProtoNix) EC tablet 40 mg, 40 mg, oral, Daily before breakfast **OR** pantoprazole (ProtoNix) injection 40 mg, 40 mg, intravenous, Daily before breakfast, RUTH Salas, 40 mg at 10/21/24 0632    primidone (Mysoline) tablet 125 mg, 125 mg, oral, Nightly, Kaleb Lam PA-C, 125 mg  at 10/20/24 2017    propofol (Diprivan) infusion, 0-50 mcg/kg/min, intravenous, Continuous, Alonso Yancey, APRN-CNP, Last Rate: 3.78 mL/hr at 10/21/24 0600, 5 mcg/kg/min at 10/21/24 0600    [Held by provider] propranolol LA (Inderal LA) 24 hr capsule 60 mg, 60 mg, oral, Daily, Kaleb Lam PA-C, 60 mg at 10/19/24 0643    sodium chloride 3 % nebulizer solution 3 mL, 3 mL, nebulization, 4x daily, Kaleb Lam PA-C, 3 mL at 10/21/24 0726    [Held by provider] traMADol (Ultram) tablet 50 mg, 50 mg, oral, q8h PRN, Kaleb Lam PA-C    vancomycin (Vancocin) pharmacy to dose - pharmacy monitoring, , miscellaneous, Daily PRN, Kaleb Lam PA-C    Review of Systems:  14 point review of systems was completed and negative except for those specially mention in my HPI    Physical Exam:    Heart Rate:  [59-97]   Temp:  [36.5 °C (97.7 °F)-36.7 °C (98.1 °F)]   Resp:  [15-25]   BP: ()/(49-85)   Weight:  [122 kg (269 lb 6.4 oz)-125 kg (275 lb 2.2 oz)]   SpO2:  [83 %-97 %]     Physical Exam  Vitals and nursing note reviewed.   Constitutional:       Comments: Lethargic, arouses to physical stimuli but difficulty maintaining alertness. Tachypnic.    HENT:      Head: Normocephalic.   Eyes:      Extraocular Movements: Extraocular movements intact.      Pupils: Pupils are equal, round, and reactive to light.   Cardiovascular:      Rate and Rhythm: Normal rate and regular rhythm.      Pulses:           Radial pulses are 1+ on the right side and 1+ on the left side.        Dorsalis pedis pulses are 1+ on the right side and 1+ on the left side.      Heart sounds: Normal heart sounds, S1 normal and S2 normal.      Comments: Anasarca present  Pulmonary:      Effort: Tachypnea and accessory muscle usage present.      Breath sounds: Rhonchi and rales present. No wheezing.   Chest:      Comments: L chest pigtail catheter in place draining straw colored output.  Abdominal:      General: Abdomen is flat. Bowel sounds are  normal.      Palpations: Abdomen is soft.      Tenderness: There is no abdominal tenderness.   Genitourinary:     Comments: Gibson in place draining minimal straw colored urine  Musculoskeletal:      Cervical back: Neck supple.      Right lower le+ Edema present.      Left lower le+ Edema present.   Skin:     General: Skin is warm.      Capillary Refill: Capillary refill takes less than 2 seconds.      Coloration: Skin is not cyanotic, jaundiced or mottled.   Neurological:      General: No focal deficit present.      Mental Status: She is lethargic.      GCS: GCS eye subscore is 3. GCS verbal subscore is 4. GCS motor subscore is 6.      Motor: Weakness present.       Objective:    I have reviewed all medications, laboratory results, and imaging pertinent for today's encounter.    Vent Mode: Pressure regulated volume control/assist control  FiO2 (%):  [40 %] 40 %  S RR:  [20] 20  S VT:  [450 mL] 450 mL  PEEP/CPAP (cm H2O):  [5 cm H20] 5 cm H20  MAP (cm H2O):  [9.3-11] 11      Intake/Output Summary (Last 24 hours) at 10/21/2024 0744  Last data filed at 10/21/2024 0600  Gross per 24 hour   Intake 1569.97 ml   Output 595 ml   Net 974.97 ml       Daily Labs:  CBC:   Results from last 7 days   Lab Units 10/21/24  0513   WBC AUTO x10*3/uL 9.8   HEMOGLOBIN g/dL 7.8*   HEMATOCRIT % 24.1*   MCV fL 95     BMP:    Results from last 7 days   Lab Units 10/21/24  0513   SODIUM mmol/L 139   POTASSIUM mmol/L 4.1   CHLORIDE mmol/L 110*   CO2 mmol/L 22   BUN mg/dL 58*   CREATININE mg/dL 2.75*   CALCIUM mg/dL 7.5*   GLUCOSE mg/dL 178*   MAGNESIUM mg/dL 1.98       Assessment/Plan:    Narda Malloy is a 69 y.o. year old female patient with past medical history of L1-L3 lumbar laminectomy, T4-S1 revision, and fusion , T2DM, HTN, essential tremor, HLD, glaucoma, sarcoidosis of the lung who presented to  ED 10/12 after being found unresponsive at her nursing facility. Initially intubated and admitted to ICU for septic  shock. Extubated 10/15. Re-intubated 10/19 for hypoxic/hypercapnic respiratory failure. Has been on and off and now back on levophed.    I am currently managing this critically ill patient for the following problems:     Neuro/Psych/Pain Ctrl/Sedation:   Acute encephalopathy - likely secondary to hypercapnia, infection  Hx Essential tremor   CT head: Negative for acute findings   Urine tox positive for barbiturates consistent with primidone intake   - Addressing underlying causes  - Pain Control: Acetaminophen PRN, Fentanyl PRN  - Sedation: Propofol  - Home primidone continued. Will hold propanolol d/t hypotension  - CAM ICU qshift, sleep-wake hygiene, delirium precautions      Respiratory/ENT:  Acute hypoxic/hypercapnic respiratory failure - likely multifactorial and 2/2 HCAP, pl effusions, volume overload  Healthcare-associated pneumonia   Atelectasis - likely 2/2 mucus plugging   Pleural Effusion -S/p left-sided pigtail placement 10/17, Output decreasing - 50 ml out overnight. Transudative.   CXR Today:   1. Persistent right basilar effusion and compressive atelectasis. Superimposed infiltrate not excluded.   2. Left-sided chest tube in place without pneumothorax. Retrocardiac area obscured with component of left basilar effusion/compressive atelectasis suggested.  ABG this am: 7.37-33-82-19  - Intubated 10/18. Vent setting: -20-5 40%. Will increase PEEP to 10 to assist with atelectasis.  - Will wean O2 as tolerated to maintain SpO2 >92%  - Diuresis vs CRRT per nephrology  - Duonebs and mucomyst QID   - IPV per RT TID  - Continuous pulse ox monitoring   - Pulm hygiene     Cardiovascular:   Septic shock - improving  Hx HTN, HLD  Acute on chronic diastolic heart failure  TTE 10/1: diastolic dysfunction, LVEF 50-55%, mildly elevated RVSP (40)  Bilateral duplex US upper extremities:  Thrombosis within the left cephalic vein, which is part of the superficial venous system of the upper extremity, adjacent to PICC  "line. No deep venous thrombosis in the upper extremities.  - Remains on persistent levophed gtt, Will wean with goal MAP > 70   - Add stress dose steroids  - Holding home propranolol (on for tremors)  - Continue home Zetia  - Continuous cardiac monitoring per ICU protocol  - EKGs PRN for ACS symptoms, arrhythmias      GI:  Hx GERD  - Diet: Tolerating TF @ goal  - BR: Colace  - GI Prophylaxis: PPI     Renal/Volume Status/Electrolytes:  Acute kidney injury - possibly ATN +/- medication-induced (vancomycin)  Anasarca   Mixed respiratory and metabolic acidosis - improving on vent  Hypoalbuminemia   Baseline Cr 0.8-1.0. BUN Cr continues to uptrend today 52/2.4-->58/2.75 today  Vanco levels remain elevated: 27 this am. Pharmacy dosing and holding d/t high levels.   - Remains grossly volume overloaded. UO remains low at about 0.2 ml/kg/hr/ 545 x 24 hrs.  - Nephrology consulted, input appreciated. Consider CRRT?  - Maintain anders catheter  - Hourly I/O's. Goal urine output 0.5-1.0cc/kg/hr.  - Replete electrolytes to maintain K >4.0 and Mg >2.0  - Daily BMP, Mg, Phos    Endocrine:  T2DM  - SSI Q 6 while NPO  - TSH: midly elevated, T4 WNL  - Hypoglycemia protocol PRN      Infectious Disease:  Thoracolumbar surgical site infection - s/p I&D x 2. Recent MRSA bacteremia on extended course of IV antibiotics (vanc and rocephin -> 11/12)  Healthcare-associated pneumonia   CT lumbar spine 10/12: Unable to r/o abscess. Unable to do MRI d/t spinal hardware, \"metal in head\" per patient  Blood cultures 10/16: Negative  Urine culture 10/14: Negative  Sputum culture 10/16: + pseudomonas   -Per RT with increasing purulent secretions. Will repeat sputum culture today.  - Antibiotics: Continue cefepime until 10/22 per ID, then will be on Ceftriaxone until 11/12.   - Vanco hold today d/t continued high levels (27 today). Dosing of vanco per pharmacy.   - ID consulted, appreciate assistance   - Replace midline prior to discharge per ID recs  - " Monitor SIRS criteria     Heme/Onc:  Normocytic anemia   H/H similar to previous: 7.6/23.6 --->7.8/24.1. No active signs of bleeding.  - Monitor for s/sx of bleeding   - Plan to transfuse if Hgb <7.0   - Daily CBC     MSK:  No active concerns   - PT/OT when appropriate     Derm:  Surgical wound with infection  - Wound care consult  - ICU skin protocol  - Q2hr turns  - Padded pressure points      Ethics/Code Status:  Full code - discussions with  at bedside      :  DVT Prophylaxis: SQH  GI Prophylaxis: PPI  Bowel Regimen: Colace  Diet: TF  CVC: Midline, Trialysis R IJ  Wanda: Right radial placed 10/19, removed 10/20 d/t malfunction  Gibson: yes, replaced 10/12  Restraints: Yes  Disposition: ICU  I have reviewed all medications, laboratory results, and imaging pertinent for today's encounter.  Plan Discussed with Dr. Shook,  at bedside, patient, nursing  Critical Care Time: 45 minutes  Critical Care [place] Edward CNP

## 2024-10-21 NOTE — CONSULTS
Vancomycin Dosing by Pharmacy- Cessation of Therapy    Consult to pharmacy for vancomycin dosing has been discontinued by the prescriber, pharmacy will sign off at this time.    Please call pharmacy if there are further questions or re-enter a consult if vancomycin is resumed.     Damari Stephen, PharmD

## 2024-10-21 NOTE — PROGRESS NOTES
Speech-Language Pathology                 Therapy Communication Note    Patient Name: Narda Malloy  MRN: [id number]  Department: [facility] S ICU  Room: 11/11-A  Today's Date: 10/21/2024     Discipline: Speech Language Pathology    Missed Visit Reason:  Travon AJ patient intubated, patient will require new speech therapy orders due to change in medical status.)     Missed Time: Cancel    Comment:

## 2024-10-21 NOTE — CARE PLAN
Suction for small amount of yellow from ETT  Problem: Respiratory  Goal: Clear secretions with interventions this shift  Outcome: Progressing

## 2024-10-21 NOTE — PROGRESS NOTES
Occupational Therapy                 Therapy Communication Note    Patient Name: Narda Malloy  MRN: [id number]  Department: [facility] 3 S ICU  Room: 11/11-A  Today's Date: 10/21/2024     Discipline: Occupational Therapy    Missed Visit Reason: Missed Visit Reason: Cancel    Missed Time: Cancel    Comment: Patient is a cancel due to s/p intubation on 10/19.  Will need new orders to continue OT tx.

## 2024-10-21 NOTE — PROGRESS NOTES
INFECTIOUS DISEASES PROGRESS NOTE    Consulted / following patient for:  Respiratory failure/pneumonia  Recent polymicrobial complicated postoperative lumbar wound infection, MRSA and Proteus  Acute kidney injury    Subjective   Interval History:   (Sedated on the ventilator)  Events of the last 72 hours reviewed, back on the ventilator and has worsening acute kidney injury    Objective   PHYSICAL EXAMINATION  Vital signs:  Visit Vitals  BP 90/53   Pulse 65   Temp 36.7 °C (98.1 °F) (Axillary)   Resp 14   On norepinephrine infusion  General: Intubated and sedated  Lungs: Diminished, clear.  Left chest tube draining serous fluid  Heart:  S1, S2 normal  Abdomen:  Soft, obese, no guarding.   Extremities:  No cords, phlebitis, cellulitis.  Increasingly edematous    Relevant Results  WBC: 15,900 --> ---> ---> 9800  Creatinine (baseline 0.66): 1.46 ---> 1.43 ---> 1.33 ---> 1.19 ---> 1.17 ---> 1.23 ---> 1.38 ---> 1.62 ---> 2.01 ---> 2.41 ---> 2.75  Random vancomycin level: 26.6  Pleural fluid: Transudate with 197 WBC, 56% neutrophils, protein 1.8, LDH 97, glucose 202  Microbiology:  Blood (10/12): Negative X2  Sputum: Stain with moderate GNB and moderate PMN, culture Pseudomonas aeruginosa, susceptible to Zosyn and cefepime  Urine: Moderate colony count Candida lusitaniae  Pleural fluid (10/17): Negative    Imaging:  CXR images (10/21) personally reviewed: Intubated.  Bilateral pleural effusions    Assessment:  Sepsis -likely due to pneumonia, present on admission. Patient already on IV vancomycin and IV ceftriaxone at time of this admission for lumbar spinal infection.  Thus far no evidence for vascular catheter infection or recurrent bacteremia.  Back on ventilator ventilator.  Pseudomonas in the sputum.  Acute kidney injury.  Worsening, may need renal replacement therapy.  Vancomycin level gradually declining, has not been dosed since admission.  At this point I would prefer to switch the patient to daptomycin to remove  the potential complicating factor of additional vancomycin dosing  Lumbar spine surgical site infection s/p I&D 9/28. Cultures + MRSA, Proteus.  Was to be on vanco/ceftriaxone through 11/12. Followed by Dr. Brant Juarez.  1 slightly suspicious area of separation of the wound edges in the superior portion of the incision     Plan/Recommendations:  Change vancomycin to daptomycin, 700 mg every 48 hour based on adjusted body weight of 91 kg and estimated creatinine clearance of 28 mL/min  Continue cefepime through 10/22 (reduced dose to 1 g every 12 hours), then revert to ceftriaxone as planned for lumbar infection, until 11/12  3.   Midline catheter will need to be replaced with a new midline catheter or PICC      prior to her discharge    Andrew Malagon MD  [facility]  Office:  [phone]

## 2024-10-21 NOTE — CARE PLAN
The patient's goals for the shift include      The clinical goals for the shift include Wean levophed down this shift

## 2024-10-22 ENCOUNTER — APPOINTMENT (OUTPATIENT)
Dept: RADIOLOGY | Facility: HOSPITAL | Age: 69
End: 2024-10-22
Payer: MEDICARE

## 2024-10-22 LAB
ALBUMIN SERPL BCP-MCNC: 2.8 G/DL (ref 3.4–5)
ANION GAP BLDV CALCULATED.4IONS-SCNC: 14 MMOL/L (ref 10–25)
ANION GAP SERPL CALCULATED.3IONS-SCNC: 14 MMOL/L (ref 10–20)
BASE EXCESS BLDV CALC-SCNC: -5.5 MMOL/L (ref -2–3)
BASOPHILS # BLD AUTO: 0.03 X10*3/UL (ref 0–0.1)
BASOPHILS NFR BLD AUTO: 0.3 %
BODY TEMPERATURE: 37 DEGREES CELSIUS
BUN SERPL-MCNC: 64 MG/DL (ref 6–23)
CA-I BLDV-SCNC: 1.17 MMOL/L (ref 1.1–1.33)
CALCIUM SERPL-MCNC: 7.8 MG/DL (ref 8.6–10.3)
CHLORIDE BLDV-SCNC: 106 MMOL/L (ref 98–107)
CHLORIDE SERPL-SCNC: 108 MMOL/L (ref 98–107)
CO2 SERPL-SCNC: 21 MMOL/L (ref 21–32)
CREAT SERPL-MCNC: 2.98 MG/DL (ref 0.5–1.05)
EGFRCR SERPLBLD CKD-EPI 2021: 17 ML/MIN/1.73M*2
EOSINOPHIL # BLD AUTO: 0.06 X10*3/UL (ref 0–0.7)
EOSINOPHIL NFR BLD AUTO: 0.6 %
ERYTHROCYTE [DISTWIDTH] IN BLOOD BY AUTOMATED COUNT: 19.5 % (ref 11.5–14.5)
GLUCOSE BLD MANUAL STRIP-MCNC: 171 MG/DL (ref 74–99)
GLUCOSE BLD MANUAL STRIP-MCNC: 181 MG/DL (ref 74–99)
GLUCOSE BLD MANUAL STRIP-MCNC: 212 MG/DL (ref 74–99)
GLUCOSE BLD MANUAL STRIP-MCNC: 239 MG/DL (ref 74–99)
GLUCOSE BLD MANUAL STRIP-MCNC: 240 MG/DL (ref 74–99)
GLUCOSE BLDV-MCNC: 234 MG/DL (ref 74–99)
GLUCOSE SERPL-MCNC: 236 MG/DL (ref 74–99)
HCO3 BLDV-SCNC: 20 MMOL/L (ref 22–26)
HCT VFR BLD AUTO: 24.4 % (ref 36–46)
HCT VFR BLD EST: 24 % (ref 36–46)
HGB BLD-MCNC: 7.7 G/DL (ref 12–16)
HGB BLDV-MCNC: 8.1 G/DL (ref 12–16)
IMM GRANULOCYTES # BLD AUTO: 0.21 X10*3/UL (ref 0–0.7)
IMM GRANULOCYTES NFR BLD AUTO: 2.3 % (ref 0–0.9)
INHALED O2 CONCENTRATION: 40 %
LABORATORY COMMENT REPORT: NORMAL
LABORATORY COMMENT REPORT: NORMAL
LACTATE BLDV-SCNC: 1 MMOL/L (ref 0.4–2)
LYMPHOCYTES # BLD AUTO: 0.81 X10*3/UL (ref 1.2–4.8)
LYMPHOCYTES NFR BLD AUTO: 8.7 %
MAGNESIUM SERPL-MCNC: 2.06 MG/DL (ref 1.6–2.4)
MCH RBC QN AUTO: 31.2 PG (ref 26–34)
MCHC RBC AUTO-ENTMCNC: 31.6 G/DL (ref 32–36)
MCV RBC AUTO: 99 FL (ref 80–100)
MONOCYTES # BLD AUTO: 0.57 X10*3/UL (ref 0.1–1)
MONOCYTES NFR BLD AUTO: 6.1 %
NEUTROPHILS # BLD AUTO: 7.65 X10*3/UL (ref 1.2–7.7)
NEUTROPHILS NFR BLD AUTO: 82 %
NRBC BLD-RTO: 0 /100 WBCS (ref 0–0)
OXYHGB MFR BLDV: 91.3 % (ref 45–75)
PATH REPORT.FINAL DX SPEC: NORMAL
PATH REPORT.GROSS SPEC: NORMAL
PATH REPORT.RELEVANT HX SPEC: NORMAL
PATH REPORT.TOTAL CANCER: NORMAL
PCO2 BLDV: 38 MM HG (ref 41–51)
PH BLDV: 7.33 PH (ref 7.33–7.43)
PHOSPHATE SERPL-MCNC: 3 MG/DL (ref 2.5–4.9)
PLATELET # BLD AUTO: 282 X10*3/UL (ref 150–450)
PO2 BLDV: 62 MM HG (ref 35–45)
POTASSIUM BLDV-SCNC: 4.6 MMOL/L (ref 3.5–5.3)
POTASSIUM SERPL-SCNC: 4.5 MMOL/L (ref 3.5–5.3)
RBC # BLD AUTO: 2.47 X10*6/UL (ref 4–5.2)
SAO2 % BLDV: 94 % (ref 45–75)
SODIUM BLDV-SCNC: 135 MMOL/L (ref 136–145)
SODIUM SERPL-SCNC: 138 MMOL/L (ref 136–145)
STAPHYLOCOCCUS SPEC CULT: NORMAL
WBC # BLD AUTO: 9.3 X10*3/UL (ref 4.4–11.3)

## 2024-10-22 PROCEDURE — 94668 MNPJ CHEST WALL SBSQ: CPT

## 2024-10-22 PROCEDURE — 84132 ASSAY OF SERUM POTASSIUM: CPT

## 2024-10-22 PROCEDURE — 5A1D90Z PERFORMANCE OF URINARY FILTRATION, CONTINUOUS, GREATER THAN 18 HOURS PER DAY: ICD-10-PCS | Performed by: INTERNAL MEDICINE

## 2024-10-22 PROCEDURE — 83735 ASSAY OF MAGNESIUM: CPT

## 2024-10-22 PROCEDURE — 31720 CLEARANCE OF AIRWAYS: CPT

## 2024-10-22 PROCEDURE — 2500000005 HC RX 250 GENERAL PHARMACY W/O HCPCS

## 2024-10-22 PROCEDURE — 85025 COMPLETE CBC W/AUTO DIFF WBC: CPT

## 2024-10-22 PROCEDURE — 2020000001 HC ICU ROOM DAILY

## 2024-10-22 PROCEDURE — 71045 X-RAY EXAM CHEST 1 VIEW: CPT | Performed by: RADIOLOGY

## 2024-10-22 PROCEDURE — 99291 CRITICAL CARE FIRST HOUR: CPT

## 2024-10-22 PROCEDURE — 2500000002 HC RX 250 W HCPCS SELF ADMINISTERED DRUGS (ALT 637 FOR MEDICARE OP, ALT 636 FOR OP/ED): Performed by: NURSE PRACTITIONER

## 2024-10-22 PROCEDURE — 2500000005 HC RX 250 GENERAL PHARMACY W/O HCPCS: Performed by: NURSE PRACTITIONER

## 2024-10-22 PROCEDURE — 2500000002 HC RX 250 W HCPCS SELF ADMINISTERED DRUGS (ALT 637 FOR MEDICARE OP, ALT 636 FOR OP/ED)

## 2024-10-22 PROCEDURE — 2500000001 HC RX 250 WO HCPCS SELF ADMINISTERED DRUGS (ALT 637 FOR MEDICARE OP)

## 2024-10-22 PROCEDURE — 82947 ASSAY GLUCOSE BLOOD QUANT: CPT

## 2024-10-22 PROCEDURE — 2500000004 HC RX 250 GENERAL PHARMACY W/ HCPCS (ALT 636 FOR OP/ED)

## 2024-10-22 PROCEDURE — 2500000004 HC RX 250 GENERAL PHARMACY W/ HCPCS (ALT 636 FOR OP/ED): Performed by: INTERNAL MEDICINE

## 2024-10-22 PROCEDURE — 94003 VENT MGMT INPAT SUBQ DAY: CPT

## 2024-10-22 PROCEDURE — 94640 AIRWAY INHALATION TREATMENT: CPT

## 2024-10-22 PROCEDURE — 71045 X-RAY EXAM CHEST 1 VIEW: CPT

## 2024-10-22 PROCEDURE — 2500000004 HC RX 250 GENERAL PHARMACY W/ HCPCS (ALT 636 FOR OP/ED): Performed by: NURSE PRACTITIONER

## 2024-10-22 PROCEDURE — 90937 HEMODIALYSIS REPEATED EVAL: CPT

## 2024-10-22 RX ORDER — HEPARIN SODIUM 1000 [USP'U]/ML
1000 INJECTION, SOLUTION INTRAVENOUS; SUBCUTANEOUS AS NEEDED
Status: DISCONTINUED | OUTPATIENT
Start: 2024-10-22 | End: 2024-10-28

## 2024-10-22 RX ORDER — FENTANYL CITRATE 50 UG/ML
25 INJECTION, SOLUTION INTRAMUSCULAR; INTRAVENOUS ONCE
Status: COMPLETED | OUTPATIENT
Start: 2024-10-22 | End: 2024-10-22

## 2024-10-22 RX ORDER — INSULIN LISPRO 100 [IU]/ML
0-15 INJECTION, SOLUTION INTRAVENOUS; SUBCUTANEOUS EVERY 4 HOURS
Status: DISCONTINUED | OUTPATIENT
Start: 2024-10-23 | End: 2024-11-14 | Stop reason: HOSPADM

## 2024-10-22 RX ORDER — DAPTOMYCIN IN SODIUM CHLORIDE 700 MG/100ML
700 INJECTION, SOLUTION INTRAVENOUS DAILY
Status: DISCONTINUED | OUTPATIENT
Start: 2024-10-23 | End: 2024-10-29

## 2024-10-22 RX ADMIN — BRIMONIDINE TARTRATE 1 DROP: 2 SOLUTION OPHTHALMIC at 09:24

## 2024-10-22 RX ADMIN — PANTOPRAZOLE SODIUM 40 MG: 40 INJECTION, POWDER, FOR SOLUTION INTRAVENOUS at 06:36

## 2024-10-22 RX ADMIN — CALCIUM CHLORIDE, MAGNESIUM CHLORIDE, DEXTROSE MONOHYDRATE, LACTIC ACID, SODIUM CHLORIDE, SODIUM BICARBONATE AND POTASSIUM CHLORIDE 25 ML/KG/HR: 3.68; 3.05; 22; 5.4; 6.46; 3.09; .314 INJECTION INTRAVENOUS at 15:09

## 2024-10-22 RX ADMIN — INSULIN LISPRO 6 UNITS: 100 INJECTION, SOLUTION INTRAVENOUS; SUBCUTANEOUS at 16:19

## 2024-10-22 RX ADMIN — EZETIMIBE 10 MG: 10 TABLET ORAL at 09:24

## 2024-10-22 RX ADMIN — HYDROCORTISONE SODIUM SUCCINATE 100 MG: 100 INJECTION, POWDER, FOR SOLUTION INTRAMUSCULAR; INTRAVENOUS at 05:08

## 2024-10-22 RX ADMIN — PROPOFOL 15 MCG/KG/MIN: 10 INJECTION, EMULSION INTRAVENOUS at 16:22

## 2024-10-22 RX ADMIN — HEPARIN SODIUM 5000 UNITS: 5000 INJECTION, SOLUTION INTRAVENOUS; SUBCUTANEOUS at 21:50

## 2024-10-22 RX ADMIN — Medication 40 PERCENT: at 08:09

## 2024-10-22 RX ADMIN — IPRATROPIUM BROMIDE AND ALBUTEROL SULFATE 3 ML: .5; 3 SOLUTION RESPIRATORY (INHALATION) at 20:22

## 2024-10-22 RX ADMIN — Medication 3 ML: at 08:05

## 2024-10-22 RX ADMIN — FENTANYL CITRATE 25 MCG: 0.05 INJECTION, SOLUTION INTRAMUSCULAR; INTRAVENOUS at 07:43

## 2024-10-22 RX ADMIN — BRIMONIDINE TARTRATE 1 DROP: 2 SOLUTION OPHTHALMIC at 20:23

## 2024-10-22 RX ADMIN — IPRATROPIUM BROMIDE AND ALBUTEROL SULFATE 3 ML: .5; 3 SOLUTION RESPIRATORY (INHALATION) at 14:34

## 2024-10-22 RX ADMIN — HEPARIN SODIUM 5000 UNITS: 5000 INJECTION, SOLUTION INTRAVENOUS; SUBCUTANEOUS at 15:09

## 2024-10-22 RX ADMIN — LATANOPROST 1 DROP: 50 SOLUTION OPHTHALMIC at 20:23

## 2024-10-22 RX ADMIN — HYDROCORTISONE SODIUM SUCCINATE 100 MG: 100 INJECTION, POWDER, FOR SOLUTION INTRAMUSCULAR; INTRAVENOUS at 20:24

## 2024-10-22 RX ADMIN — PROPOFOL 10 MCG/KG/MIN: 10 INJECTION, EMULSION INTRAVENOUS at 06:36

## 2024-10-22 RX ADMIN — PRIMIDONE 125 MG: 250 TABLET ORAL at 20:23

## 2024-10-22 RX ADMIN — Medication 40 PERCENT: at 20:22

## 2024-10-22 RX ADMIN — NOREPINEPHRINE BITARTRATE 0.02 MCG/KG/MIN: 8 INJECTION, SOLUTION INTRAVENOUS at 12:32

## 2024-10-22 RX ADMIN — Medication 3 ML: at 10:45

## 2024-10-22 RX ADMIN — INSULIN LISPRO 6 UNITS: 100 INJECTION, SOLUTION INTRAVENOUS; SUBCUTANEOUS at 05:08

## 2024-10-22 RX ADMIN — INSULIN LISPRO 6 UNITS: 100 INJECTION, SOLUTION INTRAVENOUS; SUBCUTANEOUS at 12:17

## 2024-10-22 RX ADMIN — Medication 3 ML: at 14:34

## 2024-10-22 RX ADMIN — Medication 3 ML: at 20:22

## 2024-10-22 RX ADMIN — IPRATROPIUM BROMIDE AND ALBUTEROL SULFATE 3 ML: .5; 3 SOLUTION RESPIRATORY (INHALATION) at 10:45

## 2024-10-22 RX ADMIN — CALCIUM CHLORIDE, MAGNESIUM CHLORIDE, DEXTROSE MONOHYDRATE, LACTIC ACID, SODIUM CHLORIDE, SODIUM BICARBONATE AND POTASSIUM CHLORIDE 25 ML/KG/HR: 3.68; 3.05; 22; 5.4; 6.46; 3.09; .314 INJECTION INTRAVENOUS at 20:00

## 2024-10-22 RX ADMIN — CEFEPIME 1 G: 1 INJECTION, POWDER, FOR SOLUTION INTRAMUSCULAR; INTRAVENOUS at 09:25

## 2024-10-22 RX ADMIN — IPRATROPIUM BROMIDE AND ALBUTEROL SULFATE 3 ML: .5; 3 SOLUTION RESPIRATORY (INHALATION) at 08:04

## 2024-10-22 RX ADMIN — CEFEPIME 1 G: 1 INJECTION, POWDER, FOR SOLUTION INTRAMUSCULAR; INTRAVENOUS at 20:23

## 2024-10-22 RX ADMIN — HEPARIN SODIUM 5000 UNITS: 5000 INJECTION, SOLUTION INTRAVENOUS; SUBCUTANEOUS at 05:33

## 2024-10-22 RX ADMIN — HYDROCORTISONE SODIUM SUCCINATE 100 MG: 100 INJECTION, POWDER, FOR SOLUTION INTRAMUSCULAR; INTRAVENOUS at 12:18

## 2024-10-22 RX ADMIN — FENTANYL CITRATE 25 MCG: 0.05 INJECTION, SOLUTION INTRAMUSCULAR; INTRAVENOUS at 22:13

## 2024-10-22 RX ADMIN — INSULIN LISPRO 3 UNITS: 100 INJECTION, SOLUTION INTRAVENOUS; SUBCUTANEOUS at 23:16

## 2024-10-22 ASSESSMENT — PAIN SCALES - GENERAL
PAINLEVEL_OUTOF10: 0 - NO PAIN
PAINLEVEL_OUTOF10: 4
PAINLEVEL_OUTOF10: 1
PAINLEVEL_OUTOF10: 0 - NO PAIN

## 2024-10-22 ASSESSMENT — PAIN - FUNCTIONAL ASSESSMENT
PAIN_FUNCTIONAL_ASSESSMENT: CPOT (CRITICAL CARE PAIN OBSERVATION TOOL)

## 2024-10-22 NOTE — PROGRESS NOTES
Narda Malloy is a 69 y.o. female on day 10 of admission presenting with Unresponsive.      Subjective   Patient remains intubated on the ventilator, her creatinine continues to rise and she has a net positive fluid balance of 2.1 L yesterday.  Chest x-ray does show volume overload.  She is on low-dose Levophed.       Objective          Vitals 24HR  Heart Rate:  [52-81]   Temp:  [36.7 °C (98.1 °F)-36.9 °C (98.4 °F)]   Resp:  [17-27]   BP: ()/(42-88)   Weight:  [126 kg (277 lb 1.9 oz)]   SpO2:  [85 %-100 %]       Intake/Output last 3 Shifts:    Intake/Output Summary (Last 24 hours) at 10/22/2024 1207  Last data filed at 10/22/2024 1100  Gross per 24 hour   Intake 2070.31 ml   Output 283 ml   Net 1787.31 ml       Physical Exam  Constitutional:       Comments: Intubated and sedated   Neck: Right IJ temporary dialysis catheter present  Cardiovascular:      Heart sounds:      No friction rub.   Pulmonary:      Comments: Mechanically ventilated, mildly diminished/course breath sounds  Abdominal:      General: Bowel sounds are normal.      Palpations: Abdomen is soft.   Musculoskeletal:      Comments: Upper extremities are edematous, has 1+ edema of the lower extremities     Relevant Results  Results for orders placed or performed during the hospital encounter of 10/12/24 (from the past 24 hours)   POCT GLUCOSE   Result Value Ref Range    POCT Glucose 216 (H) 74 - 99 mg/dL   Respiratory Culture/Smear    Specimen: SPUTUM; Fluid   Result Value Ref Range    Respiratory Culture/Smear Culture in progress     Gram Stain       Gram stain indicates specimen consists of lower respiratory tract secretions.    Gram Stain No predominant organism    Protein, Urine Random   Result Value Ref Range    Total Protein, Urine Random 173 (H) 5 - 24 mg/dL    Creatinine, Urine Random 27.5 20.0 - 320.0 mg/dL    T. Protein/Creatinine Ratio 6.29 (H) 0.00 - 0.17 mg/mg Creat   POCT GLUCOSE   Result Value Ref Range    POCT Glucose 208 (H) 74 -  99 mg/dL   POCT GLUCOSE   Result Value Ref Range    POCT Glucose 188 (H) 74 - 99 mg/dL   POCT GLUCOSE   Result Value Ref Range    POCT Glucose 239 (H) 74 - 99 mg/dL   CBC and Auto Differential   Result Value Ref Range    WBC 9.3 4.4 - 11.3 x10*3/uL    nRBC 0.0 0.0 - 0.0 /100 WBCs    RBC 2.47 (L) 4.00 - 5.20 x10*6/uL    Hemoglobin 7.7 (L) 12.0 - 16.0 g/dL    Hematocrit 24.4 (L) 36.0 - 46.0 %    MCV 99 80 - 100 fL    MCH 31.2 26.0 - 34.0 pg    MCHC 31.6 (L) 32.0 - 36.0 g/dL    RDW 19.5 (H) 11.5 - 14.5 %    Platelets 282 150 - 450 x10*3/uL    Neutrophils % 82.0 40.0 - 80.0 %    Immature Granulocytes %, Automated 2.3 (H) 0.0 - 0.9 %    Lymphocytes % 8.7 13.0 - 44.0 %    Monocytes % 6.1 2.0 - 10.0 %    Eosinophils % 0.6 0.0 - 6.0 %    Basophils % 0.3 0.0 - 2.0 %    Neutrophils Absolute 7.65 1.20 - 7.70 x10*3/uL    Immature Granulocytes Absolute, Automated 0.21 0.00 - 0.70 x10*3/uL    Lymphocytes Absolute 0.81 (L) 1.20 - 4.80 x10*3/uL    Monocytes Absolute 0.57 0.10 - 1.00 x10*3/uL    Eosinophils Absolute 0.06 0.00 - 0.70 x10*3/uL    Basophils Absolute 0.03 0.00 - 0.10 x10*3/uL   Renal function panel   Result Value Ref Range    Glucose 236 (H) 74 - 99 mg/dL    Sodium 138 136 - 145 mmol/L    Potassium 4.5 3.5 - 5.3 mmol/L    Chloride 108 (H) 98 - 107 mmol/L    Bicarbonate 21 21 - 32 mmol/L    Anion Gap 14 10 - 20 mmol/L    Urea Nitrogen 64 (H) 6 - 23 mg/dL    Creatinine 2.98 (H) 0.50 - 1.05 mg/dL    eGFR 17 (L) >60 mL/min/1.73m*2    Calcium 7.8 (L) 8.6 - 10.3 mg/dL    Phosphorus 3.0 2.5 - 4.9 mg/dL    Albumin 2.8 (L) 3.4 - 5.0 g/dL   Magnesium   Result Value Ref Range    Magnesium 2.06 1.60 - 2.40 mg/dL   Blood Gas Venous Full Panel   Result Value Ref Range    POCT pH, Venous 7.33 7.33 - 7.43 pH    POCT pCO2, Venous 38 (L) 41 - 51 mm Hg    POCT pO2, Venous 62 (H) 35 - 45 mm Hg    POCT SO2, Venous 94 (H) 45 - 75 %    POCT Oxy Hemoglobin, Venous 91.3 (H) 45.0 - 75.0 %    POCT Hematocrit Calculated, Venous 24.0 (L) 36.0 -  46.0 %    POCT Sodium, Venous 135 (L) 136 - 145 mmol/L    POCT Potassium, Venous 4.6 3.5 - 5.3 mmol/L    POCT Chloride, Venous 106 98 - 107 mmol/L    POCT Ionized Calicum, Venous 1.17 1.10 - 1.33 mmol/L    POCT Glucose, Venous 234 (H) 74 - 99 mg/dL    POCT Lactate, Venous 1.0 0.4 - 2.0 mmol/L    POCT Base Excess, Venous -5.5 (L) -2.0 - 3.0 mmol/L    POCT HCO3 Calculated, Venous 20.0 (L) 22.0 - 26.0 mmol/L    POCT Hemoglobin, Venous 8.1 (L) 12.0 - 16.0 g/dL    POCT Anion Gap, Venous 14.0 10.0 - 25.0 mmol/L    Patient Temperature 37.0 degrees Celsius    FiO2 40 %   POCT GLUCOSE   Result Value Ref Range    POCT Glucose 240 (H) 74 - 99 mg/dL     *Note: Due to a large number of results and/or encounters for the requested time period, some results have not been displayed. A complete set of results can be found in Results Review.            Assessment/Plan   Impression:  Acute kidney injury likely from acute tubular necrosis from multiple factors including shock hypotension as well as vancomycin toxicity  Acute respiratory failure  Edema  Septic shock  Pneumonia  Diabetes mellitus type 2  Malnutrition  Lower back surgery site infection     Recommendations:  The patient did not respond to high-dose diuretic challenge and her renal function continues to decline and she is more fluid overloaded with significant net positive fluid balance therefore I believe she needs CRRT at this point.  I have reached out to call the  to obtain consent however there was no answer so left a voicemail and awaiting his call back.  Reaching out to the ICU team as well.  Patient already has a dialysis catheter.  Will need to dose medications including antibiotics for CRRT once this is initiated.      Nelia Cheung MD

## 2024-10-22 NOTE — CARE PLAN
Problem: Knowledge Deficit  Goal: Patient/family/caregiver demonstrates understanding of disease process, treatment plan, medications, and discharge instructions  Outcome: Progressing

## 2024-10-22 NOTE — PROGRESS NOTES
INFECTIOUS DISEASES PROGRESS NOTE    Consulted / following patient for:  Respiratory failure/pneumonia  Recent polymicrobial complicated postoperative lumbar wound infection, MRSA and Proteus  Acute kidney injury    Subjective   Interval History:   (Sedated on the ventilator)    Objective   PHYSICAL EXAMINATION  Vital signs:  Visit Vitals  /52   Pulse 71   Temp 36.9 °C (98.4 °F) (Core)   Resp 24   Remains on norepinephrine infusion  General: Intubated and sedated  Lungs: Diminished, clear.  Left chest tube draining serous fluid  Heart:  S1, S2 normal  Abdomen:  Soft, obese, no guarding.   Back: Wound examined.  There is some dehiscence in the upper third, with some slough but no significant suppuration or surrounding cellulitis.  Eschar over the presacral area in the midline  Extremities:  No cords, phlebitis, cellulitis.  Massively edematous    Relevant Results  WBC: 9300  Creatinine (baseline 0.66): 1.46 ---> 1.43 ---> 1.33 ---> 1.19 ---> 1.17 ---> 1.23 ---> 1.38 ---> 1.62 ---> 2.01 ---> 2.41 ---> 2.75 ---> 2.98  Pleural fluid: Transudate with 197 WBC, 56% neutrophils, protein 1.8, LDH 97, glucose 202  Microbiology:  Blood (10/12): Negative X2  Sputum (10/13): Stain with moderate GNB and moderate PMN, culture Pseudomonas aeruginosa, susceptible to Zosyn and cefepime  Sputum (10/21): Pending  Urine: Moderate colony count Candida lusitaniae  Pleural fluid (10/17): Negative    Imaging:  CXR images (10/22) personally reviewed: Intubated.  Bilateral pleural effusions, no significant change in infiltrate    Assessment:  Sepsis -likely due to pneumonia, present on admission. Patient already on IV vancomycin and IV ceftriaxone at time of this admission for lumbar spinal infection.  Thus far no evidence for vascular catheter infection or recurrent bacteremia.  Back on ventilator.  Today is the final day of treatment for Pseudomonas in the sputum.  Repeat sputum cultures pending, chest x-ray is stable  Acute kidney  injury.  Worsening, may need renal replacement therapy.  Vancomycin level gradually declining, has not been dosed since admission.   Lumbar spine surgical site infection s/p I&D 9/28. Cultures + MRSA, Proteus.  Was to be on vanco/ceftriaxone through 11/12. Followed by Dr. Brant Juarez.  Wound as described above, doubt that this is a focus for ongoing sepsis    Plan/Recommendations:  Daptomycin 700 mg every 48 hour based on adjusted body weight of 91 kg and estimated creatinine clearance of 28 mL/min  Continue cefepime through 10/22, then revert to ceftriaxone as planned for lumbar infection, until 11/12  3.   Midline catheter will need to be replaced with a new midline catheter or PICC      prior to her discharge    Andrew Malagon MD  [facility]  Office:  [phone]

## 2024-10-22 NOTE — CARE PLAN
The clinical goals for the shift include Remain hemodynamically stable and continue to Q2 turns    Over the shift, the patient did make progress toward the following goals.       Problem: Pain - Adult  Goal: Verbalizes/displays adequate comfort level or baseline comfort level  Outcome: Progressing     Problem: Safety - Adult  Goal: Free from fall injury  Outcome: Progressing     Problem: Skin  Goal: Decreased wound size/increased tissue granulation at next dressing change  Outcome: Progressing  Flowsheets (Taken 10/22/2024 0106)  Decreased wound size/increased tissue granulation at next dressing change:   Promote sleep for wound healing   Protective dressings over bony prominences   Utilize specialty bed per algorithm  Goal: Participates in plan/prevention/treatment measures  Outcome: Progressing  Flowsheets (Taken 10/22/2024 0106)  Participates in plan/prevention/treatment measures: Elevate heels  Goal: Prevent/manage excess moisture  Outcome: Progressing  Flowsheets (Taken 10/22/2024 0106)  Prevent/manage excess moisture:   Cleanse incontinence/protect with barrier cream   Moisturize dry skin   Follow provider orders for dressing changes   Monitor for/manage infection if present  Goal: Prevent/minimize sheer/friction injuries  Outcome: Progressing  Flowsheets (Taken 10/22/2024 0106)  Prevent/minimize sheer/friction injuries:   Complete micro-shifts as needed if patient unable. Adjust patient position to relieve pressure points, not a full turn   HOB 30 degrees or less   Turn/reposition every 2 hours/use positioning/transfer devices   Use pull sheet   Utilize specialty bed per algorithm  Goal: Promote/optimize nutrition  Outcome: Progressing  Flowsheets (Taken 10/22/2024 0106)  Promote/optimize nutrition:   Assist with feeding   Monitor/record intake including meals  Goal: Promote skin healing  Outcome: Progressing  Flowsheets (Taken 10/22/2024 0106)  Promote skin healing:   Assess skin/pad under  line(s)/device(s)   Ensure correct size (line/device) and apply per  instructions   Protective dressings over bony prominences   Rotate device position/do not position patient on device   Turn/reposition every 2 hours/use positioning/transfer devices

## 2024-10-22 NOTE — NURSING NOTE
Upon rounding  left upper arm midline with current CHG dressing dry and intact. No blood return but flushes easily, clamped and Curos cap intact.Right internal jugular Mahurkar also with current CHG dressing dry and intact, pigtail in use at this time (levophed and diprovan) Pt also has right arm PIV.

## 2024-10-22 NOTE — CARE PLAN
The patient's goals for the shift include      The clinical goals for the shift include decrease levophed as tolerated by patient    Over the shift, the patient did make progress on weaning levophed down. She is now on crrt.

## 2024-10-22 NOTE — PROGRESS NOTES
[facility] Critical Care Medicine       Date:  10/22/2024  Patient:  Narda Malloy  YOB: 1955  MRN:  [id number]   Admit Date:  10/12/2024      Chief Complaint   Patient presents with    Altered Mental Status     History of Present Illness:  Narda Malloy is a 69 y.o. year old female patient with past medical history of L1-L3 lumbar laminectomy, T4-S1 revision, and fusion August 26th, T2DM, HTN, essential tremor, HLD, glaucoma, sarcoidosis of the lung who presented to  ED 10/12 after [facility] Per report from her , she has had a significant decline in her health since July 15th when she had a fall and became significantly weak. She has also had multiple infection complications since her back surgery in August requiring multiple I&Ds and long term antibiotic therapy. She went to the OR most recently on 9/28 for lumbar site infection wash out. Per chart review, she was discharged on IV vancomycin 1g and IV ceftriaxone 2d q24hrs through 11/12.     ED Course: Initial vital signs: /104 (109), HR 68, RR 20, SpO2 95% on 6L NC, temp 34.5C. Give 0.4mg of narcan with no improvement in mentation. Lab work-up remarkable for mild hyperkalemia (5.5), AMY 42/1.46, elevated alk phos, normocytic anemia 10.4/33, turbid appearing urine with mild hematuria and proteinuria and + leuk esterase, >50 WBCs. Urine drug screen positive for barbiturates. Triggered sepsis timer so she was given 3L NS. She was intubated for airway protection with 20mg etomidate and 100mg succinylcholine. BP dropped post-intubated and fluid resuscitation and she was subsequently started on levophed.       Interval ICU Events:  10/12: Pt arrived to ICU intubated and lightly sedated on low-dose versed. Eyes open, minimally responsive.      10/13: Received K cocktail last night for K 6.0, corrected appropriately. Levophed requirements mildly up, UOP decreasing.  Ordered albumin x2 this am with improvements in UOP and SBP. Will likely trial lasix this afternoon d/t hypervolemia.     10/14: Remains intubated with decreased mentation, only responsive to noxious stimuli. Trialed lasix TID for volume overload. Remains on levophed 0.01.     10/15: Mentation much improved. SAT/SBT successful so extubated. Given lasix x3, bumex x1, metolazone x1 with net negative fluid balance of 500mL -> started on bumex gtt.      10/16: O2 requirements significantly increased, NRB -> HFNC 40L 100% likely 2/2 mucus plugging.     10/17: No acute events overnight. Bumex gtt increased to 1mg/hr. CXR this am showing complete opacification of left hemithorax related to atelectasis vs pleural effusion. Remains on HFNC 40L/80%. Pigtail catheter placed with 1.2L drained immediately. Given albumin for hypotension.      10/18: ~2L output from left sided pigtail catheter since placement. Kidney function worsening and UOP declining, about 20cc/hr overnight. Will place NG tube today and start enteral nutrition and appetite and oral intake remains poor.      10/19: Patient with worsening hypoxic, hypercapnic respiratory failure - now requiring BIPAP support. Remains grossly volume overloaded with low UO. Started on vasopressors to augment BP for diuresis. 40 IV lasix + gtt started. Remains with poor nutritional status. Will re attempt NG later if respiratory status improves.     10/20: Patient stable on vent. Nephrology consulted for renal failure. Cr continues to uptrend however UO is increasing. No issues overnight.    10/21: No issues overnight. Remains stable on vent. Levo down to 0.01 mcg/kg/min. UO remains low. Nephrology following. Possible CRRT today?    10/22: Patient received 80 lasix and metalozone with minimal UO. Levo @ .02 and prop @ 10. Vent settings: 20/450/10/40%. Plan for CRRT today. Will SAT/SBT after CRRT. May change propofol to precedex.    Medical History:  Past Medical History:    Diagnosis Date    Degenerative myopia, bilateral     Diabetic neuropathy (Multi)     Difficult intubation 08/26/2024    Mac 3, grade 3, 1 attempt.  Glidescope/videolaryngoscopy recommended for future attempts.    DM type 2 (diabetes mellitus, type 2) (Multi)     Dry eye syndrome of bilateral lacrimal glands     Essential hypertension     Essential tremor     Glaucoma     Hyperlipidemia     Long term (current) use of insulin (Multi)     Low back pain     PONV (postoperative nausea and vomiting)     Primary open angle glaucoma of both eyes, severe stage     Repeated falls     Sarcoidosis of lung (Multi)     Spinal stenosis, lumbar region without neurogenic claudication     Weakness      Past Surgical History:   Procedure Laterality Date    BLEPHAROPLASTY  07/2022    BREAST SURGERY  05/20/2022    Breast lift    CARPAL TUNNEL RELEASE      CATARACT EXTRACTION W/  INTRAOCULAR LENS IMPLANT Bilateral     OD 08/04/2011 +8.5D,OS 08/04/2011 +8.50D    FOOT SURGERY      INSERTION / REMOVAL CRANIAL DBS GENERATOR      Placed 2017.  Removed 2018. part of wire left in head when everything removed    LUMBAR FUSION      L3-S1    PANRETINAL PHOTOCOAGULATION  2014    THORACIC FUSION  08/26/2024    T4-S1 fusion    VITRECTOMY Right 2013     Medications Prior to Admission   Medication Sig Dispense Refill Last Dose/Taking    acetaminophen (Tylenol) 500 mg tablet Take 2 tablets (1,000 mg) by mouth 3 times a day.   Unknown    ascorbic acid (Vitamin C) 500 mg tablet as directed Orally   Unknown    brimonidine (AlphaGAN) 0.2 % ophthalmic solution Administer 1 drop into both eyes 2 times a day.   Unknown    calcium carbonate-vitamin D3 500 mg-5 mcg (200 unit) tablet Take 1 tablet by mouth once daily.   Unknown    cefTRIAXone (Rocephin) 2 gram/50 mL IV Infuse 50 mL (2 g) at 100 mL/hr over 30 minutes into a venous catheter once every 24 hours. Once weekly labs CBC/diff, CMP, Vanc trough ESR, CRP fax to Dr. Juarez [phone]. Stop date  11/12/24. 1950 mL 0     dextrose 50 % injection Infuse 25 mL (12.5 g) into a venous catheter every 15 minutes if needed (For blood glucose 41 to 70 mg/dL).       dextrose 50 % injection Infuse 50 mL (25 g) into a venous catheter every 15 minutes if needed (For blood glucose less than or equal to 40 mg/dL).       docusate sodium (Colace) 100 mg capsule Take 1 capsule (100 mg) by mouth 2 times a day.   Unknown    ezetimibe (Zetia) 10 mg tablet Take 1 tablet (10 mg) by mouth once daily. 90 tablet 3 Unknown    FreeStyle Lite Strips strip USE TO TEST 3 TIMES A DAY AS DIRECTED 300 each 2     glucagon (Glucagen) 1 mg injection Inject 1 mg into the muscle every 15 minutes if needed for low blood sugar - see comments (Hypoglycemia).       glucagon (Glucagen) 1 mg injection Inject 1 mg into the muscle every 15 minutes if needed for low blood sugar - see comments (Hypoglycemia).       heparin sodium,porcine (heparin, porcine,) 5,000 unit/mL injection Inject 1 mL (5,000 Units) under the skin every 8 hours.       insulin lispro (HumaLOG) 100 unit/mL injection Inject 0-15 Units under the skin 3 times daily (morning, midday, late afternoon). Take as directed per insulin instructions.Do not hold when patient is not eating, continue order as scheduled for hyperglycemia management.  Insulin Lispro Corrective Scale #3     Hypoglycemia protocol Call LIP unit(s) if Blood Glucose is between 0 - 70 mg/dL     0 unit(s) if Blood glucose is between    3 unit(s) if Blood glucose is between 151-200   6 unit(s) if Blood glucose is between 201-250   9 unit(s) if Blood glucose is between 251-300   12 unit(s) if Blood glucose is between 301-350   15 unit(s) if Blood glucose is between 351-400    Notify provider unit(s) if Blood Glucose is greater than 400 mg/dL       Lactobacillus acidophilus 100 mg (1 billion cell) capsule Take 1 capsule by mouth 2 times a day.   Unknown    latanoprost (Xalatan) 0.005 % ophthalmic solution Administer 1 drop  "into both eyes once daily at bedtime. 2.5 mL 5 Unknown    melatonin 5 mg tablet Take 1 tablet (5 mg) by mouth as needed at bedtime for sleep.   Unknown    methocarbamol (Robaxin) 500 mg tablet Take 1 tablet (500 mg) by mouth 3 times a day.   Unknown    multivitamin tablet Take 1 tablet by mouth once daily.   Unknown    ondansetron (Zofran) 4 mg/2 mL injection Infuse 2 mL (4 mg) into a venous catheter every 6 hours if needed for nausea or vomiting.       oxyCODONE (Roxicodone) 5 mg immediate release tablet Take 1 tablet (5 mg) by mouth every 6 hours if needed for severe pain (7 - 10) or moderate pain (4 - 6).       oxygen (O2) gas therapy Inhale 1 each continuously.       pantoprazole (ProtoNix) 40 mg EC tablet Take 1 tablet (40 mg) by mouth once daily in the morning. Take before meals. Do not crush, chew, or split.       pantoprazole (ProtoNix) 40 mg injection Infuse 40 mg into a venous catheter once daily in the morning. Take before meals. If unable to take PO.       pen needle, diabetic (PEN NEEDLE MISC) BD Altagracia- 4 mm X 32 G needle - as directed 4x a day sc 4 times per day       polyethylene glycol (Glycolax, Miralax) 17 gram packet Take 17 g by mouth once daily.   Unknown    primidone 125 mg tablet Take 125 mg by mouth 3 times a day.   Unknown    propranolol LA (Inderal LA) 60 mg 24 hr capsule Take 1 capsule (60 mg) by mouth early in the morning.. Hold for SBP < 110 mmhg, HR < 60 bpm.   Unknown    sennosides (Senokot) 8.6 mg tablet Take 1 tablet (8.6 mg) by mouth every 12 hours if needed for constipation.   Unknown    Sure Comfort Pen Needle 32 gauge x 5/32\" needle AS DIRECTED DAILY FOR 90 DAYS 100 each 11 Unknown    traZODone (Desyrel) 25 MG split tablet Take 1 half tablet (25 mg) by mouth once daily at bedtime.   Unknown    vancomycin (Vancocin) 1 gram/250 mL solution Infuse 250 mL (1 g) at 250 mL/hr over 60 minutes into a venous catheter every 12 hours. Once weekly labs CBC/diff, CMP, Vanc trough ESR, CRP fax " to Dr. Juarez [phone]. Stop date 11/12/24. 37061 mL 0      Erythromycin, Morphine, and Rosuvastatin  Social History     Tobacco Use    Smoking status: Former     Types: Cigarettes     Passive exposure: Past    Smokeless tobacco: Never   Vaping Use    Vaping status: Never Used   Substance Use Topics    Alcohol use: Not Currently    Drug use: Not Currently     Family History   Problem Relation Name Age of Onset    Multiple myeloma Mother      Cancer Mother      Other (CABG) Father      Pulmonary embolism Father      Heart disease Father      Breast cancer Sister          Stage II    Hypertension Sister      Diabetes Sister      No Known Problems Sister          x5    No Known Problems Brother          x4    No Known Problems Daughter         [facility] Medications:    norepinephrine, 0-0.5 mcg/kg/min, Last Rate: 0.02 mcg/kg/min (10/22/24 0500)  propofol, 0-50 mcg/kg/min, Last Rate: 10 mcg/kg/min (10/22/24 0636)          Current Facility-Administered Medications:     acetaminophen (Tylenol) tablet 975 mg, 975 mg, oral, q6h PRN, Kaleb Lam PA-C, 975 mg at 10/18/24 1405    alteplase (Cathflo Activase) injection 2 mg, 2 mg, intra-catheter, PRN, Kaleb Lam PA-C    brimonidine (AlphaGAN) 0.2 % ophthalmic solution 1 drop, 1 drop, Both Eyes, BID, Kaleb Lam PA-C, 1 drop at 10/21/24 2027    [Held by provider] calcium carbonate-vitamin D3 500 mg-5 mcg (200 unit) per tablet 1 tablet, 1 tablet, oral, Daily, Kaleb Lam PA-C, 1 tablet at 10/18/24 0930    cefepime (Maxipime) 1 g in dextrose 5% 50 mL IV, 1 g, intravenous, q12h, Andrew Malagon MD, Stopped at 10/21/24 2057    [START ON 10/23/2024] cefTRIAXone (Rocephin) 2 g in dextrose (iso) IV 50 mL, 2 g, intravenous, q24h, Kaleb Lam PA-C    DAPTOmycin (Cubicin) 700 mg in sodium chloride 0.9%  mL, 700 mg, intravenous, q48h, Andrew Malagon MD, Stopped at 10/21/24 1349    dextrose 50 % injection 12.5 g, 12.5 g, intravenous, q15 min PRNKaleb  COBY Lam    dextrose 50 % injection 25 g, 25 g, intravenous, q15 min PRN, Kaleb Lam PA-C    docusate sodium (Colace) oral liquid 100 mg, 100 mg, oral, BID PRN, Kaleb Lam PA-C    ezetimibe (Zetia) tablet 10 mg, 10 mg, oral, Daily, Kaleb Lam PA-C, 10 mg at 10/21/24 0807    fentaNYL PF (Sublimaze) injection 25 mcg, 25 mcg, intravenous, q2h PRN, Kaleb Lam PA-C, 25 mcg at 10/22/24 0743    glucagon (Glucagen) injection 1 mg, 1 mg, intramuscular, q15 min PRN, Kaleb Lam PA-C    glucagon (Glucagen) injection 1 mg, 1 mg, intramuscular, q15 min PRN, Kaleb Lam PA-C    heparin (porcine) injection 5,000 Units, 5,000 Units, subcutaneous, q8h, Kaleb Lam PA-C, 5,000 Units at 10/22/24 0533    heparin flush 10 unit/mL syringe 50 Units, 50 Units, intra-catheter, PRN, Kaleb Lam PA-C, 50 Units at 10/19/24 2313    hydrocortisone sodium succinate (PF) (Solu-CORTEF) injection 100 mg, 100 mg, intravenous, q8h, LEONEL Salas-CNP, 100 mg at 10/22/24 0508    [Held by provider] HYDROmorphone (Dilaudid) injection 0.4 mg, 0.4 mg, intravenous, q2h PRN, LEONEL Salgado-CNP, 0.4 mg at 10/19/24 0520    insulin lispro (HumaLOG) injection 0-15 Units, 0-15 Units, subcutaneous, q6h, Alonso Yancey APRN-CNP, 6 Units at 10/22/24 0508    ipratropium-albuteroL (Duo-Neb) 0.5-2.5 mg/3 mL nebulizer solution 3 mL, 3 mL, nebulization, 4x daily, Kaleb Lam PA-C, 3 mL at 10/22/24 0804    latanoprost (Xalatan) 0.005 % ophthalmic solution 1 drop, 1 drop, Both Eyes, Nightly, Kaleb Lam PA-C, 1 drop at 10/21/24 2027    norepinephrine (Levophed) 8 mg in dextrose 5% 250 mL (0.032 mg/mL) infusion (premix), 0-0.5 mcg/kg/min, intravenous, Continuous, Alonso Yancey APRN-CNP, Last Rate: 4.73 mL/hr at 10/22/24 0500, 0.02 mcg/kg/min at 10/22/24 0500    oxygen (O2) therapy, , inhalation, Continuous - Inhalation, Kaleb Lam PA-C, 40 percent at 10/22/24 0809    pantoprazole (ProtoNix) EC  tablet 40 mg, 40 mg, oral, Daily before breakfast **OR** pantoprazole (ProtoNix) injection 40 mg, 40 mg, intravenous, Daily before breakfast, RUTH Salas, 40 mg at 10/22/24 0636    primidone (Mysoline) tablet 125 mg, 125 mg, oral, Nightly, Kaleb Lam PA-C, 125 mg at 10/21/24 2027    propofol (Diprivan) infusion, 0-50 mcg/kg/min, intravenous, Continuous, RUTH Salas, Last Rate: 7.56 mL/hr at 10/22/24 0636, 10 mcg/kg/min at 10/22/24 0636    [Held by provider] propranolol LA (Inderal LA) 24 hr capsule 60 mg, 60 mg, oral, Daily, Kaleb Lam PA-C, 60 mg at 10/19/24 0643    sodium chloride 3 % nebulizer solution 3 mL, 3 mL, nebulization, 4x daily, Kaleb Lam PA-C, 3 mL at 10/22/24 0805    [Held by provider] traMADol (Ultram) tablet 50 mg, 50 mg, oral, q8h PRN, Kaleb Lam PA-C    Review of Systems:  14 point review of systems was completed and negative except for those specially mention in my HPI    Physical Exam:    Heart Rate:  [52-81]   Temp:  [36.6 °C (97.9 °F)-36.9 °C (98.4 °F)]   Resp:  [6-27]   BP: ()/(44-88)   Weight:  [126 kg (277 lb 1.9 oz)]   SpO2:  [91 %-100 %]     Physical Exam  Vitals and nursing note reviewed.   Constitutional:       Comments: Lethargic, arouses to physical stimuli but difficulty maintaining alertness. Tachypnic.    HENT:      Head: Normocephalic.   Eyes:      Extraocular Movements: Extraocular movements intact.      Pupils: Pupils are equal, round, and reactive to light.   Cardiovascular:      Rate and Rhythm: Normal rate and regular rhythm.      Pulses:           Radial pulses are 1+ on the right side and 1+ on the left side.        Dorsalis pedis pulses are 1+ on the right side and 1+ on the left side.      Heart sounds: Normal heart sounds, S1 normal and S2 normal.      Comments: Anasarca present  Pulmonary:      Effort: Tachypnea and accessory muscle usage present.      Breath sounds: Rhonchi and rales present. No wheezing.   Chest:       Comments: L chest pigtail catheter in place draining straw colored output.  Abdominal:      General: Abdomen is flat. Bowel sounds are normal.      Palpations: Abdomen is soft.      Tenderness: There is no abdominal tenderness.   Genitourinary:     Comments: Gibson in place draining minimal straw colored urine  Musculoskeletal:      Cervical back: Neck supple.      Right lower le+ Edema present.      Left lower le+ Edema present.   Skin:     General: Skin is warm.      Capillary Refill: Capillary refill takes less than 2 seconds.      Coloration: Skin is not cyanotic, jaundiced or mottled.   Neurological:      General: No focal deficit present.      Mental Status: She is lethargic.      GCS: GCS eye subscore is 3. GCS verbal subscore is 4. GCS motor subscore is 6.      Motor: Weakness present.     Objective:    I have reviewed all medications, laboratory results, and imaging pertinent for today's encounter.    Vent Mode: Pressure regulated volume control/assist control  FiO2 (%):  [40 %] 40 %  S RR:  [20] 20  S VT:  [450 mL] 450 mL  PEEP/CPAP (cm H2O):  [5 cm H20-10 cm H20] 10 cm H20  MAP (cm H2O):  [11-16] 14      Intake/Output Summary (Last 24 hours) at 10/22/2024 0838  Last data filed at 10/22/2024 0500  Gross per 24 hour   Intake 2250.64 ml   Output 240 ml   Net 2010.64 ml       Daily Labs:  CBC:   Results from last 7 days   Lab Units 10/22/24  0503   WBC AUTO x10*3/uL 9.3   HEMOGLOBIN g/dL 7.7*   HEMATOCRIT % 24.4*   MCV fL 99     BMP:    Results from last 7 days   Lab Units 10/22/24  0503   SODIUM mmol/L 138   POTASSIUM mmol/L 4.5   CHLORIDE mmol/L 108*   CO2 mmol/L 21   BUN mg/dL 64*   CREATININE mg/dL 2.98*   CALCIUM mg/dL 7.8*   GLUCOSE mg/dL 236*   MAGNESIUM mg/dL 2.06       Assessment/Plan:    Narda Malloy is a 69 y.o. year old female patient with past medical history of L1-L3 lumbar laminectomy, T4-S1 revision, and fusion , T2DM, HTN, essential tremor, HLD, glaucoma, sarcoidosis  of the lung who presented to  ED 10/12 after being found unresponsive at her nursing facility. Initially intubated and admitted to ICU for septic shock. Extubated 10/15. Re-intubated 10/19 for hypoxic/hypercapnic respiratory failure. Has been on and off and now back on levophed.    I am currently managing this critically ill patient for the following problems:     Neuro/Psych/Pain Ctrl/Sedation:   Acute encephalopathy - likely secondary to hypercapnia, infection  Hx Essential tremor   CT head: Negative for acute findings   Urine tox positive for barbiturates consistent with primidone intake   - Addressing underlying causes  - Pain Control: Acetaminophen PRN, Fentanyl PRN  - Sedation: Propofol  - Home primidone continued. Will hold propanolol d/t hypotension  - CAM ICU qshift, sleep-wake hygiene, delirium precautions      Respiratory/ENT:  Acute hypoxic/hypercapnic respiratory failure - likely multifactorial and 2/2 HCAP, pl effusions, volume overload  Healthcare-associated pneumonia   Atelectasis - likely 2/2 mucus plugging   Pleural Effusion -S/p left-sided pigtail placement 10/17, Output decreasing - 50 ml out overnight. Transudative.   CXR Today:   No change in the bilateral infiltrates and effusions.  Left-sided chest tube without pneumothorax.  ABG this am: 7.37-33-82-19  - Intubated 10/18. Vent setting: -20-5 40%. Will increase PEEP to 10 to assist with atelectasis.  - Will wean O2 as tolerated to maintain SpO2 >92%  - Duonebs and mucomyst QID   - IPV per RT TID  - Continuous pulse ox monitoring   - Pulm hygiene  - Minimal CT output- labs demonstrate transudative     Cardiovascular:   Septic shock - improving  Hx HTN, HLD  Acute on chronic diastolic heart failure  TTE 10/1: diastolic dysfunction, LVEF 50-55%, mildly elevated RVSP (40)  Bilateral duplex US upper extremities:  Thrombosis within the left cephalic vein, which is part of the superficial venous system of the upper extremity, adjacent to PICC  "line. No deep venous thrombosis in the upper extremities.  - Remains on persistent levophed gtt, Will wean with goal MAP > 70   - Add stress dose steroids  - Holding home propranolol (on for tremors)  - Continue home Zetia  - Continuous cardiac monitoring per ICU protocol  - EKGs PRN for ACS symptoms, arrhythmias      GI:  Hx GERD  - Diet: Tolerating TF @ goal  - BR: Colace  - GI Prophylaxis: PPI     Renal/Volume Status/Electrolytes:  Acute kidney injury - possibly ATN +/- medication-induced (vancomycin)  Anasarca   Mixed respiratory and metabolic acidosis - improving on vent  Hypoalbuminemia   Baseline Cr 0.8-1.0. BUN Cr continues to uptrend today 58/2.75 -> 64/2.98 today  - Remains grossly volume overloaded. UO remains low at about 0.2 ml/kg/hr/ 545 x 24 hrs.  - Nephrology consulted, input appreciated. Consider CRRT?  - Maintain anders catheter  - Hourly I/O's. Goal urine output 0.5-1.0cc/kg/hr.  - Replete electrolytes to maintain K >4.0 and Mg >2.0  - Daily BMP, Mg, Phos    Endocrine:  T2DM  - SSI Q 6 while NPO  - TSH: midly elevated, T4 WNL  - Hypoglycemia protocol PRN      Infectious Disease:  Thoracolumbar surgical site infection - s/p I&D x 2. Recent MRSA bacteremia on extended course of IV antibiotics (vanc and rocephin -> 11/12)  Healthcare-associated pneumonia   CT lumbar spine 10/12: Unable to r/o abscess. Unable to do MRI d/t spinal hardware, \"metal in head\" per patient  Blood cultures 10/16: Negative  Urine culture 10/14: Negative  Sputum culture 10/16: + pseudomonas   -Per RT with increasing purulent secretions. Will repeat sputum culture today.  - Antibiotics: Continue cefepime until 10/22 per ID, then will be on Ceftriaxone until 11/12.   -- Changed cefepime to daptomycin (10/21-)   - Vanco hold today d/t continued high levels (27 today). Dosing of vanco per pharmacy.   - ID consulted, appreciate assistance   - Replace midline prior to discharge per ID recs  - Monitor SIRS criteria   "   Heme/Onc:  Normocytic anemia   H/H similar to previous: No active signs of bleeding.  - Monitor for s/sx of bleeding   - Plan to transfuse if Hgb <7.0   - Daily CBC  - H/H: 7.7/24.4     MSK:  No active concerns   - PT/OT when appropriate     Derm:  Surgical wound with infection  - Wound care consult  - ICU skin protocol  - Q2hr turns  - Padded pressure points      Ethics/Code Status:  Full code - discussions with  at bedside      :  DVT Prophylaxis: SQH  GI Prophylaxis: PPI  Bowel Regimen: Colace  Diet: TF  CVC: Midline, Trialysis R IJ  Wanda: Right radial placed 10/19, removed 10/20 d/t malfunction  Gibson: yes, replaced 10/12  Restraints: Yes  Disposition: ICU  I have reviewed all medications, laboratory results, and imaging pertinent for today's encounter.  Plan Discussed with Dr. Shook  Critical Care Time: 45 minutes  Critical Care [facility]  Nimco Weathers PA-C

## 2024-10-22 NOTE — PROGRESS NOTES
"Nutrition Follow up Note    Nutrition Assessment      Intubated for airway protection 10/19. Renal function concerns, Nephrology monitoring for possible renal replacement needs. Energy needs updated to reflect mechanical ventilation. Tube feed order is no longer appropriate. Spoke with PA, will change formula and rate at this time.    Nutrition History:  Food and Nutrient History: Vent/Tube feed     Food Allergies/Intolerances:  None  GI Symptoms: None  Oral Problems: None    Anthropometrics:  Ht: 177.8 cm (5' 10\"), Wt: 126 kg (277 lb 1.9 oz), BMI: 39.76  IBW/kg (Dietitian Calculated): 68.18 kg  Percent of IBW: 147 %     Weight Change:  Daily Weight  10/22/24 : 126 kg (277 lb 1.9 oz)  10/01/24 : 99.7 kg (219 lb 12.8 oz)  09/25/24 : 74.8 kg (165 lb)  08/27/24 : 75.8 kg (167 lb)  08/16/24 : 76.2 kg (168 lb)  08/12/24 : 76.4 kg (168 lb 6.4 oz)  07/15/24 : 76.2 kg (168 lb)  06/18/24 : 75.8 kg (167 lb 3.2 oz)  05/31/24 : 69.4 kg (153 lb)  09/29/23 : 69.4 kg (153 lb)       Nutrition Focused Physical Exam Findings:      Nutrition Significant Labs:  Lab Results   Component Value Date    WBC 9.3 10/22/2024    HGB 7.7 (L) 10/22/2024    HCT 24.4 (L) 10/22/2024     10/22/2024    CHOL 266 (H) 08/23/2023    TRIG 213 (H) 08/23/2023    HDL 58 08/23/2023    ALT 5 (L) 10/20/2024    AST 7 (L) 10/20/2024     10/22/2024    K 4.5 10/22/2024     (H) 10/22/2024    CREATININE 2.98 (H) 10/22/2024    BUN 64 (H) 10/22/2024    CO2 21 10/22/2024    TSH 4.78 (H) 10/12/2024    INR 1.0 09/28/2024    HGBA1C 6.2 (H) 09/25/2024    ALBUR 40 (H) 03/31/2022     Nutrition Specific Medications:  brimonidine, 1 drop, Both Eyes, BID  [Held by provider] calcium carbonate-vitamin D3, 1 tablet, oral, Daily  cefepime, 1 g, intravenous, q12h  [START ON 10/23/2024] cefTRIAXone, 2 g, intravenous, q24h  daptomycin, 700 mg, intravenous, q48h  ezetimibe, 10 mg, oral, Daily  heparin (porcine), 5,000 Units, subcutaneous, q8h  hydrocortisone sodium " succinate, 100 mg, intravenous, q8h  insulin lispro, 0-15 Units, subcutaneous, q6h  ipratropium-albuteroL, 3 mL, nebulization, 4x daily  latanoprost, 1 drop, Both Eyes, Nightly  oxygen, , inhalation, Continuous - Inhalation  pantoprazole, 40 mg, oral, Daily before breakfast   Or  pantoprazole, 40 mg, intravenous, Daily before breakfast  primidone, 125 mg, oral, Nightly  [Held by provider] propranolol LA, 60 mg, oral, Daily  sodium chloride, 3 mL, nebulization, 4x daily      norepinephrine, 0-0.5 mcg/kg/min, Last Rate: 0.02 mcg/kg/min (10/22/24 0500)  propofol, 0-50 mcg/kg/min, Last Rate: 15 mcg/kg/min (10/22/24 0735)      Propofol @ 11.34 ml/hr provides: 299 kcals/day    Dietary Orders (From admission, onward)       Start     Ordered    10/22/24 1039  Enteral feeding with NPO 35 (start at 10cc/hr and increase q4hrs until goal rate); 150; Every 4 hours  Diet effective now        Question Answer Comment   Tube feeding formula: Nepro    Tube feeding continuous rate (mL/hr): 35 start at 10cc/hr and increase q4hrs until goal rate   Tube feeding flush (mL): 150    Flush frequency: Every 4 hours        10/22/24 1040    10/16/24 0448  May Participate in Room Service With Assistance  ( ROOM SERVICE MAY PARTICIPATE WITH ASSISTANCE)  Once        Question:  .  Answer:  Yes    10/16/24 0447                  Nutrition Support Intake provides: Glucerna 1.5 @50 mL/Hr provides 1800 calories (2099 kcals with current rate of sedation), 99 gm protein, 911 mL free water      Estimated Needs:   Estimated Energy Needs  Total Energy Estimated Needs (kCal):  (2889-9725)  Total Estimated Energy Need per Day (kCal/kg):  (25-28)  Method for Estimating Needs: IBW    Estimated Protein Needs  Total Protein Estimated Needs (g):  ()  Total Protein Estimated Needs (g/kg):  (1.2-2.0)  Method for Estimating Needs: IBW    Estimated Fluid Needs  Total Fluid Estimated Needs (mL): 1700 mL  Total Fluid Estimated Needs (mL/kg): 25 mL/kg  Method for  Estimating Needs: Per Critical Care Team/Nephrology      Nutrition Diagnosis   Nutrition Diagnosis:     Nutrition Diagnosis  Patient has Nutrition Diagnosis: Yes  Diagnosis Status (1): Resolved  Nutrition Diagnosis 1: Inadequate energy intake  Related to (1): decreased ability to consume sufficient energy  As Evidenced by (1): NPO/Mechanical Ventilation  Additional Nutrition Diagnosis: Diagnosis 2  Diagnosis Status (2): New  Nutrition Diagnosis 2: Excessive enteral nutrition infusion  Related to (2): current tube feed formula/rate  As Evidenced by (2): enteral formula/rate providing greater than estimated energy needs     Nutrition Interventions/Recommendations   Nutrition Interventions and Recommendations:    Nutrition Prescription:  Individualized Nutrition Prescription Provided for : 8073-8190 calories,  gm protein to be provided via enteral nutrition    Nutrition Interventions:   Food and/or Nutrient Delivery Interventions  Enteral Intake: Modify composition of enteral nutrition, Modify rate of enteral nutrition  Goal: Change to: Nepro goal rate @35 mL/Hr to provide 1512 calories (1811 kcals with current rate of sedation), 68 gm protein, 611 mL free water. If CRRT or dialysis is initiated, recommend Prostat BID to provide 30 additional grams of protein/d    Education Documentation  No documentation found.         Nutrition Monitoring and Evaluation   Monitoring/Evaluation:   Food/Nutrient Related History Monitoring  Enteral and Parenteral Nutrition Intake: Enteral nutrition intake  Criteria: monitoring tube feed tolerance       Time Spent/Follow-up:   Follow Up  Time Spent (min): 25 minutes  Last Date of Nutrition Visit: 10/22/24  Nutrition Follow-Up Needed?: 3-5 days  Follow up Comment: 10/25/24

## 2024-10-23 ENCOUNTER — APPOINTMENT (OUTPATIENT)
Dept: CARDIOLOGY | Facility: HOSPITAL | Age: 69
End: 2024-10-23
Payer: MEDICARE

## 2024-10-23 ENCOUNTER — APPOINTMENT (OUTPATIENT)
Dept: RADIOLOGY | Facility: HOSPITAL | Age: 69
End: 2024-10-23
Payer: MEDICARE

## 2024-10-23 LAB
ALBUMIN SERPL BCP-MCNC: 2.6 G/DL (ref 3.4–5)
ALBUMIN SERPL BCP-MCNC: 2.7 G/DL (ref 3.4–5)
ALP SERPL-CCNC: 103 U/L (ref 33–136)
ALP SERPL-CCNC: 107 U/L (ref 33–136)
ALT SERPL W P-5'-P-CCNC: 5 U/L (ref 7–45)
ALT SERPL W P-5'-P-CCNC: 6 U/L (ref 7–45)
ANION GAP SERPL CALCULATED.3IONS-SCNC: 11 MMOL/L (ref 10–20)
ANION GAP SERPL CALCULATED.3IONS-SCNC: 9 MMOL/L (ref 10–20)
AST SERPL W P-5'-P-CCNC: 8 U/L (ref 9–39)
AST SERPL W P-5'-P-CCNC: 9 U/L (ref 9–39)
ATRIAL RATE: 67 BPM
BACTERIA SPEC RESP CULT: NORMAL
BASOPHILS # BLD AUTO: 0.03 X10*3/UL (ref 0–0.1)
BASOPHILS # BLD AUTO: 0.03 X10*3/UL (ref 0–0.1)
BASOPHILS NFR BLD AUTO: 0.2 %
BASOPHILS NFR BLD AUTO: 0.2 %
BILIRUB DIRECT SERPL-MCNC: 0 MG/DL (ref 0–0.3)
BILIRUB DIRECT SERPL-MCNC: 0.1 MG/DL (ref 0–0.3)
BILIRUB SERPL-MCNC: 0.3 MG/DL (ref 0–1.2)
BILIRUB SERPL-MCNC: 0.4 MG/DL (ref 0–1.2)
BUN SERPL-MCNC: 28 MG/DL (ref 6–23)
BUN SERPL-MCNC: 43 MG/DL (ref 6–23)
CALCIUM SERPL-MCNC: 7.4 MG/DL (ref 8.6–10.3)
CALCIUM SERPL-MCNC: 7.7 MG/DL (ref 8.6–10.3)
CHLORIDE SERPL-SCNC: 104 MMOL/L (ref 98–107)
CHLORIDE SERPL-SCNC: 104 MMOL/L (ref 98–107)
CO2 SERPL-SCNC: 25 MMOL/L (ref 21–32)
CO2 SERPL-SCNC: 25 MMOL/L (ref 21–32)
CREAT SERPL-MCNC: 1.44 MG/DL (ref 0.5–1.05)
CREAT SERPL-MCNC: 2.02 MG/DL (ref 0.5–1.05)
EGFRCR SERPLBLD CKD-EPI 2021: 26 ML/MIN/1.73M*2
EGFRCR SERPLBLD CKD-EPI 2021: 40 ML/MIN/1.73M*2
EOSINOPHIL # BLD AUTO: 0.07 X10*3/UL (ref 0–0.7)
EOSINOPHIL # BLD AUTO: 0.09 X10*3/UL (ref 0–0.7)
EOSINOPHIL NFR BLD AUTO: 0.5 %
EOSINOPHIL NFR BLD AUTO: 0.7 %
ERYTHROCYTE [DISTWIDTH] IN BLOOD BY AUTOMATED COUNT: 19.3 % (ref 11.5–14.5)
ERYTHROCYTE [DISTWIDTH] IN BLOOD BY AUTOMATED COUNT: 19.6 % (ref 11.5–14.5)
GLUCOSE BLD MANUAL STRIP-MCNC: 162 MG/DL (ref 74–99)
GLUCOSE BLD MANUAL STRIP-MCNC: 166 MG/DL (ref 74–99)
GLUCOSE BLD MANUAL STRIP-MCNC: 167 MG/DL (ref 74–99)
GLUCOSE BLD MANUAL STRIP-MCNC: 171 MG/DL (ref 74–99)
GLUCOSE BLD MANUAL STRIP-MCNC: 176 MG/DL (ref 74–99)
GLUCOSE BLD MANUAL STRIP-MCNC: 199 MG/DL (ref 74–99)
GLUCOSE SERPL-MCNC: 158 MG/DL (ref 74–99)
GLUCOSE SERPL-MCNC: 165 MG/DL (ref 74–99)
GRAM STN SPEC: NORMAL
GRAM STN SPEC: NORMAL
HCT VFR BLD AUTO: 25.8 % (ref 36–46)
HCT VFR BLD AUTO: 27 % (ref 36–46)
HGB BLD-MCNC: 8.2 G/DL (ref 12–16)
HGB BLD-MCNC: 8.6 G/DL (ref 12–16)
IMM GRANULOCYTES # BLD AUTO: 0.5 X10*3/UL (ref 0–0.7)
IMM GRANULOCYTES # BLD AUTO: 0.6 X10*3/UL (ref 0–0.7)
IMM GRANULOCYTES NFR BLD AUTO: 4 % (ref 0–0.9)
IMM GRANULOCYTES NFR BLD AUTO: 4 % (ref 0–0.9)
LYMPHOCYTES # BLD AUTO: 0.77 X10*3/UL (ref 1.2–4.8)
LYMPHOCYTES # BLD AUTO: 0.97 X10*3/UL (ref 1.2–4.8)
LYMPHOCYTES NFR BLD AUTO: 6.2 %
LYMPHOCYTES NFR BLD AUTO: 6.5 %
MAGNESIUM SERPL-MCNC: 2.11 MG/DL (ref 1.6–2.4)
MAGNESIUM SERPL-MCNC: 2.19 MG/DL (ref 1.6–2.4)
MCH RBC QN AUTO: 31.1 PG (ref 26–34)
MCH RBC QN AUTO: 31.2 PG (ref 26–34)
MCHC RBC AUTO-ENTMCNC: 31.8 G/DL (ref 32–36)
MCHC RBC AUTO-ENTMCNC: 31.9 G/DL (ref 32–36)
MCV RBC AUTO: 98 FL (ref 80–100)
MCV RBC AUTO: 98 FL (ref 80–100)
MONOCYTES # BLD AUTO: 0.53 X10*3/UL (ref 0.1–1)
MONOCYTES # BLD AUTO: 0.79 X10*3/UL (ref 0.1–1)
MONOCYTES NFR BLD AUTO: 4.2 %
MONOCYTES NFR BLD AUTO: 5.3 %
NEUTROPHILS # BLD AUTO: 10.57 X10*3/UL (ref 1.2–7.7)
NEUTROPHILS # BLD AUTO: 12.46 X10*3/UL (ref 1.2–7.7)
NEUTROPHILS NFR BLD AUTO: 83.5 %
NEUTROPHILS NFR BLD AUTO: 84.7 %
NRBC BLD-RTO: 0 /100 WBCS (ref 0–0)
NRBC BLD-RTO: 0 /100 WBCS (ref 0–0)
PHOSPHATE SERPL-MCNC: 2 MG/DL (ref 2.5–4.9)
PHOSPHATE SERPL-MCNC: 2.4 MG/DL (ref 2.5–4.9)
PLATELET # BLD AUTO: 311 X10*3/UL (ref 150–450)
PLATELET # BLD AUTO: 313 X10*3/UL (ref 150–450)
POTASSIUM SERPL-SCNC: 4.4 MMOL/L (ref 3.5–5.3)
POTASSIUM SERPL-SCNC: 4.5 MMOL/L (ref 3.5–5.3)
PR INTERVAL: 152 MS
PROT SERPL-MCNC: 5.2 G/DL (ref 6.4–8.2)
PROT SERPL-MCNC: 5.4 G/DL (ref 6.4–8.2)
Q ONSET: 226 MS
QRS COUNT: 11 BEATS
QRS DURATION: 82 MS
QT INTERVAL: 400 MS
QTC CALCULATION(BAZETT): 422 MS
QTC FREDERICIA: 415 MS
R AXIS: -13 DEGREES
RBC # BLD AUTO: 2.64 X10*6/UL (ref 4–5.2)
RBC # BLD AUTO: 2.76 X10*6/UL (ref 4–5.2)
SODIUM SERPL-SCNC: 134 MMOL/L (ref 136–145)
SODIUM SERPL-SCNC: 135 MMOL/L (ref 136–145)
T AXIS: -34 DEGREES
T OFFSET: 426 MS
VENTRICULAR RATE: 67 BPM
WBC # BLD AUTO: 12.5 X10*3/UL (ref 4.4–11.3)
WBC # BLD AUTO: 14.9 X10*3/UL (ref 4.4–11.3)

## 2024-10-23 PROCEDURE — 2500000004 HC RX 250 GENERAL PHARMACY W/ HCPCS (ALT 636 FOR OP/ED): Performed by: INTERNAL MEDICINE

## 2024-10-23 PROCEDURE — 85025 COMPLETE CBC W/AUTO DIFF WBC: CPT

## 2024-10-23 PROCEDURE — 93010 ELECTROCARDIOGRAM REPORT: CPT | Performed by: INTERNAL MEDICINE

## 2024-10-23 PROCEDURE — 2500000004 HC RX 250 GENERAL PHARMACY W/ HCPCS (ALT 636 FOR OP/ED)

## 2024-10-23 PROCEDURE — 2500000005 HC RX 250 GENERAL PHARMACY W/O HCPCS

## 2024-10-23 PROCEDURE — 83735 ASSAY OF MAGNESIUM: CPT

## 2024-10-23 PROCEDURE — 2500000002 HC RX 250 W HCPCS SELF ADMINISTERED DRUGS (ALT 637 FOR MEDICARE OP, ALT 636 FOR OP/ED)

## 2024-10-23 PROCEDURE — 99291 CRITICAL CARE FIRST HOUR: CPT

## 2024-10-23 PROCEDURE — 37799 UNLISTED PX VASCULAR SURGERY: CPT

## 2024-10-23 PROCEDURE — 94668 MNPJ CHEST WALL SBSQ: CPT

## 2024-10-23 PROCEDURE — 82947 ASSAY GLUCOSE BLOOD QUANT: CPT

## 2024-10-23 PROCEDURE — 2500000004 HC RX 250 GENERAL PHARMACY W/ HCPCS (ALT 636 FOR OP/ED): Performed by: NURSE PRACTITIONER

## 2024-10-23 PROCEDURE — 71045 X-RAY EXAM CHEST 1 VIEW: CPT | Performed by: RADIOLOGY

## 2024-10-23 PROCEDURE — 90937 HEMODIALYSIS REPEATED EVAL: CPT

## 2024-10-23 PROCEDURE — 2020000001 HC ICU ROOM DAILY

## 2024-10-23 PROCEDURE — 80076 HEPATIC FUNCTION PANEL: CPT | Performed by: INTERNAL MEDICINE

## 2024-10-23 PROCEDURE — 71045 X-RAY EXAM CHEST 1 VIEW: CPT

## 2024-10-23 PROCEDURE — 2500000001 HC RX 250 WO HCPCS SELF ADMINISTERED DRUGS (ALT 637 FOR MEDICARE OP)

## 2024-10-23 PROCEDURE — 94640 AIRWAY INHALATION TREATMENT: CPT

## 2024-10-23 PROCEDURE — 93005 ELECTROCARDIOGRAM TRACING: CPT

## 2024-10-23 PROCEDURE — 84100 ASSAY OF PHOSPHORUS: CPT

## 2024-10-23 PROCEDURE — 94003 VENT MGMT INPAT SUBQ DAY: CPT

## 2024-10-23 PROCEDURE — 82435 ASSAY OF BLOOD CHLORIDE: CPT

## 2024-10-23 RX ORDER — FENTANYL CITRATE-0.9 % NACL/PF 10 MCG/ML
0-100 PLASTIC BAG, INJECTION (ML) INTRAVENOUS CONTINUOUS
Status: DISCONTINUED | OUTPATIENT
Start: 2024-10-23 | End: 2024-10-25

## 2024-10-23 RX ADMIN — CALCIUM CHLORIDE, MAGNESIUM CHLORIDE, DEXTROSE MONOHYDRATE, LACTIC ACID, SODIUM CHLORIDE, SODIUM BICARBONATE AND POTASSIUM CHLORIDE 25 ML/KG/HR: 3.68; 3.05; 22; 5.4; 6.46; 3.09; .314 INJECTION INTRAVENOUS at 09:40

## 2024-10-23 RX ADMIN — INSULIN LISPRO 3 UNITS: 100 INJECTION, SOLUTION INTRAVENOUS; SUBCUTANEOUS at 12:29

## 2024-10-23 RX ADMIN — CALCIUM CHLORIDE, MAGNESIUM CHLORIDE, DEXTROSE MONOHYDRATE, LACTIC ACID, SODIUM CHLORIDE, SODIUM BICARBONATE AND POTASSIUM CHLORIDE 25 ML/KG/HR: 3.68; 3.05; 22; 5.4; 6.46; 3.09; .314 INJECTION INTRAVENOUS at 14:52

## 2024-10-23 RX ADMIN — HEPARIN SODIUM 5000 UNITS: 5000 INJECTION, SOLUTION INTRAVENOUS; SUBCUTANEOUS at 06:43

## 2024-10-23 RX ADMIN — PROPOFOL 10 MCG/KG/MIN: 10 INJECTION, EMULSION INTRAVENOUS at 04:38

## 2024-10-23 RX ADMIN — PROPOFOL 15 MCG/KG/MIN: 10 INJECTION, EMULSION INTRAVENOUS at 12:36

## 2024-10-23 RX ADMIN — CALCIUM CHLORIDE, MAGNESIUM CHLORIDE, DEXTROSE MONOHYDRATE, LACTIC ACID, SODIUM CHLORIDE, SODIUM BICARBONATE AND POTASSIUM CHLORIDE 25 ML/KG/HR: 3.68; 3.05; 22; 5.4; 6.46; 3.09; .314 INJECTION INTRAVENOUS at 20:01

## 2024-10-23 RX ADMIN — PROPOFOL 10 MCG/KG/MIN: 10 INJECTION, EMULSION INTRAVENOUS at 22:57

## 2024-10-23 RX ADMIN — SODIUM CHLORIDE 15 MMOL: 900 INJECTION, SOLUTION INTRAVENOUS at 10:41

## 2024-10-23 RX ADMIN — HYDROCORTISONE SODIUM SUCCINATE 100 MG: 100 INJECTION, POWDER, FOR SOLUTION INTRAMUSCULAR; INTRAVENOUS at 04:22

## 2024-10-23 RX ADMIN — IPRATROPIUM BROMIDE AND ALBUTEROL SULFATE 3 ML: .5; 3 SOLUTION RESPIRATORY (INHALATION) at 14:34

## 2024-10-23 RX ADMIN — LATANOPROST 1 DROP: 50 SOLUTION OPHTHALMIC at 21:34

## 2024-10-23 RX ADMIN — INSULIN LISPRO 3 UNITS: 100 INJECTION, SOLUTION INTRAVENOUS; SUBCUTANEOUS at 16:13

## 2024-10-23 RX ADMIN — CALCIUM CHLORIDE, MAGNESIUM CHLORIDE, DEXTROSE MONOHYDRATE, LACTIC ACID, SODIUM CHLORIDE, SODIUM BICARBONATE AND POTASSIUM CHLORIDE 25 ML/KG/HR: 3.68; 3.05; 22; 5.4; 6.46; 3.09; .314 INJECTION INTRAVENOUS at 20:00

## 2024-10-23 RX ADMIN — BRIMONIDINE TARTRATE 1 DROP: 2 SOLUTION OPHTHALMIC at 08:55

## 2024-10-23 RX ADMIN — IPRATROPIUM BROMIDE AND ALBUTEROL SULFATE 3 ML: .5; 3 SOLUTION RESPIRATORY (INHALATION) at 11:27

## 2024-10-23 RX ADMIN — CEFTRIAXONE SODIUM 2 G: 2 INJECTION, SOLUTION INTRAVENOUS at 08:56

## 2024-10-23 RX ADMIN — Medication 40 PERCENT: at 19:45

## 2024-10-23 RX ADMIN — DAPTOMYCIN IN SODIUM CHLORIDE 700 MG: 700 INJECTION, SOLUTION INTRAVENOUS at 09:45

## 2024-10-23 RX ADMIN — CALCIUM CHLORIDE, MAGNESIUM CHLORIDE, DEXTROSE MONOHYDRATE, LACTIC ACID, SODIUM CHLORIDE, SODIUM BICARBONATE AND POTASSIUM CHLORIDE 25 ML/KG/HR: 3.68; 3.05; 22; 5.4; 6.46; 3.09; .314 INJECTION INTRAVENOUS at 11:51

## 2024-10-23 RX ADMIN — PANTOPRAZOLE SODIUM 40 MG: 40 INJECTION, POWDER, FOR SOLUTION INTRAVENOUS at 06:43

## 2024-10-23 RX ADMIN — INSULIN LISPRO 3 UNITS: 100 INJECTION, SOLUTION INTRAVENOUS; SUBCUTANEOUS at 04:22

## 2024-10-23 RX ADMIN — HEPARIN SODIUM 5000 UNITS: 5000 INJECTION, SOLUTION INTRAVENOUS; SUBCUTANEOUS at 21:34

## 2024-10-23 RX ADMIN — EZETIMIBE 10 MG: 10 TABLET ORAL at 08:55

## 2024-10-23 RX ADMIN — HEPARIN SODIUM 5000 UNITS: 5000 INJECTION, SOLUTION INTRAVENOUS; SUBCUTANEOUS at 14:43

## 2024-10-23 RX ADMIN — Medication 40 PERCENT: at 07:17

## 2024-10-23 RX ADMIN — PRIMIDONE 125 MG: 250 TABLET ORAL at 22:58

## 2024-10-23 RX ADMIN — INSULIN LISPRO 3 UNITS: 100 INJECTION, SOLUTION INTRAVENOUS; SUBCUTANEOUS at 21:34

## 2024-10-23 RX ADMIN — Medication 25 MCG/HR: at 20:40

## 2024-10-23 RX ADMIN — IPRATROPIUM BROMIDE AND ALBUTEROL SULFATE 3 ML: .5; 3 SOLUTION RESPIRATORY (INHALATION) at 07:14

## 2024-10-23 RX ADMIN — INSULIN LISPRO 3 UNITS: 100 INJECTION, SOLUTION INTRAVENOUS; SUBCUTANEOUS at 08:48

## 2024-10-23 RX ADMIN — CALCIUM CHLORIDE, MAGNESIUM CHLORIDE, DEXTROSE MONOHYDRATE, LACTIC ACID, SODIUM CHLORIDE, SODIUM BICARBONATE AND POTASSIUM CHLORIDE 25 ML/KG/HR: 3.68; 3.05; 22; 5.4; 6.46; 3.09; .314 INJECTION INTRAVENOUS at 09:41

## 2024-10-23 RX ADMIN — CALCIUM CHLORIDE, MAGNESIUM CHLORIDE, DEXTROSE MONOHYDRATE, LACTIC ACID, SODIUM CHLORIDE, SODIUM BICARBONATE AND POTASSIUM CHLORIDE 25 ML/KG/HR: 3.68; 3.05; 22; 5.4; 6.46; 3.09; .314 INJECTION INTRAVENOUS at 20:02

## 2024-10-23 RX ADMIN — Medication 3 ML: at 11:27

## 2024-10-23 RX ADMIN — IPRATROPIUM BROMIDE AND ALBUTEROL SULFATE 3 ML: .5; 3 SOLUTION RESPIRATORY (INHALATION) at 19:38

## 2024-10-23 RX ADMIN — HYDROCORTISONE SODIUM SUCCINATE 100 MG: 100 INJECTION, POWDER, FOR SOLUTION INTRAMUSCULAR; INTRAVENOUS at 21:34

## 2024-10-23 RX ADMIN — CALCIUM CHLORIDE, MAGNESIUM CHLORIDE, DEXTROSE MONOHYDRATE, LACTIC ACID, SODIUM CHLORIDE, SODIUM BICARBONATE AND POTASSIUM CHLORIDE 25 ML/KG/HR: 3.68; 3.05; 22; 5.4; 6.46; 3.09; .314 INJECTION INTRAVENOUS at 14:51

## 2024-10-23 RX ADMIN — Medication 3 ML: at 19:37

## 2024-10-23 RX ADMIN — Medication 3 ML: at 14:34

## 2024-10-23 RX ADMIN — HYDROCORTISONE SODIUM SUCCINATE 100 MG: 100 INJECTION, POWDER, FOR SOLUTION INTRAMUSCULAR; INTRAVENOUS at 12:30

## 2024-10-23 RX ADMIN — BRIMONIDINE TARTRATE 1 DROP: 2 SOLUTION OPHTHALMIC at 21:34

## 2024-10-23 RX ADMIN — Medication 3 ML: at 07:14

## 2024-10-23 ASSESSMENT — PAIN SCALES - GENERAL
PAINLEVEL_OUTOF10: 4
PAINLEVEL_OUTOF10: 0 - NO PAIN

## 2024-10-23 ASSESSMENT — PAIN - FUNCTIONAL ASSESSMENT

## 2024-10-23 ASSESSMENT — PAIN DESCRIPTION - DESCRIPTORS: DESCRIPTORS: PATIENT UNABLE TO DESCRIBE

## 2024-10-23 NOTE — CARE PLAN
Problem: Pain - Adult  Goal: Verbalizes/displays adequate comfort level or baseline comfort level  Outcome: Progressing     Problem: Safety - Adult  Goal: Free from fall injury  Outcome: Progressing     Problem: Discharge Planning  Goal: Discharge to home or other facility with appropriate resources  Outcome: Progressing     Problem: Chronic Conditions and Co-morbidities  Goal: Patient's chronic conditions and co-morbidity symptoms are monitored and maintained or improved  Outcome: Progressing     Problem: Diabetes  Goal: Achieve decreasing blood glucose levels by end of shift  Outcome: Progressing  Goal: Increase stability of blood glucose readings by end of shift  Outcome: Progressing  Goal: Decrease in ketones present in urine by end of shift  Outcome: Progressing  Goal: Maintain electrolyte levels within acceptable range throughout shift  Outcome: Progressing  Goal: Maintain glucose levels >70mg/dl to <250mg/dl throughout shift  Outcome: Progressing  Goal: No changes in neurological exam by end of shift  Outcome: Progressing  Goal: Learn about and adhere to nutrition recommendations by end of shift  Outcome: Progressing  Goal: Vital signs within normal range for age by end of shift  Outcome: Progressing  Goal: Increase self care and/or family involovement by end of shift  Outcome: Progressing  Goal: Receive DSME education by end of shift  Outcome: Progressing     Problem: Knowledge Deficit  Goal: Patient/family/caregiver demonstrates understanding of disease process, treatment plan, medications, and discharge instructions  Outcome: Progressing     Problem: Skin  Goal: Decreased wound size/increased tissue granulation at next dressing change  Outcome: Progressing  Flowsheets (Taken 10/23/2024 8300)  Decreased wound size/increased tissue granulation at next dressing change:   Utilize specialty bed per algorithm   Promote sleep for wound healing  Goal: Participates in plan/prevention/treatment measures  Outcome:  Progressing  Flowsheets (Taken 10/23/2024 1453)  Participates in plan/prevention/treatment measures:   Elevate heels   Discuss with provider PT/OT consult  Goal: Prevent/manage excess moisture  Outcome: Progressing  Flowsheets (Taken 10/23/2024 1453)  Prevent/manage excess moisture:   Follow provider orders for dressing changes   Cleanse incontinence/protect with barrier cream  Goal: Prevent/minimize sheer/friction injuries  Outcome: Progressing  Flowsheets (Taken 10/23/2024 1453)  Prevent/minimize sheer/friction injuries:   Use pull sheet   Turn/reposition every 2 hours/use positioning/transfer devices   HOB 30 degrees or less  Goal: Promote/optimize nutrition  Outcome: Progressing  Flowsheets (Taken 10/23/2024 1453)  Promote/optimize nutrition: Monitor/record intake including meals  Goal: Promote skin healing  Outcome: Progressing  Flowsheets (Taken 10/23/2024 1453)  Promote skin healing:   Assess skin/pad under line(s)/device(s)   Turn/reposition every 2 hours/use positioning/transfer devices     Problem: Nutrition  Goal: Oral intake greater than 50%  Outcome: Progressing  Goal: Oral intake greater 75%  Outcome: Progressing  Goal: Consume prescribed supplement  Outcome: Progressing  Goal: Adequate PO fluid intake  Outcome: Progressing  Goal: Nutrition support goals are met within 48 hrs  Outcome: Progressing  Goal: Nutrition support is meeting 75% of nutrient needs  Outcome: Progressing  Goal: BG  mg/dL  Outcome: Progressing  Goal: Lab values WNL  Outcome: Progressing  Goal: Electrolytes WNL  Outcome: Progressing  Goal: Promote healing  Outcome: Progressing  Goal: Maintain stable weight  Outcome: Progressing  Goal: Reduce weight from edema/fluid  Outcome: Progressing     Problem: Respiratory  Goal: No signs of respiratory distress (eg. Use of accessory muscles. Peds grunting)  Outcome: Progressing  Goal: Clear secretions with interventions this shift  Outcome: Progressing  Goal: Minimize anxiety/maximize  coping throughout shift  Outcome: Progressing  Goal: Minimal/no exertional discomfort or dyspnea this shift  Outcome: Progressing  Goal: Patent airway maintained this shift  Outcome: Progressing  Goal: Tolerate mechanical ventilation evidenced by VS/agitation level this shift  Outcome: Progressing  Goal: Tolerate pulmonary toileting this shift  Outcome: Progressing  Goal: Verbalize decreased shortness of breath this shift  Outcome: Progressing  Goal: Wean oxygen to maintain O2 saturation per order/standard this shift  Outcome: Progressing  Goal: Increase self care and/or family involvement in next 24 hours  Outcome: Progressing     Problem: Pain  Goal: Takes deep breaths with improved pain control throughout the shift  Outcome: Progressing  Goal: Turns in bed with improved pain control throughout the shift  Outcome: Progressing  Goal: Walks with improved pain control throughout the shift  Outcome: Progressing  Goal: Performs ADL's with improved pain control throughout shift  Outcome: Progressing  Goal: Participates in PT with improved pain control throughout the shift  Outcome: Progressing  Goal: Free from opioid side effects throughout the shift  Outcome: Progressing  Goal: Free from acute confusion related to pain meds throughout the shift  Outcome: Progressing   The patient's goals for the shift include      The clinical goals for the shift include pt to remain hemodynamically stable    Over the shift, the patient did not make progress toward the following goals. Barriers to progression include pt still on vasopressors. Recommendations to address these barriers include monitor labs and vitals.

## 2024-10-23 NOTE — PROGRESS NOTES
Narda Malloy is a 69 y.o. female on day 11 of admission presenting with Unresponsive.      Subjective   Patient was started on CRRT yesterday and she is tolerating well.  Net -279 cc yesterday.  Remains intubated on the ventilator.       Objective          Vitals 24HR  Heart Rate:  [41-80]   Temp:  [34.3 °C (93.7 °F)-36.8 °C (98.2 °F)]   Resp:  [15-32]   BP: ()/()   Weight:  [125 kg (275 lb 5.7 oz)-127 kg (279 lb 5.2 oz)]   SpO2:  [91 %-99 %]       Intake/Output last 3 Shifts:    Intake/Output Summary (Last 24 hours) at 10/23/2024 1211  Last data filed at 10/23/2024 1100  Gross per 24 hour   Intake 1078.46 ml   Output 1980 ml   Net -901.54 ml       Physical Exam  Constitutional:       Comments: Intubated and sedated   Neck: Right IJ temporary dialysis catheter present  Cardiovascular:      Heart sounds:      No friction rub.   Pulmonary:      Comments: Mechanically ventilated, fairly clear breath sounds  Abdominal:      General: Bowel sounds are normal.      Palpations: Abdomen is soft.   Musculoskeletal:      Comments: 1+ edema     Relevant Results  Results for orders placed or performed during the hospital encounter of 10/12/24 (from the past 24 hours)   POCT GLUCOSE   Result Value Ref Range    POCT Glucose 212 (H) 74 - 99 mg/dL   POCT GLUCOSE   Result Value Ref Range    POCT Glucose 181 (H) 74 - 99 mg/dL   POCT GLUCOSE   Result Value Ref Range    POCT Glucose 171 (H) 74 - 99 mg/dL   CBC and Auto Differential   Result Value Ref Range    WBC 12.5 (H) 4.4 - 11.3 x10*3/uL    nRBC 0.0 0.0 - 0.0 /100 WBCs    RBC 2.76 (L) 4.00 - 5.20 x10*6/uL    Hemoglobin 8.6 (L) 12.0 - 16.0 g/dL    Hematocrit 27.0 (L) 36.0 - 46.0 %    MCV 98 80 - 100 fL    MCH 31.2 26.0 - 34.0 pg    MCHC 31.9 (L) 32.0 - 36.0 g/dL    RDW 19.3 (H) 11.5 - 14.5 %    Platelets 311 150 - 450 x10*3/uL    Neutrophils % 84.7 40.0 - 80.0 %    Immature Granulocytes %, Automated 4.0 (H) 0.0 - 0.9 %    Lymphocytes % 6.2 13.0 - 44.0 %    Monocytes %  4.2 2.0 - 10.0 %    Eosinophils % 0.7 0.0 - 6.0 %    Basophils % 0.2 0.0 - 2.0 %    Neutrophils Absolute 10.57 (H) 1.20 - 7.70 x10*3/uL    Immature Granulocytes Absolute, Automated 0.50 0.00 - 0.70 x10*3/uL    Lymphocytes Absolute 0.77 (L) 1.20 - 4.80 x10*3/uL    Monocytes Absolute 0.53 0.10 - 1.00 x10*3/uL    Eosinophils Absolute 0.09 0.00 - 0.70 x10*3/uL    Basophils Absolute 0.03 0.00 - 0.10 x10*3/uL   Hepatic function panel   Result Value Ref Range    Albumin 2.7 (L) 3.4 - 5.0 g/dL    Bilirubin, Total 0.4 0.0 - 1.2 mg/dL    Bilirubin, Direct 0.1 0.0 - 0.3 mg/dL    Alkaline Phosphatase 107 33 - 136 U/L    ALT 6 (L) 7 - 45 U/L    AST 8 (L) 9 - 39 U/L    Total Protein 5.4 (L) 6.4 - 8.2 g/dL   Basic Metabolic Panel   Result Value Ref Range    Glucose 165 (H) 74 - 99 mg/dL    Sodium 134 (L) 136 - 145 mmol/L    Potassium 4.4 3.5 - 5.3 mmol/L    Chloride 104 98 - 107 mmol/L    Bicarbonate 25 21 - 32 mmol/L    Anion Gap 9 (L) 10 - 20 mmol/L    Urea Nitrogen 43 (H) 6 - 23 mg/dL    Creatinine 2.02 (H) 0.50 - 1.05 mg/dL    eGFR 26 (L) >60 mL/min/1.73m*2    Calcium 7.7 (L) 8.6 - 10.3 mg/dL   Magnesium   Result Value Ref Range    Magnesium 2.19 1.60 - 2.40 mg/dL   Phosphorus   Result Value Ref Range    Phosphorus 2.0 (L) 2.5 - 4.9 mg/dL   ECG 12 Lead   Result Value Ref Range    Ventricular Rate 67 BPM    Atrial Rate 67 BPM    MI Interval 152 ms    QRS Duration 82 ms    QT Interval 400 ms    QTC Calculation(Bazett) 422 ms    R Axis -13 degrees    T Axis -34 degrees    QRS Count 11 beats    Q Onset 226 ms    T Offset 426 ms    QTC Fredericia 415 ms   POCT GLUCOSE   Result Value Ref Range    POCT Glucose 166 (H) 74 - 99 mg/dL   POCT GLUCOSE   Result Value Ref Range    POCT Glucose 167 (H) 74 - 99 mg/dL   POCT GLUCOSE   Result Value Ref Range    POCT Glucose 199 (H) 74 - 99 mg/dL     *Note: Due to a large number of results and/or encounters for the requested time period, some results have not been displayed. A complete set of  results can be found in Results Review.            Assessment/Plan   Impression:  Acute kidney injury likely from acute tubular necrosis from multiple factors including shock hypotension as well as vancomycin toxicity  Acute respiratory failure  Edema  Septic shock  Pneumonia  Diabetes mellitus type 2  Malnutrition  Lower back surgery site infection     Recommendations:  Continue CRRT, did decrease total effluent dose due to hypophosphatemia. Renal replacement therapy consent was obtained yesterday from the .  Dose medications including antibiotics for CRRT, discussed with ID Dr. Malagon yesterday. Phosphorous being replaced. Decrease water flushes as sodium mildly low. Ok to be on regular feeds while on CRRT.       Nelia Cheung MD

## 2024-10-23 NOTE — PROGRESS NOTES
INFECTIOUS DISEASES PROGRESS NOTE    Consulted / following patient for:  Respiratory failure/pneumonia  Recent polymicrobial complicated postoperative lumbar wound infection, MRSA and Proteus  Acute kidney injury    Subjective   Interval History:   (Sedated on the ventilator)    Objective   PHYSICAL EXAMINATION  Vital signs:  Visit Vitals  /57   Pulse 63   Temp 35.1 °C (95.2 °F) (Esophageal)   Resp 20   Remains on norepinephrine infusion, now on CRRT  General: Intubated and sedated  Lungs: Diminished, clear.  Left chest tube draining serous fluid  Heart:  S1, S2 normal  Abdomen:  Soft, obese, no guarding.   Extremities:  No cords, phlebitis, cellulitis.      Relevant Results  WBC: 12,500  Creatinine: (CRRT)  Pleural fluid: Transudate with 197 WBC, 56% neutrophils, protein 1.8, LDH 97, glucose 202  Microbiology:  Blood (10/12): Negative X2  Sputum (10/13): Stain with moderate GNB and moderate PMN, culture Pseudomonas aeruginosa, susceptible to Zosyn and cefepime  Sputum (10/21): Normal neva  Urine: Moderate colony count Candida lusitaniae  Pleural fluid (10/17): Negative    Imaging:  CXR images (10/23) personally reviewed: Intubated.  Bilateral pleural effusions, no significant change in infiltrate, overall better aeration    Assessment:  Sepsis -likely due to pneumonia, present on admission. Patient already on IV vancomycin and IV ceftriaxone at time of this admission for lumbar spinal infection.  Thus far no evidence for vascular catheter infection or recurrent bacteremia.  Back on ventilator   Acute kidney injury.  On CRRT  Lumbar spine surgical site infection s/p I&D 9/28. Cultures + MRSA, Proteus.  Was to be on vanco/ceftriaxone through 11/12. Followed by Dr. Brant Juarez.  Wound as described above, doubt that this is a focus for ongoing sepsis    Plan/Recommendations:  Daptomycin 700 mg every 24 hour based on adjusted body weight of 91 kg and estimated creatinine clearance of 28 mL/min, on  CRRT  Ceftriaxone until 11/12  3.   Midline catheter will need to be replaced with a new midline catheter or PICC      prior to her discharge    Discussed with RN at bedside  Discussed with  at bedside    Andrew Malagon MD  [facility]  Office:  [phone]

## 2024-10-23 NOTE — PROGRESS NOTES
Spiritual Care Visit    Clinical Encounter Type  Visited With: Family  Routine Visit: Follow-up  Crisis Visit: Critical care  Referral From: Family  Referral To:     Tenriism Encounters  Tenriism Needs: Prayer     Annotation:  received request from support staff to visit with the patient's spouse at bedside.  greeted the spouse Godfrey who was seated at bedside. Patient was non verbal and non responsive during the visit today.  inquired about the patient's medical journey. Godfrey states that the patient had back surgery in August and she experienced complications.  offered supportive presence. Inquiring about the Spouse source of support. Patient step daughter is supportive. Spouse requested prayer which was offered. Family is hopeful that the patient will recover. Spiritual care will remain available for family support as need or requested.                                    Taxonomy  Intended Effects: Establish rapport and connectedness, Build relationship of care and support, Helping someone feel comforted, Demonstrate caring and concern, Lessen someone's feelings of isolation  Methods: Assist with finding purpose, Explore rafael and values, Explore spiritual/Denominational beliefs, Offer support  Interventions: Active listening, Ask guided questions, Facilitate life review, Identify supportive relationship(s), [place]

## 2024-10-23 NOTE — NURSING NOTE
Dr. Mcknight office called regarding new consult.  Instructed by his office to message Veda Lr NP.  Message sent to Veda Lr NP.

## 2024-10-23 NOTE — PROGRESS NOTES
[facility] Critical Care Medicine       Date:  10/23/2024  Patient:  Narda Malloy  YOB: 1955  MRN:  [id number]   Admit Date:  10/12/2024      Chief Complaint   Patient presents with    Altered Mental Status     History of Present Illness:  Narda Malloy is a 69 y.o. year old female patient with past medical history of L1-L3 lumbar laminectomy, T4-S1 revision, and fusion August 26th, T2DM, HTN, essential tremor, HLD, glaucoma, sarcoidosis of the lung who presented to  ED 10/12 after being [facility] essentially un[facility]. Per report from her , she has had a significant decline in her health since July 15th when she had a fall and became significantly weak. She has also had multiple infection complications since her back surgery in August requiring multiple I&Ds and long term antibiotic therapy. She went to the OR most recently on 9/28 for lumbar site infection wash out. Per chart review, she was discharged on IV vancomycin 1g and IV ceftriaxone 2d q24hrs through 11/12.     ED Course: Initial vital signs: /104 (109), HR 68, RR 20, SpO2 95% on 6L NC, temp 34.5C. Give 0.4mg of narcan with no improvement in mentation. Lab work-up remarkable for mild hyperkalemia (5.5), AMY 42/1.46, elevated alk phos, normocytic anemia 10.4/33, turbid appearing urine with mild hematuria and proteinuria and + leuk esterase, >50 WBCs. Urine drug screen positive for barbiturates. Triggered sepsis timer so she was given 3L NS. She was intubated for airway protection with 20mg etomidate and 100mg succinylcholine. BP dropped post-intubated and fluid resuscitation and she was subsequently started on levophed.       Interval ICU Events:  10/12: Pt arrived to ICU intubated and lightly sedated on low-dose versed. Eyes open, minimally responsive.      10/13: Received K cocktail last night for K 6.0, corrected appropriately. Levophed requirements mildly up, UOP decreasing.  Ordered albumin x2 this am with improvements in UOP and SBP. Will likely trial lasix this afternoon d/t hypervolemia.     10/14: Remains intubated with decreased mentation, only responsive to noxious stimuli. Trialed lasix TID for volume overload. Remains on levophed 0.01.     10/15: Mentation much improved. SAT/SBT successful so extubated. Given lasix x3, bumex x1, metolazone x1 with net negative fluid balance of 500mL -> started on bumex gtt.      10/16: O2 requirements significantly increased, NRB -> HFNC 40L 100% likely 2/2 mucus plugging.     10/17: No acute events overnight. Bumex gtt increased to 1mg/hr. CXR this am showing complete opacification of left hemithorax related to atelectasis vs pleural effusion. Remains on HFNC 40L/80%. Pigtail catheter placed with 1.2L drained immediately. Given albumin for hypotension.      10/18: ~2L output from left sided pigtail catheter since placement. Kidney function worsening and UOP declining, about 20cc/hr overnight. Will place NG tube today and start enteral nutrition and appetite and oral intake remains poor.      10/19: Patient with worsening hypoxic, hypercapnic respiratory failure - now requiring BIPAP support. Remains grossly volume overloaded with low UO. Started on vasopressors to augment BP for diuresis. 40 IV lasix + gtt started. Remains with poor nutritional status. Will re attempt NG later if respiratory status improves.     10/20: Patient stable on vent. Nephrology consulted for renal failure. Cr continues to uptrend however UO is increasing. No issues overnight.    10/21: No issues overnight. Remains stable on vent. Levo down to 0.01 mcg/kg/min. UO remains low. Nephrology following. Possible CRRT today?    10/22: Patient received 80 lasix and metalozone with minimal UO. Levo @ .02 and prop @ 10. Vent settings: 20/450/10/40%. Plan for CRRT today. Will SAT/SBT after CRRT. May change propofol to precedex.    10/23: Had episodes of bradycardia where HR went  to 30's which resolved with heating the patient. CRRT yesterday with about 1L removed. Currently on 0.03 of levo and 10 of prop. Cont daptomycin and ceftriaxone per ID. CXR improved. SAT/SBT. EP consulted for episodes of bradycardia.    Medical History:  Past Medical History:   Diagnosis Date    Degenerative myopia, bilateral     Diabetic neuropathy (Multi)     Difficult intubation 08/26/2024    Mac 3, grade 3, 1 attempt.  Glidescope/videolaryngoscopy recommended for future attempts.    DM type 2 (diabetes mellitus, type 2) (Multi)     Dry eye syndrome of bilateral lacrimal glands     Essential hypertension     Essential tremor     Glaucoma     Hyperlipidemia     Long term (current) use of insulin (Multi)     Low back pain     PONV (postoperative nausea and vomiting)     Primary open angle glaucoma of both eyes, severe stage     Repeated falls     Sarcoidosis of lung (Multi)     Spinal stenosis, lumbar region without neurogenic claudication     Weakness      Past Surgical History:   Procedure Laterality Date    BLEPHAROPLASTY  07/2022    BREAST SURGERY  05/20/2022    Breast lift    CARPAL TUNNEL RELEASE      CATARACT EXTRACTION W/  INTRAOCULAR LENS IMPLANT Bilateral     OD 08/04/2011 +8.5D,OS 08/04/2011 +8.50D    FOOT SURGERY      INSERTION / REMOVAL CRANIAL DBS GENERATOR      Placed 2017.  Removed 2018. part of wire left in head when everything removed    LUMBAR FUSION      L3-S1    PANRETINAL PHOTOCOAGULATION  2014    THORACIC FUSION  08/26/2024    T4-S1 fusion    VITRECTOMY Right 2013     Medications Prior to Admission   Medication Sig Dispense Refill Last Dose/Taking    acetaminophen (Tylenol) 500 mg tablet Take 2 tablets (1,000 mg) by mouth 3 times a day.   Unknown    ascorbic acid (Vitamin C) 500 mg tablet as directed Orally   Unknown    brimonidine (AlphaGAN) 0.2 % ophthalmic solution Administer 1 drop into both eyes 2 times a day.   Unknown    calcium carbonate-vitamin D3 500 mg-5 mcg (200 unit) tablet Take  1 tablet by mouth once daily.   Unknown    cefTRIAXone (Rocephin) 2 gram/50 mL IV Infuse 50 mL (2 g) at 100 mL/hr over 30 minutes into a venous catheter once every 24 hours. Once weekly labs CBC/diff, CMP, Vanc trough ESR, CRP fax to Dr. Juarez [phone]. Stop date 11/12/24. 1950 mL 0     dextrose 50 % injection Infuse 25 mL (12.5 g) into a venous catheter every 15 minutes if needed (For blood glucose 41 to 70 mg/dL).       dextrose 50 % injection Infuse 50 mL (25 g) into a venous catheter every 15 minutes if needed (For blood glucose less than or equal to 40 mg/dL).       docusate sodium (Colace) 100 mg capsule Take 1 capsule (100 mg) by mouth 2 times a day.   Unknown    ezetimibe (Zetia) 10 mg tablet Take 1 tablet (10 mg) by mouth once daily. 90 tablet 3 Unknown    FreeStyle Lite Strips strip USE TO TEST 3 TIMES A DAY AS DIRECTED 300 each 2     glucagon (Glucagen) 1 mg injection Inject 1 mg into the muscle every 15 minutes if needed for low blood sugar - see comments (Hypoglycemia).       glucagon (Glucagen) 1 mg injection Inject 1 mg into the muscle every 15 minutes if needed for low blood sugar - see comments (Hypoglycemia).       heparin sodium,porcine (heparin, porcine,) 5,000 unit/mL injection Inject 1 mL (5,000 Units) under the skin every 8 hours.       insulin lispro (HumaLOG) 100 unit/mL injection Inject 0-15 Units under the skin 3 times daily (morning, midday, late afternoon). Take as directed per insulin instructions.Do not hold when patient is not eating, continue order as scheduled for hyperglycemia management.  Insulin Lispro Corrective Scale #3     Hypoglycemia protocol Call LIP unit(s) if Blood Glucose is between 0 - 70 mg/dL     0 unit(s) if Blood glucose is between    3 unit(s) if Blood glucose is between 151-200   6 unit(s) if Blood glucose is between 201-250   9 unit(s) if Blood glucose is between 251-300   12 unit(s) if Blood glucose is between 301-350   15 unit(s) if Blood glucose is  "between 351-400    Notify provider unit(s) if Blood Glucose is greater than 400 mg/dL       Lactobacillus acidophilus 100 mg (1 billion cell) capsule Take 1 capsule by mouth 2 times a day.   Unknown    latanoprost (Xalatan) 0.005 % ophthalmic solution Administer 1 drop into both eyes once daily at bedtime. 2.5 mL 5 Unknown    melatonin 5 mg tablet Take 1 tablet (5 mg) by mouth as needed at bedtime for sleep.   Unknown    methocarbamol (Robaxin) 500 mg tablet Take 1 tablet (500 mg) by mouth 3 times a day.   Unknown    multivitamin tablet Take 1 tablet by mouth once daily.   Unknown    ondansetron (Zofran) 4 mg/2 mL injection Infuse 2 mL (4 mg) into a venous catheter every 6 hours if needed for nausea or vomiting.       oxyCODONE (Roxicodone) 5 mg immediate release tablet Take 1 tablet (5 mg) by mouth every 6 hours if needed for severe pain (7 - 10) or moderate pain (4 - 6).       oxygen (O2) gas therapy Inhale 1 each continuously.       pantoprazole (ProtoNix) 40 mg EC tablet Take 1 tablet (40 mg) by mouth once daily in the morning. Take before meals. Do not crush, chew, or split.       pantoprazole (ProtoNix) 40 mg injection Infuse 40 mg into a venous catheter once daily in the morning. Take before meals. If unable to take PO.       pen needle, diabetic (PEN NEEDLE MISC) BD Altagracia- 4 mm X 32 G needle - as directed 4x a day sc 4 times per day       polyethylene glycol (Glycolax, Miralax) 17 gram packet Take 17 g by mouth once daily.   Unknown    primidone 125 mg tablet Take 125 mg by mouth 3 times a day.   Unknown    propranolol LA (Inderal LA) 60 mg 24 hr capsule Take 1 capsule (60 mg) by mouth early in the morning.. Hold for SBP < 110 mmhg, HR < 60 bpm.   Unknown    sennosides (Senokot) 8.6 mg tablet Take 1 tablet (8.6 mg) by mouth every 12 hours if needed for constipation.   Unknown    Sure Comfort Pen Needle 32 gauge x 5/32\" needle AS DIRECTED DAILY FOR 90 DAYS 100 each 11 Unknown    traZODone (Desyrel) 25 MG split " tablet Take 1 half tablet (25 mg) by mouth once daily at bedtime.   Unknown    vancomycin (Vancocin) 1 gram/250 mL solution Infuse 250 mL (1 g) at 250 mL/hr over 60 minutes into a venous catheter every 12 hours. Once weekly labs CBC/diff, CMP, Vanc trough ESR, CRP fax to Dr. Juarez [phone]. Stop date 11/12/24. 11255 mL 0      Erythromycin, Morphine, and Rosuvastatin  Social History     Tobacco Use    Smoking status: Former     Types: Cigarettes     Passive exposure: Past    Smokeless tobacco: Never   Vaping Use    Vaping status: Never Used   Substance Use Topics    Alcohol use: Not Currently    Drug use: Not Currently     Family History   Problem Relation Name Age of Onset    Multiple myeloma Mother      Cancer Mother      Other (CABG) Father      Pulmonary embolism Father      Heart disease Father      Breast cancer Sister          Stage II    Hypertension Sister      Diabetes Sister      No Known Problems Sister          x5    No Known Problems Brother          x4    No Known Problems hospitals Medications:    norepinephrine, 0-0.5 mcg/kg/min, Last Rate: 0.03 mcg/kg/min (10/23/24 0746)  PrismaSol 4/2.5, 25 mL/kg/hr, Last Rate: 25 mL/kg/hr (10/22/24 2000)  propofol, 0-50 mcg/kg/min, Last Rate: 15 mcg/kg/min (10/23/24 0705)          Current Facility-Administered Medications:     acetaminophen (Tylenol) tablet 975 mg, 975 mg, oral, q6h PRN, Kaleb Lam PA-C, 975 mg at 10/18/24 1405    alteplase (Cathflo Activase) injection 2 mg, 2 mg, intra-catheter, PRN, Kaleb Lam PA-C    brimonidine (AlphaGAN) 0.2 % ophthalmic solution 1 drop, 1 drop, Both Eyes, BID, Kaleb Lam PA-C, 1 drop at 10/22/24 2023    [Held by provider] calcium carbonate-vitamin D3 500 mg-5 mcg (200 unit) per tablet 1 tablet, 1 tablet, oral, Daily, Kaleb Lam PA-C, 1 tablet at 10/18/24 0930    cefTRIAXone (Rocephin) 2 g in dextrose (iso) IV 50 mL, 2 g, intravenous, q24h, Kaleb Lam PA-C    DAPTOmycin  (Cubicin) 700 mg in sodium chloride 0.9%  mL, 700 mg, intravenous, Daily, Andrew Malagon MD    dextrose 50 % injection 12.5 g, 12.5 g, intravenous, q15 min PRN, Kaleb Lam PA-C    dextrose 50 % injection 25 g, 25 g, intravenous, q15 min PRN, Kaleb Lam PA-C    docusate sodium (Colace) oral liquid 100 mg, 100 mg, oral, BID PRN, Kaleb Lam PA-C    ezetimibe (Zetia) tablet 10 mg, 10 mg, oral, Daily, Kaleb Lam PA-C, 10 mg at 10/22/24 0924    fentaNYL PF (Sublimaze) injection 25 mcg, 25 mcg, intravenous, q2h PRN, Kaleb Lam PA-C, 25 mcg at 10/22/24 0743    glucagon (Glucagen) injection 1 mg, 1 mg, intramuscular, q15 min PRN, Kaleb Lam PA-C    glucagon (Glucagen) injection 1 mg, 1 mg, intramuscular, q15 min PRN, Kaleb Lam PA-C    heparin (porcine) injection 5,000 Units, 5,000 Units, subcutaneous, q8h, Kaleb Lam PA-C, 5,000 Units at 10/23/24 0643    heparin 1,000 unit/mL injection 1,000 Units, 1,000 Units, intra-catheter, PRN, Nelia Cheung MD    heparin 1,000 unit/mL injection 1,000 Units, 1,000 Units, intra-catheter, PRN, Nelia Cheung MD    heparin flush 10 unit/mL syringe 50 Units, 50 Units, intra-catheter, PRN, Kaleb Lam PA-C, 50 Units at 10/19/24 2313    hydrocortisone sodium succinate (PF) (Solu-CORTEF) injection 100 mg, 100 mg, intravenous, q8h, RUTH Salas, 100 mg at 10/23/24 0422    [Held by provider] HYDROmorphone (Dilaudid) injection 0.4 mg, 0.4 mg, intravenous, q2h PRN, JENNIFER SalgadoCNP, 0.4 mg at 10/19/24 0520    insulin lispro (HumaLOG) injection 0-15 Units, 0-15 Units, subcutaneous, q4h, Lb Dodd PA-C, 3 Units at 10/23/24 0422    ipratropium-albuteroL (Duo-Neb) 0.5-2.5 mg/3 mL nebulizer solution 3 mL, 3 mL, nebulization, 4x daily, Kaleb Lam PA-C, 3 mL at 10/23/24 0714    latanoprost (Xalatan) 0.005 % ophthalmic solution 1 drop, 1 drop, Both Eyes, Nightly, Kaleb Lam PA-C, 1 drop at 10/22/24 2023     norepinephrine (Levophed) 8 mg in dextrose 5% 250 mL (0.032 mg/mL) infusion (premix), 0-0.5 mcg/kg/min, intravenous, Continuous, RUTH Salas, Last Rate: 7.09 mL/hr at 10/23/24 0746, 0.03 mcg/kg/min at 10/23/24 0746    oxygen (O2) therapy, , inhalation, Continuous - Inhalation, Kaleb Lam PA-C, 40 percent at 10/23/24 0717    pantoprazole (ProtoNix) EC tablet 40 mg, 40 mg, oral, Daily before breakfast **OR** pantoprazole (ProtoNix) injection 40 mg, 40 mg, intravenous, Daily before breakfast, RUTH Salas, 40 mg at 10/23/24 0643    primidone (Mysoline) tablet 125 mg, 125 mg, oral, Nightly, Kaleb Lam PA-C, 125 mg at 10/22/24 2023    PrismaSol 4/2.5 CRRT solution, 25 mL/kg/hr, CRRT, Continuous, Nelia Cheung MD, Last Rate: 3,150 mL/hr at 10/22/24 2000, 25 mL/kg/hr at 10/22/24 2000    propofol (Diprivan) infusion, 0-50 mcg/kg/min, intravenous, Continuous, RUTH Salas, Last Rate: 11.34 mL/hr at 10/23/24 0705, 15 mcg/kg/min at 10/23/24 0705    [Held by provider] propranolol LA (Inderal LA) 24 hr capsule 60 mg, 60 mg, oral, Daily, Kaleb Lam PA-C, 60 mg at 10/19/24 0643    sodium chloride 3 % nebulizer solution 3 mL, 3 mL, nebulization, 4x daily, Kaleb Lam PA-C, 3 mL at 10/23/24 0714    [Held by provider] traMADol (Ultram) tablet 50 mg, 50 mg, oral, q8h PRN, Kaleb Lam PA-C    Review of Systems:  14 point review of systems was completed and negative except for those specially mention in my HPI    Physical Exam:    Heart Rate:  [41-81]   Temp:  [34.3 °C (93.7 °F)-36.8 °C (98.2 °F)]   Resp:  [15-32]   BP: ()/()   Weight:  [125 kg (275 lb 5.7 oz)-127 kg (279 lb 5.2 oz)]   SpO2:  [85 %-99 %]     Physical Exam  Vitals and nursing note reviewed.   Constitutional:       Comments: Lethargic, arouses to physical stimuli but difficulty maintaining alertness. Tachypnic.    HENT:      Head: Normocephalic.   Eyes:      Extraocular Movements: Extraocular  movements intact.      Pupils: Pupils are equal, round, and reactive to light.   Cardiovascular:      Rate and Rhythm: Normal rate and regular rhythm.      Pulses:           Radial pulses are 1+ on the right side and 1+ on the left side.        Dorsalis pedis pulses are 1+ on the right side and 1+ on the left side.      Heart sounds: Normal heart sounds, S1 normal and S2 normal.      Comments: Anasarca present  Pulmonary:      Effort: Tachypnea and accessory muscle usage present.      Breath sounds: Rhonchi and rales present. No wheezing.   Chest:      Comments: L chest pigtail catheter in place draining straw colored output.  Abdominal:      General: Abdomen is flat. Bowel sounds are normal.      Palpations: Abdomen is soft.      Tenderness: There is no abdominal tenderness.   Genitourinary:     Comments: Gibson in place draining minimal straw colored urine  Musculoskeletal:      Cervical back: Neck supple.      Right lower le+ Edema present.      Left lower le+ Edema present.   Skin:     General: Skin is warm.      Capillary Refill: Capillary refill takes less than 2 seconds.      Coloration: Skin is not cyanotic, jaundiced or mottled.   Neurological:      General: No focal deficit present.      Mental Status: She is lethargic.      GCS: GCS eye subscore is 3. GCS verbal subscore is 4. GCS motor subscore is 6.      Motor: Weakness present.       Objective:    I have reviewed all medications, laboratory results, and imaging pertinent for today's encounter.    Vent Mode: Pressure regulated volume control/assist control  FiO2 (%):  [40 %] 40 %  S RR:  [20] 20  S VT:  [450 mL] 450 mL  PEEP/CPAP (cm H2O):  [10 cm H20] 10 cm H20  MAP (cm H2O):  [13-17] 17      Intake/Output Summary (Last 24 hours) at 10/23/2024 0753  Last data filed at 10/23/2024 0500  Gross per 24 hour   Intake 1264.73 ml   Output 1559 ml   Net -294.27 ml       Daily Labs:  CBC:   Results from last 7 days   Lab Units 10/23/24  0119   WBC AUTO  x10*3/uL 12.5*   HEMOGLOBIN g/dL 8.6*   HEMATOCRIT % 27.0*   MCV fL 98     BMP:    Results from last 7 days   Lab Units 10/23/24  0119   SODIUM mmol/L 134*   POTASSIUM mmol/L 4.4   CHLORIDE mmol/L 104   CO2 mmol/L 25   BUN mg/dL 43*   CREATININE mg/dL 2.02*   CALCIUM mg/dL 7.7*   GLUCOSE mg/dL 165*   MAGNESIUM mg/dL 2.19       Assessment/Plan:    Narda Malloy is a 69 y.o. year old female patient with past medical history of L1-L3 lumbar laminectomy, T4-S1 revision, and fusion August 26th, T2DM, HTN, essential tremor, HLD, glaucoma, sarcoidosis of the lung who presented to  ED 10/12 after being found unresponsive at her nursing facility. Initially intubated and admitted to ICU for septic shock. Extubated 10/15. Re-intubated 10/19 for hypoxic/hypercapnic respiratory failure. Has been on and off and now back on levophed.    I am currently managing this critically ill patient for the following problems:     Neuro/Psych/Pain Ctrl/Sedation:   Acute encephalopathy - likely secondary to hypercapnia, infection  Hx Essential tremor   CT head: Negative for acute findings   Urine tox positive for barbiturates consistent with primidone intake   - Addressing underlying causes  - Pain Control: Acetaminophen PRN, Fentanyl PRN  - Sedation: Propofol  - Home primidone continued. Will hold propanolol d/t hypotension  - CAM ICU qshift, sleep-wake hygiene, delirium precautions   - SAT/SBT Daily     Respiratory/ENT:  Acute hypoxic/hypercapnic respiratory failure - likely multifactorial and 2/2 HCAP, pl effusions, volume overload  Healthcare-associated pneumonia   Atelectasis - likely 2/2 mucus plugging   Pleural Effusion -S/p left-sided pigtail placement 10/17, Output decreasing - 50 ml out overnight. Transudative.   CXR Today:   No change in the bilateral infiltrates and effusions.  Left-sided chest tube without pneumothorax.  ABG this am: 7.37-33-82-19  - Intubated 10/18. Vent setting: -20-5 40%. Will increase PEEP to 10 to  "assist with atelectasis.  - Will wean O2 as tolerated to maintain SpO2 >92%  - Duonebs and mucomyst QID   - IPV per RT TID  - Continuous pulse ox monitoring   - Pulm hygiene  - Minimal CT output- labs demonstrate transudative     Cardiovascular:   Septic shock - improving  Hx HTN, HLD  Acute on chronic diastolic heart failure  Bradycardia  TTE 10/1: diastolic dysfunction, LVEF 50-55%, mildly elevated RVSP (40)  Bilateral duplex US upper extremities:  Thrombosis within the left cephalic vein, which is part of the superficial venous system of the upper extremity, adjacent to PICC line. No deep venous thrombosis in the upper extremities.  - Remains on persistent levophed gtt, Will wean with goal MAP > 70   - Add stress dose steroids  - Holding home propranolol (on for tremors)  - Continue home Zetia  - Continuous cardiac monitoring per ICU protocol  - EKGs PRN for ACS symptoms, arrhythmias   - EP consulted     GI:  Hx GERD  - Diet: Tolerating TF @ goal  - BR: Colace  - GI Prophylaxis: PPI     Renal/Volume Status/Electrolytes:  Acute kidney injury - possibly ATN +/- medication-induced (vancomycin)  Anasarca   Mixed respiratory and metabolic acidosis - improving on vent  Hypoalbuminemia   Baseline Cr 0.8-1.0. BUN Cr 2.02/43 today  - Remains grossly volume overloaded. UO remains low at about 0.2 ml/kg/hr/ 545 x 24 hrs.  - Nephrology consulted  -- CRRT yest  - Maintain anders catheter  - Hourly I/O's. Goal urine output 0.5-1.0cc/kg/hr.  - Replete electrolytes to maintain K >4.0 and Mg >2.0  - Daily BMP, Mg, Phos    Endocrine:  T2DM  - SSI Q 6 while NPO  - TSH: midly elevated, T4 WNL  - Hypoglycemia protocol PRN      Infectious Disease:  Thoracolumbar surgical site infection - s/p I&D x 2. Recent MRSA bacteremia on extended course of IV antibiotics (vanc and rocephin -> 11/12)  Healthcare-associated pneumonia   CT lumbar spine 10/12: Unable to r/o abscess. Unable to do MRI d/t spinal hardware, \"metal in head\" per " patient  Blood cultures 10/16: Negative  Urine culture 10/14: Negative  Sputum culture 10/16: + pseudomonas   -Per RT with increasing purulent secretions. Will repeat sputum culture today.  - Antibiotics: Continue cefepime until 10/22 per ID, then will be on Ceftriaxone until 11/12.   -- Changed cefepime to daptomycin (10/21-)   - Vanco hold today d/t continued high levels (27 today). Dosing of vanco per pharmacy.   - ID consulted, appreciate assistance   - Replace midline prior to discharge per ID recs  - Monitor SIRS criteria  - WBC: 12.5     Heme/Onc:  Normocytic anemia   H/H similar to previous: No active signs of bleeding.  - Monitor for s/sx of bleeding   - Plan to transfuse if Hgb <7.0   - Daily CBC  - H/H: 8.6     MSK:  No active concerns   - PT/OT when appropriate     Derm:  Surgical wound with infection  - Wound care consult  - ICU skin protocol  - Q2hr turns  - Padded pressure points      Ethics/Code Status:  Full code - discussions with  at bedside      :  DVT Prophylaxis: SQH  GI Prophylaxis: PPI  Bowel Regimen: Colace  Diet: TF  CVC: Midline, Trialysis R IJ  [place]: Right radial placed 10/19, removed 10/20 d/t malfunction  Gibson: yes, replaced 10/12  Restraints: Yes  Disposition: ICU  I have reviewed all medications, laboratory results, and imaging pertinent for today's encounter.  Plan Discussed with Dr. Shook  Critical Care Time: 45 minutes  Critical Care [facility]  Nimco Weathers PA-C

## 2024-10-23 NOTE — CARE PLAN
Problem: Pain - Adult  Goal: Verbalizes/displays adequate comfort level or baseline comfort level  Outcome: Progressing     Problem: Safety - Adult  Goal: Free from fall injury  Outcome: Progressing     Problem: Discharge Planning  Goal: Discharge to home or other facility with appropriate resources  Outcome: Progressing     Problem: Chronic Conditions and Co-morbidities  Goal: Patient's chronic conditions and co-morbidity symptoms are monitored and maintained or improved  Outcome: Progressing     Problem: Diabetes  Goal: Achieve decreasing blood glucose levels by end of shift  Outcome: Progressing  Goal: Increase stability of blood glucose readings by end of shift  Outcome: Progressing  Goal: Decrease in ketones present in urine by end of shift  Outcome: Progressing  Goal: Maintain electrolyte levels within acceptable range throughout shift  Outcome: Progressing  Goal: Maintain glucose levels >70mg/dl to <250mg/dl throughout shift  Outcome: Progressing  Goal: No changes in neurological exam by end of shift  Outcome: Progressing  Goal: Learn about and adhere to nutrition recommendations by end of shift  Outcome: Progressing  Goal: Vital signs within normal range for age by end of shift  Outcome: Progressing  Goal: Increase self care and/or family involovement by end of shift  Outcome: Progressing  Goal: Receive DSME education by end of shift  Outcome: Progressing     Problem: Knowledge Deficit  Goal: Patient/family/caregiver demonstrates understanding of disease process, treatment plan, medications, and discharge instructions  Outcome: Progressing     Problem: Skin  Goal: Decreased wound size/increased tissue granulation at next dressing change  Outcome: Progressing  Flowsheets (Taken 10/23/2024 0256)  Decreased wound size/increased tissue granulation at next dressing change:   Promote sleep for wound healing   Utilize specialty bed per algorithm   Protective dressings over bony prominences  Goal: Participates in  plan/prevention/treatment measures  Outcome: Progressing  Flowsheets (Taken 10/23/2024 0256)  Participates in plan/prevention/treatment measures: Elevate heels  Goal: Prevent/manage excess moisture  Outcome: Progressing  Flowsheets (Taken 10/23/2024 0256)  Prevent/manage excess moisture:   Cleanse incontinence/protect with barrier cream   Moisturize dry skin   Use wicking fabric (obtain order)   Follow provider orders for dressing changes   Monitor for/manage infection if present  Goal: Prevent/minimize sheer/friction injuries  Outcome: Progressing  Flowsheets (Taken 10/23/2024 0256)  Prevent/minimize sheer/friction injuries:   Complete micro-shifts as needed if patient unable. Adjust patient position to relieve pressure points, not a full turn   HOB 30 degrees or less   Turn/reposition every 2 hours/use positioning/transfer devices   Use pull sheet   Utilize specialty bed per algorithm  Goal: Promote/optimize nutrition  Outcome: Progressing  Flowsheets (Taken 10/23/2024 0256)  Promote/optimize nutrition:   Consume > 50% meals/supplements   Monitor/record intake including meals  Goal: Promote skin healing  Outcome: Progressing  Flowsheets (Taken 10/23/2024 0256)  Promote skin healing:   Assess skin/pad under line(s)/device(s)   Ensure correct size (line/device) and apply per  instructions   Protective dressings over bony prominences   Rotate device position/do not position patient on device   Turn/reposition every 2 hours/use positioning/transfer devices     Problem: Nutrition  Goal: Oral intake greater than 50%  Outcome: Progressing  Goal: Oral intake greater 75%  Outcome: Progressing  Goal: Consume prescribed supplement  Outcome: Progressing  Goal: Adequate PO fluid intake  Outcome: Progressing  Goal: Nutrition support goals are met within 48 hrs  Outcome: Progressing  Goal: Nutrition support is meeting 75% of nutrient needs  Outcome: Progressing  Goal: BG  mg/dL  Outcome: Progressing  Goal: Lab  values WNL  Outcome: Progressing  Goal: Electrolytes WNL  Outcome: Progressing  Goal: Promote healing  Outcome: Progressing  Goal: Maintain stable weight  Outcome: Progressing  Goal: Reduce weight from edema/fluid  Outcome: Progressing     Problem: Respiratory  Goal: No signs of respiratory distress (eg. Use of accessory muscles. Peds grunting)  Outcome: Progressing  Goal: Clear secretions with interventions this shift  Outcome: Progressing  Goal: Minimize anxiety/maximize coping throughout shift  Outcome: Progressing  Goal: Minimal/no exertional discomfort or dyspnea this shift  Outcome: Progressing  Goal: Patent airway maintained this shift  Outcome: Progressing  Goal: Tolerate mechanical ventilation evidenced by VS/agitation level this shift  Outcome: Progressing  Goal: Tolerate pulmonary toileting this shift  Outcome: Progressing  Goal: Verbalize decreased shortness of breath this shift  Outcome: Progressing  Goal: Wean oxygen to maintain O2 saturation per order/standard this shift  Outcome: Progressing  Goal: Increase self care and/or family involvement in next 24 hours  Outcome: Progressing     Problem: Pain  Goal: Takes deep breaths with improved pain control throughout the shift  Outcome: Progressing  Goal: Turns in bed with improved pain control throughout the shift  Outcome: Progressing  Goal: Walks with improved pain control throughout the shift  Outcome: Progressing  Goal: Performs ADL's with improved pain control throughout shift  Outcome: Progressing  Goal: Participates in PT with improved pain control throughout the shift  Outcome: Progressing  Goal: Free from opioid side effects throughout the shift  Outcome: Progressing  Goal: Free from acute confusion related to pain meds throughout the shift  Outcome: Progressing   The patient's goals for the shift include      The clinical goals for the shift include maintain hemodynamically stable while on CRRT

## 2024-10-23 NOTE — NURSING NOTE
Wound Wednesday full head to toe skin assessment completed.  See LDA avatar for wounds. Photos updated. Lb Dodd PA-C notified of need to update wound care orders.  Pt on e700 mattress. B/l multipodus boots on. Q2h turns in place.

## 2024-10-23 NOTE — NURSING NOTE
Filter clotting on CRRT machine.  Tried to return blood but unable to do so due to filter clotting.

## 2024-10-23 NOTE — NURSING NOTE
Pt has a RIJ Hyacinthkar, drsg dry and intact without any redness, swelling or drainage, pigtail infusing medications without any difficulty. Pt also has a single lumen midline to L upper arm, drsg dry and intact (dated 10/21) without any redness, swelling or drainage, medications infusing without any difficulty.

## 2024-10-23 NOTE — PROGRESS NOTES
Spiritual Care Visit    Clinical Encounter Type  Visited With: Patient  Routine Visit: Introduction  Crisis Visit: Critical care    Sikh Encounters  Sikh Needs: Prayer, Sacred text     Annotation:  provided patient support for spiritual care consult on the Unit.  introduced  services of [facility]  Patient was intubated and the  spoke directly to the patient. Patient briefly opened her eyes but immediately closed them.  offered support through reminder of the patient's current location in our ICU. According to the previous notes the patient's spouse requested  visit.  read inspirational text from the scripture and offered prayer at bedside. No other spiritual or Christianity needs were expressed. Spiritual care will remain available for support as requested.                                    Taxonomy  Intended Effects: Establish rapport and connectedness, Build relationship of care and support, Demonstrate caring and concern, Lessen someone's feelings of isolation  Methods: Offer spiritual/Christianity support  Interventions: [place]

## 2024-10-23 NOTE — PROGRESS NOTES
Patient not medically clear. Patient remains intubated. Patient on CRRT. At this time there is not a safe discharge plan in place. Therapy to evaluate patient. Patient was at [facility] prior to admission. If she will return then a precert will be needed. Will follow.      10/23/24 1220   Discharge Planning   Home or Post Acute Services Other (Comment)  (TBD)   Expected Discharge Disposition [place]  (TBD)   Does the patient need discharge transport arranged? Yes   RoundTrip coordination needed? Yes

## 2024-10-23 NOTE — CARE PLAN
Problem: Respiratory  Goal: Minimize anxiety/maximize coping throughout shift  Outcome: Progressing  Goal: Minimal/no exertional discomfort or dyspnea this shift  Outcome: Progressing  Goal: Patent airway maintained this shift  Outcome: Progressing  Goal: Tolerate mechanical ventilation evidenced by VS/agitation level this shift  Outcome: Progressing  Goal: Tolerate pulmonary toileting this shift  Outcome: Progressing  Goal: Wean oxygen to maintain O2 saturation per order/standard this shift  Outcome: Progressing

## 2024-10-24 ENCOUNTER — APPOINTMENT (OUTPATIENT)
Dept: RADIOLOGY | Facility: HOSPITAL | Age: 69
End: 2024-10-24
Payer: MEDICARE

## 2024-10-24 ENCOUNTER — APPOINTMENT (OUTPATIENT)
Dept: CARDIOLOGY | Facility: HOSPITAL | Age: 69
End: 2024-10-24
Payer: MEDICARE

## 2024-10-24 LAB
ALBUMIN SERPL BCP-MCNC: 2.3 G/DL (ref 3.4–5)
ALBUMIN SERPL BCP-MCNC: 2.4 G/DL (ref 3.4–5)
ALP SERPL-CCNC: 105 U/L (ref 33–136)
ALP SERPL-CCNC: 112 U/L (ref 33–136)
ALT SERPL W P-5'-P-CCNC: 6 U/L (ref 7–45)
ALT SERPL W P-5'-P-CCNC: 6 U/L (ref 7–45)
ANION GAP SERPL CALCULATED.3IONS-SCNC: 10 MMOL/L (ref 10–20)
ANION GAP SERPL CALCULATED.3IONS-SCNC: 10 MMOL/L (ref 10–20)
ANION GAP SERPL CALCULATED.3IONS-SCNC: 8 MMOL/L (ref 10–20)
AST SERPL W P-5'-P-CCNC: 10 U/L (ref 9–39)
AST SERPL W P-5'-P-CCNC: 9 U/L (ref 9–39)
BASOPHILS # BLD AUTO: 0.01 X10*3/UL (ref 0–0.1)
BASOPHILS # BLD AUTO: 0.01 X10*3/UL (ref 0–0.1)
BASOPHILS NFR BLD AUTO: 0.1 %
BASOPHILS NFR BLD AUTO: 0.1 %
BILIRUB DIRECT SERPL-MCNC: 0 MG/DL (ref 0–0.3)
BILIRUB DIRECT SERPL-MCNC: 0 MG/DL (ref 0–0.3)
BILIRUB SERPL-MCNC: 0.3 MG/DL (ref 0–1.2)
BILIRUB SERPL-MCNC: 0.3 MG/DL (ref 0–1.2)
BUN SERPL-MCNC: 13 MG/DL (ref 6–23)
BUN SERPL-MCNC: 14 MG/DL (ref 6–23)
BUN SERPL-MCNC: 20 MG/DL (ref 6–23)
CA-I BLD-SCNC: 1.07 MMOL/L (ref 1.1–1.33)
CA-I BLD-SCNC: 1.1 MMOL/L (ref 1.1–1.33)
CALCIUM SERPL-MCNC: 7.3 MG/DL (ref 8.6–10.3)
CALCIUM SERPL-MCNC: 7.4 MG/DL (ref 8.6–10.3)
CALCIUM SERPL-MCNC: 7.5 MG/DL (ref 8.6–10.3)
CHLORIDE SERPL-SCNC: 103 MMOL/L (ref 98–107)
CHLORIDE SERPL-SCNC: 103 MMOL/L (ref 98–107)
CHLORIDE SERPL-SCNC: 104 MMOL/L (ref 98–107)
CO2 SERPL-SCNC: 26 MMOL/L (ref 21–32)
CO2 SERPL-SCNC: 26 MMOL/L (ref 21–32)
CO2 SERPL-SCNC: 27 MMOL/L (ref 21–32)
CREAT SERPL-MCNC: 0.86 MG/DL (ref 0.5–1.05)
CREAT SERPL-MCNC: 0.9 MG/DL (ref 0.5–1.05)
CREAT SERPL-MCNC: 1.05 MG/DL (ref 0.5–1.05)
EGFRCR SERPLBLD CKD-EPI 2021: 58 ML/MIN/1.73M*2
EGFRCR SERPLBLD CKD-EPI 2021: 70 ML/MIN/1.73M*2
EGFRCR SERPLBLD CKD-EPI 2021: 74 ML/MIN/1.73M*2
EOSINOPHIL # BLD AUTO: 0.04 X10*3/UL (ref 0–0.7)
EOSINOPHIL # BLD AUTO: 0.06 X10*3/UL (ref 0–0.7)
EOSINOPHIL NFR BLD AUTO: 0.4 %
EOSINOPHIL NFR BLD AUTO: 0.6 %
ERYTHROCYTE [DISTWIDTH] IN BLOOD BY AUTOMATED COUNT: 19.4 % (ref 11.5–14.5)
ERYTHROCYTE [DISTWIDTH] IN BLOOD BY AUTOMATED COUNT: 19.9 % (ref 11.5–14.5)
ERYTHROCYTE [DISTWIDTH] IN BLOOD BY AUTOMATED COUNT: 19.9 % (ref 11.5–14.5)
FUNGUS SPEC CULT: NORMAL
FUNGUS SPEC FUNGUS STN: NORMAL
GLUCOSE BLD MANUAL STRIP-MCNC: 148 MG/DL (ref 74–99)
GLUCOSE BLD MANUAL STRIP-MCNC: 160 MG/DL (ref 74–99)
GLUCOSE BLD MANUAL STRIP-MCNC: 160 MG/DL (ref 74–99)
GLUCOSE BLD MANUAL STRIP-MCNC: 169 MG/DL (ref 74–99)
GLUCOSE BLD MANUAL STRIP-MCNC: 170 MG/DL (ref 74–99)
GLUCOSE BLD MANUAL STRIP-MCNC: 180 MG/DL (ref 74–99)
GLUCOSE SERPL-MCNC: 159 MG/DL (ref 74–99)
GLUCOSE SERPL-MCNC: 161 MG/DL (ref 74–99)
GLUCOSE SERPL-MCNC: 171 MG/DL (ref 74–99)
HCT VFR BLD AUTO: 22.5 % (ref 36–46)
HCT VFR BLD AUTO: 23.3 % (ref 36–46)
HCT VFR BLD AUTO: 23.8 % (ref 36–46)
HGB BLD-MCNC: 7.1 G/DL (ref 12–16)
HGB BLD-MCNC: 7.4 G/DL (ref 12–16)
HGB BLD-MCNC: 7.6 G/DL (ref 12–16)
IMM GRANULOCYTES # BLD AUTO: 0.34 X10*3/UL (ref 0–0.7)
IMM GRANULOCYTES # BLD AUTO: 0.34 X10*3/UL (ref 0–0.7)
IMM GRANULOCYTES NFR BLD AUTO: 3.2 % (ref 0–0.9)
IMM GRANULOCYTES NFR BLD AUTO: 3.6 % (ref 0–0.9)
LYMPHOCYTES # BLD AUTO: 0.67 X10*3/UL (ref 1.2–4.8)
LYMPHOCYTES # BLD AUTO: 0.85 X10*3/UL (ref 1.2–4.8)
LYMPHOCYTES NFR BLD AUTO: 6.3 %
LYMPHOCYTES NFR BLD AUTO: 9.1 %
MAGNESIUM SERPL-MCNC: 2.14 MG/DL (ref 1.6–2.4)
MAGNESIUM SERPL-MCNC: 2.18 MG/DL (ref 1.6–2.4)
MCH RBC QN AUTO: 31.1 PG (ref 26–34)
MCH RBC QN AUTO: 31.4 PG (ref 26–34)
MCH RBC QN AUTO: 31.6 PG (ref 26–34)
MCHC RBC AUTO-ENTMCNC: 31.6 G/DL (ref 32–36)
MCHC RBC AUTO-ENTMCNC: 31.8 G/DL (ref 32–36)
MCHC RBC AUTO-ENTMCNC: 31.9 G/DL (ref 32–36)
MCV RBC AUTO: 100 FL (ref 80–100)
MCV RBC AUTO: 98 FL (ref 80–100)
MCV RBC AUTO: 98 FL (ref 80–100)
MONOCYTES # BLD AUTO: 0.49 X10*3/UL (ref 0.1–1)
MONOCYTES # BLD AUTO: 0.56 X10*3/UL (ref 0.1–1)
MONOCYTES NFR BLD AUTO: 5.2 %
MONOCYTES NFR BLD AUTO: 5.2 %
NEUTROPHILS # BLD AUTO: 7.66 X10*3/UL (ref 1.2–7.7)
NEUTROPHILS # BLD AUTO: 9.03 X10*3/UL (ref 1.2–7.7)
NEUTROPHILS NFR BLD AUTO: 81.6 %
NEUTROPHILS NFR BLD AUTO: 84.6 %
NRBC BLD-RTO: 0 /100 WBCS (ref 0–0)
PHOSPHATE SERPL-MCNC: 1.8 MG/DL (ref 2.5–4.9)
PHOSPHATE SERPL-MCNC: 2.5 MG/DL (ref 2.5–4.9)
PLATELET # BLD AUTO: 215 X10*3/UL (ref 150–450)
PLATELET # BLD AUTO: 252 X10*3/UL (ref 150–450)
PLATELET # BLD AUTO: 285 X10*3/UL (ref 150–450)
POTASSIUM SERPL-SCNC: 4.2 MMOL/L (ref 3.5–5.3)
POTASSIUM SERPL-SCNC: 4.3 MMOL/L (ref 3.5–5.3)
POTASSIUM SERPL-SCNC: 4.3 MMOL/L (ref 3.5–5.3)
PROT SERPL-MCNC: 4.7 G/DL (ref 6.4–8.2)
PROT SERPL-MCNC: 4.9 G/DL (ref 6.4–8.2)
RBC # BLD AUTO: 2.25 X10*6/UL (ref 4–5.2)
RBC # BLD AUTO: 2.38 X10*6/UL (ref 4–5.2)
RBC # BLD AUTO: 2.42 X10*6/UL (ref 4–5.2)
SODIUM SERPL-SCNC: 135 MMOL/L (ref 136–145)
WBC # BLD AUTO: 10.7 X10*3/UL (ref 4.4–11.3)
WBC # BLD AUTO: 14 X10*3/UL (ref 4.4–11.3)
WBC # BLD AUTO: 9.4 X10*3/UL (ref 4.4–11.3)

## 2024-10-24 PROCEDURE — 2500000001 HC RX 250 WO HCPCS SELF ADMINISTERED DRUGS (ALT 637 FOR MEDICARE OP)

## 2024-10-24 PROCEDURE — 2500000004 HC RX 250 GENERAL PHARMACY W/ HCPCS (ALT 636 FOR OP/ED)

## 2024-10-24 PROCEDURE — 99223 1ST HOSP IP/OBS HIGH 75: CPT | Performed by: STUDENT IN AN ORGANIZED HEALTH CARE EDUCATION/TRAINING PROGRAM

## 2024-10-24 PROCEDURE — 71045 X-RAY EXAM CHEST 1 VIEW: CPT | Performed by: RADIOLOGY

## 2024-10-24 PROCEDURE — 84100 ASSAY OF PHOSPHORUS: CPT

## 2024-10-24 PROCEDURE — 2500000005 HC RX 250 GENERAL PHARMACY W/O HCPCS

## 2024-10-24 PROCEDURE — 2780000003 HC OR 278 NO HCPCS

## 2024-10-24 PROCEDURE — 85025 COMPLETE CBC W/AUTO DIFF WBC: CPT

## 2024-10-24 PROCEDURE — 94668 MNPJ CHEST WALL SBSQ: CPT

## 2024-10-24 PROCEDURE — 82947 ASSAY GLUCOSE BLOOD QUANT: CPT

## 2024-10-24 PROCEDURE — 90937 HEMODIALYSIS REPEATED EVAL: CPT

## 2024-10-24 PROCEDURE — 83735 ASSAY OF MAGNESIUM: CPT

## 2024-10-24 PROCEDURE — 85027 COMPLETE CBC AUTOMATED: CPT

## 2024-10-24 PROCEDURE — 2500000002 HC RX 250 W HCPCS SELF ADMINISTERED DRUGS (ALT 637 FOR MEDICARE OP, ALT 636 FOR OP/ED)

## 2024-10-24 PROCEDURE — 94003 VENT MGMT INPAT SUBQ DAY: CPT

## 2024-10-24 PROCEDURE — 94640 AIRWAY INHALATION TREATMENT: CPT

## 2024-10-24 PROCEDURE — 2500000004 HC RX 250 GENERAL PHARMACY W/ HCPCS (ALT 636 FOR OP/ED): Performed by: INTERNAL MEDICINE

## 2024-10-24 PROCEDURE — 82330 ASSAY OF CALCIUM: CPT

## 2024-10-24 PROCEDURE — 2020000001 HC ICU ROOM DAILY

## 2024-10-24 PROCEDURE — 87081 CULTURE SCREEN ONLY: CPT | Mod: WESLAB | Performed by: INTERNAL MEDICINE

## 2024-10-24 PROCEDURE — 05HY33Z INSERTION OF INFUSION DEVICE INTO UPPER VEIN, PERCUTANEOUS APPROACH: ICD-10-PCS | Performed by: INTERNAL MEDICINE

## 2024-10-24 PROCEDURE — 80053 COMPREHEN METABOLIC PANEL: CPT

## 2024-10-24 PROCEDURE — 36573 INSJ PICC RS&I 5 YR+: CPT

## 2024-10-24 PROCEDURE — C1751 CATH, INF, PER/CENT/MIDLINE: HCPCS

## 2024-10-24 PROCEDURE — 99291 CRITICAL CARE FIRST HOUR: CPT

## 2024-10-24 PROCEDURE — 2500000005 HC RX 250 GENERAL PHARMACY W/O HCPCS: Performed by: NURSE PRACTITIONER

## 2024-10-24 PROCEDURE — 37799 UNLISTED PX VASCULAR SURGERY: CPT

## 2024-10-24 PROCEDURE — 2500000004 HC RX 250 GENERAL PHARMACY W/ HCPCS (ALT 636 FOR OP/ED): Performed by: NURSE PRACTITIONER

## 2024-10-24 PROCEDURE — 71045 X-RAY EXAM CHEST 1 VIEW: CPT

## 2024-10-24 PROCEDURE — 84075 ASSAY ALKALINE PHOSPHATASE: CPT

## 2024-10-24 PROCEDURE — 80048 BASIC METABOLIC PNL TOTAL CA: CPT | Mod: CCI

## 2024-10-24 RX ORDER — POLYETHYLENE GLYCOL 3350 17 G/17G
17 POWDER, FOR SOLUTION ORAL DAILY PRN
Status: DISCONTINUED | OUTPATIENT
Start: 2024-10-24 | End: 2024-11-14 | Stop reason: HOSPADM

## 2024-10-24 RX ORDER — LIDOCAINE HYDROCHLORIDE 10 MG/ML
5 INJECTION, SOLUTION INFILTRATION; PERINEURAL ONCE
Status: DISCONTINUED | OUTPATIENT
Start: 2024-10-24 | End: 2024-10-25

## 2024-10-24 RX ORDER — NOREPINEPHRINE BITARTRATE/D5W 8 MG/250ML
0-.5 PLASTIC BAG, INJECTION (ML) INTRAVENOUS CONTINUOUS
Status: DISCONTINUED | OUTPATIENT
Start: 2024-10-24 | End: 2024-10-28

## 2024-10-24 RX ORDER — AMOXICILLIN 250 MG
1 CAPSULE ORAL NIGHTLY
Status: DISCONTINUED | OUTPATIENT
Start: 2024-10-24 | End: 2024-11-07

## 2024-10-24 RX ADMIN — CALCIUM CHLORIDE 0.5 G: 100 INJECTION, SOLUTION INTRAVENOUS at 04:50

## 2024-10-24 RX ADMIN — BRIMONIDINE TARTRATE 1 DROP: 2 SOLUTION OPHTHALMIC at 20:18

## 2024-10-24 RX ADMIN — Medication 40 PERCENT: at 18:17

## 2024-10-24 RX ADMIN — HYDROCORTISONE SODIUM SUCCINATE 100 MG: 100 INJECTION, POWDER, FOR SOLUTION INTRAMUSCULAR; INTRAVENOUS at 12:56

## 2024-10-24 RX ADMIN — CALCIUM CHLORIDE, MAGNESIUM CHLORIDE, DEXTROSE MONOHYDRATE, LACTIC ACID, SODIUM CHLORIDE, SODIUM BICARBONATE AND POTASSIUM CHLORIDE 25 ML/KG/HR: 3.68; 3.05; 22; 5.4; 6.46; 3.09; .314 INJECTION INTRAVENOUS at 11:38

## 2024-10-24 RX ADMIN — INSULIN LISPRO 3 UNITS: 100 INJECTION, SOLUTION INTRAVENOUS; SUBCUTANEOUS at 08:40

## 2024-10-24 RX ADMIN — Medication 3 ML: at 11:10

## 2024-10-24 RX ADMIN — INSULIN LISPRO 3 UNITS: 100 INJECTION, SOLUTION INTRAVENOUS; SUBCUTANEOUS at 03:38

## 2024-10-24 RX ADMIN — Medication 40 PERCENT: at 07:02

## 2024-10-24 RX ADMIN — IPRATROPIUM BROMIDE AND ALBUTEROL SULFATE 3 ML: .5; 3 SOLUTION RESPIRATORY (INHALATION) at 07:02

## 2024-10-24 RX ADMIN — IPRATROPIUM BROMIDE AND ALBUTEROL SULFATE 3 ML: .5; 3 SOLUTION RESPIRATORY (INHALATION) at 14:42

## 2024-10-24 RX ADMIN — Medication 3 ML: at 18:16

## 2024-10-24 RX ADMIN — CALCIUM CHLORIDE, MAGNESIUM CHLORIDE, DEXTROSE MONOHYDRATE, LACTIC ACID, SODIUM CHLORIDE, SODIUM BICARBONATE AND POTASSIUM CHLORIDE 25 ML/KG/HR: 3.68; 3.05; 22; 5.4; 6.46; 3.09; .314 INJECTION INTRAVENOUS at 03:20

## 2024-10-24 RX ADMIN — PRIMIDONE 125 MG: 250 TABLET ORAL at 20:18

## 2024-10-24 RX ADMIN — SODIUM PHOSPHATE, MONOBASIC, MONOHYDRATE AND SODIUM PHOSPHATE, DIBASIC, ANHYDROUS 21 MMOL: 276; 142 INJECTION, SOLUTION INTRAVENOUS at 18:00

## 2024-10-24 RX ADMIN — EZETIMIBE 10 MG: 10 TABLET ORAL at 09:14

## 2024-10-24 RX ADMIN — LATANOPROST 1 DROP: 50 SOLUTION OPHTHALMIC at 20:18

## 2024-10-24 RX ADMIN — CALCIUM CHLORIDE, MAGNESIUM CHLORIDE, DEXTROSE MONOHYDRATE, LACTIC ACID, SODIUM CHLORIDE, SODIUM BICARBONATE AND POTASSIUM CHLORIDE 25 ML/KG/HR: 3.68; 3.05; 22; 5.4; 6.46; 3.09; .314 INJECTION INTRAVENOUS at 00:01

## 2024-10-24 RX ADMIN — PROPOFOL 10 MCG/KG/MIN: 10 INJECTION, EMULSION INTRAVENOUS at 08:52

## 2024-10-24 RX ADMIN — SODIUM PHOSPHATE, MONOBASIC, MONOHYDRATE AND SODIUM PHOSPHATE, DIBASIC, ANHYDROUS 15 MMOL: 276; 142 INJECTION, SOLUTION INTRAVENOUS at 01:27

## 2024-10-24 RX ADMIN — Medication 3 ML: at 14:42

## 2024-10-24 RX ADMIN — HEPARIN SODIUM 5000 UNITS: 5000 INJECTION, SOLUTION INTRAVENOUS; SUBCUTANEOUS at 13:05

## 2024-10-24 RX ADMIN — INSULIN LISPRO 3 UNITS: 100 INJECTION, SOLUTION INTRAVENOUS; SUBCUTANEOUS at 16:37

## 2024-10-24 RX ADMIN — DAPTOMYCIN IN SODIUM CHLORIDE 700 MG: 700 INJECTION, SOLUTION INTRAVENOUS at 09:14

## 2024-10-24 RX ADMIN — PROPOFOL 5 MCG/KG/MIN: 10 INJECTION, EMULSION INTRAVENOUS at 21:00

## 2024-10-24 RX ADMIN — HYDROCORTISONE SODIUM SUCCINATE 100 MG: 100 INJECTION, POWDER, FOR SOLUTION INTRAMUSCULAR; INTRAVENOUS at 03:37

## 2024-10-24 RX ADMIN — HEPARIN SODIUM 5000 UNITS: 5000 INJECTION, SOLUTION INTRAVENOUS; SUBCUTANEOUS at 22:41

## 2024-10-24 RX ADMIN — PANTOPRAZOLE SODIUM 40 MG: 40 INJECTION, POWDER, FOR SOLUTION INTRAVENOUS at 06:54

## 2024-10-24 RX ADMIN — CALCIUM CHLORIDE, MAGNESIUM CHLORIDE, DEXTROSE MONOHYDRATE, LACTIC ACID, SODIUM CHLORIDE, SODIUM BICARBONATE AND POTASSIUM CHLORIDE 25 ML/KG/HR: 3.68; 3.05; 22; 5.4; 6.46; 3.09; .314 INJECTION INTRAVENOUS at 15:53

## 2024-10-24 RX ADMIN — CALCIUM CHLORIDE, MAGNESIUM CHLORIDE, DEXTROSE MONOHYDRATE, LACTIC ACID, SODIUM CHLORIDE, SODIUM BICARBONATE AND POTASSIUM CHLORIDE 25 ML/KG/HR: 3.68; 3.05; 22; 5.4; 6.46; 3.09; .314 INJECTION INTRAVENOUS at 19:12

## 2024-10-24 RX ADMIN — HEPARIN SODIUM 5000 UNITS: 5000 INJECTION, SOLUTION INTRAVENOUS; SUBCUTANEOUS at 06:54

## 2024-10-24 RX ADMIN — INSULIN LISPRO 3 UNITS: 100 INJECTION, SOLUTION INTRAVENOUS; SUBCUTANEOUS at 20:12

## 2024-10-24 RX ADMIN — IPRATROPIUM BROMIDE AND ALBUTEROL SULFATE 3 ML: .5; 3 SOLUTION RESPIRATORY (INHALATION) at 18:16

## 2024-10-24 RX ADMIN — Medication 75 MCG/HR: at 19:41

## 2024-10-24 RX ADMIN — NOREPINEPHRINE BITARTRATE 0.03 MCG/KG/MIN: 8 INJECTION, SOLUTION INTRAVENOUS at 04:58

## 2024-10-24 RX ADMIN — CALCIUM CHLORIDE, MAGNESIUM CHLORIDE, DEXTROSE MONOHYDRATE, LACTIC ACID, SODIUM CHLORIDE, SODIUM BICARBONATE AND POTASSIUM CHLORIDE 25 ML/KG/HR: 3.68; 3.05; 22; 5.4; 6.46; 3.09; .314 INJECTION INTRAVENOUS at 09:15

## 2024-10-24 RX ADMIN — HYDROCORTISONE SODIUM SUCCINATE 100 MG: 100 INJECTION, POWDER, FOR SOLUTION INTRAMUSCULAR; INTRAVENOUS at 19:41

## 2024-10-24 RX ADMIN — SENNOSIDES AND DOCUSATE SODIUM 1 TABLET: 50; 8.6 TABLET ORAL at 20:18

## 2024-10-24 RX ADMIN — IPRATROPIUM BROMIDE AND ALBUTEROL SULFATE 3 ML: .5; 3 SOLUTION RESPIRATORY (INHALATION) at 11:10

## 2024-10-24 RX ADMIN — CALCIUM CHLORIDE, MAGNESIUM CHLORIDE, DEXTROSE MONOHYDRATE, LACTIC ACID, SODIUM CHLORIDE, SODIUM BICARBONATE AND POTASSIUM CHLORIDE 25 ML/KG/HR: 3.68; 3.05; 22; 5.4; 6.46; 3.09; .314 INJECTION INTRAVENOUS at 20:46

## 2024-10-24 RX ADMIN — BRIMONIDINE TARTRATE 1 DROP: 2 SOLUTION OPHTHALMIC at 09:14

## 2024-10-24 RX ADMIN — CALCIUM CHLORIDE, MAGNESIUM CHLORIDE, DEXTROSE MONOHYDRATE, LACTIC ACID, SODIUM CHLORIDE, SODIUM BICARBONATE AND POTASSIUM CHLORIDE 25 ML/KG/HR: 3.68; 3.05; 22; 5.4; 6.46; 3.09; .314 INJECTION INTRAVENOUS at 00:00

## 2024-10-24 RX ADMIN — CALCIUM CHLORIDE, MAGNESIUM CHLORIDE, DEXTROSE MONOHYDRATE, LACTIC ACID, SODIUM CHLORIDE, SODIUM BICARBONATE AND POTASSIUM CHLORIDE 25 ML/KG/HR: 3.68; 3.05; 22; 5.4; 6.46; 3.09; .314 INJECTION INTRAVENOUS at 20:37

## 2024-10-24 RX ADMIN — INSULIN LISPRO 3 UNITS: 100 INJECTION, SOLUTION INTRAVENOUS; SUBCUTANEOUS at 23:56

## 2024-10-24 RX ADMIN — CEFTRIAXONE SODIUM 2 G: 2 INJECTION, SOLUTION INTRAVENOUS at 09:14

## 2024-10-24 RX ADMIN — Medication 3 ML: at 07:02

## 2024-10-24 ASSESSMENT — PAIN SCALES - GENERAL
PAINLEVEL_OUTOF10: 0 - NO PAIN
PAINLEVEL_OUTOF10: 0 - NO PAIN
PAINLEVEL_OUTOF10: 6
PAINLEVEL_OUTOF10: 0 - NO PAIN

## 2024-10-24 ASSESSMENT — COGNITIVE AND FUNCTIONAL STATUS - GENERAL
TURNING FROM BACK TO SIDE WHILE IN FLAT BAD: TOTAL
CLIMB 3 TO 5 STEPS WITH RAILING: TOTAL
HELP NEEDED FOR BATHING: TOTAL
MOVING TO AND FROM BED TO CHAIR: TOTAL
PERSONAL GROOMING: TOTAL
DRESSING REGULAR LOWER BODY CLOTHING: TOTAL
TOILETING: TOTAL
MOBILITY SCORE: 6
STANDING UP FROM CHAIR USING ARMS: TOTAL
DAILY ACTIVITIY SCORE: 6
WALKING IN HOSPITAL ROOM: TOTAL
MOVING FROM LYING ON BACK TO SITTING ON SIDE OF FLAT BED WITH BEDRAILS: TOTAL
EATING MEALS: TOTAL
DRESSING REGULAR UPPER BODY CLOTHING: TOTAL

## 2024-10-24 ASSESSMENT — PAIN - FUNCTIONAL ASSESSMENT

## 2024-10-24 ASSESSMENT — PAIN DESCRIPTION - DESCRIPTORS: DESCRIPTORS: PATIENT UNABLE TO DESCRIBE

## 2024-10-24 NOTE — PROCEDURES
Vascular Access Team Procedure Note     Visit Date: 10/24/2024      Patient Name: Narda Malloy         MRN: [id number]             Procedure:  Dual lumen picc placed by HERNANDO Moore RN [facility] without difficulty in Brachial vein, catheter length 44 cm, arm circumference 36 cm, ext catheter at 4 cm, both lumens flush easily and with positive brisk blood return, curos caps applied. ECG confirmed tip location, patient tolerated well, RN aware of placement and ok to use.                          Shaye Serrano RN  10/24/2024  2:37 PM

## 2024-10-24 NOTE — CONSULTS
Inpatient consult to Cardiology  Consult performed by: LEONEL Garces-CNP  Consult ordered by: RUTH Salgado  Reason for consult: bradycardia        History Of Present Illness:    Narda Malloy is a 69 y.o. female with T2DM, HTN, essential tremor, HLD, glaucoma, sarcoidosis of the lung,  L1-L3 lumbar laminectomy, T4-S1 revision, and fusion August 26th.  Patient was admitted back on October 12, 2024 after being found unresponsive at her nursing home.  The patient has had recurrent infection since her back surgery in August, having most recently been to taken the OR in September for lumbar site infection washout.  Patient was intubated on October 18.  Patient was seen by ID and found to be septic, likely due to pneumonia present on admission. Lumbar spine cultures were positive for MRSA and Proteus.  She has been treated with multiple antibiotics.  Patient also progressed with AMY with acute tubular necrosis on CRRT.  EP was consulted for bradycardia.  Patient was on propranolol for essential tremor that was held on October 18 due to hypotension.  She is intubated, currently on propofol infusion and levo.  Her white counts today are 14 hematocrit 23 magnesium is 2.2 potassium 4.3.  Echocardiogram from October 1, 2024 showed EF of 50 to 55% with mildly elevated right ventricular systolic pressure.  EKG from October 23 showed sinus rhythm with normal MS and overall normal QRS duration normal QTc and a heart rate of 67 bpm.  Her telemetry shows sinus rhythm with heart rates between 60 and 80 bpm in average.  Telemetry has automatically read by mistake episodes of pauses and asystole that are not present.     Last Recorded Vitals:  Vitals:    10/24/24 0830 10/24/24 0845 10/24/24 0900 10/24/24 0915   BP: 112/52 117/54 108/59 115/62   BP Location:       Patient Position:       Pulse: (!) 48 52 71 57   Resp: 18 20 20 20   Temp:       TempSrc:       SpO2: 97% 97% 96% 96%   Weight:       Height:           Last  Labs:  CBC - 10/24/2024:  3:35 AM  10.7 7.6 252    23.8      CMP - 10/24/2024:  3:35 AM  7.3 4.9 10 --- 0.3   2.5 2.4 6 105      PTT - 8/12/2024:  1:04 PM  1.0   11.3 31     Troponin I, High Sensitivity   Date/Time Value Ref Range Status   10/12/2024 02:04 PM 7 0 - 13 ng/L Final     BNP   Date/Time Value Ref Range Status   10/17/2024 03:23  (H) 0 - 99 pg/mL Final   09/25/2024 09:58  (H) 0 - 99 pg/mL Final     POC HEMOGLOBIN A1c   Date/Time Value Ref Range Status   06/18/2024 02:47 PM 6.6 (A) 4.2 - 6.5 % Final     Hemoglobin A1C   Date/Time Value Ref Range Status   09/25/2024 09:58 AM 6.2 (H) See comment % Final   03/31/2022 10:01 AM 6.3 (H) 4.0 - 6.0 % Final     Comment:     Hemoglobin A1C levels are related to mean blood glucose during the   preceding 2-3 months. The relationship table below may be used as a   general guide. Each 1% increase in HGB A1C is a reflection of an   increase in mean glucose of approximately 30 mg/dl.   Reference: Diabetes Care, volume 29, supplement 1 Jan. 2006                        HGB A1C ................. Approx. Mean Glucose   _______________________________________________   6%   ...............................  120 mg/dl   7%   ...............................  150 mg/dl   8%   ...............................  180 mg/dl   9%   ...............................  210 mg/dl   10%  ...............................  240 mg/dl  Performed at [address] [place] [address]     08/07/2018 08:44 AM 6.8 % Final     Comment:          Diagnosis of Diabetes-Adults   Non-Diabetic: < or = 5.6%   Increased risk for developing diabetes: 5.7-6.4%   Diagnostic of diabetes: > or = 6.5%  .       Monitoring of Diabetes                Age (y)     Therapeutic Goal (%)   Adults:          >18           <7.0   Pediatrics:    13-18           <7.5                   7-12           <8.0                   0- 6            7.5-8.5   American Diabetes Association. Diabetes Care 33(S1), Reji  2010.       LDL Calculated   Date/Time Value Ref Range Status   08/23/2023 10:42  (H) 65 - 130 MG/DL Final   03/31/2022 10:01  (H) 65 - 130 MG/DL Final   11/12/2020 11:38  (H) 65 - 130 MG/DL Final      Last I/O:  I/O last 3 completed shifts:  In: 1939.3 (15.3 mL/kg) [I.V.:539.3 (4.3 mL/kg); NG/GT:650; IV Piggyback:750]  Out: 3706 (29.3 mL/kg) [Urine:47 (0 mL/kg/hr); Other:3439; Chest Tube:220]  Weight: 126.7 kg     Past Cardiology Tests (Last 3 Years):    Echo:  Transthoracic Echo (TTE) Complete 10/01/2024  Transthoracic Echo (TTE) Complete    Result Date: 10/2/2024   [facility], [address] [place] [address]              Tel [phone] and Fax [phone] TRANSTHORACIC ECHOCARDIOGRAM REPORT  Patient Name:      KIMMIE BOBBY Rivera Physician:    [id number] Christian Jacobs DO Study Date:        10/1/2024            Ordering Provider:    [id number] HECTOR THOMPSON MRN/PID:           [id number]             Fellow: Accession#:        [id number]         Nurse: Date of Birth/Age: 1955 / 69 years Sonographer:          Ester Zurita RDCS Gender:            F                    Additional Staff: Height:            175.00 cm            Admit Date:           10/1/2024 Weight:            99.00 kg             Admission Status:     Inpatient -                                                               Routine BSA / BMI:         2.14 m2 / 32.33      Encounter#:           [id number]                    kg/m2 Blood Pressure:    110/49 mmHg          Department Location:  [facility] Non                                                               Invasive Study Type:    TRANSTHORACIC ECHO (TTE) COMPLETE Diagnosis/ICD: Bacteremia-R78.81 Indication:    bacteremia CPT Code:      Echo Complete w Full  [id number] Patient History: Diabetes:          Yes Pertinent History: HTN and Hyperlipidemia. Study Detail: The following Echo studies were performed: M-Mode, 2D, Doppler and               color flow. Technically challenging study due to patient lying in               supine position and body habitus. Definity used as a contrast               agent for endocardial border definition. Total contrast used for               this procedure was 2 mL via IV push.  PHYSICIAN INTERPRETATION: Left Ventricle: Left ventricular ejection fraction is low normal, by visual estimate at 50-55%. There are no regional left ventricular wall motion abnormalities. The left ventricular cavity size is normal. The left ventricular septal wall thickness is normal. There is normal left ventricular posterior wall thickness. Spectral Doppler shows an impaired relaxation pattern of left ventricular diastolic filling. There is no definite left ventricular thrombus visualized. Left Atrium: The left atrium was not well visualized. Right Ventricle: The right ventricle was not well visualized. Unable to determine right ventricular systolic function. Right Atrium: The right atrium was not well visualized. Aortic Valve: There is no visualized aortic valve vegetation. The aortic valve is trileaflet. There is mild aortic valve cusp calcification. The aortic valve dimensionless index is 0.95. There is no evidence of aortic valve regurgitation. The peak instantaneous gradient of the aortic valve is 7.8 mmHg. The mean gradient of the aortic valve is 4.1 mmHg. Mitral Valve: The mitral valve is mildly thickened. There was no mitral valve vegetation visualized. There is mild mitral annular calcification. There is trace to mild mitral valve regurgitation. Tricuspid Valve: No vegetation is seen on the tricuspid valve. The tricuspid valve is structurally normal. There is trace to mild tricuspid regurgitation. The Doppler estimated RVSP is mildly elevated at  40.4 mmHg. Pulmonic Valve: No pulmonic valve vegetation visualized. The pulmonic valve is structurally normal. There is physiologic pulmonic valve regurgitation. Pericardium: There is no pericardial effusion noted. Aorta: The aortic root is normal. The Ao Sinus is 3.40 cm. The Asc Ao is 4.00 cm. There is mild dilatation of the ascending aorta. There is no dilatation of the aortic root. Systemic Veins: The inferior vena cava appears normal in size, with IVC inspiratory collapse greater than 50%. In comparison to the previous echocardiogram(s): Compared with study dated 8/20/2024,.  CONCLUSIONS:  1. Left ventricular ejection fraction is low normal, by visual estimate at 50-55%.  2. Spectral Doppler shows an impaired relaxation pattern of left ventricular diastolic filling.  3. No left ventricular thrombus visualized.  4. Unable to determine right ventricular systolic function.  5. No mitral valve vegetation visualized.  6. Mildly elevated right ventricular systolic pressure.  7. No tricuspid valve vegetation.  8. No aortic valve vegetation visualized.  9. No pulmonic valve vegetation visualized. QUANTITATIVE DATA SUMMARY:  2D MEASUREMENTS:         Normal Ranges: LAs:             1.99 cm (2.7-4.0cm) IVSd:            0.99 cm (0.6-1.1cm) LVPWd:           1.13 cm (0.6-1.1cm) LVIDd:           3.38 cm (3.9-5.9cm) LVIDs:           2.67 cm LV Mass Index:   50 g/m2 LV % FS          20.9 %  M-MODE MEASUREMENTS:         Normal Ranges: Ao Root:             3.60 cm (2.0-3.7cm) LAs:                 3.81 cm (2.7-4.0cm)  AORTA MEASUREMENTS:         Normal Ranges: Ao Sinus, d:        3.40 cm (2.1-3.5cm) Ao STJ, d:          2.80 cm (1.7-3.4cm) Asc Ao, d:          4.00 cm (2.1-3.4cm)  LV SYSTOLIC FUNCTION BY 2D PLANIMETRY (MOD):                      Normal Ranges: EF-A4C View:    51 % (>=55%) EF-Visual:      53 % LV EF Reported: 53 %  LV DIASTOLIC FUNCTION:             Normal Ranges: MV Peak E:             0.79 m/s    (0.7-1.2 m/s) MV  Peak A:             1.02 m/s    (0.42-0.7 m/s) E/A Ratio:             0.78        (1.0-2.2) MV e'                  0.065 m/s   (>8.0) MV lateral e'          0.06 m/s MV medial e'           0.07 m/s MV A Dur:              110.37 msec E/e' Ratio:            12.21       (<8.0)  MITRAL VALVE:          Normal Ranges: MV DT:        225 msec (150-240msec)  AORTIC VALVE:                     Normal Ranges: AoV Vmax:                1.40 m/s (<=1.7m/s) AoV Peak P.8 mmHg (<20mmHg) AoV Mean P.1 mmHg (1.7-11.5mmHg) LVOT Max Jerrell:            1.21 m/s (<=1.1m/s) AoV VTI:                 27.01 cm (18-25cm) LVOT VTI:                25.75 cm LVOT Diameter:           2.19 cm  (1.8-2.4cm) AoV Area, VTI:           3.60 cm2 (2.5-5.5cm2) AoV Area,Vmax:           3.26 cm2 (2.5-4.5cm2) AoV Dimensionless Index: 0.95  TRICUSPID VALVE/RVSP:          Normal Ranges: Peak TR Velocity:     3.06 m/s RV Syst Pressure:     40 mmHg  (< 30mmHg) IVC Diam:             2.06 cm  PULMONIC VALVE:          Normal Ranges: PV Accel Time:  67 msec  (>120ms) PV Max Jerrell:     1.2 m/s  (0.6-0.9m/s) PV Max P.3 mmHg  AORTA: Asc Ao Diam 4.05 cm  [id number] Christian Jacobs DO Electronically signed on 10/2/2024 at 11:52:46 AM  ** Final **     Transthoracic echo (TTE) complete    Result Date: 2024           [address] [address]            Phone [phone] TRANSTHORACIC ECHOCARDIOGRAM REPORT Patient Name:      KIMMIE ROSALES LILIA Rivera Physician:    [id number] Milnid Lang DO Study Date:        2024            Ordering Provider:    [id number] ROMERO GARCIA MRN/PID:           [id number]             Fellow: Accession#:        [id number]         Nurse: Date of Birth/Age: 1955 / 69 years Sonographer:          Ras Alvarez RDCS Gender:            F                     Additional Staff: Height:            170.18 cm            Admit Date: Weight:            77.11 kg             Admission Status:     Outpatient BSA / BMI:         1.89 m2 / 26.63      Department Location:  [facility]                    kg/m2 Blood Pressure: 104 /56 mmHg Study Type:    TRANSTHORACIC ECHO (TTE) COMPLETE Diagnosis/ICD: Essential (primary) hypertension-I10 Indication:    HTN CPT Codes:     Echo Complete w Full [id number]  Study Detail: The following Echo studies were performed: 2D, M-Mode, Doppler and               color flow. Technically challenging study due to poor acoustic               windows.  PHYSICIAN INTERPRETATION: Left Ventricle: Left ventricular ejection fraction is normal, by visual estimate at 60-65%. There are no regional wall motion abnormalities. The left ventricular cavity size is normal. The left ventricular septal wall thickness is mildly increased. There is mildly increased left ventricular posterior wall thickness. Spectral Doppler shows an impaired relaxation pattern of left ventricular diastolic filling. Left Atrium: The left atrium is normal in size. Right Ventricle: The right ventricle is normal in size. There is normal right ventricular global systolic function. Right Atrium: The right atrium is normal in size. Aortic Valve: The aortic valve is trileaflet. There is no evidence of aortic valve regurgitation. Mitral Valve: The mitral valve is normal in structure. There is mild mitral valve regurgitation. Tricuspid Valve: The tricuspid valve is structurally normal. No evidence of tricuspid regurgitation. Pulmonic Valve: The pulmonic valve is not well visualized. The pulmonic valve regurgitation was not well visualized. Pericardium: There is no pericardial effusion noted. Aorta: The aortic root is normal.  CONCLUSIONS:  1. Left ventricular ejection fraction is normal, by visual estimate at 60-65%.  2. Spectral Doppler shows an impaired relaxation pattern of left  ventricular diastolic filling.  3. There is normal right ventricular global systolic function. QUANTITATIVE DATA SUMMARY: 2D MEASUREMENTS:                          Normal Ranges: Ao Root s:     3.45 cm IVSd:          1.17 cm   (0.6-1.1cm) LVPWd:         1.07 cm   (0.6-1.1cm) LVIDd:         4.59 cm   (3.9-5.9cm) LVIDs:         3.30 cm LV Mass Index: 98.2 g/m2 LV % FS        28.2 % LA VOLUME:                              Normal Ranges: LA Vol A4C:       43.4 ml LA Vol A2C:       49.4 ml LA Vol Index BSA: 24.6 ml/m2 RA VOLUME BY A/L METHOD:                       Normal Ranges: RA Area A4C: 10.0 cm2 LV SYSTOLIC FUNCTION BY 2D PLANIMETRY (MOD):                      Normal Ranges: EF-A4C View:    59 % (>=55%) EF-A2C View:    48 % EF-Biplane:     54 % EF-Visual:      63 % EF-3DQ:         55 % LV EF Reported: 63 % LV DIASTOLIC FUNCTION:                         Normal Ranges: MV Peak E:    0.74 m/s  (0.7-1.2 m/s) MV Peak A:    0.97 m/s  (0.42-0.7 m/s) E/A Ratio:    0.76      (1.0-2.2) MV e'         0.067 m/s (>8.0) MV lateral e' 0.07 m/s MV medial e'  0.07 m/s E/e' Ratio:   11.05     (<8.0) MITRAL VALVE:                 Normal Ranges: MV DT: 318 msec (150-240msec) AORTIC VALVE:                         Normal Ranges: LVOT Max Jerrell:  0.87 m/s (<=1.1m/s) LVOT VTI:      23.37 cm LVOT Diameter: 2.10 cm  (1.8-2.4cm)  RIGHT VENTRICLE: RV Basal 3.00 cm RV Mid   2.00 cm RV Major 5.0 cm TRICUSPID VALVE/RVSP:                   Normal Ranges: IVC Diam: 1.70 cm PULMONIC VALVE:                      Normal Ranges: PV Max Jerrell: 0.8 m/s  (0.6-0.9m/s) PV Max P.6 mmHg AORTA: Asc Ao Diam 3.87 cm  [id number] Milind Lang DO Electronically signed on 2024 at 2:43:11 PM  ** Final **       Transthoracic echo (TTE) complete 2024  PHYSICIAN INTERPRETATION:  Left Ventricle: Left ventricular ejection fraction is low normal, by visual estimate at 50-55%. There are no regional left ventricular wall motion abnormalities. The left ventricular  cavity size is normal. The left ventricular septal wall thickness is normal. There is normal left ventricular posterior wall thickness. Spectral Doppler shows an impaired relaxation pattern of left ventricular diastolic filling. There is no definite left ventricular thrombus visualized.  Left Atrium: The left atrium was not well visualized.  Right Ventricle: The right ventricle was not well visualized. Unable to determine right ventricular systolic function.  Right Atrium: The right atrium was not well visualized.  Aortic Valve: There is no visualized aortic valve vegetation. The aortic valve is trileaflet. There is mild aortic valve cusp calcification. The aortic valve dimensionless index is 0.95. There is no evidence of aortic valve regurgitation. The peak instantaneous gradient of the aortic valve is 7.8 mmHg. The mean gradient of the aortic valve is 4.1 mmHg.  Mitral Valve: The mitral valve is mildly thickened. There was no mitral valve vegetation visualized. There is mild mitral annular calcification. There is trace to mild mitral valve regurgitation.  Tricuspid Valve: No vegetation is seen on the tricuspid valve. The tricuspid valve is structurally normal. There is trace to mild tricuspid regurgitation. The Doppler estimated RVSP is mildly elevated at 40.4 mmHg.  Pulmonic Valve: No pulmonic valve vegetation visualized. The pulmonic valve is structurally normal. There is physiologic pulmonic valve regurgitation.  Pericardium: There is no pericardial effusion noted.  Aorta: The aortic root is normal. The Ao Sinus is 3.40 cm. The Asc Ao is 4.00 cm. There is mild dilatation of the ascending aorta. There is no dilatation of the aortic root.  Systemic Veins: The inferior vena cava appears normal in size, with IVC inspiratory collapse greater than 50%.  In comparison to the previous echocardiogram(s): Compared with study dated 8/20/2024,.        CONCLUSIONS:   1. Left ventricular ejection fraction is low normal, by  visual estimate at 50-55%.   2. Spectral Doppler shows an impaired relaxation pattern of left ventricular diastolic filling.   3. No left ventricular thrombus visualized.   4. Unable to determine right ventricular systolic function.   5. No mitral valve vegetation visualized.   6. Mildly elevated right ventricular systolic pressure.   7. No tricuspid valve vegetation.   8. No aortic valve vegetation visualized.   9. No pulmonic valve vegetation visualized.       Stress Test:  Nuclear Stress Test 08/20/2024    IMPRESSION:  1. Limited study as described above. Decreased activity within the  lateral wall on stress imaging could be related to imaging with the  left arm down versus a small territory of stress-induced ischemia.  Consider ammonia PET-CT for further evaluation. No evidence of prior  infarction.      2. The left ventricle is normal in size.      3. Normal LV wall motion with a post-stress LV EF estimated at 60%.      Past Medical History:  She has a past medical history of Degenerative myopia, bilateral, Diabetic neuropathy (Multi), Difficult intubation (08/26/2024), DM type 2 (diabetes mellitus, type 2) (Multi), Dry eye syndrome of bilateral lacrimal glands, Essential hypertension, Essential tremor, Glaucoma, Hyperlipidemia, Long term (current) use of insulin (Multi), Low back pain, PONV (postoperative nausea and vomiting), Primary open angle glaucoma of both eyes, severe stage, Repeated falls, Sarcoidosis of lung (Multi), Spinal stenosis, lumbar region without neurogenic claudication, and Weakness.    Past Surgical History:  She has a past surgical history that includes Carpal tunnel release; Vitrectomy (Right, 2013); Cataract extraction w/  intraocular lens implant (Bilateral); Panretinal photocoagulation (2014); Foot surgery; Insertion / removal cranial DBS generator; Thoracic Fusion (08/26/2024); Lumbar fusion; Blepharoplasty (07/2022); and Breast surgery (05/20/2022).      Social History:  She reports  that she has quit smoking. Her smoking use included cigarettes. She has been exposed to tobacco smoke. She has never used smokeless tobacco. She reports that she does not currently use alcohol. She reports that she does not currently use drugs.    Family History:  Family History   Problem Relation Name Age of Onset    Multiple myeloma Mother      Cancer Mother      Other (CABG) Father      Pulmonary embolism Father      Heart disease Father      Breast cancer Sister          Stage II    Hypertension Sister      Diabetes Sister      No Known Problems Sister          x5    No Known Problems Brother          x4    No Known Problems Daughter          Allergies:  Erythromycin, Morphine, and Rosuvastatin    Inpatient Medications:  Scheduled medications   Medication Dose Route Frequency    brimonidine  1 drop Both Eyes BID    [Held by provider] calcium carbonate-vitamin D3  1 tablet oral Daily    cefTRIAXone  2 g intravenous q24h    daptomycin  700 mg intravenous Daily    ezetimibe  10 mg oral Daily    heparin (porcine)  5,000 Units subcutaneous q8h    hydrocortisone sodium succinate  100 mg intravenous q8h    insulin lispro  0-15 Units subcutaneous q4h    ipratropium-albuteroL  3 mL nebulization 4x daily    latanoprost  1 drop Both Eyes Nightly    oxygen   inhalation Continuous - Inhalation    pantoprazole  40 mg oral Daily before breakfast    Or    pantoprazole  40 mg intravenous Daily before breakfast    primidone  125 mg oral Nightly    [Held by provider] propranolol LA  60 mg oral Daily    sodium chloride  3 mL nebulization 4x daily     PRN medications   Medication    acetaminophen    alteplase    dextrose    dextrose    docusate sodium    fentaNYL    glucagon    glucagon    heparin    heparin    heparin flush    [Held by provider] HYDROmorphone    [Held by provider] traMADol     Continuous Medications   Medication Dose Last Rate    fentaNYL  0-100 mcg/hr 50 mcg/hr (10/24/24 0900)    norepinephrine  0-0.5 mcg/kg/min  0.03 mcg/kg/min (10/24/24 0900)    PrismaSol 4/2.5  25 mL/kg/hr 25 mL/kg/hr (10/24/24 0320)    propofol  0-50 mcg/kg/min 10 mcg/kg/min (10/24/24 0900)     Outpatient Medications:  Current Outpatient Medications   Medication Instructions    acetaminophen (TYLENOL) 1,000 mg, oral, 3 times daily    ascorbic acid (Vitamin C) 500 mg tablet as directed Orally    brimonidine (AlphaGAN) 0.2 % ophthalmic solution 1 drop, Both Eyes, 2 times daily    calcium carbonate-vitamin D3 500 mg-5 mcg (200 unit) tablet 1 tablet, oral, Daily    cefTRIAXone (ROCEPHIN) 2 g, intravenous, Every 24 hours, Once weekly labs CBC/diff, CMP, Vanc trough ESR, CRP fax to Dr. Juarez [phone]. Stop date 11/12/24.    dextrose 12.5 g, intravenous, Every 15 min PRN    dextrose 25 g, intravenous, Every 15 min PRN    docusate sodium (COLACE) 100 mg, oral, 2 times daily    ezetimibe (ZETIA) 10 mg, oral, Daily    FreeStyle Lite Strips strip USE TO TEST 3 TIMES A DAY AS DIRECTED    glucagon (GLUCAGEN) 1 mg, intramuscular, Every 15 min PRN    glucagon (GLUCAGEN) 1 mg, intramuscular, Every 15 min PRN    heparin (porcine) 5,000 Units, subcutaneous, Every 8 hours    insulin lispro 0-15 Units, subcutaneous, 3 times daily (morning, midday, late afternoon), Take as directed per insulin instructions.Do not hold when patient is not eating, continue order as scheduled for hyperglycemia management.  Insulin Lispro Corrective Scale #3     Hypoglycemia protocol Call LIP unit(s) if Blood Glucose is between 0 - 70 mg/dL     0 unit(s) if Blood glucose is between    3 unit(s) if Blood glucose is between 151-200   6 unit(s) if Blood glucose is between 201-250   9 unit(s) if Blood glucose is between 251-300   12 unit(s) if Blood glucose is between 301-350   15 unit(s) if Blood glucose is between 351-400    Notify provider unit(s) if Blood Glucose is greater than 400 mg/dL    Lactobacillus acidophilus 100 mg (1 billion cell) capsule 1 capsule, oral, 2 times daily     "latanoprost (Xalatan) 0.005 % ophthalmic solution 1 drop, Both Eyes, Nightly    melatonin 5 mg tablet Take 1 tablet (5 mg) by mouth as needed at bedtime for sleep.    methocarbamol (ROBAXIN) 500 mg, oral, 3 times daily    multivitamin tablet 1 tablet, oral, Daily    ondansetron (ZOFRAN) 4 mg, intravenous, Every 6 hours PRN    oxyCODONE (ROXICODONE) 5 mg, oral, Every 6 hours PRN    oxygen (O2) gas therapy 1 each, inhalation, Continuous    pantoprazole (PROTONIX) 40 mg, oral, Daily before breakfast, Do not crush, chew, or split.    pantoprazole (PROTONIX) 40 mg, intravenous, Daily before breakfast, If unable to take PO.    pen needle, diabetic (PEN NEEDLE MISC) BD Altagracia- 4 mm X 32 G needle - as directed 4x a day sc 4 times per day    polyethylene glycol (GLYCOLAX, MIRALAX) 17 g, oral, Daily    primidone 125 mg, oral, 3 times daily    propranolol LA (INDERAL LA) 60 mg, oral, Daily (0630), Hold for SBP < 110 mmhg, HR < 60 bpm.    sennosides (Senokot) 8.6 mg tablet 1 tablet, oral, Every 12 hours PRN    Sure Comfort Pen Needle 32 gauge x 5/32\" needle AS DIRECTED DAILY FOR 90 DAYS    traZODone (DESYREL) 25 mg, oral, Nightly    vancomycin (Vancocin) 1 gram/250 mL solution 1 g, intravenous, Every 12 hours, Once weekly labs CBC/diff, CMP, Vanc trough ESR, CRP fax to Dr. Juarez [phone]. Stop date 11/12/24.       Physical Exam:  Physical Exam  Vitals and nursing note reviewed.   Constitutional:       Comments: Intubated   HENT:      Head: Normocephalic.   Eyes:      Extraocular Movements: Extraocular movements intact.      Pupils: Pupils are equal, round, and reactive to light.   Cardiovascular:      Rate and Rhythm: Normal rate and regular rhythm.      Pulses:           Radial pulses are 1+ on the right side and 1+ on the left side.        Dorsalis pedis pulses are 1+ on the right side and 1+ on the left side.      Heart sounds: Normal heart sounds, S1 normal and S2 normal.      Comments: Anasarca present  Pulmonary:      " Effort: Tachypnea and accessory muscle usage present.      Breath sounds: Rhonchi and rales present. No wheezing.   Chest:      Comments: L chest pigtail catheter in place draining straw colored output.  Abdominal:      General: Abdomen is flat. Bowel sounds are normal.      Palpations: Abdomen is soft.      Tenderness: There is no abdominal tenderness.   Genitourinary:     Comments: Gibson in place draining minimal straw colored urine  Musculoskeletal:      Cervical back: Neck supple.      Right lower le+ Edema present.      Left lower le+ Edema present.   Skin:     General: Skin is warm.      Capillary Refill: Capillary refill takes less than 2 seconds.      Coloration: Skin is not cyanotic, jaundiced or mottled.   Neurological:      General: No focal deficit present.      Mental Status: She is alert and easily aroused.      GCS: GCS eye subscore is 3. GCS verbal subscore is 4. GCS motor subscore is 6.      Motor: Weakness present.      Assessment/Plan   EP was consulted for bradycardia.  Patient was on propranolol for essential tremor that was held on  due to hypotension.  She is intubated, currently on propofol infusion and levo.  Her white counts today are 14 hematocrit 23 magnesium is 2.2 potassium 4.3.  Echocardiogram from 2024 showed EF of 50 to 55% with mildly elevated right ventricular systolic pressure.  EKG from  showed sinus rhythm with normal VA and overall normal QRS duration normal QTc and a heart rate of 67 bpm.  Her telemetry shows sinus rhythm with heart rates between 60 and 80 bpm in average.  Telemetry has automatically read by mistake episodes of pauses and asystole that are not present. No changes suggested at this point.     ETT  7.5 mm (Active)   Secured at (cm) 23 cm 10/24/24 0700   Measured from Lips 10/24/24 0700   Secured Location Left 10/24/24 0700   Secured by Commercial tube hinojosa 10/24/24 0700   Site Condition Dry 10/24/24 0700   Number of days:  5       Midline 09/25/24 Single lumen Left (Active)   Site Assessment Clean;Dry;Intact 10/24/24 0400   Proximal Lumen Status Infusing 10/24/24 0400   Dressing Type Antimicrobial patch;Transparent 10/24/24 0400   Dressing Status Clean;Dry;Occlusive 10/24/24 0400   Number of days: 29       NG/OG/Feeding Tube NG - [place] sump 14 Fr Left nostril (Active)   Feeding Tube Flushed With Tap water 10/24/24 0914   Intake - Flush (mL) 50 mL 10/24/24 0914   Number of days: 5       Hemodialysis Cath 10/19/24 Triple lumen Right Non-tunneled catheter Jugular (Active)   Line Necessity Continuous renal replacement therapy 10/24/24 0400   Site Assessment Clean;Dry;Intact 10/24/24 0400   Proximal Lumen Status Infusing 10/24/24 0400   Medial Lumen Status Infusing 10/24/24 0400   Distal Lumen Status Infusing 10/24/24 0400   Line Care Connections checked and tightened 10/24/24 0400   Dressing Type Antimicrobial patch;Transparent 10/24/24 0400   Dressing Status Clean;Dry 10/24/24 0400   Number of days: 5       Urethral Catheter Non-latex 16 Fr. (Active)   Site Assessment Clean;Skin intact 10/24/24 0800   Collection Container Urometer 10/24/24 0800   Securement Method Securing device (Describe) 10/24/24 0800   Reason for Continuing Urinary Catheterization accurate hourly measurement of urine volume in a critically ill patient that cannot be assessed by other volumes and urine collection strategies 10/24/24 0800   Output (mL) 5 mL 10/24/24 0800   Number of days: 12       Code Status:  Full Code        Veda Lr, APRN-CNP

## 2024-10-24 NOTE — NURSING NOTE
Vascular access note  Lt arm single lumen midline removed after picc placed, catheter length 20 cm and intact, occlusive dressing applied, old skin tear noted under dressing when removed, appears to have been from previous securement device.

## 2024-10-24 NOTE — PROGRESS NOTES
Patient not medically clear. Patient remains intubated. Patient on CRRT. At this time there is not a safe discharge plan in place. Therapy to evaluate patient. Patient was at [facility] prior to admission. If she will return then a precert will be needed. Will follow.      10/24/24 1322   Discharge Planning   Home or Post Acute Services Other (Comment)  (TBD)   Expected Discharge Disposition [place]  (TBD)   Does the patient need discharge transport arranged? Yes   RoundTrip coordination needed? Yes

## 2024-10-24 NOTE — PROGRESS NOTES
Narda Malloy is a 69 y.o. female on day 12 of admission presenting with Unresponsive.      Subjective   The patient is on CRRT and tolerating well.  She is net -1.2 L yesterday.  Not really making much urine per the nurse.  Is on low-dose Levophed.       Objective          Vitals 24HR  Heart Rate:  [39-83]   Temp:  [35 °C (95 °F)-35.8 °C (96.4 °F)]   Resp:  [17-30]   BP: ()/(45-80)   Weight:  [126 kg (277 lb 5.4 oz)]   SpO2:  [83 %-99 %]       Intake/Output last 3 Shifts:    Intake/Output Summary (Last 24 hours) at 10/24/2024 1301  Last data filed at 10/24/2024 1100  Gross per 24 hour   Intake 1399.01 ml   Output 2313 ml   Net -913.99 ml       Physical Exam  Constitutional:       Comments: Intubated and sedated   Neck: Right IJ temporary dialysis catheter present  Cardiovascular:   Distant heart sounds  Pulmonary:      Comments: Mechanically ventilated, rhonchi present anteriorly  Abdominal:      General: Bowel sounds are normal.      Palpations: Abdomen is soft.   Musculoskeletal:      Comments: 1+ edema     Relevant Results  Results for orders placed or performed during the hospital encounter of 10/12/24 (from the past 24 hours)   Hepatic function panel   Result Value Ref Range    Albumin 2.6 (L) 3.4 - 5.0 g/dL    Bilirubin, Total 0.3 0.0 - 1.2 mg/dL    Bilirubin, Direct 0.0 0.0 - 0.3 mg/dL    Alkaline Phosphatase 103 33 - 136 U/L    ALT 5 (L) 7 - 45 U/L    AST 9 9 - 39 U/L    Total Protein 5.2 (L) 6.4 - 8.2 g/dL   Phosphorus   Result Value Ref Range    Phosphorus 2.4 (L) 2.5 - 4.9 mg/dL   Basic Metabolic Panel   Result Value Ref Range    Glucose 158 (H) 74 - 99 mg/dL    Sodium 135 (L) 136 - 145 mmol/L    Potassium 4.5 3.5 - 5.3 mmol/L    Chloride 104 98 - 107 mmol/L    Bicarbonate 25 21 - 32 mmol/L    Anion Gap 11 10 - 20 mmol/L    Urea Nitrogen 28 (H) 6 - 23 mg/dL    Creatinine 1.44 (H) 0.50 - 1.05 mg/dL    eGFR 40 (L) >60 mL/min/1.73m*2    Calcium 7.4 (L) 8.6 - 10.3 mg/dL   Magnesium   Result Value Ref  Range    Magnesium 2.11 1.60 - 2.40 mg/dL   CBC and Auto Differential   Result Value Ref Range    WBC 14.9 (H) 4.4 - 11.3 x10*3/uL    nRBC 0.0 0.0 - 0.0 /100 WBCs    RBC 2.64 (L) 4.00 - 5.20 x10*6/uL    Hemoglobin 8.2 (L) 12.0 - 16.0 g/dL    Hematocrit 25.8 (L) 36.0 - 46.0 %    MCV 98 80 - 100 fL    MCH 31.1 26.0 - 34.0 pg    MCHC 31.8 (L) 32.0 - 36.0 g/dL    RDW 19.6 (H) 11.5 - 14.5 %    Platelets 313 150 - 450 x10*3/uL    Neutrophils % 83.5 40.0 - 80.0 %    Immature Granulocytes %, Automated 4.0 (H) 0.0 - 0.9 %    Lymphocytes % 6.5 13.0 - 44.0 %    Monocytes % 5.3 2.0 - 10.0 %    Eosinophils % 0.5 0.0 - 6.0 %    Basophils % 0.2 0.0 - 2.0 %    Neutrophils Absolute 12.46 (H) 1.20 - 7.70 x10*3/uL    Immature Granulocytes Absolute, Automated 0.60 0.00 - 0.70 x10*3/uL    Lymphocytes Absolute 0.97 (L) 1.20 - 4.80 x10*3/uL    Monocytes Absolute 0.79 0.10 - 1.00 x10*3/uL    Eosinophils Absolute 0.07 0.00 - 0.70 x10*3/uL    Basophils Absolute 0.03 0.00 - 0.10 x10*3/uL   POCT GLUCOSE   Result Value Ref Range    POCT Glucose 176 (H) 74 - 99 mg/dL   POCT GLUCOSE   Result Value Ref Range    POCT Glucose 171 (H) 74 - 99 mg/dL   POCT GLUCOSE   Result Value Ref Range    POCT Glucose 162 (H) 74 - 99 mg/dL   POCT GLUCOSE   Result Value Ref Range    POCT Glucose 170 (H) 74 - 99 mg/dL   Magnesium   Result Value Ref Range    Magnesium 2.14 1.60 - 2.40 mg/dL   CBC and Auto Differential   Result Value Ref Range    WBC 10.7 4.4 - 11.3 x10*3/uL    nRBC 0.0 0.0 - 0.0 /100 WBCs    RBC 2.42 (L) 4.00 - 5.20 x10*6/uL    Hemoglobin 7.6 (L) 12.0 - 16.0 g/dL    Hematocrit 23.8 (L) 36.0 - 46.0 %    MCV 98 80 - 100 fL    MCH 31.4 26.0 - 34.0 pg    MCHC 31.9 (L) 32.0 - 36.0 g/dL    RDW 19.4 (H) 11.5 - 14.5 %    Platelets 252 150 - 450 x10*3/uL    Neutrophils % 84.6 40.0 - 80.0 %    Immature Granulocytes %, Automated 3.2 (H) 0.0 - 0.9 %    Lymphocytes % 6.3 13.0 - 44.0 %    Monocytes % 5.2 2.0 - 10.0 %    Eosinophils % 0.6 0.0 - 6.0 %    Basophils %  0.1 0.0 - 2.0 %    Neutrophils Absolute 9.03 (H) 1.20 - 7.70 x10*3/uL    Immature Granulocytes Absolute, Automated 0.34 0.00 - 0.70 x10*3/uL    Lymphocytes Absolute 0.67 (L) 1.20 - 4.80 x10*3/uL    Monocytes Absolute 0.56 0.10 - 1.00 x10*3/uL    Eosinophils Absolute 0.06 0.00 - 0.70 x10*3/uL    Basophils Absolute 0.01 0.00 - 0.10 x10*3/uL   Hepatic function panel   Result Value Ref Range    Albumin 2.4 (L) 3.4 - 5.0 g/dL    Bilirubin, Total 0.3 0.0 - 1.2 mg/dL    Bilirubin, Direct 0.0 0.0 - 0.3 mg/dL    Alkaline Phosphatase 105 33 - 136 U/L    ALT 6 (L) 7 - 45 U/L    AST 10 9 - 39 U/L    Total Protein 4.9 (L) 6.4 - 8.2 g/dL   Phosphorus   Result Value Ref Range    Phosphorus 2.5 2.5 - 4.9 mg/dL   Calcium, ionized   Result Value Ref Range    POCT Calcium, Ionized 1.07 (L) 1.1 - 1.33 mmol/L   Basic metabolic panel   Result Value Ref Range    Glucose 171 (H) 74 - 99 mg/dL    Sodium 135 (L) 136 - 145 mmol/L    Potassium 4.3 3.5 - 5.3 mmol/L    Chloride 103 98 - 107 mmol/L    Bicarbonate 26 21 - 32 mmol/L    Anion Gap 10 10 - 20 mmol/L    Urea Nitrogen 20 6 - 23 mg/dL    Creatinine 1.05 0.50 - 1.05 mg/dL    eGFR 58 (L) >60 mL/min/1.73m*2    Calcium 7.3 (L) 8.6 - 10.3 mg/dL   POCT GLUCOSE   Result Value Ref Range    POCT Glucose 169 (H) 74 - 99 mg/dL   POCT GLUCOSE   Result Value Ref Range    POCT Glucose 148 (H) 74 - 99 mg/dL     *Note: Due to a large number of results and/or encounters for the requested time period, some results have not been displayed. A complete set of results can be found in Results Review.            Assessment/Plan   Impression:  Acute kidney injury likely from acute tubular necrosis from multiple factors including shock hypotension as well as vancomycin toxicity  Acute respiratory failure  Edema  Septic shock  Pneumonia  Diabetes mellitus type 2  Malnutrition  Lower back surgery site infection     Recommendations:  Continue CRRT and monitor for any signs of renal recovery.  Dose medications  including antibiotics for CRRT. Ok to be on regular feeds while on CRRT.       Nelia Cheung MD

## 2024-10-24 NOTE — CARE PLAN
Problem: Pain - Adult  Goal: Verbalizes/displays adequate comfort level or baseline comfort level  Outcome: Progressing  Flowsheets (Taken 10/24/2024 1647)  Verbalizes/displays adequate comfort level or baseline comfort level:   Assess pain using appropriate pain scale   Administer analgesics based on type and severity of pain and evaluate response   Implement non-pharmacological measures as appropriate and evaluate response   Notify Licensed Independent Practitioner if interventions unsuccessful or patient reports new pain     Problem: Safety - Adult  Goal: Free from fall injury  Outcome: Progressing  Flowsheets (Taken 10/24/2024 1647)  Free from fall injury: Instruct family/caregiver on patient safety     Problem: Discharge Planning  Goal: Discharge to home or other facility with appropriate resources  Outcome: Progressing  Flowsheets (Taken 10/24/2024 1647)  Discharge to home or other facility with appropriate resources:   Identify barriers to discharge with patient and caregiver   Arrange for needed discharge resources and transportation as appropriate   Identify discharge learning needs (meds, wound care, etc)   Refer to discharge planning if patient needs post-hospital services based on physician order or complex needs related to functional status, cognitive ability or social support system     Problem: Chronic Conditions and Co-morbidities  Goal: Patient's chronic conditions and co-morbidity symptoms are monitored and maintained or improved  Outcome: Progressing  Flowsheets (Taken 10/24/2024 1647)  Care Plan - Patient's Chronic Conditions and Co-Morbidity Symptoms are Monitored and Maintained or Improved:   Monitor and assess patient's chronic conditions and comorbid symptoms for stability, deterioration, or improvement   Collaborate with multidisciplinary team to address chronic and comorbid conditions and prevent exacerbation or deterioration   Update acute care plan with appropriate goals if chronic or  comorbid symptoms are exacerbated and prevent overall improvement and discharge     Problem: Diabetes  Goal: Increase stability of blood glucose readings by end of shift  Outcome: Progressing  Flowsheets (Taken 10/24/2024 1647)  Increase stability of blood glucose readings by end of shift: Med administration/monitoring of effect  Goal: Maintain electrolyte levels within acceptable range throughout shift  Outcome: Progressing  Flowsheets (Taken 10/24/2024 1647)  Maintain electrolyte levels within acceptable range throughout shift:   Med administration/monitoring of effect   Monitor urine output  Goal: Maintain glucose levels >70mg/dl to <250mg/dl throughout shift  Outcome: Progressing  Flowsheets (Taken 10/24/2024 1647)  Maintain glucose levels >70mg/dl to <250mg/dl throughout shift: Med administration/monitoring of effect  Goal: Learn about and adhere to nutrition recommendations by end of shift  Outcome: Progressing  Flowsheets (Taken 10/24/2024 1647)  Learn about and adhere to nutrition recommendations by end of shift: Ensure/encourage compliance with appropriate diet  Goal: Vital signs within normal range for age by end of shift  Outcome: Progressing  Flowsheets (Taken 10/24/2024 1647)  Vital signs within normal range for age by end of shift: Med administration/monitoring of effect  Goal: Increase self care and/or family involovement by end of shift  Outcome: Progressing  Goal: Receive DSME education by end of shift  Outcome: Progressing  Flowsheets (Taken 10/24/2024 1647)  Receive DSME education by end of shift: Provide patient centered education on Diabetic Self Management Education     Problem: Knowledge Deficit  Goal: Patient/family/caregiver demonstrates understanding of disease process, treatment plan, medications, and discharge instructions  Outcome: Progressing  Flowsheets (Taken 10/24/2024 1647)  Patient/family/caregiver demonstrates understanding of disease process, treatment plan, medications, and  discharge instructions:   Complete learning assessment and assess knowledge base   Provide teaching at level of understanding   Provide teaching via preferred learning methods     Problem: Skin  Goal: Decreased wound size/increased tissue granulation at next dressing change  Outcome: Progressing  Flowsheets (Taken 10/24/2024 1647)  Decreased wound size/increased tissue granulation at next dressing change:   Promote sleep for wound healing   Utilize specialty bed per algorithm   Protective dressings over bony prominences  Goal: Participates in plan/prevention/treatment measures  Outcome: Progressing  Flowsheets (Taken 10/24/2024 1647)  Participates in plan/prevention/treatment measures: Elevate heels  Goal: Prevent/manage excess moisture  Outcome: Progressing  Flowsheets (Taken 10/24/2024 1647)  Prevent/manage excess moisture:   Cleanse incontinence/protect with barrier cream   Moisturize dry skin   Follow provider orders for dressing changes   Monitor for/manage infection if present  Goal: Prevent/minimize sheer/friction injuries  Outcome: Progressing  Flowsheets (Taken 10/24/2024 1647)  Prevent/minimize sheer/friction injuries:   Complete micro-shifts as needed if patient unable. Adjust patient position to relieve pressure points, not a full turn   HOB 30 degrees or less   Increase activity/out of bed for meals   Turn/reposition every 2 hours/use positioning/transfer devices   Use pull sheet   Utilize specialty bed per algorithm  Goal: Promote/optimize nutrition  Outcome: Progressing  Flowsheets (Taken 10/24/2024 1647)  Promote/optimize nutrition:   Assist with feeding   Consume > 50% meals/supplements   Monitor/record intake including meals   Reassess MST if dietician not consulted  Goal: Promote skin healing  Outcome: Progressing  Flowsheets (Taken 10/24/2024 1647)  Promote skin healing:   Assess skin/pad under line(s)/device(s)   Ensure correct size (line/device) and apply per  instructions    Protective dressings over bony prominences   Rotate device position/do not position patient on device   Turn/reposition every 2 hours/use positioning/transfer devices     Problem: Nutrition  Goal: Consume prescribed supplement  Outcome: Progressing  Goal: Nutrition support goals are met within 48 hrs  Outcome: Progressing  Goal: Nutrition support is meeting 75% of nutrient needs  Outcome: Progressing  Goal: BG  mg/dL  Outcome: Progressing  Goal: Lab values WNL  Outcome: Progressing  Goal: Electrolytes WNL  Outcome: Progressing  Goal: Promote healing  Outcome: Progressing  Goal: Maintain stable weight  Outcome: Progressing  Goal: Reduce weight from edema/fluid  Outcome: Progressing     Problem: Respiratory  Goal: No signs of respiratory distress (eg. Use of accessory muscles. Peds grunting)  Outcome: Progressing  Goal: Clear secretions with interventions this shift  Outcome: Progressing  Flowsheets (Taken 10/24/2024 1647)  Clear secretions with interventions this shift:   Encourage/provide pulmonary hygiene/secretion clearance   Incentive spirometry   Med administration/monitoring of effect   Suctioning  Goal: Minimize anxiety/maximize coping throughout shift  Outcome: Progressing  Flowsheets (Taken 10/24/2024 1647)  Minimize anxiety/maximize coping throughout shift:   Med administration/monitoring of effect   Monitor pain/anxiety level  Goal: Minimal/no exertional discomfort or dyspnea this shift  Outcome: Progressing  Flowsheets (Taken 10/24/2024 1647)  Minimal/no exertional discomfort or dyspnea this shift: Positioning to promote ventilation/comfort  Goal: Patent airway maintained this shift  Outcome: Progressing  Flowsheets (Taken 10/24/2024 1647)  Patent airway maintained this shift: Maintain ET tube tube position  Goal: Tolerate mechanical ventilation evidenced by VS/agitation level this shift  Outcome: Progressing  Flowsheets (Taken 10/24/2024 1647)  Tolerate mechanical ventilation evidenced by  VS/agitation level this shift:   Maintain ET tube tube position   Mechanical ventilation  Goal: Tolerate pulmonary toileting this shift  Outcome: Progressing  Flowsheets (Taken 10/24/2024 1647)  Tolerate pulmonary toileting this shift:   Evaluate chest drainage   Positioning to promote ventilation/comfort  Goal: Verbalize decreased shortness of breath this shift  Outcome: Progressing  Flowsheets (Taken 10/24/2024 1647)  Verbalize decreased shortness of breath this shift:   Encourage/provide pulmonary hygiene/secretion clearance   Suctioning  Goal: Wean oxygen to maintain O2 saturation per order/standard this shift  Outcome: Progressing  Flowsheets (Taken 10/24/2024 1647)  Wean oxygen to maintain O2 saturation per order/standard this shift: Encourage activity/mobility  Goal: Increase self care and/or family involvement in next 24 hours  Outcome: Progressing  Flowsheets (Taken 10/24/2024 1647)  Increase self care and/or family involvement in next 24 hours: Encourage activity/mobility     Problem: Pain  Goal: Takes deep breaths with improved pain control throughout the shift  Outcome: Progressing  Goal: Turns in bed with improved pain control throughout the shift  Outcome: Progressing  Goal: Walks with improved pain control throughout the shift  Outcome: Progressing  Goal: Performs ADL's with improved pain control throughout shift  Outcome: Progressing  Goal: Participates in PT with improved pain control throughout the shift  Outcome: Progressing  Goal: Free from opioid side effects throughout the shift  Outcome: Progressing  Goal: Free from acute confusion related to pain meds throughout the shift  Outcome: Progressing     Problem: Safety - Medical Restraint  Goal: Remains free of injury from restraints (Restraint for Interference with Medical Device)  Outcome: Progressing  Flowsheets (Taken 10/24/2024 1647)  Remains free of injury from restraints (restraint for interference with medical device):   Determine that  other, less restrictive measures have been tried or would not be effective before applying the restraint   Evaluate the patient's condition at the time of restraint application   Inform patient/family regarding the reason for restraint   Every 2 hours: Monitor safety, psychosocial status, comfort, nutrition and hydration  Goal: Free from restraint(s) (Restraint for Interference with Medical Device)  Outcome: Progressing  Flowsheets (Taken 10/24/2024 2106)  Free from restraint(s) (restraint for interference with medical device):   ONCE/SHIFT or MINIMUM Every 12 hours: Assess and document the continuing need for restraints   Every 24 hours: Continued use of restraint requires Licensed Independent Practitioner to perform face to face examination and written order   Identify and implement measures to help patient regain control   The patient's goals for the shift include      The clinical goals for the shift include Patient will have decreased pressor requirements    Over the shift, the patient did make progress toward the following goals. Barriers to progression include continued CRRT and sedation. Recommendations to address these barriers include weaning as tolerated, communication with care team

## 2024-10-24 NOTE — CONSULTS
Vascular Access Team  Consult     Visit Date: 10/24/2024      Patient Name: Narda Malloy         MRN: [id number]                Reason for Consult: picc placement            Assessment:   Patient with midline that was present on admission. Placed on 9/23 and due to be removed, Dr. Malagon and Dr. VICTORINA Cheung both ok with removal and placement of dual lumen picc for more central access.            Plan:   Place dual lumen picc today       Shaye Serrano RN  10/24/2024  2:39 PM

## 2024-10-24 NOTE — PROGRESS NOTES
INFECTIOUS DISEASES PROGRESS NOTE    Consulted / following patient for:  Respiratory failure/pneumonia  Recent polymicrobial complicated postoperative lumbar wound infection, MRSA and Proteus  Acute kidney injury    Subjective   Interval History:   (Sedated on the ventilator)    Objective   PHYSICAL EXAMINATION  Vital signs:  Visit Vitals  /52   Pulse (!) 47   Temp 35.3 °C (95.5 °F) (Esophageal)   Resp 20   Remains on norepinephrine infusion, CRRT  General: Intubated and sedated  Lungs: Diminished, clear.  Left chest tube draining serous fluid  Heart:  S1, S2 normal  Abdomen:  Soft, obese, no guarding.   Extremities:  No cords, phlebitis, cellulitis.      Relevant Results  WBC: 12,500 ---> 14,900 ---> 10,700  Creatinine: (CRRT)  Pleural fluid: Transudate with 197 WBC, 56% neutrophils, protein 1.8, LDH 97, glucose 202  Microbiology:  Blood (10/12): Negative X2  Sputum (10/13): Stain with moderate GNB and moderate PMN, culture Pseudomonas aeruginosa, susceptible to Zosyn and cefepime  Sputum (10/21): Normal neva  Urine: Moderate colony count Candida lusitaniae  Pleural fluid (10/17): Negative    Imaging:  CXR images (10/24) personally reviewed: Intubated.  Bilateral pleural effusions, no significant change compared with 10/23    Assessment:  Sepsis -likely due to pneumonia, present on admission. Patient already on IV vancomycin and IV ceftriaxone at time of this admission for lumbar spinal infection.  Resolved with anti-Pseudomonas antimicrobial therapy  Acute kidney injury.  On CRRT  Lumbar spine surgical site infection s/p I&D 9/28. Cultures + MRSA, Proteus.  Was to be on vanco/ceftriaxone through 11/12. Followed by Dr. Brant Juarez.  Wound as described above, doubt that this is a focus for ongoing sepsis    Plan/Recommendations:  Daptomycin 700 mg every 24 hour based on adjusted body weight of 91 kg and estimated creatinine clearance of 28 mL/min, on CRRT  Ceftriaxone until 11/12  3.   Midline catheter will  need to be replaced with a PICC          Discussed with  at bedside    Andrew Malagon MD  [facility]  Office:  [phone]

## 2024-10-24 NOTE — CARE PLAN
Problem: Safety - Medical Restraint  Goal: Remains free of injury from restraints (Restraint for Interference with Medical Device)  10/24/2024 0300 by Erica Knott RN  Outcome: Progressing  10/24/2024 0300 by Erica Knott RN  Reactivated  Goal: Free from restraint(s) (Restraint for Interference with Medical Device)  10/24/2024 0300 by Erica Knott RN  Outcome: Progressing  10/24/2024 0300 by Erica Knott RN  Reactivated     Problem: Pain - Adult  Goal: Verbalizes/displays adequate comfort level or baseline comfort level  10/24/2024 0300 by Erica Knott RN  Outcome: Progressing  10/24/2024 0300 by Erica Knott RN  Outcome: Progressing     Problem: Safety - Adult  Goal: Free from fall injury  10/24/2024 0300 by Erica Knott RN  Outcome: Progressing  10/24/2024 0300 by Erica Knott RN  Outcome: Progressing     Problem: Discharge Planning  Goal: Discharge to home or other facility with appropriate resources  10/24/2024 0300 by Erica Knott RN  Outcome: Progressing  10/24/2024 0300 by Erica Knott RN  Outcome: Progressing     Problem: Chronic Conditions and Co-morbidities  Goal: Patient's chronic conditions and co-morbidity symptoms are monitored and maintained or improved  10/24/2024 0300 by Erica Knott RN  Outcome: Progressing  10/24/2024 0300 by Erica Knott RN  Outcome: Progressing     Problem: Diabetes  Goal: Achieve decreasing blood glucose levels by end of shift  10/24/2024 0300 by Erica Knott RN  Outcome: Progressing  10/24/2024 0300 by Erica Knott RN  Outcome: Progressing  Goal: Increase stability of blood glucose readings by end of shift  10/24/2024 0300 by Erica Knott RN  Outcome: Progressing  10/24/2024 0300 by Erica Knott RN  Outcome: Progressing  Goal: Decrease in ketones present in urine by end of shift  10/24/2024 0300 by Erica Knott RN  Outcome: Progressing  10/24/2024 0300 by Erica Knott RN  Outcome: Progressing  Goal:  Maintain electrolyte levels within acceptable range throughout shift  10/24/2024 0300 by Erica Knott RN  Outcome: Progressing  10/24/2024 0300 by Erica Knott RN  Outcome: Progressing  Goal: Maintain glucose levels >70mg/dl to <250mg/dl throughout shift  10/24/2024 0300 by Erica Knott RN  Outcome: Progressing  10/24/2024 0300 by Erica Knott RN  Outcome: Progressing  Goal: No changes in neurological exam by end of shift  10/24/2024 0300 by Erica Knott RN  Outcome: Progressing  10/24/2024 0300 by Erica Knott RN  Outcome: Progressing  Goal: Learn about and adhere to nutrition recommendations by end of shift  10/24/2024 0300 by Erica Knott RN  Outcome: Progressing  10/24/2024 0300 by Erica Knott RN  Outcome: Progressing  Goal: Vital signs within normal range for age by end of shift  10/24/2024 0300 by Erica Knott RN  Outcome: Progressing  10/24/2024 0300 by Erica Knott RN  Outcome: Progressing  Goal: Increase self care and/or family involovement by end of shift  10/24/2024 0300 by Erica Knott RN  Outcome: Progressing  10/24/2024 0300 by Erica Knott RN  Outcome: Progressing  Goal: Receive DSME education by end of shift  10/24/2024 0300 by Erica Knott RN  Outcome: Progressing  10/24/2024 0300 by Erica Knott RN  Outcome: Progressing     Problem: Knowledge Deficit  Goal: Patient/family/caregiver demonstrates understanding of disease process, treatment plan, medications, and discharge instructions  10/24/2024 0300 by Erica Knott RN  Outcome: Progressing  10/24/2024 0300 by Erica Knott RN  Outcome: Progressing     Problem: Skin  Goal: Decreased wound size/increased tissue granulation at next dressing change  10/24/2024 0300 by Erica Knott RN  Outcome: Progressing  Flowsheets (Taken 10/24/2024 0300)  Decreased wound size/increased tissue granulation at next dressing change:   Promote sleep for wound healing   Utilize specialty bed per  algorithm   Protective dressings over bony prominences  10/24/2024 0300 by Erica Knott RN  Outcome: Progressing  Flowsheets (Taken 10/24/2024 0300)  Decreased wound size/increased tissue granulation at next dressing change:   Promote sleep for wound healing   Utilize specialty bed per algorithm   Protective dressings over bony prominences  Goal: Participates in plan/prevention/treatment measures  10/24/2024 0300 by Erica Kontt RN  Outcome: Progressing  Flowsheets (Taken 10/24/2024 0300)  Participates in plan/prevention/treatment measures:   Elevate heels   Discuss with provider PT/OT consult  10/24/2024 0300 by Erica Knott RN  Outcome: Progressing  Flowsheets (Taken 10/24/2024 0300)  Participates in plan/prevention/treatment measures:   Elevate heels   Discuss with provider PT/OT consult  Goal: Prevent/manage excess moisture  10/24/2024 0300 by Erica Knott RN  Outcome: Progressing  Flowsheets (Taken 10/24/2024 0300)  Prevent/manage excess moisture:   Cleanse incontinence/protect with barrier cream   Follow provider orders for dressing changes   Monitor for/manage infection if present  10/24/2024 0300 by Erica Knott RN  Outcome: Progressing  Flowsheets (Taken 10/24/2024 0300)  Prevent/manage excess moisture:   Cleanse incontinence/protect with barrier cream   Follow provider orders for dressing changes   Monitor for/manage infection if present  Goal: Prevent/minimize sheer/friction injuries  10/24/2024 0300 by Erica Knott RN  Outcome: Progressing  Flowsheets (Taken 10/24/2024 0300)  Prevent/minimize sheer/friction injuries:   Complete micro-shifts as needed if patient unable. Adjust patient position to relieve pressure points, not a full turn   HOB 30 degrees or less   Increase activity/out of bed for meals   Turn/reposition every 2 hours/use positioning/transfer devices   Use pull sheet   Utilize specialty bed per algorithm  10/24/2024 0300 by Erica Knott RN  Outcome:  Progressing  Flowsheets (Taken 10/24/2024 0300)  Prevent/minimize sheer/friction injuries:   Complete micro-shifts as needed if patient unable. Adjust patient position to relieve pressure points, not a full turn   HOB 30 degrees or less   Increase activity/out of bed for meals   Turn/reposition every 2 hours/use positioning/transfer devices   Use pull sheet   Utilize specialty bed per algorithm  Goal: Promote/optimize nutrition  10/24/2024 0300 by Erica Knott RN  Outcome: Progressing  Flowsheets (Taken 10/24/2024 0300)  Promote/optimize nutrition:   Consume > 50% meals/supplements   Monitor/record intake including meals  10/24/2024 0300 by Erica Knott RN  Outcome: Progressing  Flowsheets (Taken 10/24/2024 0300)  Promote/optimize nutrition:   Consume > 50% meals/supplements   Monitor/record intake including meals  Goal: Promote skin healing  10/24/2024 0300 by Erica Knott RN  Outcome: Progressing  Flowsheets (Taken 10/24/2024 0300)  Promote skin healing:   Assess skin/pad under line(s)/device(s)   Ensure correct size (line/device) and apply per  instructions   Protective dressings over bony prominences   Rotate device position/do not position patient on device   Turn/reposition every 2 hours/use positioning/transfer devices  10/24/2024 0300 by Erica Knott RN  Outcome: Progressing  Flowsheets (Taken 10/24/2024 0300)  Promote skin healing:   Assess skin/pad under line(s)/device(s)   Ensure correct size (line/device) and apply per  instructions   Protective dressings over bony prominences   Rotate device position/do not position patient on device   Turn/reposition every 2 hours/use positioning/transfer devices     Problem: Nutrition  Goal: Oral intake greater than 50%  10/24/2024 0300 by Erica Knott RN  Outcome: Progressing  10/24/2024 0300 by Erica Knott RN  Outcome: Progressing  Goal: Oral intake greater 75%  10/24/2024 0300 by Erica Knott RN  Outcome:  Progressing  10/24/2024 0300 by Erica Knott RN  Outcome: Progressing  Goal: Consume prescribed supplement  10/24/2024 0300 by Erica Knott, RN  Outcome: Progressing  10/24/2024 0300 by Erica Knott, RN  Outcome: Progressing  Goal: Adequate PO fluid intake  10/24/2024 0300 by Erica Knott, RN  Outcome: Progressing  10/24/2024 0300 by Erica Knott, RN  Outcome: Progressing  Goal: Nutrition support goals are met within 48 hrs  10/24/2024 0300 by Erica Knott, RN  Outcome: Progressing  10/24/2024 0300 by Erica Knott, RN  Outcome: Progressing  Goal: Nutrition support is meeting 75% of nutrient needs  10/24/2024 0300 by Erica Knott, RN  Outcome: Progressing  10/24/2024 0300 by Erica Knott RN  Outcome: Progressing  Goal: BG  mg/dL  10/24/2024 0300 by Erica Knott, RN  Outcome: Progressing  10/24/2024 0300 by Erica Knott, RN  Outcome: Progressing  Goal: Lab values WNL  10/24/2024 0300 by Erica Knott, RN  Outcome: Progressing  10/24/2024 0300 by Erica Knott, RN  Outcome: Progressing  Goal: Electrolytes WNL  10/24/2024 0300 by Erica Kntot, RN  Outcome: Progressing  10/24/2024 0300 by Erica Knott, RN  Outcome: Progressing  Goal: Promote healing  10/24/2024 0300 by Erica Knott, RN  Outcome: Progressing  10/24/2024 0300 by Erica Knott, RN  Outcome: Progressing  Goal: Maintain stable weight  10/24/2024 0300 by Erica Knott, RN  Outcome: Progressing  10/24/2024 0300 by Erica Knott, RN  Outcome: Progressing  Goal: Reduce weight from edema/fluid  10/24/2024 0300 by Erica Knott, RN  Outcome: Progressing  10/24/2024 0300 by Erica Knott, RN  Outcome: Progressing     Problem: Respiratory  Goal: No signs of respiratory distress (eg. Use of accessory muscles. Peds grunting)  10/24/2024 0300 by Erica Knott RN  Outcome: Progressing  10/24/2024 0300 by Erica Knott, RN  Outcome: Progressing  Goal: Clear secretions with interventions this  shift  10/24/2024 0300 by Erica Knott RN  Outcome: Progressing  10/24/2024 0300 by Erica Knott RN  Outcome: Progressing  Goal: Minimize anxiety/maximize coping throughout shift  10/24/2024 0300 by Erica Knott RN  Outcome: Progressing  10/24/2024 0300 by Erica Knott RN  Outcome: Progressing  Goal: Minimal/no exertional discomfort or dyspnea this shift  10/24/2024 0300 by Erica Knott, RN  Outcome: Progressing  10/24/2024 0300 by Erica Knott RN  Outcome: Progressing  Goal: Patent airway maintained this shift  10/24/2024 0300 by Erica Knott, RN  Outcome: Progressing  10/24/2024 0300 by Erica Knott RN  Outcome: Progressing  Goal: Tolerate mechanical ventilation evidenced by VS/agitation level this shift  10/24/2024 0300 by Erica Knott, RN  Outcome: Progressing  10/24/2024 0300 by Erica Knott RN  Outcome: Progressing  Goal: Tolerate pulmonary toileting this shift  10/24/2024 0300 by Erica Knott, RN  Outcome: Progressing  10/24/2024 0300 by Erica Knott RN  Outcome: Progressing  Goal: Verbalize decreased shortness of breath this shift  10/24/2024 0300 by Erica Knott, RN  Outcome: Progressing  10/24/2024 0300 by Erica Knott RN  Outcome: Progressing  Goal: Wean oxygen to maintain O2 saturation per order/standard this shift  10/24/2024 0300 by Erica Knott, RN  Outcome: Progressing  10/24/2024 0300 by Erica Knott RN  Outcome: Progressing  Goal: Increase self care and/or family involvement in next 24 hours  10/24/2024 0300 by Erica Knott, RN  Outcome: Progressing  10/24/2024 0300 by Erica Knott RN  Outcome: Progressing     Problem: Pain  Goal: Takes deep breaths with improved pain control throughout the shift  10/24/2024 0300 by Erica Knott RN  Outcome: Progressing  10/24/2024 0300 by Erica Knott RN  Outcome: Progressing  Goal: Turns in bed with improved pain control throughout the shift  10/24/2024 0300 by Erica Knott RN  Outcome:  Progressing  10/24/2024 0300 by Erica Knott RN  Outcome: Progressing  Goal: Walks with improved pain control throughout the shift  10/24/2024 0300 by Erica Knott, RN  Outcome: Progressing  10/24/2024 0300 by Erica Knott RN  Outcome: Progressing  Goal: Performs ADL's with improved pain control throughout shift  10/24/2024 0300 by Erica Knott, RN  Outcome: Progressing  10/24/2024 0300 by Erica Knott, RN  Outcome: Progressing  Goal: Participates in PT with improved pain control throughout the shift  10/24/2024 0300 by Erica Knott RN  Outcome: Progressing  10/24/2024 0300 by Erica Knott RN  Outcome: Progressing  Goal: Free from opioid side effects throughout the shift  10/24/2024 0300 by Erica Knott, RN  Outcome: Progressing  10/24/2024 0300 by Erica Knott RN  Outcome: Progressing  Goal: Free from acute confusion related to pain meds throughout the shift  10/24/2024 0300 by Erica Knott, RN  Outcome: Progressing  10/24/2024 0300 by Erica Knott RN  Outcome: Progressing     Problem: Safety - Medical Restraint  Goal: Remains free of injury from restraints (Restraint for Interference with Medical Device)  10/24/2024 0300 by Erica Knott RN  Outcome: Progressing  10/24/2024 0300 by Erica Knott RN  Outcome: Progressing  Goal: Free from restraint(s) (Restraint for Interference with Medical Device)  10/24/2024 0300 by Erica Knott RN  Outcome: Progressing  10/24/2024 0300 by Erica Knott RN  Outcome: Progressing   The patient's goals for the shift include      The clinical goals for the shift include remain hemodynamically stable, maintain comfort on vent

## 2024-10-24 NOTE — PROGRESS NOTES
[facility] Critical Care Medicine       Date:  10/24/2024  Patient:  Narda Malloy  YOB: 1955  MRN:  [id number]   Admit Date:  10/12/2024      Chief Complaint   Patient presents with    Altered Mental Status     History of Present Illness:  Narda Malloy is a 69 y.o. year old female patient with past medical history of L1-L3 lumbar laminectomy, T4-S1 revision, and fusion August 26th, T2DM, HTN, essential tremor, HLD, glaucoma, sarcoidosis of the lung who presented to  ED 10/12 after being found essentially unresponsive at her nursing facility [facility]. Per report from her , she has had a significant decline in her health since July 15th when she had a fall and became significantly weak. She has also had multiple infection complications since her back surgery in August requiring multiple I&Ds and long term antibiotic therapy. She went to the OR most recently on 9/28 for lumbar site infection wash out. Per chart review, she was discharged on IV vancomycin 1g and IV ceftriaxone 2d q24hrs through 11/12.     ED Course: Initial vital signs: /104 (109), HR 68, RR 20, SpO2 95% on 6L NC, temp 34.5C. Give 0.4mg of narcan with no improvement in mentation. Lab work-up remarkable for mild hyperkalemia (5.5), AMY 42/1.46, elevated alk phos, normocytic anemia 10.4/33, turbid appearing urine with mild hematuria and proteinuria and + leuk esterase, >50 WBCs. Urine drug screen positive for barbiturates. Triggered sepsis timer so she was given 3L NS. She was intubated for airway protection with 20mg etomidate and 100mg succinylcholine. BP dropped post-intubated and fluid resuscitation and she was subsequently started on levophed.       Interval ICU Events:  10/12: Pt arrived to ICU intubated and lightly sedated on low-dose versed. Eyes open, minimally responsive.      10/13: Received K cocktail last night for K 6.0, corrected appropriately. Levophed requirements mildly up, UOP decreasing.  Ordered albumin x2 this am with improvements in UOP and SBP. Will likely trial lasix this afternoon d/t hypervolemia.     10/14: Remains intubated with decreased mentation, only responsive to noxious stimuli. Trialed lasix TID for volume overload. Remains on levophed 0.01.     10/15: Mentation much improved. SAT/SBT successful so extubated. Given lasix x3, bumex x1, metolazone x1 with net negative fluid balance of 500mL -> started on bumex gtt.      10/16: O2 requirements significantly increased, NRB -> HFNC 40L 100% likely 2/2 mucus plugging.     10/17: No acute events overnight. Bumex gtt increased to 1mg/hr. CXR this am showing complete opacification of left hemithorax related to atelectasis vs pleural effusion. Remains on HFNC 40L/80%. Pigtail catheter placed with 1.2L drained immediately. Given albumin for hypotension.      10/18: ~2L output from left sided pigtail catheter since placement. Kidney function worsening and UOP declining, about 20cc/hr overnight. Will place NG tube today and start enteral nutrition and appetite and oral intake remains poor.      10/19: Patient with worsening hypoxic, hypercapnic respiratory failure - now requiring BIPAP support. Remains grossly volume overloaded with low UO. Started on vasopressors to augment BP for diuresis. 40 IV lasix + gtt started. Remains with poor nutritional status. Will re attempt NG later if respiratory status improves.     10/20: Patient stable on vent. Nephrology consulted for renal failure. Cr continues to uptrend however UO is increasing. No issues overnight.    10/21: No issues overnight. Remains stable on vent. Levo down to 0.01 mcg/kg/min. UO remains low. Nephrology following. Possible CRRT today?    10/22: Patient received 80 lasix and metalozone with minimal UO. Levo @ .02 and prop @ 10. Vent settings: 20/450/10/40%. Plan for CRRT today. Will SAT/SBT after CRRT. May change propofol to precedex.    10/23: Had episodes of bradycardia where HR went  to 30's which resolved with heating the patient. CRRT yesterday with about 1L removed. Currently on 0.03 of levo and 10 of prop. Cont daptomycin and ceftriaxone per ID. CXR improved. SAT/SBT. EP consulted for episodes of bradycardia.    10/24: Bradycardic episodes of HR in 40s. Currently on 0.03 of Levo, 10 of prop and 50 fentanyl. Tolerating CRRT. Place PICC today.    Medical History:  Past Medical History:   Diagnosis Date    Degenerative myopia, bilateral     Diabetic neuropathy (Multi)     Difficult intubation 08/26/2024    Mac 3, grade 3, 1 attempt.  Glidescope/videolaryngoscopy recommended for future attempts.    DM type 2 (diabetes mellitus, type 2) (Multi)     Dry eye syndrome of bilateral lacrimal glands     Essential hypertension     Essential tremor     Glaucoma     Hyperlipidemia     Long term (current) use of insulin (Multi)     Low back pain     PONV (postoperative nausea and vomiting)     Primary open angle glaucoma of both eyes, severe stage     Repeated falls     Sarcoidosis of lung (Multi)     Spinal stenosis, lumbar region without neurogenic claudication     Weakness      Past Surgical History:   Procedure Laterality Date    BLEPHAROPLASTY  07/2022    BREAST SURGERY  05/20/2022    Breast lift    CARPAL TUNNEL RELEASE      CATARACT EXTRACTION W/  INTRAOCULAR LENS IMPLANT Bilateral     OD 08/04/2011 +8.5D,OS 08/04/2011 +8.50D    FOOT SURGERY      INSERTION / REMOVAL CRANIAL DBS GENERATOR      Placed 2017.  Removed 2018. part of wire left in head when everything removed    LUMBAR FUSION      L3-S1    PANRETINAL PHOTOCOAGULATION  2014    THORACIC FUSION  08/26/2024    T4-S1 fusion    VITRECTOMY Right 2013     Medications Prior to Admission   Medication Sig Dispense Refill Last Dose/Taking    acetaminophen (Tylenol) 500 mg tablet Take 2 tablets (1,000 mg) by mouth 3 times a day.   Unknown    ascorbic acid (Vitamin C) 500 mg tablet as directed Orally   Unknown    brimonidine (AlphaGAN) 0.2 % ophthalmic  solution Administer 1 drop into both eyes 2 times a day.   Unknown    calcium carbonate-vitamin D3 500 mg-5 mcg (200 unit) tablet Take 1 tablet by mouth once daily.   Unknown    cefTRIAXone (Rocephin) 2 gram/50 mL IV Infuse 50 mL (2 g) at 100 mL/hr over 30 minutes into a venous catheter once every 24 hours. Once weekly labs CBC/diff, CMP, Vanc trough ESR, CRP fax to Dr. Juarez [phone]. Stop date 11/12/24. 1950 mL 0     dextrose 50 % injection Infuse 25 mL (12.5 g) into a venous catheter every 15 minutes if needed (For blood glucose 41 to 70 mg/dL).       dextrose 50 % injection Infuse 50 mL (25 g) into a venous catheter every 15 minutes if needed (For blood glucose less than or equal to 40 mg/dL).       docusate sodium (Colace) 100 mg capsule Take 1 capsule (100 mg) by mouth 2 times a day.   Unknown    ezetimibe (Zetia) 10 mg tablet Take 1 tablet (10 mg) by mouth once daily. 90 tablet 3 Unknown    FreeStyle Lite Strips strip USE TO TEST 3 TIMES A DAY AS DIRECTED 300 each 2     glucagon (Glucagen) 1 mg injection Inject 1 mg into the muscle every 15 minutes if needed for low blood sugar - see comments (Hypoglycemia).       glucagon (Glucagen) 1 mg injection Inject 1 mg into the muscle every 15 minutes if needed for low blood sugar - see comments (Hypoglycemia).       heparin sodium,porcine (heparin, porcine,) 5,000 unit/mL injection Inject 1 mL (5,000 Units) under the skin every 8 hours.       insulin lispro (HumaLOG) 100 unit/mL injection Inject 0-15 Units under the skin 3 times daily (morning, midday, late afternoon). Take as directed per insulin instructions.Do not hold when patient is not eating, continue order as scheduled for hyperglycemia management.  Insulin Lispro Corrective Scale #3     Hypoglycemia protocol Call LIP unit(s) if Blood Glucose is between 0 - 70 mg/dL     0 unit(s) if Blood glucose is between    3 unit(s) if Blood glucose is between 151-200   6 unit(s) if Blood glucose is between  201-250   9 unit(s) if Blood glucose is between 251-300   12 unit(s) if Blood glucose is between 301-350   15 unit(s) if Blood glucose is between 351-400    Notify provider unit(s) if Blood Glucose is greater than 400 mg/dL       Lactobacillus acidophilus 100 mg (1 billion cell) capsule Take 1 capsule by mouth 2 times a day.   Unknown    latanoprost (Xalatan) 0.005 % ophthalmic solution Administer 1 drop into both eyes once daily at bedtime. 2.5 mL 5 Unknown    melatonin 5 mg tablet Take 1 tablet (5 mg) by mouth as needed at bedtime for sleep.   Unknown    methocarbamol (Robaxin) 500 mg tablet Take 1 tablet (500 mg) by mouth 3 times a day.   Unknown    multivitamin tablet Take 1 tablet by mouth once daily.   Unknown    ondansetron (Zofran) 4 mg/2 mL injection Infuse 2 mL (4 mg) into a venous catheter every 6 hours if needed for nausea or vomiting.       oxyCODONE (Roxicodone) 5 mg immediate release tablet Take 1 tablet (5 mg) by mouth every 6 hours if needed for severe pain (7 - 10) or moderate pain (4 - 6).       oxygen (O2) gas therapy Inhale 1 each continuously.       pantoprazole (ProtoNix) 40 mg EC tablet Take 1 tablet (40 mg) by mouth once daily in the morning. Take before meals. Do not crush, chew, or split.       pantoprazole (ProtoNix) 40 mg injection Infuse 40 mg into a venous catheter once daily in the morning. Take before meals. If unable to take PO.       pen needle, diabetic (PEN NEEDLE MISC) BD Altagracia- 4 mm X 32 G needle - as directed 4x a day sc 4 times per day       polyethylene glycol (Glycolax, Miralax) 17 gram packet Take 17 g by mouth once daily.   Unknown    primidone 125 mg tablet Take 125 mg by mouth 3 times a day.   Unknown    propranolol LA (Inderal LA) 60 mg 24 hr capsule Take 1 capsule (60 mg) by mouth early in the morning.. Hold for SBP < 110 mmhg, HR < 60 bpm.   Unknown    sennosides (Senokot) 8.6 mg tablet Take 1 tablet (8.6 mg) by mouth every 12 hours if needed for constipation.    "Unknown    Sure Comfort Pen Needle 32 gauge x 5/32\" needle AS DIRECTED DAILY FOR 90 DAYS 100 each 11 Unknown    traZODone (Desyrel) 25 MG split tablet Take 1 half tablet (25 mg) by mouth once daily at bedtime.   Unknown    vancomycin (Vancocin) 1 gram/250 mL solution Infuse 250 mL (1 g) at 250 mL/hr over 60 minutes into a venous catheter every 12 hours. Once weekly labs CBC/diff, CMP, Vanc trough ESR, CRP fax to Dr. Juarez [phone]. Stop date 11/12/24. 03314 mL 0      Erythromycin, Morphine, and Rosuvastatin  Social History     Tobacco Use    Smoking status: Former     Types: Cigarettes     Passive exposure: Past    Smokeless tobacco: Never   Vaping Use    Vaping status: Never Used   Substance Use Topics    Alcohol use: Not Currently    Drug use: Not Currently     Family History   Problem Relation Name Age of Onset    Multiple myeloma Mother      Cancer Mother      Other (CABG) Father      Pulmonary embolism Father      Heart disease Father      Breast cancer Sister          Stage II    Hypertension Sister      Diabetes Sister      No Known Problems Sister          x5    No Known Problems Brother          x4    No Known Problems Daughter         Hospital Medications:    fentaNYL, 0-100 mcg/hr, Last Rate: 50 mcg/hr (10/24/24 0719)  norepinephrine, 0-0.5 mcg/kg/min, Last Rate: 0.03 mcg/kg/min (10/24/24 0700)  PrismaSol 4/2.5, 25 mL/kg/hr, Last Rate: 25 mL/kg/hr (10/24/24 0320)  propofol, 0-50 mcg/kg/min, Last Rate: 10 mcg/kg/min (10/24/24 0700)          Current Facility-Administered Medications:     acetaminophen (Tylenol) tablet 975 mg, 975 mg, oral, q6h PRN, Kaleb Lam PA-C, 975 mg at 10/18/24 1405    alteplase (Cathflo Activase) injection 2 mg, 2 mg, intra-catheter, PRN, Kaleb Lam PA-C    brimonidine (AlphaGAN) 0.2 % ophthalmic solution 1 drop, 1 drop, Both Eyes, BID, Kaleb Lam PA-C, 1 drop at 10/23/24 6814    [Held by provider] calcium carbonate-vitamin D3 500 mg-5 mcg (200 unit) per tablet " 1 tablet, 1 tablet, oral, Daily, Kaleb Lam PA-C, 1 tablet at 10/18/24 0930    cefTRIAXone (Rocephin) 2 g in dextrose (iso) IV 50 mL, 2 g, intravenous, q24h, Kaleb Lam PA-C, Stopped at 10/23/24 0941    DAPTOmycin (Cubicin) 700 mg in sodium chloride 0.9%  mL, 700 mg, intravenous, Daily, Andrew Malagon MD, Stopped at 10/23/24 1045    dextrose 50 % injection 12.5 g, 12.5 g, intravenous, q15 min PRN, Kaleb Lam PA-C    dextrose 50 % injection 25 g, 25 g, intravenous, q15 min PRN, Kaleb Lam PA-C    docusate sodium (Colace) oral liquid 100 mg, 100 mg, oral, BID PRN, Kaleb Lam PA-C    ezetimibe (Zetia) tablet 10 mg, 10 mg, oral, Daily, Kaleb Lam PA-C, 10 mg at 10/23/24 0855    fentanyl (Sublimaze) 1000 mcg in sodium chloride 0.9% 100 mL (10 mcg/mL) infusion (premix), 0-100 mcg/hr, intravenous, Continuous, Kaleb Lam PA-C, Last Rate: 5 mL/hr at 10/24/24 0719, 50 mcg/hr at 10/24/24 0719    fentaNYL bolus from bag 25 mcg, 25 mcg, intravenous, q10 min PRN, Kaleb Lam PA-C    glucagon (Glucagen) injection 1 mg, 1 mg, intramuscular, q15 min PRN, Kaleb Lam PA-C    glucagon (Glucagen) injection 1 mg, 1 mg, intramuscular, q15 min PRN, Kaleb Lam PA-C    heparin (porcine) injection 5,000 Units, 5,000 Units, subcutaneous, q8h, Kaleb Lam PA-C, 5,000 Units at 10/24/24 0654    heparin 1,000 unit/mL injection 1,000 Units, 1,000 Units, intra-catheter, PRN, Nelia Cheung MD    heparin 1,000 unit/mL injection 1,000 Units, 1,000 Units, intra-catheter, PRN, Nelia Cheung MD    heparin flush 10 unit/mL syringe 50 Units, 50 Units, intra-catheter, PRN, Kaleb Lam PA-C, 50 Units at 10/19/24 9756    hydrocortisone sodium succinate (PF) (Solu-CORTEF) injection 100 mg, 100 mg, intravenous, q8h, LEONEL Salas-CNP, 100 mg at 10/24/24 0337    [Held by provider] HYDROmorphone (Dilaudid) injection 0.4 mg, 0.4 mg, intravenous, q2h PRN, Sabino Matos APRN-CNP, 0.4 mg  at 10/19/24 0520    insulin lispro (HumaLOG) injection 0-15 Units, 0-15 Units, subcutaneous, q4h, Lb Dodd PA-C, 3 Units at 10/24/24 0338    ipratropium-albuteroL (Duo-Neb) 0.5-2.5 mg/3 mL nebulizer solution 3 mL, 3 mL, nebulization, 4x daily, Kaleb Lam PA-C, 3 mL at 10/24/24 0702    latanoprost (Xalatan) 0.005 % ophthalmic solution 1 drop, 1 drop, Both Eyes, Nightly, Kaleb Lam PA-C, 1 drop at 10/23/24 2134    norepinephrine (Levophed) 8 mg in dextrose 5% 250 mL (0.032 mg/mL) infusion (premix), 0-0.5 mcg/kg/min, intravenous, Continuous, Resha R Vatty, APRN-CNP, Last Rate: 7.09 mL/hr at 10/24/24 0700, 0.03 mcg/kg/min at 10/24/24 0700    oxygen (O2) therapy, , inhalation, Continuous - Inhalation, Kaleb Lam PA-C, 40 percent at 10/24/24 0702    pantoprazole (ProtoNix) EC tablet 40 mg, 40 mg, oral, Daily before breakfast **OR** pantoprazole (ProtoNix) injection 40 mg, 40 mg, intravenous, Daily before breakfast, Resrhonda R Vatkurt APRN-CNP, 40 mg at 10/24/24 0654    primidone (Mysoline) tablet 125 mg, 125 mg, oral, Nightly, Kaleb Lam PA-C, 125 mg at 10/23/24 2258    PrismaSol 4/2.5 CRRT solution, 25 mL/kg/hr, CRRT, Continuous, Nelia Cheung MD, Last Rate: 3,150 mL/hr at 10/24/24 0320, 25 mL/kg/hr at 10/24/24 0320    propofol (Diprivan) infusion, 0-50 mcg/kg/min, intravenous, Continuous, Resha R Vatty, APRN-CNP, Last Rate: 7.56 mL/hr at 10/24/24 0700, 10 mcg/kg/min at 10/24/24 0700    [Held by provider] propranolol LA (Inderal LA) 24 hr capsule 60 mg, 60 mg, oral, Daily, Kaleb Lam PA-C, 60 mg at 10/19/24 0643    sodium chloride 3 % nebulizer solution 3 mL, 3 mL, nebulization, 4x daily, Kaleb Lam PA-C, 3 mL at 10/24/24 0702    [Held by provider] traMADol (Ultram) tablet 50 mg, 50 mg, oral, q8h PRN, Kaleb Lam PA-C    Review of Systems:  14 point review of systems was completed and negative except for those specially mention in my HPI    Physical Exam:    Heart Rate:  [39-83]    Temp:  [35 °C (95 °F)-35.8 °C (96.4 °F)]   Resp:  [17-30]   BP: ()/(45-80)   Weight:  [127 kg (279 lb 5.2 oz)]   SpO2:  [83 %-99 %]     Physical Exam  Vitals and nursing note reviewed.   Constitutional:       Comments: Lethargic, arouses to physical stimuli but difficulty maintaining alertness. Tachypnic.    HENT:      Head: Normocephalic.   Eyes:      Extraocular Movements: Extraocular movements intact.      Pupils: Pupils are equal, round, and reactive to light.   Cardiovascular:      Rate and Rhythm: Normal rate and regular rhythm.      Pulses:           Radial pulses are 1+ on the right side and 1+ on the left side.        Dorsalis pedis pulses are 1+ on the right side and 1+ on the left side.      Heart sounds: Normal heart sounds, S1 normal and S2 normal.      Comments: Anasarca present  Pulmonary:      Effort: Tachypnea and accessory muscle usage present.      Breath sounds: Rhonchi and rales present. No wheezing.   Chest:      Comments: L chest pigtail catheter in place draining straw colored output.  Abdominal:      General: Abdomen is flat. Bowel sounds are normal.      Palpations: Abdomen is soft.      Tenderness: There is no abdominal tenderness.   Genitourinary:     Comments: Gibson in place draining minimal straw colored urine  Musculoskeletal:      Cervical back: Neck supple.      Right lower le+ Edema present.      Left lower le+ Edema present.   Skin:     General: Skin is warm.      Capillary Refill: Capillary refill takes less than 2 seconds.      Coloration: Skin is not cyanotic, jaundiced or mottled.   Neurological:      General: No focal deficit present.      Mental Status: She is lethargic.      GCS: GCS eye subscore is 3. GCS verbal subscore is 4. GCS motor subscore is 6.      Motor: Weakness present.     Objective:    I have reviewed all medications, laboratory results, and imaging pertinent for today's encounter.    Vent Mode: Pressure regulated volume control/assist  control  FiO2 (%):  [40 %] 40 %  S RR:  [20] 20  S VT:  [450 mL] 450 mL  PEEP/CPAP (cm H2O):  [5 cm H20] 5 cm H20  OK SUP:  [5 cm H20] 5 cm H20  MAP (cm H2O):  [7.2-9.9] 9.9      Intake/Output Summary (Last 24 hours) at 10/24/2024 0749  Last data filed at 10/24/2024 0700  Gross per 24 hour   Intake 1376.92 ml   Output 2644 ml   Net -1267.08 ml       Daily Labs:  CBC:   Results from last 7 days   Lab Units 10/24/24  0335   WBC AUTO x10*3/uL 10.7   HEMOGLOBIN g/dL 7.6*   HEMATOCRIT % 23.8*   MCV fL 98     BMP:    Results from last 7 days   Lab Units 10/24/24  0335   SODIUM mmol/L 135*   POTASSIUM mmol/L 4.3   CHLORIDE mmol/L 103   CO2 mmol/L 26   BUN mg/dL 20   CREATININE mg/dL 1.05   CALCIUM mg/dL 7.3*   GLUCOSE mg/dL 171*   MAGNESIUM mg/dL 2.14       Assessment/Plan:    Narda Malloy is a 69 y.o. year old female patient with past medical history of L1-L3 lumbar laminectomy, T4-S1 revision, and fusion August 26th, T2DM, HTN, essential tremor, HLD, glaucoma, sarcoidosis of the lung who presented to  ED 10/12 after being found unresponsive at her nursing facility. Initially intubated and admitted to ICU for septic shock. Extubated 10/15. Re-intubated 10/19 for hypoxic/hypercapnic respiratory failure. Has been on and off and now back on levophed.    I am currently managing this critically ill patient for the following problems:     Neuro/Psych/Pain Ctrl/Sedation:   Acute encephalopathy - likely secondary to hypercapnia, infection  Hx Essential tremor   CT head: Negative for acute findings   Urine tox positive for barbiturates consistent with primidone intake   - Addressing underlying causes  - Pain Control: Acetaminophen PRN, Fentanyl gtt  - Sedation: Propofol  - Home primidone continued. Will hold propanolol d/t hypotension  - CAM ICU qshift, sleep-wake hygiene, delirium precautions   - SAT/SBT Daily     Respiratory/ENT:  Acute hypoxic/hypercapnic respiratory failure - likely multifactorial and 2/2 HCAP, pl  effusions, volume overload  Healthcare-associated pneumonia   Atelectasis - likely 2/2 mucus plugging   Pleural Effusion -S/p left-sided pigtail placement 10/17, Output decreasing - 50 ml out overnight. Transudative.   CXR Today: No change in the probable bilateral infiltrates and effusions   Left-sided chest tube without pneumothorax.  - Intubated 10/18. Vent setting: -20-5 40%  - Will wean O2 as tolerated to maintain SpO2 >92%  - Duonebs Q4 and hypertonic saline QID   - IPV per RT TID  - Continuous pulse ox monitoring   - Pulm hygiene  - Minimal CT output- labs demonstrate transudative. Will flush today     Cardiovascular:   Septic shock - improving  Hx HTN, HLD  Acute on chronic diastolic heart failure  Bradycardia  TTE 10/1: diastolic dysfunction, LVEF 50-55%, mildly elevated RVSP (40)  Bilateral duplex US upper extremities:  Thrombosis within the left cephalic vein, which is part of the superficial venous system of the upper extremity, adjacent to PICC line. No deep venous thrombosis in the upper extremities.  - Remains on persistent levophed gtt, Will wean with goal MAP > 65   - Continue stress dose steroids (10/21-...)  - Holding home propranolol (on for tremors)  - Continue home Zetia  - Continuous cardiac monitoring per ICU protocol  - EKGs PRN for ACS symptoms, arrhythmias   - EP consulted     GI:  Hx GERD  - Diet: Tolerating TF @ goal  - BR: Colace  - GI Prophylaxis: PPI     Renal/Volume Status/Electrolytes:  Acute kidney injury - possibly ATN +/- medication-induced (vancomycin)  Anasarca   Mixed respiratory and metabolic acidosis - improving on vent  Hypoalbuminemia   Baseline Cr 0.8-1.0. BUN Cr 1.05/20 today  - CRRT started 10/22, remove 50 mL/hr  - Remains grossly volume overloaded. Oliguric  - Nephrology consulted  - Maintain anders catheter  - Hourly I/O's  - Replete electrolytes to maintain K >4.0 and Mg >2.0  - Daily BMP, Mg, Phos    Endocrine:  T2DM  - SSI Q 6 while NPO  - TSH: midly  "elevated, T4 WNL  - Hypoglycemia protocol PRN      Infectious Disease:  Thoracolumbar surgical site infection - s/p I&D x 2. Recent MRSA bacteremia on extended course of IV antibiotics (vanc and rocephin -> 11/12)  Healthcare-associated pneumonia   CT lumbar spine 10/12: Unable to r/o abscess. Unable to do MRI d/t spinal hardware, \"metal in head\" per patient  Blood cultures 10/16: Negative  Urine culture 10/14: Negative  Sputum culture 10/16: + pseudomonas   - Sputum Culture 10/21: negative  - Continue Ceftriaxone (10/23-...)  - Continue Daptomycin (10/21-...)  - Cefepime completed on 10/22  - Vanco discontinued d/t continued high levels   - ID consulted, appreciate assistance   - Place PICC today  - Monitor SIRS criteria  - WBC: 12.5->10.7     Heme/Onc:  Normocytic anemia   H/H similar to previous: No active signs of bleeding.  - Monitor for s/sx of bleeding   - Plan to transfuse if Hgb <7.0   - Daily CBC  - H/H: 7.6     MSK:  No active concerns   - PT/OT when appropriate     Derm:  Surgical wound with infection  - Wound care consult  - ICU skin protocol  - Q2hr turns  - Padded pressure points      Ethics/Code Status:  Full code - discussions with  at bedside      :  DVT Prophylaxis: SQH  GI Prophylaxis: PPI  Bowel Regimen: Colace (home med)  Diet: TF  CVC: Place PICC today (remove midline), Trialysis R IJ  Wanda: none  Gibson: yes, replaced 10/12  Restraints: Yes  Disposition: ICU  I have reviewed all medications, laboratory results, and imaging pertinent for today's encounter.  Plan Discussed with Dr. Shook  Critical Care Time: 45 minutes  Critical Care [facility], [place] Jessica TENORIO    "

## 2024-10-25 ENCOUNTER — APPOINTMENT (OUTPATIENT)
Dept: RADIOLOGY | Facility: HOSPITAL | Age: 69
End: 2024-10-25
Payer: MEDICARE

## 2024-10-25 LAB
ABO GROUP (TYPE) IN BLOOD: NORMAL
ABO GROUP (TYPE) IN BLOOD: NORMAL
ALBUMIN SERPL BCP-MCNC: 2.5 G/DL (ref 3.4–5)
ALBUMIN SERPL BCP-MCNC: 2.5 G/DL (ref 3.4–5)
ALP SERPL-CCNC: 114 U/L (ref 33–136)
ALP SERPL-CCNC: 126 U/L (ref 33–136)
ALT SERPL W P-5'-P-CCNC: 7 U/L (ref 7–45)
ALT SERPL W P-5'-P-CCNC: 8 U/L (ref 7–45)
ANION GAP BLDV CALCULATED.4IONS-SCNC: 3 MMOL/L (ref 10–25)
ANION GAP SERPL CALCULATED.3IONS-SCNC: 7 MMOL/L (ref 10–20)
ANION GAP SERPL CALCULATED.3IONS-SCNC: 7 MMOL/L (ref 10–20)
ANTIBODY SCREEN: NORMAL
AST SERPL W P-5'-P-CCNC: 12 U/L (ref 9–39)
AST SERPL W P-5'-P-CCNC: 12 U/L (ref 9–39)
BASE EXCESS BLDV CALC-SCNC: 4.5 MMOL/L (ref -2–3)
BASO STIPL BLD QL SMEAR: PRESENT
BASOPHILS # BLD AUTO: 0.01 X10*3/UL (ref 0–0.1)
BASOPHILS # BLD AUTO: 0.02 X10*3/UL (ref 0–0.1)
BASOPHILS NFR BLD AUTO: 0.1 %
BASOPHILS NFR BLD AUTO: 0.2 %
BILIRUB DIRECT SERPL-MCNC: 0.1 MG/DL (ref 0–0.3)
BILIRUB DIRECT SERPL-MCNC: 0.1 MG/DL (ref 0–0.3)
BILIRUB SERPL-MCNC: 0.2 MG/DL (ref 0–1.2)
BILIRUB SERPL-MCNC: 0.3 MG/DL (ref 0–1.2)
BLOOD EXPIRATION DATE: NORMAL
BODY TEMPERATURE: 37 DEGREES CELSIUS
BUN SERPL-MCNC: 10 MG/DL (ref 6–23)
BUN SERPL-MCNC: 12 MG/DL (ref 6–23)
BURR CELLS BLD QL SMEAR: NORMAL
CA-I BLD-SCNC: 1.11 MMOL/L (ref 1.1–1.33)
CA-I BLD-SCNC: 1.15 MMOL/L (ref 1.1–1.33)
CA-I BLDV-SCNC: 1.23 MMOL/L (ref 1.1–1.33)
CALCIUM SERPL-MCNC: 7.5 MG/DL (ref 8.6–10.3)
CALCIUM SERPL-MCNC: 7.5 MG/DL (ref 8.6–10.3)
CHLORIDE BLDV-SCNC: 105 MMOL/L (ref 98–107)
CHLORIDE SERPL-SCNC: 103 MMOL/L (ref 98–107)
CHLORIDE SERPL-SCNC: 104 MMOL/L (ref 98–107)
CO2 SERPL-SCNC: 27 MMOL/L (ref 21–32)
CO2 SERPL-SCNC: 28 MMOL/L (ref 21–32)
CREAT SERPL-MCNC: 0.73 MG/DL (ref 0.5–1.05)
CREAT SERPL-MCNC: 0.78 MG/DL (ref 0.5–1.05)
DISPENSE STATUS: NORMAL
EGFRCR SERPLBLD CKD-EPI 2021: 83 ML/MIN/1.73M*2
EGFRCR SERPLBLD CKD-EPI 2021: 90 ML/MIN/1.73M*2
EOSINOPHIL # BLD AUTO: 0.02 X10*3/UL (ref 0–0.7)
EOSINOPHIL # BLD AUTO: 0.05 X10*3/UL (ref 0–0.7)
EOSINOPHIL NFR BLD AUTO: 0.2 %
EOSINOPHIL NFR BLD AUTO: 0.4 %
ERYTHROCYTE [DISTWIDTH] IN BLOOD BY AUTOMATED COUNT: 19.9 % (ref 11.5–14.5)
ERYTHROCYTE [DISTWIDTH] IN BLOOD BY AUTOMATED COUNT: 20.2 % (ref 11.5–14.5)
GLUCOSE BLD MANUAL STRIP-MCNC: 135 MG/DL (ref 74–99)
GLUCOSE BLD MANUAL STRIP-MCNC: 142 MG/DL (ref 74–99)
GLUCOSE BLD MANUAL STRIP-MCNC: 148 MG/DL (ref 74–99)
GLUCOSE BLD MANUAL STRIP-MCNC: 158 MG/DL (ref 74–99)
GLUCOSE BLD MANUAL STRIP-MCNC: 183 MG/DL (ref 74–99)
GLUCOSE BLD MANUAL STRIP-MCNC: 197 MG/DL (ref 74–99)
GLUCOSE BLDV-MCNC: 146 MG/DL (ref 74–99)
GLUCOSE SERPL-MCNC: 169 MG/DL (ref 74–99)
GLUCOSE SERPL-MCNC: 191 MG/DL (ref 74–99)
HCO3 BLDV-SCNC: 30.5 MMOL/L (ref 22–26)
HCT VFR BLD AUTO: 22.3 % (ref 36–46)
HCT VFR BLD AUTO: 22.6 % (ref 36–46)
HCT VFR BLD AUTO: 24.8 % (ref 36–46)
HCT VFR BLD EST: 22 % (ref 36–46)
HGB BLD-MCNC: 7 G/DL (ref 12–16)
HGB BLD-MCNC: 7.2 G/DL (ref 12–16)
HGB BLD-MCNC: 7.9 G/DL (ref 12–16)
HGB BLDV-MCNC: 7.2 G/DL (ref 12–16)
IMM GRANULOCYTES # BLD AUTO: 0.26 X10*3/UL (ref 0–0.7)
IMM GRANULOCYTES # BLD AUTO: 0.29 X10*3/UL (ref 0–0.7)
IMM GRANULOCYTES NFR BLD AUTO: 2.4 % (ref 0–0.9)
IMM GRANULOCYTES NFR BLD AUTO: 2.4 % (ref 0–0.9)
INHALED O2 CONCENTRATION: 40 %
LACTATE BLDV-SCNC: 1.2 MMOL/L (ref 0.4–2)
LYMPHOCYTES # BLD AUTO: 0.7 X10*3/UL (ref 1.2–4.8)
LYMPHOCYTES # BLD AUTO: 0.98 X10*3/UL (ref 1.2–4.8)
LYMPHOCYTES NFR BLD AUTO: 6.5 %
LYMPHOCYTES NFR BLD AUTO: 8 %
MAGNESIUM SERPL-MCNC: 2.21 MG/DL (ref 1.6–2.4)
MAGNESIUM SERPL-MCNC: 2.23 MG/DL (ref 1.6–2.4)
MAGNESIUM SERPL-MCNC: 2.26 MG/DL (ref 1.6–2.4)
MCH RBC QN AUTO: 30.7 PG (ref 26–34)
MCH RBC QN AUTO: 31.4 PG (ref 26–34)
MCHC RBC AUTO-ENTMCNC: 31.4 G/DL (ref 32–36)
MCHC RBC AUTO-ENTMCNC: 31.9 G/DL (ref 32–36)
MCV RBC AUTO: 100 FL (ref 80–100)
MCV RBC AUTO: 97 FL (ref 80–100)
MONOCYTES # BLD AUTO: 0.44 X10*3/UL (ref 0.1–1)
MONOCYTES # BLD AUTO: 0.64 X10*3/UL (ref 0.1–1)
MONOCYTES NFR BLD AUTO: 4.1 %
MONOCYTES NFR BLD AUTO: 5.3 %
NEUTROPHILS # BLD AUTO: 10.2 X10*3/UL (ref 1.2–7.7)
NEUTROPHILS # BLD AUTO: 9.32 X10*3/UL (ref 1.2–7.7)
NEUTROPHILS NFR BLD AUTO: 83.7 %
NEUTROPHILS NFR BLD AUTO: 86.7 %
NRBC BLD-RTO: 0 /100 WBCS (ref 0–0)
NRBC BLD-RTO: 0 /100 WBCS (ref 0–0)
OVALOCYTES BLD QL SMEAR: NORMAL
OXYHGB MFR BLDV: 70.4 % (ref 45–75)
PCO2 BLDV: 54 MM HG (ref 41–51)
PH BLDV: 7.36 PH (ref 7.33–7.43)
PHOSPHATE SERPL-MCNC: 2.2 MG/DL (ref 2.5–4.9)
PHOSPHATE SERPL-MCNC: 2.3 MG/DL (ref 2.5–4.9)
PHOSPHATE SERPL-MCNC: 2.3 MG/DL (ref 2.5–4.9)
PLATELET # BLD AUTO: 224 X10*3/UL (ref 150–450)
PLATELET # BLD AUTO: 258 X10*3/UL (ref 150–450)
PO2 BLDV: 39 MM HG (ref 35–45)
POLYCHROMASIA BLD QL SMEAR: NORMAL
POTASSIUM BLDV-SCNC: 4.2 MMOL/L (ref 3.5–5.3)
POTASSIUM SERPL-SCNC: 4.2 MMOL/L (ref 3.5–5.3)
POTASSIUM SERPL-SCNC: 4.3 MMOL/L (ref 3.5–5.3)
PRODUCT BLOOD TYPE: 600
PRODUCT CODE: NORMAL
PROT SERPL-MCNC: 4.8 G/DL (ref 6.4–8.2)
PROT SERPL-MCNC: 5.1 G/DL (ref 6.4–8.2)
RBC # BLD AUTO: 2.23 X10*6/UL (ref 4–5.2)
RBC # BLD AUTO: 2.57 X10*6/UL (ref 4–5.2)
RBC MORPH BLD: NORMAL
RH FACTOR (ANTIGEN D): NORMAL
RH FACTOR (ANTIGEN D): NORMAL
SAO2 % BLDV: 72 % (ref 45–75)
SODIUM BLDV-SCNC: 134 MMOL/L (ref 136–145)
SODIUM SERPL-SCNC: 134 MMOL/L (ref 136–145)
SODIUM SERPL-SCNC: 134 MMOL/L (ref 136–145)
UNIT ABO: NORMAL
UNIT NUMBER: NORMAL
UNIT RH: NORMAL
UNIT VOLUME: 350
WBC # BLD AUTO: 10.8 X10*3/UL (ref 4.4–11.3)
WBC # BLD AUTO: 12.2 X10*3/UL (ref 4.4–11.3)
XM INTEP: NORMAL

## 2024-10-25 PROCEDURE — 2500000004 HC RX 250 GENERAL PHARMACY W/ HCPCS (ALT 636 FOR OP/ED): Performed by: NURSE PRACTITIONER

## 2024-10-25 PROCEDURE — 2500000001 HC RX 250 WO HCPCS SELF ADMINISTERED DRUGS (ALT 637 FOR MEDICARE OP)

## 2024-10-25 PROCEDURE — 71045 X-RAY EXAM CHEST 1 VIEW: CPT | Performed by: RADIOLOGY

## 2024-10-25 PROCEDURE — 36430 TRANSFUSION BLD/BLD COMPNT: CPT

## 2024-10-25 PROCEDURE — 2020000001 HC ICU ROOM DAILY

## 2024-10-25 PROCEDURE — 2500000004 HC RX 250 GENERAL PHARMACY W/ HCPCS (ALT 636 FOR OP/ED): Performed by: INTERNAL MEDICINE

## 2024-10-25 PROCEDURE — 85025 COMPLETE CBC W/AUTO DIFF WBC: CPT

## 2024-10-25 PROCEDURE — 71045 X-RAY EXAM CHEST 1 VIEW: CPT

## 2024-10-25 PROCEDURE — 85014 HEMATOCRIT: CPT

## 2024-10-25 PROCEDURE — 2500000004 HC RX 250 GENERAL PHARMACY W/ HCPCS (ALT 636 FOR OP/ED)

## 2024-10-25 PROCEDURE — 2500000005 HC RX 250 GENERAL PHARMACY W/O HCPCS: Performed by: SURGERY

## 2024-10-25 PROCEDURE — 86901 BLOOD TYPING SEROLOGIC RH(D): CPT

## 2024-10-25 PROCEDURE — 83735 ASSAY OF MAGNESIUM: CPT

## 2024-10-25 PROCEDURE — 2500000005 HC RX 250 GENERAL PHARMACY W/O HCPCS

## 2024-10-25 PROCEDURE — 90937 HEMODIALYSIS REPEATED EVAL: CPT

## 2024-10-25 PROCEDURE — 84100 ASSAY OF PHOSPHORUS: CPT

## 2024-10-25 PROCEDURE — P9016 RBC LEUKOCYTES REDUCED: HCPCS

## 2024-10-25 PROCEDURE — 84132 ASSAY OF SERUM POTASSIUM: CPT

## 2024-10-25 PROCEDURE — 82330 ASSAY OF CALCIUM: CPT

## 2024-10-25 PROCEDURE — 80076 HEPATIC FUNCTION PANEL: CPT

## 2024-10-25 PROCEDURE — 37799 UNLISTED PX VASCULAR SURGERY: CPT

## 2024-10-25 PROCEDURE — 82374 ASSAY BLOOD CARBON DIOXIDE: CPT

## 2024-10-25 PROCEDURE — 2500000002 HC RX 250 W HCPCS SELF ADMINISTERED DRUGS (ALT 637 FOR MEDICARE OP, ALT 636 FOR OP/ED)

## 2024-10-25 PROCEDURE — 99291 CRITICAL CARE FIRST HOUR: CPT

## 2024-10-25 PROCEDURE — 82248 BILIRUBIN DIRECT: CPT

## 2024-10-25 PROCEDURE — 82947 ASSAY GLUCOSE BLOOD QUANT: CPT

## 2024-10-25 PROCEDURE — 82435 ASSAY OF BLOOD CHLORIDE: CPT

## 2024-10-25 PROCEDURE — 94003 VENT MGMT INPAT SUBQ DAY: CPT

## 2024-10-25 PROCEDURE — 94640 AIRWAY INHALATION TREATMENT: CPT

## 2024-10-25 PROCEDURE — 86923 COMPATIBILITY TEST ELECTRIC: CPT

## 2024-10-25 RX ORDER — MIDODRINE HYDROCHLORIDE 10 MG/1
10 TABLET ORAL
Status: DISCONTINUED | OUTPATIENT
Start: 2024-10-25 | End: 2024-10-26

## 2024-10-25 RX ORDER — FERROUS SULFATE 300 MG/5ML
60 LIQUID (ML) ORAL DAILY
Status: DISCONTINUED | OUTPATIENT
Start: 2024-10-25 | End: 2024-11-14 | Stop reason: HOSPADM

## 2024-10-25 RX ORDER — FENTANYL CITRATE 50 UG/ML
25 INJECTION, SOLUTION INTRAMUSCULAR; INTRAVENOUS EVERY 2 HOUR PRN
Status: DISCONTINUED | OUTPATIENT
Start: 2024-10-25 | End: 2024-10-29

## 2024-10-25 RX ORDER — OXYCODONE HYDROCHLORIDE 5 MG/1
5 TABLET ORAL
Status: DISCONTINUED | OUTPATIENT
Start: 2024-10-25 | End: 2024-10-28

## 2024-10-25 RX ORDER — FOLIC ACID 1 MG/1
1 TABLET ORAL DAILY
Status: DISCONTINUED | OUTPATIENT
Start: 2024-10-25 | End: 2024-11-14 | Stop reason: HOSPADM

## 2024-10-25 RX ADMIN — LATANOPROST 1 DROP: 50 SOLUTION OPHTHALMIC at 21:06

## 2024-10-25 RX ADMIN — SODIUM PHOSPHATE, MONOBASIC, MONOHYDRATE AND SODIUM PHOSPHATE, DIBASIC, ANHYDROUS 15 MMOL: 276; 142 INJECTION, SOLUTION INTRAVENOUS at 21:14

## 2024-10-25 RX ADMIN — CALCIUM CHLORIDE, MAGNESIUM CHLORIDE, DEXTROSE MONOHYDRATE, LACTIC ACID, SODIUM CHLORIDE, SODIUM BICARBONATE AND POTASSIUM CHLORIDE 25 ML/KG/HR: 3.68; 3.05; 22; 5.4; 6.46; 3.09; .314 INJECTION INTRAVENOUS at 17:25

## 2024-10-25 RX ADMIN — BRIMONIDINE TARTRATE 1 DROP: 2 SOLUTION OPHTHALMIC at 21:06

## 2024-10-25 RX ADMIN — HEPARIN SODIUM 5000 UNITS: 5000 INJECTION, SOLUTION INTRAVENOUS; SUBCUTANEOUS at 05:45

## 2024-10-25 RX ADMIN — CALCIUM CHLORIDE, MAGNESIUM CHLORIDE, DEXTROSE MONOHYDRATE, LACTIC ACID, SODIUM CHLORIDE, SODIUM BICARBONATE AND POTASSIUM CHLORIDE 25 ML/KG/HR: 3.68; 3.05; 22; 5.4; 6.46; 3.09; .314 INJECTION INTRAVENOUS at 06:48

## 2024-10-25 RX ADMIN — INSULIN LISPRO 3 UNITS: 100 INJECTION, SOLUTION INTRAVENOUS; SUBCUTANEOUS at 04:21

## 2024-10-25 RX ADMIN — CALCIUM CHLORIDE, MAGNESIUM CHLORIDE, DEXTROSE MONOHYDRATE, LACTIC ACID, SODIUM CHLORIDE, SODIUM BICARBONATE AND POTASSIUM CHLORIDE 25 ML/KG/HR: 3.68; 3.05; 22; 5.4; 6.46; 3.09; .314 INJECTION INTRAVENOUS at 01:30

## 2024-10-25 RX ADMIN — CALCIUM CHLORIDE, MAGNESIUM CHLORIDE, DEXTROSE MONOHYDRATE, LACTIC ACID, SODIUM CHLORIDE, SODIUM BICARBONATE AND POTASSIUM CHLORIDE 25 ML/KG/HR: 3.68; 3.05; 22; 5.4; 6.46; 3.09; .314 INJECTION INTRAVENOUS at 02:55

## 2024-10-25 RX ADMIN — Medication 30 PERCENT: at 07:18

## 2024-10-25 RX ADMIN — PROPOFOL 15 MCG/KG/MIN: 10 INJECTION, EMULSION INTRAVENOUS at 19:00

## 2024-10-25 RX ADMIN — SODIUM PHOSPHATE, MONOBASIC, MONOHYDRATE AND SODIUM PHOSPHATE, DIBASIC, ANHYDROUS 15 MMOL: 276; 142 INJECTION, SOLUTION INTRAVENOUS at 07:00

## 2024-10-25 RX ADMIN — CALCIUM CHLORIDE, MAGNESIUM CHLORIDE, DEXTROSE MONOHYDRATE, LACTIC ACID, SODIUM CHLORIDE, SODIUM BICARBONATE AND POTASSIUM CHLORIDE 25 ML/KG/HR: 3.68; 3.05; 22; 5.4; 6.46; 3.09; .314 INJECTION INTRAVENOUS at 12:29

## 2024-10-25 RX ADMIN — NOREPINEPHRINE BITARTRATE 0.03 MCG/KG/MIN: 8 INJECTION, SOLUTION INTRAVENOUS at 13:19

## 2024-10-25 RX ADMIN — CALCIUM CHLORIDE, MAGNESIUM CHLORIDE, DEXTROSE MONOHYDRATE, LACTIC ACID, SODIUM CHLORIDE, SODIUM BICARBONATE AND POTASSIUM CHLORIDE 25 ML/KG/HR: 3.68; 3.05; 22; 5.4; 6.46; 3.09; .314 INJECTION INTRAVENOUS at 17:22

## 2024-10-25 RX ADMIN — SENNOSIDES AND DOCUSATE SODIUM 1 TABLET: 50; 8.6 TABLET ORAL at 21:06

## 2024-10-25 RX ADMIN — Medication 3 ML: at 11:50

## 2024-10-25 RX ADMIN — HYDROCORTISONE SODIUM SUCCINATE 100 MG: 100 INJECTION, POWDER, FOR SOLUTION INTRAMUSCULAR; INTRAVENOUS at 04:18

## 2024-10-25 RX ADMIN — INSULIN LISPRO 3 UNITS: 100 INJECTION, SOLUTION INTRAVENOUS; SUBCUTANEOUS at 17:40

## 2024-10-25 RX ADMIN — IPRATROPIUM BROMIDE AND ALBUTEROL SULFATE 3 ML: .5; 3 SOLUTION RESPIRATORY (INHALATION) at 07:14

## 2024-10-25 RX ADMIN — CEFTRIAXONE SODIUM 2 G: 2 INJECTION, SOLUTION INTRAVENOUS at 09:38

## 2024-10-25 RX ADMIN — Medication 75 MCG/HR: at 08:30

## 2024-10-25 RX ADMIN — CALCIUM CHLORIDE, MAGNESIUM CHLORIDE, DEXTROSE MONOHYDRATE, LACTIC ACID, SODIUM CHLORIDE, SODIUM BICARBONATE AND POTASSIUM CHLORIDE 25 ML/KG/HR: 3.68; 3.05; 22; 5.4; 6.46; 3.09; .314 INJECTION INTRAVENOUS at 11:34

## 2024-10-25 RX ADMIN — Medication 3 ML: at 15:29

## 2024-10-25 RX ADMIN — EZETIMIBE 10 MG: 10 TABLET ORAL at 09:45

## 2024-10-25 RX ADMIN — OXYCODONE 5 MG: 5 TABLET ORAL at 17:57

## 2024-10-25 RX ADMIN — Medication 3 ML: at 19:35

## 2024-10-25 RX ADMIN — IPRATROPIUM BROMIDE AND ALBUTEROL SULFATE 3 ML: .5; 3 SOLUTION RESPIRATORY (INHALATION) at 19:35

## 2024-10-25 RX ADMIN — HYDROCORTISONE SODIUM SUCCINATE 100 MG: 100 INJECTION, POWDER, FOR SOLUTION INTRAMUSCULAR; INTRAVENOUS at 21:06

## 2024-10-25 RX ADMIN — MIDODRINE HYDROCHLORIDE 10 MG: 10 TABLET ORAL at 17:57

## 2024-10-25 RX ADMIN — Medication 3 ML: at 07:14

## 2024-10-25 RX ADMIN — Medication 30 PERCENT: at 19:35

## 2024-10-25 RX ADMIN — PRIMIDONE 125 MG: 250 TABLET ORAL at 21:07

## 2024-10-25 RX ADMIN — IPRATROPIUM BROMIDE AND ALBUTEROL SULFATE 3 ML: .5; 3 SOLUTION RESPIRATORY (INHALATION) at 15:28

## 2024-10-25 RX ADMIN — PANTOPRAZOLE SODIUM 40 MG: 40 INJECTION, POWDER, FOR SOLUTION INTRAVENOUS at 05:45

## 2024-10-25 RX ADMIN — CALCIUM CHLORIDE, MAGNESIUM CHLORIDE, DEXTROSE MONOHYDRATE, LACTIC ACID, SODIUM CHLORIDE, SODIUM BICARBONATE AND POTASSIUM CHLORIDE 25 ML/KG/HR: 3.68; 3.05; 22; 5.4; 6.46; 3.09; .314 INJECTION INTRAVENOUS at 12:38

## 2024-10-25 RX ADMIN — OXYCODONE 5 MG: 5 TABLET ORAL at 14:11

## 2024-10-25 RX ADMIN — IPRATROPIUM BROMIDE AND ALBUTEROL SULFATE 3 ML: .5; 3 SOLUTION RESPIRATORY (INHALATION) at 11:50

## 2024-10-25 RX ADMIN — OXYCODONE 5 MG: 5 TABLET ORAL at 21:06

## 2024-10-25 RX ADMIN — OXYCODONE 5 MG: 5 TABLET ORAL at 11:34

## 2024-10-25 RX ADMIN — CALCIUM CHLORIDE, MAGNESIUM CHLORIDE, DEXTROSE MONOHYDRATE, LACTIC ACID, SODIUM CHLORIDE, SODIUM BICARBONATE AND POTASSIUM CHLORIDE 25 ML/KG/HR: 3.68; 3.05; 22; 5.4; 6.46; 3.09; .314 INJECTION INTRAVENOUS at 01:20

## 2024-10-25 RX ADMIN — MIDODRINE HYDROCHLORIDE 10 MG: 10 TABLET ORAL at 08:30

## 2024-10-25 RX ADMIN — INSULIN LISPRO 3 UNITS: 100 INJECTION, SOLUTION INTRAVENOUS; SUBCUTANEOUS at 21:06

## 2024-10-25 RX ADMIN — FOLIC ACID 1 MG: 1 TABLET ORAL at 11:33

## 2024-10-25 RX ADMIN — Medication 60 MG OF IRON: at 15:15

## 2024-10-25 RX ADMIN — DAPTOMYCIN IN SODIUM CHLORIDE 700 MG: 700 INJECTION, SOLUTION INTRAVENOUS at 10:32

## 2024-10-25 RX ADMIN — HEPARIN SODIUM 5000 UNITS: 5000 INJECTION, SOLUTION INTRAVENOUS; SUBCUTANEOUS at 14:11

## 2024-10-25 RX ADMIN — BRIMONIDINE TARTRATE 1 DROP: 2 SOLUTION OPHTHALMIC at 12:29

## 2024-10-25 RX ADMIN — PROPOFOL 5 MCG/KG/MIN: 10 INJECTION, EMULSION INTRAVENOUS at 05:45

## 2024-10-25 RX ADMIN — HEPARIN SODIUM 5000 UNITS: 5000 INJECTION, SOLUTION INTRAVENOUS; SUBCUTANEOUS at 21:32

## 2024-10-25 RX ADMIN — HYDROCORTISONE SODIUM SUCCINATE 100 MG: 100 INJECTION, POWDER, FOR SOLUTION INTRAMUSCULAR; INTRAVENOUS at 11:34

## 2024-10-25 RX ADMIN — CALCIUM CHLORIDE, MAGNESIUM CHLORIDE, DEXTROSE MONOHYDRATE, LACTIC ACID, SODIUM CHLORIDE, SODIUM BICARBONATE AND POTASSIUM CHLORIDE 25 ML/KG/HR: 3.68; 3.05; 22; 5.4; 6.46; 3.09; .314 INJECTION INTRAVENOUS at 06:30

## 2024-10-25 ASSESSMENT — PAIN - FUNCTIONAL ASSESSMENT

## 2024-10-25 ASSESSMENT — PAIN SCALES - GENERAL
PAINLEVEL_OUTOF10: 0 - NO PAIN

## 2024-10-25 NOTE — NURSING NOTE
Vascular access note  Patient with Lt arm dual lumen picc, red lumen in use without difficulty, purple lumen flushes easily and with positive blood return, curos cap applied.  Rt internal jugular mahurkar, pigtail in use without difficulty, dressing D&I.

## 2024-10-25 NOTE — PROGRESS NOTES
[facility] Critical Care Medicine       Date:  10/25/2024  Patient:  Narda Malloy  YOB: 1955  MRN:  [id number]   Admit Date:  10/12/2024      Chief Complaint   Patient presents with    Altered Mental Status     History of Present Illness:  Narda Malloy is a 69 y.o. year old female patient with past medical history of L1-L3 lumbar laminectomy, T4-S1 revision, and fusion August 26th, T2DM, HTN, essential tremor, HLD, glaucoma, sarcoidosis of the lung who presented to  ED 10/12 after being found essentially unresponsive at her nursing [facility]. Per report from her , she has had a significant decline in her health since July 15th when she had a fall and became significantly weak. She has also had multiple infection complications since her back surgery in August requiring multiple I&Ds and long term antibiotic therapy. She went to the OR most recently on 9/28 for lumbar site infection wash out. Per chart review, she was discharged on IV vancomycin 1g and IV ceftriaxone 2d q24hrs through 11/12.     ED Course: Initial vital signs: /104 (109), HR 68, RR 20, SpO2 95% on 6L NC, temp 34.5C. Give 0.4mg of narcan with no improvement in mentation. Lab work-up remarkable for mild hyperkalemia (5.5), AMY 42/1.46, elevated alk phos, normocytic anemia 10.4/33, turbid appearing urine with mild hematuria and proteinuria and + leuk esterase, >50 WBCs. Urine drug screen positive for barbiturates. Triggered sepsis timer so she was given 3L NS. She was intubated for airway protection with 20mg etomidate and 100mg succinylcholine. BP dropped post-intubated and fluid resuscitation and she was subsequently started on levophed.       Interval ICU Events:  10/12: Pt arrived to ICU intubated and lightly sedated on low-dose versed. Eyes open, minimally responsive.      10/13: Received K cocktail last night for K 6.0, corrected appropriately. Levophed requirements mildly up, UOP decreasing.  Ordered albumin x2 this am with improvements in UOP and SBP. Will likely trial lasix this afternoon d/t hypervolemia.     10/14: Remains intubated with decreased mentation, only responsive to noxious stimuli. Trialed lasix TID for volume overload. Remains on levophed 0.01.     10/15: Mentation much improved. SAT/SBT successful so extubated. Given lasix x3, bumex x1, metolazone x1 with net negative fluid balance of 500mL -> started on bumex gtt.      10/16: O2 requirements significantly increased, NRB -> HFNC 40L 100% likely 2/2 mucus plugging.     10/17: No acute events overnight. Bumex gtt increased to 1mg/hr. CXR this am showing complete opacification of left hemithorax related to atelectasis vs pleural effusion. Remains on HFNC 40L/80%. Pigtail catheter placed with 1.2L drained immediately. Given albumin for hypotension.      10/18: ~2L output from left sided pigtail catheter since placement. Kidney function worsening and UOP declining, about 20cc/hr overnight. Will place NG tube today and start enteral nutrition and appetite and oral intake remains poor.      10/19: Patient with worsening hypoxic, hypercapnic respiratory failure - now requiring BIPAP support. Remains grossly volume overloaded with low UO. Started on vasopressors to augment BP for diuresis. 40 IV lasix + gtt started. Remains with poor nutritional status. Will re attempt NG later if respiratory status improves.     10/20: Patient stable on vent. Nephrology consulted for renal failure. Cr continues to uptrend however UO is increasing. No issues overnight.    10/21: No issues overnight. Remains stable on vent. Levo down to 0.01 mcg/kg/min. UO remains low. Nephrology following. Possible CRRT today?    10/22: Patient received 80 lasix and metalozone with minimal UO. Levo @ .02 and prop @ 10. Vent settings: 20/450/10/40%. Plan for CRRT today. Will SAT/SBT after CRRT. May change propofol to precedex.    10/23: Had episodes of bradycardia where HR went  to 30's which resolved with heating the patient. CRRT yesterday with about 1L removed. Currently on 0.03 of levo and 10 of prop. Cont daptomycin and ceftriaxone per ID. CXR improved. SAT/SBT. EP consulted for episodes of bradycardia.    10/24: Bradycardic episodes of HR in 40s. Currently on 0.03 of Levo, 10 of prop and 50 fentanyl. Tolerating CRRT. Place PICC today.    10/25: Did well with the SBT yesterday, plan for another one today. Continue CRRT. Hgb 7.0 this am, still requiting Levo 0.03, will transfuse 1 unit PRBCs. Remove chest tube today.    Medical History:  Past Medical History:   Diagnosis Date    Degenerative myopia, bilateral     Diabetic neuropathy (Multi)     Difficult intubation 08/26/2024    Mac 3, grade 3, 1 attempt.  Glidescope/videolaryngoscopy recommended for future attempts.    DM type 2 (diabetes mellitus, type 2) (Multi)     Dry eye syndrome of bilateral lacrimal glands     Essential hypertension     Essential tremor     Glaucoma     Hyperlipidemia     Long term (current) use of insulin (Multi)     Low back pain     PONV (postoperative nausea and vomiting)     Primary open angle glaucoma of both eyes, severe stage     Repeated falls     Sarcoidosis of lung (Multi)     Spinal stenosis, lumbar region without neurogenic claudication     Weakness      Past Surgical History:   Procedure Laterality Date    BLEPHAROPLASTY  07/2022    BREAST SURGERY  05/20/2022    Breast lift    CARPAL TUNNEL RELEASE      CATARACT EXTRACTION W/  INTRAOCULAR LENS IMPLANT Bilateral     OD 08/04/2011 +8.5D,OS 08/04/2011 +8.50D    FOOT SURGERY      INSERTION / REMOVAL CRANIAL DBS GENERATOR      Placed 2017.  Removed 2018. part of wire left in head when everything removed    LUMBAR FUSION      L3-S1    PANRETINAL PHOTOCOAGULATION  2014    THORACIC FUSION  08/26/2024    T4-S1 fusion    VITRECTOMY Right 2013     Medications Prior to Admission   Medication Sig Dispense Refill Last Dose/Taking    acetaminophen (Tylenol) 500  mg tablet Take 2 tablets (1,000 mg) by mouth 3 times a day.   Unknown    ascorbic acid (Vitamin C) 500 mg tablet as directed Orally   Unknown    brimonidine (AlphaGAN) 0.2 % ophthalmic solution Administer 1 drop into both eyes 2 times a day.   Unknown    calcium carbonate-vitamin D3 500 mg-5 mcg (200 unit) tablet Take 1 tablet by mouth once daily.   Unknown    cefTRIAXone (Rocephin) 2 gram/50 mL IV Infuse 50 mL (2 g) at 100 mL/hr over 30 minutes into a venous catheter once every 24 hours. Once weekly labs CBC/diff, CMP, Vanc trough ESR, CRP fax to Dr. Juarez [phone]. Stop date 11/12/24. 1950 mL 0     dextrose 50 % injection Infuse 25 mL (12.5 g) into a venous catheter every 15 minutes if needed (For blood glucose 41 to 70 mg/dL).       dextrose 50 % injection Infuse 50 mL (25 g) into a venous catheter every 15 minutes if needed (For blood glucose less than or equal to 40 mg/dL).       docusate sodium (Colace) 100 mg capsule Take 1 capsule (100 mg) by mouth 2 times a day.   Unknown    ezetimibe (Zetia) 10 mg tablet Take 1 tablet (10 mg) by mouth once daily. 90 tablet 3 Unknown    FreeStyle Lite Strips strip USE TO TEST 3 TIMES A DAY AS DIRECTED 300 each 2     glucagon (Glucagen) 1 mg injection Inject 1 mg into the muscle every 15 minutes if needed for low blood sugar - see comments (Hypoglycemia).       glucagon (Glucagen) 1 mg injection Inject 1 mg into the muscle every 15 minutes if needed for low blood sugar - see comments (Hypoglycemia).       heparin sodium,porcine (heparin, porcine,) 5,000 unit/mL injection Inject 1 mL (5,000 Units) under the skin every 8 hours.       insulin lispro (HumaLOG) 100 unit/mL injection Inject 0-15 Units under the skin 3 times daily (morning, midday, late afternoon). Take as directed per insulin instructions.Do not hold when patient is not eating, continue order as scheduled for hyperglycemia management.  Insulin Lispro Corrective Scale #3     Hypoglycemia protocol Call LIP  unit(s) if Blood Glucose is between 0 - 70 mg/dL     0 unit(s) if Blood glucose is between    3 unit(s) if Blood glucose is between 151-200   6 unit(s) if Blood glucose is between 201-250   9 unit(s) if Blood glucose is between 251-300   12 unit(s) if Blood glucose is between 301-350   15 unit(s) if Blood glucose is between 351-400    Notify provider unit(s) if Blood Glucose is greater than 400 mg/dL       Lactobacillus acidophilus 100 mg (1 billion cell) capsule Take 1 capsule by mouth 2 times a day.   Unknown    latanoprost (Xalatan) 0.005 % ophthalmic solution Administer 1 drop into both eyes once daily at bedtime. 2.5 mL 5 Unknown    melatonin 5 mg tablet Take 1 tablet (5 mg) by mouth as needed at bedtime for sleep.   Unknown    methocarbamol (Robaxin) 500 mg tablet Take 1 tablet (500 mg) by mouth 3 times a day.   Unknown    multivitamin tablet Take 1 tablet by mouth once daily.   Unknown    ondansetron (Zofran) 4 mg/2 mL injection Infuse 2 mL (4 mg) into a venous catheter every 6 hours if needed for nausea or vomiting.       oxyCODONE (Roxicodone) 5 mg immediate release tablet Take 1 tablet (5 mg) by mouth every 6 hours if needed for severe pain (7 - 10) or moderate pain (4 - 6).       oxygen (O2) gas therapy Inhale 1 each continuously.       pantoprazole (ProtoNix) 40 mg EC tablet Take 1 tablet (40 mg) by mouth once daily in the morning. Take before meals. Do not crush, chew, or split.       pantoprazole (ProtoNix) 40 mg injection Infuse 40 mg into a venous catheter once daily in the morning. Take before meals. If unable to take PO.       pen needle, diabetic (PEN NEEDLE MISC) BD Altagracia- 4 mm X 32 G needle - as directed 4x a day sc 4 times per day       polyethylene glycol (Glycolax, Miralax) 17 gram packet Take 17 g by mouth once daily.   Unknown    primidone 125 mg tablet Take 125 mg by mouth 3 times a day.   Unknown    propranolol LA (Inderal LA) 60 mg 24 hr capsule Take 1 capsule (60 mg) by mouth early  "in the morning.. Hold for SBP < 110 mmhg, HR < 60 bpm.   Unknown    sennosides (Senokot) 8.6 mg tablet Take 1 tablet (8.6 mg) by mouth every 12 hours if needed for constipation.   Unknown    Sure Comfort Pen Needle 32 gauge x 5/32\" needle AS DIRECTED DAILY FOR 90 DAYS 100 each 11 Unknown    traZODone (Desyrel) 25 MG split tablet Take 1 half tablet (25 mg) by mouth once daily at bedtime.   Unknown    vancomycin (Vancocin) 1 gram/250 mL solution Infuse 250 mL (1 g) at 250 mL/hr over 60 minutes into a venous catheter every 12 hours. Once weekly labs CBC/diff, CMP, Vanc trough ESR, CRP fax to Dr. Juarez [phone]. Stop date 11/12/24. 44760 mL 0      Erythromycin, Morphine, and Rosuvastatin  Social History     Tobacco Use    Smoking status: Former     Types: Cigarettes     Passive exposure: Past    Smokeless tobacco: Never   Vaping Use    Vaping status: Never Used   Substance Use Topics    Alcohol use: Not Currently    Drug use: Not Currently     Family History   Problem Relation Name Age of Onset    Multiple myeloma Mother      Cancer Mother      Other (CABG) Father      Pulmonary embolism Father      Heart disease Father      Breast cancer Sister          Stage II    Hypertension Sister      Diabetes Sister      No Known Problems Sister          x5    No Known Problems Brother          x4    No Known Problems Daughter         Hospital Medications:    fentaNYL, 0-100 mcg/hr, Last Rate: 75 mcg/hr (10/24/24 1941)  norepinephrine, 0-0.5 mcg/kg/min, Last Rate: 0.03 mcg/kg/min (10/24/24 1943)  PrismaSol 4/2.5, 25 mL/kg/hr, Last Rate: 25 mL/kg/hr (10/25/24 0648)  propofol, 0-50 mcg/kg/min, Last Rate: 5 mcg/kg/min (10/25/24 0545)          Current Facility-Administered Medications:     acetaminophen (Tylenol) tablet 975 mg, 975 mg, oral, q6h PRN, Kaleb Lam PA-C, 975 mg at 10/18/24 1405    alteplase (Cathflo Activase) injection 2 mg, 2 mg, intra-catheter, PRN, Kaleb Lam PA-C    alteplase (Cathflo Activase) " injection 2 mg, 2 mg, intra-catheter, PRN, Andrew Malagon MD    brimonidine (AlphaGAN) 0.2 % ophthalmic solution 1 drop, 1 drop, Both Eyes, BID, Kaleb Lam PA-C, 1 drop at 10/24/24 2018    [Held by provider] calcium carbonate-vitamin D3 500 mg-5 mcg (200 unit) per tablet 1 tablet, 1 tablet, oral, Daily, Kaleb Lam PA-C, 1 tablet at 10/18/24 0930    cefTRIAXone (Rocephin) 2 g in dextrose (iso) IV 50 mL, 2 g, intravenous, q24h, Kaleb Lam PA-C, Stopped at 10/24/24 0944    DAPTOmycin (Cubicin) 700 mg in sodium chloride 0.9%  mL, 700 mg, intravenous, Daily, Andrew Malagon MD, Stopped at 10/24/24 0944    dextrose 50 % injection 12.5 g, 12.5 g, intravenous, q15 min PRN, Kaleb Lam PA-C    dextrose 50 % injection 25 g, 25 g, intravenous, q15 min PRN, Kaleb Lam PA-C    ezetimibe (Zetia) tablet 10 mg, 10 mg, oral, Daily, Kaleb Lam PA-C, 10 mg at 10/24/24 0914    fentanyl (Sublimaze) 1000 mcg in sodium chloride 0.9% 100 mL (10 mcg/mL) infusion (premix), 0-100 mcg/hr, intravenous, Continuous, Kaleb Lam PA-C, Last Rate: 7.5 mL/hr at 10/24/24 1941, 75 mcg/hr at 10/24/24 1941    fentaNYL bolus from bag 25 mcg, 25 mcg, intravenous, q10 min PRN, Kaleb Lam PA-C, 25 mcg at 10/25/24 0310    glucagon (Glucagen) injection 1 mg, 1 mg, intramuscular, q15 min PRN, Kaleb Lam PA-C    glucagon (Glucagen) injection 1 mg, 1 mg, intramuscular, q15 min PRN, Kaleb Lam PA-C    heparin (porcine) injection 5,000 Units, 5,000 Units, subcutaneous, q8h, Kaleb Lam PA-C, 5,000 Units at 10/25/24 0545    heparin 1,000 unit/mL injection 1,000 Units, 1,000 Units, intra-catheter, PRNNelia MD    heparin 1,000 unit/mL injection 1,000 Units, 1,000 Units, intra-catheter, PRNNelia MD    heparin flush 10 unit/mL syringe 50 Units, 50 Units, intra-catheter, PRN, Kaleb Lam PA-C, 50 Units at 10/19/24 2313    hydrocortisone sodium succinate (PF) (Solu-CORTEF) injection  100 mg, 100 mg, intravenous, q8h, LEONEL Salas-CNP, 100 mg at 10/25/24 0418    [Held by provider] HYDROmorphone (Dilaudid) injection 0.4 mg, 0.4 mg, intravenous, q2h PRN, LEONEL Salgado-CNP, 0.4 mg at 10/19/24 0520    insulin lispro (HumaLOG) injection 0-15 Units, 0-15 Units, subcutaneous, q4h, Lb Dodd PA-C, 3 Units at 10/25/24 0421    ipratropium-albuteroL (Duo-Neb) 0.5-2.5 mg/3 mL nebulizer solution 3 mL, 3 mL, nebulization, 4x daily, Kaleb Lam PA-C, 3 mL at 10/25/24 0714    latanoprost (Xalatan) 0.005 % ophthalmic solution 1 drop, 1 drop, Both Eyes, Nightly, Kaleb Lam PA-C, 1 drop at 10/24/24 2018    norepinephrine (Levophed) 8 mg in dextrose 5% 250 mL (0.032 mg/mL) infusion (premix), 0-0.5 mcg/kg/min, intravenous, Continuous, RUTH Doe, Last Rate: 7.09 mL/hr at 10/24/24 1943, 0.03 mcg/kg/min at 10/24/24 1943    oxygen (O2) therapy, , inhalation, Continuous - Inhalation, Anna Marie Shook MD    pantoprazole (ProtoNix) EC tablet 40 mg, 40 mg, oral, Daily before breakfast **OR** pantoprazole (ProtoNix) injection 40 mg, 40 mg, intravenous, Daily before breakfast, LEONEL Salas-CNP, 40 mg at 10/25/24 0545    polyethylene glycol (Glycolax, Miralax) packet 17 g, 17 g, oral, Daily PRN, RUTH Doe    primidone (Mysoline) tablet 125 mg, 125 mg, oral, Nightly, Kaleb Lam PA-C, 125 mg at 10/24/24 2018    PrismaSol 4/2.5 CRRT solution, 25 mL/kg/hr, CRRT, Continuous, Nelia Cheung MD, Last Rate: 3,150 mL/hr at 10/25/24 0648, 25 mL/kg/hr at 10/25/24 0648    propofol (Diprivan) infusion, 0-50 mcg/kg/min, intravenous, Continuous, LEONEL Salas-CNP, Last Rate: 3.78 mL/hr at 10/25/24 0545, 5 mcg/kg/min at 10/25/24 0545    [Held by provider] propranolol LA (Inderal LA) 24 hr capsule 60 mg, 60 mg, oral, Daily, Kaleb Lam PA-C, 60 mg at 10/19/24 0643    sennosides-docusate sodium (Lucia-Colace) 8.6-50 mg per tablet 1 tablet, 1 tablet, oral,  Nightly, Sweta Diaz, APRN-CNP, 1 tablet at 10/24/24 2018    sodium chloride 3 % nebulizer solution 3 mL, 3 mL, nebulization, 4x daily, Kaleb Lam PA-C, 3 mL at 10/25/24 0714    sodium phosphate 15 mmol in sodium chloride 0.9% 250 mL IV, 15 mmol, intravenous, Once, Kaleb Lam PA-C, Last Rate: 125 mL/hr at 10/25/24 0700, 15 mmol at 10/25/24 0700    [Held by provider] traMADol (Ultram) tablet 50 mg, 50 mg, oral, q8h PRN, Kaleb Lam PA-C    Review of Systems:  14 point review of systems was completed and negative except for those specially mention in my HPI    Physical Exam:    Heart Rate:  []   Temp:  [35.3 °C (95.5 °F)-36.6 °C (97.9 °F)]   Resp:  [17-25]   BP: ()/(40-65)   Weight:  [121 kg (266 lb 15.6 oz)-126 kg (277 lb 5.4 oz)]   SpO2:  [92 %-98 %]     Physical Exam  Vitals and nursing note reviewed.   Constitutional:       Comments: Intubated   HENT:      Head: Normocephalic.   Eyes:      Extraocular Movements: Extraocular movements intact.      Pupils: Pupils are equal, round, and reactive to light.   Cardiovascular:      Rate and Rhythm: Normal rate and regular rhythm.      Pulses:           Radial pulses are 1+ on the right side and 1+ on the left side.        Dorsalis pedis pulses are 1+ on the right side and 1+ on the left side.      Heart sounds: Normal heart sounds, S1 normal and S2 normal.      Comments: Anasarca present  Pulmonary:      Effort: Tachypnea and accessory muscle usage present.      Breath sounds: Rhonchi and rales present. No wheezing.   Chest:      Comments: L chest pigtail catheter in place draining straw colored output.  Abdominal:      General: Abdomen is flat. Bowel sounds are normal.      Palpations: Abdomen is soft.      Tenderness: There is no abdominal tenderness.   Genitourinary:     Comments: Gibson in place draining minimal straw colored urine  Musculoskeletal:      Cervical back: Neck supple.      Right lower le+ Edema present.      Left  lower le+ Edema present.   Skin:     General: Skin is warm.      Capillary Refill: Capillary refill takes less than 2 seconds.      Coloration: Skin is not cyanotic, jaundiced or mottled.   Neurological:      General: No focal deficit present.      Mental Status: She is alert and easily aroused.      GCS: GCS eye subscore is 3. GCS verbal subscore is 4. GCS motor subscore is 6.      Motor: Weakness present.       Objective:    I have reviewed all medications, laboratory results, and imaging pertinent for today's encounter.    Vent Mode: Pressure regulated volume control/assist control  FiO2 (%):  [40 %] 40 %  S RR:  [20] 20  S VT:  [450 mL] 450 mL  PEEP/CPAP (cm H2O):  [5 cm H20] 5 cm H20  IN SUP:  [5 cm H20] 5 cm H20  MAP (cm H2O):  [5.9-9.6] 7.7      Intake/Output Summary (Last 24 hours) at 10/25/2024 0729  Last data filed at 10/25/2024 0700  Gross per 24 hour   Intake 1888.37 ml   Output 3342 ml   Net -1453.63 ml       Daily Labs:  CBC:   Results from last 7 days   Lab Units 10/25/24  0616 10/25/24  0251   WBC AUTO x10*3/uL  --  12.2*   HEMOGLOBIN g/dL 7.2* 7.0*   HEMATOCRIT % 22.6* 22.3*   MCV fL  --  100     BMP:    Results from last 7 days   Lab Units 10/25/24  0251   SODIUM mmol/L 134*   POTASSIUM mmol/L 4.2   CHLORIDE mmol/L 103   CO2 mmol/L 28   BUN mg/dL 12   CREATININE mg/dL 0.78   CALCIUM mg/dL 7.5*   GLUCOSE mg/dL 169*   MAGNESIUM mg/dL 2.23       Assessment/Plan:    Narda Malloy is a 69 y.o. year old female patient with past medical history of L1-L3 lumbar laminectomy, T4-S1 revision, and fusion , T2DM, HTN, essential tremor, HLD, glaucoma, sarcoidosis of the lung who presented to  ED 10/12 after being found unresponsive at her nursing facility. Initially intubated and admitted to ICU for septic shock. Extubated 10/15. Re-intubated 10/19 for hypoxic/hypercapnic respiratory failure. Has been on and off and now back on levophed.    I am currently managing this critically ill patient  for the following problems:     Neuro/Psych/Pain Ctrl/Sedation:   Acute encephalopathy - likely secondary to hypercapnia, infection  Hx Essential tremor   CT head: Negative for acute findings   Urine tox positive for barbiturates consistent with primidone intake   - Addressing underlying causes  - Pain Control: Oxycodone Q4, Acetaminophen PRN  - Discontinued fentanyl gtt  - Sedation: Propofol  - Home primidone continued. Will hold propanolol d/t hypotension  - CAM ICU qshift, sleep-wake hygiene, delirium precautions   - SAT/SBT Daily     Respiratory/ENT:  Acute hypoxic/hypercapnic respiratory failure - likely multifactorial and 2/2 HCAP, pl effusions, volume overload  Healthcare-associated pneumonia   Atelectasis - likely 2/2 mucus plugging   Pleural Effusion -S/p left-sided pigtail placement 10/17, Output decreasing - 50 ml out overnight. Transudative.   CXR Today: No change in the probable bilateral infiltrates and effusions   Left-sided chest tube without pneumothorax.  - Intubated 10/18. Vent setting: -20-5 30%  - Will wean O2 as tolerated to maintain SpO2 >92%  - Duonebs Q4 and hypertonic saline QID   - IPV per RT TID  - Continuous pulse ox monitoring   - Pulm hygiene  - Minimal CT output- labs demonstrate transudative. Will remove today     Cardiovascular:   Septic shock - improving  Hx HTN, HLD  Acute on chronic diastolic heart failure  Bradycardia  TTE 10/1: diastolic dysfunction, LVEF 50-55%, mildly elevated RVSP (40)  Bilateral duplex US upper extremities:  Thrombosis within the left cephalic vein, which is part of the superficial venous system of the upper extremity, adjacent to PICC line. No deep venous thrombosis in the upper extremities.  - Remains on persistent levophed gtt, Will wean with goal MAP > 65   - Start midodrine   - Continue stress dose steroids (10/21-...)  - Holding home propranolol (on for tremors)  - Continue home Zetia  - Continuous cardiac monitoring per ICU protocol  - EKGs PRN  "for ACS symptoms, arrhythmias   - EP consulted     GI:  Hx GERD  - Diet: Tolerating TF @ goal  - BR: Colace, Miralax PRN  - GI Prophylaxis: PPI     Renal/Volume Status/Electrolytes:  Acute kidney injury - possibly ATN +/- medication-induced (vancomycin)  Anasarca   Mixed respiratory and metabolic acidosis - improving on vent  Hypoalbuminemia   Baseline Cr 0.8-1.0. BUN Cr 0.78/12 today  - CRRT started 10/22, remove 50 mL/hr  - Remains grossly volume overloaded  - Oliguric  - Per nephrology continue CRRT as long as she is on pressors  - Nephrology following  - Maintain anders catheter  - Hourly I/O's  - Replete electrolytes to maintain K >4.0 and Mg >2.0  - Daily BMP, Mg, Phos    Endocrine:  T2DM  - Serum Cortisol ordered   - SSI Q 6 while NPO  - TSH: midly elevated, T4 WNL  - Hypoglycemia protocol PRN      Infectious Disease:  Thoracolumbar surgical site infection - s/p I&D x 2. Recent MRSA bacteremia on extended course of IV antibiotics (vanc and rocephin -> 11/12)  Healthcare-associated pneumonia   CT lumbar spine 10/12: Unable to r/o abscess. Unable to do MRI d/t spinal hardware, \"metal in head\" per patient  Sputum Culture 10/21: negative  Blood cultures 10/16: Negative  Urine culture 10/14: Negative  Sputum culture 10/16: + pseudomonas   - Continue Ceftriaxone (10/23-...)  - Continue Daptomycin (10/21-...)  - Cefepime completed on 10/22  - Vanco discontinued d/t continued high levels   - ID following  - PICC placed 10/24  - Monitor SIRS criteria  - WBC: 12.5->10.7->12.2     Heme/Onc:  Normocytic anemia   H/H similar to previous: No active signs of bleeding.  - Hgb 7.0 this am possibly d/t lost blood while during CRRT when filter clotted  - Transfuse 1 unit PRBCs  - Start iron and folate  - Monitor for s/sx of bleeding   - Plan to transfuse if Hgb <7.0   - Daily CBC     MSK:  No active concerns   - PT/OT when appropriate     Derm:  Surgical wound with infection  - Wound care consult  - ICU skin protocol  - Q2hr " turns  - Padded pressure points      Ethics/Code Status:  Full code - discussions with  at bedside      :  DVT Prophylaxis: SQH  GI Prophylaxis: PPI  Bowel Regimen: Colace (home med), Miralax  Diet: TF  CVC: PICC placed 10/24, Trialysis R internal jugular placed 10/19  Many: none  Gibson: yes, replaced 10/12  Restraints: Yes  Disposition: ICU  I have reviewed all medications, laboratory results, and imaging pertinent for today's encounter.  Plan Discussed with Dr. Shook  Critical Care Time: 45 minutes  Critical Care [facility]  Jalynjoaquin Lopez PA-C

## 2024-10-25 NOTE — PROGRESS NOTES
INFECTIOUS DISEASES PROGRESS NOTE    Consulted / following patient for:  Respiratory failure/pneumonia  Recent polymicrobial complicated postoperative lumbar wound infection, MRSA and Proteus  Acute kidney injury    Subjective   Interval History:   (Sedated on the ventilator)    Objective   PHYSICAL EXAMINATION  Vital signs:  Visit Vitals  /58   Pulse 84   Temp 36.7 °C (98.1 °F) (Temporal)   Resp 26   Remains on norepinephrine infusion, CRRT.  Attempting wean from ventilator  General: Intubated and sedated  Lungs: Diminished, clear.  Left chest tube draining minimal amount of serous fluid  Heart:  S1, S2 normal  Abdomen:  Soft, obese, no guarding.   Extremities:  No cords, phlebitis, cellulitis.  Anasarca.  New PICC line has been inserted    Relevant Results  WBC: 14,000 ---> 12,200  Creatinine: (CRRT)  Pleural fluid: Transudate with 197 WBC, 56% neutrophils, protein 1.8, LDH 97, glucose 202  Microbiology:  Blood (10/12): Negative X2  Sputum (10/13): Stain with moderate GNB and moderate PMN, culture Pseudomonas aeruginosa, susceptible to Zosyn and cefepime  Sputum (10/21): Normal neva  Urine: Moderate colony count Candida lusitaniae  Pleural fluid (10/17): Negative    Imaging:  CXR images (10/24) personally reviewed: Intubated.  Bilateral pleural effusions, no significant change compared with 10/23    Assessment:  Sepsis -likely due to pneumonia, present on admission. Patient already on IV vancomycin and IV ceftriaxone at time of this admission for lumbar spinal infection.  Resolved with anti-Pseudomonas antimicrobial therapy.  Large transudative pleural effusion was tapped.  Remains on mechanical ventilation, weaning  Acute kidney injury.  On CRRT  Lumbar spine surgical site infection s/p I&D 9/28. Cultures + MRSA, Proteus.  Was to be on vanco/ceftriaxone through 11/12. Followed by Dr. Brant Juarez.  Wound as described above, doubt that this is a focus for ongoing sepsis.  Vancomycin has been changed to  daptomycin because of AMY    Plan/Recommendations:  Daptomycin 700 mg every 24 hour based on adjusted body weight of 91 kg and estimated creatinine clearance of 28 mL/min, on CRRT  Ceftriaxone until 11/12       Discussed with RN    DR. KWONG COVERING 10/26-10/27 AND WILL SEE PRN YOUR CALL    Andrew Malagon MD  [facility]  Office:  [phone]

## 2024-10-25 NOTE — PROGRESS NOTES
"Nutrition Follow up Note    Nutrition Assessment      Patient remains intubated/sedated. Still on CRRT. Spoke with RN. Tube feed running at goal with no issues.    Nutrition History:  Food and Nutrient History: Vent/Tube feed     Food Allergies/Intolerances:  None  GI Symptoms: None  Oral Problems: None    Anthropometrics:  Ht: 177.8 cm (5' 10\"), Wt: 121 kg (266 lb 15.6 oz), BMI: 38.31  IBW/kg (Dietitian Calculated): 68.18 kg  Percent of IBW: 147 %     Weight Change:  Daily Weight  10/25/24 : 121 kg (266 lb 15.6 oz)  10/01/24 : 99.7 kg (219 lb 12.8 oz)  09/25/24 : 74.8 kg (165 lb)  08/27/24 : 75.8 kg (167 lb)  08/16/24 : 76.2 kg (168 lb)  08/12/24 : 76.4 kg (168 lb 6.4 oz)  07/15/24 : 76.2 kg (168 lb)  06/18/24 : 75.8 kg (167 lb 3.2 oz)  05/31/24 : 69.4 kg (153 lb)  09/29/23 : 69.4 kg (153 lb)     Nutrition Focused Physical Exam Findings:      Nutrition Significant Labs:  Lab Results   Component Value Date    WBC 12.2 (H) 10/25/2024    HGB 7.2 (L) 10/25/2024    HCT 22.6 (L) 10/25/2024     10/25/2024    CHOL 266 (H) 08/23/2023    TRIG 213 (H) 08/23/2023    HDL 58 08/23/2023    ALT 7 10/25/2024    AST 12 10/25/2024     (L) 10/25/2024    K 4.2 10/25/2024     10/25/2024    CREATININE 0.78 10/25/2024    BUN 12 10/25/2024    CO2 28 10/25/2024    TSH 4.78 (H) 10/12/2024    INR 1.0 09/28/2024    HGBA1C 6.2 (H) 09/25/2024    ALBUR 40 (H) 03/31/2022     Nutrition Specific Medications:  brimonidine, 1 drop, Both Eyes, BID  [Held by provider] calcium carbonate-vitamin D3, 1 tablet, oral, Daily  cefTRIAXone, 2 g, intravenous, q24h  daptomycin, 700 mg, intravenous, Daily  ezetimibe, 10 mg, oral, Daily  ferrous sulfate, 60 mg of iron, oral, Daily  folic acid, 1 mg, oral, Daily  heparin (porcine), 5,000 Units, subcutaneous, q8h  hydrocortisone sodium succinate, 100 mg, intravenous, q8h  insulin lispro, 0-15 Units, subcutaneous, q4h  ipratropium-albuteroL, 3 mL, nebulization, 4x daily  latanoprost, 1 drop, Both " Eyes, Nightly  midodrine, 10 mg, oral, BID  oxyCODONE, 5 mg, oral, q4h LUCA  oxygen, , inhalation, Continuous - Inhalation  pantoprazole, 40 mg, oral, Daily before breakfast   Or  pantoprazole, 40 mg, intravenous, Daily before breakfast  primidone, 125 mg, oral, Nightly  [Held by provider] propranolol LA, 60 mg, oral, Daily  sennosides-docusate sodium, 1 tablet, oral, Nightly  sodium chloride, 3 mL, nebulization, 4x daily      norepinephrine, 0-0.5 mcg/kg/min, Last Rate: 0.03 mcg/kg/min (10/25/24 1227)  PrismaSol 4/2.5, 25 mL/kg/hr, Last Rate: 25 mL/kg/hr (10/25/24 1238)  propofol, 0-50 mcg/kg/min, Last Rate: 5 mcg/kg/min (10/25/24 1227)      Propofol @ 3.78 ml/hr provides: 100 kcals/day    Dietary Orders (From admission, onward)       Start     Ordered    10/24/24 1212  Oral nutritional supplements  Until discontinued        Comments: Unflavored via OG tube   Question Answer Comment   Deliver with Breakfast    Deliver with Dinner    Select supplement: Cade        10/24/24 1211    10/23/24 1014  Enteral feeding with NPO 35 (start at 10cc/hr and increase q4hrs until goal rate); 50; Every 6 hours  Diet effective now        Question Answer Comment   Tube feeding formula: Nepro    Tube feeding continuous rate (mL/hr): 35 start at 10cc/hr and increase q4hrs until goal rate   Tube feeding flush (mL): 50    Flush frequency: Every 6 hours        10/23/24 1013    10/16/24 0448  May Participate in Room Service With Assistance  ( ROOM SERVICE MAY PARTICIPATE WITH ASSISTANCE)  Once        Question:  .  Answer:  Yes    10/16/24 0447                  Nutrition Support Intake provides: Nepro @35 ml/Hr provides 1512 calories (1762 kcals w/current rate of sedation and Cade BID), 68 gm protein (73 gm w/Cade)     Estimated Needs:   Estimated Energy Needs  Total Energy Estimated Needs (kCal):  (6056-2941)  Total Estimated Energy Need per Day (kCal/kg):  (25-28)  Method for Estimating Needs: IBW    Estimated Protein Needs  Total  Protein Estimated Needs (g):  ()  Total Protein Estimated Needs (g/kg):  (1.2-2.0)  Method for Estimating Needs: IBW    Estimated Fluid Needs  Total Fluid Estimated Needs (mL): 1700 mL  Total Fluid Estimated Needs (mL/kg): 25 mL/kg  Method for Estimating Needs: Per Critical Care Team/Nephrology      Nutrition Diagnosis   Nutrition Diagnosis:     Nutrition Diagnosis  Patient has Nutrition Diagnosis: Yes  Diagnosis Status (1): Resolved  Nutrition Diagnosis 1: Inadequate energy intake  Related to (1): decreased ability to consume sufficient energy  As Evidenced by (1): NPO/Mechanical Ventilation  Additional Nutrition Diagnosis: Diagnosis 2  Diagnosis Status (2): Resolved  Nutrition Diagnosis 2: Excessive enteral nutrition infusion  Related to (2): current tube feed formula/rate  As Evidenced by (2): enteral formula/rate providing greater than estimated energy needs     Nutrition Interventions/Recommendations   Nutrition Interventions and Recommendations:    Nutrition Prescription:  Individualized Nutrition Prescription Provided for : 3485-2966 calories,  gm protein to be provided via enteral nutrition    Nutrition Interventions:   Food and/or Nutrient Delivery Interventions  Interventions: Enteral intake  Enteral Intake: Other (Comment)  Goal: provide as ordered    Education Documentation  No documentation found.         Nutrition Monitoring and Evaluation   Monitoring/Evaluation:   Food/Nutrient Related History Monitoring  Monitoring and Evaluation Plan: Enteral and parenteral nutrition intake  Enteral and Parenteral Nutrition Intake: Enteral nutrition intake  Criteria: monitoring tube feed tolerance       Time Spent/Follow-up:   Follow Up  Time Spent (min): 25 minutes  Last Date of Nutrition Visit: 10/25/24  Nutrition Follow-Up Needed?: 5-7 days  Follow up Comment: 10/30/24

## 2024-10-25 NOTE — CARE PLAN
Problem: Respiratory  Goal: No signs of respiratory distress (eg. Use of accessory muscles. Peds grunting)  Outcome: Progressing  Goal: Clear secretions with interventions this shift  Outcome: Progressing  Goal: Minimize anxiety/maximize coping throughout shift  Outcome: Progressing  Goal: Minimal/no exertional discomfort or dyspnea this shift  Outcome: Progressing  Goal: Patent airway maintained this shift  Outcome: Progressing  Goal: Tolerate mechanical ventilation evidenced by VS/agitation level this shift  Outcome: Progressing  Goal: Tolerate pulmonary toileting this shift  Outcome: Progressing  Goal: Verbalize decreased shortness of breath this shift  Outcome: Progressing  Goal: Wean oxygen to maintain O2 saturation per order/standard this shift  Outcome: Progressing

## 2024-10-25 NOTE — CARE PLAN
Pt. Being repositioned Q2 hours. No new skin issues noted. Pt. Minimally tolerates repositioning. She becomes very agitated with HOC. Increased sedation enough so pt. Can tolerate repositioning and recovers quickly. Tolerating CRRT remains on Levo @ 0.03mcg/kg/min  Problem: Skin  Goal: Decreased wound size/increased tissue granulation at next dressing change  Outcome: Progressing  Flowsheets (Taken 10/25/2024 0436)  Decreased wound size/increased tissue granulation at next dressing change: Promote sleep for wound healing  Goal: Participates in plan/prevention/treatment measures  Outcome: Progressing  Flowsheets (Taken 10/25/2024 0436)  Participates in plan/prevention/treatment measures: Elevate heels

## 2024-10-25 NOTE — CARE PLAN
The patient's goals for the shift include      The clinical goals for the shift include Pt to remain hemodynamically stable with fluid removal on CRRT.    Over the shift, the patient did not make progress toward the following goals. Barriers to progression include pt still on vasopressors. Recommendations to address these barriers include monitor labs and vitals.

## 2024-10-25 NOTE — PROGRESS NOTES
"Narda Malloy is a 69 y.o. female on day 13 of admission presenting with Unresponsive.    Subjective   The patient is seen for acute kidney injury which is secondary to acute tubular necrosis can [place] to hypotension and septic shock as well as vancomycin toxicity the patient is seen and examined on continuous renal replacement therapy she is tolerating procedure well fluid removal is a.m. for 50 mL negative per hour she is still intubated she is on the ventilator and she is responsive she is currently on Levophed drip to support blood pressure at 0.03 mcg/kg/min and also she is fentanyl drip for sedation as well as propofol still fairly oliguric at this time       Objective     Physical Exam  Constitutional:       Comments: Patient is intubated on the ventilator   Neck:      Vascular: No carotid bruit.   Cardiovascular:      Rate and Rhythm: Normal rate and regular rhythm.      Heart sounds: No murmur heard.     No friction rub. No gallop.   Pulmonary:      Breath sounds: No wheezing, rhonchi or rales.   Chest:      Chest wall: No tenderness.   Abdominal:      General: There is no distension.      Tenderness: There is no abdominal tenderness. There is no guarding or rebound.   Musculoskeletal:         General: No swelling or tenderness.      Cervical back: Neck supple.      Right lower leg: Edema present.      Left lower leg: Edema present.   Lymphadenopathy:      Cervical: No cervical adenopathy.         Last Recorded Vitals  Blood pressure (!) 105/48, pulse 58, temperature 36.7 °C (98.1 °F), temperature source Temporal, resp. rate 21, height 1.778 m (5' 10\"), weight 121 kg (266 lb 15.6 oz), SpO2 98%.    Intake/Output last 3 Shifts:  I/O last 3 completed shifts:  In: 2416.8 (20 mL/kg) [I.V.:626.8 (5.2 mL/kg); NG/GT:1090; IV Piggyback:700]  Out: 4238 (35 mL/kg) [Urine:24 (0 mL/kg/hr); Other:4134; Chest Tube:80]  Weight: 121.1 kg     Current Facility-Administered Medications:     acetaminophen (Tylenol) tablet 975 " mg, 975 mg, oral, q6h PRN, Kaleb Lam PA-C, 975 mg at 10/18/24 1405    alteplase (Cathflo Activase) injection 2 mg, 2 mg, intra-catheter, PRN, Kaleb Lam PA-C    alteplase (Cathflo Activase) injection 2 mg, 2 mg, intra-catheter, PRN, Andrew Malagon MD    brimonidine (AlphaGAN) 0.2 % ophthalmic solution 1 drop, 1 drop, Both Eyes, BID, Kaleb Lam PA-C, 1 drop at 10/24/24 2018    [Held by provider] calcium carbonate-vitamin D3 500 mg-5 mcg (200 unit) per tablet 1 tablet, 1 tablet, oral, Daily, Kaleb Lam PA-C, 1 tablet at 10/18/24 0930    cefTRIAXone (Rocephin) 2 g in dextrose (iso) IV 50 mL, 2 g, intravenous, q24h, Kaleb Lam PA-C, Stopped at 10/24/24 0944    DAPTOmycin (Cubicin) 700 mg in sodium chloride 0.9%  mL, 700 mg, intravenous, Daily, Andrew Malagon MD, Stopped at 10/24/24 0944    dextrose 50 % injection 12.5 g, 12.5 g, intravenous, q15 min PRN, Kaleb Lam PA-C    dextrose 50 % injection 25 g, 25 g, intravenous, q15 min PRN, Kaleb Lam PA-C    ezetimibe (Zetia) tablet 10 mg, 10 mg, oral, Daily, Kaleb Lam PA-C, 10 mg at 10/24/24 0914    fentanyl (Sublimaze) 1000 mcg in sodium chloride 0.9% 100 mL (10 mcg/mL) infusion (premix), 0-100 mcg/hr, intravenous, Continuous, Kaleb Lam PA-C, Last Rate: 7.5 mL/hr at 10/25/24 0830, 75 mcg/hr at 10/25/24 0830    fentaNYL bolus from bag 25 mcg, 25 mcg, intravenous, q10 min PRN, Kaleb Lam PA-C, 25 mcg at 10/25/24 0310    glucagon (Glucagen) injection 1 mg, 1 mg, intramuscular, q15 min PRN, Kaleb Lam PA-C    glucagon (Glucagen) injection 1 mg, 1 mg, intramuscular, q15 min PRN, Kaleb Lam PA-C    heparin (porcine) injection 5,000 Units, 5,000 Units, subcutaneous, q8h, Kaleb Lam PA-C, 5,000 Units at 10/25/24 0545    heparin 1,000 unit/mL injection 1,000 Units, 1,000 Units, intra-catheter, PRN, Nelia Cheung MD    heparin 1,000 unit/mL injection 1,000 Units, 1,000 Units, intra-catheter, PRN, Nelia  JAIRO Cheung MD    heparin flush 10 unit/mL syringe 50 Units, 50 Units, intra-catheter, PRN, Kaleb Lam PA-C, 50 Units at 10/19/24 2313    hydrocortisone sodium succinate (PF) (Solu-CORTEF) injection 100 mg, 100 mg, intravenous, q8h, LEONEL Salas-CNP, 100 mg at 10/25/24 0418    [Held by provider] HYDROmorphone (Dilaudid) injection 0.4 mg, 0.4 mg, intravenous, q2h PRN, LEONEL Salgado-CNP, 0.4 mg at 10/19/24 0520    insulin lispro (HumaLOG) injection 0-15 Units, 0-15 Units, subcutaneous, q4h, Lb Dodd PA-C, 3 Units at 10/25/24 0421    ipratropium-albuteroL (Duo-Neb) 0.5-2.5 mg/3 mL nebulizer solution 3 mL, 3 mL, nebulization, 4x daily, Kaleb Lam PA-C, 3 mL at 10/25/24 0714    latanoprost (Xalatan) 0.005 % ophthalmic solution 1 drop, 1 drop, Both Eyes, Nightly, Kaleb Lam PA-C, 1 drop at 10/24/24 2018    midodrine (Proamatine) tablet 10 mg, 10 mg, oral, BID, Jalyn Lopez PA-C, 10 mg at 10/25/24 0830    norepinephrine (Levophed) 8 mg in dextrose 5% 250 mL (0.032 mg/mL) infusion (premix), 0-0.5 mcg/kg/min, intravenous, Continuous, RUTH Doe, Last Rate: 7.09 mL/hr at 10/24/24 1943, 0.03 mcg/kg/min at 10/24/24 1943    oxygen (O2) therapy, , inhalation, Continuous - Inhalation, Anna Marie Shook MD    pantoprazole (ProtoNix) EC tablet 40 mg, 40 mg, oral, Daily before breakfast **OR** pantoprazole (ProtoNix) injection 40 mg, 40 mg, intravenous, Daily before breakfast, LEONEL Salas-CNP, 40 mg at 10/25/24 0545    polyethylene glycol (Glycolax, Miralax) packet 17 g, 17 g, oral, Daily PRN, Sweta Diaz, APRN-CNP    primidone (Mysoline) tablet 125 mg, 125 mg, oral, Nightly, Kaleb Lam PA-C, 125 mg at 10/24/24 2018    PrismaSol 4/2.5 CRRT solution, 25 mL/kg/hr, CRRT, Continuous, Nelia Cheung MD, Last Rate: 3,150 mL/hr at 10/25/24 0648, 25 mL/kg/hr at 10/25/24 0648    propofol (Diprivan) infusion, 0-50 mcg/kg/min, intravenous, Continuous, Alonso Yancey,  APRN-CNP, Last Rate: 3.78 mL/hr at 10/25/24 0545, 5 mcg/kg/min at 10/25/24 0545    [Held by provider] propranolol LA (Inderal LA) 24 hr capsule 60 mg, 60 mg, oral, Daily, Kaleb Lam PA-C, 60 mg at 10/19/24 0643    sennosides-docusate sodium (Lucia-Colace) 8.6-50 mg per tablet 1 tablet, 1 tablet, oral, Nightly, RUTH Doe, 1 tablet at 10/24/24 2018    sodium chloride 3 % nebulizer solution 3 mL, 3 mL, nebulization, 4x daily, Kaleb Lam PA-C, 3 mL at 10/25/24 0714    [Held by provider] traMADol (Ultram) tablet 50 mg, 50 mg, oral, q8h PRN, Kaleb Lam PA-C   Relevant Results    Results for orders placed or performed during the hospital encounter of 10/12/24 (from the past 96 hours)   POCT GLUCOSE   Result Value Ref Range    POCT Glucose 216 (H) 74 - 99 mg/dL   Respiratory Culture/Smear    Specimen: SPUTUM; Fluid   Result Value Ref Range    Respiratory Culture/Smear Normal throat neva     Gram Stain       Gram stain indicates specimen consists of lower respiratory tract secretions.    Gram Stain No predominant organism    Protein, Urine Random   Result Value Ref Range    Total Protein, Urine Random 173 (H) 5 - 24 mg/dL    Creatinine, Urine Random 27.5 20.0 - 320.0 mg/dL    T. Protein/Creatinine Ratio 6.29 (H) 0.00 - 0.17 mg/mg Creat   POCT GLUCOSE   Result Value Ref Range    POCT Glucose 208 (H) 74 - 99 mg/dL   POCT GLUCOSE   Result Value Ref Range    POCT Glucose 188 (H) 74 - 99 mg/dL   POCT GLUCOSE   Result Value Ref Range    POCT Glucose 239 (H) 74 - 99 mg/dL   CBC and Auto Differential   Result Value Ref Range    WBC 9.3 4.4 - 11.3 x10*3/uL    nRBC 0.0 0.0 - 0.0 /100 WBCs    RBC 2.47 (L) 4.00 - 5.20 x10*6/uL    Hemoglobin 7.7 (L) 12.0 - 16.0 g/dL    Hematocrit 24.4 (L) 36.0 - 46.0 %    MCV 99 80 - 100 fL    MCH 31.2 26.0 - 34.0 pg    MCHC 31.6 (L) 32.0 - 36.0 g/dL    RDW 19.5 (H) 11.5 - 14.5 %    Platelets 282 150 - 450 x10*3/uL    Neutrophils % 82.0 40.0 - 80.0 %    Immature  Granulocytes %, Automated 2.3 (H) 0.0 - 0.9 %    Lymphocytes % 8.7 13.0 - 44.0 %    Monocytes % 6.1 2.0 - 10.0 %    Eosinophils % 0.6 0.0 - 6.0 %    Basophils % 0.3 0.0 - 2.0 %    Neutrophils Absolute 7.65 1.20 - 7.70 x10*3/uL    Immature Granulocytes Absolute, Automated 0.21 0.00 - 0.70 x10*3/uL    Lymphocytes Absolute 0.81 (L) 1.20 - 4.80 x10*3/uL    Monocytes Absolute 0.57 0.10 - 1.00 x10*3/uL    Eosinophils Absolute 0.06 0.00 - 0.70 x10*3/uL    Basophils Absolute 0.03 0.00 - 0.10 x10*3/uL   Renal function panel   Result Value Ref Range    Glucose 236 (H) 74 - 99 mg/dL    Sodium 138 136 - 145 mmol/L    Potassium 4.5 3.5 - 5.3 mmol/L    Chloride 108 (H) 98 - 107 mmol/L    Bicarbonate 21 21 - 32 mmol/L    Anion Gap 14 10 - 20 mmol/L    Urea Nitrogen 64 (H) 6 - 23 mg/dL    Creatinine 2.98 (H) 0.50 - 1.05 mg/dL    eGFR 17 (L) >60 mL/min/1.73m*2    Calcium 7.8 (L) 8.6 - 10.3 mg/dL    Phosphorus 3.0 2.5 - 4.9 mg/dL    Albumin 2.8 (L) 3.4 - 5.0 g/dL   Magnesium   Result Value Ref Range    Magnesium 2.06 1.60 - 2.40 mg/dL   Blood Gas Venous Full Panel   Result Value Ref Range    POCT pH, Venous 7.33 7.33 - 7.43 pH    POCT pCO2, Venous 38 (L) 41 - 51 mm Hg    POCT pO2, Venous 62 (H) 35 - 45 mm Hg    POCT SO2, Venous 94 (H) 45 - 75 %    POCT Oxy Hemoglobin, Venous 91.3 (H) 45.0 - 75.0 %    POCT Hematocrit Calculated, Venous 24.0 (L) 36.0 - 46.0 %    POCT Sodium, Venous 135 (L) 136 - 145 mmol/L    POCT Potassium, Venous 4.6 3.5 - 5.3 mmol/L    POCT Chloride, Venous 106 98 - 107 mmol/L    POCT Ionized [place], Venous 1.17 1.10 - 1.33 mmol/L    POCT Glucose, Venous 234 (H) 74 - 99 mg/dL    POCT Lactate, Venous 1.0 0.4 - 2.0 mmol/L    POCT Base Excess, Venous -5.5 (L) -2.0 - 3.0 mmol/L    POCT HCO3 Calculated, Venous 20.0 (L) 22.0 - 26.0 mmol/L    POCT Hemoglobin, Venous 8.1 (L) 12.0 - 16.0 g/dL    POCT Anion Gap, Venous 14.0 10.0 - 25.0 mmol/L    Patient Temperature 37.0 degrees Celsius    FiO2 40 %   POCT GLUCOSE   Result  Value Ref Range    POCT Glucose 240 (H) 74 - 99 mg/dL   POCT GLUCOSE   Result Value Ref Range    POCT Glucose 212 (H) 74 - 99 mg/dL   POCT GLUCOSE   Result Value Ref Range    POCT Glucose 181 (H) 74 - 99 mg/dL   POCT GLUCOSE   Result Value Ref Range    POCT Glucose 171 (H) 74 - 99 mg/dL   CBC and Auto Differential   Result Value Ref Range    WBC 12.5 (H) 4.4 - 11.3 x10*3/uL    nRBC 0.0 0.0 - 0.0 /100 WBCs    RBC 2.76 (L) 4.00 - 5.20 x10*6/uL    Hemoglobin 8.6 (L) 12.0 - 16.0 g/dL    Hematocrit 27.0 (L) 36.0 - 46.0 %    MCV 98 80 - 100 fL    MCH 31.2 26.0 - 34.0 pg    MCHC 31.9 (L) 32.0 - 36.0 g/dL    RDW 19.3 (H) 11.5 - 14.5 %    Platelets 311 150 - 450 x10*3/uL    Neutrophils % 84.7 40.0 - 80.0 %    Immature Granulocytes %, Automated 4.0 (H) 0.0 - 0.9 %    Lymphocytes % 6.2 13.0 - 44.0 %    Monocytes % 4.2 2.0 - 10.0 %    Eosinophils % 0.7 0.0 - 6.0 %    Basophils % 0.2 0.0 - 2.0 %    Neutrophils Absolute 10.57 (H) 1.20 - 7.70 x10*3/uL    Immature Granulocytes Absolute, Automated 0.50 0.00 - 0.70 x10*3/uL    Lymphocytes Absolute 0.77 (L) 1.20 - 4.80 x10*3/uL    Monocytes Absolute 0.53 0.10 - 1.00 x10*3/uL    Eosinophils Absolute 0.09 0.00 - 0.70 x10*3/uL    Basophils Absolute 0.03 0.00 - 0.10 x10*3/uL   Hepatic function panel   Result Value Ref Range    Albumin 2.7 (L) 3.4 - 5.0 g/dL    Bilirubin, Total 0.4 0.0 - 1.2 mg/dL    Bilirubin, Direct 0.1 0.0 - 0.3 mg/dL    Alkaline Phosphatase 107 33 - 136 U/L    ALT 6 (L) 7 - 45 U/L    AST 8 (L) 9 - 39 U/L    Total Protein 5.4 (L) 6.4 - 8.2 g/dL   Basic Metabolic Panel   Result Value Ref Range    Glucose 165 (H) 74 - 99 mg/dL    Sodium 134 (L) 136 - 145 mmol/L    Potassium 4.4 3.5 - 5.3 mmol/L    Chloride 104 98 - 107 mmol/L    Bicarbonate 25 21 - 32 mmol/L    Anion Gap 9 (L) 10 - 20 mmol/L    Urea Nitrogen 43 (H) 6 - 23 mg/dL    Creatinine 2.02 (H) 0.50 - 1.05 mg/dL    eGFR 26 (L) >60 mL/min/1.73m*2    Calcium 7.7 (L) 8.6 - 10.3 mg/dL   Magnesium   Result Value Ref  Range    Magnesium 2.19 1.60 - 2.40 mg/dL   Phosphorus   Result Value Ref Range    Phosphorus 2.0 (L) 2.5 - 4.9 mg/dL   ECG 12 Lead   Result Value Ref Range    Ventricular Rate 67 BPM    Atrial Rate 67 BPM    ID Interval 152 ms    QRS Duration 82 ms    QT Interval 400 ms    QTC Calculation(Bazett) 422 ms    R Axis -13 degrees    T Axis -34 degrees    QRS Count 11 beats    Q Onset 226 ms    T Offset 426 ms    QTC Fredericia 415 ms   POCT GLUCOSE   Result Value Ref Range    POCT Glucose 166 (H) 74 - 99 mg/dL   POCT GLUCOSE   Result Value Ref Range    POCT Glucose 167 (H) 74 - 99 mg/dL   POCT GLUCOSE   Result Value Ref Range    POCT Glucose 199 (H) 74 - 99 mg/dL   Hepatic function panel   Result Value Ref Range    Albumin 2.6 (L) 3.4 - 5.0 g/dL    Bilirubin, Total 0.3 0.0 - 1.2 mg/dL    Bilirubin, Direct 0.0 0.0 - 0.3 mg/dL    Alkaline Phosphatase 103 33 - 136 U/L    ALT 5 (L) 7 - 45 U/L    AST 9 9 - 39 U/L    Total Protein 5.2 (L) 6.4 - 8.2 g/dL   Phosphorus   Result Value Ref Range    Phosphorus 2.4 (L) 2.5 - 4.9 mg/dL   Basic Metabolic Panel   Result Value Ref Range    Glucose 158 (H) 74 - 99 mg/dL    Sodium 135 (L) 136 - 145 mmol/L    Potassium 4.5 3.5 - 5.3 mmol/L    Chloride 104 98 - 107 mmol/L    Bicarbonate 25 21 - 32 mmol/L    Anion Gap 11 10 - 20 mmol/L    Urea Nitrogen 28 (H) 6 - 23 mg/dL    Creatinine 1.44 (H) 0.50 - 1.05 mg/dL    eGFR 40 (L) >60 mL/min/1.73m*2    Calcium 7.4 (L) 8.6 - 10.3 mg/dL   Magnesium   Result Value Ref Range    Magnesium 2.11 1.60 - 2.40 mg/dL   CBC and Auto Differential   Result Value Ref Range    WBC 14.9 (H) 4.4 - 11.3 x10*3/uL    nRBC 0.0 0.0 - 0.0 /100 WBCs    RBC 2.64 (L) 4.00 - 5.20 x10*6/uL    Hemoglobin 8.2 (L) 12.0 - 16.0 g/dL    Hematocrit 25.8 (L) 36.0 - 46.0 %    MCV 98 80 - 100 fL    MCH 31.1 26.0 - 34.0 pg    MCHC 31.8 (L) 32.0 - 36.0 g/dL    RDW 19.6 (H) 11.5 - 14.5 %    Platelets 313 150 - 450 x10*3/uL    Neutrophils % 83.5 40.0 - 80.0 %    Immature Granulocytes %,  Automated 4.0 (H) 0.0 - 0.9 %    Lymphocytes % 6.5 13.0 - 44.0 %    Monocytes % 5.3 2.0 - 10.0 %    Eosinophils % 0.5 0.0 - 6.0 %    Basophils % 0.2 0.0 - 2.0 %    Neutrophils Absolute 12.46 (H) 1.20 - 7.70 x10*3/uL    Immature Granulocytes Absolute, Automated 0.60 0.00 - 0.70 x10*3/uL    Lymphocytes Absolute 0.97 (L) 1.20 - 4.80 x10*3/uL    Monocytes Absolute 0.79 0.10 - 1.00 x10*3/uL    Eosinophils Absolute 0.07 0.00 - 0.70 x10*3/uL    Basophils Absolute 0.03 0.00 - 0.10 x10*3/uL   POCT GLUCOSE   Result Value Ref Range    POCT Glucose 176 (H) 74 - 99 mg/dL   POCT GLUCOSE   Result Value Ref Range    POCT Glucose 171 (H) 74 - 99 mg/dL   POCT GLUCOSE   Result Value Ref Range    POCT Glucose 162 (H) 74 - 99 mg/dL   POCT GLUCOSE   Result Value Ref Range    POCT Glucose 170 (H) 74 - 99 mg/dL   Magnesium   Result Value Ref Range    Magnesium 2.14 1.60 - 2.40 mg/dL   CBC and Auto Differential   Result Value Ref Range    WBC 10.7 4.4 - 11.3 x10*3/uL    nRBC 0.0 0.0 - 0.0 /100 WBCs    RBC 2.42 (L) 4.00 - 5.20 x10*6/uL    Hemoglobin 7.6 (L) 12.0 - 16.0 g/dL    Hematocrit 23.8 (L) 36.0 - 46.0 %    MCV 98 80 - 100 fL    MCH 31.4 26.0 - 34.0 pg    MCHC 31.9 (L) 32.0 - 36.0 g/dL    RDW 19.4 (H) 11.5 - 14.5 %    Platelets 252 150 - 450 x10*3/uL    Neutrophils % 84.6 40.0 - 80.0 %    Immature Granulocytes %, Automated 3.2 (H) 0.0 - 0.9 %    Lymphocytes % 6.3 13.0 - 44.0 %    Monocytes % 5.2 2.0 - 10.0 %    Eosinophils % 0.6 0.0 - 6.0 %    Basophils % 0.1 0.0 - 2.0 %    Neutrophils Absolute 9.03 (H) 1.20 - 7.70 x10*3/uL    Immature Granulocytes Absolute, Automated 0.34 0.00 - 0.70 x10*3/uL    Lymphocytes Absolute 0.67 (L) 1.20 - 4.80 x10*3/uL    Monocytes Absolute 0.56 0.10 - 1.00 x10*3/uL    Eosinophils Absolute 0.06 0.00 - 0.70 x10*3/uL    Basophils Absolute 0.01 0.00 - 0.10 x10*3/uL   Hepatic function panel   Result Value Ref Range    Albumin 2.4 (L) 3.4 - 5.0 g/dL    Bilirubin, Total 0.3 0.0 - 1.2 mg/dL    Bilirubin, Direct 0.0  0.0 - 0.3 mg/dL    Alkaline Phosphatase 105 33 - 136 U/L    ALT 6 (L) 7 - 45 U/L    AST 10 9 - 39 U/L    Total Protein 4.9 (L) 6.4 - 8.2 g/dL   Phosphorus   Result Value Ref Range    Phosphorus 2.5 2.5 - 4.9 mg/dL   Calcium, ionized   Result Value Ref Range    POCT Calcium, Ionized 1.07 (L) 1.1 - 1.33 mmol/L   Basic metabolic panel   Result Value Ref Range    Glucose 171 (H) 74 - 99 mg/dL    Sodium 135 (L) 136 - 145 mmol/L    Potassium 4.3 3.5 - 5.3 mmol/L    Chloride 103 98 - 107 mmol/L    Bicarbonate 26 21 - 32 mmol/L    Anion Gap 10 10 - 20 mmol/L    Urea Nitrogen 20 6 - 23 mg/dL    Creatinine 1.05 0.50 - 1.05 mg/dL    eGFR 58 (L) >60 mL/min/1.73m*2    Calcium 7.3 (L) 8.6 - 10.3 mg/dL   POCT GLUCOSE   Result Value Ref Range    POCT Glucose 169 (H) 74 - 99 mg/dL   POCT GLUCOSE   Result Value Ref Range    POCT Glucose 148 (H) 74 - 99 mg/dL   Magnesium   Result Value Ref Range    Magnesium 2.18 1.60 - 2.40 mg/dL   CBC and Auto Differential   Result Value Ref Range    WBC 9.4 4.4 - 11.3 x10*3/uL    nRBC 0.0 0.0 - 0.0 /100 WBCs    RBC 2.25 (L) 4.00 - 5.20 x10*6/uL    Hemoglobin 7.1 (L) 12.0 - 16.0 g/dL    Hematocrit 22.5 (L) 36.0 - 46.0 %     80 - 100 fL    MCH 31.6 26.0 - 34.0 pg    MCHC 31.6 (L) 32.0 - 36.0 g/dL    RDW 19.9 (H) 11.5 - 14.5 %    Platelets 215 150 - 450 x10*3/uL    Neutrophils % 81.6 40.0 - 80.0 %    Immature Granulocytes %, Automated 3.6 (H) 0.0 - 0.9 %    Lymphocytes % 9.1 13.0 - 44.0 %    Monocytes % 5.2 2.0 - 10.0 %    Eosinophils % 0.4 0.0 - 6.0 %    Basophils % 0.1 0.0 - 2.0 %    Neutrophils Absolute 7.66 1.20 - 7.70 x10*3/uL    Immature Granulocytes Absolute, Automated 0.34 0.00 - 0.70 x10*3/uL    Lymphocytes Absolute 0.85 (L) 1.20 - 4.80 x10*3/uL    Monocytes Absolute 0.49 0.10 - 1.00 x10*3/uL    Eosinophils Absolute 0.04 0.00 - 0.70 x10*3/uL    Basophils Absolute 0.01 0.00 - 0.10 x10*3/uL   Hepatic function panel   Result Value Ref Range    Albumin 2.3 (L) 3.4 - 5.0 g/dL    Bilirubin,  Total 0.3 0.0 - 1.2 mg/dL    Bilirubin, Direct 0.0 0.0 - 0.3 mg/dL    Alkaline Phosphatase 112 33 - 136 U/L    ALT 6 (L) 7 - 45 U/L    AST 9 9 - 39 U/L    Total Protein 4.7 (L) 6.4 - 8.2 g/dL   Calcium, ionized   Result Value Ref Range    POCT Calcium, Ionized 1.10 1.1 - 1.33 mmol/L   Phosphorus   Result Value Ref Range    Phosphorus 1.8 (L) 2.5 - 4.9 mg/dL   Basic Metabolic Panel   Result Value Ref Range    Glucose 159 (H) 74 - 99 mg/dL    Sodium 135 (L) 136 - 145 mmol/L    Potassium 4.2 3.5 - 5.3 mmol/L    Chloride 104 98 - 107 mmol/L    Bicarbonate 27 21 - 32 mmol/L    Anion Gap 8 (L) 10 - 20 mmol/L    Urea Nitrogen 14 6 - 23 mg/dL    Creatinine 0.86 0.50 - 1.05 mg/dL    eGFR 74 >60 mL/min/1.73m*2    Calcium 7.4 (L) 8.6 - 10.3 mg/dL   POCT GLUCOSE   Result Value Ref Range    POCT Glucose 180 (H) 74 - 99 mg/dL   POCT GLUCOSE   Result Value Ref Range    POCT Glucose 160 (H) 74 - 99 mg/dL   Basic metabolic panel   Result Value Ref Range    Glucose 161 (H) 74 - 99 mg/dL    Sodium 135 (L) 136 - 145 mmol/L    Potassium 4.3 3.5 - 5.3 mmol/L    Chloride 103 98 - 107 mmol/L    Bicarbonate 26 21 - 32 mmol/L    Anion Gap 10 10 - 20 mmol/L    Urea Nitrogen 13 6 - 23 mg/dL    Creatinine 0.90 0.50 - 1.05 mg/dL    eGFR 70 >60 mL/min/1.73m*2    Calcium 7.5 (L) 8.6 - 10.3 mg/dL   CBC   Result Value Ref Range    WBC 14.0 (H) 4.4 - 11.3 x10*3/uL    nRBC 0.0 0.0 - 0.0 /100 WBCs    RBC 2.38 (L) 4.00 - 5.20 x10*6/uL    Hemoglobin 7.4 (L) 12.0 - 16.0 g/dL    Hematocrit 23.3 (L) 36.0 - 46.0 %    MCV 98 80 - 100 fL    MCH 31.1 26.0 - 34.0 pg    MCHC 31.8 (L) 32.0 - 36.0 g/dL    RDW 19.9 (H) 11.5 - 14.5 %    Platelets 285 150 - 450 x10*3/uL   Phosphorus   Result Value Ref Range    Phosphorus 2.3 (L) 2.5 - 4.9 mg/dL   Magnesium   Result Value Ref Range    Magnesium 2.21 1.60 - 2.40 mg/dL   POCT GLUCOSE   Result Value Ref Range    POCT Glucose 160 (H) 74 - 99 mg/dL   Magnesium   Result Value Ref Range    Magnesium 2.23 1.60 - 2.40 mg/dL    CBC and Auto Differential   Result Value Ref Range    WBC 12.2 (H) 4.4 - 11.3 x10*3/uL    nRBC 0.0 0.0 - 0.0 /100 WBCs    RBC 2.23 (L) 4.00 - 5.20 x10*6/uL    Hemoglobin 7.0 (L) 12.0 - 16.0 g/dL    Hematocrit 22.3 (L) 36.0 - 46.0 %     80 - 100 fL    MCH 31.4 26.0 - 34.0 pg    MCHC 31.4 (L) 32.0 - 36.0 g/dL    RDW 19.9 (H) 11.5 - 14.5 %    Platelets 258 150 - 450 x10*3/uL    Neutrophils % 83.7 40.0 - 80.0 %    Immature Granulocytes %, Automated 2.4 (H) 0.0 - 0.9 %    Lymphocytes % 8.0 13.0 - 44.0 %    Monocytes % 5.3 2.0 - 10.0 %    Eosinophils % 0.4 0.0 - 6.0 %    Basophils % 0.2 0.0 - 2.0 %    Neutrophils Absolute 10.20 (H) 1.20 - 7.70 x10*3/uL    Immature Granulocytes Absolute, Automated 0.29 0.00 - 0.70 x10*3/uL    Lymphocytes Absolute 0.98 (L) 1.20 - 4.80 x10*3/uL    Monocytes Absolute 0.64 0.10 - 1.00 x10*3/uL    Eosinophils Absolute 0.05 0.00 - 0.70 x10*3/uL    Basophils Absolute 0.02 0.00 - 0.10 x10*3/uL   Hepatic function panel   Result Value Ref Range    Albumin 2.5 (L) 3.4 - 5.0 g/dL    Bilirubin, Total 0.2 0.0 - 1.2 mg/dL    Bilirubin, Direct 0.1 0.0 - 0.3 mg/dL    Alkaline Phosphatase 114 33 - 136 U/L    ALT 7 7 - 45 U/L    AST 12 9 - 39 U/L    Total Protein 4.8 (L) 6.4 - 8.2 g/dL   Calcium, ionized   Result Value Ref Range    POCT Calcium, Ionized 1.15 1.1 - 1.33 mmol/L   Basic Metabolic Panel   Result Value Ref Range    Glucose 169 (H) 74 - 99 mg/dL    Sodium 134 (L) 136 - 145 mmol/L    Potassium 4.2 3.5 - 5.3 mmol/L    Chloride 103 98 - 107 mmol/L    Bicarbonate 28 21 - 32 mmol/L    Anion Gap 7 (L) 10 - 20 mmol/L    Urea Nitrogen 12 6 - 23 mg/dL    Creatinine 0.78 0.50 - 1.05 mg/dL    eGFR 83 >60 mL/min/1.73m*2    Calcium 7.5 (L) 8.6 - 10.3 mg/dL   Phosphorus   Result Value Ref Range    Phosphorus 2.2 (L) 2.5 - 4.9 mg/dL   POCT GLUCOSE   Result Value Ref Range    POCT Glucose 183 (H) 74 - 99 mg/dL   Hemoglobin and hematocrit, blood   Result Value Ref Range    Hemoglobin 7.2 (L) 12.0 - 16.0  g/dL    Hematocrit 22.6 (L) 36.0 - 46.0 %   Blood Gas Venous Full Panel   Result Value Ref Range    POCT pH, Venous 7.36 7.33 - 7.43 pH    POCT pCO2, Venous 54 (H) 41 - 51 mm Hg    POCT pO2, Venous 39 35 - 45 mm Hg    POCT SO2, Venous 72 45 - 75 %    POCT Oxy Hemoglobin, Venous 70.4 45.0 - 75.0 %    POCT Hematocrit Calculated, Venous 22.0 (L) 36.0 - 46.0 %    POCT Sodium, Venous 134 (L) 136 - 145 mmol/L    POCT Potassium, Venous 4.2 3.5 - 5.3 mmol/L    POCT Chloride, Venous 105 98 - 107 mmol/L    POCT Ionized Calicum, Venous 1.23 1.10 - 1.33 mmol/L    POCT Glucose, Venous 146 (H) 74 - 99 mg/dL    POCT Lactate, Venous 1.2 0.4 - 2.0 mmol/L    POCT Base Excess, Venous 4.5 (H) -2.0 - 3.0 mmol/L    POCT HCO3 Calculated, Venous 30.5 (H) 22.0 - 26.0 mmol/L    POCT Hemoglobin, Venous 7.2 (L) 12.0 - 16.0 g/dL    POCT Anion Gap, Venous 3.0 (L) 10.0 - 25.0 mmol/L    Patient Temperature 37.0 degrees Celsius    FiO2 40 %   Type and screen   Result Value Ref Range    ABO TYPE A     Rh TYPE NEG     ANTIBODY SCREEN NEG    Verify ABO/Rh Group Test (VERAB)   Result Value Ref Range    ABO TYPE A     Rh TYPE NEG    POCT GLUCOSE   Result Value Ref Range    POCT Glucose 135 (H) 74 - 99 mg/dL       Assessment/Plan   Acute kidney injury the patient remains oliguric my plan is to continue with continuous renal replacement therapy as long as she is on pressors  Acute respiratory failure continue with ventilator support managed by the intensive care unit team  Edema continue to do ultrafiltration as tolerated  Septic shock we will try to wean off pressors if possible  Pneumonia with antibiotic therapy  Diabetes mellitus type 2  Malnutrition continue with tube feeding  Lower back surgery site infection  Anemia transfuse when hemoglobin less than 7       Imer JAIRO Cheung MD

## 2024-10-25 NOTE — PROGRESS NOTES
Spiritual Care Visit    Clinical Encounter Type  Visited With: Patient  Routine Visit: Introduction     Annotation:  provided patient support while rounding the Unit.  introduced  services of    explained the role of the  in providing emotional and spiritual support for patient's and family while in admitted to the hospital. Patient opened her eyes when her name was called. Patient did appear startled  offered reassurance and a calm presence.  Family was not present at the time of the visit. Spiritual care will remain available for support as requested.                                         Taxonomy  Intended Effects: Establish rapport and connectedness, Lessen anxiety, Demonstrate caring and concern, Build relationship of care and support  Methods: Offer support  Interventions: Acknowledge current situation

## 2024-10-25 NOTE — PROGRESS NOTES
Patient not medically clear. Patient remains intubated. Patient on CRRT. At this time there is not a safe discharge plan in place. Therapy to evaluate patient. Patient was at [facility] prior to admission. If she will return then a precert will be needed. Will follow.      10/25/24 1213   Discharge Planning   Home or Post Acute Services Other (Comment)  (TBD)   Expected Discharge Disposition [place]  (TBD)   Does the patient need discharge transport arranged? Yes   [facility] coordination needed? Yes

## 2024-10-26 ENCOUNTER — APPOINTMENT (OUTPATIENT)
Dept: RADIOLOGY | Facility: HOSPITAL | Age: 69
End: 2024-10-26
Payer: MEDICARE

## 2024-10-26 LAB
ALBUMIN SERPL BCP-MCNC: 2.4 G/DL (ref 3.4–5)
ALBUMIN SERPL BCP-MCNC: 2.4 G/DL (ref 3.4–5)
ALP SERPL-CCNC: 122 U/L (ref 33–136)
ALP SERPL-CCNC: 126 U/L (ref 33–136)
ALT SERPL W P-5'-P-CCNC: 8 U/L (ref 7–45)
ALT SERPL W P-5'-P-CCNC: 9 U/L (ref 7–45)
ANION GAP SERPL CALCULATED.3IONS-SCNC: 7 MMOL/L (ref 10–20)
ANION GAP SERPL CALCULATED.3IONS-SCNC: 8 MMOL/L (ref 10–20)
AST SERPL W P-5'-P-CCNC: 12 U/L (ref 9–39)
AST SERPL W P-5'-P-CCNC: 14 U/L (ref 9–39)
BASO STIPL BLD QL SMEAR: PRESENT
BASO STIPL BLD QL SMEAR: PRESENT
BASOPHILS # BLD AUTO: 0.01 X10*3/UL (ref 0–0.1)
BASOPHILS # BLD AUTO: 0.01 X10*3/UL (ref 0–0.1)
BASOPHILS NFR BLD AUTO: 0.1 %
BASOPHILS NFR BLD AUTO: 0.1 %
BILIRUB DIRECT SERPL-MCNC: 0 MG/DL (ref 0–0.3)
BILIRUB DIRECT SERPL-MCNC: 0.1 MG/DL (ref 0–0.3)
BILIRUB SERPL-MCNC: 0.3 MG/DL (ref 0–1.2)
BILIRUB SERPL-MCNC: 0.3 MG/DL (ref 0–1.2)
BUN SERPL-MCNC: 8 MG/DL (ref 6–23)
BUN SERPL-MCNC: 9 MG/DL (ref 6–23)
BURR CELLS BLD QL SMEAR: NORMAL
CA-I BLD-SCNC: 1.12 MMOL/L (ref 1.1–1.33)
CA-I BLD-SCNC: 1.13 MMOL/L (ref 1.1–1.33)
CALCIUM SERPL-MCNC: 7.4 MG/DL (ref 8.6–10.3)
CALCIUM SERPL-MCNC: 7.5 MG/DL (ref 8.6–10.3)
CHLORIDE SERPL-SCNC: 103 MMOL/L (ref 98–107)
CHLORIDE SERPL-SCNC: 104 MMOL/L (ref 98–107)
CO2 SERPL-SCNC: 27 MMOL/L (ref 21–32)
CO2 SERPL-SCNC: 28 MMOL/L (ref 21–32)
CORTIS AM PEAK SERPL-MSCNC: 30 UG/DL (ref 5–20)
CREAT SERPL-MCNC: 0.61 MG/DL (ref 0.5–1.05)
CREAT SERPL-MCNC: 0.63 MG/DL (ref 0.5–1.05)
EGFRCR SERPLBLD CKD-EPI 2021: >90 ML/MIN/1.73M*2
EGFRCR SERPLBLD CKD-EPI 2021: >90 ML/MIN/1.73M*2
EOSINOPHIL # BLD AUTO: 0.02 X10*3/UL (ref 0–0.7)
EOSINOPHIL # BLD AUTO: 0.02 X10*3/UL (ref 0–0.7)
EOSINOPHIL NFR BLD AUTO: 0.2 %
EOSINOPHIL NFR BLD AUTO: 0.2 %
ERYTHROCYTE [DISTWIDTH] IN BLOOD BY AUTOMATED COUNT: 20.4 % (ref 11.5–14.5)
ERYTHROCYTE [DISTWIDTH] IN BLOOD BY AUTOMATED COUNT: 20.6 % (ref 11.5–14.5)
GLUCOSE BLD MANUAL STRIP-MCNC: 134 MG/DL (ref 74–99)
GLUCOSE BLD MANUAL STRIP-MCNC: 158 MG/DL (ref 74–99)
GLUCOSE BLD MANUAL STRIP-MCNC: 168 MG/DL (ref 74–99)
GLUCOSE BLD MANUAL STRIP-MCNC: 169 MG/DL (ref 74–99)
GLUCOSE BLD MANUAL STRIP-MCNC: 171 MG/DL (ref 74–99)
GLUCOSE SERPL-MCNC: 125 MG/DL (ref 74–99)
GLUCOSE SERPL-MCNC: 159 MG/DL (ref 74–99)
HCT VFR BLD AUTO: 22 % (ref 36–46)
HCT VFR BLD AUTO: 22.4 % (ref 36–46)
HGB BLD-MCNC: 7.1 G/DL (ref 12–16)
HGB BLD-MCNC: 7.2 G/DL (ref 12–16)
IMM GRANULOCYTES # BLD AUTO: 0.12 X10*3/UL (ref 0–0.7)
IMM GRANULOCYTES # BLD AUTO: 0.17 X10*3/UL (ref 0–0.7)
IMM GRANULOCYTES NFR BLD AUTO: 1.3 % (ref 0–0.9)
IMM GRANULOCYTES NFR BLD AUTO: 1.4 % (ref 0–0.9)
LYMPHOCYTES # BLD AUTO: 0.72 X10*3/UL (ref 1.2–4.8)
LYMPHOCYTES # BLD AUTO: 0.97 X10*3/UL (ref 1.2–4.8)
LYMPHOCYTES NFR BLD AUTO: 7.7 %
LYMPHOCYTES NFR BLD AUTO: 7.8 %
MAGNESIUM SERPL-MCNC: 2.25 MG/DL (ref 1.6–2.4)
MAGNESIUM SERPL-MCNC: 2.26 MG/DL (ref 1.6–2.4)
MCH RBC QN AUTO: 30.5 PG (ref 26–34)
MCH RBC QN AUTO: 30.8 PG (ref 26–34)
MCHC RBC AUTO-ENTMCNC: 32.1 G/DL (ref 32–36)
MCHC RBC AUTO-ENTMCNC: 32.3 G/DL (ref 32–36)
MCV RBC AUTO: 94 FL (ref 80–100)
MCV RBC AUTO: 96 FL (ref 80–100)
MONOCYTES # BLD AUTO: 0.41 X10*3/UL (ref 0.1–1)
MONOCYTES # BLD AUTO: 0.55 X10*3/UL (ref 0.1–1)
MONOCYTES NFR BLD AUTO: 4.4 %
MONOCYTES NFR BLD AUTO: 4.4 %
NEUTROPHILS # BLD AUTO: 10.66 X10*3/UL (ref 1.2–7.7)
NEUTROPHILS # BLD AUTO: 8.12 X10*3/UL (ref 1.2–7.7)
NEUTROPHILS NFR BLD AUTO: 86.1 %
NEUTROPHILS NFR BLD AUTO: 86.3 %
NRBC BLD-RTO: 0 /100 WBCS (ref 0–0)
NRBC BLD-RTO: 0 /100 WBCS (ref 0–0)
OVALOCYTES BLD QL SMEAR: NORMAL
PHOSPHATE SERPL-MCNC: 2 MG/DL (ref 2.5–4.9)
PHOSPHATE SERPL-MCNC: 2.2 MG/DL (ref 2.5–4.9)
PLATELET # BLD AUTO: 185 X10*3/UL (ref 150–450)
PLATELET # BLD AUTO: 192 X10*3/UL (ref 150–450)
POLYCHROMASIA BLD QL SMEAR: NORMAL
POLYCHROMASIA BLD QL SMEAR: NORMAL
POTASSIUM SERPL-SCNC: 3.9 MMOL/L (ref 3.5–5.3)
POTASSIUM SERPL-SCNC: 4.2 MMOL/L (ref 3.5–5.3)
PROT SERPL-MCNC: 4.7 G/DL (ref 6.4–8.2)
PROT SERPL-MCNC: 5 G/DL (ref 6.4–8.2)
RBC # BLD AUTO: 2.33 X10*6/UL (ref 4–5.2)
RBC # BLD AUTO: 2.34 X10*6/UL (ref 4–5.2)
RBC MORPH BLD: NORMAL
RBC MORPH BLD: NORMAL
SODIUM SERPL-SCNC: 134 MMOL/L (ref 136–145)
SODIUM SERPL-SCNC: 135 MMOL/L (ref 136–145)
STAPHYLOCOCCUS SPEC CULT: NORMAL
STOMATOCYTES BLD QL SMEAR: NORMAL
WBC # BLD AUTO: 12.4 X10*3/UL (ref 4.4–11.3)
WBC # BLD AUTO: 9.4 X10*3/UL (ref 4.4–11.3)

## 2024-10-26 PROCEDURE — 37799 UNLISTED PX VASCULAR SURGERY: CPT

## 2024-10-26 PROCEDURE — 2500000002 HC RX 250 W HCPCS SELF ADMINISTERED DRUGS (ALT 637 FOR MEDICARE OP, ALT 636 FOR OP/ED)

## 2024-10-26 PROCEDURE — 85025 COMPLETE CBC W/AUTO DIFF WBC: CPT

## 2024-10-26 PROCEDURE — 84100 ASSAY OF PHOSPHORUS: CPT

## 2024-10-26 PROCEDURE — 99291 CRITICAL CARE FIRST HOUR: CPT

## 2024-10-26 PROCEDURE — 82947 ASSAY GLUCOSE BLOOD QUANT: CPT

## 2024-10-26 PROCEDURE — 94668 MNPJ CHEST WALL SBSQ: CPT

## 2024-10-26 PROCEDURE — 2500000001 HC RX 250 WO HCPCS SELF ADMINISTERED DRUGS (ALT 637 FOR MEDICARE OP)

## 2024-10-26 PROCEDURE — 2500000004 HC RX 250 GENERAL PHARMACY W/ HCPCS (ALT 636 FOR OP/ED): Performed by: NURSE PRACTITIONER

## 2024-10-26 PROCEDURE — 2500000005 HC RX 250 GENERAL PHARMACY W/O HCPCS

## 2024-10-26 PROCEDURE — 94003 VENT MGMT INPAT SUBQ DAY: CPT

## 2024-10-26 PROCEDURE — 80048 BASIC METABOLIC PNL TOTAL CA: CPT | Mod: CCI

## 2024-10-26 PROCEDURE — 2500000004 HC RX 250 GENERAL PHARMACY W/ HCPCS (ALT 636 FOR OP/ED): Performed by: INTERNAL MEDICINE

## 2024-10-26 PROCEDURE — 82248 BILIRUBIN DIRECT: CPT

## 2024-10-26 PROCEDURE — 2500000004 HC RX 250 GENERAL PHARMACY W/ HCPCS (ALT 636 FOR OP/ED)

## 2024-10-26 PROCEDURE — 71045 X-RAY EXAM CHEST 1 VIEW: CPT

## 2024-10-26 PROCEDURE — 83735 ASSAY OF MAGNESIUM: CPT

## 2024-10-26 PROCEDURE — 82533 TOTAL CORTISOL: CPT | Mod: WESLAB

## 2024-10-26 PROCEDURE — 2020000001 HC ICU ROOM DAILY

## 2024-10-26 PROCEDURE — 82330 ASSAY OF CALCIUM: CPT

## 2024-10-26 PROCEDURE — 80048 BASIC METABOLIC PNL TOTAL CA: CPT

## 2024-10-26 PROCEDURE — 2500000005 HC RX 250 GENERAL PHARMACY W/O HCPCS: Performed by: SURGERY

## 2024-10-26 PROCEDURE — 94640 AIRWAY INHALATION TREATMENT: CPT

## 2024-10-26 PROCEDURE — 71045 X-RAY EXAM CHEST 1 VIEW: CPT | Performed by: RADIOLOGY

## 2024-10-26 RX ORDER — MIDODRINE HYDROCHLORIDE 10 MG/1
10 TABLET ORAL EVERY 8 HOURS
Status: DISCONTINUED | OUTPATIENT
Start: 2024-10-26 | End: 2024-11-01

## 2024-10-26 RX ORDER — MIDODRINE HYDROCHLORIDE 10 MG/1
10 TABLET ORAL
Status: DISCONTINUED | OUTPATIENT
Start: 2024-10-26 | End: 2024-10-26

## 2024-10-26 RX ORDER — SODIUM,POTASSIUM PHOSPHATES 280-250MG
2 POWDER IN PACKET (EA) ORAL EVERY 4 HOURS
Status: COMPLETED | OUTPATIENT
Start: 2024-10-26 | End: 2024-10-27

## 2024-10-26 RX ADMIN — MIDODRINE HYDROCHLORIDE 10 MG: 10 TABLET ORAL at 08:26

## 2024-10-26 RX ADMIN — PROPOFOL 15 MCG/KG/MIN: 10 INJECTION, EMULSION INTRAVENOUS at 21:54

## 2024-10-26 RX ADMIN — CALCIUM CHLORIDE, MAGNESIUM CHLORIDE, DEXTROSE MONOHYDRATE, LACTIC ACID, SODIUM CHLORIDE, SODIUM BICARBONATE AND POTASSIUM CHLORIDE 25 ML/KG/HR: 3.68; 3.05; 22; 5.4; 6.46; 3.09; .314 INJECTION INTRAVENOUS at 22:33

## 2024-10-26 RX ADMIN — DAPTOMYCIN IN SODIUM CHLORIDE 700 MG: 700 INJECTION, SOLUTION INTRAVENOUS at 09:18

## 2024-10-26 RX ADMIN — HEPARIN SODIUM 5000 UNITS: 5000 INJECTION, SOLUTION INTRAVENOUS; SUBCUTANEOUS at 22:07

## 2024-10-26 RX ADMIN — CALCIUM CHLORIDE, MAGNESIUM CHLORIDE, DEXTROSE MONOHYDRATE, LACTIC ACID, SODIUM CHLORIDE, SODIUM BICARBONATE AND POTASSIUM CHLORIDE 25 ML/KG/HR: 3.68; 3.05; 22; 5.4; 6.46; 3.09; .314 INJECTION INTRAVENOUS at 10:38

## 2024-10-26 RX ADMIN — PANTOPRAZOLE SODIUM 40 MG: 40 INJECTION, POWDER, FOR SOLUTION INTRAVENOUS at 06:07

## 2024-10-26 RX ADMIN — Medication 3 ML: at 11:01

## 2024-10-26 RX ADMIN — INSULIN LISPRO 3 UNITS: 100 INJECTION, SOLUTION INTRAVENOUS; SUBCUTANEOUS at 08:25

## 2024-10-26 RX ADMIN — HYDROCORTISONE SODIUM SUCCINATE 100 MG: 100 INJECTION, POWDER, FOR SOLUTION INTRAMUSCULAR; INTRAVENOUS at 03:43

## 2024-10-26 RX ADMIN — Medication 3 ML: at 07:34

## 2024-10-26 RX ADMIN — OXYCODONE 5 MG: 5 TABLET ORAL at 10:29

## 2024-10-26 RX ADMIN — BRIMONIDINE TARTRATE 1 DROP: 2 SOLUTION OPHTHALMIC at 08:27

## 2024-10-26 RX ADMIN — BRIMONIDINE TARTRATE 1 DROP: 2 SOLUTION OPHTHALMIC at 20:05

## 2024-10-26 RX ADMIN — PROPOFOL 15 MCG/KG/MIN: 10 INJECTION, EMULSION INTRAVENOUS at 09:52

## 2024-10-26 RX ADMIN — CALCIUM CHLORIDE, MAGNESIUM CHLORIDE, DEXTROSE MONOHYDRATE, LACTIC ACID, SODIUM CHLORIDE, SODIUM BICARBONATE AND POTASSIUM CHLORIDE 25 ML/KG/HR: 3.68; 3.05; 22; 5.4; 6.46; 3.09; .314 INJECTION INTRAVENOUS at 18:29

## 2024-10-26 RX ADMIN — CALCIUM CHLORIDE, MAGNESIUM CHLORIDE, DEXTROSE MONOHYDRATE, LACTIC ACID, SODIUM CHLORIDE, SODIUM BICARBONATE AND POTASSIUM CHLORIDE 25 ML/KG/HR: 3.68; 3.05; 22; 5.4; 6.46; 3.09; .314 INJECTION INTRAVENOUS at 12:53

## 2024-10-26 RX ADMIN — POTASSIUM & SODIUM PHOSPHATES POWDER PACK 280-160-250 MG 2 PACKET: 280-160-250 PACK at 19:52

## 2024-10-26 RX ADMIN — Medication 60 MG OF IRON: at 08:26

## 2024-10-26 RX ADMIN — FOLIC ACID 1 MG: 1 TABLET ORAL at 08:26

## 2024-10-26 RX ADMIN — HEPARIN SODIUM 5000 UNITS: 5000 INJECTION, SOLUTION INTRAVENOUS; SUBCUTANEOUS at 06:07

## 2024-10-26 RX ADMIN — OXYCODONE 5 MG: 5 TABLET ORAL at 18:53

## 2024-10-26 RX ADMIN — Medication 45 PERCENT: at 07:34

## 2024-10-26 RX ADMIN — PROPOFOL 15 MCG/KG/MIN: 10 INJECTION, EMULSION INTRAVENOUS at 03:02

## 2024-10-26 RX ADMIN — IPRATROPIUM BROMIDE AND ALBUTEROL SULFATE 3 ML: .5; 3 SOLUTION RESPIRATORY (INHALATION) at 11:01

## 2024-10-26 RX ADMIN — EZETIMIBE 10 MG: 10 TABLET ORAL at 08:27

## 2024-10-26 RX ADMIN — OXYCODONE 5 MG: 5 TABLET ORAL at 21:12

## 2024-10-26 RX ADMIN — Medication 45 PERCENT: at 19:29

## 2024-10-26 RX ADMIN — INSULIN LISPRO 3 UNITS: 100 INJECTION, SOLUTION INTRAVENOUS; SUBCUTANEOUS at 03:43

## 2024-10-26 RX ADMIN — PRIMIDONE 125 MG: 250 TABLET ORAL at 20:06

## 2024-10-26 RX ADMIN — SENNOSIDES AND DOCUSATE SODIUM 1 TABLET: 50; 8.6 TABLET ORAL at 20:06

## 2024-10-26 RX ADMIN — HEPARIN SODIUM 5000 UNITS: 5000 INJECTION, SOLUTION INTRAVENOUS; SUBCUTANEOUS at 14:22

## 2024-10-26 RX ADMIN — CALCIUM CHLORIDE, MAGNESIUM CHLORIDE, DEXTROSE MONOHYDRATE, LACTIC ACID, SODIUM CHLORIDE, SODIUM BICARBONATE AND POTASSIUM CHLORIDE 25 ML/KG/HR: 3.68; 3.05; 22; 5.4; 6.46; 3.09; .314 INJECTION INTRAVENOUS at 12:51

## 2024-10-26 RX ADMIN — HYDROCORTISONE SODIUM SUCCINATE 100 MG: 100 INJECTION, POWDER, FOR SOLUTION INTRAMUSCULAR; INTRAVENOUS at 16:22

## 2024-10-26 RX ADMIN — Medication 3 ML: at 19:29

## 2024-10-26 RX ADMIN — LATANOPROST 1 DROP: 50 SOLUTION OPHTHALMIC at 20:06

## 2024-10-26 RX ADMIN — CALCIUM CHLORIDE, MAGNESIUM CHLORIDE, DEXTROSE MONOHYDRATE, LACTIC ACID, SODIUM CHLORIDE, SODIUM BICARBONATE AND POTASSIUM CHLORIDE 25 ML/KG/HR: 3.68; 3.05; 22; 5.4; 6.46; 3.09; .314 INJECTION INTRAVENOUS at 17:52

## 2024-10-26 RX ADMIN — IPRATROPIUM BROMIDE AND ALBUTEROL SULFATE 3 ML: .5; 3 SOLUTION RESPIRATORY (INHALATION) at 19:29

## 2024-10-26 RX ADMIN — CEFTRIAXONE SODIUM 2 G: 2 INJECTION, SOLUTION INTRAVENOUS at 08:27

## 2024-10-26 RX ADMIN — IPRATROPIUM BROMIDE AND ALBUTEROL SULFATE 3 ML: .5; 3 SOLUTION RESPIRATORY (INHALATION) at 07:34

## 2024-10-26 RX ADMIN — CALCIUM CHLORIDE, MAGNESIUM CHLORIDE, DEXTROSE MONOHYDRATE, LACTIC ACID, SODIUM CHLORIDE, SODIUM BICARBONATE AND POTASSIUM CHLORIDE 25 ML/KG/HR: 3.68; 3.05; 22; 5.4; 6.46; 3.09; .314 INJECTION INTRAVENOUS at 07:58

## 2024-10-26 RX ADMIN — IPRATROPIUM BROMIDE AND ALBUTEROL SULFATE 3 ML: .5; 3 SOLUTION RESPIRATORY (INHALATION) at 15:36

## 2024-10-26 RX ADMIN — OXYCODONE 5 MG: 5 TABLET ORAL at 14:22

## 2024-10-26 RX ADMIN — INSULIN LISPRO 3 UNITS: 100 INJECTION, SOLUTION INTRAVENOUS; SUBCUTANEOUS at 19:53

## 2024-10-26 RX ADMIN — INSULIN LISPRO 3 UNITS: 100 INJECTION, SOLUTION INTRAVENOUS; SUBCUTANEOUS at 11:39

## 2024-10-26 RX ADMIN — OXYCODONE 5 MG: 5 TABLET ORAL at 02:30

## 2024-10-26 RX ADMIN — CALCIUM CHLORIDE, MAGNESIUM CHLORIDE, DEXTROSE MONOHYDRATE, LACTIC ACID, SODIUM CHLORIDE, SODIUM BICARBONATE AND POTASSIUM CHLORIDE 25 ML/KG/HR: 3.68; 3.05; 22; 5.4; 6.46; 3.09; .314 INJECTION INTRAVENOUS at 08:00

## 2024-10-26 RX ADMIN — CALCIUM CHLORIDE, MAGNESIUM CHLORIDE, DEXTROSE MONOHYDRATE, LACTIC ACID, SODIUM CHLORIDE, SODIUM BICARBONATE AND POTASSIUM CHLORIDE 25 ML/KG/HR: 3.68; 3.05; 22; 5.4; 6.46; 3.09; .314 INJECTION INTRAVENOUS at 22:32

## 2024-10-26 RX ADMIN — CALCIUM CHLORIDE, MAGNESIUM CHLORIDE, DEXTROSE MONOHYDRATE, LACTIC ACID, SODIUM CHLORIDE, SODIUM BICARBONATE AND POTASSIUM CHLORIDE 25 ML/KG/HR: 3.68; 3.05; 22; 5.4; 6.46; 3.09; .314 INJECTION INTRAVENOUS at 17:51

## 2024-10-26 RX ADMIN — SODIUM PHOSPHATE, MONOBASIC, MONOHYDRATE AND SODIUM PHOSPHATE, DIBASIC, ANHYDROUS 15 MMOL: 276; 142 INJECTION, SOLUTION INTRAVENOUS at 05:32

## 2024-10-26 RX ADMIN — MIDODRINE HYDROCHLORIDE 10 MG: 10 TABLET ORAL at 17:16

## 2024-10-26 RX ADMIN — Medication: at 13:09

## 2024-10-26 RX ADMIN — OXYCODONE 5 MG: 5 TABLET ORAL at 06:07

## 2024-10-26 RX ADMIN — Medication 3 ML: at 15:36

## 2024-10-26 ASSESSMENT — COGNITIVE AND FUNCTIONAL STATUS - GENERAL
WALKING IN HOSPITAL ROOM: TOTAL
MOVING TO AND FROM BED TO CHAIR: TOTAL
DRESSING REGULAR LOWER BODY CLOTHING: TOTAL
TURNING FROM BACK TO SIDE WHILE IN FLAT BAD: TOTAL
DAILY ACTIVITIY SCORE: 6
EATING MEALS: TOTAL
DRESSING REGULAR UPPER BODY CLOTHING: TOTAL
STANDING UP FROM CHAIR USING ARMS: TOTAL
PERSONAL GROOMING: TOTAL
MOVING FROM LYING ON BACK TO SITTING ON SIDE OF FLAT BED WITH BEDRAILS: TOTAL
MOBILITY SCORE: 6
TOILETING: TOTAL
CLIMB 3 TO 5 STEPS WITH RAILING: TOTAL
HELP NEEDED FOR BATHING: TOTAL

## 2024-10-26 ASSESSMENT — PAIN SCALES - GENERAL: PAINLEVEL_OUTOF10: 0 - NO PAIN

## 2024-10-26 ASSESSMENT — PAIN - FUNCTIONAL ASSESSMENT
PAIN_FUNCTIONAL_ASSESSMENT: CPOT (CRITICAL CARE PAIN OBSERVATION TOOL)

## 2024-10-26 NOTE — CARE PLAN
Problem: Pain - Adult  Goal: Verbalizes/displays adequate comfort level or baseline comfort level  10/26/2024 1658 by Tg Ramos RN  Outcome: Progressing  10/26/2024 1657 by Tg Ramos RN  Outcome: Progressing     Problem: Safety - Adult  Goal: Free from fall injury  10/26/2024 1658 by Tg Ramos RN  Outcome: Progressing  10/26/2024 1657 by Tg Ramos RN  Outcome: Progressing     Problem: Discharge Planning  Goal: Discharge to home or other facility with appropriate resources  10/26/2024 1658 by Tg Ramos RN  Outcome: Progressing  10/26/2024 1657 by Tg Ramos RN  Outcome: Progressing     Problem: Chronic Conditions and Co-morbidities  Goal: Patient's chronic conditions and co-morbidity symptoms are monitored and maintained or improved  10/26/2024 1658 by Tg Ramos RN  Outcome: Progressing  10/26/2024 1657 by Tg Ramos RN  Outcome: Progressing     Problem: Diabetes  Goal: Increase stability of blood glucose readings by end of shift  10/26/2024 1658 by Tg Ramos RN  Outcome: Progressing  10/26/2024 1657 by Tg Ramos RN  Outcome: Progressing  Goal: Maintain electrolyte levels within acceptable range throughout shift  10/26/2024 1658 by Tg Ramos RN  Outcome: Progressing  10/26/2024 1657 by Tg Ramos RN  Outcome: Progressing  Goal: Maintain glucose levels >70mg/dl to <250mg/dl throughout shift  10/26/2024 1658 by Tg Ramos RN  Outcome: Progressing  10/26/2024 1657 by Tg Ramos RN  Outcome: Progressing  Goal: Learn about and adhere to nutrition recommendations by end of shift  10/26/2024 1658 by Tg Ramos RN  Outcome: Progressing  10/26/2024 1657 by gT Ramos RN  Outcome: Progressing  Goal: Vital signs within normal range for age by end of shift  10/26/2024 1658 by Tg Ramos RN  Outcome: Progressing  10/26/2024 1657 by Tg Ramos RN  Outcome:  Progressing  Goal: Increase self care and/or family involovement by end of shift  10/26/2024 1658 by Tg Ramos RN  Outcome: Progressing  10/26/2024 1657 by Tg Ramos RN  Outcome: Progressing  Goal: Receive DSME education by end of shift  10/26/2024 1658 by Tg Ramos RN  Outcome: Progressing  10/26/2024 1657 by Tg Ramos RN  Outcome: Progressing     Problem: Knowledge Deficit  Goal: Patient/family/caregiver demonstrates understanding of disease process, treatment plan, medications, and discharge instructions  10/26/2024 1658 by Tg Ramos RN  Outcome: Progressing  10/26/2024 1657 by Tg Ramos RN  Outcome: Progressing     Problem: Skin  Goal: Decreased wound size/increased tissue granulation at next dressing change  10/26/2024 1658 by Tg Ramos RN  Outcome: Progressing  Flowsheets (Taken 10/26/2024 1658)  Decreased wound size/increased tissue granulation at next dressing change: Protective dressings over bony prominences  10/26/2024 1657 by Tg Ramos RN  Outcome: Progressing  Goal: Participates in plan/prevention/treatment measures  10/26/2024 1658 by Tg Ramos RN  Outcome: Progressing  Flowsheets (Taken 10/26/2024 1658)  Participates in plan/prevention/treatment measures: Elevate heels  10/26/2024 1657 by Tg Ramos RN  Outcome: Progressing  Goal: Prevent/manage excess moisture  10/26/2024 1658 by Tg Ramos RN  Outcome: Progressing  Flowsheets (Taken 10/26/2024 1658)  Prevent/manage excess moisture:   Moisturize dry skin   Cleanse incontinence/protect with barrier cream  10/26/2024 1657 by Tg Ramos RN  Outcome: Progressing  Goal: Prevent/minimize sheer/friction injuries  10/26/2024 1658 by Tg Ramos RN  Outcome: Progressing  Flowsheets (Taken 10/26/2024 1658)  Prevent/minimize sheer/friction injuries:   HOB 30 degrees or less   Turn/reposition every 2 hours/use positioning/transfer  devices   Use pull sheet  10/26/2024 1657 by Tg Ramos RN  Outcome: Progressing  Goal: Promote/optimize nutrition  10/26/2024 1658 by Tg Ramos RN  Outcome: Progressing  Flowsheets (Taken 10/26/2024 1658)  Promote/optimize nutrition:   Assist with feeding   Monitor/record intake including meals  10/26/2024 1657 by Tg Ramos RN  Outcome: Progressing  Goal: Promote skin healing  10/26/2024 1658 by Tg Ramos RN  Outcome: Progressing  Flowsheets (Taken 10/26/2024 1658)  Promote skin healing:   Turn/reposition every 2 hours/use positioning/transfer devices   Protective dressings over bony prominences   Assess skin/pad under line(s)/device(s)  10/26/2024 1657 by Tg Ramos RN  Outcome: Progressing     Problem: Nutrition  Goal: Consume prescribed supplement  10/26/2024 1658 by Tg Ramos RN  Outcome: Progressing  10/26/2024 1657 by Tg Ramos RN  Outcome: Progressing  Goal: Nutrition support goals are met within 48 hrs  10/26/2024 1658 by Tg Ramos RN  Outcome: Progressing  10/26/2024 1657 by Tg Ramos RN  Outcome: Progressing  Goal: Nutrition support is meeting 75% of nutrient needs  10/26/2024 1658 by Tg Ramos RN  Outcome: Progressing  10/26/2024 1657 by Tg Ramos RN  Outcome: Progressing  Goal: BG  mg/dL  10/26/2024 1658 by Tg Ramos RN  Outcome: Progressing  10/26/2024 1657 by Tg Ramos RN  Outcome: Progressing  Goal: Lab values WNL  10/26/2024 1658 by Tg Ramos RN  Outcome: Progressing  10/26/2024 1657 by Tg Ramos RN  Outcome: Progressing  Goal: Electrolytes WNL  10/26/2024 1658 by Tg Ramos RN  Outcome: Progressing  10/26/2024 1657 by Tg Ramos RN  Outcome: Progressing  Goal: Promote healing  10/26/2024 1658 by Tg M Joherl, RN  Outcome: Progressing  10/26/2024 1657 by Tg Ramos RN  Outcome: Progressing  Goal: Maintain stable  weight  10/26/2024 1658 by Tg Ramos RN  Outcome: Progressing  10/26/2024 1657 by Tg Ramos RN  Outcome: Progressing  Goal: Reduce weight from edema/fluid  10/26/2024 1658 by Tg Ramos RN  Outcome: Progressing  10/26/2024 1657 by Tg Ramos RN  Outcome: Progressing     Problem: Respiratory  Goal: No signs of respiratory distress (eg. Use of accessory muscles. Peds grunting)  10/26/2024 1658 by Tg Ramos RN  Outcome: Progressing  10/26/2024 1657 by Tg Ramos RN  Outcome: Progressing  Goal: Clear secretions with interventions this shift  10/26/2024 1658 by Tg Ramos RN  Outcome: Progressing  10/26/2024 1657 by Tg Ramos RN  Outcome: Progressing  Goal: Minimize anxiety/maximize coping throughout shift  10/26/2024 1658 by Tg Ramos RN  Outcome: Progressing  10/26/2024 1657 by Tg Ramos RN  Outcome: Progressing  Goal: Minimal/no exertional discomfort or dyspnea this shift  10/26/2024 1658 by Tg Ramos RN  Outcome: Progressing  10/26/2024 1657 by Tg Ramos RN  Outcome: Progressing  Goal: Patent airway maintained this shift  10/26/2024 1658 by Tg Ramos RN  Outcome: Progressing  10/26/2024 1657 by Tg Ramos RN  Outcome: Progressing  Goal: Tolerate mechanical ventilation evidenced by VS/agitation level this shift  10/26/2024 1658 by Tg Ramos RN  Outcome: Progressing  10/26/2024 1657 by Tg Ramos RN  Outcome: Progressing  Goal: Tolerate pulmonary toileting this shift  10/26/2024 1658 by Tg Ramos RN  Outcome: Progressing  10/26/2024 1657 by Tg Ramos RN  Outcome: Progressing  Goal: Verbalize decreased shortness of breath this shift  10/26/2024 1658 by Tg Ramos RN  Outcome: Progressing  10/26/2024 1657 by Tg Ramos RN  Outcome: Progressing  Goal: Wean oxygen to maintain O2 saturation per order/standard this shift  10/26/2024  1658 by Tg Ramos RN  Outcome: Progressing  10/26/2024 1657 by Tg Ramos RN  Outcome: Progressing  Goal: Increase self care and/or family involvement in next 24 hours  10/26/2024 1658 by Tg Ramos RN  Outcome: Progressing  10/26/2024 1657 by Tg Ramos RN  Outcome: Progressing     Problem: Pain  Goal: Takes deep breaths with improved pain control throughout the shift  10/26/2024 1658 by Tg Ramos RN  Outcome: Progressing  10/26/2024 1657 by Tg Ramos RN  Outcome: Progressing  Goal: Turns in bed with improved pain control throughout the shift  10/26/2024 1658 by Tg Ramos RN  Outcome: Progressing  10/26/2024 1657 by Tg Ramos RN  Outcome: Progressing  Goal: Walks with improved pain control throughout the shift  10/26/2024 1658 by Tg Ramos RN  Outcome: Progressing  10/26/2024 1657 by Tg Ramos RN  Outcome: Progressing  Goal: Performs ADL's with improved pain control throughout shift  10/26/2024 1658 by Tg Ramos RN  Outcome: Progressing  10/26/2024 1657 by Tg Ramos RN  Outcome: Progressing  Goal: Participates in PT with improved pain control throughout the shift  10/26/2024 1658 by Tg Ramos RN  Outcome: Progressing  10/26/2024 1657 by Tg Ramos RN  Outcome: Progressing  Goal: Free from opioid side effects throughout the shift  10/26/2024 1658 by Tg Ramos RN  Outcome: Progressing  10/26/2024 1657 by Tg Ramos RN  Outcome: Progressing  Goal: Free from acute confusion related to pain meds throughout the shift  10/26/2024 1658 by Tg Ramos RN  Outcome: Progressing  10/26/2024 1657 by Tg Ramos RN  Outcome: Progressing     Problem: Safety - Medical Restraint  Goal: Remains free of injury from restraints (Restraint for Interference with Medical Device)  10/26/2024 1658 by Tg Ramos RN  Outcome: Progressing  10/26/2024 1657 by  Tg Ramos, RN  Outcome: Progressing  Goal: Free from restraint(s) (Restraint for Interference with Medical Device)  10/26/2024 1658 by Tg Ramos RN  Outcome: Progressing  10/26/2024 1657 by Tg Ramos, RN  Outcome: Progressing   The patient's goals for the shift include      The clinical goals for the shift include pt to remain hemodynamically stable    Over the shift, the patient did not make progress toward the following goals. Barriers to progression include pt still on vasopressors. Recommendations to address these barriers include monitor labs and vitals.

## 2024-10-26 NOTE — CARE PLAN
Problem: Pain - Adult  Goal: Verbalizes/displays adequate comfort level or baseline comfort level  Outcome: Progressing     Problem: Safety - Adult  Goal: Free from fall injury  Outcome: Progressing     Problem: Discharge Planning  Goal: Discharge to home or other facility with appropriate resources  Outcome: Progressing     Problem: Chronic Conditions and Co-morbidities  Goal: Patient's chronic conditions and co-morbidity symptoms are monitored and maintained or improved  Outcome: Progressing     Problem: Diabetes  Goal: Increase stability of blood glucose readings by end of shift  Outcome: Progressing  Goal: Maintain electrolyte levels within acceptable range throughout shift  Outcome: Progressing  Goal: Maintain glucose levels >70mg/dl to <250mg/dl throughout shift  Outcome: Progressing  Goal: Learn about and adhere to nutrition recommendations by end of shift  Outcome: Progressing  Goal: Vital signs within normal range for age by end of shift  Outcome: Progressing  Goal: Increase self care and/or family involovement by end of shift  Outcome: Progressing  Goal: Receive DSME education by end of shift  Outcome: Progressing     Problem: Knowledge Deficit  Goal: Patient/family/caregiver demonstrates understanding of disease process, treatment plan, medications, and discharge instructions  Outcome: Progressing     Problem: Skin  Goal: Decreased wound size/increased tissue granulation at next dressing change  Outcome: Progressing  Goal: Participates in plan/prevention/treatment measures  Outcome: Progressing  Goal: Prevent/manage excess moisture  Outcome: Progressing  Goal: Prevent/minimize sheer/friction injuries  Outcome: Progressing  Goal: Promote/optimize nutrition  Outcome: Progressing  Goal: Promote skin healing  Outcome: Progressing     Problem: Nutrition  Goal: Consume prescribed supplement  Outcome: Progressing  Goal: Nutrition support goals are met within 48 hrs  Outcome: Progressing  Goal: Nutrition  support is meeting 75% of nutrient needs  Outcome: Progressing  Goal: BG  mg/dL  Outcome: Progressing  Goal: Lab values WNL  Outcome: Progressing  Goal: Electrolytes WNL  Outcome: Progressing  Goal: Promote healing  Outcome: Progressing  Goal: Maintain stable weight  Outcome: Progressing  Goal: Reduce weight from edema/fluid  Outcome: Progressing     Problem: Respiratory  Goal: No signs of respiratory distress (eg. Use of accessory muscles. Peds grunting)  Outcome: Progressing  Goal: Clear secretions with interventions this shift  Outcome: Progressing  Goal: Minimize anxiety/maximize coping throughout shift  Outcome: Progressing  Goal: Minimal/no exertional discomfort or dyspnea this shift  Outcome: Progressing  Goal: Patent airway maintained this shift  Outcome: Progressing  Goal: Tolerate mechanical ventilation evidenced by VS/agitation level this shift  Outcome: Progressing  Goal: Tolerate pulmonary toileting this shift  Outcome: Progressing  Goal: Verbalize decreased shortness of breath this shift  Outcome: Progressing  Goal: Wean oxygen to maintain O2 saturation per order/standard this shift  Outcome: Progressing  Goal: Increase self care and/or family involvement in next 24 hours  Outcome: Progressing     Problem: Pain  Goal: Takes deep breaths with improved pain control throughout the shift  Outcome: Progressing  Goal: Turns in bed with improved pain control throughout the shift  Outcome: Progressing  Goal: Walks with improved pain control throughout the shift  Outcome: Progressing  Goal: Performs ADL's with improved pain control throughout shift  Outcome: Progressing  Goal: Participates in PT with improved pain control throughout the shift  Outcome: Progressing  Goal: Free from opioid side effects throughout the shift  Outcome: Progressing  Goal: Free from acute confusion related to pain meds throughout the shift  Outcome: Progressing     Problem: Safety - Medical Restraint  Goal: Remains free of  injury from restraints (Restraint for Interference with Medical Device)  Outcome: Progressing  Goal: Free from restraint(s) (Restraint for Interference with Medical Device)  Outcome: Progressing   The patient's goals for the shift include      The clinical goals for the shift include pt to remain hemodynamically stable    Over the shift, the patient did not make progress toward the following goals. Barriers to progression include pt still on vasopressor. Recommendations to address these barriers include monitor labs and vitals.

## 2024-10-26 NOTE — CARE PLAN
Problem: Safety - Medical Restraint  Goal: Remains free of injury from restraints (Restraint for Interference with Medical Device)  Outcome: Progressing  Goal: Free from restraint(s) (Restraint for Interference with Medical Device)  Outcome: Progressing     Problem: Skin  Goal: Decreased wound size/increased tissue granulation at next dressing change  Outcome: Progressing  Flowsheets (Taken 10/26/2024 0207)  Decreased wound size/increased tissue granulation at next dressing change:   Promote sleep for wound healing   Utilize specialty bed per algorithm   Protective dressings over bony prominences  Goal: Participates in plan/prevention/treatment measures  Outcome: Progressing  Flowsheets (Taken 10/26/2024 0207)  Participates in plan/prevention/treatment measures:   Elevate heels   Discuss with provider PT/OT consult  Goal: Prevent/manage excess moisture  Outcome: Progressing  Flowsheets (Taken 10/26/2024 0207)  Prevent/manage excess moisture:   Moisturize dry skin   Monitor for/manage infection if present  Goal: Prevent/minimize sheer/friction injuries  Outcome: Progressing  Flowsheets (Taken 10/26/2024 0207)  Prevent/minimize sheer/friction injuries:   Increase activity/out of bed for meals   Use pull sheet   HOB 30 degrees or less   Turn/reposition every 2 hours/use positioning/transfer devices   Utilize specialty bed per algorithm  Goal: Promote/optimize nutrition  Outcome: Progressing  Flowsheets (Taken 10/26/2024 0207)  Promote/optimize nutrition: Monitor/record intake including meals  Goal: Promote skin healing  Outcome: Progressing  Flowsheets (Taken 10/26/2024 0207)  Promote skin healing:   Assess skin/pad under line(s)/device(s)   Protective dressings over bony prominences   Turn/reposition every 2 hours/use positioning/transfer devices   Ensure correct size (line/device) and apply per  instructions   Rotate device position/do not position patient on device     Problem: Respiratory  Goal: No  signs of respiratory distress (eg. Use of accessory muscles. Peds grunting)  Outcome: Progressing  Goal: Clear secretions with interventions this shift  Outcome: Progressing  Goal: Minimize anxiety/maximize coping throughout shift  Outcome: Progressing  Goal: Minimal/no exertional discomfort or dyspnea this shift  Outcome: Progressing  Goal: Patent airway maintained this shift  Outcome: Progressing  Goal: Tolerate mechanical ventilation evidenced by VS/agitation level this shift  Outcome: Progressing  Goal: Tolerate pulmonary toileting this shift  Outcome: Progressing  Goal: Verbalize decreased shortness of breath this shift  Outcome: Progressing  Goal: Wean oxygen to maintain O2 saturation per order/standard this shift  Outcome: Progressing  Goal: Increase self care and/or family involvement in next 24 hours  Outcome: Progressing     Problem: Pain  Goal: Takes deep breaths with improved pain control throughout the shift  Outcome: Progressing  Goal: Turns in bed with improved pain control throughout the shift  Outcome: Progressing  Goal: Walks with improved pain control throughout the shift  Outcome: Progressing  Goal: Performs ADL's with improved pain control throughout shift  Outcome: Progressing  Goal: Participates in PT with improved pain control throughout the shift  Outcome: Progressing  Goal: Free from opioid side effects throughout the shift  Outcome: Progressing  Goal: Free from acute confusion related to pain meds throughout the shift  Outcome: Progressing   The patient's goals for the shift include      The clinical goals for the shift include Pt to remain hemodynamically stable with fluid removal on CRRT.    Over the shift, the patient did not make progress toward the following goals. Barriers to progression include . Recommendations to address these barriers include .

## 2024-10-26 NOTE — PROGRESS NOTES
"Narda Malloy is a 69 y.o. female on day 14 of admission presenting with Unresponsive.    Subjective   The patient is seen for acute kidney injury which is secondary to acute tubular necrosis secondary toto hypotension and septic shock as well as vancomycin toxicity patient remains an uric remains intubated on the ventilator she is awake and responsive also she is seen and examined on continuous renal replacement therapy mainly CVVHDF she is tolerating that fairly well her edema is decreasing       Objective     Physical Exam  Constitutional:       Comments: Patient is intubated on the ventilator   Neck:      Vascular: No carotid bruit.   Cardiovascular:      Rate and Rhythm: Normal rate and regular rhythm.      Heart sounds: No murmur heard.     No friction rub. No gallop.   Pulmonary:      Breath sounds: No wheezing, rhonchi or rales.   Chest:      Chest wall: No tenderness.   Abdominal:      General: There is no distension.      Tenderness: There is no abdominal tenderness. There is no guarding or rebound.   Musculoskeletal:         General: No swelling or tenderness.      Cervical back: Neck supple.      Right lower leg: Edema present.      Left lower leg: Edema present.   Lymphadenopathy:      Cervical: No cervical adenopathy.         Last Recorded Vitals  Blood pressure 83/50, pulse 79, temperature 35.7 °C (96.3 °F), temperature source Esophageal, resp. rate 24, height 1.778 m (5' 10\"), weight 121 kg (266 lb 15.6 oz), SpO2 97%.    Intake/Output last 3 Shifts:  I/O last 3 completed shifts:  In: 3049 (25.2 mL/kg) [I.V.:541.5 (4.5 mL/kg); Blood:377.5; NG/GT:980; IV Piggyback:1150]  Out: 5180 (42.8 mL/kg) [Urine:24 (0 mL/kg/hr); Other:5086; Chest Tube:70]  Weight: 121.1 kg     Current Facility-Administered Medications:     acetaminophen (Tylenol) tablet 975 mg, 975 mg, oral, q6h PRN, Kaleb Lam PA-C, 975 mg at 10/18/24 1405    alteplase (Cathflo Activase) injection 2 mg, 2 mg, intra-catheter, PRN, Kaleb GILL" COBY Lam    alteplase (Cathflo Activase) injection 2 mg, 2 mg, intra-catheter, PRN, Andrew Malagon MD    brimonidine (AlphaGAN) 0.2 % ophthalmic solution 1 drop, 1 drop, Both Eyes, BID, Kaleb Lam PA-C, 1 drop at 10/26/24 0827    [Held by provider] calcium carbonate-vitamin D3 500 mg-5 mcg (200 unit) per tablet 1 tablet, 1 tablet, oral, Daily, Kaleb Lam PA-C, 1 tablet at 10/18/24 0930    cefTRIAXone (Rocephin) 2 g in dextrose (iso) IV 50 mL, 2 g, intravenous, q24h, Kaleb Lam PA-C, Stopped at 10/26/24 0918    DAPTOmycin (Cubicin) 700 mg in sodium chloride 0.9%  mL, 700 mg, intravenous, Daily, Andrew Malagon MD, Stopped at 10/26/24 0953    dextrose 50 % injection 12.5 g, 12.5 g, intravenous, q15 min PRN, Kaleb Lam PA-C    dextrose 50 % injection 25 g, 25 g, intravenous, q15 min PRN, Kaleb Lam PA-C    ezetimibe (Zetia) tablet 10 mg, 10 mg, oral, Daily, Kaleb Lam PA-C, 10 mg at 10/26/24 0827    fentaNYL PF (Sublimaze) injection 25 mcg, 25 mcg, intravenous, q2h PRN, RUTH Doe    ferrous sulfate syrup 60 mg of iron, 60 mg of iron, oral, Daily, RUTH Doe, 60 mg of iron at 10/26/24 0826    folic acid (Folvite) tablet 1 mg, 1 mg, oral, Daily, RUTH Doe, 1 mg at 10/26/24 0826    glucagon (Glucagen) injection 1 mg, 1 mg, intramuscular, q15 min PRN, Kaleb Lam PA-C    glucagon (Glucagen) injection 1 mg, 1 mg, intramuscular, q15 min PRN, Kaleb Lam PA-C    heparin (porcine) injection 5,000 Units, 5,000 Units, subcutaneous, q8h, Kaleb Lam PA-C, 5,000 Units at 10/26/24 0607    heparin 1,000 unit/mL injection 1,000 Units, 1,000 Units, intra-catheter, PRNelia MCCURDY MD    heparin 1,000 unit/mL injection 1,000 Units, 1,000 Units, intra-catheter, Nelia LIN MD    heparin flush 10 unit/mL syringe 50 Units, 50 Units, intra-catheter, PRCALLI, Kaleb Lam PA-C, 50 Units at 10/19/24 4713    honey (Medihoney)  topical gel, , Topical, Daily, RUTH Salgado    hydrocortisone sodium succinate (PF) (Solu-CORTEF) injection 100 mg, 100 mg, intravenous, q12h, RUTH Salgado    [Held by provider] HYDROmorphone (Dilaudid) injection 0.4 mg, 0.4 mg, intravenous, q2h PRN, RUTH Salgado, 0.4 mg at 10/19/24 0520    insulin lispro (HumaLOG) injection 0-15 Units, 0-15 Units, subcutaneous, q4h, Lb Dodd PA-C, 3 Units at 10/26/24 1139    ipratropium-albuteroL (Duo-Neb) 0.5-2.5 mg/3 mL nebulizer solution 3 mL, 3 mL, nebulization, 4x daily, Kaleb Lam PA-C, 3 mL at 10/26/24 1101    latanoprost (Xalatan) 0.005 % ophthalmic solution 1 drop, 1 drop, Both Eyes, Nightly, Kaleb Lam PA-C, 1 drop at 10/25/24 2106    midodrine (Proamatine) tablet 10 mg, 10 mg, oral, q8h, RUTH Doe    norepinephrine (Levophed) 8 mg in dextrose 5% 250 mL (0.032 mg/mL) infusion (premix), 0-0.5 mcg/kg/min, intravenous, Continuous, RUTH Doe, Last Rate: 4.73 mL/hr at 10/26/24 0852, 0.02 mcg/kg/min at 10/26/24 0852    oxyCODONE (Roxicodone) immediate release tablet 5 mg, 5 mg, oral, q4h LUCA, RUTH Doe, 5 mg at 10/26/24 1029    oxygen (O2) therapy, , inhalation, Continuous - Inhalation, RUTH Salgado    pantoprazole (ProtoNix) EC tablet 40 mg, 40 mg, oral, Daily before breakfast **OR** pantoprazole (ProtoNix) injection 40 mg, 40 mg, intravenous, Daily before breakfast, RUTH Salas, 40 mg at 10/26/24 0607    polyethylene glycol (Glycolax, Miralax) packet 17 g, 17 g, oral, Daily PRN, Sweta Diaz, APRN-CNP    primidone (Mysoline) tablet 125 mg, 125 mg, oral, Nightly, Kaleb Lam PA-C, 125 mg at 10/25/24 2107    PrismaSol 4/2.5 CRRT solution, 25 mL/kg/hr, CRRT, Continuous, Nelia Cheung MD, Last Rate: 3,150 mL/hr at 10/26/24 1038, 25 mL/kg/hr at 10/26/24 1038    propofol (Diprivan) infusion, 0-50 mcg/kg/min, intravenous, Continuous, Alonso FRENCH  Vatty, APRN-CNP, Stopped at 10/26/24 1118    [Held by provider] propranolol LA (Inderal LA) 24 hr capsule 60 mg, 60 mg, oral, Daily, Kaleb Lam PA-C, 60 mg at 10/19/24 0643    sennosides-docusate sodium (Lucia-Colace) 8.6-50 mg per tablet 1 tablet, 1 tablet, oral, Nightly, RUTH Doe, 1 tablet at 10/25/24 2106    sodium chloride 3 % nebulizer solution 3 mL, 3 mL, nebulization, 4x daily, Kaleb Lam PA-C, 3 mL at 10/26/24 1101    [Held by provider] traMADol (Ultram) tablet 50 mg, 50 mg, oral, q8h PRN, Kaleb Lam PA-C   Relevant Results    Results for orders placed or performed during the hospital encounter of 10/12/24 (from the past 96 hours)   POCT GLUCOSE   Result Value Ref Range    POCT Glucose 212 (H) 74 - 99 mg/dL   POCT GLUCOSE   Result Value Ref Range    POCT Glucose 181 (H) 74 - 99 mg/dL   POCT GLUCOSE   Result Value Ref Range    POCT Glucose 171 (H) 74 - 99 mg/dL   CBC and Auto Differential   Result Value Ref Range    WBC 12.5 (H) 4.4 - 11.3 x10*3/uL    nRBC 0.0 0.0 - 0.0 /100 WBCs    RBC 2.76 (L) 4.00 - 5.20 x10*6/uL    Hemoglobin 8.6 (L) 12.0 - 16.0 g/dL    Hematocrit 27.0 (L) 36.0 - 46.0 %    MCV 98 80 - 100 fL    MCH 31.2 26.0 - 34.0 pg    MCHC 31.9 (L) 32.0 - 36.0 g/dL    RDW 19.3 (H) 11.5 - 14.5 %    Platelets 311 150 - 450 x10*3/uL    Neutrophils % 84.7 40.0 - 80.0 %    Immature Granulocytes %, Automated 4.0 (H) 0.0 - 0.9 %    Lymphocytes % 6.2 13.0 - 44.0 %    Monocytes % 4.2 2.0 - 10.0 %    Eosinophils % 0.7 0.0 - 6.0 %    Basophils % 0.2 0.0 - 2.0 %    Neutrophils Absolute 10.57 (H) 1.20 - 7.70 x10*3/uL    Immature Granulocytes Absolute, Automated 0.50 0.00 - 0.70 x10*3/uL    Lymphocytes Absolute 0.77 (L) 1.20 - 4.80 x10*3/uL    Monocytes Absolute 0.53 0.10 - 1.00 x10*3/uL    Eosinophils Absolute 0.09 0.00 - 0.70 x10*3/uL    Basophils Absolute 0.03 0.00 - 0.10 x10*3/uL   Hepatic function panel   Result Value Ref Range    Albumin 2.7 (L) 3.4 - 5.0 g/dL    Bilirubin,  Total 0.4 0.0 - 1.2 mg/dL    Bilirubin, Direct 0.1 0.0 - 0.3 mg/dL    Alkaline Phosphatase 107 33 - 136 U/L    ALT 6 (L) 7 - 45 U/L    AST 8 (L) 9 - 39 U/L    Total Protein 5.4 (L) 6.4 - 8.2 g/dL   Basic Metabolic Panel   Result Value Ref Range    Glucose 165 (H) 74 - 99 mg/dL    Sodium 134 (L) 136 - 145 mmol/L    Potassium 4.4 3.5 - 5.3 mmol/L    Chloride 104 98 - 107 mmol/L    Bicarbonate 25 21 - 32 mmol/L    Anion Gap 9 (L) 10 - 20 mmol/L    Urea Nitrogen 43 (H) 6 - 23 mg/dL    Creatinine 2.02 (H) 0.50 - 1.05 mg/dL    eGFR 26 (L) >60 mL/min/1.73m*2    Calcium 7.7 (L) 8.6 - 10.3 mg/dL   Magnesium   Result Value Ref Range    Magnesium 2.19 1.60 - 2.40 mg/dL   Phosphorus   Result Value Ref Range    Phosphorus 2.0 (L) 2.5 - 4.9 mg/dL   ECG 12 Lead   Result Value Ref Range    Ventricular Rate 67 BPM    Atrial Rate 67 BPM    NJ Interval 152 ms    QRS Duration 82 ms    QT Interval 400 ms    QTC Calculation(Bazett) 422 ms    R Axis -13 degrees    T Axis -34 degrees    QRS Count 11 beats    Q Onset 226 ms    T Offset 426 ms    QTC Fredericia 415 ms   POCT GLUCOSE   Result Value Ref Range    POCT Glucose 166 (H) 74 - 99 mg/dL   POCT GLUCOSE   Result Value Ref Range    POCT Glucose 167 (H) 74 - 99 mg/dL   POCT GLUCOSE   Result Value Ref Range    POCT Glucose 199 (H) 74 - 99 mg/dL   Hepatic function panel   Result Value Ref Range    Albumin 2.6 (L) 3.4 - 5.0 g/dL    Bilirubin, Total 0.3 0.0 - 1.2 mg/dL    Bilirubin, Direct 0.0 0.0 - 0.3 mg/dL    Alkaline Phosphatase 103 33 - 136 U/L    ALT 5 (L) 7 - 45 U/L    AST 9 9 - 39 U/L    Total Protein 5.2 (L) 6.4 - 8.2 g/dL   Phosphorus   Result Value Ref Range    Phosphorus 2.4 (L) 2.5 - 4.9 mg/dL   Basic Metabolic Panel   Result Value Ref Range    Glucose 158 (H) 74 - 99 mg/dL    Sodium 135 (L) 136 - 145 mmol/L    Potassium 4.5 3.5 - 5.3 mmol/L    Chloride 104 98 - 107 mmol/L    Bicarbonate 25 21 - 32 mmol/L    Anion Gap 11 10 - 20 mmol/L    Urea Nitrogen 28 (H) 6 - 23 mg/dL     Creatinine 1.44 (H) 0.50 - 1.05 mg/dL    eGFR 40 (L) >60 mL/min/1.73m*2    Calcium 7.4 (L) 8.6 - 10.3 mg/dL   Magnesium   Result Value Ref Range    Magnesium 2.11 1.60 - 2.40 mg/dL   CBC and Auto Differential   Result Value Ref Range    WBC 14.9 (H) 4.4 - 11.3 x10*3/uL    nRBC 0.0 0.0 - 0.0 /100 WBCs    RBC 2.64 (L) 4.00 - 5.20 x10*6/uL    Hemoglobin 8.2 (L) 12.0 - 16.0 g/dL    Hematocrit 25.8 (L) 36.0 - 46.0 %    MCV 98 80 - 100 fL    MCH 31.1 26.0 - 34.0 pg    MCHC 31.8 (L) 32.0 - 36.0 g/dL    RDW 19.6 (H) 11.5 - 14.5 %    Platelets 313 150 - 450 x10*3/uL    Neutrophils % 83.5 40.0 - 80.0 %    Immature Granulocytes %, Automated 4.0 (H) 0.0 - 0.9 %    Lymphocytes % 6.5 13.0 - 44.0 %    Monocytes % 5.3 2.0 - 10.0 %    Eosinophils % 0.5 0.0 - 6.0 %    Basophils % 0.2 0.0 - 2.0 %    Neutrophils Absolute 12.46 (H) 1.20 - 7.70 x10*3/uL    Immature Granulocytes Absolute, Automated 0.60 0.00 - 0.70 x10*3/uL    Lymphocytes Absolute 0.97 (L) 1.20 - 4.80 x10*3/uL    Monocytes Absolute 0.79 0.10 - 1.00 x10*3/uL    Eosinophils Absolute 0.07 0.00 - 0.70 x10*3/uL    Basophils Absolute 0.03 0.00 - 0.10 x10*3/uL   POCT GLUCOSE   Result Value Ref Range    POCT Glucose 176 (H) 74 - 99 mg/dL   POCT GLUCOSE   Result Value Ref Range    POCT Glucose 171 (H) 74 - 99 mg/dL   POCT GLUCOSE   Result Value Ref Range    POCT Glucose 162 (H) 74 - 99 mg/dL   POCT GLUCOSE   Result Value Ref Range    POCT Glucose 170 (H) 74 - 99 mg/dL   Magnesium   Result Value Ref Range    Magnesium 2.14 1.60 - 2.40 mg/dL   CBC and Auto Differential   Result Value Ref Range    WBC 10.7 4.4 - 11.3 x10*3/uL    nRBC 0.0 0.0 - 0.0 /100 WBCs    RBC 2.42 (L) 4.00 - 5.20 x10*6/uL    Hemoglobin 7.6 (L) 12.0 - 16.0 g/dL    Hematocrit 23.8 (L) 36.0 - 46.0 %    MCV 98 80 - 100 fL    MCH 31.4 26.0 - 34.0 pg    MCHC 31.9 (L) 32.0 - 36.0 g/dL    RDW 19.4 (H) 11.5 - 14.5 %    Platelets 252 150 - 450 x10*3/uL    Neutrophils % 84.6 40.0 - 80.0 %    Immature Granulocytes %,  Automated 3.2 (H) 0.0 - 0.9 %    Lymphocytes % 6.3 13.0 - 44.0 %    Monocytes % 5.2 2.0 - 10.0 %    Eosinophils % 0.6 0.0 - 6.0 %    Basophils % 0.1 0.0 - 2.0 %    Neutrophils Absolute 9.03 (H) 1.20 - 7.70 x10*3/uL    Immature Granulocytes Absolute, Automated 0.34 0.00 - 0.70 x10*3/uL    Lymphocytes Absolute 0.67 (L) 1.20 - 4.80 x10*3/uL    Monocytes Absolute 0.56 0.10 - 1.00 x10*3/uL    Eosinophils Absolute 0.06 0.00 - 0.70 x10*3/uL    Basophils Absolute 0.01 0.00 - 0.10 x10*3/uL   Hepatic function panel   Result Value Ref Range    Albumin 2.4 (L) 3.4 - 5.0 g/dL    Bilirubin, Total 0.3 0.0 - 1.2 mg/dL    Bilirubin, Direct 0.0 0.0 - 0.3 mg/dL    Alkaline Phosphatase 105 33 - 136 U/L    ALT 6 (L) 7 - 45 U/L    AST 10 9 - 39 U/L    Total Protein 4.9 (L) 6.4 - 8.2 g/dL   Phosphorus   Result Value Ref Range    Phosphorus 2.5 2.5 - 4.9 mg/dL   Calcium, ionized   Result Value Ref Range    POCT Calcium, Ionized 1.07 (L) 1.1 - 1.33 mmol/L   Basic metabolic panel   Result Value Ref Range    Glucose 171 (H) 74 - 99 mg/dL    Sodium 135 (L) 136 - 145 mmol/L    Potassium 4.3 3.5 - 5.3 mmol/L    Chloride 103 98 - 107 mmol/L    Bicarbonate 26 21 - 32 mmol/L    Anion Gap 10 10 - 20 mmol/L    Urea Nitrogen 20 6 - 23 mg/dL    Creatinine 1.05 0.50 - 1.05 mg/dL    eGFR 58 (L) >60 mL/min/1.73m*2    Calcium 7.3 (L) 8.6 - 10.3 mg/dL   POCT GLUCOSE   Result Value Ref Range    POCT Glucose 169 (H) 74 - 99 mg/dL   POCT GLUCOSE   Result Value Ref Range    POCT Glucose 148 (H) 74 - 99 mg/dL   Magnesium   Result Value Ref Range    Magnesium 2.18 1.60 - 2.40 mg/dL   CBC and Auto Differential   Result Value Ref Range    WBC 9.4 4.4 - 11.3 x10*3/uL    nRBC 0.0 0.0 - 0.0 /100 WBCs    RBC 2.25 (L) 4.00 - 5.20 x10*6/uL    Hemoglobin 7.1 (L) 12.0 - 16.0 g/dL    Hematocrit 22.5 (L) 36.0 - 46.0 %     80 - 100 fL    MCH 31.6 26.0 - 34.0 pg    MCHC 31.6 (L) 32.0 - 36.0 g/dL    RDW 19.9 (H) 11.5 - 14.5 %    Platelets 215 150 - 450 x10*3/uL     Neutrophils % 81.6 40.0 - 80.0 %    Immature Granulocytes %, Automated 3.6 (H) 0.0 - 0.9 %    Lymphocytes % 9.1 13.0 - 44.0 %    Monocytes % 5.2 2.0 - 10.0 %    Eosinophils % 0.4 0.0 - 6.0 %    Basophils % 0.1 0.0 - 2.0 %    Neutrophils Absolute 7.66 1.20 - 7.70 x10*3/uL    Immature Granulocytes Absolute, Automated 0.34 0.00 - 0.70 x10*3/uL    Lymphocytes Absolute 0.85 (L) 1.20 - 4.80 x10*3/uL    Monocytes Absolute 0.49 0.10 - 1.00 x10*3/uL    Eosinophils Absolute 0.04 0.00 - 0.70 x10*3/uL    Basophils Absolute 0.01 0.00 - 0.10 x10*3/uL   Hepatic function panel   Result Value Ref Range    Albumin 2.3 (L) 3.4 - 5.0 g/dL    Bilirubin, Total 0.3 0.0 - 1.2 mg/dL    Bilirubin, Direct 0.0 0.0 - 0.3 mg/dL    Alkaline Phosphatase 112 33 - 136 U/L    ALT 6 (L) 7 - 45 U/L    AST 9 9 - 39 U/L    Total Protein 4.7 (L) 6.4 - 8.2 g/dL   Calcium, ionized   Result Value Ref Range    POCT Calcium, Ionized 1.10 1.1 - 1.33 mmol/L   Phosphorus   Result Value Ref Range    Phosphorus 1.8 (L) 2.5 - 4.9 mg/dL   Basic Metabolic Panel   Result Value Ref Range    Glucose 159 (H) 74 - 99 mg/dL    Sodium 135 (L) 136 - 145 mmol/L    Potassium 4.2 3.5 - 5.3 mmol/L    Chloride 104 98 - 107 mmol/L    Bicarbonate 27 21 - 32 mmol/L    Anion Gap 8 (L) 10 - 20 mmol/L    Urea Nitrogen 14 6 - 23 mg/dL    Creatinine 0.86 0.50 - 1.05 mg/dL    eGFR 74 >60 mL/min/1.73m*2    Calcium 7.4 (L) 8.6 - 10.3 mg/dL   POCT GLUCOSE   Result Value Ref Range    POCT Glucose 180 (H) 74 - 99 mg/dL   POCT GLUCOSE   Result Value Ref Range    POCT Glucose 160 (H) 74 - 99 mg/dL   Basic metabolic panel   Result Value Ref Range    Glucose 161 (H) 74 - 99 mg/dL    Sodium 135 (L) 136 - 145 mmol/L    Potassium 4.3 3.5 - 5.3 mmol/L    Chloride 103 98 - 107 mmol/L    Bicarbonate 26 21 - 32 mmol/L    Anion Gap 10 10 - 20 mmol/L    Urea Nitrogen 13 6 - 23 mg/dL    Creatinine 0.90 0.50 - 1.05 mg/dL    eGFR 70 >60 mL/min/1.73m*2    Calcium 7.5 (L) 8.6 - 10.3 mg/dL   CBC   Result Value  Ref Range    WBC 14.0 (H) 4.4 - 11.3 x10*3/uL    nRBC 0.0 0.0 - 0.0 /100 WBCs    RBC 2.38 (L) 4.00 - 5.20 x10*6/uL    Hemoglobin 7.4 (L) 12.0 - 16.0 g/dL    Hematocrit 23.3 (L) 36.0 - 46.0 %    MCV 98 80 - 100 fL    MCH 31.1 26.0 - 34.0 pg    MCHC 31.8 (L) 32.0 - 36.0 g/dL    RDW 19.9 (H) 11.5 - 14.5 %    Platelets 285 150 - 450 x10*3/uL   Phosphorus   Result Value Ref Range    Phosphorus 2.3 (L) 2.5 - 4.9 mg/dL   Magnesium   Result Value Ref Range    Magnesium 2.21 1.60 - 2.40 mg/dL   POCT GLUCOSE   Result Value Ref Range    POCT Glucose 160 (H) 74 - 99 mg/dL   Magnesium   Result Value Ref Range    Magnesium 2.23 1.60 - 2.40 mg/dL   CBC and Auto Differential   Result Value Ref Range    WBC 12.2 (H) 4.4 - 11.3 x10*3/uL    nRBC 0.0 0.0 - 0.0 /100 WBCs    RBC 2.23 (L) 4.00 - 5.20 x10*6/uL    Hemoglobin 7.0 (L) 12.0 - 16.0 g/dL    Hematocrit 22.3 (L) 36.0 - 46.0 %     80 - 100 fL    MCH 31.4 26.0 - 34.0 pg    MCHC 31.4 (L) 32.0 - 36.0 g/dL    RDW 19.9 (H) 11.5 - 14.5 %    Platelets 258 150 - 450 x10*3/uL    Neutrophils % 83.7 40.0 - 80.0 %    Immature Granulocytes %, Automated 2.4 (H) 0.0 - 0.9 %    Lymphocytes % 8.0 13.0 - 44.0 %    Monocytes % 5.3 2.0 - 10.0 %    Eosinophils % 0.4 0.0 - 6.0 %    Basophils % 0.2 0.0 - 2.0 %    Neutrophils Absolute 10.20 (H) 1.20 - 7.70 x10*3/uL    Immature Granulocytes Absolute, Automated 0.29 0.00 - 0.70 x10*3/uL    Lymphocytes Absolute 0.98 (L) 1.20 - 4.80 x10*3/uL    Monocytes Absolute 0.64 0.10 - 1.00 x10*3/uL    Eosinophils Absolute 0.05 0.00 - 0.70 x10*3/uL    Basophils Absolute 0.02 0.00 - 0.10 x10*3/uL   Hepatic function panel   Result Value Ref Range    Albumin 2.5 (L) 3.4 - 5.0 g/dL    Bilirubin, Total 0.2 0.0 - 1.2 mg/dL    Bilirubin, Direct 0.1 0.0 - 0.3 mg/dL    Alkaline Phosphatase 114 33 - 136 U/L    ALT 7 7 - 45 U/L    AST 12 9 - 39 U/L    Total Protein 4.8 (L) 6.4 - 8.2 g/dL   Calcium, ionized   Result Value Ref Range    POCT Calcium, Ionized 1.15 1.1 - 1.33  mmol/L   Basic Metabolic Panel   Result Value Ref Range    Glucose 169 (H) 74 - 99 mg/dL    Sodium 134 (L) 136 - 145 mmol/L    Potassium 4.2 3.5 - 5.3 mmol/L    Chloride 103 98 - 107 mmol/L    Bicarbonate 28 21 - 32 mmol/L    Anion Gap 7 (L) 10 - 20 mmol/L    Urea Nitrogen 12 6 - 23 mg/dL    Creatinine 0.78 0.50 - 1.05 mg/dL    eGFR 83 >60 mL/min/1.73m*2    Calcium 7.5 (L) 8.6 - 10.3 mg/dL   Phosphorus   Result Value Ref Range    Phosphorus 2.2 (L) 2.5 - 4.9 mg/dL   POCT GLUCOSE   Result Value Ref Range    POCT Glucose 183 (H) 74 - 99 mg/dL   Hemoglobin and hematocrit, blood   Result Value Ref Range    Hemoglobin 7.2 (L) 12.0 - 16.0 g/dL    Hematocrit 22.6 (L) 36.0 - 46.0 %   Blood Gas Venous Full Panel   Result Value Ref Range    POCT pH, Venous 7.36 7.33 - 7.43 pH    POCT pCO2, Venous 54 (H) 41 - 51 mm Hg    POCT pO2, Venous 39 35 - 45 mm Hg    POCT SO2, Venous 72 45 - 75 %    POCT Oxy Hemoglobin, Venous 70.4 45.0 - 75.0 %    POCT Hematocrit Calculated, Venous 22.0 (L) 36.0 - 46.0 %    POCT Sodium, Venous 134 (L) 136 - 145 mmol/L    POCT Potassium, Venous 4.2 3.5 - 5.3 mmol/L    POCT Chloride, Venous 105 98 - 107 mmol/L    POCT Ionized Calicum, Venous 1.23 1.10 - 1.33 mmol/L    POCT Glucose, Venous 146 (H) 74 - 99 mg/dL    POCT Lactate, Venous 1.2 0.4 - 2.0 mmol/L    POCT Base Excess, Venous 4.5 (H) -2.0 - 3.0 mmol/L    POCT HCO3 Calculated, Venous 30.5 (H) 22.0 - 26.0 mmol/L    POCT Hemoglobin, Venous 7.2 (L) 12.0 - 16.0 g/dL    POCT Anion Gap, Venous 3.0 (L) 10.0 - 25.0 mmol/L    Patient Temperature 37.0 degrees Celsius    FiO2 40 %   Type and screen   Result Value Ref Range    ABO TYPE A     Rh TYPE NEG     ANTIBODY SCREEN NEG    Verify ABO/Rh Group Test (VERAB)   Result Value Ref Range    ABO TYPE A     Rh TYPE NEG    POCT GLUCOSE   Result Value Ref Range    POCT Glucose 135 (H) 74 - 99 mg/dL   POCT GLUCOSE   Result Value Ref Range    POCT Glucose 148 (H) 74 - 99 mg/dL   Prepare RBC: 1 Units   Result Value Ref  Range    PRODUCT CODE F3351G23     Unit Number [id number]     Unit ABO A     Unit RH NEG     XM INTEP COMP     Dispense Status TR     Blood Expiration Date 11/20/2024 11:59:00 PM EST     PRODUCT BLOOD TYPE 0600     UNIT VOLUME 350    Magnesium   Result Value Ref Range    Magnesium 2.26 1.60 - 2.40 mg/dL   CBC and Auto Differential   Result Value Ref Range    WBC 10.8 4.4 - 11.3 x10*3/uL    nRBC 0.0 0.0 - 0.0 /100 WBCs    RBC 2.57 (L) 4.00 - 5.20 x10*6/uL    Hemoglobin 7.9 (L) 12.0 - 16.0 g/dL    Hematocrit 24.8 (L) 36.0 - 46.0 %    MCV 97 80 - 100 fL    MCH 30.7 26.0 - 34.0 pg    MCHC 31.9 (L) 32.0 - 36.0 g/dL    RDW 20.2 (H) 11.5 - 14.5 %    Platelets 224 150 - 450 x10*3/uL    Neutrophils % 86.7 40.0 - 80.0 %    Immature Granulocytes %, Automated 2.4 (H) 0.0 - 0.9 %    Lymphocytes % 6.5 13.0 - 44.0 %    Monocytes % 4.1 2.0 - 10.0 %    Eosinophils % 0.2 0.0 - 6.0 %    Basophils % 0.1 0.0 - 2.0 %    Neutrophils Absolute 9.32 (H) 1.20 - 7.70 x10*3/uL    Immature Granulocytes Absolute, Automated 0.26 0.00 - 0.70 x10*3/uL    Lymphocytes Absolute 0.70 (L) 1.20 - 4.80 x10*3/uL    Monocytes Absolute 0.44 0.10 - 1.00 x10*3/uL    Eosinophils Absolute 0.02 0.00 - 0.70 x10*3/uL    Basophils Absolute 0.01 0.00 - 0.10 x10*3/uL   Hepatic function panel   Result Value Ref Range    Albumin 2.5 (L) 3.4 - 5.0 g/dL    Bilirubin, Total 0.3 0.0 - 1.2 mg/dL    Bilirubin, Direct 0.1 0.0 - 0.3 mg/dL    Alkaline Phosphatase 126 33 - 136 U/L    ALT 8 7 - 45 U/L    AST 12 9 - 39 U/L    Total Protein 5.1 (L) 6.4 - 8.2 g/dL   Phosphorus   Result Value Ref Range    Phosphorus 2.3 (L) 2.5 - 4.9 mg/dL   Basic Metabolic Panel   Result Value Ref Range    Glucose 191 (H) 74 - 99 mg/dL    Sodium 134 (L) 136 - 145 mmol/L    Potassium 4.3 3.5 - 5.3 mmol/L    Chloride 104 98 - 107 mmol/L    Bicarbonate 27 21 - 32 mmol/L    Anion Gap 7 (L) 10 - 20 mmol/L    Urea Nitrogen 10 6 - 23 mg/dL    Creatinine 0.73 0.50 - 1.05 mg/dL    eGFR 90 >60  mL/min/1.73m*2    Calcium 7.5 (L) 8.6 - 10.3 mg/dL   POCT GLUCOSE   Result Value Ref Range    POCT Glucose 197 (H) 74 - 99 mg/dL   Morphology   Result Value Ref Range    RBC Morphology See Below     Polychromasia Mild     Ovalocytes Few     Lexus Cells Few     Basophilic Stippling Present    Calcium, ionized   Result Value Ref Range    POCT Calcium, Ionized 1.11 1.1 - 1.33 mmol/L   POCT GLUCOSE   Result Value Ref Range    POCT Glucose 158 (H) 74 - 99 mg/dL   POCT GLUCOSE   Result Value Ref Range    POCT Glucose 142 (H) 74 - 99 mg/dL   Magnesium   Result Value Ref Range    Magnesium 2.25 1.60 - 2.40 mg/dL   CBC and Auto Differential   Result Value Ref Range    WBC 9.4 4.4 - 11.3 x10*3/uL    nRBC 0.0 0.0 - 0.0 /100 WBCs    RBC 2.34 (L) 4.00 - 5.20 x10*6/uL    Hemoglobin 7.2 (L) 12.0 - 16.0 g/dL    Hematocrit 22.4 (L) 36.0 - 46.0 %    MCV 96 80 - 100 fL    MCH 30.8 26.0 - 34.0 pg    MCHC 32.1 32.0 - 36.0 g/dL    RDW 20.6 (H) 11.5 - 14.5 %    Platelets 185 150 - 450 x10*3/uL    Neutrophils % 86.3 40.0 - 80.0 %    Immature Granulocytes %, Automated 1.3 (H) 0.0 - 0.9 %    Lymphocytes % 7.7 13.0 - 44.0 %    Monocytes % 4.4 2.0 - 10.0 %    Eosinophils % 0.2 0.0 - 6.0 %    Basophils % 0.1 0.0 - 2.0 %    Neutrophils Absolute 8.12 (H) 1.20 - 7.70 x10*3/uL    Immature Granulocytes Absolute, Automated 0.12 0.00 - 0.70 x10*3/uL    Lymphocytes Absolute 0.72 (L) 1.20 - 4.80 x10*3/uL    Monocytes Absolute 0.41 0.10 - 1.00 x10*3/uL    Eosinophils Absolute 0.02 0.00 - 0.70 x10*3/uL    Basophils Absolute 0.01 0.00 - 0.10 x10*3/uL   Hepatic function panel   Result Value Ref Range    Albumin 2.4 (L) 3.4 - 5.0 g/dL    Bilirubin, Total 0.3 0.0 - 1.2 mg/dL    Bilirubin, Direct 0.1 0.0 - 0.3 mg/dL    Alkaline Phosphatase 122 33 - 136 U/L    ALT 8 7 - 45 U/L    AST 12 9 - 39 U/L    Total Protein 4.7 (L) 6.4 - 8.2 g/dL   Calcium, ionized   Result Value Ref Range    POCT Calcium, Ionized 1.13 1.1 - 1.33 mmol/L   Phosphorus   Result Value Ref  Range    Phosphorus 2.2 (L) 2.5 - 4.9 mg/dL   Basic Metabolic Panel   Result Value Ref Range    Glucose 159 (H) 74 - 99 mg/dL    Sodium 135 (L) 136 - 145 mmol/L    Potassium 4.2 3.5 - 5.3 mmol/L    Chloride 104 98 - 107 mmol/L    Bicarbonate 28 21 - 32 mmol/L    Anion Gap 7 (L) 10 - 20 mmol/L    Urea Nitrogen 9 6 - 23 mg/dL    Creatinine 0.61 0.50 - 1.05 mg/dL    eGFR >90 >60 mL/min/1.73m*2    Calcium 7.4 (L) 8.6 - 10.3 mg/dL   Morphology   Result Value Ref Range    RBC Morphology See Below     Polychromasia Mild     Ovalocytes Few     Lexus Cells Few     Basophilic Stippling Present    POCT GLUCOSE   Result Value Ref Range    POCT Glucose 158 (H) 74 - 99 mg/dL   POCT GLUCOSE   Result Value Ref Range    POCT Glucose 171 (H) 74 - 99 mg/dL   POCT GLUCOSE   Result Value Ref Range    POCT Glucose 169 (H) 74 - 99 mg/dL   CBC and Auto Differential   Result Value Ref Range    WBC 12.4 (H) 4.4 - 11.3 x10*3/uL    nRBC 0.0 0.0 - 0.0 /100 WBCs    RBC 2.33 (L) 4.00 - 5.20 x10*6/uL    Hemoglobin 7.1 (L) 12.0 - 16.0 g/dL    Hematocrit 22.0 (L) 36.0 - 46.0 %    MCV 94 80 - 100 fL    MCH 30.5 26.0 - 34.0 pg    MCHC 32.3 32.0 - 36.0 g/dL    RDW 20.4 (H) 11.5 - 14.5 %    Platelets 192 150 - 450 x10*3/uL    Neutrophils %      Immature Granulocytes %, Automated      Lymphocytes %      Monocytes %      Eosinophils %      Basophils %      Neutrophils Absolute      Lymphocytes Absolute      Monocytes Absolute      Eosinophils Absolute      Basophils Absolute       *Note: Due to a large number of results and/or encounters for the requested time period, some results have not been displayed. A complete set of results can be found in Results Review.       Assessment/Plan   Acute kidney injury I plan since she is still on pressors we will continue continuous renal replacement therapy with CVVHDF cussed with the nursing staff  Acute respiratory failure continue with ventilator support managed by the intensive care unit team  Edema continue to do  ultrafiltration as tolerated I will increase the goal of fluid removal to negative 75 mL/h  Septic shock we will try to wean off pressors if possible  Pneumonia with antibiotic therapy  Diabetes mellitus type 2  Malnutrition continue with tube feeding  Lower back surgery site infection  Anemia transfuse when hemoglobin less than 7       Imer JAIRO Cheung MD

## 2024-10-26 NOTE — CARE PLAN
Problem: Respiratory  Goal: No signs of respiratory distress (eg. Use of accessory muscles. Peds grunting)  Outcome: Progressing  Goal: Clear secretions with interventions this shift  Outcome: Progressing  Goal: Minimize anxiety/maximize coping throughout shift  Outcome: Progressing  Goal: Minimal/no exertional discomfort or dyspnea this shift  Outcome: Progressing  Goal: Patent airway maintained this shift  Outcome: Progressing  Goal: Tolerate mechanical ventilation evidenced by VS/agitation level this shift  Outcome: Progressing

## 2024-10-26 NOTE — PROGRESS NOTES
[facility] Critical Care Medicine       Date:  10/26/2024  Patient:  Narda Malloy  YOB: 1955  MRN:  [id number]   Admit Date:  10/12/2024      Chief Complaint   Patient presents with    Altered Mental Status         History of Present Illness:  Narda Malloy is a 69 y.o. year old female patient with Past Medical History of  L1-L3 lumbar laminectomy, T4-S1 revision, and fusion August 26th, T2DM, HTN, essential tremor, HLD, glaucoma, sarcoidosis of the lung who presented to  ED 10/12 after being found essentially unresponsive at her nursing facility [facility]. Per report from her , she has had a significant decline in her health since July 15th when she had a fall and became significantly weak. She has also had multiple infection complications since her back surgery in August requiring multiple I&Ds and long term antibiotic therapy. She went to the OR most recently on 9/28 for lumbar site infection wash out. Per chart review, she was discharged on IV vancomycin 1g and IV ceftriaxone 2d q24hrs through 11/12.     ED Course: Initial vital signs: /104 (109), HR 68, RR 20, SpO2 95% on 6L NC, temp 34.5C. Give 0.4mg of narcan with no improvement in mentation. Lab work-up remarkable for mild hyperkalemia (5.5), AMY 42/1.46, elevated alk phos, normocytic anemia 10.4/33, turbid appearing urine with mild hematuria and proteinuria and + leuk esterase, >50 WBCs. Urine drug screen positive for barbiturates. Triggered sepsis timer so she was given 3L NS. She was intubated for airway protection with 20mg etomidate and 100mg succinylcholine. BP dropped post-intubated and fluid resuscitation and she was subsequently started on levophed.         Interval ICU Events:  10/12: Pt arrived to ICU intubated and lightly sedated on low-dose versed. Eyes open, minimally responsive.      10/13: Received K cocktail last night for K 6.0, corrected appropriately. Levophed requirements mildly up, UOP  decreasing. Ordered albumin x2 this am with improvements in UOP and SBP. Will likely trial lasix this afternoon d/t hypervolemia.     10/14: Remains intubated with decreased mentation, only responsive to noxious stimuli. Trialed lasix TID for volume overload. Remains on levophed 0.01.     10/15: Mentation much improved. SAT/SBT successful so extubated. Given lasix x3, bumex x1, metolazone x1 with net negative fluid balance of 500mL -> started on bumex gtt.      10/16: O2 requirements significantly increased, NRB -> HFNC 40L 100% likely 2/2 mucus plugging.     10/17: No acute events overnight. Bumex gtt increased to 1mg/hr. CXR this am showing complete opacification of left hemithorax related to atelectasis vs pleural effusion. Remains on HFNC 40L/80%. Pigtail catheter placed with 1.2L drained immediately. Given albumin for hypotension.      10/18: ~2L output from left sided pigtail catheter since placement. Kidney function worsening and UOP declining, about 20cc/hr overnight. Will place NG tube today and start enteral nutrition and appetite and oral intake remains poor.      10/19: Patient with worsening hypoxic, hypercapnic respiratory failure - now requiring BIPAP support. Remains grossly volume overloaded with low UO. Started on vasopressors to augment BP for diuresis. 40 IV lasix + gtt started. Remains with poor nutritional status. Will re attempt NG later if respiratory status improves.      10/20: Patient stable on vent. Nephrology consulted for renal failure. Cr continues to uptrend however UO is increasing. No issues overnight.     10/21: No issues overnight. Remains stable on vent. Levo down to 0.01 mcg/kg/min. UO remains low. Nephrology following. Possible CRRT today?     10/22: Patient received 80 lasix and metalozone with minimal UO. Levo @ .02 and prop @ 10. Vent settings: 20/450/10/40%. Plan for CRRT today. Will SAT/SBT after CRRT. May change propofol to precedex.     10/23: Had episodes of bradycardia  where HR went to 30's which resolved with heating the patient. CRRT yesterday with about 1L removed. Currently on 0.03 of levo and 10 of prop. Cont daptomycin and ceftriaxone per ID. CXR improved. SAT/SBT. EP consulted for episodes of bradycardia.     10/24: Bradycardic episodes of HR in 40s. Currently on 0.03 of Levo, 10 of prop and 50 fentanyl. Tolerating CRRT. Place PICC today.     10/25: Did well with the SBT yesterday, plan for another one today. Continue CRRT. Hgb 7.0 this am, still requiting Levo 0.03, will transfuse 1 unit PRBCs. Remove chest tube today.    10/26: Chest tube removed last night, CXR improving, patient appears overall lethargic on sbt/sat, not ready to extubate, will complete 4/4/4 sbt/rest/sbt-> rest today and reassess tomorrow    Medical History:  Past Medical History:   Diagnosis Date    Degenerative myopia, bilateral     Diabetic neuropathy (Multi)     Difficult intubation 08/26/2024    Mac 3, grade 3, 1 attempt.  Glidescope/videolaryngoscopy recommended for future attempts.    DM type 2 (diabetes mellitus, type 2) (Multi)     Dry eye syndrome of bilateral lacrimal glands     Essential hypertension     Essential tremor     Glaucoma     Hyperlipidemia     Long term (current) use of insulin (Multi)     Low back pain     PONV (postoperative nausea and vomiting)     Primary open angle glaucoma of both eyes, severe stage     Repeated falls     Sarcoidosis of lung (Multi)     Spinal stenosis, lumbar region without neurogenic claudication     Weakness      Past Surgical History:   Procedure Laterality Date    BLEPHAROPLASTY  07/2022    BREAST SURGERY  05/20/2022    Breast lift    CARPAL TUNNEL RELEASE      CATARACT EXTRACTION W/  INTRAOCULAR LENS IMPLANT Bilateral     OD 08/04/2011 +8.5D,OS 08/04/2011 +8.50D    FOOT SURGERY      INSERTION / REMOVAL CRANIAL DBS GENERATOR      Placed 2017.  Removed 2018. part of wire left in head when everything removed    LUMBAR FUSION      L3-S1    PANRETINAL  PHOTOCOAGULATION  2014    THORACIC FUSION  08/26/2024    T4-S1 fusion    VITRECTOMY Right 2013     Medications Prior to Admission   Medication Sig Dispense Refill Last Dose/Taking    acetaminophen (Tylenol) 500 mg tablet Take 2 tablets (1,000 mg) by mouth 3 times a day.   Unknown    ascorbic acid (Vitamin C) 500 mg tablet as directed Orally   Unknown    brimonidine (AlphaGAN) 0.2 % ophthalmic solution Administer 1 drop into both eyes 2 times a day.   Unknown    calcium carbonate-vitamin D3 500 mg-5 mcg (200 unit) tablet Take 1 tablet by mouth once daily.   Unknown    cefTRIAXone (Rocephin) 2 gram/50 mL IV Infuse 50 mL (2 g) at 100 mL/hr over 30 minutes into a venous catheter once every 24 hours. Once weekly labs CBC/diff, CMP, Vanc trough ESR, CRP fax to Dr. Juarez [phone]. Stop date 11/12/24. 1950 mL 0     dextrose 50 % injection Infuse 25 mL (12.5 g) into a venous catheter every 15 minutes if needed (For blood glucose 41 to 70 mg/dL).       dextrose 50 % injection Infuse 50 mL (25 g) into a venous catheter every 15 minutes if needed (For blood glucose less than or equal to 40 mg/dL).       docusate sodium (Colace) 100 mg capsule Take 1 capsule (100 mg) by mouth 2 times a day.   Unknown    ezetimibe (Zetia) 10 mg tablet Take 1 tablet (10 mg) by mouth once daily. 90 tablet 3 Unknown    FreeStyle Lite Strips strip USE TO TEST 3 TIMES A DAY AS DIRECTED 300 each 2     glucagon (Glucagen) 1 mg injection Inject 1 mg into the muscle every 15 minutes if needed for low blood sugar - see comments (Hypoglycemia).       glucagon (Glucagen) 1 mg injection Inject 1 mg into the muscle every 15 minutes if needed for low blood sugar - see comments (Hypoglycemia).       heparin sodium,porcine (heparin, porcine,) 5,000 unit/mL injection Inject 1 mL (5,000 Units) under the skin every 8 hours.       insulin lispro (HumaLOG) 100 unit/mL injection Inject 0-15 Units under the skin 3 times daily (morning, midday, late afternoon). Take  as directed per insulin instructions.Do not hold when patient is not eating, continue order as scheduled for hyperglycemia management.  Insulin Lispro Corrective Scale #3     Hypoglycemia protocol Call LIP unit(s) if Blood Glucose is between 0 - 70 mg/dL     0 unit(s) if Blood glucose is between    3 unit(s) if Blood glucose is between 151-200   6 unit(s) if Blood glucose is between 201-250   9 unit(s) if Blood glucose is between 251-300   12 unit(s) if Blood glucose is between 301-350   15 unit(s) if Blood glucose is between 351-400    Notify provider unit(s) if Blood Glucose is greater than 400 mg/dL       Lactobacillus acidophilus 100 mg (1 billion cell) capsule Take 1 capsule by mouth 2 times a day.   Unknown    latanoprost (Xalatan) 0.005 % ophthalmic solution Administer 1 drop into both eyes once daily at bedtime. 2.5 mL 5 Unknown    melatonin 5 mg tablet Take 1 tablet (5 mg) by mouth as needed at bedtime for sleep.   Unknown    methocarbamol (Robaxin) 500 mg tablet Take 1 tablet (500 mg) by mouth 3 times a day.   Unknown    multivitamin tablet Take 1 tablet by mouth once daily.   Unknown    ondansetron (Zofran) 4 mg/2 mL injection Infuse 2 mL (4 mg) into a venous catheter every 6 hours if needed for nausea or vomiting.       oxyCODONE (Roxicodone) 5 mg immediate release tablet Take 1 tablet (5 mg) by mouth every 6 hours if needed for severe pain (7 - 10) or moderate pain (4 - 6).       oxygen (O2) gas therapy Inhale 1 each continuously.       pantoprazole (ProtoNix) 40 mg EC tablet Take 1 tablet (40 mg) by mouth once daily in the morning. Take before meals. Do not crush, chew, or split.       pantoprazole (ProtoNix) 40 mg injection Infuse 40 mg into a venous catheter once daily in the morning. Take before meals. If unable to take PO.       pen needle, diabetic (PEN NEEDLE MISC) BD Altagracia- 4 mm X 32 G needle - as directed 4x a day sc 4 times per day       polyethylene glycol (Glycolax, Miralax) 17 gram  "packet Take 17 g by mouth once daily.   Unknown    primidone 125 mg tablet Take 125 mg by mouth 3 times a day.   Unknown    propranolol LA (Inderal LA) 60 mg 24 hr capsule Take 1 capsule (60 mg) by mouth early in the morning.. Hold for SBP < 110 mmhg, HR < 60 bpm.   Unknown    sennosides (Senokot) 8.6 mg tablet Take 1 tablet (8.6 mg) by mouth every 12 hours if needed for constipation.   Unknown    Sure Comfort Pen Needle 32 gauge x 5/32\" needle AS DIRECTED DAILY FOR 90 DAYS 100 each 11 Unknown    traZODone (Desyrel) 25 MG split tablet Take 1 half tablet (25 mg) by mouth once daily at bedtime.   Unknown    vancomycin (Vancocin) 1 gram/250 mL solution Infuse 250 mL (1 g) at 250 mL/hr over 60 minutes into a venous catheter every 12 hours. Once weekly labs CBC/diff, CMP, Vanc trough ESR, CRP fax to Dr. Juarez [phone]. Stop date 11/12/24. 38187 mL 0      Erythromycin, Morphine, and Rosuvastatin  Social History     Tobacco Use    Smoking status: Former     Types: Cigarettes     Passive exposure: Past    Smokeless tobacco: Never   Vaping Use    Vaping status: Never Used   Substance Use Topics    Alcohol use: Not Currently    Drug use: Not Currently     Family History   Problem Relation Name Age of Onset    Multiple myeloma Mother      Cancer Mother      Other (CABG) Father      Pulmonary embolism Father      Heart disease Father      Breast cancer Sister          Stage II    Hypertension Sister      Diabetes Sister      No Known Problems Sister          x5    No Known Problems Brother          x4    No Known Problems Daughter         [facility] Medications:    norepinephrine, 0-0.5 mcg/kg/min, Last Rate: 0.02 mcg/kg/min (10/26/24 0852)  PrismaSol 4/2.5, 25 mL/kg/hr, Last Rate: 25 mL/kg/hr (10/26/24 0800)  propofol, 0-50 mcg/kg/min, Last Rate: 15 mcg/kg/min (10/26/24 0852)          Current Facility-Administered Medications:     acetaminophen (Tylenol) tablet 975 mg, 975 mg, oral, q6h PRN, Kaleb Lam PA-C, 975 mg at " 10/18/24 1405    alteplase (Cathflo Activase) injection 2 mg, 2 mg, intra-catheter, PRN, Kaleb Lam PA-C    alteplase (Cathflo Activase) injection 2 mg, 2 mg, intra-catheter, PRN, Andrew Malagon MD    brimonidine (AlphaGAN) 0.2 % ophthalmic solution 1 drop, 1 drop, Both Eyes, BID, Kaleb Lam PA-C, 1 drop at 10/26/24 0827    [Held by provider] calcium carbonate-vitamin D3 500 mg-5 mcg (200 unit) per tablet 1 tablet, 1 tablet, oral, Daily, Kaleb Lam PA-C, 1 tablet at 10/18/24 0930    cefTRIAXone (Rocephin) 2 g in dextrose (iso) IV 50 mL, 2 g, intravenous, q24h, Kaleb Lam PA-C, Stopped at 10/26/24 0918    DAPTOmycin (Cubicin) 700 mg in sodium chloride 0.9%  mL, 700 mg, intravenous, Daily, Andrew Malagon MD, Last Rate: 200 mL/hr at 10/26/24 0918, 700 mg at 10/26/24 0918    dextrose 50 % injection 12.5 g, 12.5 g, intravenous, q15 min PRN, Kaleb Lam PA-C    dextrose 50 % injection 25 g, 25 g, intravenous, q15 min PRN, Kaleb Lam PA-C    ezetimibe (Zetia) tablet 10 mg, 10 mg, oral, Daily, Kaleb Lam PA-C, 10 mg at 10/26/24 0827    fentaNYL PF (Sublimaze) injection 25 mcg, 25 mcg, intravenous, q2h PRN, RUTH Doe    ferrous sulfate syrup 60 mg of iron, 60 mg of iron, oral, Daily, RUTH Doe, 60 mg of iron at 10/26/24 0826    folic acid (Folvite) tablet 1 mg, 1 mg, oral, Daily, RUTH Doe, 1 mg at 10/26/24 0826    glucagon (Glucagen) injection 1 mg, 1 mg, intramuscular, q15 min PRN, Kaleb M Genaro, PA-C    glucagon (Glucagen) injection 1 mg, 1 mg, intramuscular, q15 min PRN, Kaleb Lam PA-C    heparin (porcine) injection 5,000 Units, 5,000 Units, subcutaneous, q8h, Kaleb Lam PA-C, 5,000 Units at 10/26/24 0607    heparin 1,000 unit/mL injection 1,000 Units, 1,000 Units, intra-catheter, PRN, Nelia Cheung MD    heparin 1,000 unit/mL injection 1,000 Units, 1,000 Units, intra-catheter, PRN, Nelia Cheung MD    heparin  flush 10 unit/mL syringe 50 Units, 50 Units, intra-catheter, PRN, Kaleb Lam PA-C, 50 Units at 10/19/24 2313    honey (Medihoney) topical gel, , Topical, Daily, RUTH Salgado    hydrocortisone sodium succinate (PF) (Solu-CORTEF) injection 100 mg, 100 mg, intravenous, q12h, RUTH Salgado    [Held by provider] HYDROmorphone (Dilaudid) injection 0.4 mg, 0.4 mg, intravenous, q2h PRN, RUTH Salgado, 0.4 mg at 10/19/24 0520    insulin lispro (HumaLOG) injection 0-15 Units, 0-15 Units, subcutaneous, q4h, Lb Dodd PA-C, 3 Units at 10/26/24 0825    ipratropium-albuteroL (Duo-Neb) 0.5-2.5 mg/3 mL nebulizer solution 3 mL, 3 mL, nebulization, 4x daily, Kaleb Lam PA-C, 3 mL at 10/26/24 0734    latanoprost (Xalatan) 0.005 % ophthalmic solution 1 drop, 1 drop, Both Eyes, Nightly, Kaleb Lam PA-C, 1 drop at 10/25/24 2106    midodrine (Proamatine) tablet 10 mg, 10 mg, oral, TID, RUTH Salgado, 10 mg at 10/26/24 0826    norepinephrine (Levophed) 8 mg in dextrose 5% 250 mL (0.032 mg/mL) infusion (premix), 0-0.5 mcg/kg/min, intravenous, Continuous, RUTH Doe, Last Rate: 4.73 mL/hr at 10/26/24 0852, 0.02 mcg/kg/min at 10/26/24 0852    oxyCODONE (Roxicodone) immediate release tablet 5 mg, 5 mg, oral, q4h LUCA, RUTH Doe, 5 mg at 10/26/24 0607    oxygen (O2) therapy, , inhalation, Continuous - Inhalation, RUTH Salgado    pantoprazole (ProtoNix) EC tablet 40 mg, 40 mg, oral, Daily before breakfast **OR** pantoprazole (ProtoNix) injection 40 mg, 40 mg, intravenous, Daily before breakfast, Alonso Yancey, LEONEL-CNP, 40 mg at 10/26/24 0607    polyethylene glycol (Glycolax, Miralax) packet 17 g, 17 g, oral, Daily PRN, Sweta Diaz, APRN-CNP    primidone (Mysoline) tablet 125 mg, 125 mg, oral, Nightly, Kaleb Lam PA-C, 125 mg at 10/25/24 2107    PrismaSol 4/2.5 CRRT solution, 25 mL/kg/hr, CRRT, Continuous, Nelia Cheung MD,  Last Rate: 3,150 mL/hr at 10/26/24 0800, 25 mL/kg/hr at 10/26/24 0800    propofol (Diprivan) infusion, 0-50 mcg/kg/min, intravenous, Continuous, RUTH Salas, Last Rate: 11.34 mL/hr at 10/26/24 0852, 15 mcg/kg/min at 10/26/24 0852    [Held by provider] propranolol LA (Inderal LA) 24 hr capsule 60 mg, 60 mg, oral, Daily, Kaleb Lam PA-C, 60 mg at 10/19/24 0643    sennosides-docusate sodium (Lucia-Colace) 8.6-50 mg per tablet 1 tablet, 1 tablet, oral, Nightly, RUTH Doe, 1 tablet at 10/25/24 2106    sodium chloride 3 % nebulizer solution 3 mL, 3 mL, nebulization, 4x daily, Kaleb Lam PA-C, 3 mL at 10/26/24 0734    [Held by provider] traMADol (Ultram) tablet 50 mg, 50 mg, oral, q8h PRN, Kaleb Lam PA-C    Review of Systems:  14 point review of systems was completed and negative except for those specially mention in my HPI    Physical Exam:    Heart Rate:  [0-101]   Temp:  [35.7 °C (96.3 °F)-36.8 °C (98.2 °F)]   Resp:  [14-31]   BP: ()/(39-71)   Weight:  [121 kg (266 lb 15.6 oz)]   SpO2:  [91 %-98 %]     Physical Exam  Constitutional:       Interventions: She is sedated, intubated and restrained.      Comments: somnolent   HENT:      Mouth/Throat:      Mouth: Mucous membranes are moist.      Pharynx: Oropharynx is clear.   Eyes:      Pupils: Pupils are equal, round, and reactive to light.   Cardiovascular:      Rate and Rhythm: Normal rate and regular rhythm.      Pulses: Normal pulses.   Pulmonary:      Effort: Pulmonary effort is normal. She is intubated.   Abdominal:      General: Abdomen is flat.      Palpations: Abdomen is soft.   Musculoskeletal:      Cervical back: Normal range of motion.      Right lower le+ Edema present.      Left lower leg: 3+ Edema present.   Skin:     General: Skin is warm and dry.      Capillary Refill: Capillary refill takes less than 2 seconds.      Comments: Surgical incision to back, photo updated in chart, sutures intact with  erythema at incision site for total length of incision    Neurological:      General: No focal deficit present.      Mental Status: Mental status is at baseline.   Psychiatric:         Behavior: Behavior is cooperative.         Objective:    I have reviewed all medications, laboratory results, and imaging pertinent for today's encounter.    Vent Mode: Pressure support  FiO2 (%):  [30 %-45 %] 45 %  S RR:  [20] 20  S VT:  [40 mL-450 mL] 450 mL  PEEP/CPAP (cm H2O):  [5 cm H20-58 cm H20] 5 cm H20  WI SUP:  [5 cm H20] 5 cm H20  MAP (cm H2O):  [6-9.3] 7      Intake/Output Summary (Last 24 hours) at 10/26/2024 0951  Last data filed at 10/26/2024 0918  Gross per 24 hour   Intake 1782.58 ml   Output 3640 ml   Net -1857.42 ml         Assessment/Plan:    I am currently managing this critically ill patient for the following problems:    Plan:  Neuro/Psych/Pain Ctrl/Sedation:   Acute encephalopathy - likely secondary to hypercapnia, infection  Hx Essential tremor   CT head: Negative for acute findings   Urine tox positive for barbiturates consistent with primidone intake   - Addressing underlying causes  - Pain Control: Oxycodone Q4, Acetaminophen PRN  - Sedation: Propofol  - Home primidone continued. Will hold propanolol d/t hypotension  - CAM ICU qshift, sleep-wake hygiene, delirium precautions   - SAT/SBT Daily     Respiratory/ENT:  Acute hypoxic/hypercapnic respiratory failure - likely multifactorial and 2/2 HCAP, pl effusions, volume overload  Healthcare-associated pneumonia   Atelectasis - likely 2/2 mucus plugging   Pleural Effusion -S/p left-sided pigtail placement 10/17, removed 10/25  CXR Today: Improvement in L pleural effusion from post ct removal cxr  - Intubated 10/18. Vent setting: -20-5 30%  - Will wean O2 as tolerated to maintain SpO2 >92%  - Duonebs Q4 and hypertonic saline QID   - IPV per RT TID  - Continuous pulse ox monitoring   - Pulm hygiene  - Will place on pressure support for 4 hours, break for 4  "hours and place on pressure support for 4 hours     Cardiovascular:   Septic shock - improving  Hx HTN, HLD  Acute on chronic diastolic heart failure  Bradycardia  TTE 10/1: diastolic dysfunction, LVEF 50-55%, mildly elevated RVSP (40)  Bilateral duplex US upper extremities:  Thrombosis within the left cephalic vein, which is part of the superficial venous system of the upper extremity, adjacent to PICC line. No deep venous thrombosis in the upper extremities.  - Remains on persistent levophed gtt, Will wean with goal MAP > 65   - Start midodrine increased from bid to q8  - Continue stress dose steroids (10/21-...), reduced from tid to bid today  - Holding home propranolol (on for tremors)  - Continue home Zetia  - Continuous cardiac monitoring per ICU protocol  - EKGs PRN for ACS symptoms, arrhythmias   - EP consulted will appreciate recs     GI:  Hx GERD  - Diet: Tolerating TF @ goal  - BR: Colace, Miralax PRN  - GI Prophylaxis: PPI     Renal/Volume Status/Electrolytes:  Acute kidney injury - possibly ATN +/- medication-induced (vancomycin)  Anasarca   Mixed respiratory and metabolic acidosis - improving on vent  Hypoalbuminemia   Baseline Cr 0.8-1.0. BUN Cr 0.78/12 today  - CRRT started 10/22, remove 50 mL/hr  - Remains grossly volume overloaded  - Oliguric  - Per nephrology continue CRRT as long as she is on pressors, increased removal rate  - Nephrology following  - Maintain anders catheter  - Hourly I/O's  - Replete electrolytes to maintain K >4.0 and Mg >2.0  - Daily BMP, Mg, Phos    Endocrine:  T2DM  - Serum Cortisol pending  - SSI Q 6 while NPO  - TSH: midly elevated, T4 WNL  - Hypoglycemia protocol PRN      Infectious Disease:  Thoracolumbar surgical site infection - s/p I&D x 2. Recent MRSA bacteremia on extended course of IV antibiotics (vanc and rocephin -> 11/12)  Healthcare-associated pneumonia   CT lumbar spine 10/12: Unable to r/o abscess. Unable to do MRI d/t spinal hardware, \"metal in head\" per " patient  Sputum Culture 10/21: negative  Blood cultures 10/16: Negative  Urine culture 10/14: Negative  Sputum culture 10/16: + pseudomonas   - Continue Ceftriaxone (10/23-...)  - Continue Daptomycin (10/21-...)  - Cefepime completed on 10/22  - Vanco discontinued d/t continued high levels   - ID following  - PICC placed 10/24  - Monitor SIRS criteria  - WBC: 12.5->10.7->12.2     Heme/Onc:  Normocytic anemia   H/H similar to previous: No active signs of bleeding.  - Hgb 7.0 this am possibly d/t lost blood while during CRRT when filter clotted  - Start iron and folate  - Monitor for s/sx of bleeding   - Plan to transfuse if Hgb <7.0   - Daily CBC     MSK:  No active concerns   - PT/OT when appropriate     Derm:  Surgical wound with infection  - Wound care consult  - ICU skin protocol  - Q2hr turns  - Padded pressure points      Ethics/Code Status:  Full code - discussions with  at bedside      :  DVT Prophylaxis: SQH  GI Prophylaxis: PPI  Bowel Regimen: Colace (home med), Miralax  Diet: TF  CVC: PICC placed 10/24, Trialysis R internal jugular placed 10/19  Wanda: none  Gibson: yes, replaced 10/12  Restraints: Yes  Disposition: ICU    Critical Care Time: 45 minutes spent in preparing to see patient (I.e. review of medical records), evaluation of diagnostics (I.e. labs, imaging, etc.), documentation, discussing plan of care with patient/ family/ caregiver, and/ or coordination of care with multidisciplinary team. Time does not include completion of procedure time.       Sabino Matos, APRN-CNP

## 2024-10-27 VITALS
BODY MASS INDEX: 36.67 KG/M2 | HEIGHT: 70 IN | HEART RATE: 82 BPM | RESPIRATION RATE: 20 BRPM | SYSTOLIC BLOOD PRESSURE: 111 MMHG | WEIGHT: 256.17 LBS | OXYGEN SATURATION: 98 % | TEMPERATURE: 98.1 F | DIASTOLIC BLOOD PRESSURE: 60 MMHG

## 2024-10-27 LAB
ALBUMIN SERPL BCP-MCNC: 2.3 G/DL (ref 3.4–5)
ALBUMIN SERPL BCP-MCNC: 2.6 G/DL (ref 3.4–5)
ALP SERPL-CCNC: 123 U/L (ref 33–136)
ALP SERPL-CCNC: 142 U/L (ref 33–136)
ALT SERPL W P-5'-P-CCNC: 11 U/L (ref 7–45)
ALT SERPL W P-5'-P-CCNC: 9 U/L (ref 7–45)
ANION GAP SERPL CALCULATED.3IONS-SCNC: 7 MMOL/L (ref 10–20)
ANION GAP SERPL CALCULATED.3IONS-SCNC: 9 MMOL/L (ref 10–20)
AST SERPL W P-5'-P-CCNC: 14 U/L (ref 9–39)
AST SERPL W P-5'-P-CCNC: 16 U/L (ref 9–39)
BASOPHILS # BLD AUTO: 0.02 X10*3/UL (ref 0–0.1)
BASOPHILS # BLD AUTO: 0.02 X10*3/UL (ref 0–0.1)
BASOPHILS NFR BLD AUTO: 0.1 %
BASOPHILS NFR BLD AUTO: 0.2 %
BILIRUB DIRECT SERPL-MCNC: 0.1 MG/DL (ref 0–0.3)
BILIRUB DIRECT SERPL-MCNC: 0.2 MG/DL (ref 0–0.3)
BILIRUB SERPL-MCNC: 0.3 MG/DL (ref 0–1.2)
BILIRUB SERPL-MCNC: 0.4 MG/DL (ref 0–1.2)
BLOOD EXPIRATION DATE: NORMAL
BUN SERPL-MCNC: 11 MG/DL (ref 6–23)
BUN SERPL-MCNC: 7 MG/DL (ref 6–23)
CA-I BLD-SCNC: 1.11 MMOL/L (ref 1.1–1.33)
CA-I BLD-SCNC: 1.11 MMOL/L (ref 1.1–1.33)
CALCIUM SERPL-MCNC: 7.2 MG/DL (ref 8.6–10.3)
CALCIUM SERPL-MCNC: 7.7 MG/DL (ref 8.6–10.3)
CHLORIDE SERPL-SCNC: 102 MMOL/L (ref 98–107)
CHLORIDE SERPL-SCNC: 104 MMOL/L (ref 98–107)
CO2 SERPL-SCNC: 28 MMOL/L (ref 21–32)
CO2 SERPL-SCNC: 28 MMOL/L (ref 21–32)
CREAT SERPL-MCNC: 0.56 MG/DL (ref 0.5–1.05)
CREAT SERPL-MCNC: 0.61 MG/DL (ref 0.5–1.05)
DISPENSE STATUS: NORMAL
EGFRCR SERPLBLD CKD-EPI 2021: >90 ML/MIN/1.73M*2
EGFRCR SERPLBLD CKD-EPI 2021: >90 ML/MIN/1.73M*2
EOSINOPHIL # BLD AUTO: 0.02 X10*3/UL (ref 0–0.7)
EOSINOPHIL # BLD AUTO: 0.05 X10*3/UL (ref 0–0.7)
EOSINOPHIL NFR BLD AUTO: 0.2 %
EOSINOPHIL NFR BLD AUTO: 0.3 %
ERYTHROCYTE [DISTWIDTH] IN BLOOD BY AUTOMATED COUNT: 20.3 % (ref 11.5–14.5)
ERYTHROCYTE [DISTWIDTH] IN BLOOD BY AUTOMATED COUNT: 20.7 % (ref 11.5–14.5)
GLUCOSE BLD MANUAL STRIP-MCNC: 119 MG/DL (ref 74–99)
GLUCOSE BLD MANUAL STRIP-MCNC: 130 MG/DL (ref 74–99)
GLUCOSE BLD MANUAL STRIP-MCNC: 151 MG/DL (ref 74–99)
GLUCOSE BLD MANUAL STRIP-MCNC: 166 MG/DL (ref 74–99)
GLUCOSE BLD MANUAL STRIP-MCNC: 174 MG/DL (ref 74–99)
GLUCOSE BLD MANUAL STRIP-MCNC: 191 MG/DL (ref 74–99)
GLUCOSE SERPL-MCNC: 136 MG/DL (ref 74–99)
GLUCOSE SERPL-MCNC: 163 MG/DL (ref 74–99)
HCT VFR BLD AUTO: 21.4 % (ref 36–46)
HCT VFR BLD AUTO: 27.1 % (ref 36–46)
HGB BLD-MCNC: 6.8 G/DL (ref 12–16)
HGB BLD-MCNC: 8.9 G/DL (ref 12–16)
IMM GRANULOCYTES # BLD AUTO: 0.11 X10*3/UL (ref 0–0.7)
IMM GRANULOCYTES # BLD AUTO: 0.21 X10*3/UL (ref 0–0.7)
IMM GRANULOCYTES NFR BLD AUTO: 1.1 % (ref 0–0.9)
IMM GRANULOCYTES NFR BLD AUTO: 1.4 % (ref 0–0.9)
LYMPHOCYTES # BLD AUTO: 0.8 X10*3/UL (ref 1.2–4.8)
LYMPHOCYTES # BLD AUTO: 0.84 X10*3/UL (ref 1.2–4.8)
LYMPHOCYTES NFR BLD AUTO: 5.3 %
LYMPHOCYTES NFR BLD AUTO: 8.1 %
MAGNESIUM SERPL-MCNC: 2.15 MG/DL (ref 1.6–2.4)
MAGNESIUM SERPL-MCNC: 2.22 MG/DL (ref 1.6–2.4)
MCH RBC QN AUTO: 30.7 PG (ref 26–34)
MCH RBC QN AUTO: 31.5 PG (ref 26–34)
MCHC RBC AUTO-ENTMCNC: 31.8 G/DL (ref 32–36)
MCHC RBC AUTO-ENTMCNC: 32.8 G/DL (ref 32–36)
MCV RBC AUTO: 93 FL (ref 80–100)
MCV RBC AUTO: 99 FL (ref 80–100)
MONOCYTES # BLD AUTO: 0.41 X10*3/UL (ref 0.1–1)
MONOCYTES # BLD AUTO: 0.58 X10*3/UL (ref 0.1–1)
MONOCYTES NFR BLD AUTO: 3.9 %
MONOCYTES NFR BLD AUTO: 4 %
NEUTROPHILS # BLD AUTO: 13.3 X10*3/UL (ref 1.2–7.7)
NEUTROPHILS # BLD AUTO: 8.91 X10*3/UL (ref 1.2–7.7)
NEUTROPHILS NFR BLD AUTO: 86.4 %
NEUTROPHILS NFR BLD AUTO: 89 %
NRBC BLD-RTO: 0 /100 WBCS (ref 0–0)
NRBC BLD-RTO: 0 /100 WBCS (ref 0–0)
PHOSPHATE SERPL-MCNC: 1.9 MG/DL (ref 2.5–4.9)
PHOSPHATE SERPL-MCNC: 2.3 MG/DL (ref 2.5–4.9)
PLATELET # BLD AUTO: 182 X10*3/UL (ref 150–450)
PLATELET # BLD AUTO: 233 X10*3/UL (ref 150–450)
POLYCHROMASIA BLD QL SMEAR: NORMAL
POLYCHROMASIA BLD QL SMEAR: NORMAL
POTASSIUM SERPL-SCNC: 4.2 MMOL/L (ref 3.5–5.3)
POTASSIUM SERPL-SCNC: 4.4 MMOL/L (ref 3.5–5.3)
PRODUCT BLOOD TYPE: 600
PRODUCT CODE: NORMAL
PROT SERPL-MCNC: 4.7 G/DL (ref 6.4–8.2)
PROT SERPL-MCNC: 5.4 G/DL (ref 6.4–8.2)
RBC # BLD AUTO: 2.16 X10*6/UL (ref 4–5.2)
RBC # BLD AUTO: 2.9 X10*6/UL (ref 4–5.2)
RBC MORPH BLD: NORMAL
RBC MORPH BLD: NORMAL
SODIUM SERPL-SCNC: 135 MMOL/L (ref 136–145)
SODIUM SERPL-SCNC: 135 MMOL/L (ref 136–145)
UNIT ABO: NORMAL
UNIT NUMBER: NORMAL
UNIT RH: NORMAL
UNIT VOLUME: 350
WBC # BLD AUTO: 10.3 X10*3/UL (ref 4.4–11.3)
WBC # BLD AUTO: 15 X10*3/UL (ref 4.4–11.3)
XM INTEP: NORMAL

## 2024-10-27 PROCEDURE — 2500000005 HC RX 250 GENERAL PHARMACY W/O HCPCS

## 2024-10-27 PROCEDURE — 2500000004 HC RX 250 GENERAL PHARMACY W/ HCPCS (ALT 636 FOR OP/ED): Performed by: INTERNAL MEDICINE

## 2024-10-27 PROCEDURE — 85025 COMPLETE CBC W/AUTO DIFF WBC: CPT

## 2024-10-27 PROCEDURE — P9040 RBC LEUKOREDUCED IRRADIATED: HCPCS

## 2024-10-27 PROCEDURE — 99291 CRITICAL CARE FIRST HOUR: CPT

## 2024-10-27 PROCEDURE — 2500000004 HC RX 250 GENERAL PHARMACY W/ HCPCS (ALT 636 FOR OP/ED): Performed by: NURSE PRACTITIONER

## 2024-10-27 PROCEDURE — 84100 ASSAY OF PHOSPHORUS: CPT

## 2024-10-27 PROCEDURE — 82248 BILIRUBIN DIRECT: CPT

## 2024-10-27 PROCEDURE — 80048 BASIC METABOLIC PNL TOTAL CA: CPT | Mod: CCI

## 2024-10-27 PROCEDURE — 2500000001 HC RX 250 WO HCPCS SELF ADMINISTERED DRUGS (ALT 637 FOR MEDICARE OP)

## 2024-10-27 PROCEDURE — 94003 VENT MGMT INPAT SUBQ DAY: CPT

## 2024-10-27 PROCEDURE — 2500000002 HC RX 250 W HCPCS SELF ADMINISTERED DRUGS (ALT 637 FOR MEDICARE OP, ALT 636 FOR OP/ED)

## 2024-10-27 PROCEDURE — 2500000004 HC RX 250 GENERAL PHARMACY W/ HCPCS (ALT 636 FOR OP/ED)

## 2024-10-27 PROCEDURE — 82330 ASSAY OF CALCIUM: CPT

## 2024-10-27 PROCEDURE — 80048 BASIC METABOLIC PNL TOTAL CA: CPT

## 2024-10-27 PROCEDURE — 80069 RENAL FUNCTION PANEL: CPT | Mod: CCI

## 2024-10-27 PROCEDURE — 83735 ASSAY OF MAGNESIUM: CPT

## 2024-10-27 PROCEDURE — 37799 UNLISTED PX VASCULAR SURGERY: CPT

## 2024-10-27 PROCEDURE — 2020000001 HC ICU ROOM DAILY

## 2024-10-27 PROCEDURE — 36430 TRANSFUSION BLD/BLD COMPNT: CPT

## 2024-10-27 PROCEDURE — 94640 AIRWAY INHALATION TREATMENT: CPT

## 2024-10-27 PROCEDURE — 90937 HEMODIALYSIS REPEATED EVAL: CPT

## 2024-10-27 PROCEDURE — 94668 MNPJ CHEST WALL SBSQ: CPT

## 2024-10-27 PROCEDURE — 82947 ASSAY GLUCOSE BLOOD QUANT: CPT

## 2024-10-27 PROCEDURE — 84450 TRANSFERASE (AST) (SGOT): CPT

## 2024-10-27 RX ORDER — SODIUM,POTASSIUM PHOSPHATES 280-250MG
2 POWDER IN PACKET (EA) ORAL
Status: COMPLETED | OUTPATIENT
Start: 2024-10-27 | End: 2024-10-28

## 2024-10-27 RX ADMIN — CALCIUM CHLORIDE, MAGNESIUM CHLORIDE, DEXTROSE MONOHYDRATE, LACTIC ACID, SODIUM CHLORIDE, SODIUM BICARBONATE AND POTASSIUM CHLORIDE 25 ML/KG/HR: 3.68; 3.05; 22; 5.4; 6.46; 3.09; .314 INJECTION INTRAVENOUS at 19:14

## 2024-10-27 RX ADMIN — CALCIUM CHLORIDE, MAGNESIUM CHLORIDE, DEXTROSE MONOHYDRATE, LACTIC ACID, SODIUM CHLORIDE, SODIUM BICARBONATE AND POTASSIUM CHLORIDE 25 ML/KG/HR: 3.68; 3.05; 22; 5.4; 6.46; 3.09; .314 INJECTION INTRAVENOUS at 04:18

## 2024-10-27 RX ADMIN — HYDROCORTISONE SODIUM SUCCINATE 100 MG: 100 INJECTION, POWDER, FOR SOLUTION INTRAMUSCULAR; INTRAVENOUS at 04:28

## 2024-10-27 RX ADMIN — CALCIUM CHLORIDE, MAGNESIUM CHLORIDE, DEXTROSE MONOHYDRATE, LACTIC ACID, SODIUM CHLORIDE, SODIUM BICARBONATE AND POTASSIUM CHLORIDE 25 ML/KG/HR: 3.68; 3.05; 22; 5.4; 6.46; 3.09; .314 INJECTION INTRAVENOUS at 10:21

## 2024-10-27 RX ADMIN — OXYCODONE 5 MG: 5 TABLET ORAL at 10:24

## 2024-10-27 RX ADMIN — OXYCODONE 5 MG: 5 TABLET ORAL at 02:06

## 2024-10-27 RX ADMIN — Medication 45 PERCENT: at 07:28

## 2024-10-27 RX ADMIN — BRIMONIDINE TARTRATE 1 DROP: 2 SOLUTION OPHTHALMIC at 21:27

## 2024-10-27 RX ADMIN — PANTOPRAZOLE SODIUM 40 MG: 40 INJECTION, POWDER, FOR SOLUTION INTRAVENOUS at 05:09

## 2024-10-27 RX ADMIN — OXYCODONE 5 MG: 5 TABLET ORAL at 14:26

## 2024-10-27 RX ADMIN — DAPTOMYCIN IN SODIUM CHLORIDE 700 MG: 700 INJECTION, SOLUTION INTRAVENOUS at 09:27

## 2024-10-27 RX ADMIN — HEPARIN SODIUM 5000 UNITS: 5000 INJECTION, SOLUTION INTRAVENOUS; SUBCUTANEOUS at 14:26

## 2024-10-27 RX ADMIN — EZETIMIBE 10 MG: 10 TABLET ORAL at 10:45

## 2024-10-27 RX ADMIN — IPRATROPIUM BROMIDE AND ALBUTEROL SULFATE 3 ML: .5; 3 SOLUTION RESPIRATORY (INHALATION) at 11:15

## 2024-10-27 RX ADMIN — HEPARIN SODIUM 5000 UNITS: 5000 INJECTION, SOLUTION INTRAVENOUS; SUBCUTANEOUS at 06:25

## 2024-10-27 RX ADMIN — INSULIN LISPRO 3 UNITS: 100 INJECTION, SOLUTION INTRAVENOUS; SUBCUTANEOUS at 12:20

## 2024-10-27 RX ADMIN — BRIMONIDINE TARTRATE 1 DROP: 2 SOLUTION OPHTHALMIC at 08:47

## 2024-10-27 RX ADMIN — CALCIUM CHLORIDE, MAGNESIUM CHLORIDE, DEXTROSE MONOHYDRATE, LACTIC ACID, SODIUM CHLORIDE, SODIUM BICARBONATE AND POTASSIUM CHLORIDE 25 ML/KG/HR: 3.68; 3.05; 22; 5.4; 6.46; 3.09; .314 INJECTION INTRAVENOUS at 19:13

## 2024-10-27 RX ADMIN — MIDODRINE HYDROCHLORIDE 10 MG: 10 TABLET ORAL at 00:27

## 2024-10-27 RX ADMIN — POTASSIUM & SODIUM PHOSPHATES POWDER PACK 280-160-250 MG 2 PACKET: 280-160-250 PACK at 17:21

## 2024-10-27 RX ADMIN — CALCIUM CHLORIDE, MAGNESIUM CHLORIDE, DEXTROSE MONOHYDRATE, LACTIC ACID, SODIUM CHLORIDE, SODIUM BICARBONATE AND POTASSIUM CHLORIDE 25 ML/KG/HR: 3.68; 3.05; 22; 5.4; 6.46; 3.09; .314 INJECTION INTRAVENOUS at 02:03

## 2024-10-27 RX ADMIN — FOLIC ACID 1 MG: 1 TABLET ORAL at 08:47

## 2024-10-27 RX ADMIN — OXYCODONE 5 MG: 5 TABLET ORAL at 05:09

## 2024-10-27 RX ADMIN — POTASSIUM & SODIUM PHOSPHATES POWDER PACK 280-160-250 MG 2 PACKET: 280-160-250 PACK at 21:27

## 2024-10-27 RX ADMIN — Medication 45 PERCENT: at 20:04

## 2024-10-27 RX ADMIN — CALCIUM CHLORIDE, MAGNESIUM CHLORIDE, DEXTROSE MONOHYDRATE, LACTIC ACID, SODIUM CHLORIDE, SODIUM BICARBONATE AND POTASSIUM CHLORIDE 25 ML/KG/HR: 3.68; 3.05; 22; 5.4; 6.46; 3.09; .314 INJECTION INTRAVENOUS at 14:16

## 2024-10-27 RX ADMIN — SENNOSIDES AND DOCUSATE SODIUM 1 TABLET: 50; 8.6 TABLET ORAL at 21:27

## 2024-10-27 RX ADMIN — PROPOFOL 15 MCG/KG/MIN: 10 INJECTION, EMULSION INTRAVENOUS at 05:09

## 2024-10-27 RX ADMIN — CALCIUM CHLORIDE, MAGNESIUM CHLORIDE, DEXTROSE MONOHYDRATE, LACTIC ACID, SODIUM CHLORIDE, SODIUM BICARBONATE AND POTASSIUM CHLORIDE 25 ML/KG/HR: 3.68; 3.05; 22; 5.4; 6.46; 3.09; .314 INJECTION INTRAVENOUS at 04:17

## 2024-10-27 RX ADMIN — CALCIUM CHLORIDE, MAGNESIUM CHLORIDE, DEXTROSE MONOHYDRATE, LACTIC ACID, SODIUM CHLORIDE, SODIUM BICARBONATE AND POTASSIUM CHLORIDE 25 ML/KG/HR: 3.68; 3.05; 22; 5.4; 6.46; 3.09; .314 INJECTION INTRAVENOUS at 09:08

## 2024-10-27 RX ADMIN — Medication 3 ML: at 07:28

## 2024-10-27 RX ADMIN — SODIUM PHOSPHATE, MONOBASIC, MONOHYDRATE AND SODIUM PHOSPHATE, DIBASIC, ANHYDROUS 21 MMOL: 276; 142 INJECTION, SOLUTION INTRAVENOUS at 05:21

## 2024-10-27 RX ADMIN — Medication: at 08:48

## 2024-10-27 RX ADMIN — Medication 3 ML: at 11:15

## 2024-10-27 RX ADMIN — MIDODRINE HYDROCHLORIDE 10 MG: 10 TABLET ORAL at 17:21

## 2024-10-27 RX ADMIN — INSULIN LISPRO 3 UNITS: 100 INJECTION, SOLUTION INTRAVENOUS; SUBCUTANEOUS at 04:29

## 2024-10-27 RX ADMIN — IPRATROPIUM BROMIDE AND ALBUTEROL SULFATE 3 ML: .5; 3 SOLUTION RESPIRATORY (INHALATION) at 20:05

## 2024-10-27 RX ADMIN — PRIMIDONE 125 MG: 250 TABLET ORAL at 21:27

## 2024-10-27 RX ADMIN — HEPARIN SODIUM 5000 UNITS: 5000 INJECTION, SOLUTION INTRAVENOUS; SUBCUTANEOUS at 22:25

## 2024-10-27 RX ADMIN — CALCIUM CHLORIDE, MAGNESIUM CHLORIDE, DEXTROSE MONOHYDRATE, LACTIC ACID, SODIUM CHLORIDE, SODIUM BICARBONATE AND POTASSIUM CHLORIDE 25 ML/KG/HR: 3.68; 3.05; 22; 5.4; 6.46; 3.09; .314 INJECTION INTRAVENOUS at 14:15

## 2024-10-27 RX ADMIN — POTASSIUM & SODIUM PHOSPHATES POWDER PACK 280-160-250 MG 2 PACKET: 280-160-250 PACK at 12:20

## 2024-10-27 RX ADMIN — Medication 60 MG OF IRON: at 10:45

## 2024-10-27 RX ADMIN — IPRATROPIUM BROMIDE AND ALBUTEROL SULFATE 3 ML: .5; 3 SOLUTION RESPIRATORY (INHALATION) at 07:28

## 2024-10-27 RX ADMIN — CALCIUM CHLORIDE, MAGNESIUM CHLORIDE, DEXTROSE MONOHYDRATE, LACTIC ACID, SODIUM CHLORIDE, SODIUM BICARBONATE AND POTASSIUM CHLORIDE 25 ML/KG/HR: 3.68; 3.05; 22; 5.4; 6.46; 3.09; .314 INJECTION INTRAVENOUS at 09:06

## 2024-10-27 RX ADMIN — LATANOPROST 1 DROP: 50 SOLUTION OPHTHALMIC at 21:27

## 2024-10-27 RX ADMIN — CEFTRIAXONE SODIUM 2 G: 2 INJECTION, SOLUTION INTRAVENOUS at 08:47

## 2024-10-27 RX ADMIN — HYDROCORTISONE SODIUM SUCCINATE 100 MG: 100 INJECTION, POWDER, FOR SOLUTION INTRAMUSCULAR; INTRAVENOUS at 16:24

## 2024-10-27 RX ADMIN — Medication 3 ML: at 20:05

## 2024-10-27 RX ADMIN — INSULIN LISPRO 3 UNITS: 100 INJECTION, SOLUTION INTRAVENOUS; SUBCUTANEOUS at 20:44

## 2024-10-27 RX ADMIN — OXYCODONE 5 MG: 5 TABLET ORAL at 21:27

## 2024-10-27 RX ADMIN — NOREPINEPHRINE BITARTRATE 0.01 MCG/KG/MIN: 8 INJECTION, SOLUTION INTRAVENOUS at 00:47

## 2024-10-27 RX ADMIN — IPRATROPIUM BROMIDE AND ALBUTEROL SULFATE 3 ML: .5; 3 SOLUTION RESPIRATORY (INHALATION) at 15:13

## 2024-10-27 RX ADMIN — OXYCODONE 5 MG: 5 TABLET ORAL at 18:34

## 2024-10-27 RX ADMIN — CALCIUM CHLORIDE, MAGNESIUM CHLORIDE, DEXTROSE MONOHYDRATE, LACTIC ACID, SODIUM CHLORIDE, SODIUM BICARBONATE AND POTASSIUM CHLORIDE 25 ML/KG/HR: 3.68; 3.05; 22; 5.4; 6.46; 3.09; .314 INJECTION INTRAVENOUS at 18:17

## 2024-10-27 RX ADMIN — POTASSIUM & SODIUM PHOSPHATES POWDER PACK 280-160-250 MG 2 PACKET: 280-160-250 PACK at 00:27

## 2024-10-27 RX ADMIN — MIDODRINE HYDROCHLORIDE 10 MG: 10 TABLET ORAL at 08:47

## 2024-10-27 RX ADMIN — Medication 3 ML: at 15:13

## 2024-10-27 RX ADMIN — NOREPINEPHRINE BITARTRATE 0.01 MCG/KG/MIN: 8 INJECTION, SOLUTION INTRAVENOUS at 04:00

## 2024-10-27 ASSESSMENT — PAIN SCALES - GENERAL
PAINLEVEL_OUTOF10: 0 - NO PAIN

## 2024-10-27 ASSESSMENT — COGNITIVE AND FUNCTIONAL STATUS - GENERAL
TOILETING: TOTAL
PERSONAL GROOMING: TOTAL
EATING MEALS: TOTAL
STANDING UP FROM CHAIR USING ARMS: TOTAL
MOBILITY SCORE: 6
MOVING FROM LYING ON BACK TO SITTING ON SIDE OF FLAT BED WITH BEDRAILS: TOTAL
WALKING IN HOSPITAL ROOM: TOTAL
CLIMB 3 TO 5 STEPS WITH RAILING: TOTAL
DRESSING REGULAR UPPER BODY CLOTHING: TOTAL
DAILY ACTIVITIY SCORE: 6
HELP NEEDED FOR BATHING: TOTAL
DRESSING REGULAR LOWER BODY CLOTHING: TOTAL
TURNING FROM BACK TO SIDE WHILE IN FLAT BAD: TOTAL
MOVING TO AND FROM BED TO CHAIR: TOTAL

## 2024-10-27 ASSESSMENT — PAIN - FUNCTIONAL ASSESSMENT
PAIN_FUNCTIONAL_ASSESSMENT: CPOT (CRITICAL CARE PAIN OBSERVATION TOOL)

## 2024-10-27 NOTE — PROGRESS NOTES
[facility] Critical Care Medicine       Date:  10/27/2024  Patient:  Narda Malloy  YOB: 1955  MRN:  [id number]   Admit Date:  10/12/2024      Chief Complaint   Patient presents with    Altered Mental Status         History of Present Illness:  Narda Malloy is a 69 y.o. year old female patient with Past Medical History of   L1-L3 lumbar laminectomy, T4-S1 revision, and fusion August 26th, T2DM, HTN, essential tremor, HLD, glaucoma, sarcoidosis of the lung who presented to  ED 10/12 after being found essentially unresponsive at her nursing facility [facility]. Per report from her , she has had a significant decline in her health since July 15th when she had a fall and became significantly weak. She has also had multiple infection complications since her back surgery in August requiring multiple I&Ds and long term antibiotic therapy. She went to the OR most recently on 9/28 for lumbar site infection wash out. Per chart review, she was discharged on IV vancomycin 1g and IV ceftriaxone 2d q24hrs through 11/12.     ED Course: Initial vital signs: /104 (109), HR 68, RR 20, SpO2 95% on 6L NC, temp 34.5C. Give 0.4mg of narcan with no improvement in mentation. Lab work-up remarkable for mild hyperkalemia (5.5), AMY 42/1.46, elevated alk phos, normocytic anemia 10.4/33, turbid appearing urine with mild hematuria and proteinuria and + leuk esterase, >50 WBCs. Urine drug screen positive for barbiturates. Triggered sepsis timer so she was given 3L NS. She was intubated for airway protection with 20mg etomidate and 100mg succinylcholine. BP dropped post-intubated and fluid resuscitation and she was subsequently started on levophed.          Interval ICU Events:  10/12: Pt arrived to ICU intubated and lightly sedated on low-dose versed. Eyes open, minimally responsive.      10/13: Received K cocktail last night for K 6.0, corrected appropriately. Levophed requirements mildly up, UOP  decreasing. Ordered albumin x2 this am with improvements in UOP and SBP. Will likely trial lasix this afternoon d/t hypervolemia.     10/14: Remains intubated with decreased mentation, only responsive to noxious stimuli. Trialed lasix TID for volume overload. Remains on levophed 0.01.     10/15: Mentation much improved. SAT/SBT successful so extubated. Given lasix x3, bumex x1, metolazone x1 with net negative fluid balance of 500mL -> started on bumex gtt.      10/16: O2 requirements significantly increased, NRB -> HFNC 40L 100% likely 2/2 mucus plugging.     10/17: No acute events overnight. Bumex gtt increased to 1mg/hr. CXR this am showing complete opacification of left hemithorax related to atelectasis vs pleural effusion. Remains on HFNC 40L/80%. Pigtail catheter placed with 1.2L drained immediately. Given albumin for hypotension.      10/18: ~2L output from left sided pigtail catheter since placement. Kidney function worsening and UOP declining, about 20cc/hr overnight. Will place NG tube today and start enteral nutrition and appetite and oral intake remains poor.      10/19: Patient with worsening hypoxic, hypercapnic respiratory failure - now requiring BIPAP support. Remains grossly volume overloaded with low UO. Started on vasopressors to augment BP for diuresis. 40 IV lasix + gtt started. Remains with poor nutritional status. Will re attempt NG later if respiratory status improves.      10/20: Patient stable on vent. Nephrology consulted for renal failure. Cr continues to uptrend however UO is increasing. No issues overnight.     10/21: No issues overnight. Remains stable on vent. Levo down to 0.01 mcg/kg/min. UO remains low. Nephrology following. Possible CRRT today?     10/22: Patient received 80 lasix and metalozone with minimal UO. Levo @ .02 and prop @ 10. Vent settings: 20/450/10/40%. Plan for CRRT today. Will SAT/SBT after CRRT. May change propofol to precedex.     10/23: Had episodes of bradycardia  where HR went to 30's which resolved with heating the patient. CRRT yesterday with about 1L removed. Currently on 0.03 of levo and 10 of prop. Cont daptomycin and ceftriaxone per ID. CXR improved. SAT/SBT. EP consulted for episodes of bradycardia.     10/24: Bradycardic episodes of HR in 40s. Currently on 0.03 of Levo, 10 of prop and 50 fentanyl. Tolerating CRRT. Place PICC today.     10/25: Did well with the SBT yesterday, plan for another one today. Continue CRRT. Hgb 7.0 this am, still requiting Levo 0.03, will transfuse 1 unit PRBCs. Remove chest tube today.     10/26: Chest tube removed last night, CXR improving, patient appears overall lethargic on sbt/sat, not ready to extubate, will complete 4/4/4 sbt/rest/sbt-> rest today and reassess tomorrow    10/27: Patient failed afternoon SBT, placed on rate overnight, net - 2.5L over previous 24 hours, will place on wean today.    Medical History:  Past Medical History:   Diagnosis Date    Degenerative myopia, bilateral     Diabetic neuropathy (Multi)     Difficult intubation 08/26/2024    Mac 3, grade 3, 1 attempt.  Glidescope/videolaryngoscopy recommended for future attempts.    DM type 2 (diabetes mellitus, type 2) (Multi)     Dry eye syndrome of bilateral lacrimal glands     Essential hypertension     Essential tremor     Glaucoma     Hyperlipidemia     Long term (current) use of insulin (Multi)     Low back pain     PONV (postoperative nausea and vomiting)     Primary open angle glaucoma of both eyes, severe stage     Repeated falls     Sarcoidosis of lung (Multi)     Spinal stenosis, lumbar region without neurogenic claudication     Weakness      Past Surgical History:   Procedure Laterality Date    BLEPHAROPLASTY  07/2022    BREAST SURGERY  05/20/2022    Breast lift    CARPAL TUNNEL RELEASE      CATARACT EXTRACTION W/  INTRAOCULAR LENS IMPLANT Bilateral     OD 08/04/2011 +8.5D,OS 08/04/2011 +8.50D    FOOT SURGERY      INSERTION / REMOVAL CRANIAL DBS GENERATOR       Placed 2017.  Removed 2018. part of wire left in head when everything removed    LUMBAR FUSION      L3-S1    PANRETINAL PHOTOCOAGULATION  2014    THORACIC FUSION  08/26/2024    T4-S1 fusion    VITRECTOMY Right 2013     Medications Prior to Admission   Medication Sig Dispense Refill Last Dose/Taking    acetaminophen (Tylenol) 500 mg tablet Take 2 tablets (1,000 mg) by mouth 3 times a day.   Unknown    ascorbic acid (Vitamin C) 500 mg tablet as directed Orally   Unknown    brimonidine (AlphaGAN) 0.2 % ophthalmic solution Administer 1 drop into both eyes 2 times a day.   Unknown    calcium carbonate-vitamin D3 500 mg-5 mcg (200 unit) tablet Take 1 tablet by mouth once daily.   Unknown    cefTRIAXone (Rocephin) 2 gram/50 mL IV Infuse 50 mL (2 g) at 100 mL/hr over 30 minutes into a venous catheter once every 24 hours. Once weekly labs CBC/diff, CMP, Vanc trough ESR, CRP fax to Dr. Juarez [phone]. Stop date 11/12/24. 1950 mL 0     dextrose 50 % injection Infuse 25 mL (12.5 g) into a venous catheter every 15 minutes if needed (For blood glucose 41 to 70 mg/dL).       dextrose 50 % injection Infuse 50 mL (25 g) into a venous catheter every 15 minutes if needed (For blood glucose less than or equal to 40 mg/dL).       docusate sodium (Colace) 100 mg capsule Take 1 capsule (100 mg) by mouth 2 times a day.   Unknown    ezetimibe (Zetia) 10 mg tablet Take 1 tablet (10 mg) by mouth once daily. 90 tablet 3 Unknown    FreeStyle Lite Strips strip USE TO TEST 3 TIMES A DAY AS DIRECTED 300 each 2     glucagon (Glucagen) 1 mg injection Inject 1 mg into the muscle every 15 minutes if needed for low blood sugar - see comments (Hypoglycemia).       glucagon (Glucagen) 1 mg injection Inject 1 mg into the muscle every 15 minutes if needed for low blood sugar - see comments (Hypoglycemia).       heparin sodium,porcine (heparin, porcine,) 5,000 unit/mL injection Inject 1 mL (5,000 Units) under the skin every 8 hours.       insulin  lispro (HumaLOG) 100 unit/mL injection Inject 0-15 Units under the skin 3 times daily (morning, midday, late afternoon). Take as directed per insulin instructions.Do not hold when patient is not eating, continue order as scheduled for hyperglycemia management.  Insulin Lispro Corrective Scale #3     Hypoglycemia protocol Call LIP unit(s) if Blood Glucose is between 0 - 70 mg/dL     0 unit(s) if Blood glucose is between    3 unit(s) if Blood glucose is between 151-200   6 unit(s) if Blood glucose is between 201-250   9 unit(s) if Blood glucose is between 251-300   12 unit(s) if Blood glucose is between 301-350   15 unit(s) if Blood glucose is between 351-400    Notify provider unit(s) if Blood Glucose is greater than 400 mg/dL       Lactobacillus acidophilus 100 mg (1 billion cell) capsule Take 1 capsule by mouth 2 times a day.   Unknown    latanoprost (Xalatan) 0.005 % ophthalmic solution Administer 1 drop into both eyes once daily at bedtime. 2.5 mL 5 Unknown    melatonin 5 mg tablet Take 1 tablet (5 mg) by mouth as needed at bedtime for sleep.   Unknown    methocarbamol (Robaxin) 500 mg tablet Take 1 tablet (500 mg) by mouth 3 times a day.   Unknown    multivitamin tablet Take 1 tablet by mouth once daily.   Unknown    ondansetron (Zofran) 4 mg/2 mL injection Infuse 2 mL (4 mg) into a venous catheter every 6 hours if needed for nausea or vomiting.       oxyCODONE (Roxicodone) 5 mg immediate release tablet Take 1 tablet (5 mg) by mouth every 6 hours if needed for severe pain (7 - 10) or moderate pain (4 - 6).       oxygen (O2) gas therapy Inhale 1 each continuously.       pantoprazole (ProtoNix) 40 mg EC tablet Take 1 tablet (40 mg) by mouth once daily in the morning. Take before meals. Do not crush, chew, or split.       pantoprazole (ProtoNix) 40 mg injection Infuse 40 mg into a venous catheter once daily in the morning. Take before meals. If unable to take PO.       pen needle, diabetic (PEN NEEDLE MISC)  "BD Altagracia- 4 mm X 32 G needle - as directed 4x a day sc 4 times per day       polyethylene glycol (Glycolax, Miralax) 17 gram packet Take 17 g by mouth once daily.   Unknown    primidone 125 mg tablet Take 125 mg by mouth 3 times a day.   Unknown    propranolol LA (Inderal LA) 60 mg 24 hr capsule Take 1 capsule (60 mg) by mouth early in the morning.. Hold for SBP < 110 mmhg, HR < 60 bpm.   Unknown    sennosides (Senokot) 8.6 mg tablet Take 1 tablet (8.6 mg) by mouth every 12 hours if needed for constipation.   Unknown    Sure Comfort Pen Needle 32 gauge x 5/32\" needle AS DIRECTED DAILY FOR 90 DAYS 100 each 11 Unknown    traZODone (Desyrel) 25 MG split tablet Take 1 half tablet (25 mg) by mouth once daily at bedtime.   Unknown    vancomycin (Vancocin) 1 gram/250 mL solution Infuse 250 mL (1 g) at 250 mL/hr over 60 minutes into a venous catheter every 12 hours. Once weekly labs CBC/diff, CMP, Vanc trough ESR, CRP fax to Dr. Juarez [phone]. Stop date 11/12/24. 52191 mL 0      Erythromycin, Morphine, and Rosuvastatin  Social History     Tobacco Use    Smoking status: Former     Types: Cigarettes     Passive exposure: Past    Smokeless tobacco: Never   Vaping Use    Vaping status: Never Used   Substance Use Topics    Alcohol use: Not Currently    Drug use: Not Currently     Family History   Problem Relation Name Age of Onset    Multiple myeloma Mother      Cancer Mother      Other (CABG) Father      Pulmonary embolism Father      Heart disease Father      Breast cancer Sister          Stage II    Hypertension Sister      Diabetes Sister      No Known Problems Sister          x5    No Known Problems Brother          x4    No Known Problems Daughter         Hospital Medications:    norepinephrine, 0-0.5 mcg/kg/min, Last Rate: 0.01 mcg/kg/min (10/27/24 0929)  PrismaSol 4/2.5, 25 mL/kg/hr, Last Rate: 25 mL/kg/hr (10/27/24 0908)  propofol, 0-50 mcg/kg/min, Last Rate: Stopped (10/27/24 0811)          Current " Facility-Administered Medications:     acetaminophen (Tylenol) tablet 975 mg, 975 mg, oral, q6h PRN, Kaleb Lam PA-C, 975 mg at 10/18/24 1405    alteplase (Cathflo Activase) injection 2 mg, 2 mg, intra-catheter, PRN, Kaleb Lam PA-C    alteplase (Cathflo Activase) injection 2 mg, 2 mg, intra-catheter, PRN, Andrew Malagon MD    brimonidine (AlphaGAN) 0.2 % ophthalmic solution 1 drop, 1 drop, Both Eyes, BID, Kaleb Lam PA-C, 1 drop at 10/27/24 0847    [Held by provider] calcium carbonate-vitamin D3 500 mg-5 mcg (200 unit) per tablet 1 tablet, 1 tablet, oral, Daily, Kaleb Lam PA-C, 1 tablet at 10/18/24 0930    cefTRIAXone (Rocephin) 2 g in dextrose (iso) IV 50 mL, 2 g, intravenous, q24h, Kaleb Lam PA-C, Stopped at 10/27/24 0922    DAPTOmycin (Cubicin) 700 mg in sodium chloride 0.9%  mL, 700 mg, intravenous, Daily, Andrew Malagon MD, Last Rate: 200 mL/hr at 10/27/24 0927, 700 mg at 10/27/24 0927    dextrose 50 % injection 12.5 g, 12.5 g, intravenous, q15 min PRN, Kaleb Lam PA-C    dextrose 50 % injection 25 g, 25 g, intravenous, q15 min PRN, Kaleb Lam PA-C    ezetimibe (Zetia) tablet 10 mg, 10 mg, oral, Daily, Kaleb Lam PA-C, 10 mg at 10/26/24 0827    fentaNYL PF (Sublimaze) injection 25 mcg, 25 mcg, intravenous, q2h PRN, RUTH Doe    ferrous sulfate syrup 60 mg of iron, 60 mg of iron, oral, Daily, RUTH Doe, 60 mg of iron at 10/26/24 0826    folic acid (Folvite) tablet 1 mg, 1 mg, oral, Daily, RUTH Doe, 1 mg at 10/27/24 0847    glucagon (Glucagen) injection 1 mg, 1 mg, intramuscular, q15 min PRN, Kaleb Lam PA-C    glucagon (Glucagen) injection 1 mg, 1 mg, intramuscular, q15 min PRN, Kaleb Lam PA-C    heparin (porcine) injection 5,000 Units, 5,000 Units, subcutaneous, q8h, Kaleb Lam PA-C, 5,000 Units at 10/27/24 0625    heparin 1,000 unit/mL injection 1,000 Units, 1,000 Units,  intra-catheter, PRN, Nelia Cheung MD    heparin 1,000 unit/mL injection 1,000 Units, 1,000 Units, intra-catheter, PRN, Nelia Cheung MD    heparin flush 10 unit/mL syringe 50 Units, 50 Units, intra-catheter, PRN, Kaleb Lam PA-C, 50 Units at 10/19/24 2313    honey (Medihoney) topical gel, , Topical, Daily, RUTH Salgado, Given at 10/27/24 0848    hydrocortisone sodium succinate (PF) (Solu-CORTEF) injection 100 mg, 100 mg, intravenous, q12h, RUTH Salgado, 100 mg at 10/27/24 0428    [Held by provider] HYDROmorphone (Dilaudid) injection 0.4 mg, 0.4 mg, intravenous, q2h PRN, RUTH Salgado, 0.4 mg at 10/19/24 0520    insulin lispro (HumaLOG) injection 0-15 Units, 0-15 Units, subcutaneous, q4h, Lb Dodd PA-C, 3 Units at 10/27/24 0429    ipratropium-albuteroL (Duo-Neb) 0.5-2.5 mg/3 mL nebulizer solution 3 mL, 3 mL, nebulization, 4x daily, Kaleb Lam PA-C, 3 mL at 10/27/24 0728    latanoprost (Xalatan) 0.005 % ophthalmic solution 1 drop, 1 drop, Both Eyes, Nightly, Kaleb Lam PA-C, 1 drop at 10/26/24 2006    midodrine (Proamatine) tablet 10 mg, 10 mg, oral, q8h, RUTH Doe, 10 mg at 10/27/24 0847    norepinephrine (Levophed) 8 mg in dextrose 5% 250 mL (0.032 mg/mL) infusion (premix), 0-0.5 mcg/kg/min, intravenous, Continuous, RUTH Doe, Last Rate: 2.36 mL/hr at 10/27/24 0929, 0.01 mcg/kg/min at 10/27/24 0929    oxyCODONE (Roxicodone) immediate release tablet 5 mg, 5 mg, oral, q4h LUCA, Sweta Diaz, APRN-CNP, 5 mg at 10/27/24 0509    oxygen (O2) therapy, , inhalation, Continuous - Inhalation, Sabino Matos, APRN-CNP, 45 percent at 10/27/24 0728    pantoprazole (ProtoNix) EC tablet 40 mg, 40 mg, oral, Daily before breakfast **OR** pantoprazole (ProtoNix) injection 40 mg, 40 mg, intravenous, Daily before breakfast, Alonso Yancey, APRN-CNP, 40 mg at 10/27/24 0509    polyethylene glycol (Glycolax, Miralax) packet 17 g, 17 g, oral,  Daily PRN, RUTH Doe    potassium, sodium phosphates (Phos-NaK) 280-160-250 mg packet 2 packet, 2 packet, oral, With meals & nightly, RUTH Salgado    primidone (Mysoline) tablet 125 mg, 125 mg, oral, Nightly, Kaleb Lam PA-C, 125 mg at 10/26/24 2006    PrismaSol 4/2.5 CRRT solution, 25 mL/kg/hr, CRRT, Continuous, Nelia Cheung MD, Last Rate: 3,150 mL/hr at 10/27/24 0908, 25 mL/kg/hr at 10/27/24 0908    propofol (Diprivan) infusion, 0-50 mcg/kg/min, intravenous, Continuous, RUTH Salas, Stopped at 10/27/24 0811    [Held by provider] propranolol LA (Inderal LA) 24 hr capsule 60 mg, 60 mg, oral, Daily, Kaleb Lam PA-C, 60 mg at 10/19/24 0643    sennosides-docusate sodium (Lucia-Colace) 8.6-50 mg per tablet 1 tablet, 1 tablet, oral, Nightly, RUTH Doe, 1 tablet at 10/26/24 2006    sodium chloride 3 % nebulizer solution 3 mL, 3 mL, nebulization, 4x daily, Kaleb Lam PA-C, 3 mL at 10/27/24 0728    sodium phosphate 21 mmol in sodium chloride 0.9% 250 mL IV, 21 mmol, intravenous, Once, Lb Dodd PA-C, Last Rate: 41.7 mL/hr at 10/27/24 0521, 21 mmol at 10/27/24 0521    [Held by provider] traMADol (Ultram) tablet 50 mg, 50 mg, oral, q8h PRN, Kaleb Lam PA-C    Review of Systems:  14 point review of systems was completed and negative except for those specially mention in my HPI    Physical Exam:    Heart Rate:  []   Temp:  [34.7 °C (94.5 °F)-36.7 °C (98.1 °F)]   Resp:  [20-33]   BP: ()/()   Weight:  [116 kg (256 lb 2.8 oz)-123 kg (271 lb 9.7 oz)]   SpO2:  [87 %-100 %]     Physical Exam  Constitutional:       Interventions: She is intubated and restrained.   HENT:      Mouth/Throat:      Mouth: Mucous membranes are moist.      Pharynx: Oropharynx is clear.   Eyes:      Pupils: Pupils are equal, round, and reactive to light.   Cardiovascular:      Rate and Rhythm: Normal rate and regular rhythm.      Pulses: Normal pulses.    Pulmonary:      Effort: Pulmonary effort is normal. She is intubated.      Breath sounds: Examination of the left-lower field reveals decreased breath sounds. Decreased breath sounds present.   Abdominal:      General: Abdomen is flat.      Palpations: Abdomen is soft.   Musculoskeletal:      Right upper arm: Edema present.      Left upper arm: Edema present.      Right lower le+ Edema present.      Left lower le+ Edema present.   Skin:     General: Skin is warm and dry.      Capillary Refill: Capillary refill takes less than 2 seconds.   Neurological:      General: No focal deficit present.      Mental Status: She is alert and oriented to person, place, and time. Mental status is at baseline.         Objective:    I have reviewed all medications, laboratory results, and imaging pertinent for today's encounter.    Vent Mode: Pressure support  FiO2 (%):  [45 %] 45 %  S RR:  [20] 20  S VT:  [450 mL] 450 mL  PEEP/CPAP (cm H2O):  [5 cm H20] 5 cm H20  AL SUP:  [5 cm H20] 5 cm H20  MAP (cm H2O):  [6.5-12] 6.5      Intake/Output Summary (Last 24 hours) at 10/27/2024 0958  Last data filed at 10/27/2024 0929  Gross per 24 hour   Intake 1062.12 ml   Output 3404 ml   Net -2341.88 ml         Assessment/Plan:    I am currently managing this critically ill patient for the following problems:    Plan:  Neuro/Psych/Pain Ctrl/Sedation:   Acute encephalopathy - likely secondary to hypercapnia, infection  Hx Essential tremor   CT head: Negative for acute findings   Urine tox positive for barbiturates consistent with primidone intake   - Addressing underlying causes  - Pain Control: Oxycodone Q4, Acetaminophen PRN  - Sedation: Propofol  - Home primidone continued. Will hold propanolol d/t hypotension  - CAM ICU qshift, sleep-wake hygiene, delirium precautions   - SAT/SBT Daily     Respiratory/ENT:  Acute hypoxic/hypercapnic respiratory failure - likely multifactorial and 2/2 HCAP, pl effusions, volume  overload  Healthcare-associated pneumonia   Atelectasis - likely 2/2 mucus plugging   Pleural Effusion -S/p left-sided pigtail placement 10/17, removed 10/25  CXR Today: Improvement in L pleural effusion from post ct removal cxr  - Intubated 10/18. Vent setting: -20-5 30%  - Will wean O2 as tolerated to maintain SpO2 >92%  - Duonebs Q4 and hypertonic saline QID   - IPV per RT TID  - Continuous pulse ox monitoring   - Pulm hygiene  - Will hold off on extubation due to generalized weakness, will repeat SBT in afternoon     Cardiovascular:   Septic shock - improving  Hx HTN, HLD  Acute on chronic diastolic heart failure  Bradycardia  TTE 10/1: diastolic dysfunction, LVEF 50-55%, mildly elevated RVSP (40)  Bilateral duplex US upper extremities:  Thrombosis within the left cephalic vein, which is part of the superficial venous system of the upper extremity, adjacent to PICC line. No deep venous thrombosis in the upper extremities.  - Remains on low dose levophed gtt, Will wean with goal MAP > 65   - Start midodrine increased from bid to q8  - Continue stress dose steroids (10/21-...), reduced from tid to bid today  - Holding home propranolol (on for tremors)  - Continue home Zetia  - Continuous cardiac monitoring per ICU protocol  - EKGs PRN for ACS symptoms, arrhythmias   - EP consulted will appreciate recs     GI:  Hx GERD  - Diet: Tolerating TF @ goal  - BR: Colace, Miralax PRN  - GI Prophylaxis: PPI     Renal/Volume Status/Electrolytes:  Acute kidney injury - possibly ATN +/- medication-induced (vancomycin)  Anasarca   Mixed respiratory and metabolic acidosis - improving on vent  Hypoalbuminemia   Baseline Cr 0.8-1.0. BUN Cr 0.78/12 today  - CRRT started 10/22, remove 75 mL/hr  - Remains grossly volume overloaded  - Oliguric  - Per nephrology continue CRRT as long as she is on pressors  - Nephrology following  - Maintain anders catheter  - Hourly I/O's  - Replete electrolytes to maintain K >4.0 and Mg >2.0  -  "Daily BMP, Mg, Phos  - Negative 2.5L / 24hrs  - PO phos placed to reduce IV repletion needs    Endocrine:  T2DM  - Serum Cortisol pending  - SSI Q 6 while NPO  - TSH: midly elevated, T4 WNL  - Hypoglycemia protocol PRN      Infectious Disease:  Thoracolumbar surgical site infection - s/p I&D x 2. Recent MRSA bacteremia on extended course of IV antibiotics (vanc and rocephin -> 11/12)  Healthcare-associated pneumonia   CT lumbar spine 10/12: Unable to r/o abscess. Unable to do MRI d/t spinal hardware, \"metal in head\" per patient  Sputum Culture 10/21: negative  Blood cultures 10/16: Negative  Urine culture 10/14: Negative  Sputum culture 10/16: + pseudomonas   - Continue Ceftriaxone (10/23-...)  - Continue Daptomycin (10/21-...)  - Cefepime completed on 10/22  - Vanco discontinued d/t continued high levels   - ID following  - PICC placed 10/24  - Monitor SIRS criteria  - WBC: stable 10-12     Heme/Onc:  Normocytic anemia   H/H similar to previous: No active signs of bleeding.  - Received 1 unit prbc this am  - Start iron and folate  - Monitor for s/sx of bleeding   - Plan to transfuse if Hgb <7.0   - Daily CBC     MSK:  No active concerns   - PT/OT when appropriate  - 10/27 Sat up on side of bed for 5 minutes, performed legs kicks, no problems with stability of vitals during process     Derm:  Surgical wound with infection  - Wound care consult  - ICU skin protocol  - Q2hr turns  - Padded pressure points      Ethics/Code Status:  Full code - discussions with  at bedside      :  DVT Prophylaxis: SQH  GI Prophylaxis: PPI  Bowel Regimen: Colace (home med), Miralax  Diet: TF  CVC: PICC placed 10/24, Trialysis R internal jugular placed 10/19  Wanda: none  Gibson: yes, replaced 10/12  Restraints: Yes  Disposition: ICU    Critical Care Time:  40 minutes spent in preparing to see patient (I.e. review of medical records), evaluation of diagnostics (I.e. labs, imaging, etc.), documentation, discussing plan " of care with patient/ family/ caregiver, and/ or coordination of care with multidisciplinary team. Time does not include completion of procedure time.       Sabino Matos, APRN-CNP

## 2024-10-27 NOTE — PROGRESS NOTES
"Narda Malloy is a 69 y.o. female on day 15 of admission presenting with Unresponsive.    Subjective   The patient is seen for acute kidney injury which is secondary to acute tubular necrosis secondary toto hypotension and septic shock as well as vancomycin toxicity the patient remains on continuous renal replacement therapy flowsheets were reviewed discussed with the nursing staff she is on CVVHD F tolerating procedure well still on a very small amount of norepinephrine support blood pressure still intubated on the vent and responsive as [id number] she is on a weaning trial of the ventilator       Objective     Physical Exam  Constitutional:       Comments: Patient is intubated on the ventilator   Neck:      Vascular: No carotid bruit.   Cardiovascular:      Rate and Rhythm: Normal rate and regular rhythm.      Heart sounds: No murmur heard.     No friction rub. No gallop.   Pulmonary:      Breath sounds: No wheezing, rhonchi or rales.   Chest:      Chest wall: No tenderness.   Abdominal:      General: There is no distension.      Tenderness: There is no abdominal tenderness. There is no guarding or rebound.   Musculoskeletal:         General: No swelling or tenderness.      Cervical back: Neck supple.      Right lower leg: Edema present.      Left lower leg: Edema present.   Lymphadenopathy:      Cervical: No cervical adenopathy.         Last Recorded Vitals  Blood pressure (!) 116/49, pulse 79, temperature 36.4 °C (97.5 °F), temperature source Esophageal, resp. rate 24, height 1.778 m (5' 10\"), weight 116 kg (256 lb 2.8 oz), SpO2 95%.    Intake/Output last 3 Shifts:  I/O last 3 completed shifts:  In: 1730.4 (14.9 mL/kg) [I.V.:428.2 (3.7 mL/kg); Blood:63.3; NG/GT:310; IV Piggyback:928.9]  Out: 4728 (40.7 mL/kg) [Urine:22 (0 mL/kg/hr); Other:4706]  Weight: 116.2 kg     Current Facility-Administered Medications:     acetaminophen (Tylenol) tablet 975 mg, 975 mg, oral, q6h PRN, Kaleb Lam PA-C, 975 mg at " 10/18/24 1405    alteplase (Cathflo Activase) injection 2 mg, 2 mg, intra-catheter, PRN, Kaleb Lam PA-C    alteplase (Cathflo Activase) injection 2 mg, 2 mg, intra-catheter, PRN, Andrew Malagon MD    brimonidine (AlphaGAN) 0.2 % ophthalmic solution 1 drop, 1 drop, Both Eyes, BID, Kaleb Lam PA-C, 1 drop at 10/27/24 0847    [Held by provider] calcium carbonate-vitamin D3 500 mg-5 mcg (200 unit) per tablet 1 tablet, 1 tablet, oral, Daily, Kaleb Lam PA-C, 1 tablet at 10/18/24 0930    cefTRIAXone (Rocephin) 2 g in dextrose (iso) IV 50 mL, 2 g, intravenous, q24h, Kaleb Lam PA-C, Stopped at 10/27/24 0922    DAPTOmycin (Cubicin) 700 mg in sodium chloride 0.9%  mL, 700 mg, intravenous, Daily, Andrew Malagon MD, Stopped at 10/27/24 0957    dextrose 50 % injection 12.5 g, 12.5 g, intravenous, q15 min PRN, Kaleb Lam PA-C    dextrose 50 % injection 25 g, 25 g, intravenous, q15 min PRN, Kaleb Lam PA-C    ezetimibe (Zetia) tablet 10 mg, 10 mg, oral, Daily, Kaleb Lam PA-C, 10 mg at 10/27/24 1045    fentaNYL PF (Sublimaze) injection 25 mcg, 25 mcg, intravenous, q2h PRN, RUTH Doe    ferrous sulfate syrup 60 mg of iron, 60 mg of iron, oral, Daily, RUTH Doe, 60 mg of iron at 10/27/24 1045    folic acid (Folvite) tablet 1 mg, 1 mg, oral, Daily, RUTH Doe, 1 mg at 10/27/24 0847    glucagon (Glucagen) injection 1 mg, 1 mg, intramuscular, q15 min PRN, Kaleb Lam PA-C    glucagon (Glucagen) injection 1 mg, 1 mg, intramuscular, q15 min PRN, Kaleb Lam PA-C    heparin (porcine) injection 5,000 Units, 5,000 Units, subcutaneous, q8h, Kaleb Lam PA-C, 5,000 Units at 10/27/24 0625    heparin 1,000 unit/mL injection 1,000 Units, 1,000 Units, intra-catheter, PRNelia MCCURDY MD    heparin 1,000 unit/mL injection 1,000 Units, 1,000 Units, intra-catheter, PRNelia MCCURDY MD    heparin flush 10 unit/mL syringe 50 Units, 50  Units, intra-catheter, PRN, Kaleb Lam PA-C, 50 Units at 10/19/24 2313    honey (Medihoney) topical gel, , Topical, Daily, RUTH Salgado, Given at 10/27/24 0848    hydrocortisone sodium succinate (PF) (Solu-CORTEF) injection 100 mg, 100 mg, intravenous, q12h, RUTH Salgado, 100 mg at 10/27/24 0428    [Held by provider] HYDROmorphone (Dilaudid) injection 0.4 mg, 0.4 mg, intravenous, q2h PRN, RUTH Salgado, 0.4 mg at 10/19/24 0520    insulin lispro (HumaLOG) injection 0-15 Units, 0-15 Units, subcutaneous, q4h, Lb Dodd PA-C, 3 Units at 10/27/24 1220    ipratropium-albuteroL (Duo-Neb) 0.5-2.5 mg/3 mL nebulizer solution 3 mL, 3 mL, nebulization, 4x daily, Kaleb Lam PA-C, 3 mL at 10/27/24 1115    latanoprost (Xalatan) 0.005 % ophthalmic solution 1 drop, 1 drop, Both Eyes, Nightly, Kaleb Lam PA-C, 1 drop at 10/26/24 2006    midodrine (Proamatine) tablet 10 mg, 10 mg, oral, q8h, RUTH Doe, 10 mg at 10/27/24 0847    norepinephrine (Levophed) 8 mg in dextrose 5% 250 mL (0.032 mg/mL) infusion (premix), 0-0.5 mcg/kg/min, intravenous, Continuous, RUTH Doe, Last Rate: 2.36 mL/hr at 10/27/24 1213, 0.01 mcg/kg/min at 10/27/24 1213    oxyCODONE (Roxicodone) immediate release tablet 5 mg, 5 mg, oral, q4h LUCA, RUTH Doe, 5 mg at 10/27/24 1024    oxygen (O2) therapy, , inhalation, Continuous - Inhalation, Sabino R Kiss, APRN-CNP, 45 percent at 10/27/24 0728    pantoprazole (ProtoNix) EC tablet 40 mg, 40 mg, oral, Daily before breakfast **OR** pantoprazole (ProtoNix) injection 40 mg, 40 mg, intravenous, Daily before breakfast, Alonso Yancey, APRN-CNP, 40 mg at 10/27/24 0509    polyethylene glycol (Glycolax, Miralax) packet 17 g, 17 g, oral, Daily PRN, Sweta Diaz, APRN-CNP    potassium, sodium phosphates (Phos-NaK) 280-160-250 mg packet 2 packet, 2 packet, oral, With meals & nightly, Sabino Matos, APRN-CNP, 2 packet  at 10/27/24 1220    primidone (Mysoline) tablet 125 mg, 125 mg, oral, Nightly, Kaleb Lam PA-C, 125 mg at 10/26/24 2006    PrismaSol 4/2.5 CRRT solution, 25 mL/kg/hr, CRRT, Continuous, Nelia Cheung MD, Last Rate: 3,150 mL/hr at 10/27/24 1021, 25 mL/kg/hr at 10/27/24 1021    propofol (Diprivan) infusion, 0-50 mcg/kg/min, intravenous, Continuous, RUTH Salas, Stopped at 10/27/24 0811    [Held by provider] propranolol LA (Inderal LA) 24 hr capsule 60 mg, 60 mg, oral, Daily, Kaleb Lam PA-C, 60 mg at 10/19/24 0643    sennosides-docusate sodium (Lucia-Colace) 8.6-50 mg per tablet 1 tablet, 1 tablet, oral, Nightly, RUTH Doe, 1 tablet at 10/26/24 2006    sodium chloride 3 % nebulizer solution 3 mL, 3 mL, nebulization, 4x daily, Kaleb Lam PA-C, 3 mL at 10/27/24 1115    [Held by provider] traMADol (Ultram) tablet 50 mg, 50 mg, oral, q8h PRN, Kaleb Lam PA-C   Relevant Results    Results for orders placed or performed during the hospital encounter of 10/12/24 (from the past 96 hours)   Hepatic function panel   Result Value Ref Range    Albumin 2.6 (L) 3.4 - 5.0 g/dL    Bilirubin, Total 0.3 0.0 - 1.2 mg/dL    Bilirubin, Direct 0.0 0.0 - 0.3 mg/dL    Alkaline Phosphatase 103 33 - 136 U/L    ALT 5 (L) 7 - 45 U/L    AST 9 9 - 39 U/L    Total Protein 5.2 (L) 6.4 - 8.2 g/dL   Phosphorus   Result Value Ref Range    Phosphorus 2.4 (L) 2.5 - 4.9 mg/dL   Basic Metabolic Panel   Result Value Ref Range    Glucose 158 (H) 74 - 99 mg/dL    Sodium 135 (L) 136 - 145 mmol/L    Potassium 4.5 3.5 - 5.3 mmol/L    Chloride 104 98 - 107 mmol/L    Bicarbonate 25 21 - 32 mmol/L    Anion Gap 11 10 - 20 mmol/L    Urea Nitrogen 28 (H) 6 - 23 mg/dL    Creatinine 1.44 (H) 0.50 - 1.05 mg/dL    eGFR 40 (L) >60 mL/min/1.73m*2    Calcium 7.4 (L) 8.6 - 10.3 mg/dL   Magnesium   Result Value Ref Range    Magnesium 2.11 1.60 - 2.40 mg/dL   CBC and Auto Differential   Result Value Ref Range    WBC 14.9 (H)  4.4 - 11.3 x10*3/uL    nRBC 0.0 0.0 - 0.0 /100 WBCs    RBC 2.64 (L) 4.00 - 5.20 x10*6/uL    Hemoglobin 8.2 (L) 12.0 - 16.0 g/dL    Hematocrit 25.8 (L) 36.0 - 46.0 %    MCV 98 80 - 100 fL    MCH 31.1 26.0 - 34.0 pg    MCHC 31.8 (L) 32.0 - 36.0 g/dL    RDW 19.6 (H) 11.5 - 14.5 %    Platelets 313 150 - 450 x10*3/uL    Neutrophils % 83.5 40.0 - 80.0 %    Immature Granulocytes %, Automated 4.0 (H) 0.0 - 0.9 %    Lymphocytes % 6.5 13.0 - 44.0 %    Monocytes % 5.3 2.0 - 10.0 %    Eosinophils % 0.5 0.0 - 6.0 %    Basophils % 0.2 0.0 - 2.0 %    Neutrophils Absolute 12.46 (H) 1.20 - 7.70 x10*3/uL    Immature Granulocytes Absolute, Automated 0.60 0.00 - 0.70 x10*3/uL    Lymphocytes Absolute 0.97 (L) 1.20 - 4.80 x10*3/uL    Monocytes Absolute 0.79 0.10 - 1.00 x10*3/uL    Eosinophils Absolute 0.07 0.00 - 0.70 x10*3/uL    Basophils Absolute 0.03 0.00 - 0.10 x10*3/uL   POCT GLUCOSE   Result Value Ref Range    POCT Glucose 176 (H) 74 - 99 mg/dL   POCT GLUCOSE   Result Value Ref Range    POCT Glucose 171 (H) 74 - 99 mg/dL   POCT GLUCOSE   Result Value Ref Range    POCT Glucose 162 (H) 74 - 99 mg/dL   POCT GLUCOSE   Result Value Ref Range    POCT Glucose 170 (H) 74 - 99 mg/dL   Magnesium   Result Value Ref Range    Magnesium 2.14 1.60 - 2.40 mg/dL   CBC and Auto Differential   Result Value Ref Range    WBC 10.7 4.4 - 11.3 x10*3/uL    nRBC 0.0 0.0 - 0.0 /100 WBCs    RBC 2.42 (L) 4.00 - 5.20 x10*6/uL    Hemoglobin 7.6 (L) 12.0 - 16.0 g/dL    Hematocrit 23.8 (L) 36.0 - 46.0 %    MCV 98 80 - 100 fL    MCH 31.4 26.0 - 34.0 pg    MCHC 31.9 (L) 32.0 - 36.0 g/dL    RDW 19.4 (H) 11.5 - 14.5 %    Platelets 252 150 - 450 x10*3/uL    Neutrophils % 84.6 40.0 - 80.0 %    Immature Granulocytes %, Automated 3.2 (H) 0.0 - 0.9 %    Lymphocytes % 6.3 13.0 - 44.0 %    Monocytes % 5.2 2.0 - 10.0 %    Eosinophils % 0.6 0.0 - 6.0 %    Basophils % 0.1 0.0 - 2.0 %    Neutrophils Absolute 9.03 (H) 1.20 - 7.70 x10*3/uL    Immature Granulocytes Absolute,  Automated 0.34 0.00 - 0.70 x10*3/uL    Lymphocytes Absolute 0.67 (L) 1.20 - 4.80 x10*3/uL    Monocytes Absolute 0.56 0.10 - 1.00 x10*3/uL    Eosinophils Absolute 0.06 0.00 - 0.70 x10*3/uL    Basophils Absolute 0.01 0.00 - 0.10 x10*3/uL   Hepatic function panel   Result Value Ref Range    Albumin 2.4 (L) 3.4 - 5.0 g/dL    Bilirubin, Total 0.3 0.0 - 1.2 mg/dL    Bilirubin, Direct 0.0 0.0 - 0.3 mg/dL    Alkaline Phosphatase 105 33 - 136 U/L    ALT 6 (L) 7 - 45 U/L    AST 10 9 - 39 U/L    Total Protein 4.9 (L) 6.4 - 8.2 g/dL   Phosphorus   Result Value Ref Range    Phosphorus 2.5 2.5 - 4.9 mg/dL   Calcium, ionized   Result Value Ref Range    POCT Calcium, Ionized 1.07 (L) 1.1 - 1.33 mmol/L   Basic metabolic panel   Result Value Ref Range    Glucose 171 (H) 74 - 99 mg/dL    Sodium 135 (L) 136 - 145 mmol/L    Potassium 4.3 3.5 - 5.3 mmol/L    Chloride 103 98 - 107 mmol/L    Bicarbonate 26 21 - 32 mmol/L    Anion Gap 10 10 - 20 mmol/L    Urea Nitrogen 20 6 - 23 mg/dL    Creatinine 1.05 0.50 - 1.05 mg/dL    eGFR 58 (L) >60 mL/min/1.73m*2    Calcium 7.3 (L) 8.6 - 10.3 mg/dL   POCT GLUCOSE   Result Value Ref Range    POCT Glucose 169 (H) 74 - 99 mg/dL   POCT GLUCOSE   Result Value Ref Range    POCT Glucose 148 (H) 74 - 99 mg/dL   Magnesium   Result Value Ref Range    Magnesium 2.18 1.60 - 2.40 mg/dL   CBC and Auto Differential   Result Value Ref Range    WBC 9.4 4.4 - 11.3 x10*3/uL    nRBC 0.0 0.0 - 0.0 /100 WBCs    RBC 2.25 (L) 4.00 - 5.20 x10*6/uL    Hemoglobin 7.1 (L) 12.0 - 16.0 g/dL    Hematocrit 22.5 (L) 36.0 - 46.0 %     80 - 100 fL    MCH 31.6 26.0 - 34.0 pg    MCHC 31.6 (L) 32.0 - 36.0 g/dL    RDW 19.9 (H) 11.5 - 14.5 %    Platelets 215 150 - 450 x10*3/uL    Neutrophils % 81.6 40.0 - 80.0 %    Immature Granulocytes %, Automated 3.6 (H) 0.0 - 0.9 %    Lymphocytes % 9.1 13.0 - 44.0 %    Monocytes % 5.2 2.0 - 10.0 %    Eosinophils % 0.4 0.0 - 6.0 %    Basophils % 0.1 0.0 - 2.0 %    Neutrophils Absolute 7.66 1.20  - 7.70 x10*3/uL    Immature Granulocytes Absolute, Automated 0.34 0.00 - 0.70 x10*3/uL    Lymphocytes Absolute 0.85 (L) 1.20 - 4.80 x10*3/uL    Monocytes Absolute 0.49 0.10 - 1.00 x10*3/uL    Eosinophils Absolute 0.04 0.00 - 0.70 x10*3/uL    Basophils Absolute 0.01 0.00 - 0.10 x10*3/uL   Hepatic function panel   Result Value Ref Range    Albumin 2.3 (L) 3.4 - 5.0 g/dL    Bilirubin, Total 0.3 0.0 - 1.2 mg/dL    Bilirubin, Direct 0.0 0.0 - 0.3 mg/dL    Alkaline Phosphatase 112 33 - 136 U/L    ALT 6 (L) 7 - 45 U/L    AST 9 9 - 39 U/L    Total Protein 4.7 (L) 6.4 - 8.2 g/dL   Calcium, ionized   Result Value Ref Range    POCT Calcium, Ionized 1.10 1.1 - 1.33 mmol/L   Phosphorus   Result Value Ref Range    Phosphorus 1.8 (L) 2.5 - 4.9 mg/dL   Basic Metabolic Panel   Result Value Ref Range    Glucose 159 (H) 74 - 99 mg/dL    Sodium 135 (L) 136 - 145 mmol/L    Potassium 4.2 3.5 - 5.3 mmol/L    Chloride 104 98 - 107 mmol/L    Bicarbonate 27 21 - 32 mmol/L    Anion Gap 8 (L) 10 - 20 mmol/L    Urea Nitrogen 14 6 - 23 mg/dL    Creatinine 0.86 0.50 - 1.05 mg/dL    eGFR 74 >60 mL/min/1.73m*2    Calcium 7.4 (L) 8.6 - 10.3 mg/dL   Staphylococcus Aureus/MRSA Colonization, Culture - PICC Only    Specimen: Anterior Nares; Swab   Result Value Ref Range    Staph/MRSA Screen Culture No Staphylococcus aureus isolated    POCT GLUCOSE   Result Value Ref Range    POCT Glucose 180 (H) 74 - 99 mg/dL   POCT GLUCOSE   Result Value Ref Range    POCT Glucose 160 (H) 74 - 99 mg/dL   Basic metabolic panel   Result Value Ref Range    Glucose 161 (H) 74 - 99 mg/dL    Sodium 135 (L) 136 - 145 mmol/L    Potassium 4.3 3.5 - 5.3 mmol/L    Chloride 103 98 - 107 mmol/L    Bicarbonate 26 21 - 32 mmol/L    Anion Gap 10 10 - 20 mmol/L    Urea Nitrogen 13 6 - 23 mg/dL    Creatinine 0.90 0.50 - 1.05 mg/dL    eGFR 70 >60 mL/min/1.73m*2    Calcium 7.5 (L) 8.6 - 10.3 mg/dL   CBC   Result Value Ref Range    WBC 14.0 (H) 4.4 - 11.3 x10*3/uL    nRBC 0.0 0.0 - 0.0 /100  WBCs    RBC 2.38 (L) 4.00 - 5.20 x10*6/uL    Hemoglobin 7.4 (L) 12.0 - 16.0 g/dL    Hematocrit 23.3 (L) 36.0 - 46.0 %    MCV 98 80 - 100 fL    MCH 31.1 26.0 - 34.0 pg    MCHC 31.8 (L) 32.0 - 36.0 g/dL    RDW 19.9 (H) 11.5 - 14.5 %    Platelets 285 150 - 450 x10*3/uL   Phosphorus   Result Value Ref Range    Phosphorus 2.3 (L) 2.5 - 4.9 mg/dL   Magnesium   Result Value Ref Range    Magnesium 2.21 1.60 - 2.40 mg/dL   POCT GLUCOSE   Result Value Ref Range    POCT Glucose 160 (H) 74 - 99 mg/dL   Magnesium   Result Value Ref Range    Magnesium 2.23 1.60 - 2.40 mg/dL   CBC and Auto Differential   Result Value Ref Range    WBC 12.2 (H) 4.4 - 11.3 x10*3/uL    nRBC 0.0 0.0 - 0.0 /100 WBCs    RBC 2.23 (L) 4.00 - 5.20 x10*6/uL    Hemoglobin 7.0 (L) 12.0 - 16.0 g/dL    Hematocrit 22.3 (L) 36.0 - 46.0 %     80 - 100 fL    MCH 31.4 26.0 - 34.0 pg    MCHC 31.4 (L) 32.0 - 36.0 g/dL    RDW 19.9 (H) 11.5 - 14.5 %    Platelets 258 150 - 450 x10*3/uL    Neutrophils % 83.7 40.0 - 80.0 %    Immature Granulocytes %, Automated 2.4 (H) 0.0 - 0.9 %    Lymphocytes % 8.0 13.0 - 44.0 %    Monocytes % 5.3 2.0 - 10.0 %    Eosinophils % 0.4 0.0 - 6.0 %    Basophils % 0.2 0.0 - 2.0 %    Neutrophils Absolute 10.20 (H) 1.20 - 7.70 x10*3/uL    Immature Granulocytes Absolute, Automated 0.29 0.00 - 0.70 x10*3/uL    Lymphocytes Absolute 0.98 (L) 1.20 - 4.80 x10*3/uL    Monocytes Absolute 0.64 0.10 - 1.00 x10*3/uL    Eosinophils Absolute 0.05 0.00 - 0.70 x10*3/uL    Basophils Absolute 0.02 0.00 - 0.10 x10*3/uL   Hepatic function panel   Result Value Ref Range    Albumin 2.5 (L) 3.4 - 5.0 g/dL    Bilirubin, Total 0.2 0.0 - 1.2 mg/dL    Bilirubin, Direct 0.1 0.0 - 0.3 mg/dL    Alkaline Phosphatase 114 33 - 136 U/L    ALT 7 7 - 45 U/L    AST 12 9 - 39 U/L    Total Protein 4.8 (L) 6.4 - 8.2 g/dL   Calcium, ionized   Result Value Ref Range    POCT Calcium, Ionized 1.15 1.1 - 1.33 mmol/L   Basic Metabolic Panel   Result Value Ref Range    Glucose 169 (H)  74 - 99 mg/dL    Sodium 134 (L) 136 - 145 mmol/L    Potassium 4.2 3.5 - 5.3 mmol/L    Chloride 103 98 - 107 mmol/L    Bicarbonate 28 21 - 32 mmol/L    Anion Gap 7 (L) 10 - 20 mmol/L    Urea Nitrogen 12 6 - 23 mg/dL    Creatinine 0.78 0.50 - 1.05 mg/dL    eGFR 83 >60 mL/min/1.73m*2    Calcium 7.5 (L) 8.6 - 10.3 mg/dL   Phosphorus   Result Value Ref Range    Phosphorus 2.2 (L) 2.5 - 4.9 mg/dL   POCT GLUCOSE   Result Value Ref Range    POCT Glucose 183 (H) 74 - 99 mg/dL   Hemoglobin and hematocrit, blood   Result Value Ref Range    Hemoglobin 7.2 (L) 12.0 - 16.0 g/dL    Hematocrit 22.6 (L) 36.0 - 46.0 %   Blood Gas Venous Full Panel   Result Value Ref Range    POCT pH, Venous 7.36 7.33 - 7.43 pH    POCT pCO2, Venous 54 (H) 41 - 51 mm Hg    POCT pO2, Venous 39 35 - 45 mm Hg    POCT SO2, Venous 72 45 - 75 %    POCT Oxy Hemoglobin, Venous 70.4 45.0 - 75.0 %    POCT Hematocrit Calculated, Venous 22.0 (L) 36.0 - 46.0 %    POCT Sodium, Venous 134 (L) 136 - 145 mmol/L    POCT Potassium, Venous 4.2 3.5 - 5.3 mmol/L    POCT Chloride, Venous 105 98 - 107 mmol/L    POCT Ionized Calicum, Venous 1.23 1.10 - 1.33 mmol/L    POCT Glucose, Venous 146 (H) 74 - 99 mg/dL    POCT Lactate, Venous 1.2 0.4 - 2.0 mmol/L    POCT Base Excess, Venous 4.5 (H) -2.0 - 3.0 mmol/L    POCT HCO3 Calculated, Venous 30.5 (H) 22.0 - 26.0 mmol/L    POCT Hemoglobin, Venous 7.2 (L) 12.0 - 16.0 g/dL    POCT Anion Gap, Venous 3.0 (L) 10.0 - 25.0 mmol/L    Patient Temperature 37.0 degrees Celsius    FiO2 40 %   Type and screen   Result Value Ref Range    ABO TYPE A     Rh TYPE NEG     ANTIBODY SCREEN NEG    Verify ABO/Rh Group Test (VERAB)   Result Value Ref Range    ABO TYPE A     Rh TYPE NEG    POCT GLUCOSE   Result Value Ref Range    POCT Glucose 135 (H) 74 - 99 mg/dL   POCT GLUCOSE   Result Value Ref Range    POCT Glucose 148 (H) 74 - 99 mg/dL   Prepare RBC: 1 Units   Result Value Ref Range    PRODUCT CODE [id number]     Unit Number [id number]     Unit  ABO A     Unit RH NEG     XM INTEP COMP     Dispense Status TR     Blood Expiration Date 11/20/2024 11:59:00 PM EST     PRODUCT BLOOD TYPE 0600     UNIT VOLUME 350    Magnesium   Result Value Ref Range    Magnesium 2.26 1.60 - 2.40 mg/dL   CBC and Auto Differential   Result Value Ref Range    WBC 10.8 4.4 - 11.3 x10*3/uL    nRBC 0.0 0.0 - 0.0 /100 WBCs    RBC 2.57 (L) 4.00 - 5.20 x10*6/uL    Hemoglobin 7.9 (L) 12.0 - 16.0 g/dL    Hematocrit 24.8 (L) 36.0 - 46.0 %    MCV 97 80 - 100 fL    MCH 30.7 26.0 - 34.0 pg    MCHC 31.9 (L) 32.0 - 36.0 g/dL    RDW 20.2 (H) 11.5 - 14.5 %    Platelets 224 150 - 450 x10*3/uL    Neutrophils % 86.7 40.0 - 80.0 %    Immature Granulocytes %, Automated 2.4 (H) 0.0 - 0.9 %    Lymphocytes % 6.5 13.0 - 44.0 %    Monocytes % 4.1 2.0 - 10.0 %    Eosinophils % 0.2 0.0 - 6.0 %    Basophils % 0.1 0.0 - 2.0 %    Neutrophils Absolute 9.32 (H) 1.20 - 7.70 x10*3/uL    Immature Granulocytes Absolute, Automated 0.26 0.00 - 0.70 x10*3/uL    Lymphocytes Absolute 0.70 (L) 1.20 - 4.80 x10*3/uL    Monocytes Absolute 0.44 0.10 - 1.00 x10*3/uL    Eosinophils Absolute 0.02 0.00 - 0.70 x10*3/uL    Basophils Absolute 0.01 0.00 - 0.10 x10*3/uL   Hepatic function panel   Result Value Ref Range    Albumin 2.5 (L) 3.4 - 5.0 g/dL    Bilirubin, Total 0.3 0.0 - 1.2 mg/dL    Bilirubin, Direct 0.1 0.0 - 0.3 mg/dL    Alkaline Phosphatase 126 33 - 136 U/L    ALT 8 7 - 45 U/L    AST 12 9 - 39 U/L    Total Protein 5.1 (L) 6.4 - 8.2 g/dL   Phosphorus   Result Value Ref Range    Phosphorus 2.3 (L) 2.5 - 4.9 mg/dL   Basic Metabolic Panel   Result Value Ref Range    Glucose 191 (H) 74 - 99 mg/dL    Sodium 134 (L) 136 - 145 mmol/L    Potassium 4.3 3.5 - 5.3 mmol/L    Chloride 104 98 - 107 mmol/L    Bicarbonate 27 21 - 32 mmol/L    Anion Gap 7 (L) 10 - 20 mmol/L    Urea Nitrogen 10 6 - 23 mg/dL    Creatinine 0.73 0.50 - 1.05 mg/dL    eGFR 90 >60 mL/min/1.73m*2    Calcium 7.5 (L) 8.6 - 10.3 mg/dL   POCT GLUCOSE   Result Value Ref  Range    POCT Glucose 197 (H) 74 - 99 mg/dL   Morphology   Result Value Ref Range    RBC Morphology See Below     Polychromasia Mild     Ovalocytes Few     Lexus Cells Few     Basophilic Stippling Present    Calcium, ionized   Result Value Ref Range    POCT Calcium, Ionized 1.11 1.1 - 1.33 mmol/L   POCT GLUCOSE   Result Value Ref Range    POCT Glucose 158 (H) 74 - 99 mg/dL   POCT GLUCOSE   Result Value Ref Range    POCT Glucose 142 (H) 74 - 99 mg/dL   Magnesium   Result Value Ref Range    Magnesium 2.25 1.60 - 2.40 mg/dL   CBC and Auto Differential   Result Value Ref Range    WBC 9.4 4.4 - 11.3 x10*3/uL    nRBC 0.0 0.0 - 0.0 /100 WBCs    RBC 2.34 (L) 4.00 - 5.20 x10*6/uL    Hemoglobin 7.2 (L) 12.0 - 16.0 g/dL    Hematocrit 22.4 (L) 36.0 - 46.0 %    MCV 96 80 - 100 fL    MCH 30.8 26.0 - 34.0 pg    MCHC 32.1 32.0 - 36.0 g/dL    RDW 20.6 (H) 11.5 - 14.5 %    Platelets 185 150 - 450 x10*3/uL    Neutrophils % 86.3 40.0 - 80.0 %    Immature Granulocytes %, Automated 1.3 (H) 0.0 - 0.9 %    Lymphocytes % 7.7 13.0 - 44.0 %    Monocytes % 4.4 2.0 - 10.0 %    Eosinophils % 0.2 0.0 - 6.0 %    Basophils % 0.1 0.0 - 2.0 %    Neutrophils Absolute 8.12 (H) 1.20 - 7.70 x10*3/uL    Immature Granulocytes Absolute, Automated 0.12 0.00 - 0.70 x10*3/uL    Lymphocytes Absolute 0.72 (L) 1.20 - 4.80 x10*3/uL    Monocytes Absolute 0.41 0.10 - 1.00 x10*3/uL    Eosinophils Absolute 0.02 0.00 - 0.70 x10*3/uL    Basophils Absolute 0.01 0.00 - 0.10 x10*3/uL   Hepatic function panel   Result Value Ref Range    Albumin 2.4 (L) 3.4 - 5.0 g/dL    Bilirubin, Total 0.3 0.0 - 1.2 mg/dL    Bilirubin, Direct 0.1 0.0 - 0.3 mg/dL    Alkaline Phosphatase 122 33 - 136 U/L    ALT 8 7 - 45 U/L    AST 12 9 - 39 U/L    Total Protein 4.7 (L) 6.4 - 8.2 g/dL   Calcium, ionized   Result Value Ref Range    POCT Calcium, Ionized 1.13 1.1 - 1.33 mmol/L   Cortisol AM   Result Value Ref Range    Cortisol  A.M. 30.0 (H) 5.0 - 20.0 ug/dL   Phosphorus   Result Value Ref Range     Phosphorus 2.2 (L) 2.5 - 4.9 mg/dL   Basic Metabolic Panel   Result Value Ref Range    Glucose 159 (H) 74 - 99 mg/dL    Sodium 135 (L) 136 - 145 mmol/L    Potassium 4.2 3.5 - 5.3 mmol/L    Chloride 104 98 - 107 mmol/L    Bicarbonate 28 21 - 32 mmol/L    Anion Gap 7 (L) 10 - 20 mmol/L    Urea Nitrogen 9 6 - 23 mg/dL    Creatinine 0.61 0.50 - 1.05 mg/dL    eGFR >90 >60 mL/min/1.73m*2    Calcium 7.4 (L) 8.6 - 10.3 mg/dL   Morphology   Result Value Ref Range    RBC Morphology See Below     Polychromasia Mild     Ovalocytes Few     Lexus Cells Few     Basophilic Stippling Present    POCT GLUCOSE   Result Value Ref Range    POCT Glucose 158 (H) 74 - 99 mg/dL   POCT GLUCOSE   Result Value Ref Range    POCT Glucose 171 (H) 74 - 99 mg/dL   POCT GLUCOSE   Result Value Ref Range    POCT Glucose 169 (H) 74 - 99 mg/dL   CBC and Auto Differential   Result Value Ref Range    WBC 12.4 (H) 4.4 - 11.3 x10*3/uL    nRBC 0.0 0.0 - 0.0 /100 WBCs    RBC 2.33 (L) 4.00 - 5.20 x10*6/uL    Hemoglobin 7.1 (L) 12.0 - 16.0 g/dL    Hematocrit 22.0 (L) 36.0 - 46.0 %    MCV 94 80 - 100 fL    MCH 30.5 26.0 - 34.0 pg    MCHC 32.3 32.0 - 36.0 g/dL    RDW 20.4 (H) 11.5 - 14.5 %    Platelets 192 150 - 450 x10*3/uL    Neutrophils % 86.1 40.0 - 80.0 %    Immature Granulocytes %, Automated 1.4 (H) 0.0 - 0.9 %    Lymphocytes % 7.8 13.0 - 44.0 %    Monocytes % 4.4 2.0 - 10.0 %    Eosinophils % 0.2 0.0 - 6.0 %    Basophils % 0.1 0.0 - 2.0 %    Neutrophils Absolute 10.66 (H) 1.20 - 7.70 x10*3/uL    Immature Granulocytes Absolute, Automated 0.17 0.00 - 0.70 x10*3/uL    Lymphocytes Absolute 0.97 (L) 1.20 - 4.80 x10*3/uL    Monocytes Absolute 0.55 0.10 - 1.00 x10*3/uL    Eosinophils Absolute 0.02 0.00 - 0.70 x10*3/uL    Basophils Absolute 0.01 0.00 - 0.10 x10*3/uL   Morphology   Result Value Ref Range    RBC Morphology See Below     Polychromasia Mild     Stomatocytes Few     Basophilic Stippling Present    Magnesium   Result Value Ref Range    Magnesium 2.26  1.60 - 2.40 mg/dL   Hepatic function panel   Result Value Ref Range    Albumin 2.4 (L) 3.4 - 5.0 g/dL    Bilirubin, Total 0.3 0.0 - 1.2 mg/dL    Bilirubin, Direct 0.0 0.0 - 0.3 mg/dL    Alkaline Phosphatase 126 33 - 136 U/L    ALT 9 7 - 45 U/L    AST 14 9 - 39 U/L    Total Protein 5.0 (L) 6.4 - 8.2 g/dL   Calcium, ionized   Result Value Ref Range    POCT Calcium, Ionized 1.12 1.1 - 1.33 mmol/L   Phosphorus   Result Value Ref Range    Phosphorus 2.0 (L) 2.5 - 4.9 mg/dL   Basic Metabolic Panel   Result Value Ref Range    Glucose 125 (H) 74 - 99 mg/dL    Sodium 134 (L) 136 - 145 mmol/L    Potassium 3.9 3.5 - 5.3 mmol/L    Chloride 103 98 - 107 mmol/L    Bicarbonate 27 21 - 32 mmol/L    Anion Gap 8 (L) 10 - 20 mmol/L    Urea Nitrogen 8 6 - 23 mg/dL    Creatinine 0.63 0.50 - 1.05 mg/dL    eGFR >90 >60 mL/min/1.73m*2    Calcium 7.5 (L) 8.6 - 10.3 mg/dL   POCT GLUCOSE   Result Value Ref Range    POCT Glucose 134 (H) 74 - 99 mg/dL   POCT GLUCOSE   Result Value Ref Range    POCT Glucose 168 (H) 74 - 99 mg/dL   POCT GLUCOSE   Result Value Ref Range    POCT Glucose 151 (H) 74 - 99 mg/dL   Magnesium   Result Value Ref Range    Magnesium 2.15 1.60 - 2.40 mg/dL   CBC and Auto Differential   Result Value Ref Range    WBC 10.3 4.4 - 11.3 x10*3/uL    nRBC 0.0 0.0 - 0.0 /100 WBCs    RBC 2.16 (L) 4.00 - 5.20 x10*6/uL    Hemoglobin 6.8 (L) 12.0 - 16.0 g/dL    Hematocrit 21.4 (L) 36.0 - 46.0 %    MCV 99 80 - 100 fL    MCH 31.5 26.0 - 34.0 pg    MCHC 31.8 (L) 32.0 - 36.0 g/dL    RDW 20.3 (H) 11.5 - 14.5 %    Platelets 182 150 - 450 x10*3/uL    Neutrophils % 86.4 40.0 - 80.0 %    Immature Granulocytes %, Automated 1.1 (H) 0.0 - 0.9 %    Lymphocytes % 8.1 13.0 - 44.0 %    Monocytes % 4.0 2.0 - 10.0 %    Eosinophils % 0.2 0.0 - 6.0 %    Basophils % 0.2 0.0 - 2.0 %    Neutrophils Absolute 8.91 (H) 1.20 - 7.70 x10*3/uL    Immature Granulocytes Absolute, Automated 0.11 0.00 - 0.70 x10*3/uL    Lymphocytes Absolute 0.84 (L) 1.20 - 4.80 x10*3/uL     Monocytes Absolute 0.41 0.10 - 1.00 x10*3/uL    Eosinophils Absolute 0.02 0.00 - 0.70 x10*3/uL    Basophils Absolute 0.02 0.00 - 0.10 x10*3/uL   Hepatic function panel   Result Value Ref Range    Albumin 2.3 (L) 3.4 - 5.0 g/dL    Bilirubin, Total 0.3 0.0 - 1.2 mg/dL    Bilirubin, Direct 0.1 0.0 - 0.3 mg/dL    Alkaline Phosphatase 123 33 - 136 U/L    ALT 9 7 - 45 U/L    AST 14 9 - 39 U/L    Total Protein 4.7 (L) 6.4 - 8.2 g/dL   Calcium, ionized   Result Value Ref Range    POCT Calcium, Ionized 1.11 1.1 - 1.33 mmol/L   Phosphorus   Result Value Ref Range    Phosphorus 1.9 (L) 2.5 - 4.9 mg/dL   Basic Metabolic Panel   Result Value Ref Range    Glucose 163 (H) 74 - 99 mg/dL    Sodium 135 (L) 136 - 145 mmol/L    Potassium 4.2 3.5 - 5.3 mmol/L    Chloride 104 98 - 107 mmol/L    Bicarbonate 28 21 - 32 mmol/L    Anion Gap 7 (L) 10 - 20 mmol/L    Urea Nitrogen 7 6 - 23 mg/dL    Creatinine 0.61 0.50 - 1.05 mg/dL    eGFR >90 >60 mL/min/1.73m*2    Calcium 7.2 (L) 8.6 - 10.3 mg/dL   Morphology   Result Value Ref Range    RBC Morphology See Below     Polychromasia Mild    Prepare RBC: 1 Units   Result Value Ref Range    PRODUCT CODE F2477L04     Unit Number [id number]     Unit ABO A     Unit RH NEG     XM INTEP COMP     Dispense Status TR     Blood Expiration Date 11/10/2024 11:59:00 PM EST     PRODUCT BLOOD TYPE 0600     UNIT VOLUME 350    POCT GLUCOSE   Result Value Ref Range    POCT Glucose 119 (H) 74 - 99 mg/dL   POCT GLUCOSE   Result Value Ref Range    POCT Glucose 174 (H) 74 - 99 mg/dL     *Note: Due to a large number of results and/or encounters for the requested time period, some results have not been displayed. A complete set of results can be found in Results Review.       Assessment/Plan   Acute kidney injury patient remains anuric my plan to continue continuous renal replacement therapy mainly CVVHDF for the time being we will be weaning her off the pressors then we will switch her to IHD  Acute respiratory  failure continue with ventilator support managed by the intensive care unit team  Edema is improving we are removing -75 to 100 mL/h CRRT  Septic shock we will try to wean off pressors if possible  Pneumonia with antibiotic therapy  Diabetes mellitus type 2  Malnutrition continue with tube feeding  Lower back surgery site infection  Anemia transfuse when hemoglobin less than 7       Imer Cheung MD

## 2024-10-27 NOTE — CARE PLAN
The patient's goals for the shift include      The clinical goals for the shift include pt to remain hemodynamically stable    Over the shift, the patient did not make progress toward the following goals. Barriers to progression include pt on vent and small amount of vasopressors. Recommendations to address these barriers include monitor labs and vitals.

## 2024-10-27 NOTE — CARE PLAN
Repositioned q 2hours per order. When pt. Repositioned or turned significantly she drops her O2 saturation, will charlotte down and drops her BP. Martir juve Dodd made aware per MARTIR this has been ongoing   Problem: Skin  Goal: Decreased wound size/increased tissue granulation at next dressing change  Outcome: Progressing  Flowsheets (Taken 10/27/2024 0550)  Decreased wound size/increased tissue granulation at next dressing change: Promote sleep for wound healing  Goal: Participates in plan/prevention/treatment measures  Outcome: Progressing  Flowsheets (Taken 10/27/2024 0550)  Participates in plan/prevention/treatment measures: Elevate heels

## 2024-10-28 ENCOUNTER — APPOINTMENT (OUTPATIENT)
Dept: RADIOLOGY | Facility: HOSPITAL | Age: 69
End: 2024-10-28
Payer: MEDICARE

## 2024-10-28 LAB
ALBUMIN SERPL BCP-MCNC: 2.5 G/DL (ref 3.4–5)
ALBUMIN SERPL BCP-MCNC: 2.6 G/DL (ref 3.4–5)
ALP SERPL-CCNC: 150 U/L (ref 33–136)
ALP SERPL-CCNC: 160 U/L (ref 33–136)
ALT SERPL W P-5'-P-CCNC: 12 U/L (ref 7–45)
ALT SERPL W P-5'-P-CCNC: 12 U/L (ref 7–45)
ANION GAP BLDA CALCULATED.4IONS-SCNC: 6 MMO/L (ref 10–25)
ANION GAP BLDV CALCULATED.4IONS-SCNC: 5 MMOL/L (ref 10–25)
ANION GAP SERPL CALCULATED.3IONS-SCNC: 7 MMOL/L (ref 10–20)
ANION GAP SERPL CALCULATED.3IONS-SCNC: 8 MMOL/L (ref 10–20)
APPARATUS: ABNORMAL
ARTERIAL PATENCY WRIST A: POSITIVE
AST SERPL W P-5'-P-CCNC: 15 U/L (ref 9–39)
AST SERPL W P-5'-P-CCNC: 15 U/L (ref 9–39)
BASE EXCESS BLDA CALC-SCNC: 3 MMOL/L (ref -2–3)
BASE EXCESS BLDV CALC-SCNC: 4.9 MMOL/L (ref -2–3)
BASO STIPL BLD QL SMEAR: PRESENT
BASO STIPL BLD QL SMEAR: PRESENT
BASOPHILS # BLD AUTO: 0.01 X10*3/UL (ref 0–0.1)
BASOPHILS # BLD AUTO: 0.01 X10*3/UL (ref 0–0.1)
BASOPHILS NFR BLD AUTO: 0.1 %
BASOPHILS NFR BLD AUTO: 0.1 %
BILIRUB DIRECT SERPL-MCNC: 0 MG/DL (ref 0–0.3)
BILIRUB DIRECT SERPL-MCNC: 0.1 MG/DL (ref 0–0.3)
BILIRUB SERPL-MCNC: 0.3 MG/DL (ref 0–1.2)
BILIRUB SERPL-MCNC: 0.3 MG/DL (ref 0–1.2)
BODY TEMPERATURE: 37 DEGREES CELSIUS
BODY TEMPERATURE: ABNORMAL
BUN SERPL-MCNC: 14 MG/DL (ref 6–23)
BUN SERPL-MCNC: 20 MG/DL (ref 6–23)
CA-I BLD-SCNC: 1.14 MMOL/L (ref 1.1–1.33)
CA-I BLD-SCNC: 1.15 MMOL/L (ref 1.1–1.33)
CA-I BLDA-SCNC: 1.14 MMOL/L (ref 1.1–1.33)
CA-I BLDV-SCNC: 1.17 MMOL/L (ref 1.1–1.33)
CALCIUM SERPL-MCNC: 7.6 MG/DL (ref 8.6–10.3)
CALCIUM SERPL-MCNC: 7.8 MG/DL (ref 8.6–10.3)
CHLORIDE BLDA-SCNC: 102 MMOL/L (ref 98–107)
CHLORIDE BLDV-SCNC: 102 MMOL/L (ref 98–107)
CHLORIDE SERPL-SCNC: 102 MMOL/L (ref 98–107)
CHLORIDE SERPL-SCNC: 103 MMOL/L (ref 98–107)
CK SERPL-CCNC: 45 U/L (ref 0–215)
CO2 SERPL-SCNC: 29 MMOL/L (ref 21–32)
CO2 SERPL-SCNC: 30 MMOL/L (ref 21–32)
CREAT SERPL-MCNC: 0.6 MG/DL (ref 0.5–1.05)
CREAT SERPL-MCNC: 0.93 MG/DL (ref 0.5–1.05)
EGFRCR SERPLBLD CKD-EPI 2021: 67 ML/MIN/1.73M*2
EGFRCR SERPLBLD CKD-EPI 2021: >90 ML/MIN/1.73M*2
EOSINOPHIL # BLD AUTO: 0.02 X10*3/UL (ref 0–0.7)
EOSINOPHIL # BLD AUTO: 0.03 X10*3/UL (ref 0–0.7)
EOSINOPHIL NFR BLD AUTO: 0.2 %
EOSINOPHIL NFR BLD AUTO: 0.3 %
ERYTHROCYTE [DISTWIDTH] IN BLOOD BY AUTOMATED COUNT: 21.2 % (ref 11.5–14.5)
ERYTHROCYTE [DISTWIDTH] IN BLOOD BY AUTOMATED COUNT: 21.5 % (ref 11.5–14.5)
FUNGUS SPEC CULT: NORMAL
FUNGUS SPEC FUNGUS STN: NORMAL
GLUCOSE BLD MANUAL STRIP-MCNC: 134 MG/DL (ref 74–99)
GLUCOSE BLD MANUAL STRIP-MCNC: 144 MG/DL (ref 74–99)
GLUCOSE BLD MANUAL STRIP-MCNC: 156 MG/DL (ref 74–99)
GLUCOSE BLD MANUAL STRIP-MCNC: 175 MG/DL (ref 74–99)
GLUCOSE BLD MANUAL STRIP-MCNC: 190 MG/DL (ref 74–99)
GLUCOSE BLDA-MCNC: 116 MG/DL (ref 74–99)
GLUCOSE BLDV-MCNC: 198 MG/DL (ref 74–99)
GLUCOSE SERPL-MCNC: 116 MG/DL (ref 74–99)
GLUCOSE SERPL-MCNC: 151 MG/DL (ref 74–99)
HCO3 BLDA-SCNC: 28.5 MMOL/L (ref 22–26)
HCO3 BLDV-SCNC: 30.4 MMOL/L (ref 22–26)
HCT VFR BLD AUTO: 25.7 % (ref 36–46)
HCT VFR BLD AUTO: 26.6 % (ref 36–46)
HCT VFR BLD EST: 26 % (ref 36–46)
HCT VFR BLD EST: 27 % (ref 36–46)
HGB BLD-MCNC: 8.3 G/DL (ref 12–16)
HGB BLD-MCNC: 8.6 G/DL (ref 12–16)
HGB BLDA-MCNC: 9.1 G/DL (ref 12–16)
HGB BLDV-MCNC: 8.8 G/DL (ref 12–16)
IMM GRANULOCYTES # BLD AUTO: 0.1 X10*3/UL (ref 0–0.7)
IMM GRANULOCYTES # BLD AUTO: 0.16 X10*3/UL (ref 0–0.7)
IMM GRANULOCYTES NFR BLD AUTO: 0.9 % (ref 0–0.9)
IMM GRANULOCYTES NFR BLD AUTO: 1.4 % (ref 0–0.9)
INHALED O2 CONCENTRATION: 40 %
INHALED O2 CONCENTRATION: 45 %
LACTATE BLDA-SCNC: 0.6 MMOL/L (ref 0.4–2)
LACTATE BLDV-SCNC: 0.8 MMOL/L (ref 0.4–2)
LYMPHOCYTES # BLD AUTO: 0.68 X10*3/UL (ref 1.2–4.8)
LYMPHOCYTES # BLD AUTO: 0.87 X10*3/UL (ref 1.2–4.8)
LYMPHOCYTES NFR BLD AUTO: 6.4 %
LYMPHOCYTES NFR BLD AUTO: 7.6 %
MAGNESIUM SERPL-MCNC: 2.22 MG/DL (ref 1.6–2.4)
MAGNESIUM SERPL-MCNC: 2.23 MG/DL (ref 1.6–2.4)
MCH RBC QN AUTO: 31 PG (ref 26–34)
MCH RBC QN AUTO: 31.1 PG (ref 26–34)
MCHC RBC AUTO-ENTMCNC: 32.3 G/DL (ref 32–36)
MCHC RBC AUTO-ENTMCNC: 32.3 G/DL (ref 32–36)
MCV RBC AUTO: 96 FL (ref 80–100)
MCV RBC AUTO: 96 FL (ref 80–100)
MONOCYTES # BLD AUTO: 0.53 X10*3/UL (ref 0.1–1)
MONOCYTES # BLD AUTO: 0.57 X10*3/UL (ref 0.1–1)
MONOCYTES NFR BLD AUTO: 4.6 %
MONOCYTES NFR BLD AUTO: 5.3 %
NEUTROPHILS # BLD AUTO: 9.29 X10*3/UL (ref 1.2–7.7)
NEUTROPHILS # BLD AUTO: 9.87 X10*3/UL (ref 1.2–7.7)
NEUTROPHILS NFR BLD AUTO: 86 %
NEUTROPHILS NFR BLD AUTO: 87.1 %
NRBC BLD-RTO: 0 /100 WBCS (ref 0–0)
NRBC BLD-RTO: 0 /100 WBCS (ref 0–0)
OVALOCYTES BLD QL SMEAR: NORMAL
OVALOCYTES BLD QL SMEAR: NORMAL
OXYHGB MFR BLDA: 95.9 % (ref 94–98)
OXYHGB MFR BLDV: 76.3 % (ref 45–75)
PCO2 BLDA: 47 MM HG (ref 38–42)
PCO2 BLDV: 49 MM HG (ref 41–51)
PEEP CMH2O: 5 CM H2O
PEEP CMH2O: 5 CM H2O
PH BLDA: 7.39 PH (ref 7.38–7.42)
PH BLDV: 7.4 PH (ref 7.33–7.43)
PHOSPHATE SERPL-MCNC: 2.1 MG/DL (ref 2.5–4.9)
PHOSPHATE SERPL-MCNC: 2.7 MG/DL (ref 2.5–4.9)
PLATELET # BLD AUTO: 222 X10*3/UL (ref 150–450)
PLATELET # BLD AUTO: 238 X10*3/UL (ref 150–450)
PO2 BLDA: 105 MM HG (ref 85–95)
PO2 BLDV: 45 MM HG (ref 35–45)
POLYCHROMASIA BLD QL SMEAR: NORMAL
POLYCHROMASIA BLD QL SMEAR: NORMAL
POTASSIUM BLDA-SCNC: 4.7 MMOL/L (ref 3.5–5.3)
POTASSIUM BLDV-SCNC: 5.2 MMOL/L (ref 3.5–5.3)
POTASSIUM SERPL-SCNC: 4.6 MMOL/L (ref 3.5–5.3)
POTASSIUM SERPL-SCNC: 4.7 MMOL/L (ref 3.5–5.3)
PROT SERPL-MCNC: 5.2 G/DL (ref 6.4–8.2)
PROT SERPL-MCNC: 5.3 G/DL (ref 6.4–8.2)
RBC # BLD AUTO: 2.67 X10*6/UL (ref 4–5.2)
RBC # BLD AUTO: 2.77 X10*6/UL (ref 4–5.2)
RBC MORPH BLD: NORMAL
RBC MORPH BLD: NORMAL
SAO2 % BLDA: 99 % (ref 94–100)
SAO2 % BLDV: 79 % (ref 45–75)
SCHISTOCYTES BLD QL SMEAR: NORMAL
SODIUM BLDA-SCNC: 132 MMOL/L (ref 136–145)
SODIUM BLDV-SCNC: 132 MMOL/L (ref 136–145)
SODIUM SERPL-SCNC: 134 MMOL/L (ref 136–145)
SODIUM SERPL-SCNC: 135 MMOL/L (ref 136–145)
SPECIMEN DRAWN FROM PATIENT: ABNORMAL
TIDAL VOLUME: 450 ML
TIDAL VOLUME: 450 ML
VENTILATOR MODE: ABNORMAL
VENTILATOR MODE: ABNORMAL
VENTILATOR RATE: 14 BPM
VENTILATOR RATE: 20 BPM
WBC # BLD AUTO: 10.7 X10*3/UL (ref 4.4–11.3)
WBC # BLD AUTO: 11.5 X10*3/UL (ref 4.4–11.3)

## 2024-10-28 PROCEDURE — 84075 ASSAY ALKALINE PHOSPHATASE: CPT

## 2024-10-28 PROCEDURE — 36415 COLL VENOUS BLD VENIPUNCTURE: CPT

## 2024-10-28 PROCEDURE — 97163 PT EVAL HIGH COMPLEX 45 MIN: CPT | Mod: GP

## 2024-10-28 PROCEDURE — 97110 THERAPEUTIC EXERCISES: CPT | Mod: GP

## 2024-10-28 PROCEDURE — 2500000001 HC RX 250 WO HCPCS SELF ADMINISTERED DRUGS (ALT 637 FOR MEDICARE OP)

## 2024-10-28 PROCEDURE — 2500000004 HC RX 250 GENERAL PHARMACY W/ HCPCS (ALT 636 FOR OP/ED): Performed by: NURSE PRACTITIONER

## 2024-10-28 PROCEDURE — 94668 MNPJ CHEST WALL SBSQ: CPT

## 2024-10-28 PROCEDURE — 84132 ASSAY OF SERUM POTASSIUM: CPT

## 2024-10-28 PROCEDURE — 84100 ASSAY OF PHOSPHORUS: CPT

## 2024-10-28 PROCEDURE — 83735 ASSAY OF MAGNESIUM: CPT

## 2024-10-28 PROCEDURE — 85025 COMPLETE CBC W/AUTO DIFF WBC: CPT

## 2024-10-28 PROCEDURE — 97530 THERAPEUTIC ACTIVITIES: CPT | Mod: GO

## 2024-10-28 PROCEDURE — 71045 X-RAY EXAM CHEST 1 VIEW: CPT | Performed by: RADIOLOGY

## 2024-10-28 PROCEDURE — 99291 CRITICAL CARE FIRST HOUR: CPT

## 2024-10-28 PROCEDURE — 37799 UNLISTED PX VASCULAR SURGERY: CPT

## 2024-10-28 PROCEDURE — 2500000002 HC RX 250 W HCPCS SELF ADMINISTERED DRUGS (ALT 637 FOR MEDICARE OP, ALT 636 FOR OP/ED)

## 2024-10-28 PROCEDURE — 94003 VENT MGMT INPAT SUBQ DAY: CPT

## 2024-10-28 PROCEDURE — 82550 ASSAY OF CK (CPK): CPT | Performed by: INTERNAL MEDICINE

## 2024-10-28 PROCEDURE — 80069 RENAL FUNCTION PANEL: CPT | Mod: CCI

## 2024-10-28 PROCEDURE — 2500000005 HC RX 250 GENERAL PHARMACY W/O HCPCS: Performed by: STUDENT IN AN ORGANIZED HEALTH CARE EDUCATION/TRAINING PROGRAM

## 2024-10-28 PROCEDURE — 71045 X-RAY EXAM CHEST 1 VIEW: CPT

## 2024-10-28 PROCEDURE — 2500000005 HC RX 250 GENERAL PHARMACY W/O HCPCS

## 2024-10-28 PROCEDURE — 2500000004 HC RX 250 GENERAL PHARMACY W/ HCPCS (ALT 636 FOR OP/ED): Performed by: INTERNAL MEDICINE

## 2024-10-28 PROCEDURE — 90937 HEMODIALYSIS REPEATED EVAL: CPT

## 2024-10-28 PROCEDURE — 2020000001 HC ICU ROOM DAILY

## 2024-10-28 PROCEDURE — 82947 ASSAY GLUCOSE BLOOD QUANT: CPT

## 2024-10-28 PROCEDURE — 94640 AIRWAY INHALATION TREATMENT: CPT

## 2024-10-28 PROCEDURE — 97535 SELF CARE MNGMENT TRAINING: CPT | Mod: GO

## 2024-10-28 PROCEDURE — 97530 THERAPEUTIC ACTIVITIES: CPT | Mod: GP

## 2024-10-28 PROCEDURE — 87077 CULTURE AEROBIC IDENTIFY: CPT | Mod: WESLAB

## 2024-10-28 PROCEDURE — 97167 OT EVAL HIGH COMPLEX 60 MIN: CPT | Mod: GO

## 2024-10-28 PROCEDURE — 82330 ASSAY OF CALCIUM: CPT

## 2024-10-28 PROCEDURE — 2500000004 HC RX 250 GENERAL PHARMACY W/ HCPCS (ALT 636 FOR OP/ED)

## 2024-10-28 PROCEDURE — 9420000001 HC RT PATIENT EDUCATION 5 MIN

## 2024-10-28 PROCEDURE — 36600 WITHDRAWAL OF ARTERIAL BLOOD: CPT

## 2024-10-28 RX ORDER — ACETAMINOPHEN 160 MG/5ML
650 SOLUTION ORAL EVERY 4 HOURS PRN
Status: DISCONTINUED | OUTPATIENT
Start: 2024-10-28 | End: 2024-11-03

## 2024-10-28 RX ORDER — OXYCODONE HYDROCHLORIDE 5 MG/1
5 TABLET ORAL EVERY 4 HOURS
Status: DISCONTINUED | OUTPATIENT
Start: 2024-10-28 | End: 2024-11-01

## 2024-10-28 RX ADMIN — OXYCODONE 5 MG: 5 TABLET ORAL at 09:57

## 2024-10-28 RX ADMIN — OXYCODONE 5 MG: 5 TABLET ORAL at 05:14

## 2024-10-28 RX ADMIN — Medication 45 PERCENT: at 07:06

## 2024-10-28 RX ADMIN — HEPARIN SODIUM 5000 UNITS: 5000 INJECTION, SOLUTION INTRAVENOUS; SUBCUTANEOUS at 21:31

## 2024-10-28 RX ADMIN — HYDROCORTISONE SODIUM SUCCINATE 50 MG: 100 INJECTION, POWDER, FOR SOLUTION INTRAMUSCULAR; INTRAVENOUS at 17:00

## 2024-10-28 RX ADMIN — INSULIN LISPRO 3 UNITS: 100 INJECTION, SOLUTION INTRAVENOUS; SUBCUTANEOUS at 17:00

## 2024-10-28 RX ADMIN — Medication 1 APPLICATION: at 09:58

## 2024-10-28 RX ADMIN — OXYCODONE 5 MG: 5 TABLET ORAL at 17:00

## 2024-10-28 RX ADMIN — IPRATROPIUM BROMIDE AND ALBUTEROL SULFATE 3 ML: .5; 3 SOLUTION RESPIRATORY (INHALATION) at 15:57

## 2024-10-28 RX ADMIN — MIDODRINE HYDROCHLORIDE 10 MG: 10 TABLET ORAL at 01:57

## 2024-10-28 RX ADMIN — LATANOPROST 1 DROP: 50 SOLUTION OPHTHALMIC at 21:28

## 2024-10-28 RX ADMIN — BRIMONIDINE TARTRATE 1 DROP: 2 SOLUTION OPHTHALMIC at 21:28

## 2024-10-28 RX ADMIN — CALCIUM CHLORIDE, MAGNESIUM CHLORIDE, DEXTROSE MONOHYDRATE, LACTIC ACID, SODIUM CHLORIDE, SODIUM BICARBONATE AND POTASSIUM CHLORIDE 25 ML/KG/HR: 3.68; 3.05; 22; 5.4; 6.46; 3.09; .314 INJECTION INTRAVENOUS at 00:51

## 2024-10-28 RX ADMIN — EZETIMIBE 10 MG: 10 TABLET ORAL at 09:57

## 2024-10-28 RX ADMIN — Medication 3 ML: at 15:57

## 2024-10-28 RX ADMIN — PANTOPRAZOLE SODIUM 40 MG: 40 INJECTION, POWDER, FOR SOLUTION INTRAVENOUS at 05:14

## 2024-10-28 RX ADMIN — MIDODRINE HYDROCHLORIDE 10 MG: 10 TABLET ORAL at 17:01

## 2024-10-28 RX ADMIN — OXYCODONE 5 MG: 5 TABLET ORAL at 20:30

## 2024-10-28 RX ADMIN — HEPARIN SODIUM 5000 UNITS: 5000 INJECTION, SOLUTION INTRAVENOUS; SUBCUTANEOUS at 14:26

## 2024-10-28 RX ADMIN — Medication 60 MG OF IRON: at 09:57

## 2024-10-28 RX ADMIN — CEFTRIAXONE SODIUM 2 G: 2 INJECTION, SOLUTION INTRAVENOUS at 09:57

## 2024-10-28 RX ADMIN — PRIMIDONE 125 MG: 250 TABLET ORAL at 21:25

## 2024-10-28 RX ADMIN — MIDODRINE HYDROCHLORIDE 10 MG: 10 TABLET ORAL at 09:57

## 2024-10-28 RX ADMIN — SENNOSIDES AND DOCUSATE SODIUM 1 TABLET: 50; 8.6 TABLET ORAL at 21:29

## 2024-10-28 RX ADMIN — OXYCODONE 5 MG: 5 TABLET ORAL at 01:59

## 2024-10-28 RX ADMIN — INSULIN LISPRO 3 UNITS: 100 INJECTION, SOLUTION INTRAVENOUS; SUBCUTANEOUS at 20:46

## 2024-10-28 RX ADMIN — BRIMONIDINE TARTRATE 1 DROP: 2 SOLUTION OPHTHALMIC at 09:57

## 2024-10-28 RX ADMIN — Medication 3 ML: at 19:37

## 2024-10-28 RX ADMIN — IPRATROPIUM BROMIDE AND ALBUTEROL SULFATE 3 ML: .5; 3 SOLUTION RESPIRATORY (INHALATION) at 19:37

## 2024-10-28 RX ADMIN — CALCIUM CHLORIDE, MAGNESIUM CHLORIDE, DEXTROSE MONOHYDRATE, LACTIC ACID, SODIUM CHLORIDE, SODIUM BICARBONATE AND POTASSIUM CHLORIDE 25 ML/KG/HR: 3.68; 3.05; 22; 5.4; 6.46; 3.09; .314 INJECTION INTRAVENOUS at 00:52

## 2024-10-28 RX ADMIN — ACETAMINOPHEN 650 MG: 160 SOLUTION ORAL at 18:18

## 2024-10-28 RX ADMIN — OXYCODONE 5 MG: 5 TABLET ORAL at 14:26

## 2024-10-28 RX ADMIN — IPRATROPIUM BROMIDE AND ALBUTEROL SULFATE 3 ML: .5; 3 SOLUTION RESPIRATORY (INHALATION) at 12:19

## 2024-10-28 RX ADMIN — CALCIUM CHLORIDE, MAGNESIUM CHLORIDE, DEXTROSE MONOHYDRATE, LACTIC ACID, SODIUM CHLORIDE, SODIUM BICARBONATE AND POTASSIUM CHLORIDE 25 ML/KG/HR: 3.68; 3.05; 22; 5.4; 6.46; 3.09; .314 INJECTION INTRAVENOUS at 02:43

## 2024-10-28 RX ADMIN — HEPARIN SODIUM 5000 UNITS: 5000 INJECTION, SOLUTION INTRAVENOUS; SUBCUTANEOUS at 05:33

## 2024-10-28 RX ADMIN — Medication 3 ML: at 12:19

## 2024-10-28 RX ADMIN — INSULIN LISPRO 3 UNITS: 100 INJECTION, SOLUTION INTRAVENOUS; SUBCUTANEOUS at 12:13

## 2024-10-28 RX ADMIN — FOLIC ACID 1 MG: 1 TABLET ORAL at 09:57

## 2024-10-28 RX ADMIN — DAPTOMYCIN IN SODIUM CHLORIDE 700 MG: 700 INJECTION, SOLUTION INTRAVENOUS at 10:45

## 2024-10-28 RX ADMIN — Medication 3 ML: at 07:05

## 2024-10-28 RX ADMIN — POTASSIUM & SODIUM PHOSPHATES POWDER PACK 280-160-250 MG 2 PACKET: 280-160-250 PACK at 10:01

## 2024-10-28 RX ADMIN — HEPARIN SODIUM 1000 UNITS: 1000 INJECTION, SOLUTION INTRAVENOUS; SUBCUTANEOUS at 05:37

## 2024-10-28 RX ADMIN — IPRATROPIUM BROMIDE AND ALBUTEROL SULFATE 3 ML: .5; 3 SOLUTION RESPIRATORY (INHALATION) at 07:05

## 2024-10-28 RX ADMIN — HYDROCORTISONE SODIUM SUCCINATE 100 MG: 100 INJECTION, POWDER, FOR SOLUTION INTRAMUSCULAR; INTRAVENOUS at 04:44

## 2024-10-28 RX ADMIN — INSULIN LISPRO 3 UNITS: 100 INJECTION, SOLUTION INTRAVENOUS; SUBCUTANEOUS at 00:02

## 2024-10-28 RX ADMIN — Medication 40 PERCENT: at 19:38

## 2024-10-28 RX ADMIN — HEPARIN SODIUM 1000 UNITS: 1000 INJECTION, SOLUTION INTRAVENOUS; SUBCUTANEOUS at 05:38

## 2024-10-28 ASSESSMENT — COGNITIVE AND FUNCTIONAL STATUS - GENERAL
TOILETING: TOTAL
TURNING FROM BACK TO SIDE WHILE IN FLAT BAD: TOTAL
DRESSING REGULAR LOWER BODY CLOTHING: TOTAL
DAILY ACTIVITIY SCORE: 6
CLIMB 3 TO 5 STEPS WITH RAILING: TOTAL
HELP NEEDED FOR BATHING: TOTAL
EATING MEALS: TOTAL
DRESSING REGULAR UPPER BODY CLOTHING: TOTAL
WALKING IN HOSPITAL ROOM: TOTAL
MOBILITY SCORE: 6
MOVING TO AND FROM BED TO CHAIR: TOTAL
MOVING FROM LYING ON BACK TO SITTING ON SIDE OF FLAT BED WITH BEDRAILS: TOTAL
PERSONAL GROOMING: TOTAL
STANDING UP FROM CHAIR USING ARMS: TOTAL

## 2024-10-28 ASSESSMENT — PAIN - FUNCTIONAL ASSESSMENT
PAIN_FUNCTIONAL_ASSESSMENT: CPOT (CRITICAL CARE PAIN OBSERVATION TOOL)
PAIN_FUNCTIONAL_ASSESSMENT: 0-10
PAIN_FUNCTIONAL_ASSESSMENT: CPOT (CRITICAL CARE PAIN OBSERVATION TOOL)
PAIN_FUNCTIONAL_ASSESSMENT: CPOT (CRITICAL CARE PAIN OBSERVATION TOOL)
PAIN_FUNCTIONAL_ASSESSMENT: FLACC (FACE, LEGS, ACTIVITY, CRY, CONSOLABILITY)

## 2024-10-28 ASSESSMENT — ACTIVITIES OF DAILY LIVING (ADL)
HOME_MANAGEMENT_TIME_ENTRY: 10
ADL_ASSISTANCE: NEEDS ASSISTANCE
BATHING_ASSISTANCE: TOTAL

## 2024-10-28 ASSESSMENT — PAIN SCALES - GENERAL
PAINLEVEL_OUTOF10: 0 - NO PAIN
PAINLEVEL_OUTOF10: 0 - NO PAIN
PAINLEVEL_OUTOF10: 4

## 2024-10-28 NOTE — PROGRESS NOTES
Occupational Therapy    Evaluation/Treatment    Patient Name: Narad Malloy  MRN: [id number]  Department: [facility] 3 S ICU  Room: 11/11-A  Today's Date: 10/28/24  Time Calculation  Start Time: 1301  Stop Time: 1340  Time Calculation (min): 39 min       Assessment:  OT Assessment: Pt presents on eval still intubated, with increased overall weakness, cognitive deficits noted, poor activity tolerance, spinal precautions, and poor overall balance affecting her self-care and functional transfers/mobility. Pt will benefit from continued skilled OT to address these deficits.  Prognosis: Fair  Barriers to Discharge: Decreased caregiver support  Evaluation/Treatment Tolerance: Patient limited by fatigue  End of Session Communication: Bedside nurse  End of Session Patient Position: Bed, 3 rail up, Alarm off, caregiver present (Needs in reach and nurse still in with the pt.)  OT Assessment Results: Decreased ADL status, Decreased upper extremity range of motion, Decreased upper extremity strength, Decreased safe judgment during ADL, Decreased cognition, Decreased endurance, Decreased fine motor control, Decreased functional mobility, Decreased gross motor control, Decreased trunk control for functional activities    Plan:  Treatment Interventions: ADL retraining, Functional transfer training, UE strengthening/ROM, Endurance training, Cognitive reorientation, Patient/family training, Equipment evaluation/education, Neuromuscular reeducation, Fine motor coordination activities, Compensatory technique education  OT Frequency: 5 times per week  OT Discharge Recommendations: Moderate intensity level of continued care  OT - OK to Discharge: Yes      Subjective     General:   OT Received On: 10/28/24  General  Reason for Referral: weakness, impaired ADL's/mobility  Referred By: Sabino Matos, APRN-CNP  Past Medical History Relevant to Rehab: L1-3 lumbar lami, T4-S1 revision/fusion, DM, HTN, essential tremor, glaucoma,  sarcoidosis  Family/Caregiver Present: No  Co-Treatment: PT  Co-Treatment Reason: Medically/physically complex intubated pt in ICU with limited activity tolerance  Prior to Session Communication: Bedside nurse  Patient Position Received: Bed, 3 rail up, Alarm off, caregiver present (Nurse in the room a majority of the eval.)  General Comment: Pt is a 68 yo female admitted to the hospital after being apparently found unresponsive at her facility. Pt has been intubated in ICU since 10/12/2024.    Precautions:  Medical Precautions: Fall precautions, Oxygen therapy device and L/min, Spinal precautions (Pt remains intubated.)  Precautions Comment: Recent spine surgery with spinal precautions still in place.    Vital Signs Comment: HR 78, pulse ox 96%, and /48 and 119/79     Pain:  Pain Assessment  Pain Assessment: 0-10  0-10 (Numeric) Pain Score: 0 - No pain    Objective     Cognition:  Overall Cognitive Status: Impaired  Orientation Level: Unable to assess (Pt remains intubated.)  Following Commands: Follows one step commands with repetition  Cognition Comments: Pt is currently non-verbal due to vent tube, but is able to respond to yes/no questions/commands by nodding her head.    Home Living:  Type of Home: House  Lives With: Spouse  Home Adaptive Equipment: Walker rolling or standard  Home Layout: One level  Home Access: Other (Comment) (2 step entry with 1 railing)  Bathroom Shower/Tub: Walk-in shower  Bathroom Toilet: Standard  Bathroom Equipment: Shower chair with back  Home Living Comments: Pt admitted from [facility] this admit.    Prior Function:  Level of [place]: Needs assistance with ADLs, Needs assistance with homemaking, Needs assistance with functional transfers (at SNF)  Receives Help From: Other (Comment) (care staff at SNF)  ADL Assistance: Needs assistance (recently after spine surgery, but indep prior)  Homemaking Assistance: Needs assistance  Ambulatory Assistance: Needs  assistance (at SNF, but mod indep using a RW prior to recent spine surgery)  Vocational: Retired  Hand Dominance: Right  Prior Function Comments: Pt was independent prior to her recent spine surgery with multiple admits to the hospital and SNF.    ADL:  Eating Assistance: Total  Grooming Assistance: Total  Bathing Assistance: Total  UE Dressing Assistance: Total  LE Dressing Assistance: Total  Toileting Assistance with Device: Total  Toileting Deficit: Incontinent (Pt required total A for pericare after incontinence of BM by rolling multiple times in bed, etc.)  ADL Comments: Pt remains intubated and dependent with all self-care.    Activity Tolerance:  Activity Tolerance Comments: Poor    Bed Mobility/Transfers: Bed Mobility  Bed Mobility:  (Pt completed rolling multiple times and supine<>sit in bed with max A x2.)    Transfers  Transfer: No    Functional Mobility:  Functional Mobility  Functional Mobility Performed: No    Sitting Balance:  Static Sitting Balance  Static Sitting-Balance Support: Bilateral upper extremity supported, Feet unsupported  Static Sitting-Level of Assistance: Maximum assistance    Therapy/Activity: Therapeutic Activity  Therapeutic Activity Performed:  (See bed mobility, static sitting balance, and toileting.)    Sensation:  Sensation Comment: Unable to assess at this time.    Strength:  Strength Comments: BUE shoulders 2-/5, distally 2 to 3-/5 (Increased edema noted in BUE's)    Coordination:  Coordination Comment: BUE's impaired grossly     Hand Function:  Hand Function  Gross Grasp: Impaired  Coordination: Impaired      Outcome Measures: [facility] Daily Activity  Putting on and taking off regular lower body clothing: Total  Bathing (including washing, rinsing, drying): Total  Putting on and taking off regular upper body clothing: Total  Toileting, which includes using toilet, bedpan or urinal: Total  Taking care of personal grooming such as brushing teeth: Total  Eating Meals: Total  Daily  Activity - Total Score: 6      Education Documentation  Home Exercise Program, taught by Pernell Muñoz Jr., OT at 10/28/2024  2:45 PM.  Learner: Patient  Readiness: Acceptance  Method: Explanation  Response: Needs Reinforcement    Body Mechanics, taught by Pernell Muñoz Jr., OT at 10/28/2024  2:45 PM.  Learner: Patient  Readiness: Acceptance  Method: Explanation  Response: Needs Reinforcement    Precautions, taught by Pernell Muñoz Jr., OT at 10/28/2024  2:45 PM.  Learner: Patient  Readiness: Acceptance  Method: Explanation  Response: Needs Reinforcement    ADL Training, taught by Pernell Muñoz Jr., OT at 10/28/2024  2:45 PM.  Learner: Patient  Readiness: Acceptance  Method: Explanation  Response: Needs Reinforcement    Education Comments  No comments found.      Goals:  Encounter Problems       Encounter Problems (Active)       OT Goals       Pt will complete self-feeding with setup. (Progressing)       Start:  10/28/24    Expected End:  11/28/24            Pt will complete all grooming tasks with min A. (Progressing)       Start:  10/28/24    Expected End:  11/28/24            Pt will complete UB dressing/bathing with min A. (Progressing)       Start:  10/28/24    Expected End:  11/28/24            Pt will tolerate sitting EOB for at least 10-15 minutes with F+ balance in order to enhance ADL's. (Progressing)       Start:  10/28/24    Expected End:  11/28/24            Pt will complete all functional transfers and mobility with mod A using a RW. (Progressing)       Start:  10/28/24    Expected End:  11/28/24

## 2024-10-28 NOTE — CARE PLAN
Problem: Respiratory  Goal: No signs of respiratory distress (eg. Use of accessory muscles. Peds grunting)  Outcome: Progressing  Goal: Clear secretions with interventions this shift  Outcome: Progressing  Goal: Minimize anxiety/maximize coping throughout shift  Outcome: Progressing  Goal: Minimal/no exertional discomfort or dyspnea this shift  Outcome: Progressing  Goal: Patent airway maintained this shift  Outcome: Progressing  Goal: Tolerate mechanical ventilation evidenced by VS/agitation level this shift  Outcome: Progressing  Goal: Tolerate pulmonary toileting this shift  Outcome: Progressing  Goal: Verbalize decreased shortness of breath this shift  Outcome: Progressing  Goal: Wean oxygen to maintain O2 saturation per order/standard this shift  Outcome: Progressing  Goal: Increase self care and/or family involvement in next 24 hours  Outcome: Progressing

## 2024-10-28 NOTE — PROGRESS NOTES
Patient not medically clear. Patient remains intubated. At this time there is not a safe discharge plan in place. Therapy to evaluate patient. Patient was at [facility] prior to admission. If she will return then a precert will be needed. Will follow.      10/28/24 5177   Discharge Planning   Home or Post Acute Services Other (Comment)  (TBD)   Expected Discharge Disposition [place]  (TBD)   Does the patient need discharge transport arranged? Yes   RoundTrip coordination needed? Yes

## 2024-10-28 NOTE — CARE PLAN
Repositioning q2 hours,  releasing restraints and performing ROM and monitoring skin. No new skin issues noted. Wound care done per order. Will continue to monitor.   Problem: Skin  Goal: Decreased wound size/increased tissue granulation at next dressing change  Outcome: Progressing  Flowsheets (Taken 10/28/2024 0333)  Decreased wound size/increased tissue granulation at next dressing change: Promote sleep for wound healing     Problem: Safety - Medical Restraint  Goal: Remains free of injury from restraints (Restraint for Interference with Medical Device)  Outcome: Progressing  Flowsheets (Taken 10/28/2024 0333)  Remains free of injury from restraints (restraint for interference with medical device): Determine that other, less restrictive measures have been tried or would not be effective before applying the restraint     Problem: Safety - Medical Restraint  Goal: Remains free of injury from restraints (Restraint for Interference with Medical Device)  Recent Flowsheet Documentation  Taken 10/28/2024 0333 by Jaswant Jaramillo RN  Remains free of injury from restraints (restraint for interference with medical device): Determine that other, less restrictive measures have been tried or would not be effective before applying the restraint  Goal: Free from restraint(s) (Restraint for Interference with Medical Device)  Recent Flowsheet Documentation  Taken 10/28/2024 0333 by Jaswant Jaramillo RN  Free from restraint(s) (restraint for interference with medical device): Every 24 hours: Continued use of restraint requires Licensed Independent Practitioner to perform face to face examination and written order     Problem: Safety - Medical Restraint  Goal: Remains free of injury from restraints (Restraint for Interference with Medical Device)  Recent Flowsheet Documentation  Taken 10/28/2024 0333 by Jaswant Jaramillo RN  Remains free of injury from restraints (restraint for interference with medical device): Determine that other,  less restrictive measures have been tried or would not be effective before applying the restraint  Goal: Free from restraint(s) (Restraint for Interference with Medical Device)  Recent Flowsheet Documentation  Taken 10/28/2024 0333 by Jaswant Jaramillo RN  Free from restraint(s) (restraint for interference with medical device): Every 24 hours: Continued use of restraint requires Licensed Independent Practitioner to perform face to face examination and written order     Problem: Safety - Medical Restraint  Goal: Free from restraint(s) (Restraint for Interference with Medical Device)  Flowsheets (Taken 10/28/2024 0333)  Free from restraint(s) (restraint for interference with medical device): Every 24 hours: Continued use of restraint requires Licensed Independent Practitioner to perform face to face examination and written order

## 2024-10-28 NOTE — CARE PLAN
Titrated to 40%  Problem: Respiratory  Goal: Wean oxygen to maintain O2 saturation per order/standard this shift  Outcome: Progressing

## 2024-10-28 NOTE — PROGRESS NOTES
INFECTIOUS DISEASES PROGRESS NOTE    Consulted / following patient for:  Respiratory failure/pneumonia  Recent polymicrobial complicated postoperative lumbar wound infection, MRSA and Proteus  Acute kidney injury    Subjective   Interval History:   (Sedated on the ventilator)    Objective   PHYSICAL EXAMINATION  Vital signs:  Visit Vitals  /50   Pulse 85   Temp 37.1 °C (98.8 °F) (Axillary)   Resp 25   Off norepinephrine infusion and CRRT.  Remains on ventilator  General: Intubated and sedated  Lungs: Diminished, clear.  Left chest tube removed  Heart:  S1, S2 normal  Abdomen:  Soft, obese, no guarding.   Extremities:  No cords, phlebitis, cellulitis.  Anasarca.  New PICC line has been inserted    Relevant Results  WBC: 11,500  Creatinine: 0.6  Pleural fluid: Transudate with 197 WBC, 56% neutrophils, protein 1.8, LDH 97, glucose 202  Microbiology:  Blood (10/12): Negative X2  Sputum (10/13): Stain with moderate GNB and moderate PMN, culture Pseudomonas aeruginosa, susceptible to Zosyn and cefepime  Sputum (10/21): Normal neva, no MRSA  Urine: Moderate colony count Candida lusitaniae  Pleural fluid (10/17): Negative    Imaging:  CXR images (10/26) personally reviewed: Intubated.  Chest tubes have been removed.  Bilateral densities stable    Assessment:  Sepsis -likely due to pneumonia, present on admission. Patient already on IV vancomycin and IV ceftriaxone at time of this admission for lumbar spinal infection.  Resolved with anti-Pseudomonas antimicrobial therapy.  Large transudative pleural effusion was tapped.  Remains on mechanical ventilation, weaning, chest tubes have been removed  Acute kidney injury.  Off CRRT, creatinine 0.6  Lumbar spine surgical site infection s/p I&D 9/28. Cultures + MRSA, Proteus.  Was to be on vanco/ceftriaxone through 11/12. Followed by Dr. Brant Juarez.  Vancomycin has been changed to daptomycin because of AMY    Plan/Recommendations:  Daptomycin 700 mg every 24 hour based on  adjusted body weight of 91 kg   Ceftriaxone until 11/12  Monitor CK while on daptomycin       Andrew Malagon MD  [facility]  Office:  [phone]

## 2024-10-28 NOTE — PROGRESS NOTES
"Narda Malloy is a 69 y.o. female on day 16 of admission presenting with Unresponsive.    Subjective   The patient is seen for acute kidney injury which is secondary to acute tubular necrosis secondary toto hypotension and septic shock as well as vancomycin toxicity the patient remains intubated on the ventilator however she is off pressors and hemodynamically more stable is undergoing weaning of the ventilator CRRT was discontinued earlier this morning he still fairly oliguric       Objective     Physical Exam  Constitutional:       Comments: Patient is intubated on the ventilator   Neck:      Vascular: No carotid bruit.   Cardiovascular:      Rate and Rhythm: Normal rate and regular rhythm.      Heart sounds: No murmur heard.     No friction rub. No gallop.   Pulmonary:      Breath sounds: No wheezing, rhonchi or rales.   Chest:      Chest wall: No tenderness.   Abdominal:      General: There is no distension.      Tenderness: There is no abdominal tenderness. There is no guarding or rebound.   Musculoskeletal:         General: No swelling or tenderness.      Cervical back: Neck supple.      Right lower leg: Edema present.      Left lower leg: Edema present.   Lymphadenopathy:      Cervical: No cervical adenopathy.         Last Recorded Vitals  Blood pressure 116/60, pulse 100, temperature 37.1 °C (98.8 °F), temperature source Axillary, resp. rate 25, height 1.778 m (5' 10\"), weight 115 kg (253 lb 12 oz), SpO2 95%.    Intake/Output last 3 Shifts:  I/O last 3 completed shifts:  In: 2851.7 (24.8 mL/kg) [I.V.:199.3 (1.7 mL/kg); Blood:407.4; NG/GT:1845; IV Piggyback:400]  Out: 6257 (54.4 mL/kg) [Urine:25 (0 mL/kg/hr); Other:6232]  Weight: 115.1 kg     Current Facility-Administered Medications:     acetaminophen (Tylenol) tablet 975 mg, 975 mg, oral, q6h PRN, Kaleb Lam PA-C, 975 mg at 10/18/24 1405    alteplase (Cathflo Activase) injection 2 mg, 2 mg, intra-catheter, PRN, Kaleb Lam PA-C    alteplase " (Cathflo Activase) injection 2 mg, 2 mg, intra-catheter, PRN, Andrew Malagon MD    brimonidine (AlphaGAN) 0.2 % ophthalmic solution 1 drop, 1 drop, Both Eyes, BID, Kaleb Lam PA-C, 1 drop at 10/28/24 0957    [Held by provider] calcium carbonate-vitamin D3 500 mg-5 mcg (200 unit) per tablet 1 tablet, 1 tablet, oral, Daily, Kaleb Lam PA-C, 1 tablet at 10/18/24 0930    cefTRIAXone (Rocephin) 2 g in dextrose (iso) IV 50 mL, 2 g, intravenous, q24h, Kaleb Lam PA-C, Last Rate: 100 mL/hr at 10/28/24 0957, 2 g at 10/28/24 0957    DAPTOmycin (Cubicin) 700 mg in sodium chloride 0.9%  mL, 700 mg, intravenous, Daily, Andrew Malagon MD, Stopped at 10/27/24 0957    dextrose 50 % injection 12.5 g, 12.5 g, intravenous, q15 min PRN, Kaleb Lam PA-C    dextrose 50 % injection 25 g, 25 g, intravenous, q15 min PRN, Kaleb Lam PA-C    ezetimibe (Zetia) tablet 10 mg, 10 mg, oral, Daily, Kaleb Lam PA-C, 10 mg at 10/28/24 0957    fentaNYL PF (Sublimaze) injection 25 mcg, 25 mcg, intravenous, q2h PRN, RUTH Doe    ferrous sulfate syrup 60 mg of iron, 60 mg of iron, oral, Daily, RUTH Doe, 60 mg of iron at 10/28/24 0957    folic acid (Folvite) tablet 1 mg, 1 mg, oral, Daily, RUTH Doe, 1 mg at 10/28/24 0957    glucagon (Glucagen) injection 1 mg, 1 mg, intramuscular, q15 min PRN, Kaleb Lam PA-C    glucagon (Glucagen) injection 1 mg, 1 mg, intramuscular, q15 min PRN, Kaleb Lam PA-C    heparin (porcine) injection 5,000 Units, 5,000 Units, subcutaneous, q8h, Kaleb Lam PA-C, 5,000 Units at 10/28/24 0533    heparin 1,000 unit/mL injection 1,000 Units, 1,000 Units, intra-catheter, PRN, Nelia Cheung MD, 1,000 Units at 10/28/24 0537    heparin 1,000 unit/mL injection 1,000 Units, 1,000 Units, intra-catheter, PRN, Nelia Cheung MD, 1,000 Units at 10/28/24 0538    heparin flush 10 unit/mL syringe 50 Units, 50 Units, intra-catheter, PRN,  Kaleb Lam PA-C, 50 Units at 10/19/24 2313    honey (Medihoney) topical gel, , Topical, Daily, RUTH Salgado, 1 Application at 10/28/24 0958    hydrocortisone sodium succinate (PF) (Solu-CORTEF) injection 50 mg, 50 mg, intravenous, q12h, RUTH Salgado    [Held by provider] HYDROmorphone (Dilaudid) injection 0.4 mg, 0.4 mg, intravenous, q2h PRN, RUTH Salgado, 0.4 mg at 10/19/24 0520    insulin lispro (HumaLOG) injection 0-15 Units, 0-15 Units, subcutaneous, q4h, Lb Dodd PA-C, 3 Units at 10/28/24 0002    ipratropium-albuteroL (Duo-Neb) 0.5-2.5 mg/3 mL nebulizer solution 3 mL, 3 mL, nebulization, 4x daily, Kaleb Lam PA-C, 3 mL at 10/28/24 0705    latanoprost (Xalatan) 0.005 % ophthalmic solution 1 drop, 1 drop, Both Eyes, Nightly, Kaleb Lam PA-C, 1 drop at 10/27/24 2127    midodrine (Proamatine) tablet 10 mg, 10 mg, oral, q8h, RUTH Doe, 10 mg at 10/28/24 0957    oxyCODONE (Roxicodone) immediate release tablet 5 mg, 5 mg, oral, q4h LUCA, RUTH Doe, 5 mg at 10/28/24 0514    oxygen (O2) therapy, , inhalation, Continuous - Inhalation, Radha Otero MD    pantoprazole (ProtoNix) EC tablet 40 mg, 40 mg, oral, Daily before breakfast **OR** pantoprazole (ProtoNix) injection 40 mg, 40 mg, intravenous, Daily before breakfast, RUTH Salas, 40 mg at 10/28/24 0514    polyethylene glycol (Glycolax, Miralax) packet 17 g, 17 g, oral, Daily PRN, Sweta R Joe, APRN-CNP    primidone (Mysoline) tablet 125 mg, 125 mg, oral, Nightly, Kaleb Lam PA-C, 125 mg at 10/27/24 2127    PrismaSol 4/2.5 CRRT solution, 25 mL/kg/hr, CRRT, Continuous, Nelia Cheung MD, Last Rate: 3,150 mL/hr at 10/28/24 0243, 25 mL/kg/hr at 10/28/24 0243    propofol (Diprivan) infusion, 0-50 mcg/kg/min, intravenous, Continuous, LEONEL Salas-CNP, Stopped at 10/27/24 0811    [Held by provider] propranolol LA (Inderal LA) 24 hr capsule 60 mg, 60 mg,  oral, Daily, Kaleb Lam PA-C, 60 mg at 10/19/24 0643    sennosides-docusate sodium (Lucia-Colace) 8.6-50 mg per tablet 1 tablet, 1 tablet, oral, Nightly, Sweta Diaz APRN-CNP, 1 tablet at 10/27/24 2127    sodium chloride 3 % nebulizer solution 3 mL, 3 mL, nebulization, 4x daily, Kaleb Lam PA-C, 3 mL at 10/28/24 0705    [Held by provider] traMADol (Ultram) tablet 50 mg, 50 mg, oral, q8h PRN, Kaleb Lam PA-C   Relevant Results    Results for orders placed or performed during the hospital encounter of 10/12/24 (from the past 96 hours)   POCT GLUCOSE   Result Value Ref Range    POCT Glucose 148 (H) 74 - 99 mg/dL   Magnesium   Result Value Ref Range    Magnesium 2.18 1.60 - 2.40 mg/dL   CBC and Auto Differential   Result Value Ref Range    WBC 9.4 4.4 - 11.3 x10*3/uL    nRBC 0.0 0.0 - 0.0 /100 WBCs    RBC 2.25 (L) 4.00 - 5.20 x10*6/uL    Hemoglobin 7.1 (L) 12.0 - 16.0 g/dL    Hematocrit 22.5 (L) 36.0 - 46.0 %     80 - 100 fL    MCH 31.6 26.0 - 34.0 pg    MCHC 31.6 (L) 32.0 - 36.0 g/dL    RDW 19.9 (H) 11.5 - 14.5 %    Platelets 215 150 - 450 x10*3/uL    Neutrophils % 81.6 40.0 - 80.0 %    Immature Granulocytes %, Automated 3.6 (H) 0.0 - 0.9 %    Lymphocytes % 9.1 13.0 - 44.0 %    Monocytes % 5.2 2.0 - 10.0 %    Eosinophils % 0.4 0.0 - 6.0 %    Basophils % 0.1 0.0 - 2.0 %    Neutrophils Absolute 7.66 1.20 - 7.70 x10*3/uL    Immature Granulocytes Absolute, Automated 0.34 0.00 - 0.70 x10*3/uL    Lymphocytes Absolute 0.85 (L) 1.20 - 4.80 x10*3/uL    Monocytes Absolute 0.49 0.10 - 1.00 x10*3/uL    Eosinophils Absolute 0.04 0.00 - 0.70 x10*3/uL    Basophils Absolute 0.01 0.00 - 0.10 x10*3/uL   Hepatic function panel   Result Value Ref Range    Albumin 2.3 (L) 3.4 - 5.0 g/dL    Bilirubin, Total 0.3 0.0 - 1.2 mg/dL    Bilirubin, Direct 0.0 0.0 - 0.3 mg/dL    Alkaline Phosphatase 112 33 - 136 U/L    ALT 6 (L) 7 - 45 U/L    AST 9 9 - 39 U/L    Total Protein 4.7 (L) 6.4 - 8.2 g/dL   Calcium, ionized    Result Value Ref Range    POCT Calcium, Ionized 1.10 1.1 - 1.33 mmol/L   Phosphorus   Result Value Ref Range    Phosphorus 1.8 (L) 2.5 - 4.9 mg/dL   Basic Metabolic Panel   Result Value Ref Range    Glucose 159 (H) 74 - 99 mg/dL    Sodium 135 (L) 136 - 145 mmol/L    Potassium 4.2 3.5 - 5.3 mmol/L    Chloride 104 98 - 107 mmol/L    Bicarbonate 27 21 - 32 mmol/L    Anion Gap 8 (L) 10 - 20 mmol/L    Urea Nitrogen 14 6 - 23 mg/dL    Creatinine 0.86 0.50 - 1.05 mg/dL    eGFR 74 >60 mL/min/1.73m*2    Calcium 7.4 (L) 8.6 - 10.3 mg/dL   Staphylococcus Aureus/MRSA Colonization, Culture - PICC Only    Specimen: Anterior Nares; Swab   Result Value Ref Range    Staph/MRSA Screen Culture No Staphylococcus aureus isolated    POCT GLUCOSE   Result Value Ref Range    POCT Glucose 180 (H) 74 - 99 mg/dL   POCT GLUCOSE   Result Value Ref Range    POCT Glucose 160 (H) 74 - 99 mg/dL   Basic metabolic panel   Result Value Ref Range    Glucose 161 (H) 74 - 99 mg/dL    Sodium 135 (L) 136 - 145 mmol/L    Potassium 4.3 3.5 - 5.3 mmol/L    Chloride 103 98 - 107 mmol/L    Bicarbonate 26 21 - 32 mmol/L    Anion Gap 10 10 - 20 mmol/L    Urea Nitrogen 13 6 - 23 mg/dL    Creatinine 0.90 0.50 - 1.05 mg/dL    eGFR 70 >60 mL/min/1.73m*2    Calcium 7.5 (L) 8.6 - 10.3 mg/dL   CBC   Result Value Ref Range    WBC 14.0 (H) 4.4 - 11.3 x10*3/uL    nRBC 0.0 0.0 - 0.0 /100 WBCs    RBC 2.38 (L) 4.00 - 5.20 x10*6/uL    Hemoglobin 7.4 (L) 12.0 - 16.0 g/dL    Hematocrit 23.3 (L) 36.0 - 46.0 %    MCV 98 80 - 100 fL    MCH 31.1 26.0 - 34.0 pg    MCHC 31.8 (L) 32.0 - 36.0 g/dL    RDW 19.9 (H) 11.5 - 14.5 %    Platelets 285 150 - 450 x10*3/uL   Phosphorus   Result Value Ref Range    Phosphorus 2.3 (L) 2.5 - 4.9 mg/dL   Magnesium   Result Value Ref Range    Magnesium 2.21 1.60 - 2.40 mg/dL   POCT GLUCOSE   Result Value Ref Range    POCT Glucose 160 (H) 74 - 99 mg/dL   Magnesium   Result Value Ref Range    Magnesium 2.23 1.60 - 2.40 mg/dL   CBC and Auto Differential    Result Value Ref Range    WBC 12.2 (H) 4.4 - 11.3 x10*3/uL    nRBC 0.0 0.0 - 0.0 /100 WBCs    RBC 2.23 (L) 4.00 - 5.20 x10*6/uL    Hemoglobin 7.0 (L) 12.0 - 16.0 g/dL    Hematocrit 22.3 (L) 36.0 - 46.0 %     80 - 100 fL    MCH 31.4 26.0 - 34.0 pg    MCHC 31.4 (L) 32.0 - 36.0 g/dL    RDW 19.9 (H) 11.5 - 14.5 %    Platelets 258 150 - 450 x10*3/uL    Neutrophils % 83.7 40.0 - 80.0 %    Immature Granulocytes %, Automated 2.4 (H) 0.0 - 0.9 %    Lymphocytes % 8.0 13.0 - 44.0 %    Monocytes % 5.3 2.0 - 10.0 %    Eosinophils % 0.4 0.0 - 6.0 %    Basophils % 0.2 0.0 - 2.0 %    Neutrophils Absolute 10.20 (H) 1.20 - 7.70 x10*3/uL    Immature Granulocytes Absolute, Automated 0.29 0.00 - 0.70 x10*3/uL    Lymphocytes Absolute 0.98 (L) 1.20 - 4.80 x10*3/uL    Monocytes Absolute 0.64 0.10 - 1.00 x10*3/uL    Eosinophils Absolute 0.05 0.00 - 0.70 x10*3/uL    Basophils Absolute 0.02 0.00 - 0.10 x10*3/uL   Hepatic function panel   Result Value Ref Range    Albumin 2.5 (L) 3.4 - 5.0 g/dL    Bilirubin, Total 0.2 0.0 - 1.2 mg/dL    Bilirubin, Direct 0.1 0.0 - 0.3 mg/dL    Alkaline Phosphatase 114 33 - 136 U/L    ALT 7 7 - 45 U/L    AST 12 9 - 39 U/L    Total Protein 4.8 (L) 6.4 - 8.2 g/dL   Calcium, ionized   Result Value Ref Range    POCT Calcium, Ionized 1.15 1.1 - 1.33 mmol/L   Basic Metabolic Panel   Result Value Ref Range    Glucose 169 (H) 74 - 99 mg/dL    Sodium 134 (L) 136 - 145 mmol/L    Potassium 4.2 3.5 - 5.3 mmol/L    Chloride 103 98 - 107 mmol/L    Bicarbonate 28 21 - 32 mmol/L    Anion Gap 7 (L) 10 - 20 mmol/L    Urea Nitrogen 12 6 - 23 mg/dL    Creatinine 0.78 0.50 - 1.05 mg/dL    eGFR 83 >60 mL/min/1.73m*2    Calcium 7.5 (L) 8.6 - 10.3 mg/dL   Phosphorus   Result Value Ref Range    Phosphorus 2.2 (L) 2.5 - 4.9 mg/dL   POCT GLUCOSE   Result Value Ref Range    POCT Glucose 183 (H) 74 - 99 mg/dL   Hemoglobin and hematocrit, blood   Result Value Ref Range    Hemoglobin 7.2 (L) 12.0 - 16.0 g/dL    Hematocrit 22.6 (L)  36.0 - 46.0 %   Blood Gas Venous Full Panel   Result Value Ref Range    POCT pH, Venous 7.36 7.33 - 7.43 pH    POCT pCO2, Venous 54 (H) 41 - 51 mm Hg    POCT pO2, Venous 39 35 - 45 mm Hg    POCT SO2, Venous 72 45 - 75 %    POCT Oxy Hemoglobin, Venous 70.4 45.0 - 75.0 %    POCT Hematocrit Calculated, Venous 22.0 (L) 36.0 - 46.0 %    POCT Sodium, Venous 134 (L) 136 - 145 mmol/L    POCT Potassium, Venous 4.2 3.5 - 5.3 mmol/L    POCT Chloride, Venous 105 98 - 107 mmol/L    POCT Ionized Calicum, Venous 1.23 1.10 - 1.33 mmol/L    POCT Glucose, Venous 146 (H) 74 - 99 mg/dL    POCT Lactate, Venous 1.2 0.4 - 2.0 mmol/L    POCT Base Excess, Venous 4.5 (H) -2.0 - 3.0 mmol/L    POCT HCO3 Calculated, Venous 30.5 (H) 22.0 - 26.0 mmol/L    POCT Hemoglobin, Venous 7.2 (L) 12.0 - 16.0 g/dL    POCT Anion Gap, Venous 3.0 (L) 10.0 - 25.0 mmol/L    Patient Temperature 37.0 degrees Celsius    FiO2 40 %   Type and screen   Result Value Ref Range    ABO TYPE A     Rh TYPE NEG     ANTIBODY SCREEN NEG    Verify ABO/Rh Group Test (VERAB)   Result Value Ref Range    ABO TYPE A     Rh TYPE NEG    POCT GLUCOSE   Result Value Ref Range    POCT Glucose 135 (H) 74 - 99 mg/dL   POCT GLUCOSE   Result Value Ref Range    POCT Glucose 148 (H) 74 - 99 mg/dL   Prepare RBC: 1 Units   Result Value Ref Range    PRODUCT CODE L2039O80     Unit Number [id number]     Unit ABO A     Unit RH NEG     XM INTEP COMP     Dispense Status TR     Blood Expiration Date 11/20/2024 11:59:00 PM EST     PRODUCT BLOOD TYPE 0600     UNIT VOLUME 350    Magnesium   Result Value Ref Range    Magnesium 2.26 1.60 - 2.40 mg/dL   CBC and Auto Differential   Result Value Ref Range    WBC 10.8 4.4 - 11.3 x10*3/uL    nRBC 0.0 0.0 - 0.0 /100 WBCs    RBC 2.57 (L) 4.00 - 5.20 x10*6/uL    Hemoglobin 7.9 (L) 12.0 - 16.0 g/dL    Hematocrit 24.8 (L) 36.0 - 46.0 %    MCV 97 80 - 100 fL    MCH 30.7 26.0 - 34.0 pg    MCHC 31.9 (L) 32.0 - 36.0 g/dL    RDW 20.2 (H) 11.5 - 14.5 %    Platelets  224 150 - 450 x10*3/uL    Neutrophils % 86.7 40.0 - 80.0 %    Immature Granulocytes %, Automated 2.4 (H) 0.0 - 0.9 %    Lymphocytes % 6.5 13.0 - 44.0 %    Monocytes % 4.1 2.0 - 10.0 %    Eosinophils % 0.2 0.0 - 6.0 %    Basophils % 0.1 0.0 - 2.0 %    Neutrophils Absolute 9.32 (H) 1.20 - 7.70 x10*3/uL    Immature Granulocytes Absolute, Automated 0.26 0.00 - 0.70 x10*3/uL    Lymphocytes Absolute 0.70 (L) 1.20 - 4.80 x10*3/uL    Monocytes Absolute 0.44 0.10 - 1.00 x10*3/uL    Eosinophils Absolute 0.02 0.00 - 0.70 x10*3/uL    Basophils Absolute 0.01 0.00 - 0.10 x10*3/uL   Hepatic function panel   Result Value Ref Range    Albumin 2.5 (L) 3.4 - 5.0 g/dL    Bilirubin, Total 0.3 0.0 - 1.2 mg/dL    Bilirubin, Direct 0.1 0.0 - 0.3 mg/dL    Alkaline Phosphatase 126 33 - 136 U/L    ALT 8 7 - 45 U/L    AST 12 9 - 39 U/L    Total Protein 5.1 (L) 6.4 - 8.2 g/dL   Phosphorus   Result Value Ref Range    Phosphorus 2.3 (L) 2.5 - 4.9 mg/dL   Basic Metabolic Panel   Result Value Ref Range    Glucose 191 (H) 74 - 99 mg/dL    Sodium 134 (L) 136 - 145 mmol/L    Potassium 4.3 3.5 - 5.3 mmol/L    Chloride 104 98 - 107 mmol/L    Bicarbonate 27 21 - 32 mmol/L    Anion Gap 7 (L) 10 - 20 mmol/L    Urea Nitrogen 10 6 - 23 mg/dL    Creatinine 0.73 0.50 - 1.05 mg/dL    eGFR 90 >60 mL/min/1.73m*2    Calcium 7.5 (L) 8.6 - 10.3 mg/dL   POCT GLUCOSE   Result Value Ref Range    POCT Glucose 197 (H) 74 - 99 mg/dL   Morphology   Result Value Ref Range    RBC Morphology See Below     Polychromasia Mild     Ovalocytes Few     Lexus Cells Few     Basophilic Stippling Present    Calcium, ionized   Result Value Ref Range    POCT Calcium, Ionized 1.11 1.1 - 1.33 mmol/L   POCT GLUCOSE   Result Value Ref Range    POCT Glucose 158 (H) 74 - 99 mg/dL   POCT GLUCOSE   Result Value Ref Range    POCT Glucose 142 (H) 74 - 99 mg/dL   Magnesium   Result Value Ref Range    Magnesium 2.25 1.60 - 2.40 mg/dL   CBC and Auto Differential   Result Value Ref Range    WBC 9.4 4.4  - 11.3 x10*3/uL    nRBC 0.0 0.0 - 0.0 /100 WBCs    RBC 2.34 (L) 4.00 - 5.20 x10*6/uL    Hemoglobin 7.2 (L) 12.0 - 16.0 g/dL    Hematocrit 22.4 (L) 36.0 - 46.0 %    MCV 96 80 - 100 fL    MCH 30.8 26.0 - 34.0 pg    MCHC 32.1 32.0 - 36.0 g/dL    RDW 20.6 (H) 11.5 - 14.5 %    Platelets 185 150 - 450 x10*3/uL    Neutrophils % 86.3 40.0 - 80.0 %    Immature Granulocytes %, Automated 1.3 (H) 0.0 - 0.9 %    Lymphocytes % 7.7 13.0 - 44.0 %    Monocytes % 4.4 2.0 - 10.0 %    Eosinophils % 0.2 0.0 - 6.0 %    Basophils % 0.1 0.0 - 2.0 %    Neutrophils Absolute 8.12 (H) 1.20 - 7.70 x10*3/uL    Immature Granulocytes Absolute, Automated 0.12 0.00 - 0.70 x10*3/uL    Lymphocytes Absolute 0.72 (L) 1.20 - 4.80 x10*3/uL    Monocytes Absolute 0.41 0.10 - 1.00 x10*3/uL    Eosinophils Absolute 0.02 0.00 - 0.70 x10*3/uL    Basophils Absolute 0.01 0.00 - 0.10 x10*3/uL   Hepatic function panel   Result Value Ref Range    Albumin 2.4 (L) 3.4 - 5.0 g/dL    Bilirubin, Total 0.3 0.0 - 1.2 mg/dL    Bilirubin, Direct 0.1 0.0 - 0.3 mg/dL    Alkaline Phosphatase 122 33 - 136 U/L    ALT 8 7 - 45 U/L    AST 12 9 - 39 U/L    Total Protein 4.7 (L) 6.4 - 8.2 g/dL   Calcium, ionized   Result Value Ref Range    POCT Calcium, Ionized 1.13 1.1 - 1.33 mmol/L   Cortisol AM   Result Value Ref Range    Cortisol  A.M. 30.0 (H) 5.0 - 20.0 ug/dL   Phosphorus   Result Value Ref Range    Phosphorus 2.2 (L) 2.5 - 4.9 mg/dL   Basic Metabolic Panel   Result Value Ref Range    Glucose 159 (H) 74 - 99 mg/dL    Sodium 135 (L) 136 - 145 mmol/L    Potassium 4.2 3.5 - 5.3 mmol/L    Chloride 104 98 - 107 mmol/L    Bicarbonate 28 21 - 32 mmol/L    Anion Gap 7 (L) 10 - 20 mmol/L    Urea Nitrogen 9 6 - 23 mg/dL    Creatinine 0.61 0.50 - 1.05 mg/dL    eGFR >90 >60 mL/min/1.73m*2    Calcium 7.4 (L) 8.6 - 10.3 mg/dL   Morphology   Result Value Ref Range    RBC Morphology See Below     Polychromasia Mild     Ovalocytes Few     Lexus Cells Few     Basophilic Stippling Present    POCT  GLUCOSE   Result Value Ref Range    POCT Glucose 158 (H) 74 - 99 mg/dL   POCT GLUCOSE   Result Value Ref Range    POCT Glucose 171 (H) 74 - 99 mg/dL   POCT GLUCOSE   Result Value Ref Range    POCT Glucose 169 (H) 74 - 99 mg/dL   CBC and Auto Differential   Result Value Ref Range    WBC 12.4 (H) 4.4 - 11.3 x10*3/uL    nRBC 0.0 0.0 - 0.0 /100 WBCs    RBC 2.33 (L) 4.00 - 5.20 x10*6/uL    Hemoglobin 7.1 (L) 12.0 - 16.0 g/dL    Hematocrit 22.0 (L) 36.0 - 46.0 %    MCV 94 80 - 100 fL    MCH 30.5 26.0 - 34.0 pg    MCHC 32.3 32.0 - 36.0 g/dL    RDW 20.4 (H) 11.5 - 14.5 %    Platelets 192 150 - 450 x10*3/uL    Neutrophils % 86.1 40.0 - 80.0 %    Immature Granulocytes %, Automated 1.4 (H) 0.0 - 0.9 %    Lymphocytes % 7.8 13.0 - 44.0 %    Monocytes % 4.4 2.0 - 10.0 %    Eosinophils % 0.2 0.0 - 6.0 %    Basophils % 0.1 0.0 - 2.0 %    Neutrophils Absolute 10.66 (H) 1.20 - 7.70 x10*3/uL    Immature Granulocytes Absolute, Automated 0.17 0.00 - 0.70 x10*3/uL    Lymphocytes Absolute 0.97 (L) 1.20 - 4.80 x10*3/uL    Monocytes Absolute 0.55 0.10 - 1.00 x10*3/uL    Eosinophils Absolute 0.02 0.00 - 0.70 x10*3/uL    Basophils Absolute 0.01 0.00 - 0.10 x10*3/uL   Morphology   Result Value Ref Range    RBC Morphology See Below     Polychromasia Mild     Stomatocytes Few     Basophilic Stippling Present    Magnesium   Result Value Ref Range    Magnesium 2.26 1.60 - 2.40 mg/dL   Hepatic function panel   Result Value Ref Range    Albumin 2.4 (L) 3.4 - 5.0 g/dL    Bilirubin, Total 0.3 0.0 - 1.2 mg/dL    Bilirubin, Direct 0.0 0.0 - 0.3 mg/dL    Alkaline Phosphatase 126 33 - 136 U/L    ALT 9 7 - 45 U/L    AST 14 9 - 39 U/L    Total Protein 5.0 (L) 6.4 - 8.2 g/dL   Calcium, ionized   Result Value Ref Range    POCT Calcium, Ionized 1.12 1.1 - 1.33 mmol/L   Phosphorus   Result Value Ref Range    Phosphorus 2.0 (L) 2.5 - 4.9 mg/dL   Basic Metabolic Panel   Result Value Ref Range    Glucose 125 (H) 74 - 99 mg/dL    Sodium 134 (L) 136 - 145 mmol/L     Potassium 3.9 3.5 - 5.3 mmol/L    Chloride 103 98 - 107 mmol/L    Bicarbonate 27 21 - 32 mmol/L    Anion Gap 8 (L) 10 - 20 mmol/L    Urea Nitrogen 8 6 - 23 mg/dL    Creatinine 0.63 0.50 - 1.05 mg/dL    eGFR >90 >60 mL/min/1.73m*2    Calcium 7.5 (L) 8.6 - 10.3 mg/dL   POCT GLUCOSE   Result Value Ref Range    POCT Glucose 134 (H) 74 - 99 mg/dL   POCT GLUCOSE   Result Value Ref Range    POCT Glucose 168 (H) 74 - 99 mg/dL   POCT GLUCOSE   Result Value Ref Range    POCT Glucose 151 (H) 74 - 99 mg/dL   Magnesium   Result Value Ref Range    Magnesium 2.15 1.60 - 2.40 mg/dL   CBC and Auto Differential   Result Value Ref Range    WBC 10.3 4.4 - 11.3 x10*3/uL    nRBC 0.0 0.0 - 0.0 /100 WBCs    RBC 2.16 (L) 4.00 - 5.20 x10*6/uL    Hemoglobin 6.8 (L) 12.0 - 16.0 g/dL    Hematocrit 21.4 (L) 36.0 - 46.0 %    MCV 99 80 - 100 fL    MCH 31.5 26.0 - 34.0 pg    MCHC 31.8 (L) 32.0 - 36.0 g/dL    RDW 20.3 (H) 11.5 - 14.5 %    Platelets 182 150 - 450 x10*3/uL    Neutrophils % 86.4 40.0 - 80.0 %    Immature Granulocytes %, Automated 1.1 (H) 0.0 - 0.9 %    Lymphocytes % 8.1 13.0 - 44.0 %    Monocytes % 4.0 2.0 - 10.0 %    Eosinophils % 0.2 0.0 - 6.0 %    Basophils % 0.2 0.0 - 2.0 %    Neutrophils Absolute 8.91 (H) 1.20 - 7.70 x10*3/uL    Immature Granulocytes Absolute, Automated 0.11 0.00 - 0.70 x10*3/uL    Lymphocytes Absolute 0.84 (L) 1.20 - 4.80 x10*3/uL    Monocytes Absolute 0.41 0.10 - 1.00 x10*3/uL    Eosinophils Absolute 0.02 0.00 - 0.70 x10*3/uL    Basophils Absolute 0.02 0.00 - 0.10 x10*3/uL   Hepatic function panel   Result Value Ref Range    Albumin 2.3 (L) 3.4 - 5.0 g/dL    Bilirubin, Total 0.3 0.0 - 1.2 mg/dL    Bilirubin, Direct 0.1 0.0 - 0.3 mg/dL    Alkaline Phosphatase 123 33 - 136 U/L    ALT 9 7 - 45 U/L    AST 14 9 - 39 U/L    Total Protein 4.7 (L) 6.4 - 8.2 g/dL   Calcium, ionized   Result Value Ref Range    POCT Calcium, Ionized 1.11 1.1 - 1.33 mmol/L   Phosphorus   Result Value Ref Range    Phosphorus 1.9 (L) 2.5 -  4.9 mg/dL   Basic Metabolic Panel   Result Value Ref Range    Glucose 163 (H) 74 - 99 mg/dL    Sodium 135 (L) 136 - 145 mmol/L    Potassium 4.2 3.5 - 5.3 mmol/L    Chloride 104 98 - 107 mmol/L    Bicarbonate 28 21 - 32 mmol/L    Anion Gap 7 (L) 10 - 20 mmol/L    Urea Nitrogen 7 6 - 23 mg/dL    Creatinine 0.61 0.50 - 1.05 mg/dL    eGFR >90 >60 mL/min/1.73m*2    Calcium 7.2 (L) 8.6 - 10.3 mg/dL   Morphology   Result Value Ref Range    RBC Morphology See Below     Polychromasia Mild    Prepare RBC: 1 Units   Result Value Ref Range    PRODUCT CODE [id number]     Unit Number [id number]     Unit ABO A     Unit RH NEG     XM INTEP COMP     Dispense Status TR     Blood Expiration Date 11/10/2024 11:59:00 PM EST     PRODUCT BLOOD TYPE 0600     UNIT VOLUME 350    POCT GLUCOSE   Result Value Ref Range    POCT Glucose 119 (H) 74 - 99 mg/dL   POCT GLUCOSE   Result Value Ref Range    POCT Glucose 174 (H) 74 - 99 mg/dL   Magnesium   Result Value Ref Range    Magnesium 2.22 1.60 - 2.40 mg/dL   CBC and Auto Differential   Result Value Ref Range    WBC 15.0 (H) 4.4 - 11.3 x10*3/uL    nRBC 0.0 0.0 - 0.0 /100 WBCs    RBC 2.90 (L) 4.00 - 5.20 x10*6/uL    Hemoglobin 8.9 (L) 12.0 - 16.0 g/dL    Hematocrit 27.1 (L) 36.0 - 46.0 %    MCV 93 80 - 100 fL    MCH 30.7 26.0 - 34.0 pg    MCHC 32.8 32.0 - 36.0 g/dL    RDW 20.7 (H) 11.5 - 14.5 %    Platelets 233 150 - 450 x10*3/uL    Neutrophils % 89.0 40.0 - 80.0 %    Immature Granulocytes %, Automated 1.4 (H) 0.0 - 0.9 %    Lymphocytes % 5.3 13.0 - 44.0 %    Monocytes % 3.9 2.0 - 10.0 %    Eosinophils % 0.3 0.0 - 6.0 %    Basophils % 0.1 0.0 - 2.0 %    Neutrophils Absolute 13.30 (H) 1.20 - 7.70 x10*3/uL    Immature Granulocytes Absolute, Automated 0.21 0.00 - 0.70 x10*3/uL    Lymphocytes Absolute 0.80 (L) 1.20 - 4.80 x10*3/uL    Monocytes Absolute 0.58 0.10 - 1.00 x10*3/uL    Eosinophils Absolute 0.05 0.00 - 0.70 x10*3/uL    Basophils Absolute 0.02 0.00 - 0.10 x10*3/uL   Hepatic function panel    Result Value Ref Range    Albumin 2.6 (L) 3.4 - 5.0 g/dL    Bilirubin, Total 0.4 0.0 - 1.2 mg/dL    Bilirubin, Direct 0.2 0.0 - 0.3 mg/dL    Alkaline Phosphatase 142 (H) 33 - 136 U/L    ALT 11 7 - 45 U/L    AST 16 9 - 39 U/L    Total Protein 5.4 (L) 6.4 - 8.2 g/dL   Calcium, ionized   Result Value Ref Range    POCT Calcium, Ionized 1.11 1.1 - 1.33 mmol/L   Phosphorus   Result Value Ref Range    Phosphorus 2.3 (L) 2.5 - 4.9 mg/dL   Basic Metabolic Panel   Result Value Ref Range    Glucose 136 (H) 74 - 99 mg/dL    Sodium 135 (L) 136 - 145 mmol/L    Potassium 4.4 3.5 - 5.3 mmol/L    Chloride 102 98 - 107 mmol/L    Bicarbonate 28 21 - 32 mmol/L    Anion Gap 9 (L) 10 - 20 mmol/L    Urea Nitrogen 11 6 - 23 mg/dL    Creatinine 0.56 0.50 - 1.05 mg/dL    eGFR >90 >60 mL/min/1.73m*2    Calcium 7.7 (L) 8.6 - 10.3 mg/dL   Morphology   Result Value Ref Range    RBC Morphology See Below     Polychromasia Mild    POCT GLUCOSE   Result Value Ref Range    POCT Glucose 130 (H) 74 - 99 mg/dL   POCT GLUCOSE   Result Value Ref Range    POCT Glucose 166 (H) 74 - 99 mg/dL   POCT GLUCOSE   Result Value Ref Range    POCT Glucose 191 (H) 74 - 99 mg/dL   Magnesium   Result Value Ref Range    Magnesium 2.22 1.60 - 2.40 mg/dL   CBC and Auto Differential   Result Value Ref Range    WBC 11.5 (H) 4.4 - 11.3 x10*3/uL    nRBC 0.0 0.0 - 0.0 /100 WBCs    RBC 2.77 (L) 4.00 - 5.20 x10*6/uL    Hemoglobin 8.6 (L) 12.0 - 16.0 g/dL    Hematocrit 26.6 (L) 36.0 - 46.0 %    MCV 96 80 - 100 fL    MCH 31.0 26.0 - 34.0 pg    MCHC 32.3 32.0 - 36.0 g/dL    RDW 21.2 (H) 11.5 - 14.5 %    Platelets 222 150 - 450 x10*3/uL    Neutrophils % 86.0 40.0 - 80.0 %    Immature Granulocytes %, Automated 1.4 (H) 0.0 - 0.9 %    Lymphocytes % 7.6 13.0 - 44.0 %    Monocytes % 4.6 2.0 - 10.0 %    Eosinophils % 0.3 0.0 - 6.0 %    Basophils % 0.1 0.0 - 2.0 %    Neutrophils Absolute 9.87 (H) 1.20 - 7.70 x10*3/uL    Immature Granulocytes Absolute, Automated 0.16 0.00 - 0.70 x10*3/uL     Lymphocytes Absolute 0.87 (L) 1.20 - 4.80 x10*3/uL    Monocytes Absolute 0.53 0.10 - 1.00 x10*3/uL    Eosinophils Absolute 0.03 0.00 - 0.70 x10*3/uL    Basophils Absolute 0.01 0.00 - 0.10 x10*3/uL   Hepatic function panel   Result Value Ref Range    Albumin 2.6 (L) 3.4 - 5.0 g/dL    Bilirubin, Total 0.3 0.0 - 1.2 mg/dL    Bilirubin, Direct 0.0 0.0 - 0.3 mg/dL    Alkaline Phosphatase 160 (H) 33 - 136 U/L    ALT 12 7 - 45 U/L    AST 15 9 - 39 U/L    Total Protein 5.3 (L) 6.4 - 8.2 g/dL   Calcium, ionized   Result Value Ref Range    POCT Calcium, Ionized 1.14 1.1 - 1.33 mmol/L   Phosphorus   Result Value Ref Range    Phosphorus 2.1 (L) 2.5 - 4.9 mg/dL   Basic Metabolic Panel   Result Value Ref Range    Glucose 116 (H) 74 - 99 mg/dL    Sodium 135 (L) 136 - 145 mmol/L    Potassium 4.6 3.5 - 5.3 mmol/L    Chloride 103 98 - 107 mmol/L    Bicarbonate 30 21 - 32 mmol/L    Anion Gap 7 (L) 10 - 20 mmol/L    Urea Nitrogen 14 6 - 23 mg/dL    Creatinine 0.60 0.50 - 1.05 mg/dL    eGFR >90 >60 mL/min/1.73m*2    Calcium 7.6 (L) 8.6 - 10.3 mg/dL   Morphology   Result Value Ref Range    RBC Morphology See Below     Polychromasia Mild     RBC Fragments Few     Ovalocytes Few     Basophilic Stippling Present    Creatine Kinase   Result Value Ref Range    Creatine Kinase 45 0 - 215 U/L   BLOOD GAS ARTERIAL FULL PANEL   Result Value Ref Range    POCT pH, Arterial 7.39 7.38 - 7.42 pH    POCT pCO2, Arterial 47 (H) 38 - 42 mm Hg    POCT pO2, Arterial 105 (H) 85 - 95 mm Hg    POCT SO2, Arterial 99 94 - 100 %    POCT Oxy Hemoglobin, Arterial 95.9 94.0 - 98.0 %    POCT Hematocrit Calculated, Arterial 27.0 (L) 36.0 - 46.0 %    POCT Sodium, Arterial 132 (L) 136 - 145 mmol/L    POCT Potassium, Arterial 4.7 3.5 - 5.3 mmol/L    POCT Chloride, Arterial 102 98 - 107 mmol/L    POCT Ionized Calcium, Arterial 1.14 1.10 - 1.33 mmol/L    POCT Glucose, Arterial 116 (H) 74 - 99 mg/dL    POCT Lactate, Arterial 0.6 0.4 - 2.0 mmol/L    POCT Base Excess,  Arterial 3.0 -2.0 - 3.0 mmol/L    POCT HCO3 Calculated, Arterial 28.5 (H) 22.0 - 26.0 mmol/L    POCT Hemoglobin, Arterial 9.1 (L) 12.0 - 16.0 g/dL    POCT Anion Gap, Arterial 6 (L) 10 - 25 mmo/L    Patient Temperature      FiO2 45 %    Apparatus      Ventilator Mode      Ventilator Rate 20 bpm    Tidal Volume 450 mL    Peep CHM2O 5.0 cm H2O    Site of Arterial Puncture Radial Left     Bill's Test Positive    POCT GLUCOSE   Result Value Ref Range    POCT Glucose 134 (H) 74 - 99 mg/dL     *Note: Due to a large number of results and/or encounters for the requested time period, some results have not been displayed. A complete set of results can be found in Results Review.       Assessment/Plan   Acute kidney injury I will reevaluate renal function and urine output tomorrow morning to see if she will need dialysis therapy  Acute respiratory failure continue with ventilator support managed by the intensive care unit team  Edema improved  Septic shock is off pressors at this time  Pneumonia continue with antibiotic therapy  Diabetes mellitus type 2  Malnutrition continue with tube feeding  Lower back surgery site infection  Anemia transfuse when hemoglobin less than 7  Hypophosphatemia replace phosphorus       Imer Cheung MD

## 2024-10-28 NOTE — PROGRESS NOTES
Physical Therapy                 Therapy Communication Note    Patient Name: Narda Malloy  MRN: [id number]  Department: [address] ICU  Room: 11/11-A  Today's Date: 10/28/2024     Discipline: Physical Therapy    Missed Visit Reason: Missed Visit Reason: Cancel (PT reconsult received. pt currently with strict bedrest until extubated orders. pt on a weaning trial from the ventilator.)    Missed Time: Cancel    Comment: PT eval deferred this date.

## 2024-10-28 NOTE — PROGRESS NOTES
[facility] Critical Care Medicine       Date:  10/28/2024  Patient:  Narda Malloy  YOB: 1955  MRN:  [id number]   Admit Date:  10/12/2024      Chief Complaint   Patient presents with    Altered Mental Status         History of Present Illness:  Narda Malloy is a 69 y.o. year old female patient with Past Medical History of   L1-L3 lumbar laminectomy, T4-S1 revision, and fusion August 26th, T2DM, HTN, essential tremor, HLD, glaucoma, sarcoidosis of the lung who presented to  ED 10/12 after being found essentially unresponsive at her nursing facility [facility]. Per report from her , she has had a significant decline in her health since July 15th when she had a fall and became significantly weak. She has also had multiple infection complications since her back surgery in August requiring multiple I&Ds and long term antibiotic therapy. She went to the OR most recently on 9/28 for lumbar site infection wash out. Per chart review, she was discharged on IV vancomycin 1g and IV ceftriaxone 2d q24hrs through 11/12.     ED Course: Initial vital signs: /104 (109), HR 68, RR 20, SpO2 95% on 6L NC, temp 34.5C. Give 0.4mg of narcan with no improvement in mentation. Lab work-up remarkable for mild hyperkalemia (5.5), AMY 42/1.46, elevated alk phos, normocytic anemia 10.4/33, turbid appearing urine with mild hematuria and proteinuria and + leuk esterase, >50 WBCs. Urine drug screen positive for barbiturates. Triggered sepsis timer so she was given 3L NS. She was intubated for airway protection with 20mg etomidate and 100mg succinylcholine. BP dropped post-intubated and fluid resuscitation and she was subsequently started on levophed.           Interval ICU Events:  10/12: Pt arrived to ICU intubated and lightly sedated on low-dose versed. Eyes open, minimally responsive.      10/13: Received K cocktail last night for K 6.0, corrected appropriately. Levophed requirements mildly up, UOP  decreasing. Ordered albumin x2 this am with improvements in UOP and SBP. Will likely trial lasix this afternoon d/t hypervolemia.     10/14: Remains intubated with decreased mentation, only responsive to noxious stimuli. Trialed lasix TID for volume overload. Remains on levophed 0.01.     10/15: Mentation much improved. SAT/SBT successful so extubated. Given lasix x3, bumex x1, metolazone x1 with net negative fluid balance of 500mL -> started on bumex gtt.      10/16: O2 requirements significantly increased, NRB -> HFNC 40L 100% likely 2/2 mucus plugging.     10/17: No acute events overnight. Bumex gtt increased to 1mg/hr. CXR this am showing complete opacification of left hemithorax related to atelectasis vs pleural effusion. Remains on HFNC 40L/80%. Pigtail catheter placed with 1.2L drained immediately. Given albumin for hypotension.      10/18: ~2L output from left sided pigtail catheter since placement. Kidney function worsening and UOP declining, about 20cc/hr overnight. Will place NG tube today and start enteral nutrition and appetite and oral intake remains poor.      10/19: Patient with worsening hypoxic, hypercapnic respiratory failure - now requiring BIPAP support. Remains grossly volume overloaded with low UO. Started on vasopressors to augment BP for diuresis. 40 IV lasix + gtt started. Remains with poor nutritional status. Will re attempt NG later if respiratory status improves.      10/20: Patient stable on vent. Nephrology consulted for renal failure. Cr continues to uptrend however UO is increasing. No issues overnight.     10/21: No issues overnight. Remains stable on vent. Levo down to 0.01 mcg/kg/min. UO remains low. Nephrology following. Possible CRRT today?     10/22: Patient received 80 lasix and metalozone with minimal UO. Levo @ .02 and prop @ 10. Vent settings: 20/450/10/40%. Plan for CRRT today. Will SAT/SBT after CRRT. May change propofol to precedex.     10/23: Had episodes of bradycardia  where HR went to 30's which resolved with heating the patient. CRRT yesterday with about 1L removed. Currently on 0.03 of levo and 10 of prop. Cont daptomycin and ceftriaxone per ID. CXR improved. SAT/SBT. EP consulted for episodes of bradycardia.     10/24: Bradycardic episodes of HR in 40s. Currently on 0.03 of Levo, 10 of prop and 50 fentanyl. Tolerating CRRT. Place PICC today.     10/25: Did well with the SBT yesterday, plan for another one today. Continue CRRT. Hgb 7.0 this am, still requiting Levo 0.03, will transfuse 1 unit PRBCs. Remove chest tube today.     10/26: Chest tube removed last night, CXR improving, patient appears overall lethargic on sbt/sat, not ready to extubate, will complete 4/4/4 sbt/rest/sbt-> rest today and reassess tomorrow     10/27: Patient failed afternoon SBT, placed on rate overnight, net - 2.5L over previous 24 hours, will place on wean today.    Medical History:  Past Medical History:   Diagnosis Date    Degenerative myopia, bilateral     Diabetic neuropathy (Multi)     Difficult intubation 08/26/2024    Mac 3, grade 3, 1 attempt.  Glidescope/videolaryngoscopy recommended for future attempts.    DM type 2 (diabetes mellitus, type 2) (Multi)     Dry eye syndrome of bilateral lacrimal glands     Essential hypertension     Essential tremor     Glaucoma     Hyperlipidemia     Long term (current) use of insulin (Multi)     Low back pain     PONV (postoperative nausea and vomiting)     Primary open angle glaucoma of both eyes, severe stage     Repeated falls     Sarcoidosis of lung (Multi)     Spinal stenosis, lumbar region without neurogenic claudication     Weakness      Past Surgical History:   Procedure Laterality Date    BLEPHAROPLASTY  07/2022    BREAST SURGERY  05/20/2022    Breast lift    CARPAL TUNNEL RELEASE      CATARACT EXTRACTION W/  INTRAOCULAR LENS IMPLANT Bilateral     OD 08/04/2011 +8.5D,OS 08/04/2011 +8.50D    FOOT SURGERY      INSERTION / REMOVAL CRANIAL DBS GENERATOR       Placed 2017.  Removed 2018. part of wire left in head when everything removed    LUMBAR FUSION      L3-S1    PANRETINAL PHOTOCOAGULATION  2014    THORACIC FUSION  08/26/2024    T4-S1 fusion    VITRECTOMY Right 2013     Medications Prior to Admission   Medication Sig Dispense Refill Last Dose/Taking    acetaminophen (Tylenol) 500 mg tablet Take 2 tablets (1,000 mg) by mouth 3 times a day.   Unknown    ascorbic acid (Vitamin C) 500 mg tablet as directed Orally   Unknown    brimonidine (AlphaGAN) 0.2 % ophthalmic solution Administer 1 drop into both eyes 2 times a day.   Unknown    calcium carbonate-vitamin D3 500 mg-5 mcg (200 unit) tablet Take 1 tablet by mouth once daily.   Unknown    cefTRIAXone (Rocephin) 2 gram/50 mL IV Infuse 50 mL (2 g) at 100 mL/hr over 30 minutes into a venous catheter once every 24 hours. Once weekly labs CBC/diff, CMP, Vanc trough ESR, CRP fax to Dr. Juarez [phone]. Stop date 11/12/24. 1950 mL 0     dextrose 50 % injection Infuse 25 mL (12.5 g) into a venous catheter every 15 minutes if needed (For blood glucose 41 to 70 mg/dL).       dextrose 50 % injection Infuse 50 mL (25 g) into a venous catheter every 15 minutes if needed (For blood glucose less than or equal to 40 mg/dL).       docusate sodium (Colace) 100 mg capsule Take 1 capsule (100 mg) by mouth 2 times a day.   Unknown    ezetimibe (Zetia) 10 mg tablet Take 1 tablet (10 mg) by mouth once daily. 90 tablet 3 Unknown    FreeStyle Lite Strips strip USE TO TEST 3 TIMES A DAY AS DIRECTED 300 each 2     glucagon (Glucagen) 1 mg injection Inject 1 mg into the muscle every 15 minutes if needed for low blood sugar - see comments (Hypoglycemia).       glucagon (Glucagen) 1 mg injection Inject 1 mg into the muscle every 15 minutes if needed for low blood sugar - see comments (Hypoglycemia).       heparin sodium,porcine (heparin, porcine,) 5,000 unit/mL injection Inject 1 mL (5,000 Units) under the skin every 8 hours.       insulin  lispro (HumaLOG) 100 unit/mL injection Inject 0-15 Units under the skin 3 times daily (morning, midday, late afternoon). Take as directed per insulin instructions.Do not hold when patient is not eating, continue order as scheduled for hyperglycemia management.  Insulin Lispro Corrective Scale #3     Hypoglycemia protocol Call LIP unit(s) if Blood Glucose is between 0 - 70 mg/dL     0 unit(s) if Blood glucose is between    3 unit(s) if Blood glucose is between 151-200   6 unit(s) if Blood glucose is between 201-250   9 unit(s) if Blood glucose is between 251-300   12 unit(s) if Blood glucose is between 301-350   15 unit(s) if Blood glucose is between 351-400    Notify provider unit(s) if Blood Glucose is greater than 400 mg/dL       Lactobacillus acidophilus 100 mg (1 billion cell) capsule Take 1 capsule by mouth 2 times a day.   Unknown    latanoprost (Xalatan) 0.005 % ophthalmic solution Administer 1 drop into both eyes once daily at bedtime. 2.5 mL 5 Unknown    melatonin 5 mg tablet Take 1 tablet (5 mg) by mouth as needed at bedtime for sleep.   Unknown    methocarbamol (Robaxin) 500 mg tablet Take 1 tablet (500 mg) by mouth 3 times a day.   Unknown    multivitamin tablet Take 1 tablet by mouth once daily.   Unknown    ondansetron (Zofran) 4 mg/2 mL injection Infuse 2 mL (4 mg) into a venous catheter every 6 hours if needed for nausea or vomiting.       oxyCODONE (Roxicodone) 5 mg immediate release tablet Take 1 tablet (5 mg) by mouth every 6 hours if needed for severe pain (7 - 10) or moderate pain (4 - 6).       oxygen (O2) gas therapy Inhale 1 each continuously.       pantoprazole (ProtoNix) 40 mg EC tablet Take 1 tablet (40 mg) by mouth once daily in the morning. Take before meals. Do not crush, chew, or split.       pantoprazole (ProtoNix) 40 mg injection Infuse 40 mg into a venous catheter once daily in the morning. Take before meals. If unable to take PO.       pen needle, diabetic (PEN NEEDLE MISC)  "BD Altagracia- 4 mm X 32 G needle - as directed 4x a day sc 4 times per day       polyethylene glycol (Glycolax, Miralax) 17 gram packet Take 17 g by mouth once daily.   Unknown    primidone 125 mg tablet Take 125 mg by mouth 3 times a day.   Unknown    propranolol LA (Inderal LA) 60 mg 24 hr capsule Take 1 capsule (60 mg) by mouth early in the morning.. Hold for SBP < 110 mmhg, HR < 60 bpm.   Unknown    sennosides (Senokot) 8.6 mg tablet Take 1 tablet (8.6 mg) by mouth every 12 hours if needed for constipation.   Unknown    Sure Comfort Pen Needle 32 gauge x 5/32\" needle AS DIRECTED DAILY FOR 90 DAYS 100 each 11 Unknown    traZODone (Desyrel) 25 MG split tablet Take 1 half tablet (25 mg) by mouth once daily at bedtime.   Unknown    vancomycin (Vancocin) 1 gram/250 mL solution Infuse 250 mL (1 g) at 250 mL/hr over 60 minutes into a venous catheter every 12 hours. Once weekly labs CBC/diff, CMP, Vanc trough ESR, CRP fax to Dr. Juarez [phone]. Stop date 11/12/24. 79425 mL 0      Erythromycin, Morphine, and Rosuvastatin  Social History     Tobacco Use    Smoking status: Former     Types: Cigarettes     Passive exposure: Past    Smokeless tobacco: Never   Vaping Use    Vaping status: Never Used   Substance Use Topics    Alcohol use: Not Currently    Drug use: Not Currently     Family History   Problem Relation Name Age of Onset    Multiple myeloma Mother      Cancer Mother      Other (CABG) Father      Pulmonary embolism Father      Heart disease Father      Breast cancer Sister          Stage II    Hypertension Sister      Diabetes Sister      No Known Problems Sister          x5    No Known Problems Brother          x4    No Known Problems Daughter         Hospital Medications:    norepinephrine, 0-0.5 mcg/kg/min, Last Rate: Stopped (10/27/24 7186)  PrismaSol 4/2.5, 25 mL/kg/hr, Last Rate: 25 mL/kg/hr (10/28/24 0243)  propofol, 0-50 mcg/kg/min, Last Rate: Stopped (10/27/24 0811)          Current Facility-Administered " Medications:     acetaminophen (Tylenol) tablet 975 mg, 975 mg, oral, q6h PRN, Kaleb Lam PA-C, 975 mg at 10/18/24 1405    alteplase (Cathflo Activase) injection 2 mg, 2 mg, intra-catheter, PRN, Kaleb Lam PA-C    alteplase (Cathflo Activase) injection 2 mg, 2 mg, intra-catheter, PRN, Andrew Malagon MD    brimonidine (AlphaGAN) 0.2 % ophthalmic solution 1 drop, 1 drop, Both Eyes, BID, Kaleb Lam PA-C, 1 drop at 10/27/24 2127    [Held by provider] calcium carbonate-vitamin D3 500 mg-5 mcg (200 unit) per tablet 1 tablet, 1 tablet, oral, Daily, Kaleb Lam PA-C, 1 tablet at 10/18/24 0930    cefTRIAXone (Rocephin) 2 g in dextrose (iso) IV 50 mL, 2 g, intravenous, q24h, Kaleb Lam PA-C, Stopped at 10/27/24 0922    DAPTOmycin (Cubicin) 700 mg in sodium chloride 0.9%  mL, 700 mg, intravenous, Daily, Andrew Malagon MD, Stopped at 10/27/24 0957    dextrose 50 % injection 12.5 g, 12.5 g, intravenous, q15 min PRN, Kaleb Lam PA-C    dextrose 50 % injection 25 g, 25 g, intravenous, q15 min PRN, Kaleb Lam PA-C    ezetimibe (Zetia) tablet 10 mg, 10 mg, oral, Daily, Kaleb Lam PA-C, 10 mg at 10/27/24 1045    fentaNYL PF (Sublimaze) injection 25 mcg, 25 mcg, intravenous, q2h PRN, RUTH Doe    ferrous sulfate syrup 60 mg of iron, 60 mg of iron, oral, Daily, RUTH Doe, 60 mg of iron at 10/27/24 1045    folic acid (Folvite) tablet 1 mg, 1 mg, oral, Daily, RUTH Doe, 1 mg at 10/27/24 0847    glucagon (Glucagen) injection 1 mg, 1 mg, intramuscular, q15 min PRN, Kaleb Lam PA-C    glucagon (Glucagen) injection 1 mg, 1 mg, intramuscular, q15 min PRN, Kaleb Lam PA-C    heparin (porcine) injection 5,000 Units, 5,000 Units, subcutaneous, q8h, Kaleb Lam PA-C, 5,000 Units at 10/28/24 0533    heparin 1,000 unit/mL injection 1,000 Units, 1,000 Units, intra-catheter, PRN, Nelia Cheung MD, 1,000 Units at 10/28/24 0537     heparin 1,000 unit/mL injection 1,000 Units, 1,000 Units, intra-catheter, PRN, Nelia Cheung MD, 1,000 Units at 10/28/24 0538    heparin flush 10 unit/mL syringe 50 Units, 50 Units, intra-catheter, PRN, Kaleb Lam PA-C, 50 Units at 10/19/24 2313    honey (Medihoney) topical gel, , Topical, Daily, RUTH Salgado, Given at 10/27/24 0848    hydrocortisone sodium succinate (PF) (Solu-CORTEF) injection 100 mg, 100 mg, intravenous, q12h, RUTH Salgado, 100 mg at 10/28/24 0444    [Held by provider] HYDROmorphone (Dilaudid) injection 0.4 mg, 0.4 mg, intravenous, q2h PRN, RUTH Salgado, 0.4 mg at 10/19/24 0520    insulin lispro (HumaLOG) injection 0-15 Units, 0-15 Units, subcutaneous, q4h, Lb Dodd PA-C, 3 Units at 10/28/24 0002    ipratropium-albuteroL (Duo-Neb) 0.5-2.5 mg/3 mL nebulizer solution 3 mL, 3 mL, nebulization, 4x daily, Kaleb Lam PA-C, 3 mL at 10/28/24 0705    latanoprost (Xalatan) 0.005 % ophthalmic solution 1 drop, 1 drop, Both Eyes, Nightly, Kaleb Lam PA-C, 1 drop at 10/27/24 2127    midodrine (Proamatine) tablet 10 mg, 10 mg, oral, q8h, RUTH Doe, 10 mg at 10/28/24 0157    norepinephrine (Levophed) 8 mg in dextrose 5% 250 mL (0.032 mg/mL) infusion (premix), 0-0.5 mcg/kg/min, intravenous, Continuous, RUTH Doe, Stopped at 10/27/24 1726    oxyCODONE (Roxicodone) immediate release tablet 5 mg, 5 mg, oral, q4h LUCA, RUTH Deo, 5 mg at 10/28/24 0514    oxygen (O2) therapy, , inhalation, Continuous - Inhalation, Radha Otero MD    pantoprazole (ProtoNix) EC tablet 40 mg, 40 mg, oral, Daily before breakfast **OR** pantoprazole (ProtoNix) injection 40 mg, 40 mg, intravenous, Daily before breakfast, RUTH Salas, 40 mg at 10/28/24 0514    polyethylene glycol (Glycolax, Miralax) packet 17 g, 17 g, oral, Daily PRN, RUTH Doe    potassium, sodium phosphates (Phos-NaK) 280-160250  mg packet 2 packet, 2 packet, oral, With meals & nightly, Sabino FRENCH RUTH Matos, 2 packet at 10/27/24 2127    primidone (Mysoline) tablet 125 mg, 125 mg, oral, Nightly, Kaleb Lam PA-C, 125 mg at 10/27/24 2127    PrismaSol 4/2.5 CRRT solution, 25 mL/kg/hr, CRRT, Continuous, Nelia Cheung MD, Last Rate: 3,150 mL/hr at 10/28/24 0243, 25 mL/kg/hr at 10/28/24 0243    propofol (Diprivan) infusion, 0-50 mcg/kg/min, intravenous, Continuous, RUTH Salas, Stopped at 10/27/24 0811    [Held by provider] propranolol LA (Inderal LA) 24 hr capsule 60 mg, 60 mg, oral, Daily, Kaleb Lam PA-C, 60 mg at 10/19/24 0643    sennosides-docusate sodium (Lucia-Colace) 8.6-50 mg per tablet 1 tablet, 1 tablet, oral, Nightly, RUTH Doe, 1 tablet at 10/27/24 2127    sodium chloride 3 % nebulizer solution 3 mL, 3 mL, nebulization, 4x daily, Kaleb Lam PA-C, 3 mL at 10/28/24 0705    [Held by provider] traMADol (Ultram) tablet 50 mg, 50 mg, oral, q8h PRN, Kaleb Lam PA-C    Review of Systems:  14 point review of systems was completed and negative except for those specially mention in my HPI    Physical Exam:    Heart Rate:  []   Temp:  [35.9 °C (96.6 °F)-36.7 °C (98.1 °F)]   Resp:  [16-29]   BP: ()/()   Weight:  [115 kg (253 lb 12 oz)-116 kg (256 lb 2.8 oz)]   SpO2:  [92 %-100 %]     Physical Exam  Constitutional:       Interventions: She is intubated and restrained.   HENT:      Mouth/Throat:      Mouth: Mucous membranes are moist.      Pharynx: Oropharynx is clear.   Eyes:      Pupils: Pupils are equal, round, and reactive to light.   Cardiovascular:      Rate and Rhythm: Normal rate and regular rhythm.      Pulses: Normal pulses.   Pulmonary:      Effort: Pulmonary effort is normal. She is intubated.   Abdominal:      General: Abdomen is flat.      Palpations: Abdomen is soft.   Musculoskeletal:         General: Normal range of motion.      Cervical back: Normal range of  motion.   Skin:     General: Skin is warm and dry.      Capillary Refill: Capillary refill takes less than 2 seconds.      Comments: See media for back incision and wounds   Neurological:      General: No focal deficit present.      Mental Status: She is alert and oriented to person, place, and time. Mental status is at baseline.   Psychiatric:         Mood and Affect: Mood normal.         Objective:    I have reviewed all medications, laboratory results, and imaging pertinent for today's encounter.    Vent Mode: Pressure support  FiO2 (%):  [40 %-45 %] 40 %  S RR:  [20] 20  S VT:  [450 mL] 450 mL  PEEP/CPAP (cm H2O):  [5 cm H20] 5 cm H20  [place] SUP:  [5 cm H20] 5 cm H20  MAP (cm H2O):  [6.5-11] 11      Intake/Output Summary (Last 24 hours) at 10/28/2024 0804  Last data filed at 10/28/2024 0600  Gross per 24 hour   Intake 2282.22 ml   Output 4232 ml   Net -1949.78 ml         Assessment/Plan:    I am currently managing this critically ill patient for the following problems:    Plan:  Neuro/Psych/Pain Ctrl/Sedation:   Acute encephalopathy - likely secondary to hypercapnia, infection, resolving   Hx Essential tremor   CT head: Negative for acute findings   Urine tox positive for barbiturates consistent with primidone intake   - Addressing underlying causes  - Pain Control: Oxycodone Q4 scheduled, Acetaminophen PRN  - Sedation: Propofol off for 24 hours  - Home primidone continued. Will hold propanolol d/t hypotension  - CAM ICU qshift, sleep-wake hygiene, delirium precautions   - SAT/SBT Daily     Respiratory/ENT:  Acute hypoxic/hypercapnic respiratory failure - likely multifactorial and 2/2 HCAP, pl effusions, volume overload  Healthcare-associated pneumonia   Atelectasis - likely 2/2 mucus plugging   Pleural Effusion -S/p left-sided pigtail placement 10/17, removed 10/25  CXR Today: Improvement in L pleural effusion from post ct removal cxr  Intubated for 3 days extubated and re intubated on the 19th, currently day 9 of  intubation, will need trach conversation if unable to extubate in the next few days  - Intubated 10/18. Vent setting: -20-5 30%  - Will wean O2 as tolerated to maintain SpO2 >92%  - Duonebs Q4 and hypertonic saline QID   - IPV per RT TID  - Continuous pulse ox monitoring   - Pulm hygiene  - Will hold off on extubation due to generalized weakness, will repeat SBT in afternoon     Cardiovascular:   Septic shock - improving  Hx HTN, HLD  Acute on chronic diastolic heart failure  Bradycardia  TTE 10/1: diastolic dysfunction, LVEF 50-55%, mildly elevated RVSP (40)  Bilateral duplex US upper extremities:  Thrombosis within the left cephalic vein, which is part of the superficial venous system of the upper extremity, adjacent to PICC line. No deep venous thrombosis in the upper extremities.  - Remains off levophed gtt, Will maintain goal MAP > 65   - Start midodrine increased from bid to q8  - Continue stress dose steroids (10/21-...), reduced from 100 to 50 bid today  - Holding home propranolol (on for tremors)  - Continue home Zetia  - Continuous cardiac monitoring per ICU protocol  - EKGs PRN for ACS symptoms, arrhythmias   - EP consulted will appreciate recs     GI:  Hx GERD  - Diet: Tolerating TF @ goal  - BR: Colace, Miralax PRN  - GI Prophylaxis: PPI     Renal/Volume Status/Electrolytes:  Acute kidney injury - possibly ATN +/- medication-induced (vancomycin)  Anasarca   Mixed respiratory and metabolic acidosis - improving on vent  Hypoalbuminemia   Hyponatremia  Baseline Cr 0.8-1.0. BUN Cr 0.78/12 today  - CRRT on hold awaiting machine  - Remains grossly volume overloaded  - Oliguric  - Per nephrology continue CRRT as long as she is on pressors  - Nephrology following  - Maintain anders catheter  - Hourly I/O's  - Replete electrolytes to maintain K >4.0 and Mg >2.0  - Daily BMP, Mg, Phos  - Negative 2.5L / 24hrs  - PO phos placed to reduce IV repletion needs    Endocrine:  T2DM  - Serum Cortisol elevated  - SSI  "Q 6 while NPO  - TSH: midly elevated, T4 WNL  - Hypoglycemia protocol PRN      Infectious Disease:  Thoracolumbar surgical site infection - s/p I&D x 2. Recent MRSA bacteremia on extended course of IV antibiotics (vanc and rocephin -> 11/12)  Healthcare-associated pneumonia   CT lumbar spine 10/12: Unable to r/o abscess. Unable to do MRI d/t spinal hardware, \"metal in head\" per patient  Sputum Culture 10/21: negative  Blood cultures 10/16: Negative  Urine culture 10/14: Negative  Sputum culture 10/16: + pseudomonas   - ID following  - Continue Ceftriaxone (10/23-...)  - Continue Daptomycin (10/21-...)  - Cefepime completed on 10/22  - Vanco discontinued d/t continued high levels   - PICC placed 10/24  - Monitor SIRS criteria  - WBC: stable 10-12  - Pro jasmyne pending     Heme/Onc:  Normocytic anemia   H/H similar to previous: No active signs of bleeding.  - Start iron and folate  - Monitor for s/sx of bleeding   - Plan to transfuse if Hgb <7.0   - Daily CBC     MSK:  No active concerns   - PT/OT when appropriate  - 10/27 Sat up on side of bed for 5 minutes, performed legs kicks, no problems with stability of vitals during process     Derm:  Surgical wound with infection  - Wound care consult  - ICU skin protocol  - Q2hr turns  - Padded pressure points      Ethics/Code Status:  Full code - discussions with  at bedside      :  DVT Prophylaxis: SQH  GI Prophylaxis: PPI  Bowel Regimen: Colace (home med), Miralax  Diet: TF  CVC: PICC placed 10/24, Trialysis R internal jugular placed 10/19  Wanda: none  Gibson: yes, replaced 10/12  Restraints: Yes  Disposition: ICU    Critical Care Time:  45 minutes spent in preparing to see patient (I.e. review of medical records), evaluation of diagnostics (I.e. labs, imaging, etc.), documentation, discussing plan of care with patient/ family/ caregiver, and/ or coordination of care with multidisciplinary team. Time does not include completion of procedure time. "       Sabino Matos, APRN-CNP

## 2024-10-28 NOTE — PROGRESS NOTES
Physical Therapy    Physical Therapy Evaluation & Treatment    Patient Name: Narda Malloy  MRN: [id number]  Department: [address] ICU  Room: 11/11-A  Today's Date: 10/28/2024   Time Calculation  Start Time: 1302  Stop Time: 1340  Time Calculation (min): 38 min    Assessment/Plan   PT Assessment  PT Assessment Results: Decreased strength, Decreased range of motion, Decreased endurance, Impaired balance, Decreased mobility, Decreased coordination, Impaired judgement, Decreased cognition, Decreased safety awareness, Impaired tone, Impaired sensation, Decreased skin integrity, Orthopedic restrictions  Rehab Prognosis: Good  Barriers to Discharge: assist of 2 persons for mobility, estreme weakness  Evaluation/Treatment Tolerance: Patient limited by fatigue, Patient limited by pain  Medical Staff Made Aware: Yes  Strengths: Rehab experience  Barriers to Participation: Comorbidities  End of Session Communication: Bedside nurse  Assessment Comment: pt with decreased functional mobility, increased wekaness, impaired tolerance to activity, and poor safety awareness. pt is functioning far below her baseline and will benefit from continued skilled therapy services to improve her functional performance and maximize safety.  End of Session Patient Position: Bed, 3 rail up, Alarm off, caregiver present (call button by left hand, B soft wrist restraints on, B SCDs and PRAFOs in place, B UEs elevated on pillows. HOB elevated 40 degrees.)   IP OR SWING BED PT PLAN  Inpatient or Swing Bed: Inpatient  PT Plan  Treatment/Interventions: Bed mobility, Transfer training, Balance training, Strengthening, Endurance training, Range of motion, Therapeutic exercise, Therapeutic activity  PT Plan: Ongoing PT  PT Frequency: 6 times per week  PT Discharge Recommendations: Moderate intensity level of continued care  Equipment Recommended upon Discharge: Wheelchair  PT Recommended Transfer Status: Total assist  PT - OK to Discharge:  Yes      Subjective     General Visit Information:  General  Reason for Referral: Impaired Mobiltiy  Referred By: Sabino Matos, APRN-CNP  Past Medical History Relevant to Rehab: L1-3 lumbar lami, T4-S1 revision/fusion, DM, HTN, essential tremor, glaucoma, sarcoidosis, arthritis, anxiety, depression  Missed Visit: No  Missed Visit Reason: Other (Comment)  Family/Caregiver Present: No  Co-Treatment: OT  Co-Treatment Reason: patient/caregiver safety and optimization of patient outcomes for a medically complex intiial evaluation in ICU  Prior to Session Communication: Bedside nurse  Patient Position Received: Bed, 3 rail up, Alarm off, caregiver present  Preferred Learning Style: verbal  General Comment: 69 y.o. female admitted from SNF unresponsive. pt with several spine surgeries/washouts for infections since July 2024.  pt intubated 10/12 and extubated 10/16. received chest tube 10/17, reintubated 10/20, chest tube removed 10/25. pt has been treated for hyperkalemia, anemia, low UO, hypotension, and kidney failur. pt had CRRT and is not going to receive hemodialysis,.  Home Living:  Home Living  Type of Home: House  Lives With: Spouse  Home Adaptive Equipment: Walker rolling or standard  Home Layout: One level  Home Access: Other (Comment) (2 step entry with 1 railing)  Bathroom Shower/Tub: Walk-in shower  Bathroom Toilet: Standard  Bathroom Equipment: Shower chair with back  Home Living Comments: pt admitted from [facility]  Prior Level of Function:  Prior Function Per Pt/Caregiver Report  Level of [place]: Needs assistance with ADLs, Needs assistance with homemaking, Needs assistance with functional transfers  Receives Help From: Other (Comment) (care staff)  ADL Assistance:  (Independent prior to July 2024, assist needed for bathing/dressing since back surgery)  Homemaking Assistance:  (spouse performs all IADLs)  Ambulatory Assistance:  (Modified independent with rolling walker prior to back  surgery)  Vocational: Retired  Hand Dominance: Right  Prior Function Comments: Pt was independent prior to her recent spine surgery with multiple admits to the hospital and SNF.  Precautions:  Precautions  Hearing/Visual Limitations: h/o visual deficits, mildly [place]  Medical Precautions: Fall precautions, Oxygen therapy device and L/min, Spinal precautions (ventilator, FiO2 45%)    Vital Signs Comment: PRE PT: HR 81 bom, RR 26 breaths/min, 96%, /48 (64) mmHg, DURING PT: BP seated /66 (84) mmHg. POST PT: /79 (91) mmHg    Objective   Pain:  Pain Assessment  Pain Assessment: FLACC (Face, Legs, Activity, Cry, Consolability)  0-10 (Numeric) Pain Score: 4  Pain Type: Chronic pain  Pain Location: Back  Pain Orientation: Upper, Mid, Lower  Pain Interventions: Repositioned  Response to Interventions: no pain at rest  Cognition:  Cognition  Overall Cognitive Status: Unable to assess (pt remains on the ventilator, no sedation)  Orientation Level: Other (Comment) (responds to name and yes/no questions)  Following Commands: Follows one step commands with repetition  Cognition Comments: remains on the ventilator however is following commands and able to nod head shake and shake head no approriately to answer questions    General Assessments:  General Observation  General Observation: B UE/LE edema, periarea not visualized, ET tube, ventilator, tele, urinary catheter, NG tube present. bandage along spine dry and intact.    Activity Tolerance  Endurance: Decreased tolerance for upright activites  Activity Tolerance Comments: fair-  Rate of Perceived Exertion (RPE): 6    Sensation  Light Touch: Partial deficits in the RUE, Partial deficits in the LUE, Partial deficits in the RLE, Partial deficits in the LLE  Sensation Comment: decreased gross sensation all 4 extremeties    Strength  Strength Comments: very weak, B ankles at least 2/5, knees 2-/5, hips 1+/5 based on function  Strength  Strength Comments: very weak,  B ankles at least 2/5, knees 2-/5, hips 1+/5 based on function    Coordination  Coordination Comment: poor movement quality, + weakness, slight delay in response to requests    Postural Control  Posture Comment: tends to keep cervical spine extended when sitting EOB    Static Sitting Balance  Static Sitting-Balance Support: Bilateral upper extremity supported, Feet supported  Static Sitting-Level of Assistance: Maximum assistance  Static Sitting-Comment/Number of Minutes: sat EOB x 9 minutes with max A for midline sitting, one for hand positioning, one with posterior support, RN present to manage ventilator. verbal reassusrnace provided to patient throughout.  Functional Assessments:  ADL  ADL's Addressed:  (dependent for hygiene)    Bed Mobility  Bed Mobility: Yes  Bed Mobility 1  Bed Mobility 1: Supine to sitting  Level of Assistance 1: Maximum assistance, +2, +1 to manage equipment  Bed Mobility Comments 1: max A for B LEs and trunk management to sit on EOB, HOB elevated and use fo draw sheet to square hips at EOB and allow B feet to touch the floor.  Bed Mobility 2  Bed Mobility  2: Sitting to supine  Level of Assistance 2: Maximum assistance, +2, +1 to manage equipment  Bed Mobility Comments 2: max A x 2  for trunk and B LE management to return to supine.  Bed Mobility 3  Bed Mobility 3: Scooting  Level of Assistance 3: Dependent, +2, +1 to manage equipment  Bed Mobility Comments 3: dependent x 2 assist to boost to HOB using the draw sheet with bed in trendelenberg.  Bed Mobility 4  Bed Mobility 4: Rolling right, Rolling left  Level of Assistance 4: Maximum assistance, +2  Bed Mobility Comments 4: max A to guide LEs over and rotate trunk, max A to help reach UE across body to the bed rail, pt able to hold onto bed rail and assist in sidelying.    Transfers  Transfer: No    Ambulation/Gait Training  Ambulation/Gait Training Performed: No    Stairs  Stairs: No  Extremity/Trunk Assessments:  RLE   RLE :   (decreased full ROM at end ranges, +weakness, proximal>distal, pitting edema, redness distally)  LLE   LLE :  (decreased full ROM at end ranges, +weakness, proximal>distal, pitting edema, redness distally)  Treatments:  Therapeutic Exercise  Therapeutic Exercise Performed:  (B LE ROM x 10 reps/AAROM: AP, heel slides, SAQ)    Therapeutic Activity  Therapeutic Activity Performed:  (see balance assessment and bed mobiltiy for details of treatment.)  Outcome Measures:  [facility] Basic Mobility  Turning from your back to your side while in a flat bed without using bedrails: Total  Moving from lying on your back to sitting on the side of a flat bed without using bedrails: Total  Moving to and from bed to chair (including a wheelchair): Total  Standing up from a chair using your arms (e.g. wheelchair or bedside chair): Total  To walk in hospital room: Total  Climbing 3-5 steps with railing: Total  Basic Mobility - Total Score: 6    FSS-ICU  Ambulation: Unable to attempt due to weakness  Rolling: Total assistance (performs 25% or requires another person)  Sitting: Total assistance (performs 25% or requires another person)  Transfer Sit-to-Stand: Unable to perform  Transfer Supine-to-Sit: Total assistance (performs 25% or requires another person)  Total Score: 3    Encounter Problems       Encounter Problems (Active)       PT STG Problem       Patient will roll right and left with minimal assist to facilitate mobility. (Progressing)       Start:  10/28/24    Expected End:  11/25/24            Patient will transfer supine to sit and sit to supine with moderate assist to facilitate mobility. (Progressing)       Start:  10/28/24    Expected End:  11/25/24            Patient will transfer sit to stand and stand to sit with maximal assist to facilitate mobility. (Not Progressing)       Start:  10/28/24    Expected End:  11/25/24            Patient will transfer bed to chair and chair to bed with maximal assist to facilitate mobility.  (Not Progressing)       Start:  10/28/24    Expected End:  11/25/24            Patient will increase strength in B LEs by half grade throughout to improve functional mobility.  (Progressing)       Start:  10/28/24    Expected End:  11/25/24            Patient will tolerate 15 minutes of sitting on EOB to improve ability to perform functional transfers. (Progressing)       Start:  10/28/24    Expected End:  11/25/24                   Education Documentation  Precautions, taught by Kisha Hinton, PT at 10/28/2024  3:50 PM.  Learner: Patient  Readiness: Acceptance  Method: Explanation  Response: Needs Reinforcement    Body Mechanics, taught by Kisha Hinton, PT at 10/28/2024  3:50 PM.  Learner: Patient  Readiness: Acceptance  Method: Explanation  Response: Needs Reinforcement    Mobility Training, taught by Kisha Hinton, PT at 10/28/2024  3:50 PM.  Learner: Patient  Readiness: Acceptance  Method: Explanation  Response: Needs Reinforcement    Education Comments  No comments found.

## 2024-10-28 NOTE — NURSING NOTE
Upon rounding right internal jugular [place] with current CHG dressing dry and intact.  Dual lumen PICC in left upper arm also with current CHG dressing dry and intact. One port in use, one with brisk blood return and flushes easily, Curos cap intact.

## 2024-10-29 ENCOUNTER — APPOINTMENT (OUTPATIENT)
Dept: CARDIOLOGY | Facility: HOSPITAL | Age: 69
End: 2024-10-29
Payer: MEDICARE

## 2024-10-29 ENCOUNTER — APPOINTMENT (OUTPATIENT)
Dept: RADIOLOGY | Facility: HOSPITAL | Age: 69
End: 2024-10-29
Payer: MEDICARE

## 2024-10-29 PROBLEM — M79.89 PAIN AND SWELLING OF UPPER EXTREMITY, LEFT: Status: ACTIVE | Noted: 2024-10-29

## 2024-10-29 PROBLEM — M79.602 PAIN AND SWELLING OF UPPER EXTREMITY, LEFT: Status: ACTIVE | Noted: 2024-10-29

## 2024-10-29 LAB
ABO GROUP (TYPE) IN BLOOD: NORMAL
ALBUMIN SERPL BCP-MCNC: 2.4 G/DL (ref 3.4–5)
ALBUMIN SERPL BCP-MCNC: 2.4 G/DL (ref 3.4–5)
ALP SERPL-CCNC: 141 U/L (ref 33–136)
ALP SERPL-CCNC: 144 U/L (ref 33–136)
ALT SERPL W P-5'-P-CCNC: 11 U/L (ref 7–45)
ALT SERPL W P-5'-P-CCNC: 11 U/L (ref 7–45)
ANION GAP SERPL CALCULATED.3IONS-SCNC: 10 MMOL/L (ref 10–20)
ANION GAP SERPL CALCULATED.3IONS-SCNC: 11 MMOL/L (ref 10–20)
ANTIBODY SCREEN: NORMAL
APTT PPP: 30.9 SECONDS (ref 22–32.5)
AST SERPL W P-5'-P-CCNC: 14 U/L (ref 9–39)
AST SERPL W P-5'-P-CCNC: 14 U/L (ref 9–39)
BASO STIPL BLD QL SMEAR: PRESENT
BASOPHILS # BLD AUTO: 0.01 X10*3/UL (ref 0–0.1)
BASOPHILS # BLD AUTO: 0.01 X10*3/UL (ref 0–0.1)
BASOPHILS NFR BLD AUTO: 0.1 %
BASOPHILS NFR BLD AUTO: 0.1 %
BILIRUB DIRECT SERPL-MCNC: 0.1 MG/DL (ref 0–0.3)
BILIRUB DIRECT SERPL-MCNC: 0.2 MG/DL (ref 0–0.3)
BILIRUB SERPL-MCNC: 0.4 MG/DL (ref 0–1.2)
BILIRUB SERPL-MCNC: 0.4 MG/DL (ref 0–1.2)
BUN SERPL-MCNC: 34 MG/DL (ref 6–23)
BUN SERPL-MCNC: 40 MG/DL (ref 6–23)
BURR CELLS BLD QL SMEAR: NORMAL
CA-I BLD-SCNC: 1.13 MMOL/L (ref 1.1–1.33)
CA-I BLD-SCNC: 1.17 MMOL/L (ref 1.1–1.33)
CALCIUM SERPL-MCNC: 7.7 MG/DL (ref 8.6–10.3)
CALCIUM SERPL-MCNC: 7.9 MG/DL (ref 8.6–10.3)
CHLORIDE SERPL-SCNC: 100 MMOL/L (ref 98–107)
CHLORIDE SERPL-SCNC: 101 MMOL/L (ref 98–107)
CO2 SERPL-SCNC: 27 MMOL/L (ref 21–32)
CO2 SERPL-SCNC: 28 MMOL/L (ref 21–32)
CREAT SERPL-MCNC: 1.25 MG/DL (ref 0.5–1.05)
CREAT SERPL-MCNC: 1.53 MG/DL (ref 0.5–1.05)
EGFRCR SERPLBLD CKD-EPI 2021: 37 ML/MIN/1.73M*2
EGFRCR SERPLBLD CKD-EPI 2021: 47 ML/MIN/1.73M*2
EOSINOPHIL # BLD AUTO: 0.02 X10*3/UL (ref 0–0.7)
EOSINOPHIL # BLD AUTO: 0.03 X10*3/UL (ref 0–0.7)
EOSINOPHIL NFR BLD AUTO: 0.3 %
EOSINOPHIL NFR BLD AUTO: 0.3 %
ERYTHROCYTE [DISTWIDTH] IN BLOOD BY AUTOMATED COUNT: 20.7 % (ref 11.5–14.5)
ERYTHROCYTE [DISTWIDTH] IN BLOOD BY AUTOMATED COUNT: 21.4 % (ref 11.5–14.5)
GLUCOSE BLD MANUAL STRIP-MCNC: 136 MG/DL (ref 74–99)
GLUCOSE BLD MANUAL STRIP-MCNC: 138 MG/DL (ref 74–99)
GLUCOSE BLD MANUAL STRIP-MCNC: 142 MG/DL (ref 74–99)
GLUCOSE BLD MANUAL STRIP-MCNC: 149 MG/DL (ref 74–99)
GLUCOSE BLD MANUAL STRIP-MCNC: 157 MG/DL (ref 74–99)
GLUCOSE BLD MANUAL STRIP-MCNC: 171 MG/DL (ref 74–99)
GLUCOSE SERPL-MCNC: 133 MG/DL (ref 74–99)
GLUCOSE SERPL-MCNC: 164 MG/DL (ref 74–99)
HBV SURFACE AB SER-ACNC: 6.4 MIU/ML
HBV SURFACE AG SERPL QL IA: NONREACTIVE
HCT VFR BLD AUTO: 23.3 % (ref 36–46)
HCT VFR BLD AUTO: 23.6 % (ref 36–46)
HGB BLD-MCNC: 7.6 G/DL (ref 12–16)
HGB BLD-MCNC: 7.6 G/DL (ref 12–16)
IMM GRANULOCYTES # BLD AUTO: 0.1 X10*3/UL (ref 0–0.7)
IMM GRANULOCYTES # BLD AUTO: 0.14 X10*3/UL (ref 0–0.7)
IMM GRANULOCYTES NFR BLD AUTO: 1.3 % (ref 0–0.9)
IMM GRANULOCYTES NFR BLD AUTO: 1.6 % (ref 0–0.9)
LYMPHOCYTES # BLD AUTO: 0.91 X10*3/UL (ref 1.2–4.8)
LYMPHOCYTES # BLD AUTO: 0.95 X10*3/UL (ref 1.2–4.8)
LYMPHOCYTES NFR BLD AUTO: 10.4 %
LYMPHOCYTES NFR BLD AUTO: 12.3 %
MAGNESIUM SERPL-MCNC: 2.31 MG/DL (ref 1.6–2.4)
MAGNESIUM SERPL-MCNC: 2.39 MG/DL (ref 1.6–2.4)
MCH RBC QN AUTO: 30.9 PG (ref 26–34)
MCH RBC QN AUTO: 31.1 PG (ref 26–34)
MCHC RBC AUTO-ENTMCNC: 32.2 G/DL (ref 32–36)
MCHC RBC AUTO-ENTMCNC: 32.6 G/DL (ref 32–36)
MCV RBC AUTO: 96 FL (ref 80–100)
MCV RBC AUTO: 96 FL (ref 80–100)
MONOCYTES # BLD AUTO: 0.51 X10*3/UL (ref 0.1–1)
MONOCYTES # BLD AUTO: 0.54 X10*3/UL (ref 0.1–1)
MONOCYTES NFR BLD AUTO: 6.2 %
MONOCYTES NFR BLD AUTO: 6.6 %
NEUTROPHILS # BLD AUTO: 6.11 X10*3/UL (ref 1.2–7.7)
NEUTROPHILS # BLD AUTO: 7.08 X10*3/UL (ref 1.2–7.7)
NEUTROPHILS NFR BLD AUTO: 79.4 %
NEUTROPHILS NFR BLD AUTO: 81.4 %
NRBC BLD-RTO: 0 /100 WBCS (ref 0–0)
NRBC BLD-RTO: 0 /100 WBCS (ref 0–0)
OVALOCYTES BLD QL SMEAR: NORMAL
PHOSPHATE SERPL-MCNC: 3.2 MG/DL (ref 2.5–4.9)
PHOSPHATE SERPL-MCNC: 4.1 MG/DL (ref 2.5–4.9)
PLATELET # BLD AUTO: 234 X10*3/UL (ref 150–450)
PLATELET # BLD AUTO: 238 X10*3/UL (ref 150–450)
POLYCHROMASIA BLD QL SMEAR: NORMAL
POLYCHROMASIA BLD QL SMEAR: NORMAL
POTASSIUM SERPL-SCNC: 4.6 MMOL/L (ref 3.5–5.3)
POTASSIUM SERPL-SCNC: 4.8 MMOL/L (ref 3.5–5.3)
PROCALCITONIN SERPL-MCNC: 0.18 NG/ML
PROT SERPL-MCNC: 5 G/DL (ref 6.4–8.2)
PROT SERPL-MCNC: 5.1 G/DL (ref 6.4–8.2)
RBC # BLD AUTO: 2.44 X10*6/UL (ref 4–5.2)
RBC # BLD AUTO: 2.46 X10*6/UL (ref 4–5.2)
RBC MORPH BLD: NORMAL
RBC MORPH BLD: NORMAL
RH FACTOR (ANTIGEN D): NORMAL
SODIUM SERPL-SCNC: 133 MMOL/L (ref 136–145)
SODIUM SERPL-SCNC: 134 MMOL/L (ref 136–145)
STOMATOCYTES BLD QL SMEAR: NORMAL
WBC # BLD AUTO: 7.7 X10*3/UL (ref 4.4–11.3)
WBC # BLD AUTO: 8.7 X10*3/UL (ref 4.4–11.3)

## 2024-10-29 PROCEDURE — 86706 HEP B SURFACE ANTIBODY: CPT | Mod: WESLAB | Performed by: INTERNAL MEDICINE

## 2024-10-29 PROCEDURE — 85025 COMPLETE CBC W/AUTO DIFF WBC: CPT

## 2024-10-29 PROCEDURE — 82330 ASSAY OF CALCIUM: CPT

## 2024-10-29 PROCEDURE — 80048 BASIC METABOLIC PNL TOTAL CA: CPT

## 2024-10-29 PROCEDURE — 82248 BILIRUBIN DIRECT: CPT

## 2024-10-29 PROCEDURE — 37799 UNLISTED PX VASCULAR SURGERY: CPT

## 2024-10-29 PROCEDURE — 2020000001 HC ICU ROOM DAILY

## 2024-10-29 PROCEDURE — 74018 RADEX ABDOMEN 1 VIEW: CPT

## 2024-10-29 PROCEDURE — 94669 MECHANICAL CHEST WALL OSCILL: CPT

## 2024-10-29 PROCEDURE — 93971 EXTREMITY STUDY: CPT | Performed by: SURGERY

## 2024-10-29 PROCEDURE — 83735 ASSAY OF MAGNESIUM: CPT

## 2024-10-29 PROCEDURE — 2500000002 HC RX 250 W HCPCS SELF ADMINISTERED DRUGS (ALT 637 FOR MEDICARE OP, ALT 636 FOR OP/ED)

## 2024-10-29 PROCEDURE — 2500000001 HC RX 250 WO HCPCS SELF ADMINISTERED DRUGS (ALT 637 FOR MEDICARE OP)

## 2024-10-29 PROCEDURE — 97110 THERAPEUTIC EXERCISES: CPT | Mod: GO

## 2024-10-29 PROCEDURE — 94003 VENT MGMT INPAT SUBQ DAY: CPT

## 2024-10-29 PROCEDURE — 94640 AIRWAY INHALATION TREATMENT: CPT

## 2024-10-29 PROCEDURE — 2500000004 HC RX 250 GENERAL PHARMACY W/ HCPCS (ALT 636 FOR OP/ED): Performed by: INTERNAL MEDICINE

## 2024-10-29 PROCEDURE — 2500000005 HC RX 250 GENERAL PHARMACY W/O HCPCS: Performed by: STUDENT IN AN ORGANIZED HEALTH CARE EDUCATION/TRAINING PROGRAM

## 2024-10-29 PROCEDURE — 84100 ASSAY OF PHOSPHORUS: CPT

## 2024-10-29 PROCEDURE — 87340 HEPATITIS B SURFACE AG IA: CPT | Mod: WESLAB | Performed by: INTERNAL MEDICINE

## 2024-10-29 PROCEDURE — 2500000004 HC RX 250 GENERAL PHARMACY W/ HCPCS (ALT 636 FOR OP/ED)

## 2024-10-29 PROCEDURE — 85730 THROMBOPLASTIN TIME PARTIAL: CPT

## 2024-10-29 PROCEDURE — 84145 PROCALCITONIN (PCT): CPT | Mod: WESLAB

## 2024-10-29 PROCEDURE — 97110 THERAPEUTIC EXERCISES: CPT | Mod: GP

## 2024-10-29 PROCEDURE — 74018 RADEX ABDOMEN 1 VIEW: CPT | Performed by: RADIOLOGY

## 2024-10-29 PROCEDURE — 82947 ASSAY GLUCOSE BLOOD QUANT: CPT

## 2024-10-29 PROCEDURE — 86901 BLOOD TYPING SEROLOGIC RH(D): CPT

## 2024-10-29 PROCEDURE — 71045 X-RAY EXAM CHEST 1 VIEW: CPT

## 2024-10-29 PROCEDURE — 71045 X-RAY EXAM CHEST 1 VIEW: CPT | Performed by: RADIOLOGY

## 2024-10-29 PROCEDURE — 36415 COLL VENOUS BLD VENIPUNCTURE: CPT

## 2024-10-29 PROCEDURE — 2500000004 HC RX 250 GENERAL PHARMACY W/ HCPCS (ALT 636 FOR OP/ED): Performed by: NURSE PRACTITIONER

## 2024-10-29 PROCEDURE — 99291 CRITICAL CARE FIRST HOUR: CPT

## 2024-10-29 PROCEDURE — 2500000005 HC RX 250 GENERAL PHARMACY W/O HCPCS

## 2024-10-29 PROCEDURE — 9420000001 HC RT PATIENT EDUCATION 5 MIN

## 2024-10-29 PROCEDURE — 93971 EXTREMITY STUDY: CPT

## 2024-10-29 PROCEDURE — 94668 MNPJ CHEST WALL SBSQ: CPT

## 2024-10-29 RX ORDER — DAPTOMYCIN IN SODIUM CHLORIDE 700 MG/100ML
700 INJECTION, SOLUTION INTRAVENOUS
Status: DISCONTINUED | OUTPATIENT
Start: 2024-10-30 | End: 2024-11-12

## 2024-10-29 RX ORDER — TOBRAMYCIN INHALATION SOLUTION 300 MG/5ML
300 INHALANT RESPIRATORY (INHALATION) EVERY 12 HOURS SCHEDULED
Status: DISCONTINUED | OUTPATIENT
Start: 2024-10-29 | End: 2024-10-29

## 2024-10-29 RX ORDER — FENTANYL CITRATE 50 UG/ML
50 INJECTION, SOLUTION INTRAMUSCULAR; INTRAVENOUS ONCE
Status: COMPLETED | OUTPATIENT
Start: 2024-10-29 | End: 2024-10-29

## 2024-10-29 RX ORDER — FENTANYL CITRATE 50 UG/ML
25 INJECTION, SOLUTION INTRAMUSCULAR; INTRAVENOUS EVERY 2 HOUR PRN
Status: DISCONTINUED | OUTPATIENT
Start: 2024-10-29 | End: 2024-11-01

## 2024-10-29 RX ORDER — TOBRAMYCIN INHALATION SOLUTION 300 MG/5ML
300 INHALANT RESPIRATORY (INHALATION)
Status: COMPLETED | OUTPATIENT
Start: 2024-10-29 | End: 2024-11-02

## 2024-10-29 RX ORDER — HEPARIN SODIUM 5000 [USP'U]/ML
3000-6000 INJECTION, SOLUTION INTRAVENOUS; SUBCUTANEOUS AS NEEDED
Status: DISCONTINUED | OUTPATIENT
Start: 2024-10-29 | End: 2024-11-07

## 2024-10-29 RX ORDER — HEPARIN SODIUM 5000 [USP'U]/ML
80 INJECTION, SOLUTION INTRAVENOUS; SUBCUTANEOUS ONCE
Status: COMPLETED | OUTPATIENT
Start: 2024-10-29 | End: 2024-10-29

## 2024-10-29 RX ORDER — HEPARIN SODIUM 10000 [USP'U]/100ML
0-4500 INJECTION, SOLUTION INTRAVENOUS CONTINUOUS
Status: DISCONTINUED | OUTPATIENT
Start: 2024-10-29 | End: 2024-11-05

## 2024-10-29 RX ADMIN — TOBRAMYCIN 300 MG: 300 SOLUTION RESPIRATORY (INHALATION) at 20:18

## 2024-10-29 RX ADMIN — Medication 40 PERCENT: at 20:18

## 2024-10-29 RX ADMIN — BRIMONIDINE TARTRATE 1 DROP: 2 SOLUTION OPHTHALMIC at 09:04

## 2024-10-29 RX ADMIN — OXYCODONE 5 MG: 5 TABLET ORAL at 00:04

## 2024-10-29 RX ADMIN — MIDODRINE HYDROCHLORIDE 10 MG: 10 TABLET ORAL at 16:15

## 2024-10-29 RX ADMIN — Medication 3 ML: at 11:18

## 2024-10-29 RX ADMIN — OXYCODONE 5 MG: 5 TABLET ORAL at 12:22

## 2024-10-29 RX ADMIN — FENTANYL CITRATE 50 MCG: 0.05 INJECTION, SOLUTION INTRAMUSCULAR; INTRAVENOUS at 09:11

## 2024-10-29 RX ADMIN — OXYCODONE 5 MG: 5 TABLET ORAL at 03:45

## 2024-10-29 RX ADMIN — INSULIN LISPRO 3 UNITS: 100 INJECTION, SOLUTION INTRAVENOUS; SUBCUTANEOUS at 04:06

## 2024-10-29 RX ADMIN — INSULIN LISPRO 3 UNITS: 100 INJECTION, SOLUTION INTRAVENOUS; SUBCUTANEOUS at 23:58

## 2024-10-29 RX ADMIN — Medication 1 APPLICATION: at 08:59

## 2024-10-29 RX ADMIN — HEPARIN SODIUM 9500 UNITS: 5000 INJECTION, SOLUTION INTRAVENOUS; SUBCUTANEOUS at 16:57

## 2024-10-29 RX ADMIN — IPRATROPIUM BROMIDE AND ALBUTEROL SULFATE 3 ML: .5; 3 SOLUTION RESPIRATORY (INHALATION) at 20:18

## 2024-10-29 RX ADMIN — HEPARIN SODIUM 2000 UNITS/HR: 10000 INJECTION, SOLUTION INTRAVENOUS at 16:55

## 2024-10-29 RX ADMIN — OXYCODONE 5 MG: 5 TABLET ORAL at 20:09

## 2024-10-29 RX ADMIN — Medication 3 ML: at 20:18

## 2024-10-29 RX ADMIN — Medication 40 PERCENT: at 07:30

## 2024-10-29 RX ADMIN — Medication 3 ML: at 15:10

## 2024-10-29 RX ADMIN — MIDODRINE HYDROCHLORIDE 10 MG: 10 TABLET ORAL at 00:04

## 2024-10-29 RX ADMIN — Medication 3 ML: at 07:27

## 2024-10-29 RX ADMIN — HEPARIN SODIUM 5000 UNITS: 5000 INJECTION, SOLUTION INTRAVENOUS; SUBCUTANEOUS at 14:22

## 2024-10-29 RX ADMIN — IPRATROPIUM BROMIDE AND ALBUTEROL SULFATE 3 ML: .5; 3 SOLUTION RESPIRATORY (INHALATION) at 11:18

## 2024-10-29 RX ADMIN — OXYCODONE 5 MG: 5 TABLET ORAL at 16:15

## 2024-10-29 RX ADMIN — BRIMONIDINE TARTRATE 1 DROP: 2 SOLUTION OPHTHALMIC at 20:16

## 2024-10-29 RX ADMIN — CEFTRIAXONE SODIUM 2 G: 2 INJECTION, SOLUTION INTRAVENOUS at 09:00

## 2024-10-29 RX ADMIN — IPRATROPIUM BROMIDE AND ALBUTEROL SULFATE 3 ML: .5; 3 SOLUTION RESPIRATORY (INHALATION) at 15:10

## 2024-10-29 RX ADMIN — PANTOPRAZOLE SODIUM 40 MG: 40 INJECTION, POWDER, FOR SOLUTION INTRAVENOUS at 06:07

## 2024-10-29 RX ADMIN — IPRATROPIUM BROMIDE AND ALBUTEROL SULFATE 3 ML: .5; 3 SOLUTION RESPIRATORY (INHALATION) at 07:27

## 2024-10-29 RX ADMIN — HEPARIN SODIUM 5000 UNITS: 5000 INJECTION, SOLUTION INTRAVENOUS; SUBCUTANEOUS at 05:48

## 2024-10-29 RX ADMIN — SENNOSIDES AND DOCUSATE SODIUM 1 TABLET: 50; 8.6 TABLET ORAL at 20:11

## 2024-10-29 RX ADMIN — HYDROCORTISONE SODIUM SUCCINATE 50 MG: 100 INJECTION, POWDER, FOR SOLUTION INTRAMUSCULAR; INTRAVENOUS at 03:45

## 2024-10-29 RX ADMIN — LATANOPROST 1 DROP: 50 SOLUTION OPHTHALMIC at 20:16

## 2024-10-29 RX ADMIN — PRIMIDONE 125 MG: 250 TABLET ORAL at 20:12

## 2024-10-29 ASSESSMENT — COGNITIVE AND FUNCTIONAL STATUS - GENERAL
MOBILITY SCORE: 6
WALKING IN HOSPITAL ROOM: TOTAL
HELP NEEDED FOR BATHING: TOTAL
CLIMB 3 TO 5 STEPS WITH RAILING: TOTAL
DAILY ACTIVITIY SCORE: 6
TURNING FROM BACK TO SIDE WHILE IN FLAT BAD: TOTAL
MOVING FROM LYING ON BACK TO SITTING ON SIDE OF FLAT BED WITH BEDRAILS: TOTAL
EATING MEALS: TOTAL
DRESSING REGULAR LOWER BODY CLOTHING: TOTAL
TOILETING: TOTAL
DRESSING REGULAR UPPER BODY CLOTHING: TOTAL
MOVING TO AND FROM BED TO CHAIR: TOTAL
STANDING UP FROM CHAIR USING ARMS: TOTAL
PERSONAL GROOMING: TOTAL

## 2024-10-29 ASSESSMENT — PAIN - FUNCTIONAL ASSESSMENT
PAIN_FUNCTIONAL_ASSESSMENT: FLACC (FACE, LEGS, ACTIVITY, CRY, CONSOLABILITY)
PAIN_FUNCTIONAL_ASSESSMENT: CPOT (CRITICAL CARE PAIN OBSERVATION TOOL)
PAIN_FUNCTIONAL_ASSESSMENT: CPOT (CRITICAL CARE PAIN OBSERVATION TOOL)
PAIN_FUNCTIONAL_ASSESSMENT: FLACC (FACE, LEGS, ACTIVITY, CRY, CONSOLABILITY)
PAIN_FUNCTIONAL_ASSESSMENT: CPOT (CRITICAL CARE PAIN OBSERVATION TOOL)
PAIN_FUNCTIONAL_ASSESSMENT: CPOT (CRITICAL CARE PAIN OBSERVATION TOOL)

## 2024-10-29 ASSESSMENT — PAIN SCALES - GENERAL
PAINLEVEL_OUTOF10: 0 - NO PAIN
PAINLEVEL_OUTOF10: 0 - NO PAIN

## 2024-10-29 NOTE — CARE PLAN
The patient's goals for the shift include unable to assess    The clinical goals for the shift include stable vitals, improved airway clearance    Over the shift, the patient made progress toward the following goals:       Problem: Safety - Adult  Goal: Free from fall injury  Outcome: Progressing  Flowsheets (Taken 10/29/2024 1847)  Free from fall injury: Instruct family/caregiver on patient safety     Problem: Discharge Planning  Goal: Discharge to home or other facility with appropriate resources  Outcome: Progressing  Flowsheets (Taken 10/29/2024 1847)  Discharge to home or other facility with appropriate resources: Identify barriers to discharge with patient and caregiver     Problem: Chronic Conditions and Co-morbidities  Goal: Patient's chronic conditions and co-morbidity symptoms are monitored and maintained or improved  Outcome: Progressing  Flowsheets (Taken 10/29/2024 1847)  Care Plan - Patient's Chronic Conditions and Co-Morbidity Symptoms are Monitored and Maintained or Improved:   Monitor and assess patient's chronic conditions and comorbid symptoms for stability, deterioration, or improvement   Collaborate with multidisciplinary team to address chronic and comorbid conditions and prevent exacerbation or deterioration   Update acute care plan with appropriate goals if chronic or comorbid symptoms are exacerbated and prevent overall improvement and discharge     Problem: Diabetes  Goal: Increase stability of blood glucose readings by end of shift  Outcome: Progressing  Flowsheets (Taken 10/29/2024 1847)  Increase stability of blood glucose readings by end of shift: Med administration/monitoring of effect  Goal: Maintain electrolyte levels within acceptable range throughout shift  Outcome: Progressing  Flowsheets (Taken 10/29/2024 1847)  Maintain electrolyte levels within acceptable range throughout shift:   Med administration/monitoring of effect   Monitor urine output  Goal: Maintain glucose levels  >70mg/dl to <250mg/dl throughout shift  Outcome: Progressing  Flowsheets (Taken 10/29/2024 1847)  Maintain glucose levels >70mg/dl to <250mg/dl throughout shift: Med administration/monitoring of effect  Goal: Vital signs within normal range for age by end of shift  Outcome: Progressing  Flowsheets (Taken 10/24/2024 1647 by Jammie Reyes RN)  Vital signs within normal range for age by end of shift: Med administration/monitoring of effect  Goal: Increase self care and/or family involovement by end of shift  Outcome: Progressing     Problem: Knowledge Deficit  Goal: Patient/family/caregiver demonstrates understanding of disease process, treatment plan, medications, and discharge instructions  Outcome: Progressing  Flowsheets (Taken 10/29/2024 1847)  Patient/family/caregiver demonstrates understanding of disease process, treatment plan, medications, and discharge instructions: Complete learning assessment and assess knowledge base     Problem: Skin  Goal: Decreased wound size/increased tissue granulation at next dressing change  Outcome: Progressing  Flowsheets (Taken 10/29/2024 1847)  Decreased wound size/increased tissue granulation at next dressing change:   Promote sleep for wound healing   Protective dressings over bony prominences   Utilize specialty bed per algorithm  Goal: Participates in plan/prevention/treatment measures  Outcome: Progressing  Flowsheets (Taken 10/29/2024 1847)  Participates in plan/prevention/treatment measures: Discuss with provider PT/OT consult  Goal: Prevent/manage excess moisture  Outcome: Progressing  Flowsheets (Taken 10/29/2024 1847)  Prevent/manage excess moisture:   Cleanse incontinence/protect with barrier cream   Follow provider orders for dressing changes   Moisturize dry skin   Monitor for/manage infection if present  Goal: Prevent/minimize sheer/friction injuries  Outcome: Progressing  Flowsheets (Taken 10/29/2024 1847)  Prevent/minimize sheer/friction injuries:   Complete  micro-shifts as needed if patient unable. Adjust patient position to relieve pressure points, not a full turn   HOB 30 degrees or less   Increase activity/out of bed for meals   Turn/reposition every 2 hours/use positioning/transfer devices   Use pull sheet   Utilize specialty bed per algorithm  Goal: Promote/optimize nutrition  Outcome: Progressing  Flowsheets (Taken 10/29/2024 1847)  Promote/optimize nutrition: Monitor/record intake including meals  Goal: Promote skin healing  Outcome: Progressing  Flowsheets (Taken 10/29/2024 1847)  Promote skin healing:   Assess skin/pad under line(s)/device(s)   Ensure correct size (line/device) and apply per  instructions   Protective dressings over bony prominences   Rotate device position/do not position patient on device   Turn/reposition every 2 hours/use positioning/transfer devices     Problem: Nutrition  Goal: Consume prescribed supplement  Outcome: Progressing  Goal: Nutrition support goals are met within 48 hrs  Outcome: Progressing  Goal: Nutrition support is meeting 75% of nutrient needs  Outcome: Progressing  Goal: BG  mg/dL  Outcome: Progressing  Goal: Lab values WNL  Outcome: Progressing  Goal: Electrolytes WNL  Outcome: Progressing  Goal: Promote healing  Outcome: Progressing  Goal: Maintain stable weight  Outcome: Progressing  Goal: Reduce weight from edema/fluid  Outcome: Progressing     Problem: Respiratory  Goal: No signs of respiratory distress (eg. Use of accessory muscles. Peds grunting)  Outcome: Progressing  Goal: Clear secretions with interventions this shift  Outcome: Progressing  Flowsheets (Taken 10/29/2024 1847)  Clear secretions with interventions this shift:   Encourage/provide pulmonary hygiene/secretion clearance   Suctioning   Med administration/monitoring of effect  Goal: Minimize anxiety/maximize coping throughout shift  Outcome: Progressing  Flowsheets (Taken 10/29/2024 1847)  Minimize anxiety/maximize coping throughout  shift:   Med administration/monitoring of effect   Monitor pain/anxiety level  Goal: Minimal/no exertional discomfort or dyspnea this shift  Outcome: Progressing  Flowsheets (Taken 10/29/2024 1847)  Minimal/no exertional discomfort or dyspnea this shift: Positioning to promote ventilation/comfort  Goal: Patent airway maintained this shift  Outcome: Progressing  Flowsheets (Taken 10/29/2024 1847)  Patent airway maintained this shift: Maintain ET tube tube position  Goal: Tolerate mechanical ventilation evidenced by VS/agitation level this shift  Outcome: Progressing  Flowsheets (Taken 10/29/2024 1847)  Tolerate mechanical ventilation evidenced by VS/agitation level this shift:   Maintain ET tube tube position   Mechanical ventilation  Goal: Tolerate pulmonary toileting this shift  Outcome: Progressing  Flowsheets (Taken 10/29/2024 1847)  Tolerate pulmonary toileting this shift: Positioning to promote ventilation/comfort  Goal: Wean oxygen to maintain O2 saturation per order/standard this shift  Outcome: Progressing  Flowsheets (Taken 10/29/2024 1847)  Wean oxygen to maintain O2 saturation per order/standard this shift: Encourage activity/mobility  Goal: Increase self care and/or family involvement in next 24 hours  Outcome: Progressing  Flowsheets (Taken 10/29/2024 1847)  Increase self care and/or family involvement in next 24 hours: Encourage activity/mobility     Problem: Pain  Goal: Takes deep breaths with improved pain control throughout the shift  Outcome: Progressing  Goal: Turns in bed with improved pain control throughout the shift  Outcome: Progressing  Goal: Participates in PT with improved pain control throughout the shift  Outcome: Progressing  Goal: Free from opioid side effects throughout the shift  Outcome: Progressing  Goal: Free from acute confusion related to pain meds throughout the shift  Outcome: Progressing     Problem: Safety - Medical Restraint  Goal: Remains free of injury from restraints  (Restraint for Interference with Medical Device)  Outcome: Progressing  Flowsheets (Taken 10/29/2024 1847)  Remains free of injury from restraints (restraint for interference with medical device):   Determine that other, less restrictive measures have been tried or would not be effective before applying the restraint   Every 2 hours: Monitor safety, psychosocial status, comfort, nutrition and hydration  Goal: Free from restraint(s) (Restraint for Interference with Medical Device)  Outcome: Progressing  Flowsheets (Taken 10/29/2024 1847)  Free from restraint(s) (restraint for interference with medical device): ONCE/SHIFT or MINIMUM Every 12 hours: Assess and document the continuing need for restraints

## 2024-10-29 NOTE — PROGRESS NOTES
INFECTIOUS DISEASES PROGRESS NOTE    Consulted / following patient for:  Respiratory failure/pneumonia  Recent polymicrobial complicated postoperative lumbar wound infection, MRSA and Proteus  Acute kidney injury    Subjective   Interval History:   (Sedated on the ventilator)  Nurse reports patient had an episode of abdominal distention with large volume residual feeds.  Also suctioned last evening for copious purulent ET secretions    Objective   PHYSICAL EXAMINATION  Vital signs:  Visit Vitals  /53   Pulse 72   Temp 37.3 °C (99.1 °F) (Axillary)   Resp 19   Had fever to 38.6 last evening.  Off norepinephrine infusion and CRRT.  Remains on ventilator.  Is to undergo hemodialysis today  General: Intubated and sedated  Lungs: Diminished, clear.  Left chest tube removed  Heart:  S1, S2 normal  Abdomen:  Soft, obese, no guarding.   Extremities:  No cords, phlebitis, cellulitis.  Anasarca.  New double-lumen PICC has been inserted in the left arm.  Back: Lengthy midline postoperative incision examined.  1 stable area of mild dehiscence and eschar superiorly, no suppuration or exudate.  Persistent presacral pressure sore with small stable eschar    Relevant Results  WBC: 8700  Creatinine: 0.6 ---> 0.9 ---> 1.25  CK: 45  Pleural fluid: Transudate with 197 WBC, 56% neutrophils, protein 1.8, LDH 97, glucose 202  Microbiology:  Blood (10/12): Negative X2  Sputum (10/13): Stain with moderate GNB and moderate PMN, culture Pseudomonas aeruginosa, susceptible to Zosyn and cefepime  Sputum (10/21): Normal neva, no MRSA  Sputum (10/28):  Pseudomonas  Urine: Moderate colony count Candida lusitaniae  Pleural fluid (10/17): Negative    Imaging:  CXR images (10/29) personally reviewed: Intubated.  Chest tubes have been removed.  Right side unchanged, better aeration on the left    Assessment:  Sepsis -likely due to pneumonia, present on admission. Patient already on IV vancomycin and IV ceftriaxone at time of this admission for  lumbar spinal infection.  Resolved with anti-Pseudomonas antimicrobial therapy.  Large transudative pleural effusion was tapped.  Remains on mechanical ventilation, weaning, chest tubes have been removed.  Had fever and purulent secretions last evening, with concern about possible aspiration, but this morning she is afebrile with normal WBC and an unchanged chest x-ray.  Sputum culture pending  Acute kidney injury.  Off CRRT, creatinine rising, anticipate hemodialysis today  Lumbar spine surgical site infection s/p I&D 9/28. Cultures + MRSA, Proteus.  Was to be on vanco/ceftriaxone through 11/12. Followed by Dr. Brant Juarez.  Vancomycin has been changed to daptomycin because of AMY    Plan/Recommendations:  Change daptomycin to 700 mg every 48 hour unless she goes back on CRRT   Continue ceftriaxone until 11/12   (see addendum)Monitor CK weekly while on daptomycin       Andrew Malagon MD  [facility]  Office:  [phone]    ADDENDUM:  Sputum growing Pseudomonas. NADER pending.  As noted above, more suspicious of tracheobronchitis than new pneumonia.  Will add aerosol tobramycin  TACO Malagon MD

## 2024-10-29 NOTE — PROGRESS NOTES
Physical Therapy    Physical Therapy Treatment    Patient Name: Narda Malloy  MRN: [id number]  Department: [address] ICU  Room: 11/11-A  Today's Date: 10/29/2024  Time Calculation  Start Time: 1336  Stop Time: 1346  Time Calculation (min): 10 min    Assessment/Plan   PT Assessment  End of Session Communication: Bedside nurse  Assessment Comment: pt not assisting or resisting movements of B LEs this date, does seem to move L ankle in a repetetive motion every 30 seconds. minimal responsiveness this date.  End of Session Patient Position: Bed, 3 rail up, Alarm off, not on at start of session (spouse present, call button near left hand, RN aware.)    PT Plan  Treatment/Interventions: Bed mobility, Transfer training, Balance training, Strengthening, Endurance training, Range of motion, Therapeutic exercise, Therapeutic activity  PT Plan: Ongoing PT  PT Frequency: 6 times per week  PT Discharge Recommendations: Moderate intensity level of continued care  Equipment Recommended upon Discharge: Wheelchair  PT Recommended Transfer Status: Total assist  PT - OK to Discharge: Yes    General Visit Information:   PT  Visit  PT Received On: 10/29/24  Response to Previous Treatment: Patient unable to report, no changes reported from family or staff  General  Family/Caregiver Present: Yes  Caregiver Feedback: Spouse at bedside.  Co-Treatment: OT  Co-Treatment Reason: patient/caregiver safety and optimization of patient outcomes for a medically complex ICU treatment  Prior to Session Communication: Bedside nurse  Patient Position Received: Bed, 3 rail up, Alarm off, not on at start of session  Preferred Learning Style: verbal  General Comment: per RN, pt aspirated on tube feed overnight and has been less responsive this date.    Subjective   Precautions:  Precautions  Medical Precautions: Fall precautions, Oxygen therapy device and L/min, Spinal precautions (ventilator)    Vital Signs Comment: vitals stable and unchanged during  therapy     Objective   Pain:  Pain Assessment  Pain Assessment: FLACC (Face, Legs, Activity, Cry, Consolability)  0-10 (Numeric) Pain Score: 0 - No pain  Cognition:  Cognition  Overall Cognitive Status: Impaired  Arousal/Alertness: Delayed responses to stimuli  Orientation Level: Unable to assess (less responsive today)  Following Commands:  (pt did open her eyes to command and nod her head, did not follow any commands to move her LEs)  Cognition Comments: less responsive today, no volitional movement B LEs    Activity Tolerance:  Activity Tolerance  Activity Tolerance Comments: poor this date  Treatments:  Therapeutic Exercise  Therapeutic Exercise Performed:  (B LE supine ROM exercises x 10 reps each: APs, heel slides, SAQ, hip abd/add/ER/IR.  no assistance or resistance from patient.)    Other Activity  Other Activity Performed:  (pt noted to dorsiflex her left ankle along with slight flexion left hip/knee every 30 seconds or so, RN notified.)    Outcome Measures:  [facility] Basic Mobility  Turning from your back to your side while in a flat bed without using bedrails: Total  Moving from lying on your back to sitting on the side of a flat bed without using bedrails: Total  Moving to and from bed to chair (including a wheelchair): Total  Standing up from a chair using your arms (e.g. wheelchair or bedside chair): Total  To walk in hospital room: Total  Climbing 3-5 steps with railing: Total  Basic Mobility - Total Score: 6    FSS-ICU  Ambulation: Unable to attempt due to weakness  Rolling: Unable to perform  Sitting: Unable to perform  Transfer Sit-to-Stand: Unable to perform  Transfer Supine-to-Sit: Unable to perform  Total Score: 0    Education Documentation  Precautions, taught by Kisha Hinton PT at 10/29/2024  4:31 PM.  Learner: Patient  Readiness: Nonacceptance  Method: Explanation  Response: No Evidence of Learning    Body Mechanics, taught by Kisha Hinton PT at 10/29/2024  4:31 PM.  Learner:  Patient  Readiness: Nonacceptance  Method: Explanation  Response: No Evidence of Learning    Mobility Training, taught by Kisha Hinton, PT at 10/29/2024  4:31 PM.  Learner: Patient  Readiness: Nonacceptance  Method: Explanation  Response: No Evidence of Learning    Education Comments  No comments found.      Encounter Problems       Encounter Problems (Active)       PT STG Problem       Patient will roll right and left with minimal assist to facilitate mobility. (Not Progressing)       Start:  10/28/24    Expected End:  11/25/24            Patient will transfer supine to sit and sit to supine with moderate assist to facilitate mobility. (Not Progressing)       Start:  10/28/24    Expected End:  11/25/24            Patient will transfer sit to stand and stand to sit with maximal assist to facilitate mobility. (Not Progressing)       Start:  10/28/24    Expected End:  11/25/24            Patient will transfer bed to chair and chair to bed with maximal assist to facilitate mobility. (Not Progressing)       Start:  10/28/24    Expected End:  11/25/24            Patient will increase strength in B LEs by half grade throughout to improve functional mobility.  (Not Progressing)       Start:  10/28/24    Expected End:  11/25/24            Patient will tolerate 15 minutes of sitting on EOB to improve ability to perform functional transfers. (Not Progressing)       Start:  10/28/24    Expected End:  11/25/24

## 2024-10-29 NOTE — CARE PLAN
Problem: Safety - Adult  Goal: Free from fall injury  10/29/2024 0639 by Adi Gross RN  Outcome: Progressing  10/29/2024 0139 by Adi Gross RN  Outcome: Progressing     Problem: Discharge Planning  Goal: Discharge to home or other facility with appropriate resources  10/29/2024 0639 by Adi Gross RN  Outcome: Progressing  10/29/2024 0139 by Adi Gross RN  Outcome: Progressing     Problem: Chronic Conditions and Co-morbidities  Goal: Patient's chronic conditions and co-morbidity symptoms are monitored and maintained or improved  10/29/2024 0639 by Adi Gross RN  Outcome: Progressing  10/29/2024 0139 by Adi Gross RN  Outcome: Progressing     Problem: Diabetes  Goal: Increase stability of blood glucose readings by end of shift  10/29/2024 0639 by Adi Gross RN  Outcome: Progressing  10/29/2024 0139 by Adi Gross RN  Outcome: Progressing  Goal: Maintain electrolyte levels within acceptable range throughout shift  10/29/2024 0639 by Adi Gross RN  Outcome: Progressing  10/29/2024 0139 by Adi Gross RN  Outcome: Progressing  Goal: Maintain glucose levels >70mg/dl to <250mg/dl throughout shift  10/29/2024 0639 by Adi Gross RN  Outcome: Progressing  10/29/2024 0139 by Adi Gross RN  Outcome: Progressing  Goal: Learn about and adhere to nutrition recommendations by end of shift  10/29/2024 0639 by Adi Gross RN  Outcome: Progressing  10/29/2024 0139 by Adi Gross RN  Outcome: Progressing  Goal: Vital signs within normal range for age by end of shift  10/29/2024 0639 by Adi Gross RN  Outcome: Progressing  10/29/2024 0139 by Adi Gross RN  Outcome: Progressing  Goal: Increase self care and/or family involovement by end of shift  10/29/2024 0639 by Adi Gross RN  Outcome: Progressing  10/29/2024 0139 by Adi Gross RN  Outcome: Progressing  Goal: Receive DSME education by end of shift  10/29/2024  0639 by Adi Gross RN  Outcome: Progressing  10/29/2024 0139 by Adi Gross RN  Outcome: Progressing     Problem: Knowledge Deficit  Goal: Patient/family/caregiver demonstrates understanding of disease process, treatment plan, medications, and discharge instructions  10/29/2024 0639 by Adi Gross RN  Outcome: Progressing  10/29/2024 0139 by Adi Gross RN  Outcome: Progressing     Problem: Skin  Goal: Decreased wound size/increased tissue granulation at next dressing change  10/29/2024 0639 by Adi Gross RN  Outcome: Progressing  Flowsheets (Taken 10/29/2024 0639)  Decreased wound size/increased tissue granulation at next dressing change:   Promote sleep for wound healing   Utilize specialty bed per algorithm   Protective dressings over bony prominences  10/29/2024 0139 by Adi Gross RN  Outcome: Progressing  Goal: Participates in plan/prevention/treatment measures  10/29/2024 0639 by Adi Gross RN  Outcome: Progressing  Flowsheets (Taken 10/29/2024 0639)  Participates in plan/prevention/treatment measures:   Elevate heels   Discuss with provider PT/OT consult  10/29/2024 0139 by Adi Gross RN  Outcome: Progressing  Goal: Prevent/manage excess moisture  10/29/2024 0639 by Adi Gross RN  Outcome: Progressing  Flowsheets (Taken 10/29/2024 0639)  Prevent/manage excess moisture:   Monitor for/manage infection if present   Cleanse incontinence/protect with barrier cream   Follow provider orders for dressing changes   Use wicking fabric (obtain order)   Moisturize dry skin  10/29/2024 0139 by Adi Gross RN  Outcome: Progressing  Goal: Prevent/minimize sheer/friction injuries  10/29/2024 0639 by Adi Gross RN  Outcome: Progressing  Flowsheets (Taken 10/29/2024 0639)  Prevent/minimize sheer/friction injuries:   Complete micro-shifts as needed if patient unable. Adjust patient position to relieve pressure points, not a full turn   Use pull sheet   HOB  30 degrees or less   Turn/reposition every 2 hours/use positioning/transfer devices   Utilize specialty bed per algorithm  10/29/2024 0139 by Adi Gross RN  Outcome: Progressing  Goal: Promote/optimize nutrition  10/29/2024 0639 by Adi Gross RN  Outcome: Progressing  Flowsheets (Taken 10/29/2024 0639)  Promote/optimize nutrition: Monitor/record intake including meals  10/29/2024 0139 by Adi Gross RN  Outcome: Progressing  Goal: Promote skin healing  10/29/2024 0639 by Adi Gross RN  Outcome: Progressing  Flowsheets (Taken 10/29/2024 0639)  Promote skin healing:   Assess skin/pad under line(s)/device(s)   Protective dressings over bony prominences   Turn/reposition every 2 hours/use positioning/transfer devices   Ensure correct size (line/device) and apply per  instructions   Rotate device position/do not position patient on device  10/29/2024 0139 by Adi Gross RN  Outcome: Progressing     Problem: Nutrition  Goal: Consume prescribed supplement  10/29/2024 0639 by Adi Gross RN  Outcome: Progressing  10/29/2024 0139 by Adi Gross RN  Outcome: Progressing  Goal: Nutrition support goals are met within 48 hrs  10/29/2024 0639 by Adi Gross RN  Outcome: Progressing  10/29/2024 0139 by Adi Gross RN  Outcome: Progressing  Goal: Nutrition support is meeting 75% of nutrient needs  10/29/2024 0639 by Adi Gross RN  Outcome: Progressing  10/29/2024 0139 by Adi Gross RN  Outcome: Progressing  Goal: BG  mg/dL  10/29/2024 0639 by Adi Gross RN  Outcome: Progressing  10/29/2024 0139 by Adi Gross RN  Outcome: Progressing  Goal: Lab values WNL  10/29/2024 0639 by Adi Gross RN  Outcome: Progressing  10/29/2024 0139 by Adi Gross RN  Outcome: Progressing  Goal: Electrolytes WNL  10/29/2024 0639 by Adi Gross RN  Outcome: Progressing  10/29/2024 0139 by Adi Gross RN  Outcome:  Progressing  Goal: Promote healing  10/29/2024 0639 by Adi Gross RN  Outcome: Progressing  10/29/2024 0139 by Adi Gross RN  Outcome: Progressing  Goal: Maintain stable weight  10/29/2024 0639 by dAi Gross RN  Outcome: Progressing  10/29/2024 0139 by Adi Gross RN  Outcome: Progressing  Goal: Reduce weight from edema/fluid  10/29/2024 0639 by Adi Gross RN  Outcome: Progressing  10/29/2024 0139 by Adi Gross RN  Outcome: Progressing     Problem: Respiratory  Goal: No signs of respiratory distress (eg. Use of accessory muscles. Peds grunting)  10/29/2024 0639 by Adi Gross RN  Outcome: Progressing  10/29/2024 0139 by Adi Gross RN  Outcome: Progressing  Goal: Clear secretions with interventions this shift  10/29/2024 0639 by Adi Gross RN  Outcome: Progressing  10/29/2024 0139 by Adi Gross RN  Outcome: Progressing  Goal: Minimize anxiety/maximize coping throughout shift  10/29/2024 0639 by Adi Gross RN  Outcome: Progressing  10/29/2024 0139 by Adi Gross RN  Outcome: Progressing  Goal: Minimal/no exertional discomfort or dyspnea this shift  10/29/2024 0639 by Adi Gross RN  Outcome: Progressing  10/29/2024 0139 by Adi Gross RN  Outcome: Progressing  Goal: Patent airway maintained this shift  10/29/2024 0639 by Adi Gross RN  Outcome: Progressing  10/29/2024 0139 by Adi Gross RN  Outcome: Progressing  Goal: Tolerate mechanical ventilation evidenced by VS/agitation level this shift  10/29/2024 0639 by Adi Gross RN  Outcome: Progressing  10/29/2024 0139 by Adi Gross RN  Outcome: Progressing  Goal: Tolerate pulmonary toileting this shift  10/29/2024 0639 by Adi Gross RN  Outcome: Progressing  10/29/2024 0139 by Adi Gross RN  Outcome: Progressing  Goal: Verbalize decreased shortness of breath this shift  10/29/2024 0639 by Adi Gross RN  Outcome:  Progressing  10/29/2024 0139 by Adi Gross RN  Outcome: Progressing  Goal: Wean oxygen to maintain O2 saturation per order/standard this shift  10/29/2024 0639 by Adi Gross RN  Outcome: Progressing  10/29/2024 0139 by Adi Gross RN  Outcome: Progressing  Goal: Increase self care and/or family involvement in next 24 hours  10/29/2024 0639 by Adi Gross RN  Outcome: Progressing  10/29/2024 0139 by Adi Gross RN  Outcome: Progressing     Problem: Pain  Goal: Takes deep breaths with improved pain control throughout the shift  10/29/2024 0639 by Adi Gross RN  Outcome: Progressing  10/29/2024 0139 by Adi Gross RN  Outcome: Progressing  Goal: Turns in bed with improved pain control throughout the shift  10/29/2024 0639 by Adi Gross RN  Outcome: Progressing  10/29/2024 0139 by Adi Gross RN  Outcome: Progressing  Goal: Walks with improved pain control throughout the shift  10/29/2024 0639 by Adi Gross RN  Outcome: Progressing  10/29/2024 0139 by Adi Gross RN  Outcome: Progressing  Goal: Performs ADL's with improved pain control throughout shift  10/29/2024 0639 by Adi Gross RN  Outcome: Progressing  10/29/2024 0139 by Adi Gross RN  Outcome: Progressing  Goal: Participates in PT with improved pain control throughout the shift  10/29/2024 0639 by Adi Gross RN  Outcome: Progressing  10/29/2024 0139 by Adi Gross RN  Outcome: Progressing  Goal: Free from opioid side effects throughout the shift  10/29/2024 0639 by Adi Gross RN  Outcome: Progressing  10/29/2024 0139 by Adi Gross RN  Outcome: Progressing  Goal: Free from acute confusion related to pain meds throughout the shift  10/29/2024 0639 by Adi Gross RN  Outcome: Progressing  10/29/2024 0139 by Adi Gross RN  Outcome: Progressing     Problem: Safety - Medical Restraint  Goal: Remains free of injury from restraints (Restraint for  Interference with Medical Device)  10/29/2024 0639 by Adi Gross RN  Outcome: Progressing  10/29/2024 0139 by Adi Gross RN  Outcome: Progressing  Goal: Free from restraint(s) (Restraint for Interference with Medical Device)  10/29/2024 0639 by Adi Gross RN  Outcome: Progressing  10/29/2024 0139 by Adi Gross RN  Outcome: Progressing   The patient's goals for the shift include      The clinical goals for the shift include Wean vent settings as tolerated    Over the shift, the patient did not make progress toward the following goals. Barriers to progression include . Recommendations to address these barriers include .

## 2024-10-29 NOTE — CARE PLAN
The patient's goals for the shift include      The clinical goals for the shift include Wean vent settings as tolerated

## 2024-10-29 NOTE — PROGRESS NOTES
[facility] Critical Care Medicine       Date:  10/29/2024  Patient:  Narda Malloy  YOB: 1955  MRN:  [id number]   Admit Date:  10/12/2024  ========================================================================================================    Chief Complaint   Patient presents with    Altered Mental Status         History of Present Illness:  Narda Malloy is a 69 y.o. year old female patient with Past Medical History of  L1-L3 lumbar laminectomy, T4-S1 revision, and fusion August 26th, T2DM, HTN, essential tremor, HLD, glaucoma, sarcoidosis of the lung who presented to  ED 10/12 after being found essentially unresponsive at her nursing facility [facility]. Per report from her , she has had a significant decline in her health since July 15th when she had a fall and became significantly weak. She has also had multiple infection complications since her back surgery in August requiring multiple I&Ds and long term antibiotic therapy. She went to the OR most recently on 9/28 for lumbar site infection wash out. Per chart review, she was discharged on IV vancomycin 1g and IV ceftriaxone 2d q24hrs through 11/12.     ED Course: Initial vital signs: /104 (109), HR 68, RR 20, SpO2 95% on 6L NC, temp 34.5C. Give 0.4mg of narcan with no improvement in mentation. Lab work-up remarkable for mild hyperkalemia (5.5), AMY 42/1.46, elevated alk phos, normocytic anemia 10.4/33, turbid appearing urine with mild hematuria and proteinuria and + leuk esterase, >50 WBCs. Urine drug screen positive for barbiturates. Triggered sepsis timer so she was given 3L NS. She was intubated for airway protection with 20mg etomidate and 100mg succinylcholine. BP dropped post-intubated and fluid resuscitation and she was subsequently started on levophed.             Interval ICU Events:  10/12: Pt arrived to ICU intubated and lightly sedated on low-dose versed. Eyes open, minimally responsive.      10/13:  Received K cocktail last night for K 6.0, corrected appropriately. Levophed requirements mildly up, UOP decreasing. Ordered albumin x2 this am with improvements in UOP and SBP. Will likely trial lasix this afternoon d/t hypervolemia.     10/14: Remains intubated with decreased mentation, only responsive to noxious stimuli. Trialed lasix TID for volume overload. Remains on levophed 0.01.     10/15: Mentation much improved. SAT/SBT successful so extubated. Given lasix x3, bumex x1, metolazone x1 with net negative fluid balance of 500mL -> started on bumex gtt.      10/16: O2 requirements significantly increased, NRB -> HFNC 40L 100% likely 2/2 mucus plugging.     10/17: No acute events overnight. Bumex gtt increased to 1mg/hr. CXR this am showing complete opacification of left hemithorax related to atelectasis vs pleural effusion. Remains on HFNC 40L/80%. Pigtail catheter placed with 1.2L drained immediately. Given albumin for hypotension.      10/18: ~2L output from left sided pigtail catheter since placement. Kidney function worsening and UOP declining, about 20cc/hr overnight. Will place NG tube today and start enteral nutrition and appetite and oral intake remains poor.      10/19: Patient with worsening hypoxic, hypercapnic respiratory failure - now requiring BIPAP support. Remains grossly volume overloaded with low UO. Started on vasopressors to augment BP for diuresis. 40 IV lasix + gtt started. Remains with poor nutritional status. Will re attempt NG later if respiratory status improves.      10/20: Patient stable on vent. Nephrology consulted for renal failure. Cr continues to uptrend however UO is increasing. No issues overnight.     10/21: No issues overnight. Remains stable on vent. Levo down to 0.01 mcg/kg/min. UO remains low. Nephrology following. Possible CRRT today?     10/22: Patient received 80 lasix and metalozone with minimal UO. Levo @ .02 and prop @ 10. Vent settings: 20/450/10/40%. Plan for CRRT  today. Will SAT/SBT after CRRT. May change propofol to precedex.     10/23: Had episodes of bradycardia where HR went to 30's which resolved with heating the patient. CRRT yesterday with about 1L removed. Currently on 0.03 of levo and 10 of prop. Cont daptomycin and ceftriaxone per ID. CXR improved. SAT/SBT. EP consulted for episodes of bradycardia.     10/24: Bradycardic episodes of HR in 40s. Currently on 0.03 of Levo, 10 of prop and 50 fentanyl. Tolerating CRRT. Place PICC today.     10/25: Did well with the SBT yesterday, plan for another one today. Continue CRRT. Hgb 7.0 this am, still requiting Levo 0.03, will transfuse 1 unit PRBCs. Remove chest tube today.     10/26: Chest tube removed last night, CXR improving, patient appears overall lethargic on sbt/sat, not ready to extubate, will complete 4/4/4 sbt/rest/sbt-> rest today and reassess tomorrow     10/27: Patient failed afternoon SBT, placed on rate overnight, net - 2.5L over previous 24 hours, will place on wean today.    10/29: Patient with high residual tube feeds overnight.  She did have an episode of vomiting.  Tube feeds placed on hold.  She was also afebrile overnight.  Will obtain a KUB to rule out ileus.  Sputum culture with Pseudomonas, discussed antibiotic plans with ID.  Will not do SBT with patient today.  Did discuss the setback with her , he did state moving forward that if she does not reach extubation he would be agreeable to a tracheostomy.    Medical History:  Past Medical History:   Diagnosis Date    Degenerative myopia, bilateral     Diabetic neuropathy (Multi)     Difficult intubation 08/26/2024    Mac 3, grade 3, 1 attempt.  Glidescope/videolaryngoscopy recommended for future attempts.    DM type 2 (diabetes mellitus, type 2) (Multi)     Dry eye syndrome of bilateral lacrimal glands     Essential hypertension     Essential tremor     Glaucoma     Hyperlipidemia     Long term (current) use of insulin (Multi)     Low back pain      PONV (postoperative nausea and vomiting)     Primary open angle glaucoma of both eyes, severe stage     Repeated falls     Sarcoidosis of lung (Multi)     Spinal stenosis, lumbar region without neurogenic claudication     Weakness      Past Surgical History:   Procedure Laterality Date    BLEPHAROPLASTY  07/2022    BREAST SURGERY  05/20/2022    Breast lift    CARPAL TUNNEL RELEASE      CATARACT EXTRACTION W/  INTRAOCULAR LENS IMPLANT Bilateral     OD 08/04/2011 +8.5D,OS 08/04/2011 +8.50D    FOOT SURGERY      INSERTION / REMOVAL CRANIAL DBS GENERATOR      Placed 2017.  Removed 2018. part of wire left in head when everything removed    LUMBAR FUSION      L3-S1    PANRETINAL PHOTOCOAGULATION  2014    THORACIC FUSION  08/26/2024    T4-S1 fusion    VITRECTOMY Right 2013     Medications Prior to Admission   Medication Sig Dispense Refill Last Dose/Taking    acetaminophen (Tylenol) 500 mg tablet Take 2 tablets (1,000 mg) by mouth 3 times a day.   Unknown    ascorbic acid (Vitamin C) 500 mg tablet as directed Orally   Unknown    brimonidine (AlphaGAN) 0.2 % ophthalmic solution Administer 1 drop into both eyes 2 times a day.   Unknown    calcium carbonate-vitamin D3 500 mg-5 mcg (200 unit) tablet Take 1 tablet by mouth once daily.   Unknown    cefTRIAXone (Rocephin) 2 gram/50 mL IV Infuse 50 mL (2 g) at 100 mL/hr over 30 minutes into a venous catheter once every 24 hours. Once weekly labs CBC/diff, CMP, Vanc trough ESR, CRP fax to Dr. Juarez [phone]. Stop date 11/12/24. 1950 mL 0     dextrose 50 % injection Infuse 25 mL (12.5 g) into a venous catheter every 15 minutes if needed (For blood glucose 41 to 70 mg/dL).       dextrose 50 % injection Infuse 50 mL (25 g) into a venous catheter every 15 minutes if needed (For blood glucose less than or equal to 40 mg/dL).       docusate sodium (Colace) 100 mg capsule Take 1 capsule (100 mg) by mouth 2 times a day.   Unknown    ezetimibe (Zetia) 10 mg tablet Take 1 tablet (10  mg) by mouth once daily. 90 tablet 3 Unknown    FreeStyle Lite Strips strip USE TO TEST 3 TIMES A DAY AS DIRECTED 300 each 2     glucagon (Glucagen) 1 mg injection Inject 1 mg into the muscle every 15 minutes if needed for low blood sugar - see comments (Hypoglycemia).       glucagon (Glucagen) 1 mg injection Inject 1 mg into the muscle every 15 minutes if needed for low blood sugar - see comments (Hypoglycemia).       heparin sodium,porcine (heparin, porcine,) 5,000 unit/mL injection Inject 1 mL (5,000 Units) under the skin every 8 hours.       insulin lispro (HumaLOG) 100 unit/mL injection Inject 0-15 Units under the skin 3 times daily (morning, midday, late afternoon). Take as directed per insulin instructions.Do not hold when patient is not eating, continue order as scheduled for hyperglycemia management.  Insulin Lispro Corrective Scale #3     Hypoglycemia protocol Call LIP unit(s) if Blood Glucose is between 0 - 70 mg/dL     0 unit(s) if Blood glucose is between    3 unit(s) if Blood glucose is between 151-200   6 unit(s) if Blood glucose is between 201-250   9 unit(s) if Blood glucose is between 251-300   12 unit(s) if Blood glucose is between 301-350   15 unit(s) if Blood glucose is between 351-400    Notify provider unit(s) if Blood Glucose is greater than 400 mg/dL       Lactobacillus acidophilus 100 mg (1 billion cell) capsule Take 1 capsule by mouth 2 times a day.   Unknown    latanoprost (Xalatan) 0.005 % ophthalmic solution Administer 1 drop into both eyes once daily at bedtime. 2.5 mL 5 Unknown    melatonin 5 mg tablet Take 1 tablet (5 mg) by mouth as needed at bedtime for sleep.   Unknown    methocarbamol (Robaxin) 500 mg tablet Take 1 tablet (500 mg) by mouth 3 times a day.   Unknown    multivitamin tablet Take 1 tablet by mouth once daily.   Unknown    ondansetron (Zofran) 4 mg/2 mL injection Infuse 2 mL (4 mg) into a venous catheter every 6 hours if needed for nausea or vomiting.        "oxyCODONE (Roxicodone) 5 mg immediate release tablet Take 1 tablet (5 mg) by mouth every 6 hours if needed for severe pain (7 - 10) or moderate pain (4 - 6).       oxygen (O2) gas therapy Inhale 1 each continuously.       pantoprazole (ProtoNix) 40 mg EC tablet Take 1 tablet (40 mg) by mouth once daily in the morning. Take before meals. Do not crush, chew, or split.       pantoprazole (ProtoNix) 40 mg injection Infuse 40 mg into a venous catheter once daily in the morning. Take before meals. If unable to take PO.       pen needle, diabetic (PEN NEEDLE MISC) BD Altagracia- 4 mm X 32 G needle - as directed 4x a day sc 4 times per day       polyethylene glycol (Glycolax, Miralax) 17 gram packet Take 17 g by mouth once daily.   Unknown    primidone 125 mg tablet Take 125 mg by mouth 3 times a day.   Unknown    propranolol LA (Inderal LA) 60 mg 24 hr capsule Take 1 capsule (60 mg) by mouth early in the morning.. Hold for SBP < 110 mmhg, HR < 60 bpm.   Unknown    sennosides (Senokot) 8.6 mg tablet Take 1 tablet (8.6 mg) by mouth every 12 hours if needed for constipation.   Unknown    Sure Comfort Pen Needle 32 gauge x 5/32\" needle AS DIRECTED DAILY FOR 90 DAYS 100 each 11 Unknown    traZODone (Desyrel) 25 MG split tablet Take 1 half tablet (25 mg) by mouth once daily at bedtime.   Unknown    vancomycin (Vancocin) 1 gram/250 mL solution Infuse 250 mL (1 g) at 250 mL/hr over 60 minutes into a venous catheter every 12 hours. Once weekly labs CBC/diff, CMP, Vanc trough ESR, CRP fax to Dr. Juarez [phone]. Stop date 11/12/24. 63863 mL 0      Erythromycin, Morphine, and Rosuvastatin  Social History     Tobacco Use    Smoking status: Former     Types: Cigarettes     Passive exposure: Past    Smokeless tobacco: Never   Vaping Use    Vaping status: Never Used   Substance Use Topics    Alcohol use: Not Currently    Drug use: Not Currently     Family History   Problem Relation Name Age of Onset    Multiple myeloma Mother      Cancer " Mother      Other (CABG) Father      Pulmonary embolism Father      Heart disease Father      Breast cancer Sister          Stage II    Hypertension Sister      Diabetes Sister      No Known Problems Sister          x5    No Known Problems Brother          x4    No Known Problems Daughter         Review of Systems:  14 point review of systems was completed and negative except for those specially mention in my HPI    Physical Exam:    Heart Rate:  []   Temp:  [37 °C (98.6 °F)-38.6 °C (101.5 °F)]   Resp:  [14-29]   BP: (100-140)/(44-80)   Weight:  [120 kg (264 lb 1.8 oz)]   SpO2:  [93 %-99 %]     Physical Exam  Vitals reviewed.   Constitutional:       General: She is awake.      Appearance: She is ill-appearing.   HENT:      Head: Normocephalic and atraumatic.      Right Ear: External ear normal.      Left Ear: External ear normal.      Nose: Nose normal.      Mouth/Throat:      Mouth: Mucous membranes are moist.      Pharynx: Oropharynx is clear.   Eyes:      Pupils: Pupils are equal, round, and reactive to light.   Cardiovascular:      Rate and Rhythm: Normal rate and regular rhythm.      Heart sounds: Normal heart sounds.   Pulmonary:      Effort: Pulmonary effort is normal.      Breath sounds: Examination of the right-upper field reveals rhonchi. Examination of the left-upper field reveals rhonchi. Examination of the right-lower field reveals decreased breath sounds. Examination of the left-lower field reveals decreased breath sounds. Decreased breath sounds and rhonchi present.   Abdominal:      General: Bowel sounds are decreased.      Palpations: Abdomen is soft.      Tenderness: There is no abdominal tenderness.   Genitourinary:     Comments: Indwelling urinary catheter   Musculoskeletal:      Left forearm: Swelling present.      Left hand: Swelling present.      Right lower leg: No edema.      Left lower leg: No edema.   Skin:     General: Skin is warm.      Capillary Refill: Capillary refill takes less  than 2 seconds.   Neurological:      Mental Status: She is alert.         Objective:    I have reviewed all medications, laboratory results, and imaging pertinent for today's encounter    Assessment/Plan:    I am currently managing this critically ill patient for the following problems:    Neuro/Psych/Pain Ctrl/Sedation:  #Acute encephalopathy - likely secondary to hypercapnia, infection, resolving   #Hx Essential tremor   CT head: Negative for acute findings   Urine tox positive for barbiturates consistent with primidone intake   - Addressing underlying causes  - Pain Control: Oxycodone Q4 scheduled, Acetaminophen PRN  - Not requiring sedation  - Home primidone continued. Will hold propanolol d/t hypotension  - CAM ICU qshift, sleep-wake hygiene, delirium precautions     Respiratory/ENT:  #Acute hypoxic/hypercapnic respiratory failure - likely multifactorial and 2/2 HCAP, pl effusions, volume overload  #Healthcare-associated pneumonia   #Atelectasis - likely 2/2 mucus plugging   #Pleural Effusion -S/p left-sided pigtail placement 10/17, removed 10/25  CXR Today: Improvement in L pleural effusion from post ct removal cxr  Intubated for 3 days extubated and re intubated on the 19th, currently day 9 of intubation, will need trach conversation if unable to extubate in the next few days  - Intubated 10/18. Vent setting: -20-5 30%  - Will wean O2 as tolerated to maintain SpO2 >92%  - Duonebs Q4 and hypertonic saline QID   - IPV per RT TID  - Continuous pulse ox monitoring   - Pulm hygiene  -Aspiration precautions     Cardiovascular:  #Septic shock - improving  #Occlusive superficial venous thrombus of the left cephalic vein  #Acute on chronic diastolic heart failure  #Bradycardia  #Hx HTN, HLD  TTE 10/1: diastolic dysfunction, LVEF 50-55%, mildly elevated RVSP (40)  Bilateral duplex US upper extremities:  Thrombosis within the left cephalic vein, which is part of the superficial venous system of the upper  "extremity, adjacent to PICC line. No deep venous thrombosis in the upper extremities.  - Remains off levophed gtt, Will maintain goal MAP > 65   - Start midodrine increased from bid to q8  - Continue stress dose steroids  - Holding home propranolol (on for tremors)  - Continue home Zetia  - Continuous cardiac monitoring per ICU protocol  - EKGs PRN for ACS symptoms, arrhythmias   - EP consulted will appreciate recs  - US duplex b/l upper extremities-occlusive superficial vein thrombosis of left cephalic vein from mid-distal upper arm extending to approximately 2.0cm proximal to subclavian junction   -Acute nonocclusive DVT right IJ vein around line placement, will need to be removed   -Heparin drip started    GI:  Hx GERD  - Diet: Glucerna d/t vomiting overnight patient will have trickle feeds. Reassess tomorrow   - BR: Colace, Miralax PRN  - GI Prophylaxis: PPI  - KUB without evidence of ileus/obstruction     Renal/Volume Status (Intra & Extravascular):  #Acute kidney injury - possibly ATN +/- medication-induced (vancomycin)  #Anasarca   #Mixed respiratory and metabolic acidosis - improving on vent  #Hypoalbuminemia   #Hyponatremia  Baseline Cr 0.8-1.0. BUN Cr 1.53/40 today   - Hemodialysis today   - Oliguric  - Nephrology following  - Maintain anders catheter  - Hourly I/O's  - Replete electrolytes to maintain K >4.0 and Mg >2.0  - Daily BMP, Mg, Phos  - PO phos placed to reduce IV repletion needs    Endocrine  T2DM  - Serum Cortisol elevated  - SSI Q 6 while NPO  - TSH: midly elevated, T4 WNL  - Hypoglycemia protocol PRN     Infectious Disease:  #Pseudomonas-Respiratory culture 10/29  #Thoracolumbar surgical site infection - s/p I&D x 2. Recent MRSA bacteremia on extended course of IV antibiotics (vanc and rocephin -> 11/12)  #Healthcare-associated pneumonia   CT lumbar spine 10/12: Unable to r/o abscess. Unable to do MRI d/t spinal hardware, \"metal in head\" per patient  Sputum Culture 10/21: negative  Blood " cultures 10/16: Negative  Urine culture 10/14: Negative  Sputum culture 10/16: + pseudomonas   - ID following  - Inhaled tobramycin  - Continue Ceftriaxone (10/23-...)  - Continue Daptomycin (10/21-...)  - Cefepime completed on 10/22  - PICC placed 10/24  - Monitor SIRS criteria  -Orthopedics consulted, as sutures from her lumbar surgery are still in place.    Heme/Onc:  #Normocytic anemia   H/H similar to previous: No active signs of bleeding.  - Start iron and folate  - Monitor for s/sx of bleeding   - Plan to transfuse if Hgb <7.0   - Daily CBC  - Keep active type and screen     OBGYN/MSK:  No active concerns   - PT/OT when appropriate  - 10/27 Sat up on side of bed for 5 minutes, performed legs kicks, no problems with stability of vitals during proce    Ethics/Code Status:  Full Code     :  DVT Prophylaxis: Heparin gtt  GI Prophylaxis: PPI  Bowel Regimen: Colace & Miralax  Diet: Tube feed   CVC: PICC placed 10/24, Trialysis R internal jugular placed 10/19   Wanda: none  Gibson: yes 10/12  Restraints: yes  Dispo: ICU    Critical Care Time:  60 minutes spent in preparing to see patient (I.e. review of medical records), evaluation of diagnostics (I.e. labs, imaging, etc.), documentation, discussing plan of care with patient/ family/ caregiver, and/ or coordination of care with multidisciplinary team. Time does not include completion of procedure time.     Plan of care discussed with Dr. Otero    Of note, this documentation is completed using the Dragon Dictation system (voice recognition software). There may be spelling and/or grammatical errors that were not corrected prior to final submission.      LEONEL Doe-CNP  Critical Care Medicine   [facility]

## 2024-10-29 NOTE — PROGRESS NOTES
"Narda Malloy is a 69 y.o. female on day 17 of admission presenting with Unresponsive.    Subjective   The patient is seen for acute kidney injury which is secondary to acute tubular necrosis secondary toto hypotension and septic shock as well as vancomycin toxicity the patient remains intubated on the ventilator events were noted apparently the patient had large emesis during the night and she aspirated she spiked temperature white count went up her  sputum cultures showed Pseudomonas ID is managing her antibiotic therapy she remains extremely oliguric  by the bedside       Objective     Physical Exam  Constitutional:       Comments: Patient is intubated on the ventilator   Neck:      Vascular: No carotid bruit.   Cardiovascular:      Rate and Rhythm: Normal rate and regular rhythm.      Heart sounds: No murmur heard.     No friction rub. No gallop.   Pulmonary:      Breath sounds: No wheezing, rhonchi or rales.   Chest:      Chest wall: No tenderness.   Abdominal:      General: There is no distension.      Tenderness: There is no abdominal tenderness. There is no guarding or rebound.   Musculoskeletal:         General: No swelling or tenderness.      Cervical back: Neck supple.      Right lower leg: Edema present.      Left lower leg: Edema present.   Lymphadenopathy:      Cervical: No cervical adenopathy.         Last Recorded Vitals  Blood pressure 113/62, pulse 68, temperature 37 °C (98.6 °F), temperature source Axillary, resp. rate 17, height 1.778 m (5' 10\"), weight 120 kg (264 lb 1.8 oz), SpO2 98%.    Intake/Output last 3 Shifts:  I/O last 3 completed shifts:  In: 2185 (18.2 mL/kg) [NG/GT:2035; IV Piggyback:150]  Out: 2106 (17.6 mL/kg) [Urine:77 (0 mL/kg/hr); Other:2029]  Weight: 119.8 kg     Current Facility-Administered Medications:     acetaminophen (Tylenol) oral liquid 650 mg, 650 mg, oral, q4h PRN, LEONEL Salgado-CNP, 650 mg at 10/28/24 1818    alteplase (Cathflo Activase) injection 2 mg, " 2 mg, intra-catheter, PRN, Kaleb Lam PA-C    alteplase (Cathflo Activase) injection 2 mg, 2 mg, intra-catheter, PRN, Andrew Malagon MD    brimonidine (AlphaGAN) 0.2 % ophthalmic solution 1 drop, 1 drop, Both Eyes, BID, Kaleb Lam PA-C, 1 drop at 10/29/24 0904    [Held by provider] calcium carbonate-vitamin D3 500 mg-5 mcg (200 unit) per tablet 1 tablet, 1 tablet, oral, Daily, Kaleb Lam PA-C, 1 tablet at 10/18/24 0930    cefTRIAXone (Rocephin) 2 g in dextrose (iso) IV 50 mL, 2 g, intravenous, q24h, Kaleb Lam PA-C, Stopped at 10/29/24 0930    [START ON 10/30/2024] DAPTOmycin (Cubicin) 700 mg in sodium chloride 0.9%  mL, 700 mg, intravenous, q48h, Andrew Malagon MD    dextrose 50 % injection 12.5 g, 12.5 g, intravenous, q15 min PRN, Kaleb Lam PA-C    dextrose 50 % injection 25 g, 25 g, intravenous, q15 min PRN, Kaleb Lam PA-C    ezetimibe (Zetia) tablet 10 mg, 10 mg, oral, Daily, Kaleb Lam PA-C, 10 mg at 10/28/24 0957    fentaNYL PF (Sublimaze) injection 25 mcg, 25 mcg, intravenous, q2h PRN, Jalyn Lopez PA-C    ferrous sulfate syrup 60 mg of iron, 60 mg of iron, oral, Daily, RUTH Doe, 60 mg of iron at 10/28/24 0957    folic acid (Folvite) tablet 1 mg, 1 mg, oral, Daily, RUTH Doe, 1 mg at 10/28/24 0957    glucagon (Glucagen) injection 1 mg, 1 mg, intramuscular, q15 min PRN, Kaleb Lam PA-C    glucagon (Glucagen) injection 1 mg, 1 mg, intramuscular, q15 min PRN, Kaleb Lam PA-C    heparin (porcine) injection 5,000 Units, 5,000 Units, subcutaneous, q8h, Kaleb Lam PA-C, 5,000 Units at 10/29/24 0548    heparin flush 10 unit/mL syringe 50 Units, 50 Units, intra-catheter, PRN, Kaleb Lam PA-C, 50 Units at 10/19/24 2313    honey (Medihoney) topical gel, , Topical, Daily, Sabino Matos, APRN-CNP, 1 Application at 10/29/24 0859    [Held by provider] HYDROmorphone (Dilaudid) injection 0.4 mg, 0.4 mg,  intravenous, q2h PRN, RUTH Salgado, 0.4 mg at 10/19/24 0520    insulin lispro (HumaLOG) injection 0-15 Units, 0-15 Units, subcutaneous, q4h, Lb Dodd PA-C, 3 Units at 10/29/24 0406    ipratropium-albuteroL (Duo-Neb) 0.5-2.5 mg/3 mL nebulizer solution 3 mL, 3 mL, nebulization, 4x daily, Kaleb Lam PA-C, 3 mL at 10/29/24 1118    latanoprost (Xalatan) 0.005 % ophthalmic solution 1 drop, 1 drop, Both Eyes, Nightly, Kaleb Lam PA-C, 1 drop at 10/28/24 2128    midodrine (Proamatine) tablet 10 mg, 10 mg, oral, q8h, RUTH Doe, 10 mg at 10/29/24 0004    oxyCODONE (Roxicodone) immediate release tablet 5 mg, 5 mg, oral, q4h, URTH Saglado, 5 mg at 10/29/24 1222    oxygen (O2) therapy, , inhalation, Continuous - Inhalation, Radha Otero MD, 40 percent at 10/29/24 0730    pantoprazole (ProtoNix) EC tablet 40 mg, 40 mg, oral, Daily before breakfast **OR** pantoprazole (ProtoNix) injection 40 mg, 40 mg, intravenous, Daily before breakfast, RUTH Salas, 40 mg at 10/29/24 0607    polyethylene glycol (Glycolax, Miralax) packet 17 g, 17 g, oral, Daily PRN, RUTH Doe    primidone (Mysoline) tablet 125 mg, 125 mg, oral, Nightly, Kaleb Lam PA-C, 125 mg at 10/28/24 2125    [Held by provider] propranolol LA (Inderal LA) 24 hr capsule 60 mg, 60 mg, oral, Daily, Kaleb Lam PA-C, 60 mg at 10/19/24 0643    sennosides-docusate sodium (Lucia-Colace) 8.6-50 mg per tablet 1 tablet, 1 tablet, oral, Nightly, Sweta Diaz, APRN-CNP, 1 tablet at 10/28/24 2129    sodium chloride 3 % nebulizer solution 3 mL, 3 mL, nebulization, 4x daily, Kaleb Lam PA-C, 3 mL at 10/29/24 1118    tobramycin (Michael 300) nebulizer solution 300 mg, 300 mg, nebulization, q12h, Andrew Malagon MD    [Held by provider] traMADol (Ultram) tablet 50 mg, 50 mg, oral, q8h PRN, Kaleb Lam PA-C   Relevant Results    Results for orders placed or performed during the  hospital encounter of 10/12/24 (from the past 96 hours)   Magnesium   Result Value Ref Range    Magnesium 2.26 1.60 - 2.40 mg/dL   CBC and Auto Differential   Result Value Ref Range    WBC 10.8 4.4 - 11.3 x10*3/uL    nRBC 0.0 0.0 - 0.0 /100 WBCs    RBC 2.57 (L) 4.00 - 5.20 x10*6/uL    Hemoglobin 7.9 (L) 12.0 - 16.0 g/dL    Hematocrit 24.8 (L) 36.0 - 46.0 %    MCV 97 80 - 100 fL    MCH 30.7 26.0 - 34.0 pg    MCHC 31.9 (L) 32.0 - 36.0 g/dL    RDW 20.2 (H) 11.5 - 14.5 %    Platelets 224 150 - 450 x10*3/uL    Neutrophils % 86.7 40.0 - 80.0 %    Immature Granulocytes %, Automated 2.4 (H) 0.0 - 0.9 %    Lymphocytes % 6.5 13.0 - 44.0 %    Monocytes % 4.1 2.0 - 10.0 %    Eosinophils % 0.2 0.0 - 6.0 %    Basophils % 0.1 0.0 - 2.0 %    Neutrophils Absolute 9.32 (H) 1.20 - 7.70 x10*3/uL    Immature Granulocytes Absolute, Automated 0.26 0.00 - 0.70 x10*3/uL    Lymphocytes Absolute 0.70 (L) 1.20 - 4.80 x10*3/uL    Monocytes Absolute 0.44 0.10 - 1.00 x10*3/uL    Eosinophils Absolute 0.02 0.00 - 0.70 x10*3/uL    Basophils Absolute 0.01 0.00 - 0.10 x10*3/uL   Hepatic function panel   Result Value Ref Range    Albumin 2.5 (L) 3.4 - 5.0 g/dL    Bilirubin, Total 0.3 0.0 - 1.2 mg/dL    Bilirubin, Direct 0.1 0.0 - 0.3 mg/dL    Alkaline Phosphatase 126 33 - 136 U/L    ALT 8 7 - 45 U/L    AST 12 9 - 39 U/L    Total Protein 5.1 (L) 6.4 - 8.2 g/dL   Phosphorus   Result Value Ref Range    Phosphorus 2.3 (L) 2.5 - 4.9 mg/dL   Basic Metabolic Panel   Result Value Ref Range    Glucose 191 (H) 74 - 99 mg/dL    Sodium 134 (L) 136 - 145 mmol/L    Potassium 4.3 3.5 - 5.3 mmol/L    Chloride 104 98 - 107 mmol/L    Bicarbonate 27 21 - 32 mmol/L    Anion Gap 7 (L) 10 - 20 mmol/L    Urea Nitrogen 10 6 - 23 mg/dL    Creatinine 0.73 0.50 - 1.05 mg/dL    eGFR 90 >60 mL/min/1.73m*2    Calcium 7.5 (L) 8.6 - 10.3 mg/dL   POCT GLUCOSE   Result Value Ref Range    POCT Glucose 197 (H) 74 - 99 mg/dL   Morphology   Result Value Ref Range    RBC Morphology See  Below     Polychromasia Mild     Ovalocytes Few     Lexus Cells Few     Basophilic Stippling Present    Calcium, ionized   Result Value Ref Range    POCT Calcium, Ionized 1.11 1.1 - 1.33 mmol/L   POCT GLUCOSE   Result Value Ref Range    POCT Glucose 158 (H) 74 - 99 mg/dL   POCT GLUCOSE   Result Value Ref Range    POCT Glucose 142 (H) 74 - 99 mg/dL   Magnesium   Result Value Ref Range    Magnesium 2.25 1.60 - 2.40 mg/dL   CBC and Auto Differential   Result Value Ref Range    WBC 9.4 4.4 - 11.3 x10*3/uL    nRBC 0.0 0.0 - 0.0 /100 WBCs    RBC 2.34 (L) 4.00 - 5.20 x10*6/uL    Hemoglobin 7.2 (L) 12.0 - 16.0 g/dL    Hematocrit 22.4 (L) 36.0 - 46.0 %    MCV 96 80 - 100 fL    MCH 30.8 26.0 - 34.0 pg    MCHC 32.1 32.0 - 36.0 g/dL    RDW 20.6 (H) 11.5 - 14.5 %    Platelets 185 150 - 450 x10*3/uL    Neutrophils % 86.3 40.0 - 80.0 %    Immature Granulocytes %, Automated 1.3 (H) 0.0 - 0.9 %    Lymphocytes % 7.7 13.0 - 44.0 %    Monocytes % 4.4 2.0 - 10.0 %    Eosinophils % 0.2 0.0 - 6.0 %    Basophils % 0.1 0.0 - 2.0 %    Neutrophils Absolute 8.12 (H) 1.20 - 7.70 x10*3/uL    Immature Granulocytes Absolute, Automated 0.12 0.00 - 0.70 x10*3/uL    Lymphocytes Absolute 0.72 (L) 1.20 - 4.80 x10*3/uL    Monocytes Absolute 0.41 0.10 - 1.00 x10*3/uL    Eosinophils Absolute 0.02 0.00 - 0.70 x10*3/uL    Basophils Absolute 0.01 0.00 - 0.10 x10*3/uL   Hepatic function panel   Result Value Ref Range    Albumin 2.4 (L) 3.4 - 5.0 g/dL    Bilirubin, Total 0.3 0.0 - 1.2 mg/dL    Bilirubin, Direct 0.1 0.0 - 0.3 mg/dL    Alkaline Phosphatase 122 33 - 136 U/L    ALT 8 7 - 45 U/L    AST 12 9 - 39 U/L    Total Protein 4.7 (L) 6.4 - 8.2 g/dL   Calcium, ionized   Result Value Ref Range    POCT Calcium, Ionized 1.13 1.1 - 1.33 mmol/L   Cortisol AM   Result Value Ref Range    Cortisol  A.M. 30.0 (H) 5.0 - 20.0 ug/dL   Phosphorus   Result Value Ref Range    Phosphorus 2.2 (L) 2.5 - 4.9 mg/dL   Basic Metabolic Panel   Result Value Ref Range    Glucose 159  (H) 74 - 99 mg/dL    Sodium 135 (L) 136 - 145 mmol/L    Potassium 4.2 3.5 - 5.3 mmol/L    Chloride 104 98 - 107 mmol/L    Bicarbonate 28 21 - 32 mmol/L    Anion Gap 7 (L) 10 - 20 mmol/L    Urea Nitrogen 9 6 - 23 mg/dL    Creatinine 0.61 0.50 - 1.05 mg/dL    eGFR >90 >60 mL/min/1.73m*2    Calcium 7.4 (L) 8.6 - 10.3 mg/dL   Morphology   Result Value Ref Range    RBC Morphology See Below     Polychromasia Mild     Ovalocytes Few     [place] Cells Few     Basophilic Stippling Present    POCT GLUCOSE   Result Value Ref Range    POCT Glucose 158 (H) 74 - 99 mg/dL   POCT GLUCOSE   Result Value Ref Range    POCT Glucose 171 (H) 74 - 99 mg/dL   POCT GLUCOSE   Result Value Ref Range    POCT Glucose 169 (H) 74 - 99 mg/dL   CBC and Auto Differential   Result Value Ref Range    WBC 12.4 (H) 4.4 - 11.3 x10*3/uL    nRBC 0.0 0.0 - 0.0 /100 WBCs    RBC 2.33 (L) 4.00 - 5.20 x10*6/uL    Hemoglobin 7.1 (L) 12.0 - 16.0 g/dL    Hematocrit 22.0 (L) 36.0 - 46.0 %    MCV 94 80 - 100 fL    MCH 30.5 26.0 - 34.0 pg    MCHC 32.3 32.0 - 36.0 g/dL    RDW 20.4 (H) 11.5 - 14.5 %    Platelets 192 150 - 450 x10*3/uL    Neutrophils % 86.1 40.0 - 80.0 %    Immature Granulocytes %, Automated 1.4 (H) 0.0 - 0.9 %    Lymphocytes % 7.8 13.0 - 44.0 %    Monocytes % 4.4 2.0 - 10.0 %    Eosinophils % 0.2 0.0 - 6.0 %    Basophils % 0.1 0.0 - 2.0 %    Neutrophils Absolute 10.66 (H) 1.20 - 7.70 x10*3/uL    Immature Granulocytes Absolute, Automated 0.17 0.00 - 0.70 x10*3/uL    Lymphocytes Absolute 0.97 (L) 1.20 - 4.80 x10*3/uL    Monocytes Absolute 0.55 0.10 - 1.00 x10*3/uL    Eosinophils Absolute 0.02 0.00 - 0.70 x10*3/uL    Basophils Absolute 0.01 0.00 - 0.10 x10*3/uL   Morphology   Result Value Ref Range    RBC Morphology See Below     Polychromasia Mild     Stomatocytes Few     Basophilic Stippling Present    Magnesium   Result Value Ref Range    Magnesium 2.26 1.60 - 2.40 mg/dL   Hepatic function panel   Result Value Ref Range    Albumin 2.4 (L) 3.4 - 5.0 g/dL     Bilirubin, Total 0.3 0.0 - 1.2 mg/dL    Bilirubin, Direct 0.0 0.0 - 0.3 mg/dL    Alkaline Phosphatase 126 33 - 136 U/L    ALT 9 7 - 45 U/L    AST 14 9 - 39 U/L    Total Protein 5.0 (L) 6.4 - 8.2 g/dL   Calcium, ionized   Result Value Ref Range    POCT Calcium, Ionized 1.12 1.1 - 1.33 mmol/L   Phosphorus   Result Value Ref Range    Phosphorus 2.0 (L) 2.5 - 4.9 mg/dL   Basic Metabolic Panel   Result Value Ref Range    Glucose 125 (H) 74 - 99 mg/dL    Sodium 134 (L) 136 - 145 mmol/L    Potassium 3.9 3.5 - 5.3 mmol/L    Chloride 103 98 - 107 mmol/L    Bicarbonate 27 21 - 32 mmol/L    Anion Gap 8 (L) 10 - 20 mmol/L    Urea Nitrogen 8 6 - 23 mg/dL    Creatinine 0.63 0.50 - 1.05 mg/dL    eGFR >90 >60 mL/min/1.73m*2    Calcium 7.5 (L) 8.6 - 10.3 mg/dL   POCT GLUCOSE   Result Value Ref Range    POCT Glucose 134 (H) 74 - 99 mg/dL   POCT GLUCOSE   Result Value Ref Range    POCT Glucose 168 (H) 74 - 99 mg/dL   POCT GLUCOSE   Result Value Ref Range    POCT Glucose 151 (H) 74 - 99 mg/dL   Magnesium   Result Value Ref Range    Magnesium 2.15 1.60 - 2.40 mg/dL   CBC and Auto Differential   Result Value Ref Range    WBC 10.3 4.4 - 11.3 x10*3/uL    nRBC 0.0 0.0 - 0.0 /100 WBCs    RBC 2.16 (L) 4.00 - 5.20 x10*6/uL    Hemoglobin 6.8 (L) 12.0 - 16.0 g/dL    Hematocrit 21.4 (L) 36.0 - 46.0 %    MCV 99 80 - 100 fL    MCH 31.5 26.0 - 34.0 pg    MCHC 31.8 (L) 32.0 - 36.0 g/dL    RDW 20.3 (H) 11.5 - 14.5 %    Platelets 182 150 - 450 x10*3/uL    Neutrophils % 86.4 40.0 - 80.0 %    Immature Granulocytes %, Automated 1.1 (H) 0.0 - 0.9 %    Lymphocytes % 8.1 13.0 - 44.0 %    Monocytes % 4.0 2.0 - 10.0 %    Eosinophils % 0.2 0.0 - 6.0 %    Basophils % 0.2 0.0 - 2.0 %    Neutrophils Absolute 8.91 (H) 1.20 - 7.70 x10*3/uL    Immature Granulocytes Absolute, Automated 0.11 0.00 - 0.70 x10*3/uL    Lymphocytes Absolute 0.84 (L) 1.20 - 4.80 x10*3/uL    Monocytes Absolute 0.41 0.10 - 1.00 x10*3/uL    Eosinophils Absolute 0.02 0.00 - 0.70 x10*3/uL     Basophils Absolute 0.02 0.00 - 0.10 x10*3/uL   Hepatic function panel   Result Value Ref Range    Albumin 2.3 (L) 3.4 - 5.0 g/dL    Bilirubin, Total 0.3 0.0 - 1.2 mg/dL    Bilirubin, Direct 0.1 0.0 - 0.3 mg/dL    Alkaline Phosphatase 123 33 - 136 U/L    ALT 9 7 - 45 U/L    AST 14 9 - 39 U/L    Total Protein 4.7 (L) 6.4 - 8.2 g/dL   Calcium, ionized   Result Value Ref Range    POCT Calcium, Ionized 1.11 1.1 - 1.33 mmol/L   Phosphorus   Result Value Ref Range    Phosphorus 1.9 (L) 2.5 - 4.9 mg/dL   Basic Metabolic Panel   Result Value Ref Range    Glucose 163 (H) 74 - 99 mg/dL    Sodium 135 (L) 136 - 145 mmol/L    Potassium 4.2 3.5 - 5.3 mmol/L    Chloride 104 98 - 107 mmol/L    Bicarbonate 28 21 - 32 mmol/L    Anion Gap 7 (L) 10 - 20 mmol/L    Urea Nitrogen 7 6 - 23 mg/dL    Creatinine 0.61 0.50 - 1.05 mg/dL    eGFR >90 >60 mL/min/1.73m*2    Calcium 7.2 (L) 8.6 - 10.3 mg/dL   Morphology   Result Value Ref Range    RBC Morphology See Below     Polychromasia Mild    Prepare RBC: 1 Units   Result Value Ref Range    PRODUCT CODE [id number]     Unit Number [id number]     Unit ABO A     Unit RH NEG     XM INTEP COMP     Dispense Status TR     Blood Expiration Date 11/10/2024 11:59:00 PM EST     PRODUCT BLOOD TYPE 0600     UNIT VOLUME 350    POCT GLUCOSE   Result Value Ref Range    POCT Glucose 119 (H) 74 - 99 mg/dL   POCT GLUCOSE   Result Value Ref Range    POCT Glucose 174 (H) 74 - 99 mg/dL   Magnesium   Result Value Ref Range    Magnesium 2.22 1.60 - 2.40 mg/dL   CBC and Auto Differential   Result Value Ref Range    WBC 15.0 (H) 4.4 - 11.3 x10*3/uL    nRBC 0.0 0.0 - 0.0 /100 WBCs    RBC 2.90 (L) 4.00 - 5.20 x10*6/uL    Hemoglobin 8.9 (L) 12.0 - 16.0 g/dL    Hematocrit 27.1 (L) 36.0 - 46.0 %    MCV 93 80 - 100 fL    MCH 30.7 26.0 - 34.0 pg    MCHC 32.8 32.0 - 36.0 g/dL    RDW 20.7 (H) 11.5 - 14.5 %    Platelets 233 150 - 450 x10*3/uL    Neutrophils % 89.0 40.0 - 80.0 %    Immature Granulocytes %, Automated 1.4 (H) 0.0  - 0.9 %    Lymphocytes % 5.3 13.0 - 44.0 %    Monocytes % 3.9 2.0 - 10.0 %    Eosinophils % 0.3 0.0 - 6.0 %    Basophils % 0.1 0.0 - 2.0 %    Neutrophils Absolute 13.30 (H) 1.20 - 7.70 x10*3/uL    Immature Granulocytes Absolute, Automated 0.21 0.00 - 0.70 x10*3/uL    Lymphocytes Absolute 0.80 (L) 1.20 - 4.80 x10*3/uL    Monocytes Absolute 0.58 0.10 - 1.00 x10*3/uL    Eosinophils Absolute 0.05 0.00 - 0.70 x10*3/uL    Basophils Absolute 0.02 0.00 - 0.10 x10*3/uL   Hepatic function panel   Result Value Ref Range    Albumin 2.6 (L) 3.4 - 5.0 g/dL    Bilirubin, Total 0.4 0.0 - 1.2 mg/dL    Bilirubin, Direct 0.2 0.0 - 0.3 mg/dL    Alkaline Phosphatase 142 (H) 33 - 136 U/L    ALT 11 7 - 45 U/L    AST 16 9 - 39 U/L    Total Protein 5.4 (L) 6.4 - 8.2 g/dL   Calcium, ionized   Result Value Ref Range    POCT Calcium, Ionized 1.11 1.1 - 1.33 mmol/L   Phosphorus   Result Value Ref Range    Phosphorus 2.3 (L) 2.5 - 4.9 mg/dL   Basic Metabolic Panel   Result Value Ref Range    Glucose 136 (H) 74 - 99 mg/dL    Sodium 135 (L) 136 - 145 mmol/L    Potassium 4.4 3.5 - 5.3 mmol/L    Chloride 102 98 - 107 mmol/L    Bicarbonate 28 21 - 32 mmol/L    Anion Gap 9 (L) 10 - 20 mmol/L    Urea Nitrogen 11 6 - 23 mg/dL    Creatinine 0.56 0.50 - 1.05 mg/dL    eGFR >90 >60 mL/min/1.73m*2    Calcium 7.7 (L) 8.6 - 10.3 mg/dL   Morphology   Result Value Ref Range    RBC Morphology See Below     Polychromasia Mild    POCT GLUCOSE   Result Value Ref Range    POCT Glucose 130 (H) 74 - 99 mg/dL   POCT GLUCOSE   Result Value Ref Range    POCT Glucose 166 (H) 74 - 99 mg/dL   POCT GLUCOSE   Result Value Ref Range    POCT Glucose 191 (H) 74 - 99 mg/dL   Magnesium   Result Value Ref Range    Magnesium 2.22 1.60 - 2.40 mg/dL   CBC and Auto Differential   Result Value Ref Range    WBC 11.5 (H) 4.4 - 11.3 x10*3/uL    nRBC 0.0 0.0 - 0.0 /100 WBCs    RBC 2.77 (L) 4.00 - 5.20 x10*6/uL    Hemoglobin 8.6 (L) 12.0 - 16.0 g/dL    Hematocrit 26.6 (L) 36.0 - 46.0 %    MCV  96 80 - 100 fL    MCH 31.0 26.0 - 34.0 pg    MCHC 32.3 32.0 - 36.0 g/dL    RDW 21.2 (H) 11.5 - 14.5 %    Platelets 222 150 - 450 x10*3/uL    Neutrophils % 86.0 40.0 - 80.0 %    Immature Granulocytes %, Automated 1.4 (H) 0.0 - 0.9 %    Lymphocytes % 7.6 13.0 - 44.0 %    Monocytes % 4.6 2.0 - 10.0 %    Eosinophils % 0.3 0.0 - 6.0 %    Basophils % 0.1 0.0 - 2.0 %    Neutrophils Absolute 9.87 (H) 1.20 - 7.70 x10*3/uL    Immature Granulocytes Absolute, Automated 0.16 0.00 - 0.70 x10*3/uL    Lymphocytes Absolute 0.87 (L) 1.20 - 4.80 x10*3/uL    Monocytes Absolute 0.53 0.10 - 1.00 x10*3/uL    Eosinophils Absolute 0.03 0.00 - 0.70 x10*3/uL    Basophils Absolute 0.01 0.00 - 0.10 x10*3/uL   Hepatic function panel   Result Value Ref Range    Albumin 2.6 (L) 3.4 - 5.0 g/dL    Bilirubin, Total 0.3 0.0 - 1.2 mg/dL    Bilirubin, Direct 0.0 0.0 - 0.3 mg/dL    Alkaline Phosphatase 160 (H) 33 - 136 U/L    ALT 12 7 - 45 U/L    AST 15 9 - 39 U/L    Total Protein 5.3 (L) 6.4 - 8.2 g/dL   Calcium, ionized   Result Value Ref Range    POCT Calcium, Ionized 1.14 1.1 - 1.33 mmol/L   Phosphorus   Result Value Ref Range    Phosphorus 2.1 (L) 2.5 - 4.9 mg/dL   Basic Metabolic Panel   Result Value Ref Range    Glucose 116 (H) 74 - 99 mg/dL    Sodium 135 (L) 136 - 145 mmol/L    Potassium 4.6 3.5 - 5.3 mmol/L    Chloride 103 98 - 107 mmol/L    Bicarbonate 30 21 - 32 mmol/L    Anion Gap 7 (L) 10 - 20 mmol/L    Urea Nitrogen 14 6 - 23 mg/dL    Creatinine 0.60 0.50 - 1.05 mg/dL    eGFR >90 >60 mL/min/1.73m*2    Calcium 7.6 (L) 8.6 - 10.3 mg/dL   Morphology   Result Value Ref Range    RBC Morphology See Below     Polychromasia Mild     RBC Fragments Few     Ovalocytes Few     Basophilic Stippling Present    Creatine Kinase   Result Value Ref Range    Creatine Kinase 45 0 - 215 U/L   BLOOD GAS ARTERIAL FULL PANEL   Result Value Ref Range    POCT pH, Arterial 7.39 7.38 - 7.42 pH    POCT pCO2, Arterial 47 (H) 38 - 42 mm Hg    POCT pO2, Arterial 105 (H)  85 - 95 mm Hg    POCT SO2, Arterial 99 94 - 100 %    POCT Oxy Hemoglobin, Arterial 95.9 94.0 - 98.0 %    POCT Hematocrit Calculated, Arterial 27.0 (L) 36.0 - 46.0 %    POCT Sodium, Arterial 132 (L) 136 - 145 mmol/L    POCT Potassium, Arterial 4.7 3.5 - 5.3 mmol/L    POCT Chloride, Arterial 102 98 - 107 mmol/L    POCT Ionized Calcium, Arterial 1.14 1.10 - 1.33 mmol/L    POCT Glucose, Arterial 116 (H) 74 - 99 mg/dL    POCT Lactate, Arterial 0.6 0.4 - 2.0 mmol/L    POCT Base Excess, Arterial 3.0 -2.0 - 3.0 mmol/L    POCT HCO3 Calculated, Arterial 28.5 (H) 22.0 - 26.0 mmol/L    POCT Hemoglobin, Arterial 9.1 (L) 12.0 - 16.0 g/dL    POCT Anion Gap, Arterial 6 (L) 10 - 25 mmo/L    Patient Temperature      FiO2 45 %    Apparatus      Ventilator Mode      Ventilator Rate 20 bpm    Tidal Volume 450 mL    Peep CHM2O 5.0 cm H2O    Site of Arterial Puncture Radial Left     Bill's Test Positive    POCT GLUCOSE   Result Value Ref Range    POCT Glucose 134 (H) 74 - 99 mg/dL   Respiratory Culture/Smear    Specimen: SPUTUM; Fluid   Result Value Ref Range    Respiratory Culture/Smear (3+) Moderate Pseudomonas aeruginosa (A)     Gram Stain       Gram stain indicates specimen consists of lower respiratory tract secretions.    Gram Stain No predominant organism    POCT GLUCOSE   Result Value Ref Range    POCT Glucose 190 (H) 74 - 99 mg/dL   Blood Gas Venous Full Panel   Result Value Ref Range    POCT pH, Venous 7.40 7.33 - 7.43 pH    POCT pCO2, Venous 49 41 - 51 mm Hg    POCT pO2, Venous 45 35 - 45 mm Hg    POCT SO2, Venous 79 (H) 45 - 75 %    POCT Oxy Hemoglobin, Venous 76.3 (H) 45.0 - 75.0 %    POCT Hematocrit Calculated, Venous 26.0 (L) 36.0 - 46.0 %    POCT Sodium, Venous 132 (L) 136 - 145 mmol/L    POCT Potassium, Venous 5.2 3.5 - 5.3 mmol/L    POCT Chloride, Venous 102 98 - 107 mmol/L    POCT Ionized Calicum, Venous 1.17 1.10 - 1.33 mmol/L    POCT Glucose, Venous 198 (H) 74 - 99 mg/dL    POCT Lactate, Venous 0.8 0.4 - 2.0 mmol/L     POCT Base Excess, Venous 4.9 (H) -2.0 - 3.0 mmol/L    POCT HCO3 Calculated, Venous 30.4 (H) 22.0 - 26.0 mmol/L    POCT Hemoglobin, Venous 8.8 (L) 12.0 - 16.0 g/dL    POCT Anion Gap, Venous 5.0 (L) 10.0 - 25.0 mmol/L    Patient Temperature 37.0 degrees Celsius    FiO2 40 %    Ventilator Mode Other     Ventilator Rate 14 bpm    Tidal Volume 450 mL    Peep CHM2O 5.0 cm H2O   Magnesium   Result Value Ref Range    Magnesium 2.23 1.60 - 2.40 mg/dL   CBC and Auto Differential   Result Value Ref Range    WBC 10.7 4.4 - 11.3 x10*3/uL    nRBC 0.0 0.0 - 0.0 /100 WBCs    RBC 2.67 (L) 4.00 - 5.20 x10*6/uL    Hemoglobin 8.3 (L) 12.0 - 16.0 g/dL    Hematocrit 25.7 (L) 36.0 - 46.0 %    MCV 96 80 - 100 fL    MCH 31.1 26.0 - 34.0 pg    MCHC 32.3 32.0 - 36.0 g/dL    RDW 21.5 (H) 11.5 - 14.5 %    Platelets 238 150 - 450 x10*3/uL    Neutrophils % 87.1 40.0 - 80.0 %    Immature Granulocytes %, Automated 0.9 0.0 - 0.9 %    Lymphocytes % 6.4 13.0 - 44.0 %    Monocytes % 5.3 2.0 - 10.0 %    Eosinophils % 0.2 0.0 - 6.0 %    Basophils % 0.1 0.0 - 2.0 %    Neutrophils Absolute 9.29 (H) 1.20 - 7.70 x10*3/uL    Immature Granulocytes Absolute, Automated 0.10 0.00 - 0.70 x10*3/uL    Lymphocytes Absolute 0.68 (L) 1.20 - 4.80 x10*3/uL    Monocytes Absolute 0.57 0.10 - 1.00 x10*3/uL    Eosinophils Absolute 0.02 0.00 - 0.70 x10*3/uL    Basophils Absolute 0.01 0.00 - 0.10 x10*3/uL   Hepatic function panel   Result Value Ref Range    Albumin 2.5 (L) 3.4 - 5.0 g/dL    Bilirubin, Total 0.3 0.0 - 1.2 mg/dL    Bilirubin, Direct 0.1 0.0 - 0.3 mg/dL    Alkaline Phosphatase 150 (H) 33 - 136 U/L    ALT 12 7 - 45 U/L    AST 15 9 - 39 U/L    Total Protein 5.2 (L) 6.4 - 8.2 g/dL   Calcium, ionized   Result Value Ref Range    POCT Calcium, Ionized 1.15 1.1 - 1.33 mmol/L   Phosphorus   Result Value Ref Range    Phosphorus 2.7 2.5 - 4.9 mg/dL   Basic Metabolic Panel   Result Value Ref Range    Glucose 151 (H) 74 - 99 mg/dL    Sodium 134 (L) 136 - 145 mmol/L     Potassium 4.7 3.5 - 5.3 mmol/L    Chloride 102 98 - 107 mmol/L    Bicarbonate 29 21 - 32 mmol/L    Anion Gap 8 (L) 10 - 20 mmol/L    Urea Nitrogen 20 6 - 23 mg/dL    Creatinine 0.93 0.50 - 1.05 mg/dL    eGFR 67 >60 mL/min/1.73m*2    Calcium 7.8 (L) 8.6 - 10.3 mg/dL   Morphology   Result Value Ref Range    RBC Morphology See Below     Polychromasia Mild     Ovalocytes Few     Basophilic Stippling Present    POCT GLUCOSE   Result Value Ref Range    POCT Glucose 156 (H) 74 - 99 mg/dL   POCT GLUCOSE   Result Value Ref Range    POCT Glucose 175 (H) 74 - 99 mg/dL   POCT GLUCOSE   Result Value Ref Range    POCT Glucose 144 (H) 74 - 99 mg/dL   Magnesium   Result Value Ref Range    Magnesium 2.31 1.60 - 2.40 mg/dL   CBC and Auto Differential   Result Value Ref Range    WBC 8.7 4.4 - 11.3 x10*3/uL    nRBC 0.0 0.0 - 0.0 /100 WBCs    RBC 2.44 (L) 4.00 - 5.20 x10*6/uL    Hemoglobin 7.6 (L) 12.0 - 16.0 g/dL    Hematocrit 23.3 (L) 36.0 - 46.0 %    MCV 96 80 - 100 fL    MCH 31.1 26.0 - 34.0 pg    MCHC 32.6 32.0 - 36.0 g/dL    RDW 21.4 (H) 11.5 - 14.5 %    Platelets 238 150 - 450 x10*3/uL    Neutrophils % 81.4 40.0 - 80.0 %    Immature Granulocytes %, Automated 1.6 (H) 0.0 - 0.9 %    Lymphocytes % 10.4 13.0 - 44.0 %    Monocytes % 6.2 2.0 - 10.0 %    Eosinophils % 0.3 0.0 - 6.0 %    Basophils % 0.1 0.0 - 2.0 %    Neutrophils Absolute 7.08 1.20 - 7.70 x10*3/uL    Immature Granulocytes Absolute, Automated 0.14 0.00 - 0.70 x10*3/uL    Lymphocytes Absolute 0.91 (L) 1.20 - 4.80 x10*3/uL    Monocytes Absolute 0.54 0.10 - 1.00 x10*3/uL    Eosinophils Absolute 0.03 0.00 - 0.70 x10*3/uL    Basophils Absolute 0.01 0.00 - 0.10 x10*3/uL   Hepatic function panel   Result Value Ref Range    Albumin 2.4 (L) 3.4 - 5.0 g/dL    Bilirubin, Total 0.4 0.0 - 1.2 mg/dL    Bilirubin, Direct 0.2 0.0 - 0.3 mg/dL    Alkaline Phosphatase 144 (H) 33 - 136 U/L    ALT 11 7 - 45 U/L    AST 14 9 - 39 U/L    Total Protein 5.0 (L) 6.4 - 8.2 g/dL   Calcium, ionized    Result Value Ref Range    POCT Calcium, Ionized 1.13 1.1 - 1.33 mmol/L   Procalcitonin   Result Value Ref Range    Procalcitonin 0.18 (H) <=0.07 ng/mL   Phosphorus   Result Value Ref Range    Phosphorus 3.2 2.5 - 4.9 mg/dL   Basic Metabolic Panel   Result Value Ref Range    Glucose 164 (H) 74 - 99 mg/dL    Sodium 133 (L) 136 - 145 mmol/L    Potassium 4.6 3.5 - 5.3 mmol/L    Chloride 100 98 - 107 mmol/L    Bicarbonate 28 21 - 32 mmol/L    Anion Gap 10 10 - 20 mmol/L    Urea Nitrogen 34 (H) 6 - 23 mg/dL    Creatinine 1.25 (H) 0.50 - 1.05 mg/dL    eGFR 47 (L) >60 mL/min/1.73m*2    Calcium 7.7 (L) 8.6 - 10.3 mg/dL   Morphology   Result Value Ref Range    RBC Morphology See Below     Polychromasia Mild     Ovalocytes Few     Lexus Cells Few     Basophilic Stippling Present    POCT GLUCOSE   Result Value Ref Range    POCT Glucose 171 (H) 74 - 99 mg/dL   POCT GLUCOSE   Result Value Ref Range    POCT Glucose 142 (H) 74 - 99 mg/dL   POCT GLUCOSE   Result Value Ref Range    POCT Glucose 149 (H) 74 - 99 mg/dL     *Note: Due to a large number of results and/or encounters for the requested time period, some results have not been displayed. A complete set of results can be found in Results Review.       Assessment/Plan   Acute kidney injury patient's urine output remains extremely low renal function is worse my plan is to schedule for dialysis tomorrow discussed with her  in details  Acute respiratory failure continue with ventilator support managed by the intensive care unit team  Edema improved  Septic shock is off pressors at this time  Pneumonia continue with antibiotic therapy infectious disease  Diabetes mellitus type 2  Malnutrition continue with tube feeding  Lower back surgery site infection  Anemia transfuse when hemoglobin less than 7         Imer Cheung MD

## 2024-10-29 NOTE — CARE PLAN
Pt still suctions for large amount of thick tan  Problem: Respiratory  Goal: Clear secretions with interventions this shift  Outcome: Not Progressing     Problem: Respiratory  Goal: Tolerate pulmonary toileting this shift  Outcome: Progressing     Problem: Respiratory  Goal: Clear secretions with interventions this shift  Outcome: Not Progressing

## 2024-10-29 NOTE — PROGRESS NOTES
Occupational Therapy    OT Treatment    Patient Name: Narda Malloy  MRN: [id number]  Department: [address] ICU  Room: 11/11-A  Today's Date: 10/29/2024  Time Calculation  Start Time: 1335  Stop Time: 1345  Time Calculation (min): 10 min        Assessment:  OT Assessment: Pt more lethargic today with very minimal to no responsiveness tolerating only minimal BUE ROM exercises. Continue POC.  Prognosis: Fair  Evaluation/Treatment Tolerance: Treatment limited secondary to medical complications (Comment), Other (Comment) (Lethargy and very minimal to no responsiveness.)  End of Session Communication: Bedside nurse  End of Session Patient Position: Bed, 3 rail up, Alarm off, not on at start of session (Needs in reach and nurse aware.)    Plan:  Treatment Interventions: ADL retraining, Functional transfer training, UE strengthening/ROM, Endurance training, Cognitive reorientation, Patient/family training, Equipment evaluation/education, Neuromuscular reeducation, Fine motor coordination activities, Compensatory technique education  OT Frequency: 5 times per week  OT Discharge Recommendations: Moderate intensity level of continued care  OT - OK to Discharge: Yes      Subjective     Previous Visit Info:  OT Last Visit  OT Received On: 10/29/24    General:  General  Family/Caregiver Present: No  Caregiver Feedback: Spouse at bedside.  Co-Treatment: PT  Co-Treatment Reason: patient/caregiver safety and optimization of patient outcomes for a medically complex ICU treatment  Prior to Session Communication: Bedside nurse  Patient Position Received: Bed, 3 rail up, Alarm off, not on at start of session  General Comment: Cleared by nurse prior to session and pt presents more lethargic today with minimal to no responsiveness.    Precautions:  Medical Precautions: Fall precautions, Oxygen therapy device and L/min, Spinal precautions (Pt remains intubated and less responsive today.)  Precautions Comment: Recent spine surgery with spinal  precautions still in place. Bilateral wrist restraints remain in place.    Vital Signs Comment: HR 62, pulse ox 98%, /53 1st time and 116/54 2nd time, RR 18     Pain:  Pain Assessment  Pain Assessment: FLACC (Face, Legs, Activity, Cry, Consolability)  0-10 (Numeric) Pain Score: 0 - No pain    Objective      Cognition:  Cognition  Overall Cognitive Status: Impaired  Arousal/Alertness: Inconsistent responses to stimuli  Following Commands: Other (Comment) (Pt very lethargic today with eyes closed a majority of the time and following very minimal to no commands.)    Therapy/Activity: Therapeutic Exercise  Therapeutic Exercise Performed: Yes (ROM exercises completed for each UE separately while monitoring for responsiveness and/or resistance to movements.)  Therapeutic Exercise Activity 1: Shoulder flexion for 10 reps x1  Therapeutic Exercise Activity 2: Bicep curls for 10 reps x1  Therapeutic Exercise Activity 3: Tricep curls for 10 reps x1  Therapeutic Exercise Activity 4: Forearm supination/pronation for 10 reps x1  Therapeutic Exercise Activity 5: Wrist flexion/extension and circumduction for 10 reps x1      Outcome Measures:[facility] Daily Activity  Putting on and taking off regular lower body clothing: Total  Bathing (including washing, rinsing, drying): Total  Putting on and taking off regular upper body clothing: Total  Toileting, which includes using toilet, bedpan or urinal: Total  Taking care of personal grooming such as brushing teeth: Total  Eating Meals: Total  Daily Activity - Total Score: 6      Education Documentation  Home Exercise Program, taught by Pernell Muñoz Jr., OT at 10/28/2024  2:45 PM.  Learner: Patient  Readiness: Acceptance  Method: Explanation  Response: Needs Reinforcement    Body Mechanics, taught by Pernell Muñoz Jr., OT at 10/28/2024  2:45 PM.  Learner: Patient  Readiness: Acceptance  Method: Explanation  Response: Needs Reinforcement    Precautions, taught by Pernell Muñoz Jr.,  OT at 10/28/2024  2:45 PM.  Learner: Patient  Readiness: Acceptance  Method: Explanation  Response: Needs Reinforcement    ADL Training, taught by Pernell Muñoz Jr., OT at 10/28/2024  2:45 PM.  Learner: Patient  Readiness: Acceptance  Method: Explanation  Response: Needs Reinforcement    Education Comments  No comments found.      Goals:  Encounter Problems       Encounter Problems (Active)       OT Goals       Pt will complete self-feeding with setup. (Progressing)       Start:  10/28/24    Expected End:  11/28/24            Pt will complete all grooming tasks with min A. (Progressing)       Start:  10/28/24    Expected End:  11/28/24            Pt will complete UB dressing/bathing with min A. (Progressing)       Start:  10/28/24    Expected End:  11/28/24            Pt will tolerate sitting EOB for at least 10-15 minutes with F+ balance in order to enhance ADL's. (Progressing)       Start:  10/28/24    Expected End:  11/28/24            Pt will complete all functional transfers and mobility with mod A using a RW. (Progressing)       Start:  10/28/24    Expected End:  11/28/24

## 2024-10-30 ENCOUNTER — APPOINTMENT (OUTPATIENT)
Dept: DIALYSIS | Facility: HOSPITAL | Age: 69
End: 2024-10-30
Payer: MEDICARE

## 2024-10-30 ENCOUNTER — APPOINTMENT (OUTPATIENT)
Dept: RADIOLOGY | Facility: HOSPITAL | Age: 69
End: 2024-10-30
Payer: MEDICARE

## 2024-10-30 LAB
ACID FAST STN SPEC: NORMAL
ALBUMIN SERPL BCP-MCNC: 2.3 G/DL (ref 3.4–5)
ALP SERPL-CCNC: 136 U/L (ref 33–136)
ALT SERPL W P-5'-P-CCNC: 11 U/L (ref 7–45)
ANION GAP BLDV CALCULATED.4IONS-SCNC: 6 MMOL/L (ref 10–25)
ANION GAP SERPL CALCULATED.3IONS-SCNC: 12 MMOL/L (ref 10–20)
APTT PPP: 111.5 SECONDS (ref 22–32.5)
APTT PPP: 40.3 SECONDS (ref 22–32.5)
APTT PPP: >139 SECONDS (ref 22–32.5)
AST SERPL W P-5'-P-CCNC: 12 U/L (ref 9–39)
BACTERIA SPEC RESP CULT: ABNORMAL
BACTERIA SPEC RESP CULT: ABNORMAL
BASE EXCESS BLDV CALC-SCNC: 4.4 MMOL/L (ref -2–3)
BASOPHILS # BLD AUTO: 0.02 X10*3/UL (ref 0–0.1)
BASOPHILS NFR BLD AUTO: 0.2 %
BILIRUB DIRECT SERPL-MCNC: 0.1 MG/DL (ref 0–0.3)
BILIRUB SERPL-MCNC: 0.4 MG/DL (ref 0–1.2)
BODY TEMPERATURE: 37 DEGREES CELSIUS
BUN SERPL-MCNC: 45 MG/DL (ref 6–23)
BURR CELLS BLD QL SMEAR: NORMAL
C DIF TOX TCDA+TCDB STL QL NAA+PROBE: NOT DETECTED
CA-I BLD-SCNC: 1.15 MMOL/L (ref 1.1–1.33)
CA-I BLDV-SCNC: 1.17 MMOL/L (ref 1.1–1.33)
CALCIUM SERPL-MCNC: 7.8 MG/DL (ref 8.6–10.3)
CHLORIDE BLDV-SCNC: 99 MMOL/L (ref 98–107)
CHLORIDE SERPL-SCNC: 100 MMOL/L (ref 98–107)
CO2 SERPL-SCNC: 26 MMOL/L (ref 21–32)
CREAT SERPL-MCNC: 1.8 MG/DL (ref 0.5–1.05)
EGFRCR SERPLBLD CKD-EPI 2021: 30 ML/MIN/1.73M*2
EOSINOPHIL # BLD AUTO: 0.05 X10*3/UL (ref 0–0.7)
EOSINOPHIL NFR BLD AUTO: 0.6 %
ERYTHROCYTE [DISTWIDTH] IN BLOOD BY AUTOMATED COUNT: 20.4 % (ref 11.5–14.5)
GLUCOSE BLD MANUAL STRIP-MCNC: 118 MG/DL (ref 74–99)
GLUCOSE BLD MANUAL STRIP-MCNC: 118 MG/DL (ref 74–99)
GLUCOSE BLD MANUAL STRIP-MCNC: 128 MG/DL (ref 74–99)
GLUCOSE BLD MANUAL STRIP-MCNC: 129 MG/DL (ref 74–99)
GLUCOSE BLD MANUAL STRIP-MCNC: 131 MG/DL (ref 74–99)
GLUCOSE BLD MANUAL STRIP-MCNC: 157 MG/DL (ref 74–99)
GLUCOSE BLDV-MCNC: 123 MG/DL (ref 74–99)
GLUCOSE SERPL-MCNC: 116 MG/DL (ref 74–99)
GRAM STN SPEC: ABNORMAL
GRAM STN SPEC: ABNORMAL
HCO3 BLDV-SCNC: 30.2 MMOL/L (ref 22–26)
HCT VFR BLD AUTO: 23.7 % (ref 36–46)
HCT VFR BLD EST: 26 % (ref 36–46)
HGB BLD-MCNC: 7.4 G/DL (ref 12–16)
HGB BLDV-MCNC: 8.6 G/DL (ref 12–16)
IMM GRANULOCYTES # BLD AUTO: 0.11 X10*3/UL (ref 0–0.7)
IMM GRANULOCYTES NFR BLD AUTO: 1.3 % (ref 0–0.9)
INHALED O2 CONCENTRATION: 35 %
LACTATE BLDV-SCNC: 0.6 MMOL/L (ref 0.4–2)
LYMPHOCYTES # BLD AUTO: 0.9 X10*3/UL (ref 1.2–4.8)
LYMPHOCYTES NFR BLD AUTO: 10.8 %
MAGNESIUM SERPL-MCNC: 2.4 MG/DL (ref 1.6–2.4)
MCH RBC QN AUTO: 30.7 PG (ref 26–34)
MCHC RBC AUTO-ENTMCNC: 31.2 G/DL (ref 32–36)
MCV RBC AUTO: 98 FL (ref 80–100)
MONOCYTES # BLD AUTO: 0.65 X10*3/UL (ref 0.1–1)
MONOCYTES NFR BLD AUTO: 7.8 %
MYCOBACTERIUM SPEC CULT: NORMAL
NEUTROPHILS # BLD AUTO: 6.61 X10*3/UL (ref 1.2–7.7)
NEUTROPHILS NFR BLD AUTO: 79.3 %
NRBC BLD-RTO: 0 /100 WBCS (ref 0–0)
OVALOCYTES BLD QL SMEAR: NORMAL
OXYHGB MFR BLDV: 78 % (ref 45–75)
PCO2 BLDV: 51 MM HG (ref 41–51)
PEEP CMH2O: 5 CM H2O
PH BLDV: 7.38 PH (ref 7.33–7.43)
PHOSPHATE SERPL-MCNC: 4.9 MG/DL (ref 2.5–4.9)
PLATELET # BLD AUTO: 243 X10*3/UL (ref 150–450)
PO2 BLDV: 44 MM HG (ref 35–45)
POLYCHROMASIA BLD QL SMEAR: NORMAL
POTASSIUM BLDV-SCNC: 3.9 MMOL/L (ref 3.5–5.3)
POTASSIUM SERPL-SCNC: 4.6 MMOL/L (ref 3.5–5.3)
PROT SERPL-MCNC: 4.9 G/DL (ref 6.4–8.2)
RBC # BLD AUTO: 2.41 X10*6/UL (ref 4–5.2)
RBC MORPH BLD: NORMAL
SAO2 % BLDV: 80 % (ref 45–75)
SODIUM BLDV-SCNC: 131 MMOL/L (ref 136–145)
SODIUM SERPL-SCNC: 133 MMOL/L (ref 136–145)
SPONTANEOUS TIDAL VOLUME: 537 ML
STOMATOCYTES BLD QL SMEAR: NORMAL
VENTILATOR MODE: ABNORMAL
VENTILATOR RATE: 15 BPM
WBC # BLD AUTO: 8.3 X10*3/UL (ref 4.4–11.3)

## 2024-10-30 PROCEDURE — 71045 X-RAY EXAM CHEST 1 VIEW: CPT

## 2024-10-30 PROCEDURE — 2500000005 HC RX 250 GENERAL PHARMACY W/O HCPCS: Performed by: STUDENT IN AN ORGANIZED HEALTH CARE EDUCATION/TRAINING PROGRAM

## 2024-10-30 PROCEDURE — 2500000001 HC RX 250 WO HCPCS SELF ADMINISTERED DRUGS (ALT 637 FOR MEDICARE OP)

## 2024-10-30 PROCEDURE — 83735 ASSAY OF MAGNESIUM: CPT

## 2024-10-30 PROCEDURE — 9420000001 HC RT PATIENT EDUCATION 5 MIN

## 2024-10-30 PROCEDURE — 37799 UNLISTED PX VASCULAR SURGERY: CPT

## 2024-10-30 PROCEDURE — 94668 MNPJ CHEST WALL SBSQ: CPT

## 2024-10-30 PROCEDURE — 2500000004 HC RX 250 GENERAL PHARMACY W/ HCPCS (ALT 636 FOR OP/ED): Performed by: INTERNAL MEDICINE

## 2024-10-30 PROCEDURE — 2500000004 HC RX 250 GENERAL PHARMACY W/ HCPCS (ALT 636 FOR OP/ED)

## 2024-10-30 PROCEDURE — 8010000001 HC DIALYSIS - HEMODIALYSIS PER DAY

## 2024-10-30 PROCEDURE — 82330 ASSAY OF CALCIUM: CPT

## 2024-10-30 PROCEDURE — 94003 VENT MGMT INPAT SUBQ DAY: CPT

## 2024-10-30 PROCEDURE — 85025 COMPLETE CBC W/AUTO DIFF WBC: CPT

## 2024-10-30 PROCEDURE — 85730 THROMBOPLASTIN TIME PARTIAL: CPT

## 2024-10-30 PROCEDURE — 36415 COLL VENOUS BLD VENIPUNCTURE: CPT

## 2024-10-30 PROCEDURE — 87493 C DIFF AMPLIFIED PROBE: CPT

## 2024-10-30 PROCEDURE — 82947 ASSAY GLUCOSE BLOOD QUANT: CPT

## 2024-10-30 PROCEDURE — 84100 ASSAY OF PHOSPHORUS: CPT

## 2024-10-30 PROCEDURE — 2500000002 HC RX 250 W HCPCS SELF ADMINISTERED DRUGS (ALT 637 FOR MEDICARE OP, ALT 636 FOR OP/ED)

## 2024-10-30 PROCEDURE — 71045 X-RAY EXAM CHEST 1 VIEW: CPT | Performed by: RADIOLOGY

## 2024-10-30 PROCEDURE — 94640 AIRWAY INHALATION TREATMENT: CPT

## 2024-10-30 PROCEDURE — 99291 CRITICAL CARE FIRST HOUR: CPT

## 2024-10-30 PROCEDURE — 2020000001 HC ICU ROOM DAILY

## 2024-10-30 PROCEDURE — 82248 BILIRUBIN DIRECT: CPT

## 2024-10-30 PROCEDURE — 80048 BASIC METABOLIC PNL TOTAL CA: CPT

## 2024-10-30 PROCEDURE — 2500000004 HC RX 250 GENERAL PHARMACY W/ HCPCS (ALT 636 FOR OP/ED): Performed by: NURSE PRACTITIONER

## 2024-10-30 PROCEDURE — 84132 ASSAY OF SERUM POTASSIUM: CPT

## 2024-10-30 PROCEDURE — 2500000005 HC RX 250 GENERAL PHARMACY W/O HCPCS

## 2024-10-30 RX ORDER — HEPARIN SODIUM 1000 [USP'U]/ML
1000 INJECTION, SOLUTION INTRAVENOUS; SUBCUTANEOUS
Status: DISCONTINUED | OUTPATIENT
Start: 2024-10-30 | End: 2024-11-02

## 2024-10-30 RX ADMIN — BRIMONIDINE TARTRATE 1 DROP: 2 SOLUTION OPHTHALMIC at 08:06

## 2024-10-30 RX ADMIN — IPRATROPIUM BROMIDE AND ALBUTEROL SULFATE 3 ML: .5; 3 SOLUTION RESPIRATORY (INHALATION) at 20:14

## 2024-10-30 RX ADMIN — Medication 35 PERCENT: at 20:13

## 2024-10-30 RX ADMIN — CEFTRIAXONE SODIUM 2 G: 2 INJECTION, SOLUTION INTRAVENOUS at 08:06

## 2024-10-30 RX ADMIN — TOBRAMYCIN 300 MG: 300 SOLUTION RESPIRATORY (INHALATION) at 07:39

## 2024-10-30 RX ADMIN — PANTOPRAZOLE SODIUM 40 MG: 40 INJECTION, POWDER, FOR SOLUTION INTRAVENOUS at 06:00

## 2024-10-30 RX ADMIN — OXYCODONE 5 MG: 5 TABLET ORAL at 03:40

## 2024-10-30 RX ADMIN — Medication 3 ML: at 10:38

## 2024-10-30 RX ADMIN — IPRATROPIUM BROMIDE AND ALBUTEROL SULFATE 3 ML: .5; 3 SOLUTION RESPIRATORY (INHALATION) at 07:26

## 2024-10-30 RX ADMIN — PRIMIDONE 125 MG: 250 TABLET ORAL at 20:43

## 2024-10-30 RX ADMIN — OXYCODONE 5 MG: 5 TABLET ORAL at 17:25

## 2024-10-30 RX ADMIN — MIDODRINE HYDROCHLORIDE 10 MG: 10 TABLET ORAL at 17:25

## 2024-10-30 RX ADMIN — Medication 3 ML: at 07:26

## 2024-10-30 RX ADMIN — MIDODRINE HYDROCHLORIDE 10 MG: 10 TABLET ORAL at 08:06

## 2024-10-30 RX ADMIN — HEPARIN SODIUM 1700 UNITS/HR: 10000 INJECTION, SOLUTION INTRAVENOUS at 06:45

## 2024-10-30 RX ADMIN — Medication: at 08:24

## 2024-10-30 RX ADMIN — HEPARIN SODIUM 1000 UNITS: 1000 INJECTION INTRAVENOUS; SUBCUTANEOUS at 14:00

## 2024-10-30 RX ADMIN — OXYCODONE 5 MG: 5 TABLET ORAL at 00:14

## 2024-10-30 RX ADMIN — Medication 60 MG OF IRON: at 08:06

## 2024-10-30 RX ADMIN — BRIMONIDINE TARTRATE 1 DROP: 2 SOLUTION OPHTHALMIC at 20:42

## 2024-10-30 RX ADMIN — EZETIMIBE 10 MG: 10 TABLET ORAL at 08:06

## 2024-10-30 RX ADMIN — OXYCODONE 5 MG: 5 TABLET ORAL at 13:01

## 2024-10-30 RX ADMIN — FOLIC ACID 1 MG: 1 TABLET ORAL at 08:06

## 2024-10-30 RX ADMIN — INSULIN LISPRO 3 UNITS: 100 INJECTION, SOLUTION INTRAVENOUS; SUBCUTANEOUS at 17:24

## 2024-10-30 RX ADMIN — SENNOSIDES AND DOCUSATE SODIUM 1 TABLET: 50; 8.6 TABLET ORAL at 20:42

## 2024-10-30 RX ADMIN — DAPTOMYCIN IN SODIUM CHLORIDE 700 MG: 700 INJECTION, SOLUTION INTRAVENOUS at 08:06

## 2024-10-30 RX ADMIN — IPRATROPIUM BROMIDE AND ALBUTEROL SULFATE 3 ML: .5; 3 SOLUTION RESPIRATORY (INHALATION) at 15:00

## 2024-10-30 RX ADMIN — Medication 3 ML: at 20:14

## 2024-10-30 RX ADMIN — OXYCODONE 5 MG: 5 TABLET ORAL at 08:06

## 2024-10-30 RX ADMIN — TOBRAMYCIN 300 MG: 300 SOLUTION RESPIRATORY (INHALATION) at 20:14

## 2024-10-30 RX ADMIN — OXYCODONE 5 MG: 5 TABLET ORAL at 20:42

## 2024-10-30 RX ADMIN — Medication 3 ML: at 15:00

## 2024-10-30 RX ADMIN — MIDODRINE HYDROCHLORIDE 10 MG: 10 TABLET ORAL at 00:15

## 2024-10-30 RX ADMIN — IPRATROPIUM BROMIDE AND ALBUTEROL SULFATE 3 ML: .5; 3 SOLUTION RESPIRATORY (INHALATION) at 10:38

## 2024-10-30 RX ADMIN — Medication 35 PERCENT: at 07:27

## 2024-10-30 ASSESSMENT — PAIN - FUNCTIONAL ASSESSMENT
PAIN_FUNCTIONAL_ASSESSMENT: CPOT (CRITICAL CARE PAIN OBSERVATION TOOL)

## 2024-10-30 ASSESSMENT — PAIN SCALES - GENERAL
PAINLEVEL_OUTOF10: 0 - NO PAIN
PAINLEVEL_OUTOF10: 0 - NO PAIN

## 2024-10-30 NOTE — PROGRESS NOTES
Patient not medically clear. Patient remains intubated. Trach and peg? At this time there is not a safe discharge plan in place. Patient was at [facility] prior to admission. If she will return then a precert will be needed. Will follow.      10/30/24 1323   Discharge Planning   Home or Post Acute Services Other (Comment)   Expected Discharge Disposition [place]  (TBD)   Does the patient need discharge transport arranged? Yes   RoundTrip coordination needed? Yes

## 2024-10-30 NOTE — PROGRESS NOTES
Spiritual Care Visit    Clinical Encounter Type  Visited With: Patient  Routine Visit: Introduction     Annotation:  provided patient support while rounding the Unit.  introduced  services of [facility].  inquired of the patient's nurse about the current emotional and spiritual needs. Patient spouse was not present.  requested to be notified when the spouse arrives to offer support. Spiritual care will remain available for support as requested.                                         Taxonomy  Intended Effects: Lessen someone's feelings of isolation

## 2024-10-30 NOTE — PROGRESS NOTES
"Narda Malloy is a 69 y.o. female on day 18 of admission presenting with Unresponsive.    Subjective   The patient is seen for acute kidney injury which is secondary to acute tubular necrosis patient remains fairly oliguric she is seen and examined on dialysis therapy tolerating procedure very well she remains intubated on the ventilator no further fever or chills       Objective     Physical Exam  Constitutional:       Comments: Patient is intubated on the ventilator   Neck:      Vascular: No carotid bruit.   Cardiovascular:      Rate and Rhythm: Normal rate and regular rhythm.      Heart sounds: No murmur heard.     No friction rub. No gallop.   Pulmonary:      Breath sounds: No wheezing, rhonchi or rales.   Chest:      Chest wall: No tenderness.   Abdominal:      General: There is no distension.      Tenderness: There is no abdominal tenderness. There is no guarding or rebound.   Musculoskeletal:         General: No swelling or tenderness.      Cervical back: Neck supple.      Right lower leg: Edema present.      Left lower leg: Edema present.   Lymphadenopathy:      Cervical: No cervical adenopathy.         Last Recorded Vitals  Blood pressure 113/63, pulse 73, temperature 36.8 °C (98.2 °F), temperature source Temporal, resp. rate 17, height 1.778 m (5' 10\"), weight 118 kg (259 lb 11.2 oz), SpO2 98%.    Intake/Output last 3 Shifts:  I/O last 3 completed shifts:  In: 372 (3.2 mL/kg) [I.V.:222 (1.9 mL/kg); NG/GT:100; IV Piggyback:50]  Out: 65 (0.6 mL/kg) [Urine:65 (0 mL/kg/hr)]  Weight: 117.8 kg     Current Facility-Administered Medications:     acetaminophen (Tylenol) oral liquid 650 mg, 650 mg, oral, q4h PRN, Sabino Matos APRN-CNP, 650 mg at 10/28/24 1818    alteplase (Cathflo Activase) injection 2 mg, 2 mg, intra-catheter, PRN, Kaleb Lam PA-C    alteplase (Cathflo Activase) injection 2 mg, 2 mg, intra-catheter, PRN, Andrew Malagon MD    brimonidine (AlphaGAN) 0.2 % ophthalmic solution 1 drop, 1 drop, " Both Eyes, BID, Kaleb Lam PA-C, 1 drop at 10/30/24 0806    [Held by provider] calcium carbonate-vitamin D3 500 mg-5 mcg (200 unit) per tablet 1 tablet, 1 tablet, oral, Daily, Kaleb Lam PA-C, 1 tablet at 10/18/24 0930    cefTRIAXone (Rocephin) 2 g in dextrose (iso) IV 50 mL, 2 g, intravenous, q24h, Kaleb Lam PA-C, Stopped at 10/30/24 0853    DAPTOmycin (Cubicin) 700 mg in sodium chloride 0.9%  mL, 700 mg, intravenous, q48h, Andrew Malagon MD, Stopped at 10/30/24 0853    dextrose 50 % injection 12.5 g, 12.5 g, intravenous, q15 min PRN, Kaleb Lam PA-C    dextrose 50 % injection 25 g, 25 g, intravenous, q15 min PRN, Kaleb Lam PA-C    ezetimibe (Zetia) tablet 10 mg, 10 mg, oral, Daily, Kaleb Lam PA-C, 10 mg at 10/30/24 0806    fentaNYL PF (Sublimaze) injection 25 mcg, 25 mcg, intravenous, q2h PRN, Jalyn Lopez PA-C    ferrous sulfate syrup 60 mg of iron, 60 mg of iron, oral, Daily, RUTH Doe, 60 mg of iron at 10/30/24 0806    folic acid (Folvite) tablet 1 mg, 1 mg, oral, Daily, RUTH Doe, 1 mg at 10/30/24 0806    glucagon (Glucagen) injection 1 mg, 1 mg, intramuscular, q15 min PRN, Kaleb Lam PA-C    glucagon (Glucagen) injection 1 mg, 1 mg, intramuscular, q15 min PRN, Kaleb Lam PA-C    heparin (porcine) injection 3,000-6,000 Units, 3,000-6,000 Units, intravenous, PRN, RUTH Doe    heparin 1,000 unit/mL injection 1,000 Units, 1,000 Units, intra-catheter, After Dialysis, Imer Cheung MD    heparin 1,000 unit/mL injection 1,000 Units, 1,000 Units, intra-catheter, After Dialysis, Imer Cheung MD    heparin 25,000 Units in dextrose 5% 250 mL (100 Units/mL) infusion (premix), 0-4,500 Units/hr, intravenous, Continuous, Sweta Diaz, APRN-CNP, Last Rate: 14 mL/hr at 10/30/24 0844, 1,400 Units/hr at 10/30/24 0844    heparin flush 10 unit/mL syringe 50 Units, 50 Units, intra-catheter, PRN, Kaleb Lam,  COBY, 50 Units at 10/19/24 2313    honey (Medihoney) topical gel, , Topical, Daily, RUTH Salgado, Given at 10/30/24 0824    [Held by provider] HYDROmorphone (Dilaudid) injection 0.4 mg, 0.4 mg, intravenous, q2h PRN, RUTH Salgado, 0.4 mg at 10/19/24 0520    insulin lispro (HumaLOG) injection 0-15 Units, 0-15 Units, subcutaneous, q4h, Lb Dodd PA-C, 3 Units at 10/29/24 2358    ipratropium-albuteroL (Duo-Neb) 0.5-2.5 mg/3 mL nebulizer solution 3 mL, 3 mL, nebulization, 4x daily, Kaleb Lam PA-C, 3 mL at 10/30/24 1038    latanoprost (Xalatan) 0.005 % ophthalmic solution 1 drop, 1 drop, Both Eyes, Nightly, Kaleb Lam PA-C, 1 drop at 10/29/24 2016    midodrine (Proamatine) tablet 10 mg, 10 mg, oral, q8h, RUTH Doe, 10 mg at 10/30/24 0806    oxyCODONE (Roxicodone) immediate release tablet 5 mg, 5 mg, oral, q4h, RUTH Salgado, 5 mg at 10/30/24 0806    oxygen (O2) therapy, , inhalation, Continuous - Inhalation, Radha Otero MD    pantoprazole (ProtoNix) EC tablet 40 mg, 40 mg, oral, Daily before breakfast **OR** pantoprazole (ProtoNix) injection 40 mg, 40 mg, intravenous, Daily before breakfast, RUTH Salas, 40 mg at 10/30/24 0600    polyethylene glycol (Glycolax, Miralax) packet 17 g, 17 g, oral, Daily PRN, RUTH Doe    primidone (Mysoline) tablet 125 mg, 125 mg, oral, Nightly, Kaleb Lam PA-C, 125 mg at 10/29/24 2012    [Held by provider] propranolol LA (Inderal LA) 24 hr capsule 60 mg, 60 mg, oral, Daily, Kaleb Lam PA-C, 60 mg at 10/19/24 0643    sennosides-docusate sodium (Lucia-Colace) 8.6-50 mg per tablet 1 tablet, 1 tablet, oral, Nightly, LEONEL Doe-CNP, 1 tablet at 10/29/24 2011    sodium chloride 0.9 % bolus 200 mL, 200 mL, intravenous, Once, Radha Otero MD, Last Rate: 200 mL/hr at 10/30/24 1145, Restarted at 10/30/24 1145    sodium chloride 3 % nebulizer solution 3 mL, 3 mL,  nebulization, 4x daily, Kaleb Lam PA-C, 3 mL at 10/30/24 1038    tobramycin (Michael 300) nebulizer solution 300 mg, 300 mg, nebulization, q12h, Andrew Malagon MD, 300 mg at 10/30/24 0739    [Held by provider] traMADol (Ultram) tablet 50 mg, 50 mg, oral, q8h PRN, Kaleb Lam PA-C   Relevant Results    Results for orders placed or performed during the hospital encounter of 10/12/24 (from the past 96 hours)   Magnesium   Result Value Ref Range    Magnesium 2.26 1.60 - 2.40 mg/dL   Hepatic function panel   Result Value Ref Range    Albumin 2.4 (L) 3.4 - 5.0 g/dL    Bilirubin, Total 0.3 0.0 - 1.2 mg/dL    Bilirubin, Direct 0.0 0.0 - 0.3 mg/dL    Alkaline Phosphatase 126 33 - 136 U/L    ALT 9 7 - 45 U/L    AST 14 9 - 39 U/L    Total Protein 5.0 (L) 6.4 - 8.2 g/dL   Calcium, ionized   Result Value Ref Range    POCT Calcium, Ionized 1.12 1.1 - 1.33 mmol/L   Phosphorus   Result Value Ref Range    Phosphorus 2.0 (L) 2.5 - 4.9 mg/dL   Basic Metabolic Panel   Result Value Ref Range    Glucose 125 (H) 74 - 99 mg/dL    Sodium 134 (L) 136 - 145 mmol/L    Potassium 3.9 3.5 - 5.3 mmol/L    Chloride 103 98 - 107 mmol/L    Bicarbonate 27 21 - 32 mmol/L    Anion Gap 8 (L) 10 - 20 mmol/L    Urea Nitrogen 8 6 - 23 mg/dL    Creatinine 0.63 0.50 - 1.05 mg/dL    eGFR >90 >60 mL/min/1.73m*2    Calcium 7.5 (L) 8.6 - 10.3 mg/dL   POCT GLUCOSE   Result Value Ref Range    POCT Glucose 134 (H) 74 - 99 mg/dL   POCT GLUCOSE   Result Value Ref Range    POCT Glucose 168 (H) 74 - 99 mg/dL   POCT GLUCOSE   Result Value Ref Range    POCT Glucose 151 (H) 74 - 99 mg/dL   Magnesium   Result Value Ref Range    Magnesium 2.15 1.60 - 2.40 mg/dL   CBC and Auto Differential   Result Value Ref Range    WBC 10.3 4.4 - 11.3 x10*3/uL    nRBC 0.0 0.0 - 0.0 /100 WBCs    RBC 2.16 (L) 4.00 - 5.20 x10*6/uL    Hemoglobin 6.8 (L) 12.0 - 16.0 g/dL    Hematocrit 21.4 (L) 36.0 - 46.0 %    MCV 99 80 - 100 fL    MCH 31.5 26.0 - 34.0 pg    MCHC 31.8 (L) 32.0 - 36.0  g/dL    RDW 20.3 (H) 11.5 - 14.5 %    Platelets 182 150 - 450 x10*3/uL    Neutrophils % 86.4 40.0 - 80.0 %    Immature Granulocytes %, Automated 1.1 (H) 0.0 - 0.9 %    Lymphocytes % 8.1 13.0 - 44.0 %    Monocytes % 4.0 2.0 - 10.0 %    Eosinophils % 0.2 0.0 - 6.0 %    Basophils % 0.2 0.0 - 2.0 %    Neutrophils Absolute 8.91 (H) 1.20 - 7.70 x10*3/uL    Immature Granulocytes Absolute, Automated 0.11 0.00 - 0.70 x10*3/uL    Lymphocytes Absolute 0.84 (L) 1.20 - 4.80 x10*3/uL    Monocytes Absolute 0.41 0.10 - 1.00 x10*3/uL    Eosinophils Absolute 0.02 0.00 - 0.70 x10*3/uL    Basophils Absolute 0.02 0.00 - 0.10 x10*3/uL   Hepatic function panel   Result Value Ref Range    Albumin 2.3 (L) 3.4 - 5.0 g/dL    Bilirubin, Total 0.3 0.0 - 1.2 mg/dL    Bilirubin, Direct 0.1 0.0 - 0.3 mg/dL    Alkaline Phosphatase 123 33 - 136 U/L    ALT 9 7 - 45 U/L    AST 14 9 - 39 U/L    Total Protein 4.7 (L) 6.4 - 8.2 g/dL   Calcium, ionized   Result Value Ref Range    POCT Calcium, Ionized 1.11 1.1 - 1.33 mmol/L   Phosphorus   Result Value Ref Range    Phosphorus 1.9 (L) 2.5 - 4.9 mg/dL   Basic Metabolic Panel   Result Value Ref Range    Glucose 163 (H) 74 - 99 mg/dL    Sodium 135 (L) 136 - 145 mmol/L    Potassium 4.2 3.5 - 5.3 mmol/L    Chloride 104 98 - 107 mmol/L    Bicarbonate 28 21 - 32 mmol/L    Anion Gap 7 (L) 10 - 20 mmol/L    Urea Nitrogen 7 6 - 23 mg/dL    Creatinine 0.61 0.50 - 1.05 mg/dL    eGFR >90 >60 mL/min/1.73m*2    Calcium 7.2 (L) 8.6 - 10.3 mg/dL   Morphology   Result Value Ref Range    RBC Morphology See Below     Polychromasia Mild    Prepare RBC: 1 Units   Result Value Ref Range    PRODUCT CODE [id number]     Unit Number [id number]     Unit ABO A     Unit RH NEG     XM INTEP COMP     Dispense Status TR     Blood Expiration Date 11/10/2024 11:59:00 PM EST     PRODUCT BLOOD TYPE 0600     UNIT VOLUME 350    POCT GLUCOSE   Result Value Ref Range    POCT Glucose 119 (H) 74 - 99 mg/dL   POCT GLUCOSE   Result Value Ref Range     POCT Glucose 174 (H) 74 - 99 mg/dL   Magnesium   Result Value Ref Range    Magnesium 2.22 1.60 - 2.40 mg/dL   CBC and Auto Differential   Result Value Ref Range    WBC 15.0 (H) 4.4 - 11.3 x10*3/uL    nRBC 0.0 0.0 - 0.0 /100 WBCs    RBC 2.90 (L) 4.00 - 5.20 x10*6/uL    Hemoglobin 8.9 (L) 12.0 - 16.0 g/dL    Hematocrit 27.1 (L) 36.0 - 46.0 %    MCV 93 80 - 100 fL    MCH 30.7 26.0 - 34.0 pg    MCHC 32.8 32.0 - 36.0 g/dL    RDW 20.7 (H) 11.5 - 14.5 %    Platelets 233 150 - 450 x10*3/uL    Neutrophils % 89.0 40.0 - 80.0 %    Immature Granulocytes %, Automated 1.4 (H) 0.0 - 0.9 %    Lymphocytes % 5.3 13.0 - 44.0 %    Monocytes % 3.9 2.0 - 10.0 %    Eosinophils % 0.3 0.0 - 6.0 %    Basophils % 0.1 0.0 - 2.0 %    Neutrophils Absolute 13.30 (H) 1.20 - 7.70 x10*3/uL    Immature Granulocytes Absolute, Automated 0.21 0.00 - 0.70 x10*3/uL    Lymphocytes Absolute 0.80 (L) 1.20 - 4.80 x10*3/uL    Monocytes Absolute 0.58 0.10 - 1.00 x10*3/uL    Eosinophils Absolute 0.05 0.00 - 0.70 x10*3/uL    Basophils Absolute 0.02 0.00 - 0.10 x10*3/uL   Hepatic function panel   Result Value Ref Range    Albumin 2.6 (L) 3.4 - 5.0 g/dL    Bilirubin, Total 0.4 0.0 - 1.2 mg/dL    Bilirubin, Direct 0.2 0.0 - 0.3 mg/dL    Alkaline Phosphatase 142 (H) 33 - 136 U/L    ALT 11 7 - 45 U/L    AST 16 9 - 39 U/L    Total Protein 5.4 (L) 6.4 - 8.2 g/dL   Calcium, ionized   Result Value Ref Range    POCT Calcium, Ionized 1.11 1.1 - 1.33 mmol/L   Phosphorus   Result Value Ref Range    Phosphorus 2.3 (L) 2.5 - 4.9 mg/dL   Basic Metabolic Panel   Result Value Ref Range    Glucose 136 (H) 74 - 99 mg/dL    Sodium 135 (L) 136 - 145 mmol/L    Potassium 4.4 3.5 - 5.3 mmol/L    Chloride 102 98 - 107 mmol/L    Bicarbonate 28 21 - 32 mmol/L    Anion Gap 9 (L) 10 - 20 mmol/L    Urea Nitrogen 11 6 - 23 mg/dL    Creatinine 0.56 0.50 - 1.05 mg/dL    eGFR >90 >60 mL/min/1.73m*2    Calcium 7.7 (L) 8.6 - 10.3 mg/dL   Morphology   Result Value Ref Range    RBC Morphology See  Below     Polychromasia Mild    POCT GLUCOSE   Result Value Ref Range    POCT Glucose 130 (H) 74 - 99 mg/dL   POCT GLUCOSE   Result Value Ref Range    POCT Glucose 166 (H) 74 - 99 mg/dL   POCT GLUCOSE   Result Value Ref Range    POCT Glucose 191 (H) 74 - 99 mg/dL   Magnesium   Result Value Ref Range    Magnesium 2.22 1.60 - 2.40 mg/dL   CBC and Auto Differential   Result Value Ref Range    WBC 11.5 (H) 4.4 - 11.3 x10*3/uL    nRBC 0.0 0.0 - 0.0 /100 WBCs    RBC 2.77 (L) 4.00 - 5.20 x10*6/uL    Hemoglobin 8.6 (L) 12.0 - 16.0 g/dL    Hematocrit 26.6 (L) 36.0 - 46.0 %    MCV 96 80 - 100 fL    MCH 31.0 26.0 - 34.0 pg    MCHC 32.3 32.0 - 36.0 g/dL    RDW 21.2 (H) 11.5 - 14.5 %    Platelets 222 150 - 450 x10*3/uL    Neutrophils % 86.0 40.0 - 80.0 %    Immature Granulocytes %, Automated 1.4 (H) 0.0 - 0.9 %    Lymphocytes % 7.6 13.0 - 44.0 %    Monocytes % 4.6 2.0 - 10.0 %    Eosinophils % 0.3 0.0 - 6.0 %    Basophils % 0.1 0.0 - 2.0 %    Neutrophils Absolute 9.87 (H) 1.20 - 7.70 x10*3/uL    Immature Granulocytes Absolute, Automated 0.16 0.00 - 0.70 x10*3/uL    Lymphocytes Absolute 0.87 (L) 1.20 - 4.80 x10*3/uL    Monocytes Absolute 0.53 0.10 - 1.00 x10*3/uL    Eosinophils Absolute 0.03 0.00 - 0.70 x10*3/uL    Basophils Absolute 0.01 0.00 - 0.10 x10*3/uL   Hepatic function panel   Result Value Ref Range    Albumin 2.6 (L) 3.4 - 5.0 g/dL    Bilirubin, Total 0.3 0.0 - 1.2 mg/dL    Bilirubin, Direct 0.0 0.0 - 0.3 mg/dL    Alkaline Phosphatase 160 (H) 33 - 136 U/L    ALT 12 7 - 45 U/L    AST 15 9 - 39 U/L    Total Protein 5.3 (L) 6.4 - 8.2 g/dL   Calcium, ionized   Result Value Ref Range    POCT Calcium, Ionized 1.14 1.1 - 1.33 mmol/L   Phosphorus   Result Value Ref Range    Phosphorus 2.1 (L) 2.5 - 4.9 mg/dL   Basic Metabolic Panel   Result Value Ref Range    Glucose 116 (H) 74 - 99 mg/dL    Sodium 135 (L) 136 - 145 mmol/L    Potassium 4.6 3.5 - 5.3 mmol/L    Chloride 103 98 - 107 mmol/L    Bicarbonate 30 21 - 32 mmol/L     Anion Gap 7 (L) 10 - 20 mmol/L    Urea Nitrogen 14 6 - 23 mg/dL    Creatinine 0.60 0.50 - 1.05 mg/dL    eGFR >90 >60 mL/min/1.73m*2    Calcium 7.6 (L) 8.6 - 10.3 mg/dL   Morphology   Result Value Ref Range    RBC Morphology See Below     Polychromasia Mild     RBC Fragments Few     Ovalocytes Few     Basophilic Stippling Present    Creatine Kinase   Result Value Ref Range    Creatine Kinase 45 0 - 215 U/L   BLOOD GAS ARTERIAL FULL PANEL   Result Value Ref Range    POCT pH, Arterial 7.39 7.38 - 7.42 pH    POCT pCO2, Arterial 47 (H) 38 - 42 mm Hg    POCT pO2, Arterial 105 (H) 85 - 95 mm Hg    POCT SO2, Arterial 99 94 - 100 %    POCT Oxy Hemoglobin, Arterial 95.9 94.0 - 98.0 %    POCT Hematocrit Calculated, Arterial 27.0 (L) 36.0 - 46.0 %    POCT Sodium, Arterial 132 (L) 136 - 145 mmol/L    POCT Potassium, Arterial 4.7 3.5 - 5.3 mmol/L    POCT Chloride, Arterial 102 98 - 107 mmol/L    POCT Ionized Calcium, Arterial 1.14 1.10 - 1.33 mmol/L    POCT Glucose, Arterial 116 (H) 74 - 99 mg/dL    POCT Lactate, Arterial 0.6 0.4 - 2.0 mmol/L    POCT Base Excess, Arterial 3.0 -2.0 - 3.0 mmol/L    POCT HCO3 Calculated, Arterial 28.5 (H) 22.0 - 26.0 mmol/L    POCT Hemoglobin, Arterial 9.1 (L) 12.0 - 16.0 g/dL    POCT Anion Gap, Arterial 6 (L) 10 - 25 mmo/L    Patient Temperature      FiO2 45 %    Apparatus      Ventilator Mode      Ventilator Rate 20 bpm    Tidal Volume 450 mL    Peep CHM2O 5.0 cm H2O    Site of Arterial Puncture Radial Left     Bill's Test Positive    POCT GLUCOSE   Result Value Ref Range    POCT Glucose 134 (H) 74 - 99 mg/dL   Respiratory Culture/Smear    Specimen: SPUTUM; Fluid   Result Value Ref Range    Respiratory Culture/Smear (3+) Moderate Pseudomonas aeruginosa (A)     Respiratory Culture/Smear (3+) Moderate Normal throat neva     Gram Stain       Gram stain indicates specimen consists of lower respiratory tract secretions.    Gram Stain No predominant organism        Susceptibility    Pseudomonas  aeruginosa - MICROSCAN     Aztreonam  Susceptible ug/ml     Cefepime  Susceptible ug/ml     Ceftazidime  Susceptible ug/ml     Ciprofloxacin  Susceptible ug/ml     Levofloxacin  Susceptible ug/ml     Piperacillin/Tazobactam  Susceptible ug/ml     Tobramycin  Susceptible ug/ml   POCT GLUCOSE   Result Value Ref Range    POCT Glucose 190 (H) 74 - 99 mg/dL   Blood Gas Venous Full Panel   Result Value Ref Range    POCT pH, Venous 7.40 7.33 - 7.43 pH    POCT pCO2, Venous 49 41 - 51 mm Hg    POCT pO2, Venous 45 35 - 45 mm Hg    POCT SO2, Venous 79 (H) 45 - 75 %    POCT Oxy Hemoglobin, Venous 76.3 (H) 45.0 - 75.0 %    POCT Hematocrit Calculated, Venous 26.0 (L) 36.0 - 46.0 %    POCT Sodium, Venous 132 (L) 136 - 145 mmol/L    POCT Potassium, Venous 5.2 3.5 - 5.3 mmol/L    POCT Chloride, Venous 102 98 - 107 mmol/L    POCT Ionized Calicum, Venous 1.17 1.10 - 1.33 mmol/L    POCT Glucose, Venous 198 (H) 74 - 99 mg/dL    POCT Lactate, Venous 0.8 0.4 - 2.0 mmol/L    POCT Base Excess, Venous 4.9 (H) -2.0 - 3.0 mmol/L    POCT HCO3 Calculated, Venous 30.4 (H) 22.0 - 26.0 mmol/L    POCT Hemoglobin, Venous 8.8 (L) 12.0 - 16.0 g/dL    POCT Anion Gap, Venous 5.0 (L) 10.0 - 25.0 mmol/L    Patient Temperature 37.0 degrees Celsius    FiO2 40 %    Ventilator Mode Other     Ventilator Rate 14 bpm    Tidal Volume 450 mL    Peep CHM2O 5.0 cm H2O   Magnesium   Result Value Ref Range    Magnesium 2.23 1.60 - 2.40 mg/dL   CBC and Auto Differential   Result Value Ref Range    WBC 10.7 4.4 - 11.3 x10*3/uL    nRBC 0.0 0.0 - 0.0 /100 WBCs    RBC 2.67 (L) 4.00 - 5.20 x10*6/uL    Hemoglobin 8.3 (L) 12.0 - 16.0 g/dL    Hematocrit 25.7 (L) 36.0 - 46.0 %    MCV 96 80 - 100 fL    MCH 31.1 26.0 - 34.0 pg    MCHC 32.3 32.0 - 36.0 g/dL    RDW 21.5 (H) 11.5 - 14.5 %    Platelets 238 150 - 450 x10*3/uL    Neutrophils % 87.1 40.0 - 80.0 %    Immature Granulocytes %, Automated 0.9 0.0 - 0.9 %    Lymphocytes % 6.4 13.0 - 44.0 %    Monocytes % 5.3 2.0 - 10.0 %     Eosinophils % 0.2 0.0 - 6.0 %    Basophils % 0.1 0.0 - 2.0 %    Neutrophils Absolute 9.29 (H) 1.20 - 7.70 x10*3/uL    Immature Granulocytes Absolute, Automated 0.10 0.00 - 0.70 x10*3/uL    Lymphocytes Absolute 0.68 (L) 1.20 - 4.80 x10*3/uL    Monocytes Absolute 0.57 0.10 - 1.00 x10*3/uL    Eosinophils Absolute 0.02 0.00 - 0.70 x10*3/uL    Basophils Absolute 0.01 0.00 - 0.10 x10*3/uL   Hepatic function panel   Result Value Ref Range    Albumin 2.5 (L) 3.4 - 5.0 g/dL    Bilirubin, Total 0.3 0.0 - 1.2 mg/dL    Bilirubin, Direct 0.1 0.0 - 0.3 mg/dL    Alkaline Phosphatase 150 (H) 33 - 136 U/L    ALT 12 7 - 45 U/L    AST 15 9 - 39 U/L    Total Protein 5.2 (L) 6.4 - 8.2 g/dL   Calcium, ionized   Result Value Ref Range    POCT Calcium, Ionized 1.15 1.1 - 1.33 mmol/L   Phosphorus   Result Value Ref Range    Phosphorus 2.7 2.5 - 4.9 mg/dL   Basic Metabolic Panel   Result Value Ref Range    Glucose 151 (H) 74 - 99 mg/dL    Sodium 134 (L) 136 - 145 mmol/L    Potassium 4.7 3.5 - 5.3 mmol/L    Chloride 102 98 - 107 mmol/L    Bicarbonate 29 21 - 32 mmol/L    Anion Gap 8 (L) 10 - 20 mmol/L    Urea Nitrogen 20 6 - 23 mg/dL    Creatinine 0.93 0.50 - 1.05 mg/dL    eGFR 67 >60 mL/min/1.73m*2    Calcium 7.8 (L) 8.6 - 10.3 mg/dL   Morphology   Result Value Ref Range    RBC Morphology See Below     Polychromasia Mild     Ovalocytes Few     Basophilic Stippling Present    POCT GLUCOSE   Result Value Ref Range    POCT Glucose 156 (H) 74 - 99 mg/dL   POCT GLUCOSE   Result Value Ref Range    POCT Glucose 175 (H) 74 - 99 mg/dL   POCT GLUCOSE   Result Value Ref Range    POCT Glucose 144 (H) 74 - 99 mg/dL   Magnesium   Result Value Ref Range    Magnesium 2.31 1.60 - 2.40 mg/dL   CBC and Auto Differential   Result Value Ref Range    WBC 8.7 4.4 - 11.3 x10*3/uL    nRBC 0.0 0.0 - 0.0 /100 WBCs    RBC 2.44 (L) 4.00 - 5.20 x10*6/uL    Hemoglobin 7.6 (L) 12.0 - 16.0 g/dL    Hematocrit 23.3 (L) 36.0 - 46.0 %    MCV 96 80 - 100 fL    MCH 31.1 26.0 -  34.0 pg    MCHC 32.6 32.0 - 36.0 g/dL    RDW 21.4 (H) 11.5 - 14.5 %    Platelets 238 150 - 450 x10*3/uL    Neutrophils % 81.4 40.0 - 80.0 %    Immature Granulocytes %, Automated 1.6 (H) 0.0 - 0.9 %    Lymphocytes % 10.4 13.0 - 44.0 %    Monocytes % 6.2 2.0 - 10.0 %    Eosinophils % 0.3 0.0 - 6.0 %    Basophils % 0.1 0.0 - 2.0 %    Neutrophils Absolute 7.08 1.20 - 7.70 x10*3/uL    Immature Granulocytes Absolute, Automated 0.14 0.00 - 0.70 x10*3/uL    Lymphocytes Absolute 0.91 (L) 1.20 - 4.80 x10*3/uL    Monocytes Absolute 0.54 0.10 - 1.00 x10*3/uL    Eosinophils Absolute 0.03 0.00 - 0.70 x10*3/uL    Basophils Absolute 0.01 0.00 - 0.10 x10*3/uL   Hepatic function panel   Result Value Ref Range    Albumin 2.4 (L) 3.4 - 5.0 g/dL    Bilirubin, Total 0.4 0.0 - 1.2 mg/dL    Bilirubin, Direct 0.2 0.0 - 0.3 mg/dL    Alkaline Phosphatase 144 (H) 33 - 136 U/L    ALT 11 7 - 45 U/L    AST 14 9 - 39 U/L    Total Protein 5.0 (L) 6.4 - 8.2 g/dL   Calcium, ionized   Result Value Ref Range    POCT Calcium, Ionized 1.13 1.1 - 1.33 mmol/L   Procalcitonin   Result Value Ref Range    Procalcitonin 0.18 (H) <=0.07 ng/mL   Phosphorus   Result Value Ref Range    Phosphorus 3.2 2.5 - 4.9 mg/dL   Basic Metabolic Panel   Result Value Ref Range    Glucose 164 (H) 74 - 99 mg/dL    Sodium 133 (L) 136 - 145 mmol/L    Potassium 4.6 3.5 - 5.3 mmol/L    Chloride 100 98 - 107 mmol/L    Bicarbonate 28 21 - 32 mmol/L    Anion Gap 10 10 - 20 mmol/L    Urea Nitrogen 34 (H) 6 - 23 mg/dL    Creatinine 1.25 (H) 0.50 - 1.05 mg/dL    eGFR 47 (L) >60 mL/min/1.73m*2    Calcium 7.7 (L) 8.6 - 10.3 mg/dL   Morphology   Result Value Ref Range    RBC Morphology See Below     Polychromasia Mild     Ovalocytes Few     Lexus Cells Few     Basophilic Stippling Present    POCT GLUCOSE   Result Value Ref Range    POCT Glucose 171 (H) 74 - 99 mg/dL   POCT GLUCOSE   Result Value Ref Range    POCT Glucose 142 (H) 74 - 99 mg/dL   POCT GLUCOSE   Result Value Ref Range    POCT  Glucose 149 (H) 74 - 99 mg/dL   Magnesium   Result Value Ref Range    Magnesium 2.39 1.60 - 2.40 mg/dL   Hepatic function panel   Result Value Ref Range    Albumin 2.4 (L) 3.4 - 5.0 g/dL    Bilirubin, Total 0.4 0.0 - 1.2 mg/dL    Bilirubin, Direct 0.1 0.0 - 0.3 mg/dL    Alkaline Phosphatase 141 (H) 33 - 136 U/L    ALT 11 7 - 45 U/L    AST 14 9 - 39 U/L    Total Protein 5.1 (L) 6.4 - 8.2 g/dL   Calcium, ionized   Result Value Ref Range    POCT Calcium, Ionized 1.17 1.1 - 1.33 mmol/L   Phosphorus   Result Value Ref Range    Phosphorus 4.1 2.5 - 4.9 mg/dL   Basic Metabolic Panel   Result Value Ref Range    Glucose 133 (H) 74 - 99 mg/dL    Sodium 134 (L) 136 - 145 mmol/L    Potassium 4.8 3.5 - 5.3 mmol/L    Chloride 101 98 - 107 mmol/L    Bicarbonate 27 21 - 32 mmol/L    Anion Gap 11 10 - 20 mmol/L    Urea Nitrogen 40 (H) 6 - 23 mg/dL    Creatinine 1.53 (H) 0.50 - 1.05 mg/dL    eGFR 37 (L) >60 mL/min/1.73m*2    Calcium 7.9 (L) 8.6 - 10.3 mg/dL   CBC and Auto Differential   Result Value Ref Range    WBC 7.7 4.4 - 11.3 x10*3/uL    nRBC 0.0 0.0 - 0.0 /100 WBCs    RBC 2.46 (L) 4.00 - 5.20 x10*6/uL    Hemoglobin 7.6 (L) 12.0 - 16.0 g/dL    Hematocrit 23.6 (L) 36.0 - 46.0 %    MCV 96 80 - 100 fL    MCH 30.9 26.0 - 34.0 pg    MCHC 32.2 32.0 - 36.0 g/dL    RDW 20.7 (H) 11.5 - 14.5 %    Platelets 234 150 - 450 x10*3/uL    Neutrophils % 79.4 40.0 - 80.0 %    Immature Granulocytes %, Automated 1.3 (H) 0.0 - 0.9 %    Lymphocytes % 12.3 13.0 - 44.0 %    Monocytes % 6.6 2.0 - 10.0 %    Eosinophils % 0.3 0.0 - 6.0 %    Basophils % 0.1 0.0 - 2.0 %    Neutrophils Absolute 6.11 1.20 - 7.70 x10*3/uL    Immature Granulocytes Absolute, Automated 0.10 0.00 - 0.70 x10*3/uL    Lymphocytes Absolute 0.95 (L) 1.20 - 4.80 x10*3/uL    Monocytes Absolute 0.51 0.10 - 1.00 x10*3/uL    Eosinophils Absolute 0.02 0.00 - 0.70 x10*3/uL    Basophils Absolute 0.01 0.00 - 0.10 x10*3/uL   Hepatitis B surface antigen   Result Value Ref Range    Hepatitis B  Surface AG Nonreactive Nonreactive   Hepatitis B surface antibody   Result Value Ref Range    Hepatitis B Surface AB 6.4 <10.0 mIU/mL   Morphology   Result Value Ref Range    RBC Morphology See Below     Polychromasia Mild     Stomatocytes Few    Type and screen   Result Value Ref Range    ABO TYPE A     Rh TYPE NEG     ANTIBODY SCREEN NEG    aPTT   Result Value Ref Range    aPTT 30.9 22.0 - 32.5 seconds   POCT GLUCOSE   Result Value Ref Range    POCT Glucose 136 (H) 74 - 99 mg/dL   POCT GLUCOSE   Result Value Ref Range    POCT Glucose 138 (H) 74 - 99 mg/dL   APTT   Result Value Ref Range    aPTT >139.0 (HH) 22.0 - 32.5 seconds   POCT GLUCOSE   Result Value Ref Range    POCT Glucose 157 (H) 74 - 99 mg/dL   APTT   Result Value Ref Range    aPTT >139.0 (HH) 22.0 - 32.5 seconds   Calcium, ionized   Result Value Ref Range    POCT Calcium, Ionized 1.15 1.1 - 1.33 mmol/L   CBC and Auto Differential   Result Value Ref Range    WBC 8.3 4.4 - 11.3 x10*3/uL    nRBC 0.0 0.0 - 0.0 /100 WBCs    RBC 2.41 (L) 4.00 - 5.20 x10*6/uL    Hemoglobin 7.4 (L) 12.0 - 16.0 g/dL    Hematocrit 23.7 (L) 36.0 - 46.0 %    MCV 98 80 - 100 fL    MCH 30.7 26.0 - 34.0 pg    MCHC 31.2 (L) 32.0 - 36.0 g/dL    RDW 20.4 (H) 11.5 - 14.5 %    Platelets 243 150 - 450 x10*3/uL    Neutrophils % 79.3 40.0 - 80.0 %    Immature Granulocytes %, Automated 1.3 (H) 0.0 - 0.9 %    Lymphocytes % 10.8 13.0 - 44.0 %    Monocytes % 7.8 2.0 - 10.0 %    Eosinophils % 0.6 0.0 - 6.0 %    Basophils % 0.2 0.0 - 2.0 %    Neutrophils Absolute 6.61 1.20 - 7.70 x10*3/uL    Immature Granulocytes Absolute, Automated 0.11 0.00 - 0.70 x10*3/uL    Lymphocytes Absolute 0.90 (L) 1.20 - 4.80 x10*3/uL    Monocytes Absolute 0.65 0.10 - 1.00 x10*3/uL    Eosinophils Absolute 0.05 0.00 - 0.70 x10*3/uL    Basophils Absolute 0.02 0.00 - 0.10 x10*3/uL   Hepatic function panel   Result Value Ref Range    Albumin 2.3 (L) 3.4 - 5.0 g/dL    Bilirubin, Total 0.4 0.0 - 1.2 mg/dL    Bilirubin, Direct  0.1 0.0 - 0.3 mg/dL    Alkaline Phosphatase 136 33 - 136 U/L    ALT 11 7 - 45 U/L    AST 12 9 - 39 U/L    Total Protein 4.9 (L) 6.4 - 8.2 g/dL   Magnesium   Result Value Ref Range    Magnesium 2.40 1.60 - 2.40 mg/dL   Phosphorus   Result Value Ref Range    Phosphorus 4.9 2.5 - 4.9 mg/dL   Basic Metabolic Panel   Result Value Ref Range    Glucose 116 (H) 74 - 99 mg/dL    Sodium 133 (L) 136 - 145 mmol/L    Potassium 4.6 3.5 - 5.3 mmol/L    Chloride 100 98 - 107 mmol/L    Bicarbonate 26 21 - 32 mmol/L    Anion Gap 12 10 - 20 mmol/L    Urea Nitrogen 45 (H) 6 - 23 mg/dL    Creatinine 1.80 (H) 0.50 - 1.05 mg/dL    eGFR 30 (L) >60 mL/min/1.73m*2    Calcium 7.8 (L) 8.6 - 10.3 mg/dL   Morphology   Result Value Ref Range    RBC Morphology See Below     Polychromasia Mild     Ovalocytes Few     Lexus Cells Few     Stomatocytes Few    POCT GLUCOSE   Result Value Ref Range    POCT Glucose 118 (H) 74 - 99 mg/dL   C. difficile, PCR    Specimen: Stool   Result Value Ref Range    C. difficile, PCR Not Detected Not Detected   APTT   Result Value Ref Range    aPTT >139.0 (HH) 22.0 - 32.5 seconds   POCT GLUCOSE   Result Value Ref Range    POCT Glucose 118 (H) 74 - 99 mg/dL   POCT GLUCOSE   Result Value Ref Range    POCT Glucose 128 (H) 74 - 99 mg/dL   Blood Gas Venous Full Panel   Result Value Ref Range    POCT pH, Venous 7.38 7.33 - 7.43 pH    POCT pCO2, Venous 51 41 - 51 mm Hg    POCT pO2, Venous 44 35 - 45 mm Hg    POCT SO2, Venous 80 (H) 45 - 75 %    POCT Oxy Hemoglobin, Venous 78.0 (H) 45.0 - 75.0 %    POCT Hematocrit Calculated, Venous 26.0 (L) 36.0 - 46.0 %    POCT Sodium, Venous 131 (L) 136 - 145 mmol/L    POCT Potassium, Venous 3.9 3.5 - 5.3 mmol/L    POCT Chloride, Venous 99 98 - 107 mmol/L    POCT Ionized Calicum, Venous 1.17 1.10 - 1.33 mmol/L    POCT Glucose, Venous 123 (H) 74 - 99 mg/dL    POCT Lactate, Venous 0.6 0.4 - 2.0 mmol/L    POCT Base Excess, Venous 4.4 (H) -2.0 - 3.0 mmol/L    POCT HCO3 Calculated, Venous 30.2  (H) 22.0 - 26.0 mmol/L    POCT Hemoglobin, Venous 8.6 (L) 12.0 - 16.0 g/dL    POCT Anion Gap, Venous 6.0 (L) 10.0 - 25.0 mmol/L    Patient Temperature 37.0 degrees Celsius    FiO2 35 %    Ventilator Mode CPAP     Ventilator Rate 15 bpm    Spontaneous Tidal Volume 537 mL    Peep CHM2O 5.0 cm H2O     *Note: Due to a large number of results and/or encounters for the requested time period, some results have not been displayed. A complete set of results can be found in Results Review.       Assessment/Plan   Acute kidney injury the patient remains fairly oliguric my plan is to continue dialysis with IHD on Monday Wednesday and Friday  Acute respiratory failure plan to continue vent support followed by intensive care team  Edema improved  Septic shock is off pressors at this time  Pneumonia continue with antibiotic therapy infectious disease  Diabetes mellitus type 2  Malnutrition continue with tube feeding  Lower back surgery site infection  Anemia transfuse when hemoglobin less than 7         Imer JAIRO Cheung MD

## 2024-10-30 NOTE — POST-PROCEDURE NOTE
Post Hemodialysis Report to Receiving RN:    Report To: Jaswant/RN  Time Report Called: 1400  Hand-Off Communication: Verbal  Complications During Treatment: Yes, slight hypotension, 2.5L fluid removed As orddered.  Ultrafiltration Treatment: No  Medications Administered During Dialysis: No  Blood Products Administered During Dialysis: No  Labs Sent During Dialysis: No  Heparin Drip Rate Changes: Yes  Dialysis Catheter Dressing: Yes  Last Dressing Change: 10/30/24    Electronic Signatures:  Meagan/OCDT    Last Updated: 2:41 PM by JAKE ESCOBAR

## 2024-10-30 NOTE — CARE PLAN
Fio2 titrated to 35%  Problem: Respiratory  Goal: Wean oxygen to maintain O2 saturation per order/standard this shift  Outcome: Progressing

## 2024-10-30 NOTE — NURSING NOTE
Pt has a dual lumen picc line to L upper arm, drsg dry and intact (dated 10/24) without any redness, swelling or drainage, the red lumen has brisk blood return and flushes easily with NS, the other lumen currently in use and infusing without any difficulty. Pt also has a RIJ Syedurkar, drsg dry and intact (dated 10/30) without any redness, swelling or drainage, medications infusing through pigtail without any difficulty.

## 2024-10-30 NOTE — SIGNIFICANT EVENT
RT at bedside in rounds. In SBT since 0800. No plans to extubate today. Place back on set rate. Wean again in am

## 2024-10-30 NOTE — PRE-PROCEDURE NOTE
Pre Hemodialysis Report from Sending RN:    Report From: Jaswant/RN  Recent Surgery of Procedure: No  Baseline Level of Consciousness (LOC): alert X1  Oxygen Use: Yes  Type:Ventilator  Diabetic: No  Last BP Med Given Day of Dialysis: See Mar  Last Pain Med Given: See Mar  Lab Tests to be Obtained with Dialysis: No  Blood Transfusion to be Given During Dialysis: No  Available IV Access: Yes  Medications to be Administered During Dialysis: No  Continuous IV Infusion Running: Yes  Restraints on Currently or in the Last 24 Hours: Yes  Hand-Off Communication: Verbal  Dialysis Catheter Dressing: Yes  Last Dressing Change: 10/19/24

## 2024-10-30 NOTE — PROGRESS NOTES
"Nutrition Follow up Note    Nutrition Assessment      Patient remains intubated, propofol stopped. Tube feed stopped 10/29 d/t vomiting/high residuals. Restarted 10/30 with Glucerna 1.5 goal rate @50 mL/Hr. Dialysis initiated 10/30. Spoke with CNP, will change patient to more appropriate tube feed formula at this time.    Nutrition History:  Food and Nutrient History: Vent/Tube feed     Food Allergies/Intolerances:  None  GI Symptoms: None  Oral Problems: None    Anthropometrics:  Ht: 177.8 cm (5' 10\"), Wt: 118 kg (259 lb 11.2 oz), BMI: 37.26  IBW/kg (Dietitian Calculated): 68.18 kg  Percent of IBW: 147 %     Weight Change:  Daily Weight  10/30/24 : 118 kg (259 lb 11.2 oz)  10/01/24 : 99.7 kg (219 lb 12.8 oz)  09/25/24 : 74.8 kg (165 lb)  08/27/24 : 75.8 kg (167 lb)  08/16/24 : 76.2 kg (168 lb)  08/12/24 : 76.4 kg (168 lb 6.4 oz)  07/15/24 : 76.2 kg (168 lb)  06/18/24 : 75.8 kg (167 lb 3.2 oz)  05/31/24 : 69.4 kg (153 lb)  09/29/23 : 69.4 kg (153 lb)      Nutrition Focused Physical Exam Findings:      Nutrition Significant Labs:  Lab Results   Component Value Date    WBC 8.3 10/30/2024    HGB 7.4 (L) 10/30/2024    HCT 23.7 (L) 10/30/2024     10/30/2024    CHOL 266 (H) 08/23/2023    TRIG 213 (H) 08/23/2023    HDL 58 08/23/2023    ALT 11 10/30/2024    AST 12 10/30/2024     (L) 10/30/2024    K 4.6 10/30/2024     10/30/2024    CREATININE 1.80 (H) 10/30/2024    BUN 45 (H) 10/30/2024    CO2 26 10/30/2024    TSH 4.78 (H) 10/12/2024    INR 1.0 09/28/2024    HGBA1C 6.2 (H) 09/25/2024    ALBUR 40 (H) 03/31/2022     Nutrition Specific Medications:  brimonidine, 1 drop, Both Eyes, BID  [Held by provider] calcium carbonate-vitamin D3, 1 tablet, oral, Daily  cefTRIAXone, 2 g, intravenous, q24h  daptomycin, 700 mg, intravenous, q48h  ezetimibe, 10 mg, oral, Daily  ferrous sulfate, 60 mg of iron, oral, Daily  folic acid, 1 mg, oral, Daily  heparin, 1,000 Units, intra-catheter, After Dialysis  heparin, 1,000 " Units, intra-catheter, After Dialysis  honey, , Topical, Daily  insulin lispro, 0-15 Units, subcutaneous, q4h  ipratropium-albuteroL, 3 mL, nebulization, 4x daily  latanoprost, 1 drop, Both Eyes, Nightly  midodrine, 10 mg, oral, q8h  oxyCODONE, 5 mg, oral, q4h  oxygen, , inhalation, Continuous - Inhalation  pantoprazole, 40 mg, oral, Daily before breakfast   Or  pantoprazole, 40 mg, intravenous, Daily before breakfast  primidone, 125 mg, oral, Nightly  [Held by provider] propranolol LA, 60 mg, oral, Daily  sennosides-docusate sodium, 1 tablet, oral, Nightly  sodium chloride, 200 mL, intravenous, Once  sodium chloride, 3 mL, nebulization, 4x daily  tobramycin, 300 mg, nebulization, q12h      heparin, 0-4,500 Units/hr, Last Rate: 1,400 Units/hr (10/30/24 0844)      Dietary Orders (From admission, onward)       Start     Ordered    10/30/24 1254  Enteral feeding with NPO 40 (start at 10cc/hr and increase q4hrs until goal rate)  Diet effective now        Comments: Start @20 ml/Hr and increase by 10 ml Q12H until goal (40 mL/Hr)   Question Answer Comment   Tube feeding formula: Nepro    Tube feeding continuous rate (mL/hr): 40 start at 10cc/hr and increase q4hrs until goal rate       10/30/24 1254    10/24/24 1212  Oral nutritional supplements  Until discontinued        Comments: Unflavored via OG tube   Question Answer Comment   Deliver with Breakfast    Deliver with Dinner    Select supplement: Cade        10/24/24 1211    10/16/24 0448  May Participate in Room Service With Assistance  ( ROOM SERVICE MAY PARTICIPATE WITH ASSISTANCE)  Once        Question:  .  Answer:  Yes    10/16/24 6557                  Nutrition Support Intake provides: Glucerna 1.5 goal rate @50 mL/Hr provides 1800 calories, 99 gm protein, 3024 mg potassium when running at goal      Estimated Needs:   Estimated Energy Needs  Total Energy Estimated Needs (kCal):  (8797-0456)  Total Estimated Energy Need per Day (kCal/kg):  (25-28)  Method for  Estimating Needs: IBW    Estimated Protein Needs  Total Protein Estimated Needs (g):  ()  Total Protein Estimated Needs (g/kg):  (1.2-2.0)  Method for Estimating Needs: IBW    Estimated Fluid Needs  Total Fluid Estimated Needs (mL): 1700 mL  Total Fluid Estimated Needs (mL/kg): 25 mL/kg  Method for Estimating Needs: Per Critical Care Team/Nephrology      Nutrition Diagnosis   Nutrition Diagnosis:     Nutrition Diagnosis  Patient has Nutrition Diagnosis: Yes  Diagnosis Status (1): Resolved  Nutrition Diagnosis 1: Inadequate energy intake  Related to (1): decreased ability to consume sufficient energy  As Evidenced by (1): NPO/Mechanical Ventilation  Additional Nutrition Diagnosis: Diagnosis 2  Diagnosis Status (2): Resolved  Nutrition Diagnosis 2: Excessive enteral nutrition infusion  Related to (2): current tube feed formula/rate  As Evidenced by (2): enteral formula/rate providing greater than estimated energy needs       Nutrition Interventions/Recommendations   Nutrition Interventions and Recommendations:    Nutrition Prescription:  Individualized Nutrition Prescription Provided for : 7513-4426 calories,  gm protein to be provided via enteral nutrition    Nutrition Interventions:   Food and/or Nutrient Delivery Interventions  Interventions: Enteral intake  Enteral Intake: Modify composition of enteral nutrition, Modify rate of enteral nutrition  Goal: Change to: Nepro goal rate @40 mL/Hr to provide 1728 calories, 78 gm protein, 698 mL free water    Education Documentation  No documentation found.         Nutrition Monitoring and Evaluation   Monitoring/Evaluation:   Food/Nutrient Related History Monitoring  Monitoring and Evaluation Plan: Enteral and parenteral nutrition intake  Energy Intake: Estimated energy intake  Enteral and Parenteral Nutrition Intake: Enteral nutrition intake  Criteria: monitoring tube feed tolerance       Time Spent/Follow-up:   Follow Up  Time Spent (min): 25 minutes  Last  Date of Nutrition Visit: 10/30/24  Nutrition Follow-Up Needed?: 3-5 days  Follow up Comment: 11/1/24

## 2024-10-30 NOTE — CARE PLAN
Problem: Respiratory  Goal: No signs of respiratory distress (eg. Use of accessory muscles. Peds grunting)  Outcome: Progressing  Goal: Clear secretions with interventions this shift  Outcome: Progressing  Goal: Minimize anxiety/maximize coping throughout shift  Outcome: Progressing  Goal: Minimal/no exertional discomfort or dyspnea this shift  Outcome: Progressing  Goal: Patent airway maintained this shift  Outcome: Progressing  Goal: Tolerate mechanical ventilation evidenced by VS/agitation level this shift  Outcome: Progressing  Goal: Tolerate pulmonary toileting this shift  Outcome: Progressing  Goal: Verbalize decreased shortness of breath this shift  Outcome: Progressing

## 2024-10-30 NOTE — PROGRESS NOTES
INFECTIOUS DISEASES PROGRESS NOTE    Consulted / following patient for:  Respiratory failure/pneumonia/Pseudomonas in the sputum  Recent polymicrobial complicated postoperative lumbar wound infection, MRSA and Proteus  Acute kidney injury    Subjective   Interval History:   (Sedated on the ventilator)    Objective   PHYSICAL EXAMINATION  Vital signs:  Visit Vitals  /63   Pulse 79   Temp 36.8 °C (98.2 °F) (Temporal)   Resp 19   Undergoing hemodialysis.  General: Intubated and sedated  Lungs: Diminished, clear.    Heart:  S1, S2 normal  Abdomen:  Soft, obese, no guarding.   Extremities:  No cords, phlebitis, cellulitis.  Anasarca.  New double-lumen PICC has been inserted in the left arm.    Relevant Results  WBC: 8300  Creatinine: (Dialysis)  CK: 45  Pleural fluid: Transudate with 197 WBC, 56% neutrophils, protein 1.8, LDH 97, glucose 202  Microbiology:  Blood (10/12): Negative X2  Sputum (10/13): Stain with moderate GNB and moderate PMN, culture Pseudomonas aeruginosa, susceptible to Zosyn and cefepime  Sputum (10/21): Normal neva, no MRSA  Sputum (10/28): Pseudomonas, pan-susceptible  Urine: Moderate colony count Candida lusitaniae  Pleural fluid (10/17): Negative  C. difficile PCR (10/30): Negative    Imaging:  CXR images (10/30) personally reviewed: Intubated.  No significant change compared with 10/29    Assessment:  Sepsis -likely due to pneumonia, present on admission. Patient already on IV vancomycin and IV ceftriaxone at time of this admission for lumbar spinal infection.  Resolved with anti-Pseudomonas antimicrobial therapy.  Large transudative pleural effusion was tapped.  Remains on mechanical ventilation, weaning, chest tubes have been removed.  Had fever and purulent secretions in the evening of 10/28, Pseudomonas in sputum, started on aerosol tobramycin.  Afebrile with stable chest x-ray and decreasing ET secretions.    Acute kidney injury.  Now on a schedule of thrice weekly hemodialysis  Lumbar  spine surgical site infection s/p I&D 9/28. Cultures + MRSA, Proteus.  Was to be on vanco/ceftriaxone through 11/12. Followed by Dr. Brant Juarez.  Vancomycin has been changed to daptomycin because of AMY    Plan/Recommendations:  Continue daptomycin 700 mg every 48 hour until 11/12, dose after dialysis when a dose is due on a dialysis day  Continue ceftriaxone until 11/12  Aerosol tobramycin 300 mg every 12 hours  Monitor CK weekly while on daptomycin       Andrew Malagon MD  [facility]  Office:  [phone]

## 2024-10-30 NOTE — PROGRESS NOTES
[facility] Critical Care Medicine       Date:  10/30/2024  Patient:  Narda Malloy  YOB: 1955  MRN:  [id number]   Admit Date:  10/12/2024  ========================================================================================================    Chief Complaint   Patient presents with    Altered Mental Status         History of Present Illness:  Narda Malloy is a 69 y.o. year old female patient with Past Medical History of  L1-L3 lumbar laminectomy, T4-S1 revision, and fusion August 26th, T2DM, HTN, essential tremor, HLD, glaucoma, sarcoidosis of the lung who presented to  ED 10/12 after being found essentially unresponsive at her nursing facility [facility]. Per report from her , she has had a significant decline in her health since July 15th when she had a fall and became significantly weak. She has also had multiple infection complications since her back surgery in August requiring multiple I&Ds and long term antibiotic therapy. She went to the OR most recently on 9/28 for lumbar site infection wash out. Per chart review, she was discharged on IV vancomycin 1g and IV ceftriaxone 2d q24hrs through 11/12.     ED Course: Initial vital signs: /104 (109), HR 68, RR 20, SpO2 95% on 6L NC, temp 34.5C. Give 0.4mg of narcan with no improvement in mentation. Lab work-up remarkable for mild hyperkalemia (5.5), AMY 42/1.46, elevated alk phos, normocytic anemia 10.4/33, turbid appearing urine with mild hematuria and proteinuria and + leuk esterase, >50 WBCs. Urine drug screen positive for barbiturates. Triggered sepsis timer so she was given 3L NS. She was intubated for airway protection with 20mg etomidate and 100mg succinylcholine. BP dropped post-intubated and fluid resuscitation and she was subsequently started on levophed.             Interval ICU Events:  10/12: Pt arrived to ICU intubated and lightly sedated on low-dose versed. Eyes open, minimally responsive.      10/13:  Received K cocktail last night for K 6.0, corrected appropriately. Levophed requirements mildly up, UOP decreasing. Ordered albumin x2 this am with improvements in UOP and SBP. Will likely trial lasix this afternoon d/t hypervolemia.     10/14: Remains intubated with decreased mentation, only responsive to noxious stimuli. Trialed lasix TID for volume overload. Remains on levophed 0.01.     10/15: Mentation much improved. SAT/SBT successful so extubated. Given lasix x3, bumex x1, metolazone x1 with net negative fluid balance of 500mL -> started on bumex gtt.      10/16: O2 requirements significantly increased, NRB -> HFNC 40L 100% likely 2/2 mucus plugging.     10/17: No acute events overnight. Bumex gtt increased to 1mg/hr. CXR this am showing complete opacification of left hemithorax related to atelectasis vs pleural effusion. Remains on HFNC 40L/80%. Pigtail catheter placed with 1.2L drained immediately. Given albumin for hypotension.      10/18: ~2L output from left sided pigtail catheter since placement. Kidney function worsening and UOP declining, about 20cc/hr overnight. Will place NG tube today and start enteral nutrition and appetite and oral intake remains poor.      10/19: Patient with worsening hypoxic, hypercapnic respiratory failure - now requiring BIPAP support. Remains grossly volume overloaded with low UO. Started on vasopressors to augment BP for diuresis. 40 IV lasix + gtt started. Remains with poor nutritional status. Will re attempt NG later if respiratory status improves.      10/20: Patient stable on vent. Nephrology consulted for renal failure. Cr continues to uptrend however UO is increasing. No issues overnight.     10/21: No issues overnight. Remains stable on vent. Levo down to 0.01 mcg/kg/min. UO remains low. Nephrology following. Possible CRRT today?     10/22: Patient received 80 lasix and metalozone with minimal UO. Levo @ .02 and prop @ 10. Vent settings: 20/450/10/40%. Plan for CRRT  today. Will SAT/SBT after CRRT. May change propofol to precedex.     10/23: Had episodes of bradycardia where HR went to 30's which resolved with heating the patient. CRRT yesterday with about 1L removed. Currently on 0.03 of levo and 10 of prop. Cont daptomycin and ceftriaxone per ID. CXR improved. SAT/SBT. EP consulted for episodes of bradycardia.     10/24: Bradycardic episodes of HR in 40s. Currently on 0.03 of Levo, 10 of prop and 50 fentanyl. Tolerating CRRT. Place PICC today.     10/25: Did well with the SBT yesterday, plan for another one today. Continue CRRT. Hgb 7.0 this am, still requiting Levo 0.03, will transfuse 1 unit PRBCs. Remove chest tube today.     10/26: Chest tube removed last night, CXR improving, patient appears overall lethargic on sbt/sat, not ready to extubate, will complete 4/4/4 sbt/rest/sbt-> rest today and reassess tomorrow     10/27: Patient failed afternoon SBT, placed on rate overnight, net - 2.5L over previous 24 hours, will place on wean today.     10/29: Patient with high residual tube feeds overnight.  She did have an episode of vomiting.  Tube feeds placed on hold.  She was also afebrile overnight.  Will obtain a KUB to rule out ileus.  Sputum culture with Pseudomonas, discussed antibiotic plans with ID.  Will not do SBT with patient today.  Did discuss the setback with her , he did state moving forward that if she does not reach extubation he would be agreeable to a tracheostomy.     10/30: No significant events overnight.  Tube feeds resumed at trickle rate yesterday, tolerating overnight.  Will increase to goal today.  Dialysis this morning.  Patient improved from yesterday.  Plan for SBT today.  Will attempt to sit patient on side of bed today.    Medical History:  Past Medical History:   Diagnosis Date    Degenerative myopia, bilateral     Diabetic neuropathy (Multi)     Difficult intubation 08/26/2024    Mac 3, grade 3, 1 attempt.  Glidescope/videolaryngoscopy  recommended for future attempts.    DM type 2 (diabetes mellitus, type 2) (Multi)     Dry eye syndrome of bilateral lacrimal glands     Essential hypertension     Essential tremor     Glaucoma     Hyperlipidemia     Long term (current) use of insulin (Multi)     Low back pain     PONV (postoperative nausea and vomiting)     Primary open angle glaucoma of both eyes, severe stage     Repeated falls     Sarcoidosis of lung (Multi)     Spinal stenosis, lumbar region without neurogenic claudication     Weakness      Past Surgical History:   Procedure Laterality Date    BLEPHAROPLASTY  07/2022    BREAST SURGERY  05/20/2022    Breast lift    CARPAL TUNNEL RELEASE      CATARACT EXTRACTION W/  INTRAOCULAR LENS IMPLANT Bilateral     OD 08/04/2011 +8.5D,OS 08/04/2011 +8.50D    FOOT SURGERY      INSERTION / REMOVAL CRANIAL DBS GENERATOR      Placed 2017.  Removed 2018. part of wire left in head when everything removed    LUMBAR FUSION      L3-S1    PANRETINAL PHOTOCOAGULATION  2014    THORACIC FUSION  08/26/2024    T4-S1 fusion    VITRECTOMY Right 2013     Medications Prior to Admission   Medication Sig Dispense Refill Last Dose/Taking    acetaminophen (Tylenol) 500 mg tablet Take 2 tablets (1,000 mg) by mouth 3 times a day.   Unknown    ascorbic acid (Vitamin C) 500 mg tablet as directed Orally   Unknown    brimonidine (AlphaGAN) 0.2 % ophthalmic solution Administer 1 drop into both eyes 2 times a day.   Unknown    calcium carbonate-vitamin D3 500 mg-5 mcg (200 unit) tablet Take 1 tablet by mouth once daily.   Unknown    cefTRIAXone (Rocephin) 2 gram/50 mL IV Infuse 50 mL (2 g) at 100 mL/hr over 30 minutes into a venous catheter once every 24 hours. Once weekly labs CBC/diff, CMP, Vanc trough ESR, CRP fax to Dr. Juarez [phone]. Stop date 11/12/24. 1950 mL 0     dextrose 50 % injection Infuse 25 mL (12.5 g) into a venous catheter every 15 minutes if needed (For blood glucose 41 to 70 mg/dL).       dextrose 50 % injection  Infuse 50 mL (25 g) into a venous catheter every 15 minutes if needed (For blood glucose less than or equal to 40 mg/dL).       docusate sodium (Colace) 100 mg capsule Take 1 capsule (100 mg) by mouth 2 times a day.   Unknown    ezetimibe (Zetia) 10 mg tablet Take 1 tablet (10 mg) by mouth once daily. 90 tablet 3 Unknown    FreeStyle Lite Strips strip USE TO TEST 3 TIMES A DAY AS DIRECTED 300 each 2     glucagon (Glucagen) 1 mg injection Inject 1 mg into the muscle every 15 minutes if needed for low blood sugar - see comments (Hypoglycemia).       glucagon (Glucagen) 1 mg injection Inject 1 mg into the muscle every 15 minutes if needed for low blood sugar - see comments (Hypoglycemia).       heparin sodium,porcine (heparin, porcine,) 5,000 unit/mL injection Inject 1 mL (5,000 Units) under the skin every 8 hours.       insulin lispro (HumaLOG) 100 unit/mL injection Inject 0-15 Units under the skin 3 times daily (morning, midday, late afternoon). Take as directed per insulin instructions.Do not hold when patient is not eating, continue order as scheduled for hyperglycemia management.  Insulin Lispro Corrective Scale #3     Hypoglycemia protocol Call LIP unit(s) if Blood Glucose is between 0 - 70 mg/dL     0 unit(s) if Blood glucose is between    3 unit(s) if Blood glucose is between 151-200   6 unit(s) if Blood glucose is between 201-250   9 unit(s) if Blood glucose is between 251-300   12 unit(s) if Blood glucose is between 301-350   15 unit(s) if Blood glucose is between 351-400    Notify provider unit(s) if Blood Glucose is greater than 400 mg/dL       Lactobacillus acidophilus 100 mg (1 billion cell) capsule Take 1 capsule by mouth 2 times a day.   Unknown    latanoprost (Xalatan) 0.005 % ophthalmic solution Administer 1 drop into both eyes once daily at bedtime. 2.5 mL 5 Unknown    melatonin 5 mg tablet Take 1 tablet (5 mg) by mouth as needed at bedtime for sleep.   Unknown    methocarbamol (Robaxin) 500 mg  "tablet Take 1 tablet (500 mg) by mouth 3 times a day.   Unknown    multivitamin tablet Take 1 tablet by mouth once daily.   Unknown    ondansetron (Zofran) 4 mg/2 mL injection Infuse 2 mL (4 mg) into a venous catheter every 6 hours if needed for nausea or vomiting.       oxyCODONE (Roxicodone) 5 mg immediate release tablet Take 1 tablet (5 mg) by mouth every 6 hours if needed for severe pain (7 - 10) or moderate pain (4 - 6).       oxygen (O2) gas therapy Inhale 1 each continuously.       pantoprazole (ProtoNix) 40 mg EC tablet Take 1 tablet (40 mg) by mouth once daily in the morning. Take before meals. Do not crush, chew, or split.       pantoprazole (ProtoNix) 40 mg injection Infuse 40 mg into a venous catheter once daily in the morning. Take before meals. If unable to take PO.       pen needle, diabetic (PEN NEEDLE MISC) BD Altagracia- 4 mm X 32 G needle - as directed 4x a day sc 4 times per day       polyethylene glycol (Glycolax, Miralax) 17 gram packet Take 17 g by mouth once daily.   Unknown    primidone 125 mg tablet Take 125 mg by mouth 3 times a day.   Unknown    propranolol LA (Inderal LA) 60 mg 24 hr capsule Take 1 capsule (60 mg) by mouth early in the morning.. Hold for SBP < 110 mmhg, HR < 60 bpm.   Unknown    sennosides (Senokot) 8.6 mg tablet Take 1 tablet (8.6 mg) by mouth every 12 hours if needed for constipation.   Unknown    Sure Comfort Pen Needle 32 gauge x 5/32\" needle AS DIRECTED DAILY FOR 90 DAYS 100 each 11 Unknown    traZODone (Desyrel) 25 MG split tablet Take 1 half tablet (25 mg) by mouth once daily at bedtime.   Unknown    vancomycin (Vancocin) 1 gram/250 mL solution Infuse 250 mL (1 g) at 250 mL/hr over 60 minutes into a venous catheter every 12 hours. Once weekly labs CBC/diff, CMP, Vanc trough ESR, CRP fax to Dr. Juarez [phone]. Stop date 11/12/24. 98051 mL 0      Erythromycin, Morphine, and Rosuvastatin  Social History     Tobacco Use    Smoking status: Former     Types: Cigarettes "     Passive exposure: Past    Smokeless tobacco: Never   Vaping Use    Vaping status: Never Used   Substance Use Topics    Alcohol use: Not Currently    Drug use: Not Currently     Family History   Problem Relation Name Age of Onset    Multiple myeloma Mother      Cancer Mother      Other (CABG) Father      Pulmonary embolism Father      Heart disease Father      Breast cancer Sister          Stage II    Hypertension Sister      Diabetes Sister      No Known Problems Sister          x5    No Known Problems Brother          x4    No Known Problems Daughter         Review of Systems:  14 point review of systems was completed and negative except for those specially mention in my HPI    Physical Exam:    Heart Rate:  []   Temp:  [36.6 °C (97.9 °F)-37 °C (98.6 °F)]   Resp:  []   BP: ()/(39-80)   Weight:  [118 kg (259 lb 11.2 oz)]   SpO2:  [95 %-98 %]     Physical Exam  Vitals reviewed.   Constitutional:       Interventions: She is intubated.   HENT:      Head: Normocephalic and atraumatic.      Right Ear: External ear normal.      Left Ear: External ear normal.      Nose: Nose normal.      Mouth/Throat:      Mouth: Mucous membranes are moist.      Pharynx: Oropharynx is clear.   Cardiovascular:      Rate and Rhythm: Normal rate and regular rhythm.      Heart sounds: Normal heart sounds.   Pulmonary:      Effort: She is intubated.      Breath sounds: Examination of the right-upper field reveals rhonchi. Examination of the left-upper field reveals rhonchi. Rhonchi present.   Abdominal:      General: Bowel sounds are decreased.      Palpations: Abdomen is soft.   Genitourinary:     Comments: Indwelling urinary catheter   Musculoskeletal:      Right forearm: Swelling present.      Left forearm: Swelling present.      Right hand: Swelling present.      Left hand: Swelling present.      Right lower leg: No edema.      Left lower leg: No edema.   Skin:     General: Skin is warm.      Capillary Refill:  Capillary refill takes less than 2 seconds.   Neurological:      Mental Status: She is easily aroused.         Objective:    I have reviewed all medications, laboratory results, and imaging pertinent for today's encounter    Assessment/Plan:    I am currently managing this critically ill patient for the following problems:    Neuro/Psych/Pain Ctrl/Sedation:  #Acute encephalopathy - likely secondary to hypercapnia, infection, resolving   #Hx Essential tremor   CT head: Negative for acute findings   Urine tox positive for barbiturates consistent with primidone intake   - Pain Control: Oxycodone Q4 scheduled, Acetaminophen PRN  - Not requiring sedation  - Home primidone continued. Will hold propanolol d/t hypotension  - CAM ICU qshift, sleep-wake hygiene, delirium precautions     Respiratory/ENT:  #Acute hypoxic/hypercapnic respiratory failure - likely multifactorial and 2/2 HCAP, pl effusions, volume overload  #Healthcare-associated pneumonia   #Atelectasis - likely 2/2 mucus plugging   #Pleural Effusion -S/p left-sided pigtail placement 10/17, removed 10/25  Intubated for 3 days extubated and re intubated on the 19th, currently day 11 of intubation, will need trach conversation if unable to extubate in the next few days  - Will wean O2 as tolerated to maintain SpO2 >92%  - Duonebs Q4 and hypertonic saline QID   - IPV per RT TID  - Continuous pulse ox monitoring   - Pulm hygiene  - Aspiration precautions   - Respiratory culture with Pseudomonas, please see ID section for antibiotics  -SBT today    Cardiovascular:  #Septic shock -resolved  #Occlusive superficial venous thrombus of the left cephalic vein  #Acute on chronic diastolic heart failure  #Bradycardia  #Hx HTN, HLD  TTE 10/1: diastolic dysfunction, LVEF 50-55%, mildly elevated RVSP (40)  Bilateral duplex US upper extremities:  Thrombosis within the left cephalic vein, which is part of the superficial venous system of the upper extremity, adjacent to PICC line.  No deep venous thrombosis in the upper extremities.  - Will maintain goal MAP > 65   - Continue midodrine increased from bid to q8  - Holding home propranolol (on for tremors)  - Continue home Zetia  - Continuous cardiac monitoring per ICU protocol  - EKGs PRN for ACS symptoms, arrhythmias   - US duplex b/l upper extremities-occlusive superficial vein thrombosis of left cephalic vein from mid-distal upper arm extending to approximately 2.0cm proximal to subclavian junction   -Acute nonocclusive DVT right IJ vein around line placement, will need to be removed   -Continue heparin drip  -Repeat right IJ duplex on Monday    GI:  #Diarrhea  #Hx GERD  - Diet: Changed to nephro  - BR: Colace, Miralax PRN  - GI Prophylaxis: PPI  - KUB without evidence of ileus/obstruction   -Patient tolerating trickle feeds overnight, will discuss tube feed with dietary.  Patient is now MWF dialysis.  -Sample sent, C-Diff negative     Renal/Volume Status (Intra & Extravascular):  #Acute kidney injury - possibly ATN +/- medication-induced (vancomycin)  #Anasarca   #Mixed respiratory and metabolic acidosis - improving on vent  #Hypoalbuminemia   #Hyponatremia  Baseline Cr 0.8-1.0. BUN Cr 1.80/45 today   - Hemodialysis today, patient did not receive hemodialysis yesterday.  Plan to start MWF schedule, anticipate need for long-term dialysis line.  - Dialysis goal today -2.5 L  - Oliguric  - Nephrology following  - Remove anders catheter  - Bladder scan  - Replete electrolytes to maintain K >4.0 and Mg >2.0  - Daily BMP, Mg, Phos  -Free water flushes being held due to hyponatremia    Endocrine  #T2DM  - SSI Q 4 while NPO  - Hypoglycemia protocol PRN    Infectious Disease:  #Pseudomonas-Respiratory culture 10/29  #Thoracolumbar surgical site infection - s/p I&D x 2. Recent MRSA bacteremia on extended course of IV antibiotics (vanc and rocephin -> 11/12)  #Healthcare-associated pneumonia   CT lumbar spine 10/12: Unable to r/o abscess. Unable to do  "MRI d/t spinal hardware, \"metal in head\" per patient  Sputum Culture 10/21: negative  Blood cultures 10/16: Negative  Urine culture 10/14: Negative  Sputum culture 10/16: + pseudomonas   - ID following  - Inhaled tobramycin (10/29...)  - Continue Ceftriaxone (10/23-...)  - Continue Daptomycin (10/21-...)  - Cefepime completed on 10/22  - PICC placed 10/24  - Monitor SIRS criteria  -Orthopedics consulted, as sutures from her lumbar surgery are still in place.    Heme/Onc:  #Normocytic anemia   H/H similar to previous: No active signs of bleeding.  - Start iron and folate  - Monitor for s/sx of bleeding   - Plan to transfuse if Hgb <7.0   - Daily CBC  - Keep active type and screen    OBGYN/MSK:  Pt/OT when appropriate   10/27 sat on side of the bed for 5 min     Ethics/Code Status:  Full Code     :  DVT Prophylaxis: Heparin GTT  GI Prophylaxis: PPI  Bowel Regimen: Colace and MiraLAX  Diet: Tube feeds  CVC: PICC placed 10/24, trialysis right internal jugular placed 1019  Wanda: No  Gibson: Yes placed on 1012  Restraints: Soft  Dispo: ICU    Critical Care Time:  45 minutes spent in preparing to see patient (I.e. review of medical records), evaluation of diagnostics (I.e. labs, imaging, etc.), documentation, discussing plan of care with patient/ family/ caregiver, and/ or coordination of care with multidisciplinary team. Time does not include completion of procedure time.     Plan of care discussed with Dr. Moe, APRN-CNP  Critical Care Medicine  [facility]     Of note, this documentation is completed using the Dragon Dictation system (voice recognition software). There may be spelling and/or grammatical errors that were not corrected prior to final submission.    "

## 2024-10-30 NOTE — PROGRESS NOTES
Occupational Therapy                 Therapy Communication Note    Patient Name: Narda Malloy  MRN: [id number]  Department: [facility] DIALYSIS  Room: 11/11-A  Today's Date: 10/30/2024     Discipline: Occupational Therapy    Missed Visit Reason: Patient in a medical procedure (Pt is currently receiving bedside dialysis.)    Missed Time: Cancel

## 2024-10-30 NOTE — CARE PLAN
The patient's goals for the shift include unable to assess    The clinical goals for the shift include Pt will tolerate hemodialysis this shift and will remain hemodynamically stable    Over the shift, the patient made progress toward goal of the shift. She tolerated HD very well. Pt's vitals all remained stable.

## 2024-10-30 NOTE — NURSING NOTE
Wound Wednesday full head to toe skin assessment completed. See LDA avatar for wounds. Photos updated. Patient on waffle mattress. Sacral and heel protectors in place. Q2 turns in place.

## 2024-10-31 ENCOUNTER — APPOINTMENT (OUTPATIENT)
Dept: RADIOLOGY | Facility: HOSPITAL | Age: 69
End: 2024-10-31
Payer: MEDICARE

## 2024-10-31 LAB
ALBUMIN SERPL BCP-MCNC: 2.3 G/DL (ref 3.4–5)
ALP SERPL-CCNC: 136 U/L (ref 33–136)
ALT SERPL W P-5'-P-CCNC: 10 U/L (ref 7–45)
ANION GAP BLDV CALCULATED.4IONS-SCNC: 3 MMOL/L (ref 10–25)
ANION GAP SERPL CALCULATED.3IONS-SCNC: 9 MMOL/L (ref 10–20)
APTT PPP: 56.5 SECONDS (ref 22–32.5)
AST SERPL W P-5'-P-CCNC: 11 U/L (ref 9–39)
BASE EXCESS BLDV CALC-SCNC: 6.9 MMOL/L (ref -2–3)
BASOPHILS # BLD AUTO: 0.02 X10*3/UL (ref 0–0.1)
BASOPHILS NFR BLD AUTO: 0.3 %
BILIRUB DIRECT SERPL-MCNC: 0.1 MG/DL (ref 0–0.3)
BILIRUB SERPL-MCNC: 0.4 MG/DL (ref 0–1.2)
BODY TEMPERATURE: 37 DEGREES CELSIUS
BUN SERPL-MCNC: 25 MG/DL (ref 6–23)
CA-I BLD-SCNC: 1.1 MMOL/L (ref 1.1–1.33)
CA-I BLD-SCNC: 1.14 MMOL/L (ref 1.1–1.33)
CA-I BLDV-SCNC: 1.16 MMOL/L (ref 1.1–1.33)
CALCIUM SERPL-MCNC: 7.8 MG/DL (ref 8.6–10.3)
CHLORIDE BLDV-SCNC: 100 MMOL/L (ref 98–107)
CHLORIDE SERPL-SCNC: 98 MMOL/L (ref 98–107)
CO2 SERPL-SCNC: 29 MMOL/L (ref 21–32)
CREAT SERPL-MCNC: 1.45 MG/DL (ref 0.5–1.05)
EGFRCR SERPLBLD CKD-EPI 2021: 39 ML/MIN/1.73M*2
EOSINOPHIL # BLD AUTO: 0.03 X10*3/UL (ref 0–0.7)
EOSINOPHIL NFR BLD AUTO: 0.4 %
ERYTHROCYTE [DISTWIDTH] IN BLOOD BY AUTOMATED COUNT: 19.6 % (ref 11.5–14.5)
ERYTHROCYTE [DISTWIDTH] IN BLOOD BY AUTOMATED COUNT: 20 % (ref 11.5–14.5)
GLUCOSE BLD MANUAL STRIP-MCNC: 125 MG/DL (ref 74–99)
GLUCOSE BLD MANUAL STRIP-MCNC: 144 MG/DL (ref 74–99)
GLUCOSE BLD MANUAL STRIP-MCNC: 150 MG/DL (ref 74–99)
GLUCOSE BLD MANUAL STRIP-MCNC: 151 MG/DL (ref 74–99)
GLUCOSE BLD MANUAL STRIP-MCNC: 158 MG/DL (ref 74–99)
GLUCOSE BLD MANUAL STRIP-MCNC: 191 MG/DL (ref 74–99)
GLUCOSE BLDV-MCNC: 132 MG/DL (ref 74–99)
GLUCOSE SERPL-MCNC: 144 MG/DL (ref 74–99)
HCO3 BLDV-SCNC: 32.3 MMOL/L (ref 22–26)
HCT VFR BLD AUTO: 23.3 % (ref 36–46)
HCT VFR BLD AUTO: 26 % (ref 36–46)
HCT VFR BLD EST: 23 % (ref 36–46)
HGB BLD-MCNC: 7.6 G/DL (ref 12–16)
HGB BLD-MCNC: 8.1 G/DL (ref 12–16)
HGB BLDV-MCNC: 7.7 G/DL (ref 12–16)
HOLD SPECIMEN: NORMAL
IMM GRANULOCYTES # BLD AUTO: 0.08 X10*3/UL (ref 0–0.7)
IMM GRANULOCYTES NFR BLD AUTO: 1.1 % (ref 0–0.9)
INHALED O2 CONCENTRATION: 35 %
LACTATE BLDV-SCNC: 1.2 MMOL/L (ref 0.4–2)
LYMPHOCYTES # BLD AUTO: 0.62 X10*3/UL (ref 1.2–4.8)
LYMPHOCYTES NFR BLD AUTO: 8.8 %
MAGNESIUM SERPL-MCNC: 2.08 MG/DL (ref 1.6–2.4)
MCH RBC QN AUTO: 30.9 PG (ref 26–34)
MCH RBC QN AUTO: 31 PG (ref 26–34)
MCHC RBC AUTO-ENTMCNC: 31.2 G/DL (ref 32–36)
MCHC RBC AUTO-ENTMCNC: 32.6 G/DL (ref 32–36)
MCV RBC AUTO: 95 FL (ref 80–100)
MCV RBC AUTO: 99 FL (ref 80–100)
MONOCYTES # BLD AUTO: 0.65 X10*3/UL (ref 0.1–1)
MONOCYTES NFR BLD AUTO: 9.2 %
NEUTROPHILS # BLD AUTO: 5.65 X10*3/UL (ref 1.2–7.7)
NEUTROPHILS NFR BLD AUTO: 80.2 %
NRBC BLD-RTO: 0 /100 WBCS (ref 0–0)
NRBC BLD-RTO: 0 /100 WBCS (ref 0–0)
OXYHGB MFR BLDV: 79.3 % (ref 45–75)
PCO2 BLDV: 51 MM HG (ref 41–51)
PEEP CMH2O: 5 CM H2O
PH BLDV: 7.41 PH (ref 7.33–7.43)
PHOSPHATE SERPL-MCNC: 3.5 MG/DL (ref 2.5–4.9)
PLATELET # BLD AUTO: 261 X10*3/UL (ref 150–450)
PLATELET # BLD AUTO: 279 X10*3/UL (ref 150–450)
PO2 BLDV: 44 MM HG (ref 35–45)
POTASSIUM BLDV-SCNC: 4.3 MMOL/L (ref 3.5–5.3)
POTASSIUM SERPL-SCNC: 4.3 MMOL/L (ref 3.5–5.3)
PRESSURE SUPPORT: 5 CM H2O
PROT SERPL-MCNC: 5.3 G/DL (ref 6.4–8.2)
RBC # BLD AUTO: 2.45 X10*6/UL (ref 4–5.2)
RBC # BLD AUTO: 2.62 X10*6/UL (ref 4–5.2)
SAO2 % BLDV: 81 % (ref 45–75)
SODIUM BLDV-SCNC: 131 MMOL/L (ref 136–145)
SODIUM SERPL-SCNC: 132 MMOL/L (ref 136–145)
VENTILATOR MODE: ABNORMAL
VENTILATOR RATE: 17 BPM
WBC # BLD AUTO: 7.1 X10*3/UL (ref 4.4–11.3)
WBC # BLD AUTO: 9.3 X10*3/UL (ref 4.4–11.3)

## 2024-10-31 PROCEDURE — 37799 UNLISTED PX VASCULAR SURGERY: CPT

## 2024-10-31 PROCEDURE — 2500000002 HC RX 250 W HCPCS SELF ADMINISTERED DRUGS (ALT 637 FOR MEDICARE OP, ALT 636 FOR OP/ED)

## 2024-10-31 PROCEDURE — 2500000005 HC RX 250 GENERAL PHARMACY W/O HCPCS

## 2024-10-31 PROCEDURE — 94660 CPAP INITIATION&MGMT: CPT

## 2024-10-31 PROCEDURE — 84100 ASSAY OF PHOSPHORUS: CPT

## 2024-10-31 PROCEDURE — 83735 ASSAY OF MAGNESIUM: CPT

## 2024-10-31 PROCEDURE — 85027 COMPLETE CBC AUTOMATED: CPT

## 2024-10-31 PROCEDURE — 2500000001 HC RX 250 WO HCPCS SELF ADMINISTERED DRUGS (ALT 637 FOR MEDICARE OP)

## 2024-10-31 PROCEDURE — 82947 ASSAY GLUCOSE BLOOD QUANT: CPT

## 2024-10-31 PROCEDURE — 85730 THROMBOPLASTIN TIME PARTIAL: CPT

## 2024-10-31 PROCEDURE — 71045 X-RAY EXAM CHEST 1 VIEW: CPT | Performed by: RADIOLOGY

## 2024-10-31 PROCEDURE — 94640 AIRWAY INHALATION TREATMENT: CPT

## 2024-10-31 PROCEDURE — 2500000004 HC RX 250 GENERAL PHARMACY W/ HCPCS (ALT 636 FOR OP/ED): Performed by: NURSE PRACTITIONER

## 2024-10-31 PROCEDURE — 84132 ASSAY OF SERUM POTASSIUM: CPT

## 2024-10-31 PROCEDURE — 2500000004 HC RX 250 GENERAL PHARMACY W/ HCPCS (ALT 636 FOR OP/ED): Performed by: INTERNAL MEDICINE

## 2024-10-31 PROCEDURE — 9420000001 HC RT PATIENT EDUCATION 5 MIN

## 2024-10-31 PROCEDURE — 85025 COMPLETE CBC W/AUTO DIFF WBC: CPT

## 2024-10-31 PROCEDURE — 71045 X-RAY EXAM CHEST 1 VIEW: CPT

## 2024-10-31 PROCEDURE — 2500000005 HC RX 250 GENERAL PHARMACY W/O HCPCS: Performed by: STUDENT IN AN ORGANIZED HEALTH CARE EDUCATION/TRAINING PROGRAM

## 2024-10-31 PROCEDURE — 82330 ASSAY OF CALCIUM: CPT

## 2024-10-31 PROCEDURE — 2500000004 HC RX 250 GENERAL PHARMACY W/ HCPCS (ALT 636 FOR OP/ED)

## 2024-10-31 PROCEDURE — 2020000001 HC ICU ROOM DAILY

## 2024-10-31 PROCEDURE — 82248 BILIRUBIN DIRECT: CPT

## 2024-10-31 PROCEDURE — 80048 BASIC METABOLIC PNL TOTAL CA: CPT

## 2024-10-31 PROCEDURE — 99291 CRITICAL CARE FIRST HOUR: CPT

## 2024-10-31 PROCEDURE — 94003 VENT MGMT INPAT SUBQ DAY: CPT

## 2024-10-31 PROCEDURE — 31720 CLEARANCE OF AIRWAYS: CPT

## 2024-10-31 PROCEDURE — 84075 ASSAY ALKALINE PHOSPHATASE: CPT

## 2024-10-31 RX ORDER — HEPARIN SODIUM 1000 [USP'U]/ML
1000 INJECTION, SOLUTION INTRAVENOUS; SUBCUTANEOUS
Status: CANCELLED | OUTPATIENT
Start: 2024-11-01

## 2024-10-31 RX ADMIN — Medication 3 ML: at 11:37

## 2024-10-31 RX ADMIN — INSULIN LISPRO 3 UNITS: 100 INJECTION, SOLUTION INTRAVENOUS; SUBCUTANEOUS at 20:42

## 2024-10-31 RX ADMIN — IPRATROPIUM BROMIDE AND ALBUTEROL SULFATE 3 ML: .5; 3 SOLUTION RESPIRATORY (INHALATION) at 11:37

## 2024-10-31 RX ADMIN — LATANOPROST 1 DROP: 50 SOLUTION OPHTHALMIC at 20:18

## 2024-10-31 RX ADMIN — IPRATROPIUM BROMIDE AND ALBUTEROL SULFATE 3 ML: .5; 3 SOLUTION RESPIRATORY (INHALATION) at 14:52

## 2024-10-31 RX ADMIN — Medication: at 09:09

## 2024-10-31 RX ADMIN — MIDODRINE HYDROCHLORIDE 10 MG: 10 TABLET ORAL at 16:19

## 2024-10-31 RX ADMIN — BRIMONIDINE TARTRATE 1 DROP: 2 SOLUTION OPHTHALMIC at 08:45

## 2024-10-31 RX ADMIN — PANTOPRAZOLE SODIUM 40 MG: 40 INJECTION, POWDER, FOR SOLUTION INTRAVENOUS at 08:54

## 2024-10-31 RX ADMIN — Medication 35 PERCENT: at 06:58

## 2024-10-31 RX ADMIN — OXYCODONE 5 MG: 5 TABLET ORAL at 12:44

## 2024-10-31 RX ADMIN — OXYCODONE 5 MG: 5 TABLET ORAL at 00:16

## 2024-10-31 RX ADMIN — IPRATROPIUM BROMIDE AND ALBUTEROL SULFATE 3 ML: .5; 3 SOLUTION RESPIRATORY (INHALATION) at 20:23

## 2024-10-31 RX ADMIN — TOBRAMYCIN 300 MG: 300 SOLUTION RESPIRATORY (INHALATION) at 20:25

## 2024-10-31 RX ADMIN — Medication 70 PERCENT: at 20:28

## 2024-10-31 RX ADMIN — OXYCODONE 5 MG: 5 TABLET ORAL at 04:45

## 2024-10-31 RX ADMIN — IPRATROPIUM BROMIDE AND ALBUTEROL SULFATE 3 ML: .5; 3 SOLUTION RESPIRATORY (INHALATION) at 07:06

## 2024-10-31 RX ADMIN — OXYCODONE 5 MG: 5 TABLET ORAL at 20:18

## 2024-10-31 RX ADMIN — SENNOSIDES AND DOCUSATE SODIUM 1 TABLET: 50; 8.6 TABLET ORAL at 20:21

## 2024-10-31 RX ADMIN — TOBRAMYCIN 300 MG: 300 SOLUTION RESPIRATORY (INHALATION) at 06:57

## 2024-10-31 RX ADMIN — INSULIN LISPRO 3 UNITS: 100 INJECTION, SOLUTION INTRAVENOUS; SUBCUTANEOUS at 16:19

## 2024-10-31 RX ADMIN — MIDODRINE HYDROCHLORIDE 10 MG: 10 TABLET ORAL at 00:16

## 2024-10-31 RX ADMIN — FOLIC ACID 1 MG: 1 TABLET ORAL at 08:44

## 2024-10-31 RX ADMIN — OXYCODONE 5 MG: 5 TABLET ORAL at 16:28

## 2024-10-31 RX ADMIN — MIDODRINE HYDROCHLORIDE 10 MG: 10 TABLET ORAL at 08:45

## 2024-10-31 RX ADMIN — Medication 70 PERCENT: at 20:29

## 2024-10-31 RX ADMIN — Medication 3 ML: at 14:52

## 2024-10-31 RX ADMIN — BRIMONIDINE TARTRATE 1 DROP: 2 SOLUTION OPHTHALMIC at 20:18

## 2024-10-31 RX ADMIN — Medication 60 MG OF IRON: at 08:45

## 2024-10-31 RX ADMIN — CEFTRIAXONE SODIUM 2 G: 2 INJECTION, SOLUTION INTRAVENOUS at 08:41

## 2024-10-31 RX ADMIN — LATANOPROST 1 DROP: 50 SOLUTION OPHTHALMIC at 00:17

## 2024-10-31 RX ADMIN — HEPARIN SODIUM 1400 UNITS/HR: 10000 INJECTION, SOLUTION INTRAVENOUS at 07:57

## 2024-10-31 RX ADMIN — INSULIN LISPRO 3 UNITS: 100 INJECTION, SOLUTION INTRAVENOUS; SUBCUTANEOUS at 08:43

## 2024-10-31 RX ADMIN — PRIMIDONE 125 MG: 250 TABLET ORAL at 20:18

## 2024-10-31 RX ADMIN — OXYCODONE 5 MG: 5 TABLET ORAL at 08:45

## 2024-10-31 RX ADMIN — Medication 80 PERCENT: at 13:40

## 2024-10-31 RX ADMIN — Medication 3 ML: at 20:24

## 2024-10-31 RX ADMIN — EZETIMIBE 10 MG: 10 TABLET ORAL at 08:44

## 2024-10-31 ASSESSMENT — PAIN - FUNCTIONAL ASSESSMENT
PAIN_FUNCTIONAL_ASSESSMENT: CPOT (CRITICAL CARE PAIN OBSERVATION TOOL)

## 2024-10-31 NOTE — NURSING NOTE
Assumed care of patient.  She is alert and awake on the vent.  NAD.  VSS. RT placed her in a wean.

## 2024-10-31 NOTE — PROGRESS NOTES
[facility] Critical Care Medicine       Date:  10/31/2024  Patient:  Narda Malloy  YOB: 1955  MRN:  [id number]   Admit Date:  10/12/2024  ========================================================================================================    Chief Complaint   Patient presents with    Altered Mental Status         History of Present Illness:  Narda Malloy is a 69 y.o. year old female patient with Past Medical History of L1-L3 lumbar laminectomy, T4-S1 revision, and fusion August 26th, T2DM, HTN, essential tremor, HLD, glaucoma, sarcoidosis of the lung who presented to  ED 10/12 after being found essentially unresponsive at her nursing facility [facility]. Per report from her , she has had a significant decline in her health since July 15th when she had a fall and became significantly weak. She has also had multiple infection complications since her back surgery in August requiring multiple I&Ds and long term antibiotic therapy. She went to the OR most recently on 9/28 for lumbar site infection wash out. Per chart review, she was discharged on IV vancomycin 1g and IV ceftriaxone 2d q24hrs through 11/12.     ED Course: Initial vital signs: /104 (109), HR 68, RR 20, SpO2 95% on 6L NC, temp 34.5C. Give 0.4mg of narcan with no improvement in mentation. Lab work-up remarkable for mild hyperkalemia (5.5), AMY 42/1.46, elevated alk phos, normocytic anemia 10.4/33, turbid appearing urine with mild hematuria and proteinuria and + leuk esterase, >50 WBCs. Urine drug screen positive for barbiturates. Triggered sepsis timer so she was given 3L NS. She was intubated for airway protection with 20mg etomidate and 100mg succinylcholine. BP dropped post-intubated and fluid resuscitation and she was subsequently started on levophed.             Interval ICU Events:  10/12: Pt arrived to ICU intubated and lightly sedated on low-dose versed. Eyes open, minimally responsive.      10/13:  Received K cocktail last night for K 6.0, corrected appropriately. Levophed requirements mildly up, UOP decreasing. Ordered albumin x2 this am with improvements in UOP and SBP. Will likely trial lasix this afternoon d/t hypervolemia.     10/14: Remains intubated with decreased mentation, only responsive to noxious stimuli. Trialed lasix TID for volume overload. Remains on levophed 0.01.     10/15: Mentation much improved. SAT/SBT successful so extubated. Given lasix x3, bumex x1, metolazone x1 with net negative fluid balance of 500mL -> started on bumex gtt.      10/16: O2 requirements significantly increased, NRB -> HFNC 40L 100% likely 2/2 mucus plugging.     10/17: No acute events overnight. Bumex gtt increased to 1mg/hr. CXR this am showing complete opacification of left hemithorax related to atelectasis vs pleural effusion. Remains on HFNC 40L/80%. Pigtail catheter placed with 1.2L drained immediately. Given albumin for hypotension.      10/18: ~2L output from left sided pigtail catheter since placement. Kidney function worsening and UOP declining, about 20cc/hr overnight. Will place NG tube today and start enteral nutrition and appetite and oral intake remains poor.      10/19: Patient with worsening hypoxic, hypercapnic respiratory failure - now requiring BIPAP support. Remains grossly volume overloaded with low UO. Started on vasopressors to augment BP for diuresis. 40 IV lasix + gtt started. Remains with poor nutritional status. Will re attempt NG later if respiratory status improves.      10/20: Patient stable on vent. Nephrology consulted for renal failure. Cr continues to uptrend however UO is increasing. No issues overnight.     10/21: No issues overnight. Remains stable on vent. Levo down to 0.01 mcg/kg/min. UO remains low. Nephrology following. Possible CRRT today?     10/22: Patient received 80 lasix and metalozone with minimal UO. Levo @ .02 and prop @ 10. Vent settings: 20/450/10/40%. Plan for CRRT  today. Will SAT/SBT after CRRT. May change propofol to precedex.     10/23: Had episodes of bradycardia where HR went to 30's which resolved with heating the patient. CRRT yesterday with about 1L removed. Currently on 0.03 of levo and 10 of prop. Cont daptomycin and ceftriaxone per ID. CXR improved. SAT/SBT. EP consulted for episodes of bradycardia.     10/24: Bradycardic episodes of HR in 40s. Currently on 0.03 of Levo, 10 of prop and 50 fentanyl. Tolerating CRRT. Place PICC today.     10/25: Did well with the SBT yesterday, plan for another one today. Continue CRRT. Hgb 7.0 this am, still requiting Levo 0.03, will transfuse 1 unit PRBCs. Remove chest tube today.     10/26: Chest tube removed last night, CXR improving, patient appears overall lethargic on sbt/sat, not ready to extubate, will complete 4/4/4 sbt/rest/sbt-> rest today and reassess tomorrow     10/27: Patient failed afternoon SBT, placed on rate overnight, net - 2.5L over previous 24 hours, will place on wean today.     10/29: Patient with high residual tube feeds overnight.  She did have an episode of vomiting.  Tube feeds placed on hold.  She was also afebrile overnight.  Will obtain a KUB to rule out ileus.  Sputum culture with Pseudomonas, discussed antibiotic plans with ID.  Will not do SBT with patient today.  Did discuss the setback with her , he did state moving forward that if she does not reach extubation he would be agreeable to a tracheostomy.      10/30: No significant events overnight.  Tube feeds resumed at trickle rate yesterday, tolerating overnight.  Will increase to goal today.  Dialysis this morning.  Patient improved from yesterday.  Plan for SBT today.  Will attempt to sit patient on side of bed today.    10/31: No significant events overnight. Tolerating tube feeds, tube feeds on hold this AM for SBT. Patient awake and alert this morning, improved from yesterday.  Tolerating SBT, answering yes or no questions.  Will  extubate today.    Medical History:  Past Medical History:   Diagnosis Date    Degenerative myopia, bilateral     Diabetic neuropathy (Multi)     Difficult intubation 08/26/2024    Mac 3, grade 3, 1 attempt.  Glidescope/videolaryngoscopy recommended for future attempts.    DM type 2 (diabetes mellitus, type 2) (Multi)     Dry eye syndrome of bilateral lacrimal glands     Essential hypertension     Essential tremor     Glaucoma     Hyperlipidemia     Long term (current) use of insulin (Multi)     Low back pain     PONV (postoperative nausea and vomiting)     Primary open angle glaucoma of both eyes, severe stage     Repeated falls     Sarcoidosis of lung (Multi)     Spinal stenosis, lumbar region without neurogenic claudication     Weakness      Past Surgical History:   Procedure Laterality Date    BLEPHAROPLASTY  07/2022    BREAST SURGERY  05/20/2022    Breast lift    CARPAL TUNNEL RELEASE      CATARACT EXTRACTION W/  INTRAOCULAR LENS IMPLANT Bilateral     OD 08/04/2011 +8.5D,OS 08/04/2011 +8.50D    FOOT SURGERY      INSERTION / REMOVAL CRANIAL DBS GENERATOR      Placed 2017.  Removed 2018. part of wire left in head when everything removed    LUMBAR FUSION      L3-S1    PANRETINAL PHOTOCOAGULATION  2014    THORACIC FUSION  08/26/2024    T4-S1 fusion    VITRECTOMY Right 2013     Medications Prior to Admission   Medication Sig Dispense Refill Last Dose/Taking    acetaminophen (Tylenol) 500 mg tablet Take 2 tablets (1,000 mg) by mouth 3 times a day.   Unknown    ascorbic acid (Vitamin C) 500 mg tablet as directed Orally   Unknown    brimonidine (AlphaGAN) 0.2 % ophthalmic solution Administer 1 drop into both eyes 2 times a day.   Unknown    calcium carbonate-vitamin D3 500 mg-5 mcg (200 unit) tablet Take 1 tablet by mouth once daily.   Unknown    cefTRIAXone (Rocephin) 2 gram/50 mL IV Infuse 50 mL (2 g) at 100 mL/hr over 30 minutes into a venous catheter once every 24 hours. Once weekly labs CBC/diff, CMP, Vanc trough  ESR, CRP fax to Dr. Juarez [phone]. Stop date 11/12/24. 1950 mL 0     dextrose 50 % injection Infuse 25 mL (12.5 g) into a venous catheter every 15 minutes if needed (For blood glucose 41 to 70 mg/dL).       dextrose 50 % injection Infuse 50 mL (25 g) into a venous catheter every 15 minutes if needed (For blood glucose less than or equal to 40 mg/dL).       docusate sodium (Colace) 100 mg capsule Take 1 capsule (100 mg) by mouth 2 times a day.   Unknown    ezetimibe (Zetia) 10 mg tablet Take 1 tablet (10 mg) by mouth once daily. 90 tablet 3 Unknown    FreeStyle Lite Strips strip USE TO TEST 3 TIMES A DAY AS DIRECTED 300 each 2     glucagon (Glucagen) 1 mg injection Inject 1 mg into the muscle every 15 minutes if needed for low blood sugar - see comments (Hypoglycemia).       glucagon (Glucagen) 1 mg injection Inject 1 mg into the muscle every 15 minutes if needed for low blood sugar - see comments (Hypoglycemia).       heparin sodium,porcine (heparin, porcine,) 5,000 unit/mL injection Inject 1 mL (5,000 Units) under the skin every 8 hours.       insulin lispro (HumaLOG) 100 unit/mL injection Inject 0-15 Units under the skin 3 times daily (morning, midday, late afternoon). Take as directed per insulin instructions.Do not hold when patient is not eating, continue order as scheduled for hyperglycemia management.  Insulin Lispro Corrective Scale #3     Hypoglycemia protocol Call LIP unit(s) if Blood Glucose is between 0 - 70 mg/dL     0 unit(s) if Blood glucose is between    3 unit(s) if Blood glucose is between 151-200   6 unit(s) if Blood glucose is between 201-250   9 unit(s) if Blood glucose is between 251-300   12 unit(s) if Blood glucose is between 301-350   15 unit(s) if Blood glucose is between 351-400    Notify provider unit(s) if Blood Glucose is greater than 400 mg/dL       Lactobacillus acidophilus 100 mg (1 billion cell) capsule Take 1 capsule by mouth 2 times a day.   Unknown    latanoprost  "(Xalatan) 0.005 % ophthalmic solution Administer 1 drop into both eyes once daily at bedtime. 2.5 mL 5 Unknown    melatonin 5 mg tablet Take 1 tablet (5 mg) by mouth as needed at bedtime for sleep.   Unknown    methocarbamol (Robaxin) 500 mg tablet Take 1 tablet (500 mg) by mouth 3 times a day.   Unknown    multivitamin tablet Take 1 tablet by mouth once daily.   Unknown    ondansetron (Zofran) 4 mg/2 mL injection Infuse 2 mL (4 mg) into a venous catheter every 6 hours if needed for nausea or vomiting.       oxyCODONE (Roxicodone) 5 mg immediate release tablet Take 1 tablet (5 mg) by mouth every 6 hours if needed for severe pain (7 - 10) or moderate pain (4 - 6).       oxygen (O2) gas therapy Inhale 1 each continuously.       pantoprazole (ProtoNix) 40 mg EC tablet Take 1 tablet (40 mg) by mouth once daily in the morning. Take before meals. Do not crush, chew, or split.       pantoprazole (ProtoNix) 40 mg injection Infuse 40 mg into a venous catheter once daily in the morning. Take before meals. If unable to take PO.       pen needle, diabetic (PEN NEEDLE MISC) BD Altagracia- 4 mm X 32 G needle - as directed 4x a day sc 4 times per day       polyethylene glycol (Glycolax, Miralax) 17 gram packet Take 17 g by mouth once daily.   Unknown    primidone 125 mg tablet Take 125 mg by mouth 3 times a day.   Unknown    propranolol LA (Inderal LA) 60 mg 24 hr capsule Take 1 capsule (60 mg) by mouth early in the morning.. Hold for SBP < 110 mmhg, HR < 60 bpm.   Unknown    sennosides (Senokot) 8.6 mg tablet Take 1 tablet (8.6 mg) by mouth every 12 hours if needed for constipation.   Unknown    Sure Comfort Pen Needle 32 gauge x 5/32\" needle AS DIRECTED DAILY FOR 90 DAYS 100 each 11 Unknown    traZODone (Desyrel) 25 MG split tablet Take 1 half tablet (25 mg) by mouth once daily at bedtime.   Unknown    vancomycin (Vancocin) 1 gram/250 mL solution Infuse 250 mL (1 g) at 250 mL/hr over 60 minutes into a venous catheter every 12 hours. " Once weekly labs CBC/diff, CMP, Vanc trough ESR, CRP fax to Dr. Juarez [phone]. Stop date 11/12/24. 49377 mL 0      Erythromycin, Morphine, and Rosuvastatin  Social History     Tobacco Use    Smoking status: Former     Types: Cigarettes     Passive exposure: Past    Smokeless tobacco: Never   Vaping Use    Vaping status: Never Used   Substance Use Topics    Alcohol use: Not Currently    Drug use: Not Currently     Family History   Problem Relation Name Age of Onset    Multiple myeloma Mother      Cancer Mother      Other (CABG) Father      Pulmonary embolism Father      Heart disease Father      Breast cancer Sister          Stage II    Hypertension Sister      Diabetes Sister      No Known Problems Sister          x5    No Known Problems Brother          x4    No Known Problems Daughter         Review of Systems:  14 point review of systems was completed and negative except for those specially mention in my HPI    Physical Exam:    Heart Rate:  [60-93]   Temp:  [36.4 °C (97.5 °F)-37.4 °C (99.3 °F)]   Resp:  [14-24]   BP: ()/(47-69)   SpO2:  [94 %-99 %]     Physical Exam  Vitals reviewed.   Constitutional:       General: She is awake.      Interventions: She is intubated.   HENT:      Head: Normocephalic and atraumatic.      Right Ear: External ear normal.      Left Ear: External ear normal.      Nose: Nose normal.      Mouth/Throat:      Mouth: Mucous membranes are dry.   Eyes:      Pupils: Pupils are equal, round, and reactive to light.   Cardiovascular:      Rate and Rhythm: Normal rate and regular rhythm.      Pulses: Normal pulses.      Heart sounds: Normal heart sounds.   Pulmonary:      Effort: She is intubated.      Breath sounds: Examination of the right-lower field reveals decreased breath sounds. Examination of the left-lower field reveals decreased breath sounds. Decreased breath sounds present.   Abdominal:      General: Bowel sounds are decreased.      Palpations: Abdomen is soft.       Tenderness: There is no abdominal tenderness.   Musculoskeletal:      Right hand: Swelling present.      Left hand: Swelling present.      Right lower le+ Edema present.      Left lower le+ Edema present.   Skin:     General: Skin is warm.      Capillary Refill: Capillary refill takes less than 2 seconds.   Neurological:      Mental Status: She is alert.         Objective:    I have reviewed all medications, laboratory results, and imaging pertinent for today's encounter    Assessment/Plan:    I am currently managing this critically ill patient for the following problems:    Neuro/Psych/Pain Ctrl/Sedation:  #Acute encephalopathy - likely 2/2 hypercapnia, & infection- resolving   #Hx Essential tremor   CT head: Negative for acute findings   Urine tox positive for barbiturates consistent with primidone intake   - Pain Control: Oxycodone Q4 scheduled, Acetaminophen PRN  - Not requiring sedation  - Home primidone continued. Will hold propanolol d/t hypotension  - CAM ICU qshift, sleep-wake hygiene, delirium precautions    Respiratory/ENT:  #Acute hypoxic/hypercapnic respiratory failure - likely multifactorial and 2/2 HCAP, pl effusions, volume overload  #Healthcare-associated pneumonia   #Atelectasis - likely 2/2 mucus plugging   #Pleural Effusion -S/p left-sided pigtail placement 10/17, removed 10/25-resolved  Intubated for 3 days extubated and re intubated on the , currently day 11 of intubation  - Will wean O2 as tolerated to maintain SpO2 >92%  - Duonebs Q4 and hypertonic saline QID   - IPV per RT TID  - Continuous pulse ox monitoring   - Pulm hygiene  - Aspiration precautions   - Respiratory culture with Pseudomonas, please see ID section for antibiotics  - SBT today plan for extubation     Cardiovascular:  #Septic shock -resolved  #Occlusive superficial venous thrombus of the left cephalic vein  #Acute on chronic diastolic heart failure  #Hx HTN, HLD  TTE 10/1: diastolic dysfunction, LVEF 50-55%,  "mildly elevated RVSP (40)  Bilateral duplex US upper extremities:  Thrombosis within the left cephalic vein, which is part of the superficial venous system of the upper extremity, adjacent to PICC line. No deep venous thrombosis in the upper extremities.  - Will maintain goal MAP > 65   - Continue midodrine increased from bid to q8  - Holding home propranolol (on for tremors)  - Continue home Zetia  - Continuous cardiac monitoring per ICU protocol  - EKGs PRN for ACS symptoms, arrhythmias   - US duplex b/l upper extremities-occlusive superficial vein thrombosis of left cephalic vein from mid-distal upper arm extending to approximately 2.0cm proximal to subclavian junction   -Acute nonocclusive DVT right IJ vein around line placement, will need to be removed   -Continue heparin drip  -Repeat right IJ duplex on Monday    GI:  #Hx GERD  - Diet: Changed to nephro  - BR: Colace, Miralax PRN  - GI Prophylaxis: PPI  - Patient had a few episodes of diarrhea, sample sent, C-Diff negative     Renal/Volume Status (Intra & Extravascular):  #Acute kidney injury - possibly ATN +/- medication-induced (vancomycin)  #Anasarca   #Hypoalbuminemia   #Hyponatremia  Baseline Cr 0.8-1.0. BUN Cr 1.45/25 today   - Hemodialysis yesterday with 2.5L removed.   - MWF schedule, anticipate need for long-term dialysis line.  - Oliguric  - Nephrology following  - Bladder scan daily   - Replete electrolytes to maintain K >4.0 and Mg >2.0  - Daily BMP, Mg, Phos  -Free water flushes being held due to hyponatremia    Endocrine  #T2DM  - SSI Q 4 while NPO  - Hypoglycemia protocol PRN    Infectious Disease:  #Pseudomonas-Respiratory culture 10/29  #Thoracolumbar surgical site infection - s/p I&D x 2. Recent MRSA bacteremia on extended course of IV antibiotics (vanc and rocephin -> 11/12)  #Healthcare-associated pneumonia   CT lumbar spine 10/12: Unable to r/o abscess. Unable to do MRI d/t spinal hardware, \"metal in head\" per patient  Sputum culture " 10/16: + pseudomonas   - ID following  - Inhaled tobramycin (10/29...)  - Continue Ceftriaxone (10/23-...)  - Continue Daptomycin (10/21-...)  - Cefepime completed on 10/22  - PICC placed 10/24  - Monitor SIRS criteria  - Consult placed to Dr. Ventura    Heme/Onc:  #Normocytic anemia   H/H similar to previous: No active signs of bleeding.  - Start iron and folate  - Monitor for s/sx of bleeding   - Plan to transfuse if Hgb <7.0   - Daily CBC  - Keep active type and screen    OBGYN/MSK:  PT/OT- no need to hold PT/OT patient was at a rehab facility after lumbar laminectomy for PT/OT before this admission   10/27 & 10/28 sat on side of the bed for 5 min     Ethics/Code Status:  Full Code     :  DVT Prophylaxis: Heparin gtt  GI Prophylaxis: PPI  Bowel Regimen: Colace and Miralax   Diet: Tube Feeds  CVC: PICC placed 10/24, trialysis right internal jugular placed 1019   Wanda: No  Gibson: No  Restraints: Soft   Dispo: ICU    Critical Care Time:  50 minutes spent in preparing to see patient (I.e. review of medical records), evaluation of diagnostics (I.e. labs, imaging, etc.), documentation, discussing plan of care with patient/ family/ caregiver, and/ or coordination of care with multidisciplinary team. Time does not include completion of procedure time.     Plan of care discussed with Dr. Glen Diaz, APRN-CNP  Critical Care Medicine  [facility]

## 2024-10-31 NOTE — CARE PLAN
The patient's goals for the shift include unable to assess    The clinical goals for the shift include pt will remain HD stable      Problem: Safety - Adult  Goal: Free from fall injury  Outcome: Progressing     Problem: Discharge Planning  Goal: Discharge to home or other facility with appropriate resources  Outcome: Progressing     Problem: Chronic Conditions and Co-morbidities  Goal: Patient's chronic conditions and co-morbidity symptoms are monitored and maintained or improved  Outcome: Progressing     Problem: Diabetes  Goal: Increase stability of blood glucose readings by end of shift  Outcome: Progressing  Goal: Maintain electrolyte levels within acceptable range throughout shift  Outcome: Progressing  Goal: Maintain glucose levels >70mg/dl to <250mg/dl throughout shift  Outcome: Progressing  Goal: Learn about and adhere to nutrition recommendations by end of shift  Outcome: Progressing  Goal: Vital signs within normal range for age by end of shift  Outcome: Progressing  Goal: Increase self care and/or family involovement by end of shift  Outcome: Progressing  Goal: Receive DSME education by end of shift  Outcome: Progressing     Problem: Knowledge Deficit  Goal: Patient/family/caregiver demonstrates understanding of disease process, treatment plan, medications, and discharge instructions  Outcome: Progressing     Problem: Skin  Goal: Decreased wound size/increased tissue granulation at next dressing change  Outcome: Progressing  Flowsheets (Taken 10/30/2024 2314)  Decreased wound size/increased tissue granulation at next dressing change:   Utilize specialty bed per algorithm   Promote sleep for wound healing   Protective dressings over bony prominences  Goal: Participates in plan/prevention/treatment measures  Outcome: Progressing  Flowsheets (Taken 10/30/2024 2314)  Participates in plan/prevention/treatment measures:   Increase activity/out of bed for meals   Discuss with provider PT/OT consult   Elevate  heels  Goal: Prevent/manage excess moisture  Outcome: Progressing  Flowsheets (Taken 10/30/2024 2314)  Prevent/manage excess moisture:   Use wicking fabric (obtain order)   Moisturize dry skin   Cleanse incontinence/protect with barrier cream   Follow provider orders for dressing changes   Monitor for/manage infection if present  Goal: Prevent/minimize sheer/friction injuries  Outcome: Progressing  Flowsheets (Taken 10/30/2024 2314)  Prevent/minimize sheer/friction injuries:   Use pull sheet   Utilize specialty bed per algorithm   Turn/reposition every 2 hours/use positioning/transfer devices   Increase activity/out of bed for meals   HOB 30 degrees or less   Complete micro-shifts as needed if patient unable. Adjust patient position to relieve pressure points, not a full turn  Goal: Promote/optimize nutrition  Outcome: Progressing  Flowsheets (Taken 10/30/2024 2314)  Promote/optimize nutrition:   Offer water/supplements/favorite foods   Discuss with provider if NPO > 2 days   Assist with feeding   Consume > 50% meals/supplements   Monitor/record intake including meals   Reassess MST if dietician not consulted  Goal: Promote skin healing  Outcome: Progressing  Flowsheets (Taken 10/30/2024 2314)  Promote skin healing:   Rotate device position/do not position patient on device   Protective dressings over bony prominences   Assess skin/pad under line(s)/device(s)   Ensure correct size (line/device) and apply per  instructions   Turn/reposition every 2 hours/use positioning/transfer devices     Problem: Nutrition  Goal: Consume prescribed supplement  Outcome: Progressing  Goal: Nutrition support goals are met within 48 hrs  Outcome: Progressing  Goal: Nutrition support is meeting 75% of nutrient needs  Outcome: Progressing  Goal: BG  mg/dL  Outcome: Progressing  Goal: Lab values WNL  Outcome: Progressing  Goal: Electrolytes WNL  Outcome: Progressing  Goal: Promote healing  Outcome: Progressing  Goal:  Maintain stable weight  Outcome: Progressing  Goal: Reduce weight from edema/fluid  Outcome: Progressing     Problem: Respiratory  Goal: No signs of respiratory distress (eg. Use of accessory muscles. Peds grunting)  Outcome: Progressing  Goal: Clear secretions with interventions this shift  Outcome: Progressing  Goal: Minimize anxiety/maximize coping throughout shift  Outcome: Progressing  Goal: Minimal/no exertional discomfort or dyspnea this shift  Outcome: Progressing  Goal: Patent airway maintained this shift  Outcome: Progressing  Goal: Tolerate mechanical ventilation evidenced by VS/agitation level this shift  Outcome: Progressing  Goal: Tolerate pulmonary toileting this shift  Outcome: Progressing  Goal: Verbalize decreased shortness of breath this shift  Outcome: Progressing  Goal: Wean oxygen to maintain O2 saturation per order/standard this shift  Outcome: Progressing  Goal: Increase self care and/or family involvement in next 24 hours  Outcome: Progressing     Problem: Pain  Goal: Takes deep breaths with improved pain control throughout the shift  Outcome: Progressing  Goal: Turns in bed with improved pain control throughout the shift  Outcome: Progressing  Goal: Walks with improved pain control throughout the shift  Outcome: Progressing  Goal: Performs ADL's with improved pain control throughout shift  Outcome: Progressing  Goal: Participates in PT with improved pain control throughout the shift  Outcome: Progressing  Goal: Free from opioid side effects throughout the shift  Outcome: Progressing  Goal: Free from acute confusion related to pain meds throughout the shift  Outcome: Progressing     Problem: Safety - Medical Restraint  Goal: Remains free of injury from restraints (Restraint for Interference with Medical Device)  Outcome: Progressing  Goal: Free from restraint(s) (Restraint for Interference with Medical Device)  Outcome: Progressing

## 2024-10-31 NOTE — PROGRESS NOTES
Occupational Therapy                 Therapy Communication Note    Patient Name: Narda Malloy  MRN: [id number]  Department: [address] ICU  Room: 11/11-A  Today's Date: 10/31/2024     Discipline: Occupational Therapy    Missed Visit Reason: Cancel (Pt recently extubated from the vent, but is now in respiratory distress and not medically appropriate per nurse. RT called to assess the pt.)    Missed Time: Cancel

## 2024-10-31 NOTE — CARE PLAN
The patient's goals for the shift include unable to assess    The clinical goals for the shift include pt will remain hemodynamically stable throughout shift      Problem: Safety - Medical Restraint  Goal: Remains free of injury from restraints (Restraint for Interference with Medical Device)  Outcome: Progressing  Goal: Free from restraint(s) (Restraint for Interference with Medical Device)  Outcome: Progressing     Problem: Safety - Adult  Goal: Free from fall injury  Outcome: Progressing     Problem: Discharge Planning  Goal: Discharge to home or other facility with appropriate resources  Outcome: Progressing     Problem: Chronic Conditions and Co-morbidities  Goal: Patient's chronic conditions and co-morbidity symptoms are monitored and maintained or improved  Outcome: Progressing     Problem: Diabetes  Goal: Increase stability of blood glucose readings by end of shift  Outcome: Progressing  Goal: Maintain electrolyte levels within acceptable range throughout shift  Outcome: Progressing  Goal: Maintain glucose levels >70mg/dl to <250mg/dl throughout shift  Outcome: Progressing  Goal: Learn about and adhere to nutrition recommendations by end of shift  Outcome: Progressing  Goal: Vital signs within normal range for age by end of shift  Outcome: Progressing  Goal: Increase self care and/or family involovement by end of shift  Outcome: Progressing  Goal: Receive DSME education by end of shift  Outcome: Progressing     Problem: Knowledge Deficit  Goal: Patient/family/caregiver demonstrates understanding of disease process, treatment plan, medications, and discharge instructions  Outcome: Progressing     Problem: Skin  Goal: Decreased wound size/increased tissue granulation at next dressing change  Outcome: Progressing  Flowsheets (Taken 10/31/2024 0808)  Decreased wound size/increased tissue granulation at next dressing change:   Promote sleep for wound healing   Protective dressings over bony prominences   Utilize  specialty bed per algorithm  Goal: Participates in plan/prevention/treatment measures  Outcome: Progressing  Flowsheets (Taken 10/31/2024 1427)  Participates in plan/prevention/treatment measures:   Discuss with provider PT/OT consult   Elevate heels   Increase activity/out of bed for meals  Goal: Prevent/manage excess moisture  Outcome: Progressing  Flowsheets (Taken 10/31/2024 1427)  Prevent/manage excess moisture:   Cleanse incontinence/protect with barrier cream   Monitor for/manage infection if present   Follow provider orders for dressing changes  Goal: Prevent/minimize sheer/friction injuries  Outcome: Progressing  Flowsheets (Taken 10/31/2024 1427)  Prevent/minimize sheer/friction injuries:   Complete micro-shifts as needed if patient unable. Adjust patient position to relieve pressure points, not a full turn   Increase activity/out of bed for meals   Use pull sheet   HOB 30 degrees or less   Turn/reposition every 2 hours/use positioning/transfer devices   Utilize specialty bed per algorithm  Goal: Promote/optimize nutrition  Outcome: Progressing  Flowsheets (Taken 10/31/2024 1427)  Promote/optimize nutrition: Discuss with provider if NPO > 2 days  Goal: Promote skin healing  Outcome: Progressing  Flowsheets (Taken 10/31/2024 1427)  Promote skin healing:   Assess skin/pad under line(s)/device(s)   Protective dressings over bony prominences   Turn/reposition every 2 hours/use positioning/transfer devices     Problem: Nutrition  Goal: Consume prescribed supplement  Outcome: Progressing  Goal: Nutrition support goals are met within 48 hrs  Outcome: Progressing  Goal: Nutrition support is meeting 75% of nutrient needs  Outcome: Progressing  Goal: BG  mg/dL  Outcome: Progressing  Goal: Lab values WNL  Outcome: Progressing  Goal: Electrolytes WNL  Outcome: Progressing  Goal: Promote healing  Outcome: Progressing  Goal: Maintain stable weight  Outcome: Progressing  Goal: Reduce weight from  edema/fluid  Outcome: Progressing     Problem: Respiratory  Goal: No signs of respiratory distress (eg. Use of accessory muscles. Peds grunting)  Outcome: Progressing  Goal: Clear secretions with interventions this shift  Outcome: Progressing  Goal: Minimize anxiety/maximize coping throughout shift  Outcome: Progressing  Goal: Minimal/no exertional discomfort or dyspnea this shift  Outcome: Progressing  Goal: Patent airway maintained this shift  Outcome: Progressing  Goal: Tolerate mechanical ventilation evidenced by VS/agitation level this shift  Outcome: Progressing  Goal: Tolerate pulmonary toileting this shift  Outcome: Progressing  Goal: Verbalize decreased shortness of breath this shift  Outcome: Progressing  Goal: Wean oxygen to maintain O2 saturation per order/standard this shift  Outcome: Progressing  Goal: Increase self care and/or family involvement in next 24 hours  Outcome: Progressing     Problem: Pain  Goal: Takes deep breaths with improved pain control throughout the shift  Outcome: Progressing  Goal: Turns in bed with improved pain control throughout the shift  Outcome: Progressing  Goal: Walks with improved pain control throughout the shift  Outcome: Progressing  Goal: Performs ADL's with improved pain control throughout shift  Outcome: Progressing  Goal: Participates in PT with improved pain control throughout the shift  Outcome: Progressing  Goal: Free from opioid side effects throughout the shift  Outcome: Progressing  Goal: Free from acute confusion related to pain meds throughout the shift  Outcome: Progressing

## 2024-10-31 NOTE — PROGRESS NOTES
Patient not medically clear. Patient extubated today and is on high flow oxygen. At this time there is not a safe discharge plan in place. Patient was at [facility] prior to admission. If she will return then a precert will be needed. Will follow.      10/31/24 [id number]   Discharge Planning   Home or Post Acute Services Other (Comment)   Expected Discharge Disposition [place]  (TBD)   Does the patient need discharge transport arranged? Yes   RoundTrip coordination needed? Yes

## 2024-10-31 NOTE — PROGRESS NOTES
"Narda Malloy is a 69 y.o. female on day 19 of admission presenting with Unresponsive.    Subjective   The patient is seen for acute kidney injury which is secondary to acute tubular necrosis patient remains fairly oliguric patient still intubated on the ventilator however she is more awake and responsive weaning off the vent being conducted at this time she is hemodynamically stable and she is not on any pressors       Objective     Physical Exam  Constitutional:       Comments: Patient is intubated on the ventilator   Neck:      Vascular: No carotid bruit.   Cardiovascular:      Rate and Rhythm: Normal rate and regular rhythm.      Heart sounds: No murmur heard.     No friction rub. No gallop.   Pulmonary:      Breath sounds: No wheezing, rhonchi or rales.   Chest:      Chest wall: No tenderness.   Abdominal:      General: There is no distension.      Tenderness: There is no abdominal tenderness. There is no guarding or rebound.   Musculoskeletal:         General: No swelling or tenderness.      Cervical back: Neck supple.      Right lower leg: Edema present.      Left lower leg: Edema present.   Lymphadenopathy:      Cervical: No cervical adenopathy.         Last Recorded Vitals  Blood pressure 132/58, pulse 79, temperature 36.9 °C (98.4 °F), temperature source Temporal, resp. rate 15, height 1.778 m (5' 10\"), weight 118 kg (259 lb 11.2 oz), SpO2 95%.    Intake/Output last 3 Shifts:  I/O last 3 completed shifts:  In: 1746 (14.8 mL/kg) [I.V.:1116 (9.5 mL/kg); NG/GT:280; IV Piggyback:350]  Out: 0 (0 mL/kg)   Weight: 117.8 kg     Current Facility-Administered Medications:     acetaminophen (Tylenol) oral liquid 650 mg, 650 mg, oral, q4h PRN, Sabino Matos, APRN-CNP, 650 mg at 10/28/24 1818    alteplase (Cathflo Activase) injection 2 mg, 2 mg, intra-catheter, PRN, Kaleb Lam PA-C    alteplase (Cathflo Activase) injection 2 mg, 2 mg, intra-catheter, PRN, Andrew Malagon MD    brimonidine (AlphaGAN) 0.2 % " ophthalmic solution 1 drop, 1 drop, Both Eyes, BID, Kaleb Lam PA-C, 1 drop at 10/31/24 0845    [Held by provider] calcium carbonate-vitamin D3 500 mg-5 mcg (200 unit) per tablet 1 tablet, 1 tablet, oral, Daily, Kaleb Lam PA-C, 1 tablet at 10/18/24 0930    cefTRIAXone (Rocephin) 2 g in dextrose (iso) IV 50 mL, 2 g, intravenous, q24h, Kaleb Lam PA-C, Stopped at 10/31/24 1010    DAPTOmycin (Cubicin) 700 mg in sodium chloride 0.9%  mL, 700 mg, intravenous, q48h, Andrew Malagon MD, Stopped at 10/30/24 0853    dextrose 50 % injection 12.5 g, 12.5 g, intravenous, q15 min PRN, Kaleb Lam PA-C    dextrose 50 % injection 25 g, 25 g, intravenous, q15 min PRN, Kaleb Lam PA-C    ezetimibe (Zetia) tablet 10 mg, 10 mg, oral, Daily, Kaleb Lam PA-C, 10 mg at 10/31/24 0844    fentaNYL PF (Sublimaze) injection 25 mcg, 25 mcg, intravenous, q2h PRN, Jalyn Lopez PA-C    ferrous sulfate syrup 60 mg of iron, 60 mg of iron, oral, Daily, RUTH Doe, 60 mg of iron at 10/31/24 0845    folic acid (Folvite) tablet 1 mg, 1 mg, oral, Daily, RUTH Doe, 1 mg at 10/31/24 0844    glucagon (Glucagen) injection 1 mg, 1 mg, intramuscular, q15 min PRN, Kaleb Lam PA-C    glucagon (Glucagen) injection 1 mg, 1 mg, intramuscular, q15 min PRN, Kaleb Lam PA-C    heparin (porcine) injection 3,000-6,000 Units, 3,000-6,000 Units, intravenous, PRN, RUTH Doe    heparin 1,000 unit/mL injection 1,000 Units, 1,000 Units, intra-catheter, After Dialysis, Imer Cheung MD, 1,000 Units at 10/30/24 1400    heparin 1,000 unit/mL injection 1,000 Units, 1,000 Units, intra-catheter, After Dialysis, Imer Cheung MD, 1,000 Units at 10/30/24 1400    heparin 25,000 Units in dextrose 5% 250 mL (100 Units/mL) infusion (premix), 0-4,500 Units/hr, intravenous, Continuous, LEONEL Doe-CNP, Last Rate: 14 mL/hr at 10/31/24 0757, 1,400 Units/hr at 10/31/24  0757    heparin flush 10 unit/mL syringe 50 Units, 50 Units, intra-catheter, PRN, Kaleb Lam PA-C, 50 Units at 10/19/24 2313    honey (Medihoney) topical gel, , Topical, Daily, RUTH Salgado, Given at 10/31/24 0909    [Held by provider] HYDROmorphone (Dilaudid) injection 0.4 mg, 0.4 mg, intravenous, q2h PRN, RUTH Salgado, 0.4 mg at 10/19/24 0520    insulin lispro (HumaLOG) injection 0-15 Units, 0-15 Units, subcutaneous, q4h, Lb Dodd PA-C, 3 Units at 10/31/24 0843    ipratropium-albuteroL (Duo-Neb) 0.5-2.5 mg/3 mL nebulizer solution 3 mL, 3 mL, nebulization, 4x daily, Kaleb Lam PA-C, 3 mL at 10/31/24 1137    latanoprost (Xalatan) 0.005 % ophthalmic solution 1 drop, 1 drop, Both Eyes, Nightly, Kaleb Lam PA-C, 1 drop at 10/31/24 0017    midodrine (Proamatine) tablet 10 mg, 10 mg, oral, q8h, RUTH Doe, 10 mg at 10/31/24 0845    oxyCODONE (Roxicodone) immediate release tablet 5 mg, 5 mg, oral, q4h, RUTH Salgado, 5 mg at 10/31/24 1244    oxygen (O2) therapy, , inhalation, Continuous - Inhalation, Radha Otero MD, 35 percent at 10/31/24 0658    pantoprazole (ProtoNix) EC tablet 40 mg, 40 mg, oral, Daily before breakfast **OR** pantoprazole (ProtoNix) injection 40 mg, 40 mg, intravenous, Daily before breakfast, RUTH Salas, 40 mg at 10/31/24 0854    polyethylene glycol (Glycolax, Miralax) packet 17 g, 17 g, oral, Daily PRN, RUTH Doe    primidone (Mysoline) tablet 125 mg, 125 mg, oral, Nightly, Kaleb Lam PA-C, 125 mg at 10/30/24 2043    [Held by provider] propranolol LA (Inderal LA) 24 hr capsule 60 mg, 60 mg, oral, Daily, Kaleb Lam PA-C, 60 mg at 10/19/24 0643    sennosides-docusate sodium (Lucia-Colace) 8.6-50 mg per tablet 1 tablet, 1 tablet, oral, Nightly, LEONEL Doe-CNP, 1 tablet at 10/30/24 2042    sodium chloride 0.9 % bolus 200 mL, 200 mL, intravenous, Once, Radha Otero MD,  Last Rate: 200 mL/hr at 10/30/24 1145, Restarted at 10/30/24 1145    sodium chloride 3 % nebulizer solution 3 mL, 3 mL, nebulization, 4x daily, Kaleb Lam PA-C, 3 mL at 10/31/24 1137    tobramycin (Michael 300) nebulizer solution 300 mg, 300 mg, nebulization, q12h, Andrew Malagon MD, 300 mg at 10/31/24 0657    [Held by provider] traMADol (Ultram) tablet 50 mg, 50 mg, oral, q8h PRN, Kaleb Lam PA-C   Relevant Results    Results for orders placed or performed during the hospital encounter of 10/12/24 (from the past 96 hours)   Magnesium   Result Value Ref Range    Magnesium 2.22 1.60 - 2.40 mg/dL   CBC and Auto Differential   Result Value Ref Range    WBC 15.0 (H) 4.4 - 11.3 x10*3/uL    nRBC 0.0 0.0 - 0.0 /100 WBCs    RBC 2.90 (L) 4.00 - 5.20 x10*6/uL    Hemoglobin 8.9 (L) 12.0 - 16.0 g/dL    Hematocrit 27.1 (L) 36.0 - 46.0 %    MCV 93 80 - 100 fL    MCH 30.7 26.0 - 34.0 pg    MCHC 32.8 32.0 - 36.0 g/dL    RDW 20.7 (H) 11.5 - 14.5 %    Platelets 233 150 - 450 x10*3/uL    Neutrophils % 89.0 40.0 - 80.0 %    Immature Granulocytes %, Automated 1.4 (H) 0.0 - 0.9 %    Lymphocytes % 5.3 13.0 - 44.0 %    Monocytes % 3.9 2.0 - 10.0 %    Eosinophils % 0.3 0.0 - 6.0 %    Basophils % 0.1 0.0 - 2.0 %    Neutrophils Absolute 13.30 (H) 1.20 - 7.70 x10*3/uL    Immature Granulocytes Absolute, Automated 0.21 0.00 - 0.70 x10*3/uL    Lymphocytes Absolute 0.80 (L) 1.20 - 4.80 x10*3/uL    Monocytes Absolute 0.58 0.10 - 1.00 x10*3/uL    Eosinophils Absolute 0.05 0.00 - 0.70 x10*3/uL    Basophils Absolute 0.02 0.00 - 0.10 x10*3/uL   Hepatic function panel   Result Value Ref Range    Albumin 2.6 (L) 3.4 - 5.0 g/dL    Bilirubin, Total 0.4 0.0 - 1.2 mg/dL    Bilirubin, Direct 0.2 0.0 - 0.3 mg/dL    Alkaline Phosphatase 142 (H) 33 - 136 U/L    ALT 11 7 - 45 U/L    AST 16 9 - 39 U/L    Total Protein 5.4 (L) 6.4 - 8.2 g/dL   Calcium, ionized   Result Value Ref Range    POCT Calcium, Ionized 1.11 1.1 - 1.33 mmol/L   Phosphorus   Result  Value Ref Range    Phosphorus 2.3 (L) 2.5 - 4.9 mg/dL   Basic Metabolic Panel   Result Value Ref Range    Glucose 136 (H) 74 - 99 mg/dL    Sodium 135 (L) 136 - 145 mmol/L    Potassium 4.4 3.5 - 5.3 mmol/L    Chloride 102 98 - 107 mmol/L    Bicarbonate 28 21 - 32 mmol/L    Anion Gap 9 (L) 10 - 20 mmol/L    Urea Nitrogen 11 6 - 23 mg/dL    Creatinine 0.56 0.50 - 1.05 mg/dL    eGFR >90 >60 mL/min/1.73m*2    Calcium 7.7 (L) 8.6 - 10.3 mg/dL   Morphology   Result Value Ref Range    RBC Morphology See Below     Polychromasia Mild    POCT GLUCOSE   Result Value Ref Range    POCT Glucose 130 (H) 74 - 99 mg/dL   POCT GLUCOSE   Result Value Ref Range    POCT Glucose 166 (H) 74 - 99 mg/dL   POCT GLUCOSE   Result Value Ref Range    POCT Glucose 191 (H) 74 - 99 mg/dL   Magnesium   Result Value Ref Range    Magnesium 2.22 1.60 - 2.40 mg/dL   CBC and Auto Differential   Result Value Ref Range    WBC 11.5 (H) 4.4 - 11.3 x10*3/uL    nRBC 0.0 0.0 - 0.0 /100 WBCs    RBC 2.77 (L) 4.00 - 5.20 x10*6/uL    Hemoglobin 8.6 (L) 12.0 - 16.0 g/dL    Hematocrit 26.6 (L) 36.0 - 46.0 %    MCV 96 80 - 100 fL    MCH 31.0 26.0 - 34.0 pg    MCHC 32.3 32.0 - 36.0 g/dL    RDW 21.2 (H) 11.5 - 14.5 %    Platelets 222 150 - 450 x10*3/uL    Neutrophils % 86.0 40.0 - 80.0 %    Immature Granulocytes %, Automated 1.4 (H) 0.0 - 0.9 %    Lymphocytes % 7.6 13.0 - 44.0 %    Monocytes % 4.6 2.0 - 10.0 %    Eosinophils % 0.3 0.0 - 6.0 %    Basophils % 0.1 0.0 - 2.0 %    Neutrophils Absolute 9.87 (H) 1.20 - 7.70 x10*3/uL    Immature Granulocytes Absolute, Automated 0.16 0.00 - 0.70 x10*3/uL    Lymphocytes Absolute 0.87 (L) 1.20 - 4.80 x10*3/uL    Monocytes Absolute 0.53 0.10 - 1.00 x10*3/uL    Eosinophils Absolute 0.03 0.00 - 0.70 x10*3/uL    Basophils Absolute 0.01 0.00 - 0.10 x10*3/uL   Hepatic function panel   Result Value Ref Range    Albumin 2.6 (L) 3.4 - 5.0 g/dL    Bilirubin, Total 0.3 0.0 - 1.2 mg/dL    Bilirubin, Direct 0.0 0.0 - 0.3 mg/dL    Alkaline  Phosphatase 160 (H) 33 - 136 U/L    ALT 12 7 - 45 U/L    AST 15 9 - 39 U/L    Total Protein 5.3 (L) 6.4 - 8.2 g/dL   Calcium, ionized   Result Value Ref Range    POCT Calcium, Ionized 1.14 1.1 - 1.33 mmol/L   Phosphorus   Result Value Ref Range    Phosphorus 2.1 (L) 2.5 - 4.9 mg/dL   Basic Metabolic Panel   Result Value Ref Range    Glucose 116 (H) 74 - 99 mg/dL    Sodium 135 (L) 136 - 145 mmol/L    Potassium 4.6 3.5 - 5.3 mmol/L    Chloride 103 98 - 107 mmol/L    Bicarbonate 30 21 - 32 mmol/L    Anion Gap 7 (L) 10 - 20 mmol/L    Urea Nitrogen 14 6 - 23 mg/dL    Creatinine 0.60 0.50 - 1.05 mg/dL    eGFR >90 >60 mL/min/1.73m*2    Calcium 7.6 (L) 8.6 - 10.3 mg/dL   Morphology   Result Value Ref Range    RBC Morphology See Below     Polychromasia Mild     RBC Fragments Few     Ovalocytes Few     Basophilic Stippling Present    Creatine Kinase   Result Value Ref Range    Creatine Kinase 45 0 - 215 U/L   BLOOD GAS ARTERIAL FULL PANEL   Result Value Ref Range    POCT pH, Arterial 7.39 7.38 - 7.42 pH    POCT pCO2, Arterial 47 (H) 38 - 42 mm Hg    POCT pO2, Arterial 105 (H) 85 - 95 mm Hg    POCT SO2, Arterial 99 94 - 100 %    POCT Oxy Hemoglobin, Arterial 95.9 94.0 - 98.0 %    POCT Hematocrit Calculated, Arterial 27.0 (L) 36.0 - 46.0 %    POCT Sodium, Arterial 132 (L) 136 - 145 mmol/L    POCT Potassium, Arterial 4.7 3.5 - 5.3 mmol/L    POCT Chloride, Arterial 102 98 - 107 mmol/L    POCT Ionized Calcium, Arterial 1.14 1.10 - 1.33 mmol/L    POCT Glucose, Arterial 116 (H) 74 - 99 mg/dL    POCT Lactate, Arterial 0.6 0.4 - 2.0 mmol/L    POCT Base Excess, Arterial 3.0 -2.0 - 3.0 mmol/L    POCT HCO3 Calculated, Arterial 28.5 (H) 22.0 - 26.0 mmol/L    POCT Hemoglobin, Arterial 9.1 (L) 12.0 - 16.0 g/dL    POCT Anion Gap, Arterial 6 (L) 10 - 25 mmo/L    Patient Temperature      FiO2 45 %    Apparatus      Ventilator Mode      Ventilator Rate 20 bpm    Tidal Volume 450 mL    Peep CHM2O 5.0 cm H2O    Site of Arterial Puncture Radial  Left     Bill's Test Positive    POCT GLUCOSE   Result Value Ref Range    POCT Glucose 134 (H) 74 - 99 mg/dL   Respiratory Culture/Smear    Specimen: SPUTUM; Fluid   Result Value Ref Range    Respiratory Culture/Smear (3+) Moderate Pseudomonas aeruginosa (A)     Respiratory Culture/Smear (3+) Moderate Normal throat neva     Gram Stain       Gram stain indicates specimen consists of lower respiratory tract secretions.    Gram Stain No predominant organism        Susceptibility    Pseudomonas aeruginosa - MICROSCAN     Aztreonam  Susceptible ug/ml     Cefepime  Susceptible ug/ml     Ceftazidime  Susceptible ug/ml     Ciprofloxacin  Susceptible ug/ml     Levofloxacin  Susceptible ug/ml     Piperacillin/Tazobactam  Susceptible ug/ml     Tobramycin  Susceptible ug/ml   POCT GLUCOSE   Result Value Ref Range    POCT Glucose 190 (H) 74 - 99 mg/dL   Blood Gas Venous Full Panel   Result Value Ref Range    POCT pH, Venous 7.40 7.33 - 7.43 pH    POCT pCO2, Venous 49 41 - 51 mm Hg    POCT pO2, Venous 45 35 - 45 mm Hg    POCT SO2, Venous 79 (H) 45 - 75 %    POCT Oxy Hemoglobin, Venous 76.3 (H) 45.0 - 75.0 %    POCT Hematocrit Calculated, Venous 26.0 (L) 36.0 - 46.0 %    POCT Sodium, Venous 132 (L) 136 - 145 mmol/L    POCT Potassium, Venous 5.2 3.5 - 5.3 mmol/L    POCT Chloride, Venous 102 98 - 107 mmol/L    POCT Ionized Calicum, Venous 1.17 1.10 - 1.33 mmol/L    POCT Glucose, Venous 198 (H) 74 - 99 mg/dL    POCT Lactate, Venous 0.8 0.4 - 2.0 mmol/L    POCT Base Excess, Venous 4.9 (H) -2.0 - 3.0 mmol/L    POCT HCO3 Calculated, Venous 30.4 (H) 22.0 - 26.0 mmol/L    POCT Hemoglobin, Venous 8.8 (L) 12.0 - 16.0 g/dL    POCT Anion Gap, Venous 5.0 (L) 10.0 - 25.0 mmol/L    Patient Temperature 37.0 degrees Celsius    FiO2 40 %    Ventilator Mode Other     Ventilator Rate 14 bpm    Tidal Volume 450 mL    Peep CHM2O 5.0 cm H2O   Magnesium   Result Value Ref Range    Magnesium 2.23 1.60 - 2.40 mg/dL   CBC and Auto Differential   Result  Value Ref Range    WBC 10.7 4.4 - 11.3 x10*3/uL    nRBC 0.0 0.0 - 0.0 /100 WBCs    RBC 2.67 (L) 4.00 - 5.20 x10*6/uL    Hemoglobin 8.3 (L) 12.0 - 16.0 g/dL    Hematocrit 25.7 (L) 36.0 - 46.0 %    MCV 96 80 - 100 fL    MCH 31.1 26.0 - 34.0 pg    MCHC 32.3 32.0 - 36.0 g/dL    RDW 21.5 (H) 11.5 - 14.5 %    Platelets 238 150 - 450 x10*3/uL    Neutrophils % 87.1 40.0 - 80.0 %    Immature Granulocytes %, Automated 0.9 0.0 - 0.9 %    Lymphocytes % 6.4 13.0 - 44.0 %    Monocytes % 5.3 2.0 - 10.0 %    Eosinophils % 0.2 0.0 - 6.0 %    Basophils % 0.1 0.0 - 2.0 %    Neutrophils Absolute 9.29 (H) 1.20 - 7.70 x10*3/uL    Immature Granulocytes Absolute, Automated 0.10 0.00 - 0.70 x10*3/uL    Lymphocytes Absolute 0.68 (L) 1.20 - 4.80 x10*3/uL    Monocytes Absolute 0.57 0.10 - 1.00 x10*3/uL    Eosinophils Absolute 0.02 0.00 - 0.70 x10*3/uL    Basophils Absolute 0.01 0.00 - 0.10 x10*3/uL   Hepatic function panel   Result Value Ref Range    Albumin 2.5 (L) 3.4 - 5.0 g/dL    Bilirubin, Total 0.3 0.0 - 1.2 mg/dL    Bilirubin, Direct 0.1 0.0 - 0.3 mg/dL    Alkaline Phosphatase 150 (H) 33 - 136 U/L    ALT 12 7 - 45 U/L    AST 15 9 - 39 U/L    Total Protein 5.2 (L) 6.4 - 8.2 g/dL   Calcium, ionized   Result Value Ref Range    POCT Calcium, Ionized 1.15 1.1 - 1.33 mmol/L   Phosphorus   Result Value Ref Range    Phosphorus 2.7 2.5 - 4.9 mg/dL   Basic Metabolic Panel   Result Value Ref Range    Glucose 151 (H) 74 - 99 mg/dL    Sodium 134 (L) 136 - 145 mmol/L    Potassium 4.7 3.5 - 5.3 mmol/L    Chloride 102 98 - 107 mmol/L    Bicarbonate 29 21 - 32 mmol/L    Anion Gap 8 (L) 10 - 20 mmol/L    Urea Nitrogen 20 6 - 23 mg/dL    Creatinine 0.93 0.50 - 1.05 mg/dL    eGFR 67 >60 mL/min/1.73m*2    Calcium 7.8 (L) 8.6 - 10.3 mg/dL   Morphology   Result Value Ref Range    RBC Morphology See Below     Polychromasia Mild     Ovalocytes Few     Basophilic Stippling Present    POCT GLUCOSE   Result Value Ref Range    POCT Glucose 156 (H) 74 - 99 mg/dL    POCT GLUCOSE   Result Value Ref Range    POCT Glucose 175 (H) 74 - 99 mg/dL   POCT GLUCOSE   Result Value Ref Range    POCT Glucose 144 (H) 74 - 99 mg/dL   Magnesium   Result Value Ref Range    Magnesium 2.31 1.60 - 2.40 mg/dL   CBC and Auto Differential   Result Value Ref Range    WBC 8.7 4.4 - 11.3 x10*3/uL    nRBC 0.0 0.0 - 0.0 /100 WBCs    RBC 2.44 (L) 4.00 - 5.20 x10*6/uL    Hemoglobin 7.6 (L) 12.0 - 16.0 g/dL    Hematocrit 23.3 (L) 36.0 - 46.0 %    MCV 96 80 - 100 fL    MCH 31.1 26.0 - 34.0 pg    MCHC 32.6 32.0 - 36.0 g/dL    RDW 21.4 (H) 11.5 - 14.5 %    Platelets 238 150 - 450 x10*3/uL    Neutrophils % 81.4 40.0 - 80.0 %    Immature Granulocytes %, Automated 1.6 (H) 0.0 - 0.9 %    Lymphocytes % 10.4 13.0 - 44.0 %    Monocytes % 6.2 2.0 - 10.0 %    Eosinophils % 0.3 0.0 - 6.0 %    Basophils % 0.1 0.0 - 2.0 %    Neutrophils Absolute 7.08 1.20 - 7.70 x10*3/uL    Immature Granulocytes Absolute, Automated 0.14 0.00 - 0.70 x10*3/uL    Lymphocytes Absolute 0.91 (L) 1.20 - 4.80 x10*3/uL    Monocytes Absolute 0.54 0.10 - 1.00 x10*3/uL    Eosinophils Absolute 0.03 0.00 - 0.70 x10*3/uL    Basophils Absolute 0.01 0.00 - 0.10 x10*3/uL   Hepatic function panel   Result Value Ref Range    Albumin 2.4 (L) 3.4 - 5.0 g/dL    Bilirubin, Total 0.4 0.0 - 1.2 mg/dL    Bilirubin, Direct 0.2 0.0 - 0.3 mg/dL    Alkaline Phosphatase 144 (H) 33 - 136 U/L    ALT 11 7 - 45 U/L    AST 14 9 - 39 U/L    Total Protein 5.0 (L) 6.4 - 8.2 g/dL   Calcium, ionized   Result Value Ref Range    POCT Calcium, Ionized 1.13 1.1 - 1.33 mmol/L   Procalcitonin   Result Value Ref Range    Procalcitonin 0.18 (H) <=0.07 ng/mL   Phosphorus   Result Value Ref Range    Phosphorus 3.2 2.5 - 4.9 mg/dL   Basic Metabolic Panel   Result Value Ref Range    Glucose 164 (H) 74 - 99 mg/dL    Sodium 133 (L) 136 - 145 mmol/L    Potassium 4.6 3.5 - 5.3 mmol/L    Chloride 100 98 - 107 mmol/L    Bicarbonate 28 21 - 32 mmol/L    Anion Gap 10 10 - 20 mmol/L    Urea Nitrogen  34 (H) 6 - 23 mg/dL    Creatinine 1.25 (H) 0.50 - 1.05 mg/dL    eGFR 47 (L) >60 mL/min/1.73m*2    Calcium 7.7 (L) 8.6 - 10.3 mg/dL   Morphology   Result Value Ref Range    RBC Morphology See Below     Polychromasia Mild     Ovalocytes Few     Lexus Cells Few     Basophilic Stippling Present    POCT GLUCOSE   Result Value Ref Range    POCT Glucose 171 (H) 74 - 99 mg/dL   POCT GLUCOSE   Result Value Ref Range    POCT Glucose 142 (H) 74 - 99 mg/dL   POCT GLUCOSE   Result Value Ref Range    POCT Glucose 149 (H) 74 - 99 mg/dL   Magnesium   Result Value Ref Range    Magnesium 2.39 1.60 - 2.40 mg/dL   Hepatic function panel   Result Value Ref Range    Albumin 2.4 (L) 3.4 - 5.0 g/dL    Bilirubin, Total 0.4 0.0 - 1.2 mg/dL    Bilirubin, Direct 0.1 0.0 - 0.3 mg/dL    Alkaline Phosphatase 141 (H) 33 - 136 U/L    ALT 11 7 - 45 U/L    AST 14 9 - 39 U/L    Total Protein 5.1 (L) 6.4 - 8.2 g/dL   Calcium, ionized   Result Value Ref Range    POCT Calcium, Ionized 1.17 1.1 - 1.33 mmol/L   Phosphorus   Result Value Ref Range    Phosphorus 4.1 2.5 - 4.9 mg/dL   Basic Metabolic Panel   Result Value Ref Range    Glucose 133 (H) 74 - 99 mg/dL    Sodium 134 (L) 136 - 145 mmol/L    Potassium 4.8 3.5 - 5.3 mmol/L    Chloride 101 98 - 107 mmol/L    Bicarbonate 27 21 - 32 mmol/L    Anion Gap 11 10 - 20 mmol/L    Urea Nitrogen 40 (H) 6 - 23 mg/dL    Creatinine 1.53 (H) 0.50 - 1.05 mg/dL    eGFR 37 (L) >60 mL/min/1.73m*2    Calcium 7.9 (L) 8.6 - 10.3 mg/dL   CBC and Auto Differential   Result Value Ref Range    WBC 7.7 4.4 - 11.3 x10*3/uL    nRBC 0.0 0.0 - 0.0 /100 WBCs    RBC 2.46 (L) 4.00 - 5.20 x10*6/uL    Hemoglobin 7.6 (L) 12.0 - 16.0 g/dL    Hematocrit 23.6 (L) 36.0 - 46.0 %    MCV 96 80 - 100 fL    MCH 30.9 26.0 - 34.0 pg    MCHC 32.2 32.0 - 36.0 g/dL    RDW 20.7 (H) 11.5 - 14.5 %    Platelets 234 150 - 450 x10*3/uL    Neutrophils % 79.4 40.0 - 80.0 %    Immature Granulocytes %, Automated 1.3 (H) 0.0 - 0.9 %    Lymphocytes % 12.3 13.0 -  44.0 %    Monocytes % 6.6 2.0 - 10.0 %    Eosinophils % 0.3 0.0 - 6.0 %    Basophils % 0.1 0.0 - 2.0 %    Neutrophils Absolute 6.11 1.20 - 7.70 x10*3/uL    Immature Granulocytes Absolute, Automated 0.10 0.00 - 0.70 x10*3/uL    Lymphocytes Absolute 0.95 (L) 1.20 - 4.80 x10*3/uL    Monocytes Absolute 0.51 0.10 - 1.00 x10*3/uL    Eosinophils Absolute 0.02 0.00 - 0.70 x10*3/uL    Basophils Absolute 0.01 0.00 - 0.10 x10*3/uL   Hepatitis B surface antigen   Result Value Ref Range    Hepatitis B Surface AG Nonreactive Nonreactive   Hepatitis B surface antibody   Result Value Ref Range    Hepatitis B Surface AB 6.4 <10.0 mIU/mL   Morphology   Result Value Ref Range    RBC Morphology See Below     Polychromasia Mild     Stomatocytes Few    Type and screen   Result Value Ref Range    ABO TYPE A     Rh TYPE NEG     ANTIBODY SCREEN NEG    aPTT   Result Value Ref Range    aPTT 30.9 22.0 - 32.5 seconds   POCT GLUCOSE   Result Value Ref Range    POCT Glucose 136 (H) 74 - 99 mg/dL   POCT GLUCOSE   Result Value Ref Range    POCT Glucose 138 (H) 74 - 99 mg/dL   APTT   Result Value Ref Range    aPTT >139.0 (HH) 22.0 - 32.5 seconds   POCT GLUCOSE   Result Value Ref Range    POCT Glucose 157 (H) 74 - 99 mg/dL   APTT   Result Value Ref Range    aPTT >139.0 (HH) 22.0 - 32.5 seconds   Calcium, ionized   Result Value Ref Range    POCT Calcium, Ionized 1.15 1.1 - 1.33 mmol/L   CBC and Auto Differential   Result Value Ref Range    WBC 8.3 4.4 - 11.3 x10*3/uL    nRBC 0.0 0.0 - 0.0 /100 WBCs    RBC 2.41 (L) 4.00 - 5.20 x10*6/uL    Hemoglobin 7.4 (L) 12.0 - 16.0 g/dL    Hematocrit 23.7 (L) 36.0 - 46.0 %    MCV 98 80 - 100 fL    MCH 30.7 26.0 - 34.0 pg    MCHC 31.2 (L) 32.0 - 36.0 g/dL    RDW 20.4 (H) 11.5 - 14.5 %    Platelets 243 150 - 450 x10*3/uL    Neutrophils % 79.3 40.0 - 80.0 %    Immature Granulocytes %, Automated 1.3 (H) 0.0 - 0.9 %    Lymphocytes % 10.8 13.0 - 44.0 %    Monocytes % 7.8 2.0 - 10.0 %    Eosinophils % 0.6 0.0 - 6.0 %     Basophils % 0.2 0.0 - 2.0 %    Neutrophils Absolute 6.61 1.20 - 7.70 x10*3/uL    Immature Granulocytes Absolute, Automated 0.11 0.00 - 0.70 x10*3/uL    Lymphocytes Absolute 0.90 (L) 1.20 - 4.80 x10*3/uL    Monocytes Absolute 0.65 0.10 - 1.00 x10*3/uL    Eosinophils Absolute 0.05 0.00 - 0.70 x10*3/uL    Basophils Absolute 0.02 0.00 - 0.10 x10*3/uL   Hepatic function panel   Result Value Ref Range    Albumin 2.3 (L) 3.4 - 5.0 g/dL    Bilirubin, Total 0.4 0.0 - 1.2 mg/dL    Bilirubin, Direct 0.1 0.0 - 0.3 mg/dL    Alkaline Phosphatase 136 33 - 136 U/L    ALT 11 7 - 45 U/L    AST 12 9 - 39 U/L    Total Protein 4.9 (L) 6.4 - 8.2 g/dL   Magnesium   Result Value Ref Range    Magnesium 2.40 1.60 - 2.40 mg/dL   Phosphorus   Result Value Ref Range    Phosphorus 4.9 2.5 - 4.9 mg/dL   Basic Metabolic Panel   Result Value Ref Range    Glucose 116 (H) 74 - 99 mg/dL    Sodium 133 (L) 136 - 145 mmol/L    Potassium 4.6 3.5 - 5.3 mmol/L    Chloride 100 98 - 107 mmol/L    Bicarbonate 26 21 - 32 mmol/L    Anion Gap 12 10 - 20 mmol/L    Urea Nitrogen 45 (H) 6 - 23 mg/dL    Creatinine 1.80 (H) 0.50 - 1.05 mg/dL    eGFR 30 (L) >60 mL/min/1.73m*2    Calcium 7.8 (L) 8.6 - 10.3 mg/dL   Morphology   Result Value Ref Range    RBC Morphology See Below     Polychromasia Mild     Ovalocytes Few     [place] Cells Few     Stomatocytes Few    POCT GLUCOSE   Result Value Ref Range    POCT Glucose 118 (H) 74 - 99 mg/dL   C. difficile, PCR    Specimen: Stool   Result Value Ref Range    C. difficile, PCR Not Detected Not Detected   APTT   Result Value Ref Range    aPTT >139.0 (HH) 22.0 - 32.5 seconds   POCT GLUCOSE   Result Value Ref Range    POCT Glucose 118 (H) 74 - 99 mg/dL   POCT GLUCOSE   Result Value Ref Range    POCT Glucose 128 (H) 74 - 99 mg/dL   Blood Gas Venous Full Panel   Result Value Ref Range    POCT pH, Venous 7.38 7.33 - 7.43 pH    POCT pCO2, Venous 51 41 - 51 mm Hg    POCT pO2, Venous 44 35 - 45 mm Hg    POCT SO2, Venous 80 (H) 45 - 75 %     POCT Oxy Hemoglobin, Venous 78.0 (H) 45.0 - 75.0 %    POCT Hematocrit Calculated, Venous 26.0 (L) 36.0 - 46.0 %    POCT Sodium, Venous 131 (L) 136 - 145 mmol/L    POCT Potassium, Venous 3.9 3.5 - 5.3 mmol/L    POCT Chloride, Venous 99 98 - 107 mmol/L    POCT Ionized [place], Venous 1.17 1.10 - 1.33 mmol/L    POCT Glucose, Venous 123 (H) 74 - 99 mg/dL    POCT Lactate, Venous 0.6 0.4 - 2.0 mmol/L    POCT Base Excess, Venous 4.4 (H) -2.0 - 3.0 mmol/L    POCT HCO3 Calculated, Venous 30.2 (H) 22.0 - 26.0 mmol/L    POCT Hemoglobin, Venous 8.6 (L) 12.0 - 16.0 g/dL    POCT Anion Gap, Venous 6.0 (L) 10.0 - 25.0 mmol/L    Patient Temperature 37.0 degrees Celsius    FiO2 35 %    Ventilator Mode CPAP     Ventilator Rate 15 bpm    Spontaneous Tidal Volume 537 mL    Peep CHM2O 5.0 cm H2O   POCT GLUCOSE   Result Value Ref Range    POCT Glucose 131 (H) 74 - 99 mg/dL   aPTT   Result Value Ref Range    aPTT 111.5 (HH) 22.0 - 32.5 seconds   POCT GLUCOSE   Result Value Ref Range    POCT Glucose 157 (H) 74 - 99 mg/dL   POCT GLUCOSE   Result Value Ref Range    POCT Glucose 129 (H) 74 - 99 mg/dL   aPTT   Result Value Ref Range    aPTT 40.3 (H) 22.0 - 32.5 seconds   POCT GLUCOSE   Result Value Ref Range    POCT Glucose 144 (H) 74 - 99 mg/dL   POCT GLUCOSE   Result Value Ref Range    POCT Glucose 150 (H) 74 - 99 mg/dL   CBC and Auto Differential   Result Value Ref Range    WBC 7.1 4.4 - 11.3 x10*3/uL    nRBC 0.0 0.0 - 0.0 /100 WBCs    RBC 2.45 (L) 4.00 - 5.20 x10*6/uL    Hemoglobin 7.6 (L) 12.0 - 16.0 g/dL    Hematocrit 23.3 (L) 36.0 - 46.0 %    MCV 95 80 - 100 fL    MCH 31.0 26.0 - 34.0 pg    MCHC 32.6 32.0 - 36.0 g/dL    RDW 20.0 (H) 11.5 - 14.5 %    Platelets 261 150 - 450 x10*3/uL    Neutrophils % 80.2 40.0 - 80.0 %    Immature Granulocytes %, Automated 1.1 (H) 0.0 - 0.9 %    Lymphocytes % 8.8 13.0 - 44.0 %    Monocytes % 9.2 2.0 - 10.0 %    Eosinophils % 0.4 0.0 - 6.0 %    Basophils % 0.3 0.0 - 2.0 %    Neutrophils Absolute 5.65  1.20 - 7.70 x10*3/uL    Immature Granulocytes Absolute, Automated 0.08 0.00 - 0.70 x10*3/uL    Lymphocytes Absolute 0.62 (L) 1.20 - 4.80 x10*3/uL    Monocytes Absolute 0.65 0.10 - 1.00 x10*3/uL    Eosinophils Absolute 0.03 0.00 - 0.70 x10*3/uL    Basophils Absolute 0.02 0.00 - 0.10 x10*3/uL   Hepatic function panel   Result Value Ref Range    Albumin 2.3 (L) 3.4 - 5.0 g/dL    Bilirubin, Total 0.4 0.0 - 1.2 mg/dL    Bilirubin, Direct 0.1 0.0 - 0.3 mg/dL    Alkaline Phosphatase 136 33 - 136 U/L    ALT 10 7 - 45 U/L    AST 11 9 - 39 U/L    Total Protein 5.3 (L) 6.4 - 8.2 g/dL   Magnesium   Result Value Ref Range    Magnesium 2.08 1.60 - 2.40 mg/dL   Phosphorus   Result Value Ref Range    Phosphorus 3.5 2.5 - 4.9 mg/dL   Basic Metabolic Panel   Result Value Ref Range    Glucose 144 (H) 74 - 99 mg/dL    Sodium 132 (L) 136 - 145 mmol/L    Potassium 4.3 3.5 - 5.3 mmol/L    Chloride 98 98 - 107 mmol/L    Bicarbonate 29 21 - 32 mmol/L    Anion Gap 9 (L) 10 - 20 mmol/L    Urea Nitrogen 25 (H) 6 - 23 mg/dL    Creatinine 1.45 (H) 0.50 - 1.05 mg/dL    eGFR 39 (L) >60 mL/min/1.73m*2    Calcium 7.8 (L) 8.6 - 10.3 mg/dL   PST Top   Result Value Ref Range    Extra Tube Hold for add-ons.    Calcium, ionized   Result Value Ref Range    POCT Calcium, Ionized 1.10 1.1 - 1.33 mmol/L   POCT GLUCOSE   Result Value Ref Range    POCT Glucose 151 (H) 74 - 99 mg/dL   aPTT   Result Value Ref Range    aPTT 56.5 (H) 22.0 - 32.5 seconds   Blood Gas Venous Full Panel   Result Value Ref Range    POCT pH, Venous 7.41 7.33 - 7.43 pH    POCT pCO2, Venous 51 41 - 51 mm Hg    POCT pO2, Venous 44 35 - 45 mm Hg    POCT SO2, Venous 81 (H) 45 - 75 %    POCT Oxy Hemoglobin, Venous 79.3 (H) 45.0 - 75.0 %    POCT Hematocrit Calculated, Venous 23.0 (L) 36.0 - 46.0 %    POCT Sodium, Venous 131 (L) 136 - 145 mmol/L    POCT Potassium, Venous 4.3 3.5 - 5.3 mmol/L    POCT Chloride, Venous 100 98 - 107 mmol/L    POCT Ionized Calicum, Venous 1.16 1.10 - 1.33 mmol/L     POCT Glucose, Venous 132 (H) 74 - 99 mg/dL    POCT Lactate, Venous 1.2 0.4 - 2.0 mmol/L    POCT Base Excess, Venous 6.9 (H) -2.0 - 3.0 mmol/L    POCT HCO3 Calculated, Venous 32.3 (H) 22.0 - 26.0 mmol/L    POCT Hemoglobin, Venous 7.7 (L) 12.0 - 16.0 g/dL    POCT Anion Gap, Venous 3.0 (L) 10.0 - 25.0 mmol/L    Patient Temperature 37.0 degrees Celsius    FiO2 35 %    Ventilator Mode CPAP     Ventilator Rate 17 bpm    Peep CHM2O 5.0 cm H2O    Pressure Support 5 cm H2O   POCT GLUCOSE   Result Value Ref Range    POCT Glucose 125 (H) 74 - 99 mg/dL     *Note: Due to a large number of results and/or encounters for the requested time period, some results have not been displayed. A complete set of results can be found in Results Review.       Assessment/Plan   Acute kidney injury there is no signs of renal recovery I will schedule for dialysis again tomorrow  Edema improved sinew to challenge her dry weight  Septic shock is off pressors at this time  Pneumonia continue with antibiotic therapy infectious disease  Diabetes mellitus type 2  Malnutrition continue with tube feeding  Lower back surgery site infection  Anemia transfuse when hemoglobin less than 7         Imer Cheung MD

## 2024-10-31 NOTE — NURSING NOTE
Upon rounding left upper arm dual lumen PICC due for routine dressing change. Both lumens in use at this time. Right internal jugular Mahurkar with current CHG dressing dry and intact. Pigtail with brisk blood return and flushes easily, clamped and Curos cap intact. Dressing change to PICC line done per protocol using sterile technique, site without redness, edema or bleeding noted. Line out at 4cm as documented on insertion.

## 2024-10-31 NOTE — PROGRESS NOTES
INFECTIOUS DISEASES PROGRESS NOTE    Consulted / following patient for:  Respiratory failure/pneumonia/Pseudomonas in the sputum  Recent polymicrobial complicated postoperative lumbar wound infection, MRSA and Proteus  Acute kidney injury    Subjective   Interval History:   (On ventilator)    Objective   PHYSICAL EXAMINATION  Vital signs: Afebrile, off vasopressor agents  General: Intubated, alert and responsive  Lungs: Diminished, clear.  Moderate amount of ET secretions  Heart:  S1, S2 normal  Abdomen:  Soft, obese, no guarding.   Extremities:  No cords, phlebitis, cellulitis.      Relevant Results  WBC: 7100  Creatinine: (Dialysis)  CK: 45  Pleural fluid: Transudate with 197 WBC, 56% neutrophils, protein 1.8, LDH 97, glucose 202  Microbiology:  Blood (10/12): Negative X2  Sputum (10/13): Stain with moderate GNB and moderate PMN, culture Pseudomonas aeruginosa, susceptible to Zosyn and cefepime  Sputum (10/21): Normal neva, no MRSA  Sputum (10/28): Pseudomonas, pan-susceptible  Urine: Moderate colony count Candida lusitaniae  Pleural fluid (10/17): Negative  C. difficile PCR (10/30): Negative    Imaging:  CXR images (10/31) personally reviewed: Intubated.  No significant change compared with most recent film    Assessment:  Sepsis -likely due to pneumonia, present on admission. Patient already on IV vancomycin and IV ceftriaxone at time of this admission for lumbar spinal infection.  Resolved with anti-Pseudomonas antimicrobial therapy.  Large transudative pleural effusion was tapped.  Remains on mechanical ventilation, weaning, chest tubes have been removed.  Had fever and purulent secretions in the evening of 10/28, Pseudomonas in sputum, started on aerosol tobramycin.  Afebrile with stable chest x-ray and respiratory status  Acute kidney injury.  Now on a schedule of thrice weekly hemodialysis  Lumbar spine surgical site infection s/p I&D 9/28. Cultures + MRSA, Proteus.  Was to be on vanco/ceftriaxone through  11/12. Followed by Dr. Brant Juarez.  Vancomycin has been changed to daptomycin because of AMY    Plan/Recommendations:  Continue daptomycin 700 mg every 48 hour until 11/12, dose after dialysis when a dose is due on a dialysis day  Continue ceftriaxone until 11/12  Aerosol tobramycin 300 mg every 12 hours, day 3 of 5  Monitor CK weekly while on daptomycin       Andrew Malagon MD  [facility]  Office:  [phone]

## 2024-10-31 NOTE — CARE PLAN
0752: Pt placed in SBT on PSV 5/5.     1140: Pt extubated to HFNC 40L 100%.    1338:  RT called to place pt on BiPAP 15/5 R18 80%.    RT to follow.

## 2024-10-31 NOTE — PROGRESS NOTES
Physical Therapy                 Therapy Communication Note    Patient Name: Narda Malloy  MRN: [id number]  Department: [address] ICU  Room: 11/11-A  Today's Date: 10/31/2024     Discipline: Physical Therapy    Missed Visit Reason: Missed Visit Reason: Cancel (pt extubated earlier and is now in respiratory distress. pt desaturating on HFNC and is demonstrating increased work of breathing. RN called RT and pt is being placed on continuous BIPAP.)    Missed Time: Cancel    Comment: PT Treatment deferred this date.

## 2024-11-01 ENCOUNTER — APPOINTMENT (OUTPATIENT)
Dept: RADIOLOGY | Facility: HOSPITAL | Age: 69
End: 2024-11-01
Payer: MEDICARE

## 2024-11-01 ENCOUNTER — APPOINTMENT (OUTPATIENT)
Dept: ORTHOPEDIC SURGERY | Facility: CLINIC | Age: 69
End: 2024-11-01
Payer: MEDICARE

## 2024-11-01 LAB
ALBUMIN SERPL BCP-MCNC: 2.5 G/DL (ref 3.4–5)
ALP SERPL-CCNC: 138 U/L (ref 33–136)
ALT SERPL W P-5'-P-CCNC: 13 U/L (ref 7–45)
ANION GAP BLDA CALCULATED.4IONS-SCNC: 11 MMO/L (ref 10–25)
ANION GAP BLDV CALCULATED.4IONS-SCNC: 8 MMOL/L (ref 10–25)
ANION GAP SERPL CALCULATED.3IONS-SCNC: 11 MMOL/L (ref 10–20)
APPARATUS: ABNORMAL
APPARATUS: ABNORMAL
APTT PPP: 57.4 SECONDS (ref 22–32.5)
APTT PPP: 72.4 SECONDS (ref 22–32.5)
ARTERIAL PATENCY WRIST A: POSITIVE
AST SERPL W P-5'-P-CCNC: 22 U/L (ref 9–39)
BASE EXCESS BLDA CALC-SCNC: 0.9 MMOL/L (ref -2–3)
BASE EXCESS BLDV CALC-SCNC: 3.3 MMOL/L (ref -2–3)
BASOPHILS # BLD AUTO: 0.02 X10*3/UL (ref 0–0.1)
BASOPHILS NFR BLD AUTO: 0.2 %
BILIRUB DIRECT SERPL-MCNC: 0.1 MG/DL (ref 0–0.3)
BILIRUB SERPL-MCNC: 0.4 MG/DL (ref 0–1.2)
BODY TEMPERATURE: 37 DEGREES CELSIUS
BODY TEMPERATURE: ABNORMAL
BUN SERPL-MCNC: 32 MG/DL (ref 6–23)
CA-I BLD-SCNC: 1.14 MMOL/L (ref 1.1–1.33)
CA-I BLDA-SCNC: 1.16 MMOL/L (ref 1.1–1.33)
CA-I BLDV-SCNC: 1.21 MMOL/L (ref 1.1–1.33)
CALCIUM SERPL-MCNC: 8.2 MG/DL (ref 8.6–10.3)
CHLORIDE BLDA-SCNC: 100 MMOL/L (ref 98–107)
CHLORIDE BLDV-SCNC: 101 MMOL/L (ref 98–107)
CHLORIDE SERPL-SCNC: 97 MMOL/L (ref 98–107)
CO2 SERPL-SCNC: 28 MMOL/L (ref 21–32)
CREAT SERPL-MCNC: 2 MG/DL (ref 0.5–1.05)
EGFRCR SERPLBLD CKD-EPI 2021: 27 ML/MIN/1.73M*2
EOSINOPHIL # BLD AUTO: 0.03 X10*3/UL (ref 0–0.7)
EOSINOPHIL NFR BLD AUTO: 0.3 %
EPAP CMH2O: 10 CM H2O
ERYTHROCYTE [DISTWIDTH] IN BLOOD BY AUTOMATED COUNT: 19.5 % (ref 11.5–14.5)
GLUCOSE BLD MANUAL STRIP-MCNC: 101 MG/DL (ref 74–99)
GLUCOSE BLD MANUAL STRIP-MCNC: 103 MG/DL (ref 74–99)
GLUCOSE BLD MANUAL STRIP-MCNC: 105 MG/DL (ref 74–99)
GLUCOSE BLD MANUAL STRIP-MCNC: 105 MG/DL (ref 74–99)
GLUCOSE BLD MANUAL STRIP-MCNC: 112 MG/DL (ref 74–99)
GLUCOSE BLD MANUAL STRIP-MCNC: 113 MG/DL (ref 74–99)
GLUCOSE BLD MANUAL STRIP-MCNC: 98 MG/DL (ref 74–99)
GLUCOSE BLDA-MCNC: 114 MG/DL (ref 74–99)
GLUCOSE BLDV-MCNC: 101 MG/DL (ref 74–99)
GLUCOSE SERPL-MCNC: 99 MG/DL (ref 74–99)
HCO3 BLDA-SCNC: 26 MMOL/L (ref 22–26)
HCO3 BLDV-SCNC: 29.7 MMOL/L (ref 22–26)
HCT VFR BLD AUTO: 25 % (ref 36–46)
HCT VFR BLD EST: 26 % (ref 36–46)
HCT VFR BLD EST: 26 % (ref 36–46)
HGB BLD-MCNC: 7.9 G/DL (ref 12–16)
HGB BLDA-MCNC: 8.5 G/DL (ref 12–16)
HGB BLDV-MCNC: 8.5 G/DL (ref 12–16)
IMM GRANULOCYTES # BLD AUTO: 0.05 X10*3/UL (ref 0–0.7)
IMM GRANULOCYTES NFR BLD AUTO: 0.5 % (ref 0–0.9)
INHALED O2 CONCENTRATION: 50 %
INHALED O2 CONCENTRATION: 70 %
IPAP CMH2O: 22 CM H2O
LACTATE BLDA-SCNC: 0.9 MMOL/L (ref 0.4–2)
LACTATE BLDV-SCNC: 0.7 MMOL/L (ref 0.4–2)
LYMPHOCYTES # BLD AUTO: 0.66 X10*3/UL (ref 1.2–4.8)
LYMPHOCYTES NFR BLD AUTO: 7 %
MAGNESIUM SERPL-MCNC: 2.18 MG/DL (ref 1.6–2.4)
MCH RBC QN AUTO: 31.1 PG (ref 26–34)
MCHC RBC AUTO-ENTMCNC: 31.6 G/DL (ref 32–36)
MCV RBC AUTO: 98 FL (ref 80–100)
MONOCYTES # BLD AUTO: 0.67 X10*3/UL (ref 0.1–1)
MONOCYTES NFR BLD AUTO: 7.1 %
NEUTROPHILS # BLD AUTO: 7.97 X10*3/UL (ref 1.2–7.7)
NEUTROPHILS NFR BLD AUTO: 84.9 %
NRBC BLD-RTO: 0 /100 WBCS (ref 0–0)
OXYHGB MFR BLDA: 96.5 % (ref 94–98)
OXYHGB MFR BLDV: 72.5 % (ref 45–75)
PCO2 BLDA: 43 MM HG (ref 38–42)
PCO2 BLDV: 55 MM HG (ref 41–51)
PEEP CMH2O: 8 CM H2O
PH BLDA: 7.39 PH (ref 7.38–7.42)
PH BLDV: 7.34 PH (ref 7.33–7.43)
PHOSPHATE SERPL-MCNC: 5.1 MG/DL (ref 2.5–4.9)
PLATELET # BLD AUTO: 290 X10*3/UL (ref 150–450)
PO2 BLDA: 110 MM HG (ref 85–95)
PO2 BLDV: 40 MM HG (ref 35–45)
POTASSIUM BLDA-SCNC: 4.1 MMOL/L (ref 3.5–5.3)
POTASSIUM BLDV-SCNC: 4.2 MMOL/L (ref 3.5–5.3)
POTASSIUM SERPL-SCNC: 4.4 MMOL/L (ref 3.5–5.3)
PROT SERPL-MCNC: 5.7 G/DL (ref 6.4–8.2)
RBC # BLD AUTO: 2.54 X10*6/UL (ref 4–5.2)
SAO2 % BLDA: 100 % (ref 94–100)
SAO2 % BLDV: 74 % (ref 45–75)
SODIUM BLDA-SCNC: 133 MMOL/L (ref 136–145)
SODIUM BLDV-SCNC: 134 MMOL/L (ref 136–145)
SODIUM SERPL-SCNC: 132 MMOL/L (ref 136–145)
SPECIMEN DRAWN FROM PATIENT: ABNORMAL
TIDAL VOLUME: 450 ML
VENTILATOR MODE: ABNORMAL
VENTILATOR RATE: 16 BPM
WBC # BLD AUTO: 9.4 X10*3/UL (ref 4.4–11.3)

## 2024-11-01 PROCEDURE — 36600 WITHDRAWAL OF ARTERIAL BLOOD: CPT

## 2024-11-01 PROCEDURE — 71045 X-RAY EXAM CHEST 1 VIEW: CPT | Performed by: RADIOLOGY

## 2024-11-01 PROCEDURE — 83735 ASSAY OF MAGNESIUM: CPT

## 2024-11-01 PROCEDURE — 2500000005 HC RX 250 GENERAL PHARMACY W/O HCPCS

## 2024-11-01 PROCEDURE — 82435 ASSAY OF BLOOD CHLORIDE: CPT

## 2024-11-01 PROCEDURE — 2020000001 HC ICU ROOM DAILY

## 2024-11-01 PROCEDURE — 82947 ASSAY GLUCOSE BLOOD QUANT: CPT

## 2024-11-01 PROCEDURE — 94660 CPAP INITIATION&MGMT: CPT

## 2024-11-01 PROCEDURE — 9420000001 HC RT PATIENT EDUCATION 5 MIN

## 2024-11-01 PROCEDURE — 71045 X-RAY EXAM CHEST 1 VIEW: CPT

## 2024-11-01 PROCEDURE — 2500000005 HC RX 250 GENERAL PHARMACY W/O HCPCS: Performed by: STUDENT IN AN ORGANIZED HEALTH CARE EDUCATION/TRAINING PROGRAM

## 2024-11-01 PROCEDURE — 2500000004 HC RX 250 GENERAL PHARMACY W/ HCPCS (ALT 636 FOR OP/ED)

## 2024-11-01 PROCEDURE — 2500000004 HC RX 250 GENERAL PHARMACY W/ HCPCS (ALT 636 FOR OP/ED): Performed by: NURSE PRACTITIONER

## 2024-11-01 PROCEDURE — 2500000001 HC RX 250 WO HCPCS SELF ADMINISTERED DRUGS (ALT 637 FOR MEDICARE OP)

## 2024-11-01 PROCEDURE — 31500 INSERT EMERGENCY AIRWAY: CPT

## 2024-11-01 PROCEDURE — 80048 BASIC METABOLIC PNL TOTAL CA: CPT

## 2024-11-01 PROCEDURE — 2500000002 HC RX 250 W HCPCS SELF ADMINISTERED DRUGS (ALT 637 FOR MEDICARE OP, ALT 636 FOR OP/ED)

## 2024-11-01 PROCEDURE — 84100 ASSAY OF PHOSPHORUS: CPT

## 2024-11-01 PROCEDURE — 94640 AIRWAY INHALATION TREATMENT: CPT

## 2024-11-01 PROCEDURE — 84132 ASSAY OF SERUM POTASSIUM: CPT

## 2024-11-01 PROCEDURE — 85025 COMPLETE CBC W/AUTO DIFF WBC: CPT

## 2024-11-01 PROCEDURE — 37799 UNLISTED PX VASCULAR SURGERY: CPT

## 2024-11-01 PROCEDURE — 99291 CRITICAL CARE FIRST HOUR: CPT

## 2024-11-01 PROCEDURE — 85730 THROMBOPLASTIN TIME PARTIAL: CPT

## 2024-11-01 PROCEDURE — 82248 BILIRUBIN DIRECT: CPT

## 2024-11-01 PROCEDURE — 94002 VENT MGMT INPAT INIT DAY: CPT

## 2024-11-01 PROCEDURE — 2500000004 HC RX 250 GENERAL PHARMACY W/ HCPCS (ALT 636 FOR OP/ED): Performed by: INTERNAL MEDICINE

## 2024-11-01 PROCEDURE — 82330 ASSAY OF CALCIUM: CPT

## 2024-11-01 PROCEDURE — 5A1955Z RESPIRATORY VENTILATION, GREATER THAN 96 CONSECUTIVE HOURS: ICD-10-PCS | Performed by: ANESTHESIOLOGY

## 2024-11-01 PROCEDURE — 8010000001 HC DIALYSIS - HEMODIALYSIS PER DAY

## 2024-11-01 RX ORDER — FENTANYL CITRATE-0.9 % NACL/PF 10 MCG/ML
0-100 PLASTIC BAG, INJECTION (ML) INTRAVENOUS CONTINUOUS
Status: DISCONTINUED | OUTPATIENT
Start: 2024-11-01 | End: 2024-11-04

## 2024-11-01 RX ORDER — NOREPINEPHRINE BITARTRATE/D5W 8 MG/250ML
0-.5 PLASTIC BAG, INJECTION (ML) INTRAVENOUS CONTINUOUS
Status: DISCONTINUED | OUTPATIENT
Start: 2024-11-01 | End: 2024-11-04

## 2024-11-01 RX ORDER — ETOMIDATE 2 MG/ML
15 INJECTION INTRAVENOUS ONCE
Status: COMPLETED | OUTPATIENT
Start: 2024-11-01 | End: 2024-11-01

## 2024-11-01 RX ORDER — OXYCODONE HYDROCHLORIDE 5 MG/1
5 TABLET ORAL EVERY 4 HOURS PRN
Status: DISCONTINUED | OUTPATIENT
Start: 2024-11-01 | End: 2024-11-03

## 2024-11-01 RX ORDER — PROPOFOL 10 MG/ML
0-50 INJECTION, EMULSION INTRAVENOUS CONTINUOUS
Status: DISCONTINUED | OUTPATIENT
Start: 2024-11-01 | End: 2024-11-02

## 2024-11-01 RX ORDER — ROCURONIUM BROMIDE 10 MG/ML
50 INJECTION, SOLUTION INTRAVENOUS ONCE
Status: COMPLETED | OUTPATIENT
Start: 2024-11-01 | End: 2024-11-01

## 2024-11-01 RX ORDER — MIDODRINE HYDROCHLORIDE 10 MG/1
10 TABLET ORAL EVERY 8 HOURS
Status: DISCONTINUED | OUTPATIENT
Start: 2024-11-02 | End: 2024-11-12

## 2024-11-01 RX ORDER — MIDODRINE HYDROCHLORIDE 10 MG/1
10 TABLET ORAL
Status: DISCONTINUED | OUTPATIENT
Start: 2024-11-01 | End: 2024-11-01

## 2024-11-01 RX ADMIN — PROPOFOL 20 MCG/KG/MIN: 10 INJECTION, EMULSION INTRAVENOUS at 21:43

## 2024-11-01 RX ADMIN — PROPOFOL 5 MCG/KG/MIN: 10 INJECTION, EMULSION INTRAVENOUS at 18:05

## 2024-11-01 RX ADMIN — DAPTOMYCIN IN SODIUM CHLORIDE 700 MG: 700 INJECTION, SOLUTION INTRAVENOUS at 09:05

## 2024-11-01 RX ADMIN — Medication 50 PERCENT: at 08:20

## 2024-11-01 RX ADMIN — MIDODRINE HYDROCHLORIDE 10 MG: 10 TABLET ORAL at 08:50

## 2024-11-01 RX ADMIN — IPRATROPIUM BROMIDE AND ALBUTEROL SULFATE 3 ML: .5; 3 SOLUTION RESPIRATORY (INHALATION) at 07:37

## 2024-11-01 RX ADMIN — EZETIMIBE 10 MG: 10 TABLET ORAL at 08:52

## 2024-11-01 RX ADMIN — PANTOPRAZOLE SODIUM 40 MG: 40 INJECTION, POWDER, FOR SOLUTION INTRAVENOUS at 06:31

## 2024-11-01 RX ADMIN — ETOMIDATE 15 MG: 2 INJECTION, SOLUTION INTRAVENOUS at 17:55

## 2024-11-01 RX ADMIN — CEFTRIAXONE SODIUM 2 G: 2 INJECTION, SOLUTION INTRAVENOUS at 08:49

## 2024-11-01 RX ADMIN — IPRATROPIUM BROMIDE AND ALBUTEROL SULFATE 3 ML: .5; 3 SOLUTION RESPIRATORY (INHALATION) at 15:09

## 2024-11-01 RX ADMIN — MIDODRINE HYDROCHLORIDE 10 MG: 10 TABLET ORAL at 17:07

## 2024-11-01 RX ADMIN — SENNOSIDES AND DOCUSATE SODIUM 1 TABLET: 50; 8.6 TABLET ORAL at 21:53

## 2024-11-01 RX ADMIN — BRIMONIDINE TARTRATE 1 DROP: 2 SOLUTION OPHTHALMIC at 21:53

## 2024-11-01 RX ADMIN — IPRATROPIUM BROMIDE AND ALBUTEROL SULFATE 3 ML: .5; 3 SOLUTION RESPIRATORY (INHALATION) at 19:52

## 2024-11-01 RX ADMIN — HEPARIN SODIUM 1000 UNITS: 1000 INJECTION INTRAVENOUS; SUBCUTANEOUS at 14:47

## 2024-11-01 RX ADMIN — TOBRAMYCIN 300 MG: 300 SOLUTION RESPIRATORY (INHALATION) at 20:13

## 2024-11-01 RX ADMIN — Medication 3 ML: at 19:52

## 2024-11-01 RX ADMIN — OXYCODONE 5 MG: 5 TABLET ORAL at 08:51

## 2024-11-01 RX ADMIN — Medication 50 PERCENT: at 18:04

## 2024-11-01 RX ADMIN — Medication: at 09:05

## 2024-11-01 RX ADMIN — FOLIC ACID 1 MG: 1 TABLET ORAL at 08:50

## 2024-11-01 RX ADMIN — Medication 3 ML: at 10:53

## 2024-11-01 RX ADMIN — Medication 25 MCG/HR: at 18:29

## 2024-11-01 RX ADMIN — HEPARIN SODIUM 1400 UNITS/HR: 10000 INJECTION, SOLUTION INTRAVENOUS at 01:44

## 2024-11-01 RX ADMIN — ROCURONIUM BROMIDE 50 MG: 10 INJECTION, SOLUTION INTRAVENOUS at 17:56

## 2024-11-01 RX ADMIN — NOREPINEPHRINE BITARTRATE 0.01 MCG/KG/MIN: 8 INJECTION, SOLUTION INTRAVENOUS at 17:45

## 2024-11-01 RX ADMIN — OXYCODONE 5 MG: 5 TABLET ORAL at 00:08

## 2024-11-01 RX ADMIN — Medication 3 ML: at 15:09

## 2024-11-01 RX ADMIN — HEPARIN SODIUM 1000 UNITS: 1000 INJECTION INTRAVENOUS; SUBCUTANEOUS at 14:49

## 2024-11-01 RX ADMIN — Medication 50 PERCENT: at 19:52

## 2024-11-01 RX ADMIN — BRIMONIDINE TARTRATE 1 DROP: 2 SOLUTION OPHTHALMIC at 08:51

## 2024-11-01 RX ADMIN — IPRATROPIUM BROMIDE AND ALBUTEROL SULFATE 3 ML: .5; 3 SOLUTION RESPIRATORY (INHALATION) at 10:53

## 2024-11-01 RX ADMIN — OXYCODONE 5 MG: 5 TABLET ORAL at 04:17

## 2024-11-01 RX ADMIN — MIDODRINE HYDROCHLORIDE 10 MG: 10 TABLET ORAL at 00:08

## 2024-11-01 RX ADMIN — PRIMIDONE 125 MG: 250 TABLET ORAL at 21:53

## 2024-11-01 RX ADMIN — Medication 3 ML: at 07:37

## 2024-11-01 RX ADMIN — TOBRAMYCIN 300 MG: 300 SOLUTION RESPIRATORY (INHALATION) at 08:56

## 2024-11-01 RX ADMIN — Medication 60 MG OF IRON: at 08:51

## 2024-11-01 RX ADMIN — MIDODRINE HYDROCHLORIDE 10 MG: 10 TABLET ORAL at 12:34

## 2024-11-01 RX ADMIN — LATANOPROST 1 DROP: 50 SOLUTION OPHTHALMIC at 21:53

## 2024-11-01 ASSESSMENT — PAIN - FUNCTIONAL ASSESSMENT
PAIN_FUNCTIONAL_ASSESSMENT: CPOT (CRITICAL CARE PAIN OBSERVATION TOOL)

## 2024-11-01 NOTE — PROGRESS NOTES
"Narda Malloy is a 69 y.o. female on day 20 of admission presenting with Unresponsive.    Subjective   The patient is seen for acute kidney injury which is secondary to acute tubular necrosis patient remains fairly oliguric patient was extubated yesterday she is currently on continuous BiPAP and she is responsive hemodynamically stable no pressors       Objective     Physical Exam  Constitutional:       Comments: Patient is intubated on the ventilator   Neck:      Vascular: No carotid bruit.   Cardiovascular:      Rate and Rhythm: Normal rate and regular rhythm.      Heart sounds: No murmur heard.     No friction rub. No gallop.   Pulmonary:      Breath sounds: No wheezing, rhonchi or rales.   Chest:      Chest wall: No tenderness.   Abdominal:      General: There is no distension.      Tenderness: There is no abdominal tenderness. There is no guarding or rebound.   Musculoskeletal:         General: No swelling or tenderness.      Cervical back: Neck supple.      Right lower leg: Edema present.      Left lower leg: Edema present.   Lymphadenopathy:      Cervical: No cervical adenopathy.         Last Recorded Vitals  Blood pressure 104/56, pulse 72, temperature 36.8 °C (98.2 °F), temperature source Temporal, resp. rate 19, height 1.778 m (5' 10\"), weight 119 kg (261 lb 11 oz), SpO2 98%.    Intake/Output last 3 Shifts:  I/O last 3 completed shifts:  In: 1141.5 (9.6 mL/kg) [I.V.:491.5 (4.1 mL/kg); NG/GT:650]  Out: 0 (0 mL/kg)   Weight: 118.7 kg     Current Facility-Administered Medications:     acetaminophen (Tylenol) oral liquid 650 mg, 650 mg, oral, q4h PRN, LEONEL Salgado-CNP, 650 mg at 10/28/24 1818    alteplase (Cathflo Activase) injection 2 mg, 2 mg, intra-catheter, PRN, Kaleb Lam PA-C    brimonidine (AlphaGAN) 0.2 % ophthalmic solution 1 drop, 1 drop, Both Eyes, BID, Kaleb Lam PA-C, 1 drop at 11/01/24 1686    [Held by provider] calcium carbonate-vitamin D3 500 mg-5 mcg (200 unit) per tablet " 1 tablet, 1 tablet, oral, Daily, Kaleb Lam PA-C, 1 tablet at 10/18/24 0930    cefTRIAXone (Rocephin) 2 g in dextrose (iso) IV 50 mL, 2 g, intravenous, q24h, Kaleb Lam PA-C, Stopped at 11/01/24 0932    DAPTOmycin (Cubicin) 700 mg in sodium chloride 0.9%  mL, 700 mg, intravenous, q48h, Andrew Malagon MD, Last Rate: 200 mL/hr at 11/01/24 0905, 700 mg at 11/01/24 0905    dextrose 50 % injection 12.5 g, 12.5 g, intravenous, q15 min PRN, Kaleb Lam PA-C    dextrose 50 % injection 25 g, 25 g, intravenous, q15 min PRN, Kaleb Lam PA-C    ezetimibe (Zetia) tablet 10 mg, 10 mg, oral, Daily, Kaleb Lam PA-C, 10 mg at 11/01/24 0852    ferrous sulfate syrup 60 mg of iron, 60 mg of iron, oral, Daily, LEONEL Doe-CNP, 60 mg of iron at 11/01/24 0851    folic acid (Folvite) tablet 1 mg, 1 mg, oral, Daily, LEONEL Doe-CNP, 1 mg at 11/01/24 0850    glucagon (Glucagen) injection 1 mg, 1 mg, intramuscular, q15 min PRN, Kaleb Lam PA-C    glucagon (Glucagen) injection 1 mg, 1 mg, intramuscular, q15 min PRN, Kaleb Lam PA-C    heparin (porcine) injection 3,000-6,000 Units, 3,000-6,000 Units, intravenous, PRN, LEONEL Doe-CNP    heparin 1,000 unit/mL injection 1,000 Units, 1,000 Units, intra-catheter, After Dialysis, Imer Cheung MD, 1,000 Units at 10/30/24 1400    heparin 1,000 unit/mL injection 1,000 Units, 1,000 Units, intra-catheter, After Dialysis, Imer Cheung MD, 1,000 Units at 10/30/24 1400    heparin 25,000 Units in dextrose 5% 250 mL (100 Units/mL) infusion (premix), 0-4,500 Units/hr, intravenous, Continuous, LEONEL Doe-CNP, Last Rate: 12 mL/hr at 11/01/24 0539, 1,200 Units/hr at 11/01/24 0539    heparin flush 10 unit/mL syringe 50 Units, 50 Units, intra-catheter, PRN, Kaleb Lam PA-C, 50 Units at 10/19/24 2313    honey (Medihoney) topical gel, , Topical, Daily, LEONEL Salgado-CNP, Given at 11/01/24 0905     HYDROmorphone (Dilaudid) injection 0.4 mg, 0.4 mg, intravenous, q2h PRN, Kaleb Lam PA-C, 0.4 mg at 10/19/24 0520    insulin lispro (HumaLOG) injection 0-15 Units, 0-15 Units, subcutaneous, q4h, Lb Dodd PA-C, 3 Units at 10/31/24 2042    ipratropium-albuteroL (Duo-Neb) 0.5-2.5 mg/3 mL nebulizer solution 3 mL, 3 mL, nebulization, 4x daily, Kaleb Lam PA-C, 3 mL at 11/01/24 0737    latanoprost (Xalatan) 0.005 % ophthalmic solution 1 drop, 1 drop, Both Eyes, Nightly, Kaleb Lam PA-C, 1 drop at 10/31/24 2018    midodrine (Proamatine) tablet 10 mg, 10 mg, oral, q8h, RUTH Doe, 10 mg at 11/01/24 0850    oxyCODONE (Roxicodone) immediate release tablet 5 mg, 5 mg, oral, q4h PRN, Kaleb Lam PA-C    oxygen (O2) therapy, , inhalation, Continuous - Inhalation, Benigno Carroll, DO, 50 percent at 11/01/24 0820    pantoprazole (ProtoNix) EC tablet 40 mg, 40 mg, oral, Daily before breakfast **OR** pantoprazole (ProtoNix) injection 40 mg, 40 mg, intravenous, Daily before breakfast, RUTH Salas, 40 mg at 11/01/24 0631    polyethylene glycol (Glycolax, Miralax) packet 17 g, 17 g, oral, Daily PRN, RUTH Doe    primidone (Mysoline) tablet 125 mg, 125 mg, oral, Nightly, Kaleb Lam PA-C, 125 mg at 10/31/24 2018    [Held by provider] propranolol LA (Inderal LA) 24 hr capsule 60 mg, 60 mg, oral, Daily, Kaleb Lam PA-C, 60 mg at 10/19/24 0643    sennosides-docusate sodium (Lucia-Colace) 8.6-50 mg per tablet 1 tablet, 1 tablet, oral, Nightly, Sweta Diaz, APRN-CNP, 1 tablet at 10/31/24 2021    sodium chloride 0.9 % bolus 200 mL, 200 mL, intravenous, Once, Radha Otero MD, Last Rate: 200 mL/hr at 10/30/24 1145, Restarted at 10/30/24 1145    sodium chloride 3 % nebulizer solution 3 mL, 3 mL, nebulization, 4x daily, Kaleb Lam PA-C, 3 mL at 11/01/24 0737    tobramycin (Michael 300) nebulizer solution 300 mg, 300 mg, nebulization, q12h, Andrew GILL  MD Manas, 300 mg at 11/01/24 0856    traMADol (Ultram) tablet 50 mg, 50 mg, oral, q8h PRN, Kaleb Lam PA-C   Relevant Results    Results for orders placed or performed during the hospital encounter of 10/12/24 (from the past 96 hours)   Respiratory Culture/Smear    Specimen: SPUTUM; Fluid   Result Value Ref Range    Respiratory Culture/Smear (3+) Moderate Pseudomonas aeruginosa (A)     Respiratory Culture/Smear (3+) Moderate Normal throat neva     Gram Stain       Gram stain indicates specimen consists of lower respiratory tract secretions.    Gram Stain No predominant organism        Susceptibility    Pseudomonas aeruginosa - MICROSCAN     Aztreonam  Susceptible ug/ml     Cefepime  Susceptible ug/ml     Ceftazidime  Susceptible ug/ml     Ciprofloxacin  Susceptible ug/ml     Levofloxacin  Susceptible ug/ml     Piperacillin/Tazobactam  Susceptible ug/ml     Tobramycin  Susceptible ug/ml   POCT GLUCOSE   Result Value Ref Range    POCT Glucose 190 (H) 74 - 99 mg/dL   Blood Gas Venous Full Panel   Result Value Ref Range    POCT pH, Venous 7.40 7.33 - 7.43 pH    POCT pCO2, Venous 49 41 - 51 mm Hg    POCT pO2, Venous 45 35 - 45 mm Hg    POCT SO2, Venous 79 (H) 45 - 75 %    POCT Oxy Hemoglobin, Venous 76.3 (H) 45.0 - 75.0 %    POCT Hematocrit Calculated, Venous 26.0 (L) 36.0 - 46.0 %    POCT Sodium, Venous 132 (L) 136 - 145 mmol/L    POCT Potassium, Venous 5.2 3.5 - 5.3 mmol/L    POCT Chloride, Venous 102 98 - 107 mmol/L    POCT Ionized Calicum, Venous 1.17 1.10 - 1.33 mmol/L    POCT Glucose, Venous 198 (H) 74 - 99 mg/dL    POCT Lactate, Venous 0.8 0.4 - 2.0 mmol/L    POCT Base Excess, Venous 4.9 (H) -2.0 - 3.0 mmol/L    POCT HCO3 Calculated, Venous 30.4 (H) 22.0 - 26.0 mmol/L    POCT Hemoglobin, Venous 8.8 (L) 12.0 - 16.0 g/dL    POCT Anion Gap, Venous 5.0 (L) 10.0 - 25.0 mmol/L    Patient Temperature 37.0 degrees Celsius    FiO2 40 %    Ventilator Mode Other     Ventilator Rate 14 bpm    Tidal Volume 450 mL     Peep CHM2O 5.0 cm H2O   Magnesium   Result Value Ref Range    Magnesium 2.23 1.60 - 2.40 mg/dL   CBC and Auto Differential   Result Value Ref Range    WBC 10.7 4.4 - 11.3 x10*3/uL    nRBC 0.0 0.0 - 0.0 /100 WBCs    RBC 2.67 (L) 4.00 - 5.20 x10*6/uL    Hemoglobin 8.3 (L) 12.0 - 16.0 g/dL    Hematocrit 25.7 (L) 36.0 - 46.0 %    MCV 96 80 - 100 fL    MCH 31.1 26.0 - 34.0 pg    MCHC 32.3 32.0 - 36.0 g/dL    RDW 21.5 (H) 11.5 - 14.5 %    Platelets 238 150 - 450 x10*3/uL    Neutrophils % 87.1 40.0 - 80.0 %    Immature Granulocytes %, Automated 0.9 0.0 - 0.9 %    Lymphocytes % 6.4 13.0 - 44.0 %    Monocytes % 5.3 2.0 - 10.0 %    Eosinophils % 0.2 0.0 - 6.0 %    Basophils % 0.1 0.0 - 2.0 %    Neutrophils Absolute 9.29 (H) 1.20 - 7.70 x10*3/uL    Immature Granulocytes Absolute, Automated 0.10 0.00 - 0.70 x10*3/uL    Lymphocytes Absolute 0.68 (L) 1.20 - 4.80 x10*3/uL    Monocytes Absolute 0.57 0.10 - 1.00 x10*3/uL    Eosinophils Absolute 0.02 0.00 - 0.70 x10*3/uL    Basophils Absolute 0.01 0.00 - 0.10 x10*3/uL   Hepatic function panel   Result Value Ref Range    Albumin 2.5 (L) 3.4 - 5.0 g/dL    Bilirubin, Total 0.3 0.0 - 1.2 mg/dL    Bilirubin, Direct 0.1 0.0 - 0.3 mg/dL    Alkaline Phosphatase 150 (H) 33 - 136 U/L    ALT 12 7 - 45 U/L    AST 15 9 - 39 U/L    Total Protein 5.2 (L) 6.4 - 8.2 g/dL   Calcium, ionized   Result Value Ref Range    POCT Calcium, Ionized 1.15 1.1 - 1.33 mmol/L   Phosphorus   Result Value Ref Range    Phosphorus 2.7 2.5 - 4.9 mg/dL   Basic Metabolic Panel   Result Value Ref Range    Glucose 151 (H) 74 - 99 mg/dL    Sodium 134 (L) 136 - 145 mmol/L    Potassium 4.7 3.5 - 5.3 mmol/L    Chloride 102 98 - 107 mmol/L    Bicarbonate 29 21 - 32 mmol/L    Anion Gap 8 (L) 10 - 20 mmol/L    Urea Nitrogen 20 6 - 23 mg/dL    Creatinine 0.93 0.50 - 1.05 mg/dL    eGFR 67 >60 mL/min/1.73m*2    Calcium 7.8 (L) 8.6 - 10.3 mg/dL   Morphology   Result Value Ref Range    RBC Morphology See Below     Polychromasia Mild      Ovalocytes Few     Basophilic Stippling Present    POCT GLUCOSE   Result Value Ref Range    POCT Glucose 156 (H) 74 - 99 mg/dL   POCT GLUCOSE   Result Value Ref Range    POCT Glucose 175 (H) 74 - 99 mg/dL   POCT GLUCOSE   Result Value Ref Range    POCT Glucose 144 (H) 74 - 99 mg/dL   Magnesium   Result Value Ref Range    Magnesium 2.31 1.60 - 2.40 mg/dL   CBC and Auto Differential   Result Value Ref Range    WBC 8.7 4.4 - 11.3 x10*3/uL    nRBC 0.0 0.0 - 0.0 /100 WBCs    RBC 2.44 (L) 4.00 - 5.20 x10*6/uL    Hemoglobin 7.6 (L) 12.0 - 16.0 g/dL    Hematocrit 23.3 (L) 36.0 - 46.0 %    MCV 96 80 - 100 fL    MCH 31.1 26.0 - 34.0 pg    MCHC 32.6 32.0 - 36.0 g/dL    RDW 21.4 (H) 11.5 - 14.5 %    Platelets 238 150 - 450 x10*3/uL    Neutrophils % 81.4 40.0 - 80.0 %    Immature Granulocytes %, Automated 1.6 (H) 0.0 - 0.9 %    Lymphocytes % 10.4 13.0 - 44.0 %    Monocytes % 6.2 2.0 - 10.0 %    Eosinophils % 0.3 0.0 - 6.0 %    Basophils % 0.1 0.0 - 2.0 %    Neutrophils Absolute 7.08 1.20 - 7.70 x10*3/uL    Immature Granulocytes Absolute, Automated 0.14 0.00 - 0.70 x10*3/uL    Lymphocytes Absolute 0.91 (L) 1.20 - 4.80 x10*3/uL    Monocytes Absolute 0.54 0.10 - 1.00 x10*3/uL    Eosinophils Absolute 0.03 0.00 - 0.70 x10*3/uL    Basophils Absolute 0.01 0.00 - 0.10 x10*3/uL   Hepatic function panel   Result Value Ref Range    Albumin 2.4 (L) 3.4 - 5.0 g/dL    Bilirubin, Total 0.4 0.0 - 1.2 mg/dL    Bilirubin, Direct 0.2 0.0 - 0.3 mg/dL    Alkaline Phosphatase 144 (H) 33 - 136 U/L    ALT 11 7 - 45 U/L    AST 14 9 - 39 U/L    Total Protein 5.0 (L) 6.4 - 8.2 g/dL   Calcium, ionized   Result Value Ref Range    POCT Calcium, Ionized 1.13 1.1 - 1.33 mmol/L   Procalcitonin   Result Value Ref Range    Procalcitonin 0.18 (H) <=0.07 ng/mL   Phosphorus   Result Value Ref Range    Phosphorus 3.2 2.5 - 4.9 mg/dL   Basic Metabolic Panel   Result Value Ref Range    Glucose 164 (H) 74 - 99 mg/dL    Sodium 133 (L) 136 - 145 mmol/L    Potassium 4.6  3.5 - 5.3 mmol/L    Chloride 100 98 - 107 mmol/L    Bicarbonate 28 21 - 32 mmol/L    Anion Gap 10 10 - 20 mmol/L    Urea Nitrogen 34 (H) 6 - 23 mg/dL    Creatinine 1.25 (H) 0.50 - 1.05 mg/dL    eGFR 47 (L) >60 mL/min/1.73m*2    Calcium 7.7 (L) 8.6 - 10.3 mg/dL   Morphology   Result Value Ref Range    RBC Morphology See Below     Polychromasia Mild     Ovalocytes Few     [place] Cells Few     Basophilic Stippling Present    POCT GLUCOSE   Result Value Ref Range    POCT Glucose 171 (H) 74 - 99 mg/dL   POCT GLUCOSE   Result Value Ref Range    POCT Glucose 142 (H) 74 - 99 mg/dL   POCT GLUCOSE   Result Value Ref Range    POCT Glucose 149 (H) 74 - 99 mg/dL   Magnesium   Result Value Ref Range    Magnesium 2.39 1.60 - 2.40 mg/dL   Hepatic function panel   Result Value Ref Range    Albumin 2.4 (L) 3.4 - 5.0 g/dL    Bilirubin, Total 0.4 0.0 - 1.2 mg/dL    Bilirubin, Direct 0.1 0.0 - 0.3 mg/dL    Alkaline Phosphatase 141 (H) 33 - 136 U/L    ALT 11 7 - 45 U/L    AST 14 9 - 39 U/L    Total Protein 5.1 (L) 6.4 - 8.2 g/dL   Calcium, ionized   Result Value Ref Range    POCT Calcium, Ionized 1.17 1.1 - 1.33 mmol/L   Phosphorus   Result Value Ref Range    Phosphorus 4.1 2.5 - 4.9 mg/dL   Basic Metabolic Panel   Result Value Ref Range    Glucose 133 (H) 74 - 99 mg/dL    Sodium 134 (L) 136 - 145 mmol/L    Potassium 4.8 3.5 - 5.3 mmol/L    Chloride 101 98 - 107 mmol/L    Bicarbonate 27 21 - 32 mmol/L    Anion Gap 11 10 - 20 mmol/L    Urea Nitrogen 40 (H) 6 - 23 mg/dL    Creatinine 1.53 (H) 0.50 - 1.05 mg/dL    eGFR 37 (L) >60 mL/min/1.73m*2    Calcium 7.9 (L) 8.6 - 10.3 mg/dL   CBC and Auto Differential   Result Value Ref Range    WBC 7.7 4.4 - 11.3 x10*3/uL    nRBC 0.0 0.0 - 0.0 /100 WBCs    RBC 2.46 (L) 4.00 - 5.20 x10*6/uL    Hemoglobin 7.6 (L) 12.0 - 16.0 g/dL    Hematocrit 23.6 (L) 36.0 - 46.0 %    MCV 96 80 - 100 fL    MCH 30.9 26.0 - 34.0 pg    MCHC 32.2 32.0 - 36.0 g/dL    RDW 20.7 (H) 11.5 - 14.5 %    Platelets 234 150 - 450  x10*3/uL    Neutrophils % 79.4 40.0 - 80.0 %    Immature Granulocytes %, Automated 1.3 (H) 0.0 - 0.9 %    Lymphocytes % 12.3 13.0 - 44.0 %    Monocytes % 6.6 2.0 - 10.0 %    Eosinophils % 0.3 0.0 - 6.0 %    Basophils % 0.1 0.0 - 2.0 %    Neutrophils Absolute 6.11 1.20 - 7.70 x10*3/uL    Immature Granulocytes Absolute, Automated 0.10 0.00 - 0.70 x10*3/uL    Lymphocytes Absolute 0.95 (L) 1.20 - 4.80 x10*3/uL    Monocytes Absolute 0.51 0.10 - 1.00 x10*3/uL    Eosinophils Absolute 0.02 0.00 - 0.70 x10*3/uL    Basophils Absolute 0.01 0.00 - 0.10 x10*3/uL   Hepatitis B surface antigen   Result Value Ref Range    Hepatitis B Surface AG Nonreactive Nonreactive   Hepatitis B surface antibody   Result Value Ref Range    Hepatitis B Surface AB 6.4 <10.0 mIU/mL   Morphology   Result Value Ref Range    RBC Morphology See Below     Polychromasia Mild     Stomatocytes Few    Type and screen   Result Value Ref Range    ABO TYPE A     Rh TYPE NEG     ANTIBODY SCREEN NEG    aPTT   Result Value Ref Range    aPTT 30.9 22.0 - 32.5 seconds   POCT GLUCOSE   Result Value Ref Range    POCT Glucose 136 (H) 74 - 99 mg/dL   POCT GLUCOSE   Result Value Ref Range    POCT Glucose 138 (H) 74 - 99 mg/dL   APTT   Result Value Ref Range    aPTT >139.0 (HH) 22.0 - 32.5 seconds   POCT GLUCOSE   Result Value Ref Range    POCT Glucose 157 (H) 74 - 99 mg/dL   APTT   Result Value Ref Range    aPTT >139.0 (HH) 22.0 - 32.5 seconds   Calcium, ionized   Result Value Ref Range    POCT Calcium, Ionized 1.15 1.1 - 1.33 mmol/L   CBC and Auto Differential   Result Value Ref Range    WBC 8.3 4.4 - 11.3 x10*3/uL    nRBC 0.0 0.0 - 0.0 /100 WBCs    RBC 2.41 (L) 4.00 - 5.20 x10*6/uL    Hemoglobin 7.4 (L) 12.0 - 16.0 g/dL    Hematocrit 23.7 (L) 36.0 - 46.0 %    MCV 98 80 - 100 fL    MCH 30.7 26.0 - 34.0 pg    MCHC 31.2 (L) 32.0 - 36.0 g/dL    RDW 20.4 (H) 11.5 - 14.5 %    Platelets 243 150 - 450 x10*3/uL    Neutrophils % 79.3 40.0 - 80.0 %    Immature Granulocytes %,  Automated 1.3 (H) 0.0 - 0.9 %    Lymphocytes % 10.8 13.0 - 44.0 %    Monocytes % 7.8 2.0 - 10.0 %    Eosinophils % 0.6 0.0 - 6.0 %    Basophils % 0.2 0.0 - 2.0 %    Neutrophils Absolute 6.61 1.20 - 7.70 x10*3/uL    Immature Granulocytes Absolute, Automated 0.11 0.00 - 0.70 x10*3/uL    Lymphocytes Absolute 0.90 (L) 1.20 - 4.80 x10*3/uL    Monocytes Absolute 0.65 0.10 - 1.00 x10*3/uL    Eosinophils Absolute 0.05 0.00 - 0.70 x10*3/uL    Basophils Absolute 0.02 0.00 - 0.10 x10*3/uL   Hepatic function panel   Result Value Ref Range    Albumin 2.3 (L) 3.4 - 5.0 g/dL    Bilirubin, Total 0.4 0.0 - 1.2 mg/dL    Bilirubin, Direct 0.1 0.0 - 0.3 mg/dL    Alkaline Phosphatase 136 33 - 136 U/L    ALT 11 7 - 45 U/L    AST 12 9 - 39 U/L    Total Protein 4.9 (L) 6.4 - 8.2 g/dL   Magnesium   Result Value Ref Range    Magnesium 2.40 1.60 - 2.40 mg/dL   Phosphorus   Result Value Ref Range    Phosphorus 4.9 2.5 - 4.9 mg/dL   Basic Metabolic Panel   Result Value Ref Range    Glucose 116 (H) 74 - 99 mg/dL    Sodium 133 (L) 136 - 145 mmol/L    Potassium 4.6 3.5 - 5.3 mmol/L    Chloride 100 98 - 107 mmol/L    Bicarbonate 26 21 - 32 mmol/L    Anion Gap 12 10 - 20 mmol/L    Urea Nitrogen 45 (H) 6 - 23 mg/dL    Creatinine 1.80 (H) 0.50 - 1.05 mg/dL    eGFR 30 (L) >60 mL/min/1.73m*2    Calcium 7.8 (L) 8.6 - 10.3 mg/dL   Morphology   Result Value Ref Range    RBC Morphology See Below     Polychromasia Mild     Ovalocytes Few     [place] Cells Few     Stomatocytes Few    POCT GLUCOSE   Result Value Ref Range    POCT Glucose 118 (H) 74 - 99 mg/dL   C. difficile, PCR    Specimen: Stool   Result Value Ref Range    C. difficile, PCR Not Detected Not Detected   APTT   Result Value Ref Range    aPTT >139.0 (HH) 22.0 - 32.5 seconds   POCT GLUCOSE   Result Value Ref Range    POCT Glucose 118 (H) 74 - 99 mg/dL   POCT GLUCOSE   Result Value Ref Range    POCT Glucose 128 (H) 74 - 99 mg/dL   Blood Gas Venous Full Panel   Result Value Ref Range    POCT pH,  Venous 7.38 7.33 - 7.43 pH    POCT pCO2, Venous 51 41 - 51 mm Hg    POCT pO2, Venous 44 35 - 45 mm Hg    POCT SO2, Venous 80 (H) 45 - 75 %    POCT Oxy Hemoglobin, Venous 78.0 (H) 45.0 - 75.0 %    POCT Hematocrit Calculated, Venous 26.0 (L) 36.0 - 46.0 %    POCT Sodium, Venous 131 (L) 136 - 145 mmol/L    POCT Potassium, Venous 3.9 3.5 - 5.3 mmol/L    POCT Chloride, Venous 99 98 - 107 mmol/L    POCT Ionized Calicum, Venous 1.17 1.10 - 1.33 mmol/L    POCT Glucose, Venous 123 (H) 74 - 99 mg/dL    POCT Lactate, Venous 0.6 0.4 - 2.0 mmol/L    POCT Base Excess, Venous 4.4 (H) -2.0 - 3.0 mmol/L    POCT HCO3 Calculated, Venous 30.2 (H) 22.0 - 26.0 mmol/L    POCT Hemoglobin, Venous 8.6 (L) 12.0 - 16.0 g/dL    POCT Anion Gap, Venous 6.0 (L) 10.0 - 25.0 mmol/L    Patient Temperature 37.0 degrees Celsius    FiO2 35 %    Ventilator Mode CPAP     Ventilator Rate 15 bpm    Spontaneous Tidal Volume 537 mL    Peep CHM2O 5.0 cm H2O   POCT GLUCOSE   Result Value Ref Range    POCT Glucose 131 (H) 74 - 99 mg/dL   aPTT   Result Value Ref Range    aPTT 111.5 (HH) 22.0 - 32.5 seconds   POCT GLUCOSE   Result Value Ref Range    POCT Glucose 157 (H) 74 - 99 mg/dL   POCT GLUCOSE   Result Value Ref Range    POCT Glucose 129 (H) 74 - 99 mg/dL   aPTT   Result Value Ref Range    aPTT 40.3 (H) 22.0 - 32.5 seconds   POCT GLUCOSE   Result Value Ref Range    POCT Glucose 144 (H) 74 - 99 mg/dL   POCT GLUCOSE   Result Value Ref Range    POCT Glucose 150 (H) 74 - 99 mg/dL   CBC and Auto Differential   Result Value Ref Range    WBC 7.1 4.4 - 11.3 x10*3/uL    nRBC 0.0 0.0 - 0.0 /100 WBCs    RBC 2.45 (L) 4.00 - 5.20 x10*6/uL    Hemoglobin 7.6 (L) 12.0 - 16.0 g/dL    Hematocrit 23.3 (L) 36.0 - 46.0 %    MCV 95 80 - 100 fL    MCH 31.0 26.0 - 34.0 pg    MCHC 32.6 32.0 - 36.0 g/dL    RDW 20.0 (H) 11.5 - 14.5 %    Platelets 261 150 - 450 x10*3/uL    Neutrophils % 80.2 40.0 - 80.0 %    Immature Granulocytes %, Automated 1.1 (H) 0.0 - 0.9 %    Lymphocytes % 8.8  13.0 - 44.0 %    Monocytes % 9.2 2.0 - 10.0 %    Eosinophils % 0.4 0.0 - 6.0 %    Basophils % 0.3 0.0 - 2.0 %    Neutrophils Absolute 5.65 1.20 - 7.70 x10*3/uL    Immature Granulocytes Absolute, Automated 0.08 0.00 - 0.70 x10*3/uL    Lymphocytes Absolute 0.62 (L) 1.20 - 4.80 x10*3/uL    Monocytes Absolute 0.65 0.10 - 1.00 x10*3/uL    Eosinophils Absolute 0.03 0.00 - 0.70 x10*3/uL    Basophils Absolute 0.02 0.00 - 0.10 x10*3/uL   Hepatic function panel   Result Value Ref Range    Albumin 2.3 (L) 3.4 - 5.0 g/dL    Bilirubin, Total 0.4 0.0 - 1.2 mg/dL    Bilirubin, Direct 0.1 0.0 - 0.3 mg/dL    Alkaline Phosphatase 136 33 - 136 U/L    ALT 10 7 - 45 U/L    AST 11 9 - 39 U/L    Total Protein 5.3 (L) 6.4 - 8.2 g/dL   Magnesium   Result Value Ref Range    Magnesium 2.08 1.60 - 2.40 mg/dL   Phosphorus   Result Value Ref Range    Phosphorus 3.5 2.5 - 4.9 mg/dL   Basic Metabolic Panel   Result Value Ref Range    Glucose 144 (H) 74 - 99 mg/dL    Sodium 132 (L) 136 - 145 mmol/L    Potassium 4.3 3.5 - 5.3 mmol/L    Chloride 98 98 - 107 mmol/L    Bicarbonate 29 21 - 32 mmol/L    Anion Gap 9 (L) 10 - 20 mmol/L    Urea Nitrogen 25 (H) 6 - 23 mg/dL    Creatinine 1.45 (H) 0.50 - 1.05 mg/dL    eGFR 39 (L) >60 mL/min/1.73m*2    Calcium 7.8 (L) 8.6 - 10.3 mg/dL   PST Top   Result Value Ref Range    Extra Tube Hold for add-ons.    Calcium, ionized   Result Value Ref Range    POCT Calcium, Ionized 1.10 1.1 - 1.33 mmol/L   POCT GLUCOSE   Result Value Ref Range    POCT Glucose 151 (H) 74 - 99 mg/dL   aPTT   Result Value Ref Range    aPTT 56.5 (H) 22.0 - 32.5 seconds   Blood Gas Venous Full Panel   Result Value Ref Range    POCT pH, Venous 7.41 7.33 - 7.43 pH    POCT pCO2, Venous 51 41 - 51 mm Hg    POCT pO2, Venous 44 35 - 45 mm Hg    POCT SO2, Venous 81 (H) 45 - 75 %    POCT Oxy Hemoglobin, Venous 79.3 (H) 45.0 - 75.0 %    POCT Hematocrit Calculated, Venous 23.0 (L) 36.0 - 46.0 %    POCT Sodium, Venous 131 (L) 136 - 145 mmol/L    POCT  Potassium, Venous 4.3 3.5 - 5.3 mmol/L    POCT Chloride, Venous 100 98 - 107 mmol/L    POCT Ionized Calicum, Venous 1.16 1.10 - 1.33 mmol/L    POCT Glucose, Venous 132 (H) 74 - 99 mg/dL    POCT Lactate, Venous 1.2 0.4 - 2.0 mmol/L    POCT Base Excess, Venous 6.9 (H) -2.0 - 3.0 mmol/L    POCT HCO3 Calculated, Venous 32.3 (H) 22.0 - 26.0 mmol/L    POCT Hemoglobin, Venous 7.7 (L) 12.0 - 16.0 g/dL    POCT Anion Gap, Venous 3.0 (L) 10.0 - 25.0 mmol/L    Patient Temperature 37.0 degrees Celsius    FiO2 35 %    Ventilator Mode CPAP     Ventilator Rate 17 bpm    Peep CHM2O 5.0 cm H2O    Pressure Support 5 cm H2O   POCT GLUCOSE   Result Value Ref Range    POCT Glucose 125 (H) 74 - 99 mg/dL   POCT GLUCOSE   Result Value Ref Range    POCT Glucose 191 (H) 74 - 99 mg/dL   CBC   Result Value Ref Range    WBC 9.3 4.4 - 11.3 x10*3/uL    nRBC 0.0 0.0 - 0.0 /100 WBCs    RBC 2.62 (L) 4.00 - 5.20 x10*6/uL    Hemoglobin 8.1 (L) 12.0 - 16.0 g/dL    Hematocrit 26.0 (L) 36.0 - 46.0 %    MCV 99 80 - 100 fL    MCH 30.9 26.0 - 34.0 pg    MCHC 31.2 (L) 32.0 - 36.0 g/dL    RDW 19.6 (H) 11.5 - 14.5 %    Platelets 279 150 - 450 x10*3/uL   Calcium, ionized   Result Value Ref Range    POCT Calcium, Ionized 1.14 1.1 - 1.33 mmol/L   POCT GLUCOSE   Result Value Ref Range    POCT Glucose 158 (H) 74 - 99 mg/dL   POCT GLUCOSE   Result Value Ref Range    POCT Glucose 101 (H) 74 - 99 mg/dL   Calcium, ionized   Result Value Ref Range    POCT Calcium, Ionized 1.14 1.1 - 1.33 mmol/L   CBC and Auto Differential   Result Value Ref Range    WBC 9.4 4.4 - 11.3 x10*3/uL    nRBC 0.0 0.0 - 0.0 /100 WBCs    RBC 2.54 (L) 4.00 - 5.20 x10*6/uL    Hemoglobin 7.9 (L) 12.0 - 16.0 g/dL    Hematocrit 25.0 (L) 36.0 - 46.0 %    MCV 98 80 - 100 fL    MCH 31.1 26.0 - 34.0 pg    MCHC 31.6 (L) 32.0 - 36.0 g/dL    RDW 19.5 (H) 11.5 - 14.5 %    Platelets 290 150 - 450 x10*3/uL    Neutrophils % 84.9 40.0 - 80.0 %    Immature Granulocytes %, Automated 0.5 0.0 - 0.9 %    Lymphocytes %  7.0 13.0 - 44.0 %    Monocytes % 7.1 2.0 - 10.0 %    Eosinophils % 0.3 0.0 - 6.0 %    Basophils % 0.2 0.0 - 2.0 %    Neutrophils Absolute 7.97 (H) 1.20 - 7.70 x10*3/uL    Immature Granulocytes Absolute, Automated 0.05 0.00 - 0.70 x10*3/uL    Lymphocytes Absolute 0.66 (L) 1.20 - 4.80 x10*3/uL    Monocytes Absolute 0.67 0.10 - 1.00 x10*3/uL    Eosinophils Absolute 0.03 0.00 - 0.70 x10*3/uL    Basophils Absolute 0.02 0.00 - 0.10 x10*3/uL   Hepatic function panel   Result Value Ref Range    Albumin 2.5 (L) 3.4 - 5.0 g/dL    Bilirubin, Total 0.4 0.0 - 1.2 mg/dL    Bilirubin, Direct 0.1 0.0 - 0.3 mg/dL    Alkaline Phosphatase 138 (H) 33 - 136 U/L    ALT 13 7 - 45 U/L    AST 22 9 - 39 U/L    Total Protein 5.7 (L) 6.4 - 8.2 g/dL   Magnesium   Result Value Ref Range    Magnesium 2.18 1.60 - 2.40 mg/dL   Phosphorus   Result Value Ref Range    Phosphorus 5.1 (H) 2.5 - 4.9 mg/dL   Basic Metabolic Panel   Result Value Ref Range    Glucose 99 74 - 99 mg/dL    Sodium 132 (L) 136 - 145 mmol/L    Potassium 4.4 3.5 - 5.3 mmol/L    Chloride 97 (L) 98 - 107 mmol/L    Bicarbonate 28 21 - 32 mmol/L    Anion Gap 11 10 - 20 mmol/L    Urea Nitrogen 32 (H) 6 - 23 mg/dL    Creatinine 2.00 (H) 0.50 - 1.05 mg/dL    eGFR 27 (L) >60 mL/min/1.73m*2    Calcium 8.2 (L) 8.6 - 10.3 mg/dL   aPTT   Result Value Ref Range    aPTT 72.4 (H) 22.0 - 32.5 seconds   POCT GLUCOSE   Result Value Ref Range    POCT Glucose 103 (H) 74 - 99 mg/dL   POCT GLUCOSE   Result Value Ref Range    POCT Glucose 98 74 - 99 mg/dL     *Note: Due to a large number of results and/or encounters for the requested time period, some results have not been displayed. A complete set of results can be found in Results Review.       Assessment/Plan   Acute kidney injury patient remains fairly oliguric my plan is to dialyze her today  Edema improved sinew to challenge her dry weight will try to remove 3 L of fluid with dialysis today  Septic shock is off pressors at this time  Pneumonia  continue with antibiotic therapy infectious disease  Diabetes mellitus type 2  Malnutrition continue with tube feeding  Lower back surgery site infection  Anemia transfuse when hemoglobin less than 7         Imer Cheung MD

## 2024-11-01 NOTE — PROGRESS NOTES
"Nutrition Follow up Note    Nutrition Assessment      Extubated 10/31. Tube feed stopped. Patient NPO at this time due to continuous BiPAP. Will advance diet when able. Will continue to follow and monitor nutrition needs.    Nutrition History:  Food and Nutrient History: NPO     Food Allergies/Intolerances:  None  GI Symptoms: None  Oral Problems: None    Anthropometrics:  Ht: 177.8 cm (5' 10\"), Wt: 119 kg (261 lb 11 oz), BMI: 37.55  IBW/kg (Dietitian Calculated): 68.18 kg  Percent of IBW: 147 %     Weight Change:  Daily Weight  11/01/24 : 119 kg (261 lb 11 oz)  10/01/24 : 99.7 kg (219 lb 12.8 oz)  09/25/24 : 74.8 kg (165 lb)  08/27/24 : 75.8 kg (167 lb)  08/16/24 : 76.2 kg (168 lb)  08/12/24 : 76.4 kg (168 lb 6.4 oz)  07/15/24 : 76.2 kg (168 lb)  06/18/24 : 75.8 kg (167 lb 3.2 oz)  05/31/24 : 69.4 kg (153 lb)  09/29/23 : 69.4 kg (153 lb)     Nutrition Focused Physical Exam Findings:      Nutrition Significant Labs:  Lab Results   Component Value Date    WBC 9.4 11/01/2024    HGB 7.9 (L) 11/01/2024    HCT 25.0 (L) 11/01/2024     11/01/2024    CHOL 266 (H) 08/23/2023    TRIG 213 (H) 08/23/2023    HDL 58 08/23/2023    ALT 13 11/01/2024    AST 22 11/01/2024     (L) 11/01/2024    K 4.4 11/01/2024    CL 97 (L) 11/01/2024    CREATININE 2.00 (H) 11/01/2024    BUN 32 (H) 11/01/2024    CO2 28 11/01/2024    TSH 4.78 (H) 10/12/2024    INR 1.0 09/28/2024    HGBA1C 6.2 (H) 09/25/2024    ALBUR 40 (H) 03/31/2022     Nutrition Specific Medications:  brimonidine, 1 drop, Both Eyes, BID  [Held by provider] calcium carbonate-vitamin D3, 1 tablet, oral, Daily  cefTRIAXone, 2 g, intravenous, q24h  daptomycin, 700 mg, intravenous, q48h  ezetimibe, 10 mg, oral, Daily  ferrous sulfate, 60 mg of iron, oral, Daily  folic acid, 1 mg, oral, Daily  heparin, 1,000 Units, intra-catheter, After Dialysis  heparin, 1,000 Units, intra-catheter, After Dialysis  honey, , Topical, Daily  insulin lispro, 0-15 Units, subcutaneous, " q4h  ipratropium-albuteroL, 3 mL, nebulization, 4x daily  latanoprost, 1 drop, Both Eyes, Nightly  midodrine, 10 mg, oral, TID  oxygen, , inhalation, Continuous - Inhalation  pantoprazole, 40 mg, oral, Daily before breakfast   Or  pantoprazole, 40 mg, intravenous, Daily before breakfast  primidone, 125 mg, oral, Nightly  [Held by provider] propranolol LA, 60 mg, oral, Daily  sennosides-docusate sodium, 1 tablet, oral, Nightly  sodium chloride, 3 mL, nebulization, 4x daily  tobramycin, 300 mg, nebulization, q12h      heparin, 0-4,500 Units/hr, Last Rate: Stopped (11/01/24 1101)      Dietary Orders (From admission, onward)       Start     Ordered    10/30/24 1254  Enteral feeding with NPO 40 (start at 10cc/hr and increase q4hrs until goal rate)  Diet effective now        Comments: Start @20 ml/Hr and increase by 10 ml Q12H until goal (40 mL/Hr)   Question Answer Comment   Tube feeding formula: Nepro    Tube feeding continuous rate (mL/hr): 40 start at 10cc/hr and increase q4hrs until goal rate       10/30/24 1254    10/24/24 1212  Oral nutritional supplements  Until discontinued        Comments: Unflavored via OG tube   Question Answer Comment   Deliver with Breakfast    Deliver with Dinner    Select supplement: Cade        10/24/24 1211    10/16/24 2298  May Participate in Room Service With Assistance  ( ROOM SERVICE MAY PARTICIPATE WITH ASSISTANCE)  Once        Question:  .  Answer:  Yes    10/16/24 4727                   Estimated Needs:   Estimated Energy Needs  Total Energy Estimated Needs (kCal): 1700 kCal  Total Estimated Energy Need per Day (kCal/kg): 25 kCal/kg  Method for Estimating Needs: IBW    Estimated Protein Needs  Total Protein Estimated Needs (g): 68 g  Total Protein Estimated Needs (g/kg): 1 g/kg  Method for Estimating Needs: IBW    Estimated Fluid Needs  Total Fluid Estimated Needs (mL): 1700 mL  Total Fluid Estimated Needs (mL/kg): 25 mL/kg  Method for Estimating Needs: Per Critical Care  Team/Nephrology        Nutrition Diagnosis   Nutrition Diagnosis:       Nutrition Diagnosis  Patient has Nutrition Diagnosis: Yes  Diagnosis Status (1): Ongoing  Nutrition Diagnosis 1: Inadequate energy intake  Related to (1): decreased ability to consume sufficient energy  As Evidenced by (1): NPO  Additional Nutrition Diagnosis: Diagnosis 2  Diagnosis Status (2): Resolved  Nutrition Diagnosis 2: Excessive enteral nutrition infusion  Related to (2): current tube feed formula/rate  As Evidenced by (2): enteral formula/rate providing greater than estimated energy needs       Nutrition Interventions/Recommendations   Nutrition Interventions and Recommendations:    Nutrition Prescription:  Individualized Nutrition Prescription Provided for : 4457-3805 calories,  gm protein to be provided via enteral nutrition    Nutrition Interventions:   Food and/or Nutrient Delivery Interventions  Interventions: Meals and snacks  Meals and Snacks: Mineral-modified diet  Goal: Advance as able    Education Documentation  No documentation found.         Nutrition Monitoring and Evaluation   Monitoring/Evaluation:   Food/Nutrient Related History Monitoring  Monitoring and Evaluation Plan: Energy intake  Energy Intake: Estimated energy intake  Criteria: monitoring for diet order       Time Spent/Follow-up:   Follow Up  Time Spent (min): 25 minutes  Last Date of Nutrition Visit: 11/01/24  Nutrition Follow-Up Needed?: 3-5 days  Follow up Comment: 11/5/24

## 2024-11-01 NOTE — SIGNIFICANT EVENT
RT tried patient on HFNC 40L 50%. Patient desat to 88%. Fio2 increased to 100% on HFNC, SpO2 still dropping into 70s fast. RT took patient off HFNC and placed on BIPAP 20/10 RR 16 fio2 50%.

## 2024-11-01 NOTE — CARE PLAN
The patient's goals for the shift include unable to assess    The clinical goals for the shift include Maintain hemodynamic stability, HD today.      Problem: Safety - Adult  Goal: Free from fall injury  11/1/2024 1001 by Irene Gordillo RN  Outcome: Progressing  11/1/2024 0943 by Irene Gordillo RN  Outcome: Progressing     Problem: Discharge Planning  Goal: Discharge to home or other facility with appropriate resources  11/1/2024 1001 by Irene Gordillo RN  Outcome: Progressing  11/1/2024 0943 by Irene Gordillo RN  Outcome: Progressing     Problem: Chronic Conditions and Co-morbidities  Goal: Patient's chronic conditions and co-morbidity symptoms are monitored and maintained or improved  11/1/2024 1001 by Irene Gordillo RN  Outcome: Progressing  11/1/2024 0943 by Irene Gordillo RN  Outcome: Progressing     Problem: Diabetes  Goal: Increase stability of blood glucose readings by end of shift  11/1/2024 1001 by Irene Gordillo RN  Outcome: Progressing  11/1/2024 0943 by Irene Gordillo RN  Outcome: Progressing  Goal: Maintain electrolyte levels within acceptable range throughout shift  11/1/2024 1001 by Irene Gordillo RN  Outcome: Progressing  11/1/2024 0943 by Irene Gordillo RN  Outcome: Progressing  Goal: Maintain glucose levels >70mg/dl to <250mg/dl throughout shift  11/1/2024 1001 by Irene Gordillo RN  Outcome: Progressing  11/1/2024 0943 by Irene Gordillo RN  Outcome: Progressing  Goal: Learn about and adhere to nutrition recommendations by end of shift  11/1/2024 1001 by Irene Gordillo RN  Outcome: Progressing  11/1/2024 0943 by Irene Gordillo RN  Outcome: Progressing  Goal: Vital signs within normal range for age by end of shift  11/1/2024 1001 by Irene Gordillo RN  Outcome: Progressing  11/1/2024 0943 by Irene Gordillo RN  Outcome: Progressing  Goal: Increase self care and/or family involovement by end of shift  11/1/2024 1001 by Irene Gordillo RN  Outcome: Progressing  11/1/2024 0943 by Irene  JEAN MARIE Gordillo  Outcome: Progressing  Goal: Receive DSME education by end of shift  11/1/2024 1001 by Irene Gordillo RN  Outcome: Progressing  11/1/2024 0943 by Irene Gordillo RN  Outcome: Progressing     Problem: Skin  Goal: Decreased wound size/increased tissue granulation at next dressing change  11/1/2024 1001 by Irene Gordillo RN  Outcome: Progressing  Flowsheets (Taken 11/1/2024 1001)  Decreased wound size/increased tissue granulation at next dressing change:   Promote sleep for wound healing   Utilize specialty bed per algorithm   Protective dressings over bony prominences  11/1/2024 0943 by Irene Gordillo RN  Outcome: Progressing  Goal: Participates in plan/prevention/treatment measures  11/1/2024 1001 by Irene Gordillo RN  Outcome: Progressing  Flowsheets (Taken 11/1/2024 1001)  Participates in plan/prevention/treatment measures:   Discuss with provider PT/OT consult   Increase activity/out of bed for meals  11/1/2024 0943 by Irene Gordillo RN  Outcome: Progressing  Goal: Prevent/manage excess moisture  11/1/2024 1001 by Irene Gordillo RN  Outcome: Progressing  Flowsheets (Taken 11/1/2024 1001)  Prevent/manage excess moisture:   Cleanse incontinence/protect with barrier cream   Follow provider orders for dressing changes   Monitor for/manage infection if present  11/1/2024 0943 by Irene Gordillo RN  Outcome: Progressing  Goal: Prevent/minimize sheer/friction injuries  11/1/2024 1001 by Irene Gordillo RN  Outcome: Progressing  Flowsheets (Taken 11/1/2024 1001)  Prevent/minimize sheer/friction injuries:   Complete micro-shifts as needed if patient unable. Adjust patient position to relieve pressure points, not a full turn   HOB 30 degrees or less   Increase activity/out of bed for meals   Turn/reposition every 2 hours/use positioning/transfer devices   Use pull sheet   Utilize specialty bed per algorithm  11/1/2024 0943 by Irene Gordillo RN  Outcome: Progressing  Goal: Promote/optimize  nutrition  11/1/2024 1001 by Irene Gordillo RN  Outcome: Progressing  Flowsheets (Taken 11/1/2024 1001)  Promote/optimize nutrition:   Discuss with provider if NPO > 2 days   Monitor/record intake including meals  11/1/2024 0943 by Irene Gordillo RN  Outcome: Progressing  Goal: Promote skin healing  11/1/2024 1001 by Irene Gordillo RN  Outcome: Progressing  Flowsheets (Taken 11/1/2024 1001)  Promote skin healing:   Assess skin/pad under line(s)/device(s)   Protective dressings over bony prominences   Rotate device position/do not position patient on device   Turn/reposition every 2 hours/use positioning/transfer devices  11/1/2024 0943 by Irene Gordillo RN  Outcome: Progressing     Problem: Nutrition  Goal: Consume prescribed supplement  11/1/2024 1001 by Irene Gordillo RN  Outcome: Progressing  11/1/2024 0943 by Irene Gordillo RN  Outcome: Progressing  Goal: Nutrition support goals are met within 48 hrs  11/1/2024 1001 by Irene Gordillo RN  Outcome: Progressing  11/1/2024 0943 by Irene Gordillo RN  Outcome: Progressing  Goal: Nutrition support is meeting 75% of nutrient needs  11/1/2024 1001 by Irene Gordillo RN  Outcome: Progressing  11/1/2024 0943 by Irene Gordillo RN  Outcome: Progressing  Goal: BG  mg/dL  11/1/2024 1001 by Irene Gordillo RN  Outcome: Progressing  11/1/2024 0943 by Irene Gordillo RN  Outcome: Progressing  Goal: Lab values WNL  11/1/2024 1001 by Irene Gordillo RN  Outcome: Progressing  11/1/2024 0943 by Irene Gordillo RN  Outcome: Progressing  Goal: Electrolytes WNL  11/1/2024 1001 by Irene Gordillo RN  Outcome: Progressing  11/1/2024 0943 by Irene Gordillo RN  Outcome: Progressing  Goal: Promote healing  11/1/2024 1001 by Irene Gordillo RN  Outcome: Progressing  11/1/2024 0943 by Irene Gordillo RN  Outcome: Progressing  Goal: Maintain stable weight  11/1/2024 1001 by Irene Gordillo RN  Outcome: Progressing  11/1/2024 0943 by Irene Gordillo, JEAN MARIE  Outcome:  Progressing  Goal: Reduce weight from edema/fluid  11/1/2024 1001 by Irene Gordillo RN  Outcome: Progressing  11/1/2024 0943 by Irene Gordillo RN  Outcome: Progressing     Problem: Respiratory  Goal: No signs of respiratory distress (eg. Use of accessory muscles. Peds grunting)  11/1/2024 1001 by Irene Gordillo RN  Outcome: Progressing  11/1/2024 0943 by Irene Gordillo RN  Outcome: Progressing  Goal: Clear secretions with interventions this shift  11/1/2024 1001 by Irene Gordillo RN  Outcome: Progressing  11/1/2024 0943 by Irene Gordillo RN  Outcome: Progressing  Goal: Minimize anxiety/maximize coping throughout shift  11/1/2024 1001 by Irene Gordillo RN  Outcome: Progressing  11/1/2024 0943 by Irene Gordillo RN  Outcome: Progressing  Goal: Minimal/no exertional discomfort or dyspnea this shift  11/1/2024 1001 by Irene Gordillo RN  Outcome: Progressing  11/1/2024 0943 by Irene Gordillo RN  Outcome: Progressing  Goal: Patent airway maintained this shift  11/1/2024 1001 by Irene Gordillo RN  Outcome: Progressing  11/1/2024 0943 by Irene Gordillo RN  Outcome: Progressing  Goal: Tolerate mechanical ventilation evidenced by VS/agitation level this shift  11/1/2024 1001 by Irene Gordillo RN  Outcome: Progressing  11/1/2024 0943 by Irene Gordillo RN  Outcome: Progressing  Goal: Tolerate pulmonary toileting this shift  11/1/2024 1001 by Irene Gordillo RN  Outcome: Progressing  11/1/2024 0943 by Irene Gordillo RN  Outcome: Progressing  Goal: Verbalize decreased shortness of breath this shift  11/1/2024 1001 by Irene Gordillo RN  Outcome: Progressing  11/1/2024 0943 by Irene Gordillo RN  Outcome: Progressing  Goal: Wean oxygen to maintain O2 saturation per order/standard this shift  11/1/2024 1001 by Irene Gordillo RN  Outcome: Progressing  11/1/2024 0943 by Irene Gordillo RN  Outcome: Progressing  Goal: Increase self care and/or family involvement in next 24 hours  11/1/2024 1001 by Irene Gordillo  RN  Outcome: Progressing  11/1/2024 0943 by Irene Gordillo RN  Outcome: Progressing     Problem: Pain  Goal: Takes deep breaths with improved pain control throughout the shift  11/1/2024 1001 by Irene Gordillo RN  Outcome: Progressing  11/1/2024 0943 by Irene Gordlilo RN  Outcome: Progressing  Goal: Turns in bed with improved pain control throughout the shift  11/1/2024 1001 by Irene Gordillo RN  Outcome: Progressing  11/1/2024 0943 by Irene Gordillo RN  Outcome: Progressing  Goal: Walks with improved pain control throughout the shift  11/1/2024 1001 by Irene Gordillo RN  Outcome: Progressing  11/1/2024 0943 by Irene Gordillo RN  Outcome: Progressing  Goal: Performs ADL's with improved pain control throughout shift  11/1/2024 1001 by Irene Gordillo RN  Outcome: Progressing  11/1/2024 0943 by Irene Gordillo RN  Outcome: Progressing  Goal: Participates in PT with improved pain control throughout the shift  11/1/2024 1001 by Irene Gordillo RN  Outcome: Progressing  11/1/2024 0943 by Irene Gordillo RN  Outcome: Progressing  Goal: Free from opioid side effects throughout the shift  11/1/2024 1001 by Ireen Gordillo RN  Outcome: Progressing  11/1/2024 0943 by Irene Gordillo RN  Outcome: Progressing  Goal: Free from acute confusion related to pain meds throughout the shift  11/1/2024 1001 by Irene Gordillo RN  Outcome: Progressing  11/1/2024 0943 by Irene Gordillo RN  Outcome: Progressing     Problem: Fall/Injury  Goal: Not fall by end of shift  11/1/2024 1001 by Irene Gordillo RN  Outcome: Progressing  11/1/2024 0943 by Irene Gordillo RN  Outcome: Progressing  Goal: Be free from injury by end of the shift  11/1/2024 1001 by rIene Gordillo RN  Outcome: Progressing  11/1/2024 0943 by Irene Gordillo RN  Outcome: Progressing  Goal: Verbalize understanding of personal risk factors for fall in the hospital  11/1/2024 1001 by Irene Gordillo RN  Outcome: Progressing  11/1/2024 0943 by Irene Gordillo  RN  Outcome: Progressing  Goal: Verbalize understanding of risk factor reduction measures to prevent injury from fall in the home  11/1/2024 1001 by Irene Gordillo RN  Outcome: Progressing  11/1/2024 0943 by Irene Gordillo RN  Outcome: Progressing  Goal: Use assistive devices by end of the shift  11/1/2024 1001 by Irene Gordillo RN  Outcome: Progressing  11/1/2024 0943 by Irene Gordillo RN  Outcome: Progressing  Goal: Pace activities to prevent fatigue by end of the shift  11/1/2024 1001 by Irene Gordillo RN  Outcome: Progressing  11/1/2024 0943 by Irene Gordillo RN  Outcome: Progressing

## 2024-11-01 NOTE — PROGRESS NOTES
INFECTIOUS DISEASES PROGRESS NOTE    Consulted / following patient for:  Respiratory failure/pneumonia/Pseudomonas in the sputum  Recent polymicrobial complicated postoperative lumbar wound infection, MRSA and Proteus  Acute kidney injury    Subjective   Interval History:   (Somnolent)    Objective   PHYSICAL EXAMINATION  Vital signs: Afebrile, off vasopressor agents  General: Extubated.  Somnolent on positive pressure mask  Lungs: Diminished, clear.    Heart:  S1, S2 normal  Abdomen:  Soft, obese, no guarding.   Extremities:  No cords, phlebitis, cellulitis.      Relevant Results  WBC: 9400  Creatinine: (Dialysis)  CK: 45  Pleural fluid: Transudate with 197 WBC, 56% neutrophils, protein 1.8, LDH 97, glucose 202  Microbiology:  Blood (10/12): Negative X2  Sputum (10/13): Stain with moderate GNB and moderate PMN, culture Pseudomonas aeruginosa, susceptible to Zosyn and cefepime  Sputum (10/21): Normal neva, no MRSA  Sputum (10/28): Pseudomonas, pan-susceptible  Urine: Moderate colony count Candida lusitaniae  Pleural fluid (10/17): Negative  C. difficile PCR (10/30): Negative    Imaging:  CXR images (10/31) personally reviewed: Intubated.  No significant change compared with most recent film    Assessment:  Sepsis -likely due to pneumonia, present on admission. Patient already on IV vancomycin and IV ceftriaxone at time of this admission for lumbar spinal infection.  Resolved with anti-Pseudomonas antimicrobial therapy.  Large transudative pleural effusion was tapped.  Extubated.  Had fever and purulent secretions in the evening of 10/28, Pseudomonas in sputum, started on aerosol tobramycin.  Afebrile with stable chest x-ray and improving respiratory status  Acute kidney injury.  Now on a schedule of thrice weekly hemodialysis  Lumbar spine surgical site infection s/p I&D 9/28. Cultures + MRSA, Proteus.  Was to be on vanco/ceftriaxone through 11/12. Followed by Dr. Brant Juarez.  Vancomycin has been changed to  daptomycin because of AMY    Plan/Recommendations:  Continue daptomycin 700 mg every 48 hour until 11/12, dose after dialysis when a dose is due on a dialysis day  Continue ceftriaxone until 11/12  Aerosol tobramycin 300 mg every 12 hours, day 4 of 5  Monitor CK weekly while on daptomycin      Dr. Bruner covering 11/2-11/3 and will see prn your call    Andrew Malagon MD  [facility]  Office:  [phone]

## 2024-11-01 NOTE — PROGRESS NOTES
[facility] Critical Care Medicine       Date:  11/1/2024  Patient:  Narda Malloy  YOB: 1955  MRN:  [id number]   Admit Date:  10/12/2024    Chief Complaint   Patient presents with    Altered Mental Status         History of Present Illness:  Narda Malloy is a 69 y.o. year old female patient with Past Medical History of L1-L3 lumbar laminectomy, T4-S1 revision, and fusion August 26th, T2DM, HTN, essential tremor, HLD, glaucoma, sarcoidosis of the lung who presented to  ED 10/12 after being found essentially unresponsive at her nursing facility [facility]. Per report from her , she has had a significant decline in her health since July 15th when she had a fall and became significantly weak. She has also had multiple infection complications since her back surgery in August requiring multiple I&Ds and long term antibiotic therapy. She went to the OR most recently on 9/28 for lumbar site infection wash out. Per chart review, she was discharged on IV vancomycin 1g and IV ceftriaxone 2d q24hrs through 11/12.     ED Course: Initial vital signs: /104 (109), HR 68, RR 20, SpO2 95% on 6L NC, temp 34.5C. Give 0.4mg of narcan with no improvement in mentation. Lab work-up remarkable for mild hyperkalemia (5.5), AMY 42/1.46, elevated alk phos, normocytic anemia 10.4/33, turbid appearing urine with mild hematuria and proteinuria and + leuk esterase, >50 WBCs. Urine drug screen positive for barbiturates. Triggered sepsis timer so she was given 3L NS. She was intubated for airway protection with 20mg etomidate and 100mg succinylcholine. BP dropped post-intubated and fluid resuscitation and she was subsequently started on levophed.             Interval ICU Events:  10/12: Pt arrived to ICU intubated and lightly sedated on low-dose versed. Eyes open, minimally responsive.      10/13: Received K cocktail last night for K 6.0, corrected appropriately. Levophed requirements mildly up, UOP  decreasing. Ordered albumin x2 this am with improvements in UOP and SBP. Will likely trial lasix this afternoon d/t hypervolemia.     10/14: Remains intubated with decreased mentation, only responsive to noxious stimuli. Trialed lasix TID for volume overload. Remains on levophed 0.01.     10/15: Mentation much improved. SAT/SBT successful so extubated. Given lasix x3, bumex x1, metolazone x1 with net negative fluid balance of 500mL -> started on bumex gtt.      10/16: O2 requirements significantly increased, NRB -> HFNC 40L 100% likely 2/2 mucus plugging.     10/17: No acute events overnight. Bumex gtt increased to 1mg/hr. CXR this am showing complete opacification of left hemithorax related to atelectasis vs pleural effusion. Remains on HFNC 40L/80%. Pigtail catheter placed with 1.2L drained immediately. Given albumin for hypotension.      10/18: ~2L output from left sided pigtail catheter since placement. Kidney function worsening and UOP declining, about 20cc/hr overnight. Will place NG tube today and start enteral nutrition and appetite and oral intake remains poor.      10/19: Patient with worsening hypoxic, hypercapnic respiratory failure - now requiring BIPAP support. Remains grossly volume overloaded with low UO. Started on vasopressors to augment BP for diuresis. 40 IV lasix + gtt started. Remains with poor nutritional status. Will re attempt NG later if respiratory status improves.      10/20: Patient stable on vent. Nephrology consulted for renal failure. Cr continues to uptrend however UO is increasing. No issues overnight.     10/21: No issues overnight. Remains stable on vent. Levo down to 0.01 mcg/kg/min. UO remains low. Nephrology following. Possible CRRT today?     10/22: Patient received 80 lasix and metalozone with minimal UO. Levo @ .02 and prop @ 10. Vent settings: 20/450/10/40%. Plan for CRRT today. Will SAT/SBT after CRRT. May change propofol to precedex.     10/23: Had episodes of bradycardia  where HR went to 30's which resolved with heating the patient. CRRT yesterday with about 1L removed. Currently on 0.03 of levo and 10 of prop. Cont daptomycin and ceftriaxone per ID. CXR improved. SAT/SBT. EP consulted for episodes of bradycardia.     10/24: Bradycardic episodes of HR in 40s. Currently on 0.03 of Levo, 10 of prop and 50 fentanyl. Tolerating CRRT. Place PICC today.     10/25: Did well with the SBT yesterday, plan for another one today. Continue CRRT. Hgb 7.0 this am, still requiting Levo 0.03, will transfuse 1 unit PRBCs. Remove chest tube today.     10/26: Chest tube removed last night, CXR improving, patient appears overall lethargic on sbt/sat, not ready to extubate, will complete 4/4/4 sbt/rest/sbt-> rest today and reassess tomorrow     10/27: Patient failed afternoon SBT, placed on rate overnight, net - 2.5L over previous 24 hours, will place on wean today.     10/29: Patient with high residual tube feeds overnight.  She did have an episode of vomiting.  Tube feeds placed on hold.  She was also afebrile overnight.  Will obtain a KUB to rule out ileus.  Sputum culture with Pseudomonas, discussed antibiotic plans with ID.  Will not do SBT with patient today.  Did discuss the setback with her , he did state moving forward that if she does not reach extubation he would be agreeable to a tracheostomy.      10/30: No significant events overnight.  Tube feeds resumed at trickle rate yesterday, tolerating overnight.  Will increase to goal today.  Dialysis this morning.  Patient improved from yesterday.  Plan for SBT today.  Will attempt to sit patient on side of bed today.    10/31: No significant events overnight. Tolerating tube feeds, tube feeds on hold this AM for SBT. Patient awake and alert this morning, improved from yesterday.  Tolerating SBT, answering yes or no questions.  Will extubate today.    11/1: Pt extubated yesterday to HFNC. Had worsened respiratory distress requiring bipap,  wore bipap overnight. Will trial back on HFNC when more awake. Dialysis scheduled for today. Will remove spinal sutures.     Medical History:  Past Medical History:   Diagnosis Date    Degenerative myopia, bilateral     Diabetic neuropathy (Multi)     Difficult intubation 08/26/2024    Mac 3, grade 3, 1 attempt.  Glidescope/videolaryngoscopy recommended for future attempts.    DM type 2 (diabetes mellitus, type 2) (Multi)     Dry eye syndrome of bilateral lacrimal glands     Essential hypertension     Essential tremor     Glaucoma     Hyperlipidemia     Long term (current) use of insulin (Multi)     Low back pain     PONV (postoperative nausea and vomiting)     Primary open angle glaucoma of both eyes, severe stage     Repeated falls     Sarcoidosis of lung (Multi)     Spinal stenosis, lumbar region without neurogenic claudication     Weakness      Past Surgical History:   Procedure Laterality Date    BLEPHAROPLASTY  07/2022    BREAST SURGERY  05/20/2022    Breast lift    CARPAL TUNNEL RELEASE      CATARACT EXTRACTION W/  INTRAOCULAR LENS IMPLANT Bilateral     OD 08/04/2011 +8.5D,OS 08/04/2011 +8.50D    FOOT SURGERY      INSERTION / REMOVAL CRANIAL DBS GENERATOR      Placed 2017.  Removed 2018. part of wire left in head when everything removed    LUMBAR FUSION      L3-S1    PANRETINAL PHOTOCOAGULATION  2014    THORACIC FUSION  08/26/2024    T4-S1 fusion    VITRECTOMY Right 2013     Medications Prior to Admission   Medication Sig Dispense Refill Last Dose/Taking    acetaminophen (Tylenol) 500 mg tablet Take 2 tablets (1,000 mg) by mouth 3 times a day.   Unknown    ascorbic acid (Vitamin C) 500 mg tablet as directed Orally   Unknown    brimonidine (AlphaGAN) 0.2 % ophthalmic solution Administer 1 drop into both eyes 2 times a day.   Unknown    calcium carbonate-vitamin D3 500 mg-5 mcg (200 unit) tablet Take 1 tablet by mouth once daily.   Unknown    cefTRIAXone (Rocephin) 2 gram/50 mL IV Infuse 50 mL (2 g) at 100  mL/hr over 30 minutes into a venous catheter once every 24 hours. Once weekly labs CBC/diff, CMP, Vanc trough ESR, CRP fax to Dr. Juarez [phone]. Stop date 11/12/24. 1950 mL 0     dextrose 50 % injection Infuse 25 mL (12.5 g) into a venous catheter every 15 minutes if needed (For blood glucose 41 to 70 mg/dL).       dextrose 50 % injection Infuse 50 mL (25 g) into a venous catheter every 15 minutes if needed (For blood glucose less than or equal to 40 mg/dL).       docusate sodium (Colace) 100 mg capsule Take 1 capsule (100 mg) by mouth 2 times a day.   Unknown    ezetimibe (Zetia) 10 mg tablet Take 1 tablet (10 mg) by mouth once daily. 90 tablet 3 Unknown    FreeStyle Lite Strips strip USE TO TEST 3 TIMES A DAY AS DIRECTED 300 each 2     glucagon (Glucagen) 1 mg injection Inject 1 mg into the muscle every 15 minutes if needed for low blood sugar - see comments (Hypoglycemia).       glucagon (Glucagen) 1 mg injection Inject 1 mg into the muscle every 15 minutes if needed for low blood sugar - see comments (Hypoglycemia).       heparin sodium,porcine (heparin, porcine,) 5,000 unit/mL injection Inject 1 mL (5,000 Units) under the skin every 8 hours.       insulin lispro (HumaLOG) 100 unit/mL injection Inject 0-15 Units under the skin 3 times daily (morning, midday, late afternoon). Take as directed per insulin instructions.Do not hold when patient is not eating, continue order as scheduled for hyperglycemia management.  Insulin Lispro Corrective Scale #3     Hypoglycemia protocol Call LIP unit(s) if Blood Glucose is between 0 - 70 mg/dL     0 unit(s) if Blood glucose is between    3 unit(s) if Blood glucose is between 151-200   6 unit(s) if Blood glucose is between 201-250   9 unit(s) if Blood glucose is between 251-300   12 unit(s) if Blood glucose is between 301-350   15 unit(s) if Blood glucose is between 351-400    Notify provider unit(s) if Blood Glucose is greater than 400 mg/dL       Lactobacillus  "acidophilus 100 mg (1 billion cell) capsule Take 1 capsule by mouth 2 times a day.   Unknown    latanoprost (Xalatan) 0.005 % ophthalmic solution Administer 1 drop into both eyes once daily at bedtime. 2.5 mL 5 Unknown    melatonin 5 mg tablet Take 1 tablet (5 mg) by mouth as needed at bedtime for sleep.   Unknown    methocarbamol (Robaxin) 500 mg tablet Take 1 tablet (500 mg) by mouth 3 times a day.   Unknown    multivitamin tablet Take 1 tablet by mouth once daily.   Unknown    ondansetron (Zofran) 4 mg/2 mL injection Infuse 2 mL (4 mg) into a venous catheter every 6 hours if needed for nausea or vomiting.       oxyCODONE (Roxicodone) 5 mg immediate release tablet Take 1 tablet (5 mg) by mouth every 6 hours if needed for severe pain (7 - 10) or moderate pain (4 - 6).       oxygen (O2) gas therapy Inhale 1 each continuously.       pantoprazole (ProtoNix) 40 mg EC tablet Take 1 tablet (40 mg) by mouth once daily in the morning. Take before meals. Do not crush, chew, or split.       pantoprazole (ProtoNix) 40 mg injection Infuse 40 mg into a venous catheter once daily in the morning. Take before meals. If unable to take PO.       pen needle, diabetic (PEN NEEDLE MISC) BD Altagracia- 4 mm X 32 G needle - as directed 4x a day sc 4 times per day       polyethylene glycol (Glycolax, Miralax) 17 gram packet Take 17 g by mouth once daily.   Unknown    primidone 125 mg tablet Take 125 mg by mouth 3 times a day.   Unknown    propranolol LA (Inderal LA) 60 mg 24 hr capsule Take 1 capsule (60 mg) by mouth early in the morning.. Hold for SBP < 110 mmhg, HR < 60 bpm.   Unknown    sennosides (Senokot) 8.6 mg tablet Take 1 tablet (8.6 mg) by mouth every 12 hours if needed for constipation.   Unknown    Sure Comfort Pen Needle 32 gauge x 5/32\" needle AS DIRECTED DAILY FOR 90 DAYS 100 each 11 Unknown    traZODone (Desyrel) 25 MG split tablet Take 1 half tablet (25 mg) by mouth once daily at bedtime.   Unknown    vancomycin (Vancocin) 1 " gram/250 mL solution Infuse 250 mL (1 g) at 250 mL/hr over 60 minutes into a venous catheter every 12 hours. Once weekly labs CBC/diff, CMP, Vanc trough ESR, CRP fax to Dr. Juarez [phone]. Stop date 11/12/24. 38828 mL 0      Erythromycin, Morphine, and Rosuvastatin  Social History     Tobacco Use    Smoking status: Former     Types: Cigarettes     Passive exposure: Past    Smokeless tobacco: Never   Vaping Use    Vaping status: Never Used   Substance Use Topics    Alcohol use: Not Currently    Drug use: Not Currently     Family History   Problem Relation Name Age of Onset    Multiple myeloma Mother      Cancer Mother      Other (CABG) Father      Pulmonary embolism Father      Heart disease Father      Breast cancer Sister          Stage II    Hypertension Sister      Diabetes Sister      No Known Problems Sister          x5    No Known Problems Brother          x4    No Known Problems Daughter         Review of Systems:  14 point review of systems was completed and negative except for those specially mention in my HPI    Physical Exam:    Heart Rate:  []   Temp:  [36.8 °C (98.2 °F)-37.2 °C (99 °F)]   Resp:  [14-43]   BP: ()/(55-78)   Weight:  [118 kg (259 lb 11.2 oz)-119 kg (261 lb 11 oz)]   SpO2:  [90 %-100 %]     Physical Exam  Vitals reviewed.   Constitutional:       General: She is awake.      Interventions: She is intubated.   HENT:      Head: Normocephalic and atraumatic.      Right Ear: External ear normal.      Left Ear: External ear normal.      Nose: Nose normal.      Mouth/Throat:      Mouth: Mucous membranes are dry.   Eyes:      Pupils: Pupils are equal, round, and reactive to light.   Cardiovascular:      Rate and Rhythm: Normal rate and regular rhythm.      Pulses: Normal pulses.      Heart sounds: Normal heart sounds.   Pulmonary:      Effort: She is intubated.      Breath sounds: Examination of the right-lower field reveals decreased breath sounds. Examination of the left-lower  field reveals decreased breath sounds. Decreased breath sounds present.   Abdominal:      General: Bowel sounds are decreased.      Palpations: Abdomen is soft.      Tenderness: There is no abdominal tenderness.   Musculoskeletal:      Right hand: Swelling present.      Left hand: Swelling present.      Right lower le+ Edema present.      Left lower le+ Edema present.   Skin:     General: Skin is warm.      Capillary Refill: Capillary refill takes less than 2 seconds.   Neurological:      Mental Status: She is alert.       Objective:    I have reviewed all medications, laboratory results, and imaging pertinent for today's encounter    Assessment/Plan:    I am currently managing this critically ill patient for the following problems:    Neuro/Psych/Pain Ctrl/Sedation:  Acute encephalopathy - likely 2/2 hypercapnia, & infection- resolving   Hx essential tremor   CT head: Negative for acute findings   Urine tox positive for barbiturates consistent with primidone intake   - Pain Control: oxycodone, acetaminophen, dilaudid for dressing changes PRN  - Home primidone continued. Will hold propanolol d/t hypotension  - CAM ICU qshift, sleep-wake hygiene, delirium precautions    Respiratory/ENT:  Acute hypoxic/hypercapnic respiratory failure - likely multifactorial and 2/2 HCAP, pl effusions, volume overload  Healthcare-associated pneumonia   Atelectasis - likely 2/2 mucus plugging   Pleural effusion -S/p left-sided pigtail placement 10/17, removed 10/25-resolved  Intubated for 3 days extubated and re intubated on the , currently day 11 of intubation  - Supplemental O2: continuous bipap, will trial on HFNC  -- Extubated 10/31  - Will wean O2 as tolerated to maintain SpO2 >92%  - Duonebs Q4 and hypertonic saline QID   - IPV per RT TID  - Continuous pulse ox monitoring   - Pulm hygiene  - Aspiration precautions   - Respiratory culture with Pseudomonas, see abx below     Cardiovascular:  Septic shock  -resolved  Occlusive superficial venous thrombus of the left cephalic vein  Acute on chronic diastolic heart failure  Hx HTN, HLD  TTE 10/1: diastolic dysfunction, LVEF 50-55%, mildly elevated RVSP (40)  Bilateral duplex US upper extremities:  Thrombosis within the left cephalic vein, which is part of the superficial venous system of the upper extremity, adjacent to PICC line. No deep venous thrombosis in the upper extremities.  - Continue midodrine   - Holding home propranolol (on for tremors)  - Continue home Zetia  - Continuous cardiac monitoring per ICU protocol  - EKGs PRN for ACS symptoms, arrhythmias   - US duplex b/l upper extremities-occlusive superficial vein thrombosis of left cephalic vein from mid-distal upper arm extending to approximately 2.0cm proximal to subclavian junction   -Acute nonocclusive DVT right IJ vein around line placement, will need to be removed   -Continue heparin drip  -Repeat right IJ duplex on Monday    GI:  Hx GERD  - Diet: enteral feeding via NG  - BR: Colace, Miralax PRN  - GI Prophylaxis: PPI  - Patient had a few episodes of diarrhea, sample sent, C-Diff negative     Renal/Volume Status (Intra & Extravascular):  Acute kidney injury - possibly ATN +/- medication-induced (vancomycin)  Anasarca   Hypoalbuminemia   Hyponatremia  Baseline Cr 0.8-1.0. BUN Cr 1.45/25 today   - Hemodialysis today with 3L removed  - MWF schedule, will plan for tunneled line when more stable from respiratory standpoint   - Nephrology following  - Bladder scan daily   - Replete electrolytes to maintain K >4.0 and Mg >2.0  - Daily BMP, Mg, Phos  -Free water flushes being held due to hyponatremia    Endocrine  T2DM  - SSI Q 4 while NPO  - Hypoglycemia protocol PRN    Infectious Disease:  Pseudomonas-Respiratory culture 10/29  Thoracolumbar surgical site infection - s/p I&D x 2. Recent MRSA bacteremia on extended course of IV antibiotics (vanc and rocephin -> 11/12)  Healthcare-associated pneumonia   CT  "lumbar spine 10/12: Unable to r/o abscess. Unable to do MRI d/t spinal hardware, \"metal in head\" per patient  Sputum culture 10/16: + pseudomonas   - ID following  - Inhaled tobramycin (10/29...)  - Continue Ceftriaxone (10/23-...)  - Continue Daptomycin (10/21-...)  - Cefepime completed on 10/22  - PICC placed 10/24  - Monitor SIRS criteria  - Consult placed to Dr. Ventura    Heme/Onc:  #Normocytic anemia   H/H similar to previous: No active signs of bleeding.  - Continue iron and folate  - Monitor for s/sx of bleeding   - Plan to transfuse if Hgb <7.0   - Daily CBC  - Keep active type and screen    MSK:  PT/OT- no need to hold PT/OT patient was at a rehab facility after lumbar laminectomy for PT/OT before this admission   10/27 & 10/28 sat on side of the bed for 5 min     Ethics/Code Status:  Full Code     :  DVT Prophylaxis: Heparin gtt  GI Prophylaxis: PPI  Bowel Regimen: Colace and Miralax   Diet: Tube Feeds  CVC: PICC placed 10/24, trialysis right internal jugular placed 1019   Beasley: none  Gibson: none  Restraints: none  Dispo: ICU    Critical Care Time:  53 minutes spent in preparing to see patient (I.e. review of medical records), evaluation of diagnostics (I.e. labs, imaging, etc.), documentation, discussing plan of care with patient/ family/ caregiver, and/ or coordination of care with multidisciplinary team. Time does not include completion of procedure time.     Kaleb Lam PA-C  Pulmonary and Critical Care Medicine  [facility]   "

## 2024-11-01 NOTE — PROGRESS NOTES
Patient not medically clear. Patient extubated yesterday and is now on continuous bipap. At this time there is not a safe discharge plan in place. Patient was at [facility] prior to admission. If she will return then a precert will be needed. Will follow.       11/01/24 0521   Discharge Planning   Home or Post Acute Services Other (Comment)   Expected Discharge Disposition [place]  (TBD)   Does the patient need discharge transport arranged? Yes   RoundTrip coordination needed? Yes

## 2024-11-01 NOTE — PROGRESS NOTES
Occupational Therapy                 Therapy Communication Note    Patient Name: Narda Malloy  MRN: [id number]  Department: [facility] DIALYSIS  Room: 11/11-A  Today's Date: 11/1/2024     Discipline: Occupational Therapy    Missed Visit Reason: Cancel (Pt is now on continuous BIPAP and currently receiving bedside dialysis.)    Missed Time: Cancel

## 2024-11-01 NOTE — CARE PLAN
1759: RT assisted in intubation. Pt intubated with a 7.5 ETT 24cm @ lips. Current vent settings are PRVC: RR 16, , PEEP 8, FiO2 50%.    RT to follow.

## 2024-11-01 NOTE — POST-PROCEDURE NOTE
Report to Receiving RN:    Report To: Irene Gordillo  Time Report Called: [id number]  Hand-Off Communication: BP recovered post treatment 106/50.  Patient removed 3.0 liters  Complications During Treatment: No  Ultrafiltration Treatment: No  Medications Administered During Dialysis: Yes  Blood Products Administered During Dialysis: No  Labs Sent During Dialysis: No  Heparin Drip Rate Changes: Yes  Dialysis Catheter Dressing: dry and intact      Electronic Signatures:  Mya Delgado RN       Last Updated: 2:43 PM by MYA DELGADO

## 2024-11-01 NOTE — NURSING NOTE
The intensivist team decided to reintubate the patient.  ETT size is 7.5 cm, 24 at the lip.  Etomodate and Sudhakar were given as ordered.  Patient tolerated well.

## 2024-11-01 NOTE — CARE PLAN
The patient's goals for the shift include unable to assess    The clinical goals for the shift include Maintain hemodynamic stability      Problem: Safety - Adult  Goal: Free from fall injury  Outcome: Progressing     Problem: Discharge Planning  Goal: Discharge to home or other facility with appropriate resources  Outcome: Progressing     Problem: Chronic Conditions and Co-morbidities  Goal: Patient's chronic conditions and co-morbidity symptoms are monitored and maintained or improved  Outcome: Progressing     Problem: Diabetes  Goal: Increase stability of blood glucose readings by end of shift  Outcome: Progressing  Goal: Maintain electrolyte levels within acceptable range throughout shift  Outcome: Progressing  Goal: Maintain glucose levels >70mg/dl to <250mg/dl throughout shift  Outcome: Progressing  Goal: Learn about and adhere to nutrition recommendations by end of shift  Outcome: Progressing  Goal: Vital signs within normal range for age by end of shift  Outcome: Progressing  Goal: Increase self care and/or family involovement by end of shift  Outcome: Progressing  Goal: Receive DSME education by end of shift  Outcome: Progressing     Problem: Knowledge Deficit  Goal: Patient/family/caregiver demonstrates understanding of disease process, treatment plan, medications, and discharge instructions  Outcome: Progressing     Problem: Skin  Goal: Decreased wound size/increased tissue granulation at next dressing change  Outcome: Progressing  Flowsheets (Taken 10/31/2024 2058)  Decreased wound size/increased tissue granulation at next dressing change:   Promote sleep for wound healing   Utilize specialty bed per algorithm   Protective dressings over bony prominences  Goal: Participates in plan/prevention/treatment measures  Outcome: Progressing  Flowsheets (Taken 10/31/2024 2058)  Participates in plan/prevention/treatment measures:   Discuss with provider PT/OT consult   Elevate heels  Goal: Prevent/manage excess  moisture  Outcome: Progressing  Flowsheets (Taken 10/31/2024 2058)  Prevent/manage excess moisture:   Cleanse incontinence/protect with barrier cream   Moisturize dry skin   Follow provider orders for dressing changes   Monitor for/manage infection if present  Goal: Prevent/minimize sheer/friction injuries  Outcome: Progressing  Flowsheets (Taken 10/31/2024 2058)  Prevent/minimize sheer/friction injuries:   Complete micro-shifts as needed if patient unable. Adjust patient position to relieve pressure points, not a full turn   Increase activity/out of bed for meals   Use pull sheet   Turn/reposition every 2 hours/use positioning/transfer devices   HOB 30 degrees or less   Utilize specialty bed per algorithm  Goal: Promote/optimize nutrition  Outcome: Progressing  Flowsheets (Taken 10/31/2024 2058)  Promote/optimize nutrition: Discuss with provider if NPO > 2 days  Goal: Promote skin healing  Outcome: Progressing  Flowsheets (Taken 10/31/2024 2058)  Promote skin healing:   Assess skin/pad under line(s)/device(s)   Protective dressings over bony prominences   Turn/reposition every 2 hours/use positioning/transfer devices   Ensure correct size (line/device) and apply per  instructions   Rotate device position/do not position patient on device     Problem: Nutrition  Goal: Consume prescribed supplement  Outcome: Progressing  Goal: Nutrition support goals are met within 48 hrs  Outcome: Progressing  Goal: Nutrition support is meeting 75% of nutrient needs  Outcome: Progressing  Goal: BG  mg/dL  Outcome: Progressing  Goal: Lab values WNL  Outcome: Progressing  Goal: Electrolytes WNL  Outcome: Progressing  Goal: Promote healing  Outcome: Progressing  Goal: Maintain stable weight  Outcome: Progressing  Goal: Reduce weight from edema/fluid  Outcome: Progressing     Problem: Respiratory  Goal: No signs of respiratory distress (eg. Use of accessory muscles. Peds grunting)  Outcome: Progressing  Goal: Clear  secretions with interventions this shift  Outcome: Progressing  Goal: Minimize anxiety/maximize coping throughout shift  Outcome: Progressing  Goal: Minimal/no exertional discomfort or dyspnea this shift  Outcome: Progressing  Goal: Patent airway maintained this shift  Outcome: Progressing  Goal: Tolerate mechanical ventilation evidenced by VS/agitation level this shift  Outcome: Progressing  Goal: Tolerate pulmonary toileting this shift  Outcome: Progressing  Goal: Verbalize decreased shortness of breath this shift  Outcome: Progressing  Goal: Wean oxygen to maintain O2 saturation per order/standard this shift  Outcome: Progressing  Goal: Increase self care and/or family involvement in next 24 hours  Outcome: Progressing     Problem: Pain  Goal: Takes deep breaths with improved pain control throughout the shift  Outcome: Progressing  Goal: Turns in bed with improved pain control throughout the shift  Outcome: Progressing  Goal: Walks with improved pain control throughout the shift  Outcome: Progressing  Goal: Performs ADL's with improved pain control throughout shift  Outcome: Progressing  Goal: Participates in PT with improved pain control throughout the shift  Outcome: Progressing  Goal: Free from opioid side effects throughout the shift  Outcome: Progressing  Goal: Free from acute confusion related to pain meds throughout the shift  Outcome: Progressing

## 2024-11-01 NOTE — PROCEDURES
Endotracheal Intubation      Indication(s):  -Hypoxemic respiratory failure     Induction agents:  -Etomidate 15 mg  -Rocuronium 50 mg     Procedure:  Pt was on Bipap with 100% FiO2. RSI performed with cricoid pressure. Induction agents as above. DL with Mac 3, grade II view. Atraumatic intubation with #7.5 ETT @ 24 cm @ lips. +EtCO2 color change. Breath sound equal bilaterally. Patient tolerated well. There was laryngeal edema noted on video laryngoscopy. CXR pending.     Proctored by Dr. Carroll.     Kaleb Lam PA-C  Pulmonary and Critical Care Medicine  [facility]

## 2024-11-01 NOTE — PROGRESS NOTES
Spiritual Care Visit    Clinical Encounter Type  Visited With: Patient  Routine Visit: Introduction    Yazidism Encounters  Yazidism Needs: Prayer     Annotation:  provided patient support while rounding the Unit.  introduced  services of    explained the role of the  in providing emotional and spiritual support for patient's and family while in admitted to the hospital. Patient was resting with BiPap and remained non verbal.  provided support for the patient's spouse Godfrey. Prayer was offered for spiritual support today. No other spiritual or Jain needs were expressed. Spiritual care will remain available for support as requested.                                    Taxonomy  Intended Effects: Establish rapport and connectedness, Build relationship of care and support, Demonstrate caring and concern  Methods: Assist with finding purpose, Offer support  Interventions: Active listening, Brunson

## 2024-11-01 NOTE — PROGRESS NOTES
Physical Therapy                 Therapy Communication Note    Patient Name: Narda Malloy  MRN: [id number]  Department: [facility] DIALYSIS  Room: 11/11-A  Today's Date: 11/1/2024     Discipline: Physical Therapy    Missed Visit Reason: Missed Visit Reason: Patient in a medical procedure (pt is currently receiving hemodialysis at the bedside. pt remains on continuous bipap since failing HFNC post extubation.)    Missed Time: Cancel    Comment: PT treatment deferred this date.

## 2024-11-02 ENCOUNTER — APPOINTMENT (OUTPATIENT)
Dept: CARDIOLOGY | Facility: HOSPITAL | Age: 69
End: 2024-11-02
Payer: MEDICARE

## 2024-11-02 ENCOUNTER — APPOINTMENT (OUTPATIENT)
Dept: RADIOLOGY | Facility: HOSPITAL | Age: 69
End: 2024-11-02
Payer: MEDICARE

## 2024-11-02 LAB
ABO GROUP (TYPE) IN BLOOD: NORMAL
ALBUMIN SERPL BCP-MCNC: 2.4 G/DL (ref 3.4–5)
ALP SERPL-CCNC: 126 U/L (ref 33–136)
ALT SERPL W P-5'-P-CCNC: 12 U/L (ref 7–45)
ANION GAP BLDA CALCULATED.4IONS-SCNC: 12 MMO/L (ref 10–25)
ANION GAP SERPL CALCULATED.3IONS-SCNC: 12 MMOL/L (ref 10–20)
ANTIBODY SCREEN: NORMAL
APPARATUS: ABNORMAL
APTT PPP: 42.2 SECONDS (ref 22–32.5)
APTT PPP: 46.7 SECONDS (ref 22–32.5)
ARTERIAL PATENCY WRIST A: POSITIVE
AST SERPL W P-5'-P-CCNC: 14 U/L (ref 9–39)
BASE EXCESS BLDA CALC-SCNC: 2 MMOL/L (ref -2–3)
BASOPHILS # BLD AUTO: 0.03 X10*3/UL (ref 0–0.1)
BASOPHILS NFR BLD AUTO: 0.3 %
BILIRUB DIRECT SERPL-MCNC: 0.1 MG/DL (ref 0–0.3)
BILIRUB SERPL-MCNC: 0.4 MG/DL (ref 0–1.2)
BODY TEMPERATURE: 37 DEGREES CELSIUS
BUN SERPL-MCNC: 17 MG/DL (ref 6–23)
CA-I BLDA-SCNC: 1.18 MMOL/L (ref 1.1–1.33)
CALCIUM SERPL-MCNC: 8.1 MG/DL (ref 8.6–10.3)
CHLORIDE BLDA-SCNC: 99 MMOL/L (ref 98–107)
CHLORIDE SERPL-SCNC: 99 MMOL/L (ref 98–107)
CO2 SERPL-SCNC: 28 MMOL/L (ref 21–32)
CREAT SERPL-MCNC: 1.37 MG/DL (ref 0.5–1.05)
EGFRCR SERPLBLD CKD-EPI 2021: 42 ML/MIN/1.73M*2
EOSINOPHIL # BLD AUTO: 0.03 X10*3/UL (ref 0–0.7)
EOSINOPHIL NFR BLD AUTO: 0.3 %
ERYTHROCYTE [DISTWIDTH] IN BLOOD BY AUTOMATED COUNT: 19.7 % (ref 11.5–14.5)
GLUCOSE BLD MANUAL STRIP-MCNC: 135 MG/DL (ref 74–99)
GLUCOSE BLD MANUAL STRIP-MCNC: 148 MG/DL (ref 74–99)
GLUCOSE BLD MANUAL STRIP-MCNC: 174 MG/DL (ref 74–99)
GLUCOSE BLD MANUAL STRIP-MCNC: 174 MG/DL (ref 74–99)
GLUCOSE BLDA-MCNC: 128 MG/DL (ref 74–99)
GLUCOSE SERPL-MCNC: 124 MG/DL (ref 74–99)
HCO3 BLDA-SCNC: 26.5 MMOL/L (ref 22–26)
HCT VFR BLD AUTO: 23 % (ref 36–46)
HCT VFR BLD AUTO: 23.2 % (ref 36–46)
HCT VFR BLD EST: 24 % (ref 36–46)
HGB BLD-MCNC: 7.4 G/DL (ref 12–16)
HGB BLD-MCNC: 7.4 G/DL (ref 12–16)
HGB BLDA-MCNC: 7.9 G/DL (ref 12–16)
IMM GRANULOCYTES # BLD AUTO: 0.08 X10*3/UL (ref 0–0.7)
IMM GRANULOCYTES NFR BLD AUTO: 0.9 % (ref 0–0.9)
INHALED O2 CONCENTRATION: 40 %
LACTATE BLDA-SCNC: 0.9 MMOL/L (ref 0.4–2)
LYMPHOCYTES # BLD AUTO: 1.03 X10*3/UL (ref 1.2–4.8)
LYMPHOCYTES NFR BLD AUTO: 11.8 %
MAGNESIUM SERPL-MCNC: 1.98 MG/DL (ref 1.6–2.4)
MCH RBC QN AUTO: 31 PG (ref 26–34)
MCHC RBC AUTO-ENTMCNC: 32.2 G/DL (ref 32–36)
MCV RBC AUTO: 96 FL (ref 80–100)
MONOCYTES # BLD AUTO: 0.67 X10*3/UL (ref 0.1–1)
MONOCYTES NFR BLD AUTO: 7.7 %
NEUTROPHILS # BLD AUTO: 6.89 X10*3/UL (ref 1.2–7.7)
NEUTROPHILS NFR BLD AUTO: 79 %
NRBC BLD-RTO: 0 /100 WBCS (ref 0–0)
OXYHGB MFR BLDA: 96 % (ref 94–98)
PCO2 BLDA: 40 MM HG (ref 38–42)
PEEP CMH2O: 8 CM H2O
PH BLDA: 7.43 PH (ref 7.38–7.42)
PHOSPHATE SERPL-MCNC: 2.9 MG/DL (ref 2.5–4.9)
PLATELET # BLD AUTO: 283 X10*3/UL (ref 150–450)
PO2 BLDA: 115 MM HG (ref 85–95)
POTASSIUM BLDA-SCNC: 3.9 MMOL/L (ref 3.5–5.3)
POTASSIUM SERPL-SCNC: 3.8 MMOL/L (ref 3.5–5.3)
PROT SERPL-MCNC: 5.6 G/DL (ref 6.4–8.2)
RBC # BLD AUTO: 2.39 X10*6/UL (ref 4–5.2)
RH FACTOR (ANTIGEN D): NORMAL
SAO2 % BLDA: 99 % (ref 94–100)
SODIUM BLDA-SCNC: 134 MMOL/L (ref 136–145)
SODIUM SERPL-SCNC: 135 MMOL/L (ref 136–145)
SPECIMEN DRAWN FROM PATIENT: ABNORMAL
TIDAL VOLUME: 450 ML
VENTILATOR MODE: ABNORMAL
VENTILATOR RATE: 16 BPM
WBC # BLD AUTO: 8.7 X10*3/UL (ref 4.4–11.3)

## 2024-11-02 PROCEDURE — 2500000004 HC RX 250 GENERAL PHARMACY W/ HCPCS (ALT 636 FOR OP/ED): Performed by: NURSE PRACTITIONER

## 2024-11-02 PROCEDURE — 2500000001 HC RX 250 WO HCPCS SELF ADMINISTERED DRUGS (ALT 637 FOR MEDICARE OP)

## 2024-11-02 PROCEDURE — 37799 UNLISTED PX VASCULAR SURGERY: CPT

## 2024-11-02 PROCEDURE — 99223 1ST HOSP IP/OBS HIGH 75: CPT | Performed by: NURSE PRACTITIONER

## 2024-11-02 PROCEDURE — 86923 COMPATIBILITY TEST ELECTRIC: CPT

## 2024-11-02 PROCEDURE — 93356 MYOCRD STRAIN IMG SPCKL TRCK: CPT | Performed by: INTERNAL MEDICINE

## 2024-11-02 PROCEDURE — 94640 AIRWAY INHALATION TREATMENT: CPT

## 2024-11-02 PROCEDURE — 85730 THROMBOPLASTIN TIME PARTIAL: CPT

## 2024-11-02 PROCEDURE — 84132 ASSAY OF SERUM POTASSIUM: CPT

## 2024-11-02 PROCEDURE — 80053 COMPREHEN METABOLIC PANEL: CPT

## 2024-11-02 PROCEDURE — 2500000004 HC RX 250 GENERAL PHARMACY W/ HCPCS (ALT 636 FOR OP/ED): Performed by: INTERNAL MEDICINE

## 2024-11-02 PROCEDURE — 2500000004 HC RX 250 GENERAL PHARMACY W/ HCPCS (ALT 636 FOR OP/ED)

## 2024-11-02 PROCEDURE — 71045 X-RAY EXAM CHEST 1 VIEW: CPT | Performed by: RADIOLOGY

## 2024-11-02 PROCEDURE — 85025 COMPLETE CBC W/AUTO DIFF WBC: CPT

## 2024-11-02 PROCEDURE — 71045 X-RAY EXAM CHEST 1 VIEW: CPT

## 2024-11-02 PROCEDURE — 83735 ASSAY OF MAGNESIUM: CPT

## 2024-11-02 PROCEDURE — 85014 HEMATOCRIT: CPT

## 2024-11-02 PROCEDURE — 82947 ASSAY GLUCOSE BLOOD QUANT: CPT

## 2024-11-02 PROCEDURE — 93356 MYOCRD STRAIN IMG SPCKL TRCK: CPT

## 2024-11-02 PROCEDURE — 2500000002 HC RX 250 W HCPCS SELF ADMINISTERED DRUGS (ALT 637 FOR MEDICARE OP, ALT 636 FOR OP/ED)

## 2024-11-02 PROCEDURE — 2500000005 HC RX 250 GENERAL PHARMACY W/O HCPCS

## 2024-11-02 PROCEDURE — 93306 TTE W/DOPPLER COMPLETE: CPT | Performed by: INTERNAL MEDICINE

## 2024-11-02 PROCEDURE — 2020000001 HC ICU ROOM DAILY

## 2024-11-02 PROCEDURE — 99497 ADVNCD CARE PLAN 30 MIN: CPT | Performed by: NURSE PRACTITIONER

## 2024-11-02 PROCEDURE — 86901 BLOOD TYPING SEROLOGIC RH(D): CPT

## 2024-11-02 PROCEDURE — 36600 WITHDRAWAL OF ARTERIAL BLOOD: CPT

## 2024-11-02 PROCEDURE — 94669 MECHANICAL CHEST WALL OSCILL: CPT

## 2024-11-02 PROCEDURE — 84100 ASSAY OF PHOSPHORUS: CPT

## 2024-11-02 PROCEDURE — 99291 CRITICAL CARE FIRST HOUR: CPT

## 2024-11-02 PROCEDURE — 94668 MNPJ CHEST WALL SBSQ: CPT

## 2024-11-02 PROCEDURE — 82248 BILIRUBIN DIRECT: CPT

## 2024-11-02 PROCEDURE — 94003 VENT MGMT INPAT SUBQ DAY: CPT

## 2024-11-02 RX ORDER — HEPARIN SODIUM 1000 [USP'U]/ML
1000 INJECTION, SOLUTION INTRAVENOUS; SUBCUTANEOUS
Status: DISCONTINUED | OUTPATIENT
Start: 2024-11-02 | End: 2024-11-05

## 2024-11-02 RX ORDER — PROPOFOL 10 MG/ML
0-10 INJECTION, EMULSION INTRAVENOUS CONTINUOUS
Status: DISCONTINUED | OUTPATIENT
Start: 2024-11-02 | End: 2024-11-04

## 2024-11-02 RX ORDER — ALBUTEROL SULFATE 0.83 MG/ML
3 SOLUTION RESPIRATORY (INHALATION)
Status: DISCONTINUED | OUTPATIENT
Start: 2024-11-02 | End: 2024-11-14 | Stop reason: HOSPADM

## 2024-11-02 RX ORDER — ACETYLCYSTEINE 200 MG/ML
3 SOLUTION ORAL; RESPIRATORY (INHALATION)
Status: DISCONTINUED | OUTPATIENT
Start: 2024-11-02 | End: 2024-11-09

## 2024-11-02 RX ADMIN — PROPOFOL 25 MCG/KG/MIN: 10 INJECTION, EMULSION INTRAVENOUS at 03:02

## 2024-11-02 RX ADMIN — PROPOFOL 20 MCG/KG/MIN: 10 INJECTION, EMULSION INTRAVENOUS at 14:01

## 2024-11-02 RX ADMIN — Medication: at 09:17

## 2024-11-02 RX ADMIN — Medication 60 MG OF IRON: at 08:48

## 2024-11-02 RX ADMIN — IPRATROPIUM BROMIDE AND ALBUTEROL SULFATE 3 ML: .5; 3 SOLUTION RESPIRATORY (INHALATION) at 11:02

## 2024-11-02 RX ADMIN — Medication 3 ML: at 15:00

## 2024-11-02 RX ADMIN — Medication 40 PERCENT: at 19:38

## 2024-11-02 RX ADMIN — HEPARIN SODIUM 1000 UNITS: 1000 INJECTION, SOLUTION INTRAVENOUS; SUBCUTANEOUS at 22:48

## 2024-11-02 RX ADMIN — MIDODRINE HYDROCHLORIDE 10 MG: 10 TABLET ORAL at 08:47

## 2024-11-02 RX ADMIN — PROPOFOL 25 MCG/KG/MIN: 10 INJECTION, EMULSION INTRAVENOUS at 08:06

## 2024-11-02 RX ADMIN — ACETYLCYSTEINE 600 MG: 200 SOLUTION ORAL; RESPIRATORY (INHALATION) at 15:14

## 2024-11-02 RX ADMIN — ALBUTEROL SULFATE 3 ML: 2.5 SOLUTION RESPIRATORY (INHALATION) at 19:18

## 2024-11-02 RX ADMIN — HEPARIN SODIUM 1000 UNITS: 1000 INJECTION, SOLUTION INTRAVENOUS; SUBCUTANEOUS at 22:50

## 2024-11-02 RX ADMIN — FOLIC ACID 1 MG: 1 TABLET ORAL at 08:47

## 2024-11-02 RX ADMIN — TOBRAMYCIN 300 MG: 300 SOLUTION RESPIRATORY (INHALATION) at 19:37

## 2024-11-02 RX ADMIN — Medication 3 ML: at 19:19

## 2024-11-02 RX ADMIN — BRIMONIDINE TARTRATE 1 DROP: 2 SOLUTION OPHTHALMIC at 20:45

## 2024-11-02 RX ADMIN — PANTOPRAZOLE SODIUM 40 MG: 40 INJECTION, POWDER, FOR SOLUTION INTRAVENOUS at 06:36

## 2024-11-02 RX ADMIN — LATANOPROST 1 DROP: 50 SOLUTION OPHTHALMIC at 20:46

## 2024-11-02 RX ADMIN — SENNOSIDES AND DOCUSATE SODIUM 1 TABLET: 50; 8.6 TABLET ORAL at 20:45

## 2024-11-02 RX ADMIN — Medication 3 ML: at 11:04

## 2024-11-02 RX ADMIN — INSULIN LISPRO 3 UNITS: 100 INJECTION, SOLUTION INTRAVENOUS; SUBCUTANEOUS at 16:51

## 2024-11-02 RX ADMIN — HEPARIN SODIUM 1400 UNITS/HR: 10000 INJECTION, SOLUTION INTRAVENOUS at 05:19

## 2024-11-02 RX ADMIN — PRIMIDONE 125 MG: 250 TABLET ORAL at 20:46

## 2024-11-02 RX ADMIN — PROPOFOL 10 MCG/KG/MIN: 10 INJECTION, EMULSION INTRAVENOUS at 23:00

## 2024-11-02 RX ADMIN — Medication 40 PERCENT: at 07:08

## 2024-11-02 RX ADMIN — TOBRAMYCIN 300 MG: 300 SOLUTION RESPIRATORY (INHALATION) at 07:32

## 2024-11-02 RX ADMIN — IPRATROPIUM BROMIDE AND ALBUTEROL SULFATE 3 ML: .5; 3 SOLUTION RESPIRATORY (INHALATION) at 15:00

## 2024-11-02 RX ADMIN — MIDODRINE HYDROCHLORIDE 10 MG: 10 TABLET ORAL at 16:51

## 2024-11-02 RX ADMIN — CEFTRIAXONE SODIUM 2 G: 2 INJECTION, SOLUTION INTRAVENOUS at 08:47

## 2024-11-02 RX ADMIN — MIDODRINE HYDROCHLORIDE 10 MG: 10 TABLET ORAL at 00:25

## 2024-11-02 RX ADMIN — ACETYLCYSTEINE 600 MG: 200 SOLUTION ORAL; RESPIRATORY (INHALATION) at 19:18

## 2024-11-02 RX ADMIN — Medication 25 MCG/HR: at 19:12

## 2024-11-02 RX ADMIN — Medication 3 ML: at 07:08

## 2024-11-02 RX ADMIN — IPRATROPIUM BROMIDE AND ALBUTEROL SULFATE 3 ML: .5; 3 SOLUTION RESPIRATORY (INHALATION) at 07:08

## 2024-11-02 RX ADMIN — BRIMONIDINE TARTRATE 1 DROP: 2 SOLUTION OPHTHALMIC at 08:47

## 2024-11-02 RX ADMIN — INSULIN LISPRO 3 UNITS: 100 INJECTION, SOLUTION INTRAVENOUS; SUBCUTANEOUS at 12:53

## 2024-11-02 RX ADMIN — EZETIMIBE 10 MG: 10 TABLET ORAL at 08:47

## 2024-11-02 ASSESSMENT — PAIN - FUNCTIONAL ASSESSMENT
PAIN_FUNCTIONAL_ASSESSMENT: CPOT (CRITICAL CARE PAIN OBSERVATION TOOL)

## 2024-11-02 NOTE — CARE PLAN
The patient's goals for the shift include unable to assess    The clinical goals for the shift include Maintain hemodynamic stability while resting on vent      Problem: Safety - Adult  Goal: Free from fall injury  Outcome: Progressing     Problem: Discharge Planning  Goal: Discharge to home or other facility with appropriate resources  Outcome: Progressing     Problem: Chronic Conditions and Co-morbidities  Goal: Patient's chronic conditions and co-morbidity symptoms are monitored and maintained or improved  Outcome: Progressing     Problem: Diabetes  Goal: Increase stability of blood glucose readings by end of shift  Outcome: Progressing  Goal: Maintain electrolyte levels within acceptable range throughout shift  Outcome: Progressing  Goal: Maintain glucose levels >70mg/dl to <250mg/dl throughout shift  Outcome: Progressing  Goal: Learn about and adhere to nutrition recommendations by end of shift  Outcome: Progressing  Goal: Vital signs within normal range for age by end of shift  Outcome: Progressing  Goal: Increase self care and/or family involovement by end of shift  Outcome: Progressing  Goal: Receive DSME education by end of shift  Outcome: Progressing     Problem: Knowledge Deficit  Goal: Patient/family/caregiver demonstrates understanding of disease process, treatment plan, medications, and discharge instructions  Outcome: Progressing     Problem: Skin  Goal: Decreased wound size/increased tissue granulation at next dressing change  Outcome: Progressing  Goal: Participates in plan/prevention/treatment measures  Outcome: Progressing  Goal: Prevent/manage excess moisture  Outcome: Progressing  Goal: Prevent/minimize sheer/friction injuries  Outcome: Progressing  Goal: Promote/optimize nutrition  Outcome: Progressing  Goal: Promote skin healing  Outcome: Progressing     Problem: Nutrition  Goal: Consume prescribed supplement  Outcome: Progressing  Goal: Nutrition support goals are met within 48 hrs  Outcome:  Progressing  Goal: Nutrition support is meeting 75% of nutrient needs  Outcome: Progressing  Goal: BG  mg/dL  Outcome: Progressing  Goal: Lab values WNL  Outcome: Progressing  Goal: Electrolytes WNL  Outcome: Progressing  Goal: Promote healing  Outcome: Progressing  Goal: Maintain stable weight  Outcome: Progressing  Goal: Reduce weight from edema/fluid  Outcome: Progressing     Problem: Respiratory  Goal: No signs of respiratory distress (eg. Use of accessory muscles. Peds grunting)  Outcome: Progressing  Goal: Clear secretions with interventions this shift  Outcome: Progressing  Goal: Minimize anxiety/maximize coping throughout shift  Outcome: Progressing  Goal: Minimal/no exertional discomfort or dyspnea this shift  Outcome: Progressing  Goal: Patent airway maintained this shift  Outcome: Progressing  Goal: Tolerate mechanical ventilation evidenced by VS/agitation level this shift  Outcome: Progressing  Goal: Tolerate pulmonary toileting this shift  Outcome: Progressing  Goal: Verbalize decreased shortness of breath this shift  Outcome: Progressing  Goal: Wean oxygen to maintain O2 saturation per order/standard this shift  Outcome: Progressing  Goal: Increase self care and/or family involvement in next 24 hours  Outcome: Progressing     Problem: Pain  Goal: Takes deep breaths with improved pain control throughout the shift  Outcome: Progressing  Goal: Turns in bed with improved pain control throughout the shift  Outcome: Progressing  Goal: Walks with improved pain control throughout the shift  Outcome: Progressing  Goal: Performs ADL's with improved pain control throughout shift  Outcome: Progressing  Goal: Participates in PT with improved pain control throughout the shift  Outcome: Progressing  Goal: Free from opioid side effects throughout the shift  Outcome: Progressing  Goal: Free from acute confusion related to pain meds throughout the shift  Outcome: Progressing     Problem: Fall/Injury  Goal: Not  fall by end of shift  Outcome: Progressing  Goal: Be free from injury by end of the shift  Outcome: Progressing  Goal: Verbalize understanding of personal risk factors for fall in the hospital  Outcome: Progressing  Goal: Verbalize understanding of risk factor reduction measures to prevent injury from fall in the home  Outcome: Progressing  Goal: Use assistive devices by end of the shift  Outcome: Progressing  Goal: Pace activities to prevent fatigue by end of the shift  Outcome: Progressing

## 2024-11-02 NOTE — PROGRESS NOTES
Narda Malloy is a 69 y.o. female on day 21 of admission presenting with Unresponsive.      Subjective   Patient was reintubated, yesterday was net -1.8 L and had dialysis yesterday.  Is on very low-dose Levophed.       Objective          Vitals 24HR  Heart Rate:  []   Temp:  [36.7 °C (98.1 °F)-37.5 °C (99.5 °F)]   Resp:  [14-25]   BP: ()/(41-75)   Weight:  [114 kg (250 lb 14.1 oz)]   SpO2:  [88 %-100 %]       Intake/Output last 3 Shifts:    Intake/Output Summary (Last 24 hours) at 11/2/2024 1558  Last data filed at 11/2/2024 1446  Gross per 24 hour   Intake 1179.13 ml   Output 0 ml   Net 1179.13 ml       Physical Exam  Constitutional:       Comments: Intubated and sedated   Cardiovascular:      Rate and Rhythm: Regular rhythm.      Heart sounds:      No friction rub.   Pulmonary:      Comments: Mechanically ventilated, fairly clear breath sounds anteriorly  Abdominal:      General: Bowel sounds are normal.      Palpations: Abdomen is soft.   Musculoskeletal:      Comments: 1+ edema         Relevant Results  Results for orders placed or performed during the hospital encounter of 10/12/24 (from the past 24 hours)   POCT GLUCOSE   Result Value Ref Range    POCT Glucose 105 (H) 74 - 99 mg/dL   BLOOD GAS VENOUS FULL PANEL   Result Value Ref Range    POCT pH, Venous 7.34 7.33 - 7.43 pH    POCT pCO2, Venous 55 (H) 41 - 51 mm Hg    POCT pO2, Venous 40 35 - 45 mm Hg    POCT SO2, Venous 74 45 - 75 %    POCT Oxy Hemoglobin, Venous 72.5 45.0 - 75.0 %    POCT Hematocrit Calculated, Venous 26.0 (L) 36.0 - 46.0 %    POCT Sodium, Venous 134 (L) 136 - 145 mmol/L    POCT Potassium, Venous 4.2 3.5 - 5.3 mmol/L    POCT Chloride, Venous 101 98 - 107 mmol/L    POCT Ionized Calicum, Venous 1.21 1.10 - 1.33 mmol/L    POCT Glucose, Venous 101 (H) 74 - 99 mg/dL    POCT Lactate, Venous 0.7 0.4 - 2.0 mmol/L    POCT Base Excess, Venous 3.3 (H) -2.0 - 3.0 mmol/L    POCT HCO3 Calculated, Venous 29.7 (H) 22.0 - 26.0 mmol/L    POCT  Hemoglobin, Venous 8.5 (L) 12.0 - 16.0 g/dL    POCT Anion Gap, Venous 8.0 (L) 10.0 - 25.0 mmol/L    Patient Temperature 37.0 degrees Celsius    FiO2 70 %    Apparatus      Ipap CMH2O 22.0 cm H2O    Epap CMH2O 10.0 cm H2O   POCT GLUCOSE   Result Value Ref Range    POCT Glucose 105 (H) 74 - 99 mg/dL   Blood Gas Arterial Full Panel   Result Value Ref Range    POCT pH, Arterial 7.39 7.38 - 7.42 pH    POCT pCO2, Arterial 43 (H) 38 - 42 mm Hg    POCT pO2, Arterial 110 (H) 85 - 95 mm Hg    POCT SO2, Arterial 100 94 - 100 %    POCT Oxy Hemoglobin, Arterial 96.5 94.0 - 98.0 %    POCT Hematocrit Calculated, Arterial 26.0 (L) 36.0 - 46.0 %    POCT Sodium, Arterial 133 (L) 136 - 145 mmol/L    POCT Potassium, Arterial 4.1 3.5 - 5.3 mmol/L    POCT Chloride, Arterial 100 98 - 107 mmol/L    POCT Ionized Calcium, Arterial 1.16 1.10 - 1.33 mmol/L    POCT Glucose, Arterial 114 (H) 74 - 99 mg/dL    POCT Lactate, Arterial 0.9 0.4 - 2.0 mmol/L    POCT Base Excess, Arterial 0.9 -2.0 - 3.0 mmol/L    POCT HCO3 Calculated, Arterial 26.0 22.0 - 26.0 mmol/L    POCT Hemoglobin, Arterial 8.5 (L) 12.0 - 16.0 g/dL    POCT Anion Gap, Arterial 11 10 - 25 mmo/L    Patient Temperature      FiO2 50 %    Apparatus      Ventilator Mode      Ventilator Rate 16 bpm    Tidal Volume 450 mL    Peep CHM2O 8.0 cm H2O    Site of Arterial Puncture Radial Left     Bill's Test Positive    POCT GLUCOSE   Result Value Ref Range    POCT Glucose 113 (H) 74 - 99 mg/dL   aPTT   Result Value Ref Range    aPTT 42.2 (H) 22.0 - 32.5 seconds   CBC and Auto Differential   Result Value Ref Range    WBC 8.7 4.4 - 11.3 x10*3/uL    nRBC 0.0 0.0 - 0.0 /100 WBCs    RBC 2.39 (L) 4.00 - 5.20 x10*6/uL    Hemoglobin 7.4 (L) 12.0 - 16.0 g/dL    Hematocrit 23.0 (L) 36.0 - 46.0 %    MCV 96 80 - 100 fL    MCH 31.0 26.0 - 34.0 pg    MCHC 32.2 32.0 - 36.0 g/dL    RDW 19.7 (H) 11.5 - 14.5 %    Platelets 283 150 - 450 x10*3/uL    Neutrophils % 79.0 40.0 - 80.0 %    Immature Granulocytes %,  Automated 0.9 0.0 - 0.9 %    Lymphocytes % 11.8 13.0 - 44.0 %    Monocytes % 7.7 2.0 - 10.0 %    Eosinophils % 0.3 0.0 - 6.0 %    Basophils % 0.3 0.0 - 2.0 %    Neutrophils Absolute 6.89 1.20 - 7.70 x10*3/uL    Immature Granulocytes Absolute, Automated 0.08 0.00 - 0.70 x10*3/uL    Lymphocytes Absolute 1.03 (L) 1.20 - 4.80 x10*3/uL    Monocytes Absolute 0.67 0.10 - 1.00 x10*3/uL    Eosinophils Absolute 0.03 0.00 - 0.70 x10*3/uL    Basophils Absolute 0.03 0.00 - 0.10 x10*3/uL   Hepatic function panel   Result Value Ref Range    Albumin 2.4 (L) 3.4 - 5.0 g/dL    Bilirubin, Total 0.4 0.0 - 1.2 mg/dL    Bilirubin, Direct 0.1 0.0 - 0.3 mg/dL    Alkaline Phosphatase 126 33 - 136 U/L    ALT 12 7 - 45 U/L    AST 14 9 - 39 U/L    Total Protein 5.6 (L) 6.4 - 8.2 g/dL   Magnesium   Result Value Ref Range    Magnesium 1.98 1.60 - 2.40 mg/dL   Phosphorus   Result Value Ref Range    Phosphorus 2.9 2.5 - 4.9 mg/dL   Basic Metabolic Panel   Result Value Ref Range    Glucose 124 (H) 74 - 99 mg/dL    Sodium 135 (L) 136 - 145 mmol/L    Potassium 3.8 3.5 - 5.3 mmol/L    Chloride 99 98 - 107 mmol/L    Bicarbonate 28 21 - 32 mmol/L    Anion Gap 12 10 - 20 mmol/L    Urea Nitrogen 17 6 - 23 mg/dL    Creatinine 1.37 (H) 0.50 - 1.05 mg/dL    eGFR 42 (L) >60 mL/min/1.73m*2    Calcium 8.1 (L) 8.6 - 10.3 mg/dL   Blood Gas Arterial Full Panel   Result Value Ref Range    POCT pH, Arterial 7.43 (H) 7.38 - 7.42 pH    POCT pCO2, Arterial 40 38 - 42 mm Hg    POCT pO2, Arterial 115 (H) 85 - 95 mm Hg    POCT SO2, Arterial 99 94 - 100 %    POCT Oxy Hemoglobin, Arterial 96.0 94.0 - 98.0 %    POCT Hematocrit Calculated, Arterial 24.0 (L) 36.0 - 46.0 %    POCT Sodium, Arterial 134 (L) 136 - 145 mmol/L    POCT Potassium, Arterial 3.9 3.5 - 5.3 mmol/L    POCT Chloride, Arterial 99 98 - 107 mmol/L    POCT Ionized Calcium, Arterial 1.18 1.10 - 1.33 mmol/L    POCT Glucose, Arterial 128 (H) 74 - 99 mg/dL    POCT Lactate, Arterial 0.9 0.4 - 2.0 mmol/L    POCT Base  Excess, Arterial 2.0 -2.0 - 3.0 mmol/L    POCT HCO3 Calculated, Arterial 26.5 (H) 22.0 - 26.0 mmol/L    POCT Hemoglobin, Arterial 7.9 (L) 12.0 - 16.0 g/dL    POCT Anion Gap, Arterial 12 10 - 25 mmo/L    Patient Temperature 37.0 degrees Celsius    FiO2 40 %    Apparatus      Ventilator Mode      Ventilator Rate 16 bpm    Tidal Volume 450 mL    Peep CHM2O 8.0 cm H2O    Site of Arterial Puncture Radial Right     Bill's Test Positive    aPTT   Result Value Ref Range    aPTT 46.7 (H) 22.0 - 32.5 seconds   POCT GLUCOSE   Result Value Ref Range    POCT Glucose 135 (H) 74 - 99 mg/dL   POCT GLUCOSE   Result Value Ref Range    POCT Glucose 174 (H) 74 - 99 mg/dL   Transthoracic Echo (TTE) Complete   Result Value Ref Range    BSA 2.37 m2   POCT GLUCOSE   Result Value Ref Range    POCT Glucose 174 (H) 74 - 99 mg/dL     *Note: Due to a large number of results and/or encounters for the requested time period, some results have not been displayed. A complete set of results can be found in Results Review.            Assessment/Plan   Acute kidney injury - still dialysis dependent, discussed case with the ICU team, for further volume management I will put her on for tablo dialysis treatment today for further ultrafiltration  Edema improved - challenge her dry weight, ultrafiltration with dialysis  Septic shock, on very low-dose Levophed  Pneumonia continue with antibiotic therapy infectious disease  Diabetes mellitus type 2  Malnutrition continue with tube feeding  Lower back surgery site infection  Anemia transfuse when hemoglobin less than 7    Nelia Cheung MD

## 2024-11-02 NOTE — CONSULTS
Inpatient consult to Palliative Care  Consult performed by: Breanne Ballesteros, LEONEL-CNP  Consult ordered by: Kaleb Lma PA-C  Reason for consult: Goals of Care          Reason For Consult  Reason for Consult: communication / medical decision making     History Of Present Illness  Narda Malloy is a 69 y.o. female with past medical history of L1-L3 lumbar laminectomy, T4-S1 revision, and fusion August 26th, T2DM, HTN, essential tremor, HLD, glaucoma, sarcoidosis of the lung who presented to  ED 10/12 after being found essentially unresponsive at her nursing facility [facility]. Per report from her , she has had a significant decline in her health since July 15th when she had a fall and became significantly weak. She has also had multiple infection complications since her back surgery in August requiring multiple I&Ds and long term antibiotic therapy. She went to the OR most recently on 9/28  and again 9/30 for lumbar site infection wash out. Per chart review, she was discharged on IV vancomycin 1g and IV ceftriaxone 2d q24hrs through 11/12. She rehospitalized from her rehab on 10/12, minimally responsive, septic, intubated for airway protection 10/17, PNA present on admission, treated with iv antbiotics, noted to have AMY, started on pressors and CRRT. Has had a complicated hospital stay with intubation 3 times(10/12, 10/19, most recently on 11/1), chest tube placement for pleural effusion. She currently remains on the ventilator, on low dose levophed. She is off CRRT, but requires tablo due to persistently low BP. Her in hospital cultures showed pseudomonas in the sputum 10/28, wound culture with MRSA, proteus, pleural fluid negative, C diff negative.      Symptoms (0 - 10, Best to Worst)  [place] Symptom Assessment System  0-10 (Numeric) Pain Score: 0 - No pain    BM in last 48 hours? unknown    Emotional/Psychological/Spiritual Needs  NA-intubated  Serious Illness Conversation  What is  your understanding now of where you are with your illness:  discussion with spouse today, long discussion about overall decline and currently conditions.   How much information about what is likely to be ahead with your illness  would you like from me: fully disclose information to spouse and daughter  What are your most important goals if your health situation worsens:  quality of life  What are your biggest fears and worries about the future with your health:  spouse does not want patient to suffer, he really wants her to return home with good functional status.   What gives you strength as you think about the future with your illness:  support of family and life experiences  What abilities are so critical to your life that you can’t imagine living without them:  unsure, he believes she valued her function and did not want to be in pain  If you become sicker, how much are you willing to go through for the possibility of gaining more time:  not much more if there is no likelihood of a meaningful recovery  How much does your family know about your priorities and wishes:  family is aware and conveying wishes as patient cannot speak for herself.     Personal/Social History    She reports that she has quit smoking. Her smoking use included cigarettes. She has been exposed to tobacco smoke. She has never used smokeless tobacco. She reports that she does not currently use alcohol. She reports that she does not currently use drugs.    Functional Status    Bedbound currently    Caregiving/Caregiver Support  Does the patient require assistance in some or all components of his care, including coordination of medical care? Yes  If Yes, which person serves that role?  spouse   Caregiver emotional or practical needs:      Past Medical History  She has a past medical history of Degenerative myopia, bilateral, Diabetic neuropathy (Multi), Difficult intubation (08/26/2024), DM type 2 (diabetes mellitus, type 2) (Multi), Dry eye  syndrome of bilateral lacrimal glands, Essential hypertension, Essential tremor, Glaucoma, Hyperlipidemia, Long term (current) use of insulin (Multi), Low back pain, PONV (postoperative nausea and vomiting), Primary open angle glaucoma of both eyes, severe stage, Repeated falls, Sarcoidosis of lung (Multi), Spinal stenosis, lumbar region without neurogenic claudication, and Weakness.    Surgical History  She has a past surgical history that includes Carpal tunnel release; Vitrectomy (Right, 2013); Cataract extraction w/  intraocular lens implant (Bilateral); Panretinal photocoagulation (2014); Foot surgery; Insertion / removal cranial DBS generator; Thoracic Fusion (08/26/2024); Lumbar fusion; Blepharoplasty (07/2022); and Breast surgery (05/20/2022).     Family History  Family History   Problem Relation Name Age of Onset    Multiple myeloma Mother      Cancer Mother      Other (CABG) Father      Pulmonary embolism Father      Heart disease Father      Breast cancer Sister          Stage II    Hypertension Sister      Diabetes Sister      No Known Problems Sister          x5    No Known Problems Brother          x4    No Known Problems Daughter       Allergies  Erythromycin, Morphine, and Rosuvastatin    Review of Systems   Unable to perform ROS: Patient unresponsive        Physical Exam  Vitals and nursing note reviewed.   Constitutional:       General: She is not in acute distress.     Appearance: She is ill-appearing.   HENT:      Head: Normocephalic and atraumatic.      Comments: intubated     Nose: Nose normal.      Mouth/Throat:      Mouth: Mucous membranes are moist.      Pharynx: Oropharynx is clear.   Eyes:      Extraocular Movements: Extraocular movements intact.      Pupils: Pupils are equal, round, and reactive to light.   Neck:      Vascular: No carotid bruit.   Cardiovascular:      Rate and Rhythm: Normal rate and regular rhythm.      Heart sounds: No murmur heard.     Comments: Line in place, dressing  "intact.   Weak pulses.   Pulmonary:      Effort: Pulmonary effort is normal. No respiratory distress.      Comments: Rhonchi noted, diminished bases.   Abdominal:      General: Abdomen is flat. Bowel sounds are normal. There is no distension.      Palpations: Abdomen is soft.      Tenderness: There is no abdominal tenderness.   Musculoskeletal:         General: No swelling, tenderness, deformity or signs of injury. Normal range of motion.      Cervical back: Normal range of motion and neck supple.   Skin:     General: Skin is warm and dry.      Capillary Refill: Capillary refill takes 2 to 3 seconds.      Coloration: Skin is pale.   Neurological:      Mental Status: She is alert.      Comments: Reflexes present, sedated.          Last Recorded Vitals  Blood pressure 127/53, pulse 66, temperature 37.4 °C (99.3 °F), temperature source Tympanic, resp. rate 16, height 1.778 m (5' 10\"), weight 114 kg (250 lb 14.1 oz), SpO2 100%.    Relevant Results  Results for orders placed or performed during the hospital encounter of 10/12/24 (from the past 24 hours)   POCT GLUCOSE   Result Value Ref Range    POCT Glucose 105 (H) 74 - 99 mg/dL   Blood Gas Arterial Full Panel   Result Value Ref Range    POCT pH, Arterial 7.39 7.38 - 7.42 pH    POCT pCO2, Arterial 43 (H) 38 - 42 mm Hg    POCT pO2, Arterial 110 (H) 85 - 95 mm Hg    POCT SO2, Arterial 100 94 - 100 %    POCT Oxy Hemoglobin, Arterial 96.5 94.0 - 98.0 %    POCT Hematocrit Calculated, Arterial 26.0 (L) 36.0 - 46.0 %    POCT Sodium, Arterial 133 (L) 136 - 145 mmol/L    POCT Potassium, Arterial 4.1 3.5 - 5.3 mmol/L    POCT Chloride, Arterial 100 98 - 107 mmol/L    POCT Ionized Calcium, Arterial 1.16 1.10 - 1.33 mmol/L    POCT Glucose, Arterial 114 (H) 74 - 99 mg/dL    POCT Lactate, Arterial 0.9 0.4 - 2.0 mmol/L    POCT Base Excess, Arterial 0.9 -2.0 - 3.0 mmol/L    POCT HCO3 Calculated, Arterial 26.0 22.0 - 26.0 mmol/L    POCT Hemoglobin, Arterial 8.5 (L) 12.0 - 16.0 g/dL    " POCT Anion Gap, Arterial 11 10 - 25 mmo/L    Patient Temperature      FiO2 50 %    Apparatus      Ventilator Mode      Ventilator Rate 16 bpm    Tidal Volume 450 mL    Peep CHM2O 8.0 cm H2O    Site of Arterial Puncture Radial Left     Bill's Test Positive    POCT GLUCOSE   Result Value Ref Range    POCT Glucose 113 (H) 74 - 99 mg/dL   aPTT   Result Value Ref Range    aPTT 42.2 (H) 22.0 - 32.5 seconds   CBC and Auto Differential   Result Value Ref Range    WBC 8.7 4.4 - 11.3 x10*3/uL    nRBC 0.0 0.0 - 0.0 /100 WBCs    RBC 2.39 (L) 4.00 - 5.20 x10*6/uL    Hemoglobin 7.4 (L) 12.0 - 16.0 g/dL    Hematocrit 23.0 (L) 36.0 - 46.0 %    MCV 96 80 - 100 fL    MCH 31.0 26.0 - 34.0 pg    MCHC 32.2 32.0 - 36.0 g/dL    RDW 19.7 (H) 11.5 - 14.5 %    Platelets 283 150 - 450 x10*3/uL    Neutrophils % 79.0 40.0 - 80.0 %    Immature Granulocytes %, Automated 0.9 0.0 - 0.9 %    Lymphocytes % 11.8 13.0 - 44.0 %    Monocytes % 7.7 2.0 - 10.0 %    Eosinophils % 0.3 0.0 - 6.0 %    Basophils % 0.3 0.0 - 2.0 %    Neutrophils Absolute 6.89 1.20 - 7.70 x10*3/uL    Immature Granulocytes Absolute, Automated 0.08 0.00 - 0.70 x10*3/uL    Lymphocytes Absolute 1.03 (L) 1.20 - 4.80 x10*3/uL    Monocytes Absolute 0.67 0.10 - 1.00 x10*3/uL    Eosinophils Absolute 0.03 0.00 - 0.70 x10*3/uL    Basophils Absolute 0.03 0.00 - 0.10 x10*3/uL   Hepatic function panel   Result Value Ref Range    Albumin 2.4 (L) 3.4 - 5.0 g/dL    Bilirubin, Total 0.4 0.0 - 1.2 mg/dL    Bilirubin, Direct 0.1 0.0 - 0.3 mg/dL    Alkaline Phosphatase 126 33 - 136 U/L    ALT 12 7 - 45 U/L    AST 14 9 - 39 U/L    Total Protein 5.6 (L) 6.4 - 8.2 g/dL   Magnesium   Result Value Ref Range    Magnesium 1.98 1.60 - 2.40 mg/dL   Phosphorus   Result Value Ref Range    Phosphorus 2.9 2.5 - 4.9 mg/dL   Basic Metabolic Panel   Result Value Ref Range    Glucose 124 (H) 74 - 99 mg/dL    Sodium 135 (L) 136 - 145 mmol/L    Potassium 3.8 3.5 - 5.3 mmol/L    Chloride 99 98 - 107 mmol/L     Bicarbonate 28 21 - 32 mmol/L    Anion Gap 12 10 - 20 mmol/L    Urea Nitrogen 17 6 - 23 mg/dL    Creatinine 1.37 (H) 0.50 - 1.05 mg/dL    eGFR 42 (L) >60 mL/min/1.73m*2    Calcium 8.1 (L) 8.6 - 10.3 mg/dL   Blood Gas Arterial Full Panel   Result Value Ref Range    POCT pH, Arterial 7.43 (H) 7.38 - 7.42 pH    POCT pCO2, Arterial 40 38 - 42 mm Hg    POCT pO2, Arterial 115 (H) 85 - 95 mm Hg    POCT SO2, Arterial 99 94 - 100 %    POCT Oxy Hemoglobin, Arterial 96.0 94.0 - 98.0 %    POCT Hematocrit Calculated, Arterial 24.0 (L) 36.0 - 46.0 %    POCT Sodium, Arterial 134 (L) 136 - 145 mmol/L    POCT Potassium, Arterial 3.9 3.5 - 5.3 mmol/L    POCT Chloride, Arterial 99 98 - 107 mmol/L    POCT Ionized Calcium, Arterial 1.18 1.10 - 1.33 mmol/L    POCT Glucose, Arterial 128 (H) 74 - 99 mg/dL    POCT Lactate, Arterial 0.9 0.4 - 2.0 mmol/L    POCT Base Excess, Arterial 2.0 -2.0 - 3.0 mmol/L    POCT HCO3 Calculated, Arterial 26.5 (H) 22.0 - 26.0 mmol/L    POCT Hemoglobin, Arterial 7.9 (L) 12.0 - 16.0 g/dL    POCT Anion Gap, Arterial 12 10 - 25 mmo/L    Patient Temperature 37.0 degrees Celsius    FiO2 40 %    Apparatus      Ventilator Mode      Ventilator Rate 16 bpm    Tidal Volume 450 mL    Peep CHM2O 8.0 cm H2O    Site of Arterial Puncture Radial Right     Bill's Test Positive    aPTT   Result Value Ref Range    aPTT 46.7 (H) 22.0 - 32.5 seconds   POCT GLUCOSE   Result Value Ref Range    POCT Glucose 135 (H) 74 - 99 mg/dL   POCT GLUCOSE   Result Value Ref Range    POCT Glucose 174 (H) 74 - 99 mg/dL   Transthoracic Echo (TTE) Complete   Result Value Ref Range    BSA 2.37 m2   POCT GLUCOSE   Result Value Ref Range    POCT Glucose 174 (H) 74 - 99 mg/dL   Type and screen   Result Value Ref Range    ABO TYPE A     Rh TYPE NEG     ANTIBODY SCREEN NEG    Hemoglobin and hematocrit, blood   Result Value Ref Range    Hemoglobin 7.4 (L) 12.0 - 16.0 g/dL    Hematocrit 23.2 (L) 36.0 - 46.0 %     *Note: Due to a large number of results  and/or encounters for the requested time period, some results have not been displayed. A complete set of results can be found in Results Review.    XR chest 1 view    Result Date: 11/2/2024  Interpreted By:  Mayuri Velez, STUDY: XR CHEST 1 VIEW;  11/2/2024 5:46 am   INDICATION: Signs/Symptoms:Continued intubation.   COMPARISON: 11/01/2024   ACCESSION NUMBER(S): [id number]   ORDERING CLINICIAN: STEVIE ZHAO   FINDINGS: CARDIOMEDIASTINAL SILHOUETTE: Cardiomediastinal silhouette is normal in size and configuration. There is a left PICC line with the tip in the superior vena cava. There is an endotracheal tube with the tip not seen because of upper thoracic dorsal fusion rods.   LUNGS: There is bibasilar pneumonia with improvement in aeration at the right lung base. There is less consolidation.   ABDOMEN: No remarkable upper abdominal findings. There is a nasogastric tube with the tip not seen but is well beyond the gastroesophageal junction.   BONES: No acute osseous changes. There are dorsal fusion rods bilaterally involving the in the tire thoracic and visualized upper lumbar spine.       1. Endotracheal tube tip not seen because of obscuration from upper thoracic dorsal fusion rods. 2. Bibasilar pneumonia with improved aeration at the right lung base.   MACRO: None   Signed by: Mayuri Velez 11/2/2024 12:42 PM Dictation workstation:   [id number]    XR chest 1 view    Result Date: 11/1/2024  Interpreted By:  Parmjit Mancini, STUDY: XR CHEST 1 VIEW   INDICATION: Signs/Symptoms:post intubation.   COMPARISON: Earlier the same day.   ACCESSION NUMBER(S): [id number]   ORDERING CLINICIAN: SERINA BUTCHER   FINDINGS: Intubation again noted. Unfortunately the tip of the endotracheal tube not definitively visualized as it is obscured by some of the thoracic fusion hardware. It looks to be near the aortic knob.   Bilateral pleural effusions and basilar edema unchanged.   No evidence of pneumothorax.       Unchanged  appearance of the chest as above.   Signed by: Parmjit Mancini 11/1/2024 7:04 PM Dictation workstation:   [id number]    XR chest 1 view    Result Date: 11/1/2024  Interpreted By:  Joyce Fontaine, STUDY: XR CHEST 1 VIEW 11/1/2024 6:06 am   INDICATION: Signs/Symptoms:Continued intubation   COMPARISON: 10/31/2024   ACCESSION NUMBER(S): [id number]   ORDERING CLINICIAN: STEVIE ZHAO   TECHNIQUE: Single AP view chest   FINDINGS: Hazy opacities identified within bilateral lung fields mid to lower lung zones with component of layering effusions and compressive atelectasis suggested. Right IJ line is demonstrated with tip in the proximal SVC. Left-sided PICC line with tip in the region of the distal SVC. NG tube is in place with interval removal of the ET tube.   Spinal fusion demonstrated. Cardiac silhouette within normal limits                 1. Persistent hazy opacities mid to lower lung zones bilaterally with layering basilar effusions and compressive atelectasis suggested.   2. Interval removal of the ET tube. Remainder of the lines and tubes are unremarkable.   Signed by: Joyce Fontaine 11/1/2024 10:56 AM Dictation workstation:   [id number]    XR chest 1 view    Result Date: 10/31/2024  Interpreted By:  Andrew Byrd, STUDY: XR CHEST 1 VIEW; 10/31/2024 4:55 am   INDICATION: CLINICAL INFORMATION: Signs/Symptoms:Continued intubation.   COMPARISON: 10/30/2024 at 0433 hours   ACCESSION NUMBER(S): [id number]   ORDERING CLINICIAN: STEVIE ZHAO   TECHNIQUE: Portable chest one view.   FINDINGS: The cardiac size is indeterminate in view of the AP projection.  The tube and line placement is unchanged.  There is no change in the bilateral infiltrates and effusions. Tube and line placement is somewhat difficult to assess due to overlying surgical hardware.       No change in the bilateral infiltrates and effusions.   MACRO: none   Signed by: Andrew Byrd 10/31/2024 7:42 AM Dictation workstation:   [id number]    XR  chest 1 view    Result Date: 10/30/2024  Interpreted By:  Andrew Byrd, STUDY: XR CHEST 1 VIEW; 10/30/2024 4:46 am   INDICATION: CLINICAL INFORMATION: Signs/Symptoms:Continued intubation.   COMPARISON: 10/29/2024 0832 hours   ACCESSION NUMBER(S): [id number]   ORDERING CLINICIAN: STEVIE ZHAO   TECHNIQUE: Portable chest one view.   FINDINGS: The cardiac size is indeterminate in view of the AP projection. There is no change in the bilateral infiltrates and effusions compared to the prior study. Tube and line placement is somewhat difficult to assess due to the extensive thoracic spinal hardware.       There is no interval change when compared to the previous examination.   MACRO: none   Signed by: Andrew Byrd 10/30/2024 9:00 AM Dictation workstation:   [id number]    Vascular US upper extremity venous duplex left    Result Date: 10/29/2024           [place] [address]            Phone [phone]  Vascular Lab Report  VASC US UPPER EXTREMITY VENOUS DUPLEX LEFT Patient Name:      KIMMIE RODRIGUES       Reading Physician:  [id number] Derek Michelle MD, RPVI Study Date:        10/29/2024           Ordering Provider:  [id number] SAIGE MULLER MRN/PID:           [id number]             Fellow: Accession#:        [id number]         Technologist:       Junior Baltazar Date of Birth/Age: 1955 / 69 years Technologist 2:     Tanya Blank RVBARI Gender:            F                    Encounter#:         [id number] Admission Status:  Inpatient            Location Performed: [facility]  Diagnosis/ICD: Left arm swelling-M79.89 Indication:    Limb swelling CPT Codes:     62524 Peripheral venous duplex scan for DVT Limited  Pertinent History: Left cephalic vein superficial thrombophlebitis noted on                    ultrasound in radiology 10/14/24.  **CRITICAL  RESULT** Critical Result: Acute, non-occlusive DVT right internal jugular vein around line placement. Notification called to Saige Diaz CNP on 10/29/2024 at 4:00:00 PM by Tanya Blank RVT.  CONCLUSIONS: Right Upper Venous: Acute, non-occlusive, focal deep vein thrombus of right internal jugular vein around line placement. Visualized portions of subclavian and innominate veins appear patent. Left Upper Venous: No evidence of acute deep vein thrombus visualized in the left upper extremity. Acute, occlusive superficial vein thrombosis of left cephalic vein from mid-distal upper arm extending to approximately 2.0cm proximal to subclavian junction. There is a left subclavian vein catheter noted in place.  Imaging & Doppler Findings:  Right            Compressible      Thrombus              Flow Internal Jugular   Partial    Acute non-occlusive Subclavian                                        Spontaneous/Phasic  Left                Compress    Thrombus Internal Jugular      Yes         None Subclavian            Yes         None Subclavian Proximal   Yes         None Subclavian Mid        Yes         None Subclavian Distal     Yes         None Axillary              Yes         None Brachial              Yes         None Cephalic               No    Acute occlusive Basilic               Yes         None  [id number] Derek Michelle MD, RPVI Electronically signed by [id number] Derek Michelle MD, RPVI on 10/29/2024 at 9:41:07 PM  ** Final **     XR abdomen 1 view    Result Date: 10/29/2024  Interpreted By:  Mayuri Velze, STUDY: XR ABDOMEN 1 VIEW;  10/29/2024 8:51 am. 2 views.   INDICATION: Signs/Symptoms:high residual of tube feeds.   COMPARISON: 10/1924   ACCESSION NUMBER(S): [id number]   ORDERING CLINICIAN: SAIGE DIAZ   FINDINGS: Bowel gas pattern is nonspecific and nonobstructive.  There is a nasogastric tube with the tip in the distal stomach. There is a 2nd nasogastric tube with the tip in the proximal stomach. There is  no gastric distention.   There is partial atherosclerotic calcification of the abdominal aorta.   There is left basilar consolidation with small left pleural effusion.   Osseous structures demonstrate no acute bony changes.   There are lower thoracic and lumbar dorsal fusion rods.       Nonspecific, nonobstructive bowel gas pattern. There are 2 nasogastric tubes. The tip of one of the tubes as in the distal stomach and the tip of the 2nd tube is in the proximal stomach. Left basilar consolidation with a small left pleural effusion.   MACRO: None   Signed by: Mayuri Velez 10/29/2024 9:36 AM Dictation workstation:   [id number]    XR chest 1 view    Result Date: 10/29/2024  Interpreted By:  Lily Mancini, STUDY: XR CHEST 1 VIEW;  10/29/2024 8:51 am   INDICATION: Signs/Symptoms:R/O aspiration. Residual feeds   COMPARISON: 10/28/2024; CT chest dated 09/26/2024   ACCESSION NUMBER(S): [id number]   ORDERING CLINICIAN: SAIGE MULLER   FINDINGS: In size.   There is bilateral parenchymal infiltration. There may be bilateral pleural effusions.   There are calcified granulomas.   A nasogastric tube terminates below the diaphragm.   There appears to be a PICC line on the left with the tip in the region of superior vena cava.   There is also of jugular catheter on the right. The tip is not well seen.   The patient is status post spinal fusion.   COMPARISON OF FINDING: The chest is similar.       Bilateral parenchymal infiltrates. Bilateral pleural effusions.   MACRO: none   Signed by: Lily Mancini 10/29/2024 9:20 AM Dictation workstation:   [id number]    XR chest 1 view    Result Date: 10/28/2024  Interpreted By:  Andrew Byrd, STUDY: XR CHEST 1 VIEW; 10/28/2024 11:33 am   INDICATION: CLINICAL INFORMATION: Signs/Symptoms:more secretions.   COMPARISON: 10/26/2024   ACCESSION NUMBER(S): [id number]   ORDERING CLINICIAN: MARIAN GILBERT   TECHNIQUE: Portable chest one view.   FINDINGS: The cardiac size is indeterminate in  view of the AP projection. Bilateral perihilar infiltrate is present. Bilateral effusions are present, greater than left. Increased density at the left base suggest consolidation within left lower lobe. The tube and line placement is unchanged.       Bilateral infiltrates and effusions. There is no interval change when compared to the previous examination.   MACRO: none   Signed by: Andrew Byrd 10/28/2024 12:05 PM Dictation workstation:   [id number]    XR chest 1 view    Result Date: 10/26/2024  Interpreted By:  Mar Watkins, STUDY: XR CHEST 1 VIEW;  10/26/2024 9:21 am   INDICATION: Signs/Symptoms:post chest tube removal.     COMPARISON: 10/25/2024   ACCESSION NUMBER(S): [id number]   ORDERING CLINICIAN: STEVIE ZHAO   FINDINGS: Multiple support devices remain in place including ET tube, tip not well visualized, likely 2 enteric tubes extending into the upper abdomen, tips not included on the image, right jugular central line with tip overlying the proximal SVC and left subclavian PICC line with tip extending to the region of the right atrium again seen. Additional presumed overlying monitoring/support devices. Extensive orthopedic hardware overlying the central chest/spine again seen.   Ill-defined bilateral chest opacities similar to the prior exam. No definite pneumothorax. The cardiac silhouette is within normal limits for size.       Multiple support devices in place as above. Persistent hazy bilateral chest opacities.   MACRO: None.   Signed by: Mar Watkins 10/26/2024 10:11 AM Dictation workstation:   [id number]    XR chest 1 view    Result Date: 10/25/2024  Interpreted By:  Parmjit Mancini, STUDY: XR CHEST 1 VIEW   INDICATION: Signs/Symptoms:post chest tube removal.   COMPARISON: October 24   ACCESSION NUMBER(S): [id number]   ORDERING CLINICIAN: MARIAN GILBERT   FINDINGS: Interval removal of the left-sided chest tube without pneumothorax.   Moderate left pleural effusion grossly similar.   Edema  similar to prior.   Other lines and tubes grossly unchanged.       Interval left-sided chest tube removal without pneumothorax.   Signed by: Parmjit Mancini 10/25/2024 6:30 PM Dictation workstation:   [id number]    Bedside PICC Imaging    Result Date: 10/24/2024  These images are not reportable by radiology and will not be interpreted by  Radiologists.    XR chest 1 view    Result Date: 10/24/2024  Interpreted By:  Andrew Byrd, STUDY: XR CHEST 1 VIEW; 10/24/2024 9:16 am   INDICATION: CLINICAL INFORMATION: Signs/Symptoms:assess effusions, intubated, on CRRT.   COMPARISON: 10/23/2024 at 0401 hours   ACCESSION NUMBER(S): [id number]   ORDERING CLINICIAN: SERINA BUTCHER   TECHNIQUE: Portable chest one view.   FINDINGS: The cardiac size is indeterminate in view of the AP projection. There is no change in the left-sided chest tube. There is no change in the bibasilar density likely representing pleural effusions. Hazy bilateral perihilar infiltrates are suspected.       No change in the probable bilateral infiltrates and effusions as above. Left-sided chest tube is present. There is no evidence for pneumothorax.   MACRO: none   Signed by: Andrew Byrd 10/24/2024 9:58 AM Dictation workstation:   [id number]    ECG 12 Lead    Result Date: 10/23/2024  Normal sinus rhythm Low voltage QRS Borderline ECG No previous ECGs available Confirmed by Milind Lang ([id number]) on 10/23/2024 4:02:31 PM    XR chest 1 view    Result Date: 10/23/2024  Interpreted By:  Stewart Leiva, STUDY: XR CHEST 1 VIEW;  10/23/2024 4:14 am   INDICATION: Signs/Symptoms:increased o2.   COMPARISON: 10/22/2024   ACCESSION NUMBER(S): [id number]   ORDERING CLINICIAN: BRIAN MANRIQUEZ   FINDINGS: Endotracheal tube, nasogastric tube and right central venous catheter is not well assessed secondary to projectional overlap with thoracolumbar fusion hardware. The cardiac silhouette is stable in size. There are bilateral airspace opacities and pleural effusions. Stable  left chest tube. No pneumothorax.       Bilateral airspace opacities and pleural effusions. There is stable focal asymmetric prominent opacity in the right hilar region which may correspond to focal area of consolidation, however attention on continued progress imaging is recommended to assure resolution exclude other under pathology including mass.   MACRO: None   Signed by: Stewart Leiva 10/23/2024 4:31 AM Dictation workstation:   [id number]    XR chest 1 view    Result Date: 10/22/2024  Interpreted By:  Andrew Byrd, STUDY: XR CHEST 1 VIEW; 10/22/2024 6:36 am   INDICATION: CLINICAL INFORMATION: Signs/Symptoms:assess effusions. Unresponsive.   COMPARISON: 10/21/2024   ACCESSION NUMBER(S): [id number]   ORDERING CLINICIAN: SERINA BUTCHER   TECHNIQUE: Portable chest one view.   FINDINGS: The cardiac size is indeterminate in view of the AP projection. Pigtail catheter overlies the left hemithorax. Moderate bilateral pleural effusions are suspected. Perihilar hazy infiltrate is present. Bilateral thoracic and lumbar pedicle screw and bar fusions are present. Right internal jugular venous catheter tip overlies the junction of the right and left innominate veins.   ETT may lie low possibly extending into the right mainstem bronchus but this study is limited due to overlying artifact from the patient's pedicle screw and bar fusion.       No change in the bilateral infiltrates and effusions. Left-sided chest tube without pneumothorax.   Endotracheal tube may be low in position possibly extending into the right mainstem bronchus although following the endotracheal tube is limited due to overlapping artifact from the patient's pedicle screw and bar fusion. Patient may benefit from obtaining RPO and LPO views of the chest to more definitively assess ET tube position.   MACRO: Andrew Byrd communicated these findings by [employer] secure chat with [facility] intensivist team on 10/22/2024 at 8:16 am.  (**-RCF-**) Findings: See  findings.   Signed by: Andrew Byrd 10/22/2024 8:17 AM Dictation workstation:   [id number]    US renal complete    Result Date: 10/22/2024  Interpreted By:  Andrew Byrd, STUDY: US RENAL COMPLETE; 10/21/2024 7:54 pm   INDICATION: Signs/Symptoms:AMY.   COMPARISON: 09/26/2024   ACCESSION NUMBER(S): [id number]   ORDERING CLINICIAN: BLAKE NAIK   TECHNIQUE: Grayscale and color Doppler imaging of the kidneys   FINDINGS: COMMENTS: Technologist note indicates the exam was extremely limited due to portable technique and limited patient mobility.   The right kidney measures 13.3 cm  . The left kidney measures 12.1 cm  .     No renal stones are identified.  The renal cortical thickness and echogenicity is normal.  No hydronephrosis is identified.   Urinary bladder is collapsed around a Gibson catheter. This limited bladder assessment.   Small amount of ascites is present. Right pleural effusion is present.       Non specific appearance of the kidneys as described. Small amount of ascites. Right pleural effusion.   MACRO: none   Signed by: Andrew Byrd 10/22/2024 8:08 AM Dictation workstation:   [id number]    XR chest 1 view    Result Date: 10/21/2024  Interpreted By:  Joyce Fontaine, STUDY: XR CHEST 1 VIEW 10/21/2024 8:00 am   INDICATION: Signs/Symptoms:eval ett, lines, lung fields   COMPARISON: 10/19/2024   ACCESSION NUMBER(S): [id number]   ORDERING CLINICIAN: DUSTY RICO   TECHNIQUE: Single AP view chest   FINDINGS: Endotracheal tube tip is obscured by hardware overlying the chest from thoracic spinal fusion. The endotracheal tube is not identified about the tip of the michi and is just distal to the level of the distal clavicles visualized portions. NG tube is in place.   Hazy opacity at the right lung base component of layering effusion and compressive atelectasis superimposed infiltrate not excluded. There is a calcified left suprahilar granuloma. Left-sided chest tube in place without evidence for  pneumothorax. Hazy opacity retrocardiac area obscuring evaluation with component of layering effusion/compressive atelectasis not excluded.             1. Endotracheal tube obscured by patient positioning and hardware overlying the thoracic spine. The tube is not demonstrated about the level of the michi as seen just distal to the level of the clavicles. Follow up as clinically indicated.   2. Persistent right basilar effusion and compressive atelectasis. Superimposed infiltrate not excluded   3. Left-sided chest tube in place without pneumothorax. Retrocardiac area obscured with component of left basilar effusion/compressive atelectasis suggested.   Signed by: Joyce Fontaine 10/21/2024 9:14 AM Dictation workstation:   [id number]    XR chest 1 view    Result Date: 10/19/2024  Interpreted By:  Parmjit Mancini, STUDY: XR CHEST 1 VIEW   INDICATION: Signs/Symptoms:line placement.   COMPARISON: October 19, 2024 earlier the same day   ACCESSION NUMBER(S): [id number]   ORDERING CLINICIAN: SERINA BUTCHER   FINDINGS: Again the right-sided central line slightly overlies the soft tissues of the chest. The appearance is similar to the previous study. It is potentially in the SVC which may be projecting more laterally given the patient rotation but. Definitive intravascular location can not be confirmed. Correlate with line function.   Basilar edema.   No evidence of pneumothorax.       Again the right-sided central line slightly overlies the soft tissues of the chest. The appearance is similar to the previous study. It is potentially in the SVC which may be projecting more laterally given the patient rotation but. Definitive intravascular location can not be confirmed. Correlate with line function.   Signed by: Parmjit Mancini 10/19/2024 9:55 PM Dictation workstation:   [id number]    XR abdomen 1 view    Result Date: 10/19/2024  Interpreted By:  Parmjit Mancini, STUDY: XR ABDOMEN 1 VIEW   INDICATION: Signs/Symptoms:NG tube  placement.   COMPARISON: Earlier same day   ACCESSION NUMBER(S): [id number]   ORDERING CLINICIAN: DUSTY RICO   FINDINGS: Nasogastric tube over the gastric body.   Bowel-gas pattern not fully assessed.       Nasogastric tube over the gastric body.   Signed by: Parmjit Mancini 10/19/2024 8:02 PM Dictation workstation:   [id number]    XR chest 1 view    Result Date: 10/19/2024  Interpreted By:  Parmjit Mancini, STUDY: XR CHEST 1 VIEW   INDICATION: Signs/Symptoms:ett placed.   COMPARISON: Earlier the same day 5:32 a.m.   ACCESSION NUMBER(S): [id number]   ORDERING CLINICIAN: DUSTY RICO   FINDINGS: Interval intubation with the endotracheal tube an approximate 4 cm proximal to the michi. Bilateral pleural effusions right worse than left with basilar edema, slightly worsened from prior.   Left-sided thoracostomy tube.   No evidence of pneumothorax.   Right-sided central line overlies the right chest near the apex. This is potentially within the SVC but is slightly lateral and its definitive intravascular location not confirmed.       Right-sided central line overlies the right chest near the apex. This is potentially within the SVC but is slightly lateral and its definitive intravascular location not confirmed. Correlate with function.   Interval intubation.       Signed by: Parmjit Mancini 10/19/2024 8:02 PM Dictation workstation:   [id number]    XR chest 1 view    Result Date: 10/19/2024  Interpreted By:  Parmjit Mancini, STUDY: XR CHEST 1 VIEW   INDICATION: Signs/Symptoms:sob, ng tube placement.   COMPARISON: Earlier the same day   ACCESSION NUMBER(S): [id number]   ORDERING CLINICIAN: CHRISTIANO BRICENO   FINDINGS: Nasogastric tube overlies the gastric fundus.   Basilar edema and effusions similar to prior exam       Nasogastric tube overlies the gastric fundus.   Signed by: Parmjit Mancini 10/19/2024 5:34 PM Dictation workstation:   [id number]    XR chest 1 view    Result Date: 10/19/2024  Interpreted By:  Fern Mckeon, STUDY:  XR CHEST 1 VIEW;  10/19/2024 5:51 am   INDICATION: Signs/Symptoms:assess effusions.     COMPARISON: 10/18/2024   ACCESSION NUMBER(S): [id number]   ORDERING CLINICIAN: SERINA BUTCHER   TECHNIQUE: Portable AP semi upright   FINDINGS: Spinal fixation hardware appears unchanged. Pigtail pleural catheter mid to lower left hemithorax projects laterally and is unchanged in position.   Heart is partially obscured by pleuroparenchymal opacities but is grossly unchanged in size. There is poor aeration of the lungs with considerable bilateral dependent atelectasis and bilateral pleural effusions. No pneumothorax is identified. Overall appearance is similar to the prior study. Osseous structures are grossly unchanged.       Bilateral atelectasis and pleural effusions with very poor aeration of the lungs, unchanged   MACRO: None.   Signed by: Fern Mckeon 10/19/2024 12:20 PM Dictation workstation:   [id number]    XR chest 1 view    Result Date: 10/18/2024  Interpreted By:  Joyce Fontaine, STUDY: XR CHEST 1 VIEW 10/18/2024 5:31 am   INDICATION: Signs/Symptoms:assess effusions   COMPARISON: 10/17/2024   ACCESSION NUMBER(S): [id number]   ORDERING CLINICIAN: SERINA BUTCHER   TECHNIQUE: Single portable AP view chest   FINDINGS: Cardiac silhouette is mildly enlarged. Examination is rotated accentuating the cardiac silhouette. Hazy opacity at the right lung base with component of layering basilar effusion and compressive atelectasis. Of note, left-sided chest tube in place without pneumothorax. Hazy opacity at the left lung base component of which may relate to compressive atelectasis or even small effusion. Spinal fusion noted.             1. Stable chest with persistent bilateral basilar effusions. No pneumothorax with left-sided percutaneous pigtail catheter in place.   Signed by: Joyce Fontaine 10/18/2024 9:02 AM Dictation workstation:   [id number]    XR chest 1 view    Result Date: 10/17/2024  Interpreted By:   Fern Mckeon, STUDY: XR CHEST 1 VIEW;  10/17/2024 3:12 pm   INDICATION: Signs/Symptoms:s/p pigtail placement.     COMPARISON: 10/17/2024 at 8:28 a.m.   ACCESSION NUMBER(S): [id number]   ORDERING CLINICIAN: SERINA BUTCHER   TECHNIQUE: Portable AP upright   FINDINGS: Since the earlier examination a pigtail catheter is been placed in the pleural space lateral mid left hemithorax. There is improved aeration in the left lung compared to the previous study. Continued opacity is present at the left lung base which is probably a combination of pleural fluid and atelectasis. There is no pneumothorax at the left apex. Shallow volume in the right lung appears similar. There is opacity at the right lung base which is unchanged and consistent with a combination of pleural fluid and atelectasis. Fixation hardware in the spine is partially visualized and is grossly unchanged.       Interval placement of a left pleural pigtail catheter and improved aeration of the left lung since the earlier study.   No pneumothorax   Bilateral basilar opacities consistent with a combination of pleural fluid and atelectasis   MACRO: None.   Signed by: Fern Mckeon 10/17/2024 4:36 PM Dictation workstation:   [id number]    XR chest 1 view    Result Date: 10/17/2024  Interpreted By:  Mayuri Velez, STUDY: XR CHEST 1 VIEW;  10/17/2024 8:56 am   INDICATION: Signs/Symptoms:fluid overload.   COMPARISON: 10/16/2024   ACCESSION NUMBER(S): [id number]   ORDERING CLINICIAN: SERINA BUTCHER   FINDINGS: CARDIOMEDIASTINAL SILHOUETTE: Cardiomediastinal silhouette is normal in size and configuration.     LUNGS: There has been interval development of complete opacification of the left hemithorax, likely a large left pleural effusion. There may be an infiltrate or compressive atelectasis at the left lung. There is a moderate-size right pleural effusion extending into the major fissure, unchanged.   ABDOMEN: No remarkable upper abdominal findings.      BONES: No acute osseous changes. Status post dorsal fusion of the thoracic spine and lumbar spine.       1. Interval complete opacification of the left hemithorax consistent with a large left pleural effusion. There is an infiltrate or compressive atelectasis at the left lung. 2. Unchanged moderate size right pleural effusion.   MACRO: None   Signed by: Mayuri Velez 10/17/2024 9:16 AM Dictation workstation:   [id number]    XR chest 1 view    Result Date: 10/16/2024  Interpreted By:  Vanessa Richardson, STUDY: XR CHEST 1 VIEW 10/16/2024 10:17 am   INDICATION: Hypoxia with increasing oxygen requirements   COMPARISON: 10/13/2024   ACCESSION NUMBER(S): [id number]   ORDERING CLINICIAN: KRYSTLE BRADY   TECHNIQUE: AP erect view of the chest   FINDINGS: Increasing opacification within the left mid and lower hemithorax is noted silhouetting the left cardiac border consistent with increasing size of left pleural effusion and probable left lower lobe atelectasis. There is left suprahilar infiltrate noted as well. On the right, the pleural effusion has also increased in size with increasing atelectasis.   Calcified lymph nodes are seen within the AP window. There is postoperative change from spinal fusion in the thoracic and lumbar regions.   Endotracheal and nasogastric tubes have been removed.       Increasing size of the bilateral pleural effusions with worsening atelectasis.   There is now new left suprahilar infiltrate.   Signed by: Vanessa Richardson 10/16/2024 12:03 PM Dictation workstation:   [id number]    Upper extremity venous duplex bilateral    Result Date: 10/14/2024  Interpreted By:  Sean Alfred, STUDY: [facility] UPPER EXTREMITY VENOUS DUPLEX BILATERAL; 10/14/2024 4:48 pm   INDICATION: Signs/Symptoms:new onset upper extremity swelling.   COMPARISON: None.   ACCESSION NUMBER(S): [id number]   ORDERING CLINICIAN: SERINA BUTCHER   TECHNIQUE: 2D ultrasound and color-flow duplex images were obtained along with Doppler  spectral waveform analysis of the deep venous system of the right upper extremity. Venous compression was performed. Also interrogated, contralateral subclavian vein.   FINDINGS:     Right upper Extremity: There is normal compressibility and flow within the visualized vessels of the deep venous system of this upper extremity. Visualized portions of the jugular vein and subclavian vein appear patent.   Left upper extremity: There is thrombus within the left cephalic vein, which is part of the superficial venous system of the upper extremity. Central line is also in place through the left cephalic vein. The left basilic vein could not be visualized, due to edema and positioning. The deep veins of the left upper extremity appear patent.       1. Thrombosis within the left cephalic vein, which is part of the superficial venous system of the upper extremity, adjacent to PICC line. 2. No deep venous thrombosis in the upper extremities.     Signed by: Sean Alfred 10/14/2024 4:59 PM Dictation workstation:   [id number]    ECG 12 lead    Result Date: 10/14/2024  Accelerated Junctional rhythm Low voltage QRS Nonspecific T wave abnormality Abnormal ECG No previous ECGs available Confirmed by Misha Corral ([id number]) on 10/14/2024 4:08:43 PM    XR chest 1 view    Result Date: 10/13/2024  Interpreted By:  Lazaro Butler, STUDY: XR CHEST 1 VIEW; 10/13/2024 11:42 am   INDICATION: Signs/Symptoms:NG tube advancement.   COMPARISON: None   ACCESSION NUMBER(S): [id number]   ORDERING CLINICIAN: SERINA BUTCHER   TECHNIQUE: 1 view of the chest was performed.   FINDINGS: Persistent layering left-sided pleural effusion with multifocal airspace opacities/atelectasis. Right upper perihilar vascular congestion. No pleural effusion. No pneumothorax.  The cardiomediastinal silhouette is within normal limits. Limited evaluation of supportive devices due to surgical spine hardware. Possibly tip of endotracheal tube 6.5 cm from the level of the  michi. Distal tip of enteric tube likely terminating in the proximal stomach.       1. Persistent layering left-sided pleural effusion with multifocal airspace opacity/atelectasis. Persistent perihilar vascular congestion. 2. Advanced enteric feeding tube likely terminating now in the proximal stomach although difficult evaluation given thoracolumbar spine hardware.   Signed by: Lazaro Butler 10/13/2024 1:21 PM Dictation workstation:   [id number]    XR chest 1 view    Result Date: 10/13/2024  Interpreted By:  Dory Mcarthur, STUDY: XR CHEST 1 VIEW; 10/13/2024 8:50 am   INDICATION: Signs/Symptoms:wheezing, intubated, assess lung fields   COMPARISON: Radiographs 10/12/2024   ACCESSION NUMBER(S): [id number]   ORDERING CLINICIAN: SERINA BUTCHER   TECHNIQUE: Single frontal view of the chest performed.   FINDINGS: LINES AND DEVICES: Obscure by surgical hardware. *Tip of enteric tube appears to be in the proximal stomach and sidehole near the EG junction; consider advancing by 6 cm. *There appears to be a esophageal temperature probe extending to the distal esophagus, the tip not well seen. *Tip of ET tube is estimated to be 6 cm above the michi, but not well seen.   LUNGS: Haziness throughout the left mid and lower lung and right lower lung with slightly improved aeration in the left lung apex. No new focal consolidation. No pneumothorax.   CARDIOMEDIASTINAL SILHOUETTE: The cardiomediastinal silhouette is stable.   OTHER: Extensive thoracolumbar instrumentation.       Decreased but persistent moderate left and small right layering effusions with improved left apical aeration.   Significantly limited evaluation of lines and devices due to surgical hardware. Best gross estimation as follows: *Tip of enteric tube appears to be in the proximal stomach and sidehole near the EG junction; consider advancing by 6 cm. *Possible esophageal temperature probe extending to the distal esophagus, the tip not well seen. *Tip of ET  tube is estimated to be 6 cm above the michi, but not well seen.     MACRO Dory Mcarthur discussed the significance and urgency of this critical finding by Epic secure chat with  SERINA BUTCHER on 10/13/2024 at 10:19 am.  (**-RCF-**) Findings:  See findings.   Signed by: Dory Mcarthur 10/13/2024 10:19 AM Dictation workstation:   [id number]    CT lumbar spine wo IV contrast    Result Date: 10/12/2024  Interpreted By:  Chip March, STUDY: CT LUMBAR SPINE WO IV CONTRAST;  10/12/2024 5:47 pm   INDICATION: Signs/Symptoms:r/o post operative infection/lesion.   COMPARISON: 09/25/2024   ACCESSION NUMBER(S): [id number]   ORDERING CLINICIAN: DALILA GEORGE   TECHNIQUE: Axial noncontrast images of the lumbar spine with coronal and sagittal reconstructed images.   FINDINGS: Limited study secondary to extensive prior surgical fusion of the visualized thoracic and lumbar spine.   Redemonstrated is levoscoliosis. Alignment is unchanged.   Previously discussed erosive appearing changes surrounding the L1-L2 level surrounding the intervertebral cage has an unchanged appearance.   No new fracture or dislocation. No additional new evidence of significant hardware complication is identified.   Evaluation of the central canal and posterior paraspinal soft tissues is again markedly limited.   Partially visualized pleural effusion seen in the chest. Partially visualized NG tube is seen extending into the stomach.   Dense calcification of the aorta is redemonstrated.       Similar-appearing extensive postsurgical changes related to fusion of the thoracic and lumbar spine redemonstrated which appears to be essentially unchanged compared to imaging 09/25/2024. Again this study is markedly limited in its evaluation of the central canal and posterior paraspinal soft tissues. This limits evaluation for infection. Stable erosive appearing changes at the L1-L2 level surrounding the intervertebral cage, again infection at this site not  excluded.   MACRO: None.   Signed by: Chip March 10/12/2024 6:50 PM Dictation workstation:   [id number]    CT head wo IV contrast    Result Date: 10/12/2024  Interpreted By:  Chip March, STUDY: CT HEAD WO IV CONTRAST;  10/12/2024 5:46 pm   INDICATION: Signs/Symptoms:AMS.   COMPARISON: None.   ACCESSION NUMBER(S): [id number]   ORDERING CLINICIAN: YASH BARRAGAN   TECHNIQUE: Noncontrast axial CT images of head were obtained with coronal and sagittal reconstructed images.   FINDINGS: BRAIN PARENCHYMA: Some areas of hypoattenuation/suspected encephalomalacia are seen involving the frontal lobes. Unchanged calcification right frontal lobe. No acute intraparenchymal hemorrhage or parenchymal evidence of acute large territory ischemic infarct. No mass-effect. Gray-white matter distinction is preserved.   VENTRICLES and EXTRA-AXIAL SPACES:  No acute extra-axial or intraventricular hemorrhage. No effacement of cerebral sulci. Ventricles and sulci are age-concordant.   PARANASAL SINUSES/MASTOIDS:  No hemorrhage or air-fluid levels within the visualized paranasal sinuses. The mastoids are well aerated.   CALVARIUM/ORBITS:  No skull fracture. The orbits and globes are intact to the extent visualized.   EXTRACRANIAL SOFT TISSUES: No discernible abnormality.       No acute intracranial abnormality.     MACRO: None.   Signed by: Chip March 10/12/2024 6:42 PM Dictation workstation:   [id number]    XR chest 1 view    Result Date: 10/12/2024  Interpreted By:  Parmjit Mancini, STUDY: XR CHEST 1 VIEW   INDICATION: Signs/Symptoms:NGT.   COMPARISON: Earlier the same day   ACCESSION NUMBER(S): [id number]   ORDERING CLINICIAN: DALILA GEORGE   FINDINGS: The tip of the nasogastric tube not definitively visualized but the curvature of the tubing looks to read along the gastric body with the tip at least distal to the gastric body.       The tip of the nasogastric tube not definitively visualized but the curvature of the tubing looks  to read along the gastric body with the tip at least distal to the gastric body.   Signed by: Parmjit Mancini 10/12/2024 5:35 PM Dictation workstation:   [id number]    XR chest 1 view    Result Date: 10/12/2024  Interpreted By:  Parmjit Mancini, STUDY: XR CHEST 1 VIEW   INDICATION: Signs/Symptoms:NG tube placement.   COMPARISON: Earlier the same day 3:47 p.m.   ACCESSION NUMBER(S): [id number]   ORDERING CLINICIAN: DALILA GEORGE   FINDINGS: Nasogastric tube tip looks to be centered roughly at the level of the gastroesophageal junction.   Left effusion and basilar airspace disease similar to previous         Nasogastric tube tip looks to be centered roughly at the level of the gastroesophageal junction.   Left effusion and basilar airspace disease similar to previous   Signed by: Parmjit Mancini 10/12/2024 5:34 PM Dictation workstation:   [id number]    XR chest 1 view    Result Date: 10/12/2024  Interpreted By:  Parmjit Mancini, STUDY: XR CHEST 1 VIEW   INDICATION: Signs/Symptoms:NGT placement.   COMPARISON: Earlier the same day 2:49 p.m.   ACCESSION NUMBER(S): [id number]   ORDERING CLINICIAN: DALILA GEORGE   FINDINGS: Nasogastric tube tip not definitively confirmed, slightly obscured by the patient's spinal fusion hardware. It may be reflected upon itself in the distal esophagus.       Nasogastric tube tip not definitively confirmed, slightly obscured by the patient's spinal fusion hardware. It may be reflected upon itself in the distal esophagus.   Signed by: Parmjit Mancini 10/12/2024 3:57 PM Dictation workstation:   [id number]    XR chest 1 view    Result Date: 10/12/2024  Interpreted By:  Ildefonso Herring, STUDY: XR CHEST 1 VIEW;  10/12/2024 2:48 pm   INDICATION: Signs/Symptoms:post-intubation.     COMPARISON: None.   ACCESSION NUMBER(S): [id number]   ORDERING CLINICIAN: YASH BARRAGAN   FINDINGS: Endotracheal tube in place with its tip difficult to exactly visualize due to hardware about approximately 2.5 cm above the  michi   CARDIOMEDIASTINAL SILHOUETTE: Cardiomediastinal silhouette is normal in size and configuration.   LUNGS: Extensive airspace disease in the left lung, worsened from the prior. There is a large left effusion as well..   ABDOMEN: No remarkable upper abdominal findings.   BONES: Extensive postsurgical change the thoracolumbar spine.       1. Limited visualization of the ET tube with its tip approximately 2.5 cm above the michi 2. Extensive left lung airspace disease and a large left effusion, worsening from the prior.       MACRO: None   Signed by: Ildefonso Herring 10/12/2024 3:43 PM Dictation workstation:   [id number]       Assessment/Plan   IMP:    Acute Respiratory Failure  Sepsis/Septic Shock  AMY  PNA healthcare associated  Wound Infection, surgical site  Encephalopathy  Pleural Effusion  Acute on chronic diastolic heart failure  Palliative Care    DNRCCA  Not Capable  Documented HPOA is spouse Godfrey, alternate is daughter Kelly    Family meeting today with spouse after extensive chart review and discussion with critical care team. Spouse spoke about patient's likely poor health prior to surgery 7/24, and her overall decline since then. Most recently, spent <7 days at rehab before rehospitalizing. We reviewed together her most recent hospitalization and interventions, including Tablo, ventilator, pigtail and lack of improvement. Critical care team have already spoken to spouse about trach, peg and LTAC. We reviewed risks vs benefits of trach/peg and impact to quality of life. We also reviewed likelihood of a meaningful recovery even with prolonged life support. We reviewed her living will and I requested spouse interpret patient wishes for me to clarify whether patient would support trach/peg. He stated that her goal all along had been to recover and return home with spouse, but he acknowledged that at this point, this is not going to happen. He stated that she would have no desire to be stuck in a  bed on life support and would like to discuss this further with daughter Kelly. He stated he was not surprised that we were talking today. We set up a time for Sunday at 1pm to discuss further with daughter and confirm goals for patient. I also discussed that given her wishes and prognosis, CPR unlikely to provide benefit and spouse agreed. Signed copy of DNRCCA placed in chart. Critical care team updated.     Family meeting tomorrow at 1pM.     I spent 90 minutes in the professional and overall care of this patient. 30min spent in acp discussion with spouse.       Breanne Ballesteros, APRN-CNP

## 2024-11-02 NOTE — PROGRESS NOTES
[facility] Critical Care Medicine       Date:  11/2/2024  Patient:  Narda Malloy  YOB: 1955  MRN:  [id number]   Admit Date:  10/12/2024    Chief Complaint   Patient presents with    Altered Mental Status         History of Present Illness:  Narda Malloy is a 69 y.o. year old female patient with Past Medical History of L1-L3 lumbar laminectomy, T4-S1 revision, and fusion August 26th, T2DM, HTN, essential tremor, HLD, glaucoma, sarcoidosis of the lung who presented to  ED 10/12 after being found essentially unresponsive at [facility]. Per report from her , she has had a significant decline in her health since July 15th when she had a fall and became significantly weak. She has also had multiple infection complications since her back surgery in August requiring multiple I&Ds and long term antibiotic therapy. She went to the OR most recently on 9/28 for lumbar site infection wash out. Per chart review, she was discharged on IV vancomycin 1g and IV ceftriaxone 2d q24hrs through 11/12.     ED Course: Initial vital signs: /104 (109), HR 68, RR 20, SpO2 95% on 6L NC, temp 34.5C. Give 0.4mg of narcan with no improvement in mentation. Lab work-up remarkable for mild hyperkalemia (5.5), AMY 42/1.46, elevated alk phos, normocytic anemia 10.4/33, turbid appearing urine with mild hematuria and proteinuria and + leuk esterase, >50 WBCs. Urine drug screen positive for barbiturates. Triggered sepsis timer so she was given 3L NS. She was intubated for airway protection with 20mg etomidate and 100mg succinylcholine. BP dropped post-intubated and fluid resuscitation and she was subsequently started on levophed.             Interval ICU Events:  10/12: Pt arrived to ICU intubated and lightly sedated on low-dose versed. Eyes open, minimally responsive.      10/13: Received K cocktail last night for K 6.0, corrected appropriately. Levophed requirements mildly up, UOP  decreasing. Ordered albumin x2 this am with improvements in UOP and SBP. Will likely trial lasix this afternoon d/t hypervolemia.     10/14: Remains intubated with decreased mentation, only responsive to noxious stimuli. Trialed lasix TID for volume overload. Remains on levophed 0.01.     10/15: Mentation much improved. SAT/SBT successful so extubated. Given lasix x3, bumex x1, metolazone x1 with net negative fluid balance of 500mL -> started on bumex gtt.      10/16: O2 requirements significantly increased, NRB -> HFNC 40L 100% likely 2/2 mucus plugging.     10/17: No acute events overnight. Bumex gtt increased to 1mg/hr. CXR this am showing complete opacification of left hemithorax related to atelectasis vs pleural effusion. Remains on HFNC 40L/80%. Pigtail catheter placed with 1.2L drained immediately. Given albumin for hypotension.      10/18: ~2L output from left sided pigtail catheter since placement. Kidney function worsening and UOP declining, about 20cc/hr overnight. Will place NG tube today and start enteral nutrition and appetite and oral intake remains poor.      10/19: Patient with worsening hypoxic, hypercapnic respiratory failure - now requiring BIPAP support. Remains grossly volume overloaded with low UO. Started on vasopressors to augment BP for diuresis. 40 IV lasix + gtt started. Remains with poor nutritional status. Will re attempt NG later if respiratory status improves.      10/20: Patient stable on vent. Nephrology consulted for renal failure. Cr continues to uptrend however UO is increasing. No issues overnight.     10/21: No issues overnight. Remains stable on vent. Levo down to 0.01 mcg/kg/min. UO remains low. Nephrology following. Possible CRRT today?     10/22: Patient received 80 lasix and metalozone with minimal UO. Levo @ .02 and prop @ 10. Vent settings: 20/450/10/40%. Plan for CRRT today. Will SAT/SBT after CRRT. May change propofol to precedex.     10/23: Had episodes of bradycardia  where HR went to 30's which resolved with heating the patient. CRRT yesterday with about 1L removed. Currently on 0.03 of levo and 10 of prop. Cont daptomycin and ceftriaxone per ID. CXR improved. SAT/SBT. EP consulted for episodes of bradycardia.     10/24: Bradycardic episodes of HR in 40s. Currently on 0.03 of Levo, 10 of prop and 50 fentanyl. Tolerating CRRT. Place PICC today.     10/25: Did well with the SBT yesterday, plan for another one today. Continue CRRT. Hgb 7.0 this am, still requiting Levo 0.03, will transfuse 1 unit PRBCs. Remove chest tube today.     10/26: Chest tube removed last night, CXR improving, patient appears overall lethargic on sbt/sat, not ready to extubate, will complete 4/4/4 sbt/rest/sbt-> rest today and reassess tomorrow     10/27: Patient failed afternoon SBT, placed on rate overnight, net - 2.5L over previous 24 hours, will place on wean today.     10/29: Patient with high residual tube feeds overnight.  She did have an episode of vomiting.  Tube feeds placed on hold.  She was also afebrile overnight.  Will obtain a KUB to rule out ileus.  Sputum culture with Pseudomonas, discussed antibiotic plans with ID.  Will not do SBT with patient today.  Did discuss the setback with her , he did state moving forward that if she does not reach extubation he would be agreeable to a tracheostomy.      10/30: No significant events overnight.  Tube feeds resumed at trickle rate yesterday, tolerating overnight.  Will increase to goal today.  Dialysis this morning.  Patient improved from yesterday.  Plan for SBT today.  Will attempt to sit patient on side of bed today.    10/31: No significant events overnight. Tolerating tube feeds, tube feeds on hold this AM for SBT. Patient awake and alert this morning, improved from yesterday.  Tolerating SBT, answering yes or no questions.  Will extubate today.    11/1: Pt extubated yesterday to HFNC. Had worsened respiratory distress requiring bipap,  wore bipap overnight. Will trial back on HFNC when more awake. Dialysis scheduled for today. Will remove spinal sutures.     11/2: Unable to wean from bipap yesterday without immediate desat in 50-60s, increasingly lethargic and weak. Decision made to reintubate and this was discussed with  which he was ok with. Pt will need trach and peg as this was her 3rd intubation this admission and she's having persistent respiratory failure due to recurrent pleural effusions, even with iHD fluid removal and recurrent atelectasis with lobar collapse. Consulted to palliative care for communication to family about GOC and communication of expectations, risks, benefits of tracheostomy creation, peg tube placement, and LTAC admission. Discussed with neph, will run tablo today for additional fluid removal d/t recurrent intubation. Plan for ENT and surgery consult for trach and peg pending family meeting tmrw at 1pm.     Medical History:  Past Medical History:   Diagnosis Date    Degenerative myopia, bilateral     Diabetic neuropathy (Multi)     Difficult intubation 08/26/2024    Mac 3, grade 3, 1 attempt.  Glidescope/videolaryngoscopy recommended for future attempts.    DM type 2 (diabetes mellitus, type 2) (Multi)     Dry eye syndrome of bilateral lacrimal glands     Essential hypertension     Essential tremor     Glaucoma     Hyperlipidemia     Long term (current) use of insulin (Multi)     Low back pain     PONV (postoperative nausea and vomiting)     Primary open angle glaucoma of both eyes, severe stage     Repeated falls     Sarcoidosis of lung (Multi)     Spinal stenosis, lumbar region without neurogenic claudication     Weakness      Past Surgical History:   Procedure Laterality Date    BLEPHAROPLASTY  07/2022    BREAST SURGERY  05/20/2022    Breast lift    CARPAL TUNNEL RELEASE      CATARACT EXTRACTION W/  INTRAOCULAR LENS IMPLANT Bilateral     OD 08/04/2011 +8.5D,OS 08/04/2011 +8.50D    FOOT SURGERY      INSERTION  / REMOVAL CRANIAL DBS GENERATOR      Placed 2017.  Removed 2018. part of wire left in head when everything removed    LUMBAR FUSION      L3-S1    PANRETINAL PHOTOCOAGULATION  2014    THORACIC FUSION  08/26/2024    T4-S1 fusion    VITRECTOMY Right 2013     Medications Prior to Admission   Medication Sig Dispense Refill Last Dose/Taking    acetaminophen (Tylenol) 500 mg tablet Take 2 tablets (1,000 mg) by mouth 3 times a day.   Unknown    ascorbic acid (Vitamin C) 500 mg tablet as directed Orally   Unknown    brimonidine (AlphaGAN) 0.2 % ophthalmic solution Administer 1 drop into both eyes 2 times a day.   Unknown    calcium carbonate-vitamin D3 500 mg-5 mcg (200 unit) tablet Take 1 tablet by mouth once daily.   Unknown    cefTRIAXone (Rocephin) 2 gram/50 mL IV Infuse 50 mL (2 g) at 100 mL/hr over 30 minutes into a venous catheter once every 24 hours. Once weekly labs CBC/diff, CMP, Vanc trough ESR, CRP fax to Dr. Juarez [phone]. Stop date 11/12/24. 1950 mL 0     dextrose 50 % injection Infuse 25 mL (12.5 g) into a venous catheter every 15 minutes if needed (For blood glucose 41 to 70 mg/dL).       dextrose 50 % injection Infuse 50 mL (25 g) into a venous catheter every 15 minutes if needed (For blood glucose less than or equal to 40 mg/dL).       docusate sodium (Colace) 100 mg capsule Take 1 capsule (100 mg) by mouth 2 times a day.   Unknown    ezetimibe (Zetia) 10 mg tablet Take 1 tablet (10 mg) by mouth once daily. 90 tablet 3 Unknown    FreeStyle Lite Strips strip USE TO TEST 3 TIMES A DAY AS DIRECTED 300 each 2     glucagon (Glucagen) 1 mg injection Inject 1 mg into the muscle every 15 minutes if needed for low blood sugar - see comments (Hypoglycemia).       glucagon (Glucagen) 1 mg injection Inject 1 mg into the muscle every 15 minutes if needed for low blood sugar - see comments (Hypoglycemia).       heparin sodium,porcine (heparin, porcine,) 5,000 unit/mL injection Inject 1 mL (5,000 Units) under the  skin every 8 hours.       insulin lispro (HumaLOG) 100 unit/mL injection Inject 0-15 Units under the skin 3 times daily (morning, midday, late afternoon). Take as directed per insulin instructions.Do not hold when patient is not eating, continue order as scheduled for hyperglycemia management.  Insulin Lispro Corrective Scale #3     Hypoglycemia protocol Call LIP unit(s) if Blood Glucose is between 0 - 70 mg/dL     0 unit(s) if Blood glucose is between    3 unit(s) if Blood glucose is between 151-200   6 unit(s) if Blood glucose is between 201-250   9 unit(s) if Blood glucose is between 251-300   12 unit(s) if Blood glucose is between 301-350   15 unit(s) if Blood glucose is between 351-400    Notify provider unit(s) if Blood Glucose is greater than 400 mg/dL       Lactobacillus acidophilus 100 mg (1 billion cell) capsule Take 1 capsule by mouth 2 times a day.   Unknown    latanoprost (Xalatan) 0.005 % ophthalmic solution Administer 1 drop into both eyes once daily at bedtime. 2.5 mL 5 Unknown    melatonin 5 mg tablet Take 1 tablet (5 mg) by mouth as needed at bedtime for sleep.   Unknown    methocarbamol (Robaxin) 500 mg tablet Take 1 tablet (500 mg) by mouth 3 times a day.   Unknown    multivitamin tablet Take 1 tablet by mouth once daily.   Unknown    ondansetron (Zofran) 4 mg/2 mL injection Infuse 2 mL (4 mg) into a venous catheter every 6 hours if needed for nausea or vomiting.       oxyCODONE (Roxicodone) 5 mg immediate release tablet Take 1 tablet (5 mg) by mouth every 6 hours if needed for severe pain (7 - 10) or moderate pain (4 - 6).       oxygen (O2) gas therapy Inhale 1 each continuously.       pantoprazole (ProtoNix) 40 mg EC tablet Take 1 tablet (40 mg) by mouth once daily in the morning. Take before meals. Do not crush, chew, or split.       pantoprazole (ProtoNix) 40 mg injection Infuse 40 mg into a venous catheter once daily in the morning. Take before meals. If unable to take PO.       pen  "needle, diabetic (PEN NEEDLE MISC) BD Altagracia- 4 mm X 32 G needle - as directed 4x a day sc 4 times per day       polyethylene glycol (Glycolax, Miralax) 17 gram packet Take 17 g by mouth once daily.   Unknown    primidone 125 mg tablet Take 125 mg by mouth 3 times a day.   Unknown    propranolol LA (Inderal LA) 60 mg 24 hr capsule Take 1 capsule (60 mg) by mouth early in the morning.. Hold for SBP < 110 mmhg, HR < 60 bpm.   Unknown    sennosides (Senokot) 8.6 mg tablet Take 1 tablet (8.6 mg) by mouth every 12 hours if needed for constipation.   Unknown    Sure Comfort Pen Needle 32 gauge x 5/32\" needle AS DIRECTED DAILY FOR 90 DAYS 100 each 11 Unknown    traZODone (Desyrel) 25 MG split tablet Take 1 half tablet (25 mg) by mouth once daily at bedtime.   Unknown    vancomycin (Vancocin) 1 gram/250 mL solution Infuse 250 mL (1 g) at 250 mL/hr over 60 minutes into a venous catheter every 12 hours. Once weekly labs CBC/diff, CMP, Vanc trough ESR, CRP fax to Dr. Juarez [phone]. Stop date 11/12/24. 66862 mL 0      Erythromycin, Morphine, and Rosuvastatin  Social History     Tobacco Use    Smoking status: Former     Types: Cigarettes     Passive exposure: Past    Smokeless tobacco: Never   Vaping Use    Vaping status: Never Used   Substance Use Topics    Alcohol use: Not Currently    Drug use: Not Currently     Family History   Problem Relation Name Age of Onset    Multiple myeloma Mother      Cancer Mother      Other (CABG) Father      Pulmonary embolism Father      Heart disease Father      Breast cancer Sister          Stage II    Hypertension Sister      Diabetes Sister      No Known Problems Sister          x5    No Known Problems Brother          x4    No Known Problems Daughter         Review of Systems:  14 point review of systems was completed and negative except for those specially mention in my HPI    Physical Exam:    Heart Rate:  []   Temp:  [36.3 °C (97.3 °F)-37.5 °C (99.5 °F)]   Resp:  [14-25]   BP: " ()/(41-72)   Weight:  [114 kg (250 lb 14.1 oz)-119 kg (261 lb 11 oz)]   SpO2:  [88 %-100 %]     Physical Exam  Vitals reviewed.   Constitutional:       General: She is awake.      Interventions: She is intubated.   HENT:      Head: Normocephalic and atraumatic.      Right Ear: External ear normal.      Left Ear: External ear normal.      Nose: Nose normal.      Mouth/Throat:      Mouth: Mucous membranes are dry.   Eyes:      Pupils: Pupils are equal, round, and reactive to light.   Cardiovascular:      Rate and Rhythm: Normal rate and regular rhythm.      Pulses: Normal pulses.      Heart sounds: Normal heart sounds.   Pulmonary:      Effort: She is intubated.      Breath sounds: Examination of the right-lower field reveals decreased breath sounds. Examination of the left-lower field reveals decreased breath sounds. Decreased breath sounds present.   Abdominal:      General: Bowel sounds are decreased.      Palpations: Abdomen is soft.      Tenderness: There is no abdominal tenderness.   Musculoskeletal:      Right hand: Swelling present.      Left hand: Swelling present.      Right lower le+ Edema present.      Left lower le+ Edema present.   Skin:     General: Skin is warm.      Capillary Refill: Capillary refill takes less than 2 seconds.   Neurological:      Mental Status: She is alert.         Objective:    I have reviewed all medications, laboratory results, and imaging pertinent for today's encounter    Assessment/Plan:    I am currently managing this critically ill patient for the following problems:    Neuro/Psych/Pain Ctrl/Sedation:  Acute encephalopathy - likely 2/2 hypercapnia, & infection- resolving   Hx essential tremor   CT head: Negative for acute findings   Urine tox positive for barbiturates consistent with primidone intake   - Pain Control: oxycodone, acetaminophen, dilaudid for dressing changes PRN  - Sedation/analgesia: propofol, fentanyl   -- Recommend maintaining low doses as pt  was comfortable on low dose of sedation last intubation   - Home primidone continued. Will hold propanolol d/t hypotension  - CAM ICU qshift, sleep-wake hygiene, delirium precautions    Respiratory/ENT:  Acute hypoxic/hypercapnic respiratory failure - likely multifactorial and 2/2 HCAP, pl effusions, volume overload  Healthcare-associated pneumonia   Atelectasis - likely 2/2 mucus plugging   Pleural effusion -S/p left-sided pigtail placement 10/17, removed 10/25-resolved  Intubated for 3 days extubated and re intubated on the 19th, currently day 11 of intubation  - Supplemental O2: intubated x3 times this admission   -- Extubated 10/31  - Will wean O2 as tolerated to maintain SpO2 >92%  - Consider bronchoscopy  - Consider repeat thoracentesis   - Albuterol, mucomyst, hypertonic saline QID   - IPV per RT TID  - Continuous pulse ox monitoring   - Pulm hygiene  - Aspiration precautions   - Respiratory culture with Pseudomonas, see abx below     Cardiovascular:  Septic shock -resolved  Occlusive superficial venous thrombus of the left cephalic vein  Acute on chronic diastolic heart failure  Hx HTN, HLD  TTE 10/1: diastolic dysfunction, LVEF 50-55%, mildly elevated RVSP (40)  Bilateral duplex US upper extremities:  Thrombosis within the left cephalic vein, which is part of the superficial venous system of the upper extremity, adjacent to PICC line. No deep venous thrombosis in the upper extremities.  - Continue midodrine   - Levophed gtt, wean to maintain MAPS >65  - TTE ordered, read pending   - Holding home propranolol (on for tremors)  - Continue home Zetia  - Continuous cardiac monitoring per ICU protocol  - EKGs PRN for ACS symptoms, arrhythmias   - US duplex b/l upper extremities-occlusive superficial vein thrombosis of left cephalic vein from mid-distal upper arm extending to approximately 2.0cm proximal to subclavian junction   - Acute nonocclusive DVT right IJ vein around line placement, will need to be removed  "  - Continue heparin drip  - Repeat right IJ duplex on Monday 11/4    GI:  Hx GERD  - Diet: enteral feeding via NG  - BR: Colace, Miralax PRN  - GI Prophylaxis: PPI  - Patient had a few episodes of diarrhea, sample sent, C-Diff negative     Renal/Volume Status (Intra & Extravascular):  Acute kidney injury - possibly ATN +/- medication-induced (vancomycin)  Anasarca   Hypoalbuminemia   Hyponatremia  Baseline Cr 0.8-1.0. BUN Cr 1.45/25 today   - Tablo today, fluid removal as tolerated   - MWF schedule, will plan for tunneled line when more stable from respiratory standpoint   - Nephrology following  - Bladder scan daily   - Replete electrolytes to maintain K >4.0 and Mg >2.0  - Daily BMP, Mg, Phos  - Free water flushes being held due to hyponatremia, improving     Endocrine  T2DM  - SSI Q 4 while NPO  - Hypoglycemia protocol PRN    Infectious Disease:  Pseudomonas-Respiratory culture 10/29  Thoracolumbar surgical site infection - s/p I&D x 2. Recent MRSA bacteremia on extended course of IV antibiotics (vanc and rocephin -> 11/12)  Healthcare-associated pneumonia   CT lumbar spine 10/12: Unable to r/o abscess. Unable to do MRI d/t spinal hardware, \"metal in head\" per patient  Sputum culture 10/16: + pseudomonas   - ID following  - Inhaled tobramycin (10/29...)  - Continue Ceftriaxone (10/23-...)  - Continue Daptomycin (10/21-...)  - Cefepime completed on 10/22  - PICC placed 10/24  - Monitor SIRS criteria  - Consult placed to Dr. Ventura: ok to remove sutures     Heme/Onc:  Normocytic anemia   H/H similar to previous: No active signs of bleeding.  - Continue iron and folate  - Monitor for s/sx of bleeding   - Plan to transfuse if Hgb <7.0   - Daily CBC  - Keep active type and screen    MSK:  PT/OT- no need to hold PT/OT patient was at a rehab facility after lumbar laminectomy for PT/OT before this admission   10/27 & 10/28 sat on side of the bed for 5 min   Needs new PT/OT consult placed when sutures removed "     Ethics/Code Status:  Full Code     :  DVT Prophylaxis: Heparin gtt  GI Prophylaxis: PPI  Bowel Regimen: Colace and Miralax   Diet: Tube Feeds  CVC: PICC placed 10/24, trialysis right internal jugular placed 1019   Wanda: none  Gibson: none  Restraints: none  Dispo: ICU    Critical Care Time:  42 minutes spent in preparing to see patient (I.e. review of medical records), evaluation of diagnostics (I.e. labs, imaging, etc.), documentation, discussing plan of care with patient/ family/ caregiver, and/ or coordination of care with multidisciplinary team. Time does not include completion of procedure time.     Kaleb Lam PA-C  Pulmonary and Critical Care Medicine  [facility]

## 2024-11-02 NOTE — NURSING NOTE
Assumed care of patient.  She is sedated on the vent.  She is resting very comfortably.  NAD.  VSS.

## 2024-11-02 NOTE — SIGNIFICANT EVENT
Patient changed to a heated wire circuit per intensivist team. Patient vent circuit changed and patient circuit test performed.

## 2024-11-02 NOTE — CARE PLAN
The patient's goals for the shift include unable to assess    The clinical goals for the shift include Maintain hemodynamic stability while resting on vent      Problem: Safety - Adult  Goal: Free from fall injury  Outcome: Progressing     Problem: Discharge Planning  Goal: Discharge to home or other facility with appropriate resources  Outcome: Progressing     Problem: Chronic Conditions and Co-morbidities  Goal: Patient's chronic conditions and co-morbidity symptoms are monitored and maintained or improved  Outcome: Progressing     Problem: Diabetes  Goal: Increase stability of blood glucose readings by end of shift  Outcome: Progressing  Goal: Maintain electrolyte levels within acceptable range throughout shift  Outcome: Progressing  Goal: Maintain glucose levels >70mg/dl to <250mg/dl throughout shift  Outcome: Progressing  Goal: Learn about and adhere to nutrition recommendations by end of shift  Outcome: Progressing  Goal: Vital signs within normal range for age by end of shift  Outcome: Progressing  Goal: Increase self care and/or family involovement by end of shift  Outcome: Progressing  Goal: Receive DSME education by end of shift  Outcome: Progressing     Problem: Knowledge Deficit  Goal: Patient/family/caregiver demonstrates understanding of disease process, treatment plan, medications, and discharge instructions  Outcome: Progressing     Problem: Skin  Goal: Decreased wound size/increased tissue granulation at next dressing change  Outcome: Progressing  Flowsheets (Taken 11/2/2024 0953)  Decreased wound size/increased tissue granulation at next dressing change:   Promote sleep for wound healing   Protective dressings over bony prominences   Utilize specialty bed per algorithm  Goal: Participates in plan/prevention/treatment measures  Outcome: Progressing  Flowsheets (Taken 11/2/2024 0953)  Participates in plan/prevention/treatment measures:   Discuss with provider PT/OT consult   Elevate heels   Increase  activity/out of bed for meals  Goal: Prevent/manage excess moisture  Outcome: Progressing  Flowsheets (Taken 11/2/2024 0953)  Prevent/manage excess moisture:   Cleanse incontinence/protect with barrier cream   Monitor for/manage infection if present   Follow provider orders for dressing changes  Goal: Prevent/minimize sheer/friction injuries  Outcome: Progressing  Flowsheets (Taken 11/2/2024 0953)  Prevent/minimize sheer/friction injuries:   Increase activity/out of bed for meals   Use pull sheet   HOB 30 degrees or less   Turn/reposition every 2 hours/use positioning/transfer devices  Goal: Promote/optimize nutrition  Outcome: Progressing  Flowsheets (Taken 11/2/2024 0953)  Promote/optimize nutrition:   Monitor/record intake including meals   Offer water/supplements/favorite foods   Consume > 50% meals/supplements  Goal: Promote skin healing  Outcome: Progressing  Flowsheets (Taken 11/2/2024 0953)  Promote skin healing:   Assess skin/pad under line(s)/device(s)   Protective dressings over bony prominences   Turn/reposition every 2 hours/use positioning/transfer devices   Rotate device position/do not position patient on device     Problem: Nutrition  Goal: Consume prescribed supplement  Outcome: Progressing  Goal: Nutrition support goals are met within 48 hrs  Outcome: Progressing  Goal: Nutrition support is meeting 75% of nutrient needs  Outcome: Progressing  Goal: BG  mg/dL  Outcome: Progressing  Goal: Lab values WNL  Outcome: Progressing  Goal: Electrolytes WNL  Outcome: Progressing  Goal: Promote healing  Outcome: Progressing  Goal: Maintain stable weight  Outcome: Progressing  Goal: Reduce weight from edema/fluid  Outcome: Progressing     Problem: Respiratory  Goal: No signs of respiratory distress (eg. Use of accessory muscles. Peds grunting)  Outcome: Progressing  Goal: Clear secretions with interventions this shift  Outcome: Progressing  Goal: Minimize anxiety/maximize coping throughout  shift  Outcome: Progressing  Goal: Minimal/no exertional discomfort or dyspnea this shift  Outcome: Progressing  Goal: Patent airway maintained this shift  Outcome: Progressing  Goal: Tolerate mechanical ventilation evidenced by VS/agitation level this shift  Outcome: Progressing  Goal: Tolerate pulmonary toileting this shift  Outcome: Progressing  Goal: Verbalize decreased shortness of breath this shift  Outcome: Progressing  Goal: Wean oxygen to maintain O2 saturation per order/standard this shift  Outcome: Progressing  Goal: Increase self care and/or family involvement in next 24 hours  Outcome: Progressing     Problem: Pain  Goal: Takes deep breaths with improved pain control throughout the shift  Outcome: Progressing  Goal: Turns in bed with improved pain control throughout the shift  Outcome: Progressing  Goal: Walks with improved pain control throughout the shift  Outcome: Progressing  Goal: Performs ADL's with improved pain control throughout shift  Outcome: Progressing  Goal: Participates in PT with improved pain control throughout the shift  Outcome: Progressing  Goal: Free from opioid side effects throughout the shift  Outcome: Progressing  Goal: Free from acute confusion related to pain meds throughout the shift  Outcome: Progressing     Problem: Fall/Injury  Goal: Not fall by end of shift  Outcome: Progressing  Goal: Be free from injury by end of the shift  Outcome: Progressing  Goal: Verbalize understanding of personal risk factors for fall in the hospital  Outcome: Progressing  Goal: Verbalize understanding of risk factor reduction measures to prevent injury from fall in the home  Outcome: Progressing  Goal: Use assistive devices by end of the shift  Outcome: Progressing  Goal: Pace activities to prevent fatigue by end of the shift  Outcome: Progressing

## 2024-11-03 ENCOUNTER — APPOINTMENT (OUTPATIENT)
Dept: RADIOLOGY | Facility: HOSPITAL | Age: 69
End: 2024-11-03
Payer: MEDICARE

## 2024-11-03 VITALS
DIASTOLIC BLOOD PRESSURE: 50 MMHG | HEIGHT: 70 IN | RESPIRATION RATE: 16 BRPM | HEART RATE: 70 BPM | SYSTOLIC BLOOD PRESSURE: 109 MMHG | OXYGEN SATURATION: 100 % | TEMPERATURE: 100.4 F | WEIGHT: 250.44 LBS | BODY MASS INDEX: 35.85 KG/M2

## 2024-11-03 LAB
ALBUMIN SERPL BCP-MCNC: 2.2 G/DL (ref 3.4–5)
ALBUMIN SERPL BCP-MCNC: 2.7 G/DL (ref 3.4–5)
ALP SERPL-CCNC: 125 U/L (ref 33–136)
ALT SERPL W P-5'-P-CCNC: 10 U/L (ref 7–45)
ANION GAP SERPL CALCULATED.3IONS-SCNC: 10 MMOL/L (ref 10–20)
ANION GAP SERPL CALCULATED.3IONS-SCNC: 10 MMOL/L (ref 10–20)
AORTIC VALVE PEAK VELOCITY: 1.56 M/S
APTT PPP: 45.6 SECONDS (ref 22–32.5)
APTT PPP: 60.5 SECONDS (ref 22–32.5)
AST SERPL W P-5'-P-CCNC: 10 U/L (ref 9–39)
AV PEAK GRADIENT: 10 MMHG
AVA (PEAK VEL): 3.6 CM2
BACTERIA BLD CULT: NORMAL
BACTERIA BLD CULT: NORMAL
BASOPHILS # BLD AUTO: 0.04 X10*3/UL (ref 0–0.1)
BASOPHILS NFR BLD AUTO: 0.5 %
BILIRUB DIRECT SERPL-MCNC: 0.1 MG/DL (ref 0–0.3)
BILIRUB SERPL-MCNC: 0.4 MG/DL (ref 0–1.2)
BUN SERPL-MCNC: 14 MG/DL (ref 6–23)
BUN SERPL-MCNC: 20 MG/DL (ref 6–23)
CA-I BLD-SCNC: 1.1 MMOL/L (ref 1.1–1.33)
CALCIUM SERPL-MCNC: 7.6 MG/DL (ref 8.6–10.3)
CALCIUM SERPL-MCNC: 7.9 MG/DL (ref 8.6–10.3)
CHLORIDE SERPL-SCNC: 99 MMOL/L (ref 98–107)
CHLORIDE SERPL-SCNC: 99 MMOL/L (ref 98–107)
CO2 SERPL-SCNC: 28 MMOL/L (ref 21–32)
CO2 SERPL-SCNC: 28 MMOL/L (ref 21–32)
CREAT SERPL-MCNC: 1.3 MG/DL (ref 0.5–1.05)
CREAT SERPL-MCNC: 1.71 MG/DL (ref 0.5–1.05)
EGFRCR SERPLBLD CKD-EPI 2021: 32 ML/MIN/1.73M*2
EGFRCR SERPLBLD CKD-EPI 2021: 45 ML/MIN/1.73M*2
EJECTION FRACTION APICAL 4 CHAMBER: 59.1
EJECTION FRACTION: 63 %
EOSINOPHIL # BLD AUTO: 0.04 X10*3/UL (ref 0–0.7)
EOSINOPHIL NFR BLD AUTO: 0.5 %
ERYTHROCYTE [DISTWIDTH] IN BLOOD BY AUTOMATED COUNT: 19.5 % (ref 11.5–14.5)
GLOBAL LONGITUDINAL STRAIN: -17.4 %
GLUCOSE BLD MANUAL STRIP-MCNC: 150 MG/DL (ref 74–99)
GLUCOSE BLD MANUAL STRIP-MCNC: 158 MG/DL (ref 74–99)
GLUCOSE BLD MANUAL STRIP-MCNC: 173 MG/DL (ref 74–99)
GLUCOSE BLD MANUAL STRIP-MCNC: 176 MG/DL (ref 74–99)
GLUCOSE BLD MANUAL STRIP-MCNC: 178 MG/DL (ref 74–99)
GLUCOSE BLD MANUAL STRIP-MCNC: 181 MG/DL (ref 74–99)
GLUCOSE BLD MANUAL STRIP-MCNC: 188 MG/DL (ref 74–99)
GLUCOSE SERPL-MCNC: 171 MG/DL (ref 74–99)
GLUCOSE SERPL-MCNC: 172 MG/DL (ref 74–99)
HCT VFR BLD AUTO: 22.9 % (ref 36–46)
HGB BLD-MCNC: 7.4 G/DL (ref 12–16)
IMM GRANULOCYTES # BLD AUTO: 0.04 X10*3/UL (ref 0–0.7)
IMM GRANULOCYTES NFR BLD AUTO: 0.5 % (ref 0–0.9)
LEFT VENTRICLE INTERNAL DIMENSION DIASTOLE: 4.44 CM (ref 3.5–6)
LEFT VENTRICULAR OUTFLOW TRACT DIAMETER: 2.1 CM
LV EJECTION FRACTION BIPLANE: 65 %
LYMPHOCYTES # BLD AUTO: 0.59 X10*3/UL (ref 1.2–4.8)
LYMPHOCYTES NFR BLD AUTO: 7 %
MAGNESIUM SERPL-MCNC: 1.92 MG/DL (ref 1.6–2.4)
MAGNESIUM SERPL-MCNC: 2.21 MG/DL (ref 1.6–2.4)
MCH RBC QN AUTO: 31.5 PG (ref 26–34)
MCHC RBC AUTO-ENTMCNC: 32.3 G/DL (ref 32–36)
MCV RBC AUTO: 97 FL (ref 80–100)
MITRAL VALVE E/A RATIO: 1.3
MONOCYTES # BLD AUTO: 0.39 X10*3/UL (ref 0.1–1)
MONOCYTES NFR BLD AUTO: 4.6 %
NEUTROPHILS # BLD AUTO: 7.38 X10*3/UL (ref 1.2–7.7)
NEUTROPHILS NFR BLD AUTO: 86.9 %
NRBC BLD-RTO: 0 /100 WBCS (ref 0–0)
PHOSPHATE SERPL-MCNC: 2.3 MG/DL (ref 2.5–4.9)
PHOSPHATE SERPL-MCNC: 2.6 MG/DL (ref 2.5–4.9)
PLATELET # BLD AUTO: 243 X10*3/UL (ref 150–450)
POTASSIUM SERPL-SCNC: 4 MMOL/L (ref 3.5–5.3)
POTASSIUM SERPL-SCNC: 4.2 MMOL/L (ref 3.5–5.3)
PROT SERPL-MCNC: 5.5 G/DL (ref 6.4–8.2)
RBC # BLD AUTO: 2.35 X10*6/UL (ref 4–5.2)
RIGHT VENTRICLE FREE WALL PEAK S': 18.5 CM/S
RIGHT VENTRICLE PEAK SYSTOLIC PRESSURE: 52.8 MMHG
SODIUM SERPL-SCNC: 133 MMOL/L (ref 136–145)
SODIUM SERPL-SCNC: 133 MMOL/L (ref 136–145)
TRICUSPID ANNULAR PLANE SYSTOLIC EXCURSION: 1.5 CM
WBC # BLD AUTO: 8.5 X10*3/UL (ref 4.4–11.3)

## 2024-11-03 PROCEDURE — 2020000001 HC ICU ROOM DAILY

## 2024-11-03 PROCEDURE — 2500000001 HC RX 250 WO HCPCS SELF ADMINISTERED DRUGS (ALT 637 FOR MEDICARE OP)

## 2024-11-03 PROCEDURE — 80048 BASIC METABOLIC PNL TOTAL CA: CPT

## 2024-11-03 PROCEDURE — 84100 ASSAY OF PHOSPHORUS: CPT | Performed by: PHYSICIAN ASSISTANT

## 2024-11-03 PROCEDURE — 80069 RENAL FUNCTION PANEL: CPT | Mod: CCI

## 2024-11-03 PROCEDURE — 2500000004 HC RX 250 GENERAL PHARMACY W/ HCPCS (ALT 636 FOR OP/ED)

## 2024-11-03 PROCEDURE — 2500000005 HC RX 250 GENERAL PHARMACY W/O HCPCS

## 2024-11-03 PROCEDURE — 94003 VENT MGMT INPAT SUBQ DAY: CPT

## 2024-11-03 PROCEDURE — 83735 ASSAY OF MAGNESIUM: CPT

## 2024-11-03 PROCEDURE — 36415 COLL VENOUS BLD VENIPUNCTURE: CPT | Performed by: PHYSICIAN ASSISTANT

## 2024-11-03 PROCEDURE — 2500000002 HC RX 250 W HCPCS SELF ADMINISTERED DRUGS (ALT 637 FOR MEDICARE OP, ALT 636 FOR OP/ED)

## 2024-11-03 PROCEDURE — 82248 BILIRUBIN DIRECT: CPT

## 2024-11-03 PROCEDURE — 83735 ASSAY OF MAGNESIUM: CPT | Performed by: PHYSICIAN ASSISTANT

## 2024-11-03 PROCEDURE — 94669 MECHANICAL CHEST WALL OSCILL: CPT

## 2024-11-03 PROCEDURE — 31720 CLEARANCE OF AIRWAYS: CPT

## 2024-11-03 PROCEDURE — 85730 THROMBOPLASTIN TIME PARTIAL: CPT

## 2024-11-03 PROCEDURE — 87040 BLOOD CULTURE FOR BACTERIA: CPT | Mod: WESLAB | Performed by: PHYSICIAN ASSISTANT

## 2024-11-03 PROCEDURE — 82330 ASSAY OF CALCIUM: CPT | Performed by: PHYSICIAN ASSISTANT

## 2024-11-03 PROCEDURE — 82947 ASSAY GLUCOSE BLOOD QUANT: CPT

## 2024-11-03 PROCEDURE — 85025 COMPLETE CBC W/AUTO DIFF WBC: CPT

## 2024-11-03 PROCEDURE — 99291 CRITICAL CARE FIRST HOUR: CPT | Performed by: PHYSICIAN ASSISTANT

## 2024-11-03 PROCEDURE — 71045 X-RAY EXAM CHEST 1 VIEW: CPT | Performed by: RADIOLOGY

## 2024-11-03 PROCEDURE — 2500000004 HC RX 250 GENERAL PHARMACY W/ HCPCS (ALT 636 FOR OP/ED): Performed by: NURSE PRACTITIONER

## 2024-11-03 PROCEDURE — 99233 SBSQ HOSP IP/OBS HIGH 50: CPT | Performed by: NURSE PRACTITIONER

## 2024-11-03 PROCEDURE — 2500000004 HC RX 250 GENERAL PHARMACY W/ HCPCS (ALT 636 FOR OP/ED): Performed by: INTERNAL MEDICINE

## 2024-11-03 PROCEDURE — 71045 X-RAY EXAM CHEST 1 VIEW: CPT

## 2024-11-03 PROCEDURE — 99497 ADVNCD CARE PLAN 30 MIN: CPT | Performed by: NURSE PRACTITIONER

## 2024-11-03 PROCEDURE — 94668 MNPJ CHEST WALL SBSQ: CPT

## 2024-11-03 PROCEDURE — 37799 UNLISTED PX VASCULAR SURGERY: CPT

## 2024-11-03 PROCEDURE — 94640 AIRWAY INHALATION TREATMENT: CPT

## 2024-11-03 PROCEDURE — 2500000001 HC RX 250 WO HCPCS SELF ADMINISTERED DRUGS (ALT 637 FOR MEDICARE OP): Performed by: PHYSICIAN ASSISTANT

## 2024-11-03 RX ORDER — OXYCODONE HCL 5 MG/5 ML
5 SOLUTION, ORAL ORAL EVERY 4 HOURS PRN
Status: DISCONTINUED | OUTPATIENT
Start: 2024-11-03 | End: 2024-11-14 | Stop reason: HOSPADM

## 2024-11-03 RX ORDER — OXYCODONE HCL 5 MG/5 ML
7.5 SOLUTION, ORAL ORAL EVERY 4 HOURS PRN
Status: DISCONTINUED | OUTPATIENT
Start: 2024-11-03 | End: 2024-11-14 | Stop reason: HOSPADM

## 2024-11-03 RX ORDER — MAGNESIUM SULFATE HEPTAHYDRATE 40 MG/ML
2 INJECTION, SOLUTION INTRAVENOUS ONCE
Status: COMPLETED | OUTPATIENT
Start: 2024-11-03 | End: 2024-11-03

## 2024-11-03 RX ORDER — SODIUM,POTASSIUM PHOSPHATES 280-250MG
1 POWDER IN PACKET (EA) ORAL
Status: DISCONTINUED | OUTPATIENT
Start: 2024-11-03 | End: 2024-11-13

## 2024-11-03 RX ORDER — ACETAMINOPHEN 160 MG/5ML
650 SOLUTION ORAL EVERY 6 HOURS
Status: DISCONTINUED | OUTPATIENT
Start: 2024-11-03 | End: 2024-11-09

## 2024-11-03 RX ADMIN — NOREPINEPHRINE BITARTRATE 0.01 MCG/KG/MIN: 8 INJECTION, SOLUTION INTRAVENOUS at 06:30

## 2024-11-03 RX ADMIN — ACETYLCYSTEINE 600 MG: 200 SOLUTION ORAL; RESPIRATORY (INHALATION) at 16:39

## 2024-11-03 RX ADMIN — Medication 3 ML: at 10:47

## 2024-11-03 RX ADMIN — BRIMONIDINE TARTRATE 1 DROP: 2 SOLUTION OPHTHALMIC at 22:14

## 2024-11-03 RX ADMIN — PANTOPRAZOLE SODIUM 40 MG: 40 INJECTION, POWDER, FOR SOLUTION INTRAVENOUS at 06:06

## 2024-11-03 RX ADMIN — Medication 40 PERCENT: at 07:42

## 2024-11-03 RX ADMIN — SENNOSIDES AND DOCUSATE SODIUM 1 TABLET: 50; 8.6 TABLET ORAL at 22:14

## 2024-11-03 RX ADMIN — POTASSIUM & SODIUM PHOSPHATES POWDER PACK 280-160-250 MG 1 PACKET: 280-160-250 PACK at 22:14

## 2024-11-03 RX ADMIN — HEPARIN SODIUM 1600 UNITS/HR: 10000 INJECTION, SOLUTION INTRAVENOUS at 16:47

## 2024-11-03 RX ADMIN — Medication 3 ML: at 16:39

## 2024-11-03 RX ADMIN — ACETYLCYSTEINE 600 MG: 200 SOLUTION ORAL; RESPIRATORY (INHALATION) at 19:20

## 2024-11-03 RX ADMIN — HEPARIN SODIUM 1400 UNITS/HR: 10000 INJECTION, SOLUTION INTRAVENOUS at 00:18

## 2024-11-03 RX ADMIN — MIDODRINE HYDROCHLORIDE 10 MG: 10 TABLET ORAL at 08:52

## 2024-11-03 RX ADMIN — Medication 40 PERCENT: at 19:21

## 2024-11-03 RX ADMIN — ALBUTEROL SULFATE 3 ML: 2.5 SOLUTION RESPIRATORY (INHALATION) at 16:38

## 2024-11-03 RX ADMIN — INSULIN LISPRO 3 UNITS: 100 INJECTION, SOLUTION INTRAVENOUS; SUBCUTANEOUS at 16:44

## 2024-11-03 RX ADMIN — Medication 3 ML: at 07:42

## 2024-11-03 RX ADMIN — Medication: at 09:23

## 2024-11-03 RX ADMIN — DAPTOMYCIN IN SODIUM CHLORIDE 700 MG: 700 INJECTION, SOLUTION INTRAVENOUS at 09:46

## 2024-11-03 RX ADMIN — INSULIN LISPRO 3 UNITS: 100 INJECTION, SOLUTION INTRAVENOUS; SUBCUTANEOUS at 04:22

## 2024-11-03 RX ADMIN — MAGNESIUM SULFATE IN WATER FOR 2 G: 40 INJECTION INTRAVENOUS at 06:06

## 2024-11-03 RX ADMIN — Medication 60 MG OF IRON: at 08:52

## 2024-11-03 RX ADMIN — POTASSIUM & SODIUM PHOSPHATES POWDER PACK 280-160-250 MG 1 PACKET: 280-160-250 PACK at 16:44

## 2024-11-03 RX ADMIN — POTASSIUM & SODIUM PHOSPHATES POWDER PACK 280-160-250 MG 1 PACKET: 280-160-250 PACK at 08:52

## 2024-11-03 RX ADMIN — ACETYLCYSTEINE 600 MG: 200 SOLUTION ORAL; RESPIRATORY (INHALATION) at 10:48

## 2024-11-03 RX ADMIN — FOLIC ACID 1 MG: 1 TABLET ORAL at 08:52

## 2024-11-03 RX ADMIN — ALBUTEROL SULFATE 3 ML: 2.5 SOLUTION RESPIRATORY (INHALATION) at 07:42

## 2024-11-03 RX ADMIN — ALBUTEROL SULFATE 3 ML: 2.5 SOLUTION RESPIRATORY (INHALATION) at 19:20

## 2024-11-03 RX ADMIN — MIDODRINE HYDROCHLORIDE 10 MG: 10 TABLET ORAL at 16:44

## 2024-11-03 RX ADMIN — INSULIN LISPRO 3 UNITS: 100 INJECTION, SOLUTION INTRAVENOUS; SUBCUTANEOUS at 00:30

## 2024-11-03 RX ADMIN — ACETYLCYSTEINE 600 MG: 200 SOLUTION ORAL; RESPIRATORY (INHALATION) at 07:42

## 2024-11-03 RX ADMIN — BRIMONIDINE TARTRATE 1 DROP: 2 SOLUTION OPHTHALMIC at 08:52

## 2024-11-03 RX ADMIN — MIDODRINE HYDROCHLORIDE 10 MG: 10 TABLET ORAL at 00:23

## 2024-11-03 RX ADMIN — ALBUTEROL SULFATE 3 ML: 2.5 SOLUTION RESPIRATORY (INHALATION) at 10:47

## 2024-11-03 RX ADMIN — INSULIN LISPRO 3 UNITS: 100 INJECTION, SOLUTION INTRAVENOUS; SUBCUTANEOUS at 08:52

## 2024-11-03 RX ADMIN — EZETIMIBE 10 MG: 10 TABLET ORAL at 08:52

## 2024-11-03 RX ADMIN — POTASSIUM & SODIUM PHOSPHATES POWDER PACK 280-160-250 MG 1 PACKET: 280-160-250 PACK at 12:16

## 2024-11-03 RX ADMIN — Medication 3 ML: at 19:20

## 2024-11-03 RX ADMIN — INSULIN LISPRO 3 UNITS: 100 INJECTION, SOLUTION INTRAVENOUS; SUBCUTANEOUS at 12:16

## 2024-11-03 RX ADMIN — LATANOPROST 1 DROP: 50 SOLUTION OPHTHALMIC at 22:14

## 2024-11-03 RX ADMIN — ACETAMINOPHEN 650 MG: 160 SOLUTION ORAL at 22:27

## 2024-11-03 RX ADMIN — PRIMIDONE 125 MG: 250 TABLET ORAL at 22:14

## 2024-11-03 RX ADMIN — CEFTRIAXONE SODIUM 2 G: 2 INJECTION, SOLUTION INTRAVENOUS at 08:52

## 2024-11-03 RX ADMIN — PROPOFOL 10 MCG/KG/MIN: 10 INJECTION, EMULSION INTRAVENOUS at 10:16

## 2024-11-03 ASSESSMENT — PAIN - FUNCTIONAL ASSESSMENT
PAIN_FUNCTIONAL_ASSESSMENT: CPOT (CRITICAL CARE PAIN OBSERVATION TOOL)

## 2024-11-03 ASSESSMENT — PAIN SCALES - GENERAL: PAINLEVEL_OUTOF10: 0 - NO PAIN

## 2024-11-03 NOTE — PROGRESS NOTES
[facility] Critical Care Medicine       Date:  11/3/2024  Patient:  Narda Malloy  YOB: 1955  MRN:  [id number]   Admit Date:  10/12/2024    Chief Complaint   Patient presents with    Altered Mental Status     History of Present Illness:  Narda Malloy is a 69 y.o. year old female patient with Past Medical History of L1-L3 lumbar laminectomy, T4-S1 revision, and fusion August 26th, T2DM, HTN, essential tremor, HLD, glaucoma, sarcoidosis of the lung who presented to  ED 10/12 after being found essentially unresponsive at [facility] nursing [facility]. Per report from her , she has had a significant decline in her health since July 15th when she had a fall and became significantly weak. She has also had multiple infection complications since her back surgery in August requiring multiple I&Ds and long term antibiotic therapy. She went to the OR most recently on 9/28 for lumbar site infection wash out. Per chart review, she was discharged on IV vancomycin 1g and IV ceftriaxone 2d q24hrs through 11/12.     ED Course: Initial vital signs: /104 (109), HR 68, RR 20, SpO2 95% on 6L NC, temp 34.5C. Give 0.4mg of narcan with no improvement in mentation. Lab work-up remarkable for mild hyperkalemia (5.5), AMY 42/1.46, elevated alk phos, normocytic anemia 10.4/33, turbid appearing urine with mild hematuria and proteinuria and + leuk esterase, >50 WBCs. Urine drug screen positive for barbiturates. Triggered sepsis timer so she was given 3L NS. She was intubated for airway protection with 20mg etomidate and 100mg succinylcholine. BP dropped post-intubated and fluid resuscitation and she was subsequently started on levophed.          Interval ICU Events:  10/12: Pt arrived to ICU intubated and lightly sedated on low-dose versed. Eyes open, minimally responsive.      10/13: Received K cocktail last night for K 6.0, corrected appropriately. Levophed requirements mildly up, UOP decreasing.  Ordered albumin x2 this am with improvements in UOP and SBP. Will likely trial lasix this afternoon d/t hypervolemia.     10/14: Remains intubated with decreased mentation, only responsive to noxious stimuli. Trialed lasix TID for volume overload. Remains on levophed 0.01.     10/15: Mentation much improved. SAT/SBT successful so extubated. Given lasix x3, bumex x1, metolazone x1 with net negative fluid balance of 500mL -> started on bumex gtt.      10/16: O2 requirements significantly increased, NRB -> HFNC 40L 100% likely 2/2 mucus plugging.     10/17: No acute events overnight. Bumex gtt increased to 1mg/hr. CXR this am showing complete opacification of left hemithorax related to atelectasis vs pleural effusion. Remains on HFNC 40L/80%. Pigtail catheter placed with 1.2L drained immediately. Given albumin for hypotension.      10/18: ~2L output from left sided pigtail catheter since placement. Kidney function worsening and UOP declining, about 20cc/hr overnight. Will place NG tube today and start enteral nutrition and appetite and oral intake remains poor.      10/19: Patient with worsening hypoxic, hypercapnic respiratory failure - now requiring BIPAP support. Remains grossly volume overloaded with low UO. Started on vasopressors to augment BP for diuresis. 40 IV lasix + gtt started. Remains with poor nutritional status. Will re attempt NG later if respiratory status improves.      10/20: Patient stable on vent. Nephrology consulted for renal failure. Cr continues to uptrend however UO is increasing. No issues overnight.     10/21: No issues overnight. Remains stable on vent. Levo down to 0.01 mcg/kg/min. UO remains low. Nephrology following. Possible CRRT today?     10/22: Patient received 80 lasix and metalozone with minimal UO. Levo @ .02 and prop @ 10. Vent settings: 20/450/10/40%. Plan for CRRT today. Will SAT/SBT after CRRT. May change propofol to precedex.     10/23: Had episodes of bradycardia where HR  went to 30's which resolved with heating the patient. CRRT yesterday with about 1L removed. Currently on 0.03 of levo and 10 of prop. Cont daptomycin and ceftriaxone per ID. CXR improved. SAT/SBT. EP consulted for episodes of bradycardia.     10/24: Bradycardic episodes of HR in 40s. Currently on 0.03 of Levo, 10 of prop and 50 fentanyl. Tolerating CRRT. Place PICC today.     10/25: Did well with the SBT yesterday, plan for another one today. Continue CRRT. Hgb 7.0 this am, still requiting Levo 0.03, will transfuse 1 unit PRBCs. Remove chest tube today.     10/26: Chest tube removed last night, CXR improving, patient appears overall lethargic on sbt/sat, not ready to extubate, will complete 4/4/4 sbt/rest/sbt-> rest today and reassess tomorrow     10/27: Patient failed afternoon SBT, placed on rate overnight, net - 2.5L over previous 24 hours, will place on wean today.     10/29: Patient with high residual tube feeds overnight.  She did have an episode of vomiting.  Tube feeds placed on hold.  She was also afebrile overnight.  Will obtain a KUB to rule out ileus.  Sputum culture with Pseudomonas, discussed antibiotic plans with ID.  Will not do SBT with patient today.  Did discuss the setback with her , he did state moving forward that if she does not reach extubation he would be agreeable to a tracheostomy.      10/30: No significant events overnight.  Tube feeds resumed at trickle rate yesterday, tolerating overnight.  Will increase to goal today.  Dialysis this morning.  Patient improved from yesterday.  Plan for SBT today.  Will attempt to sit patient on side of bed today.    10/31: No significant events overnight. Tolerating tube feeds, tube feeds on hold this AM for SBT. Patient awake and alert this morning, improved from yesterday.  Tolerating SBT, answering yes or no questions.  Will extubate today.    11/1: Pt extubated yesterday to HFNC. Had worsened respiratory distress requiring bipap, wore bipap  overnight. Will trial back on HFNC when more awake. Dialysis scheduled for today. Will remove spinal sutures.     11/2: Unable to wean from bipap yesterday without immediate desat in 50-60s, increasingly lethargic and weak. Decision made to reintubate and this was discussed with  which he was ok with. Pt will need trach and peg as this was her 3rd intubation this admission and she's having persistent respiratory failure due to recurrent pleural effusions, even with iHD fluid removal and recurrent atelectasis with lobar collapse. Consulted to palliative care for communication to family about GOC and communication of expectations, risks, benefits of tracheostomy creation, peg tube placement, and LTAC admission. Discussed with neph, will run tablo today for additional fluid removal d/t recurrent intubation. Plan for ENT and surgery consult for trach and peg pending family meeting tmrw at 1pm.     11/3: OVN: Unsuccessful removal of incisional sutures of posterior surgery d/t skin overgrowth. DAY: Reached out to ortho spine surgical team about suture removal, awaiting to hear back. GOC discussion today, patient will remain DNR-CCA and family wants to pursue Trach/PEG placement (consults ordered). Off sedation and fent, transitioned to enteral narcotics for pain control.       Medical History:  Past Medical History:   Diagnosis Date    Degenerative myopia, bilateral     Diabetic neuropathy (Multi)     Difficult intubation 08/26/2024    Mac 3, grade 3, 1 attempt.  Glidescope/videolaryngoscopy recommended for future attempts.    DM type 2 (diabetes mellitus, type 2) (Multi)     Dry eye syndrome of bilateral lacrimal glands     Essential hypertension     Essential tremor     Glaucoma     Hyperlipidemia     Long term (current) use of insulin (Multi)     Low back pain     PONV (postoperative nausea and vomiting)     Primary open angle glaucoma of both eyes, severe stage     Repeated falls     Sarcoidosis of lung (Multi)      Spinal stenosis, lumbar region without neurogenic claudication     Weakness      Past Surgical History:   Procedure Laterality Date    BLEPHAROPLASTY  07/2022    BREAST SURGERY  05/20/2022    Breast lift    CARPAL TUNNEL RELEASE      CATARACT EXTRACTION W/  INTRAOCULAR LENS IMPLANT Bilateral     OD 08/04/2011 +8.5D,OS 08/04/2011 +8.50D    FOOT SURGERY      INSERTION / REMOVAL CRANIAL DBS GENERATOR      Placed 2017.  Removed 2018. part of wire left in head when everything removed    LUMBAR FUSION      L3-S1    PANRETINAL PHOTOCOAGULATION  2014    THORACIC FUSION  08/26/2024    T4-S1 fusion    VITRECTOMY Right 2013     Medications Prior to Admission   Medication Sig Dispense Refill Last Dose/Taking    acetaminophen (Tylenol) 500 mg tablet Take 2 tablets (1,000 mg) by mouth 3 times a day.   Unknown    ascorbic acid (Vitamin C) 500 mg tablet as directed Orally   Unknown    brimonidine (AlphaGAN) 0.2 % ophthalmic solution Administer 1 drop into both eyes 2 times a day.   Unknown    calcium carbonate-vitamin D3 500 mg-5 mcg (200 unit) tablet Take 1 tablet by mouth once daily.   Unknown    cefTRIAXone (Rocephin) 2 gram/50 mL IV Infuse 50 mL (2 g) at 100 mL/hr over 30 minutes into a venous catheter once every 24 hours. Once weekly labs CBC/diff, CMP, Vanc trough ESR, CRP fax to Dr. Juarez [phone]. Stop date 11/12/24. 1950 mL 0     dextrose 50 % injection Infuse 25 mL (12.5 g) into a venous catheter every 15 minutes if needed (For blood glucose 41 to 70 mg/dL).       dextrose 50 % injection Infuse 50 mL (25 g) into a venous catheter every 15 minutes if needed (For blood glucose less than or equal to 40 mg/dL).       docusate sodium (Colace) 100 mg capsule Take 1 capsule (100 mg) by mouth 2 times a day.   Unknown    ezetimibe (Zetia) 10 mg tablet Take 1 tablet (10 mg) by mouth once daily. 90 tablet 3 Unknown    FreeStyle Lite Strips strip USE TO TEST 3 TIMES A DAY AS DIRECTED 300 each 2     glucagon (Glucagen) 1 mg  injection Inject 1 mg into the muscle every 15 minutes if needed for low blood sugar - see comments (Hypoglycemia).       glucagon (Glucagen) 1 mg injection Inject 1 mg into the muscle every 15 minutes if needed for low blood sugar - see comments (Hypoglycemia).       heparin sodium,porcine (heparin, porcine,) 5,000 unit/mL injection Inject 1 mL (5,000 Units) under the skin every 8 hours.       insulin lispro (HumaLOG) 100 unit/mL injection Inject 0-15 Units under the skin 3 times daily (morning, midday, late afternoon). Take as directed per insulin instructions.Do not hold when patient is not eating, continue order as scheduled for hyperglycemia management.  Insulin Lispro Corrective Scale #3     Hypoglycemia protocol Call LIP unit(s) if Blood Glucose is between 0 - 70 mg/dL     0 unit(s) if Blood glucose is between    3 unit(s) if Blood glucose is between 151-200   6 unit(s) if Blood glucose is between 201-250   9 unit(s) if Blood glucose is between 251-300   12 unit(s) if Blood glucose is between 301-350   15 unit(s) if Blood glucose is between 351-400    Notify provider unit(s) if Blood Glucose is greater than 400 mg/dL       Lactobacillus acidophilus 100 mg (1 billion cell) capsule Take 1 capsule by mouth 2 times a day.   Unknown    latanoprost (Xalatan) 0.005 % ophthalmic solution Administer 1 drop into both eyes once daily at bedtime. 2.5 mL 5 Unknown    melatonin 5 mg tablet Take 1 tablet (5 mg) by mouth as needed at bedtime for sleep.   Unknown    methocarbamol (Robaxin) 500 mg tablet Take 1 tablet (500 mg) by mouth 3 times a day.   Unknown    multivitamin tablet Take 1 tablet by mouth once daily.   Unknown    ondansetron (Zofran) 4 mg/2 mL injection Infuse 2 mL (4 mg) into a venous catheter every 6 hours if needed for nausea or vomiting.       oxyCODONE (Roxicodone) 5 mg immediate release tablet Take 1 tablet (5 mg) by mouth every 6 hours if needed for severe pain (7 - 10) or moderate pain (4 - 6).    "    oxygen (O2) gas therapy Inhale 1 each continuously.       pantoprazole (ProtoNix) 40 mg EC tablet Take 1 tablet (40 mg) by mouth once daily in the morning. Take before meals. Do not crush, chew, or split.       pantoprazole (ProtoNix) 40 mg injection Infuse 40 mg into a venous catheter once daily in the morning. Take before meals. If unable to take PO.       pen needle, diabetic (PEN NEEDLE MISC) BD Altagracia- 4 mm X 32 G needle - as directed 4x a day sc 4 times per day       polyethylene glycol (Glycolax, Miralax) 17 gram packet Take 17 g by mouth once daily.   Unknown    primidone 125 mg tablet Take 125 mg by mouth 3 times a day.   Unknown    propranolol LA (Inderal LA) 60 mg 24 hr capsule Take 1 capsule (60 mg) by mouth early in the morning.. Hold for SBP < 110 mmhg, HR < 60 bpm.   Unknown    sennosides (Senokot) 8.6 mg tablet Take 1 tablet (8.6 mg) by mouth every 12 hours if needed for constipation.   Unknown    Sure Comfort Pen Needle 32 gauge x 5/32\" needle AS DIRECTED DAILY FOR 90 DAYS 100 each 11 Unknown    traZODone (Desyrel) 25 MG split tablet Take 1 half tablet (25 mg) by mouth once daily at bedtime.   Unknown    vancomycin (Vancocin) 1 gram/250 mL solution Infuse 250 mL (1 g) at 250 mL/hr over 60 minutes into a venous catheter every 12 hours. Once weekly labs CBC/diff, CMP, Vanc trough ESR, CRP fax to Dr. Juarez [phone]. Stop date 11/12/24. 52920 mL 0      Erythromycin, Morphine, and Rosuvastatin  Social History     Tobacco Use    Smoking status: Former     Types: Cigarettes     Passive exposure: Past    Smokeless tobacco: Never   Vaping Use    Vaping status: Never Used   Substance Use Topics    Alcohol use: Not Currently    Drug use: Not Currently     Family History   Problem Relation Name Age of Onset    Multiple myeloma Mother      Cancer Mother      Other (CABG) Father      Pulmonary embolism Father      Heart disease Father      Breast cancer Sister          Stage II    Hypertension Sister      " Diabetes Sister      No Known Problems Sister          x5    No Known Problems Brother          x4    No Known Problems Daughter         Review of Systems:  14 point review of systems was completed and negative except for those specially mention in my HPI    Physical Exam:    Heart Rate:  []   Temp:  [36.5 °C (97.7 °F)-38 °C (100.4 °F)]   Resp:  [16-25]   BP: ()/(41-72)   Weight:  [114 kg (250 lb 7.1 oz)-114 kg (251 lb 5.2 oz)]   SpO2:  [94 %-100 %]     Physical Exam  Vitals reviewed.   Constitutional:       Appearance: She is overweight.      Interventions: She is intubated.   HENT:      Head: Normocephalic and atraumatic.      Right Ear: External ear normal.      Left Ear: External ear normal.      Nose: Nose normal.      Mouth/Throat:      Mouth: Mucous membranes are dry.      Comments: Age related dental decay  Eyes:      Conjunctiva/sclera: Conjunctivae normal.      Pupils: Pupils are equal, round, and reactive to light.      Comments: GARY, R4/L4, conjugate gaze, consensual response   Cardiovascular:      Rate and Rhythm: Normal rate and regular rhythm.      Pulses: Normal pulses.      Heart sounds: Normal heart sounds.      Comments: Upper extremity edema also noted bilat  Pulmonary:      Effort: She is intubated.      Breath sounds: Examination of the right-lower field reveals decreased breath sounds. Examination of the left-lower field reveals decreased breath sounds. Decreased breath sounds present.      Comments: Breath sound decreased with no adventitious breath sounds  Abdominal:      General: Bowel sounds are decreased.      Palpations: Abdomen is soft.      Tenderness: There is no abdominal tenderness.   Musculoskeletal:      Right hand: Swelling present.      Left hand: Swelling present.      Right lower le+ Edema present.      Left lower le+ Edema present.   Skin:     General: Skin is warm.      Capillary Refill: Capillary refill takes less than 2 seconds.      Comments: Age  related skin changes   Neurological:      Mental Status: She is easily aroused. She is confused.      GCS: GCS eye subscore is 4. GCS verbal subscore is 1. GCS motor subscore is 4.      Comments: Episodic following of simple motor commands, not at normal cognitive baseline       Objective:  I have reviewed all medications, laboratory results, and imaging pertinent for today's encounter    Assessment/Plan:    I am currently managing this critically ill patient for the following problems:    Neuro/Psych/Pain Ctrl/Sedation: (Hx: essential tremor)  Acute encephalopathy - likely 2/2 hypercapnia, & infection- resolving   CT head: Negative for acute findings   Urine tox positive for barbiturates consistent with primidone intake   - Pain Control: liquid oxy, scheduled acetaminophen, dilaudid for dressing changes PRN  - Sedation/analgesia: 11/3 off at this time   - Home primidone continued. Will hold propanolol d/t hypotension  - CAM ICU qshift, sleep-wake hygiene, delirium precautions    Respiratory/ENT:  Acute hypoxic/hypercapnic respiratory failure - likely multifactorial and 2/2 HCAP, pl effusions, volume overload  Healthcare-associated pneumonia   Atelectasis - likely 2/2 mucus plugging   Pleural effusion -S/p left-sided pigtail placement 10/17, removed 10/25-resolved  Intubated for 3 days extubated and re intubated on the 19th, currently day 11 of intubation  - Supplemental O2: intubated x3 times this admission   - Will wean O2 as tolerated to maintain SpO2 >92%  - Consider bronchoscopy  - Consider repeat thoracentesis   - Albuterol, mucomyst, hypertonic saline QID   - IPV per RT TID  - Continuous pulse ox monitoring   - Pulm hygiene  - Aspiration precautions   - Respiratory culture with Pseudomonas, see abx below   - ENT consulted 11/3 for Trach placement    Cardiovascular:  (Hx: diastolic heart failure, HTN, HLD)  Septic shock -resolved  Occlusive superficial venous thrombus of the left cephalic vein  Non-occlusive  DVT right IJ vein   Acute on chronic diastolic heart failure  - US duplex b/l upper extremities-occlusive superficial vein thrombosis of left cephalic vein   - Acute nonocclusive DVT right IJ vein around line placement, will need to be removed   - Continue midodrine   - Levophed gtt, wean to maintain MAPS >65mmHg  - TTE ordered     -->EF 60-65%, mild/mod AV valve regurg  - Holding home propranolol (on for tremors)  - Continue home Zetia  - Continuous cardiac monitoring per ICU protocol  - EKGs PRN for ACS symptoms, arrhythmias   - Continue heparin drip     -->Hold at 4am 11/4 for hopeful Trach/PEG placement  - Repeat right IJ duplex on Monday 11/4    GI:  Hx GERD  - Diet: enteral feeding via NG at goal  - BR: Lucia-colace, Miralax PRN  - GI Prophylaxis: PPI  - Patient had a few episodes of diarrhea, sample sent, C-Diff negative   - Gen surg consulted 11/3 for PEG placement  - NPO midnight pending procedures 11/4    /Volume Status/Electrolytes:  Acute kidney injury - possibly ATN +/- medication-induced (vancomycin)  Anasarca   Hypoalbuminemia   Hyponatremia  Baseline Cr 0.8-1.0. BUN Cr 1.92 today   - Nephrology following  - Tablo scheduled for Monday, 11/4       -->M/W/F schedule, plan for tunneled line when more stable  - Bladder scan daily   - Replete electrolytes to maintain K >4.0 and Mg >2.0  - Daily BMP, Mg, Phos  - Free water flushes being held due to hyponatremia, improving     Heme/Onc: (Hx: normocytic anemia)  Occlusive LUE DVT  Non-occlusive R IJ DVT  - Therapeutic heparin infusion     -->Hold heparin @ 0400 11/4 pending procedures  - Continue iron and folate  - Monitor for s/sx of bleeding   - Plan to transfuse if Hgb <7.0   - Daily CBC  - T&S ordered for 11/4 morning    Endocrine: (Hx: DMII)  - -176     -->9u insulin coverage OVN  - SSI Q4  - Hypoglycemia protocol PRN    Infectious Disease:  Pseudomonas-Respiratory culture 10/29  Thoracolumbar surgical site infection - s/p I&D x 2. Recent MRSA  "bacteremia on extended course of IV antibiotics (vanc and rocephin -> 11/12)  Healthcare-associated pneumonia   CT lumbar spine 10/12: Unable to r/o abscess. Unable to do MRI d/t spinal hardware, \"metal in head\" per patient  Sputum culture 10/16: + pseudomonas   - ID following  - Inhaled tobramycin (10/29 - course completed)  - Continue Ceftriaxone for 7 days (10/23-...)  - Continue Daptomycin for 15 days (10/21-...)  - Cefepime completed on 10/22  - PICC placed 10/24  - Monitor SIRS criteria  - Consult placed to Dr. Ventura: ok to remove sutures     -->Attempted 11/2 by team, unable to remove     -->Request sent to Dr. Ventura (message forwarded to Marc Hendricks PA-C) about still needing to remove, awaiting response    MSK:  - PT/OT- no need to hold PT/OT patient was at a rehab facility after lumbar laminectomy for PT/OT before this admission   - 10/27 & 10/28 sat on side of the bed for 5 min   - Needs new PT/OT consult placed when sutures removed     Ethics/Code Status:  DNR-CCA     :  DVT Prophylaxis: Heparin gtt  GI Prophylaxis: PPI  Bowel Regimen: Lucia-colace and Miralax   Diet: Tube Feeds  CVC: PICC placed 10/24, trialysis right internal jugular placed 1019   Wanda: none  Gibson: none  Restraints: BUE soft wrist restraints    DISPO: Consults to gen surg/ENT for trach/PEG, follow up evening RFP, morning labs ordered, hold TF @ midnight, hold heparin @ 4am. Continued ICU level care.    Discussed with intensivist, Dr. Sultana Carbajal MPAS, M,Ed., PA-C  Pulmonary/Critical Care, [facility]  Secure Chat Preferred    Critical Care Time:  45 minutes spent in preparing to see patient (I.e. review of medical records), evaluation of diagnostics (I.e. labs, imaging, etc.), documentation, discussing plan of care with patient/ family/ caregiver, and/ or coordination of care with multidisciplinary team. Time does not include completion of procedure time.   "

## 2024-11-03 NOTE — NURSING NOTE
Palliative nurse and Salas FERNANDO are here with the  and daughter to talk about the patient wishes.

## 2024-11-03 NOTE — PROGRESS NOTES
I was informed by palliative care that there is a goals of care meeting for this afternoon, will monitor goals of care discussions.    Nelia Cheung MD

## 2024-11-03 NOTE — CARE PLAN
The patient's goals for the shift include unable to assess    The clinical goals for the shift include Maintain hemodynamic stability; tolerate TABLO      Problem: Safety - Adult  Goal: Free from fall injury  Outcome: Progressing     Problem: Discharge Planning  Goal: Discharge to home or other facility with appropriate resources  Outcome: Progressing     Problem: Chronic Conditions and Co-morbidities  Goal: Patient's chronic conditions and co-morbidity symptoms are monitored and maintained or improved  Outcome: Progressing     Problem: Diabetes  Goal: Increase stability of blood glucose readings by end of shift  Outcome: Progressing  Goal: Maintain electrolyte levels within acceptable range throughout shift  Outcome: Progressing  Goal: Maintain glucose levels >70mg/dl to <250mg/dl throughout shift  Outcome: Progressing  Goal: Learn about and adhere to nutrition recommendations by end of shift  Outcome: Progressing  Goal: Vital signs within normal range for age by end of shift  Outcome: Progressing  Goal: Increase self care and/or family involovement by end of shift  Outcome: Progressing  Goal: Receive DSME education by end of shift  Outcome: Progressing     Problem: Knowledge Deficit  Goal: Patient/family/caregiver demonstrates understanding of disease process, treatment plan, medications, and discharge instructions  Outcome: Progressing     Problem: Mechanical Ventilation  Goal: ET tube will be managed safely  Outcome: Progressing     Problem: Skin  Goal: Decreased wound size/increased tissue granulation at next dressing change  11/2/2024 2159 by Gretel Pavon RN  Outcome: Progressing  11/2/2024 2159 by Gretel Pavon RN  Flowsheets (Taken 11/2/2024 2159)  Decreased wound size/increased tissue granulation at next dressing change:   Promote sleep for wound healing   Utilize specialty bed per algorithm   Protective dressings over bony prominences  Goal: Participates in plan/prevention/treatment measures  11/2/2024  2159 by Gretel Pavon RN  Outcome: Progressing  11/2/2024 2159 by Gretel Pavon RN  Flowsheets (Taken 11/2/2024 2159)  Participates in plan/prevention/treatment measures:   Discuss with provider PT/OT consult   Elevate heels  Goal: Prevent/manage excess moisture  11/2/2024 2159 by Gretel Pavon RN  Outcome: Progressing  11/2/2024 2159 by Gretel Pavon RN  Flowsheets (Taken 11/2/2024 2159)  Prevent/manage excess moisture:   Cleanse incontinence/protect with barrier cream   Moisturize dry skin   Follow provider orders for dressing changes   Monitor for/manage infection if present  Goal: Prevent/minimize sheer/friction injuries  11/2/2024 2159 by Gretel Pavon RN  Outcome: Progressing  11/2/2024 2159 by Gretel Pavon RN  Flowsheets (Taken 11/2/2024 2159)  Prevent/minimize sheer/friction injuries:   Complete micro-shifts as needed if patient unable. Adjust patient position to relieve pressure points, not a full turn   Increase activity/out of bed for meals   Use pull sheet   HOB 30 degrees or less   Turn/reposition every 2 hours/use positioning/transfer devices   Utilize specialty bed per algorithm  Goal: Promote/optimize nutrition  11/2/2024 2159 by Gretel Pavon RN  Outcome: Progressing  11/2/2024 2159 by Gretel Pavon RN  Flowsheets (Taken 11/2/2024 2159)  Promote/optimize nutrition: Monitor/record intake including meals  Goal: Promote skin healing  11/2/2024 2159 by Gretel Pavon RN  Outcome: Progressing  11/2/2024 2159 by Gretel Pavon RN  Flowsheets (Taken 11/2/2024 2159)  Promote skin healing:   Assess skin/pad under line(s)/device(s)   Protective dressings over bony prominences   Turn/reposition every 2 hours/use positioning/transfer devices   Ensure correct size (line/device) and apply per  instructions   Rotate device position/do not position patient on device     Problem: Nutrition  Goal: Consume prescribed supplement  Outcome: Progressing  Goal: Nutrition support goals are met within 48  hrs  Outcome: Progressing  Goal: Nutrition support is meeting 75% of nutrient needs  Outcome: Progressing  Goal: BG  mg/dL  Outcome: Progressing  Goal: Lab values WNL  Outcome: Progressing  Goal: Electrolytes WNL  Outcome: Progressing  Goal: Promote healing  Outcome: Progressing  Goal: Maintain stable weight  Outcome: Progressing  Goal: Reduce weight from edema/fluid  Outcome: Progressing     Problem: Respiratory  Goal: No signs of respiratory distress (eg. Use of accessory muscles. Peds grunting)  Outcome: Progressing  Goal: Clear secretions with interventions this shift  Outcome: Progressing  Goal: Minimize anxiety/maximize coping throughout shift  Outcome: Progressing  Goal: Minimal/no exertional discomfort or dyspnea this shift  Outcome: Progressing  Goal: Patent airway maintained this shift  Outcome: Progressing  Goal: Tolerate mechanical ventilation evidenced by VS/agitation level this shift  Outcome: Progressing  Goal: Tolerate pulmonary toileting this shift  Outcome: Progressing  Goal: Verbalize decreased shortness of breath this shift  Outcome: Progressing  Goal: Wean oxygen to maintain O2 saturation per order/standard this shift  Outcome: Progressing  Goal: Increase self care and/or family involvement in next 24 hours  Outcome: Progressing     Problem: Pain  Goal: Takes deep breaths with improved pain control throughout the shift  Outcome: Progressing  Goal: Turns in bed with improved pain control throughout the shift  Outcome: Progressing  Goal: Walks with improved pain control throughout the shift  Outcome: Progressing  Goal: Performs ADL's with improved pain control throughout shift  Outcome: Progressing  Goal: Participates in PT with improved pain control throughout the shift  Outcome: Progressing  Goal: Free from opioid side effects throughout the shift  Outcome: Progressing  Goal: Free from acute confusion related to pain meds throughout the shift  Outcome: Progressing     Problem:  Fall/Injury  Goal: Not fall by end of shift  Outcome: Progressing  Goal: Be free from injury by end of the shift  Outcome: Progressing  Goal: Verbalize understanding of personal risk factors for fall in the hospital  Outcome: Progressing  Goal: Verbalize understanding of risk factor reduction measures to prevent injury from fall in the home  Outcome: Progressing  Goal: Use assistive devices by end of the shift  Outcome: Progressing  Goal: Pace activities to prevent fatigue by end of the shift  Outcome: Progressing

## 2024-11-03 NOTE — CARE PLAN
The patient's goals for the shift include unable to assess    The clinical goals for the shift include hemodynamic stability, meeting at 1 pm.       Problem: Safety - Adult  Goal: Free from fall injury  Outcome: Progressing     Problem: Discharge Planning  Goal: Discharge to home or other facility with appropriate resources  Outcome: Progressing     Problem: Chronic Conditions and Co-morbidities  Goal: Patient's chronic conditions and co-morbidity symptoms are monitored and maintained or improved  Outcome: Progressing     Problem: Diabetes  Goal: Increase stability of blood glucose readings by end of shift  Outcome: Progressing  Goal: Maintain electrolyte levels within acceptable range throughout shift  Outcome: Progressing  Goal: Maintain glucose levels >70mg/dl to <250mg/dl throughout shift  Outcome: Progressing  Goal: Learn about and adhere to nutrition recommendations by end of shift  Outcome: Progressing  Goal: Vital signs within normal range for age by end of shift  Outcome: Progressing  Goal: Increase self care and/or family involovement by end of shift  Outcome: Progressing  Goal: Receive DSME education by end of shift  Outcome: Progressing     Problem: Knowledge Deficit  Goal: Patient/family/caregiver demonstrates understanding of disease process, treatment plan, medications, and discharge instructions  Outcome: Progressing     Problem: Mechanical Ventilation  Goal: ET tube will be managed safely  Outcome: Progressing     Problem: Skin  Goal: Decreased wound size/increased tissue granulation at next dressing change  Outcome: Progressing  Flowsheets (Taken 11/3/2024 0814)  Decreased wound size/increased tissue granulation at next dressing change:   Promote sleep for wound healing   Protective dressings over bony prominences   Utilize specialty bed per algorithm  Goal: Participates in plan/prevention/treatment measures  Outcome: Progressing  Flowsheets (Taken 11/3/2024 0814)  Participates in  plan/prevention/treatment measures:   Discuss with provider PT/OT consult   Elevate heels   Increase activity/out of bed for meals  Goal: Prevent/manage excess moisture  Outcome: Progressing  Flowsheets (Taken 11/3/2024 0814)  Prevent/manage excess moisture:   Cleanse incontinence/protect with barrier cream   Monitor for/manage infection if present   Follow provider orders for dressing changes  Goal: Prevent/minimize sheer/friction injuries  Outcome: Progressing  Flowsheets (Taken 11/3/2024 0814)  Prevent/minimize sheer/friction injuries:   Increase activity/out of bed for meals   Use pull sheet   HOB 30 degrees or less   Turn/reposition every 2 hours/use positioning/transfer devices   Utilize specialty bed per algorithm  Goal: Promote/optimize nutrition  Outcome: Progressing  Flowsheets (Taken 11/3/2024 0814)  Promote/optimize nutrition:   Monitor/record intake including meals   Consume > 50% meals/supplements   Offer water/supplements/favorite foods  Goal: Promote skin healing  Outcome: Progressing  Flowsheets (Taken 11/3/2024 0814)  Promote skin healing:   Protective dressings over bony prominences   Turn/reposition every 2 hours/use positioning/transfer devices     Problem: Nutrition  Goal: Consume prescribed supplement  Outcome: Progressing  Goal: Nutrition support goals are met within 48 hrs  Outcome: Progressing  Goal: Nutrition support is meeting 75% of nutrient needs  Outcome: Progressing  Goal: BG  mg/dL  Outcome: Progressing  Goal: Lab values WNL  Outcome: Progressing  Goal: Electrolytes WNL  Outcome: Progressing  Goal: Promote healing  Outcome: Progressing  Goal: Maintain stable weight  Outcome: Progressing  Goal: Reduce weight from edema/fluid  Outcome: Progressing     Problem: Respiratory  Goal: No signs of respiratory distress (eg. Use of accessory muscles. Peds grunting)  Outcome: Progressing  Goal: Clear secretions with interventions this shift  Outcome: Progressing  Goal: Minimize  anxiety/maximize coping throughout shift  Outcome: Progressing  Goal: Minimal/no exertional discomfort or dyspnea this shift  Outcome: Progressing  Goal: Patent airway maintained this shift  Outcome: Progressing  Goal: Tolerate mechanical ventilation evidenced by VS/agitation level this shift  Outcome: Progressing  Goal: Tolerate pulmonary toileting this shift  Outcome: Progressing  Goal: Verbalize decreased shortness of breath this shift  Outcome: Progressing  Goal: Wean oxygen to maintain O2 saturation per order/standard this shift  Outcome: Progressing  Goal: Increase self care and/or family involvement in next 24 hours  Outcome: Progressing     Problem: Pain  Goal: Takes deep breaths with improved pain control throughout the shift  Outcome: Progressing  Goal: Turns in bed with improved pain control throughout the shift  Outcome: Progressing  Goal: Walks with improved pain control throughout the shift  Outcome: Progressing  Goal: Performs ADL's with improved pain control throughout shift  Outcome: Progressing  Goal: Participates in PT with improved pain control throughout the shift  Outcome: Progressing  Goal: Free from opioid side effects throughout the shift  Outcome: Progressing  Goal: Free from acute confusion related to pain meds throughout the shift  Outcome: Progressing     Problem: Fall/Injury  Goal: Not fall by end of shift  Outcome: Progressing  Goal: Be free from injury by end of the shift  Outcome: Progressing  Goal: Verbalize understanding of personal risk factors for fall in the hospital  Outcome: Progressing  Goal: Verbalize understanding of risk factor reduction measures to prevent injury from fall in the home  Outcome: Progressing  Goal: Use assistive devices by end of the shift  Outcome: Progressing  Goal: Pace activities to prevent fatigue by end of the shift  Outcome: Progressing

## 2024-11-03 NOTE — PROGRESS NOTES
"Narda Malloy is a 69 y.o. female on day 22 of admission presenting with Unresponsive.    Subjective   Symptoms (0 - 10, Best to Worst)  [place] Symptom Assessment System  0-10 (Numeric) Pain Score: 0 - No pain  Intubated, off sedation, opens eyes, nods, follows simple commands.        Objective     Vitals and nursing note reviewed.   Constitutional:       General: She is not in acute distress.     Appearance: She is ill-appearing.   HENT:      Head: Normocephalic and atraumatic.      Comments: intubated     Nose: Nose normal.      Mouth/Throat:      Mouth: Mucous membranes are moist.      Pharynx: Oropharynx is clear.   Eyes:      Extraocular Movements: Extraocular movements intact.      Pupils: Pupils are equal, round, and reactive to light.   Neck:      Vascular: No carotid bruit.   Cardiovascular:      Rate and Rhythm: Normal rate and regular rhythm.      Heart sounds: No murmur heard.     Comments: Line in place, dressing intact.   Weak pulses.   Pulmonary:      Effort: Pulmonary effort is normal. No respiratory distress.      Comments: Rhonchi noted, diminished bases.   Intubated, on vent  Abdominal:      General: Abdomen is flat. Bowel sounds are normal. There is no distension.      Palpations: Abdomen is soft.      Tenderness: There is no abdominal tenderness.   Musculoskeletal:         General: No swelling, tenderness, deformity or signs of injury. Normal range of motion.      Cervical back: Normal range of motion and neck supple.   Skin:     General: Skin is warm and dry.      Capillary Refill: Capillary refill takes 2 to 3 seconds.      Coloration: Skin is pale.   Neurological:      Mental Status: She is alert.      Comments: nods, squeezes hands    Last Recorded Vitals  Blood pressure 116/52, pulse 85, temperature 36.5 °C (97.7 °F), temperature source Axillary, resp. rate 21, height 1.778 m (5' 10\"), weight 114 kg (250 lb 7.1 oz), SpO2 100%.  Intake/Output last 3 Shifts:  I/O last 3 completed " shifts:  In: 1978 (17.4 mL/kg) [I.V.:1168 (10.3 mL/kg); NG/GT:810]  Out: 2030 (17.9 mL/kg) [Emesis/NG output:30; Other:2000]  Weight: 113.6 kg     Relevant Results  Results for orders placed or performed during the hospital encounter of 10/12/24 (from the past 24 hours)   POCT GLUCOSE   Result Value Ref Range    POCT Glucose 148 (H) 74 - 99 mg/dL   POCT GLUCOSE   Result Value Ref Range    POCT Glucose 188 (H) 74 - 99 mg/dL   POCT GLUCOSE   Result Value Ref Range    POCT Glucose 178 (H) 74 - 99 mg/dL   CBC and Auto Differential   Result Value Ref Range    WBC 8.5 4.4 - 11.3 x10*3/uL    nRBC 0.0 0.0 - 0.0 /100 WBCs    RBC 2.35 (L) 4.00 - 5.20 x10*6/uL    Hemoglobin 7.4 (L) 12.0 - 16.0 g/dL    Hematocrit 22.9 (L) 36.0 - 46.0 %    MCV 97 80 - 100 fL    MCH 31.5 26.0 - 34.0 pg    MCHC 32.3 32.0 - 36.0 g/dL    RDW 19.5 (H) 11.5 - 14.5 %    Platelets 243 150 - 450 x10*3/uL    Neutrophils % 86.9 40.0 - 80.0 %    Immature Granulocytes %, Automated 0.5 0.0 - 0.9 %    Lymphocytes % 7.0 13.0 - 44.0 %    Monocytes % 4.6 2.0 - 10.0 %    Eosinophils % 0.5 0.0 - 6.0 %    Basophils % 0.5 0.0 - 2.0 %    Neutrophils Absolute 7.38 1.20 - 7.70 x10*3/uL    Immature Granulocytes Absolute, Automated 0.04 0.00 - 0.70 x10*3/uL    Lymphocytes Absolute 0.59 (L) 1.20 - 4.80 x10*3/uL    Monocytes Absolute 0.39 0.10 - 1.00 x10*3/uL    Eosinophils Absolute 0.04 0.00 - 0.70 x10*3/uL    Basophils Absolute 0.04 0.00 - 0.10 x10*3/uL   Hepatic function panel   Result Value Ref Range    Albumin 2.7 (L) 3.4 - 5.0 g/dL    Bilirubin, Total 0.4 0.0 - 1.2 mg/dL    Bilirubin, Direct 0.1 0.0 - 0.3 mg/dL    Alkaline Phosphatase 125 33 - 136 U/L    ALT 10 7 - 45 U/L    AST 10 9 - 39 U/L    Total Protein 5.5 (L) 6.4 - 8.2 g/dL   Magnesium   Result Value Ref Range    Magnesium 1.92 1.60 - 2.40 mg/dL   Phosphorus   Result Value Ref Range    Phosphorus 2.3 (L) 2.5 - 4.9 mg/dL   Basic Metabolic Panel   Result Value Ref Range    Glucose 171 (H) 74 - 99 mg/dL    Sodium  133 (L) 136 - 145 mmol/L    Potassium 4.0 3.5 - 5.3 mmol/L    Chloride 99 98 - 107 mmol/L    Bicarbonate 28 21 - 32 mmol/L    Anion Gap 10 10 - 20 mmol/L    Urea Nitrogen 14 6 - 23 mg/dL    Creatinine 1.30 (H) 0.50 - 1.05 mg/dL    eGFR 45 (L) >60 mL/min/1.73m*2    Calcium 7.9 (L) 8.6 - 10.3 mg/dL   aPTT   Result Value Ref Range    aPTT 45.6 (H) 22.0 - 32.5 seconds   POCT GLUCOSE   Result Value Ref Range    POCT Glucose 176 (H) 74 - 99 mg/dL   POCT GLUCOSE   Result Value Ref Range    POCT Glucose 173 (H) 74 - 99 mg/dL   aPTT   Result Value Ref Range    aPTT 60.5 (H) 22.0 - 32.5 seconds     *Note: Due to a large number of results and/or encounters for the requested time period, some results have not been displayed. A complete set of results can be found in Results Review.    XR chest 1 view    Result Date: 11/3/2024  Interpreted By:  Lisseth Rocha, STUDY: XR CHEST 1 VIEW 11/3/2024 5:30 am   INDICATION: Signs/Symptoms:Continued intubation   COMPARISON: 11/02/2024   ACCESSION NUMBER(S): [id number]   ORDERING CLINICIAN: STEVIE ZHAO   TECHNIQUE: AP view   FINDINGS: Triple-lumen catheter noted in the right neck. Endotracheal tube and nasogastric tube are again seen. Endotracheal tube tip obscured by spinal fusion hardware. Left subclavian PICC line noted overlying the superior vena cava. Spinal fusion rods in the thoracic and lumbar spine   Again seen is pulmonary venous congestion with bilateral perihilar airspace opacification possibly pulmonary edema. Suspected small left pleural effusion again noted.       Bilateral perihilar airspace opacification similar to the prior exam possibly due to pulmonary edema or pneumonia with life-support devices unchanged since the prior exam.   Signed by: Lisseth Rocha 11/3/2024 1:57 PM Dictation workstation:   [id number]    Transthoracic Echo (TTE) Complete    Result Date: 11/3/2024           [place] [address]            Phone  [phone] TRANSTHORACIC ECHOCARDIOGRAM REPORT Patient Name:       KIMMIE RODRIGUES       Reading Physician:    [id number] Valeria Mays MD Study Date:         11/2/2024            Ordering Provider:    [id number] SERINA GILL                                                                HADLEY MRN/PID:            [id number]             Fellow: Accession#:         [id number]         Nurse: Date of Birth/Age:  1955 / 69 years Sonographer:          Camila GIANG Gender Assigned at  F                    Additional Staff: Birth: Height:             177.80 cm            Admit Date:           11/2/2024 Weight:             113.40 kg            Admission Status:     Inpatient -                                                                Routine BSA / BMI:          2.29 m2 / 35.87      Department Location:                     kg/m2 Blood Pressure: 132 /49 mmHg Study Type:    TRANSTHORACIC ECHO (TTE) COMPLETE Diagnosis/ICD: Generalized edema-R60.1 Indication:    Anasarca Acute Renal Failure revurent Effusions Hypoxia CPT Codes:     Echo Complete w Full Doppler-[id number] Patient History: Pertinent History: HTN HLD DMII Former Smoker CAD Dyslipidemia. Study Detail: The following Echo studies were performed: 2D, M-Mode, Doppler,               color flow and Strain. A bubble study was not performed.  PHYSICIAN INTERPRETATION: Left Ventricle: The left ventricular systolic function is normal, with a visually estimated ejection fraction of 60-65%. There is borderline concentric left ventricular hypertrophy. There are no regional wall motion abnormalities. The left ventricular cavity size is normal. Left Ventricular Global Longitudinal Strain - -17.4 %. Spectral Doppler shows a normal pattern of left ventricular diastolic filling. Left Atrium: The left atrium is normal in size. A bubble study using agitated saline  was not performed. Right Ventricle: The right ventricle is normal in size. There is normal right ventricular global systolic function. Right Atrium: The right atrium is normal in size. Aortic Valve: The aortic valve is trileaflet. There is mild aortic valve thickening. There is evidence of mildly elevated transaortic gradients consistent with sclerosis of the aortic valve. There is no evidence of aortic valve regurgitation. The peak instantaneous gradient of the aortic valve is 10 mmHg. Mitral Valve: The mitral valve is normal in structure. There is mild mitral annular calcification. There is mild mitral valve regurgitation. Tricuspid Valve: The tricuspid valve is structurally normal. There is mild tricuspid regurgitation. The Doppler estimated RVSP is mild to moderately elevated at 52.8 mmHg. Pulmonic Valve: The pulmonic valve is structurally normal. There is physiologic pulmonic valve regurgitation. Pericardium: Trivial pericardial effusion. Aorta: The aortic root is normal. There is an aneurysm involving the ascending aorta. 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 Systemic Veins: The inferior vena cava appears normal in size, with IVC inspiratory collapse greater than 50%.  CONCLUSIONS:  1. The left ventricular systolic function is normal, with a visually estimated ejection fraction of 60-65%.  2. There is normal right ventricular global systolic function.  3. Mild mitral valve regurgitation.  4. Mild to moderately elevated right ventricular systolic pressure.  5. Mild tricuspid regurgitation is visualized.  6. Aortic valve sclerosis.  7. Aneurysm of the ascending aorta.  8. 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 QUANTITATIVE DATA SUMMARY:  2D MEASUREMENTS:           Normal Ranges: LAs:             3.70 cm   (2.7-4.0cm) IVSd:            1.35 cm   (0.6-1.1cm) LVPWd:           1.14 cm   (0.6-1.1cm) LVIDd:           4.44 cm   (3.9-5.9cm) LVIDs:            2.93 cm LV Mass Index:   89.2 g/m2 LV % FS          34.0 %  LA VOLUME:                   Normal Ranges: LA Vol A4C:        43.8 ml   (22+/-6mL/m2) LA Vol Index A4C:  19.1ml/m2 LA Area A4C:       15.8 cm2 LA Major Axis A4C: 4.8 cm LA Vol A4C:        34.8 ml  RA VOLUME BY A/L METHOD:            Normal Ranges: RA Vol A4C:              23.6 ml    (8.3-19.5ml) RA Vol Index A4C:        10.3 ml/m2 RA Area A4C:             11.3 cm2 RA Major Axis A4C:       4.6 cm  AORTA MEASUREMENTS:         Normal Ranges: Ao Sinus, d:        3.18 cm (2.1-3.5cm) Ao STJ, d:          3.13 cm (1.7-3.4cm) Asc Ao, d:          3.80 cm (2.1-3.4cm)  LV SYSTOLIC FUNCTION BY 2D PLANIMETRY (MOD):                                         Normal Ranges: EF-A4C View:                      59 %  (>=55%) EF-A2C View:                      71 % EF-Biplane:                       65 % EF-Visual:                        63 % LV EF Reported:                   63 % Global Longitudinal Strain (GLS): -17 %  LV DIASTOLIC FUNCTION:            Normal Ranges: MV Peak E:             1.31 m/s   (0.7-1.2 m/s) MV Peak A:             1.01 m/s   (0.42-0.7 m/s) E/A Ratio:             1.30       (1.0-2.2) MV e'                  0.085 m/s  (>8.0) MV lateral e'          0.10 m/s MV medial e'           0.07 m/s E/e' Ratio:            15.38      (<8.0) PulmV Sys Jerrell:         54.60 cm/s PulmV Daugherty Jerrell:        46.10 cm/s PulmV S/D Jerrell:         1.20 PulmV A Revs Jerrell:      57.30 cm/s  MITRAL VALVE:          Normal Ranges: MV DT:        225 msec (150-240msec)  AORTIC VALVE:           Normal Ranges: AoV Vmax:      1.56 m/s (<=1.7m/s) AoV Peak P.7 mmHg (<20mmHg) LVOT Max Jerrell:  1.62 m/s (<=1.1m/s) LVOT VTI:      29.40 cm LVOT Diameter: 2.10 cm  (1.8-2.4cm) AoV Area,Vmax: 3.60 cm2 (2.5-4.5cm2)  RIGHT VENTRICLE: RV Basal 3.45 cm RV Mid   3.32 cm RV Major 6.2 cm TAPSE:   14.7 mm RV s'    0.18 m/s  TRICUSPID VALVE/RVSP:          Normal Ranges: Peak TR Velocity:     3.53 m/s RV Syst  Pressure:     53 mmHg  (< 30mmHg) IVC Diam:             1.55 cm  PULMONIC VALVE:          Normal Ranges: PV Accel Time:  74 msec  (>120ms) PV Max Jerrell:     1.1 m/s  (0.6-0.9m/s) PV Max P.7 mmHg  Pulmonary Veins: PulmV A Revs Jerrell: 57.30 cm/s PulmV Daugherty Jerrell:   46.10 cm/s PulmV S/D Jerrell:    1.20 PulmV Sys Jerrell:    54.60 cm/s  [id number] Valeria Mays MD Electronically signed on 11/3/2024 at 9:40:38 AM  ** Final **     XR chest 1 view    Result Date: 2024  Interpreted By:  Mayuri Velez, STUDY: XR CHEST 1 VIEW;  2024 5:46 am   INDICATION: Signs/Symptoms:Continued intubation.   COMPARISON: 2024   ACCESSION NUMBER(S): [id number]   ORDERING CLINICIAN: STEVIE ZHAO   FINDINGS: CARDIOMEDIASTINAL SILHOUETTE: Cardiomediastinal silhouette is normal in size and configuration. There is a left PICC line with the tip in the superior vena cava. There is an endotracheal tube with the tip not seen because of upper thoracic dorsal fusion rods.   LUNGS: There is bibasilar pneumonia with improvement in aeration at the right lung base. There is less consolidation.   ABDOMEN: No remarkable upper abdominal findings. There is a nasogastric tube with the tip not seen but is well beyond the gastroesophageal junction.   BONES: No acute osseous changes. There are dorsal fusion rods bilaterally involving the in the tire thoracic and visualized upper lumbar spine.       1. Endotracheal tube tip not seen because of obscuration from upper thoracic dorsal fusion rods. 2. Bibasilar pneumonia with improved aeration at the right lung base.   MACRO: None   Signed by: Mayuri Velez 2024 12:42 PM Dictation workstation:   [id number]    XR chest 1 view    Result Date: 2024  Interpreted By:  Parmjit Mancini, STUDY: XR CHEST 1 VIEW   INDICATION: Signs/Symptoms:post intubation.   COMPARISON: Earlier the same day.   ACCESSION NUMBER(S): [id number]   ORDERING CLINICIAN: SERINA BUTCHER   FINDINGS: Intubation again noted.  Unfortunately the tip of the endotracheal tube not definitively visualized as it is obscured by some of the thoracic fusion hardware. It looks to be near the aortic knob.   Bilateral pleural effusions and basilar edema unchanged.   No evidence of pneumothorax.       Unchanged appearance of the chest as above.   Signed by: Parmjit Mancini 11/1/2024 7:04 PM Dictation workstation:   [id number]    XR chest 1 view    Result Date: 11/1/2024  Interpreted By:  Joyce Fontaine, STUDY: XR CHEST 1 VIEW 11/1/2024 6:06 am   INDICATION: Signs/Symptoms:Continued intubation   COMPARISON: 10/31/2024   ACCESSION NUMBER(S): [id number]   ORDERING CLINICIAN: STEVIE ZHAO   TECHNIQUE: Single AP view chest   FINDINGS: Hazy opacities identified within bilateral lung fields mid to lower lung zones with component of layering effusions and compressive atelectasis suggested. Right IJ line is demonstrated with tip in the proximal SVC. Left-sided PICC line with tip in the region of the distal SVC. NG tube is in place with interval removal of the ET tube.   Spinal fusion demonstrated. Cardiac silhouette within normal limits                 1. Persistent hazy opacities mid to lower lung zones bilaterally with layering basilar effusions and compressive atelectasis suggested.   2. Interval removal of the ET tube. Remainder of the lines and tubes are unremarkable.   Signed by: Joyce Fontaine 11/1/2024 10:56 AM Dictation workstation:   [id number]    XR chest 1 view    Result Date: 10/31/2024  Interpreted By:  Andrew Byrd, STUDY: XR CHEST 1 VIEW; 10/31/2024 4:55 am   INDICATION: CLINICAL INFORMATION: Signs/Symptoms:Continued intubation.   COMPARISON: 10/30/2024 at 0433 hours   ACCESSION NUMBER(S): [id number]   ORDERING CLINICIAN: STEVIE ZHAO   TECHNIQUE: Portable chest one view.   FINDINGS: The cardiac size is indeterminate in view of the AP projection.  The tube and line placement is unchanged.  There is no change in the bilateral  infiltrates and effusions. Tube and line placement is somewhat difficult to assess due to overlying surgical hardware.       No change in the bilateral infiltrates and effusions.   MACRO: none   Signed by: Andrew Byrd 10/31/2024 7:42 AM Dictation workstation:   [id number]    XR chest 1 view    Result Date: 10/30/2024  Interpreted By:  Andrew Byrd, STUDY: XR CHEST 1 VIEW; 10/30/2024 4:46 am   INDICATION: CLINICAL INFORMATION: Signs/Symptoms:Continued intubation.   COMPARISON: 10/29/2024 0832 hours   ACCESSION NUMBER(S): [id number]   ORDERING CLINICIAN: STEVIE ZHAO   TECHNIQUE: Portable chest one view.   FINDINGS: The cardiac size is indeterminate in view of the AP projection. There is no change in the bilateral infiltrates and effusions compared to the prior study. Tube and line placement is somewhat difficult to assess due to the extensive thoracic spinal hardware.       There is no interval change when compared to the previous examination.   MACRO: none   Signed by: Andrew Byrd 10/30/2024 9:00 AM Dictation workstation:   [id number]    Vascular US upper extremity venous duplex left    Result Date: 10/29/2024           [place] [address]            Phone [phone]  Vascular Lab Report  VASC US UPPER EXTREMITY VENOUS DUPLEX LEFT Patient Name:      KIMMIE Rivera Physician:  [id number] Derek Michelle MD, RPVI Study Date:        10/29/2024           Ordering Provider:  [id number] SAIGE MULLER MRN/PID:           [id number]             Fellow: Accession#:        [id number]         Technologist:       Junior Baltazar Date of Birth/Age: 1955 / 69 years Technologist 2:     Tanya Blank RVT Gender:            F                    Encounter#:         [id number] Admission Status:  Inpatient            Location Performed: [facility]  Diagnosis/ICD: Left arm swelling-M79.89 Indication:    Limb swelling CPT Codes:     43768 Peripheral venous duplex scan for DVT Limited  Pertinent History: Left cephalic vein superficial thrombophlebitis noted on                    ultrasound in radiology 10/14/24.  **CRITICAL RESULT** Critical Result: Acute, non-occlusive DVT right internal jugular vein around line placement. Notification called to Sweta Diaz CNP on 10/29/2024 at 4:00:00 PM by Tanya Blank RVT.  CONCLUSIONS: Right Upper Venous: Acute, non-occlusive, focal deep vein thrombus of right internal jugular vein around line placement. Visualized portions of subclavian and innominate veins appear patent. Left Upper Venous: No evidence of acute deep vein thrombus visualized in the left upper extremity. Acute, occlusive superficial vein thrombosis of left cephalic vein from mid-distal upper arm extending to approximately 2.0cm proximal to subclavian junction. There is a left subclavian vein catheter noted in place.  Imaging & Doppler Findings:  Right            Compressible      Thrombus              Flow Internal Jugular   Partial    Acute non-occlusive Subclavian                                        Spontaneous/Phasic  Left                Compress    Thrombus Internal Jugular      Yes         None Subclavian            Yes         None Subclavian Proximal   Yes         None Subclavian Mid        Yes         None Subclavian Distal     Yes         None Axillary              Yes         None Brachial              Yes         None Cephalic               No    Acute occlusive Basilic               Yes         None  [id number] Derek Michelle MD, RPVI Electronically signed by [id number] Derek Michelle MD, RPVI on 10/29/2024 at 9:41:07 PM  ** Final **     XR abdomen 1 view    Result Date: 10/29/2024  Interpreted By:  Mayuri Velez, STUDY: XR ABDOMEN 1 VIEW;  10/29/2024 8:51 am. 2 views.   INDICATION: Signs/Symptoms:high residual of tube feeds.   COMPARISON:  10/1924   ACCESSION NUMBER(S): [id number]   ORDERING CLINICIAN: SAIGE MULLER   FINDINGS: Bowel gas pattern is nonspecific and nonobstructive.  There is a nasogastric tube with the tip in the distal stomach. There is a 2nd nasogastric tube with the tip in the proximal stomach. There is no gastric distention.   There is partial atherosclerotic calcification of the abdominal aorta.   There is left basilar consolidation with small left pleural effusion.   Osseous structures demonstrate no acute bony changes.   There are lower thoracic and lumbar dorsal fusion rods.       Nonspecific, nonobstructive bowel gas pattern. There are 2 nasogastric tubes. The tip of one of the tubes as in the distal stomach and the tip of the 2nd tube is in the proximal stomach. Left basilar consolidation with a small left pleural effusion.   MACRO: None   Signed by: Mayuri Velez 10/29/2024 9:36 AM Dictation workstation:   [id number]    XR chest 1 view    Result Date: 10/29/2024  Interpreted By:  Lily Mancini, STUDY: XR CHEST 1 VIEW;  10/29/2024 8:51 am   INDICATION: Signs/Symptoms:R/O aspiration. Residual feeds   COMPARISON: 10/28/2024; CT chest dated 09/26/2024   ACCESSION NUMBER(S): [id number]   ORDERING CLINICIAN: SAIGE MULLER   FINDINGS: In size.   There is bilateral parenchymal infiltration. There may be bilateral pleural effusions.   There are calcified granulomas.   A nasogastric tube terminates below the diaphragm.   There appears to be a PICC line on the left with the tip in the region of superior vena cava.   There is also of jugular catheter on the right. The tip is not well seen.   The patient is status post spinal fusion.   COMPARISON OF FINDING: The chest is similar.       Bilateral parenchymal infiltrates. Bilateral pleural effusions.   MACRO: none   Signed by: Lily Mancini 10/29/2024 9:20 AM Dictation workstation:   [id number]    XR chest 1 view    Result Date: 10/28/2024  Interpreted By:  Andrew Byrd, STUDY:  XR CHEST 1 VIEW; 10/28/2024 11:33 am   INDICATION: CLINICAL INFORMATION: Signs/Symptoms:more secretions.   COMPARISON: 10/26/2024   ACCESSION NUMBER(S): [id number]   ORDERING CLINICIAN: MARIAN GILBERT   TECHNIQUE: Portable chest one view.   FINDINGS: The cardiac size is indeterminate in view of the AP projection. Bilateral perihilar infiltrate is present. Bilateral effusions are present, greater than left. Increased density at the left base suggest consolidation within left lower lobe. The tube and line placement is unchanged.       Bilateral infiltrates and effusions. There is no interval change when compared to the previous examination.   MACRO: none   Signed by: Andrew Byrd 10/28/2024 12:05 PM Dictation workstation:   [id number]    XR chest 1 view    Result Date: 10/26/2024  Interpreted By:  Mar Watkins, STUDY: XR CHEST 1 VIEW;  10/26/2024 9:21 am   INDICATION: Signs/Symptoms:post chest tube removal.     COMPARISON: 10/25/2024   ACCESSION NUMBER(S): [id number]   ORDERING CLINICIAN: STEVIE ZHAO   FINDINGS: Multiple support devices remain in place including ET tube, tip not well visualized, likely 2 enteric tubes extending into the upper abdomen, tips not included on the image, right jugular central line with tip overlying the proximal SVC and left subclavian PICC line with tip extending to the region of the right atrium again seen. Additional presumed overlying monitoring/support devices. Extensive orthopedic hardware overlying the central chest/spine again seen.   Ill-defined bilateral chest opacities similar to the prior exam. No definite pneumothorax. The cardiac silhouette is within normal limits for size.       Multiple support devices in place as above. Persistent hazy bilateral chest opacities.   MACRO: None.   Signed by: Mar Watkins 10/26/2024 10:11 AM Dictation workstation:   [id number]    XR chest 1 view    Result Date: 10/25/2024  Interpreted By:  Parmjit Mancini, STUDY: XR CHEST 1 VIEW    INDICATION: Signs/Symptoms:post chest tube removal.   COMPARISON: October 24   ACCESSION NUMBER(S): [id number]   ORDERING CLINICIAN: MARIAN GILBERT   FINDINGS: Interval removal of the left-sided chest tube without pneumothorax.   Moderate left pleural effusion grossly similar.   Edema similar to prior.   Other lines and tubes grossly unchanged.       Interval left-sided chest tube removal without pneumothorax.   Signed by: Parmjit Mancini 10/25/2024 6:30 PM Dictation workstation:   [id number]    Bedside PICC Imaging    Result Date: 10/24/2024  These images are not reportable by radiology and will not be interpreted by  Radiologists.    XR chest 1 view    Result Date: 10/24/2024  Interpreted By:  Andrew Byrd, STUDY: XR CHEST 1 VIEW; 10/24/2024 9:16 am   INDICATION: CLINICAL INFORMATION: Signs/Symptoms:assess effusions, intubated, on CRRT.   COMPARISON: 10/23/2024 at 0401 hours   ACCESSION NUMBER(S): [id number]   ORDERING CLINICIAN: SERINA BUTCHER   TECHNIQUE: Portable chest one view.   FINDINGS: The cardiac size is indeterminate in view of the AP projection. There is no change in the left-sided chest tube. There is no change in the bibasilar density likely representing pleural effusions. Hazy bilateral perihilar infiltrates are suspected.       No change in the probable bilateral infiltrates and effusions as above. Left-sided chest tube is present. There is no evidence for pneumothorax.   MACRO: none   Signed by: Andrew Byrd 10/24/2024 9:58 AM Dictation workstation:   [id number]    ECG 12 Lead    Result Date: 10/23/2024  Normal sinus rhythm Low voltage QRS Borderline ECG No previous ECGs available Confirmed by Milind Lang ([id number]) on 10/23/2024 4:02:31 PM    XR chest 1 view    Result Date: 10/23/2024  Interpreted By:  Stewart Leiva, STUDY: XR CHEST 1 VIEW;  10/23/2024 4:14 am   INDICATION: Signs/Symptoms:increased o2.   COMPARISON: 10/22/2024   ACCESSION NUMBER(S): [id number]   ORDERING CLINICIAN: BRIAN  HIPPS   FINDINGS: Endotracheal tube, nasogastric tube and right central venous catheter is not well assessed secondary to projectional overlap with thoracolumbar fusion hardware. The cardiac silhouette is stable in size. There are bilateral airspace opacities and pleural effusions. Stable left chest tube. No pneumothorax.       Bilateral airspace opacities and pleural effusions. There is stable focal asymmetric prominent opacity in the right hilar region which may correspond to focal area of consolidation, however attention on continued progress imaging is recommended to assure resolution exclude other under pathology including mass.   MACRO: None   Signed by: Stewart Leiva 10/23/2024 4:31 AM Dictation workstation:   [id number]    XR chest 1 view    Result Date: 10/22/2024  Interpreted By:  Andrew Byrd, STUDY: XR CHEST 1 VIEW; 10/22/2024 6:36 am   INDICATION: CLINICAL INFORMATION: Signs/Symptoms:assess effusions. Unresponsive.   COMPARISON: 10/21/2024   ACCESSION NUMBER(S): [id number]   ORDERING CLINICIAN: SERINA BUTCHER   TECHNIQUE: Portable chest one view.   FINDINGS: The cardiac size is indeterminate in view of the AP projection. Pigtail catheter overlies the left hemithorax. Moderate bilateral pleural effusions are suspected. Perihilar hazy infiltrate is present. Bilateral thoracic and lumbar pedicle screw and bar fusions are present. Right internal jugular venous catheter tip overlies the junction of the right and left innominate veins.   ETT may lie low possibly extending into the right mainstem bronchus but this study is limited due to overlying artifact from the patient's pedicle screw and bar fusion.       No change in the bilateral infiltrates and effusions. Left-sided chest tube without pneumothorax.   Endotracheal tube may be low in position possibly extending into the right mainstem bronchus although following the endotracheal tube is limited due to overlapping artifact from the patient's pedicle  screw and bar fusion. Patient may benefit from obtaining RPO and LPO views of the chest to more definitively assess ET tube position.   MACRO: Andrew Byrd communicated these findings by EPIC secure chat with [facility] intensivist team on 10/22/2024 at 8:16 am.  (**-RCF-**) Findings: See findings.   Signed by: Andrew Byrd 10/22/2024 8:17 AM Dictation workstation:   [id number]    US renal complete    Result Date: 10/22/2024  Interpreted By:  Andrew Byrd, STUDY: US RENAL COMPLETE; 10/21/2024 7:54 pm   INDICATION: Signs/Symptoms:AMY.   COMPARISON: 09/26/2024   ACCESSION NUMBER(S): [id number]   ORDERING CLINICIAN: BLAKE NAIK   TECHNIQUE: Grayscale and color Doppler imaging of the kidneys   FINDINGS: COMMENTS: Technologist note indicates the exam was extremely limited due to portable technique and limited patient mobility.   The right kidney measures 13.3 cm  . The left kidney measures 12.1 cm  .     No renal stones are identified.  The renal cortical thickness and echogenicity is normal.  No hydronephrosis is identified.   Urinary bladder is collapsed around a Gibson catheter. This limited bladder assessment.   Small amount of ascites is present. Right pleural effusion is present.       Non specific appearance of the kidneys as described. Small amount of ascites. Right pleural effusion.   MACRO: none   Signed by: nAdrew Byrd 10/22/2024 8:08 AM Dictation workstation:   [id number]    XR chest 1 view    Result Date: 10/21/2024  Interpreted By:  Joyce Fontaine, STUDY: XR CHEST 1 VIEW 10/21/2024 8:00 am   INDICATION: Signs/Symptoms:eval ett, lines, lung fields   COMPARISON: 10/19/2024   ACCESSION NUMBER(S): [id number]   ORDERING CLINICIAN: DUSTY RICO   TECHNIQUE: Single AP view chest   FINDINGS: Endotracheal tube tip is obscured by hardware overlying the chest from thoracic spinal fusion. The endotracheal tube is not identified about the tip of the michi and is just distal to the level of the distal clavicles  visualized portions. NG tube is in place.   Hazy opacity at the right lung base component of layering effusion and compressive atelectasis superimposed infiltrate not excluded. There is a calcified left suprahilar granuloma. Left-sided chest tube in place without evidence for pneumothorax. Hazy opacity retrocardiac area obscuring evaluation with component of layering effusion/compressive atelectasis not excluded.             1. Endotracheal tube obscured by patient positioning and hardware overlying the thoracic spine. The tube is not demonstrated about the level of the michi as seen just distal to the level of the clavicles. Follow up as clinically indicated.   2. Persistent right basilar effusion and compressive atelectasis. Superimposed infiltrate not excluded   3. Left-sided chest tube in place without pneumothorax. Retrocardiac area obscured with component of left basilar effusion/compressive atelectasis suggested.   Signed by: Joyce Fontaine 10/21/2024 9:14 AM Dictation workstation:   [id number]    XR chest 1 view    Result Date: 10/19/2024  Interpreted By:  Parmjit Mancini, STUDY: XR CHEST 1 VIEW   INDICATION: Signs/Symptoms:line placement.   COMPARISON: October 19, 2024 earlier the same day   ACCESSION NUMBER(S): [id number]   ORDERING CLINICIAN: SERINA BUTCHER   FINDINGS: Again the right-sided central line slightly overlies the soft tissues of the chest. The appearance is similar to the previous study. It is potentially in the SVC which may be projecting more laterally given the patient rotation but. Definitive intravascular location can not be confirmed. Correlate with line function.   Basilar edema.   No evidence of pneumothorax.       Again the right-sided central line slightly overlies the soft tissues of the chest. The appearance is similar to the previous study. It is potentially in the SVC which may be projecting more laterally given the patient rotation but. Definitive intravascular location can  not be confirmed. Correlate with line function.   Signed by: Parmjit Mancini 10/19/2024 9:55 PM Dictation workstation:   [id number]    XR abdomen 1 view    Result Date: 10/19/2024  Interpreted By:  Parmjit Mancini, STUDY: XR ABDOMEN 1 VIEW   INDICATION: Signs/Symptoms:NG tube placement.   COMPARISON: Earlier same day   ACCESSION NUMBER(S): [id number]   ORDERING CLINICIAN: DUSTY RICO   FINDINGS: Nasogastric tube over the gastric body.   Bowel-gas pattern not fully assessed.       Nasogastric tube over the gastric body.   Signed by: Parmjit Mancini 10/19/2024 8:02 PM Dictation workstation:   [id number]    XR chest 1 view    Result Date: 10/19/2024  Interpreted By:  Parmjit Mancini, STUDY: XR CHEST 1 VIEW   INDICATION: Signs/Symptoms:ett placed.   COMPARISON: Earlier the same day 5:32 a.m.   ACCESSION NUMBER(S): [id number]   ORDERING CLINICIAN: DUSTY RICO   FINDINGS: Interval intubation with the endotracheal tube an approximate 4 cm proximal to the michi. Bilateral pleural effusions right worse than left with basilar edema, slightly worsened from prior.   Left-sided thoracostomy tube.   No evidence of pneumothorax.   Right-sided central line overlies the right chest near the apex. This is potentially within the SVC but is slightly lateral and its definitive intravascular location not confirmed.       Right-sided central line overlies the right chest near the apex. This is potentially within the SVC but is slightly lateral and its definitive intravascular location not confirmed. Correlate with function.   Interval intubation.       Signed by: Parmjit Mancini 10/19/2024 8:02 PM Dictation workstation:   [id number]    XR chest 1 view    Result Date: 10/19/2024  Interpreted By:  Parmjit Mancini, STUDY: XR CHEST 1 VIEW   INDICATION: Signs/Symptoms:sob, ng tube placement.   COMPARISON: Earlier the same day   ACCESSION NUMBER(S): [id number]   ORDERING CLINICIAN: CHRISTIANO BRICENO   FINDINGS: Nasogastric tube overlies the gastric fundus.    Basilar edema and effusions similar to prior exam       Nasogastric tube overlies the gastric fundus.   Signed by: Parmjit Mancini 10/19/2024 5:34 PM Dictation workstation:   [id number]    XR chest 1 view    Result Date: 10/19/2024  Interpreted By:  Fern Mckeon, STUDY: XR CHEST 1 VIEW;  10/19/2024 5:51 am   INDICATION: Signs/Symptoms:assess effusions.     COMPARISON: 10/18/2024   ACCESSION NUMBER(S): [id number]   ORDERING CLINICIAN: SERINA BUTCHER   TECHNIQUE: Portable AP semi upright   FINDINGS: Spinal fixation hardware appears unchanged. Pigtail pleural catheter mid to lower left hemithorax projects laterally and is unchanged in position.   Heart is partially obscured by pleuroparenchymal opacities but is grossly unchanged in size. There is poor aeration of the lungs with considerable bilateral dependent atelectasis and bilateral pleural effusions. No pneumothorax is identified. Overall appearance is similar to the prior study. Osseous structures are grossly unchanged.       Bilateral atelectasis and pleural effusions with very poor aeration of the lungs, unchanged   MACRO: None.   Signed by: Fern Mckeon 10/19/2024 12:20 PM Dictation workstation:   [id number]    XR chest 1 view    Result Date: 10/18/2024  Interpreted By:  Joyce Fontaine, STUDY: XR CHEST 1 VIEW 10/18/2024 5:31 am   INDICATION: Signs/Symptoms:assess effusions   COMPARISON: 10/17/2024   ACCESSION NUMBER(S): [id number]   ORDERING CLINICIAN: SERINA BUTCHER   TECHNIQUE: Single portable AP view chest   FINDINGS: Cardiac silhouette is mildly enlarged. Examination is rotated accentuating the cardiac silhouette. Hazy opacity at the right lung base with component of layering basilar effusion and compressive atelectasis. Of note, left-sided chest tube in place without pneumothorax. Hazy opacity at the left lung base component of which may relate to compressive atelectasis or even small effusion. Spinal fusion noted.             1. Stable chest  with persistent bilateral basilar effusions. No pneumothorax with left-sided percutaneous pigtail catheter in place.   Signed by: Joyce Fontaine 10/18/2024 9:02 AM Dictation workstation:   [id number]    XR chest 1 view    Result Date: 10/17/2024  Interpreted By:  Fern Mckeon, STUDY: XR CHEST 1 VIEW;  10/17/2024 3:12 pm   INDICATION: Signs/Symptoms:s/p pigtail placement.     COMPARISON: 10/17/2024 at 8:28 a.m.   ACCESSION NUMBER(S): [id number]   ORDERING CLINICIAN: SERINA BUTCHER   TECHNIQUE: Portable AP upright   FINDINGS: Since the earlier examination a pigtail catheter is been placed in the pleural space lateral mid left hemithorax. There is improved aeration in the left lung compared to the previous study. Continued opacity is present at the left lung base which is probably a combination of pleural fluid and atelectasis. There is no pneumothorax at the left apex. Shallow volume in the right lung appears similar. There is opacity at the right lung base which is unchanged and consistent with a combination of pleural fluid and atelectasis. Fixation hardware in the spine is partially visualized and is grossly unchanged.       Interval placement of a left pleural pigtail catheter and improved aeration of the left lung since the earlier study.   No pneumothorax   Bilateral basilar opacities consistent with a combination of pleural fluid and atelectasis   MACRO: None.   Signed by: Fern Mckeon 10/17/2024 4:36 PM Dictation workstation:   [id number]    XR chest 1 view    Result Date: 10/17/2024  Interpreted By:  Mayuri Velez, STUDY: XR CHEST 1 VIEW;  10/17/2024 8:56 am   INDICATION: Signs/Symptoms:fluid overload.   COMPARISON: 10/16/2024   ACCESSION NUMBER(S): [id number]   ORDERING CLINICIAN: SERINA BUTCHER   FINDINGS: CARDIOMEDIASTINAL SILHOUETTE: Cardiomediastinal silhouette is normal in size and configuration.     LUNGS: There has been interval development of complete opacification of the left  hemithorax, likely a large left pleural effusion. There may be an infiltrate or compressive atelectasis at the left lung. There is a moderate-size right pleural effusion extending into the major fissure, unchanged.   ABDOMEN: No remarkable upper abdominal findings.     BONES: No acute osseous changes. Status post dorsal fusion of the thoracic spine and lumbar spine.       1. Interval complete opacification of the left hemithorax consistent with a large left pleural effusion. There is an infiltrate or compressive atelectasis at the left lung. 2. Unchanged moderate size right pleural effusion.   MACRO: None   Signed by: Mayuri Velez 10/17/2024 9:16 AM Dictation workstation:   [id number]    XR chest 1 view    Result Date: 10/16/2024  Interpreted By:  Vanessa Richardson, STUDY: XR CHEST 1 VIEW 10/16/2024 10:17 am   INDICATION: Hypoxia with increasing oxygen requirements   COMPARISON: 10/13/2024   ACCESSION NUMBER(S): [id number]   ORDERING CLINICIAN: KRYSTLE BRADY   TECHNIQUE: AP erect view of the chest   FINDINGS: Increasing opacification within the left mid and lower hemithorax is noted silhouetting the left cardiac border consistent with increasing size of left pleural effusion and probable left lower lobe atelectasis. There is left suprahilar infiltrate noted as well. On the right, the pleural effusion has also increased in size with increasing atelectasis.   Calcified lymph nodes are seen within the AP window. There is postoperative change from spinal fusion in the thoracic and lumbar regions.   Endotracheal and nasogastric tubes have been removed.       Increasing size of the bilateral pleural effusions with worsening atelectasis.   There is now new left suprahilar infiltrate.   Signed by: Vanessa Richardson 10/16/2024 12:03 PM Dictation workstation:   [id number]    Upper extremity venous duplex bilateral    Result Date: 10/14/2024  Interpreted By:  Sean Alfred, STUDY: VASC  UPPER EXTREMITY VENOUS DUPLEX BILATERAL;  10/14/2024 4:48 pm   INDICATION: Signs/Symptoms:new onset upper extremity swelling.   COMPARISON: None.   ACCESSION NUMBER(S): [id number]   ORDERING CLINICIAN: SERINA BUTCHER   TECHNIQUE: 2D ultrasound and color-flow duplex images were obtained along with Doppler spectral waveform analysis of the deep venous system of the right upper extremity. Venous compression was performed. Also interrogated, contralateral subclavian vein.   FINDINGS:     Right upper Extremity: There is normal compressibility and flow within the visualized vessels of the deep venous system of this upper extremity. Visualized portions of the jugular vein and subclavian vein appear patent.   Left upper extremity: There is thrombus within the left cephalic vein, which is part of the superficial venous system of the upper extremity. Central line is also in place through the left cephalic vein. The left basilic vein could not be visualized, due to edema and positioning. The deep veins of the left upper extremity appear patent.       1. Thrombosis within the left cephalic vein, which is part of the superficial venous system of the upper extremity, adjacent to PICC line. 2. No deep venous thrombosis in the upper extremities.     Signed by: Sean Alfred 10/14/2024 4:59 PM Dictation workstation:   [id number]    ECG 12 lead    Result Date: 10/14/2024  Accelerated Junctional rhythm Low voltage QRS Nonspecific T wave abnormality Abnormal ECG No previous ECGs available Confirmed by Misha Corral ([id number]) on 10/14/2024 4:08:43 PM    XR chest 1 view    Result Date: 10/13/2024  Interpreted By:  Lazaro Butler, STUDY: XR CHEST 1 VIEW; 10/13/2024 11:42 am   INDICATION: Signs/Symptoms:NG tube advancement.   COMPARISON: None   ACCESSION NUMBER(S): [id number]   ORDERING CLINICIAN: SERINA BUTCHER   TECHNIQUE: 1 view of the chest was performed.   FINDINGS: Persistent layering left-sided pleural effusion with multifocal airspace opacities/atelectasis. Right upper  perihilar vascular congestion. No pleural effusion. No pneumothorax.  The cardiomediastinal silhouette is within normal limits. Limited evaluation of supportive devices due to surgical spine hardware. Possibly tip of endotracheal tube 6.5 cm from the level of the michi. Distal tip of enteric tube likely terminating in the proximal stomach.       1. Persistent layering left-sided pleural effusion with multifocal airspace opacity/atelectasis. Persistent perihilar vascular congestion. 2. Advanced enteric feeding tube likely terminating now in the proximal stomach although difficult evaluation given thoracolumbar spine hardware.   Signed by: Lazaro Butler 10/13/2024 1:21 PM Dictation workstation:   [id number]    XR chest 1 view    Result Date: 10/13/2024  Interpreted By:  Dory Mcarthur, STUDY: XR CHEST 1 VIEW; 10/13/2024 8:50 am   INDICATION: Signs/Symptoms:wheezing, intubated, assess lung fields   COMPARISON: Radiographs 10/12/2024   ACCESSION NUMBER(S): [id number]   ORDERING CLINICIAN: SERINA BUTCHER   TECHNIQUE: Single frontal view of the chest performed.   FINDINGS: LINES AND DEVICES: Obscure by surgical hardware. *Tip of enteric tube appears to be in the proximal stomach and sidehole near the EG junction; consider advancing by 6 cm. *There appears to be a esophageal temperature probe extending to the distal esophagus, the tip not well seen. *Tip of ET tube is estimated to be 6 cm above the michi, but not well seen.   LUNGS: Haziness throughout the left mid and lower lung and right lower lung with slightly improved aeration in the left lung apex. No new focal consolidation. No pneumothorax.   CARDIOMEDIASTINAL SILHOUETTE: The cardiomediastinal silhouette is stable.   OTHER: Extensive thoracolumbar instrumentation.       Decreased but persistent moderate left and small right layering effusions with improved left apical aeration.   Significantly limited evaluation of lines and devices due to surgical hardware.  Best gross estimation as follows: *Tip of enteric tube appears to be in the proximal stomach and sidehole near the EG junction; consider advancing by 6 cm. *Possible esophageal temperature probe extending to the distal esophagus, the tip not well seen. *Tip of ET tube is estimated to be 6 cm above the michi, but not well seen.     MACRO Dory Mcarthur discussed the significance and urgency of this critical finding by Epic secure chat with  SERINA BUTCHER on 10/13/2024 at 10:19 am.  (**-RCF-**) Findings:  See findings.   Signed by: Dory Mcarthur 10/13/2024 10:19 AM Dictation workstation:   [id number]    CT lumbar spine wo IV contrast    Result Date: 10/12/2024  Interpreted By:  Chip March, STUDY: CT LUMBAR SPINE WO IV CONTRAST;  10/12/2024 5:47 pm   INDICATION: Signs/Symptoms:r/o post operative infection/lesion.   COMPARISON: 09/25/2024   ACCESSION NUMBER(S): [id number]   ORDERING CLINICIAN: DALILA GEORGE   TECHNIQUE: Axial noncontrast images of the lumbar spine with coronal and sagittal reconstructed images.   FINDINGS: Limited study secondary to extensive prior surgical fusion of the visualized thoracic and lumbar spine.   Redemonstrated is levoscoliosis. Alignment is unchanged.   Previously discussed erosive appearing changes surrounding the L1-L2 level surrounding the intervertebral cage has an unchanged appearance.   No new fracture or dislocation. No additional new evidence of significant hardware complication is identified.   Evaluation of the central canal and posterior paraspinal soft tissues is again markedly limited.   Partially visualized pleural effusion seen in the chest. Partially visualized NG tube is seen extending into the stomach.   Dense calcification of the aorta is redemonstrated.       Similar-appearing extensive postsurgical changes related to fusion of the thoracic and lumbar spine redemonstrated which appears to be essentially unchanged compared to imaging 09/25/2024. Again this study  is markedly limited in its evaluation of the central canal and posterior paraspinal soft tissues. This limits evaluation for infection. Stable erosive appearing changes at the L1-L2 level surrounding the intervertebral cage, again infection at this site not excluded.   MACRO: None.   Signed by: Chip March 10/12/2024 6:50 PM Dictation workstation:   [id number]    CT head wo IV contrast    Result Date: 10/12/2024  Interpreted By:  Chip March, STUDY: CT HEAD WO IV CONTRAST;  10/12/2024 5:46 pm   INDICATION: Signs/Symptoms:AMS.   COMPARISON: None.   ACCESSION NUMBER(S): [id number]   ORDERING CLINICIAN: YASH BARRAGAN   TECHNIQUE: Noncontrast axial CT images of head were obtained with coronal and sagittal reconstructed images.   FINDINGS: BRAIN PARENCHYMA: Some areas of hypoattenuation/suspected encephalomalacia are seen involving the frontal lobes. Unchanged calcification right frontal lobe. No acute intraparenchymal hemorrhage or parenchymal evidence of acute large territory ischemic infarct. No mass-effect. Gray-white matter distinction is preserved.   VENTRICLES and EXTRA-AXIAL SPACES:  No acute extra-axial or intraventricular hemorrhage. No effacement of cerebral sulci. Ventricles and sulci are age-concordant.   PARANASAL SINUSES/MASTOIDS:  No hemorrhage or air-fluid levels within the visualized paranasal sinuses. The mastoids are well aerated.   CALVARIUM/ORBITS:  No skull fracture. The orbits and globes are intact to the extent visualized.   EXTRACRANIAL SOFT TISSUES: No discernible abnormality.       No acute intracranial abnormality.     MACRO: None.   Signed by: Chip March 10/12/2024 6:42 PM Dictation workstation:   [id number]    XR chest 1 view    Result Date: 10/12/2024  Interpreted By:  Parmjit Mancini, STUDY: XR CHEST 1 VIEW   INDICATION: Signs/Symptoms:NGT.   COMPARISON: Earlier the same day   ACCESSION NUMBER(S): [id number]   ORDERING CLINICIAN: DALILA GEORGE   FINDINGS: The tip of the nasogastric  tube not definitively visualized but the curvature of the tubing looks to read along the gastric body with the tip at least distal to the gastric body.       The tip of the nasogastric tube not definitively visualized but the curvature of the tubing looks to read along the gastric body with the tip at least distal to the gastric body.   Signed by: Parmjit Mancini 10/12/2024 5:35 PM Dictation workstation:   [id number]    XR chest 1 view    Result Date: 10/12/2024  Interpreted By:  Parmjit Mancini, STUDY: XR CHEST 1 VIEW   INDICATION: Signs/Symptoms:NG tube placement.   COMPARISON: Earlier the same day 3:47 p.m.   ACCESSION NUMBER(S): [id number]   ORDERING CLINICIAN: DALILA GEORGE   FINDINGS: Nasogastric tube tip looks to be centered roughly at the level of the gastroesophageal junction.   Left effusion and basilar airspace disease similar to previous         Nasogastric tube tip looks to be centered roughly at the level of the gastroesophageal junction.   Left effusion and basilar airspace disease similar to previous   Signed by: Parmjit Mancini 10/12/2024 5:34 PM Dictation workstation:   [id number]    XR chest 1 view    Result Date: 10/12/2024  Interpreted By:  Parmjit Mancini, STUDY: XR CHEST 1 VIEW   INDICATION: Signs/Symptoms:NGT placement.   COMPARISON: Earlier the same day 2:49 p.m.   ACCESSION NUMBER(S): [id number]   ORDERING CLINICIAN: DALILA GEORGE   FINDINGS: Nasogastric tube tip not definitively confirmed, slightly obscured by the patient's spinal fusion hardware. It may be reflected upon itself in the distal esophagus.       Nasogastric tube tip not definitively confirmed, slightly obscured by the patient's spinal fusion hardware. It may be reflected upon itself in the distal esophagus.   Signed by: Parmjit Mancini 10/12/2024 3:57 PM Dictation workstation:   [id number]    XR chest 1 view    Result Date: 10/12/2024  Interpreted By:  Ildefonso Herring, STUDY: XR CHEST 1 VIEW;  10/12/2024 2:48 pm   INDICATION:  Signs/Symptoms:post-intubation.     COMPARISON: None.   ACCESSION NUMBER(S): [id number]   ORDERING CLINICIAN: YAHS BARRAGAN   FINDINGS: Endotracheal tube in place with its tip difficult to exactly visualize due to hardware about approximately 2.5 cm above the michi   CARDIOMEDIASTINAL SILHOUETTE: Cardiomediastinal silhouette is normal in size and configuration.   LUNGS: Extensive airspace disease in the left lung, worsened from the prior. There is a large left effusion as well..   ABDOMEN: No remarkable upper abdominal findings.   BONES: Extensive postsurgical change the thoracolumbar spine.       1. Limited visualization of the ET tube with its tip approximately 2.5 cm above the michi 2. Extensive left lung airspace disease and a large left effusion, worsening from the prior.       MACRO: None   Signed by: Ildefonso Herring 10/12/2024 3:43 PM Dictation workstation:   [id number]      Assessment/Plan   IMP:    Acute Respiratory Failure  Sepsis/Septic Shock  AMY  PNA healthcare associated  Wound Infection, surgical site  Encephalopathy  Pleural Effusion  Acute on chronic diastolic heart failure  Palliative Care     DNRCCA  Not Capable  Documented HPOA is spouse Godfrey, alternate is daughter Kelly     11/3  Family meeting today. Francesco Bender PA-C, myself, spouse Godfrey, daughter Kelly present. Reviewed current condition. Discussed risks vs benefits of trach/PEG and need for interventions given her 3rd intubation, poor respiratory status. Family stated patient wanted to fight, not ready to consider comfort care at this time. Family tell us patient values her mental status. She could potentially tolerate loss of function, but would not find loss of cognition acceptable. They are hopeful that with adequate support, she has a potential for some recovery. They understand that patient is fragile and is high risk for further complications/setbacks. They ultimately would like patient to stabilze enough to  participate in her own goals of care discussion. Family would like to proceed with ENT/surgery consult to pursue trach/peg. They want to remain in a treatment model of care. We discussed code status, and both daughter and spouse agree that if patient suffers a major cardiac event, CPR would not provide her benefit. Family all confirmed DNRCCA established by spouse 11/2.     Patient with chronic pain from back surgery/wounds. Goal is to stay off sedation and fentanyl gtt to optimize cognition. Critical care service put oxycodone prn in place for pain control. Remains on scheduled tylenol.     11/2  Family meeting today with spouse after extensive chart review and discussion with critical care team. Spouse spoke about patient's likely poor health prior to surgery 7/24, and her overall decline since then. Most recently, spent <7 days at rehab before rehospitalizing. We reviewed together her most recent hospitalization and interventions, including Tablo, ventilator, pigtail and lack of improvement. Critical care team have already spoken to spouse about trach, peg and LTAC. We reviewed risks vs benefits of trach/peg and impact to quality of life. We also reviewed likelihood of a meaningful recovery even with prolonged life support. We reviewed her living will and I requested spouse interpret patient wishes for me to clarify whether patient would support trach/peg. He stated that her goal all along had been to recover and return home with spouse, but he acknowledged that at this point, this is not going to happen. He stated that she would have no desire to be stuck in a bed on life support and would like to discuss this further with daughter Kelly. He stated he was not surprised that we were talking today. We set up a time for Sunday at 1pm to discuss further with daughter and confirm goals for patient. I also discussed that given her wishes and prognosis, CPR unlikely to provide benefit and spouse agreed. Signed copy  of DNRCCA placed in chart. Critical care team updated.      I spent 60 minutes in the professional and overall care of this patient. 20min spent in acp discussion.       Breanne Ballesteros, APRN-CNP

## 2024-11-04 ENCOUNTER — APPOINTMENT (OUTPATIENT)
Dept: RADIOLOGY | Facility: HOSPITAL | Age: 69
End: 2024-11-04
Payer: MEDICARE

## 2024-11-04 ENCOUNTER — APPOINTMENT (OUTPATIENT)
Dept: DIALYSIS | Facility: HOSPITAL | Age: 69
End: 2024-11-04
Payer: MEDICARE

## 2024-11-04 ENCOUNTER — ANESTHESIA (OUTPATIENT)
Dept: GASTROENTEROLOGY | Facility: HOSPITAL | Age: 69
End: 2024-11-04
Payer: MEDICARE

## 2024-11-04 ENCOUNTER — APPOINTMENT (OUTPATIENT)
Dept: GASTROENTEROLOGY | Facility: HOSPITAL | Age: 69
End: 2024-11-04
Payer: MEDICARE

## 2024-11-04 ENCOUNTER — ANESTHESIA EVENT (OUTPATIENT)
Dept: GASTROENTEROLOGY | Facility: HOSPITAL | Age: 69
End: 2024-11-04
Payer: MEDICARE

## 2024-11-04 ENCOUNTER — APPOINTMENT (OUTPATIENT)
Dept: CARDIOLOGY | Facility: HOSPITAL | Age: 69
End: 2024-11-04
Payer: MEDICARE

## 2024-11-04 PROBLEM — J96.02 ACUTE RESPIRATORY FAILURE WITH HYPOXIA AND HYPERCAPNIA (MULTI): Status: ACTIVE | Noted: 2024-10-12

## 2024-11-04 PROBLEM — J96.01 ACUTE RESPIRATORY FAILURE WITH HYPOXIA AND HYPERCAPNIA (MULTI): Status: ACTIVE | Noted: 2024-10-12

## 2024-11-04 LAB
ABO GROUP (TYPE) IN BLOOD: NORMAL
ALBUMIN SERPL BCP-MCNC: 2.2 G/DL (ref 3.4–5)
ALP SERPL-CCNC: 106 U/L (ref 33–136)
ALT SERPL W P-5'-P-CCNC: 8 U/L (ref 7–45)
ANION GAP BLDV CALCULATED.4IONS-SCNC: 6 MMOL/L (ref 10–25)
ANION GAP SERPL CALCULATED.3IONS-SCNC: 10 MMOL/L (ref 10–20)
ANTIBODY SCREEN: NORMAL
APTT PPP: 55.5 SECONDS (ref 22–32.5)
AST SERPL W P-5'-P-CCNC: 10 U/L (ref 9–39)
BASE EXCESS BLDV CALC-SCNC: 3.6 MMOL/L (ref -2–3)
BASOPHILS # BLD AUTO: 0.03 X10*3/UL (ref 0–0.1)
BASOPHILS NFR BLD AUTO: 0.6 %
BILIRUB DIRECT SERPL-MCNC: 0.1 MG/DL (ref 0–0.3)
BILIRUB SERPL-MCNC: 0.3 MG/DL (ref 0–1.2)
BODY TEMPERATURE: 37 DEGREES CELSIUS
BUN SERPL-MCNC: 21 MG/DL (ref 6–23)
BURR CELLS BLD QL SMEAR: NORMAL
CA-I BLDV-SCNC: 1.14 MMOL/L (ref 1.1–1.33)
CALCIUM SERPL-MCNC: 7.7 MG/DL (ref 8.6–10.3)
CHLORIDE BLDV-SCNC: 100 MMOL/L (ref 98–107)
CHLORIDE SERPL-SCNC: 99 MMOL/L (ref 98–107)
CK SERPL-CCNC: 42 U/L (ref 0–215)
CO2 SERPL-SCNC: 29 MMOL/L (ref 21–32)
CREAT SERPL-MCNC: 1.88 MG/DL (ref 0.5–1.05)
EGFRCR SERPLBLD CKD-EPI 2021: 29 ML/MIN/1.73M*2
EOSINOPHIL # BLD AUTO: 0.06 X10*3/UL (ref 0–0.7)
EOSINOPHIL NFR BLD AUTO: 1.1 %
ERYTHROCYTE [DISTWIDTH] IN BLOOD BY AUTOMATED COUNT: 19.1 % (ref 11.5–14.5)
ERYTHROCYTE [DISTWIDTH] IN BLOOD BY AUTOMATED COUNT: 19.2 % (ref 11.5–14.5)
FUNGUS SPEC CULT: NORMAL
FUNGUS SPEC FUNGUS STN: NORMAL
GLUCOSE BLD MANUAL STRIP-MCNC: 117 MG/DL (ref 74–99)
GLUCOSE BLD MANUAL STRIP-MCNC: 136 MG/DL (ref 74–99)
GLUCOSE BLD MANUAL STRIP-MCNC: 149 MG/DL (ref 74–99)
GLUCOSE BLD MANUAL STRIP-MCNC: 159 MG/DL (ref 74–99)
GLUCOSE BLD MANUAL STRIP-MCNC: 170 MG/DL (ref 74–99)
GLUCOSE BLDV-MCNC: 166 MG/DL (ref 74–99)
GLUCOSE SERPL-MCNC: 142 MG/DL (ref 74–99)
HCO3 BLDV-SCNC: 28.5 MMOL/L (ref 22–26)
HCT VFR BLD AUTO: 20.9 % (ref 36–46)
HCT VFR BLD AUTO: 23.2 % (ref 36–46)
HCT VFR BLD EST: 22 % (ref 36–46)
HGB BLD-MCNC: 6.6 G/DL (ref 12–16)
HGB BLD-MCNC: 7.2 G/DL (ref 12–16)
HGB BLDV-MCNC: 7.3 G/DL (ref 12–16)
IMM GRANULOCYTES # BLD AUTO: 0.06 X10*3/UL (ref 0–0.7)
IMM GRANULOCYTES NFR BLD AUTO: 1.1 % (ref 0–0.9)
INHALED O2 CONCENTRATION: 40 %
LACTATE BLDV-SCNC: 0.7 MMOL/L (ref 0.4–2)
LYMPHOCYTES # BLD AUTO: 0.76 X10*3/UL (ref 1.2–4.8)
LYMPHOCYTES NFR BLD AUTO: 14.4 %
MAGNESIUM SERPL-MCNC: 2.17 MG/DL (ref 1.6–2.4)
MCH RBC QN AUTO: 30.5 PG (ref 26–34)
MCH RBC QN AUTO: 31.1 PG (ref 26–34)
MCHC RBC AUTO-ENTMCNC: 31 G/DL (ref 32–36)
MCHC RBC AUTO-ENTMCNC: 31.6 G/DL (ref 32–36)
MCV RBC AUTO: 98 FL (ref 80–100)
MCV RBC AUTO: 99 FL (ref 80–100)
MONOCYTES # BLD AUTO: 0.32 X10*3/UL (ref 0.1–1)
MONOCYTES NFR BLD AUTO: 6.1 %
NEUTROPHILS # BLD AUTO: 4.04 X10*3/UL (ref 1.2–7.7)
NEUTROPHILS NFR BLD AUTO: 76.7 %
NRBC BLD-RTO: 0 /100 WBCS (ref 0–0)
NRBC BLD-RTO: 0 /100 WBCS (ref 0–0)
OVALOCYTES BLD QL SMEAR: NORMAL
OXYHGB MFR BLDV: 79.5 % (ref 45–75)
PCO2 BLDV: 44 MM HG (ref 41–51)
PH BLDV: 7.42 PH (ref 7.33–7.43)
PHOSPHATE SERPL-MCNC: 3 MG/DL (ref 2.5–4.9)
PLATELET # BLD AUTO: 202 X10*3/UL (ref 150–450)
PLATELET # BLD AUTO: 240 X10*3/UL (ref 150–450)
PO2 BLDV: 45 MM HG (ref 35–45)
POLYCHROMASIA BLD QL SMEAR: NORMAL
POTASSIUM BLDV-SCNC: 4.8 MMOL/L (ref 3.5–5.3)
POTASSIUM SERPL-SCNC: 4.3 MMOL/L (ref 3.5–5.3)
PROT SERPL-MCNC: 5.2 G/DL (ref 6.4–8.2)
RBC # BLD AUTO: 2.12 X10*6/UL (ref 4–5.2)
RBC # BLD AUTO: 2.36 X10*6/UL (ref 4–5.2)
RBC MORPH BLD: NORMAL
RH FACTOR (ANTIGEN D): NORMAL
SAO2 % BLDV: 81 % (ref 45–75)
SODIUM BLDV-SCNC: 130 MMOL/L (ref 136–145)
SODIUM SERPL-SCNC: 134 MMOL/L (ref 136–145)
WBC # BLD AUTO: 5.3 X10*3/UL (ref 4.4–11.3)
WBC # BLD AUTO: 5.7 X10*3/UL (ref 4.4–11.3)

## 2024-11-04 PROCEDURE — 3700000001 HC GENERAL ANESTHESIA TIME - INITIAL BASE CHARGE

## 2024-11-04 PROCEDURE — 0DH63UZ INSERTION OF FEEDING DEVICE INTO STOMACH, PERCUTANEOUS APPROACH: ICD-10-PCS | Performed by: INTERNAL MEDICINE

## 2024-11-04 PROCEDURE — 85025 COMPLETE CBC W/AUTO DIFF WBC: CPT

## 2024-11-04 PROCEDURE — 2500000004 HC RX 250 GENERAL PHARMACY W/ HCPCS (ALT 636 FOR OP/ED): Performed by: SURGERY

## 2024-11-04 PROCEDURE — 2500000004 HC RX 250 GENERAL PHARMACY W/ HCPCS (ALT 636 FOR OP/ED): Performed by: INTERNAL MEDICINE

## 2024-11-04 PROCEDURE — 85027 COMPLETE CBC AUTOMATED: CPT

## 2024-11-04 PROCEDURE — 2020000001 HC ICU ROOM DAILY

## 2024-11-04 PROCEDURE — 37799 UNLISTED PX VASCULAR SURGERY: CPT

## 2024-11-04 PROCEDURE — 2500000004 HC RX 250 GENERAL PHARMACY W/ HCPCS (ALT 636 FOR OP/ED)

## 2024-11-04 PROCEDURE — 3700000002 HC GENERAL ANESTHESIA TIME - EACH INCREMENTAL 1 MINUTE

## 2024-11-04 PROCEDURE — 99291 CRITICAL CARE FIRST HOUR: CPT | Performed by: STUDENT IN AN ORGANIZED HEALTH CARE EDUCATION/TRAINING PROGRAM

## 2024-11-04 PROCEDURE — 84100 ASSAY OF PHOSPHORUS: CPT

## 2024-11-04 PROCEDURE — 84132 ASSAY OF SERUM POTASSIUM: CPT

## 2024-11-04 PROCEDURE — 85730 THROMBOPLASTIN TIME PARTIAL: CPT

## 2024-11-04 PROCEDURE — 93971 EXTREMITY STUDY: CPT | Performed by: RADIOLOGY

## 2024-11-04 PROCEDURE — 86923 COMPATIBILITY TEST ELECTRIC: CPT

## 2024-11-04 PROCEDURE — 2500000004 HC RX 250 GENERAL PHARMACY W/ HCPCS (ALT 636 FOR OP/ED): Performed by: NURSE PRACTITIONER

## 2024-11-04 PROCEDURE — 2500000002 HC RX 250 W HCPCS SELF ADMINISTERED DRUGS (ALT 637 FOR MEDICARE OP, ALT 636 FOR OP/ED)

## 2024-11-04 PROCEDURE — 82947 ASSAY GLUCOSE BLOOD QUANT: CPT

## 2024-11-04 PROCEDURE — 82248 BILIRUBIN DIRECT: CPT

## 2024-11-04 PROCEDURE — 2500000001 HC RX 250 WO HCPCS SELF ADMINISTERED DRUGS (ALT 637 FOR MEDICARE OP)

## 2024-11-04 PROCEDURE — 99291 CRITICAL CARE FIRST HOUR: CPT

## 2024-11-04 PROCEDURE — 94668 MNPJ CHEST WALL SBSQ: CPT

## 2024-11-04 PROCEDURE — 2500000001 HC RX 250 WO HCPCS SELF ADMINISTERED DRUGS (ALT 637 FOR MEDICARE OP): Performed by: PHYSICIAN ASSISTANT

## 2024-11-04 PROCEDURE — A43246 PR EDG PERCUTANEOUS PLACEMENT GASTROSTOMY TUBE: Performed by: ANESTHESIOLOGY

## 2024-11-04 PROCEDURE — 87205 SMEAR GRAM STAIN: CPT | Mod: WESLAB | Performed by: PHYSICIAN ASSISTANT

## 2024-11-04 PROCEDURE — A43246 PR EDG PERCUTANEOUS PLACEMENT GASTROSTOMY TUBE: Performed by: ANESTHESIOLOGIST ASSISTANT

## 2024-11-04 PROCEDURE — 8010000001 HC DIALYSIS - HEMODIALYSIS PER DAY

## 2024-11-04 PROCEDURE — 82550 ASSAY OF CK (CPK): CPT | Performed by: INTERNAL MEDICINE

## 2024-11-04 PROCEDURE — 71045 X-RAY EXAM CHEST 1 VIEW: CPT | Performed by: RADIOLOGY

## 2024-11-04 PROCEDURE — 71045 X-RAY EXAM CHEST 1 VIEW: CPT

## 2024-11-04 PROCEDURE — 83735 ASSAY OF MAGNESIUM: CPT

## 2024-11-04 PROCEDURE — 2500000005 HC RX 250 GENERAL PHARMACY W/O HCPCS

## 2024-11-04 PROCEDURE — 2720000007 HC OR 272 NO HCPCS

## 2024-11-04 PROCEDURE — 94640 AIRWAY INHALATION TREATMENT: CPT

## 2024-11-04 PROCEDURE — 86901 BLOOD TYPING SEROLOGIC RH(D): CPT | Performed by: PHYSICIAN ASSISTANT

## 2024-11-04 PROCEDURE — 3E0G76Z INTRODUCTION OF NUTRITIONAL SUBSTANCE INTO UPPER GI, VIA NATURAL OR ARTIFICIAL OPENING: ICD-10-PCS | Performed by: INTERNAL MEDICINE

## 2024-11-04 PROCEDURE — 93971 EXTREMITY STUDY: CPT

## 2024-11-04 PROCEDURE — 99222 1ST HOSP IP/OBS MODERATE 55: CPT

## 2024-11-04 PROCEDURE — 99232 SBSQ HOSP IP/OBS MODERATE 35: CPT | Performed by: NURSE PRACTITIONER

## 2024-11-04 PROCEDURE — 43246 EGD PLACE GASTROSTOMY TUBE: CPT | Performed by: STUDENT IN AN ORGANIZED HEALTH CARE EDUCATION/TRAINING PROGRAM

## 2024-11-04 PROCEDURE — 94003 VENT MGMT INPAT SUBQ DAY: CPT

## 2024-11-04 RX ORDER — MIDAZOLAM HYDROCHLORIDE 1 MG/ML
2 INJECTION, SOLUTION INTRAMUSCULAR; INTRAVENOUS ONCE
Status: COMPLETED | OUTPATIENT
Start: 2024-11-04 | End: 2024-11-04

## 2024-11-04 RX ORDER — LIDOCAINE HYDROCHLORIDE 10 MG/ML
INJECTION, SOLUTION INFILTRATION; PERINEURAL AS NEEDED
Status: DISCONTINUED | OUTPATIENT
Start: 2024-11-04 | End: 2024-11-04

## 2024-11-04 RX ORDER — MIDAZOLAM HYDROCHLORIDE 1 MG/ML
INJECTION, SOLUTION INTRAMUSCULAR; INTRAVENOUS AS NEEDED
Status: DISCONTINUED | OUTPATIENT
Start: 2024-11-04 | End: 2024-11-04

## 2024-11-04 RX ORDER — MIDAZOLAM HYDROCHLORIDE 1 MG/ML
INJECTION, SOLUTION INTRAMUSCULAR; INTRAVENOUS
Status: COMPLETED
Start: 2024-11-04 | End: 2024-11-04

## 2024-11-04 RX ORDER — HEPARIN SODIUM 1000 [USP'U]/ML
1000 INJECTION, SOLUTION INTRAVENOUS; SUBCUTANEOUS
Status: DISCONTINUED | OUTPATIENT
Start: 2024-11-04 | End: 2024-11-05

## 2024-11-04 RX ORDER — PROPOFOL 10 MG/ML
INJECTION, EMULSION INTRAVENOUS AS NEEDED
Status: DISCONTINUED | OUTPATIENT
Start: 2024-11-04 | End: 2024-11-04

## 2024-11-04 SDOH — HEALTH STABILITY: MENTAL HEALTH: CURRENT SMOKER: 0

## 2024-11-04 ASSESSMENT — PAIN - FUNCTIONAL ASSESSMENT
PAIN_FUNCTIONAL_ASSESSMENT: CPOT (CRITICAL CARE PAIN OBSERVATION TOOL)

## 2024-11-04 ASSESSMENT — ENCOUNTER SYMPTOMS: ALTERED MENTAL STATUS: 1

## 2024-11-04 ASSESSMENT — PAIN SCALES - GENERAL: PAIN_LEVEL: 0

## 2024-11-04 NOTE — CONSULTS
Assessment/Plan     Inpatient consult to Acute Care Surgery  Consult performed by: LEONEL Chavis-CNP  Consult ordered by: Francesco Carbajal PA-C  Reason for consult: PEG placement  Assessment/Recommendations: 69-year-old female patient with respiratory failure requiring feeding tube placement.  Patient has been n.p.o. over midnight, heparin drip has been placed on hold.  Will plan to perform EGD with percutaneous endoscopic gastrostomy tube insertion at the bedside this afternoon.  Procedure discussed with patient's spouse Godfrey and has agreed to move forward with procedure.  Consent has been obtained and on the chart.   D/W Dr. Clinton        Subjective     This is a 69-year-old female patient with a recent history of lumbar surgery in August with persistent weakness after requiring multiple I&D's and long-term antibiotics who presented to the hospital from [facility] after being found unresponsive.  She has been intubated and sedated in the ICU since admission.  She has been on and off vasopressors.  She was extubated on 11/1 to high flow nasal cannula but became increasingly hypoxic throughout the course of the day and was reintubated on 11/2.  We have been asked to see the patient for feeding tube placement.  History was obtained from the bedside RN and the chart as patient is currently intubated and unable to provide any history.    Altered Mental Status      Review of Systems  Review of Systems   Reason unable to perform ROS: intubated, not able to answer questions.       Objective     Vital signs for last 24 hours:  Temp:  [36.5 °C (97.7 °F)-38 °C (100.4 °F)] 36.7 °C (98.1 °F)  Heart Rate:  [] 59  Resp:  [16-26] 16  BP: ()/(41-73) 141/60  FiO2 (%):  [40 %] 40 %    Intake/Output this shift:  No intake/output data recorded.    Physical Exam  Physical Exam  Vitals and nursing note reviewed.   Constitutional:       General: She is sleeping.      Appearance: Normal appearance. She  is obese.      Interventions: She is intubated.      Comments: Currently not on any sedation.  She is not following commands.   HENT:      Head: Normocephalic and atraumatic.   Cardiovascular:      Rate and Rhythm: Normal rate.   Pulmonary:      Effort: Pulmonary effort is normal. She is intubated.   Abdominal:      General: Abdomen is flat. There is no distension.      Palpations: Abdomen is soft.   Musculoskeletal:         General: No swelling or tenderness.   Skin:     General: Skin is warm and dry.   Neurological:      Mental Status: She is oriented to person, place, and time.         Labs  Lab Results   Component Value Date    GLUCOSE 142 (H) 11/04/2024    CALCIUM 7.7 (L) 11/04/2024     (L) 11/04/2024    K 4.3 11/04/2024    CO2 29 11/04/2024    CL 99 11/04/2024    BUN 21 11/04/2024    CREATININE 1.88 (H) 11/04/2024     Lab Results   Component Value Date    WBC 5.3 11/04/2024    HGB 6.6 (L) 11/04/2024    HCT 20.9 (L) 11/04/2024    MCV 99 11/04/2024     11/04/2024     === 10/12/24 ===    XR CHEST 1 VIEW    - Impression -  There is no interval change when compared to the previous examination.    MACRO:  none    Signed by: Andrew Byrd 11/4/2024 8:34 AM  Dictation workstation:   [id number]

## 2024-11-04 NOTE — PROGRESS NOTES
Physical Therapy                 Therapy Communication Note    Patient Name: Narda Malloy  MRN: [id number]  Department: [facility] S ICU  Room: 11/11-A  Today's Date: 11/4/2024     Discipline: Physical Therapy    Missed Visit Reason: Cancel   (Cancel follow-up pre nsg. Pt not appropriate for mobility at this time. Will hold treatment.)    Missed Time: Cancel    Comment:

## 2024-11-04 NOTE — POST-PROCEDURE NOTE
Post Hemodialysis Report to Receiving RN:    Report To: Cecelia Cassidy/RN  Time Report Called: [id number]  Hand-Off Communication: Verbal  Complications During Treatment: Yes, hypotension,clotting  Patient tried to clot system with 30min remaining 1.8L fluid removed.  Dr. ANITRA Cheung & Cecelia/RN aware.  Ultrafiltration Treatment: No  Medications Administered During Dialysis: No  Blood Products Administered During Dialysis: No  Labs Sent During Dialysis: No  Heparin Drip Rate Changes: No  Dialysis Catheter Dressing: No  Last Dressing Change: 11/6/24    Electronic Signatures:  Meagan/[email or web address]    Last Updated: 5:52 PM by JAKE ESCOBAR

## 2024-11-04 NOTE — CONSULTS
Assessment/Plan     Inpatient consult to ENT  Consult performed by: LEONEL Chavis-CNP  Consult ordered by: Francesco Carbajal PA-C  Reason for consult: Tracheostomy creation  Assessment/Recommendations: 69-year-old female patient with respiratory failure requiring intubation and re-intubation with ventilatory support.    Will plan to take patient to the operating room on Thursday for tracheostomy creation..  Procedure discussed with patient's spouse, Godfrey and has agreed to move forward with procedure.  Consent has been obtained and on the chart.   N.p.o. after midnight on Thursday.  D/W Dr. Aris Weaver     This is a 69-year-old female patient with a recent history of lumbar surgery in August with persistent weakness after requiring multiple I&D's and long-term antibiotics who presented to the hospital from [facility] after being found unresponsive.  She has been intubated and sedated in the ICU since admission.  She has been on and off vasopressors.  She was extubated on 11/1 to high flow nasal cannula but became increasingly hypoxic throughout the course of the day and was reintubated on 11/2.  We have been asked to see the patient for tracheostomy creation.  History was obtained from the bedside RN and the chart as patient is currently intubated and unable to provide any history.    Altered Mental Status      Review of Systems  Review of Systems   Reason unable to perform ROS: intubated, not able to answer questions.       Objective     Vital signs for last 24 hours:  Temp:  [36.5 °C (97.7 °F)-38 °C (100.4 °F)] 36.7 °C (98.1 °F)  Heart Rate:  [] 59  Resp:  [16-26] 16  BP: ()/(41-73) 141/60  FiO2 (%):  [40 %] 40 %    Intake/Output this shift:  No intake/output data recorded.    Physical Exam  Physical Exam  Vitals and nursing note reviewed.   Constitutional:       General: She is sleeping.      Appearance: Normal appearance. She is obese.      Interventions: She is  intubated.      Comments: Currently not on any sedation.  She is not following commands.   HENT:      Head: Normocephalic and atraumatic.   Neck:      Trachea: Trachea normal.   Cardiovascular:      Rate and Rhythm: Normal rate.   Pulmonary:      Effort: Pulmonary effort is normal. She is intubated.   Abdominal:      General: Abdomen is flat. There is no distension.      Palpations: Abdomen is soft.   Musculoskeletal:         General: No swelling or tenderness.      Cervical back: Normal range of motion and neck supple. No signs of trauma, rigidity or crepitus.   Skin:     General: Skin is warm and dry.   Neurological:      Mental Status: She is oriented to person, place, and time.         Labs  Lab Results   Component Value Date    GLUCOSE 142 (H) 11/04/2024    CALCIUM 7.7 (L) 11/04/2024     (L) 11/04/2024    K 4.3 11/04/2024    CO2 29 11/04/2024    CL 99 11/04/2024    BUN 21 11/04/2024    CREATININE 1.88 (H) 11/04/2024     Lab Results   Component Value Date    WBC 5.3 11/04/2024    HGB 6.6 (L) 11/04/2024    HCT 20.9 (L) 11/04/2024    MCV 99 11/04/2024     11/04/2024     === 10/12/24 ===    XR CHEST 1 VIEW    - Impression -  There is no interval change when compared to the previous examination.    MACRO:  none    Signed by: Andrew Byrd 11/4/2024 8:34 AM  Dictation workstation:   [id number]

## 2024-11-04 NOTE — PROGRESS NOTES
Occupational Therapy                 Therapy Communication Note    Patient Name: Narda Malloy  MRN: [id number]  Department: [facility] GI [facility] ENDOSC[facility]  Room: 11/11-A  Today's Date: 11/4/2024     Discipline: Occupational Therapy    Missed Visit Reason: Missed Visit Reason: Cancel (Cancel follow-up pre nsg. Pt not appropriate for mobility at this time. Will hold treatment.)    Missed Time: Cancel    Comment:

## 2024-11-04 NOTE — PROGRESS NOTES
[facility] Critical Care Medicine       Date:  11/4/2024  Patient:  Narda Malloy  YOB: 1955  MRN:  [id number]   Admit Date:  10/12/2024    Chief Complaint   Patient presents with    Altered Mental Status     History of Present Illness:  Narda Malloy is a 69 y.o. year old female patient with Past Medical History of L1-L3 lumbar laminectomy, T4-S1 revision, and fusion August 26th, T2DM, HTN, essential tremor, HLD, glaucoma, sarcoidosis of the lung who presented to  ED 10/12 after being found essentially unresponsive at her nursing facility [facility]. Per report from her , she has had a significant decline in her health since July 15th when she had a fall and became significantly weak. She has also had multiple infection complications since her back surgery in August requiring multiple I&Ds and long term antibiotic therapy. She went to the OR most recently on 9/28 for lumbar site infection wash out. Per chart review, she was discharged on IV vancomycin 1g and IV ceftriaxone 2d q24hrs through 11/12.     ED Course: Initial vital signs: /104 (109), HR 68, RR 20, SpO2 95% on 6L NC, temp 34.5C. Give 0.4mg of narcan with no improvement in mentation. Lab work-up remarkable for mild hyperkalemia (5.5), AMY 42/1.46, elevated alk phos, normocytic anemia 10.4/33, turbid appearing urine with mild hematuria and proteinuria and + leuk esterase, >50 WBCs. Urine drug screen positive for barbiturates. Triggered sepsis timer so she was given 3L NS. She was intubated for airway protection with 20mg etomidate and 100mg succinylcholine. BP dropped post-intubated and fluid resuscitation and she was subsequently started on levophed.          Interval ICU Events:  10/12: Pt arrived to ICU intubated and lightly sedated on low-dose versed. Eyes open, minimally responsive.      10/13: Received K cocktail last night for K 6.0, corrected appropriately. Levophed requirements mildly up, UOP decreasing.  Ordered albumin x2 this am with improvements in UOP and SBP. Will likely trial lasix this afternoon d/t hypervolemia.     10/14: Remains intubated with decreased mentation, only responsive to noxious stimuli. Trialed lasix TID for volume overload. Remains on levophed 0.01.     10/15: Mentation much improved. SAT/SBT successful so extubated. Given lasix x3, bumex x1, metolazone x1 with net negative fluid balance of 500mL -> started on bumex gtt.      10/16: O2 requirements significantly increased, NRB -> HFNC 40L 100% likely 2/2 mucus plugging.     10/17: No acute events overnight. Bumex gtt increased to 1mg/hr. CXR this am showing complete opacification of left hemithorax related to atelectasis vs pleural effusion. Remains on HFNC 40L/80%. Pigtail catheter placed with 1.2L drained immediately. Given albumin for hypotension.      10/18: ~2L output from left sided pigtail catheter since placement. Kidney function worsening and UOP declining, about 20cc/hr overnight. Will place NG tube today and start enteral nutrition and appetite and oral intake remains poor.      10/19: Patient with worsening hypoxic, hypercapnic respiratory failure - now requiring BIPAP support. Remains grossly volume overloaded with low UO. Started on vasopressors to augment BP for diuresis. 40 IV lasix + gtt started. Remains with poor nutritional status. Will re attempt NG later if respiratory status improves.      10/20: Patient stable on vent. Nephrology consulted for renal failure. Cr continues to uptrend however UO is increasing. No issues overnight.     10/21: No issues overnight. Remains stable on vent. Levo down to 0.01 mcg/kg/min. UO remains low. Nephrology following. Possible CRRT today?     10/22: Patient received 80 lasix and metalozone with minimal UO. Levo @ .02 and prop @ 10. Vent settings: 20/450/10/40%. Plan for CRRT today. Will SAT/SBT after CRRT. May change propofol to precedex.     10/23: Had episodes of bradycardia where HR  went to 30's which resolved with heating the patient. CRRT yesterday with about 1L removed. Currently on 0.03 of levo and 10 of prop. Cont daptomycin and ceftriaxone per ID. CXR improved. SAT/SBT. EP consulted for episodes of bradycardia.     10/24: Bradycardic episodes of HR in 40s. Currently on 0.03 of Levo, 10 of prop and 50 fentanyl. Tolerating CRRT. Place PICC today.     10/25: Did well with the SBT yesterday, plan for another one today. Continue CRRT. Hgb 7.0 this am, still requiting Levo 0.03, will transfuse 1 unit PRBCs. Remove chest tube today.     10/26: Chest tube removed last night, CXR improving, patient appears overall lethargic on sbt/sat, not ready to extubate, will complete 4/4/4 sbt/rest/sbt-> rest today and reassess tomorrow     10/27: Patient failed afternoon SBT, placed on rate overnight, net - 2.5L over previous 24 hours, will place on wean today.     10/29: Patient with high residual tube feeds overnight.  She did have an episode of vomiting.  Tube feeds placed on hold.  She was also afebrile overnight.  Will obtain a KUB to rule out ileus.  Sputum culture with Pseudomonas, discussed antibiotic plans with ID.  Will not do SBT with patient today.  Did discuss the setback with her , he did state moving forward that if she does not reach extubation he would be agreeable to a tracheostomy.      10/30: No significant events overnight.  Tube feeds resumed at trickle rate yesterday, tolerating overnight.  Will increase to goal today.  Dialysis this morning.  Patient improved from yesterday.  Plan for SBT today.  Will attempt to sit patient on side of bed today.    10/31: No significant events overnight. Tolerating tube feeds, tube feeds on hold this AM for SBT. Patient awake and alert this morning, improved from yesterday.  Tolerating SBT, answering yes or no questions.  Will extubate today.    11/1: Pt extubated yesterday to HFNC. Had worsened respiratory distress requiring bipap, wore bipap  overnight. Will trial back on HFNC when more awake. Dialysis scheduled for today. Will remove spinal sutures.     11/2: Unable to wean from bipap yesterday without immediate desat in 50-60s, increasingly lethargic and weak. Decision made to reintubate and this was discussed with  which he was ok with. Pt will need trach and peg as this was her 3rd intubation this admission and she's having persistent respiratory failure due to recurrent pleural effusions, even with iHD fluid removal and recurrent atelectasis with lobar collapse. Consulted to palliative care for communication to family about GOC and communication of expectations, risks, benefits of tracheostomy creation, peg tube placement, and LTAC admission. Discussed with neph, will run tablo today for additional fluid removal d/t recurrent intubation. Plan for ENT and surgery consult for trach and peg pending family meeting tmrw at 1pm.     11/3: OVN: Unsuccessful removal of incisional sutures of posterior surgery d/t skin overgrowth. DAY: Reached out to ortho spine surgical team about suture removal, awaiting to hear back. GOC discussion today, patient will remain DNR-CCA and family wants to pursue Trach/PEG placement (consults ordered). Off sedation and fent, transitioned to enteral narcotics for pain control.     11/4: Plan for peg placement today at bedside, tracheostomy creation Thursday. Will receive scheduled dialysis after peg placement.       Medical History:  Past Medical History:   Diagnosis Date    Degenerative myopia, bilateral     Diabetic neuropathy (Multi)     Difficult intubation 08/26/2024    Mac 3, grade 3, 1 attempt.  Glidescope/videolaryngoscopy recommended for future attempts.    DM type 2 (diabetes mellitus, type 2) (Multi)     Dry eye syndrome of bilateral lacrimal glands     Essential hypertension     Essential tremor     Glaucoma     Hyperlipidemia     Long term (current) use of insulin (Multi)     Low back pain     PONV  (postoperative nausea and vomiting)     Primary open angle glaucoma of both eyes, severe stage     Repeated falls     Sarcoidosis of lung (Multi)     Spinal stenosis, lumbar region without neurogenic claudication     Weakness      Past Surgical History:   Procedure Laterality Date    BLEPHAROPLASTY  07/2022    BREAST SURGERY  05/20/2022    Breast lift    CARPAL TUNNEL RELEASE      CATARACT EXTRACTION W/  INTRAOCULAR LENS IMPLANT Bilateral     OD 08/04/2011 +8.5D,OS 08/04/2011 +8.50D    FOOT SURGERY      INSERTION / REMOVAL CRANIAL DBS GENERATOR      Placed 2017.  Removed 2018. part of wire left in head when everything removed    LUMBAR FUSION      L3-S1    PANRETINAL PHOTOCOAGULATION  2014    THORACIC FUSION  08/26/2024    T4-S1 fusion    VITRECTOMY Right 2013     Medications Prior to Admission   Medication Sig Dispense Refill Last Dose/Taking    acetaminophen (Tylenol) 500 mg tablet Take 2 tablets (1,000 mg) by mouth 3 times a day.   Unknown    ascorbic acid (Vitamin C) 500 mg tablet as directed Orally   Unknown    brimonidine (AlphaGAN) 0.2 % ophthalmic solution Administer 1 drop into both eyes 2 times a day.   Unknown    calcium carbonate-vitamin D3 500 mg-5 mcg (200 unit) tablet Take 1 tablet by mouth once daily.   Unknown    cefTRIAXone (Rocephin) 2 gram/50 mL IV Infuse 50 mL (2 g) at 100 mL/hr over 30 minutes into a venous catheter once every 24 hours. Once weekly labs CBC/diff, CMP, Vanc trough ESR, CRP fax to Dr. Juarez [phone]. Stop date 11/12/24. 1950 mL 0     dextrose 50 % injection Infuse 25 mL (12.5 g) into a venous catheter every 15 minutes if needed (For blood glucose 41 to 70 mg/dL).       dextrose 50 % injection Infuse 50 mL (25 g) into a venous catheter every 15 minutes if needed (For blood glucose less than or equal to 40 mg/dL).       docusate sodium (Colace) 100 mg capsule Take 1 capsule (100 mg) by mouth 2 times a day.   Unknown    ezetimibe (Zetia) 10 mg tablet Take 1 tablet (10 mg) by  mouth once daily. 90 tablet 3 Unknown    FreeStyle Lite Strips strip USE TO TEST 3 TIMES A DAY AS DIRECTED 300 each 2     glucagon (Glucagen) 1 mg injection Inject 1 mg into the muscle every 15 minutes if needed for low blood sugar - see comments (Hypoglycemia).       glucagon (Glucagen) 1 mg injection Inject 1 mg into the muscle every 15 minutes if needed for low blood sugar - see comments (Hypoglycemia).       heparin sodium,porcine (heparin, porcine,) 5,000 unit/mL injection Inject 1 mL (5,000 Units) under the skin every 8 hours.       insulin lispro (HumaLOG) 100 unit/mL injection Inject 0-15 Units under the skin 3 times daily (morning, midday, late afternoon). Take as directed per insulin instructions.Do not hold when patient is not eating, continue order as scheduled for hyperglycemia management.  Insulin Lispro Corrective Scale #3     Hypoglycemia protocol Call LIP unit(s) if Blood Glucose is between 0 - 70 mg/dL     0 unit(s) if Blood glucose is between    3 unit(s) if Blood glucose is between 151-200   6 unit(s) if Blood glucose is between 201-250   9 unit(s) if Blood glucose is between 251-300   12 unit(s) if Blood glucose is between 301-350   15 unit(s) if Blood glucose is between 351-400    Notify provider unit(s) if Blood Glucose is greater than 400 mg/dL       Lactobacillus acidophilus 100 mg (1 billion cell) capsule Take 1 capsule by mouth 2 times a day.   Unknown    latanoprost (Xalatan) 0.005 % ophthalmic solution Administer 1 drop into both eyes once daily at bedtime. 2.5 mL 5 Unknown    melatonin 5 mg tablet Take 1 tablet (5 mg) by mouth as needed at bedtime for sleep.   Unknown    methocarbamol (Robaxin) 500 mg tablet Take 1 tablet (500 mg) by mouth 3 times a day.   Unknown    multivitamin tablet Take 1 tablet by mouth once daily.   Unknown    ondansetron (Zofran) 4 mg/2 mL injection Infuse 2 mL (4 mg) into a venous catheter every 6 hours if needed for nausea or vomiting.       oxyCODONE  "(Roxicodone) 5 mg immediate release tablet Take 1 tablet (5 mg) by mouth every 6 hours if needed for severe pain (7 - 10) or moderate pain (4 - 6).       oxygen (O2) gas therapy Inhale 1 each continuously.       pantoprazole (ProtoNix) 40 mg EC tablet Take 1 tablet (40 mg) by mouth once daily in the morning. Take before meals. Do not crush, chew, or split.       pantoprazole (ProtoNix) 40 mg injection Infuse 40 mg into a venous catheter once daily in the morning. Take before meals. If unable to take PO.       pen needle, diabetic (PEN NEEDLE MISC) BD Altagracia- 4 mm X 32 G needle - as directed 4x a day sc 4 times per day       polyethylene glycol (Glycolax, Miralax) 17 gram packet Take 17 g by mouth once daily.   Unknown    primidone 125 mg tablet Take 125 mg by mouth 3 times a day.   Unknown    propranolol LA (Inderal LA) 60 mg 24 hr capsule Take 1 capsule (60 mg) by mouth early in the morning.. Hold for SBP < 110 mmhg, HR < 60 bpm.   Unknown    sennosides (Senokot) 8.6 mg tablet Take 1 tablet (8.6 mg) by mouth every 12 hours if needed for constipation.   Unknown    Sure Comfort Pen Needle 32 gauge x 5/32\" needle AS DIRECTED DAILY FOR 90 DAYS 100 each 11 Unknown    traZODone (Desyrel) 25 MG split tablet Take 1 half tablet (25 mg) by mouth once daily at bedtime.   Unknown    vancomycin (Vancocin) 1 gram/250 mL solution Infuse 250 mL (1 g) at 250 mL/hr over 60 minutes into a venous catheter every 12 hours. Once weekly labs CBC/diff, CMP, Vanc trough ESR, CRP fax to Dr. Juarez [phone]. Stop date 11/12/24. 89585 mL 0      Erythromycin, Morphine, and Rosuvastatin  Social History     Tobacco Use    Smoking status: Former     Types: Cigarettes     Passive exposure: Past    Smokeless tobacco: Never   Vaping Use    Vaping status: Never Used   Substance Use Topics    Alcohol use: Not Currently    Drug use: Not Currently     Family History   Problem Relation Name Age of Onset    Multiple myeloma Mother      Cancer Mother      " Other (CABG) Father      Pulmonary embolism Father      Heart disease Father      Breast cancer Sister          Stage II    Hypertension Sister      Diabetes Sister      No Known Problems Sister          x5    No Known Problems Brother          x4    No Known Problems Daughter         Review of Systems:  14 point review of systems was completed and negative except for those specially mention in my HPI    Physical Exam:    Heart Rate:  []   Temp:  [36.5 °C (97.7 °F)-38 °C (100.4 °F)]   Resp:  [16-26]   BP: ()/(41-73)   Weight:  [114 kg (250 lb 7.1 oz)-117 kg (258 lb 6.1 oz)]   SpO2:  [97 %-100 %]     Physical Exam  Vitals reviewed.   Constitutional:       Appearance: She is overweight.      Interventions: She is intubated.   HENT:      Head: Normocephalic and atraumatic.      Right Ear: External ear normal.      Left Ear: External ear normal.      Nose: Nose normal.      Mouth/Throat:      Mouth: Mucous membranes are dry.      Comments: Age related dental decay  Eyes:      Conjunctiva/sclera: Conjunctivae normal.      Pupils: Pupils are equal, round, and reactive to light.      Comments: GARY, R4/L4, conjugate gaze, consensual response   Cardiovascular:      Rate and Rhythm: Normal rate and regular rhythm.      Pulses: Normal pulses.      Heart sounds: Normal heart sounds.      Comments: Upper extremity edema also noted bilat  Pulmonary:      Effort: She is intubated.      Breath sounds: Examination of the right-lower field reveals decreased breath sounds. Examination of the left-lower field reveals decreased breath sounds. Decreased breath sounds present.      Comments: Breath sound decreased with no adventitious breath sounds  Abdominal:      General: Bowel sounds are decreased.      Palpations: Abdomen is soft.      Tenderness: There is no abdominal tenderness.   Musculoskeletal:      Right hand: Swelling present.      Left hand: Swelling present.      Right lower le+ Edema present.      Left  lower le+ Edema present.   Skin:     General: Skin is warm.      Capillary Refill: Capillary refill takes less than 2 seconds.      Comments: Age related skin changes   Neurological:      Mental Status: She is easily aroused. She is confused.      GCS: GCS eye subscore is 4. GCS verbal subscore is 1. GCS motor subscore is 4.      Comments: Episodic following of simple motor commands, not at normal cognitive baseline       Objective:  I have reviewed all medications, laboratory results, and imaging pertinent for today's encounter    Assessment/Plan:    I am currently managing this critically ill patient for the following problems:    Neuro/Psych/Pain Ctrl/Sedation: (Hx: essential tremor)  Acute encephalopathy - likely 2/2 hypercapnia, & infection- resolving   CT head: Negative for acute findings   Urine tox positive for barbiturates consistent with primidone intake   - Pain Control: liquid oxy, scheduled acetaminophen, dilaudid for dressing changes PRN  - Sedation/analgesia: none, keep sedation minimized as able   - Home primidone continued. Will hold propanolol d/t hypotension  - CAM ICU qshift, sleep-wake hygiene, delirium precautions    Respiratory/ENT:  Acute hypoxic/hypercapnic respiratory failure - likely multifactorial and 2/2 HCAP, pl effusions, volume overload  Healthcare-associated pneumonia   Atelectasis - likely 2/2 mucus plugging   Pleural effusion -S/p left-sided pigtail placement 10/17, removed 10/25-resolved  Intubated for 3 days extubated and re intubated on the , currently day 11 of intubation  - Supplemental O2: intubated x3 times this admission   - Will wean O2 as tolerated to maintain SpO2 >92%  - Consider bronchoscopy  - Consider repeat thoracentesis   - Albuterol, mucomyst, hypertonic saline QID   - IPV per RT TID  - Continuous pulse ox monitoring   - Pulm hygiene  - Aspiration precautions   - Respiratory culture with Pseudomonas, see abx below   - Tracheostomy creation with ENT  Thursday 11/8    Cardiovascular:  (Hx: diastolic heart failure, HTN, HLD)  Septic shock -resolved  Occlusive superficial venous thrombus of the left cephalic vein  Non-occlusive DVT right IJ vein   Acute on chronic diastolic heart failure  - US duplex b/l upper extremities-occlusive superficial vein thrombosis of left cephalic vein   - Acute nonocclusive DVT right IJ vein around line placement, will need to be removed   - Continue midodrine   - TTE ordered     -->EF 60-65%, mild/mod AV valve regurg  - Holding home propranolol (on for tremors)  - Continue home Zetia  - Continuous cardiac monitoring per ICU protocol  - EKGs PRN for ACS symptoms, arrhythmias   - Resume heparin gtt 6 hours post-peg placement  - Repeat right IJ duplex ordered to assess DVT burden     GI:  Hx GERD  - Diet: enteral feeding via NG at goal  - BR: Lucia-colace, Miralax PRN  - GI Prophylaxis: PPI  - Patient had a few episodes of diarrhea, sample sent, C-Diff negative   - Peg-placed today at bedside by general surgery   - NPO midnight pending procedures 11/4    /Volume Status/Electrolytes:  Acute kidney injury - possibly ATN +/- medication-induced (vancomycin)  Anasarca   Hypoalbuminemia   Hyponatremia  Baseline Cr 0.8-1.0. BUN Cr 1.92 today   - Nephrology following  - Tablo scheduled for Monday, 11/4       -->M/W/F schedule, plan for tunneled line when more stable  - Bladder scan daily   - Replete electrolytes to maintain K >4.0 and Mg >2.0  - Daily BMP, Mg, Phos  - Free water flushes being held due to hyponatremia, improving     Heme/Onc: (Hx: normocytic anemia)  Occlusive LUE DVT  Non-occlusive R IJ DVT  - Therapeutic heparin infusion, will restart 6 hours post peg tube placement   - Continue iron and folate  - Monitor for s/sx of bleeding   - Plan to transfuse if Hgb <7.0   - Daily CBC  - T&S ordered for 11/4 morning    Endocrine: (Hx: DMII)  - SSI Q4  - Hypoglycemia protocol PRN    Infectious Disease:  Pseudomonas-Respiratory culture  "10/29  Thoracolumbar surgical site infection - s/p I&D x 2. Recent MRSA bacteremia on extended course of IV antibiotics (vanc and rocephin -> 11/12)  Healthcare-associated pneumonia   CT lumbar spine 10/12: Unable to r/o abscess. Unable to do MRI d/t spinal hardware, \"metal in head\" per patient  Sputum culture 10/16: + pseudomonas   - ID following  - Continue Ceftriaxone for 7 days (10/23-11/12)  - Continue Daptomycin for 15 days (10/21-11/12)  - Cefepime and inhaled tobramycin courses completed   - PICC placed 10/24  - Monitor SIRS criteria  - Consult placed to Dr. Ventura: ok to remove sutures, will attempt bedside removal after dialysis is completed     MSK:  - PT/OT- no need to hold PT/OT patient was at a rehab facility after lumbar laminectomy for PT/OT before this admission   - 10/27 & 10/28 sat on side of the bed for 5 min   - Needs new PT/OT consult placed when sutures removed     Ethics/Code Status:  DNR-CCA     :  DVT Prophylaxis: Heparin gtt  GI Prophylaxis: PPI  Bowel Regimen: Lucia-colace and Miralax   Diet: Tube Feeds  CVC: PICC placed 10/24, trialysis right internal jugular placed 10/19   [place]: none  [place]: none  Restraints: yes    Restraints indicated to maintain lines/tubes.  Alternative therapies have been attempted and have been ineffective.  Restrain with soft wrist restraints and side rails up x4 until medical devices discontinued and/or patient able to participate with plan of care.       Critical Care Time:  45 minutes spent in preparing to see patient (I.e. review of medical records), evaluation of diagnostics (I.e. labs, imaging, etc.), documentation, discussing plan of care with patient/ family/ caregiver, and/ or coordination of care with multidisciplinary team. Time does not include completion of procedure time.     Kaleb Lam PA-C  Pulmonary and Critical Care Medicine   [facility]   "

## 2024-11-04 NOTE — PROGRESS NOTES
"Narda Malloy is a 69 y.o. female on day 23 of admission presenting with Unresponsive.    Subjective   The patient is seen for acute kidney injury which is secondary to acute tubular necrosis events are noted the patient is back on the ventilator and she is sedated she is still very oliguric and sedated       Objective     Physical Exam  Constitutional:       Comments: Patient is intubated on the ventilator   Neck:      Vascular: No carotid bruit.   Cardiovascular:      Rate and Rhythm: Normal rate and regular rhythm.      Heart sounds: No murmur heard.     No friction rub. No gallop.   Pulmonary:      Breath sounds: No wheezing, rhonchi or rales.   Chest:      Chest wall: No tenderness.   Abdominal:      General: There is no distension.      Tenderness: There is no abdominal tenderness. There is no guarding or rebound.   Musculoskeletal:         General: No swelling or tenderness.      Cervical back: Neck supple.      Right lower leg: Edema present.      Left lower leg: Edema present.   Lymphadenopathy:      Cervical: No cervical adenopathy.         Last Recorded Vitals  Blood pressure 119/60, pulse 68, temperature 36.7 °C (98.1 °F), temperature source Axillary, resp. rate 16, height 1.778 m (5' 10\"), weight 117 kg (258 lb 6.1 oz), SpO2 100%.    Intake/Output last 3 Shifts:  I/O last 3 completed shifts:  In: 1187.8 (10.1 mL/kg) [I.V.:757.8 (6.5 mL/kg); NG/GT:430]  Out: 2030 (17.3 mL/kg) [Emesis/NG output:30; Other:2000]  Weight: 117.2 kg     Current Facility-Administered Medications:     acetaminophen (Tylenol) oral liquid 650 mg, 650 mg, oral, q6h, Francesco Carbajal PA-C, 650 mg at 11/04/24 0438    acetylcysteine (Mucomyst) 200 mg/mL (20 %) nebulizer solution 600 mg, 3 mL, nebulization, 4x daily, Kaleb Lam PA-C, 600 mg at 11/04/24 0724    albuterol 2.5 mg /3 mL (0.083 %) nebulizer solution 3 mL, 3 mL, nebulization, 4x daily, Kaleb Lam PA-C, 3 mL at 11/04/24 0724    alteplase (Cathflo Activase) " injection 2 mg, 2 mg, intra-catheter, PRN, Kaleb Lam PA-C    brimonidine (AlphaGAN) 0.2 % ophthalmic solution 1 drop, 1 drop, Both Eyes, BID, Kaleb Lam PA-C, 1 drop at 11/04/24 0817    [Held by provider] calcium carbonate-vitamin D3 500 mg-5 mcg (200 unit) per tablet 1 tablet, 1 tablet, oral, Daily, Kaleb Lam PA-C, 1 tablet at 10/18/24 0930    cefTRIAXone (Rocephin) 2 g in dextrose (iso) IV 50 mL, 2 g, intravenous, q24h, Kaleb Lam PA-C, Stopped at 11/04/24 0850    DAPTOmycin (Cubicin) 700 mg in sodium chloride 0.9%  mL, 700 mg, intravenous, q48h, Andrew Malagon MD, Stopped at 11/03/24 1129    dextrose 50 % injection 12.5 g, 12.5 g, intravenous, q15 min PRN, Kaleb Lam PA-C    dextrose 50 % injection 25 g, 25 g, intravenous, q15 min PRN, Kaleb Lam PA-C    ezetimibe (Zetia) tablet 10 mg, 10 mg, oral, Daily, Kaleb Lam PA-C, 10 mg at 11/04/24 0817    [Held by provider] fentanyl (Sublimaze) 1000 mcg in sodium chloride 0.9% 100 mL (10 mcg/mL) infusion (premix), 0-100 mcg/hr, intravenous, Continuous, Kaleb Lam PA-C, Stopped at 11/03/24 1115    ferrous sulfate syrup 60 mg of iron, 60 mg of iron, oral, Daily, RUTH Doe, 60 mg of iron at 11/04/24 0817    folic acid (Folvite) tablet 1 mg, 1 mg, oral, Daily, RUTH Doe, 1 mg at 11/04/24 0817    glucagon (Glucagen) injection 1 mg, 1 mg, intramuscular, q15 min PRN, Kaleb Lam PA-C    glucagon (Glucagen) injection 1 mg, 1 mg, intramuscular, q15 min PRN, Kaleb Lam PA-C    heparin (porcine) injection 3,000-6,000 Units, 3,000-6,000 Units, intravenous, PRN, Sweta Diaz, APRN-CNP    heparin 1,000 unit/mL injection 1,000 Units, 1,000 Units, intra-catheter, After Dialysis, Nelia Cheung MD, 1,000 Units at 11/02/24 2250    heparin 1,000 unit/mL injection 1,000 Units, 1,000 Units, intra-catheter, After Dialysis, Nelia Cheung MD, 1,000 Units at 11/02/24 2248    heparin 25,000  Units in dextrose 5% 250 mL (100 Units/mL) infusion (premix), 0-4,500 Units/hr, intravenous, Continuous, RUTH Doe, Stopped at 11/04/24 0400    heparin flush 10 unit/mL syringe 50 Units, 50 Units, intra-catheter, PRN, Kaleb Lam PA-C, 50 Units at 10/19/24 2313    honey (Medihoney) topical gel, , Topical, Daily, RUTH Salgado, Given at 11/03/24 0923    HYDROmorphone (Dilaudid) injection 0.4 mg, 0.4 mg, intravenous, q2h PRN, Kaleb Lam PA-C, 0.4 mg at 10/19/24 0520    insulin lispro (HumaLOG) injection 0-15 Units, 0-15 Units, subcutaneous, q4h, Lb Dodd PA-C, 3 Units at 11/04/24 0010    latanoprost (Xalatan) 0.005 % ophthalmic solution 1 drop, 1 drop, Both Eyes, Nightly, Kaleb Lam PA-C, 1 drop at 11/03/24 2214    midodrine (Proamatine) tablet 10 mg, 10 mg, oral, q8h, RUTH Salgado, 10 mg at 11/04/24 0817    norepinephrine (Levophed) 8 mg in dextrose 5% 250 mL (0.032 mg/mL) infusion (premix), 0-0.5 mcg/kg/min, intravenous, Continuous, Kaleb Lam PA-C, Stopped at 11/03/24 1115    oxyCODONE (Roxicodone) solution 5 mg, 5 mg, oral, q4h PRN, Francesco Carbajal PA-C    oxyCODONE (Roxicodone) solution 7.5 mg, 7.5 mg, oral, q4h PRN, Francesco Carbajal PA-C    oxygen (O2) therapy, , inhalation, Continuous - Inhalation, RUTH Salgado, 40 percent at 11/04/24 0726    pantoprazole (ProtoNix) EC tablet 40 mg, 40 mg, oral, Daily before breakfast **OR** pantoprazole (ProtoNix) injection 40 mg, 40 mg, intravenous, Daily before breakfast, Alonso Yancey, APRN-CNP, 40 mg at 11/04/24 0618    polyethylene glycol (Glycolax, Miralax) packet 17 g, 17 g, oral, Daily PRN, Sweta Diaz, APRN-CNP    potassium, sodium phosphates (Phos-NaK) 280-160-250 mg packet 1 packet, 1 packet, oral, With meals & nightly, Sabino Matos, APRN-CNP, 1 packet at 11/04/24 0817    primidone (Mysoline) tablet 125 mg, 125 mg, oral, Nightly, Kaleb Lam PA-C, 125 mg at 11/03/24  2214    propofol (Diprivan) infusion, 0-10 mcg/kg/min, intravenous, Continuous, Kaleb Lam PA-C, Stopped at 11/03/24 1115    [Held by provider] propranolol LA (Inderal LA) 24 hr capsule 60 mg, 60 mg, oral, Daily, Kaleb Lam PA-C, 60 mg at 10/19/24 0643    sennosides-docusate sodium (Lucia-Colace) 8.6-50 mg per tablet 1 tablet, 1 tablet, oral, Nightly, LEONEL Doe-CNP, 1 tablet at 11/03/24 2214    sodium chloride 3 % nebulizer solution 3 mL, 3 mL, nebulization, 4x daily, Kaleb Lam PA-C, 3 mL at 11/04/24 0724   Relevant Results    Results for orders placed or performed during the hospital encounter of 10/12/24 (from the past 96 hours)   POCT GLUCOSE   Result Value Ref Range    POCT Glucose 125 (H) 74 - 99 mg/dL   POCT GLUCOSE   Result Value Ref Range    POCT Glucose 191 (H) 74 - 99 mg/dL   CBC   Result Value Ref Range    WBC 9.3 4.4 - 11.3 x10*3/uL    nRBC 0.0 0.0 - 0.0 /100 WBCs    RBC 2.62 (L) 4.00 - 5.20 x10*6/uL    Hemoglobin 8.1 (L) 12.0 - 16.0 g/dL    Hematocrit 26.0 (L) 36.0 - 46.0 %    MCV 99 80 - 100 fL    MCH 30.9 26.0 - 34.0 pg    MCHC 31.2 (L) 32.0 - 36.0 g/dL    RDW 19.6 (H) 11.5 - 14.5 %    Platelets 279 150 - 450 x10*3/uL   Calcium, ionized   Result Value Ref Range    POCT Calcium, Ionized 1.14 1.1 - 1.33 mmol/L   POCT GLUCOSE   Result Value Ref Range    POCT Glucose 158 (H) 74 - 99 mg/dL   POCT GLUCOSE   Result Value Ref Range    POCT Glucose 101 (H) 74 - 99 mg/dL   Calcium, ionized   Result Value Ref Range    POCT Calcium, Ionized 1.14 1.1 - 1.33 mmol/L   CBC and Auto Differential   Result Value Ref Range    WBC 9.4 4.4 - 11.3 x10*3/uL    nRBC 0.0 0.0 - 0.0 /100 WBCs    RBC 2.54 (L) 4.00 - 5.20 x10*6/uL    Hemoglobin 7.9 (L) 12.0 - 16.0 g/dL    Hematocrit 25.0 (L) 36.0 - 46.0 %    MCV 98 80 - 100 fL    MCH 31.1 26.0 - 34.0 pg    MCHC 31.6 (L) 32.0 - 36.0 g/dL    RDW 19.5 (H) 11.5 - 14.5 %    Platelets 290 150 - 450 x10*3/uL    Neutrophils % 84.9 40.0 - 80.0 %    Immature  Granulocytes %, Automated 0.5 0.0 - 0.9 %    Lymphocytes % 7.0 13.0 - 44.0 %    Monocytes % 7.1 2.0 - 10.0 %    Eosinophils % 0.3 0.0 - 6.0 %    Basophils % 0.2 0.0 - 2.0 %    Neutrophils Absolute 7.97 (H) 1.20 - 7.70 x10*3/uL    Immature Granulocytes Absolute, Automated 0.05 0.00 - 0.70 x10*3/uL    Lymphocytes Absolute 0.66 (L) 1.20 - 4.80 x10*3/uL    Monocytes Absolute 0.67 0.10 - 1.00 x10*3/uL    Eosinophils Absolute 0.03 0.00 - 0.70 x10*3/uL    Basophils Absolute 0.02 0.00 - 0.10 x10*3/uL   Hepatic function panel   Result Value Ref Range    Albumin 2.5 (L) 3.4 - 5.0 g/dL    Bilirubin, Total 0.4 0.0 - 1.2 mg/dL    Bilirubin, Direct 0.1 0.0 - 0.3 mg/dL    Alkaline Phosphatase 138 (H) 33 - 136 U/L    ALT 13 7 - 45 U/L    AST 22 9 - 39 U/L    Total Protein 5.7 (L) 6.4 - 8.2 g/dL   Magnesium   Result Value Ref Range    Magnesium 2.18 1.60 - 2.40 mg/dL   Phosphorus   Result Value Ref Range    Phosphorus 5.1 (H) 2.5 - 4.9 mg/dL   Basic Metabolic Panel   Result Value Ref Range    Glucose 99 74 - 99 mg/dL    Sodium 132 (L) 136 - 145 mmol/L    Potassium 4.4 3.5 - 5.3 mmol/L    Chloride 97 (L) 98 - 107 mmol/L    Bicarbonate 28 21 - 32 mmol/L    Anion Gap 11 10 - 20 mmol/L    Urea Nitrogen 32 (H) 6 - 23 mg/dL    Creatinine 2.00 (H) 0.50 - 1.05 mg/dL    eGFR 27 (L) >60 mL/min/1.73m*2    Calcium 8.2 (L) 8.6 - 10.3 mg/dL   aPTT   Result Value Ref Range    aPTT 72.4 (H) 22.0 - 32.5 seconds   POCT GLUCOSE   Result Value Ref Range    POCT Glucose 103 (H) 74 - 99 mg/dL   POCT GLUCOSE   Result Value Ref Range    POCT Glucose 98 74 - 99 mg/dL   aPTT   Result Value Ref Range    aPTT 57.4 (H) 22.0 - 32.5 seconds   POCT GLUCOSE   Result Value Ref Range    POCT Glucose 112 (H) 74 - 99 mg/dL   POCT GLUCOSE   Result Value Ref Range    POCT Glucose 105 (H) 74 - 99 mg/dL   BLOOD GAS VENOUS FULL PANEL   Result Value Ref Range    POCT pH, Venous 7.34 7.33 - 7.43 pH    POCT pCO2, Venous 55 (H) 41 - 51 mm Hg    POCT pO2, Venous 40 35 - 45 mm Hg     POCT SO2, Venous 74 45 - 75 %    POCT Oxy Hemoglobin, Venous 72.5 45.0 - 75.0 %    POCT Hematocrit Calculated, Venous 26.0 (L) 36.0 - 46.0 %    POCT Sodium, Venous 134 (L) 136 - 145 mmol/L    POCT Potassium, Venous 4.2 3.5 - 5.3 mmol/L    POCT Chloride, Venous 101 98 - 107 mmol/L    POCT Ionized Calicum, Venous 1.21 1.10 - 1.33 mmol/L    POCT Glucose, Venous 101 (H) 74 - 99 mg/dL    POCT Lactate, Venous 0.7 0.4 - 2.0 mmol/L    POCT Base Excess, Venous 3.3 (H) -2.0 - 3.0 mmol/L    POCT HCO3 Calculated, Venous 29.7 (H) 22.0 - 26.0 mmol/L    POCT Hemoglobin, Venous 8.5 (L) 12.0 - 16.0 g/dL    POCT Anion Gap, Venous 8.0 (L) 10.0 - 25.0 mmol/L    Patient Temperature 37.0 degrees Celsius    FiO2 70 %    Apparatus      Ipap CMH2O 22.0 cm H2O    Epap CMH2O 10.0 cm H2O   POCT GLUCOSE   Result Value Ref Range    POCT Glucose 105 (H) 74 - 99 mg/dL   Blood Gas Arterial Full Panel   Result Value Ref Range    POCT pH, Arterial 7.39 7.38 - 7.42 pH    POCT pCO2, Arterial 43 (H) 38 - 42 mm Hg    POCT pO2, Arterial 110 (H) 85 - 95 mm Hg    POCT SO2, Arterial 100 94 - 100 %    POCT Oxy Hemoglobin, Arterial 96.5 94.0 - 98.0 %    POCT Hematocrit Calculated, Arterial 26.0 (L) 36.0 - 46.0 %    POCT Sodium, Arterial 133 (L) 136 - 145 mmol/L    POCT Potassium, Arterial 4.1 3.5 - 5.3 mmol/L    POCT Chloride, Arterial 100 98 - 107 mmol/L    POCT Ionized Calcium, Arterial 1.16 1.10 - 1.33 mmol/L    POCT Glucose, Arterial 114 (H) 74 - 99 mg/dL    POCT Lactate, Arterial 0.9 0.4 - 2.0 mmol/L    POCT Base Excess, Arterial 0.9 -2.0 - 3.0 mmol/L    POCT HCO3 Calculated, Arterial 26.0 22.0 - 26.0 mmol/L    POCT Hemoglobin, Arterial 8.5 (L) 12.0 - 16.0 g/dL    POCT Anion Gap, Arterial 11 10 - 25 mmo/L    Patient Temperature      FiO2 50 %    Apparatus      Ventilator Mode      Ventilator Rate 16 bpm    Tidal Volume 450 mL    Peep CHM2O 8.0 cm H2O    Site of Arterial Puncture Radial Left     Bill's Test Positive    POCT GLUCOSE   Result Value Ref  Range    POCT Glucose 113 (H) 74 - 99 mg/dL   aPTT   Result Value Ref Range    aPTT 42.2 (H) 22.0 - 32.5 seconds   CBC and Auto Differential   Result Value Ref Range    WBC 8.7 4.4 - 11.3 x10*3/uL    nRBC 0.0 0.0 - 0.0 /100 WBCs    RBC 2.39 (L) 4.00 - 5.20 x10*6/uL    Hemoglobin 7.4 (L) 12.0 - 16.0 g/dL    Hematocrit 23.0 (L) 36.0 - 46.0 %    MCV 96 80 - 100 fL    MCH 31.0 26.0 - 34.0 pg    MCHC 32.2 32.0 - 36.0 g/dL    RDW 19.7 (H) 11.5 - 14.5 %    Platelets 283 150 - 450 x10*3/uL    Neutrophils % 79.0 40.0 - 80.0 %    Immature Granulocytes %, Automated 0.9 0.0 - 0.9 %    Lymphocytes % 11.8 13.0 - 44.0 %    Monocytes % 7.7 2.0 - 10.0 %    Eosinophils % 0.3 0.0 - 6.0 %    Basophils % 0.3 0.0 - 2.0 %    Neutrophils Absolute 6.89 1.20 - 7.70 x10*3/uL    Immature Granulocytes Absolute, Automated 0.08 0.00 - 0.70 x10*3/uL    Lymphocytes Absolute 1.03 (L) 1.20 - 4.80 x10*3/uL    Monocytes Absolute 0.67 0.10 - 1.00 x10*3/uL    Eosinophils Absolute 0.03 0.00 - 0.70 x10*3/uL    Basophils Absolute 0.03 0.00 - 0.10 x10*3/uL   Hepatic function panel   Result Value Ref Range    Albumin 2.4 (L) 3.4 - 5.0 g/dL    Bilirubin, Total 0.4 0.0 - 1.2 mg/dL    Bilirubin, Direct 0.1 0.0 - 0.3 mg/dL    Alkaline Phosphatase 126 33 - 136 U/L    ALT 12 7 - 45 U/L    AST 14 9 - 39 U/L    Total Protein 5.6 (L) 6.4 - 8.2 g/dL   Magnesium   Result Value Ref Range    Magnesium 1.98 1.60 - 2.40 mg/dL   Phosphorus   Result Value Ref Range    Phosphorus 2.9 2.5 - 4.9 mg/dL   Basic Metabolic Panel   Result Value Ref Range    Glucose 124 (H) 74 - 99 mg/dL    Sodium 135 (L) 136 - 145 mmol/L    Potassium 3.8 3.5 - 5.3 mmol/L    Chloride 99 98 - 107 mmol/L    Bicarbonate 28 21 - 32 mmol/L    Anion Gap 12 10 - 20 mmol/L    Urea Nitrogen 17 6 - 23 mg/dL    Creatinine 1.37 (H) 0.50 - 1.05 mg/dL    eGFR 42 (L) >60 mL/min/1.73m*2    Calcium 8.1 (L) 8.6 - 10.3 mg/dL   Blood Gas Arterial Full Panel   Result Value Ref Range    POCT pH, Arterial 7.43 (H) 7.38 -  7.42 pH    POCT pCO2, Arterial 40 38 - 42 mm Hg    POCT pO2, Arterial 115 (H) 85 - 95 mm Hg    POCT SO2, Arterial 99 94 - 100 %    POCT Oxy Hemoglobin, Arterial 96.0 94.0 - 98.0 %    POCT Hematocrit Calculated, Arterial 24.0 (L) 36.0 - 46.0 %    POCT Sodium, Arterial 134 (L) 136 - 145 mmol/L    POCT Potassium, Arterial 3.9 3.5 - 5.3 mmol/L    POCT Chloride, Arterial 99 98 - 107 mmol/L    POCT Ionized Calcium, Arterial 1.18 1.10 - 1.33 mmol/L    POCT Glucose, Arterial 128 (H) 74 - 99 mg/dL    POCT Lactate, Arterial 0.9 0.4 - 2.0 mmol/L    POCT Base Excess, Arterial 2.0 -2.0 - 3.0 mmol/L    POCT HCO3 Calculated, Arterial 26.5 (H) 22.0 - 26.0 mmol/L    POCT Hemoglobin, Arterial 7.9 (L) 12.0 - 16.0 g/dL    POCT Anion Gap, Arterial 12 10 - 25 mmo/L    Patient Temperature 37.0 degrees Celsius    FiO2 40 %    Apparatus      Ventilator Mode      Ventilator Rate 16 bpm    Tidal Volume 450 mL    Peep CHM2O 8.0 cm H2O    Site of Arterial Puncture Radial Right     Bill's Test Positive    aPTT   Result Value Ref Range    aPTT 46.7 (H) 22.0 - 32.5 seconds   POCT GLUCOSE   Result Value Ref Range    POCT Glucose 135 (H) 74 - 99 mg/dL   POCT GLUCOSE   Result Value Ref Range    POCT Glucose 174 (H) 74 - 99 mg/dL   Transthoracic Echo (TTE) Complete   Result Value Ref Range    AV pk varinder 1.56 m/s    LVOT diam 2.10 cm    MV E/A ratio 1.30     Tricuspid annular plane systolic excursion 1.5 cm    LV Biplane EF 65 %    LV EF 63 %    RV free wall pk S' 18.50 cm/s    LV GLS -17.4 %    RVSP 52.8 mmHg    LVIDd 4.44 cm    AV pk grad 10 mmHg    Aortic Valve Area by Continuity of Peak Velocity 3.60 cm2    LV A4C EF 59.1    POCT GLUCOSE   Result Value Ref Range    POCT Glucose 174 (H) 74 - 99 mg/dL   Type and screen   Result Value Ref Range    ABO TYPE A     Rh TYPE NEG     ANTIBODY SCREEN NEG    Hemoglobin and hematocrit, blood   Result Value Ref Range    Hemoglobin 7.4 (L) 12.0 - 16.0 g/dL    Hematocrit 23.2 (L) 36.0 - 46.0 %   POCT GLUCOSE    Result Value Ref Range    POCT Glucose 148 (H) 74 - 99 mg/dL   POCT GLUCOSE   Result Value Ref Range    POCT Glucose 188 (H) 74 - 99 mg/dL   POCT GLUCOSE   Result Value Ref Range    POCT Glucose 178 (H) 74 - 99 mg/dL   CBC and Auto Differential   Result Value Ref Range    WBC 8.5 4.4 - 11.3 x10*3/uL    nRBC 0.0 0.0 - 0.0 /100 WBCs    RBC 2.35 (L) 4.00 - 5.20 x10*6/uL    Hemoglobin 7.4 (L) 12.0 - 16.0 g/dL    Hematocrit 22.9 (L) 36.0 - 46.0 %    MCV 97 80 - 100 fL    MCH 31.5 26.0 - 34.0 pg    MCHC 32.3 32.0 - 36.0 g/dL    RDW 19.5 (H) 11.5 - 14.5 %    Platelets 243 150 - 450 x10*3/uL    Neutrophils % 86.9 40.0 - 80.0 %    Immature Granulocytes %, Automated 0.5 0.0 - 0.9 %    Lymphocytes % 7.0 13.0 - 44.0 %    Monocytes % 4.6 2.0 - 10.0 %    Eosinophils % 0.5 0.0 - 6.0 %    Basophils % 0.5 0.0 - 2.0 %    Neutrophils Absolute 7.38 1.20 - 7.70 x10*3/uL    Immature Granulocytes Absolute, Automated 0.04 0.00 - 0.70 x10*3/uL    Lymphocytes Absolute 0.59 (L) 1.20 - 4.80 x10*3/uL    Monocytes Absolute 0.39 0.10 - 1.00 x10*3/uL    Eosinophils Absolute 0.04 0.00 - 0.70 x10*3/uL    Basophils Absolute 0.04 0.00 - 0.10 x10*3/uL   Hepatic function panel   Result Value Ref Range    Albumin 2.7 (L) 3.4 - 5.0 g/dL    Bilirubin, Total 0.4 0.0 - 1.2 mg/dL    Bilirubin, Direct 0.1 0.0 - 0.3 mg/dL    Alkaline Phosphatase 125 33 - 136 U/L    ALT 10 7 - 45 U/L    AST 10 9 - 39 U/L    Total Protein 5.5 (L) 6.4 - 8.2 g/dL   Magnesium   Result Value Ref Range    Magnesium 1.92 1.60 - 2.40 mg/dL   Phosphorus   Result Value Ref Range    Phosphorus 2.3 (L) 2.5 - 4.9 mg/dL   Basic Metabolic Panel   Result Value Ref Range    Glucose 171 (H) 74 - 99 mg/dL    Sodium 133 (L) 136 - 145 mmol/L    Potassium 4.0 3.5 - 5.3 mmol/L    Chloride 99 98 - 107 mmol/L    Bicarbonate 28 21 - 32 mmol/L    Anion Gap 10 10 - 20 mmol/L    Urea Nitrogen 14 6 - 23 mg/dL    Creatinine 1.30 (H) 0.50 - 1.05 mg/dL    eGFR 45 (L) >60 mL/min/1.73m*2    Calcium 7.9 (L) 8.6  - 10.3 mg/dL   aPTT   Result Value Ref Range    aPTT 45.6 (H) 22.0 - 32.5 seconds   POCT GLUCOSE   Result Value Ref Range    POCT Glucose 176 (H) 74 - 99 mg/dL   Blood Culture    Specimen: Peripheral Venipuncture; Blood culture   Result Value Ref Range    Blood Culture Loaded on Instrument - Culture in progress    Blood Culture    Specimen: Peripheral Venipuncture; Blood culture   Result Value Ref Range    Blood Culture Loaded on Instrument - Culture in progress    POCT GLUCOSE   Result Value Ref Range    POCT Glucose 173 (H) 74 - 99 mg/dL   aPTT   Result Value Ref Range    aPTT 60.5 (H) 22.0 - 32.5 seconds   POCT GLUCOSE   Result Value Ref Range    POCT Glucose 158 (H) 74 - 99 mg/dL   POCT GLUCOSE   Result Value Ref Range    POCT Glucose 150 (H) 74 - 99 mg/dL   Renal function panel   Result Value Ref Range    Glucose 172 (H) 74 - 99 mg/dL    Sodium 133 (L) 136 - 145 mmol/L    Potassium 4.2 3.5 - 5.3 mmol/L    Chloride 99 98 - 107 mmol/L    Bicarbonate 28 21 - 32 mmol/L    Anion Gap 10 10 - 20 mmol/L    Urea Nitrogen 20 6 - 23 mg/dL    Creatinine 1.71 (H) 0.50 - 1.05 mg/dL    eGFR 32 (L) >60 mL/min/1.73m*2    Calcium 7.6 (L) 8.6 - 10.3 mg/dL    Phosphorus 2.6 2.5 - 4.9 mg/dL    Albumin 2.2 (L) 3.4 - 5.0 g/dL   Calcium, ionized   Result Value Ref Range    POCT Calcium, Ionized 1.10 1.1 - 1.33 mmol/L   Magnesium   Result Value Ref Range    Magnesium 2.21 1.60 - 2.40 mg/dL   POCT GLUCOSE   Result Value Ref Range    POCT Glucose 181 (H) 74 - 99 mg/dL   POCT GLUCOSE   Result Value Ref Range    POCT Glucose 149 (H) 74 - 99 mg/dL   CBC and Auto Differential   Result Value Ref Range    WBC 5.3 4.4 - 11.3 x10*3/uL    nRBC 0.0 0.0 - 0.0 /100 WBCs    RBC 2.12 (L) 4.00 - 5.20 x10*6/uL    Hemoglobin 6.6 (L) 12.0 - 16.0 g/dL    Hematocrit 20.9 (L) 36.0 - 46.0 %    MCV 99 80 - 100 fL    MCH 31.1 26.0 - 34.0 pg    MCHC 31.6 (L) 32.0 - 36.0 g/dL    RDW 19.2 (H) 11.5 - 14.5 %    Platelets 202 150 - 450 x10*3/uL    Neutrophils % 76.7  40.0 - 80.0 %    Immature Granulocytes %, Automated 1.1 (H) 0.0 - 0.9 %    Lymphocytes % 14.4 13.0 - 44.0 %    Monocytes % 6.1 2.0 - 10.0 %    Eosinophils % 1.1 0.0 - 6.0 %    Basophils % 0.6 0.0 - 2.0 %    Neutrophils Absolute 4.04 1.20 - 7.70 x10*3/uL    Immature Granulocytes Absolute, Automated 0.06 0.00 - 0.70 x10*3/uL    Lymphocytes Absolute 0.76 (L) 1.20 - 4.80 x10*3/uL    Monocytes Absolute 0.32 0.10 - 1.00 x10*3/uL    Eosinophils Absolute 0.06 0.00 - 0.70 x10*3/uL    Basophils Absolute 0.03 0.00 - 0.10 x10*3/uL   Hepatic function panel   Result Value Ref Range    Albumin 2.2 (L) 3.4 - 5.0 g/dL    Bilirubin, Total 0.3 0.0 - 1.2 mg/dL    Bilirubin, Direct 0.1 0.0 - 0.3 mg/dL    Alkaline Phosphatase 106 33 - 136 U/L    ALT 8 7 - 45 U/L    AST 10 9 - 39 U/L    Total Protein 5.2 (L) 6.4 - 8.2 g/dL   Magnesium   Result Value Ref Range    Magnesium 2.17 1.60 - 2.40 mg/dL   Type and screen   Result Value Ref Range    ABO TYPE A     Rh TYPE NEG     ANTIBODY SCREEN NEG    Phosphorus   Result Value Ref Range    Phosphorus 3.0 2.5 - 4.9 mg/dL   Basic Metabolic Panel   Result Value Ref Range    Glucose 142 (H) 74 - 99 mg/dL    Sodium 134 (L) 136 - 145 mmol/L    Potassium 4.3 3.5 - 5.3 mmol/L    Chloride 99 98 - 107 mmol/L    Bicarbonate 29 21 - 32 mmol/L    Anion Gap 10 10 - 20 mmol/L    Urea Nitrogen 21 6 - 23 mg/dL    Creatinine 1.88 (H) 0.50 - 1.05 mg/dL    eGFR 29 (L) >60 mL/min/1.73m*2    Calcium 7.7 (L) 8.6 - 10.3 mg/dL   aPTT   Result Value Ref Range    aPTT 55.5 (H) 22.0 - 32.5 seconds   Morphology   Result Value Ref Range    RBC Morphology See Below     Polychromasia Mild     Ovalocytes Few     Lexus Cells Few    Creatine Kinase   Result Value Ref Range    Creatine Kinase 42 0 - 215 U/L   Prepare RBC: 1 Units   Result Value Ref Range    PRODUCT CODE [id number]     Unit Number [id number]     Unit ABO A     Unit RH NEG     XM INTEP COMP     Dispense Status XM     Blood Expiration Date 11/27/2024 11:59:00 PM EST      PRODUCT BLOOD TYPE 0600     UNIT VOLUME 350    POCT GLUCOSE   Result Value Ref Range    POCT Glucose 159 (H) 74 - 99 mg/dL       Assessment/Plan   Acute kidney injury no sign of renal recovery at this time she is still fairly oliguric my plan is to dialyze her today especially she needs blood transfusion and she is still fluid overloaded  Edema improved plan to continue to challenge her dry weight will try to remove 2.5 L of fluid with dialysis today if her blood pressure tolerates  Septic shock is off pressors at this time  Pneumonia continue with antibiotic therapy infectious disease  Diabetes mellitus type 2  Malnutrition continue with tube feeding  Lower back surgery site infection  Anemia transfuse when hemoglobin less than 7         Imer Cheung MD

## 2024-11-04 NOTE — PROGRESS NOTES
Patient not medically clear. Patient reintubated. Today patient getting a PEG tube and plan for trach on Thursday. Patient will likely need LTACH. Will follow.      11/04/24 1401   Discharge Planning   Home or Post Acute Services Other (Comment)   Expected Discharge Disposition [place]  (TBD)   Does the patient need discharge transport arranged? Yes   RoundTrip coordination needed? Yes

## 2024-11-04 NOTE — CARE PLAN
The patient's goals for the shift include unable to assess    The clinical goals for the shift include stable hemoduynamically, PEG placement today.    Problem: Safety - Adult  Goal: Free from fall injury  Outcome: Progressing     Problem: Discharge Planning  Goal: Discharge to home or other facility with appropriate resources  Outcome: Progressing     Problem: Chronic Conditions and Co-morbidities  Goal: Patient's chronic conditions and co-morbidity symptoms are monitored and maintained or improved  Outcome: Progressing     Problem: Diabetes  Goal: Increase stability of blood glucose readings by end of shift  Outcome: Progressing  Goal: Maintain electrolyte levels within acceptable range throughout shift  Outcome: Progressing  Goal: Maintain glucose levels >70mg/dl to <250mg/dl throughout shift  Outcome: Progressing  Goal: Learn about and adhere to nutrition recommendations by end of shift  Outcome: Progressing  Goal: Vital signs within normal range for age by end of shift  Outcome: Progressing  Goal: Increase self care and/or family involovement by end of shift  Outcome: Progressing  Goal: Receive DSME education by end of shift  Outcome: Progressing     Problem: Knowledge Deficit  Goal: Patient/family/caregiver demonstrates understanding of disease process, treatment plan, medications, and discharge instructions  Outcome: Progressing     Problem: Skin  Goal: Decreased wound size/increased tissue granulation at next dressing change  Outcome: Progressing  Flowsheets (Taken 11/4/2024 1258)  Decreased wound size/increased tissue granulation at next dressing change:   Promote sleep for wound healing   Utilize specialty bed per algorithm   Protective dressings over bony prominences  Goal: Participates in plan/prevention/treatment measures  Outcome: Progressing  Flowsheets (Taken 11/4/2024 1258)  Participates in plan/prevention/treatment measures: Elevate heels  Goal: Prevent/manage excess moisture  Outcome:  Progressing  Flowsheets (Taken 11/4/2024 1258)  Prevent/manage excess moisture:   Cleanse incontinence/protect with barrier cream   Moisturize dry skin   Follow provider orders for dressing changes   Monitor for/manage infection if present  Goal: Prevent/minimize sheer/friction injuries  Outcome: Progressing  Flowsheets (Taken 11/4/2024 1258)  Prevent/minimize sheer/friction injuries:   HOB 30 degrees or less   Increase activity/out of bed for meals   Turn/reposition every 2 hours/use positioning/transfer devices   Use pull sheet   Utilize specialty bed per algorithm  Goal: Promote/optimize nutrition  Outcome: Progressing  Flowsheets (Taken 11/4/2024 1258)  Promote/optimize nutrition: Monitor/record intake including meals  Goal: Promote skin healing  Outcome: Progressing  Flowsheets (Taken 11/4/2024 1258)  Promote skin healing:   Assess skin/pad under line(s)/device(s)   Ensure correct size (line/device) and apply per  instructions   Protective dressings over bony prominences   Rotate device position/do not position patient on device   Turn/reposition every 2 hours/use positioning/transfer devices     Problem: Nutrition  Goal: Consume prescribed supplement  Outcome: Progressing  Goal: Nutrition support goals are met within 48 hrs  Outcome: Progressing  Goal: Nutrition support is meeting 75% of nutrient needs  Outcome: Progressing  Goal: BG  mg/dL  Outcome: Progressing  Goal: Lab values WNL  Outcome: Progressing  Goal: Electrolytes WNL  Outcome: Progressing  Goal: Promote healing  Outcome: Progressing  Goal: Maintain stable weight  Outcome: Progressing  Goal: Reduce weight from edema/fluid  Outcome: Progressing     Problem: Respiratory  Goal: No signs of respiratory distress (eg. Use of accessory muscles. Peds grunting)  Outcome: Progressing  Goal: Clear secretions with interventions this shift  Outcome: Progressing  Goal: Minimize anxiety/maximize coping throughout shift  Outcome:  Progressing  Goal: Minimal/no exertional discomfort or dyspnea this shift  Outcome: Progressing  Goal: Patent airway maintained this shift  Outcome: Progressing  Goal: Tolerate mechanical ventilation evidenced by VS/agitation level this shift  Outcome: Progressing  Goal: Tolerate pulmonary toileting this shift  Outcome: Progressing  Goal: Verbalize decreased shortness of breath this shift  Outcome: Progressing  Goal: Wean oxygen to maintain O2 saturation per order/standard this shift  Outcome: Progressing  Goal: Increase self care and/or family involvement in next 24 hours  Outcome: Progressing     Problem: Pain  Goal: Takes deep breaths with improved pain control throughout the shift  Outcome: Progressing  Goal: Turns in bed with improved pain control throughout the shift  Outcome: Progressing  Goal: Walks with improved pain control throughout the shift  Outcome: Progressing  Goal: Performs ADL's with improved pain control throughout shift  Outcome: Progressing  Goal: Participates in PT with improved pain control throughout the shift  Outcome: Progressing  Goal: Free from opioid side effects throughout the shift  Outcome: Progressing  Goal: Free from acute confusion related to pain meds throughout the shift  Outcome: Progressing     Problem: Fall/Injury  Goal: Not fall by end of shift  Outcome: Progressing  Goal: Be free from injury by end of the shift  Outcome: Progressing  Goal: Verbalize understanding of personal risk factors for fall in the hospital  Outcome: Progressing  Goal: Verbalize understanding of risk factor reduction measures to prevent injury from fall in the home  Outcome: Progressing  Goal: Use assistive devices by end of the shift  Outcome: Progressing  Goal: Pace activities to prevent fatigue by end of the shift  Outcome: Progressing     Problem: Mechanical Ventilation  Goal: ET tube will be managed safely  Outcome: Progressing

## 2024-11-04 NOTE — PRE-PROCEDURE NOTE
Pre Hemodialysis Report from Sending RN:    Report From: Cecelia Cassidy/RN  Recent Surgery of Procedure: No  Baseline Level of Consciousness (LOC): Alert X1  Oxygen Use: Yes  Type: Vent  Diabetic: No  Last BP Med Given Day of Dialysis: See Mar  Last Pain Med Given: See Mar  Lab Tests to be Obtained with Dialysis: No  Blood Transfusion to be Given During Dialysis: No  Available IV Access: Yes  Medications to be Administered During Dialysis: No  Continuous IV Infusion Running: No  Restraints on Currently or in the Last 24 Hours: Yes  Hand-Off Communication: Verbal  Dialysis Catheter Dressing: No   Last Dressing Change: 11/6/24    Upon arrival pt having peg tube placed slight delay on starting.

## 2024-11-04 NOTE — PROGRESS NOTES
"Narda Malloy is a 69 y.o. female on day 23 of admission presenting with Unresponsive.    Subjective   Symptoms (0 - 10, Best to Worst)  [facility] Symptom Assessment System  0-10 (Numeric) Pain Score: 0 - No pain  And sedated, off all sedation, nodding yes no to questions appropriately.  Denies pain.       Objective     Vitals and nursing note reviewed.   Constitutional:       General: She is not in acute distress.     Appearance: She is ill-appearing.   HENT:      Head: Normocephalic and atraumatic.      Comments: intubated     Nose: Nose normal.      Mouth/Throat:      Mouth: Mucous membranes are moist.      Pharynx: Oropharynx is clear.   Eyes:      Extraocular Movements: Extraocular movements intact.      Pupils: Pupils are equal, round, and reactive to light.   Neck:      Vascular: No carotid bruit.   Cardiovascular:      Rate and Rhythm: Normal rate and regular rhythm.      Heart sounds: No murmur heard.     Comments: Line in place, dressing intact.   Weak pulses.   Pulmonary:      Effort: Pulmonary effort is normal. No respiratory distress.      Comments: Rhonchi noted, diminished bases.   Intubated, on vent  Abdominal:      General: Abdomen is flat. Bowel sounds are normal. There is no distension.      Palpations: Abdomen is soft.      Tenderness: There is no abdominal tenderness.   Musculoskeletal:         General: No swelling, tenderness, deformity or signs of injury. Normal range of motion.      Cervical back: Normal range of motion and neck supple.   Skin:     General: Skin is warm and dry.      Capillary Refill: Capillary refill takes 2 to 3 seconds.      Coloration: Skin is pale.   Neurological:      Mental Status: She is alert.      Comments: nods appropriately yes no to all questions, follows all commands.    Last Recorded Vitals  Blood pressure 131/53, pulse 59, temperature 36.9 °C (98.4 °F), temperature source Oral, resp. rate 16, height 1.778 m (5' 10\"), weight 117 kg (258 lb 6.1 oz), SpO2 " 99%.  Intake/Output last 3 Shifts:  I/O last 3 completed shifts:  In: 1187.8 (10.1 mL/kg) [I.V.:757.8 (6.5 mL/kg); NG/GT:430]  Out: 2030 (17.3 mL/kg) [Emesis/NG output:30; Other:2000]  Weight: 117.2 kg     Relevant Results  Results for orders placed or performed during the hospital encounter of 10/12/24 (from the past 24 hours)   POCT GLUCOSE   Result Value Ref Range    POCT Glucose 158 (H) 74 - 99 mg/dL   POCT GLUCOSE   Result Value Ref Range    POCT Glucose 150 (H) 74 - 99 mg/dL   Renal function panel   Result Value Ref Range    Glucose 172 (H) 74 - 99 mg/dL    Sodium 133 (L) 136 - 145 mmol/L    Potassium 4.2 3.5 - 5.3 mmol/L    Chloride 99 98 - 107 mmol/L    Bicarbonate 28 21 - 32 mmol/L    Anion Gap 10 10 - 20 mmol/L    Urea Nitrogen 20 6 - 23 mg/dL    Creatinine 1.71 (H) 0.50 - 1.05 mg/dL    eGFR 32 (L) >60 mL/min/1.73m*2    Calcium 7.6 (L) 8.6 - 10.3 mg/dL    Phosphorus 2.6 2.5 - 4.9 mg/dL    Albumin 2.2 (L) 3.4 - 5.0 g/dL   Calcium, ionized   Result Value Ref Range    POCT Calcium, Ionized 1.10 1.1 - 1.33 mmol/L   Magnesium   Result Value Ref Range    Magnesium 2.21 1.60 - 2.40 mg/dL   POCT GLUCOSE   Result Value Ref Range    POCT Glucose 181 (H) 74 - 99 mg/dL   POCT GLUCOSE   Result Value Ref Range    POCT Glucose 149 (H) 74 - 99 mg/dL   CBC and Auto Differential   Result Value Ref Range    WBC 5.3 4.4 - 11.3 x10*3/uL    nRBC 0.0 0.0 - 0.0 /100 WBCs    RBC 2.12 (L) 4.00 - 5.20 x10*6/uL    Hemoglobin 6.6 (L) 12.0 - 16.0 g/dL    Hematocrit 20.9 (L) 36.0 - 46.0 %    MCV 99 80 - 100 fL    MCH 31.1 26.0 - 34.0 pg    MCHC 31.6 (L) 32.0 - 36.0 g/dL    RDW 19.2 (H) 11.5 - 14.5 %    Platelets 202 150 - 450 x10*3/uL    Neutrophils % 76.7 40.0 - 80.0 %    Immature Granulocytes %, Automated 1.1 (H) 0.0 - 0.9 %    Lymphocytes % 14.4 13.0 - 44.0 %    Monocytes % 6.1 2.0 - 10.0 %    Eosinophils % 1.1 0.0 - 6.0 %    Basophils % 0.6 0.0 - 2.0 %    Neutrophils Absolute 4.04 1.20 - 7.70 x10*3/uL    Immature Granulocytes  Absolute, Automated 0.06 0.00 - 0.70 x10*3/uL    Lymphocytes Absolute 0.76 (L) 1.20 - 4.80 x10*3/uL    Monocytes Absolute 0.32 0.10 - 1.00 x10*3/uL    Eosinophils Absolute 0.06 0.00 - 0.70 x10*3/uL    Basophils Absolute 0.03 0.00 - 0.10 x10*3/uL   Hepatic function panel   Result Value Ref Range    Albumin 2.2 (L) 3.4 - 5.0 g/dL    Bilirubin, Total 0.3 0.0 - 1.2 mg/dL    Bilirubin, Direct 0.1 0.0 - 0.3 mg/dL    Alkaline Phosphatase 106 33 - 136 U/L    ALT 8 7 - 45 U/L    AST 10 9 - 39 U/L    Total Protein 5.2 (L) 6.4 - 8.2 g/dL   Magnesium   Result Value Ref Range    Magnesium 2.17 1.60 - 2.40 mg/dL   Type and screen   Result Value Ref Range    ABO TYPE A     Rh TYPE NEG     ANTIBODY SCREEN NEG    Phosphorus   Result Value Ref Range    Phosphorus 3.0 2.5 - 4.9 mg/dL   Basic Metabolic Panel   Result Value Ref Range    Glucose 142 (H) 74 - 99 mg/dL    Sodium 134 (L) 136 - 145 mmol/L    Potassium 4.3 3.5 - 5.3 mmol/L    Chloride 99 98 - 107 mmol/L    Bicarbonate 29 21 - 32 mmol/L    Anion Gap 10 10 - 20 mmol/L    Urea Nitrogen 21 6 - 23 mg/dL    Creatinine 1.88 (H) 0.50 - 1.05 mg/dL    eGFR 29 (L) >60 mL/min/1.73m*2    Calcium 7.7 (L) 8.6 - 10.3 mg/dL   aPTT   Result Value Ref Range    aPTT 55.5 (H) 22.0 - 32.5 seconds   Morphology   Result Value Ref Range    RBC Morphology See Below     Polychromasia Mild     Ovalocytes Few     [place] Cells Few    Creatine Kinase   Result Value Ref Range    Creatine Kinase 42 0 - 215 U/L   Prepare RBC: 1 Units   Result Value Ref Range    PRODUCT CODE S2491P80     Unit Number [id number]     Unit ABO A     Unit RH NEG     XM INTEP COMP     Dispense Status XM     Blood Expiration Date 11/27/2024 11:59:00 PM EST     PRODUCT BLOOD TYPE 0600     UNIT VOLUME 350    POCT GLUCOSE   Result Value Ref Range    POCT Glucose 159 (H) 74 - 99 mg/dL   POCT GLUCOSE   Result Value Ref Range    POCT Glucose 170 (H) 74 - 99 mg/dL   CBC   Result Value Ref Range    WBC 5.7 4.4 - 11.3 x10*3/uL    nRBC 0.0  0.0 - 0.0 /100 WBCs    RBC 2.36 (L) 4.00 - 5.20 x10*6/uL    Hemoglobin 7.2 (L) 12.0 - 16.0 g/dL    Hematocrit 23.2 (L) 36.0 - 46.0 %    MCV 98 80 - 100 fL    MCH 30.5 26.0 - 34.0 pg    MCHC 31.0 (L) 32.0 - 36.0 g/dL    RDW 19.1 (H) 11.5 - 14.5 %    Platelets 240 150 - 450 x10*3/uL   Blood Gas Venous Full Panel   Result Value Ref Range    POCT pH, Venous 7.42 7.33 - 7.43 pH    POCT pCO2, Venous 44 41 - 51 mm Hg    POCT pO2, Venous 45 35 - 45 mm Hg    POCT SO2, Venous 81 (H) 45 - 75 %    POCT Oxy Hemoglobin, Venous 79.5 (H) 45.0 - 75.0 %    POCT Hematocrit Calculated, Venous 22.0 (L) 36.0 - 46.0 %    POCT Sodium, Venous 130 (L) 136 - 145 mmol/L    POCT Potassium, Venous 4.8 3.5 - 5.3 mmol/L    POCT Chloride, Venous 100 98 - 107 mmol/L    POCT Ionized Calicum, Venous 1.14 1.10 - 1.33 mmol/L    POCT Glucose, Venous 166 (H) 74 - 99 mg/dL    POCT Lactate, Venous 0.7 0.4 - 2.0 mmol/L    POCT Base Excess, Venous 3.6 (H) -2.0 - 3.0 mmol/L    POCT HCO3 Calculated, Venous 28.5 (H) 22.0 - 26.0 mmol/L    POCT Hemoglobin, Venous 7.3 (L) 12.0 - 16.0 g/dL    POCT Anion Gap, Venous 6.0 (L) 10.0 - 25.0 mmol/L    Patient Temperature 37.0 degrees Celsius    FiO2 40 %   Vascular US upper extremity venous duplex right   Result Value Ref Range    BSA 2.41 m2     *Note: Due to a large number of results and/or encounters for the requested time period, some results have not been displayed. A complete set of results can be found in Results Review.    XR chest 1 view    Result Date: 11/3/2024  Interpreted By:  Lisseth Rocha, STUDY: XR CHEST 1 VIEW 11/3/2024 5:30 am   INDICATION: Signs/Symptoms:Continued intubation   COMPARISON: 11/02/2024   ACCESSION NUMBER(S): [id number]   ORDERING CLINICIAN: STEVIE ZHAO   TECHNIQUE: AP view   FINDINGS: Triple-lumen catheter noted in the right neck. Endotracheal tube and nasogastric tube are again seen. Endotracheal tube tip obscured by spinal fusion hardware. Left subclavian PICC line noted overlying the  superior vena cava. Spinal fusion rods in the thoracic and lumbar spine   Again seen is pulmonary venous congestion with bilateral perihilar airspace opacification possibly pulmonary edema. Suspected small left pleural effusion again noted.       Bilateral perihilar airspace opacification similar to the prior exam possibly due to pulmonary edema or pneumonia with life-support devices unchanged since the prior exam.   Signed by: Lisseth Rocha 11/3/2024 1:57 PM Dictation workstation:   [id number]    Transthoracic Echo (TTE) Complete    Result Date: 11/3/2024           [place] [address]            Phone [phone] TRANSTHORACIC ECHOCARDIOGRAM REPORT Patient Name:       KIMMIE ROSALES LILIA       Reading Physician:    [id number] Valeria Mays MD Study Date:         11/2/2024            Ordering Provider:    [id number] SERINA BUTCHER MRN/PID:            [id number]             Fellow: Accession#:         [id number]         Nurse: Date of Birth/Age:  1955 / 69 years Sonographer:          Camila GIANG Gender Assigned at  F                    Additional Staff: Birth: Height:             177.80 cm            Admit Date:           11/2/2024 Weight:             113.40 kg            Admission Status:     Inpatient -                                                                Routine BSA / BMI:          2.29 m2 / 35.87      Department Location:                     kg/m2 Blood Pressure: 132 /49 mmHg Study Type:    TRANSTHORACIC ECHO (TTE) COMPLETE Diagnosis/ICD: Generalized edema-R60.1 Indication:    Anasarca Acute Renal Failure revurent Effusions Hypoxia CPT Codes:     Echo Complete w Full Doppler-[id number] Patient History: Pertinent History: HTN HLD DMII Former Smoker CAD Dyslipidemia. Study  Detail: The following Echo studies were performed: 2D, M-Mode, Doppler,               color flow and Strain. A bubble study was not performed.  PHYSICIAN INTERPRETATION: Left Ventricle: The left ventricular systolic function is normal, with a visually estimated ejection fraction of 60-65%. There is borderline concentric left ventricular hypertrophy. There are no regional wall motion abnormalities. The left ventricular cavity size is normal. Left Ventricular Global Longitudinal Strain - -17.4 %. Spectral Doppler shows a normal pattern of left ventricular diastolic filling. Left Atrium: The left atrium is normal in size. A bubble study using agitated saline was not performed. Right Ventricle: The right ventricle is normal in size. There is normal right ventricular global systolic function. Right Atrium: The right atrium is normal in size. Aortic Valve: The aortic valve is trileaflet. There is mild aortic valve thickening. There is evidence of mildly elevated transaortic gradients consistent with sclerosis of the aortic valve. There is no evidence of aortic valve regurgitation. The peak instantaneous gradient of the aortic valve is 10 mmHg. Mitral Valve: The mitral valve is normal in structure. There is mild mitral annular calcification. There is mild mitral valve regurgitation. Tricuspid Valve: The tricuspid valve is structurally normal. There is mild tricuspid regurgitation. The Doppler estimated RVSP is mild to moderately elevated at 52.8 mmHg. Pulmonic Valve: The pulmonic valve is structurally normal. There is physiologic pulmonic valve regurgitation. Pericardium: Trivial pericardial effusion. Aorta: The aortic root is normal. There is an aneurysm involving the ascending aorta. 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 Systemic Veins: The inferior vena cava appears normal in size, with IVC inspiratory collapse greater than 50%.  CONCLUSIONS:  1. The left ventricular systolic function is  normal, with a visually estimated ejection fraction of 60-65%.  2. There is normal right ventricular global systolic function.  3. Mild mitral valve regurgitation.  4. Mild to moderately elevated right ventricular systolic pressure.  5. Mild tricuspid regurgitation is visualized.  6. Aortic valve sclerosis.  7. Aneurysm of the ascending aorta.  8. 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 QUANTITATIVE DATA SUMMARY:  2D MEASUREMENTS:           Normal Ranges: LAs:             3.70 cm   (2.7-4.0cm) IVSd:            1.35 cm   (0.6-1.1cm) LVPWd:           1.14 cm   (0.6-1.1cm) LVIDd:           4.44 cm   (3.9-5.9cm) LVIDs:           2.93 cm LV Mass Index:   89.2 g/m2 LV % FS          34.0 %  LA VOLUME:                   Normal Ranges: LA Vol A4C:        43.8 ml   (22+/-6mL/m2) LA Vol Index A4C:  19.1ml/m2 LA Area A4C:       15.8 cm2 LA Major Axis A4C: 4.8 cm LA Vol A4C:        34.8 ml  RA VOLUME BY A/L METHOD:            Normal Ranges: RA Vol A4C:              23.6 ml    (8.3-19.5ml) RA Vol Index A4C:        10.3 ml/m2 RA Area A4C:             11.3 cm2 RA Major Axis A4C:       4.6 cm  AORTA MEASUREMENTS:         Normal Ranges: Ao Sinus, d:        3.18 cm (2.1-3.5cm) Ao STJ, d:          3.13 cm (1.7-3.4cm) Asc Ao, d:          3.80 cm (2.1-3.4cm)  LV SYSTOLIC FUNCTION BY 2D PLANIMETRY (MOD):                                         Normal Ranges: EF-A4C View:                      59 %  (>=55%) EF-A2C View:                      71 % EF-Biplane:                       65 % EF-Visual:                        63 % LV EF Reported:                   63 % Global Longitudinal Strain (GLS): -17 %  LV DIASTOLIC FUNCTION:            Normal Ranges: MV Peak E:             1.31 m/s   (0.7-1.2 m/s) MV Peak A:             1.01 m/s   (0.42-0.7 m/s) E/A Ratio:             1.30       (1.0-2.2) MV e'                  0.085 m/s  (>8.0) MV lateral e'          0.10 m/s MV medial e'           0.07 m/s E/e' Ratio:             15.38      (<8.0) PulmV Sys Jerrell:         54.60 cm/s PulmV Daugherty Jerrell:        46.10 cm/s PulmV S/D Jerrell:         1.20 PulmV A Revs Jerrell:      57.30 cm/s  MITRAL VALVE:          Normal Ranges: MV DT:        225 msec (150-240msec)  AORTIC VALVE:           Normal Ranges: AoV Vmax:      1.56 m/s (<=1.7m/s) AoV Peak P.7 mmHg (<20mmHg) LVOT Max Jerrell:  1.62 m/s (<=1.1m/s) LVOT VTI:      29.40 cm LVOT Diameter: 2.10 cm  (1.8-2.4cm) AoV Area,Vmax: 3.60 cm2 (2.5-4.5cm2)  RIGHT VENTRICLE: RV Basal 3.45 cm RV Mid   3.32 cm RV Major 6.2 cm TAPSE:   14.7 mm RV s'    0.18 m/s  TRICUSPID VALVE/RVSP:          Normal Ranges: Peak TR Velocity:     3.53 m/s RV Syst Pressure:     53 mmHg  (< 30mmHg) IVC Diam:             1.55 cm  PULMONIC VALVE:          Normal Ranges: PV Accel Time:  74 msec  (>120ms) PV Max Jerrell:     1.1 m/s  (0.6-0.9m/s) PV Max P.7 mmHg  Pulmonary Veins: PulmV A Revs Jerrell: 57.30 cm/s PulmV Daugherty Jerrell:   46.10 cm/s PulmV S/D Jerrell:    1.20 PulmV Sys Jerrell:    54.60 cm/s  [id number] Valeria Mays MD Electronically signed on 11/3/2024 at 9:40:38 AM  ** Final **     XR chest 1 view    Result Date: 2024  Interpreted By:  Mayuri Velez, STUDY: XR CHEST 1 VIEW;  2024 5:46 am   INDICATION: Signs/Symptoms:Continued intubation.   COMPARISON: 2024   ACCESSION NUMBER(S): [id number]   ORDERING CLINICIAN: STEVIE ZHAO   FINDINGS: CARDIOMEDIASTINAL SILHOUETTE: Cardiomediastinal silhouette is normal in size and configuration. There is a left PICC line with the tip in the superior vena cava. There is an endotracheal tube with the tip not seen because of upper thoracic dorsal fusion rods.   LUNGS: There is bibasilar pneumonia with improvement in aeration at the right lung base. There is less consolidation.   ABDOMEN: No remarkable upper abdominal findings. There is a nasogastric tube with the tip not seen but is well beyond the gastroesophageal junction.   BONES: No acute osseous changes. There are dorsal fusion rods  bilaterally involving the in the tire thoracic and visualized upper lumbar spine.       1. Endotracheal tube tip not seen because of obscuration from upper thoracic dorsal fusion rods. 2. Bibasilar pneumonia with improved aeration at the right lung base.   MACRO: None   Signed by: Mayuri Velez 11/2/2024 12:42 PM Dictation workstation:   [id number]    XR chest 1 view    Result Date: 11/1/2024  Interpreted By:  Parmjit Mancini, STUDY: XR CHEST 1 VIEW   INDICATION: Signs/Symptoms:post intubation.   COMPARISON: Earlier the same day.   ACCESSION NUMBER(S): [id number]   ORDERING CLINICIAN: SERINA BUTCHER   FINDINGS: Intubation again noted. Unfortunately the tip of the endotracheal tube not definitively visualized as it is obscured by some of the thoracic fusion hardware. It looks to be near the aortic knob.   Bilateral pleural effusions and basilar edema unchanged.   No evidence of pneumothorax.       Unchanged appearance of the chest as above.   Signed by: Parmjit Mancini 11/1/2024 7:04 PM Dictation workstation:   [id number]    XR chest 1 view    Result Date: 11/1/2024  Interpreted By:  Joyce Fontaine, STUDY: XR CHEST 1 VIEW 11/1/2024 6:06 am   INDICATION: Signs/Symptoms:Continued intubation   COMPARISON: 10/31/2024   ACCESSION NUMBER(S): [id number]   ORDERING CLINICIAN: STEVIE ZHAO   TECHNIQUE: Single AP view chest   FINDINGS: Hazy opacities identified within bilateral lung fields mid to lower lung zones with component of layering effusions and compressive atelectasis suggested. Right IJ line is demonstrated with tip in the proximal SVC. Left-sided PICC line with tip in the region of the distal SVC. NG tube is in place with interval removal of the ET tube.   Spinal fusion demonstrated. Cardiac silhouette within normal limits                 1. Persistent hazy opacities mid to lower lung zones bilaterally with layering basilar effusions and compressive atelectasis suggested.   2. Interval removal of the ET tube.  Remainder of the lines and tubes are unremarkable.   Signed by: Joyce Fontaine 11/1/2024 10:56 AM Dictation workstation:   [id number]    XR chest 1 view    Result Date: 10/31/2024  Interpreted By:  Andrew Byrd, STUDY: XR CHEST 1 VIEW; 10/31/2024 4:55 am   INDICATION: CLINICAL INFORMATION: Signs/Symptoms:Continued intubation.   COMPARISON: 10/30/2024 at 0433 hours   ACCESSION NUMBER(S): [id number]   ORDERING CLINICIAN: STEVIE ZHAO   TECHNIQUE: Portable chest one view.   FINDINGS: The cardiac size is indeterminate in view of the AP projection.  The tube and line placement is unchanged.  There is no change in the bilateral infiltrates and effusions. Tube and line placement is somewhat difficult to assess due to overlying surgical hardware.       No change in the bilateral infiltrates and effusions.   MACRO: none   Signed by: Andrew Byrd 10/31/2024 7:42 AM Dictation workstation:   [id number]    XR chest 1 view    Result Date: 10/30/2024  Interpreted By:  Andrew Byrd, STUDY: XR CHEST 1 VIEW; 10/30/2024 4:46 am   INDICATION: CLINICAL INFORMATION: Signs/Symptoms:Continued intubation.   COMPARISON: 10/29/2024 0832 hours   ACCESSION NUMBER(S): [id number]   ORDERING CLINICIAN: STEVIE ZHAO   TECHNIQUE: Portable chest one view.   FINDINGS: The cardiac size is indeterminate in view of the AP projection. There is no change in the bilateral infiltrates and effusions compared to the prior study. Tube and line placement is somewhat difficult to assess due to the extensive thoracic spinal hardware.       There is no interval change when compared to the previous examination.   MACRO: none   Signed by: Andrew Byrd 10/30/2024 9:00 AM Dictation workstation:   [id number]    Vascular US upper extremity venous duplex left    Result Date: 10/29/2024           [place] [address]            Phone [phone]  Vascular Lab Report  VASC US UPPER EXTREMITY VENOUS DUPLEX LEFT  Patient Name:      KIMMIE ROSALES RODRIGUES       Reading Physician:  [id number] Derek Michelle MD, RPVI Study Date:        10/29/2024           Ordering Provider:  [id number] SAIGE MULLER MRN/PID:           [id number]             Fellow: Accession#:        [id number]         Technologist:       Junior Baltazar Date of Birth/Age: 1955 / 69 years Technologist 2:     Tanya Blank RVT Gender:            F                    Encounter#:         [id number] Admission Status:  Inpatient            Location Performed: [facility]  Diagnosis/ICD: Left arm swelling-M79.89 Indication:    Limb swelling CPT Codes:     24204 Peripheral venous duplex scan for DVT Limited  Pertinent History: Left cephalic vein superficial thrombophlebitis noted on                    ultrasound in radiology 10/14/24.  **CRITICAL RESULT** Critical Result: Acute, non-occlusive DVT right internal jugular vein around line placement. Notification called to Saige Muller CNP on 10/29/2024 at 4:00:00 PM by Tanya Blank RVT.  CONCLUSIONS: Right Upper Venous: Acute, non-occlusive, focal deep vein thrombus of right internal jugular vein around line placement. Visualized portions of subclavian and innominate veins appear patent. Left Upper Venous: No evidence of acute deep vein thrombus visualized in the left upper extremity. Acute, occlusive superficial vein thrombosis of left cephalic vein from mid-distal upper arm extending to approximately 2.0cm proximal to subclavian junction. There is a left subclavian vein catheter noted in place.  Imaging & Doppler Findings:  Right            Compressible      Thrombus              Flow Internal Jugular   Partial    Acute non-occlusive Subclavian                                        Spontaneous/Phasic  Left                Compress    Thrombus Internal Jugular      Yes         None Subclavian             Yes         None Subclavian Proximal   Yes         None Subclavian Mid        Yes         None Subclavian Distal     Yes         None Axillary              Yes         None Brachial              Yes         None Cephalic               No    Acute occlusive Basilic               Yes         None  [id number] JOSIAH Preciado MD Electronically signed by [id number] JOSIAH Preciado MD on 10/29/2024 at 9:41:07 PM  ** Final **     XR abdomen 1 view    Result Date: 10/29/2024  Interpreted By:  Mayuri Velez, STUDY: XR ABDOMEN 1 VIEW;  10/29/2024 8:51 am. 2 views.   INDICATION: Signs/Symptoms:high residual of tube feeds.   COMPARISON: 10/1924   ACCESSION NUMBER(S): [id number]   ORDERING CLINICIAN: SAIGE MULLER   FINDINGS: Bowel gas pattern is nonspecific and nonobstructive.  There is a nasogastric tube with the tip in the distal stomach. There is a 2nd nasogastric tube with the tip in the proximal stomach. There is no gastric distention.   There is partial atherosclerotic calcification of the abdominal aorta.   There is left basilar consolidation with small left pleural effusion.   Osseous structures demonstrate no acute bony changes.   There are lower thoracic and lumbar dorsal fusion rods.       Nonspecific, nonobstructive bowel gas pattern. There are 2 nasogastric tubes. The tip of one of the tubes as in the distal stomach and the tip of the 2nd tube is in the proximal stomach. Left basilar consolidation with a small left pleural effusion.   MACRO: None   Signed by: Mayuri Velez 10/29/2024 9:36 AM Dictation workstation:   [id number]    XR chest 1 view    Result Date: 10/29/2024  Interpreted By:  Lily Mancini, STUDY: XR CHEST 1 VIEW;  10/29/2024 8:51 am   INDICATION: Signs/Symptoms:R/O aspiration. Residual feeds   COMPARISON: 10/28/2024; CT chest dated 09/26/2024   ACCESSION NUMBER(S): [id number]   ORDERING CLINICIAN: SAIGE MULLER   FINDINGS: In size.   There is bilateral parenchymal infiltration. There may be  bilateral pleural effusions.   There are calcified granulomas.   A nasogastric tube terminates below the diaphragm.   There appears to be a PICC line on the left with the tip in the region of superior vena cava.   There is also of jugular catheter on the right. The tip is not well seen.   The patient is status post spinal fusion.   COMPARISON OF FINDING: The chest is similar.       Bilateral parenchymal infiltrates. Bilateral pleural effusions.   MACRO: none   Signed by: Lily Mancini 10/29/2024 9:20 AM Dictation workstation:   [id number]    XR chest 1 view    Result Date: 10/28/2024  Interpreted By:  Andrew Byrd, STUDY: XR CHEST 1 VIEW; 10/28/2024 11:33 am   INDICATION: CLINICAL INFORMATION: Signs/Symptoms:more secretions.   COMPARISON: 10/26/2024   ACCESSION NUMBER(S): [id number]   ORDERING CLINICIAN: MARIAN GILBERT   TECHNIQUE: Portable chest one view.   FINDINGS: The cardiac size is indeterminate in view of the AP projection. Bilateral perihilar infiltrate is present. Bilateral effusions are present, greater than left. Increased density at the left base suggest consolidation within left lower lobe. The tube and line placement is unchanged.       Bilateral infiltrates and effusions. There is no interval change when compared to the previous examination.   MACRO: none   Signed by: Andrew Byrd 10/28/2024 12:05 PM Dictation workstation:   [id number]    XR chest 1 view    Result Date: 10/26/2024  Interpreted By:  Mar Watkins, STUDY: XR CHEST 1 VIEW;  10/26/2024 9:21 am   INDICATION: Signs/Symptoms:post chest tube removal.     COMPARISON: 10/25/2024   ACCESSION NUMBER(S): [id number]   ORDERING CLINICIAN: STEVIE ZHAO   FINDINGS: Multiple support devices remain in place including ET tube, tip not well visualized, likely 2 enteric tubes extending into the upper abdomen, tips not included on the image, right jugular central line with tip overlying the proximal SVC and left subclavian PICC line with tip  extending to the region of the right atrium again seen. Additional presumed overlying monitoring/support devices. Extensive orthopedic hardware overlying the central chest/spine again seen.   Ill-defined bilateral chest opacities similar to the prior exam. No definite pneumothorax. The cardiac silhouette is within normal limits for size.       Multiple support devices in place as above. Persistent hazy bilateral chest opacities.   MACRO: None.   Signed by: Mar Watkins 10/26/2024 10:11 AM Dictation workstation:   [id number]    XR chest 1 view    Result Date: 10/25/2024  Interpreted By:  Parmjit Mancini, STUDY: XR CHEST 1 VIEW   INDICATION: Signs/Symptoms:post chest tube removal.   COMPARISON: October 24   ACCESSION NUMBER(S): [id number]   ORDERING CLINICIAN: MARIAN GILBERT   FINDINGS: Interval removal of the left-sided chest tube without pneumothorax.   Moderate left pleural effusion grossly similar.   Edema similar to prior.   Other lines and tubes grossly unchanged.       Interval left-sided chest tube removal without pneumothorax.   Signed by: Parmjit Mancini 10/25/2024 6:30 PM Dictation workstation:   [id number]    Bedside PICC Imaging    Result Date: 10/24/2024  These images are not reportable by radiology and will not be interpreted by  Radiologists.    XR chest 1 view    Result Date: 10/24/2024  Interpreted By:  Andrew Byrd, STUDY: XR CHEST 1 VIEW; 10/24/2024 9:16 am   INDICATION: CLINICAL INFORMATION: Signs/Symptoms:assess effusions, intubated, on CRRT.   COMPARISON: 10/23/2024 at 0401 hours   ACCESSION NUMBER(S): [id number]   ORDERING CLINICIAN: SERINA BUTCHER   TECHNIQUE: Portable chest one view.   FINDINGS: The cardiac size is indeterminate in view of the AP projection. There is no change in the left-sided chest tube. There is no change in the bibasilar density likely representing pleural effusions. Hazy bilateral perihilar infiltrates are suspected.       No change in the probable bilateral  infiltrates and effusions as above. Left-sided chest tube is present. There is no evidence for pneumothorax.   MACRO: none   Signed by: Andrew Byrd 10/24/2024 9:58 AM Dictation workstation:   [id number]    ECG 12 Lead    Result Date: 10/23/2024  Normal sinus rhythm Low voltage QRS Borderline ECG No previous ECGs available Confirmed by Milind Lang ([id number]) on 10/23/2024 4:02:31 PM    XR chest 1 view    Result Date: 10/23/2024  Interpreted By:  Stewart Leiva, STUDY: XR CHEST 1 VIEW;  10/23/2024 4:14 am   INDICATION: Signs/Symptoms:increased o2.   COMPARISON: 10/22/2024   ACCESSION NUMBER(S): [id number]   ORDERING CLINICIAN: BRIAN MANRIQUEZ   FINDINGS: Endotracheal tube, nasogastric tube and right central venous catheter is not well assessed secondary to projectional overlap with thoracolumbar fusion hardware. The cardiac silhouette is stable in size. There are bilateral airspace opacities and pleural effusions. Stable left chest tube. No pneumothorax.       Bilateral airspace opacities and pleural effusions. There is stable focal asymmetric prominent opacity in the right hilar region which may correspond to focal area of consolidation, however attention on continued progress imaging is recommended to assure resolution exclude other under pathology including mass.   MACRO: None   Signed by: Stewart Leiva 10/23/2024 4:31 AM Dictation workstation:   [id number]    XR chest 1 view    Result Date: 10/22/2024  Interpreted By:  Andrew Byrd, STUDY: XR CHEST 1 VIEW; 10/22/2024 6:36 am   INDICATION: CLINICAL INFORMATION: Signs/Symptoms:assess effusions. Unresponsive.   COMPARISON: 10/21/2024   ACCESSION NUMBER(S): [id number]   ORDERING CLINICIAN: SERINA BUTCHER   TECHNIQUE: Portable chest one view.   FINDINGS: The cardiac size is indeterminate in view of the AP projection. Pigtail catheter overlies the left hemithorax. Moderate bilateral pleural effusions are suspected. Perihilar hazy infiltrate is present. Bilateral  thoracic and lumbar pedicle screw and bar fusions are present. Right internal jugular venous catheter tip overlies the junction of the right and left innominate veins.   ETT may lie low possibly extending into the right mainstem bronchus but this study is limited due to overlying artifact from the patient's pedicle screw and bar fusion.       No change in the bilateral infiltrates and effusions. Left-sided chest tube without pneumothorax.   Endotracheal tube may be low in position possibly extending into the right mainstem bronchus although following the endotracheal tube is limited due to overlapping artifact from the patient's pedicle screw and bar fusion. Patient may benefit from obtaining RPO and LPO views of the chest to more definitively assess ET tube position.   MACRO: Andrew Byrd communicated these findings by "[employer]" secure chat with [facility] intensivist team on 10/22/2024 at 8:16 am.  (**-RCF-**) Findings: See findings.   Signed by: Andrew Byrd 10/22/2024 8:17 AM Dictation workstation:   [id number]    US renal complete    Result Date: 10/22/2024  Interpreted By:  Andrew Byrd, STUDY: US RENAL COMPLETE; 10/21/2024 7:54 pm   INDICATION: Signs/Symptoms:AMY.   COMPARISON: 09/26/2024   ACCESSION NUMBER(S): [id number]   ORDERING CLINICIAN: BLAKE NAIK   TECHNIQUE: Grayscale and color Doppler imaging of the kidneys   FINDINGS: COMMENTS: Technologist note indicates the exam was extremely limited due to portable technique and limited patient mobility.   The right kidney measures 13.3 cm  . The left kidney measures 12.1 cm  .     No renal stones are identified.  The renal cortical thickness and echogenicity is normal.  No hydronephrosis is identified.   Urinary bladder is collapsed around a Gibson catheter. This limited bladder assessment.   Small amount of ascites is present. Right pleural effusion is present.       Non specific appearance of the kidneys as described. Small amount of ascites. Right pleural  effusion.   MACRO: none   Signed by: Andrew Byrd 10/22/2024 8:08 AM Dictation workstation:   [id number]    XR chest 1 view    Result Date: 10/21/2024  Interpreted By:  Joyce Fontaine, STUDY: XR CHEST 1 VIEW 10/21/2024 8:00 am   INDICATION: Signs/Symptoms:eval ett, lines, lung fields   COMPARISON: 10/19/2024   ACCESSION NUMBER(S): [id number]   ORDERING CLINICIAN: DUSTY RICO   TECHNIQUE: Single AP view chest   FINDINGS: Endotracheal tube tip is obscured by hardware overlying the chest from thoracic spinal fusion. The endotracheal tube is not identified about the tip of the michi and is just distal to the level of the distal clavicles visualized portions. NG tube is in place.   Hazy opacity at the right lung base component of layering effusion and compressive atelectasis superimposed infiltrate not excluded. There is a calcified left suprahilar granuloma. Left-sided chest tube in place without evidence for pneumothorax. Hazy opacity retrocardiac area obscuring evaluation with component of layering effusion/compressive atelectasis not excluded.             1. Endotracheal tube obscured by patient positioning and hardware overlying the thoracic spine. The tube is not demonstrated about the level of the michi as seen just distal to the level of the clavicles. Follow up as clinically indicated.   2. Persistent right basilar effusion and compressive atelectasis. Superimposed infiltrate not excluded   3. Left-sided chest tube in place without pneumothorax. Retrocardiac area obscured with component of left basilar effusion/compressive atelectasis suggested.   Signed by: Joyec Fontaine 10/21/2024 9:14 AM Dictation workstation:   [id number]    XR chest 1 view    Result Date: 10/19/2024  Interpreted By:  Parmjit Mancini, STUDY: XR CHEST 1 VIEW   INDICATION: Signs/Symptoms:line placement.   COMPARISON: October 19, 2024 earlier the same day   ACCESSION NUMBER(S): [id number]   ORDERING CLINICIAN: SERINA BUTCHER    FINDINGS: Again the right-sided central line slightly overlies the soft tissues of the chest. The appearance is similar to the previous study. It is potentially in the SVC which may be projecting more laterally given the patient rotation but. Definitive intravascular location can not be confirmed. Correlate with line function.   Basilar edema.   No evidence of pneumothorax.       Again the right-sided central line slightly overlies the soft tissues of the chest. The appearance is similar to the previous study. It is potentially in the SVC which may be projecting more laterally given the patient rotation but. Definitive intravascular location can not be confirmed. Correlate with line function.   Signed by: Parmjit Mancini 10/19/2024 9:55 PM Dictation workstation:   [id number]    XR abdomen 1 view    Result Date: 10/19/2024  Interpreted By:  Parmjit Mancini, STUDY: XR ABDOMEN 1 VIEW   INDICATION: Signs/Symptoms:NG tube placement.   COMPARISON: Earlier same day   ACCESSION NUMBER(S): [id number]   ORDERING CLINICIAN: RESHA VATTY   FINDINGS: Nasogastric tube over the gastric body.   Bowel-gas pattern not fully assessed.       Nasogastric tube over the gastric body.   Signed by: Parmjit Mancini 10/19/2024 8:02 PM Dictation workstation:   [id number]    XR chest 1 view    Result Date: 10/19/2024  Interpreted By:  Parmjit Mancini, STUDY: XR CHEST 1 VIEW   INDICATION: Signs/Symptoms:ett placed.   COMPARISON: Earlier the same day 5:32 a.m.   ACCESSION NUMBER(S): [id number]   ORDERING CLINICIAN: RESHA VATTY   FINDINGS: Interval intubation with the endotracheal tube an approximate 4 cm proximal to the michi. Bilateral pleural effusions right worse than left with basilar edema, slightly worsened from prior.   Left-sided thoracostomy tube.   No evidence of pneumothorax.   Right-sided central line overlies the right chest near the apex. This is potentially within the SVC but is slightly lateral and its definitive intravascular location  not confirmed.       Right-sided central line overlies the right chest near the apex. This is potentially within the SVC but is slightly lateral and its definitive intravascular location not confirmed. Correlate with function.   Interval intubation.       Signed by: Parmjit Mancini 10/19/2024 8:02 PM Dictation workstation:   [id number]    XR chest 1 view    Result Date: 10/19/2024  Interpreted By:  Parmjit Mancini, STUDY: XR CHEST 1 VIEW   INDICATION: Signs/Symptoms:sob, ng tube placement.   COMPARISON: Earlier the same day   ACCESSION NUMBER(S): [id number]   ORDERING CLINICIAN: CHRISTIANO BRICENO   FINDINGS: Nasogastric tube overlies the gastric fundus.   Basilar edema and effusions similar to prior exam       Nasogastric tube overlies the gastric fundus.   Signed by: Parmjit Mancini 10/19/2024 5:34 PM Dictation workstation:   [id number]    XR chest 1 view    Result Date: 10/19/2024  Interpreted By:  Fern Mckeon, STUDY: XR CHEST 1 VIEW;  10/19/2024 5:51 am   INDICATION: Signs/Symptoms:assess effusions.     COMPARISON: 10/18/2024   ACCESSION NUMBER(S): [id number]   ORDERING CLINICIAN: SERINA BUTCHER   TECHNIQUE: Portable AP semi upright   FINDINGS: Spinal fixation hardware appears unchanged. Pigtail pleural catheter mid to lower left hemithorax projects laterally and is unchanged in position.   Heart is partially obscured by pleuroparenchymal opacities but is grossly unchanged in size. There is poor aeration of the lungs with considerable bilateral dependent atelectasis and bilateral pleural effusions. No pneumothorax is identified. Overall appearance is similar to the prior study. Osseous structures are grossly unchanged.       Bilateral atelectasis and pleural effusions with very poor aeration of the lungs, unchanged   MACRO: None.   Signed by: Fern Mckeon 10/19/2024 12:20 PM Dictation workstation:   [id number]    XR chest 1 view    Result Date: 10/18/2024  Interpreted By:  Joyce Fontaine, STUDY: XR CHEST 1 VIEW  10/18/2024 5:31 am   INDICATION: Signs/Symptoms:assess effusions   COMPARISON: 10/17/2024   ACCESSION NUMBER(S): [id number]   ORDERING CLINICIAN: SERINA BUTCHER   TECHNIQUE: Single portable AP view chest   FINDINGS: Cardiac silhouette is mildly enlarged. Examination is rotated accentuating the cardiac silhouette. Hazy opacity at the right lung base with component of layering basilar effusion and compressive atelectasis. Of note, left-sided chest tube in place without pneumothorax. Hazy opacity at the left lung base component of which may relate to compressive atelectasis or even small effusion. Spinal fusion noted.             1. Stable chest with persistent bilateral basilar effusions. No pneumothorax with left-sided percutaneous pigtail catheter in place.   Signed by: Joyce Fontaine 10/18/2024 9:02 AM Dictation workstation:   [id number]    XR chest 1 view    Result Date: 10/17/2024  Interpreted By:  Fern Mckeon, STUDY: XR CHEST 1 VIEW;  10/17/2024 3:12 pm   INDICATION: Signs/Symptoms:s/p pigtail placement.     COMPARISON: 10/17/2024 at 8:28 a.m.   ACCESSION NUMBER(S): [id number]   ORDERING CLINICIAN: SERINA BUTCHER   TECHNIQUE: Portable AP upright   FINDINGS: Since the earlier examination a pigtail catheter is been placed in the pleural space lateral mid left hemithorax. There is improved aeration in the left lung compared to the previous study. Continued opacity is present at the left lung base which is probably a combination of pleural fluid and atelectasis. There is no pneumothorax at the left apex. Shallow volume in the right lung appears similar. There is opacity at the right lung base which is unchanged and consistent with a combination of pleural fluid and atelectasis. Fixation hardware in the spine is partially visualized and is grossly unchanged.       Interval placement of a left pleural pigtail catheter and improved aeration of the left lung since the earlier study.   No pneumothorax    Bilateral basilar opacities consistent with a combination of pleural fluid and atelectasis   MACRO: None.   Signed by: Fern Mckeon 10/17/2024 4:36 PM Dictation workstation:   [id number]    XR chest 1 view    Result Date: 10/17/2024  Interpreted By:  Mayuri Velez, STUDY: XR CHEST 1 VIEW;  10/17/2024 8:56 am   INDICATION: Signs/Symptoms:fluid overload.   COMPARISON: 10/16/2024   ACCESSION NUMBER(S): [id number]   ORDERING CLINICIAN: SERINA BUTCHER   FINDINGS: CARDIOMEDIASTINAL SILHOUETTE: Cardiomediastinal silhouette is normal in size and configuration.     LUNGS: There has been interval development of complete opacification of the left hemithorax, likely a large left pleural effusion. There may be an infiltrate or compressive atelectasis at the left lung. There is a moderate-size right pleural effusion extending into the major fissure, unchanged.   ABDOMEN: No remarkable upper abdominal findings.     BONES: No acute osseous changes. Status post dorsal fusion of the thoracic spine and lumbar spine.       1. Interval complete opacification of the left hemithorax consistent with a large left pleural effusion. There is an infiltrate or compressive atelectasis at the left lung. 2. Unchanged moderate size right pleural effusion.   MACRO: None   Signed by: Mayuri Velez 10/17/2024 9:16 AM Dictation workstation:   [id number]    XR chest 1 view    Result Date: 10/16/2024  Interpreted By:  Vanessa Richardson, STUDY: XR CHEST 1 VIEW 10/16/2024 10:17 am   INDICATION: Hypoxia with increasing oxygen requirements   COMPARISON: 10/13/2024   ACCESSION NUMBER(S): [id number]   ORDERING CLINICIAN: KRYSTLE BRADY   TECHNIQUE: AP erect view of the chest   FINDINGS: Increasing opacification within the left mid and lower hemithorax is noted silhouetting the left cardiac border consistent with increasing size of left pleural effusion and probable left lower lobe atelectasis. There is left suprahilar infiltrate noted as well. On the  right, the pleural effusion has also increased in size with increasing atelectasis.   Calcified lymph nodes are seen within the AP window. There is postoperative change from spinal fusion in the thoracic and lumbar regions.   Endotracheal and nasogastric tubes have been removed.       Increasing size of the bilateral pleural effusions with worsening atelectasis.   There is now new left suprahilar infiltrate.   Signed by: Vanessa Richardson 10/16/2024 12:03 PM Dictation workstation:   [id number]    Upper extremity venous duplex bilateral    Result Date: 10/14/2024  Interpreted By:  Sean Alfred, STUDY: [facility] UPPER EXTREMITY VENOUS DUPLEX BILATERAL; 10/14/2024 4:48 pm   INDICATION: Signs/Symptoms:new onset upper extremity swelling.   COMPARISON: None.   ACCESSION NUMBER(S): [id number]   ORDERING CLINICIAN: SERINA BUTCHER   TECHNIQUE: 2D ultrasound and color-flow duplex images were obtained along with Doppler spectral waveform analysis of the deep venous system of the right upper extremity. Venous compression was performed. Also interrogated, contralateral subclavian vein.   FINDINGS:     Right upper Extremity: There is normal compressibility and flow within the visualized vessels of the deep venous system of this upper extremity. Visualized portions of the jugular vein and subclavian vein appear patent.   Left upper extremity: There is thrombus within the left cephalic vein, which is part of the superficial venous system of the upper extremity. Central line is also in place through the left cephalic vein. The left basilic vein could not be visualized, due to edema and positioning. The deep veins of the left upper extremity appear patent.       1. Thrombosis within the left cephalic vein, which is part of the superficial venous system of the upper extremity, adjacent to PICC line. 2. No deep venous thrombosis in the upper extremities.     Signed by: Sean Alfred 10/14/2024 4:59 PM Dictation workstation:    [id number]    ECG 12 lead    Result Date: 10/14/2024  Accelerated Junctional rhythm Low voltage QRS Nonspecific T wave abnormality Abnormal ECG No previous ECGs available Confirmed by Misha Corral ([id number]) on 10/14/2024 4:08:43 PM    XR chest 1 view    Result Date: 10/13/2024  Interpreted By:  Lazaro Butler, STUDY: XR CHEST 1 VIEW; 10/13/2024 11:42 am   INDICATION: Signs/Symptoms:NG tube advancement.   COMPARISON: None   ACCESSION NUMBER(S): [id number]   ORDERING CLINICIAN: SERINA BUTCHER   TECHNIQUE: 1 view of the chest was performed.   FINDINGS: Persistent layering left-sided pleural effusion with multifocal airspace opacities/atelectasis. Right upper perihilar vascular congestion. No pleural effusion. No pneumothorax.  The cardiomediastinal silhouette is within normal limits. Limited evaluation of supportive devices due to surgical spine hardware. Possibly tip of endotracheal tube 6.5 cm from the level of the michi. Distal tip of enteric tube likely terminating in the proximal stomach.       1. Persistent layering left-sided pleural effusion with multifocal airspace opacity/atelectasis. Persistent perihilar vascular congestion. 2. Advanced enteric feeding tube likely terminating now in the proximal stomach although difficult evaluation given thoracolumbar spine hardware.   Signed by: Lazaro Butler 10/13/2024 1:21 PM Dictation workstation:   [id number]    XR chest 1 view    Result Date: 10/13/2024  Interpreted By:  Dory Mcarthur, STUDY: XR CHEST 1 VIEW; 10/13/2024 8:50 am   INDICATION: Signs/Symptoms:wheezing, intubated, assess lung fields   COMPARISON: Radiographs 10/12/2024   ACCESSION NUMBER(S): [id number]   ORDERING CLINICIAN: SERINA BUTCHER   TECHNIQUE: Single frontal view of the chest performed.   FINDINGS: LINES AND DEVICES: Obscure by surgical hardware. *Tip of enteric tube appears to be in the proximal stomach and sidehole near the EG junction; consider advancing by 6 cm. *There appears to be a  esophageal temperature probe extending to the distal esophagus, the tip not well seen. *Tip of ET tube is estimated to be 6 cm above the michi, but not well seen.   LUNGS: Haziness throughout the left mid and lower lung and right lower lung with slightly improved aeration in the left lung apex. No new focal consolidation. No pneumothorax.   CARDIOMEDIASTINAL SILHOUETTE: The cardiomediastinal silhouette is stable.   OTHER: Extensive thoracolumbar instrumentation.       Decreased but persistent moderate left and small right layering effusions with improved left apical aeration.   Significantly limited evaluation of lines and devices due to surgical hardware. Best gross estimation as follows: *Tip of enteric tube appears to be in the proximal stomach and sidehole near the EG junction; consider advancing by 6 cm. *Possible esophageal temperature probe extending to the distal esophagus, the tip not well seen. *Tip of ET tube is estimated to be 6 cm above the michi, but not well seen.     MACRO Dory Mcarthur discussed the significance and urgency of this critical finding by [employer] secure chat with  SERINA BUTCHER on 10/13/2024 at 10:19 am.  (**-RCF-**) Findings:  See findings.   Signed by: Dory Mcarthur 10/13/2024 10:19 AM Dictation workstation:   [id number]    CT lumbar spine wo IV contrast    Result Date: 10/12/2024  Interpreted By:  Chip March, STUDY: CT LUMBAR SPINE WO IV CONTRAST;  10/12/2024 5:47 pm   INDICATION: Signs/Symptoms:r/o post operative infection/lesion.   COMPARISON: 09/25/2024   ACCESSION NUMBER(S): [id number]   ORDERING CLINICIAN: DALILA GEORGE   TECHNIQUE: Axial noncontrast images of the lumbar spine with coronal and sagittal reconstructed images.   FINDINGS: Limited study secondary to extensive prior surgical fusion of the visualized thoracic and lumbar spine.   Redemonstrated is levoscoliosis. Alignment is unchanged.   Previously discussed erosive appearing changes surrounding the L1-L2 level  surrounding the intervertebral cage has an unchanged appearance.   No new fracture or dislocation. No additional new evidence of significant hardware complication is identified.   Evaluation of the central canal and posterior paraspinal soft tissues is again markedly limited.   Partially visualized pleural effusion seen in the chest. Partially visualized NG tube is seen extending into the stomach.   Dense calcification of the aorta is redemonstrated.       Similar-appearing extensive postsurgical changes related to fusion of the thoracic and lumbar spine redemonstrated which appears to be essentially unchanged compared to imaging 09/25/2024. Again this study is markedly limited in its evaluation of the central canal and posterior paraspinal soft tissues. This limits evaluation for infection. Stable erosive appearing changes at the L1-L2 level surrounding the intervertebral cage, again infection at this site not excluded.   MACRO: None.   Signed by: Chip March 10/12/2024 6:50 PM Dictation workstation:   [id number]    CT head wo IV contrast    Result Date: 10/12/2024  Interpreted By:  Chip March, STUDY: CT HEAD WO IV CONTRAST;  10/12/2024 5:46 pm   INDICATION: Signs/Symptoms:AMS.   COMPARISON: None.   ACCESSION NUMBER(S): [id number]   ORDERING CLINICIAN: YASH BARRAGAN   TECHNIQUE: Noncontrast axial CT images of head were obtained with coronal and sagittal reconstructed images.   FINDINGS: BRAIN PARENCHYMA: Some areas of hypoattenuation/suspected encephalomalacia are seen involving the frontal lobes. Unchanged calcification right frontal lobe. No acute intraparenchymal hemorrhage or parenchymal evidence of acute large territory ischemic infarct. No mass-effect. Gray-white matter distinction is preserved.   VENTRICLES and EXTRA-AXIAL SPACES:  No acute extra-axial or intraventricular hemorrhage. No effacement of cerebral sulci. Ventricles and sulci are age-concordant.   PARANASAL SINUSES/MASTOIDS:  No hemorrhage  or air-fluid levels within the visualized paranasal sinuses. The mastoids are well aerated.   CALVARIUM/ORBITS:  No skull fracture. The orbits and globes are intact to the extent visualized.   EXTRACRANIAL SOFT TISSUES: No discernible abnormality.       No acute intracranial abnormality.     MACRO: None.   Signed by: Chip March 10/12/2024 6:42 PM Dictation workstation:   [id number]    XR chest 1 view    Result Date: 10/12/2024  Interpreted By:  Parmjit Mancini, STUDY: XR CHEST 1 VIEW   INDICATION: Signs/Symptoms:NGT.   COMPARISON: Earlier the same day   ACCESSION NUMBER(S): [id number]   ORDERING CLINICIAN: DALILA GEORGE   FINDINGS: The tip of the nasogastric tube not definitively visualized but the curvature of the tubing looks to read along the gastric body with the tip at least distal to the gastric body.       The tip of the nasogastric tube not definitively visualized but the curvature of the tubing looks to read along the gastric body with the tip at least distal to the gastric body.   Signed by: Parmjit Mancini 10/12/2024 5:35 PM Dictation workstation:   [id number]    XR chest 1 view    Result Date: 10/12/2024  Interpreted By:  Parmjit Mancini, STUDY: XR CHEST 1 VIEW   INDICATION: Signs/Symptoms:NG tube placement.   COMPARISON: Earlier the same day 3:47 p.m.   ACCESSION NUMBER(S): [id number]   ORDERING CLINICIAN: DALILA GEORGE   FINDINGS: Nasogastric tube tip looks to be centered roughly at the level of the gastroesophageal junction.   Left effusion and basilar airspace disease similar to previous         Nasogastric tube tip looks to be centered roughly at the level of the gastroesophageal junction.   Left effusion and basilar airspace disease similar to previous   Signed by: Parmjit Mancini 10/12/2024 5:34 PM Dictation workstation:   [id number]    XR chest 1 view    Result Date: 10/12/2024  Interpreted By:  Parmjit Mancini, STUDY: XR CHEST 1 VIEW   INDICATION: Signs/Symptoms:NGT placement.   COMPARISON: Earlier the  same day 2:49 p.m.   ACCESSION NUMBER(S): [id number]   ORDERING CLINICIAN: DALILA GEORGE   FINDINGS: Nasogastric tube tip not definitively confirmed, slightly obscured by the patient's spinal fusion hardware. It may be reflected upon itself in the distal esophagus.       Nasogastric tube tip not definitively confirmed, slightly obscured by the patient's spinal fusion hardware. It may be reflected upon itself in the distal esophagus.   Signed by: Parmjit Mancini 10/12/2024 3:57 PM Dictation workstation:   [id number]    XR chest 1 view    Result Date: 10/12/2024  Interpreted By:  Ildefonso Herring, STUDY: XR CHEST 1 VIEW;  10/12/2024 2:48 pm   INDICATION: Signs/Symptoms:post-intubation.     COMPARISON: None.   ACCESSION NUMBER(S): [id number]   ORDERING CLINICIAN: YASH BARRAGAN   FINDINGS: Endotracheal tube in place with its tip difficult to exactly visualize due to hardware about approximately 2.5 cm above the michi   CARDIOMEDIASTINAL SILHOUETTE: Cardiomediastinal silhouette is normal in size and configuration.   LUNGS: Extensive airspace disease in the left lung, worsened from the prior. There is a large left effusion as well..   ABDOMEN: No remarkable upper abdominal findings.   BONES: Extensive postsurgical change the thoracolumbar spine.       1. Limited visualization of the ET tube with its tip approximately 2.5 cm above the michi 2. Extensive left lung airspace disease and a large left effusion, worsening from the prior.       MACRO: None   Signed by: Ildefonso Herring 10/12/2024 3:43 PM Dictation workstation:   [id number]      Assessment/Plan   IMP:    Acute Respiratory Failure  Sepsis/Septic Shock  AMY  PNA healthcare associated  Wound Infection, surgical site  Encephalopathy  Pleural Effusion  Acute on chronic diastolic heart failure  Palliative Care     DNRCCA  Not Capable technically, but able to respond appropriately to questions and participate in discussion  Documented HPOA is spouse Godfrey, alternate is  daughter Kelly     11/4  Patient neurologically intact, no acute events overnight, off pressors, stable on low vent settings.  N.p.o. for PEG tube placement today at 1 PM.  Planning to proceed forward with tracheostomy Thursday per ENT consult note.  I was able to review planned procedure for today and tracheostomy with patient she did not yes/no appropriately during explanation.  She nodded yes that she was supportive of plan established by spouse and daughter.    11/3  Family meeting today. Francesco Bender PA-C, myself, spouse Godfrey, daughter Kelly present. Reviewed current condition. Discussed risks vs benefits of trach/PEG and need for interventions given her 3rd intubation, poor respiratory status. Family stated patient wanted to fight, not ready to consider comfort care at this time. Family tell us patient values her mental status. She could potentially tolerate loss of function, but would not find loss of cognition acceptable. They are hopeful that with adequate support, she has a potential for some recovery. They understand that patient is fragile and is high risk for further complications/setbacks. They ultimately would like patient to stabilze enough to participate in her own goals of care discussion. Family would like to proceed with ENT/surgery consult to pursue trach/peg. They want to remain in a treatment model of care. We discussed code status, and both daughter and spouse agree that if patient suffers a major cardiac event, CPR would not provide her benefit. Family all confirmed DNRCCA established by spouse 11/2.     Patient with chronic pain from back surgery/wounds. Goal is to stay off sedation and fentanyl gtt to optimize cognition. Critical care service put oxycodone prn in place for pain control. Remains on scheduled tylenol.     11/2  Family meeting today with spouse after extensive chart review and discussion with critical care team. Spouse spoke about patient's likely poor health  prior to surgery 7/24, and her overall decline since then. Most recently, spent <7 days at rehab before rehospitalizing. We reviewed together her most recent hospitalization and interventions, including Tablo, ventilator, pigtail and lack of improvement. Critical care team have already spoken to spouse about trach, peg and LTAC. We reviewed risks vs benefits of trach/peg and impact to quality of life. We also reviewed likelihood of a meaningful recovery even with prolonged life support. We reviewed her living will and I requested spouse interpret patient wishes for me to clarify whether patient would support trach/peg. He stated that her goal all along had been to recover and return home with spouse, but he acknowledged that at this point, this is not going to happen. He stated that she would have no desire to be stuck in a bed on life support and would like to discuss this further with daughter Kelly. He stated he was not surprised that we were talking today. We set up a time for Sunday at 1pm to discuss further with daughter and confirm goals for patient. I also discussed that given her wishes and prognosis, CPR unlikely to provide benefit and spouse agreed. Signed copy of DNRCCA placed in chart. Critical care team updated.      I spent 30 minutes in the professional and overall care of this patient.     Breanne Ballesteros, APRN-CNP

## 2024-11-04 NOTE — PROGRESS NOTES
INFECTIOUS DISEASES PROGRESS NOTE    Consulted / following patient for:  Respiratory failure/pneumonia/Pseudomonas in the sputum  Recent polymicrobial complicated postoperative lumbar wound infection, MRSA and Proteus  Acute kidney injury    Subjective   Interval History:   (Sedated on the ventilator)  Since my last visit, patient has been reintubated    Objective   PHYSICAL EXAMINATION  Vital signs: Afebrile, no vasopressor agents  General: Intubated.  Not toxic  Lungs: Diminished, clear.  No description of copious purulent ET secretions  Heart:  S1, S2 normal  Abdomen:  Soft, obese, no guarding.   Extremities:  No cords, phlebitis, cellulitis.      Relevant Results  WBC: 5300  Creatinine: (Dialysis)  CK: Pending  Pleural fluid: Transudate with 197 WBC, 56% neutrophils, protein 1.8, LDH 97, glucose 202  Microbiology:  Blood (11/3): Negative X2  Sputum (10/21): Normal neva, no MRSA  Sputum (10/28): Pseudomonas, pan-susceptible  Sputum (11/4): Pending   Pleural fluid (10/17): Negative  C. difficile PCR (10/30): Negative    Imaging:  CXR images (11/4) personally reviewed: Intubated.  No significant change compared with most recent film    Assessment:  Sepsis -likely due to pneumonia, present on admission. Patient already on IV vancomycin and IV ceftriaxone at time of this admission for lumbar spinal infection.  Resolved with anti-Pseudomonas antimicrobial therapy.  Large transudative pleural effusion was tapped.  Extubated.  Had fever and purulent secretions in the evening of 10/28, Pseudomonas in sputum, treated for 5 days with aerosol tobramycin.  Briefly extubated, now reintubated without clinical or radiographic evidence for progressive pneumonia.  Kidney injury, now on a schedule of thrice weekly hemodialysis.  Palliative Care consultation and notes reviewed and appreciated  Lumbar spine surgical site infection s/p I&D 9/28. Cultures + MRSA, Proteus.  Was to be on vanco/ceftriaxone through 11/12. Followed by   Brant Juarez.  Vancomycin has been changed to daptomycin because of AMY    Plan/Recommendations:  Continue daptomycin 700 mg every 48 hour until 11/12, dose after dialysis when a dose is due on a dialysis day  Continue ceftriaxone until 11/12  Monitor CK weekly while on daptomycin        Andrew Malagon MD  [facility]  Office:  [phone]

## 2024-11-04 NOTE — ANESTHESIA PREPROCEDURE EVALUATION
Patient: Narda Malloy    Procedure Information       Date/Time: 11/04/24 1300    Scheduled providers: Joby Cardenas MD    Procedure: EGD    Location: [facility]            Relevant Problems   Anesthesia (within normal limits)      Cardiac   (+) Essential hypertension   (+) Hyperlipidemia      Pulmonary (within normal limits)  Intubated on vent      Neuro   (+) Depression with anxiety   (+) Lumbar radiculopathy      GI (within normal limits)      /Renal (within normal limits)      Liver (within normal limits)      Endocrine   (+) Controlled type 2 diabetes mellitus with stable proliferative retinopathy of both eyes, without long-term current use of insulin   (+) DM w/o complication type II (Multi)   (+) Proliferative diabetic retinopathy (Multi)   (+) Type 2 diabetes mellitus with hyperglycemia (Multi)   (+) Type 2 diabetes mellitus with hyperglycemia, with long-term current use of insulin      Hematology   (+) Anemia      Musculoskeletal   (+) Displacement of lumbar intervertebral disc without myelopathy   (+) Kyphoscoliosis      HEENT   (+) Primary open-angle glaucoma, bilateral, severe stage      ID   (+) Postoperative infection, unspecified type, initial encounter   (+) Surgical site infection   (+) Surgical wound infection      Skin (within normal limits)      GYN (within normal limits)       Clinical information reviewed:   Tobacco  Allergies  Meds   Med Hx  Surg Hx   Fam Hx  Soc Hx      Allergies   Allergen Reactions    Erythromycin Nausea And Vomiting    Morphine GI Upset     nausea and vomiting    Rosuvastatin Other and Myalgia     Prior to Admission medications    Medication Sig Start Date End Date Taking? Authorizing Provider   acetaminophen (Tylenol) 500 mg tablet Take 2 tablets (1,000 mg) by mouth 3 times a day.    Historical Provider, MD   ascorbic acid (Vitamin C) 500 mg tablet as directed Orally    Historical Provider, MD   brimonidine (AlphaGAN) 0.2 % ophthalmic solution  Administer 1 drop into both eyes 2 times a day.    Historical Provider, MD   calcium carbonate-vitamin D3 500 mg-5 mcg (200 unit) tablet Take 1 tablet by mouth once daily.    Historical Provider, MD   cefTRIAXone (Rocephin) 2 gram/50 mL IV Infuse 50 mL (2 g) at 100 mL/hr over 30 minutes into a venous catheter once every 24 hours. Once weekly labs CBC/diff, CMP, Vanc trough ESR, CRP fax to Dr. Juarez [phone]. Stop date 11/12/24. 10/4/24 11/12/24  Brant Juarez MD   dextrose 50 % injection Infuse 25 mL (12.5 g) into a venous catheter every 15 minutes if needed (For blood glucose 41 to 70 mg/dL). 9/26/24   RUTH Alonzo   dextrose 50 % injection Infuse 50 mL (25 g) into a venous catheter every 15 minutes if needed (For blood glucose less than or equal to 40 mg/dL). 9/26/24   RUTH Alonzo   docusate sodium (Colace) 100 mg capsule Take 1 capsule (100 mg) by mouth 2 times a day.    Historical Provider, MD   ezetimibe (Zetia) 10 mg tablet Take 1 tablet (10 mg) by mouth once daily. 8/16/24 8/16/25  Luiz Fisher MD   FreeStyle Lite Strips strip USE TO TEST 3 TIMES A DAY AS DIRECTED 2/13/24   Jonathan Burrell MD   glucagon (Glucagen) 1 mg injection Inject 1 mg into the muscle every 15 minutes if needed for low blood sugar - see comments (Hypoglycemia). 9/26/24   RUTH Alonzo   glucagon (Glucagen) 1 mg injection Inject 1 mg into the muscle every 15 minutes if needed for low blood sugar - see comments (Hypoglycemia). 9/26/24   RUTH Alonzo   heparin sodium,porcine (heparin, porcine,) 5,000 unit/mL injection Inject 1 mL (5,000 Units) under the skin every 8 hours. 9/27/24   RUTH Alonzo   insulin lispro (HumaLOG) 100 unit/mL injection Inject 0-15 Units under the skin 3 times daily (morning, midday, late afternoon). Take as directed per insulin instructions.Do not hold when patient is not eating, continue order as scheduled for hyperglycemia  management.  Insulin Lispro Corrective Scale #3     Hypoglycemia protocol Call LIP unit(s) if Blood Glucose is between 0 - 70 mg/dL     0 unit(s) if Blood glucose is between    3 unit(s) if Blood glucose is between 151-200   6 unit(s) if Blood glucose is between 201-250   9 unit(s) if Blood glucose is between 251-300   12 unit(s) if Blood glucose is between 301-350   15 unit(s) if Blood glucose is between 351-400    Notify provider unit(s) if Blood Glucose is greater than 400 mg/dL 9/27/24   RUTH Alonzo   Lactobacillus acidophilus 100 mg (1 billion cell) capsule Take 1 capsule by mouth 2 times a day.    Historical Provider, MD   latanoprost (Xalatan) 0.005 % ophthalmic solution Administer 1 drop into both eyes once daily at bedtime. 12/14/23   Jose Juan MD   melatonin 5 mg tablet Take 1 tablet (5 mg) by mouth as needed at bedtime for sleep.    Historical Provider, MD   methocarbamol (Robaxin) 500 mg tablet Take 1 tablet (500 mg) by mouth 3 times a day.    Historical Provider, MD   multivitamin tablet Take 1 tablet by mouth once daily.    Historical Provider, MD   ondansetron (Zofran) 4 mg/2 mL injection Infuse 2 mL (4 mg) into a venous catheter every 6 hours if needed for nausea or vomiting. 9/26/24   RUTH Alonzo   oxyCODONE (Roxicodone) 5 mg immediate release tablet Take 1 tablet (5 mg) by mouth every 6 hours if needed for severe pain (7 - 10) or moderate pain (4 - 6).    Historical Provider, MD   oxygen (O2) gas therapy Inhale 1 each continuously. 9/26/24   RUTH Alonzo   pantoprazole (ProtoNix) 40 mg EC tablet Take 1 tablet (40 mg) by mouth once daily in the morning. Take before meals. Do not crush, chew, or split. 9/27/24   RUTH Alonzo   pantoprazole (ProtoNix) 40 mg injection Infuse 40 mg into a venous catheter once daily in the morning. Take before meals. If unable to take PO. 9/27/24   Leona A Vanik, APRN-CNP   pen needle, diabetic  "(PEN NEEDLE MISC) BD Altagracia- 4 mm X 32 G needle - as directed 4x a day sc 4 times per day 4/28/20   Historical Provider, MD   polyethylene glycol (Glycolax, Miralax) 17 gram packet Take 17 g by mouth once daily. 9/27/24   LEONEL Alonzo-CNP   primidone 125 mg tablet Take 125 mg by mouth 3 times a day. 7/17/24   Zuleyma Damon MD   propranolol LA (Inderal LA) 60 mg 24 hr capsule Take 1 capsule (60 mg) by mouth early in the morning.. Hold for SBP < 110 mmhg, HR < 60 bpm. 9/26/24   RUTH Alonzo   sennosides (Senokot) 8.6 mg tablet Take 1 tablet (8.6 mg) by mouth every 12 hours if needed for constipation.    Historical Provider, MD   Sure Comfort Pen Needle 32 gauge x 5/32\" needle AS DIRECTED DAILY FOR 90 DAYS 6/26/24   Jonathan Burrell MD   traZODone (Desyrel) 25 MG split tablet Take 1 half tablet (25 mg) by mouth once daily at bedtime.    Historical Provider, MD   vancomycin (Vancocin) 1 gram/250 mL solution Infuse 250 mL (1 g) at 250 mL/hr over 60 minutes into a venous catheter every 12 hours. Once weekly labs CBC/diff, CMP, Vanc trough ESR, CRP fax to Dr. Juarez [phone]. Stop date 11/12/24. 10/3/24 11/12/24  Brant Juarez MD     Past Medical History:   Diagnosis Date    Degenerative myopia, bilateral     Diabetic neuropathy (Multi)     Difficult intubation 08/26/2024    Mac 3, grade 3, 1 attempt.  Glidescope/videolaryngoscopy recommended for future attempts.    DM type 2 (diabetes mellitus, type 2) (Multi)     Dry eye syndrome of bilateral lacrimal glands     Essential hypertension     Essential tremor     Glaucoma     Hyperlipidemia     Long term (current) use of insulin (Multi)     Low back pain     PONV (postoperative nausea and vomiting)     Primary open angle glaucoma of both eyes, severe stage     Repeated falls     Sarcoidosis of lung (Multi)     Spinal stenosis, lumbar region without neurogenic claudication     Weakness      Past Surgical History:   Procedure Laterality Date    " BLEPHAROPLASTY  07/2022    BREAST SURGERY  05/20/2022    Breast lift    CARPAL TUNNEL RELEASE      CATARACT EXTRACTION W/  INTRAOCULAR LENS IMPLANT Bilateral     OD 08/04/2011 +8.5D,OS 08/04/2011 +8.50D    FOOT SURGERY      INSERTION / REMOVAL CRANIAL DBS GENERATOR      Placed 2017.  Removed 2018. part of wire left in head when everything removed    LUMBAR FUSION      L3-S1    PANRETINAL PHOTOCOAGULATION  2014    THORACIC FUSION  08/26/2024    T4-S1 fusion    VITRECTOMY Right 2013       NPO Detail:  No data recorded     Physical Exam    Airway  Mallampati: unable to assess     Cardiovascular    Dental    Pulmonary    Abdominal            Anesthesia Plan    History of general anesthesia?: yes  History of complications of general anesthesia?: no    ASA 3     MAC     The patient is not a current smoker.  Patient was not previously instructed to abstain from smoking on day of procedure.  Patient did not smoke on day of procedure.  Medical reason for not educating patient about risks of obstructive sleep apnea.  intravenous induction   Anesthetic plan and risks discussed with patient.    Plan discussed with CRNA and CAA.

## 2024-11-04 NOTE — ANESTHESIA POSTPROCEDURE EVALUATION
Patient: Narda Malloy    Procedure Summary       Date: 11/04/24 Room / Location: [facility]    Anesthesia Start: 1307 Anesthesia Stop: 1342    Procedure: EGD Diagnosis: Protein-calorie malnutrition, unspecified severity (Multi)    Scheduled Providers: Joby Cardenas MD; OMERO Finney; OMERO Hancock Responsible Provider: Joby Cardenas MD    Anesthesia Type: MAC ASA Status: 3            Anesthesia Type: MAC    Vitals Value Taken Time   /59 11/04/24 1401   Temp  11/04/24 1403   Pulse 57 11/04/24 1403   Resp 17 11/04/24 1403   SpO2 97 % 11/04/24 1403   Vitals shown include unfiled device data.    Anesthesia Post Evaluation    Patient location during evaluation: ICU  Patient participation: waiting for patient participation  Level of consciousness: awake  Pain score: 0  Pain management: adequate  Multimodal analgesia pain management approach  Airway patency: patent  Two or more strategies used to mitigate risk of obstructive sleep apnea  Cardiovascular status: acceptable  Respiratory status: acceptable, intubated and ventilator  Hydration status: acceptable  Postoperative Nausea and Vomiting: none        There were no known notable events for this encounter.

## 2024-11-04 NOTE — CARE PLAN
The patient's goals for the shift include unable to assess    The clinical goals for the shift include Maintain hemodynamic stability      Problem: Safety - Adult  Goal: Free from fall injury  Outcome: Progressing     Problem: Discharge Planning  Goal: Discharge to home or other facility with appropriate resources  Outcome: Progressing     Problem: Chronic Conditions and Co-morbidities  Goal: Patient's chronic conditions and co-morbidity symptoms are monitored and maintained or improved  Outcome: Progressing     Problem: Diabetes  Goal: Increase stability of blood glucose readings by end of shift  Outcome: Progressing  Goal: Maintain electrolyte levels within acceptable range throughout shift  Outcome: Progressing  Goal: Maintain glucose levels >70mg/dl to <250mg/dl throughout shift  Outcome: Progressing  Goal: Learn about and adhere to nutrition recommendations by end of shift  Outcome: Progressing  Goal: Vital signs within normal range for age by end of shift  Outcome: Progressing  Goal: Increase self care and/or family involovement by end of shift  Outcome: Progressing  Goal: Receive DSME education by end of shift  Outcome: Progressing     Problem: Knowledge Deficit  Goal: Patient/family/caregiver demonstrates understanding of disease process, treatment plan, medications, and discharge instructions  Outcome: Progressing     Problem: Mechanical Ventilation  Goal: ET tube will be managed safely  Outcome: Progressing     Problem: Skin  Goal: Decreased wound size/increased tissue granulation at next dressing change  Outcome: Progressing  Flowsheets (Taken 11/4/2024 0150)  Decreased wound size/increased tissue granulation at next dressing change:   Promote sleep for wound healing   Protective dressings over bony prominences   Utilize specialty bed per algorithm  Goal: Participates in plan/prevention/treatment measures  Outcome: Progressing  Flowsheets (Taken 11/4/2024 0150)  Participates in plan/prevention/treatment  measures:   Discuss with provider PT/OT consult   Elevate heels  Goal: Prevent/manage excess moisture  Outcome: Progressing  Flowsheets (Taken 11/4/2024 0150)  Prevent/manage excess moisture:   Cleanse incontinence/protect with barrier cream   Moisturize dry skin   Follow provider orders for dressing changes   Monitor for/manage infection if present  Goal: Prevent/minimize sheer/friction injuries  Outcome: Progressing  Flowsheets (Taken 11/4/2024 0150)  Prevent/minimize sheer/friction injuries:   Complete micro-shifts as needed if patient unable. Adjust patient position to relieve pressure points, not a full turn   Increase activity/out of bed for meals   Use pull sheet   HOB 30 degrees or less   Turn/reposition every 2 hours/use positioning/transfer devices   Utilize specialty bed per algorithm  Goal: Promote/optimize nutrition  Outcome: Progressing  Flowsheets (Taken 11/4/2024 0150)  Promote/optimize nutrition:   Monitor/record intake including meals   Consume > 50% meals/supplements  Goal: Promote skin healing  Outcome: Progressing  Flowsheets (Taken 11/4/2024 0150)  Promote skin healing:   Assess skin/pad under line(s)/device(s)   Protective dressings over bony prominences   Turn/reposition every 2 hours/use positioning/transfer devices   Ensure correct size (line/device) and apply per  instructions   Rotate device position/do not position patient on device     Problem: Nutrition  Goal: Consume prescribed supplement  Outcome: Progressing  Goal: Nutrition support goals are met within 48 hrs  Outcome: Progressing  Goal: Nutrition support is meeting 75% of nutrient needs  Outcome: Progressing  Goal: BG  mg/dL  Outcome: Progressing  Goal: Lab values WNL  Outcome: Progressing  Goal: Electrolytes WNL  Outcome: Progressing  Goal: Promote healing  Outcome: Progressing  Goal: Maintain stable weight  Outcome: Progressing  Goal: Reduce weight from edema/fluid  Outcome: Progressing     Problem:  Respiratory  Goal: No signs of respiratory distress (eg. Use of accessory muscles. Peds grunting)  Outcome: Progressing  Goal: Clear secretions with interventions this shift  Outcome: Progressing  Goal: Minimize anxiety/maximize coping throughout shift  Outcome: Progressing  Goal: Minimal/no exertional discomfort or dyspnea this shift  Outcome: Progressing  Goal: Patent airway maintained this shift  Outcome: Progressing  Goal: Tolerate mechanical ventilation evidenced by VS/agitation level this shift  Outcome: Progressing  Goal: Tolerate pulmonary toileting this shift  Outcome: Progressing  Goal: Verbalize decreased shortness of breath this shift  Outcome: Progressing  Goal: Wean oxygen to maintain O2 saturation per order/standard this shift  Outcome: Progressing  Goal: Increase self care and/or family involvement in next 24 hours  Outcome: Progressing     Problem: Pain  Goal: Takes deep breaths with improved pain control throughout the shift  Outcome: Progressing  Goal: Turns in bed with improved pain control throughout the shift  Outcome: Progressing  Goal: Walks with improved pain control throughout the shift  Outcome: Progressing  Goal: Performs ADL's with improved pain control throughout shift  Outcome: Progressing  Goal: Participates in PT with improved pain control throughout the shift  Outcome: Progressing  Goal: Free from opioid side effects throughout the shift  Outcome: Progressing  Goal: Free from acute confusion related to pain meds throughout the shift  Outcome: Progressing     Problem: Fall/Injury  Goal: Not fall by end of shift  Outcome: Progressing  Goal: Be free from injury by end of the shift  Outcome: Progressing  Goal: Verbalize understanding of personal risk factors for fall in the hospital  Outcome: Progressing  Goal: Verbalize understanding of risk factor reduction measures to prevent injury from fall in the home  Outcome: Progressing  Goal: Use assistive devices by end of the shift  Outcome:  Progressing  Goal: Pace activities to prevent fatigue by end of the shift  Outcome: Progressing

## 2024-11-05 ENCOUNTER — APPOINTMENT (OUTPATIENT)
Dept: RADIOLOGY | Facility: HOSPITAL | Age: 69
End: 2024-11-05
Payer: MEDICARE

## 2024-11-05 LAB
ALBUMIN SERPL BCP-MCNC: 2.3 G/DL (ref 3.4–5)
ALP SERPL-CCNC: 111 U/L (ref 33–136)
ALT SERPL W P-5'-P-CCNC: 9 U/L (ref 7–45)
ANION GAP SERPL CALCULATED.3IONS-SCNC: 12 MMOL/L (ref 10–20)
APTT PPP: 28.8 SECONDS (ref 22–32.5)
AST SERPL W P-5'-P-CCNC: 12 U/L (ref 9–39)
BASOPHILS # BLD AUTO: 0.03 X10*3/UL (ref 0–0.1)
BASOPHILS # BLD AUTO: 0.03 X10*3/UL (ref 0–0.1)
BASOPHILS NFR BLD AUTO: 0.4 %
BASOPHILS NFR BLD AUTO: 0.4 %
BILIRUB DIRECT SERPL-MCNC: 0.1 MG/DL (ref 0–0.3)
BILIRUB SERPL-MCNC: 0.3 MG/DL (ref 0–1.2)
BLOOD EXPIRATION DATE: NORMAL
BUN SERPL-MCNC: 17 MG/DL (ref 6–23)
CALCIUM SERPL-MCNC: 7.6 MG/DL (ref 8.6–10.3)
CHLORIDE SERPL-SCNC: 97 MMOL/L (ref 98–107)
CO2 SERPL-SCNC: 28 MMOL/L (ref 21–32)
CREAT SERPL-MCNC: 1.5 MG/DL (ref 0.5–1.05)
DISPENSE STATUS: NORMAL
EGFRCR SERPLBLD CKD-EPI 2021: 38 ML/MIN/1.73M*2
EOSINOPHIL # BLD AUTO: 0.03 X10*3/UL (ref 0–0.7)
EOSINOPHIL # BLD AUTO: 0.06 X10*3/UL (ref 0–0.7)
EOSINOPHIL NFR BLD AUTO: 0.4 %
EOSINOPHIL NFR BLD AUTO: 0.8 %
ERYTHROCYTE [DISTWIDTH] IN BLOOD BY AUTOMATED COUNT: 19.3 % (ref 11.5–14.5)
ERYTHROCYTE [DISTWIDTH] IN BLOOD BY AUTOMATED COUNT: 20.4 % (ref 11.5–14.5)
ERYTHROCYTE [DISTWIDTH] IN BLOOD BY AUTOMATED COUNT: 20.4 % (ref 11.5–14.5)
FERRITIN SERPL-MCNC: 479 NG/ML (ref 8–150)
GLUCOSE BLD MANUAL STRIP-MCNC: 112 MG/DL (ref 74–99)
GLUCOSE BLD MANUAL STRIP-MCNC: 121 MG/DL (ref 74–99)
GLUCOSE BLD MANUAL STRIP-MCNC: 146 MG/DL (ref 74–99)
GLUCOSE BLD MANUAL STRIP-MCNC: 150 MG/DL (ref 74–99)
GLUCOSE BLD MANUAL STRIP-MCNC: 160 MG/DL (ref 74–99)
GLUCOSE BLD MANUAL STRIP-MCNC: 162 MG/DL (ref 74–99)
GLUCOSE SERPL-MCNC: 123 MG/DL (ref 74–99)
HCT VFR BLD AUTO: 21.6 % (ref 36–46)
HCT VFR BLD AUTO: 25.2 % (ref 36–46)
HCT VFR BLD AUTO: 25.7 % (ref 36–46)
HGB BLD-MCNC: 6.9 G/DL (ref 12–16)
HGB BLD-MCNC: 8.2 G/DL (ref 12–16)
HGB BLD-MCNC: 8.5 G/DL (ref 12–16)
IMM GRANULOCYTES # BLD AUTO: 0.04 X10*3/UL (ref 0–0.7)
IMM GRANULOCYTES # BLD AUTO: 0.08 X10*3/UL (ref 0–0.7)
IMM GRANULOCYTES NFR BLD AUTO: 0.6 % (ref 0–0.9)
IMM GRANULOCYTES NFR BLD AUTO: 1.1 % (ref 0–0.9)
IRON SATN MFR SERPL: 21 % (ref 25–45)
IRON SERPL-MCNC: 23 UG/DL (ref 35–150)
LACTATE SERPL-SCNC: 0.8 MMOL/L (ref 0.4–2)
LDH SERPL L TO P-CCNC: 166 U/L (ref 84–246)
LYMPHOCYTES # BLD AUTO: 0.59 X10*3/UL (ref 1.2–4.8)
LYMPHOCYTES # BLD AUTO: 0.69 X10*3/UL (ref 1.2–4.8)
LYMPHOCYTES NFR BLD AUTO: 7.8 %
LYMPHOCYTES NFR BLD AUTO: 9.6 %
MAGNESIUM SERPL-MCNC: 2.07 MG/DL (ref 1.6–2.4)
MCH RBC QN AUTO: 30.7 PG (ref 26–34)
MCH RBC QN AUTO: 30.8 PG (ref 26–34)
MCH RBC QN AUTO: 31.1 PG (ref 26–34)
MCHC RBC AUTO-ENTMCNC: 31.9 G/DL (ref 32–36)
MCHC RBC AUTO-ENTMCNC: 32.5 G/DL (ref 32–36)
MCHC RBC AUTO-ENTMCNC: 33.1 G/DL (ref 32–36)
MCV RBC AUTO: 94 FL (ref 80–100)
MCV RBC AUTO: 95 FL (ref 80–100)
MCV RBC AUTO: 96 FL (ref 80–100)
MONOCYTES # BLD AUTO: 0.36 X10*3/UL (ref 0.1–1)
MONOCYTES # BLD AUTO: 0.39 X10*3/UL (ref 0.1–1)
MONOCYTES NFR BLD AUTO: 4.7 %
MONOCYTES NFR BLD AUTO: 5.4 %
NEUTROPHILS # BLD AUTO: 6.03 X10*3/UL (ref 1.2–7.7)
NEUTROPHILS # BLD AUTO: 6.49 X10*3/UL (ref 1.2–7.7)
NEUTROPHILS NFR BLD AUTO: 83.6 %
NEUTROPHILS NFR BLD AUTO: 85.2 %
NRBC BLD-RTO: 0 /100 WBCS (ref 0–0)
OVALOCYTES BLD QL SMEAR: NORMAL
PHOSPHATE SERPL-MCNC: 2.6 MG/DL (ref 2.5–4.9)
PLATELET # BLD AUTO: 250 X10*3/UL (ref 150–450)
PLATELET # BLD AUTO: 252 X10*3/UL (ref 150–450)
PLATELET # BLD AUTO: 257 X10*3/UL (ref 150–450)
POLYCHROMASIA BLD QL SMEAR: NORMAL
POTASSIUM SERPL-SCNC: 3.9 MMOL/L (ref 3.5–5.3)
PRODUCT BLOOD TYPE: NORMAL
PRODUCT CODE: NORMAL
PROT SERPL-MCNC: 5.5 G/DL (ref 6.4–8.2)
RBC # BLD AUTO: 2.25 X10*6/UL (ref 4–5.2)
RBC # BLD AUTO: 2.66 X10*6/UL (ref 4–5.2)
RBC # BLD AUTO: 2.73 X10*6/UL (ref 4–5.2)
RBC MORPH BLD: NORMAL
RBC MORPH BLD: NORMAL
SODIUM SERPL-SCNC: 133 MMOL/L (ref 136–145)
TIBC SERPL-MCNC: 107 UG/DL (ref 240–445)
UIBC SERPL-MCNC: 84 UG/DL (ref 110–370)
UNIT ABO: NORMAL
UNIT NUMBER: NORMAL
UNIT RH: NORMAL
UNIT VOLUME: 280
WBC # BLD AUTO: 7.2 X10*3/UL (ref 4.4–11.3)
WBC # BLD AUTO: 7.6 X10*3/UL (ref 4.4–11.3)
WBC # BLD AUTO: 8.1 X10*3/UL (ref 4.4–11.3)
XM INTEP: NORMAL

## 2024-11-05 PROCEDURE — 82746 ASSAY OF FOLIC ACID SERUM: CPT | Mod: WESLAB | Performed by: PHYSICIAN ASSISTANT

## 2024-11-05 PROCEDURE — 94668 MNPJ CHEST WALL SBSQ: CPT

## 2024-11-05 PROCEDURE — 2500000004 HC RX 250 GENERAL PHARMACY W/ HCPCS (ALT 636 FOR OP/ED)

## 2024-11-05 PROCEDURE — 97530 THERAPEUTIC ACTIVITIES: CPT | Mod: GO

## 2024-11-05 PROCEDURE — 31720 CLEARANCE OF AIRWAYS: CPT

## 2024-11-05 PROCEDURE — 2020000001 HC ICU ROOM DAILY

## 2024-11-05 PROCEDURE — 71045 X-RAY EXAM CHEST 1 VIEW: CPT | Performed by: RADIOLOGY

## 2024-11-05 PROCEDURE — 36430 TRANSFUSION BLD/BLD COMPNT: CPT

## 2024-11-05 PROCEDURE — 82947 ASSAY GLUCOSE BLOOD QUANT: CPT

## 2024-11-05 PROCEDURE — 2500000001 HC RX 250 WO HCPCS SELF ADMINISTERED DRUGS (ALT 637 FOR MEDICARE OP)

## 2024-11-05 PROCEDURE — 2500000004 HC RX 250 GENERAL PHARMACY W/ HCPCS (ALT 636 FOR OP/ED): Performed by: INTERNAL MEDICINE

## 2024-11-05 PROCEDURE — 36558 INSERT TUNNELED CV CATH: CPT | Performed by: RADIOLOGY

## 2024-11-05 PROCEDURE — 83605 ASSAY OF LACTIC ACID: CPT

## 2024-11-05 PROCEDURE — 97110 THERAPEUTIC EXERCISES: CPT | Mod: GP

## 2024-11-05 PROCEDURE — 36415 COLL VENOUS BLD VENIPUNCTURE: CPT | Performed by: PHYSICIAN ASSISTANT

## 2024-11-05 PROCEDURE — 84075 ASSAY ALKALINE PHOSPHATASE: CPT

## 2024-11-05 PROCEDURE — 37799 UNLISTED PX VASCULAR SURGERY: CPT

## 2024-11-05 PROCEDURE — 2500000004 HC RX 250 GENERAL PHARMACY W/ HCPCS (ALT 636 FOR OP/ED): Performed by: NURSE PRACTITIONER

## 2024-11-05 PROCEDURE — 2500000004 HC RX 250 GENERAL PHARMACY W/ HCPCS (ALT 636 FOR OP/ED): Performed by: SURGERY

## 2024-11-05 PROCEDURE — 80069 RENAL FUNCTION PANEL: CPT

## 2024-11-05 PROCEDURE — 2500000004 HC RX 250 GENERAL PHARMACY W/ HCPCS (ALT 636 FOR OP/ED): Performed by: RADIOLOGY

## 2024-11-05 PROCEDURE — 2500000005 HC RX 250 GENERAL PHARMACY W/O HCPCS

## 2024-11-05 PROCEDURE — 82607 VITAMIN B-12: CPT | Mod: WESLAB | Performed by: PHYSICIAN ASSISTANT

## 2024-11-05 PROCEDURE — 2500000002 HC RX 250 W HCPCS SELF ADMINISTERED DRUGS (ALT 637 FOR MEDICARE OP, ALT 636 FOR OP/ED)

## 2024-11-05 PROCEDURE — 71045 X-RAY EXAM CHEST 1 VIEW: CPT

## 2024-11-05 PROCEDURE — 0JH63XZ INSERTION OF TUNNELED VASCULAR ACCESS DEVICE INTO CHEST SUBCUTANEOUS TISSUE AND FASCIA, PERCUTANEOUS APPROACH: ICD-10-PCS | Performed by: RADIOLOGY

## 2024-11-05 PROCEDURE — 85730 THROMBOPLASTIN TIME PARTIAL: CPT | Performed by: SURGERY

## 2024-11-05 PROCEDURE — 85025 COMPLETE CBC W/AUTO DIFF WBC: CPT

## 2024-11-05 PROCEDURE — 83540 ASSAY OF IRON: CPT

## 2024-11-05 PROCEDURE — 2500000001 HC RX 250 WO HCPCS SELF ADMINISTERED DRUGS (ALT 637 FOR MEDICARE OP): Performed by: PHYSICIAN ASSISTANT

## 2024-11-05 PROCEDURE — 84100 ASSAY OF PHOSPHORUS: CPT

## 2024-11-05 PROCEDURE — 94003 VENT MGMT INPAT SUBQ DAY: CPT

## 2024-11-05 PROCEDURE — 2720000007 HC OR 272 NO HCPCS

## 2024-11-05 PROCEDURE — 99232 SBSQ HOSP IP/OBS MODERATE 35: CPT | Performed by: NURSE PRACTITIONER

## 2024-11-05 PROCEDURE — 97110 THERAPEUTIC EXERCISES: CPT | Mod: GO

## 2024-11-05 PROCEDURE — 94640 AIRWAY INHALATION TREATMENT: CPT

## 2024-11-05 PROCEDURE — 82728 ASSAY OF FERRITIN: CPT

## 2024-11-05 PROCEDURE — C1894 INTRO/SHEATH, NON-LASER: HCPCS

## 2024-11-05 PROCEDURE — 83615 LACTATE (LD) (LDH) ENZYME: CPT

## 2024-11-05 PROCEDURE — 76942 ECHO GUIDE FOR BIOPSY: CPT

## 2024-11-05 PROCEDURE — 76937 US GUIDE VASCULAR ACCESS: CPT | Performed by: RADIOLOGY

## 2024-11-05 PROCEDURE — 83735 ASSAY OF MAGNESIUM: CPT

## 2024-11-05 PROCEDURE — 99291 CRITICAL CARE FIRST HOUR: CPT

## 2024-11-05 PROCEDURE — 02HV33Z INSERTION OF INFUSION DEVICE INTO SUPERIOR VENA CAVA, PERCUTANEOUS APPROACH: ICD-10-PCS | Performed by: RADIOLOGY

## 2024-11-05 PROCEDURE — 97530 THERAPEUTIC ACTIVITIES: CPT | Mod: GP

## 2024-11-05 PROCEDURE — P9016 RBC LEUKOCYTES REDUCED: HCPCS

## 2024-11-05 PROCEDURE — 85027 COMPLETE CBC AUTOMATED: CPT | Performed by: SURGERY

## 2024-11-05 RX ORDER — LIDOCAINE HYDROCHLORIDE 10 MG/ML
INJECTION, SOLUTION EPIDURAL; INFILTRATION; INTRACAUDAL; PERINEURAL
Status: COMPLETED | OUTPATIENT
Start: 2024-11-05 | End: 2024-11-05

## 2024-11-05 RX ORDER — HEPARIN SODIUM 10000 [USP'U]/100ML
0-4500 INJECTION, SOLUTION INTRAVENOUS CONTINUOUS
Status: DISCONTINUED | OUTPATIENT
Start: 2024-11-05 | End: 2024-11-11

## 2024-11-05 RX ORDER — MIDAZOLAM HYDROCHLORIDE 1 MG/ML
5 INJECTION, SOLUTION INTRAMUSCULAR; INTRAVENOUS
Status: COMPLETED | OUTPATIENT
Start: 2024-11-05 | End: 2024-11-05

## 2024-11-05 RX ORDER — HEPARIN SODIUM 5000 [USP'U]/ML
20 INJECTION, SOLUTION INTRAVENOUS; SUBCUTANEOUS ONCE
Status: COMPLETED | OUTPATIENT
Start: 2024-11-05 | End: 2024-11-05

## 2024-11-05 RX ORDER — HEPARIN SODIUM 5000 [USP'U]/ML
3000-6000 INJECTION, SOLUTION INTRAVENOUS; SUBCUTANEOUS AS NEEDED
Status: DISCONTINUED | OUTPATIENT
Start: 2024-11-05 | End: 2024-11-11

## 2024-11-05 ASSESSMENT — COGNITIVE AND FUNCTIONAL STATUS - GENERAL
CLIMB 3 TO 5 STEPS WITH RAILING: TOTAL
MOBILITY SCORE: 6
EATING MEALS: TOTAL
PERSONAL GROOMING: TOTAL
MOVING TO AND FROM BED TO CHAIR: TOTAL
DRESSING REGULAR LOWER BODY CLOTHING: TOTAL
HELP NEEDED FOR BATHING: TOTAL
DAILY ACTIVITIY SCORE: 6
MOVING FROM LYING ON BACK TO SITTING ON SIDE OF FLAT BED WITH BEDRAILS: TOTAL
WALKING IN HOSPITAL ROOM: TOTAL
TOILETING: TOTAL
STANDING UP FROM CHAIR USING ARMS: TOTAL
DRESSING REGULAR UPPER BODY CLOTHING: TOTAL
TURNING FROM BACK TO SIDE WHILE IN FLAT BAD: TOTAL

## 2024-11-05 ASSESSMENT — PAIN - FUNCTIONAL ASSESSMENT
PAIN_FUNCTIONAL_ASSESSMENT: CPOT (CRITICAL CARE PAIN OBSERVATION TOOL)
PAIN_FUNCTIONAL_ASSESSMENT: FLACC (FACE, LEGS, ACTIVITY, CRY, CONSOLABILITY)
PAIN_FUNCTIONAL_ASSESSMENT: CPOT (CRITICAL CARE PAIN OBSERVATION TOOL)

## 2024-11-05 ASSESSMENT — PAIN SCALES - GENERAL
PAINLEVEL_OUTOF10: 0 - NO PAIN

## 2024-11-05 NOTE — PROGRESS NOTES
"Nutrition Follow up Note    Nutrition Assessment      Patient re-intubated. PEG placed 11/4. Trach scheduled for 11/7. Tube feed restarted. Spoke with RN. Running at goal, patient tolerating well.    Nutrition History:  Food and Nutrient History: Tube feed/vent     Food Allergies/Intolerances:  None  GI Symptoms: None  Oral Problems: None    Anthropometrics:  Ht: 177.8 cm (5' 10\"), Wt: 116 kg (255 lb 11.7 oz), BMI: 36.69  IBW/kg (Dietitian Calculated): 68.18 kg  Percent of IBW: 147 %     Weight Change:  Daily Weight  11/05/24 : 116 kg (255 lb 11.7 oz)  10/01/24 : 99.7 kg (219 lb 12.8 oz)  09/25/24 : 74.8 kg (165 lb)  08/27/24 : 75.8 kg (167 lb)  08/16/24 : 76.2 kg (168 lb)  08/12/24 : 76.4 kg (168 lb 6.4 oz)  07/15/24 : 76.2 kg (168 lb)  06/18/24 : 75.8 kg (167 lb 3.2 oz)  05/31/24 : 69.4 kg (153 lb)  09/29/23 : 69.4 kg (153 lb)     Nutrition Focused Physical Exam Findings:      Nutrition Significant Labs:  Lab Results   Component Value Date    WBC 7.6 11/05/2024    HGB 6.9 (L) 11/05/2024    HCT 21.6 (L) 11/05/2024     11/05/2024    CHOL 266 (H) 08/23/2023    TRIG 213 (H) 08/23/2023    HDL 58 08/23/2023    ALT 9 11/05/2024    AST 12 11/05/2024     (L) 11/05/2024    K 3.9 11/05/2024    CL 97 (L) 11/05/2024    CREATININE 1.50 (H) 11/05/2024    BUN 17 11/05/2024    CO2 28 11/05/2024    TSH 4.78 (H) 10/12/2024    INR 1.0 09/28/2024    HGBA1C 6.2 (H) 09/25/2024    ALBUR 40 (H) 03/31/2022     Nutrition Specific Medications:  acetaminophen, 650 mg, oral, q6h  acetylcysteine, 3 mL, nebulization, 4x daily  albuterol, 3 mL, nebulization, 4x daily  brimonidine, 1 drop, Both Eyes, BID  [Held by provider] calcium carbonate-vitamin D3, 1 tablet, oral, Daily  cefTRIAXone, 2 g, intravenous, q24h  daptomycin, 700 mg, intravenous, q48h  ezetimibe, 10 mg, oral, Daily  ferrous sulfate, 60 mg of iron, oral, Daily  folic acid, 1 mg, oral, Daily  honey, , Topical, Daily  insulin lispro, 0-15 Units, subcutaneous, " q4h  latanoprost, 1 drop, Both Eyes, Nightly  midodrine, 10 mg, oral, q8h  oxygen, , inhalation, Continuous - Inhalation  pantoprazole, 40 mg, oral, Daily before breakfast   Or  pantoprazole, 40 mg, intravenous, Daily before breakfast  potassium, sodium phosphates, 1 packet, oral, With meals & nightly  primidone, 125 mg, oral, Nightly  [Held by provider] propranolol LA, 60 mg, oral, Daily  sennosides-docusate sodium, 1 tablet, oral, Nightly  sodium chloride, 3 mL, nebulization, 4x daily      heparin, 0-4,500 Units/hr, Last Rate: Stopped (11/04/24 0400)      Dietary Orders (From admission, onward)       Start     Ordered    11/07/24 0001  NPO Diet; Effective midnight  Diet effective midnight         11/04/24 1025    11/04/24 2305  Enteral feeding with NPO 40  Diet effective now        Question Answer Comment   Tube feeding formula: Nepro    Tube feeding continuous rate (mL/hr): 40        11/04/24 2304    10/24/24 1212  Oral nutritional supplements  Until discontinued        Comments: Unflavored via OG tube   Question Answer Comment   Deliver with Breakfast    Deliver with Dinner    Select supplement: Cade        10/24/24 1211    10/16/24 0448  May Participate in Room Service With Assistance  ( ROOM SERVICE MAY PARTICIPATE WITH ASSISTANCE)  Once        Question:  .  Answer:  Yes    10/16/24 0447                  Nutrition Support Intake provides: Nepro @40 mL/Hr provides 1728 calories, 78 gm protein      Estimated Needs:   Estimated Energy Needs  Total Energy Estimated Needs (kCal): 1700 kCal  Total Estimated Energy Need per Day (kCal/kg): 25 kCal/kg  Method for Estimating Needs: IBW    Estimated Protein Needs  Total Protein Estimated Needs (g):  ()  Total Protein Estimated Needs (g/kg):  (1.2-2.0)  Method for Estimating Needs: IBW    Estimated Fluid Needs  Total Fluid Estimated Needs (mL): 1700 mL  Total Fluid Estimated Needs (mL/kg): 25 mL/kg  Method for Estimating Needs: Per Critical Care Team/Nephrology       Nutrition Diagnosis   Nutrition Diagnosis:     Nutrition Diagnosis  Patient has Nutrition Diagnosis: Yes  Diagnosis Status (1): Ongoing  Nutrition Diagnosis 1: Inadequate energy intake  Related to (1): decreased ability to consume sufficient energy  As Evidenced by (1): NPO  Additional Nutrition Diagnosis: Diagnosis 2  Diagnosis Status (2): Resolved  Nutrition Diagnosis 2: Excessive enteral nutrition infusion  Related to (2): current tube feed formula/rate  As Evidenced by (2): enteral formula/rate providing greater than estimated energy needs       Nutrition Interventions/Recommendations   Nutrition Interventions and Recommendations:    Nutrition Prescription:  Individualized Nutrition Prescription Provided for : 1700 calories,  gm protein to be provided via enteral nutrition    Nutrition Interventions:   Food and/or Nutrient Delivery Interventions  Interventions: Enteral intake  Enteral Intake: Other (Comment)  Goal: provide as ordered    Education Documentation  No documentation found.         Nutrition Monitoring and Evaluation   Monitoring/Evaluation:   Food/Nutrient Related History Monitoring  Monitoring and Evaluation Plan: Enteral and parenteral nutrition intake  Enteral and Parenteral Nutrition Intake: Enteral nutrition intake  Criteria: monitoring tube feed tolerance       Time Spent/Follow-up:   Follow Up  Time Spent (min): 25 minutes  Last Date of Nutrition Visit: 11/05/24  Nutrition Follow-Up Needed?: 5-7 days  Follow up Comment: 11/11/24

## 2024-11-05 NOTE — PROGRESS NOTES
[facility] Critical Care Medicine       Date:  11/5/2024  Patient:  Narda Malloy  YOB: 1955  MRN:  [id number]   Admit Date:  10/12/2024    Chief Complaint   Patient presents with    Altered Mental Status     History of Present Illness:  Narda Malloy is a 69 y.o. year old female patient with Past Medical History of L1-L3 lumbar laminectomy, T4-S1 revision, and fusion August 26th, T2DM, HTN, essential tremor, HLD, glaucoma, sarcoidosis of the lung who presented to  ED 10/12 after being found essentially unresponsive at her nursing facility [facility]. Per report from her , she has had a significant decline in her health since July 15th when she had a fall and became significantly weak. She has also had multiple infection complications since her back surgery in August requiring multiple I&Ds and long term antibiotic therapy. She went to the OR most recently on 9/28 for lumbar site infection wash out. Per chart review, she was discharged on IV vancomycin 1g and IV ceftriaxone 2d q24hrs through 11/12.     ED Course: Initial vital signs: /104 (109), HR 68, RR 20, SpO2 95% on 6L NC, temp 34.5C. Give 0.4mg of narcan with no improvement in mentation. Lab work-up remarkable for mild hyperkalemia (5.5), AMY 42/1.46, elevated alk phos, normocytic anemia 10.4/33, turbid appearing urine with mild hematuria and proteinuria and + leuk esterase, >50 WBCs. Urine drug screen positive for barbiturates. Triggered sepsis timer so she was given 3L NS. She was intubated for airway protection with 20mg etomidate and 100mg succinylcholine. BP dropped post-intubated and fluid resuscitation and she was subsequently started on levophed.          Interval ICU Events:  10/12: Pt arrived to ICU intubated and lightly sedated on low-dose versed. Eyes open, minimally responsive.      10/13: Received K cocktail last night for K 6.0, corrected appropriately. Levophed requirements mildly up, UOP decreasing.  Ordered albumin x2 this am with improvements in UOP and SBP. Will likely trial lasix this afternoon d/t hypervolemia.     10/14: Remains intubated with decreased mentation, only responsive to noxious stimuli. Trialed lasix TID for volume overload. Remains on levophed 0.01.     10/15: Mentation much improved. SAT/SBT successful so extubated. Given lasix x3, bumex x1, metolazone x1 with net negative fluid balance of 500mL -> started on bumex gtt.      10/16: O2 requirements significantly increased, NRB -> HFNC 40L 100% likely 2/2 mucus plugging.     10/17: No acute events overnight. Bumex gtt increased to 1mg/hr. CXR this am showing complete opacification of left hemithorax related to atelectasis vs pleural effusion. Remains on HFNC 40L/80%. Pigtail catheter placed with 1.2L drained immediately. Given albumin for hypotension.      10/18: ~2L output from left sided pigtail catheter since placement. Kidney function worsening and UOP declining, about 20cc/hr overnight. Will place NG tube today and start enteral nutrition and appetite and oral intake remains poor.      10/19: Patient with worsening hypoxic, hypercapnic respiratory failure - now requiring BIPAP support. Remains grossly volume overloaded with low UO. Started on vasopressors to augment BP for diuresis. 40 IV lasix + gtt started. Remains with poor nutritional status. Will re attempt NG later if respiratory status improves.      10/20: Patient stable on vent. Nephrology consulted for renal failure. Cr continues to uptrend however UO is increasing. No issues overnight.     10/21: No issues overnight. Remains stable on vent. Levo down to 0.01 mcg/kg/min. UO remains low. Nephrology following. Possible CRRT today?     10/22: Patient received 80 lasix and metalozone with minimal UO. Levo @ .02 and prop @ 10. Vent settings: 20/450/10/40%. Plan for CRRT today. Will SAT/SBT after CRRT. May change propofol to precedex.     10/23: Had episodes of bradycardia where HR  went to 30's which resolved with heating the patient. CRRT yesterday with about 1L removed. Currently on 0.03 of levo and 10 of prop. Cont daptomycin and ceftriaxone per ID. CXR improved. SAT/SBT. EP consulted for episodes of bradycardia.     10/24: Bradycardic episodes of HR in 40s. Currently on 0.03 of Levo, 10 of prop and 50 fentanyl. Tolerating CRRT. Place PICC today.     10/25: Did well with the SBT yesterday, plan for another one today. Continue CRRT. Hgb 7.0 this am, still requiting Levo 0.03, will transfuse 1 unit PRBCs. Remove chest tube today.     10/26: Chest tube removed last night, CXR improving, patient appears overall lethargic on sbt/sat, not ready to extubate, will complete 4/4/4 sbt/rest/sbt-> rest today and reassess tomorrow     10/27: Patient failed afternoon SBT, placed on rate overnight, net - 2.5L over previous 24 hours, will place on wean today.     10/29: Patient with high residual tube feeds overnight.  She did have an episode of vomiting.  Tube feeds placed on hold.  She was also afebrile overnight.  Will obtain a KUB to rule out ileus.  Sputum culture with Pseudomonas, discussed antibiotic plans with ID.  Will not do SBT with patient today.  Did discuss the setback with her , he did state moving forward that if she does not reach extubation he would be agreeable to a tracheostomy.      10/30: No significant events overnight.  Tube feeds resumed at trickle rate yesterday, tolerating overnight.  Will increase to goal today.  Dialysis this morning.  Patient improved from yesterday.  Plan for SBT today.  Will attempt to sit patient on side of bed today.    10/31: No significant events overnight. Tolerating tube feeds, tube feeds on hold this AM for SBT. Patient awake and alert this morning, improved from yesterday.  Tolerating SBT, answering yes or no questions.  Will extubate today.    11/1: Pt extubated yesterday to HFNC. Had worsened respiratory distress requiring bipap, wore bipap  overnight. Will trial back on HFNC when more awake. Dialysis scheduled for today. Will remove spinal sutures.     11/2: Unable to wean from bipap yesterday without immediate desat in 50-60s, increasingly lethargic and weak. Decision made to reintubate and this was discussed with  which he was ok with. Pt will need trach and peg as this was her 3rd intubation this admission and she's having persistent respiratory failure due to recurrent pleural effusions, even with iHD fluid removal and recurrent atelectasis with lobar collapse. Consulted to palliative care for communication to family about GOC and communication of expectations, risks, benefits of tracheostomy creation, peg tube placement, and LTAC admission. Discussed with neph, will run tablo today for additional fluid removal d/t recurrent intubation. Plan for ENT and surgery consult for trach and peg pending family meeting tmrw at 1pm.     11/3: OVN: Unsuccessful removal of incisional sutures of posterior surgery d/t skin overgrowth. DAY: Reached out to ortho spine surgical team about suture removal, awaiting to hear back. GOC discussion today, patient will remain DNR-CCA and family wants to pursue Trach/PEG placement (consults ordered). Off sedation and fent, transitioned to enteral narcotics for pain control.     11/4: Plan for peg placement today at bedside, tracheostomy creation Thursday. Will receive scheduled dialysis after peg placement.     11/5: Peg-tube placed yesterday without issue. Removed majority of sutures yesterday but had an episode of desaturation when turned on her right side, will plan to remove the rest today. 1 unit PRBC ordered for Hgb 6.9.       Medical History:  Past Medical History:   Diagnosis Date    Degenerative myopia, bilateral     Diabetic neuropathy (Multi)     Difficult intubation 08/26/2024    Mac 3, grade 3, 1 attempt.  Glidescope/videolaryngoscopy recommended for future attempts.    DM type 2 (diabetes mellitus, type  2) (Multi)     Dry eye syndrome of bilateral lacrimal glands     Essential hypertension     Essential tremor     Glaucoma     Hyperlipidemia     Long term (current) use of insulin (Multi)     Low back pain     PONV (postoperative nausea and vomiting)     Primary open angle glaucoma of both eyes, severe stage     Repeated falls     Sarcoidosis of lung (Multi)     Spinal stenosis, lumbar region without neurogenic claudication     Weakness      Past Surgical History:   Procedure Laterality Date    BLEPHAROPLASTY  07/2022    BREAST SURGERY  05/20/2022    Breast lift    CARPAL TUNNEL RELEASE      CATARACT EXTRACTION W/  INTRAOCULAR LENS IMPLANT Bilateral     OD 08/04/2011 +8.5D,OS 08/04/2011 +8.50D    FOOT SURGERY      INSERTION / REMOVAL CRANIAL DBS GENERATOR      Placed 2017.  Removed 2018. part of wire left in head when everything removed    LUMBAR FUSION      L3-S1    PANRETINAL PHOTOCOAGULATION  2014    THORACIC FUSION  08/26/2024    T4-S1 fusion    VITRECTOMY Right 2013     Medications Prior to Admission   Medication Sig Dispense Refill Last Dose/Taking    acetaminophen (Tylenol) 500 mg tablet Take 2 tablets (1,000 mg) by mouth 3 times a day.   Unknown    ascorbic acid (Vitamin C) 500 mg tablet as directed Orally   Unknown    brimonidine (AlphaGAN) 0.2 % ophthalmic solution Administer 1 drop into both eyes 2 times a day.   Unknown    calcium carbonate-vitamin D3 500 mg-5 mcg (200 unit) tablet Take 1 tablet by mouth once daily.   Unknown    cefTRIAXone (Rocephin) 2 gram/50 mL IV Infuse 50 mL (2 g) at 100 mL/hr over 30 minutes into a venous catheter once every 24 hours. Once weekly labs CBC/diff, CMP, Vanc trough ESR, CRP fax to Dr. Juarez [phone]. Stop date 11/12/24. 1950 mL 0     dextrose 50 % injection Infuse 25 mL (12.5 g) into a venous catheter every 15 minutes if needed (For blood glucose 41 to 70 mg/dL).       dextrose 50 % injection Infuse 50 mL (25 g) into a venous catheter every 15 minutes if needed  (For blood glucose less than or equal to 40 mg/dL).       docusate sodium (Colace) 100 mg capsule Take 1 capsule (100 mg) by mouth 2 times a day.   Unknown    ezetimibe (Zetia) 10 mg tablet Take 1 tablet (10 mg) by mouth once daily. 90 tablet 3 Unknown    FreeStyle Lite Strips strip USE TO TEST 3 TIMES A DAY AS DIRECTED 300 each 2     glucagon (Glucagen) 1 mg injection Inject 1 mg into the muscle every 15 minutes if needed for low blood sugar - see comments (Hypoglycemia).       glucagon (Glucagen) 1 mg injection Inject 1 mg into the muscle every 15 minutes if needed for low blood sugar - see comments (Hypoglycemia).       heparin sodium,porcine (heparin, porcine,) 5,000 unit/mL injection Inject 1 mL (5,000 Units) under the skin every 8 hours.       insulin lispro (HumaLOG) 100 unit/mL injection Inject 0-15 Units under the skin 3 times daily (morning, midday, late afternoon). Take as directed per insulin instructions.Do not hold when patient is not eating, continue order as scheduled for hyperglycemia management.  Insulin Lispro Corrective Scale #3     Hypoglycemia protocol Call LIP unit(s) if Blood Glucose is between 0 - 70 mg/dL     0 unit(s) if Blood glucose is between    3 unit(s) if Blood glucose is between 151-200   6 unit(s) if Blood glucose is between 201-250   9 unit(s) if Blood glucose is between 251-300   12 unit(s) if Blood glucose is between 301-350   15 unit(s) if Blood glucose is between 351-400    Notify provider unit(s) if Blood Glucose is greater than 400 mg/dL       Lactobacillus acidophilus 100 mg (1 billion cell) capsule Take 1 capsule by mouth 2 times a day.   Unknown    latanoprost (Xalatan) 0.005 % ophthalmic solution Administer 1 drop into both eyes once daily at bedtime. 2.5 mL 5 Unknown    melatonin 5 mg tablet Take 1 tablet (5 mg) by mouth as needed at bedtime for sleep.   Unknown    methocarbamol (Robaxin) 500 mg tablet Take 1 tablet (500 mg) by mouth 3 times a day.   Unknown     "multivitamin tablet Take 1 tablet by mouth once daily.   Unknown    ondansetron (Zofran) 4 mg/2 mL injection Infuse 2 mL (4 mg) into a venous catheter every 6 hours if needed for nausea or vomiting.       oxyCODONE (Roxicodone) 5 mg immediate release tablet Take 1 tablet (5 mg) by mouth every 6 hours if needed for severe pain (7 - 10) or moderate pain (4 - 6).       oxygen (O2) gas therapy Inhale 1 each continuously.       pantoprazole (ProtoNix) 40 mg EC tablet Take 1 tablet (40 mg) by mouth once daily in the morning. Take before meals. Do not crush, chew, or split.       pantoprazole (ProtoNix) 40 mg injection Infuse 40 mg into a venous catheter once daily in the morning. Take before meals. If unable to take PO.       pen needle, diabetic (PEN NEEDLE MISC) BD Altagracia- 4 mm X 32 G needle - as directed 4x a day sc 4 times per day       polyethylene glycol (Glycolax, Miralax) 17 gram packet Take 17 g by mouth once daily.   Unknown    primidone 125 mg tablet Take 125 mg by mouth 3 times a day.   Unknown    propranolol LA (Inderal LA) 60 mg 24 hr capsule Take 1 capsule (60 mg) by mouth early in the morning.. Hold for SBP < 110 mmhg, HR < 60 bpm.   Unknown    sennosides (Senokot) 8.6 mg tablet Take 1 tablet (8.6 mg) by mouth every 12 hours if needed for constipation.   Unknown    Sure Comfort Pen Needle 32 gauge x 5/32\" needle AS DIRECTED DAILY FOR 90 DAYS 100 each 11 Unknown    traZODone (Desyrel) 25 MG split tablet Take 1 half tablet (25 mg) by mouth once daily at bedtime.   Unknown    vancomycin (Vancocin) 1 gram/250 mL solution Infuse 250 mL (1 g) at 250 mL/hr over 60 minutes into a venous catheter every 12 hours. Once weekly labs CBC/diff, CMP, Vanc trough ESR, CRP fax to Dr. Juarez [phone]. Stop date 11/12/24. 64818 mL 0      Erythromycin, Morphine, and Rosuvastatin  Social History     Tobacco Use    Smoking status: Former     Types: Cigarettes     Passive exposure: Past    Smokeless tobacco: Never   Vaping Use "    Vaping status: Never Used   Substance Use Topics    Alcohol use: Not Currently    Drug use: Not Currently     Family History   Problem Relation Name Age of Onset    Multiple myeloma Mother      Cancer Mother      Other (CABG) Father      Pulmonary embolism Father      Heart disease Father      Breast cancer Sister          Stage II    Hypertension Sister      Diabetes Sister      No Known Problems Sister          x5    No Known Problems Brother          x4    No Known Problems Daughter         Review of Systems:  Unable to obtain ROS as pt is on ventilator, does shake head yes and no, most incomprehensible     Physical Exam:    Heart Rate:  [53-97]   Temp:  [36.6 °C (97.8 °F)-37.3 °C (99.1 °F)]   Resp:  [12-27]   BP: ()/(46-75)   Weight:  [116 kg (256 lb 9.9 oz)]   SpO2:  [87 %-100 %]     Physical Exam  Vitals reviewed.   Constitutional:       Appearance: She is overweight.      Interventions: She is intubated.   HENT:      Head: Normocephalic and atraumatic.      Right Ear: External ear normal.      Left Ear: External ear normal.      Nose: Nose normal.      Mouth/Throat:      Mouth: Mucous membranes are dry.      Comments: Age related dental decay  Eyes:      Conjunctiva/sclera: Conjunctivae normal.      Pupils: Pupils are equal, round, and reactive to light.      Comments: GARY, R4/L4, conjugate gaze, consensual response   Cardiovascular:      Rate and Rhythm: Normal rate and regular rhythm.      Pulses: Normal pulses.      Heart sounds: Normal heart sounds.      Comments: Upper extremity edema also noted bilat  Pulmonary:      Effort: She is intubated.      Breath sounds: Examination of the right-lower field reveals decreased breath sounds. Examination of the left-lower field reveals decreased breath sounds. Decreased breath sounds present.      Comments: Breath sound decreased with no adventitious breath sounds  Abdominal:      General: Bowel sounds are decreased.      Palpations: Abdomen is soft.       Tenderness: There is no abdominal tenderness.   Musculoskeletal:      Right hand: Swelling present.      Left hand: Swelling present.      Right lower le+ Edema present.      Left lower le+ Edema present.   Skin:     General: Skin is warm.      Capillary Refill: Capillary refill takes less than 2 seconds.      Comments: Age related skin changes   Neurological:      Mental Status: She is easily aroused. She is confused.      GCS: GCS eye subscore is 4. GCS verbal subscore is 1. GCS motor subscore is 4.      Comments: Episodic following of simple motor commands, not at normal cognitive baseline       Objective:  I have reviewed all medications, laboratory results, and imaging pertinent for today's encounter    Assessment/Plan:    I am currently managing this critically ill patient for the following problems:    Neuro/Psych/Pain Ctrl/Sedation: (Hx: essential tremor)  Acute encephalopathy - likely 2/2 hypercapnia, & infection- resolving   CT head: Negative for acute findings   Urine tox positive for barbiturates consistent with primidone intake   - Pain Control: liquid oxy, scheduled acetaminophen, dilaudid for dressing changes PRN  - Sedation/analgesia: none, keep sedation minimized as able   - Home primidone continued. Will hold propanolol d/t hypotension  - CAM ICU qshift, sleep-wake hygiene, delirium precautions    Respiratory/ENT:  Acute hypoxic/hypercapnic respiratory failure - likely multifactorial and 2/2 HCAP, pl effusions, volume overload  Healthcare-associated pneumonia   Recurrent atelectasis   Pleural effusion -S/p left-sided pigtail placement 10/17, removed 10/25  Intubated for 3 days extubated and re intubated on the , currently day 11 of intubation  - Supplemental O2: intubated x3 times this admission   - Will wean O2 as tolerated to maintain SpO2 >92%  - Consider bronchoscopy, repeat thoracentesis if indicated   - Albuterol, mucomyst, hypertonic saline QID   - IPV per RT TID  - Aspiration  precautions   - Respiratory culture with Pseudomonas, see abx below   - Tracheostomy creation with ENT Thursday 11/8    Cardiovascular:  (Hx: diastolic heart failure, HTN, HLD)  Septic shock - resolved  Occlusive superficial venous thrombus of the left cephalic vein  Non-occlusive DVT right IJ vein   Acute on chronic diastolic heart failure  - US duplex b/l upper extremities-occlusive superficial vein thrombosis of left cephalic vein   - Continue midodrine   - TTE: EF 60-65%, mild/mod AV valve regurg  - Holding home propranolol (on for tremors)  - Continue home Zetia  - Continuous cardiac monitoring per ICU protocol  - EKGs PRN for ACS symptoms, arrhythmias   - Resume heparin gtt after line placement   - Repeat right IJ duplex ordered to assess DVT burden     GI:  Hx GERD  Peg-dependent - placed 11/5  - Diet: enteral feeding via NG at goal  - BR: waqas-colace, miralax PRN  - GI Prophylaxis: PPI  - Patient had a few episodes of diarrhea, sample sent, C-Diff negative   - Peg-placed 11/4    /Volume Status/Electrolytes:  Acute kidney injury - possibly ATN +/- medication-induced (vancomycin)  Anasarca   Hypoalbuminemia   Hyponatremia  Baseline Cr 0.8-1.0. BUN Cr 1.92 today   - Nephrology following: M/W/F iHD  - Bladder scan daily   - Replete electrolytes to maintain K >4.0 and Mg >2.0  - Daily BMP, Mg, Phos  - Free water flushes being held due to hyponatremia, improving     Heme/Onc: (Hx: normocytic anemia)  Occlusive LUE DVT  Non-occlusive R IJ DVT  - Therapeutic heparin infusion, will restart this evening after tunneled line placement   - Can plan for transition to eliquis after tracheostomy if no more planned procedures   - Repeat DVT US still showing presence of RIJ thrombus around catheter   - Continue iron and folate  -- 1 unit PRBC yesterday for Hgb 6.6, 1 unit PRBC today for Hgb 6.9, will resend iron studies, folate, B12  -- EPO not indicated   - Monitor for s/sx of bleeding   - Plan to transfuse if Hgb <7.0   -  "Daily CBC  - T&S ordered for 11/4 morning    Endocrine: (Hx: DMII)  - SSI Q4  - Hypoglycemia protocol PRN    Infectious Disease:  Pseudomonas-Respiratory culture 10/29  Thoracolumbar surgical site infection - s/p I&D x 2. Recent MRSA bacteremia on extended course of IV antibiotics (vanc and rocephin -> 11/12)  Healthcare-associated pneumonia   CT lumbar spine 10/12: Unable to r/o abscess. Unable to do MRI d/t spinal hardware, \"metal in head\" per patient  Sputum culture 10/16: + pseudomonas   - ID following  - Continue Ceftriaxone for 7 days (10/23-11/12)  - Continue Daptomycin for 15 days (10/21-11/12)  - Cefepime and inhaled tobramycin courses completed   - PICC placed 10/24  - Monitor SIRS criteria  - Consult placed to Dr. Ventura: ok to remove sutures, removed half yesterday and patient became hypoxic on her side, will remove the other half today     MSK:  - PT/OT- no need to hold PT/OT patient was at a rehab facility after lumbar laminectomy for PT/OT before this admission   - May need new consults placed     Ethics/Code Status:  DNR-CCA     :  DVT Prophylaxis: Heparin gtt  GI Prophylaxis: PPI  Bowel Regimen: Lucia-colace and Miralax   Diet: Tube Feeds  CVC: PICC placed 10/24, trialysis right internal jugular placed 10/19   [place]: none  [place]: none  Restraints: yes    Restraints indicated to maintain lines/tubes.  Alternative therapies have been attempted and have been ineffective.  Restrain with soft wrist restraints and side rails up x4 until medical devices discontinued and/or patient able to participate with plan of care.       Critical Care Time:  59 minutes spent in preparing to see patient (I.e. review of medical records), evaluation of diagnostics (I.e. labs, imaging, etc.), documentation, discussing plan of care with patient/ family/ caregiver, and/ or coordination of care with multidisciplinary team. Time does not include completion of procedure time.     Kaleb Lam PA-C  Pulmonary " and Critical Care Medicine   [facility]

## 2024-11-05 NOTE — PROGRESS NOTES
Patient not medically clear. Patient to get a tracheostomy on Thursday. TCC spoke to patient's spouse, Godfrey, regarding LTACH at discharge. He is agreeable and chose [facility]. Referral sent for review. Patient will need a precert, not started at this time. Will follow.     11/05/24 [id number]   Discharge Planning   Home or Post Acute Services Post acute facilities (Rehab/SNF/etc)   Type of Post Acute Facility Services Rehab;Skilled nursing   Expected Discharge Disposition Long Term  ([facility])   Does the patient need discharge transport arranged? Yes   RoundTrip coordination needed? Yes

## 2024-11-05 NOTE — PROGRESS NOTES
Narda Malloy is a 69 y.o. female on day 24 of admission presenting with Unresponsive.    Subjective   Symptoms (0 - 10, Best to Worst)  [facility] Symptom Assessment System  0-10 (Numeric) Pain Score: 0 - No pain  Awake on vent, calm, no pain currently. PEG placed yesterday. Tunneled HD catheter placed today. Plan for tracheostomy placement on Thursday.        Objective     Vitals and nursing note reviewed.   Constitutional:       General: She is not in acute distress.     Appearance: She is ill-appearing.   HENT:      Head: Normocephalic and atraumatic.      Comments: intubated     Nose: Nose normal.      Mouth/Throat:      Mouth: Mucous membranes are moist.      Pharynx: Oropharynx is clear.   Eyes:      Extraocular Movements: Extraocular movements intact.      Pupils: Pupils are equal, round, and reactive to light.   Neck:      Vascular: No carotid bruit.   Cardiovascular:      Rate and Rhythm: Normal rate and regular rhythm.      Heart sounds: No murmur heard.     Comments: Line in place, dressing intact.   Weak pulses.   Pulmonary:      Effort: Pulmonary effort is normal. No respiratory distress.      Comments: diminished bases.   Intubated, on vent  Abdominal:      General: Abdomen is flat. Bowel sounds are normal. There is no distension.      Palpations: Abdomen is soft.      Tenderness: There is no abdominal tenderness.   Musculoskeletal:         General: No swelling, tenderness, deformity or signs of injury. Normal range of motion.      Cervical back: Normal range of motion and neck supple.   Skin:     General: Skin is warm and dry.      Capillary Refill: Capillary refill takes 2 to 3 seconds.      Coloration: Skin is pale.   Wounds not assessed due to positioning.   Neurological:      Mental Status: She is alert.      Comments: nods appropriately yes no to all questions, follows all commands.    Last Recorded Vitals  Blood pressure 111/86, pulse (!) 120, temperature (!) 38.4 °C (101.1 °F), temperature  "source Axillary, resp. rate 18, height 1.778 m (5' 10\"), weight 116 kg (255 lb 11.7 oz), SpO2 98%.  Intake/Output last 3 Shifts:  I/O last 3 completed shifts:  In: 1861.6 (16 mL/kg) [I.V.:961.6 (8.3 mL/kg); NG/GT:250; IV Piggyback:650]  Out: - (0 mL/kg)   Weight: 116.4 kg     Relevant Results  Results for orders placed or performed during the hospital encounter of 10/12/24 (from the past 24 hours)   POCT GLUCOSE   Result Value Ref Range    POCT Glucose 117 (H) 74 - 99 mg/dL   POCT GLUCOSE   Result Value Ref Range    POCT Glucose 112 (H) 74 - 99 mg/dL   POCT GLUCOSE   Result Value Ref Range    POCT Glucose 121 (H) 74 - 99 mg/dL   CBC and Auto Differential   Result Value Ref Range    WBC 7.6 4.4 - 11.3 x10*3/uL    nRBC 0.0 0.0 - 0.0 /100 WBCs    RBC 2.25 (L) 4.00 - 5.20 x10*6/uL    Hemoglobin 6.9 (L) 12.0 - 16.0 g/dL    Hematocrit 21.6 (L) 36.0 - 46.0 %    MCV 96 80 - 100 fL    MCH 30.7 26.0 - 34.0 pg    MCHC 31.9 (L) 32.0 - 36.0 g/dL    RDW 19.3 (H) 11.5 - 14.5 %    Platelets 250 150 - 450 x10*3/uL    Neutrophils % 85.2 40.0 - 80.0 %    Immature Granulocytes %, Automated 1.1 (H) 0.0 - 0.9 %    Lymphocytes % 7.8 13.0 - 44.0 %    Monocytes % 4.7 2.0 - 10.0 %    Eosinophils % 0.8 0.0 - 6.0 %    Basophils % 0.4 0.0 - 2.0 %    Neutrophils Absolute 6.49 1.20 - 7.70 x10*3/uL    Immature Granulocytes Absolute, Automated 0.08 0.00 - 0.70 x10*3/uL    Lymphocytes Absolute 0.59 (L) 1.20 - 4.80 x10*3/uL    Monocytes Absolute 0.36 0.10 - 1.00 x10*3/uL    Eosinophils Absolute 0.06 0.00 - 0.70 x10*3/uL    Basophils Absolute 0.03 0.00 - 0.10 x10*3/uL   Hepatic function panel   Result Value Ref Range    Albumin 2.3 (L) 3.4 - 5.0 g/dL    Bilirubin, Total 0.3 0.0 - 1.2 mg/dL    Bilirubin, Direct 0.1 0.0 - 0.3 mg/dL    Alkaline Phosphatase 111 33 - 136 U/L    ALT 9 7 - 45 U/L    AST 12 9 - 39 U/L    Total Protein 5.5 (L) 6.4 - 8.2 g/dL   Magnesium   Result Value Ref Range    Magnesium 2.07 1.60 - 2.40 mg/dL   Phosphorus   Result Value " Ref Range    Phosphorus 2.6 2.5 - 4.9 mg/dL   Basic Metabolic Panel   Result Value Ref Range    Glucose 123 (H) 74 - 99 mg/dL    Sodium 133 (L) 136 - 145 mmol/L    Potassium 3.9 3.5 - 5.3 mmol/L    Chloride 97 (L) 98 - 107 mmol/L    Bicarbonate 28 21 - 32 mmol/L    Anion Gap 12 10 - 20 mmol/L    Urea Nitrogen 17 6 - 23 mg/dL    Creatinine 1.50 (H) 0.50 - 1.05 mg/dL    eGFR 38 (L) >60 mL/min/1.73m*2    Calcium 7.6 (L) 8.6 - 10.3 mg/dL   Morphology   Result Value Ref Range    RBC Morphology See Below     Polychromasia Mild     Ovalocytes Few    Iron and TIBC   Result Value Ref Range    Iron 23 (L) 35 - 150 ug/dL    UIBC 84 (L) 110 - 370 ug/dL    TIBC 107 (L) 240 - 445 ug/dL    % Saturation 21 (L) 25 - 45 %   Ferritin   Result Value Ref Range    Ferritin 479 (H) 8 - 150 ng/mL   Prepare RBC: 1 Units   Result Value Ref Range    PRODUCT CODE C6881R89     Unit Number [id number]     Unit ABO O     Unit RH NEG     XM INTEP COMP     Dispense Status TR     Blood Expiration Date 11/11/2024 11:59:00 PM EST     PRODUCT BLOOD TYPE      UNIT VOLUME 280    POCT GLUCOSE   Result Value Ref Range    POCT Glucose 162 (H) 74 - 99 mg/dL   POCT GLUCOSE   Result Value Ref Range    POCT Glucose 150 (H) 74 - 99 mg/dL   POCT GLUCOSE   Result Value Ref Range    POCT Glucose 160 (H) 74 - 99 mg/dL   Lactate dehydrogenase   Result Value Ref Range     84 - 246 U/L   CBC and Auto Differential   Result Value Ref Range    WBC 7.2 4.4 - 11.3 x10*3/uL    nRBC 0.0 0.0 - 0.0 /100 WBCs    RBC 2.73 (L) 4.00 - 5.20 x10*6/uL    Hemoglobin 8.5 (L) 12.0 - 16.0 g/dL    Hematocrit 25.7 (L) 36.0 - 46.0 %    MCV 94 80 - 100 fL    MCH 31.1 26.0 - 34.0 pg    MCHC 33.1 32.0 - 36.0 g/dL    RDW 20.4 (H) 11.5 - 14.5 %    Platelets 252 150 - 450 x10*3/uL    Neutrophils % 83.6 40.0 - 80.0 %    Immature Granulocytes %, Automated 0.6 0.0 - 0.9 %    Lymphocytes % 9.6 13.0 - 44.0 %    Monocytes % 5.4 2.0 - 10.0 %    Eosinophils % 0.4 0.0 - 6.0 %    Basophils % 0.4  0.0 - 2.0 %    Neutrophils Absolute 6.03 1.20 - 7.70 x10*3/uL    Immature Granulocytes Absolute, Automated 0.04 0.00 - 0.70 x10*3/uL    Lymphocytes Absolute 0.69 (L) 1.20 - 4.80 x10*3/uL    Monocytes Absolute 0.39 0.10 - 1.00 x10*3/uL    Eosinophils Absolute 0.03 0.00 - 0.70 x10*3/uL    Basophils Absolute 0.03 0.00 - 0.10 x10*3/uL   Lactate   Result Value Ref Range    Lactate 0.8 0.4 - 2.0 mmol/L   Morphology   Result Value Ref Range    RBC Morphology No significant RBC morphology present      *Note: Due to a large number of results and/or encounters for the requested time period, some results have not been displayed. A complete set of results can be found in Results Review.    XR chest 1 view    Result Date: 11/3/2024  Interpreted By:  Lisseth Rocha, STUDY: XR CHEST 1 VIEW 11/3/2024 5:30 am   INDICATION: Signs/Symptoms:Continued intubation   COMPARISON: 11/02/2024   ACCESSION NUMBER(S): [id number]   ORDERING CLINICIAN: STEVIE ZHAO   TECHNIQUE: AP view   FINDINGS: Triple-lumen catheter noted in the right neck. Endotracheal tube and nasogastric tube are again seen. Endotracheal tube tip obscured by spinal fusion hardware. Left subclavian PICC line noted overlying the superior vena cava. Spinal fusion rods in the thoracic and lumbar spine   Again seen is pulmonary venous congestion with bilateral perihilar airspace opacification possibly pulmonary edema. Suspected small left pleural effusion again noted.       Bilateral perihilar airspace opacification similar to the prior exam possibly due to pulmonary edema or pneumonia with life-support devices unchanged since the prior exam.   Signed by: Lisseth Rocha 11/3/2024 1:57 PM Dictation workstation:   [id number]    Transthoracic Echo (TTE) Complete    Result Date: 11/3/2024           [place] [id number]            Phone [phone] TRANSTHORACIC ECHOCARDIOGRAM REPORT Patient Name:       KIMMIE Rivera  Physician:    [id number] Valeria Mays MD Study Date:         11/2/2024            Ordering Provider:    [id number] SERINA UBTCHER MRN/PID:            [id number]             Fellow: Accession#:         [id number]         Nurse: Date of Birth/Age:  1955 / 69 years Sonographer:          Camila GIANG Gender Assigned at  F                    Additional Staff: Birth: Height:             177.80 cm            Admit Date:           11/2/2024 Weight:             113.40 kg            Admission Status:     Inpatient -                                                                Routine BSA / BMI:          2.29 m2 / 35.87      Department Location:                     kg/m2 Blood Pressure: 132 /49 mmHg Study Type:    TRANSTHORACIC ECHO (TTE) COMPLETE Diagnosis/ICD: Generalized edema-R60.1 Indication:    Anasarca Acute Renal Failure revurent Effusions Hypoxia CPT Codes:     Echo Complete w Full [id number] Patient History: Pertinent History: HTN HLD DMII Former Smoker CAD Dyslipidemia. Study Detail: The following Echo studies were performed: 2D, M-Mode, Doppler,               color flow and Strain. A bubble study was not performed.  PHYSICIAN INTERPRETATION: Left Ventricle: The left ventricular systolic function is normal, with a visually estimated ejection fraction of 60-65%. There is borderline concentric left ventricular hypertrophy. There are no regional wall motion abnormalities. The left ventricular cavity size is normal. Left Ventricular Global Longitudinal Strain - -17.4 %. Spectral Doppler shows a normal pattern of left ventricular diastolic filling. Left Atrium: The left atrium is normal in size. A bubble study using agitated saline was not performed. Right Ventricle: The right ventricle is normal in size. There is normal right  ventricular global systolic function. Right Atrium: The right atrium is normal in size. Aortic Valve: The aortic valve is trileaflet. There is mild aortic valve thickening. There is evidence of mildly elevated transaortic gradients consistent with sclerosis of the aortic valve. There is no evidence of aortic valve regurgitation. The peak instantaneous gradient of the aortic valve is 10 mmHg. Mitral Valve: The mitral valve is normal in structure. There is mild mitral annular calcification. There is mild mitral valve regurgitation. Tricuspid Valve: The tricuspid valve is structurally normal. There is mild tricuspid regurgitation. The Doppler estimated RVSP is mild to moderately elevated at 52.8 mmHg. Pulmonic Valve: The pulmonic valve is structurally normal. There is physiologic pulmonic valve regurgitation. Pericardium: Trivial pericardial effusion. Aorta: The aortic root is normal. There is an aneurysm involving the ascending aorta. 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 Systemic Veins: The inferior vena cava appears normal in size, with IVC inspiratory collapse greater than 50%.  CONCLUSIONS:  1. The left ventricular systolic function is normal, with a visually estimated ejection fraction of 60-65%.  2. There is normal right ventricular global systolic function.  3. Mild mitral valve regurgitation.  4. Mild to moderately elevated right ventricular systolic pressure.  5. Mild tricuspid regurgitation is visualized.  6. Aortic valve sclerosis.  7. Aneurysm of the ascending aorta.  8. 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 QUANTITATIVE DATA SUMMARY:  2D MEASUREMENTS:           Normal Ranges: LAs:             3.70 cm   (2.7-4.0cm) IVSd:            1.35 cm   (0.6-1.1cm) LVPWd:           1.14 cm   (0.6-1.1cm) LVIDd:           4.44 cm   (3.9-5.9cm) LVIDs:           2.93 cm LV Mass Index:   89.2 g/m2 LV % FS          34.0 %  LA VOLUME:                   Normal  Ranges: LA Vol A4C:        43.8 ml   (22+/-6mL/m2) LA Vol Index A4C:  19.1ml/m2 LA Area A4C:       15.8 cm2 LA Major Axis A4C: 4.8 cm LA Vol A4C:        34.8 ml  RA VOLUME BY A/L METHOD:            Normal Ranges: RA Vol A4C:              23.6 ml    (8.3-19.5ml) RA Vol Index A4C:        10.3 ml/m2 RA Area A4C:             11.3 cm2 RA Major Axis A4C:       4.6 cm  AORTA MEASUREMENTS:         Normal Ranges: Ao Sinus, d:        3.18 cm (2.1-3.5cm) Ao STJ, d:          3.13 cm (1.7-3.4cm) Asc Ao, d:          3.80 cm (2.1-3.4cm)  LV SYSTOLIC FUNCTION BY 2D PLANIMETRY (MOD):                                         Normal Ranges: EF-A4C View:                      59 %  (>=55%) EF-A2C View:                      71 % EF-Biplane:                       65 % EF-Visual:                        63 % LV EF Reported:                   63 % Global Longitudinal Strain (GLS): -17 %  LV DIASTOLIC FUNCTION:            Normal Ranges: MV Peak E:             1.31 m/s   (0.7-1.2 m/s) MV Peak A:             1.01 m/s   (0.42-0.7 m/s) E/A Ratio:             1.30       (1.0-2.2) MV e'                  0.085 m/s  (>8.0) MV lateral e'          0.10 m/s MV medial e'           0.07 m/s E/e' Ratio:            15.38      (<8.0) PulmV Sys Jerrell:         54.60 cm/s PulmV Daugherty Jerrell:        46.10 cm/s PulmV S/D Jerrell:         1.20 PulmV A Revs Jerrell:      57.30 cm/s  MITRAL VALVE:          Normal Ranges: MV DT:        225 msec (150-240msec)  AORTIC VALVE:           Normal Ranges: AoV Vmax:      1.56 m/s (<=1.7m/s) AoV Peak P.7 mmHg (<20mmHg) LVOT Max Jerrell:  1.62 m/s (<=1.1m/s) LVOT VTI:      29.40 cm LVOT Diameter: 2.10 cm  (1.8-2.4cm) AoV Area,Vmax: 3.60 cm2 (2.5-4.5cm2)  RIGHT VENTRICLE: RV Basal 3.45 cm RV Mid   3.32 cm RV Major 6.2 cm TAPSE:   14.7 mm RV s'    0.18 m/s  TRICUSPID VALVE/RVSP:          Normal Ranges: Peak TR Velocity:     3.53 m/s RV Syst Pressure:     53 mmHg  (< 30mmHg) IVC Diam:             1.55 cm  PULMONIC VALVE:          Normal  Ranges: PV Accel Time:  74 msec  (>120ms) PV Max Jerrell:     1.1 m/s  (0.6-0.9m/s) PV Max P.7 mmHg  Pulmonary Veins: PulmV A Revs Jerrell: 57.30 cm/s PulmV Daugherty Jerrell:   46.10 cm/s PulmV S/D Jerrell:    1.20 PulmV Sys Jerrell:    54.60 cm/s  [id number] Valeria Mays MD Electronically signed on 11/3/2024 at 9:40:38 AM  ** Final **     XR chest 1 view    Result Date: 2024  Interpreted By:  Mayuri Velez, STUDY: XR CHEST 1 VIEW;  2024 5:46 am   INDICATION: Signs/Symptoms:Continued intubation.   COMPARISON: 2024   ACCESSION NUMBER(S): [id number]   ORDERING CLINICIAN: STEVIE ZHAO   FINDINGS: CARDIOMEDIASTINAL SILHOUETTE: Cardiomediastinal silhouette is normal in size and configuration. There is a left PICC line with the tip in the superior vena cava. There is an endotracheal tube with the tip not seen because of upper thoracic dorsal fusion rods.   LUNGS: There is bibasilar pneumonia with improvement in aeration at the right lung base. There is less consolidation.   ABDOMEN: No remarkable upper abdominal findings. There is a nasogastric tube with the tip not seen but is well beyond the gastroesophageal junction.   BONES: No acute osseous changes. There are dorsal fusion rods bilaterally involving the in the tire thoracic and visualized upper lumbar spine.       1. Endotracheal tube tip not seen because of obscuration from upper thoracic dorsal fusion rods. 2. Bibasilar pneumonia with improved aeration at the right lung base.   MACRO: None   Signed by: Mayuri Velez 2024 12:42 PM Dictation workstation:   [id number]    XR chest 1 view    Result Date: 2024  Interpreted By:  Parmjit Mancini, STUDY: XR CHEST 1 VIEW   INDICATION: Signs/Symptoms:post intubation.   COMPARISON: Earlier the same day.   ACCESSION NUMBER(S): [id number]   ORDERING CLINICIAN: SERINA BUTCHER   FINDINGS: Intubation again noted. Unfortunately the tip of the endotracheal tube not definitively visualized as it is obscured by some of the  thoracic fusion hardware. It looks to be near the aortic knob.   Bilateral pleural effusions and basilar edema unchanged.   No evidence of pneumothorax.       Unchanged appearance of the chest as above.   Signed by: Parmjit Mancini 11/1/2024 7:04 PM Dictation workstation:   [id number]    XR chest 1 view    Result Date: 11/1/2024  Interpreted By:  Joyce Fontaine, STUDY: XR CHEST 1 VIEW 11/1/2024 6:06 am   INDICATION: Signs/Symptoms:Continued intubation   COMPARISON: 10/31/2024   ACCESSION NUMBER(S): [id number]   ORDERING CLINICIAN: STEVIE ZHAO   TECHNIQUE: Single AP view chest   FINDINGS: Hazy opacities identified within bilateral lung fields mid to lower lung zones with component of layering effusions and compressive atelectasis suggested. Right IJ line is demonstrated with tip in the proximal SVC. Left-sided PICC line with tip in the region of the distal SVC. NG tube is in place with interval removal of the ET tube.   Spinal fusion demonstrated. Cardiac silhouette within normal limits                 1. Persistent hazy opacities mid to lower lung zones bilaterally with layering basilar effusions and compressive atelectasis suggested.   2. Interval removal of the ET tube. Remainder of the lines and tubes are unremarkable.   Signed by: Joyce Fontaine 11/1/2024 10:56 AM Dictation workstation:   [id number]    XR chest 1 view    Result Date: 10/31/2024  Interpreted By:  Andrew Byrd, STUDY: XR CHEST 1 VIEW; 10/31/2024 4:55 am   INDICATION: CLINICAL INFORMATION: Signs/Symptoms:Continued intubation.   COMPARISON: 10/30/2024 at 0433 hours   ACCESSION NUMBER(S): [id number]   ORDERING CLINICIAN: STEVIE ZHAO   TECHNIQUE: Portable chest one view.   FINDINGS: The cardiac size is indeterminate in view of the AP projection.  The tube and line placement is unchanged.  There is no change in the bilateral infiltrates and effusions. Tube and line placement is somewhat difficult to assess due to overlying surgical hardware.        No change in the bilateral infiltrates and effusions.   MACRO: none   Signed by: Andrew Byrd 10/31/2024 7:42 AM Dictation workstation:   [id number]    XR chest 1 view    Result Date: 10/30/2024  Interpreted By:  Andrew Byrd, STUDY: XR CHEST 1 VIEW; 10/30/2024 4:46 am   INDICATION: CLINICAL INFORMATION: Signs/Symptoms:Continued intubation.   COMPARISON: 10/29/2024 0832 hours   ACCESSION NUMBER(S): [id number]   ORDERING CLINICIAN: STEVIE ZHAO   TECHNIQUE: Portable chest one view.   FINDINGS: The cardiac size is indeterminate in view of the AP projection. There is no change in the bilateral infiltrates and effusions compared to the prior study. Tube and line placement is somewhat difficult to assess due to the extensive thoracic spinal hardware.       There is no interval change when compared to the previous examination.   MACRO: none   Signed by: Andrew Byrd 10/30/2024 9:00 AM Dictation workstation:   [id number]    Vascular US upper extremity venous duplex left    Result Date: 10/29/2024           [place] [id number]            Phone [phone]  Vascular Lab Report  VASC US UPPER EXTREMITY VENOUS DUPLEX LEFT Patient Name:      KIMMIEMESHA RODRIGUES       Reading Physician:  [id number] Derek Michelle MD, RPVI Study Date:        10/29/2024           Ordering Provider:  [id number] SAGIE MULLER MRN/PID:           [id number]             Fellow: Accession#:        [id number]         Technologist:       Junior Baltazar Date of Birth/Age: 1955 / 69 years Technologist 2:     Tanya Blank T Gender:            F                    Encounter#:         [id number] Admission Status:  Inpatient            Location Performed: [facility]  Diagnosis/ICD: Left arm swelling-M79.89 Indication:    Limb swelling CPT Codes:     31613 Peripheral venous  duplex scan for DVT Limited  Pertinent History: Left cephalic vein superficial thrombophlebitis noted on                    ultrasound in radiology 10/14/24.  **CRITICAL RESULT** Critical Result: Acute, non-occlusive DVT right internal jugular vein around line placement. Notification called to Saige Diaz CNP on 10/29/2024 at 4:00:00 PM by Tanya Blank RVT.  CONCLUSIONS: Right Upper Venous: Acute, non-occlusive, focal deep vein thrombus of right internal jugular vein around line placement. Visualized portions of subclavian and innominate veins appear patent. Left Upper Venous: No evidence of acute deep vein thrombus visualized in the left upper extremity. Acute, occlusive superficial vein thrombosis of left cephalic vein from mid-distal upper arm extending to approximately 2.0cm proximal to subclavian junction. There is a left subclavian vein catheter noted in place.  Imaging & Doppler Findings:  Right            Compressible      Thrombus              Flow Internal Jugular   Partial    Acute non-occlusive Subclavian                                        Spontaneous/Phasic  Left                Compress    Thrombus Internal Jugular      Yes         None Subclavian            Yes         None Subclavian Proximal   Yes         None Subclavian Mid        Yes         None Subclavian Distal     Yes         None Axillary              Yes         None Brachial              Yes         None Cephalic               No    Acute occlusive Basilic               Yes         None  [id number] Derek Michelle MD, RPVI Electronically signed by [id number] Derek Michelle MD, RPVI on 10/29/2024 at 9:41:07 PM  ** Final **     XR abdomen 1 view    Result Date: 10/29/2024  Interpreted By:  Mayuri Velez, STUDY: XR ABDOMEN 1 VIEW;  10/29/2024 8:51 am. 2 views.   INDICATION: Signs/Symptoms:high residual of tube feeds.   COMPARISON: 10/1924   ACCESSION NUMBER(S): [id number]   ORDERING CLINICIAN: SAIGE DIAZ   FINDINGS: Bowel gas pattern is  nonspecific and nonobstructive.  There is a nasogastric tube with the tip in the distal stomach. There is a 2nd nasogastric tube with the tip in the proximal stomach. There is no gastric distention.   There is partial atherosclerotic calcification of the abdominal aorta.   There is left basilar consolidation with small left pleural effusion.   Osseous structures demonstrate no acute bony changes.   There are lower thoracic and lumbar dorsal fusion rods.       Nonspecific, nonobstructive bowel gas pattern. There are 2 nasogastric tubes. The tip of one of the tubes as in the distal stomach and the tip of the 2nd tube is in the proximal stomach. Left basilar consolidation with a small left pleural effusion.   MACRO: None   Signed by: Mayuri Velez 10/29/2024 9:36 AM Dictation workstation:   [id number]    XR chest 1 view    Result Date: 10/29/2024  Interpreted By:  Lily Mancini, STUDY: XR CHEST 1 VIEW;  10/29/2024 8:51 am   INDICATION: Signs/Symptoms:R/O aspiration. Residual feeds   COMPARISON: 10/28/2024; CT chest dated 09/26/2024   ACCESSION NUMBER(S): [id number]   ORDERING CLINICIAN: SAIGE MULLER   FINDINGS: In size.   There is bilateral parenchymal infiltration. There may be bilateral pleural effusions.   There are calcified granulomas.   A nasogastric tube terminates below the diaphragm.   There appears to be a PICC line on the left with the tip in the region of superior vena cava.   There is also of jugular catheter on the right. The tip is not well seen.   The patient is status post spinal fusion.   COMPARISON OF FINDING: The chest is similar.       Bilateral parenchymal infiltrates. Bilateral pleural effusions.   MACRO: none   Signed by: Lily Mancini 10/29/2024 9:20 AM Dictation workstation:   [id number]    XR chest 1 view    Result Date: 10/28/2024  Interpreted By:  Andrew Byrd, STUDY: XR CHEST 1 VIEW; 10/28/2024 11:33 am   INDICATION: CLINICAL INFORMATION: Signs/Symptoms:more secretions.    COMPARISON: 10/26/2024   ACCESSION NUMBER(S): [id number]   ORDERING CLINICIAN: MARIAN GILBERT   TECHNIQUE: Portable chest one view.   FINDINGS: The cardiac size is indeterminate in view of the AP projection. Bilateral perihilar infiltrate is present. Bilateral effusions are present, greater than left. Increased density at the left base suggest consolidation within left lower lobe. The tube and line placement is unchanged.       Bilateral infiltrates and effusions. There is no interval change when compared to the previous examination.   MACRO: none   Signed by: Andrew Byrd 10/28/2024 12:05 PM Dictation workstation:   [id number]    XR chest 1 view    Result Date: 10/26/2024  Interpreted By:  Mar Watkins, STUDY: XR CHEST 1 VIEW;  10/26/2024 9:21 am   INDICATION: Signs/Symptoms:post chest tube removal.     COMPARISON: 10/25/2024   ACCESSION NUMBER(S): [id number]   ORDERING CLINICIAN: STEVIE ZHAO   FINDINGS: Multiple support devices remain in place including ET tube, tip not well visualized, likely 2 enteric tubes extending into the upper abdomen, tips not included on the image, right jugular central line with tip overlying the proximal SVC and left subclavian PICC line with tip extending to the region of the right atrium again seen. Additional presumed overlying monitoring/support devices. Extensive orthopedic hardware overlying the central chest/spine again seen.   Ill-defined bilateral chest opacities similar to the prior exam. No definite pneumothorax. The cardiac silhouette is within normal limits for size.       Multiple support devices in place as above. Persistent hazy bilateral chest opacities.   MACRO: None.   Signed by: Mar Watkins 10/26/2024 10:11 AM Dictation workstation:   [id number]    XR chest 1 view    Result Date: 10/25/2024  Interpreted By:  Parmjit Mancini, STUDY: XR CHEST 1 VIEW   INDICATION: Signs/Symptoms:post chest tube removal.   COMPARISON: October 24   ACCESSION NUMBER(S):  [id number]   ORDERING CLINICIAN: MARIAN GILBERT   FINDINGS: Interval removal of the left-sided chest tube without pneumothorax.   Moderate left pleural effusion grossly similar.   Edema similar to prior.   Other lines and tubes grossly unchanged.       Interval left-sided chest tube removal without pneumothorax.   Signed by: Parmjit Mancini 10/25/2024 6:30 PM Dictation workstation:   [id number]    Bedside PICC Imaging    Result Date: 10/24/2024  These images are not reportable by radiology and will not be interpreted by  Radiologists.    XR chest 1 view    Result Date: 10/24/2024  Interpreted By:  Andrew Byrd, STUDY: XR CHEST 1 VIEW; 10/24/2024 9:16 am   INDICATION: CLINICAL INFORMATION: Signs/Symptoms:assess effusions, intubated, on CRRT.   COMPARISON: 10/23/2024 at 0401 hours   ACCESSION NUMBER(S): [id number]   ORDERING CLINICIAN: SERINA BUTCHER   TECHNIQUE: Portable chest one view.   FINDINGS: The cardiac size is indeterminate in view of the AP projection. There is no change in the left-sided chest tube. There is no change in the bibasilar density likely representing pleural effusions. Hazy bilateral perihilar infiltrates are suspected.       No change in the probable bilateral infiltrates and effusions as above. Left-sided chest tube is present. There is no evidence for pneumothorax.   MACRO: none   Signed by: Andrew Byrd 10/24/2024 9:58 AM Dictation workstation:   [id number]    ECG 12 Lead    Result Date: 10/23/2024  Normal sinus rhythm Low voltage QRS Borderline ECG No previous ECGs available Confirmed by Milind Lang ([id number]) on 10/23/2024 4:02:31 PM    XR chest 1 view    Result Date: 10/23/2024  Interpreted By:  Stewart Leiva, STUDY: XR CHEST 1 VIEW;  10/23/2024 4:14 am   INDICATION: Signs/Symptoms:increased o2.   COMPARISON: 10/22/2024   ACCESSION NUMBER(S): [id number]   ORDERING CLINICIAN: BRIAN MANRIQUEZ   FINDINGS: Endotracheal tube, nasogastric tube and right central venous catheter is not well  assessed secondary to projectional overlap with thoracolumbar fusion hardware. The cardiac silhouette is stable in size. There are bilateral airspace opacities and pleural effusions. Stable left chest tube. No pneumothorax.       Bilateral airspace opacities and pleural effusions. There is stable focal asymmetric prominent opacity in the right hilar region which may correspond to focal area of consolidation, however attention on continued progress imaging is recommended to assure resolution exclude other under pathology including mass.   MACRO: None   Signed by: Stewart Leiva 10/23/2024 4:31 AM Dictation workstation:   [id number]    XR chest 1 view    Result Date: 10/22/2024  Interpreted By:  Andrew Byrd, STUDY: XR CHEST 1 VIEW; 10/22/2024 6:36 am   INDICATION: CLINICAL INFORMATION: Signs/Symptoms:assess effusions. Unresponsive.   COMPARISON: 10/21/2024   ACCESSION NUMBER(S): [id number]   ORDERING CLINICIAN: SERINA BUTCHER   TECHNIQUE: Portable chest one view.   FINDINGS: The cardiac size is indeterminate in view of the AP projection. Pigtail catheter overlies the left hemithorax. Moderate bilateral pleural effusions are suspected. Perihilar hazy infiltrate is present. Bilateral thoracic and lumbar pedicle screw and bar fusions are present. Right internal jugular venous catheter tip overlies the junction of the right and left innominate veins.   ETT may lie low possibly extending into the right mainstem bronchus but this study is limited due to overlying artifact from the patient's pedicle screw and bar fusion.       No change in the bilateral infiltrates and effusions. Left-sided chest tube without pneumothorax.   Endotracheal tube may be low in position possibly extending into the right mainstem bronchus although following the endotracheal tube is limited due to overlapping artifact from the patient's pedicle screw and bar fusion. Patient may benefit from obtaining RPO and LPO views of the chest to more  definitively assess ET tube position.   MACRO: Andrew Byrd communicated these findings by EPIC secure chat with [facility] intensivist team on 10/22/2024 at 8:16 am.  (**-RCF-**) Findings: See findings.   Signed by: Andrew Byrd 10/22/2024 8:17 AM Dictation workstation:   [id number]    US renal complete    Result Date: 10/22/2024  Interpreted By:  Andrew Byrd, STUDY: US RENAL COMPLETE; 10/21/2024 7:54 pm   INDICATION: Signs/Symptoms:AMY.   COMPARISON: 09/26/2024   ACCESSION NUMBER(S): [id number]   ORDERING CLINICIAN: BLAKE NAIK   TECHNIQUE: Grayscale and color Doppler imaging of the kidneys   FINDINGS: COMMENTS: Technologist note indicates the exam was extremely limited due to portable technique and limited patient mobility.   The right kidney measures 13.3 cm  . The left kidney measures 12.1 cm  .     No renal stones are identified.  The renal cortical thickness and echogenicity is normal.  No hydronephrosis is identified.   Urinary bladder is collapsed around a Gibson catheter. This limited bladder assessment.   Small amount of ascites is present. Right pleural effusion is present.       Non specific appearance of the kidneys as described. Small amount of ascites. Right pleural effusion.   MACRO: none   Signed by: Andrew Byrd 10/22/2024 8:08 AM Dictation workstation:   [id number]    XR chest 1 view    Result Date: 10/21/2024  Interpreted By:  Joyce Fontaine, STUDY: XR CHEST 1 VIEW 10/21/2024 8:00 am   INDICATION: Signs/Symptoms:eval ett, lines, lung fields   COMPARISON: 10/19/2024   ACCESSION NUMBER(S): [id number]   ORDERING CLINICIAN: DUSTY RICO   TECHNIQUE: Single AP view chest   FINDINGS: Endotracheal tube tip is obscured by hardware overlying the chest from thoracic spinal fusion. The endotracheal tube is not identified about the tip of the michi and is just distal to the level of the distal clavicles visualized portions. NG tube is in place.   Hazy opacity at the right lung base component of  layering effusion and compressive atelectasis superimposed infiltrate not excluded. There is a calcified left suprahilar granuloma. Left-sided chest tube in place without evidence for pneumothorax. Hazy opacity retrocardiac area obscuring evaluation with component of layering effusion/compressive atelectasis not excluded.             1. Endotracheal tube obscured by patient positioning and hardware overlying the thoracic spine. The tube is not demonstrated about the level of the michi as seen just distal to the level of the clavicles. Follow up as clinically indicated.   2. Persistent right basilar effusion and compressive atelectasis. Superimposed infiltrate not excluded   3. Left-sided chest tube in place without pneumothorax. Retrocardiac area obscured with component of left basilar effusion/compressive atelectasis suggested.   Signed by: Joyce Fontaine 10/21/2024 9:14 AM Dictation workstation:   [id number]    XR chest 1 view    Result Date: 10/19/2024  Interpreted By:  Parmjit Mancini, STUDY: XR CHEST 1 VIEW   INDICATION: Signs/Symptoms:line placement.   COMPARISON: October 19, 2024 earlier the same day   ACCESSION NUMBER(S): [id number]   ORDERING CLINICIAN: SERINA BUTCHER   FINDINGS: Again the right-sided central line slightly overlies the soft tissues of the chest. The appearance is similar to the previous study. It is potentially in the SVC which may be projecting more laterally given the patient rotation but. Definitive intravascular location can not be confirmed. Correlate with line function.   Basilar edema.   No evidence of pneumothorax.       Again the right-sided central line slightly overlies the soft tissues of the chest. The appearance is similar to the previous study. It is potentially in the SVC which may be projecting more laterally given the patient rotation but. Definitive intravascular location can not be confirmed. Correlate with line function.   Signed by: Parmjit Mancini 10/19/2024 9:55 PM  Dictation workstation:   [id number]    XR abdomen 1 view    Result Date: 10/19/2024  Interpreted By:  Parmjit Mancini, STUDY: XR ABDOMEN 1 VIEW   INDICATION: Signs/Symptoms:NG tube placement.   COMPARISON: Earlier same day   ACCESSION NUMBER(S): [id number]   ORDERING CLINICIAN: DUSTY RICO   FINDINGS: Nasogastric tube over the gastric body.   Bowel-gas pattern not fully assessed.       Nasogastric tube over the gastric body.   Signed by: Parmjit Mancini 10/19/2024 8:02 PM Dictation workstation:   [id number]    XR chest 1 view    Result Date: 10/19/2024  Interpreted By:  Parmjit Mancini, STUDY: XR CHEST 1 VIEW   INDICATION: Signs/Symptoms:ett placed.   COMPARISON: Earlier the same day 5:32 a.m.   ACCESSION NUMBER(S): [id number]   ORDERING CLINICIAN: DUSTY RICO   FINDINGS: Interval intubation with the endotracheal tube an approximate 4 cm proximal to the michi. Bilateral pleural effusions right worse than left with basilar edema, slightly worsened from prior.   Left-sided thoracostomy tube.   No evidence of pneumothorax.   Right-sided central line overlies the right chest near the apex. This is potentially within the SVC but is slightly lateral and its definitive intravascular location not confirmed.       Right-sided central line overlies the right chest near the apex. This is potentially within the SVC but is slightly lateral and its definitive intravascular location not confirmed. Correlate with function.   Interval intubation.       Signed by: Parmjit Mancini 10/19/2024 8:02 PM Dictation workstation:   [id number]    XR chest 1 view    Result Date: 10/19/2024  Interpreted By:  Parmjit Mancini, STUDY: XR CHEST 1 VIEW   INDICATION: Signs/Symptoms:sob, ng tube placement.   COMPARISON: Earlier the same day   ACCESSION NUMBER(S): [id number]   ORDERING CLINICIAN: CHRISTIANO BRICENO   FINDINGS: Nasogastric tube overlies the gastric fundus.   Basilar edema and effusions similar to prior exam       Nasogastric tube overlies the  gastric fundus.   Signed by: Parmjit Mancini 10/19/2024 5:34 PM Dictation workstation:   [id number]    XR chest 1 view    Result Date: 10/19/2024  Interpreted By:  Fern Mckeon, STUDY: XR CHEST 1 VIEW;  10/19/2024 5:51 am   INDICATION: Signs/Symptoms:assess effusions.     COMPARISON: 10/18/2024   ACCESSION NUMBER(S): [id number]   ORDERING CLINICIAN: SERINA BUTCHER   TECHNIQUE: Portable AP semi upright   FINDINGS: Spinal fixation hardware appears unchanged. Pigtail pleural catheter mid to lower left hemithorax projects laterally and is unchanged in position.   Heart is partially obscured by pleuroparenchymal opacities but is grossly unchanged in size. There is poor aeration of the lungs with considerable bilateral dependent atelectasis and bilateral pleural effusions. No pneumothorax is identified. Overall appearance is similar to the prior study. Osseous structures are grossly unchanged.       Bilateral atelectasis and pleural effusions with very poor aeration of the lungs, unchanged   MACRO: None.   Signed by: Fern Mckeon 10/19/2024 12:20 PM Dictation workstation:   [id number]    XR chest 1 view    Result Date: 10/18/2024  Interpreted By:  Joyce Fontaine, STUDY: XR CHEST 1 VIEW 10/18/2024 5:31 am   INDICATION: Signs/Symptoms:assess effusions   COMPARISON: 10/17/2024   ACCESSION NUMBER(S): [id number]   ORDERING CLINICIAN: SERINA BUTCHER   TECHNIQUE: Single portable AP view chest   FINDINGS: Cardiac silhouette is mildly enlarged. Examination is rotated accentuating the cardiac silhouette. Hazy opacity at the right lung base with component of layering basilar effusion and compressive atelectasis. Of note, left-sided chest tube in place without pneumothorax. Hazy opacity at the left lung base component of which may relate to compressive atelectasis or even small effusion. Spinal fusion noted.             1. Stable chest with persistent bilateral basilar effusions. No pneumothorax with left-sided  percutaneous pigtail catheter in place.   Signed by: Joyce Fontaine 10/18/2024 9:02 AM Dictation workstation:   [id number]    XR chest 1 view    Result Date: 10/17/2024  Interpreted By:  Fern Mckeon, STUDY: XR CHEST 1 VIEW;  10/17/2024 3:12 pm   INDICATION: Signs/Symptoms:s/p pigtail placement.     COMPARISON: 10/17/2024 at 8:28 a.m.   ACCESSION NUMBER(S): [id number]   ORDERING CLINICIAN: SERINA BUTCHER   TECHNIQUE: Portable AP upright   FINDINGS: Since the earlier examination a pigtail catheter is been placed in the pleural space lateral mid left hemithorax. There is improved aeration in the left lung compared to the previous study. Continued opacity is present at the left lung base which is probably a combination of pleural fluid and atelectasis. There is no pneumothorax at the left apex. Shallow volume in the right lung appears similar. There is opacity at the right lung base which is unchanged and consistent with a combination of pleural fluid and atelectasis. Fixation hardware in the spine is partially visualized and is grossly unchanged.       Interval placement of a left pleural pigtail catheter and improved aeration of the left lung since the earlier study.   No pneumothorax   Bilateral basilar opacities consistent with a combination of pleural fluid and atelectasis   MACRO: None.   Signed by: Fern Mckeon 10/17/2024 4:36 PM Dictation workstation:   [id number]    XR chest 1 view    Result Date: 10/17/2024  Interpreted By:  Mayuri Velez, STUDY: XR CHEST 1 VIEW;  10/17/2024 8:56 am   INDICATION: Signs/Symptoms:fluid overload.   COMPARISON: 10/16/2024   ACCESSION NUMBER(S): [id number]   ORDERING CLINICIAN: SERINA BUTCEHR   FINDINGS: CARDIOMEDIASTINAL SILHOUETTE: Cardiomediastinal silhouette is normal in size and configuration.     LUNGS: There has been interval development of complete opacification of the left hemithorax, likely a large left pleural effusion. There may be an infiltrate or  compressive atelectasis at the left lung. There is a moderate-size right pleural effusion extending into the major fissure, unchanged.   ABDOMEN: No remarkable upper abdominal findings.     BONES: No acute osseous changes. Status post dorsal fusion of the thoracic spine and lumbar spine.       1. Interval complete opacification of the left hemithorax consistent with a large left pleural effusion. There is an infiltrate or compressive atelectasis at the left lung. 2. Unchanged moderate size right pleural effusion.   MACRO: None   Signed by: Mayuri Velez 10/17/2024 9:16 AM Dictation workstation:   [id number]    XR chest 1 view    Result Date: 10/16/2024  Interpreted By:  Vanessa Richardson, STUDY: XR CHEST 1 VIEW 10/16/2024 10:17 am   INDICATION: Hypoxia with increasing oxygen requirements   COMPARISON: 10/13/2024   ACCESSION NUMBER(S): [id number]   ORDERING CLINICIAN: KRYSTLE BRADY   TECHNIQUE: AP erect view of the chest   FINDINGS: Increasing opacification within the left mid and lower hemithorax is noted silhouetting the left cardiac border consistent with increasing size of left pleural effusion and probable left lower lobe atelectasis. There is left suprahilar infiltrate noted as well. On the right, the pleural effusion has also increased in size with increasing atelectasis.   Calcified lymph nodes are seen within the AP window. There is postoperative change from spinal fusion in the thoracic and lumbar regions.   Endotracheal and nasogastric tubes have been removed.       Increasing size of the bilateral pleural effusions with worsening atelectasis.   There is now new left suprahilar infiltrate.   Signed by: Vanessa Richardson 10/16/2024 12:03 PM Dictation workstation:   [id number]    Upper extremity venous duplex bilateral    Result Date: 10/14/2024  Interpreted By:  Sean Alfred, STUDY: [facility] UPPER EXTREMITY VENOUS DUPLEX BILATERAL; 10/14/2024 4:48 pm   INDICATION: Signs/Symptoms:new onset upper extremity  swelling.   COMPARISON: None.   ACCESSION NUMBER(S): [id number]   ORDERING CLINICIAN: SERINA BUTCHER   TECHNIQUE: 2D ultrasound and color-flow duplex images were obtained along with Doppler spectral waveform analysis of the deep venous system of the right upper extremity. Venous compression was performed. Also interrogated, contralateral subclavian vein.   FINDINGS:     Right upper Extremity: There is normal compressibility and flow within the visualized vessels of the deep venous system of this upper extremity. Visualized portions of the jugular vein and subclavian vein appear patent.   Left upper extremity: There is thrombus within the left cephalic vein, which is part of the superficial venous system of the upper extremity. Central line is also in place through the left cephalic vein. The left basilic vein could not be visualized, due to edema and positioning. The deep veins of the left upper extremity appear patent.       1. Thrombosis within the left cephalic vein, which is part of the superficial venous system of the upper extremity, adjacent to PICC line. 2. No deep venous thrombosis in the upper extremities.     Signed by: Sean Alfred 10/14/2024 4:59 PM Dictation workstation:   [id number]    ECG 12 lead    Result Date: 10/14/2024  Accelerated Junctional rhythm Low voltage QRS Nonspecific T wave abnormality Abnormal ECG No previous ECGs available Confirmed by Misha Corral ([id number]) on 10/14/2024 4:08:43 PM    XR chest 1 view    Result Date: 10/13/2024  Interpreted By:  Lazaro Butler, STUDY: XR CHEST 1 VIEW; 10/13/2024 11:42 am   INDICATION: Signs/Symptoms:NG tube advancement.   COMPARISON: None   ACCESSION NUMBER(S): [id number]   ORDERING CLINICIAN: SERINA BUTCHER   TECHNIQUE: 1 view of the chest was performed.   FINDINGS: Persistent layering left-sided pleural effusion with multifocal airspace opacities/atelectasis. Right upper perihilar vascular congestion. No pleural effusion. No pneumothorax.  The  cardiomediastinal silhouette is within normal limits. Limited evaluation of supportive devices due to surgical spine hardware. Possibly tip of endotracheal tube 6.5 cm from the level of the michi. Distal tip of enteric tube likely terminating in the proximal stomach.       1. Persistent layering left-sided pleural effusion with multifocal airspace opacity/atelectasis. Persistent perihilar vascular congestion. 2. Advanced enteric feeding tube likely terminating now in the proximal stomach although difficult evaluation given thoracolumbar spine hardware.   Signed by: Lazaro Butler 10/13/2024 1:21 PM Dictation workstation:   [id number]    XR chest 1 view    Result Date: 10/13/2024  Interpreted By:  Dory Mcarthur, STUDY: XR CHEST 1 VIEW; 10/13/2024 8:50 am   INDICATION: Signs/Symptoms:wheezing, intubated, assess lung fields   COMPARISON: Radiographs 10/12/2024   ACCESSION NUMBER(S): [id number]   ORDERING CLINICIAN: SERINA BUTCHER   TECHNIQUE: Single frontal view of the chest performed.   FINDINGS: LINES AND DEVICES: Obscure by surgical hardware. *Tip of enteric tube appears to be in the proximal stomach and sidehole near the EG junction; consider advancing by 6 cm. *There appears to be a esophageal temperature probe extending to the distal esophagus, the tip not well seen. *Tip of ET tube is estimated to be 6 cm above the michi, but not well seen.   LUNGS: Haziness throughout the left mid and lower lung and right lower lung with slightly improved aeration in the left lung apex. No new focal consolidation. No pneumothorax.   CARDIOMEDIASTINAL SILHOUETTE: The cardiomediastinal silhouette is stable.   OTHER: Extensive thoracolumbar instrumentation.       Decreased but persistent moderate left and small right layering effusions with improved left apical aeration.   Significantly limited evaluation of lines and devices due to surgical hardware. Best gross estimation as follows: *Tip of enteric tube appears to be in the  proximal stomach and sidehole near the EG junction; consider advancing by 6 cm. *Possible esophageal temperature probe extending to the distal esophagus, the tip not well seen. *Tip of ET tube is estimated to be 6 cm above the michi, but not well seen.     CASSY Mcarthur discussed the significance and urgency of this critical finding by Epic secure chat with  SERINA BUTCHER on 10/13/2024 at 10:19 am.  (**-RCF-**) Findings:  See findings.   Signed by: Dory Mcarthur 10/13/2024 10:19 AM Dictation workstation:   [id number]    CT lumbar spine wo IV contrast    Result Date: 10/12/2024  Interpreted By:  Chip March, STUDY: CT LUMBAR SPINE WO IV CONTRAST;  10/12/2024 5:47 pm   INDICATION: Signs/Symptoms:r/o post operative infection/lesion.   COMPARISON: 09/25/2024   ACCESSION NUMBER(S): [id number]   ORDERING CLINICIAN: DALILA GEORGE   TECHNIQUE: Axial noncontrast images of the lumbar spine with coronal and sagittal reconstructed images.   FINDINGS: Limited study secondary to extensive prior surgical fusion of the visualized thoracic and lumbar spine.   Redemonstrated is levoscoliosis. Alignment is unchanged.   Previously discussed erosive appearing changes surrounding the L1-L2 level surrounding the intervertebral cage has an unchanged appearance.   No new fracture or dislocation. No additional new evidence of significant hardware complication is identified.   Evaluation of the central canal and posterior paraspinal soft tissues is again markedly limited.   Partially visualized pleural effusion seen in the chest. Partially visualized NG tube is seen extending into the stomach.   Dense calcification of the aorta is redemonstrated.       Similar-appearing extensive postsurgical changes related to fusion of the thoracic and lumbar spine redemonstrated which appears to be essentially unchanged compared to imaging 09/25/2024. Again this study is markedly limited in its evaluation of the central canal and posterior  paraspinal soft tissues. This limits evaluation for infection. Stable erosive appearing changes at the L1-L2 level surrounding the intervertebral cage, again infection at this site not excluded.   MACRO: None.   Signed by: Chip March 10/12/2024 6:50 PM Dictation workstation:   [id number]    CT head wo IV contrast    Result Date: 10/12/2024  Interpreted By:  Chip March, STUDY: CT HEAD WO IV CONTRAST;  10/12/2024 5:46 pm   INDICATION: Signs/Symptoms:AMS.   COMPARISON: None.   ACCESSION NUMBER(S): [id number]   ORDERING CLINICIAN: YASH BARRAGAN   TECHNIQUE: Noncontrast axial CT images of head were obtained with coronal and sagittal reconstructed images.   FINDINGS: BRAIN PARENCHYMA: Some areas of hypoattenuation/suspected encephalomalacia are seen involving the frontal lobes. Unchanged calcification right frontal lobe. No acute intraparenchymal hemorrhage or parenchymal evidence of acute large territory ischemic infarct. No mass-effect. Gray-white matter distinction is preserved.   VENTRICLES and EXTRA-AXIAL SPACES:  No acute extra-axial or intraventricular hemorrhage. No effacement of cerebral sulci. Ventricles and sulci are age-concordant.   PARANASAL SINUSES/MASTOIDS:  No hemorrhage or air-fluid levels within the visualized paranasal sinuses. The mastoids are well aerated.   CALVARIUM/ORBITS:  No skull fracture. The orbits and globes are intact to the extent visualized.   EXTRACRANIAL SOFT TISSUES: No discernible abnormality.       No acute intracranial abnormality.     MACRO: None.   Signed by: Chip March 10/12/2024 6:42 PM Dictation workstation:   [id number]    XR chest 1 view    Result Date: 10/12/2024  Interpreted By:  Parmjit Mancini, STUDY: XR CHEST 1 VIEW   INDICATION: Signs/Symptoms:NGT.   COMPARISON: Earlier the same day   ACCESSION NUMBER(S): [id number]   ORDERING CLINICIAN: DALILA GEORGE   FINDINGS: The tip of the nasogastric tube not definitively visualized but the curvature of the tubing looks  to read along the gastric body with the tip at least distal to the gastric body.       The tip of the nasogastric tube not definitively visualized but the curvature of the tubing looks to read along the gastric body with the tip at least distal to the gastric body.   Signed by: Parmjit Mancini 10/12/2024 5:35 PM Dictation workstation:   [id number]    XR chest 1 view    Result Date: 10/12/2024  Interpreted By:  Parmjit Mancini, STUDY: XR CHEST 1 VIEW   INDICATION: Signs/Symptoms:NG tube placement.   COMPARISON: Earlier the same day 3:47 p.m.   ACCESSION NUMBER(S): [id number]   ORDERING CLINICIAN: DALILA GEORGE   FINDINGS: Nasogastric tube tip looks to be centered roughly at the level of the gastroesophageal junction.   Left effusion and basilar airspace disease similar to previous         Nasogastric tube tip looks to be centered roughly at the level of the gastroesophageal junction.   Left effusion and basilar airspace disease similar to previous   Signed by: Parmjit Mancini 10/12/2024 5:34 PM Dictation workstation:   [id number]    XR chest 1 view    Result Date: 10/12/2024  Interpreted By:  Parmjit Mancini, STUDY: XR CHEST 1 VIEW   INDICATION: Signs/Symptoms:NGT placement.   COMPARISON: Earlier the same day 2:49 p.m.   ACCESSION NUMBER(S): [id number]   ORDERING CLINICIAN: DALILA GEORGE   FINDINGS: Nasogastric tube tip not definitively confirmed, slightly obscured by the patient's spinal fusion hardware. It may be reflected upon itself in the distal esophagus.       Nasogastric tube tip not definitively confirmed, slightly obscured by the patient's spinal fusion hardware. It may be reflected upon itself in the distal esophagus.   Signed by: Parmjit Mancini 10/12/2024 3:57 PM Dictation workstation:   [id number]    XR chest 1 view    Result Date: 10/12/2024  Interpreted By:  Ildefonso Herring, STUDY: XR CHEST 1 VIEW;  10/12/2024 2:48 pm   INDICATION: Signs/Symptoms:post-intubation.     COMPARISON: None.   ACCESSION NUMBER(S):  [id number]   ORDERING CLINICIAN: YASH BARRAGAN   FINDINGS: Endotracheal tube in place with its tip difficult to exactly visualize due to hardware about approximately 2.5 cm above the michi   CARDIOMEDIASTINAL SILHOUETTE: Cardiomediastinal silhouette is normal in size and configuration.   LUNGS: Extensive airspace disease in the left lung, worsened from the prior. There is a large left effusion as well..   ABDOMEN: No remarkable upper abdominal findings.   BONES: Extensive postsurgical change the thoracolumbar spine.       1. Limited visualization of the ET tube with its tip approximately 2.5 cm above the michi 2. Extensive left lung airspace disease and a large left effusion, worsening from the prior.       MACRO: None   Signed by: Ildefonso Herring 10/12/2024 3:43 PM Dictation workstation:   [id number]      Assessment/Plan   IMP:    Acute Respiratory Failure  Sepsis/Septic Shock  AMY  PNA healthcare associated  Wound Infection, surgical site  Encephalopathy  Pleural Effusion  Acute on chronic diastolic heart failure  Palliative Care     DNRCCA  Not Capable technically, but able to respond appropriately to questions and participate in discussion  Documented HPOA is spouse Godfrey, alternate is daughter Kelly     11/5  Spoke to spouse at bedside, provided updates. Plan for Trach Thursday,  HD catheter placed today, PEG placed 11/4. Pain overall controlled on oral oxycodone. Once medically stable, plan for LTAC. Goals established, no acute palliative needs, will sign off. Discussed with critical care team.     11/4  Patient neurologically intact, no acute events overnight, off pressors, stable on low vent settings.  N.p.o. for PEG tube placement today at 1 PM.  Planning to proceed forward with tracheostomy Thursday per ENT consult note.  I was able to review planned procedure for today and tracheostomy with patient she did not yes/no appropriately during explanation.  She nodded yes that she was supportive of plan  established by spouse and daughter.    11/3  Family meeting today. Dr Otero, Francesco TENORIO, myself, spouse Godfrey, daughter Kelly present. Reviewed current condition. Discussed risks vs benefits of trach/PEG and need for interventions given her 3rd intubation, poor respiratory status. Family stated patient wanted to fight, not ready to consider comfort care at this time. Family tell us patient values her mental status. She could potentially tolerate loss of function, but would not find loss of cognition acceptable. They are hopeful that with adequate support, she has a potential for some recovery. They understand that patient is fragile and is high risk for further complications/setbacks. They ultimately would like patient to stabilze enough to participate in her own goals of care discussion. Family would like to proceed with ENT/surgery consult to pursue trach/peg. They want to remain in a treatment model of care. We discussed code status, and both daughter and spouse agree that if patient suffers a major cardiac event, CPR would not provide her benefit. Family all confirmed DNRCCA established by spouse 11/2.     Patient with chronic pain from back surgery/wounds. Goal is to stay off sedation and fentanyl gtt to optimize cognition. Critical care service put oxycodone prn in place for pain control. Remains on scheduled tylenol.     11/2  Family meeting today with spouse after extensive chart review and discussion with critical care team. Spouse spoke about patient's likely poor health prior to surgery 7/24, and her overall decline since then. Most recently, spent <7 days at rehab before rehospitalizing. We reviewed together her most recent hospitalization and interventions, including Tablo, ventilator, pigtail and lack of improvement. Critical care team have already spoken to spouse about trach, peg and LTAC. We reviewed risks vs benefits of trach/peg and impact to quality of life. We also reviewed likelihood of  a meaningful recovery even with prolonged life support. We reviewed her living will and I requested spouse interpret patient wishes for me to clarify whether patient would support trach/peg. He stated that her goal all along had been to recover and return home with spouse, but he acknowledged that at this point, this is not going to happen. He stated that she would have no desire to be stuck in a bed on life support and would like to discuss this further with daughter Kelly. He stated he was not surprised that we were talking today. We set up a time for Sunday at 1pm to discuss further with daughter and confirm goals for patient. I also discussed that given her wishes and prognosis, CPR unlikely to provide benefit and spouse agreed. Signed copy of DNRCCA placed in chart. Critical care team updated.      I spent 30 minutes in the professional and overall care of this patient.     Breanne Ballesteros, APRN-CNP

## 2024-11-05 NOTE — PROGRESS NOTES
"Narda Malloy is a 69 y.o. female on day 24 of admission presenting with Unresponsive.    Subjective   The patient is seen for acute kidney injury which is secondary to acute tubular necrosis patient remains intubated on the ventilator she is awake and responsive plan for tracheostomy on Friday and is hemodynamically stable she was dialyzed yesterday tolerated procedure well show 1.8 L of fluid was removed currently she is receiving blood transfusion       Objective     Physical Exam  Constitutional:       Comments: Patient is intubated on the ventilator   Neck:      Vascular: No carotid bruit.   Cardiovascular:      Rate and Rhythm: Normal rate and regular rhythm.      Heart sounds: No murmur heard.     No friction rub. No gallop.   Pulmonary:      Breath sounds: No wheezing, rhonchi or rales.   Chest:      Chest wall: No tenderness.   Abdominal:      General: There is no distension.      Tenderness: There is no abdominal tenderness. There is no guarding or rebound.   Musculoskeletal:         General: No swelling or tenderness.      Cervical back: Neck supple.      Right lower leg: Edema present.      Left lower leg: Edema present.   Lymphadenopathy:      Cervical: No cervical adenopathy.         Last Recorded Vitals  Blood pressure 124/72, pulse 76, temperature 37.5 °C (99.5 °F), temperature source Axillary, resp. rate 21, height 1.778 m (5' 10\"), weight 116 kg (255 lb 11.7 oz), SpO2 99%.    Intake/Output last 3 Shifts:  I/O last 3 completed shifts:  In: 1861.6 (16 mL/kg) [I.V.:961.6 (8.3 mL/kg); NG/GT:250; IV Piggyback:650]  Out: - (0 mL/kg)   Weight: 116.4 kg     Current Facility-Administered Medications:     acetaminophen (Tylenol) oral liquid 650 mg, 650 mg, oral, q6h, Francesco Carbajal PA-C, 650 mg at 11/05/24 0853    acetylcysteine (Mucomyst) 200 mg/mL (20 %) nebulizer solution 600 mg, 3 mL, nebulization, 4x daily, Kaleb Lam PA-C, 600 mg at 11/05/24 1020    albuterol 2.5 mg /3 mL (0.083 %) nebulizer " solution 3 mL, 3 mL, nebulization, 4x daily, Kaleb Lam PA-C, 3 mL at 11/05/24 1021    alteplase (Cathflo Activase) injection 2 mg, 2 mg, intra-catheter, PRN, Kaleb Lam PA-C    brimonidine (AlphaGAN) 0.2 % ophthalmic solution 1 drop, 1 drop, Both Eyes, BID, Kaleb Lam PA-C, 1 drop at 11/05/24 0853    [Held by provider] calcium carbonate-vitamin D3 500 mg-5 mcg (200 unit) per tablet 1 tablet, 1 tablet, oral, Daily, Kaleb Lam PA-C, 1 tablet at 10/18/24 0930    cefTRIAXone (Rocephin) 2 g in dextrose (iso) IV 50 mL, 2 g, intravenous, q24h, Kaleb Lam PA-C, Stopped at 11/05/24 0923    DAPTOmycin (Cubicin) 700 mg in sodium chloride 0.9%  mL, 700 mg, intravenous, q48h, Andrew Malagon MD, Stopped at 11/05/24 1001    dextrose 50 % injection 12.5 g, 12.5 g, intravenous, q15 min PRN, Kaleb Lam PA-C    dextrose 50 % injection 25 g, 25 g, intravenous, q15 min PRN, Kaleb Lam PA-C    ezetimibe (Zetia) tablet 10 mg, 10 mg, oral, Daily, Kaleb Lam PA-C, 10 mg at 11/05/24 0853    ferrous sulfate syrup 60 mg of iron, 60 mg of iron, oral, Daily, RUTH Doe, 60 mg of iron at 11/05/24 0853    folic acid (Folvite) tablet 1 mg, 1 mg, oral, Daily, RUTH Doe, 1 mg at 11/05/24 0853    glucagon (Glucagen) injection 1 mg, 1 mg, intramuscular, q15 min PRN, Kaleb Lam PA-C    glucagon (Glucagen) injection 1 mg, 1 mg, intramuscular, q15 min PRN, Kaleb Lam PA-C    heparin (porcine) injection 3,000-6,000 Units, 3,000-6,000 Units, intravenous, PRN, RUTH Doe    heparin 25,000 Units in dextrose 5% 250 mL (100 Units/mL) infusion (premix), 0-4,500 Units/hr, intravenous, Continuous, RUTH Doe, Stopped at 11/04/24 0400    heparin flush 10 unit/mL syringe 50 Units, 50 Units, intra-catheter, PRN, Kaleb Lam PA-C, 50 Units at 10/19/24 2313    honey (Medihoney) topical gel, , Topical, Daily, Sabino Matos,  LEONEL-CNP, Given at 11/05/24 0910    HYDROmorphone (Dilaudid) injection 0.4 mg, 0.4 mg, intravenous, q2h PRN, Kaleb Lam PA-C, 0.5 mg at 11/04/24 1900    insulin lispro (HumaLOG) injection 0-15 Units, 0-15 Units, subcutaneous, q4h, Lb Dodd PA-C, 3 Units at 11/05/24 0854    latanoprost (Xalatan) 0.005 % ophthalmic solution 1 drop, 1 drop, Both Eyes, Nightly, Kaleb Lam PA-C, 1 drop at 11/04/24 2205    midazolam (Versed) injection 5 mg, 5 mg, intravenous, Once PRN, Kaleb Lam PA-C    midodrine (Proamatine) tablet 10 mg, 10 mg, oral, q8h, RUTH Salgado, 10 mg at 11/05/24 0853    oxyCODONE (Roxicodone) solution 5 mg, 5 mg, oral, q4h PRN, Francesco Carbajal PA-C    oxyCODONE (Roxicodone) solution 7.5 mg, 7.5 mg, oral, q4h PRN, Francesco Carbajal PA-C    oxygen (O2) therapy, , inhalation, Continuous - Inhalation, LEONEL Salgado-CNP, 40 percent at 11/05/24 0713    pantoprazole (ProtoNix) EC tablet 40 mg, 40 mg, oral, Daily before breakfast **OR** pantoprazole (ProtoNix) injection 40 mg, 40 mg, intravenous, Daily before breakfast, LEONEL Salas-CNP, 40 mg at 11/05/24 0601    polyethylene glycol (Glycolax, Miralax) packet 17 g, 17 g, oral, Daily PRN, LEONEL Doe-CNP    potassium, sodium phosphates (Phos-NaK) 280-160-250 mg packet 1 packet, 1 packet, oral, With meals & nightly, RUTH Salgado, 1 packet at 11/05/24 0853    primidone (Mysoline) tablet 125 mg, 125 mg, oral, Nightly, Kaleb Lam PA-C, 125 mg at 11/04/24 2205    [Held by provider] propranolol LA (Inderal LA) 24 hr capsule 60 mg, 60 mg, oral, Daily, Kaleb Lam PA-C, 60 mg at 10/19/24 0643    sennosides-docusate sodium (Lucia-Colace) 8.6-50 mg per tablet 1 tablet, 1 tablet, oral, Nightly, Sweta Diaz APRN-CNP, 1 tablet at 11/04/24 2205    sodium chloride 0.9 % bolus 200 mL, 200 mL, intravenous, q1h PRN, Nelia Cheung MD, Restarted at 11/04/24 1522    sodium chloride 3 % nebulizer  solution 3 mL, 3 mL, nebulization, 4x daily, Kaleb Lam PA-C, 3 mL at 11/05/24 1022   Relevant Results    Results for orders placed or performed during the hospital encounter of 10/12/24 (from the past 96 hours)   POCT GLUCOSE   Result Value Ref Range    POCT Glucose 105 (H) 74 - 99 mg/dL   BLOOD GAS VENOUS FULL PANEL   Result Value Ref Range    POCT pH, Venous 7.34 7.33 - 7.43 pH    POCT pCO2, Venous 55 (H) 41 - 51 mm Hg    POCT pO2, Venous 40 35 - 45 mm Hg    POCT SO2, Venous 74 45 - 75 %    POCT Oxy Hemoglobin, Venous 72.5 45.0 - 75.0 %    POCT Hematocrit Calculated, Venous 26.0 (L) 36.0 - 46.0 %    POCT Sodium, Venous 134 (L) 136 - 145 mmol/L    POCT Potassium, Venous 4.2 3.5 - 5.3 mmol/L    POCT Chloride, Venous 101 98 - 107 mmol/L    POCT Ionized Calicum, Venous 1.21 1.10 - 1.33 mmol/L    POCT Glucose, Venous 101 (H) 74 - 99 mg/dL    POCT Lactate, Venous 0.7 0.4 - 2.0 mmol/L    POCT Base Excess, Venous 3.3 (H) -2.0 - 3.0 mmol/L    POCT HCO3 Calculated, Venous 29.7 (H) 22.0 - 26.0 mmol/L    POCT Hemoglobin, Venous 8.5 (L) 12.0 - 16.0 g/dL    POCT Anion Gap, Venous 8.0 (L) 10.0 - 25.0 mmol/L    Patient Temperature 37.0 degrees Celsius    FiO2 70 %    Apparatus      Ipap CMH2O 22.0 cm H2O    Epap CMH2O 10.0 cm H2O   POCT GLUCOSE   Result Value Ref Range    POCT Glucose 105 (H) 74 - 99 mg/dL   Blood Gas Arterial Full Panel   Result Value Ref Range    POCT pH, Arterial 7.39 7.38 - 7.42 pH    POCT pCO2, Arterial 43 (H) 38 - 42 mm Hg    POCT pO2, Arterial 110 (H) 85 - 95 mm Hg    POCT SO2, Arterial 100 94 - 100 %    POCT Oxy Hemoglobin, Arterial 96.5 94.0 - 98.0 %    POCT Hematocrit Calculated, Arterial 26.0 (L) 36.0 - 46.0 %    POCT Sodium, Arterial 133 (L) 136 - 145 mmol/L    POCT Potassium, Arterial 4.1 3.5 - 5.3 mmol/L    POCT Chloride, Arterial 100 98 - 107 mmol/L    POCT Ionized Calcium, Arterial 1.16 1.10 - 1.33 mmol/L    POCT Glucose, Arterial 114 (H) 74 - 99 mg/dL    POCT Lactate, Arterial 0.9 0.4 -  2.0 mmol/L    POCT Base Excess, Arterial 0.9 -2.0 - 3.0 mmol/L    POCT HCO3 Calculated, Arterial 26.0 22.0 - 26.0 mmol/L    POCT Hemoglobin, Arterial 8.5 (L) 12.0 - 16.0 g/dL    POCT Anion Gap, Arterial 11 10 - 25 mmo/L    Patient Temperature      FiO2 50 %    Apparatus      Ventilator Mode      Ventilator Rate 16 bpm    Tidal Volume 450 mL    Peep CHM2O 8.0 cm H2O    Site of Arterial Puncture Radial Left     Bill's Test Positive    POCT GLUCOSE   Result Value Ref Range    POCT Glucose 113 (H) 74 - 99 mg/dL   aPTT   Result Value Ref Range    aPTT 42.2 (H) 22.0 - 32.5 seconds   CBC and Auto Differential   Result Value Ref Range    WBC 8.7 4.4 - 11.3 x10*3/uL    nRBC 0.0 0.0 - 0.0 /100 WBCs    RBC 2.39 (L) 4.00 - 5.20 x10*6/uL    Hemoglobin 7.4 (L) 12.0 - 16.0 g/dL    Hematocrit 23.0 (L) 36.0 - 46.0 %    MCV 96 80 - 100 fL    MCH 31.0 26.0 - 34.0 pg    MCHC 32.2 32.0 - 36.0 g/dL    RDW 19.7 (H) 11.5 - 14.5 %    Platelets 283 150 - 450 x10*3/uL    Neutrophils % 79.0 40.0 - 80.0 %    Immature Granulocytes %, Automated 0.9 0.0 - 0.9 %    Lymphocytes % 11.8 13.0 - 44.0 %    Monocytes % 7.7 2.0 - 10.0 %    Eosinophils % 0.3 0.0 - 6.0 %    Basophils % 0.3 0.0 - 2.0 %    Neutrophils Absolute 6.89 1.20 - 7.70 x10*3/uL    Immature Granulocytes Absolute, Automated 0.08 0.00 - 0.70 x10*3/uL    Lymphocytes Absolute 1.03 (L) 1.20 - 4.80 x10*3/uL    Monocytes Absolute 0.67 0.10 - 1.00 x10*3/uL    Eosinophils Absolute 0.03 0.00 - 0.70 x10*3/uL    Basophils Absolute 0.03 0.00 - 0.10 x10*3/uL   Hepatic function panel   Result Value Ref Range    Albumin 2.4 (L) 3.4 - 5.0 g/dL    Bilirubin, Total 0.4 0.0 - 1.2 mg/dL    Bilirubin, Direct 0.1 0.0 - 0.3 mg/dL    Alkaline Phosphatase 126 33 - 136 U/L    ALT 12 7 - 45 U/L    AST 14 9 - 39 U/L    Total Protein 5.6 (L) 6.4 - 8.2 g/dL   Magnesium   Result Value Ref Range    Magnesium 1.98 1.60 - 2.40 mg/dL   Phosphorus   Result Value Ref Range    Phosphorus 2.9 2.5 - 4.9 mg/dL   Basic  Metabolic Panel   Result Value Ref Range    Glucose 124 (H) 74 - 99 mg/dL    Sodium 135 (L) 136 - 145 mmol/L    Potassium 3.8 3.5 - 5.3 mmol/L    Chloride 99 98 - 107 mmol/L    Bicarbonate 28 21 - 32 mmol/L    Anion Gap 12 10 - 20 mmol/L    Urea Nitrogen 17 6 - 23 mg/dL    Creatinine 1.37 (H) 0.50 - 1.05 mg/dL    eGFR 42 (L) >60 mL/min/1.73m*2    Calcium 8.1 (L) 8.6 - 10.3 mg/dL   Blood Gas Arterial Full Panel   Result Value Ref Range    POCT pH, Arterial 7.43 (H) 7.38 - 7.42 pH    POCT pCO2, Arterial 40 38 - 42 mm Hg    POCT pO2, Arterial 115 (H) 85 - 95 mm Hg    POCT SO2, Arterial 99 94 - 100 %    POCT Oxy Hemoglobin, Arterial 96.0 94.0 - 98.0 %    POCT Hematocrit Calculated, Arterial 24.0 (L) 36.0 - 46.0 %    POCT Sodium, Arterial 134 (L) 136 - 145 mmol/L    POCT Potassium, Arterial 3.9 3.5 - 5.3 mmol/L    POCT Chloride, Arterial 99 98 - 107 mmol/L    POCT Ionized Calcium, Arterial 1.18 1.10 - 1.33 mmol/L    POCT Glucose, Arterial 128 (H) 74 - 99 mg/dL    POCT Lactate, Arterial 0.9 0.4 - 2.0 mmol/L    POCT Base Excess, Arterial 2.0 -2.0 - 3.0 mmol/L    POCT HCO3 Calculated, Arterial 26.5 (H) 22.0 - 26.0 mmol/L    POCT Hemoglobin, Arterial 7.9 (L) 12.0 - 16.0 g/dL    POCT Anion Gap, Arterial 12 10 - 25 mmo/L    Patient Temperature 37.0 degrees Celsius    FiO2 40 %    Apparatus      Ventilator Mode      Ventilator Rate 16 bpm    Tidal Volume 450 mL    Peep CHM2O 8.0 cm H2O    Site of Arterial Puncture Radial Right     Bill's Test Positive    aPTT   Result Value Ref Range    aPTT 46.7 (H) 22.0 - 32.5 seconds   POCT GLUCOSE   Result Value Ref Range    POCT Glucose 135 (H) 74 - 99 mg/dL   POCT GLUCOSE   Result Value Ref Range    POCT Glucose 174 (H) 74 - 99 mg/dL   Transthoracic Echo (TTE) Complete   Result Value Ref Range    AV pk varinder 1.56 m/s    LVOT diam 2.10 cm    MV E/A ratio 1.30     Tricuspid annular plane systolic excursion 1.5 cm    LV Biplane EF 65 %    LV EF 63 %    RV free wall pk S' 18.50 cm/s    LV GLS  -17.4 %    RVSP 52.8 mmHg    LVIDd 4.44 cm    AV pk grad 10 mmHg    Aortic Valve Area by Continuity of Peak Velocity 3.60 cm2    LV A4C EF 59.1    POCT GLUCOSE   Result Value Ref Range    POCT Glucose 174 (H) 74 - 99 mg/dL   Type and screen   Result Value Ref Range    ABO TYPE A     Rh TYPE NEG     ANTIBODY SCREEN NEG    Hemoglobin and hematocrit, blood   Result Value Ref Range    Hemoglobin 7.4 (L) 12.0 - 16.0 g/dL    Hematocrit 23.2 (L) 36.0 - 46.0 %   POCT GLUCOSE   Result Value Ref Range    POCT Glucose 148 (H) 74 - 99 mg/dL   POCT GLUCOSE   Result Value Ref Range    POCT Glucose 188 (H) 74 - 99 mg/dL   POCT GLUCOSE   Result Value Ref Range    POCT Glucose 178 (H) 74 - 99 mg/dL   CBC and Auto Differential   Result Value Ref Range    WBC 8.5 4.4 - 11.3 x10*3/uL    nRBC 0.0 0.0 - 0.0 /100 WBCs    RBC 2.35 (L) 4.00 - 5.20 x10*6/uL    Hemoglobin 7.4 (L) 12.0 - 16.0 g/dL    Hematocrit 22.9 (L) 36.0 - 46.0 %    MCV 97 80 - 100 fL    MCH 31.5 26.0 - 34.0 pg    MCHC 32.3 32.0 - 36.0 g/dL    RDW 19.5 (H) 11.5 - 14.5 %    Platelets 243 150 - 450 x10*3/uL    Neutrophils % 86.9 40.0 - 80.0 %    Immature Granulocytes %, Automated 0.5 0.0 - 0.9 %    Lymphocytes % 7.0 13.0 - 44.0 %    Monocytes % 4.6 2.0 - 10.0 %    Eosinophils % 0.5 0.0 - 6.0 %    Basophils % 0.5 0.0 - 2.0 %    Neutrophils Absolute 7.38 1.20 - 7.70 x10*3/uL    Immature Granulocytes Absolute, Automated 0.04 0.00 - 0.70 x10*3/uL    Lymphocytes Absolute 0.59 (L) 1.20 - 4.80 x10*3/uL    Monocytes Absolute 0.39 0.10 - 1.00 x10*3/uL    Eosinophils Absolute 0.04 0.00 - 0.70 x10*3/uL    Basophils Absolute 0.04 0.00 - 0.10 x10*3/uL   Hepatic function panel   Result Value Ref Range    Albumin 2.7 (L) 3.4 - 5.0 g/dL    Bilirubin, Total 0.4 0.0 - 1.2 mg/dL    Bilirubin, Direct 0.1 0.0 - 0.3 mg/dL    Alkaline Phosphatase 125 33 - 136 U/L    ALT 10 7 - 45 U/L    AST 10 9 - 39 U/L    Total Protein 5.5 (L) 6.4 - 8.2 g/dL   Magnesium   Result Value Ref Range    Magnesium 1.92  1.60 - 2.40 mg/dL   Phosphorus   Result Value Ref Range    Phosphorus 2.3 (L) 2.5 - 4.9 mg/dL   Basic Metabolic Panel   Result Value Ref Range    Glucose 171 (H) 74 - 99 mg/dL    Sodium 133 (L) 136 - 145 mmol/L    Potassium 4.0 3.5 - 5.3 mmol/L    Chloride 99 98 - 107 mmol/L    Bicarbonate 28 21 - 32 mmol/L    Anion Gap 10 10 - 20 mmol/L    Urea Nitrogen 14 6 - 23 mg/dL    Creatinine 1.30 (H) 0.50 - 1.05 mg/dL    eGFR 45 (L) >60 mL/min/1.73m*2    Calcium 7.9 (L) 8.6 - 10.3 mg/dL   aPTT   Result Value Ref Range    aPTT 45.6 (H) 22.0 - 32.5 seconds   POCT GLUCOSE   Result Value Ref Range    POCT Glucose 176 (H) 74 - 99 mg/dL   Blood Culture    Specimen: Peripheral Venipuncture; Blood culture   Result Value Ref Range    Blood Culture No growth at 1 day    Blood Culture    Specimen: Peripheral Venipuncture; Blood culture   Result Value Ref Range    Blood Culture No growth at 1 day    POCT GLUCOSE   Result Value Ref Range    POCT Glucose 173 (H) 74 - 99 mg/dL   aPTT   Result Value Ref Range    aPTT 60.5 (H) 22.0 - 32.5 seconds   POCT GLUCOSE   Result Value Ref Range    POCT Glucose 158 (H) 74 - 99 mg/dL   POCT GLUCOSE   Result Value Ref Range    POCT Glucose 150 (H) 74 - 99 mg/dL   Renal function panel   Result Value Ref Range    Glucose 172 (H) 74 - 99 mg/dL    Sodium 133 (L) 136 - 145 mmol/L    Potassium 4.2 3.5 - 5.3 mmol/L    Chloride 99 98 - 107 mmol/L    Bicarbonate 28 21 - 32 mmol/L    Anion Gap 10 10 - 20 mmol/L    Urea Nitrogen 20 6 - 23 mg/dL    Creatinine 1.71 (H) 0.50 - 1.05 mg/dL    eGFR 32 (L) >60 mL/min/1.73m*2    Calcium 7.6 (L) 8.6 - 10.3 mg/dL    Phosphorus 2.6 2.5 - 4.9 mg/dL    Albumin 2.2 (L) 3.4 - 5.0 g/dL   Calcium, ionized   Result Value Ref Range    POCT Calcium, Ionized 1.10 1.1 - 1.33 mmol/L   Magnesium   Result Value Ref Range    Magnesium 2.21 1.60 - 2.40 mg/dL   POCT GLUCOSE   Result Value Ref Range    POCT Glucose 181 (H) 74 - 99 mg/dL   Respiratory Culture/Smear    Specimen: Tracheal  Aspirate; Fluid   Result Value Ref Range    Respiratory Culture/Smear No growth to date     Gram Stain (3+) Moderate Polymorphonuclear leukocytes     Gram Stain No organisms seen    POCT GLUCOSE   Result Value Ref Range    POCT Glucose 149 (H) 74 - 99 mg/dL   CBC and Auto Differential   Result Value Ref Range    WBC 5.3 4.4 - 11.3 x10*3/uL    nRBC 0.0 0.0 - 0.0 /100 WBCs    RBC 2.12 (L) 4.00 - 5.20 x10*6/uL    Hemoglobin 6.6 (L) 12.0 - 16.0 g/dL    Hematocrit 20.9 (L) 36.0 - 46.0 %    MCV 99 80 - 100 fL    MCH 31.1 26.0 - 34.0 pg    MCHC 31.6 (L) 32.0 - 36.0 g/dL    RDW 19.2 (H) 11.5 - 14.5 %    Platelets 202 150 - 450 x10*3/uL    Neutrophils % 76.7 40.0 - 80.0 %    Immature Granulocytes %, Automated 1.1 (H) 0.0 - 0.9 %    Lymphocytes % 14.4 13.0 - 44.0 %    Monocytes % 6.1 2.0 - 10.0 %    Eosinophils % 1.1 0.0 - 6.0 %    Basophils % 0.6 0.0 - 2.0 %    Neutrophils Absolute 4.04 1.20 - 7.70 x10*3/uL    Immature Granulocytes Absolute, Automated 0.06 0.00 - 0.70 x10*3/uL    Lymphocytes Absolute 0.76 (L) 1.20 - 4.80 x10*3/uL    Monocytes Absolute 0.32 0.10 - 1.00 x10*3/uL    Eosinophils Absolute 0.06 0.00 - 0.70 x10*3/uL    Basophils Absolute 0.03 0.00 - 0.10 x10*3/uL   Hepatic function panel   Result Value Ref Range    Albumin 2.2 (L) 3.4 - 5.0 g/dL    Bilirubin, Total 0.3 0.0 - 1.2 mg/dL    Bilirubin, Direct 0.1 0.0 - 0.3 mg/dL    Alkaline Phosphatase 106 33 - 136 U/L    ALT 8 7 - 45 U/L    AST 10 9 - 39 U/L    Total Protein 5.2 (L) 6.4 - 8.2 g/dL   Magnesium   Result Value Ref Range    Magnesium 2.17 1.60 - 2.40 mg/dL   Type and screen   Result Value Ref Range    ABO TYPE A     Rh TYPE NEG     ANTIBODY SCREEN NEG    Phosphorus   Result Value Ref Range    Phosphorus 3.0 2.5 - 4.9 mg/dL   Basic Metabolic Panel   Result Value Ref Range    Glucose 142 (H) 74 - 99 mg/dL    Sodium 134 (L) 136 - 145 mmol/L    Potassium 4.3 3.5 - 5.3 mmol/L    Chloride 99 98 - 107 mmol/L    Bicarbonate 29 21 - 32 mmol/L    Anion Gap 10 10 -  20 mmol/L    Urea Nitrogen 21 6 - 23 mg/dL    Creatinine 1.88 (H) 0.50 - 1.05 mg/dL    eGFR 29 (L) >60 mL/min/1.73m*2    Calcium 7.7 (L) 8.6 - 10.3 mg/dL   aPTT   Result Value Ref Range    aPTT 55.5 (H) 22.0 - 32.5 seconds   Morphology   Result Value Ref Range    RBC Morphology See Below     Polychromasia Mild     Ovalocytes Few     Lexus Cells Few    Creatine Kinase   Result Value Ref Range    Creatine Kinase 42 0 - 215 U/L   Prepare RBC: 1 Units   Result Value Ref Range    PRODUCT CODE V8360I74     Unit Number [id number]     Unit ABO A     Unit RH NEG     XM INTEP COMP     Dispense Status XM     Blood Expiration Date 11/27/2024 11:59:00 PM EST     PRODUCT BLOOD TYPE 0600     UNIT VOLUME 350    POCT GLUCOSE   Result Value Ref Range    POCT Glucose 159 (H) 74 - 99 mg/dL   POCT GLUCOSE   Result Value Ref Range    POCT Glucose 170 (H) 74 - 99 mg/dL   CBC   Result Value Ref Range    WBC 5.7 4.4 - 11.3 x10*3/uL    nRBC 0.0 0.0 - 0.0 /100 WBCs    RBC 2.36 (L) 4.00 - 5.20 x10*6/uL    Hemoglobin 7.2 (L) 12.0 - 16.0 g/dL    Hematocrit 23.2 (L) 36.0 - 46.0 %    MCV 98 80 - 100 fL    MCH 30.5 26.0 - 34.0 pg    MCHC 31.0 (L) 32.0 - 36.0 g/dL    RDW 19.1 (H) 11.5 - 14.5 %    Platelets 240 150 - 450 x10*3/uL   Blood Gas Venous Full Panel   Result Value Ref Range    POCT pH, Venous 7.42 7.33 - 7.43 pH    POCT pCO2, Venous 44 41 - 51 mm Hg    POCT pO2, Venous 45 35 - 45 mm Hg    POCT SO2, Venous 81 (H) 45 - 75 %    POCT Oxy Hemoglobin, Venous 79.5 (H) 45.0 - 75.0 %    POCT Hematocrit Calculated, Venous 22.0 (L) 36.0 - 46.0 %    POCT Sodium, Venous 130 (L) 136 - 145 mmol/L    POCT Potassium, Venous 4.8 3.5 - 5.3 mmol/L    POCT Chloride, Venous 100 98 - 107 mmol/L    POCT Ionized Calicum, Venous 1.14 1.10 - 1.33 mmol/L    POCT Glucose, Venous 166 (H) 74 - 99 mg/dL    POCT Lactate, Venous 0.7 0.4 - 2.0 mmol/L    POCT Base Excess, Venous 3.6 (H) -2.0 - 3.0 mmol/L    POCT HCO3 Calculated, Venous 28.5 (H) 22.0 - 26.0 mmol/L    POCT  Hemoglobin, Venous 7.3 (L) 12.0 - 16.0 g/dL    POCT Anion Gap, Venous 6.0 (L) 10.0 - 25.0 mmol/L    Patient Temperature 37.0 degrees Celsius    FiO2 40 %   POCT GLUCOSE   Result Value Ref Range    POCT Glucose 136 (H) 74 - 99 mg/dL   POCT GLUCOSE   Result Value Ref Range    POCT Glucose 117 (H) 74 - 99 mg/dL   POCT GLUCOSE   Result Value Ref Range    POCT Glucose 112 (H) 74 - 99 mg/dL   POCT GLUCOSE   Result Value Ref Range    POCT Glucose 121 (H) 74 - 99 mg/dL   CBC and Auto Differential   Result Value Ref Range    WBC 7.6 4.4 - 11.3 x10*3/uL    nRBC 0.0 0.0 - 0.0 /100 WBCs    RBC 2.25 (L) 4.00 - 5.20 x10*6/uL    Hemoglobin 6.9 (L) 12.0 - 16.0 g/dL    Hematocrit 21.6 (L) 36.0 - 46.0 %    MCV 96 80 - 100 fL    MCH 30.7 26.0 - 34.0 pg    MCHC 31.9 (L) 32.0 - 36.0 g/dL    RDW 19.3 (H) 11.5 - 14.5 %    Platelets 250 150 - 450 x10*3/uL    Neutrophils % 85.2 40.0 - 80.0 %    Immature Granulocytes %, Automated 1.1 (H) 0.0 - 0.9 %    Lymphocytes % 7.8 13.0 - 44.0 %    Monocytes % 4.7 2.0 - 10.0 %    Eosinophils % 0.8 0.0 - 6.0 %    Basophils % 0.4 0.0 - 2.0 %    Neutrophils Absolute 6.49 1.20 - 7.70 x10*3/uL    Immature Granulocytes Absolute, Automated 0.08 0.00 - 0.70 x10*3/uL    Lymphocytes Absolute 0.59 (L) 1.20 - 4.80 x10*3/uL    Monocytes Absolute 0.36 0.10 - 1.00 x10*3/uL    Eosinophils Absolute 0.06 0.00 - 0.70 x10*3/uL    Basophils Absolute 0.03 0.00 - 0.10 x10*3/uL   Hepatic function panel   Result Value Ref Range    Albumin 2.3 (L) 3.4 - 5.0 g/dL    Bilirubin, Total 0.3 0.0 - 1.2 mg/dL    Bilirubin, Direct 0.1 0.0 - 0.3 mg/dL    Alkaline Phosphatase 111 33 - 136 U/L    ALT 9 7 - 45 U/L    AST 12 9 - 39 U/L    Total Protein 5.5 (L) 6.4 - 8.2 g/dL   Magnesium   Result Value Ref Range    Magnesium 2.07 1.60 - 2.40 mg/dL   Phosphorus   Result Value Ref Range    Phosphorus 2.6 2.5 - 4.9 mg/dL   Basic Metabolic Panel   Result Value Ref Range    Glucose 123 (H) 74 - 99 mg/dL    Sodium 133 (L) 136 - 145 mmol/L     Potassium 3.9 3.5 - 5.3 mmol/L    Chloride 97 (L) 98 - 107 mmol/L    Bicarbonate 28 21 - 32 mmol/L    Anion Gap 12 10 - 20 mmol/L    Urea Nitrogen 17 6 - 23 mg/dL    Creatinine 1.50 (H) 0.50 - 1.05 mg/dL    eGFR 38 (L) >60 mL/min/1.73m*2    Calcium 7.6 (L) 8.6 - 10.3 mg/dL   Morphology   Result Value Ref Range    RBC Morphology See Below     Polychromasia Mild     Ovalocytes Few    Iron and TIBC   Result Value Ref Range    Iron 23 (L) 35 - 150 ug/dL    UIBC 84 (L) 110 - 370 ug/dL    TIBC 107 (L) 240 - 445 ug/dL    % Saturation 21 (L) 25 - 45 %   Ferritin   Result Value Ref Range    Ferritin 479 (H) 8 - 150 ng/mL   Prepare RBC: 1 Units   Result Value Ref Range    PRODUCT CODE [id number]     Unit Number [id number]     Unit ABO O     Unit RH NEG     XM INTEP COMP     Dispense Status IS     Blood Expiration Date 11/11/2024 11:59:00 PM EST     PRODUCT BLOOD TYPE      UNIT VOLUME 280    POCT GLUCOSE   Result Value Ref Range    POCT Glucose 162 (H) 74 - 99 mg/dL     *Note: Due to a large number of results and/or encounters for the requested time period, some results have not been displayed. A complete set of results can be found in Results Review.       Assessment/Plan   Acute kidney injury there is no signs of renal recovery at this time I will schedule her for dialysis tomorrow morning using [facility]  Edema much improved  Pneumonia continue with antibiotic therapy infectious disease  Diabetes mellitus type 2  Malnutrition continue with tube feeding  Lower back surgery site infection  Anemia transfuse when hemoglobin less than 7         Imer Cheung MD

## 2024-11-05 NOTE — PROGRESS NOTES
"Narda Malloy is a 69 y.o. female on day 24 of admission presenting with Unresponsive.    Subjective   Remains intubated.  She is awake and alert.  Tube feed running at goal.  Objective     Physical Exam  Vitals and nursing note reviewed.   Constitutional:       Appearance: Normal appearance.      Interventions: She is intubated.   HENT:      Head: Normocephalic and atraumatic.   Cardiovascular:      Rate and Rhythm: Normal rate.   Pulmonary:      Effort: Pulmonary effort is normal. She is intubated.      Comments: Ventilator  Abdominal:      General: Abdomen is flat. There is no distension.      Palpations: Abdomen is soft.      Tenderness: There is no abdominal tenderness. There is no guarding.      Comments: Gastrostomy tube in place with tube feed infusing   Musculoskeletal:         General: No swelling or tenderness.   Skin:     General: Skin is warm and dry.   Neurological:      General: No focal deficit present.      Mental Status: She is alert.         Last Recorded Vitals  Blood pressure 109/59, pulse 75, temperature 37.6 °C (99.7 °F), temperature source Axillary, resp. rate 21, height 1.778 m (5' 10\"), weight 116 kg (255 lb 11.7 oz), SpO2 99%.  Intake/Output last 3 Shifts:  I/O last 3 completed shifts:  In: 1861.6 (16 mL/kg) [I.V.:961.6 (8.3 mL/kg); NG/GT:250; IV Piggyback:650]  Out: - (0 mL/kg)   Weight: 116.4 kg     Relevant Results  Lab Results   Component Value Date    GLUCOSE 123 (H) 11/05/2024    CALCIUM 7.6 (L) 11/05/2024     (L) 11/05/2024    K 3.9 11/05/2024    CO2 28 11/05/2024    CL 97 (L) 11/05/2024    BUN 17 11/05/2024    CREATININE 1.50 (H) 11/05/2024     Lab Results   Component Value Date    WBC 7.6 11/05/2024    HGB 6.9 (L) 11/05/2024    HCT 21.6 (L) 11/05/2024    MCV 96 11/05/2024     11/05/2024       Assessment/Plan   Assessment & Plan  Unresponsive    Pain and swelling of upper extremity, left    Acute respiratory failure with hypoxia and hypercapnia (Multi)    11/5: PEG " intact, surrounding skin intact. Tube feed running at goal rate. Patient tolerating well. General surgery will sign off. Please call with questions or concerns.     Please contact the APPs first for any issues or questions during work hours Monday-Friday from 8am-5pm via Secure Messaging. For help after hours, please contact the Surgeon on call. In the event of an emergency, please contact the surgeon directly.      11/4: EGD with PEG insertion at bedside       I spent 15 minutes in the professional and overall care of this patient.      Chaya Mahmood, LEONEL-CNP

## 2024-11-05 NOTE — PROGRESS NOTES
Physical Therapy    Physical Therapy Treatment    Patient Name: Narda Malloy  MRN: [id number]  Department: [facility]  Room: 11/11-A  Today's Date: 11/5/2024  Time Calculation  Start Time: 1105  Stop Time: 1130  Time Calculation (min): 25 min         Assessment/Plan   PT Assessment  PT Assessment Results: Decreased strength, Decreased range of motion, Decreased endurance, Impaired balance, Decreased mobility, Decreased coordination, Impaired judgement, Decreased cognition, Decreased safety awareness, Impaired tone, Impaired sensation, Decreased skin integrity, Orthopedic restrictions  Rehab Prognosis: Good  Barriers to Discharge: assist of 2 persons for mobility, extreme weakness  Evaluation/Treatment Tolerance: Patient limited by fatigue  Medical Staff Made Aware: Yes  Strengths: Premorbid level of function  Barriers to Participation: Comorbidities  End of Session Communication: Bedside nurse  Assessment Comment: The patient is alert this session and intubated on vent. Pt following simple VCs for UE movement; very minimal dorsiflexion noted Lt ankle; appears to move intermittently and not on command. RLE with no trace muscle contractions. Pt would continue to benefit from skilled therapy services to address functional deficits.  End of Session Patient Position: Bed, 4 rail up, Alarm on (jf wrist restraints applied per nsg.)  PT Plan  Inpatient/Swing Bed or Outpatient: Inpatient  PT Plan  Treatment/Interventions: Bed mobility, Transfer training, Balance training, Strengthening, Endurance training, Range of motion, Therapeutic exercise, Therapeutic activity  PT Plan: Ongoing PT  PT Frequency: 6 times per week  PT Discharge Recommendations: Moderate intensity level of continued care  Equipment Recommended upon Discharge: Wheelchair  PT Recommended Transfer Status: Total assist  PT - OK to Discharge: Yes      General Visit Information:   PT  Visit  PT Received On: 11/05/24  Response to Previous Treatment: Patient unable  to report, no changes reported from family or staff  General  Family/Caregiver Present: Yes  Caregiver Feedback: Spouse at bedside.  Co-Treatment: OT  Co-Treatment Reason: patient/caregiver safety and optimization of patient outcomes for a medically complex ICU treatment  Prior to Session Communication: Bedside nurse  Patient Position Received: Bed, 4 rail up, Alarm off, not on at start of session (soft wrist restraints jf)  Preferred Learning Style: verbal  General Comment: Patient cleared for therapy follow-up by nsg. Pt presented supine in bed on vent; nodding in agreement to therapy session.    Subjective   Precautions:  Precautions  Hearing/Visual Limitations: h/o visual deficits, mildly [place]  Medical Precautions: Fall precautions, Oxygen therapy device and L/min, Spinal precautions (Vent; PEEP 5; FiO2 40%)  Post-Surgical Precautions: Spinal precautions  Precautions Comment: Recent spine surgery with spinal precautions still in place. Bilateral wrist restraints remain in place.      Vital Signs Comment: HR: 86 bpm, 24 RR, 97% SpO2, BP: 125/58 (77) mmHg     Objective   Pain:  Pain Assessment  Pain Assessment: CPOT (Critical Care Pain Observation Tool)  0-10 (Numeric) Pain Score: 0 - No pain  Cognition:  Cognition  Overall Cognitive Status: Impaired  Arousal/Alertness: Delayed responses to stimuli  Cognition Comments: pt able to nod yes/no in response to simple questions  Processing Speed: Delayed  Coordination:  Coordination Comment: poor movement quality, + weakness, slight delay in response to requests  Postural Control:  Postural Control  Posture Comment: mild cervical spine extension in supported sitting  Static Sitting Balance  Static Sitting-Balance Support: Left upper extremity supported  Static Sitting-Level of Assistance: Dependent, Maximum assistance  Static Sitting-Comment/Number of Minutes: Long-sitting for approx. 1.5 min; with dep to maxA; pt able to keep head in upright position  Extremity/Trunk  Assessments:  RLE   RLE :  (decreased full ROM at end ranges, +weakness, proximal>distal, pitting edema, redness distally; no trace muscle contractions noted)  LLE   LLE :  (decreased full ROM at end ranges, +weakness, proximal>distal, pitting edema, redness distally; 1/5 dorsiflexion)  Activity Tolerance:  Activity Tolerance  Endurance: Decreased tolerance for upright activites  Activity Tolerance Comments: poor  Rate of Perceived Exertion (RPE): 6/10  Treatments:  Therapeutic Exercise  Therapeutic Exercise Performed: Yes  Therapeutic Exercise Activity 1: AAROM in all planes x 15 reps jf; 1/5 Lt ankle dorsiflexion    Bed Mobility  Bed Mobility: Yes  Bed Mobility 1  Bed Mobility 1: Long sit  Level of Assistance 1: Dependent, +2, Maximum assistance  Bed Mobility Comments 1: Long sitting with depAx2 with use of draw sheet. Able to keep head in upright position; poor trunk control.    Outcome Measures:  [facility] Basic Mobility  Turning from your back to your side while in a flat bed without using bedrails: Total  Moving from lying on your back to sitting on the side of a flat bed without using bedrails: Total  Moving to and from bed to chair (including a wheelchair): Total  Standing up from a chair using your arms (e.g. wheelchair or bedside chair): Total  To walk in hospital room: Total  Climbing 3-5 steps with railing: Total  Basic Mobility - Total Score: 6    FSS-ICU  Ambulation: Unable to attempt due to weakness  Rolling: Total assistance (performs 25% or requires another person)  Sitting: Unable to perform  Transfer Sit-to-Stand: Unable to perform  Transfer Supine-to-Sit: Total assistance (performs 25% or requires another person)  Total Score: 2    Education Documentation  Precautions, taught by Hali Yu PT at 11/5/2024  2:06 PM.  Learner: Patient  Readiness: Acceptance  Method: Explanation, Demonstration  Response: Needs Reinforcement  Comment: VCs for AAROM and safe bed mobility    Body Mechanics, taught by  Hali Yu PT at 11/5/2024  2:06 PM.  Learner: Patient  Readiness: Acceptance  Method: Explanation, Demonstration  Response: Needs Reinforcement  Comment: VCs for AAROM and safe bed mobility    Mobility Training, taught by Hali Yu PT at 11/5/2024  2:06 PM.  Learner: Patient  Readiness: Acceptance  Method: Explanation, Demonstration  Response: Needs Reinforcement  Comment: VCs for AAROM and safe bed mobility    Education Comments  No comments found.      Encounter Problems       Encounter Problems (Active)       PT STG Problem       Patient will roll right and left with minimal assist to facilitate mobility. (Progressing)       Start:  10/28/24    Expected End:  11/25/24            Patient will transfer supine to sit and sit to supine with moderate assist to facilitate mobility. (Progressing)       Start:  10/28/24    Expected End:  11/25/24            Patient will transfer sit to stand and stand to sit with maximal assist to facilitate mobility. (Not Progressing)       Start:  10/28/24    Expected End:  11/25/24            Patient will transfer bed to chair and chair to bed with maximal assist to facilitate mobility. (Not Progressing)       Start:  10/28/24    Expected End:  11/25/24            Patient will increase strength in B LEs by half grade throughout to improve functional mobility.  (Progressing)       Start:  10/28/24    Expected End:  11/25/24            Patient will tolerate 15 minutes of sitting on EOB to improve ability to perform functional transfers. (Not Progressing)       Start:  10/28/24    Expected End:  11/25/24

## 2024-11-05 NOTE — CARE PLAN
Problem: Safety - Adult  Goal: Free from fall injury  Outcome: Progressing  Flowsheets (Taken 11/5/2024 1442)  Free from fall injury: Instruct family/caregiver on patient safety     Problem: Discharge Planning  Goal: Discharge to home or other facility with appropriate resources  Outcome: Progressing  Flowsheets (Taken 11/5/2024 1442)  Discharge to home or other facility with appropriate resources:   Identify barriers to discharge with patient and caregiver   Arrange for needed discharge resources and transportation as appropriate   Identify discharge learning needs (meds, wound care, etc)   Arrange for interpreters to assist at discharge as needed   Refer to discharge planning if patient needs post-hospital services based on physician order or complex needs related to functional status, cognitive ability or social support system     Problem: Chronic Conditions and Co-morbidities  Goal: Patient's chronic conditions and co-morbidity symptoms are monitored and maintained or improved  Outcome: Progressing  Flowsheets (Taken 11/5/2024 1442)  Care Plan - Patient's Chronic Conditions and Co-Morbidity Symptoms are Monitored and Maintained or Improved:   Monitor and assess patient's chronic conditions and comorbid symptoms for stability, deterioration, or improvement   Collaborate with multidisciplinary team to address chronic and comorbid conditions and prevent exacerbation or deterioration   Update acute care plan with appropriate goals if chronic or comorbid symptoms are exacerbated and prevent overall improvement and discharge     Problem: Diabetes  Goal: Increase stability of blood glucose readings by end of shift  Outcome: Progressing  Flowsheets (Taken 11/5/2024 1442)  Increase stability of blood glucose readings by end of shift: Med administration/monitoring of effect  Goal: Maintain electrolyte levels within acceptable range throughout shift  Outcome: Progressing  Flowsheets (Taken 11/5/2024 1442)  Maintain  Problem: Potential for Falls  Goal: Patient will remain free of falls  Description: INTERVENTIONS:  - Educate patient/family on patient safety including physical limitations  - Instruct patient to call for assistance with activity   - Consult OT/PT to assist with strengthening/mobility   - Keep Call bell within reach  - Keep bed low and locked with side rails adjusted as appropriate  - Keep care items and personal belongings within reach  - Initiate and maintain comfort rounds  - Make Fall Risk Sign visible to staff  - Offer Toileting every 2 Hours, in advance of need  - Initiate/Maintain bed alarm  - Obtain necessary fall risk management equipment: yellow socks  - Apply yellow socks and bracelet for high fall risk patients  - Consider moving patient to room near nurses station  Outcome: Progressing     Problem: Prexisting or High Potential for Compromised Skin Integrity  Goal: Skin integrity is maintained or improved  Description: INTERVENTIONS:  - Identify patients at risk for skin breakdown  - Assess and monitor skin integrity  - Assess and monitor nutrition and hydration status  - Monitor labs   - Assess for incontinence   - Turn and reposition patient  - Assist with mobility/ambulation  - Relieve pressure over bony prominences  - Avoid friction and shearing  - Provide appropriate hygiene as needed including keeping skin clean and dry  - Evaluate need for skin moisturizer/barrier cream  - Collaborate with interdisciplinary team   - Patient/family teaching  - Consider wound care consult   Outcome: Progressing     Problem: MOBILITY - ADULT  Goal: Maintain or return to baseline ADL function  Description: INTERVENTIONS:  -  Assess patient's ability to carry out ADLs; assess patient's baseline for ADL function and identify physical deficits which impact ability to perform ADLs (bathing, care of mouth/teeth, toileting, grooming, dressing, etc.)  - Assess/evaluate cause of self-care deficits   - Assess range of electrolyte levels within acceptable range throughout shift:   Med administration/monitoring of effect   Monitor urine output  Goal: Maintain glucose levels >70mg/dl to <250mg/dl throughout shift  Outcome: Progressing  Flowsheets (Taken 11/5/2024 1442)  Maintain glucose levels >70mg/dl to <250mg/dl throughout shift: Med administration/monitoring of effect  Goal: Learn about and adhere to nutrition recommendations by end of shift  Outcome: Progressing  Flowsheets (Taken 11/5/2024 1442)  Learn about and adhere to nutrition recommendations by end of shift: Ensure/encourage compliance with appropriate diet  Goal: Vital signs within normal range for age by end of shift  Outcome: Progressing  Flowsheets (Taken 11/5/2024 1442)  Vital signs within normal range for age by end of shift: Med administration/monitoring of effect  Goal: Increase self care and/or family involovement by end of shift  Outcome: Progressing  Goal: Receive DSME education by end of shift  Outcome: Progressing  Flowsheets (Taken 11/5/2024 1442)  Receive DSME education by end of shift: Provide patient centered education on Diabetic Self Management Education     Problem: Knowledge Deficit  Goal: Patient/family/caregiver demonstrates understanding of disease process, treatment plan, medications, and discharge instructions  Outcome: Progressing  Flowsheets (Taken 11/5/2024 1442)  Patient/family/caregiver demonstrates understanding of disease process, treatment plan, medications, and discharge instructions:   Complete learning assessment and assess knowledge base   Provide teaching via preferred learning methods   Provide teaching at level of understanding     Problem: Skin  Goal: Decreased wound size/increased tissue granulation at next dressing change  Outcome: Progressing  Flowsheets (Taken 11/5/2024 1442)  Decreased wound size/increased tissue granulation at next dressing change:   Promote sleep for wound healing   Utilize specialty bed per algorithm    motion  - Assess patient's mobility; develop plan if impaired  - Assess patient's need for assistive devices and provide as appropriate  - Encourage maximum independence but intervene and supervise when necessary  - Involve family in performance of ADLs  - Assess for home care needs following discharge   - Consider OT consult to assist with ADL evaluation and planning for discharge  - Provide patient education as appropriate  Outcome: Progressing  Goal: Maintains/Returns to pre admission functional level  Description: INTERVENTIONS:  - Perform BMAT or MOVE assessment daily.   - Set and communicate daily mobility goal to care team and patient/family/caregiver. - Collaborate with rehabilitation services on mobility goals if consulted  - Perform Range of Motion 2 times a day. - Reposition patient every 2 hours.   - Dangle patient 2 times a day  - Stand patient 2 times a day  - Ambulate patient 2 times a day  - Out of bed to chair 2 times a day   - Out of bed for meals 2 times a day  - Out of bed for toileting  - Record patient progress and toleration of activity level   Outcome: Progressing Protective dressings over bony prominences  Goal: Participates in plan/prevention/treatment measures  Outcome: Progressing  Flowsheets (Taken 11/5/2024 1442)  Participates in plan/prevention/treatment measures:   Discuss with provider PT/OT consult   Elevate heels   Increase activity/out of bed for meals  Goal: Prevent/manage excess moisture  Outcome: Progressing  Flowsheets (Taken 11/5/2024 1442)  Prevent/manage excess moisture:   Cleanse incontinence/protect with barrier cream   Moisturize dry skin   Follow provider orders for dressing changes   Monitor for/manage infection if present  Goal: Prevent/minimize sheer/friction injuries  Outcome: Progressing  Flowsheets (Taken 11/5/2024 1442)  Prevent/minimize sheer/friction injuries:   Complete micro-shifts as needed if patient unable. Adjust patient position to relieve pressure points, not a full turn   HOB 30 degrees or less   Increase activity/out of bed for meals   Turn/reposition every 2 hours/use positioning/transfer devices   Use pull sheet   Utilize specialty bed per algorithm  Goal: Promote/optimize nutrition  Outcome: Progressing  Flowsheets (Taken 11/5/2024 1442)  Promote/optimize nutrition:   Assist with feeding   Discuss with provider if NPO > 2 days   Offer water/supplements/favorite foods   Consume > 50% meals/supplements   Monitor/record intake including meals   Reassess MST if dietician not consulted  Goal: Promote skin healing  Outcome: Progressing  Flowsheets (Taken 11/5/2024 1442)  Promote skin healing:   Assess skin/pad under line(s)/device(s)   Ensure correct size (line/device) and apply per  instructions   Protective dressings over bony prominences   Rotate device position/do not position patient on device   Turn/reposition every 2 hours/use positioning/transfer devices     Problem: Nutrition  Goal: Consume prescribed supplement  Outcome: Progressing  Goal: Nutrition support goals are met within 48 hrs  Outcome: Progressing  Goal:  Nutrition support is meeting 75% of nutrient needs  Outcome: Progressing  Goal: BG  mg/dL  Outcome: Progressing  Goal: Lab values WNL  Outcome: Progressing  Goal: Electrolytes WNL  Outcome: Progressing  Goal: Promote healing  Outcome: Progressing  Goal: Maintain stable weight  Outcome: Progressing  Goal: Reduce weight from edema/fluid  Outcome: Progressing     Problem: Respiratory  Goal: No signs of respiratory distress (eg. Use of accessory muscles. Peds grunting)  Outcome: Progressing  Goal: Clear secretions with interventions this shift  Outcome: Progressing  Flowsheets (Taken 11/5/2024 1442)  Clear secretions with interventions this shift:   Encourage/provide pulmonary hygiene/secretion clearance   Med administration/monitoring of effect   Incentive spirometry   Suctioning  Goal: Minimize anxiety/maximize coping throughout shift  Outcome: Progressing  Flowsheets (Taken 11/5/2024 1442)  Minimize anxiety/maximize coping throughout shift:   Med administration/monitoring of effect   Monitor pain/anxiety level  Goal: Minimal/no exertional discomfort or dyspnea this shift  Outcome: Progressing  Flowsheets (Taken 11/5/2024 1442)  Minimal/no exertional discomfort or dyspnea this shift: Positioning to promote ventilation/comfort  Goal: Patent airway maintained this shift  Outcome: Progressing  Flowsheets (Taken 11/5/2024 1442)  Patent airway maintained this shift: Maintain ET tube tube position  Goal: Tolerate mechanical ventilation evidenced by VS/agitation level this shift  Outcome: Progressing  Flowsheets (Taken 11/5/2024 1442)  Tolerate mechanical ventilation evidenced by VS/agitation level this shift:   Maintain ET tube tube position   Mechanical ventilation  Goal: Tolerate pulmonary toileting this shift  Outcome: Progressing  Flowsheets (Taken 11/5/2024 1442)  Tolerate pulmonary toileting this shift:   Evaluate chest drainage   Positioning to promote ventilation/comfort  Goal: Verbalize decreased shortness of  breath this shift  Outcome: Progressing  Flowsheets (Taken 11/5/2024 1442)  Verbalize decreased shortness of breath this shift:   Encourage/provide pulmonary hygiene/secretion clearance   Suctioning  Goal: Wean oxygen to maintain O2 saturation per order/standard this shift  Outcome: Progressing  Flowsheets (Taken 11/5/2024 1442)  Wean oxygen to maintain O2 saturation per order/standard this shift: Encourage activity/mobility  Goal: Increase self care and/or family involvement in next 24 hours  Outcome: Progressing  Flowsheets (Taken 11/5/2024 1442)  Increase self care and/or family involvement in next 24 hours: Encourage activity/mobility     Problem: Pain  Goal: Takes deep breaths with improved pain control throughout the shift  Outcome: Progressing  Goal: Turns in bed with improved pain control throughout the shift  Outcome: Progressing  Goal: Walks with improved pain control throughout the shift  Outcome: Progressing  Goal: Performs ADL's with improved pain control throughout shift  Outcome: Progressing  Goal: Participates in PT with improved pain control throughout the shift  Outcome: Progressing  Goal: Free from opioid side effects throughout the shift  Outcome: Progressing  Goal: Free from acute confusion related to pain meds throughout the shift  Outcome: Progressing     Problem: Fall/Injury  Goal: Not fall by end of shift  Outcome: Progressing  Goal: Be free from injury by end of the shift  Outcome: Progressing  Goal: Verbalize understanding of personal risk factors for fall in the hospital  Outcome: Progressing  Goal: Verbalize understanding of risk factor reduction measures to prevent injury from fall in the home  Outcome: Progressing  Goal: Use assistive devices by end of the shift  Outcome: Progressing  Goal: Pace activities to prevent fatigue by end of the shift  Outcome: Progressing     Problem: Mechanical Ventilation  Goal: ET tube will be managed safely  Outcome: Progressing  Flowsheets (Taken  11/5/2024 9062)  Endotracheal Tube Will Be Managed Safely:   Assess and monitor insertion depth at lip/nare line   Utilize ETT securing device and change as necessary to ensure ETT is properly secured to patient   Support ventilator tubing to avoid pressure from drag of tubing   Utilize endotracheal tube securing device   Keep ambu bag, mask, oxygen connection tubing, and extra ETT at bedside and accompanying patient at all times   Reposition endotracheal tube to opposite side of mouth   The patient's goals for the shift include unable to assess    The clinical goals for the shift include Patient will maintain hemodynamic stability    Over the shift, the patient did make progress toward the following goals.

## 2024-11-05 NOTE — PROCEDURES
PERCUTANEOUS ENDOSCOPIC GASTROSTOMY TUBE   INITIAL PLACEMENT      Feeding Tube Replacement    Date/Time: 11/4/2024 8:35 PM    Performed by: Madelyn Clinton MD  Authorized by: Madelyn Clinton MD    Consent:     Consent obtained:  Verbal    Consent given by:  Spouse and healthcare agent    Risks, benefits, and alternatives were discussed: yes      Risks discussed:  Bleeding, infection and pain    Alternatives discussed:  No treatment  Universal protocol:     Procedure explained and questions answered to patient or proxy's satisfaction: yes      Relevant documents present and verified: yes      Test results available: yes      Imaging studies available: yes      Required blood products, implants, devices, and special equipment available: yes      Site/side marked: yes      Immediately prior to procedure, a time out was called: yes      Patient identity confirmed:  Arm band and hospital-assigned identification number  Sedation:     Sedation type:  Moderate sedation  Anesthesia:     Anesthesia method:  Local infiltration    Local anesthetic:  Lidocaine 1% w/o epi  Procedure details:     Patient position:  Supine    Tube type:  Gastrostomy    Tube size:  24 Fr  Post-procedure details:     Placement/position confirmation:  Insufflation of air    Placement difficulty:  None    Bleeding:  Minimal    Procedure completion:  Tolerated well, no immediate complications  Comments:      PERCUTANEOUS ENDOSCOPIC GASTROSTOMY TUBE PLACEMENT  Placement under direct endoscopic visualization, confirmed with 1-to-1 poke and transillumination in LUQ  Bumper at 4cm at skin

## 2024-11-05 NOTE — CARE PLAN
Problem: Respiratory  Goal: No signs of respiratory distress (eg. Use of accessory muscles. Peds grunting)  11/4/2024 1934 by Otilio Canseco RRT  Outcome: Progressing  11/4/2024 1932 by Otilio Canseco RRT  Outcome: Progressing  Goal: Clear secretions with interventions this shift  11/4/2024 1934 by Otilio Canseco RRT  Outcome: Progressing  11/4/2024 1932 by Otilio Canseco RRT  Outcome: Progressing  Goal: Minimize anxiety/maximize coping throughout shift  11/4/2024 1934 by Otilio Canseco RRT  Outcome: Progressing  11/4/2024 1932 by Otilio Canseco RRT  Outcome: Progressing  Goal: Minimal/no exertional discomfort or dyspnea this shift  11/4/2024 1934 by Otilio Canseco RRT  Outcome: Progressing  11/4/2024 1932 by Otilio Canseco RRT  Outcome: Progressing  Goal: Patent airway maintained this shift  11/4/2024 1934 by Otilio Canseco RRT  Outcome: Progressing  11/4/2024 1932 by Otilio Canseco RRT  Outcome: Progressing  Goal: Tolerate mechanical ventilation evidenced by VS/agitation level this shift  11/4/2024 1934 by Otilio Canseco RRT  Outcome: Progressing  11/4/2024 1932 by Otilio Canseco RRT  Outcome: Progressing  Goal: Tolerate pulmonary toileting this shift  Outcome: Progressing  Goal: Verbalize decreased shortness of breath this shift  Outcome: Progressing  Goal: Wean oxygen to maintain O2 saturation per order/standard this shift  Outcome: Progressing  Goal: Increase self care and/or family involvement in next 24 hours  Outcome: Progressing

## 2024-11-05 NOTE — PROGRESS NOTES
"INFECTIOUS DISEASES PROGRESS NOTE    Consulted / following patient for:  Respiratory failure/pneumonia/Pseudomonas in the sputum  Recent polymicrobial complicated postoperative lumbar wound infection, MRSA and Proteus  Acute kidney injury    Subjective   Interval History:   (On the ventilator, mouthing \"help me\")    Objective   PHYSICAL EXAMINATION  Vital signs: Afebrile, no vasopressor agents  General: Intubated.  Not toxic  Lungs: Diminished, clear.  No description of copious purulent ET secretions.  On ventilator with FiO2 stable, 40%  Heart:  S1, S2 normal  Abdomen:  Soft, nontender.  PEG tube in place and functioning  Extremities:  No cords, phlebitis, cellulitis.      Relevant Results  WBC: 7600  Creatinine: (Dialysis)  CK: 42  Pleural fluid: Transudate with 197 WBC, 56% neutrophils, protein 1.8, LDH 97, glucose 202  Microbiology:  Blood (11/3): Negative X2  Sputum (10/21): Normal neva, no MRSA  Sputum (10/28): Pseudomonas, pan-susceptible  Sputum (11/4): Pending   Pleural fluid (10/17): Negative  C. difficile PCR (10/30): Negative    Imaging:  CXR images (11/5) personally reviewed: Intubated.  Some increase in bilateral effusion and atelectasis  Assessment:  Sepsis -likely due to pneumonia, present on admission. Patient already on IV vancomycin and IV ceftriaxone at time of this admission for lumbar spinal infection.  Resolved with anti-Pseudomonas antimicrobial therapy.  Large transudative pleural effusion was tapped.  Had fever and purulent secretions in the evening of 10/28, Pseudomonas in sputum, treated for 5 days with aerosol tobramycin.  Briefly extubated, now reintubated without clinical or radiographic evidence for progressive pneumonia.  Remains on a schedule of thrice weekly hemodialysis.  Palliative Care consultation and notes reviewed and appreciated.  Underwent PEG placement on 11/4 there are plans for tracheostomy on 11/7  Lumbar spine surgical site infection s/p I&D 9/28. Cultures + MRSA, " Proteus.  Was to be on vanco/ceftriaxone through 11/12. Followed by Dr. Brant Juarez.  Vancomycin has been changed to daptomycin because of AMY    Plan/Recommendations:  Continue daptomycin 700 mg every 48 hour until 11/12, dose after dialysis when a dose is due on a dialysis day  Continue ceftriaxone until 11/12       Andrew Malagon MD  [facility]  Office:  [phone]

## 2024-11-05 NOTE — CARE PLAN
The patient's goals for the shift include unable to assess    The clinical goals for the shift include Maintain hemodynamic stability      Problem: Safety - Adult  Goal: Free from fall injury  Outcome: Progressing     Problem: Discharge Planning  Goal: Discharge to home or other facility with appropriate resources  Outcome: Progressing     Problem: Chronic Conditions and Co-morbidities  Goal: Patient's chronic conditions and co-morbidity symptoms are monitored and maintained or improved  Outcome: Progressing     Problem: Diabetes  Goal: Increase stability of blood glucose readings by end of shift  Outcome: Progressing  Goal: Maintain electrolyte levels within acceptable range throughout shift  Outcome: Progressing  Goal: Maintain glucose levels >70mg/dl to <250mg/dl throughout shift  Outcome: Progressing  Goal: Learn about and adhere to nutrition recommendations by end of shift  Outcome: Progressing  Goal: Vital signs within normal range for age by end of shift  Outcome: Progressing  Goal: Increase self care and/or family involovement by end of shift  Outcome: Progressing  Goal: Receive DSME education by end of shift  Outcome: Progressing     Problem: Knowledge Deficit  Goal: Patient/family/caregiver demonstrates understanding of disease process, treatment plan, medications, and discharge instructions  Outcome: Progressing     Problem: Mechanical Ventilation  Goal: ET tube will be managed safely  Outcome: Progressing     Problem: Skin  Goal: Decreased wound size/increased tissue granulation at next dressing change  Outcome: Progressing  Flowsheets (Taken 11/5/2024 0015)  Decreased wound size/increased tissue granulation at next dressing change:   Promote sleep for wound healing   Protective dressings over bony prominences   Utilize specialty bed per algorithm  Goal: Participates in plan/prevention/treatment measures  Outcome: Progressing  Flowsheets (Taken 11/5/2024 0015)  Participates in plan/prevention/treatment  measures:   Discuss with provider PT/OT consult   Elevate heels  Goal: Prevent/manage excess moisture  Outcome: Progressing  Flowsheets (Taken 11/5/2024 0015)  Prevent/manage excess moisture:   Cleanse incontinence/protect with barrier cream   Moisturize dry skin   Follow provider orders for dressing changes   Monitor for/manage infection if present  Goal: Prevent/minimize sheer/friction injuries  Outcome: Progressing  Flowsheets (Taken 11/5/2024 0015)  Prevent/minimize sheer/friction injuries:   Complete micro-shifts as needed if patient unable. Adjust patient position to relieve pressure points, not a full turn   Increase activity/out of bed for meals   Use pull sheet   Turn/reposition every 2 hours/use positioning/transfer devices   Utilize specialty bed per algorithm   HOB 30 degrees or less  Goal: Promote/optimize nutrition  Outcome: Progressing  Flowsheets (Taken 11/5/2024 0015)  Promote/optimize nutrition:   Monitor/record intake including meals   Consume > 50% meals/supplements  Goal: Promote skin healing  Outcome: Progressing  Flowsheets (Taken 11/5/2024 0015)  Promote skin healing:   Assess skin/pad under line(s)/device(s)   Protective dressings over bony prominences   Turn/reposition every 2 hours/use positioning/transfer devices   Ensure correct size (line/device) and apply per  instructions   Rotate device position/do not position patient on device     Problem: Nutrition  Goal: Consume prescribed supplement  Outcome: Progressing  Goal: Nutrition support goals are met within 48 hrs  Outcome: Progressing  Goal: Nutrition support is meeting 75% of nutrient needs  Outcome: Progressing  Goal: BG  mg/dL  Outcome: Progressing  Goal: Lab values WNL  Outcome: Progressing  Goal: Electrolytes WNL  Outcome: Progressing  Goal: Promote healing  Outcome: Progressing  Goal: Maintain stable weight  Outcome: Progressing  Goal: Reduce weight from edema/fluid  Outcome: Progressing     Problem:  Respiratory  Goal: No signs of respiratory distress (eg. Use of accessory muscles. Peds grunting)  Outcome: Progressing  Goal: Clear secretions with interventions this shift  Outcome: Progressing  Goal: Minimize anxiety/maximize coping throughout shift  Outcome: Progressing  Goal: Minimal/no exertional discomfort or dyspnea this shift  Outcome: Progressing  Goal: Patent airway maintained this shift  Outcome: Progressing  Goal: Tolerate mechanical ventilation evidenced by VS/agitation level this shift  Outcome: Progressing  Goal: Tolerate pulmonary toileting this shift  Outcome: Progressing  Goal: Verbalize decreased shortness of breath this shift  Outcome: Progressing  Goal: Wean oxygen to maintain O2 saturation per order/standard this shift  Outcome: Progressing  Goal: Increase self care and/or family involvement in next 24 hours  Outcome: Progressing     Problem: Pain  Goal: Takes deep breaths with improved pain control throughout the shift  Outcome: Progressing  Goal: Turns in bed with improved pain control throughout the shift  Outcome: Progressing  Goal: Walks with improved pain control throughout the shift  Outcome: Progressing  Goal: Performs ADL's with improved pain control throughout shift  Outcome: Progressing  Goal: Participates in PT with improved pain control throughout the shift  Outcome: Progressing  Goal: Free from opioid side effects throughout the shift  Outcome: Progressing  Goal: Free from acute confusion related to pain meds throughout the shift  Outcome: Progressing     Problem: Fall/Injury  Goal: Not fall by end of shift  Outcome: Progressing  Goal: Be free from injury by end of the shift  Outcome: Progressing  Goal: Verbalize understanding of personal risk factors for fall in the hospital  Outcome: Progressing  Goal: Verbalize understanding of risk factor reduction measures to prevent injury from fall in the home  Outcome: Progressing  Goal: Use assistive devices by end of the shift  Outcome:  Progressing  Goal: Pace activities to prevent fatigue by end of the shift  Outcome: Progressing

## 2024-11-05 NOTE — PROGRESS NOTES
Occupational Therapy    Occupational Therapy Treatment    Name: Narda Malloy  MRN: [id number]  Department: [facility] 3 S ICU  Room: 11/11-A  Date: 11/05/24  Time Calculation  Start Time: 1101  Stop Time: 1129  Time Calculation (min): 28 min    Assessment:  OT Assessment: Pt with improved cognition and ability to follow commands this date. Pt able to participate in AAROM and long sitting. Pt would benefit from continued acute OT services to facilitate return to OF.  Prognosis: Fair  Barriers to Discharge: Decreased caregiver support  Evaluation/Treatment Tolerance: Patient limited by fatigue  Medical Staff Made Aware: Yes  End of Session Communication: Bedside nurse  End of Session Patient Position: Bed, 4 rail up, Alarm on (restraints reapplied per nursing)  Plan:  Treatment Interventions: ADL retraining, Functional transfer training, UE strengthening/ROM, Endurance training, Cognitive reorientation, Patient/family training, Equipment evaluation/education, Compensatory technique education  OT Frequency: 5 times per week  OT Discharge Recommendations: Moderate intensity level of continued care  Equipment Recommended upon Discharge: Wheelchair  OT Recommended Transfer Status: Maximum assist, Assist of 2  OT - OK to Discharge: Yes    Subjective   Previous Visit Info:  OT Last Visit  OT Received On: 11/05/24  General:  General  Family/Caregiver Present: Yes  Caregiver Feedback: Spouse at bedside.  Co-Treatment: PT  Co-Treatment Reason: patient/caregiver safety and optimization of patient outcomes for a medically complex ICU treatment  Prior to Session Communication: Bedside nurse  Patient Position Received: Alarm off, not on at start of session, Bed, 4 rail up  Preferred Learning Style: verbal  General Comment:  (Patient cleared for therapy follow-up by nsg. Pt presented supine in bed on vent; nodding in agreement to therapy session.)  Precautions:  Hearing/Visual Limitations: h/o visual deficits, mildly [place]  Medical  Precautions: Fall precautions, Oxygen therapy device and L/min, Spinal precautions (Vent; PEEP 5; FiO2 40%)  Post-Surgical Precautions: Spinal precautions  Precautions Comment: Recent spine surgery with spinal precautions still in place. Bilateral wrist restraints remain in place.      Vital Signs Comment: HR: 86 bpm, 24 RR, 97% SpO2, BP: 125/58 (77) mmHg    Pain Assessment:  Pain Assessment  Pain Assessment: FLACC (Face, Legs, Activity, Cry, Consolability)  0-10 (Numeric) Pain Score: 0 - No pain     Objective   Cognition:  Overall Cognitive Status: Impaired  Arousal/Alertness: Delayed responses to stimuli  Following Commands: Follows one step commands with repetition  Cognition Comments: pt able to nod yes/no in response to simple questions    Bed Mobility/Transfers: Bed Mobility  Bed Mobility: Yes  Bed Mobility 1  Bed Mobility 1: Long sit  Level of Assistance 1: Dependent, +2  Bed Mobility Comments 1: dep x2 for long sit with draw sheet pt able to hold up own head with VCs    Therapy/Activity: Therapeutic Exercise  Therapeutic Exercise Performed: Yes  Therapeutic Exercise Activity 1: TENA shoulder flexion x10 AAROM  Therapeutic Exercise Activity 2: TENA elbow flexion x10 AAROM  Therapeutic Exercise Activity 3: TENA wrist flexion x10 AAROM  Therapeutic Exercise Activity 4: TENA hand flexion x10 AAROM    Outcome Measures:  [facility] Daily Activity  Putting on and taking off regular lower body clothing: Total  Bathing (including washing, rinsing, drying): Total  Putting on and taking off regular upper body clothing: Total  Toileting, which includes using toilet, bedpan or urinal: Total  Taking care of personal grooming such as brushing teeth: Total  Eating Meals: Total  Daily Activity - Total Score: 6        Education Documentation  Home Exercise Program, taught by Leona May OT at 11/5/2024  1:14 PM.  Learner: Significant Other, Patient  Readiness: Acceptance  Method: Explanation  Response: Verbalizes  Understanding  Comment: educated spouse and pt on OT POC    Body Mechanics, taught by Leona May OT at 11/5/2024  1:14 PM.  Learner: Significant Other, Patient  Readiness: Acceptance  Method: Explanation  Response: Verbalizes Understanding  Comment: educated spouse and pt on OT POC    Precautions, taught by Leona May OT at 11/5/2024  1:14 PM.  Learner: Significant Other, Patient  Readiness: Acceptance  Method: Explanation  Response: Verbalizes Understanding  Comment: educated spouse and pt on OT POC    ADL Training, taught by Leona May OT at 11/5/2024  1:14 PM.  Learner: Significant Other, Patient  Readiness: Acceptance  Method: Explanation  Response: Verbalizes Understanding  Comment: educated spouse and pt on OT POC    Education Comments  No comments found.      Goals:  Encounter Problems       Encounter Problems (Active)       OT Goals       Pt will complete self-feeding with setup. (Progressing)       Start:  10/28/24    Expected End:  11/28/24            Pt will complete all grooming tasks with min A. (Progressing)       Start:  10/28/24    Expected End:  11/28/24            Pt will complete UB dressing/bathing with min A. (Progressing)       Start:  10/28/24    Expected End:  11/28/24            Pt will tolerate sitting EOB for at least 10-15 minutes with F+ balance in order to enhance ADL's. (Progressing)       Start:  10/28/24    Expected End:  11/28/24            Pt will complete all functional transfers and mobility with mod A using a RW. (Progressing)       Start:  10/28/24    Expected End:  11/28/24

## 2024-11-06 ENCOUNTER — ANESTHESIA EVENT (OUTPATIENT)
Dept: OPERATING ROOM | Facility: HOSPITAL | Age: 69
End: 2024-11-06
Payer: MEDICARE

## 2024-11-06 ENCOUNTER — APPOINTMENT (OUTPATIENT)
Dept: DIALYSIS | Facility: HOSPITAL | Age: 69
End: 2024-11-06
Payer: MEDICARE

## 2024-11-06 LAB
ACID FAST STN SPEC: NORMAL
ALBUMIN SERPL BCP-MCNC: 2.4 G/DL (ref 3.4–5)
ALP SERPL-CCNC: 126 U/L (ref 33–136)
ALT SERPL W P-5'-P-CCNC: 10 U/L (ref 7–45)
ANION GAP BLDV CALCULATED.4IONS-SCNC: 10 MMOL/L (ref 10–25)
ANION GAP SERPL CALCULATED.3IONS-SCNC: 11 MMOL/L (ref 10–20)
APTT PPP: 62.4 SECONDS (ref 22–32.5)
APTT PPP: 79.1 SECONDS (ref 22–32.5)
AST SERPL W P-5'-P-CCNC: 12 U/L (ref 9–39)
BACTERIA SPEC RESP CULT: NORMAL
BASE EXCESS BLDV CALC-SCNC: 4.4 MMOL/L (ref -2–3)
BASOPHILS # BLD AUTO: 0.04 X10*3/UL (ref 0–0.1)
BASOPHILS NFR BLD AUTO: 0.5 %
BILIRUB DIRECT SERPL-MCNC: 0.1 MG/DL (ref 0–0.3)
BILIRUB SERPL-MCNC: 0.5 MG/DL (ref 0–1.2)
BLOOD EXPIRATION DATE: NORMAL
BODY TEMPERATURE: 37 DEGREES CELSIUS
BUN SERPL-MCNC: 29 MG/DL (ref 6–23)
CA-I BLDV-SCNC: 1.16 MMOL/L (ref 1.1–1.33)
CALCIUM SERPL-MCNC: 7.8 MG/DL (ref 8.6–10.3)
CHLORIDE BLDV-SCNC: 98 MMOL/L (ref 98–107)
CHLORIDE SERPL-SCNC: 98 MMOL/L (ref 98–107)
CO2 SERPL-SCNC: 29 MMOL/L (ref 21–32)
CREAT SERPL-MCNC: 1.88 MG/DL (ref 0.5–1.05)
DISPENSE STATUS: NORMAL
EGFRCR SERPLBLD CKD-EPI 2021: 29 ML/MIN/1.73M*2
EOSINOPHIL # BLD AUTO: 0.07 X10*3/UL (ref 0–0.7)
EOSINOPHIL NFR BLD AUTO: 0.8 %
ERYTHROCYTE [DISTWIDTH] IN BLOOD BY AUTOMATED COUNT: 20.1 % (ref 11.5–14.5)
FOLATE SERPL-MCNC: 15.5 NG/ML
GLUCOSE BLD MANUAL STRIP-MCNC: 107 MG/DL (ref 74–99)
GLUCOSE BLD MANUAL STRIP-MCNC: 146 MG/DL (ref 74–99)
GLUCOSE BLD MANUAL STRIP-MCNC: 159 MG/DL (ref 74–99)
GLUCOSE BLD MANUAL STRIP-MCNC: 183 MG/DL (ref 74–99)
GLUCOSE BLD MANUAL STRIP-MCNC: 186 MG/DL (ref 74–99)
GLUCOSE BLD MANUAL STRIP-MCNC: 194 MG/DL (ref 74–99)
GLUCOSE BLDV-MCNC: 163 MG/DL (ref 74–99)
GLUCOSE SERPL-MCNC: 161 MG/DL (ref 74–99)
GRAM STN SPEC: NORMAL
GRAM STN SPEC: NORMAL
HCO3 BLDV-SCNC: 29.2 MMOL/L (ref 22–26)
HCT VFR BLD AUTO: 24.5 % (ref 36–46)
HCT VFR BLD EST: 25 % (ref 36–46)
HGB BLD-MCNC: 8 G/DL (ref 12–16)
HGB BLDV-MCNC: 8.2 G/DL (ref 12–16)
HOLD SPECIMEN: NORMAL
IMM GRANULOCYTES # BLD AUTO: 0.09 X10*3/UL (ref 0–0.7)
IMM GRANULOCYTES NFR BLD AUTO: 1.1 % (ref 0–0.9)
INHALED O2 CONCENTRATION: 40 %
LACTATE BLDV-SCNC: 0.9 MMOL/L (ref 0.4–2)
LYMPHOCYTES # BLD AUTO: 0.66 X10*3/UL (ref 1.2–4.8)
LYMPHOCYTES NFR BLD AUTO: 7.8 %
MAGNESIUM SERPL-MCNC: 2.13 MG/DL (ref 1.6–2.4)
MCH RBC QN AUTO: 30.5 PG (ref 26–34)
MCHC RBC AUTO-ENTMCNC: 32.7 G/DL (ref 32–36)
MCV RBC AUTO: 94 FL (ref 80–100)
MONOCYTES # BLD AUTO: 0.42 X10*3/UL (ref 0.1–1)
MONOCYTES NFR BLD AUTO: 4.9 %
MYCOBACTERIUM SPEC CULT: NORMAL
NEUTROPHILS # BLD AUTO: 7.22 X10*3/UL (ref 1.2–7.7)
NEUTROPHILS NFR BLD AUTO: 84.9 %
NRBC BLD-RTO: 0 /100 WBCS (ref 0–0)
OXYHGB MFR BLDV: 75 % (ref 45–75)
PCO2 BLDV: 44 MM HG (ref 41–51)
PH BLDV: 7.43 PH (ref 7.33–7.43)
PHOSPHATE SERPL-MCNC: 3.1 MG/DL (ref 2.5–4.9)
PLATELET # BLD AUTO: 256 X10*3/UL (ref 150–450)
PO2 BLDV: 42 MM HG (ref 35–45)
POTASSIUM BLDV-SCNC: 4 MMOL/L (ref 3.5–5.3)
POTASSIUM SERPL-SCNC: 4 MMOL/L (ref 3.5–5.3)
PROCALCITONIN SERPL-MCNC: 0.42 NG/ML
PRODUCT BLOOD TYPE: 600
PRODUCT CODE: NORMAL
PROT SERPL-MCNC: 5.9 G/DL (ref 6.4–8.2)
RBC # BLD AUTO: 2.62 X10*6/UL (ref 4–5.2)
RBC MORPH BLD: NORMAL
SAO2 % BLDV: 76 % (ref 45–75)
SODIUM BLDV-SCNC: 133 MMOL/L (ref 136–145)
SODIUM SERPL-SCNC: 134 MMOL/L (ref 136–145)
UNIT ABO: NORMAL
UNIT NUMBER: NORMAL
UNIT RH: NORMAL
UNIT VOLUME: 350
VIT B12 SERPL-MCNC: 1152 PG/ML (ref 211–911)
WBC # BLD AUTO: 8.5 X10*3/UL (ref 4.4–11.3)
XM INTEP: NORMAL

## 2024-11-06 PROCEDURE — 84132 ASSAY OF SERUM POTASSIUM: CPT

## 2024-11-06 PROCEDURE — 94003 VENT MGMT INPAT SUBQ DAY: CPT

## 2024-11-06 PROCEDURE — 37799 UNLISTED PX VASCULAR SURGERY: CPT

## 2024-11-06 PROCEDURE — 2500000004 HC RX 250 GENERAL PHARMACY W/ HCPCS (ALT 636 FOR OP/ED): Performed by: PHYSICIAN ASSISTANT

## 2024-11-06 PROCEDURE — 85730 THROMBOPLASTIN TIME PARTIAL: CPT | Performed by: SURGERY

## 2024-11-06 PROCEDURE — 80076 HEPATIC FUNCTION PANEL: CPT

## 2024-11-06 PROCEDURE — 2500000004 HC RX 250 GENERAL PHARMACY W/ HCPCS (ALT 636 FOR OP/ED)

## 2024-11-06 PROCEDURE — 37799 UNLISTED PX VASCULAR SURGERY: CPT | Performed by: SURGERY

## 2024-11-06 PROCEDURE — 2500000004 HC RX 250 GENERAL PHARMACY W/ HCPCS (ALT 636 FOR OP/ED): Performed by: NURSE PRACTITIONER

## 2024-11-06 PROCEDURE — 84100 ASSAY OF PHOSPHORUS: CPT

## 2024-11-06 PROCEDURE — 94668 MNPJ CHEST WALL SBSQ: CPT

## 2024-11-06 PROCEDURE — 2500000001 HC RX 250 WO HCPCS SELF ADMINISTERED DRUGS (ALT 637 FOR MEDICARE OP): Performed by: PHYSICIAN ASSISTANT

## 2024-11-06 PROCEDURE — 84145 PROCALCITONIN (PCT): CPT | Mod: WESLAB

## 2024-11-06 PROCEDURE — 2500000005 HC RX 250 GENERAL PHARMACY W/O HCPCS

## 2024-11-06 PROCEDURE — 97530 THERAPEUTIC ACTIVITIES: CPT | Mod: GP

## 2024-11-06 PROCEDURE — 83735 ASSAY OF MAGNESIUM: CPT

## 2024-11-06 PROCEDURE — 2500000001 HC RX 250 WO HCPCS SELF ADMINISTERED DRUGS (ALT 637 FOR MEDICARE OP)

## 2024-11-06 PROCEDURE — 2500000002 HC RX 250 W HCPCS SELF ADMINISTERED DRUGS (ALT 637 FOR MEDICARE OP, ALT 636 FOR OP/ED)

## 2024-11-06 PROCEDURE — 82947 ASSAY GLUCOSE BLOOD QUANT: CPT

## 2024-11-06 PROCEDURE — 82374 ASSAY BLOOD CARBON DIOXIDE: CPT

## 2024-11-06 PROCEDURE — 99291 CRITICAL CARE FIRST HOUR: CPT | Performed by: NURSE PRACTITIONER

## 2024-11-06 PROCEDURE — 97110 THERAPEUTIC EXERCISES: CPT | Mod: GO

## 2024-11-06 PROCEDURE — 2020000001 HC ICU ROOM DAILY

## 2024-11-06 PROCEDURE — 2500000004 HC RX 250 GENERAL PHARMACY W/ HCPCS (ALT 636 FOR OP/ED): Performed by: SURGERY

## 2024-11-06 PROCEDURE — 8010000001 HC DIALYSIS - HEMODIALYSIS PER DAY

## 2024-11-06 PROCEDURE — 97530 THERAPEUTIC ACTIVITIES: CPT | Mod: GO

## 2024-11-06 PROCEDURE — 97110 THERAPEUTIC EXERCISES: CPT | Mod: GP

## 2024-11-06 PROCEDURE — 94640 AIRWAY INHALATION TREATMENT: CPT

## 2024-11-06 PROCEDURE — 2500000004 HC RX 250 GENERAL PHARMACY W/ HCPCS (ALT 636 FOR OP/ED): Performed by: INTERNAL MEDICINE

## 2024-11-06 PROCEDURE — 85025 COMPLETE CBC W/AUTO DIFF WBC: CPT

## 2024-11-06 RX ORDER — HYDROXYZINE PAMOATE 25 MG/1
25 CAPSULE ORAL ONCE AS NEEDED
Status: DISCONTINUED | OUTPATIENT
Start: 2024-11-06 | End: 2024-11-07

## 2024-11-06 RX ORDER — HEPARIN SODIUM 1000 [USP'U]/ML
1000 INJECTION, SOLUTION INTRAVENOUS; SUBCUTANEOUS
Status: DISCONTINUED | OUTPATIENT
Start: 2024-11-06 | End: 2024-11-07

## 2024-11-06 RX ORDER — FENTANYL CITRATE 50 UG/ML
25 INJECTION, SOLUTION INTRAMUSCULAR; INTRAVENOUS ONCE
Status: COMPLETED | OUTPATIENT
Start: 2024-11-06 | End: 2024-11-06

## 2024-11-06 RX ORDER — HEPARIN SODIUM 10000 [USP'U]/100ML
0-4500 INJECTION, SOLUTION INTRAVENOUS CONTINUOUS
Status: CANCELLED | OUTPATIENT
Start: 2024-11-06

## 2024-11-06 ASSESSMENT — PAIN - FUNCTIONAL ASSESSMENT
PAIN_FUNCTIONAL_ASSESSMENT: CPOT (CRITICAL CARE PAIN OBSERVATION TOOL)
PAIN_FUNCTIONAL_ASSESSMENT: FLACC (FACE, LEGS, ACTIVITY, CRY, CONSOLABILITY)
PAIN_FUNCTIONAL_ASSESSMENT: CPOT (CRITICAL CARE PAIN OBSERVATION TOOL)
PAIN_FUNCTIONAL_ASSESSMENT: FLACC (FACE, LEGS, ACTIVITY, CRY, CONSOLABILITY)
PAIN_FUNCTIONAL_ASSESSMENT: CPOT (CRITICAL CARE PAIN OBSERVATION TOOL)

## 2024-11-06 ASSESSMENT — COGNITIVE AND FUNCTIONAL STATUS - GENERAL
MOVING TO AND FROM BED TO CHAIR: TOTAL
STANDING UP FROM CHAIR USING ARMS: TOTAL
DAILY ACTIVITIY SCORE: 6
MOBILITY SCORE: 6
CLIMB 3 TO 5 STEPS WITH RAILING: TOTAL
DRESSING REGULAR LOWER BODY CLOTHING: TOTAL
PERSONAL GROOMING: TOTAL
MOVING FROM LYING ON BACK TO SITTING ON SIDE OF FLAT BED WITH BEDRAILS: TOTAL
HELP NEEDED FOR BATHING: TOTAL
DRESSING REGULAR UPPER BODY CLOTHING: TOTAL
TOILETING: TOTAL
WALKING IN HOSPITAL ROOM: TOTAL
EATING MEALS: TOTAL
TURNING FROM BACK TO SIDE WHILE IN FLAT BAD: TOTAL

## 2024-11-06 ASSESSMENT — PAIN SCALES - GENERAL
PAINLEVEL_OUTOF10: 0 - NO PAIN
PAINLEVEL_OUTOF10: 0 - NO PAIN

## 2024-11-06 ASSESSMENT — PAIN SCALES - WONG BAKER: WONGBAKER_NUMERICALRESPONSE: NO HURT

## 2024-11-06 NOTE — PROGRESS NOTES
[facility] Critical Care Medicine       Date:  11/6/2024  Patient:  Narda Malloy  YOB: 1955  MRN:  [id number]   Admit Date:  10/12/2024    Chief Complaint   Patient presents with    Altered Mental Status     History of Present Illness:  Narda Malloy is a 69 y.o. year old female patient with Past Medical History of L1-L3 lumbar laminectomy, T4-S1 revision, and fusion August 26th, T2DM, HTN, essential tremor, HLD, glaucoma, sarcoidosis of the lung who presented to  ED 10/12 after being found essentially unresponsive at her nursing facility [facility]. Per report from her , she has had a significant decline in her health since July 15th when she had a fall and became significantly weak. She has also had multiple infection complications since her back surgery in August requiring multiple I&Ds and long term antibiotic therapy. She went to the OR most recently on 9/28 for lumbar site infection wash out. Per chart review, she was discharged on IV vancomycin 1g and IV ceftriaxone 2d q24hrs through 11/12.     ED Course: Initial vital signs: /104 (109), HR 68, RR 20, SpO2 95% on 6L NC, temp 34.5C. Give 0.4mg of narcan with no improvement in mentation. Lab work-up remarkable for mild hyperkalemia (5.5), AMY 42/1.46, elevated alk phos, normocytic anemia 10.4/33, turbid appearing urine with mild hematuria and proteinuria and + leuk esterase, >50 WBCs. Urine drug screen positive for barbiturates. Triggered sepsis timer so she was given 3L NS. She was intubated for airway protection with 20mg etomidate and 100mg succinylcholine. BP dropped post-intubated and fluid resuscitation and she was subsequently started on levophed.          Interval ICU Events:  10/12: Pt arrived to ICU intubated and lightly sedated on low-dose versed. Eyes open, minimally responsive.      10/13: Received K cocktail last night for K 6.0, corrected appropriately. Levophed requirements mildly up, UOP decreasing.  Ordered albumin x2 this am with improvements in UOP and SBP. Will likely trial lasix this afternoon d/t hypervolemia.     10/14: Remains intubated with decreased mentation, only responsive to noxious stimuli. Trialed lasix TID for volume overload. Remains on levophed 0.01.     10/15: Mentation much improved. SAT/SBT successful so extubated. Given lasix x3, bumex x1, metolazone x1 with net negative fluid balance of 500mL -> started on bumex gtt.      10/16: O2 requirements significantly increased, NRB -> HFNC 40L 100% likely 2/2 mucus plugging.     10/17: No acute events overnight. Bumex gtt increased to 1mg/hr. CXR this am showing complete opacification of left hemithorax related to atelectasis vs pleural effusion. Remains on HFNC 40L/80%. Pigtail catheter placed with 1.2L drained immediately. Given albumin for hypotension.      10/18: ~2L output from left sided pigtail catheter since placement. Kidney function worsening and UOP declining, about 20cc/hr overnight. Will place NG tube today and start enteral nutrition and appetite and oral intake remains poor.      10/19: Patient with worsening hypoxic, hypercapnic respiratory failure - now requiring BIPAP support. Remains grossly volume overloaded with low UO. Started on vasopressors to augment BP for diuresis. 40 IV lasix + gtt started. Remains with poor nutritional status. Will re attempt NG later if respiratory status improves.      10/20: Patient stable on vent. Nephrology consulted for renal failure. Cr continues to uptrend however UO is increasing. No issues overnight.     10/21: No issues overnight. Remains stable on vent. Levo down to 0.01 mcg/kg/min. UO remains low. Nephrology following. Possible CRRT today?     10/22: Patient received 80 lasix and metalozone with minimal UO. Levo @ .02 and prop @ 10. Vent settings: 20/450/10/40%. Plan for CRRT today. Will SAT/SBT after CRRT. May change propofol to precedex.     10/23: Had episodes of bradycardia where HR  went to 30's which resolved with heating the patient. CRRT yesterday with about 1L removed. Currently on 0.03 of levo and 10 of prop. Cont daptomycin and ceftriaxone per ID. CXR improved. SAT/SBT. EP consulted for episodes of bradycardia.     10/24: Bradycardic episodes of HR in 40s. Currently on 0.03 of Levo, 10 of prop and 50 fentanyl. Tolerating CRRT. Place PICC today.     10/25: Did well with the SBT yesterday, plan for another one today. Continue CRRT. Hgb 7.0 this am, still requiting Levo 0.03, will transfuse 1 unit PRBCs. Remove chest tube today.     10/26: Chest tube removed last night, CXR improving, patient appears overall lethargic on sbt/sat, not ready to extubate, will complete 4/4/4 sbt/rest/sbt-> rest today and reassess tomorrow     10/27: Patient failed afternoon SBT, placed on rate overnight, net - 2.5L over previous 24 hours, will place on wean today.     10/29: Patient with high residual tube feeds overnight.  She did have an episode of vomiting.  Tube feeds placed on hold.  She was also afebrile overnight.  Will obtain a KUB to rule out ileus.  Sputum culture with Pseudomonas, discussed antibiotic plans with ID.  Will not do SBT with patient today.  Did discuss the setback with her , he did state moving forward that if she does not reach extubation he would be agreeable to a tracheostomy.      10/30: No significant events overnight.  Tube feeds resumed at trickle rate yesterday, tolerating overnight.  Will increase to goal today.  Dialysis this morning.  Patient improved from yesterday.  Plan for SBT today.  Will attempt to sit patient on side of bed today.    10/31: No significant events overnight. Tolerating tube feeds, tube feeds on hold this AM for SBT. Patient awake and alert this morning, improved from yesterday.  Tolerating SBT, answering yes or no questions.  Will extubate today.    11/1: Pt extubated yesterday to HFNC. Had worsened respiratory distress requiring bipap, wore bipap  overnight. Will trial back on HFNC when more awake. Dialysis scheduled for today. Will remove spinal sutures.     11/2: Unable to wean from bipap yesterday without immediate desat in 50-60s, increasingly lethargic and weak. Decision made to reintubate and this was discussed with  which he was ok with. Pt will need trach and peg as this was her 3rd intubation this admission and she's having persistent respiratory failure due to recurrent pleural effusions, even with iHD fluid removal and recurrent atelectasis with lobar collapse. Consulted to palliative care for communication to family about GOC and communication of expectations, risks, benefits of tracheostomy creation, peg tube placement, and LTAC admission. Discussed with neph, will run tablo today for additional fluid removal d/t recurrent intubation. Plan for ENT and surgery consult for trach and peg pending family meeting tmrw at 1pm.     11/3: OVN: Unsuccessful removal of incisional sutures of posterior surgery d/t skin overgrowth. DAY: Reached out to ortho spine surgical team about suture removal, awaiting to hear back. GOC discussion today, patient will remain DNR-CCA and family wants to pursue Trach/PEG placement (consults ordered). Off sedation and fent, transitioned to enteral narcotics for pain control.     11/4: Plan for peg placement today at bedside, tracheostomy creation Thursday. Will receive scheduled dialysis after peg placement.     11/5: Peg-tube placed 11/4 without issue. Removed majority of sutures yesterday but had an episode of desaturation when turned on her right side, will plan to remove the rest today. 1 unit PRBC ordered for Hgb 6.9.     11/6: No significant overnight events      Medical History:  Past Medical History:   Diagnosis Date    Degenerative myopia, bilateral     Diabetic neuropathy (Multi)     Difficult intubation 08/26/2024    Mac 3, grade 3, 1 attempt.  Glidescope/videolaryngoscopy recommended for future attempts.     DM type 2 (diabetes mellitus, type 2) (Multi)     Dry eye syndrome of bilateral lacrimal glands     Essential hypertension     Essential tremor     Glaucoma     Hyperlipidemia     Long term (current) use of insulin (Multi)     Low back pain     PONV (postoperative nausea and vomiting)     Primary open angle glaucoma of both eyes, severe stage     Repeated falls     Sarcoidosis of lung (Multi)     Spinal stenosis, lumbar region without neurogenic claudication     Weakness        Scheduled medications:  acetaminophen, 650 mg, oral, q6h  acetylcysteine, 3 mL, nebulization, 4x daily  albuterol, 3 mL, nebulization, 4x daily  brimonidine, 1 drop, Both Eyes, BID  [Held by provider] calcium carbonate-vitamin D3, 1 tablet, oral, Daily  cefTRIAXone, 2 g, intravenous, q24h  daptomycin, 700 mg, intravenous, q48h  ezetimibe, 10 mg, oral, Daily  ferrous sulfate, 60 mg of iron, oral, Daily  folic acid, 1 mg, oral, Daily  honey, , Topical, Daily  insulin lispro, 0-15 Units, subcutaneous, q4h  latanoprost, 1 drop, Both Eyes, Nightly  midodrine, 10 mg, oral, q8h  oxygen, , inhalation, Continuous - Inhalation  pantoprazole, 40 mg, oral, Daily before breakfast   Or  pantoprazole, 40 mg, intravenous, Daily before breakfast  potassium, sodium phosphates, 1 packet, oral, With meals & nightly  primidone, 125 mg, oral, Nightly  [Held by provider] propranolol LA, 60 mg, oral, Daily  sennosides-docusate sodium, 1 tablet, oral, Nightly  sodium chloride, 3 mL, nebulization, 4x daily       PRN medications: alteplase, dextrose, dextrose, glucagon, glucagon, heparin (porcine), heparin (porcine), heparin flush, HYDROmorphone, oxyCODONE, oxyCODONE, polyethylene glycol     IV infusions:  heparin, 0-4,500 Units/hr, Last Rate: 1,800 Units/hr (11/06/24 1051)       Physical Exam  Vitals reviewed.   Constitutional:       Appearance: Obese     Interventions: Mecanically ventilated  HENT:      Head: Normocephalic and atraumatic.      Right Ear: External ear  normal.      Left Ear: External ear normal.      Nose: Nose normal.      Mouth/Throat:      Mouth: Mucous membranes are dry.      Comments: Age related dental decay  Eyes:      Conjunctiva/sclera: Conjunctivae normal.      Pupils: Pupils are equal, round, and reactive to light.   Cardiovascular:      Rate and Rhythm: Normal rate and regular rhythm.      Pulses: Normal pulses.      Heart sounds: Normal heart sounds.      Comments: Upper extremity edema also noted bilat  Pulmonary:      Effort: Mechanically ventilated     Breath sounds: Examination of the right-lower field reveals decreased breath sounds. Examination of the left-lower field reveals decreased breath sounds. Decreased breath sounds present.      Comments: Breath sound decreased with no adventitious breath sounds  Abdominal:      General: Bowel sounds are hypoactive     Palpations: Abdomen is soft.      Tenderness: There is no abdominal tenderness.   Musculoskeletal:      Right hand: Swelling present.      Left hand: Swelling present.      Right lower le+ Edema present.      Left lower le+ Edema present.   Skin:   General: Skin is warm.    Capillary Refill: Capillary refill takes less than 2 seconds.    Comments: Back with linear surgical incision open but well approximated with erythema, without drainage  Neurological:   Mental Status: Somnolent, opens eyes to verbal stimuli, follows commands, answers simple question with head nods.  GCS: GCS eye subscore is 4. GCS verbal subscore is 1. GCS motor subscore is 4.    Comments:   Relevant Results:  Results for orders placed or performed during the hospital encounter of 10/12/24 (from the past 24 hours)   POCT GLUCOSE   Result Value Ref Range    POCT Glucose 160 (H) 74 - 99 mg/dL   Lactate dehydrogenase   Result Value Ref Range     84 - 246 U/L   CBC and Auto Differential   Result Value Ref Range    WBC 7.2 4.4 - 11.3 x10*3/uL    nRBC 0.0 0.0 - 0.0 /100 WBCs    RBC 2.73 (L) 4.00 - 5.20  x10*6/uL    Hemoglobin 8.5 (L) 12.0 - 16.0 g/dL    Hematocrit 25.7 (L) 36.0 - 46.0 %    MCV 94 80 - 100 fL    MCH 31.1 26.0 - 34.0 pg    MCHC 33.1 32.0 - 36.0 g/dL    RDW 20.4 (H) 11.5 - 14.5 %    Platelets 252 150 - 450 x10*3/uL    Neutrophils % 83.6 40.0 - 80.0 %    Immature Granulocytes %, Automated 0.6 0.0 - 0.9 %    Lymphocytes % 9.6 13.0 - 44.0 %    Monocytes % 5.4 2.0 - 10.0 %    Eosinophils % 0.4 0.0 - 6.0 %    Basophils % 0.4 0.0 - 2.0 %    Neutrophils Absolute 6.03 1.20 - 7.70 x10*3/uL    Immature Granulocytes Absolute, Automated 0.04 0.00 - 0.70 x10*3/uL    Lymphocytes Absolute 0.69 (L) 1.20 - 4.80 x10*3/uL    Monocytes Absolute 0.39 0.10 - 1.00 x10*3/uL    Eosinophils Absolute 0.03 0.00 - 0.70 x10*3/uL    Basophils Absolute 0.03 0.00 - 0.10 x10*3/uL   Lactate   Result Value Ref Range    Lactate 0.8 0.4 - 2.0 mmol/L   Morphology   Result Value Ref Range    RBC Morphology No significant RBC morphology present    POCT GLUCOSE   Result Value Ref Range    POCT Glucose 146 (H) 74 - 99 mg/dL   aPTT   Result Value Ref Range    aPTT 28.8 22.0 - 32.5 seconds   CBC   Result Value Ref Range    WBC 8.1 4.4 - 11.3 x10*3/uL    nRBC 0.0 0.0 - 0.0 /100 WBCs    RBC 2.66 (L) 4.00 - 5.20 x10*6/uL    Hemoglobin 8.2 (L) 12.0 - 16.0 g/dL    Hematocrit 25.2 (L) 36.0 - 46.0 %    MCV 95 80 - 100 fL    MCH 30.8 26.0 - 34.0 pg    MCHC 32.5 32.0 - 36.0 g/dL    RDW 20.4 (H) 11.5 - 14.5 %    Platelets 257 150 - 450 x10*3/uL   Folate   Result Value Ref Range    Folate, Serum 15.5 >5.0 ng/mL   Vitamin B12   Result Value Ref Range    Vitamin B12 1,152 (H) 211 - 911 pg/mL   POCT GLUCOSE   Result Value Ref Range    POCT Glucose 194 (H) 74 - 99 mg/dL   aPTT   Result Value Ref Range    aPTT 79.1 (H) 22.0 - 32.5 seconds   CBC and Auto Differential   Result Value Ref Range    WBC 8.5 4.4 - 11.3 x10*3/uL    nRBC 0.0 0.0 - 0.0 /100 WBCs    RBC 2.62 (L) 4.00 - 5.20 x10*6/uL    Hemoglobin 8.0 (L) 12.0 - 16.0 g/dL    Hematocrit 24.5 (L) 36.0 - 46.0  %    MCV 94 80 - 100 fL    MCH 30.5 26.0 - 34.0 pg    MCHC 32.7 32.0 - 36.0 g/dL    RDW 20.1 (H) 11.5 - 14.5 %    Platelets 256 150 - 450 x10*3/uL    Neutrophils % 84.9 40.0 - 80.0 %    Immature Granulocytes %, Automated 1.1 (H) 0.0 - 0.9 %    Lymphocytes % 7.8 13.0 - 44.0 %    Monocytes % 4.9 2.0 - 10.0 %    Eosinophils % 0.8 0.0 - 6.0 %    Basophils % 0.5 0.0 - 2.0 %    Neutrophils Absolute 7.22 1.20 - 7.70 x10*3/uL    Immature Granulocytes Absolute, Automated 0.09 0.00 - 0.70 x10*3/uL    Lymphocytes Absolute 0.66 (L) 1.20 - 4.80 x10*3/uL    Monocytes Absolute 0.42 0.10 - 1.00 x10*3/uL    Eosinophils Absolute 0.07 0.00 - 0.70 x10*3/uL    Basophils Absolute 0.04 0.00 - 0.10 x10*3/uL   Hepatic function panel   Result Value Ref Range    Albumin 2.4 (L) 3.4 - 5.0 g/dL    Bilirubin, Total 0.5 0.0 - 1.2 mg/dL    Bilirubin, Direct 0.1 0.0 - 0.3 mg/dL    Alkaline Phosphatase 126 33 - 136 U/L    ALT 10 7 - 45 U/L    AST 12 9 - 39 U/L    Total Protein 5.9 (L) 6.4 - 8.2 g/dL   Magnesium   Result Value Ref Range    Magnesium 2.13 1.60 - 2.40 mg/dL   Phosphorus   Result Value Ref Range    Phosphorus 3.1 2.5 - 4.9 mg/dL   Basic Metabolic Panel   Result Value Ref Range    Glucose 161 (H) 74 - 99 mg/dL    Sodium 134 (L) 136 - 145 mmol/L    Potassium 4.0 3.5 - 5.3 mmol/L    Chloride 98 98 - 107 mmol/L    Bicarbonate 29 21 - 32 mmol/L    Anion Gap 11 10 - 20 mmol/L    Urea Nitrogen 29 (H) 6 - 23 mg/dL    Creatinine 1.88 (H) 0.50 - 1.05 mg/dL    eGFR 29 (L) >60 mL/min/1.73m*2    Calcium 7.8 (L) 8.6 - 10.3 mg/dL   POCT GLUCOSE   Result Value Ref Range    POCT Glucose 107 (H) 74 - 99 mg/dL   Morphology   Result Value Ref Range    RBC Morphology No significant RBC morphology present    Blood Gas Venous Full Panel   Result Value Ref Range    POCT pH, Venous 7.43 7.33 - 7.43 pH    POCT pCO2, Venous 44 41 - 51 mm Hg    POCT pO2, Venous 42 35 - 45 mm Hg    POCT SO2, Venous 76 (H) 45 - 75 %    POCT Oxy Hemoglobin, Venous 75.0 45.0 - 75.0 %     POCT Hematocrit Calculated, Venous 25.0 (L) 36.0 - 46.0 %    POCT Sodium, Venous 133 (L) 136 - 145 mmol/L    POCT Potassium, Venous 4.0 3.5 - 5.3 mmol/L    POCT Chloride, Venous 98 98 - 107 mmol/L    POCT Ionized Calicum, Venous 1.16 1.10 - 1.33 mmol/L    POCT Glucose, Venous 163 (H) 74 - 99 mg/dL    POCT Lactate, Venous 0.9 0.4 - 2.0 mmol/L    POCT Base Excess, Venous 4.4 (H) -2.0 - 3.0 mmol/L    POCT HCO3 Calculated, Venous 29.2 (H) 22.0 - 26.0 mmol/L    POCT Hemoglobin, Venous 8.2 (L) 12.0 - 16.0 g/dL    POCT Anion Gap, Venous 10.0 10.0 - 25.0 mmol/L    Patient Temperature 37.0 degrees Celsius    FiO2 40 %   POCT GLUCOSE   Result Value Ref Range    POCT Glucose 186 (H) 74 - 99 mg/dL   aPTT   Result Value Ref Range    aPTT 62.4 (H) 22.0 - 32.5 seconds   PST Top   Result Value Ref Range    Extra Tube Hold for add-ons.    POCT GLUCOSE   Result Value Ref Range    POCT Glucose 146 (H) 74 - 99 mg/dL     *Note: Due to a large number of results and/or encounters for the requested time period, some results have not been displayed. A complete set of results can be found in Results Review.      XR chest 1 view  Result Date: 11/5/2024  FINDINGS: The study is very limited due to rotation, respiratory motion, underpenetration, overlying artifacts obscuring some detail and poor inspiratory effort, with resultant crowding of the pulmonary vasculature. Endotracheal tube, left PICC line and dialysis catheter are again in position. The cardiomediastinal silhouette is within normal limits for the technique. Bilateral infiltrates and left pleural effusion are again noted. There is no pneumothorax. Hardware is again in position fusing the thoracic and lumbar spine posteriorly.  Very limited exam. Stable lines and tubes. Persistent bilateral infiltrates and left pleural effusion. Follow-up as needed.       XR chest 1 view  Result Date: 11/5/2024  FINDINGS: The cardiac size is indeterminate in view of the AP projection.  ET tube lies  4 cm above the michi. There is no change in right internal jugular venous catheter or PICC line. There is no change in the bilateral infiltrates and effusions. Patient is rotated towards the right.  There is no interval change when compared to the previous examination.       Vascular US upper extremity venous duplex right  Result Date: 11/4/2024  FINDINGS: There is nonocclusive thrombus within the right internal jugular vein in association with a catheter within this vessel. There is normal flow and compressibility within the right subclavian vein, axillary vein, and brachial vein of the deep venous system with normal compressibility and flow within the basilic and cephalic veins of the superficial system. There is nonocclusive thrombus surrounding the catheter within the right internal jugular vein of the deep system. This appears acute.    XR chest 1 view  Result Date: 11/4/2024  FINDINGS: The cardiac size is indeterminate in view of the AP projection.  The tube and line placement is unchanged.  There is no change in the bilateral infiltrates and effusions more marked at the left base.  There is no interval change when compared to the previous examination.       XR chest 1 view  Result Date: 11/3/2024  FINDINGS: Triple-lumen catheter noted in the right neck. Endotracheal tube and nasogastric tube are again seen. Endotracheal tube tip obscured by spinal fusion hardware. Left subclavian PICC line noted overlying the superior vena cava. Spinal fusion rods in the thoracic and lumbar spine   Again seen is pulmonary venous congestion with bilateral perihilar airspace opacification possibly pulmonary edema. Suspected small left pleural effusion again noted.  Bilateral perihilar airspace opacification similar to the prior exam possibly due to pulmonary edema or pneumonia with life-support devices unchanged since the prior exam.       Transthoracic Echo (TTE) Complete  Result Date: 11/3/2024  CONCLUSIONS:  1. The left  ventricular systolic function is normal, with a visually estimated ejection fraction of 60-65%.  2. There is normal right ventricular global systolic function.  3. Mild mitral valve regurgitation.  4. Mild to moderately elevated right ventricular systolic pressure.  5. Mild tricuspid regurgitation is visualized.  6. Aortic valve sclerosis.  7. Aneurysm of the ascending aorta.  8. 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    XR chest 1 view  Result Date: 11/2/2024  FINDINGS: CARDIOMEDIASTINAL SILHOUETTE: Cardiomediastinal silhouette is normal in size and configuration. There is a left PICC line with the tip in the superior vena cava. There is an endotracheal tube with the tip not seen because of upper thoracic dorsal fusion rods.   LUNGS: There is bibasilar pneumonia with improvement in aeration at the right lung base. There is less consolidation.   ABDOMEN: No remarkable upper abdominal findings. There is a nasogastric tube with the tip not seen but is well beyond the gastroesophageal junction.   BONES: No acute osseous changes. There are dorsal fusion rods bilaterally involving the in the tire thoracic and visualized upper lumbar spine.  1. Endotracheal tube tip not seen because of obscuration from upper thoracic dorsal fusion rods. 2. Bibasilar pneumonia with improved aeration at the right lung base.       XR chest 1 view  Result Date: 11/1/2024  FINDINGS: Intubation again noted. Unfortunately the tip of the endotracheal tube not definitively visualized as it is obscured by some of the thoracic fusion hardware. It looks to be near the aortic knob.   Bilateral pleural effusions and basilar edema unchanged.   No evidence of pneumothorax.  Unchanged appearance of the chest as above.     XR chest 1 view  Result Date: 11/1/2024  FINDINGS: Hazy opacities identified within bilateral lung fields mid to lower lung zones with component of layering effusions and compressive atelectasis suggested.  Right IJ line is demonstrated with tip in the proximal SVC. Left-sided PICC line with tip in the region of the distal SVC. NG tube is in place with interval removal of the ET tube.   Spinal fusion demonstrated. Cardiac silhouette within normal limits   1. Persistent hazy opacities mid to lower lung zones bilaterally with layering basilar effusions and compressive atelectasis suggested.   2. Interval removal of the ET tube. Remainder of the lines and tubes are unremarkable.      XR chest 1 view  Result Date: 10/31/2024  FINDINGS: The cardiac size is indeterminate in view of the AP projection.  The tube and line placement is unchanged.  There is no change in the bilateral infiltrates and effusions. Tube and line placement is somewhat difficult to assess due to overlying surgical hardware.  No change in the bilateral infiltrates and effusions.       XR chest 1 view  Result Date: 10/30/2024  FINDINGS: The cardiac size is indeterminate in view of the AP projection. There is no change in the bilateral infiltrates and effusions compared to the prior study. Tube and line placement is somewhat difficult to assess due to the extensive thoracic spinal hardware.  There is no interval change when compared to the previous examination.       Vascular US upper extremity venous duplex left  Result Date: 10/29/2024  CONCLUSIONS: Right Upper Venous: Acute, non-occlusive, focal deep vein thrombus of right internal jugular vein around line placement. Visualized portions of subclavian and innominate veins appear patent. Left Upper Venous: No evidence of acute deep vein thrombus visualized in the left upper extremity. Acute, occlusive superficial vein thrombosis of left cephalic vein from mid-distal upper arm extending to approximately 2.0cm proximal to subclavian junction. There is a left subclavian vein catheter noted in place.  Imaging & Doppler Findings:  Right            Compressible      Thrombus              Flow Internal Jugular    Partial    Acute non-occlusive Subclavian                                        Spontaneous/Phasic  Left                Compress    Thrombus Internal Jugular      Yes         None Subclavian            Yes         None Subclavian Proximal   Yes         None Subclavian Mid        Yes         None Subclavian Distal     Yes         None Axillary              Yes         None Brachial              Yes         None Cephalic               No    Acute occlusive Basilic                   XR abdomen 1 view  Result Date: 10/29/2024  FINDINGS: Bowel gas pattern is nonspecific and nonobstructive.  There is a nasogastric tube with the tip in the distal stomach. There is a 2nd nasogastric tube with the tip in the proximal stomach. There is no gastric distention.   There is partial atherosclerotic calcification of the abdominal aorta.   There is left basilar consolidation with small left pleural effusion.   Osseous structures demonstrate no acute bony changes.   There are lower thoracic and lumbar dorsal fusion rods.  Nonspecific, nonobstructive bowel gas pattern. There are 2 nasogastric tubes. The tip of one of the tubes as in the distal stomach and the tip of the 2nd tube is in the proximal stomach. Left basilar consolidation with a small left pleural effusion.      XR chest 1 view  Result Date: 10/29/2024  FINDINGS: In size.   There is bilateral parenchymal infiltration. There may be bilateral pleural effusions.   There are calcified granulomas.   A nasogastric tube terminates below the diaphragm.   There appears to be a PICC line on the left with the tip in the region of superior vena cava.   There is also of jugular catheter on the right. The tip is not well seen.   The patient is status post spinal fusion.   COMPARISON OF FINDING: The chest is similar.  Bilateral parenchymal infiltrates. Bilateral pleural effusions.       XR chest 1 view  Result Date: 10/28/2024  FINDINGS: The cardiac size is indeterminate in view of the AP  projection. Bilateral perihilar infiltrate is present. Bilateral effusions are present, greater than left. Increased density at the left base suggest consolidation within left lower lobe. The tube and line placement is unchanged.   Bilateral infiltrates and effusions. There is no interval change when compared to the previous examination.      XR chest 1 view  Result Date: 10/26/2024  FINDINGS: Multiple support devices remain in place including ET tube, tip not well visualized, likely 2 enteric tubes extending into the upper abdomen, tips not included on the image, right jugular central line with tip overlying the proximal SVC and left subclavian PICC line with tip extending to the region of the right atrium again seen. Additional presumed overlying monitoring/support devices. Extensive orthopedic hardware overlying the central chest/spine again seen.   Ill-defined bilateral chest opacities similar to the prior exam. No definite pneumothorax. The cardiac silhouette is within normal limits for size.  Multiple support devices in place as above. Persistent hazy bilateral chest opacities.      XR chest 1 view  Result Date: 10/25/2024  FINDINGS: Interval removal of the left-sided chest tube without pneumothorax.   Moderate left pleural effusion grossly similar.   Edema similar to prior.   Other lines and tubes grossly unchanged.  Interval left-sided chest tube removal without pneumothorax.      XR chest 1 view  Result Date: 10/24/2024  FINDINGS: The cardiac size is indeterminate in view of the AP projection. There is no change in the left-sided chest tube. There is no change in the bibasilar density likely representing pleural effusions. Hazy bilateral perihilar infiltrates are suspected.  No change in the probable bilateral infiltrates and effusions as above. Left-sided chest tube is present. There is no evidence for pneumothorax.      XR chest 1 view  Result Date: 10/23/2024  FINDINGS: Endotracheal tube, nasogastric  tube and right central venous catheter is not well assessed secondary to projectional overlap with thoracolumbar fusion hardware. The cardiac silhouette is stable in size. There are bilateral airspace opacities and pleural effusions. Stable left chest tube. No pneumothorax.  Bilateral airspace opacities and pleural effusions. There is stable focal asymmetric prominent opacity in the right hilar region which may correspond to focal area of consolidation, however attention on continued progress imaging is recommended to assure resolution exclude other under pathology including mass.       Assessment/Plan:  Neuro/Psych/Pain Ctrl/Sedation: (Hx: essential tremor)  Acute encephalopathy - likely 2/2 hypercapnia, & infection-resolving   CT head: Negative for acute findings   Urine tox positive for barbiturates consistent with primidone intake   Pain Control: liquid oxycodone, scheduled acetaminophen, dilaudid for dressing changes PRN  Sedation/analgesia: No sedation, keep sedation to a minimum as able   Home primidone continued. Will hold propanolol d/t hypotension  CAM ICU qshift, sleep-wake hygiene, delirium precautions    Respiratory/ENT:  Acute hypoxic/hypercapnic respiratory failure-likely multifactorial secondary to HCAP, pleural effusions, volume overload  Healthcare-associated pneumonia   Recurrent atelectasis   Pleural effusion -Status post left-sided pigtail placement 10/17, removed 10/25  Intubated for 3 days extubated and re intubated on the 10/19  Supplemental O2: intubated x3 times this admission   Will wean O2 as tolerated to maintain SpO2 >92%  Consider bronchoscopy, repeat thoracentesis if indicated   Albuterol, mucomyst, hypertonic saline QID   IPV per RT TID  Aspiration precautions   Respiratory culture with Pseudomonas, see antibiotics below   Tracheostomy placement per ENT tomorrow    Cardiovascular:  (Hx: diastolic heart failure, HTN, HLD)  Septic shock-resolved  Occlusive superficial venous thrombus  of the left cephalic vein  Non-occlusive DVT right IJ vein   Acute on chronic diastolic heart failure  Duplex US duplex bilateral upper extremities-occlusive superficial vein thrombosis of left cephalic vein   Continue midodrine   TTE: EF 60-65%, mild/mod AV regurgitation   Holding home propranolol (on for tremors)  Continue home Zetia  Continuous cardiac monitoring per ICU protocol  EKGs PRN for ACS symptoms, arrhythmias   Heparin drip  Repeat right IJ duplex ordered to assess DVT burden     GI:  Hx GERD  Peg-dependent - placed 11/5  Diet: enteral feeding via NG at goal  BR: waqas-colace, miralax PRN  GI Prophylaxis: PPI  Patient had a few episodes of diarrhea, sample sent, C-Diff negative   Peg-placed 11/4    /Volume Status/Electrolytes:  Acute kidney injury - possibly ATN +/- medication-induced (vancomycin)  Anasarca   Hypoalbuminemia   Hyponatremia  Baseline Cr 0.8-1.0- Cr 1.88 today   Nephrology following: M/W/F HD  Bladder scan daily   Replete electrolytes to maintain K >4.0 and Mg >2.0  Daily BMP, Mg, Phos  Free water flushes being held due to hyponatremia, improving     Heme/Onc: (Hx: normocytic anemia)  Occlusive LUE DVT  Non-occlusive R IJ DVT  Therapeutic heparin infusion, will restart this evening after tunneled line placement   Can plan for transition to eliquis after tracheostomy if no more planned procedures   Repeat DVT US still showing presence of RIJ thrombus around catheter   Continue iron and folate  1 unit PRBC yesterday for Hgb 6.6, 1 unit PRBC today for Hgb 6.9, will resend iron studies, folate, B12  EPO not indicated   Monitor for s/sx of bleeding   1 Unit PRBCa transfused yesterday  Daily CBC    Endocrine: (Hx: DMII)  SSI Q4  Hypoglycemia protocol PRN    Infectious Disease:  Pseudomonas-Respiratory culture 10/29  Thoracolumbar surgical site infection - s/p I&D x 2. Recent MRSA bacteremia on extended course of IV antibiotics (vanc and rocephin -> 11/12)  Healthcare-associated pneumonia   CT  "lumbar spine 10/12: Unable to r/o abscess. Unable to do MRI d/t spinal hardware, \"metal in head\" per patient  Sputum culture 10/16: + pseudomonas   Infectious Disease following  Continue Ceftriaxone for 7 days (10/23-11/12)  Continue Daptomycin every 48 hours-after dialysis when dose is due on a dialysis day for 15 days (10/21-11/12)  Cefepime and inhaled tobramycin courses completed   PICC placed 10/24  Monitor SIRS criteria  Okay to remove sutures, per Dr. Ventura-removed half 11/4 and patient became hypoxic on her side remainder removed yesterday  Sacral wound and back surgical incision care/dressing changes per Wound Care R.N.    MSK:  PT/OT    Ethics/Code Status:  DNR-CCA     :  DVT Prophylaxis: Heparin drip  GI Prophylaxis: Protonix  Bowel Regimen: Lucia-colace and Miralax   Diet: Tube Feeds  CVC: PICC placed 10/24, trialysis right internal jugular placed 10/19   [place]: none  [place]: none  Restraints: yes  Restraints indicated to maintain lines/tubes.  Alternative therapies have been attempted and have been ineffective.  Restrain with soft wrist restraints and side rails up x4 until medical devices discontinued and/or patient able to participate with plan of care.       Discharge planning: [facility]  Updated  at bedside, answered all questions    Krysta MIRANDA  "

## 2024-11-06 NOTE — PROGRESS NOTES
Patient not medically clear. Tracheostomy tomorrow. [facility] can accept patient. Patient will need a precert, not started on this day. Will follow.      11/06/24 0746   Discharge Planning   Home or Post Acute Services Post acute facilities (Rehab/SNF/etc)   Type of Post Acute Facility Services Rehab;Skilled nursing   Expected Discharge Disposition Long Term  ([facility])   Does the patient need discharge transport arranged? Yes   RoundTrip coordination needed? Yes

## 2024-11-06 NOTE — NURSING NOTE
Messaged Tommy Amezcua PA-C and inquired about restarting Heparin drip. Told it will be started on day shift. No orders received.

## 2024-11-06 NOTE — CARE PLAN
The patient's goals for the shift include unable to assess    The clinical goals for the shift include stable hemodynamically, keep comfortable on the vent      Problem: Safety - Adult  Goal: Free from fall injury  Outcome: Progressing     Problem: Discharge Planning  Goal: Discharge to home or other facility with appropriate resources  Outcome: Progressing     Problem: Chronic Conditions and Co-morbidities  Goal: Patient's chronic conditions and co-morbidity symptoms are monitored and maintained or improved  Outcome: Progressing     Problem: Diabetes  Goal: Increase stability of blood glucose readings by end of shift  Outcome: Progressing  Goal: Maintain electrolyte levels within acceptable range throughout shift  Outcome: Progressing  Goal: Maintain glucose levels >70mg/dl to <250mg/dl throughout shift  Outcome: Progressing  Goal: Learn about and adhere to nutrition recommendations by end of shift  Outcome: Progressing  Goal: Vital signs within normal range for age by end of shift  Outcome: Progressing  Goal: Increase self care and/or family involovement by end of shift  Outcome: Progressing  Goal: Receive DSME education by end of shift  Outcome: Progressing     Problem: Knowledge Deficit  Goal: Patient/family/caregiver demonstrates understanding of disease process, treatment plan, medications, and discharge instructions  Outcome: Progressing     Problem: Skin  Goal: Decreased wound size/increased tissue granulation at next dressing change  Outcome: Progressing  Flowsheets (Taken 11/6/2024 1354)  Decreased wound size/increased tissue granulation at next dressing change:   Promote sleep for wound healing   Utilize specialty bed per algorithm   Protective dressings over bony prominences  Goal: Participates in plan/prevention/treatment measures  Outcome: Progressing  Flowsheets (Taken 11/6/2024 1354)  Participates in plan/prevention/treatment measures:   Discuss with provider PT/OT consult   Elevate heels  Goal:  Prevent/manage excess moisture  Outcome: Progressing  Flowsheets (Taken 11/6/2024 1354)  Prevent/manage excess moisture:   Cleanse incontinence/protect with barrier cream   Moisturize dry skin   Follow provider orders for dressing changes   Monitor for/manage infection if present  Goal: Prevent/minimize sheer/friction injuries  Outcome: Progressing  Flowsheets (Taken 11/6/2024 1354)  Prevent/minimize sheer/friction injuries:   Complete micro-shifts as needed if patient unable. Adjust patient position to relieve pressure points, not a full turn   HOB 30 degrees or less   Turn/reposition every 2 hours/use positioning/transfer devices   Use pull sheet   Utilize specialty bed per algorithm  Goal: Promote/optimize nutrition  Outcome: Progressing  Flowsheets (Taken 11/6/2024 1354)  Promote/optimize nutrition: Monitor/record intake including meals  Goal: Promote skin healing  Outcome: Progressing  Flowsheets (Taken 11/6/2024 1354)  Promote skin healing:   Assess skin/pad under line(s)/device(s)   Ensure correct size (line/device) and apply per  instructions   Protective dressings over bony prominences   Rotate device position/do not position patient on device   Turn/reposition every 2 hours/use positioning/transfer devices     Problem: Nutrition  Goal: Consume prescribed supplement  Outcome: Progressing  Goal: Nutrition support goals are met within 48 hrs  Outcome: Progressing  Goal: Nutrition support is meeting 75% of nutrient needs  Outcome: Progressing  Goal: BG  mg/dL  Outcome: Progressing  Goal: Lab values WNL  Outcome: Progressing  Goal: Electrolytes WNL  Outcome: Progressing  Goal: Promote healing  Outcome: Progressing  Goal: Maintain stable weight  Outcome: Progressing  Goal: Reduce weight from edema/fluid  Outcome: Progressing     Problem: Respiratory  Goal: No signs of respiratory distress (eg. Use of accessory muscles. Peds grunting)  Outcome: Progressing  Goal: Clear secretions with  interventions this shift  Outcome: Progressing  Goal: Minimize anxiety/maximize coping throughout shift  Outcome: Progressing  Goal: Minimal/no exertional discomfort or dyspnea this shift  Outcome: Progressing  Goal: Patent airway maintained this shift  Outcome: Progressing  Goal: Tolerate mechanical ventilation evidenced by VS/agitation level this shift  Outcome: Progressing  Goal: Tolerate pulmonary toileting this shift  Outcome: Progressing  Goal: Verbalize decreased shortness of breath this shift  Outcome: Progressing  Goal: Wean oxygen to maintain O2 saturation per order/standard this shift  Outcome: Progressing  Goal: Increase self care and/or family involvement in next 24 hours  Outcome: Progressing     Problem: Pain  Goal: Takes deep breaths with improved pain control throughout the shift  Outcome: Progressing  Goal: Turns in bed with improved pain control throughout the shift  Outcome: Progressing  Goal: Walks with improved pain control throughout the shift  Outcome: Progressing  Goal: Performs ADL's with improved pain control throughout shift  Outcome: Progressing  Goal: Participates in PT with improved pain control throughout the shift  Outcome: Progressing  Goal: Free from opioid side effects throughout the shift  Outcome: Progressing  Goal: Free from acute confusion related to pain meds throughout the shift  Outcome: Progressing     Problem: Fall/Injury  Goal: Not fall by end of shift  Outcome: Progressing  Goal: Be free from injury by end of the shift  Outcome: Progressing  Goal: Verbalize understanding of personal risk factors for fall in the hospital  Outcome: Progressing  Goal: Verbalize understanding of risk factor reduction measures to prevent injury from fall in the home  Outcome: Progressing  Goal: Use assistive devices by end of the shift  Outcome: Progressing  Goal: Pace activities to prevent fatigue by end of the shift  Outcome: Progressing     Problem: Mechanical Ventilation  Goal: ET tube  will be managed safely  Outcome: Progressing

## 2024-11-06 NOTE — NURSING NOTE
Lab entered for a re-draw of Folate and Vitamin B12 labs. Called and spoke with lab and they are adding both labs to the Procalcitonin that was drawn at 1954.

## 2024-11-06 NOTE — CARE PLAN
The patient's goals for the shift include unable to assess    The clinical goals for the shift include Maintain hemodynamic stability      Problem: Safety - Adult  Goal: Free from fall injury  Outcome: Progressing     Problem: Discharge Planning  Goal: Discharge to home or other facility with appropriate resources  Outcome: Progressing     Problem: Chronic Conditions and Co-morbidities  Goal: Patient's chronic conditions and co-morbidity symptoms are monitored and maintained or improved  Outcome: Progressing     Problem: Diabetes  Goal: Increase stability of blood glucose readings by end of shift  Outcome: Progressing  Goal: Maintain electrolyte levels within acceptable range throughout shift  Outcome: Progressing  Goal: Maintain glucose levels >70mg/dl to <250mg/dl throughout shift  Outcome: Progressing  Goal: Learn about and adhere to nutrition recommendations by end of shift  Outcome: Progressing  Goal: Vital signs within normal range for age by end of shift  Outcome: Progressing  Goal: Increase self care and/or family involovement by end of shift  Outcome: Progressing  Goal: Receive DSME education by end of shift  Outcome: Progressing     Problem: Knowledge Deficit  Goal: Patient/family/caregiver demonstrates understanding of disease process, treatment plan, medications, and discharge instructions  Outcome: Progressing     Problem: Mechanical Ventilation  Goal: ET tube will be managed safely  Outcome: Progressing     Problem: Skin  Goal: Decreased wound size/increased tissue granulation at next dressing change  Outcome: Progressing  Flowsheets (Taken 11/6/2024 0018)  Decreased wound size/increased tissue granulation at next dressing change:   Promote sleep for wound healing   Protective dressings over bony prominences   Utilize specialty bed per algorithm  Goal: Participates in plan/prevention/treatment measures  Outcome: Progressing  Flowsheets (Taken 11/6/2024 0018)  Participates in plan/prevention/treatment  measures:   Discuss with provider PT/OT consult   Elevate heels  Goal: Prevent/manage excess moisture  Outcome: Progressing  Flowsheets (Taken 11/6/2024 0018)  Prevent/manage excess moisture:   Cleanse incontinence/protect with barrier cream   Follow provider orders for dressing changes   Moisturize dry skin   Monitor for/manage infection if present  Goal: Prevent/minimize sheer/friction injuries  Outcome: Progressing  Flowsheets (Taken 11/6/2024 0018)  Prevent/minimize sheer/friction injuries:   Complete micro-shifts as needed if patient unable. Adjust patient position to relieve pressure points, not a full turn   Increase activity/out of bed for meals   Use pull sheet   HOB 30 degrees or less   Turn/reposition every 2 hours/use positioning/transfer devices   Utilize specialty bed per algorithm  Goal: Promote/optimize nutrition  Outcome: Progressing  Flowsheets (Taken 11/6/2024 0018)  Promote/optimize nutrition:   Monitor/record intake including meals   Consume > 50% meals/supplements  Goal: Promote skin healing  Outcome: Progressing  Flowsheets (Taken 11/6/2024 0018)  Promote skin healing:   Assess skin/pad under line(s)/device(s)   Protective dressings over bony prominences   Turn/reposition every 2 hours/use positioning/transfer devices   Ensure correct size (line/device) and apply per  instructions   Rotate device position/do not position patient on device     Problem: Nutrition  Goal: Consume prescribed supplement  Outcome: Progressing  Goal: Nutrition support goals are met within 48 hrs  Outcome: Progressing  Goal: Nutrition support is meeting 75% of nutrient needs  Outcome: Progressing  Goal: BG  mg/dL  Outcome: Progressing  Goal: Lab values WNL  Outcome: Progressing  Goal: Electrolytes WNL  Outcome: Progressing  Goal: Promote healing  Outcome: Progressing  Goal: Maintain stable weight  Outcome: Progressing  Goal: Reduce weight from edema/fluid  Outcome: Progressing     Problem:  Respiratory  Goal: No signs of respiratory distress (eg. Use of accessory muscles. Peds grunting)  Outcome: Progressing  Goal: Clear secretions with interventions this shift  Outcome: Progressing  Goal: Minimize anxiety/maximize coping throughout shift  Outcome: Progressing  Goal: Minimal/no exertional discomfort or dyspnea this shift  Outcome: Progressing  Goal: Patent airway maintained this shift  Outcome: Progressing  Goal: Tolerate mechanical ventilation evidenced by VS/agitation level this shift  Outcome: Progressing  Goal: Tolerate pulmonary toileting this shift  Outcome: Progressing  Goal: Verbalize decreased shortness of breath this shift  Outcome: Progressing  Goal: Wean oxygen to maintain O2 saturation per order/standard this shift  Outcome: Progressing  Goal: Increase self care and/or family involvement in next 24 hours  Outcome: Progressing     Problem: Pain  Goal: Takes deep breaths with improved pain control throughout the shift  Outcome: Progressing  Goal: Turns in bed with improved pain control throughout the shift  Outcome: Progressing  Goal: Walks with improved pain control throughout the shift  Outcome: Progressing  Goal: Performs ADL's with improved pain control throughout shift  Outcome: Progressing  Goal: Participates in PT with improved pain control throughout the shift  Outcome: Progressing  Goal: Free from opioid side effects throughout the shift  Outcome: Progressing  Goal: Free from acute confusion related to pain meds throughout the shift  Outcome: Progressing     Problem: Fall/Injury  Goal: Not fall by end of shift  Outcome: Progressing  Goal: Be free from injury by end of the shift  Outcome: Progressing  Goal: Verbalize understanding of personal risk factors for fall in the hospital  Outcome: Progressing  Goal: Verbalize understanding of risk factor reduction measures to prevent injury from fall in the home  Outcome: Progressing  Goal: Use assistive devices by end of the shift  Outcome:  Progressing  Goal: Pace activities to prevent fatigue by end of the shift  Outcome: Progressing

## 2024-11-06 NOTE — NURSING NOTE
Full skin assessment completed. See images and flowsheet for measurement and description. Patient in special bed. Pressure relief boots on patient. Dressing changes done per order. Ordered creams applied per order. Foam applied to bony prominences. Q 2 turns provided for patient throughout shift. All pressure areas relieved by elevation/support or repositioning of patient.

## 2024-11-06 NOTE — CARE PLAN
Problem: Respiratory  Goal: No signs of respiratory distress (eg. Use of accessory muscles. Peds grunting)  Outcome: Progressing  Goal: Clear secretions with interventions this shift  Outcome: Progressing  Goal: Minimize anxiety/maximize coping throughout shift  Outcome: Progressing  Goal: Minimal/no exertional discomfort or dyspnea this shift  Outcome: Progressing  Goal: Patent airway maintained this shift  Outcome: Progressing  Goal: Tolerate mechanical ventilation evidenced by VS/agitation level this shift  Outcome: Progressing  Goal: Tolerate pulmonary toileting this shift  Outcome: Progressing

## 2024-11-06 NOTE — CARE PLAN
RT present at rounds. Stable on current vent settings. Plan for trach tomorrow 11/07.     RT to follow.

## 2024-11-06 NOTE — PROGRESS NOTES
Left message for patient to call back.  3-5-24   Physical Therapy    Physical Therapy Treatment    Patient Name: Narda Malloy  MRN: [id number]  Department: [address] ICU  Room: 11/11-A  Today's Date: 11/6/2024  Time Calculation  Start Time: 1101  Stop Time: 1130  Time Calculation (min): 29 min         Assessment/Plan   PT Assessment  Rehab Prognosis: Good  Evaluation/Treatment Tolerance: Patient limited by fatigue (intermittent lethargy)  End of Session Communication: Bedside nurse  Assessment Comment: Progressing slowly with functional mobility via patient sitting on side of bed;  intermittent lethargy limiting tolerance to activity this date;  fall risk  End of Session Patient Position: Bed, 4 rail up, Alarm on  PT Plan  Inpatient/Swing Bed or Outpatient: Inpatient  PT Plan  Treatment/Interventions: Bed mobility, Transfer training, Balance training, Strengthening, Endurance training, Range of motion, Therapeutic exercise, Therapeutic activity  PT Plan: Ongoing PT  PT Frequency: 6 times per week  PT Discharge Recommendations: Moderate intensity level of continued care (LTAC)  Equipment Recommended upon Discharge: Wheelchair  PT Recommended Transfer Status: Total assist  PT - OK to Discharge: Yes (with skilled physical therapy services at next level of care)      General Visit Information:   PT  Visit  PT Received On: 11/06/24  General  Co-Treatment: OT  Co-Treatment Reason: Partial co-tx with OT due to medical complexity ICU status;  need for 2nd skilled person for therapeutic handling during functional mobility to optimize safety and outcomes  Prior to Session Communication: Bedside nurse  Patient Position Received: Bed, 4 rail up, Alarm off, not on at start of session  General Comment: RN cleared patient for treatment.  Patient appears agreeable to treatment.    Subjective   Precautions:  Precautions  Medical Precautions: Fall precautions  Post-Surgical Precautions: Spinal precautions  Precautions Comment: Vent;  PEG;  jf UE wrist restraints    Vital Signs (Past  2hrs)        Date/Time Vitals Session Patient Position Pulse Resp SpO2 BP MAP (mmHg)    11/06/24 1000 --  --  55  16  --  102/59  72     11/06/24 1030 --  --  54  16  94 %  110/55  72     11/06/24 1101 --  --  66  --  98 %  --  --                         Objective   Pain:  Pain Assessment  Pain Assessment: FLACC (Face, Legs, Activity, Cry, Consolability)  0-10 (Numeric) Pain Score: 0 - No pain  Cognition:  Cognition  Overall Cognitive Status: Impaired (Intermittently lethargic;  eyes opened ~75% of time;  difficulty following commands)  Coordination:  Movements are Fluid and Coordinated:  (Unable to assess coordination due to limited movement jf LE)  Postural Control:  Static Sitting Balance  Static Sitting-Balance Support: Bilateral upper extremity supported  Static Sitting-Level of Assistance: Maximum assistance (x 1-2)  Static Sitting-Comment/Number of Minutes: Assist with maintaining midline while sitting on side of bed due to decreased balance posterior and left side  Extremity/Trunk Assessments:  RLE   RLE : Exceptions to WFL (right LE movements appear involuntary)  Strength RLE  R Hip Flexion: 0/5  R Knee Extension: 0/5  R Ankle Dorsiflexion: 0/5  LLE   LLE : Exceptions to WFL (Left LE movements appear involuntary)  Strength LLE  L Hip Flexion: 0/5  L Knee Extension: 0/5  L Ankle Dorsiflexion: 0/5  Activity Tolerance:  Activity Tolerance  Endurance: Decreased tolerance for upright activites  Activity Tolerance Comments: intermittent lethargy  Treatments:  Therapeutic Exercise  Therapeutic Exercise Performed: Yes  Therapeutic Exercise Activity 1: ankle pumps, heel slides, hip abduction jf LE 10 reps x 1 set with max assist    Therapeutic Activity  Therapeutic Activity Performed: Yes  Therapeutic Activity 1: Sitting balance activity while sitting on side of bed including maintaining midline x 4 minutes;  patient appeared to use left UE on bed to assist with supporting trunk         Bed Mobility  Bed Mobility:  Yes  Bed Mobility 1  Bed Mobility 1: Supine to sitting  Level of Assistance 1: Dependent (x 2)  Bed Mobility Comments 1: Assist with trunk and jf LE during supine to sit via log roll;  dependent assist to scoot hips to edge of bed during supine to sit  Bed Mobility 2  Bed Mobility  2: Sitting to supine  Level of Assistance 2: Dependent (x 2)  Bed Mobility Comments 2: Assist with trunk and jf LE during sit to supine via log roll  Bed Mobility 3  Bed Mobility 3: Rolling right, Rolling left  Level of Assistance 3: Dependent (x 2)  Bed Mobility Comments 3: Assist at shoulders and hips during rolling side to side    Ambulation/Gait Training  Ambulation/Gait Training Performed: No  Transfers  Transfer: No    Stairs  Stairs: No         Outcome Measures:  [facility] Basic Mobility  Turning from your back to your side while in a flat bed without using bedrails: Total  Moving from lying on your back to sitting on the side of a flat bed without using bedrails: Total  Moving to and from bed to chair (including a wheelchair): Total  Standing up from a chair using your arms (e.g. wheelchair or bedside chair): Total  To walk in hospital room: Total  Climbing 3-5 steps with railing: Total  Basic Mobility - Total Score: 6    Education Documentation  No documentation found.  Education Comments  No comments found.        OP EDUCATION:       Encounter Problems       Encounter Problems (Active)       PT STG Problem       Patient will roll right and left with minimal assist to facilitate mobility. (Progressing)       Start:  10/28/24    Expected End:  11/25/24            Patient will transfer supine to sit and sit to supine with moderate assist to facilitate mobility. (Progressing)       Start:  10/28/24    Expected End:  11/25/24            Patient will transfer sit to stand and stand to sit with maximal assist to facilitate mobility. (Not Progressing)       Start:  10/28/24    Expected End:  11/25/24            Patient will transfer bed  to chair and chair to bed with maximal assist to facilitate mobility. (Not Progressing)       Start:  10/28/24    Expected End:  11/25/24            Patient will increase strength in B LEs by half grade throughout to improve functional mobility.  (Progressing)       Start:  10/28/24    Expected End:  11/25/24            Patient will tolerate 15 minutes of sitting on EOB to improve ability to perform functional transfers. (Progressing)       Start:  10/28/24    Expected End:  11/25/24

## 2024-11-06 NOTE — CARE PLAN
Problem: Respiratory  Goal: No signs of respiratory distress (eg. Use of accessory muscles. Peds grunting)  Outcome: Progressing  Goal: Clear secretions with interventions this shift  Outcome: Progressing  Goal: Minimize anxiety/maximize coping throughout shift  Outcome: Progressing  Goal: Patent airway maintained this shift  Outcome: Progressing  Goal: Tolerate pulmonary toileting this shift  Outcome: Progressing     Problem: Mechanical Ventilation  Goal: ET tube will be managed safely  Outcome: Progressing

## 2024-11-06 NOTE — POST-PROCEDURE NOTE
Report to Receiving RN:     Report To: JEAN MARIE Cassidy  Time Report Called: [id number]  Hand-Off Communication: Dialysis completed. 1.5L fluid removal per Dr ANITRA Cheung. Last /60  HR 73. Catheter ports dwelled with heparin per MAR, capped securely.  Complications During Treatment: No  Ultrafiltration Treatment: No  Medications Administered During Dialysis: No  Blood Products Administered During Dialysis: No  Labs Sent During Dialysis: No  Heparin Drip Rate Changes: N/A  Dialysis Catheter Dressing: clean, dry and intact  Last Dressing Change: 11/5/24     Electronic Signatures:  Graciela DICK

## 2024-11-06 NOTE — CARE PLAN
Problem: PT STG Problem  Goal: Patient will roll right and left with minimal assist to facilitate mobility.  11/6/2024 1140 by Andrew Hinkle, PT  Outcome: Progressing  11/6/2024 1140 by Andrew Hinkle, PT  Outcome: Progressing  Goal: Patient will transfer supine to sit and sit to supine with moderate assist to facilitate mobility.  11/6/2024 1140 by Andrew Hinkle, PT  Outcome: Progressing  11/6/2024 1140 by Andrew Hinkle, PT  Outcome: Progressing  Goal: Patient will increase strength in B LEs by half grade throughout to improve functional mobility.   11/6/2024 1140 by Andrew Hinkle, PT  Outcome: Progressing  11/6/2024 1140 by Andrew Hinkle, PT  Outcome: Progressing  Goal: Patient will tolerate 15 minutes of sitting on EOB to improve ability to perform functional transfers.  11/6/2024 1140 by Andrew Hinkle, PT  Outcome: Progressing  11/6/2024 1140 by Andrew Hinkle, PT  Outcome: Progressing

## 2024-11-06 NOTE — PROGRESS NOTES
Occupational Therapy    Occupational Therapy Treatment    Name: Narda Malloy  MRN: [id number]  Department: [facility] 3 S ICU  Room: 11/11-A  Date: 11/06/24  Time Calculation  Start Time: 1045  Stop Time: 1113  Time Calculation (min): 28 min    Assessment:  OT Assessment: Pt with gradual progress towards functional goals. Pt tolerated sitting EOB with min A x2, participated in TENA UE ther ex. Continues to be significantly limited with ADL participation due to ET tube and laethargy, Pt would benefit from continued acute OT services to facilitate return to PLOF.  Prognosis: Fair  Barriers to Discharge: Decreased caregiver support  Evaluation/Treatment Tolerance: Patient limited by fatigue  Medical Staff Made Aware: Yes  End of Session Communication: Bedside nurse  End of Session Patient Position: Bed, 4 rail up, Alarm on (TENA wrist restrains applied per nursing, handoff to PT in room)  Plan:  Treatment Interventions: ADL retraining, UE strengthening/ROM, Functional transfer training, Endurance training, Cognitive reorientation, Patient/family training, Equipment evaluation/education, Compensatory technique education  OT Frequency: 5 times per week  OT Discharge Recommendations: Moderate intensity level of continued care  Equipment Recommended upon Discharge: Wheelchair  OT Recommended Transfer Status: Maximum assist, Assist of 2  OT - OK to Discharge: Yes    Subjective   Previous Visit Info:  OT Last Visit  OT Received On: 11/06/24  General:  General  Reason for Referral: activities of daily living  Family/Caregiver Present: No  Co-Treatment: PT  Co-Treatment Reason: Partial co-tx with OT due to medical complexity ICU status;  need for 2nd skilled person for therapeutic handling during functional mobility to optimize safety and outcomes  Prior to Session Communication: Bedside nurse  Patient Position Received: Bed, 4 rail up, Alarm off, not on at start of session  General Comment: Pt cleared by nursing, agreeable to OT  treatment. TENA wrist restraints in place  Precautions:  Hearing/Visual Limitations: h/o visual deficits, mildly [place]  Medical Precautions: Fall precautions, Oxygen therapy device and L/min, Spinal precautions (vent 30% peep 5)  Post-Surgical Precautions: Spinal precautions  Precautions Comment: Vent;  PEG;  tena UE wrist restraints    Vital Signs Comment: HR 66 BP 11/55 map 72    Pain Assessment:  Pain Assessment  Pain Assessment: FLACC (Face, Legs, Activity, Cry, Consolability)  0-10 (Numeric) Pain Score: 0 - No pain     Objective   Cognition:  Overall Cognitive Status: Impaired  Arousal/Alertness: Inconsistent responses to stimuli  Orientation Level: Unable to assess  Following Commands:  (intermittently will follow simple commands but more lethargic this date than yesterday, increased difficulty, multiple cues for alertness)  Cognition Comments: pt able to nod yes/no in response to simple questions  Activities of Daily Living:      Grooming  Grooming Level of Assistance: Maximum assistance  Grooming Where Assessed: Bed level  Grooming Comments: Perormed max A hand over hand assist for washing face, pt unable to coordinate or folow commands, max A for thoroughness.    Bed Mobility/Transfers: Bed Mobility  Bed Mobility: Yes  Bed Mobility 1  Bed Mobility 1: Supine to sitting  Level of Assistance 1: Dependent, +2  Bed Mobility Comments 1: Assist for B LEs and truk up and useof draw sheet for scooting to EOB. Pt able to support self at EOB with min A x2 and assist to place B UEs for upper body support. Pt tolerated sitting EOB approx 5 minues supported.  Bed Mobility 2  Bed Mobility  2: Sitting to supine  Level of Assistance 2: Dependent, +2  Bed Mobility Comments 2: Assist for B LEs and trunk into supine. Dep scoot to HOB  Bed Mobility 3  Bed Mobility 3: Rolling right, Rolling left  Level of Assistance 3: Dependent, +2  Bed Mobility Comments 3: assist for log rolling to protect back    Transfers  Transfer:  No    Outcome Measures:  [facility] Daily Activity  Putting on and taking off regular lower body clothing: Total  Bathing (including washing, rinsing, drying): Total  Putting on and taking off regular upper body clothing: Total  Toileting, which includes using toilet, bedpan or urinal: Total  Taking care of personal grooming such as brushing teeth: Total  Eating Meals: Total  Daily Activity - Total Score: 6        Education Documentation  Home Exercise Program, taught by Leona May OT at 11/6/2024 11:52 AM.  Learner: Patient  Readiness: Acceptance  Method: Explanation  Response: Verbalizes Understanding  Comment: Pt edu on OT POC    Body Mechanics, taught by Leona May OT at 11/6/2024 11:52 AM.  Learner: Patient  Readiness: Acceptance  Method: Explanation  Response: Verbalizes Understanding  Comment: Pt edu on OT POC    Precautions, taught by Leona May OT at 11/6/2024 11:52 AM.  Learner: Patient  Readiness: Acceptance  Method: Explanation  Response: Verbalizes Understanding  Comment: Pt edu on OT POC    ADL Training, taught by Leona May OT at 11/6/2024 11:52 AM.  Learner: Patient  Readiness: Acceptance  Method: Explanation  Response: Verbalizes Understanding  Comment: Pt edu on OT POC    Home Exercise Program, taught by Leona May OT at 11/5/2024  1:14 PM.  Learner: Significant Other, Patient  Readiness: Acceptance  Method: Explanation  Response: Verbalizes Understanding  Comment: educated spouse and pt on OT POC    Body Mechanics, taught by Leona May OT at 11/5/2024  1:14 PM.  Learner: Significant Other, Patient  Readiness: Acceptance  Method: Explanation  Response: Verbalizes Understanding  Comment: educated spouse and pt on OT POC    Precautions, taught by Leona May OT at 11/5/2024  1:14 PM.  Learner: Significant Other, Patient  Readiness: Acceptance  Method: Explanation  Response: Verbalizes Understanding  Comment: educated spouse and pt on OT POC    ADL Training, taught by  Leona May, OT at 11/5/2024  1:14 PM.  Learner: Significant Other, Patient  Readiness: Acceptance  Method: Explanation  Response: Verbalizes Understanding  Comment: educated spouse and pt on OT POC      Goals:  Encounter Problems       Encounter Problems (Active)       OT Goals       Pt will complete self-feeding with setup. (Progressing)       Start:  10/28/24    Expected End:  11/28/24            Pt will complete all grooming tasks with min A. (Progressing)       Start:  10/28/24    Expected End:  11/28/24            Pt will complete UB dressing/bathing with min A. (Progressing)       Start:  10/28/24    Expected End:  11/28/24            Pt will tolerate sitting EOB for at least 10-15 minutes with F+ balance in order to enhance ADL's. (Progressing)       Start:  10/28/24    Expected End:  11/28/24            Pt will complete all functional transfers and mobility with mod A using a RW. (Progressing)       Start:  10/28/24    Expected End:  11/28/24

## 2024-11-06 NOTE — PRE-PROCEDURE NOTE
Report from Sending RN:     Report From: JEAN MARIE Cassidy  Recent Surgery of Procedure: Yes, TDC placed yesterday, Ok to use verified, trach tomorrow  Baseline Level of Consciousness (LOC): Alert, on vent, nods head  Oxygen Use: Yes  Type: ventilator  Diabetic: Yes  Last BP Med Given Day of Dialysis: see EMAR  Last Pain Med Given: see EMAR  Lab Tests to be Obtained with Dialysis: No  Blood Transfusion to be Given During Dialysis: No  Available IV Access: Yes  Medications to be Administered During Dialysis: No  Continuous IV Infusion Running: Yes  Restraints on Currently or in the Last 24 Hours: No  Hand-Off Communication: stable and ready for bedside HD  Dialysis Catheter Dressing: clean, dry and intact  Last Dressing Change: dated 11/5/24

## 2024-11-06 NOTE — CONSULTS
Wound Care Consult     Visit Date: 11/6/2024      Patient Name: Narda Malloy         MRN: [id number]           YOB: 1955     Reason for Consult: Follow up wound care assessment        Wound History: deferred to patient's chart     Pertinent Labs:   [place], Urine   Date Value Ref Range Status   03/31/2022 40 (H) 0 - 23 MG/L Final     Albumin   Date Value Ref Range Status   11/06/2024 2.4 (L) 3.4 - 5.0 g/dL Final       Wound Assessment:  Wound 08/29/24 Diabetic Ulcer Foot Left;Plantar (Active)   Wound Image   11/06/24 1200   Site Assessment Fragile;Dry;Eschar 11/06/24 1200   Lucia-Wound Assessment Dry 11/06/24 1200   Non-staged Wound Description Not applicable 11/06/24 1200   Shape round 11/06/24 1200   Wound Length (cm) 0.5 cm 11/06/24 1200   Wound Width (cm) 0.5 cm 11/06/24 1200   Wound Surface Area (cm^2) 0.25 cm^2 11/06/24 1200   State of Healing Eschar 11/06/24 1200   Wound Bed Eschar (%) 100 % 11/06/24 1200   Margins Unable to assess 11/06/24 1200   Drainage Description None 11/06/24 1200   Drainage Amount None 11/06/24 1200   Dressing Foam 11/06/24 1200   Dressing Changed Changed 10/28/24 0500   Dressing Status Clean;Dry;Occlusive 10/30/24 0800       Wound 09/25/24 Abrasion Leg Lower (Active)   Wound Image   10/23/24 0005   Site Assessment Pink;Non-blanchable erythema 10/30/24 0800   Lucia-Wound Assessment Pink;Non-blanchable erythema 10/23/24 0730   Non-staged Wound Description Partial thickness 10/16/24 0400   Shape round 10/16/24 0400   Wound Length (cm) 1.6 cm 10/16/24 0400   Wound Width (cm) 1.4 cm 10/16/24 0400   Wound Surface Area (cm^2) 2.24 cm^2 10/16/24 0400   Wound Depth (cm) 0.01 cm 10/16/24 0400   Wound Volume (cm^3) 0.0224 cm^3 10/16/24 0400   Wound Healing % 25 10/16/24 0400   State of Healing Early/partial granulation 10/25/24 0730   Margins Poorly defined 10/15/24 0730   Treatments Cleansed;Site care 10/16/24 0400   Drainage Description None 10/27/24 0730   Drainage Amount None  10/30/24 0400   Dressing Dry dressing;Kerlix/rolled gauze;Xeroform 10/30/24 0800   Dressing Changed Changed 10/30/24 0800   Dressing Status Clean;Dry 10/30/24 0800       Wound 09/28/24 Incision Back Medial;Mid;Upper (Active)   Wound Image     11/06/24 1129   Site Assessment Red 11/03/24 0800   Lucia-Wound Assessment Blanchable erythema 10/27/24 0730   Shape linear 10/17/24 1036   Wound Length (cm) 36 cm 11/06/24 1129   Wound Width (cm) 1 cm 11/06/24 1129   Wound Surface Area (cm^2) 36 cm^2 11/06/24 1129   Wound Depth (cm) 0.3 cm 11/06/24 1129   Wound Volume (cm^3) 10.8 cm^3 11/06/24 1129   Wound Healing % -137 11/06/24 1129   State of Healing Closed wound edges 10/27/24 0730   Margins Attached edges 10/17/24 1036   Closure None;Dehisced 11/06/24 1129   Treatments Cleansed 11/02/24 0800   Sutures/Staple Line Approximated 11/03/24 0800   Drainage Description Serosanguineous 11/06/24 0600   Drainage Amount Scant 11/06/24 0600   Dressing Dry dressing 11/06/24 1129   Dressing Changed Changed 11/06/24 1129   Dressing Status Dry;Clean 11/06/24 1129   Adhesive Closure Strips Changed 10/28/24 0500       Wound 10/12/24 Pressure Injury Sacrum (Active)   Wound Image   11/06/24 1200   Site Assessment Eschar 11/06/24 1200   Lucia-Wound Assessment Blanchable erythema;Fragile;Intact 11/06/24 1200   Non-staged Wound Description Not applicable 11/06/24 1200   Pressure Injury Stage U 11/06/24 1200   Shape irregular 11/06/24 1200   Wound Length (cm) 4.2 cm 11/06/24 1200   Wound Width (cm) 3.6 cm 11/06/24 1200   Wound Surface Area (cm^2) 15.12 cm^2 11/06/24 1200   Wound Depth (cm) 0.1 cm 10/16/24 0400   Wound Volume (cm^3) 0.528 cm^3 10/16/24 0400   State of Healing Eschar 11/06/24 1200   Wound Bed Eschar (%) 99 % 10/15/24 0730   Margins Poorly defined 10/27/24 0730   Drainage Description None 11/06/24 0600   Drainage Amount None 11/06/24 0600   Dressing Foam 11/06/24 1200   Dressing Changed Changed 11/06/24 1200   Dressing Status  Clean;Dry 11/06/24 1200       Wound 10/12/24 Diabetic Ulcer Toe D1, great Right (Active)   Wound Image   11/06/24 1200   Site Assessment Eschar;Dry;Fragile 11/06/24 1200   Non-staged Wound Description Not applicable 11/06/24 1200   Shape round 11/06/24 1200   Wound Length (cm) 1.3 cm 11/06/24 1200   Wound Width (cm) 1 cm 11/06/24 1200   Wound Surface Area (cm^2) 1.3 cm^2 11/06/24 1200   State of Healing Eschar 11/06/24 1200   Wound Bed Eschar (%) 100 % 11/06/24 1200   Margins Unable to assess 11/06/24 1200   Drainage Description None 11/06/24 1200   Drainage Amount None 11/06/24 1200   Dressing Foam 11/06/24 1200   Dressing Changed Changed 10/28/24 0500   Dressing Status Clean;Dry 10/30/24 0800       Wound 10/22/24 Buttock Right (Active)   Wound Image    11/06/24 1200   Shape mutiple round 11/06/24 1200   Wound Length (cm) 1.5 cm 11/06/24 1200   Wound Width (cm) 1.5 cm 11/06/24 1200   Wound Surface Area (cm^2) 2.25 cm^2 11/06/24 1200   Wound Depth (cm) 0 cm 11/06/24 1200   Wound Volume (cm^3) 0 cm^3 11/06/24 1200   State of Healing Epithelialized;Closed wound edges 11/06/24 1200   Margins Attached edges 11/06/24 1200   Drainage Description None 10/27/24 0730   Drainage Amount None 10/30/24 0400   Dressing Foam 11/05/24 1700   Dressing Changed Changed 10/28/24 0500   Dressing Status Clean;Dry 10/30/24 0800       Wound 11/01/24 Buttock Left (Active)   Wound Image   11/06/24 1200   Shape irregular 11/06/24 1200   Wound Length (cm) 6 cm 11/06/24 1200   Wound Width (cm) 3 cm 11/06/24 1200   Wound Surface Area (cm^2) 18 cm^2 11/06/24 1200   Wound Depth (cm) 0 cm 11/06/24 1200   Wound Volume (cm^3) 0 cm^3 11/06/24 1200   State of Healing Closed wound edges;Epithelialized 11/06/24 1200   Drainage Description None 11/06/24 1200   Drainage Amount None 11/06/24 1200   Dressing Foam 11/06/24 1200       Wound Team Summary Assessment: Wound status cahnges :  Sacral decubitus ; heavy wound bed eschar. Recommend evaluation by  surgery for possible sharp surgical debridement and following wound care and dressing treatments as appropriate .  Mid back surgical incisional line. Sterile saline irrigating flushes of Open areas x 2 proximal and distal then packing open areas with medihoney and Calcium Alginate strips with 3 cm tail extending from wound depth, and cover with dry sterile dressing. Change dressing every 1-3 days or as needed.     Wound Team Plan: follow up to wound care recommendations     Paolo Delaney RN  11/6/2024  1:37 PM

## 2024-11-06 NOTE — PROGRESS NOTES
INFECTIOUS DISEASES PROGRESS NOTE    Consulted / following patient for:  Respiratory failure/pneumonia/Pseudomonas in the sputum  Recent polymicrobial complicated postoperative lumbar wound infection, MRSA and Proteus  Acute kidney injury    Subjective   Interval History:   (On the ventilator, calmer and indicating no distress)    Objective   PHYSICAL EXAMINATION  Vital signs: Afebrile, isolated fever to 38.4 yesterday afternoon, no vasopressor agents  General: Intubated.  Not toxic  Lungs: Diminished, clear.  On ventilator with FiO2 stable, 40%  Heart:  S1, S2 normal  Abdomen:  Soft, nontender.  PEG tube in place and functioning  Extremities:  No cords, phlebitis, cellulitis.      Relevant Results  WBC: 8500  Creatinine: (Dialysis)  CK: 42  Pleural fluid: Transudate with 197 WBC, 56% neutrophils, protein 1.8, LDH 97, glucose 202  Microbiology:  Blood (11/3): Negative X2  Sputum (10/21): Normal neva, no MRSA  Sputum (10/28): Pseudomonas, pan-susceptible  Sputum (11/4): Pending   Pleural fluid (10/17): Negative  C. difficile PCR (10/30): Negative    Imaging:  CXR images (11/5) personally reviewed: Intubated.  Some increase in bilateral effusion and atelectasis      Assessment:  Sepsis -likely due to pneumonia, present on admission. Patient already on IV vancomycin and IV ceftriaxone at time of this admission for lumbar spinal infection.  Resolved with anti-Pseudomonas antimicrobial therapy.  Large transudative pleural effusion was tapped.  Had fever and purulent secretions in the evening of 10/28, Pseudomonas in sputum, treated for 5 days with aerosol tobramycin.  Briefly extubated, now reintubated without clinical or radiographic evidence for progressive pneumonia.  Remains on a schedule of thrice weekly hemodialysis.  Palliative Care consultation and notes reviewed and appreciated.  Underwent PEG placement on 11/4 there are plans for tracheostomy on 11/7.  Overall appears stable despite transient fever yesterday  afternoon  Lumbar spine surgical site infection s/p I&D 9/28. Cultures + MRSA, Proteus.  Was to be on vanco/ceftriaxone through 11/12. Followed by Dr. Brant Juarez.  Vancomycin has been changed to daptomycin because of AMY    Plan/Recommendations:  Continue daptomycin 700 mg every 48 hour until 11/12, dose after dialysis when a dose is due on a dialysis day  Continue ceftriaxone until 11/12       Andrew Malagon MD  [facility]  Office:  [phone]

## 2024-11-06 NOTE — PROGRESS NOTES
"Narda Malloy is a 69 y.o. female on day 25 of admission presenting with Unresponsive.    Subjective   The patient is seen for acute kidney injury which is secondary to acute tubular necrosis patient remains intubated on the ventilator she did undergo insertion of permacath yesterday no overnight events noted she is off pressors       Objective     Physical Exam  Constitutional:       Comments: Patient is intubated on the ventilator   Neck:      Vascular: No carotid bruit.   Cardiovascular:      Rate and Rhythm: Normal rate and regular rhythm.      Heart sounds: No murmur heard.     No friction rub. No gallop.   Pulmonary:      Breath sounds: No wheezing, rhonchi or rales.   Chest:      Chest wall: No tenderness.   Abdominal:      General: There is no distension.      Tenderness: There is no abdominal tenderness. There is no guarding or rebound.   Musculoskeletal:         General: No swelling or tenderness.      Cervical back: Neck supple.      Right lower leg: Edema present.      Left lower leg: Edema present.   Lymphadenopathy:      Cervical: No cervical adenopathy.         Last Recorded Vitals  Blood pressure 118/66, pulse 82, temperature 36.8 °C (98.2 °F), temperature source Oral, resp. rate 16, height 1.778 m (5' 10\"), weight 114 kg (251 lb 15.8 oz), SpO2 97%.    Intake/Output last 3 Shifts:  I/O last 3 completed shifts:  In: 1790.7 (15.7 mL/kg) [I.V.:167.4 (1.5 mL/kg); Blood:303.3; NG/GT:970; IV Piggyback:350]  Out: 0 (0 mL/kg)   Weight: 114.3 kg     Current Facility-Administered Medications:     acetaminophen (Tylenol) oral liquid 650 mg, 650 mg, oral, q6h, Francesco Carbajal PA-C, 650 mg at 11/06/24 0444    acetylcysteine (Mucomyst) 200 mg/mL (20 %) nebulizer solution 600 mg, 3 mL, nebulization, 4x daily, Kaleb Lam PA-C, 600 mg at 11/06/24 0745    albuterol 2.5 mg /3 mL (0.083 %) nebulizer solution 3 mL, 3 mL, nebulization, 4x daily, Kaleb Lam PA-C, 3 mL at 11/06/24 0744    alteplase (Cathflo " Activase) injection 2 mg, 2 mg, intra-catheter, PRN, Kaleb Lam PA-C    brimonidine (AlphaGAN) 0.2 % ophthalmic solution 1 drop, 1 drop, Both Eyes, BID, Kaleb Lam PA-C, 1 drop at 11/06/24 0811    [Held by provider] calcium carbonate-vitamin D3 500 mg-5 mcg (200 unit) per tablet 1 tablet, 1 tablet, oral, Daily, Kaleb Lam PA-C, 1 tablet at 10/18/24 0930    cefTRIAXone (Rocephin) 2 g in dextrose (iso) IV 50 mL, 2 g, intravenous, q24h, Kaleb Lam PA-C, Last Rate: 100 mL/hr at 11/06/24 0814, 2 g at 11/06/24 0814    DAPTOmycin (Cubicin) 700 mg in sodium chloride 0.9%  mL, 700 mg, intravenous, q48h, Andrew Malagon MD, Stopped at 11/05/24 1001    dextrose 50 % injection 12.5 g, 12.5 g, intravenous, q15 min PRN, Kaleb Lam PA-C    dextrose 50 % injection 25 g, 25 g, intravenous, q15 min PRN, Kaleb Lam PA-C    ezetimibe (Zetia) tablet 10 mg, 10 mg, oral, Daily, Kaleb Lam PA-C, 10 mg at 11/06/24 0808    ferrous sulfate syrup 60 mg of iron, 60 mg of iron, oral, Daily, RUTH Doe, 60 mg of iron at 11/06/24 0810    folic acid (Folvite) tablet 1 mg, 1 mg, oral, Daily, RUTH Doe, 1 mg at 11/06/24 0810    glucagon (Glucagen) injection 1 mg, 1 mg, intramuscular, q15 min PRN, Kaleb Lam PA-C    glucagon (Glucagen) injection 1 mg, 1 mg, intramuscular, q15 min PRN, Kaleb Lam PA-C    heparin (porcine) injection 3,000-6,000 Units, 3,000-6,000 Units, intravenous, PRN, RUTH Doe    heparin (porcine) injection 3,000-6,000 Units, 3,000-6,000 Units, intravenous, PRN, Anna Marie Shook MD    heparin 25,000 Units in dextrose 5% 250 mL (100 Units/mL) infusion (premix), 0-4,500 Units/hr, intravenous, Continuous, Anna Marie Shook MD, Last Rate: 18 mL/hr at 11/06/24 0815, 1,800 Units/hr at 11/06/24 0815    heparin flush 10 unit/mL syringe 50 Units, 50 Units, intra-catheter, PRN, Kaleb Lam PA-C, 50 Units at 10/19/24 0634     honey (Medihoney) topical gel, , Topical, Daily, RUTH Salgado, Given at 11/05/24 0910    HYDROmorphone (Dilaudid) injection 0.4 mg, 0.4 mg, intravenous, q2h PRN, Kaleb Lam PA-C, 0.4 mg at 11/05/24 1739    insulin lispro (HumaLOG) injection 0-15 Units, 0-15 Units, subcutaneous, q4h, Lb Dodd PA-C, 3 Units at 11/06/24 0811    latanoprost (Xalatan) 0.005 % ophthalmic solution 1 drop, 1 drop, Both Eyes, Nightly, Kaleb Lam PA-C, 1 drop at 11/05/24 2224    midodrine (Proamatine) tablet 10 mg, 10 mg, oral, q8h, RUTH Salgado, 10 mg at 11/06/24 0808    oxyCODONE (Roxicodone) solution 5 mg, 5 mg, oral, q4h PRN, Francesco Carbajal PA-C    oxyCODONE (Roxicodone) solution 7.5 mg, 7.5 mg, oral, q4h PRN, Francesco Carbajal PA-C    oxygen (O2) therapy, , inhalation, Continuous - Inhalation, RUTH Salgado, 40 percent at 11/06/24 0745    pantoprazole (ProtoNix) EC tablet 40 mg, 40 mg, oral, Daily before breakfast **OR** pantoprazole (ProtoNix) injection 40 mg, 40 mg, intravenous, Daily before breakfast, RUTH Salas, 40 mg at 11/06/24 0652    polyethylene glycol (Glycolax, Miralax) packet 17 g, 17 g, oral, Daily PRN, RUTH Doe    potassium, sodium phosphates (Phos-NaK) 280-160-250 mg packet 1 packet, 1 packet, oral, With meals & nightly, RUTH Salgado, 1 packet at 11/06/24 0810    primidone (Mysoline) tablet 125 mg, 125 mg, oral, Nightly, Kaleb Lam PA-C, 125 mg at 11/05/24 2224    [Held by provider] propranolol LA (Inderal LA) 24 hr capsule 60 mg, 60 mg, oral, Daily, Kaleb Lam PA-C, 60 mg at 10/19/24 0643    sennosides-docusate sodium (Lucia-Colace) 8.6-50 mg per tablet 1 tablet, 1 tablet, oral, Nightly, LEONEL Doe-CNP, 1 tablet at 11/05/24 2224    sodium chloride 3 % nebulizer solution 3 mL, 3 mL, nebulization, 4x daily, Kaleb Lam PA-C, 3 mL at 11/06/24 0712   Relevant Results    Results for orders placed or  performed during the hospital encounter of 10/12/24 (from the past 96 hours)   POCT GLUCOSE   Result Value Ref Range    POCT Glucose 174 (H) 74 - 99 mg/dL   Transthoracic Echo (TTE) Complete   Result Value Ref Range    AV pk varinder 1.56 m/s    LVOT diam 2.10 cm    MV E/A ratio 1.30     Tricuspid annular plane systolic excursion 1.5 cm    LV Biplane EF 65 %    LV EF 63 %    RV free wall pk S' 18.50 cm/s    LV GLS -17.4 %    RVSP 52.8 mmHg    LVIDd 4.44 cm    AV pk grad 10 mmHg    Aortic Valve Area by Continuity of Peak Velocity 3.60 cm2    LV A4C EF 59.1    POCT GLUCOSE   Result Value Ref Range    POCT Glucose 174 (H) 74 - 99 mg/dL   Type and screen   Result Value Ref Range    ABO TYPE A     Rh TYPE NEG     ANTIBODY SCREEN NEG    Hemoglobin and hematocrit, blood   Result Value Ref Range    Hemoglobin 7.4 (L) 12.0 - 16.0 g/dL    Hematocrit 23.2 (L) 36.0 - 46.0 %   POCT GLUCOSE   Result Value Ref Range    POCT Glucose 148 (H) 74 - 99 mg/dL   POCT GLUCOSE   Result Value Ref Range    POCT Glucose 188 (H) 74 - 99 mg/dL   POCT GLUCOSE   Result Value Ref Range    POCT Glucose 178 (H) 74 - 99 mg/dL   CBC and Auto Differential   Result Value Ref Range    WBC 8.5 4.4 - 11.3 x10*3/uL    nRBC 0.0 0.0 - 0.0 /100 WBCs    RBC 2.35 (L) 4.00 - 5.20 x10*6/uL    Hemoglobin 7.4 (L) 12.0 - 16.0 g/dL    Hematocrit 22.9 (L) 36.0 - 46.0 %    MCV 97 80 - 100 fL    MCH 31.5 26.0 - 34.0 pg    MCHC 32.3 32.0 - 36.0 g/dL    RDW 19.5 (H) 11.5 - 14.5 %    Platelets 243 150 - 450 x10*3/uL    Neutrophils % 86.9 40.0 - 80.0 %    Immature Granulocytes %, Automated 0.5 0.0 - 0.9 %    Lymphocytes % 7.0 13.0 - 44.0 %    Monocytes % 4.6 2.0 - 10.0 %    Eosinophils % 0.5 0.0 - 6.0 %    Basophils % 0.5 0.0 - 2.0 %    Neutrophils Absolute 7.38 1.20 - 7.70 x10*3/uL    Immature Granulocytes Absolute, Automated 0.04 0.00 - 0.70 x10*3/uL    Lymphocytes Absolute 0.59 (L) 1.20 - 4.80 x10*3/uL    Monocytes Absolute 0.39 0.10 - 1.00 x10*3/uL    Eosinophils Absolute  0.04 0.00 - 0.70 x10*3/uL    Basophils Absolute 0.04 0.00 - 0.10 x10*3/uL   Hepatic function panel   Result Value Ref Range    Albumin 2.7 (L) 3.4 - 5.0 g/dL    Bilirubin, Total 0.4 0.0 - 1.2 mg/dL    Bilirubin, Direct 0.1 0.0 - 0.3 mg/dL    Alkaline Phosphatase 125 33 - 136 U/L    ALT 10 7 - 45 U/L    AST 10 9 - 39 U/L    Total Protein 5.5 (L) 6.4 - 8.2 g/dL   Magnesium   Result Value Ref Range    Magnesium 1.92 1.60 - 2.40 mg/dL   Phosphorus   Result Value Ref Range    Phosphorus 2.3 (L) 2.5 - 4.9 mg/dL   Basic Metabolic Panel   Result Value Ref Range    Glucose 171 (H) 74 - 99 mg/dL    Sodium 133 (L) 136 - 145 mmol/L    Potassium 4.0 3.5 - 5.3 mmol/L    Chloride 99 98 - 107 mmol/L    Bicarbonate 28 21 - 32 mmol/L    Anion Gap 10 10 - 20 mmol/L    Urea Nitrogen 14 6 - 23 mg/dL    Creatinine 1.30 (H) 0.50 - 1.05 mg/dL    eGFR 45 (L) >60 mL/min/1.73m*2    Calcium 7.9 (L) 8.6 - 10.3 mg/dL   aPTT   Result Value Ref Range    aPTT 45.6 (H) 22.0 - 32.5 seconds   POCT GLUCOSE   Result Value Ref Range    POCT Glucose 176 (H) 74 - 99 mg/dL   Blood Culture    Specimen: Peripheral Venipuncture; Blood culture   Result Value Ref Range    Blood Culture No growth at 2 days    Blood Culture    Specimen: Peripheral Venipuncture; Blood culture   Result Value Ref Range    Blood Culture No growth at 2 days    POCT GLUCOSE   Result Value Ref Range    POCT Glucose 173 (H) 74 - 99 mg/dL   aPTT   Result Value Ref Range    aPTT 60.5 (H) 22.0 - 32.5 seconds   POCT GLUCOSE   Result Value Ref Range    POCT Glucose 158 (H) 74 - 99 mg/dL   POCT GLUCOSE   Result Value Ref Range    POCT Glucose 150 (H) 74 - 99 mg/dL   Renal function panel   Result Value Ref Range    Glucose 172 (H) 74 - 99 mg/dL    Sodium 133 (L) 136 - 145 mmol/L    Potassium 4.2 3.5 - 5.3 mmol/L    Chloride 99 98 - 107 mmol/L    Bicarbonate 28 21 - 32 mmol/L    Anion Gap 10 10 - 20 mmol/L    Urea Nitrogen 20 6 - 23 mg/dL    Creatinine 1.71 (H) 0.50 - 1.05 mg/dL    eGFR 32 (L)  >60 mL/min/1.73m*2    Calcium 7.6 (L) 8.6 - 10.3 mg/dL    Phosphorus 2.6 2.5 - 4.9 mg/dL    Albumin 2.2 (L) 3.4 - 5.0 g/dL   Calcium, ionized   Result Value Ref Range    POCT Calcium, Ionized 1.10 1.1 - 1.33 mmol/L   Magnesium   Result Value Ref Range    Magnesium 2.21 1.60 - 2.40 mg/dL   POCT GLUCOSE   Result Value Ref Range    POCT Glucose 181 (H) 74 - 99 mg/dL   Respiratory Culture/Smear    Specimen: Tracheal Aspirate; Fluid   Result Value Ref Range    Respiratory Culture/Smear Culture in progress     Gram Stain (3+) Moderate Polymorphonuclear leukocytes     Gram Stain No organisms seen    POCT GLUCOSE   Result Value Ref Range    POCT Glucose 149 (H) 74 - 99 mg/dL   CBC and Auto Differential   Result Value Ref Range    WBC 5.3 4.4 - 11.3 x10*3/uL    nRBC 0.0 0.0 - 0.0 /100 WBCs    RBC 2.12 (L) 4.00 - 5.20 x10*6/uL    Hemoglobin 6.6 (L) 12.0 - 16.0 g/dL    Hematocrit 20.9 (L) 36.0 - 46.0 %    MCV 99 80 - 100 fL    MCH 31.1 26.0 - 34.0 pg    MCHC 31.6 (L) 32.0 - 36.0 g/dL    RDW 19.2 (H) 11.5 - 14.5 %    Platelets 202 150 - 450 x10*3/uL    Neutrophils % 76.7 40.0 - 80.0 %    Immature Granulocytes %, Automated 1.1 (H) 0.0 - 0.9 %    Lymphocytes % 14.4 13.0 - 44.0 %    Monocytes % 6.1 2.0 - 10.0 %    Eosinophils % 1.1 0.0 - 6.0 %    Basophils % 0.6 0.0 - 2.0 %    Neutrophils Absolute 4.04 1.20 - 7.70 x10*3/uL    Immature Granulocytes Absolute, Automated 0.06 0.00 - 0.70 x10*3/uL    Lymphocytes Absolute 0.76 (L) 1.20 - 4.80 x10*3/uL    Monocytes Absolute 0.32 0.10 - 1.00 x10*3/uL    Eosinophils Absolute 0.06 0.00 - 0.70 x10*3/uL    Basophils Absolute 0.03 0.00 - 0.10 x10*3/uL   Hepatic function panel   Result Value Ref Range    Albumin 2.2 (L) 3.4 - 5.0 g/dL    Bilirubin, Total 0.3 0.0 - 1.2 mg/dL    Bilirubin, Direct 0.1 0.0 - 0.3 mg/dL    Alkaline Phosphatase 106 33 - 136 U/L    ALT 8 7 - 45 U/L    AST 10 9 - 39 U/L    Total Protein 5.2 (L) 6.4 - 8.2 g/dL   Magnesium   Result Value Ref Range    Magnesium 2.17 1.60 -  2.40 mg/dL   Type and screen   Result Value Ref Range    ABO TYPE A     Rh TYPE NEG     ANTIBODY SCREEN NEG    Phosphorus   Result Value Ref Range    Phosphorus 3.0 2.5 - 4.9 mg/dL   Basic Metabolic Panel   Result Value Ref Range    Glucose 142 (H) 74 - 99 mg/dL    Sodium 134 (L) 136 - 145 mmol/L    Potassium 4.3 3.5 - 5.3 mmol/L    Chloride 99 98 - 107 mmol/L    Bicarbonate 29 21 - 32 mmol/L    Anion Gap 10 10 - 20 mmol/L    Urea Nitrogen 21 6 - 23 mg/dL    Creatinine 1.88 (H) 0.50 - 1.05 mg/dL    eGFR 29 (L) >60 mL/min/1.73m*2    Calcium 7.7 (L) 8.6 - 10.3 mg/dL   aPTT   Result Value Ref Range    aPTT 55.5 (H) 22.0 - 32.5 seconds   Morphology   Result Value Ref Range    RBC Morphology See Below     Polychromasia Mild     Ovalocytes Few     [place] Cells Few    Creatine Kinase   Result Value Ref Range    Creatine Kinase 42 0 - 215 U/L   Prepare RBC: 1 Units   Result Value Ref Range    PRODUCT CODE [id number]     Unit Number [id number]     Unit ABO A     Unit RH NEG     XM INTEP COMP     Dispense Status RE     Blood Expiration Date 11/27/2024 11:59:00 PM EST     PRODUCT BLOOD TYPE 0600     UNIT VOLUME 350    POCT GLUCOSE   Result Value Ref Range    POCT Glucose 159 (H) 74 - 99 mg/dL   POCT GLUCOSE   Result Value Ref Range    POCT Glucose 170 (H) 74 - 99 mg/dL   CBC   Result Value Ref Range    WBC 5.7 4.4 - 11.3 x10*3/uL    nRBC 0.0 0.0 - 0.0 /100 WBCs    RBC 2.36 (L) 4.00 - 5.20 x10*6/uL    Hemoglobin 7.2 (L) 12.0 - 16.0 g/dL    Hematocrit 23.2 (L) 36.0 - 46.0 %    MCV 98 80 - 100 fL    MCH 30.5 26.0 - 34.0 pg    MCHC 31.0 (L) 32.0 - 36.0 g/dL    RDW 19.1 (H) 11.5 - 14.5 %    Platelets 240 150 - 450 x10*3/uL   Blood Gas Venous Full Panel   Result Value Ref Range    POCT pH, Venous 7.42 7.33 - 7.43 pH    POCT pCO2, Venous 44 41 - 51 mm Hg    POCT pO2, Venous 45 35 - 45 mm Hg    POCT SO2, Venous 81 (H) 45 - 75 %    POCT Oxy Hemoglobin, Venous 79.5 (H) 45.0 - 75.0 %    POCT Hematocrit Calculated, Venous 22.0 (L) 36.0 -  46.0 %    POCT Sodium, Venous 130 (L) 136 - 145 mmol/L    POCT Potassium, Venous 4.8 3.5 - 5.3 mmol/L    POCT Chloride, Venous 100 98 - 107 mmol/L    POCT Ionized Calicum, Venous 1.14 1.10 - 1.33 mmol/L    POCT Glucose, Venous 166 (H) 74 - 99 mg/dL    POCT Lactate, Venous 0.7 0.4 - 2.0 mmol/L    POCT Base Excess, Venous 3.6 (H) -2.0 - 3.0 mmol/L    POCT HCO3 Calculated, Venous 28.5 (H) 22.0 - 26.0 mmol/L    POCT Hemoglobin, Venous 7.3 (L) 12.0 - 16.0 g/dL    POCT Anion Gap, Venous 6.0 (L) 10.0 - 25.0 mmol/L    Patient Temperature 37.0 degrees Celsius    FiO2 40 %   POCT GLUCOSE   Result Value Ref Range    POCT Glucose 136 (H) 74 - 99 mg/dL   POCT GLUCOSE   Result Value Ref Range    POCT Glucose 117 (H) 74 - 99 mg/dL   POCT GLUCOSE   Result Value Ref Range    POCT Glucose 112 (H) 74 - 99 mg/dL   POCT GLUCOSE   Result Value Ref Range    POCT Glucose 121 (H) 74 - 99 mg/dL   CBC and Auto Differential   Result Value Ref Range    WBC 7.6 4.4 - 11.3 x10*3/uL    nRBC 0.0 0.0 - 0.0 /100 WBCs    RBC 2.25 (L) 4.00 - 5.20 x10*6/uL    Hemoglobin 6.9 (L) 12.0 - 16.0 g/dL    Hematocrit 21.6 (L) 36.0 - 46.0 %    MCV 96 80 - 100 fL    MCH 30.7 26.0 - 34.0 pg    MCHC 31.9 (L) 32.0 - 36.0 g/dL    RDW 19.3 (H) 11.5 - 14.5 %    Platelets 250 150 - 450 x10*3/uL    Neutrophils % 85.2 40.0 - 80.0 %    Immature Granulocytes %, Automated 1.1 (H) 0.0 - 0.9 %    Lymphocytes % 7.8 13.0 - 44.0 %    Monocytes % 4.7 2.0 - 10.0 %    Eosinophils % 0.8 0.0 - 6.0 %    Basophils % 0.4 0.0 - 2.0 %    Neutrophils Absolute 6.49 1.20 - 7.70 x10*3/uL    Immature Granulocytes Absolute, Automated 0.08 0.00 - 0.70 x10*3/uL    Lymphocytes Absolute 0.59 (L) 1.20 - 4.80 x10*3/uL    Monocytes Absolute 0.36 0.10 - 1.00 x10*3/uL    Eosinophils Absolute 0.06 0.00 - 0.70 x10*3/uL    Basophils Absolute 0.03 0.00 - 0.10 x10*3/uL   Hepatic function panel   Result Value Ref Range    Albumin 2.3 (L) 3.4 - 5.0 g/dL    Bilirubin, Total 0.3 0.0 - 1.2 mg/dL    Bilirubin,  Direct 0.1 0.0 - 0.3 mg/dL    Alkaline Phosphatase 111 33 - 136 U/L    ALT 9 7 - 45 U/L    AST 12 9 - 39 U/L    Total Protein 5.5 (L) 6.4 - 8.2 g/dL   Magnesium   Result Value Ref Range    Magnesium 2.07 1.60 - 2.40 mg/dL   Phosphorus   Result Value Ref Range    Phosphorus 2.6 2.5 - 4.9 mg/dL   Basic Metabolic Panel   Result Value Ref Range    Glucose 123 (H) 74 - 99 mg/dL    Sodium 133 (L) 136 - 145 mmol/L    Potassium 3.9 3.5 - 5.3 mmol/L    Chloride 97 (L) 98 - 107 mmol/L    Bicarbonate 28 21 - 32 mmol/L    Anion Gap 12 10 - 20 mmol/L    Urea Nitrogen 17 6 - 23 mg/dL    Creatinine 1.50 (H) 0.50 - 1.05 mg/dL    eGFR 38 (L) >60 mL/min/1.73m*2    Calcium 7.6 (L) 8.6 - 10.3 mg/dL   Morphology   Result Value Ref Range    RBC Morphology See Below     Polychromasia Mild     Ovalocytes Few    Iron and TIBC   Result Value Ref Range    Iron 23 (L) 35 - 150 ug/dL    UIBC 84 (L) 110 - 370 ug/dL    TIBC 107 (L) 240 - 445 ug/dL    % Saturation 21 (L) 25 - 45 %   Ferritin   Result Value Ref Range    Ferritin 479 (H) 8 - 150 ng/mL   Prepare RBC: 1 Units   Result Value Ref Range    PRODUCT CODE [id number]     Unit Number [id number]     Unit ABO O     Unit RH NEG     XM INTEP COMP     Dispense Status TR     Blood Expiration Date 11/11/2024 11:59:00 PM EST     PRODUCT BLOOD TYPE      UNIT VOLUME 280    POCT GLUCOSE   Result Value Ref Range    POCT Glucose 162 (H) 74 - 99 mg/dL   POCT GLUCOSE   Result Value Ref Range    POCT Glucose 150 (H) 74 - 99 mg/dL   POCT GLUCOSE   Result Value Ref Range    POCT Glucose 160 (H) 74 - 99 mg/dL   Lactate dehydrogenase   Result Value Ref Range     84 - 246 U/L   CBC and Auto Differential   Result Value Ref Range    WBC 7.2 4.4 - 11.3 x10*3/uL    nRBC 0.0 0.0 - 0.0 /100 WBCs    RBC 2.73 (L) 4.00 - 5.20 x10*6/uL    Hemoglobin 8.5 (L) 12.0 - 16.0 g/dL    Hematocrit 25.7 (L) 36.0 - 46.0 %    MCV 94 80 - 100 fL    MCH 31.1 26.0 - 34.0 pg    MCHC 33.1 32.0 - 36.0 g/dL    RDW 20.4 (H) 11.5 -  14.5 %    Platelets 252 150 - 450 x10*3/uL    Neutrophils % 83.6 40.0 - 80.0 %    Immature Granulocytes %, Automated 0.6 0.0 - 0.9 %    Lymphocytes % 9.6 13.0 - 44.0 %    Monocytes % 5.4 2.0 - 10.0 %    Eosinophils % 0.4 0.0 - 6.0 %    Basophils % 0.4 0.0 - 2.0 %    Neutrophils Absolute 6.03 1.20 - 7.70 x10*3/uL    Immature Granulocytes Absolute, Automated 0.04 0.00 - 0.70 x10*3/uL    Lymphocytes Absolute 0.69 (L) 1.20 - 4.80 x10*3/uL    Monocytes Absolute 0.39 0.10 - 1.00 x10*3/uL    Eosinophils Absolute 0.03 0.00 - 0.70 x10*3/uL    Basophils Absolute 0.03 0.00 - 0.10 x10*3/uL   Lactate   Result Value Ref Range    Lactate 0.8 0.4 - 2.0 mmol/L   Morphology   Result Value Ref Range    RBC Morphology No significant RBC morphology present    POCT GLUCOSE   Result Value Ref Range    POCT Glucose 146 (H) 74 - 99 mg/dL   aPTT   Result Value Ref Range    aPTT 28.8 22.0 - 32.5 seconds   CBC   Result Value Ref Range    WBC 8.1 4.4 - 11.3 x10*3/uL    nRBC 0.0 0.0 - 0.0 /100 WBCs    RBC 2.66 (L) 4.00 - 5.20 x10*6/uL    Hemoglobin 8.2 (L) 12.0 - 16.0 g/dL    Hematocrit 25.2 (L) 36.0 - 46.0 %    MCV 95 80 - 100 fL    MCH 30.8 26.0 - 34.0 pg    MCHC 32.5 32.0 - 36.0 g/dL    RDW 20.4 (H) 11.5 - 14.5 %    Platelets 257 150 - 450 x10*3/uL   Folate   Result Value Ref Range    Folate, Serum 15.5 >5.0 ng/mL   Vitamin B12   Result Value Ref Range    Vitamin B12 1,152 (H) 211 - 911 pg/mL   POCT GLUCOSE   Result Value Ref Range    POCT Glucose 194 (H) 74 - 99 mg/dL   aPTT   Result Value Ref Range    aPTT 79.1 (H) 22.0 - 32.5 seconds   CBC and Auto Differential   Result Value Ref Range    WBC 8.5 4.4 - 11.3 x10*3/uL    nRBC 0.0 0.0 - 0.0 /100 WBCs    RBC 2.62 (L) 4.00 - 5.20 x10*6/uL    Hemoglobin 8.0 (L) 12.0 - 16.0 g/dL    Hematocrit 24.5 (L) 36.0 - 46.0 %    MCV 94 80 - 100 fL    MCH 30.5 26.0 - 34.0 pg    MCHC 32.7 32.0 - 36.0 g/dL    RDW 20.1 (H) 11.5 - 14.5 %    Platelets 256 150 - 450 x10*3/uL    Neutrophils % 84.9 40.0 - 80.0 %     Immature Granulocytes %, Automated 1.1 (H) 0.0 - 0.9 %    Lymphocytes % 7.8 13.0 - 44.0 %    Monocytes % 4.9 2.0 - 10.0 %    Eosinophils % 0.8 0.0 - 6.0 %    Basophils % 0.5 0.0 - 2.0 %    Neutrophils Absolute 7.22 1.20 - 7.70 x10*3/uL    Immature Granulocytes Absolute, Automated 0.09 0.00 - 0.70 x10*3/uL    Lymphocytes Absolute 0.66 (L) 1.20 - 4.80 x10*3/uL    Monocytes Absolute 0.42 0.10 - 1.00 x10*3/uL    Eosinophils Absolute 0.07 0.00 - 0.70 x10*3/uL    Basophils Absolute 0.04 0.00 - 0.10 x10*3/uL   Hepatic function panel   Result Value Ref Range    Albumin 2.4 (L) 3.4 - 5.0 g/dL    Bilirubin, Total 0.5 0.0 - 1.2 mg/dL    Bilirubin, Direct 0.1 0.0 - 0.3 mg/dL    Alkaline Phosphatase 126 33 - 136 U/L    ALT 10 7 - 45 U/L    AST 12 9 - 39 U/L    Total Protein 5.9 (L) 6.4 - 8.2 g/dL   Magnesium   Result Value Ref Range    Magnesium 2.13 1.60 - 2.40 mg/dL   Phosphorus   Result Value Ref Range    Phosphorus 3.1 2.5 - 4.9 mg/dL   Basic Metabolic Panel   Result Value Ref Range    Glucose 161 (H) 74 - 99 mg/dL    Sodium 134 (L) 136 - 145 mmol/L    Potassium 4.0 3.5 - 5.3 mmol/L    Chloride 98 98 - 107 mmol/L    Bicarbonate 29 21 - 32 mmol/L    Anion Gap 11 10 - 20 mmol/L    Urea Nitrogen 29 (H) 6 - 23 mg/dL    Creatinine 1.88 (H) 0.50 - 1.05 mg/dL    eGFR 29 (L) >60 mL/min/1.73m*2    Calcium 7.8 (L) 8.6 - 10.3 mg/dL   POCT GLUCOSE   Result Value Ref Range    POCT Glucose 107 (H) 74 - 99 mg/dL   Morphology   Result Value Ref Range    RBC Morphology No significant RBC morphology present    Blood Gas Venous Full Panel   Result Value Ref Range    POCT pH, Venous 7.43 7.33 - 7.43 pH    POCT pCO2, Venous 44 41 - 51 mm Hg    POCT pO2, Venous 42 35 - 45 mm Hg    POCT SO2, Venous 76 (H) 45 - 75 %    POCT Oxy Hemoglobin, Venous 75.0 45.0 - 75.0 %    POCT Hematocrit Calculated, Venous 25.0 (L) 36.0 - 46.0 %    POCT Sodium, Venous 133 (L) 136 - 145 mmol/L    POCT Potassium, Venous 4.0 3.5 - 5.3 mmol/L    POCT Chloride, Venous 98 98  - 107 mmol/L    POCT Ionized Calicum, Venous 1.16 1.10 - 1.33 mmol/L    POCT Glucose, Venous 163 (H) 74 - 99 mg/dL    POCT Lactate, Venous 0.9 0.4 - 2.0 mmol/L    POCT Base Excess, Venous 4.4 (H) -2.0 - 3.0 mmol/L    POCT HCO3 Calculated, Venous 29.2 (H) 22.0 - 26.0 mmol/L    POCT Hemoglobin, Venous 8.2 (L) 12.0 - 16.0 g/dL    POCT Anion Gap, Venous 10.0 10.0 - 25.0 mmol/L    Patient Temperature 37.0 degrees Celsius    FiO2 40 %   POCT GLUCOSE   Result Value Ref Range    POCT Glucose 186 (H) 74 - 99 mg/dL   aPTT   Result Value Ref Range    aPTT 62.4 (H) 22.0 - 32.5 seconds     *Note: Due to a large number of results and/or encounters for the requested time period, some results have not been displayed. A complete set of results can be found in Results Review.       Assessment/Plan   Acute kidney injury pains fairly oliguric my plan is to dialyze the patient today her permacath  Edema much improved  Pneumonia continue with antibiotic therapy infectious disease  Diabetes mellitus type 2  Malnutrition continue with tube feeding  Lower back surgery site infection  Anemia transfuse when hemoglobin less than 7  Acute respiratory failure plan to continue the ventilator and I think there is a plan for a tracheostomy on Friday         Imer Cheung MD

## 2024-11-07 ENCOUNTER — ANESTHESIA (OUTPATIENT)
Dept: OPERATING ROOM | Facility: HOSPITAL | Age: 69
End: 2024-11-07
Payer: MEDICARE

## 2024-11-07 LAB
ABO GROUP (TYPE) IN BLOOD: NORMAL
ALBUMIN SERPL BCP-MCNC: 2.3 G/DL (ref 3.4–5)
ALP SERPL-CCNC: 116 U/L (ref 33–136)
ALT SERPL W P-5'-P-CCNC: 9 U/L (ref 7–45)
ANION GAP BLDV CALCULATED.4IONS-SCNC: 5 MMOL/L (ref 10–25)
ANION GAP SERPL CALCULATED.3IONS-SCNC: 12 MMOL/L (ref 10–20)
ANTIBODY SCREEN: NORMAL
APTT PPP: 22.6 SECONDS (ref 22–32.5)
AST SERPL W P-5'-P-CCNC: 11 U/L (ref 9–39)
BACTERIA BLD CULT: NORMAL
BACTERIA BLD CULT: NORMAL
BASE EXCESS BLDV CALC-SCNC: 7.6 MMOL/L (ref -2–3)
BASOPHILS # BLD AUTO: 0.04 X10*3/UL (ref 0–0.1)
BASOPHILS NFR BLD AUTO: 0.5 %
BILIRUB DIRECT SERPL-MCNC: 0.1 MG/DL (ref 0–0.3)
BILIRUB SERPL-MCNC: 0.3 MG/DL (ref 0–1.2)
BLOOD EXPIRATION DATE: NORMAL
BODY TEMPERATURE: 37 DEGREES CELSIUS
BUN SERPL-MCNC: 17 MG/DL (ref 6–23)
C DIF TOX TCDA+TCDB STL QL NAA+PROBE: NOT DETECTED
CA-I BLDV-SCNC: 1.15 MMOL/L (ref 1.1–1.33)
CALCIUM SERPL-MCNC: 7.9 MG/DL (ref 8.6–10.3)
CHLORIDE BLDV-SCNC: 101 MMOL/L (ref 98–107)
CHLORIDE SERPL-SCNC: 99 MMOL/L (ref 98–107)
CO2 SERPL-SCNC: 29 MMOL/L (ref 21–32)
CREAT SERPL-MCNC: 1.34 MG/DL (ref 0.5–1.05)
DISPENSE STATUS: NORMAL
EGFRCR SERPLBLD CKD-EPI 2021: 43 ML/MIN/1.73M*2
EOSINOPHIL # BLD AUTO: 0.05 X10*3/UL (ref 0–0.7)
EOSINOPHIL NFR BLD AUTO: 0.7 %
ERYTHROCYTE [DISTWIDTH] IN BLOOD BY AUTOMATED COUNT: 20 % (ref 11.5–14.5)
GLUCOSE BLD MANUAL STRIP-MCNC: 111 MG/DL (ref 74–99)
GLUCOSE BLD MANUAL STRIP-MCNC: 112 MG/DL (ref 74–99)
GLUCOSE BLD MANUAL STRIP-MCNC: 132 MG/DL (ref 74–99)
GLUCOSE BLD MANUAL STRIP-MCNC: 149 MG/DL (ref 74–99)
GLUCOSE BLD MANUAL STRIP-MCNC: 167 MG/DL (ref 74–99)
GLUCOSE BLD MANUAL STRIP-MCNC: 179 MG/DL (ref 74–99)
GLUCOSE BLD MANUAL STRIP-MCNC: 187 MG/DL (ref 74–99)
GLUCOSE BLDV-MCNC: 92 MG/DL (ref 74–99)
GLUCOSE SERPL-MCNC: 95 MG/DL (ref 74–99)
HCO3 BLDV-SCNC: 32 MMOL/L (ref 22–26)
HCT VFR BLD AUTO: 23.2 % (ref 36–46)
HCT VFR BLD AUTO: 29 % (ref 36–46)
HCT VFR BLD EST: 23 % (ref 36–46)
HGB BLD-MCNC: 7.4 G/DL (ref 12–16)
HGB BLD-MCNC: 9.4 G/DL (ref 12–16)
HGB BLDV-MCNC: 7.6 G/DL (ref 12–16)
IMM GRANULOCYTES # BLD AUTO: 0.04 X10*3/UL (ref 0–0.7)
IMM GRANULOCYTES NFR BLD AUTO: 0.5 % (ref 0–0.9)
INHALED O2 CONCENTRATION: 40 %
LACTATE BLDV-SCNC: 1 MMOL/L (ref 0.4–2)
LYMPHOCYTES # BLD AUTO: 0.85 X10*3/UL (ref 1.2–4.8)
LYMPHOCYTES NFR BLD AUTO: 11.6 %
MAGNESIUM SERPL-MCNC: 1.97 MG/DL (ref 1.6–2.4)
MCH RBC QN AUTO: 30.8 PG (ref 26–34)
MCHC RBC AUTO-ENTMCNC: 31.9 G/DL (ref 32–36)
MCV RBC AUTO: 97 FL (ref 80–100)
MONOCYTES # BLD AUTO: 0.38 X10*3/UL (ref 0.1–1)
MONOCYTES NFR BLD AUTO: 5.2 %
NEUTROPHILS # BLD AUTO: 5.98 X10*3/UL (ref 1.2–7.7)
NEUTROPHILS NFR BLD AUTO: 81.5 %
NRBC BLD-RTO: 0 /100 WBCS (ref 0–0)
OXYHGB MFR BLDV: 76.8 % (ref 45–75)
PCO2 BLDV: 44 MM HG (ref 41–51)
PH BLDV: 7.47 PH (ref 7.33–7.43)
PHOSPHATE SERPL-MCNC: 2.3 MG/DL (ref 2.5–4.9)
PLATELET # BLD AUTO: 249 X10*3/UL (ref 150–450)
PO2 BLDV: 42 MM HG (ref 35–45)
POTASSIUM BLDV-SCNC: 3.8 MMOL/L (ref 3.5–5.3)
POTASSIUM SERPL-SCNC: 3.7 MMOL/L (ref 3.5–5.3)
PRODUCT BLOOD TYPE: 9500
PRODUCT CODE: NORMAL
PROT SERPL-MCNC: 5.4 G/DL (ref 6.4–8.2)
RBC # BLD AUTO: 2.4 X10*6/UL (ref 4–5.2)
RH FACTOR (ANTIGEN D): NORMAL
SAO2 % BLDV: 78 % (ref 45–75)
SODIUM BLDV-SCNC: 134 MMOL/L (ref 136–145)
SODIUM SERPL-SCNC: 136 MMOL/L (ref 136–145)
UNIT ABO: NORMAL
UNIT NUMBER: NORMAL
UNIT RH: NORMAL
UNIT VOLUME: 350
WBC # BLD AUTO: 7.3 X10*3/UL (ref 4.4–11.3)
XM INTEP: NORMAL

## 2024-11-07 PROCEDURE — A4649 SURGICAL SUPPLIES: HCPCS | Performed by: OTOLARYNGOLOGY

## 2024-11-07 PROCEDURE — 83735 ASSAY OF MAGNESIUM: CPT

## 2024-11-07 PROCEDURE — 2500000001 HC RX 250 WO HCPCS SELF ADMINISTERED DRUGS (ALT 637 FOR MEDICARE OP)

## 2024-11-07 PROCEDURE — 36415 COLL VENOUS BLD VENIPUNCTURE: CPT

## 2024-11-07 PROCEDURE — 2500000004 HC RX 250 GENERAL PHARMACY W/ HCPCS (ALT 636 FOR OP/ED)

## 2024-11-07 PROCEDURE — 85730 THROMBOPLASTIN TIME PARTIAL: CPT

## 2024-11-07 PROCEDURE — 2500000004 HC RX 250 GENERAL PHARMACY W/ HCPCS (ALT 636 FOR OP/ED): Performed by: INTERNAL MEDICINE

## 2024-11-07 PROCEDURE — 87493 C DIFF AMPLIFIED PROBE: CPT

## 2024-11-07 PROCEDURE — 2500000005 HC RX 250 GENERAL PHARMACY W/O HCPCS

## 2024-11-07 PROCEDURE — 82947 ASSAY GLUCOSE BLOOD QUANT: CPT

## 2024-11-07 PROCEDURE — 86901 BLOOD TYPING SEROLOGIC RH(D): CPT

## 2024-11-07 PROCEDURE — 84075 ASSAY ALKALINE PHOSPHATASE: CPT

## 2024-11-07 PROCEDURE — 2500000004 HC RX 250 GENERAL PHARMACY W/ HCPCS (ALT 636 FOR OP/ED): Performed by: PHYSICIAN ASSISTANT

## 2024-11-07 PROCEDURE — 3700000002 HC GENERAL ANESTHESIA TIME - EACH INCREMENTAL 1 MINUTE: Performed by: OTOLARYNGOLOGY

## 2024-11-07 PROCEDURE — 99291 CRITICAL CARE FIRST HOUR: CPT

## 2024-11-07 PROCEDURE — 3600000003 HC OR TIME - INITIAL BASE CHARGE - PROCEDURE LEVEL THREE: Performed by: OTOLARYNGOLOGY

## 2024-11-07 PROCEDURE — 0B110F4 BYPASS TRACHEA TO CUTANEOUS WITH TRACHEOSTOMY DEVICE, OPEN APPROACH: ICD-10-PCS | Performed by: OTOLARYNGOLOGY

## 2024-11-07 PROCEDURE — 94640 AIRWAY INHALATION TREATMENT: CPT

## 2024-11-07 PROCEDURE — 2020000001 HC ICU ROOM DAILY

## 2024-11-07 PROCEDURE — 84132 ASSAY OF SERUM POTASSIUM: CPT

## 2024-11-07 PROCEDURE — 2500000004 HC RX 250 GENERAL PHARMACY W/ HCPCS (ALT 636 FOR OP/ED): Performed by: NURSE PRACTITIONER

## 2024-11-07 PROCEDURE — 85014 HEMATOCRIT: CPT

## 2024-11-07 PROCEDURE — 84100 ASSAY OF PHOSPHORUS: CPT

## 2024-11-07 PROCEDURE — 31600 PLANNED TRACHEOSTOMY: CPT | Performed by: OTOLARYNGOLOGY

## 2024-11-07 PROCEDURE — 3600000008 HC OR TIME - EACH INCREMENTAL 1 MINUTE - PROCEDURE LEVEL THREE: Performed by: OTOLARYNGOLOGY

## 2024-11-07 PROCEDURE — P9040 RBC LEUKOREDUCED IRRADIATED: HCPCS

## 2024-11-07 PROCEDURE — 36430 TRANSFUSION BLD/BLD COMPNT: CPT

## 2024-11-07 PROCEDURE — 85025 COMPLETE CBC W/AUTO DIFF WBC: CPT

## 2024-11-07 PROCEDURE — 3700000001 HC GENERAL ANESTHESIA TIME - INITIAL BASE CHARGE: Performed by: OTOLARYNGOLOGY

## 2024-11-07 PROCEDURE — 2500000002 HC RX 250 W HCPCS SELF ADMINISTERED DRUGS (ALT 637 FOR MEDICARE OP, ALT 636 FOR OP/ED)

## 2024-11-07 PROCEDURE — 2500000004 HC RX 250 GENERAL PHARMACY W/ HCPCS (ALT 636 FOR OP/ED): Performed by: OTOLARYNGOLOGY

## 2024-11-07 PROCEDURE — 94003 VENT MGMT INPAT SUBQ DAY: CPT

## 2024-11-07 PROCEDURE — 9420000001 HC RT PATIENT EDUCATION 5 MIN

## 2024-11-07 PROCEDURE — 2720000007 HC OR 272 NO HCPCS: Performed by: OTOLARYNGOLOGY

## 2024-11-07 PROCEDURE — 2500000001 HC RX 250 WO HCPCS SELF ADMINISTERED DRUGS (ALT 637 FOR MEDICARE OP): Performed by: PHYSICIAN ASSISTANT

## 2024-11-07 PROCEDURE — 94668 MNPJ CHEST WALL SBSQ: CPT

## 2024-11-07 PROCEDURE — 2500000004 HC RX 250 GENERAL PHARMACY W/ HCPCS (ALT 636 FOR OP/ED): Performed by: NURSE ANESTHETIST, CERTIFIED REGISTERED

## 2024-11-07 PROCEDURE — 2500000005 HC RX 250 GENERAL PHARMACY W/O HCPCS: Performed by: PHYSICIAN ASSISTANT

## 2024-11-07 RX ORDER — HYDROXYZINE HYDROCHLORIDE 25 MG/1
25 TABLET, FILM COATED ORAL ONCE
Status: COMPLETED | OUTPATIENT
Start: 2024-11-07 | End: 2024-11-07

## 2024-11-07 RX ORDER — HYDROXYZINE HYDROCHLORIDE 25 MG/1
25 TABLET, FILM COATED ORAL ONCE
Status: DISCONTINUED | OUTPATIENT
Start: 2024-11-07 | End: 2024-11-07

## 2024-11-07 RX ORDER — FENTANYL CITRATE 50 UG/ML
INJECTION, SOLUTION INTRAMUSCULAR; INTRAVENOUS AS NEEDED
Status: DISCONTINUED | OUTPATIENT
Start: 2024-11-07 | End: 2024-11-07

## 2024-11-07 RX ORDER — PROPOFOL 10 MG/ML
INJECTION, EMULSION INTRAVENOUS CONTINUOUS PRN
Status: DISCONTINUED | OUTPATIENT
Start: 2024-11-07 | End: 2024-11-07

## 2024-11-07 RX ORDER — MIDAZOLAM HYDROCHLORIDE 1 MG/ML
INJECTION, SOLUTION INTRAMUSCULAR; INTRAVENOUS AS NEEDED
Status: DISCONTINUED | OUTPATIENT
Start: 2024-11-07 | End: 2024-11-07

## 2024-11-07 RX ORDER — LIDOCAINE HYDROCHLORIDE AND EPINEPHRINE 10; 10 UG/ML; MG/ML
INJECTION, SOLUTION INFILTRATION; PERINEURAL AS NEEDED
Status: DISCONTINUED | OUTPATIENT
Start: 2024-11-07 | End: 2024-11-07 | Stop reason: HOSPADM

## 2024-11-07 RX ORDER — ROCURONIUM BROMIDE 10 MG/ML
INJECTION, SOLUTION INTRAVENOUS AS NEEDED
Status: DISCONTINUED | OUTPATIENT
Start: 2024-11-07 | End: 2024-11-07

## 2024-11-07 RX ORDER — AMOXICILLIN 250 MG
1 CAPSULE ORAL NIGHTLY PRN
Status: DISCONTINUED | OUTPATIENT
Start: 2024-11-07 | End: 2024-11-14 | Stop reason: HOSPADM

## 2024-11-07 RX ORDER — LIDOCAINE HYDROCHLORIDE 10 MG/ML
INJECTION, SOLUTION INFILTRATION; PERINEURAL AS NEEDED
Status: DISCONTINUED | OUTPATIENT
Start: 2024-11-07 | End: 2024-11-07

## 2024-11-07 ASSESSMENT — PAIN SCALES - GENERAL: PAIN_LEVEL: 0

## 2024-11-07 ASSESSMENT — PAIN - FUNCTIONAL ASSESSMENT
PAIN_FUNCTIONAL_ASSESSMENT: CPOT (CRITICAL CARE PAIN OBSERVATION TOOL)

## 2024-11-07 ASSESSMENT — PAIN SCALES - WONG BAKER: WONGBAKER_NUMERICALRESPONSE: NO HURT

## 2024-11-07 NOTE — ANESTHESIA POSTPROCEDURE EVALUATION
Patient: Narda Malloy    Procedure Summary       Date: 11/07/24 Room / Location: [facility] OR 10 / Virtual [facility] OR    Anesthesia Start: 1236 Anesthesia Stop: 1316    Procedure: Creation Tracheostomy (Neck) Diagnosis:       Acute respiratory failure with hypoxia and hypercapnia (Multi)      (Acute respiratory failure with hypoxia and hypercapnia (Multi) [J96.01, J96.02])    Surgeons: Estiven Hurst MD Responsible Provider: David Jon MD    Anesthesia Type: general ASA Status: 3            Anesthesia Type: general    Vitals Value Taken Time   /60 11/07/24 1313   Temp 36 11/07/24 1316   Pulse 86 11/07/24 1315   Resp 19 11/07/24 1315   SpO2 95 % 11/07/24 1315   Vitals shown include unfiled device data.    Anesthesia Post Evaluation    Patient location during evaluation: PACU  Patient participation: complete - patient participated  Level of consciousness: awake  Pain score: 0  Pain management: adequate  Multimodal analgesia pain management approach  Airway patency: patent  Two or more strategies used to mitigate risk of obstructive sleep apnea  Cardiovascular status: acceptable  Respiratory status: acceptable  Hydration status: acceptable  Postoperative Nausea and Vomiting: none  Comments: No Nausea        There were no known notable events for this encounter.

## 2024-11-07 NOTE — NURSING NOTE
Upon rounding left upper arm dual lumen PICC with current CHG dressing dry and intact, IVF/meds infusing in both ports. Right chest dialysis catheter with dried blood to edge of dressing. Dressing changed per protocol using sterile technique, site without redness, edema or bleeding noted.

## 2024-11-07 NOTE — PROGRESS NOTES
[facility] Critical Care Medicine       Date:  11/7/2024  Patient:  Narda Malloy  YOB: 1955  MRN:  [id number]   Admit Date:  10/12/2024    Chief Complaint   Patient presents with    Altered Mental Status     History of Present Illness:  Narda Malloy is a 69 y.o. year old female patient with Past Medical History of L1-L3 lumbar laminectomy, T4-S1 revision, and fusion August 26th, T2DM, HTN, essential tremor, HLD, glaucoma, sarcoidosis of the lung who presented to  ED 10/12 after being found essentially unresponsive at her nursing facility [facility]. Per report from her , she has had a significant decline in her health since July 15th when she had a fall and became significantly weak. She has also had multiple infection complications since her back surgery in August requiring multiple I&Ds and long term antibiotic therapy. She went to the OR most recently on 9/28 for lumbar site infection wash out. Per chart review, she was discharged on IV vancomycin 1g and IV ceftriaxone 2d q24hrs through 11/12.     ED Course: Initial vital signs: /104 (109), HR 68, RR 20, SpO2 95% on 6L NC, temp 34.5C. Give 0.4mg of narcan with no improvement in mentation. Lab work-up remarkable for mild hyperkalemia (5.5), AMY 42/1.46, elevated alk phos, normocytic anemia 10.4/33, turbid appearing urine with mild hematuria and proteinuria and + leuk esterase, >50 WBCs. Urine drug screen positive for barbiturates. Triggered sepsis timer so she was given 3L NS. She was intubated for airway protection with 20mg etomidate and 100mg succinylcholine. BP dropped post-intubated and fluid resuscitation and she was subsequently started on levophed.          Interval ICU Events:  10/12: Pt arrived to ICU intubated and lightly sedated on low-dose versed. Eyes open, minimally responsive.      10/13: Received K cocktail last night for K 6.0, corrected appropriately. Levophed requirements mildly up, UOP decreasing.  Ordered albumin x2 this am with improvements in UOP and SBP. Will likely trial lasix this afternoon d/t hypervolemia.     10/14: Remains intubated with decreased mentation, only responsive to noxious stimuli. Trialed lasix TID for volume overload. Remains on levophed 0.01.     10/15: Mentation much improved. SAT/SBT successful so extubated. Given lasix x3, bumex x1, metolazone x1 with net negative fluid balance of 500mL -> started on bumex gtt.      10/16: O2 requirements significantly increased, NRB -> HFNC 40L 100% likely 2/2 mucus plugging.     10/17: No acute events overnight. Bumex gtt increased to 1mg/hr. CXR this am showing complete opacification of left hemithorax related to atelectasis vs pleural effusion. Remains on HFNC 40L/80%. Pigtail catheter placed with 1.2L drained immediately. Given albumin for hypotension.      10/18: ~2L output from left sided pigtail catheter since placement. Kidney function worsening and UOP declining, about 20cc/hr overnight. Will place NG tube today and start enteral nutrition and appetite and oral intake remains poor.      10/19: Patient with worsening hypoxic, hypercapnic respiratory failure - now requiring BIPAP support. Remains grossly volume overloaded with low UO. Started on vasopressors to augment BP for diuresis. 40 IV lasix + gtt started. Remains with poor nutritional status. Will re attempt NG later if respiratory status improves.      10/20: Patient stable on vent. Nephrology consulted for renal failure. Cr continues to uptrend however UO is increasing. No issues overnight.     10/21: No issues overnight. Remains stable on vent. Levo down to 0.01 mcg/kg/min. UO remains low. Nephrology following. Possible CRRT today?     10/22: Patient received 80 lasix and metalozone with minimal UO. Levo @ .02 and prop @ 10. Vent settings: 20/450/10/40%. Plan for CRRT today. Will SAT/SBT after CRRT. May change propofol to precedex.     10/23: Had episodes of bradycardia where HR  went to 30's which resolved with heating the patient. CRRT yesterday with about 1L removed. Currently on 0.03 of levo and 10 of prop. Cont daptomycin and ceftriaxone per ID. CXR improved. SAT/SBT. EP consulted for episodes of bradycardia.     10/24: Bradycardic episodes of HR in 40s. Currently on 0.03 of Levo, 10 of prop and 50 fentanyl. Tolerating CRRT. Place PICC today.     10/25: Did well with the SBT yesterday, plan for another one today. Continue CRRT. Hgb 7.0 this am, still requiting Levo 0.03, will transfuse 1 unit PRBCs. Remove chest tube today.     10/26: Chest tube removed last night, CXR improving, patient appears overall lethargic on sbt/sat, not ready to extubate, will complete 4/4/4 sbt/rest/sbt-> rest today and reassess tomorrow     10/27: Patient failed afternoon SBT, placed on rate overnight, net - 2.5L over previous 24 hours, will place on wean today.     10/29: Patient with high residual tube feeds overnight.  She did have an episode of vomiting.  Tube feeds placed on hold.  She was also afebrile overnight.  Will obtain a KUB to rule out ileus.  Sputum culture with Pseudomonas, discussed antibiotic plans with ID.  Will not do SBT with patient today.  Did discuss the setback with her , he did state moving forward that if she does not reach extubation he would be agreeable to a tracheostomy.      10/30: No significant events overnight.  Tube feeds resumed at trickle rate yesterday, tolerating overnight.  Will increase to goal today.  Dialysis this morning.  Patient improved from yesterday.  Plan for SBT today.  Will attempt to sit patient on side of bed today.    10/31: No significant events overnight. Tolerating tube feeds, tube feeds on hold this AM for SBT. Patient awake and alert this morning, improved from yesterday.  Tolerating SBT, answering yes or no questions.  Will extubate today.    11/1: Pt extubated yesterday to HFNC. Had worsened respiratory distress requiring bipap, wore bipap  overnight. Will trial back on HFNC when more awake. Dialysis scheduled for today. Will remove spinal sutures.     11/2: Unable to wean from bipap yesterday without immediate desat in 50-60s, increasingly lethargic and weak. Decision made to reintubate and this was discussed with  which he was ok with. Pt will need trach and peg as this was her 3rd intubation this admission and she's having persistent respiratory failure due to recurrent pleural effusions, even with iHD fluid removal and recurrent atelectasis with lobar collapse. Consulted to palliative care for communication to family about GOC and communication of expectations, risks, benefits of tracheostomy creation, peg tube placement, and LTAC admission. Discussed with neph, will run tablo today for additional fluid removal d/t recurrent intubation. Plan for ENT and surgery consult for trach and peg pending family meeting tmrw at 1pm.     11/3: OVN: Unsuccessful removal of incisional sutures of posterior surgery d/t skin overgrowth. DAY: Reached out to ortho spine surgical team about suture removal, awaiting to hear back. GOC discussion today, patient will remain DNR-CCA and family wants to pursue Trach/PEG placement (consults ordered). Off sedation and fent, transitioned to enteral narcotics for pain control.     11/4: Plan for peg placement today at bedside, tracheostomy creation Thursday. Will receive scheduled dialysis after peg placement.     11/5: Peg-tube placed yesterday without issue. Removed majority of sutures yesterday but had an episode of desaturation when turned on her right side, will plan to remove the rest today. 1 unit PRBC ordered for Hgb 6.9.     11/6: No significant overnight events.      11/7: Drop in Hgb this morning to 7.4, will transfuse 1 unit PRBC pre-operatively, tracheostomy scheduled for this afternoon. Bladder scan this morning with 270cc, will place anders and hold off on dialysis tomorrow to assess renal recovery.       Medical History:  Past Medical History:   Diagnosis Date    Degenerative myopia, bilateral     Diabetic neuropathy (Multi)     Difficult intubation 08/26/2024    Mac 3, grade 3, 1 attempt.  Glidescope/videolaryngoscopy recommended for future attempts.    DM type 2 (diabetes mellitus, type 2) (Multi)     Dry eye syndrome of bilateral lacrimal glands     Essential hypertension     Essential tremor     Glaucoma     Hyperlipidemia     Long term (current) use of insulin (Multi)     Low back pain     PONV (postoperative nausea and vomiting)     Primary open angle glaucoma of both eyes, severe stage     Repeated falls     Sarcoidosis of lung (Multi)     Spinal stenosis, lumbar region without neurogenic claudication     Weakness      Past Surgical History:   Procedure Laterality Date    BLEPHAROPLASTY  07/2022    BREAST SURGERY  05/20/2022    Breast lift    CARPAL TUNNEL RELEASE      CATARACT EXTRACTION W/  INTRAOCULAR LENS IMPLANT Bilateral     OD 08/04/2011 +8.5D,OS 08/04/2011 +8.50D    FOOT SURGERY      INSERTION / REMOVAL CRANIAL DBS GENERATOR      Placed 2017.  Removed 2018. part of wire left in head when everything removed    LUMBAR FUSION      L3-S1    PANRETINAL PHOTOCOAGULATION  2014    THORACIC FUSION  08/26/2024    T4-S1 fusion    VITRECTOMY Right 2013     Medications Prior to Admission   Medication Sig Dispense Refill Last Dose/Taking    acetaminophen (Tylenol) 500 mg tablet Take 2 tablets (1,000 mg) by mouth 3 times a day.   Unknown    ascorbic acid (Vitamin C) 500 mg tablet as directed Orally   Unknown    brimonidine (AlphaGAN) 0.2 % ophthalmic solution Administer 1 drop into both eyes 2 times a day.   Unknown    calcium carbonate-vitamin D3 500 mg-5 mcg (200 unit) tablet Take 1 tablet by mouth once daily.   Unknown    cefTRIAXone (Rocephin) 2 gram/50 mL IV Infuse 50 mL (2 g) at 100 mL/hr over 30 minutes into a venous catheter once every 24 hours. Once weekly labs CBC/diff, CMP, Vanc trough ESR, CRP fax to  Dr. Juarez [phone]. Stop date 11/12/24. 1950 mL 0     dextrose 50 % injection Infuse 25 mL (12.5 g) into a venous catheter every 15 minutes if needed (For blood glucose 41 to 70 mg/dL).       dextrose 50 % injection Infuse 50 mL (25 g) into a venous catheter every 15 minutes if needed (For blood glucose less than or equal to 40 mg/dL).       docusate sodium (Colace) 100 mg capsule Take 1 capsule (100 mg) by mouth 2 times a day.   Unknown    ezetimibe (Zetia) 10 mg tablet Take 1 tablet (10 mg) by mouth once daily. 90 tablet 3 Unknown    FreeStyle Lite Strips strip USE TO TEST 3 TIMES A DAY AS DIRECTED 300 each 2     glucagon (Glucagen) 1 mg injection Inject 1 mg into the muscle every 15 minutes if needed for low blood sugar - see comments (Hypoglycemia).       glucagon (Glucagen) 1 mg injection Inject 1 mg into the muscle every 15 minutes if needed for low blood sugar - see comments (Hypoglycemia).       heparin sodium,porcine (heparin, porcine,) 5,000 unit/mL injection Inject 1 mL (5,000 Units) under the skin every 8 hours.       insulin lispro (HumaLOG) 100 unit/mL injection Inject 0-15 Units under the skin 3 times daily (morning, midday, late afternoon). Take as directed per insulin instructions.Do not hold when patient is not eating, continue order as scheduled for hyperglycemia management.  Insulin Lispro Corrective Scale #3     Hypoglycemia protocol Call LIP unit(s) if Blood Glucose is between 0 - 70 mg/dL     0 unit(s) if Blood glucose is between    3 unit(s) if Blood glucose is between 151-200   6 unit(s) if Blood glucose is between 201-250   9 unit(s) if Blood glucose is between 251-300   12 unit(s) if Blood glucose is between 301-350   15 unit(s) if Blood glucose is between 351-400    Notify provider unit(s) if Blood Glucose is greater than 400 mg/dL       Lactobacillus acidophilus 100 mg (1 billion cell) capsule Take 1 capsule by mouth 2 times a day.   Unknown    latanoprost (Xalatan) 0.005 %  "ophthalmic solution Administer 1 drop into both eyes once daily at bedtime. 2.5 mL 5 Unknown    melatonin 5 mg tablet Take 1 tablet (5 mg) by mouth as needed at bedtime for sleep.   Unknown    methocarbamol (Robaxin) 500 mg tablet Take 1 tablet (500 mg) by mouth 3 times a day.   Unknown    multivitamin tablet Take 1 tablet by mouth once daily.   Unknown    ondansetron (Zofran) 4 mg/2 mL injection Infuse 2 mL (4 mg) into a venous catheter every 6 hours if needed for nausea or vomiting.       oxyCODONE (Roxicodone) 5 mg immediate release tablet Take 1 tablet (5 mg) by mouth every 6 hours if needed for severe pain (7 - 10) or moderate pain (4 - 6).       oxygen (O2) gas therapy Inhale 1 each continuously.       pantoprazole (ProtoNix) 40 mg EC tablet Take 1 tablet (40 mg) by mouth once daily in the morning. Take before meals. Do not crush, chew, or split.       pantoprazole (ProtoNix) 40 mg injection Infuse 40 mg into a venous catheter once daily in the morning. Take before meals. If unable to take PO.       pen needle, diabetic (PEN NEEDLE MISC) BD Altagracia- 4 mm X 32 G needle - as directed 4x a day sc 4 times per day       polyethylene glycol (Glycolax, Miralax) 17 gram packet Take 17 g by mouth once daily.   Unknown    primidone 125 mg tablet Take 125 mg by mouth 3 times a day.   Unknown    propranolol LA (Inderal LA) 60 mg 24 hr capsule Take 1 capsule (60 mg) by mouth early in the morning.. Hold for SBP < 110 mmhg, HR < 60 bpm.   Unknown    sennosides (Senokot) 8.6 mg tablet Take 1 tablet (8.6 mg) by mouth every 12 hours if needed for constipation.   Unknown    Sure Comfort Pen Needle 32 gauge x 5/32\" needle AS DIRECTED DAILY FOR 90 DAYS 100 each 11 Unknown    traZODone (Desyrel) 25 MG split tablet Take 1 half tablet (25 mg) by mouth once daily at bedtime.   Unknown    vancomycin (Vancocin) 1 gram/250 mL solution Infuse 250 mL (1 g) at 250 mL/hr over 60 minutes into a venous catheter every 12 hours. Once weekly labs " CBC/diff, CMP, Vanc trough ESR, CRP fax to Dr. Juarez [phone]. Stop date 11/12/24. 61094 mL 0      Erythromycin, Morphine, and Rosuvastatin  Social History     Tobacco Use    Smoking status: Former     Types: Cigarettes     Passive exposure: Past    Smokeless tobacco: Never   Vaping Use    Vaping status: Never Used   Substance Use Topics    Alcohol use: Not Currently    Drug use: Not Currently     Family History   Problem Relation Name Age of Onset    Multiple myeloma Mother      Cancer Mother      Other (CABG) Father      Pulmonary embolism Father      Heart disease Father      Breast cancer Sister          Stage II    Hypertension Sister      Diabetes Sister      No Known Problems Sister          x5    No Known Problems Brother          x4    No Known Problems Daughter         Review of Systems:  Unable to obtain ROS as pt is on ventilator, does shake head yes and no, mostly incomprehensible     Physical Exam:    Heart Rate:  []   Temp:  [36.2 °C (97.2 °F)-37.7 °C (99.9 °F)]   Resp:  [14-27]   BP: ()/()   Weight:  [112 kg (246 lb 4.1 oz)]   SpO2:  [92 %-100 %]     Physical Exam  Vitals reviewed.   Constitutional:       Appearance: She is overweight.      Interventions: She is intubated.   HENT:      Head: Normocephalic and atraumatic.      Right Ear: External ear normal.      Left Ear: External ear normal.      Nose: Nose normal.      Mouth/Throat:      Mouth: Mucous membranes are dry.      Comments: Age related dental decay  Eyes:      Conjunctiva/sclera: Conjunctivae normal.      Pupils: Pupils are equal, round, and reactive to light.      Comments: GARY, R4/L4, conjugate gaze, consensual response   Cardiovascular:      Rate and Rhythm: Normal rate and regular rhythm.      Pulses: Normal pulses.      Heart sounds: Normal heart sounds.      Comments: Upper extremity edema also noted bilat  Pulmonary:      Effort: She is intubated.      Breath sounds: Examination of the right-lower field  reveals decreased breath sounds. Examination of the left-lower field reveals decreased breath sounds. Decreased breath sounds present.      Comments: Breath sound decreased with no adventitious breath sounds  Abdominal:      General: Bowel sounds are decreased. There is no distension.      Palpations: Abdomen is soft.      Tenderness: There is no abdominal tenderness.   Musculoskeletal:      Right hand: Swelling present.      Left hand: Swelling present.      Right lower le+ Edema present.      Left lower le+ Edema present.   Skin:     General: Skin is warm.      Capillary Refill: Capillary refill takes less than 2 seconds.      Comments: Age related skin changes   Neurological:      Mental Status: She is easily aroused. She is confused.      GCS: GCS eye subscore is 4. GCS verbal subscore is 1. GCS motor subscore is 4.      Comments: Episodic following of simple motor commands, not at normal cognitive baseline       Objective:  I have reviewed all medications, laboratory results, and imaging pertinent for today's encounter    Assessment/Plan:    I am currently managing this critically ill patient for the following problems:    Neuro/Psych/Pain Ctrl/Sedation: (Hx: essential tremor)  Acute encephalopathy - likely 2/2 hypercapnia, & infection- resolving   CT head: Negative for acute findings   Urine tox positive for barbiturates consistent with primidone intake   - Pain Control: liquid oxy, scheduled acetaminophen, dilaudid for dressing changes PRN  - Sedation/analgesia: none, keep sedation minimized as able   - Home primidone continued. Will hold propanolol d/t hypotension  - CAM ICU qshift, sleep-wake hygiene, delirium precautions    Respiratory/ENT:  Acute hypoxic/hypercapnic respiratory failure - likely multifactorial and 2/2 HCAP, pl effusions, volume overload  Healthcare-associated pneumonia   Recurrent atelectasis   Ventilator-dependence   Pleural effusion -S/p left-sided pigtail placement 10/17,  removed 10/25  Intubated for 3 days extubated and re intubated on the 19th, currently day 11 of intubation  - Supplemental O2: intubated x3 times this admission   - Will wean O2 as tolerated to maintain SpO2 >92%  -- Will SBT tomorrow am and wean to trach collar as able   - Consider bronchoscopy, repeat thoracentesis if indicated   - Albuterol, mucomyst, hypertonic saline QID   -- Likely can begin to deescalate these tomorrow   - IPV per RT TID  - Aspiration precautions   - Respiratory culture with Pseudomonas, repeat culture with no growth   - Tracheostomy creation with ENT today  - [facility] care    Cardiovascular:  (Hx: diastolic heart failure, HTN, HLD)  Septic shock - resolved  Occlusive superficial venous thrombus of the left cephalic vein  Non-occlusive DVT right IJ vein   Acute on chronic diastolic heart failure  - US duplex b/l upper extremities-occlusive superficial vein thrombosis of left cephalic vein   - Continue midodrine   - TTE: EF 60-65%, mild/mod AV valve regurg  - Holding home propranolol (on for tremors)  - Continue home Zetia  - Continuous cardiac monitoring per ICU protocol  - EKGs PRN for ACS symptoms, arrhythmias   - Resume heparin gtt 6 hours after trach creation if Hgb stable or no overt bleeding   -- Can transition to eliquis closer to discharge   - Repeat right IJ duplex showing presence of acute DVT     GI:  Hx GERD  Peg-dependent - placed 11/4  - Diet: enteral feeding via NG at goal  - BR: waqas-colace, miralax PRN  - GI Prophylaxis: PPI  - Patient had a few episodes of diarrhea, sample sent, C-Diff negative     /Volume Status/Electrolytes:  Acute kidney injury - possibly ATN +/- medication-induced (vancomycin)  Anasarca   Hypoalbuminemia   Hyponatremia  Baseline Cr 0.8-1.0. BUN Cr 1.92 today   - Nephrology following: M/W/F iHD  - 270cc on bladder scan this morning, anders placed for accurate I/Os  -- Likely to hold off on dialysis tomorrow to assess for renal recovery   - Cr 1.34 this am  "  - Bladder scan daily   - Replete electrolytes to maintain K >4.0 and Mg >2.0  - Daily BMP, Mg, Phos  - Resumed FWF since hyponatremia corrected, can deescalate if hypoNa reoccurs     Heme/Onc: (Hx: normocytic anemia)  Occlusive LUE DVT  Non-occlusive R IJ DVT  - Therapeutic heparin infusion, will restart this evening   - Can plan for transition to eliquis after tracheostomy if no more planned procedures   - Repeat DVT US still showing presence of RIJ thrombus around catheter   - Continue iron and folate  -- 1 unit PRBC today   -- EPO not indicated   - Monitor for s/sx of bleeding   - Plan to transfuse if Hgb <7.0   - Daily CBC  - T&S updated 11/7    Endocrine: (Hx: DMII)  - SSI Q4  - Hypoglycemia protocol PRN    Infectious Disease:  Pseudomonas-Respiratory culture 10/29  Thoracolumbar surgical site infection - s/p I&D x 2. Recent MRSA bacteremia on extended course of IV antibiotics (vanc and rocephin -> 11/12)  Healthcare-associated pneumonia   CT lumbar spine 10/12: Unable to r/o abscess. Unable to do MRI d/t spinal hardware, \"metal in head\" per patient  Sputum culture 10/16: + pseudomonas   - ID following  - Continue Ceftriaxone for 7 days (10/23-11/12)  - Continue Daptomycin for 15 days (10/21-11/12)  - Cefepime and inhaled tobramycin courses completed   - PICC placed 10/24  - Monitor SIRS criteria    MSK:  - PT/OT- no need to hold PT/OT patient was at a rehab facility after lumbar laminectomy for PT/OT before this admission   - Spinal sutures removed     Ethics/Code Status:  DNR-CCA     :  DVT Prophylaxis: SCDs  GI Prophylaxis: PPI  Bowel Regimen: Lucia-colace and Miralax   Diet: Tube Feeds  CVC: PICC placed 10/24, permacath 11/5  S [place]: none  [place]: yes 11/7  Restraints: yes, can attempt to remove tomorrow or when comfortable with trach     Restraints indicated to maintain lines/tubes.  Alternative therapies have been attempted and have been ineffective.  Restrain with soft wrist restraints and " side rails up x4 until medical devices discontinued and/or patient able to participate with plan of care.       Critical Care Time:  41 minutes spent in preparing to see patient (I.e. review of medical records), evaluation of diagnostics (I.e. labs, imaging, etc.), documentation, discussing plan of care with patient/ family/ caregiver, and/ or coordination of care with multidisciplinary team. Time does not include completion of procedure time.     Kaleb Lam PA-C  Pulmonary and Critical Care Medicine   [facility]

## 2024-11-07 NOTE — CARE PLAN
RT present at rounds. Stable on current vent settings. Plan for trach today 11/07.     RT to follow.

## 2024-11-07 NOTE — CARE PLAN
The patient's goals for the shift include unable to assess    The clinical goals for the shift include Maintain hemodynamic stability; rest on vent      Problem: Safety - Adult  Goal: Free from fall injury  Outcome: Progressing     Problem: Discharge Planning  Goal: Discharge to home or other facility with appropriate resources  Outcome: Progressing     Problem: Chronic Conditions and Co-morbidities  Goal: Patient's chronic conditions and co-morbidity symptoms are monitored and maintained or improved  Outcome: Progressing     Problem: Diabetes  Goal: Increase stability of blood glucose readings by end of shift  Outcome: Progressing  Goal: Maintain electrolyte levels within acceptable range throughout shift  Outcome: Progressing  Goal: Maintain glucose levels >70mg/dl to <250mg/dl throughout shift  Outcome: Progressing  Goal: Learn about and adhere to nutrition recommendations by end of shift  Outcome: Progressing  Goal: Vital signs within normal range for age by end of shift  Outcome: Progressing  Goal: Increase self care and/or family involovement by end of shift  Outcome: Progressing  Goal: Receive DSME education by end of shift  Outcome: Progressing     Problem: Knowledge Deficit  Goal: Patient/family/caregiver demonstrates understanding of disease process, treatment plan, medications, and discharge instructions  Outcome: Progressing     Problem: Mechanical Ventilation  Goal: ET tube will be managed safely  Outcome: Progressing     Problem: Skin  Goal: Decreased wound size/increased tissue granulation at next dressing change  Outcome: Progressing  Flowsheets (Taken 11/6/2024 2304)  Decreased wound size/increased tissue granulation at next dressing change:   Promote sleep for wound healing   Protective dressings over bony prominences   Utilize specialty bed per algorithm  Goal: Participates in plan/prevention/treatment measures  Outcome: Progressing  Flowsheets (Taken 11/6/2024 2304)  Participates in  plan/prevention/treatment measures:   Discuss with provider PT/OT consult   Elevate heels  Goal: Prevent/manage excess moisture  Outcome: Progressing  Flowsheets (Taken 11/6/2024 2304)  Prevent/manage excess moisture:   Cleanse incontinence/protect with barrier cream   Moisturize dry skin   Follow provider orders for dressing changes   Monitor for/manage infection if present  Goal: Prevent/minimize sheer/friction injuries  Outcome: Progressing  Flowsheets (Taken 11/6/2024 2304)  Prevent/minimize sheer/friction injuries:   Complete micro-shifts as needed if patient unable. Adjust patient position to relieve pressure points, not a full turn   Increase activity/out of bed for meals   Use pull sheet   HOB 30 degrees or less   Turn/reposition every 2 hours/use positioning/transfer devices   Utilize specialty bed per algorithm  Goal: Promote/optimize nutrition  Outcome: Progressing  Flowsheets (Taken 11/6/2024 2304)  Promote/optimize nutrition:   Monitor/record intake including meals   Consume > 50% meals/supplements  Goal: Promote skin healing  Outcome: Progressing  Flowsheets (Taken 11/6/2024 2304)  Promote skin healing:   Assess skin/pad under line(s)/device(s)   Protective dressings over bony prominences   Turn/reposition every 2 hours/use positioning/transfer devices   Ensure correct size (line/device) and apply per  instructions   Rotate device position/do not position patient on device     Problem: Nutrition  Goal: Consume prescribed supplement  Outcome: Progressing  Goal: Nutrition support goals are met within 48 hrs  Outcome: Progressing  Goal: Nutrition support is meeting 75% of nutrient needs  Outcome: Progressing  Goal: BG  mg/dL  Outcome: Progressing  Goal: Lab values WNL  Outcome: Progressing  Goal: Electrolytes WNL  Outcome: Progressing  Goal: Promote healing  Outcome: Progressing  Goal: Maintain stable weight  Outcome: Progressing  Goal: Reduce weight from edema/fluid  Outcome:  Progressing     Problem: Respiratory  Goal: No signs of respiratory distress (eg. Use of accessory muscles. Peds grunting)  Outcome: Progressing  Goal: Clear secretions with interventions this shift  Outcome: Progressing  Goal: Minimize anxiety/maximize coping throughout shift  Outcome: Progressing  Goal: Minimal/no exertional discomfort or dyspnea this shift  Outcome: Progressing  Goal: Patent airway maintained this shift  Outcome: Progressing  Goal: Tolerate mechanical ventilation evidenced by VS/agitation level this shift  Outcome: Progressing  Goal: Tolerate pulmonary toileting this shift  Outcome: Progressing  Goal: Verbalize decreased shortness of breath this shift  Outcome: Progressing  Goal: Wean oxygen to maintain O2 saturation per order/standard this shift  Outcome: Progressing  Goal: Increase self care and/or family involvement in next 24 hours  Outcome: Progressing     Problem: Pain  Goal: Takes deep breaths with improved pain control throughout the shift  Outcome: Progressing  Goal: Turns in bed with improved pain control throughout the shift  Outcome: Progressing  Goal: Walks with improved pain control throughout the shift  Outcome: Progressing  Goal: Performs ADL's with improved pain control throughout shift  Outcome: Progressing  Goal: Participates in PT with improved pain control throughout the shift  Outcome: Progressing  Goal: Free from opioid side effects throughout the shift  Outcome: Progressing  Goal: Free from acute confusion related to pain meds throughout the shift  Outcome: Progressing     Problem: Fall/Injury  Goal: Not fall by end of shift  Outcome: Progressing  Goal: Be free from injury by end of the shift  Outcome: Progressing  Goal: Verbalize understanding of personal risk factors for fall in the hospital  Outcome: Progressing  Goal: Verbalize understanding of risk factor reduction measures to prevent injury from fall in the home  Outcome: Progressing  Goal: Use assistive devices by  end of the shift  Outcome: Progressing  Goal: Pace activities to prevent fatigue by end of the shift  Outcome: Progressing

## 2024-11-07 NOTE — CARE PLAN
The patient's goals for the shift include unable to assess    The clinical goals for the shift include remain hemodynamically stable, labs WNL, trach procedure without complications    Over the shift, the patient made progress toward the following goals.      Problem: Safety - Adult  Goal: Free from fall injury  Outcome: Progressing  Flowsheets (Taken 11/7/2024 1846)  Free from fall injury: Instruct family/caregiver on patient safety     Problem: Discharge Planning  Goal: Discharge to home or other facility with appropriate resources  Outcome: Progressing  Flowsheets (Taken 11/7/2024 1846)  Discharge to home or other facility with appropriate resources:   Identify barriers to discharge with patient and caregiver   Arrange for needed discharge resources and transportation as appropriate   Identify discharge learning needs (meds, wound care, etc)     Problem: Chronic Conditions and Co-morbidities  Goal: Patient's chronic conditions and co-morbidity symptoms are monitored and maintained or improved  Outcome: Progressing  Flowsheets (Taken 11/7/2024 1846)  Care Plan - Patient's Chronic Conditions and Co-Morbidity Symptoms are Monitored and Maintained or Improved:   Monitor and assess patient's chronic conditions and comorbid symptoms for stability, deterioration, or improvement   Collaborate with multidisciplinary team to address chronic and comorbid conditions and prevent exacerbation or deterioration   Update acute care plan with appropriate goals if chronic or comorbid symptoms are exacerbated and prevent overall improvement and discharge     Problem: Diabetes  Goal: Increase stability of blood glucose readings by end of shift  Outcome: Progressing  Flowsheets (Taken 11/7/2024 1846)  Increase stability of blood glucose readings by end of shift: Med administration/monitoring of effect  Goal: Maintain electrolyte levels within acceptable range throughout shift  Outcome: Progressing  Flowsheets (Taken 11/7/2024  1846)  Maintain electrolyte levels within acceptable range throughout shift:   Med administration/monitoring of effect   Monitor urine output  Goal: Maintain glucose levels >70mg/dl to <250mg/dl throughout shift  Outcome: Progressing  Flowsheets (Taken 11/7/2024 1846)  Maintain glucose levels >70mg/dl to <250mg/dl throughout shift: Med administration/monitoring of effect  Goal: Learn about and adhere to nutrition recommendations by end of shift  Outcome: Progressing  Flowsheets (Taken 11/7/2024 1846)  Learn about and adhere to nutrition recommendations by end of shift: Ensure/encourage compliance with appropriate diet  Goal: Vital signs within normal range for age by end of shift  Outcome: Progressing  Flowsheets (Taken 11/7/2024 1846)  Vital signs within normal range for age by end of shift: Med administration/monitoring of effect  Goal: Increase self care and/or family involovement by end of shift  Outcome: Progressing  Goal: Receive DSME education by end of shift  Outcome: Progressing  Flowsheets (Taken 11/7/2024 1846)  Receive DSME education by end of shift: Provide patient centered education on Diabetic Self Management Education     Problem: Knowledge Deficit  Goal: Patient/family/caregiver demonstrates understanding of disease process, treatment plan, medications, and discharge instructions  Outcome: Progressing  Flowsheets (Taken 11/7/2024 1846)  Patient/family/caregiver demonstrates understanding of disease process, treatment plan, medications, and discharge instructions:   Complete learning assessment and assess knowledge base   Provide teaching at level of understanding   Provide teaching via preferred learning methods     Problem: Skin  Goal: Decreased wound size/increased tissue granulation at next dressing change  Outcome: Progressing  Flowsheets (Taken 11/7/2024 1846)  Decreased wound size/increased tissue granulation at next dressing change:   Promote sleep for wound healing   Protective dressings  over bony prominences   Utilize specialty bed per algorithm  Goal: Participates in plan/prevention/treatment measures  Outcome: Progressing  Flowsheets (Taken 11/7/2024 1846)  Participates in plan/prevention/treatment measures: Elevate heels  Goal: Prevent/manage excess moisture  Outcome: Progressing  Flowsheets (Taken 11/7/2024 1846)  Prevent/manage excess moisture:   Cleanse incontinence/protect with barrier cream   Follow provider orders for dressing changes   Moisturize dry skin   Monitor for/manage infection if present  Goal: Prevent/minimize sheer/friction injuries  Outcome: Progressing  Flowsheets (Taken 11/7/2024 1846)  Prevent/minimize sheer/friction injuries:   Complete micro-shifts as needed if patient unable. Adjust patient position to relieve pressure points, not a full turn   HOB 30 degrees or less   Increase activity/out of bed for meals   Turn/reposition every 2 hours/use positioning/transfer devices   Use pull sheet   Utilize specialty bed per algorithm  Goal: Promote/optimize nutrition  Outcome: Progressing  Flowsheets (Taken 11/7/2024 1846)  Promote/optimize nutrition: Monitor/record intake including meals  Goal: Promote skin healing  Outcome: Progressing  Flowsheets (Taken 11/7/2024 1846)  Promote skin healing:   Assess skin/pad under line(s)/device(s)   Protective dressings over bony prominences   Ensure correct size (line/device) and apply per  instructions   Rotate device position/do not position patient on device   Turn/reposition every 2 hours/use positioning/transfer devices     Problem: Nutrition  Goal: Consume prescribed supplement  Outcome: Progressing  Goal: Nutrition support goals are met within 48 hrs  Outcome: Progressing  Goal: Nutrition support is meeting 75% of nutrient needs  Outcome: Progressing  Goal: BG  mg/dL  Outcome: Progressing  Goal: Lab values WNL  Outcome: Progressing  Goal: Electrolytes WNL  Outcome: Progressing  Goal: Promote healing  Outcome:  Progressing  Goal: Maintain stable weight  Outcome: Progressing  Goal: Reduce weight from edema/fluid  Outcome: Progressing     Problem: Respiratory  Goal: No signs of respiratory distress (eg. Use of accessory muscles. Peds grunting)  Outcome: Progressing  Goal: Clear secretions with interventions this shift  Outcome: Progressing  Flowsheets (Taken 11/7/2024 1846)  Clear secretions with interventions this shift:   Encourage/provide pulmonary hygiene/secretion clearance   Med administration/monitoring of effect   Suctioning  Goal: Minimize anxiety/maximize coping throughout shift  Outcome: Progressing  Flowsheets (Taken 11/7/2024 1846)  Minimize anxiety/maximize coping throughout shift:   Med administration/monitoring of effect   Monitor pain/anxiety level  Goal: Minimal/no exertional discomfort or dyspnea this shift  Outcome: Progressing  Flowsheets (Taken 11/7/2024 1846)  Minimal/no exertional discomfort or dyspnea this shift: Positioning to promote ventilation/comfort  Goal: Patent airway maintained this shift  Outcome: Progressing  Goal: Tolerate mechanical ventilation evidenced by VS/agitation level this shift  Outcome: Progressing  Goal: Tolerate pulmonary toileting this shift  Outcome: Progressing  Flowsheets (Taken 11/7/2024 1846)  Tolerate pulmonary toileting this shift: Positioning to promote ventilation/comfort  Goal: Verbalize decreased shortness of breath this shift  Outcome: Progressing  Flowsheets (Taken 11/7/2024 1846)  Verbalize decreased shortness of breath this shift:   Suctioning   Encourage/provide pulmonary hygiene/secretion clearance  Goal: Wean oxygen to maintain O2 saturation per order/standard this shift  Outcome: Progressing  Goal: Increase self care and/or family involvement in next 24 hours  Outcome: Progressing  Flowsheets (Taken 11/7/2024 1846)  Increase self care and/or family involvement in next 24 hours: Encourage activity/mobility     Problem: Pain  Goal: Takes deep breaths with  improved pain control throughout the shift  Outcome: Progressing  Goal: Turns in bed with improved pain control throughout the shift  Outcome: Progressing  Goal: Free from opioid side effects throughout the shift  Outcome: Progressing  Goal: Free from acute confusion related to pain meds throughout the shift  Outcome: Progressing     Problem: Fall/Injury  Goal: Not fall by end of shift  Outcome: Progressing  Goal: Be free from injury by end of the shift  Outcome: Progressing  Goal: Verbalize understanding of personal risk factors for fall in the hospital  Outcome: Progressing  Goal: Verbalize understanding of risk factor reduction measures to prevent injury from fall in the home  Outcome: Progressing    Goal: Pace activities to prevent fatigue by end of the shift  Outcome: Progressing     Problem: Mechanical Ventilation  Goal: Tracheostomy will be managed safely  Outcome: Progressing  Flowsheets (Taken 11/7/2024 3256)  Tracheostomy Will Be Managed Safely:   Utilize tracheostomy securing device and change as necessary to ensure tracheostomy is secured to patient   Support ventilator tubing to avoid pressure from drag of tubing   Keep ambu bag, mask, oxygen connection tubing, and extra tracheostomy at bedside and accompanying patient at all times   Utilize trach securing device         Over the shift, the patient did not make progress toward the following goals. Barriers to progression include: not appropriate to work with PT. Will re eval tomorrow.    Problem: Pain  Goal: Walks with improved pain control throughout the shift  Outcome: Not Progressing  Goal: Performs ADL's with improved pain control throughout shift  Outcome: Not Progressing  Goal: Participates in PT with improved pain control throughout the shift  Outcome: Not Progressing    Goal: Use assistive devices by end of the shift  Outcome: Not Progressing

## 2024-11-07 NOTE — PROGRESS NOTES
Occupational Therapy                 Therapy Communication Note    Patient Name: Narda Malloy  MRN: [id number]  Department: [facility] ICU  Room: 11/11-A  Today's Date: 11/7/2024     Discipline: Occupational Therapy    Missed Visit Reason: Missed Visit Reason:  (Patient currently receiving blood transfusion;  plans for patient to have surgery this date for tracheostomy.)    Missed Time: Cancel    Comment:

## 2024-11-07 NOTE — PROGRESS NOTES
INFECTIOUS DISEASES PROGRESS NOTE    Consulted / following patient for:  Respiratory failure/pneumonia/Pseudomonas in the sputum  Recent polymicrobial complicated postoperative lumbar wound infection, MRSA and Proteus  Acute kidney injury    Subjective   Interval History:   (On the ventilator, alert and apprehensive)    Objective   PHYSICAL EXAMINATION  Vital signs: Afebrile, no vasopressor agents  General: Intubated.  Not toxic, anxious  Lungs: Diminished, clear.  On ventilator with FiO2 stable, 40%  Heart:  S1, S2 normal  Abdomen:  Soft, nontender.  PEG tube in place and functioning  Extremities:  No cords, phlebitis, cellulitis.      Relevant Results  WBC: 7300  Creatinine: (Dialysis)  Pleural fluid: Transudate with 197 WBC, 56% neutrophils, protein 1.8, LDH 97, glucose 202  Microbiology:  Blood (11/3): Negative X2  Sputum (10/21): Normal neva, no MRSA  Sputum (10/28): Pseudomonas, pan-susceptible  Sputum (11/4): Pending   Pleural fluid (10/17): Negative  C. difficile PCR (10/30): Negative    Imaging:  CXR images (11/5) personally reviewed: Intubated.  Some increase in bilateral effusion and atelectasis      Assessment:  Sepsis -likely due to pneumonia, present on admission. Patient already on IV vancomycin and IV ceftriaxone at time of this admission for lumbar spinal infection.  Resolved with anti-Pseudomonas antimicrobial therapy.  Large transudative pleural effusion was tapped.  Had fever and purulent secretions in the evening of 10/28, Pseudomonas in sputum, treated for 5 days with aerosol tobramycin.  Briefly extubated, now reintubated without clinical or radiographic evidence for progressive pneumonia.  Remains on a schedule of thrice weekly hemodialysis.  Palliative Care consultation and notes reviewed and appreciated.  Underwent PEG placement on 11/4, tracheostomy planned for today.  Overall appears stable   Lumbar spine surgical site infection s/p I&D 9/28. Cultures + MRSA, Proteus.  Was to be on  vanco/ceftriaxone through 11/12. Followed by Dr. Brant Juarez.  Vancomycin has been changed to daptomycin because of AMY    Plan/Recommendations:  Continue daptomycin 700 mg every 48 hour until 11/12, dose after dialysis when a dose is due on a dialysis day  Continue ceftriaxone until 11/12       Andrew Malagon MD  [email or web address]  Office:  [phone]

## 2024-11-07 NOTE — ANESTHESIA PREPROCEDURE EVALUATION
Patient: Narda Malloy    Procedure Information       Date/Time: 11/07/24 1245    Procedure: Creation Tracheostomy (Neck)    Location: [facility] OR 10 / [facility]    Surgeons: Estiven Hurst MD            Relevant Problems   Cardiac   (+) Essential hypertension   (+) Hyperlipidemia      Neuro   (+) Depression with anxiety   (+) Lumbar radiculopathy      Endocrine   (+) Controlled type 2 diabetes mellitus with stable proliferative retinopathy of both eyes, without long-term current use of insulin   (+) DM w/o complication type II (Multi)   (+) Proliferative diabetic retinopathy (Multi)   (+) Type 2 diabetes mellitus with hyperglycemia (Multi)   (+) Type 2 diabetes mellitus with hyperglycemia, with long-term current use of insulin      Hematology   (+) Anemia      Musculoskeletal   (+) Displacement of lumbar intervertebral disc without myelopathy   (+) Kyphoscoliosis      HEENT   (+) Primary open-angle glaucoma, bilateral, severe stage      ID   (+) Postoperative infection, unspecified type, initial encounter   (+) Surgical site infection   (+) Surgical wound infection       Clinical information reviewed:   Tobacco  Allergies  Meds   Med Hx  Surg Hx   Fam Hx  Soc Hx        NPO Detail:  No data recorded     Physical Exam    Airway  Mallampati: unable to assess     Cardiovascular    Dental    Pulmonary    Abdominal      Other findings: ETT in situ.          Anesthesia Plan    History of general anesthesia?: yes  History of complications of general anesthesia?: yes    ASA 3     general     intravenous induction   Anesthetic plan and risks discussed with patient.    Plan discussed with CAA and CRNA.

## 2024-11-07 NOTE — PROGRESS NOTES
Physical Therapy                 Therapy Communication Note    Patient Name: Narda Malloy  MRN: [id number]  Department: [address] ICU  Room: 11/11-A  Today's Date: 11/7/2024     Discipline: Physical Therapy    Missed Visit Reason: Missed Visit Reason: Other (Comment) (Patient currently receiving blood transfusion;  plans for patient to have surgery this date for tacheostomy.)    Missed Time: Cancel

## 2024-11-07 NOTE — PROGRESS NOTES
"Narda Malloy is a 69 y.o. female on day 26 of admission presenting with Unresponsive.    Subjective   The patient is seen for acute kidney injury which is secondary to acute tubular necrosis patient remains intubated on the ventilator she did undergo insertion of permacath no overnight events noted she is off pressors scheduled for tracheostomy today       Objective     Physical Exam  Constitutional:       Comments: Patient is intubated on the ventilator   Neck:      Vascular: No carotid bruit.   Cardiovascular:      Rate and Rhythm: Normal rate and regular rhythm.      Heart sounds: No murmur heard.     No friction rub. No gallop.   Pulmonary:      Breath sounds: No wheezing, rhonchi or rales.   Chest:      Chest wall: No tenderness.   Abdominal:      General: There is no distension.      Tenderness: There is no abdominal tenderness. There is no guarding or rebound.   Musculoskeletal:         General: No swelling or tenderness.      Cervical back: Neck supple.      Right lower leg: Edema present.      Left lower leg: Edema present.   Lymphadenopathy:      Cervical: No cervical adenopathy.         Last Recorded Vitals  Blood pressure 132/74, pulse 86, temperature 37.4 °C (99.3 °F), temperature source Axillary, resp. rate 19, height 1.778 m (5' 10\"), weight 112 kg (246 lb 4.1 oz), SpO2 95%.    Intake/Output last 3 Shifts:  I/O last 3 completed shifts:  In: 3183.8 (28.5 mL/kg) [I.V.:1583.8 (14.2 mL/kg); Other:600; NG/GT:1000]  Out: 3600 (32.2 mL/kg) [Other:3600]  Weight: 111.7 kg     Current Facility-Administered Medications:     acetaminophen (Tylenol) oral liquid 650 mg, 650 mg, oral, q6h, Francesco Carbajal PA-C, 650 mg at 11/06/24 2200    acetylcysteine (Mucomyst) 200 mg/mL (20 %) nebulizer solution 600 mg, 3 mL, nebulization, 4x daily, Kaleb Lam PA-C, 600 mg at 11/07/24 0751    albuterol 2.5 mg /3 mL (0.083 %) nebulizer solution 3 mL, 3 mL, nebulization, 4x daily, Kaleb Lam PA-C, 3 mL at 11/07/24 " 0751    alteplase (Cathflo Activase) injection 2 mg, 2 mg, intra-catheter, PRN, Kaleb Lam PA-C    brimonidine (AlphaGAN) 0.2 % ophthalmic solution 1 drop, 1 drop, Both Eyes, BID, Kaleb Lam PA-C, 1 drop at 11/07/24 0845    [Held by provider] calcium carbonate-vitamin D3 500 mg-5 mcg (200 unit) per tablet 1 tablet, 1 tablet, oral, Daily, Kaleb Lam PA-C, 1 tablet at 10/18/24 0930    cefTRIAXone (Rocephin) 2 g in dextrose (iso) IV 50 mL, 2 g, intravenous, q24h, Kaleb Lam PA-C, Stopped at 11/07/24 0906    DAPTOmycin (Cubicin) 700 mg in sodium chloride 0.9%  mL, 700 mg, intravenous, q48h, Andrew Malagon MD, Stopped at 11/07/24 0937    dextrose 50 % injection 12.5 g, 12.5 g, intravenous, q15 min PRN, Kaleb Lam PA-C    dextrose 50 % injection 25 g, 25 g, intravenous, q15 min PRN, Kaleb Lam PA-C    ezetimibe (Zetia) tablet 10 mg, 10 mg, oral, Daily, Kaleb Lam PA-C, 10 mg at 11/07/24 0844    ferrous sulfate syrup 60 mg of iron, 60 mg of iron, oral, Daily, RUTH Doe, 60 mg of iron at 11/07/24 0845    folic acid (Folvite) tablet 1 mg, 1 mg, oral, Daily, RUTH Doe, 1 mg at 11/07/24 0845    glucagon (Glucagen) injection 1 mg, 1 mg, intramuscular, q15 min PRN, Kaleb Lam PA-C    glucagon (Glucagen) injection 1 mg, 1 mg, intramuscular, q15 min PRN, Kaleb Lam PA-C    heparin (porcine) injection 3,000-6,000 Units, 3,000-6,000 Units, intravenous, PRN, RUTH Doe    heparin (porcine) injection 3,000-6,000 Units, 3,000-6,000 Units, intravenous, PRN, Anna Marie Shook MD    heparin 1,000 unit/mL injection 1,000 Units, 1,000 Units, intra-catheter, After Dialysis, Imer Cheung MD, 1,900 Units at 11/06/24 1742    heparin 1,000 unit/mL injection 1,000 Units, 1,000 Units, intra-catheter, After Dialysis, Imer Cheung MD, 1,900 Units at 11/06/24 1742    [Held by provider] heparin 25,000 Units in dextrose 5% 250 mL (100  Units/mL) infusion (premix), 0-4,500 Units/hr, intravenous, Continuous, RUTH Spring, Stopped at 11/07/24 0359    heparin flush 10 unit/mL syringe 50 Units, 50 Units, intra-catheter, PRN, Kaleb Lam PA-C, 50 Units at 10/19/24 2313    honey (Medihoney) topical gel, , Topical, Daily, RUTH Salgado, Given at 11/06/24 0901    HYDROmorphone (Dilaudid) injection 0.4 mg, 0.4 mg, intravenous, q2h PRN, Kaleb Lam PA-C, 0.4 mg at 11/05/24 1739    hydrOXYzine HCL (Atarax) tablet 25 mg, 25 mg, oral, Once, Francesco Carbajal PA-C    insulin lispro (HumaLOG) injection 0-15 Units, 0-15 Units, subcutaneous, q4h, Lb Dodd PA-C, 3 Units at 11/07/24 0002    latanoprost (Xalatan) 0.005 % ophthalmic solution 1 drop, 1 drop, Both Eyes, Nightly, Kaleb Lam PA-C, 1 drop at 11/06/24 2150    midodrine (Proamatine) tablet 10 mg, 10 mg, oral, q8h, RUTH Salgado, 10 mg at 11/07/24 0845    oxyCODONE (Roxicodone) solution 5 mg, 5 mg, oral, q4h PRN, Francesco Carbajal PA-C    oxyCODONE (Roxicodone) solution 7.5 mg, 7.5 mg, oral, q4h PRN, Francesco Carbajal PA-C    oxygen (O2) therapy, , inhalation, Continuous - Inhalation, RUTH Salgado, 40 percent at 11/07/24 0755    pantoprazole (ProtoNix) EC tablet 40 mg, 40 mg, oral, Daily before breakfast **OR** pantoprazole (ProtoNix) injection 40 mg, 40 mg, intravenous, Daily before breakfast, RUTH aSlas, 40 mg at 11/07/24 0659    polyethylene glycol (Glycolax, Miralax) packet 17 g, 17 g, oral, Daily PRN, Sweta Diaz APRN-CNP    potassium phosphates 15 mmol in dextrose 5% 250 mL IV, 15 mmol, intravenous, Once, Francesco Carbajal PA-C, Last Rate: 63.8 mL/hr at 11/07/24 0821, 15 mmol at 11/07/24 0821    potassium, sodium phosphates (Phos-NaK) 280-160-250 mg packet 1 packet, 1 packet, oral, With meals & nightly, Sabino Matos, APRN-CNP, 1 packet at 11/07/24 0845    primidone (Mysoline) tablet 125 mg, 125 mg, oral, Nightly,  Kaleb Lam PA-C, 125 mg at 11/06/24 2150    [Held by provider] propranolol LA (Inderal LA) 24 hr capsule 60 mg, 60 mg, oral, Daily, Kaleb Lam PA-C, 60 mg at 10/19/24 0643    sennosides-docusate sodium (Lucia-Colace) 8.6-50 mg per tablet 1 tablet, 1 tablet, oral, Nightly, LEONEL Doe-CNP, 1 tablet at 11/06/24 2150    sodium chloride 3 % nebulizer solution 3 mL, 3 mL, nebulization, 4x daily, Kaleb Lam PA-C, 3 mL at 11/07/24 0755   Relevant Results    Results for orders placed or performed during the hospital encounter of 10/12/24 (from the past 96 hours)   POCT GLUCOSE   Result Value Ref Range    POCT Glucose 173 (H) 74 - 99 mg/dL   aPTT   Result Value Ref Range    aPTT 60.5 (H) 22.0 - 32.5 seconds   POCT GLUCOSE   Result Value Ref Range    POCT Glucose 158 (H) 74 - 99 mg/dL   POCT GLUCOSE   Result Value Ref Range    POCT Glucose 150 (H) 74 - 99 mg/dL   Renal function panel   Result Value Ref Range    Glucose 172 (H) 74 - 99 mg/dL    Sodium 133 (L) 136 - 145 mmol/L    Potassium 4.2 3.5 - 5.3 mmol/L    Chloride 99 98 - 107 mmol/L    Bicarbonate 28 21 - 32 mmol/L    Anion Gap 10 10 - 20 mmol/L    Urea Nitrogen 20 6 - 23 mg/dL    Creatinine 1.71 (H) 0.50 - 1.05 mg/dL    eGFR 32 (L) >60 mL/min/1.73m*2    Calcium 7.6 (L) 8.6 - 10.3 mg/dL    Phosphorus 2.6 2.5 - 4.9 mg/dL    Albumin 2.2 (L) 3.4 - 5.0 g/dL   Calcium, ionized   Result Value Ref Range    POCT Calcium, Ionized 1.10 1.1 - 1.33 mmol/L   Magnesium   Result Value Ref Range    Magnesium 2.21 1.60 - 2.40 mg/dL   POCT GLUCOSE   Result Value Ref Range    POCT Glucose 181 (H) 74 - 99 mg/dL   Respiratory Culture/Smear    Specimen: Tracheal Aspirate; Fluid   Result Value Ref Range    Respiratory Culture/Smear Normal throat neva     Gram Stain (3+) Moderate Polymorphonuclear leukocytes     Gram Stain No organisms seen    POCT GLUCOSE   Result Value Ref Range    POCT Glucose 149 (H) 74 - 99 mg/dL   CBC and Auto Differential   Result Value Ref  Range    WBC 5.3 4.4 - 11.3 x10*3/uL    nRBC 0.0 0.0 - 0.0 /100 WBCs    RBC 2.12 (L) 4.00 - 5.20 x10*6/uL    Hemoglobin 6.6 (L) 12.0 - 16.0 g/dL    Hematocrit 20.9 (L) 36.0 - 46.0 %    MCV 99 80 - 100 fL    MCH 31.1 26.0 - 34.0 pg    MCHC 31.6 (L) 32.0 - 36.0 g/dL    RDW 19.2 (H) 11.5 - 14.5 %    Platelets 202 150 - 450 x10*3/uL    Neutrophils % 76.7 40.0 - 80.0 %    Immature Granulocytes %, Automated 1.1 (H) 0.0 - 0.9 %    Lymphocytes % 14.4 13.0 - 44.0 %    Monocytes % 6.1 2.0 - 10.0 %    Eosinophils % 1.1 0.0 - 6.0 %    Basophils % 0.6 0.0 - 2.0 %    Neutrophils Absolute 4.04 1.20 - 7.70 x10*3/uL    Immature Granulocytes Absolute, Automated 0.06 0.00 - 0.70 x10*3/uL    Lymphocytes Absolute 0.76 (L) 1.20 - 4.80 x10*3/uL    Monocytes Absolute 0.32 0.10 - 1.00 x10*3/uL    Eosinophils Absolute 0.06 0.00 - 0.70 x10*3/uL    Basophils Absolute 0.03 0.00 - 0.10 x10*3/uL   Hepatic function panel   Result Value Ref Range    Albumin 2.2 (L) 3.4 - 5.0 g/dL    Bilirubin, Total 0.3 0.0 - 1.2 mg/dL    Bilirubin, Direct 0.1 0.0 - 0.3 mg/dL    Alkaline Phosphatase 106 33 - 136 U/L    ALT 8 7 - 45 U/L    AST 10 9 - 39 U/L    Total Protein 5.2 (L) 6.4 - 8.2 g/dL   Magnesium   Result Value Ref Range    Magnesium 2.17 1.60 - 2.40 mg/dL   Type and screen   Result Value Ref Range    ABO TYPE A     Rh TYPE NEG     ANTIBODY SCREEN NEG    Phosphorus   Result Value Ref Range    Phosphorus 3.0 2.5 - 4.9 mg/dL   Basic Metabolic Panel   Result Value Ref Range    Glucose 142 (H) 74 - 99 mg/dL    Sodium 134 (L) 136 - 145 mmol/L    Potassium 4.3 3.5 - 5.3 mmol/L    Chloride 99 98 - 107 mmol/L    Bicarbonate 29 21 - 32 mmol/L    Anion Gap 10 10 - 20 mmol/L    Urea Nitrogen 21 6 - 23 mg/dL    Creatinine 1.88 (H) 0.50 - 1.05 mg/dL    eGFR 29 (L) >60 mL/min/1.73m*2    Calcium 7.7 (L) 8.6 - 10.3 mg/dL   aPTT   Result Value Ref Range    aPTT 55.5 (H) 22.0 - 32.5 seconds   Morphology   Result Value Ref Range    RBC Morphology See Below      Polychromasia Mild     Ovalocytes Few     Lexus Cells Few    Creatine Kinase   Result Value Ref Range    Creatine Kinase 42 0 - 215 U/L   Prepare RBC: 1 Units   Result Value Ref Range    PRODUCT CODE [id number]     Unit Number [id number]     Unit ABO A     Unit RH NEG     XM INTEP COMP     Dispense Status RE     Blood Expiration Date 11/27/2024 11:59:00 PM EST     PRODUCT BLOOD TYPE 0600     UNIT VOLUME 350    POCT GLUCOSE   Result Value Ref Range    POCT Glucose 159 (H) 74 - 99 mg/dL   POCT GLUCOSE   Result Value Ref Range    POCT Glucose 170 (H) 74 - 99 mg/dL   CBC   Result Value Ref Range    WBC 5.7 4.4 - 11.3 x10*3/uL    nRBC 0.0 0.0 - 0.0 /100 WBCs    RBC 2.36 (L) 4.00 - 5.20 x10*6/uL    Hemoglobin 7.2 (L) 12.0 - 16.0 g/dL    Hematocrit 23.2 (L) 36.0 - 46.0 %    MCV 98 80 - 100 fL    MCH 30.5 26.0 - 34.0 pg    MCHC 31.0 (L) 32.0 - 36.0 g/dL    RDW 19.1 (H) 11.5 - 14.5 %    Platelets 240 150 - 450 x10*3/uL   Blood Gas Venous Full Panel   Result Value Ref Range    POCT pH, Venous 7.42 7.33 - 7.43 pH    POCT pCO2, Venous 44 41 - 51 mm Hg    POCT pO2, Venous 45 35 - 45 mm Hg    POCT SO2, Venous 81 (H) 45 - 75 %    POCT Oxy Hemoglobin, Venous 79.5 (H) 45.0 - 75.0 %    POCT Hematocrit Calculated, Venous 22.0 (L) 36.0 - 46.0 %    POCT Sodium, Venous 130 (L) 136 - 145 mmol/L    POCT Potassium, Venous 4.8 3.5 - 5.3 mmol/L    POCT Chloride, Venous 100 98 - 107 mmol/L    POCT Ionized Calicum, Venous 1.14 1.10 - 1.33 mmol/L    POCT Glucose, Venous 166 (H) 74 - 99 mg/dL    POCT Lactate, Venous 0.7 0.4 - 2.0 mmol/L    POCT Base Excess, Venous 3.6 (H) -2.0 - 3.0 mmol/L    POCT HCO3 Calculated, Venous 28.5 (H) 22.0 - 26.0 mmol/L    POCT Hemoglobin, Venous 7.3 (L) 12.0 - 16.0 g/dL    POCT Anion Gap, Venous 6.0 (L) 10.0 - 25.0 mmol/L    Patient Temperature 37.0 degrees Celsius    FiO2 40 %   POCT GLUCOSE   Result Value Ref Range    POCT Glucose 136 (H) 74 - 99 mg/dL   POCT GLUCOSE   Result Value Ref Range    POCT Glucose 117  (H) 74 - 99 mg/dL   POCT GLUCOSE   Result Value Ref Range    POCT Glucose 112 (H) 74 - 99 mg/dL   POCT GLUCOSE   Result Value Ref Range    POCT Glucose 121 (H) 74 - 99 mg/dL   CBC and Auto Differential   Result Value Ref Range    WBC 7.6 4.4 - 11.3 x10*3/uL    nRBC 0.0 0.0 - 0.0 /100 WBCs    RBC 2.25 (L) 4.00 - 5.20 x10*6/uL    Hemoglobin 6.9 (L) 12.0 - 16.0 g/dL    Hematocrit 21.6 (L) 36.0 - 46.0 %    MCV 96 80 - 100 fL    MCH 30.7 26.0 - 34.0 pg    MCHC 31.9 (L) 32.0 - 36.0 g/dL    RDW 19.3 (H) 11.5 - 14.5 %    Platelets 250 150 - 450 x10*3/uL    Neutrophils % 85.2 40.0 - 80.0 %    Immature Granulocytes %, Automated 1.1 (H) 0.0 - 0.9 %    Lymphocytes % 7.8 13.0 - 44.0 %    Monocytes % 4.7 2.0 - 10.0 %    Eosinophils % 0.8 0.0 - 6.0 %    Basophils % 0.4 0.0 - 2.0 %    Neutrophils Absolute 6.49 1.20 - 7.70 x10*3/uL    Immature Granulocytes Absolute, Automated 0.08 0.00 - 0.70 x10*3/uL    Lymphocytes Absolute 0.59 (L) 1.20 - 4.80 x10*3/uL    Monocytes Absolute 0.36 0.10 - 1.00 x10*3/uL    Eosinophils Absolute 0.06 0.00 - 0.70 x10*3/uL    Basophils Absolute 0.03 0.00 - 0.10 x10*3/uL   Hepatic function panel   Result Value Ref Range    Albumin 2.3 (L) 3.4 - 5.0 g/dL    Bilirubin, Total 0.3 0.0 - 1.2 mg/dL    Bilirubin, Direct 0.1 0.0 - 0.3 mg/dL    Alkaline Phosphatase 111 33 - 136 U/L    ALT 9 7 - 45 U/L    AST 12 9 - 39 U/L    Total Protein 5.5 (L) 6.4 - 8.2 g/dL   Magnesium   Result Value Ref Range    Magnesium 2.07 1.60 - 2.40 mg/dL   Phosphorus   Result Value Ref Range    Phosphorus 2.6 2.5 - 4.9 mg/dL   Basic Metabolic Panel   Result Value Ref Range    Glucose 123 (H) 74 - 99 mg/dL    Sodium 133 (L) 136 - 145 mmol/L    Potassium 3.9 3.5 - 5.3 mmol/L    Chloride 97 (L) 98 - 107 mmol/L    Bicarbonate 28 21 - 32 mmol/L    Anion Gap 12 10 - 20 mmol/L    Urea Nitrogen 17 6 - 23 mg/dL    Creatinine 1.50 (H) 0.50 - 1.05 mg/dL    eGFR 38 (L) >60 mL/min/1.73m*2    Calcium 7.6 (L) 8.6 - 10.3 mg/dL   Morphology   Result  Value Ref Range    RBC Morphology See Below     Polychromasia Mild     Ovalocytes Few    Iron and TIBC   Result Value Ref Range    Iron 23 (L) 35 - 150 ug/dL    UIBC 84 (L) 110 - 370 ug/dL    TIBC 107 (L) 240 - 445 ug/dL    % Saturation 21 (L) 25 - 45 %   Ferritin   Result Value Ref Range    Ferritin 479 (H) 8 - 150 ng/mL   Prepare RBC: 1 Units   Result Value Ref Range    PRODUCT CODE [id number]     Unit Number [id number]     Unit ABO O     Unit RH NEG     XM INTEP COMP     Dispense Status TR     Blood Expiration Date 11/11/2024 11:59:00 PM EST     PRODUCT BLOOD TYPE      UNIT VOLUME 280    POCT GLUCOSE   Result Value Ref Range    POCT Glucose 162 (H) 74 - 99 mg/dL   POCT GLUCOSE   Result Value Ref Range    POCT Glucose 150 (H) 74 - 99 mg/dL   POCT GLUCOSE   Result Value Ref Range    POCT Glucose 160 (H) 74 - 99 mg/dL   Lactate dehydrogenase   Result Value Ref Range     84 - 246 U/L   CBC and Auto Differential   Result Value Ref Range    WBC 7.2 4.4 - 11.3 x10*3/uL    nRBC 0.0 0.0 - 0.0 /100 WBCs    RBC 2.73 (L) 4.00 - 5.20 x10*6/uL    Hemoglobin 8.5 (L) 12.0 - 16.0 g/dL    Hematocrit 25.7 (L) 36.0 - 46.0 %    MCV 94 80 - 100 fL    MCH 31.1 26.0 - 34.0 pg    MCHC 33.1 32.0 - 36.0 g/dL    RDW 20.4 (H) 11.5 - 14.5 %    Platelets 252 150 - 450 x10*3/uL    Neutrophils % 83.6 40.0 - 80.0 %    Immature Granulocytes %, Automated 0.6 0.0 - 0.9 %    Lymphocytes % 9.6 13.0 - 44.0 %    Monocytes % 5.4 2.0 - 10.0 %    Eosinophils % 0.4 0.0 - 6.0 %    Basophils % 0.4 0.0 - 2.0 %    Neutrophils Absolute 6.03 1.20 - 7.70 x10*3/uL    Immature Granulocytes Absolute, Automated 0.04 0.00 - 0.70 x10*3/uL    Lymphocytes Absolute 0.69 (L) 1.20 - 4.80 x10*3/uL    Monocytes Absolute 0.39 0.10 - 1.00 x10*3/uL    Eosinophils Absolute 0.03 0.00 - 0.70 x10*3/uL    Basophils Absolute 0.03 0.00 - 0.10 x10*3/uL   Lactate   Result Value Ref Range    Lactate 0.8 0.4 - 2.0 mmol/L   Morphology   Result Value Ref Range    RBC Morphology No  significant RBC morphology present    POCT GLUCOSE   Result Value Ref Range    POCT Glucose 146 (H) 74 - 99 mg/dL   aPTT   Result Value Ref Range    aPTT 28.8 22.0 - 32.5 seconds   CBC   Result Value Ref Range    WBC 8.1 4.4 - 11.3 x10*3/uL    nRBC 0.0 0.0 - 0.0 /100 WBCs    RBC 2.66 (L) 4.00 - 5.20 x10*6/uL    Hemoglobin 8.2 (L) 12.0 - 16.0 g/dL    Hematocrit 25.2 (L) 36.0 - 46.0 %    MCV 95 80 - 100 fL    MCH 30.8 26.0 - 34.0 pg    MCHC 32.5 32.0 - 36.0 g/dL    RDW 20.4 (H) 11.5 - 14.5 %    Platelets 257 150 - 450 x10*3/uL   Folate   Result Value Ref Range    Folate, Serum 15.5 >5.0 ng/mL   Vitamin B12   Result Value Ref Range    Vitamin B12 1,152 (H) 211 - 911 pg/mL   POCT GLUCOSE   Result Value Ref Range    POCT Glucose 194 (H) 74 - 99 mg/dL   aPTT   Result Value Ref Range    aPTT 79.1 (H) 22.0 - 32.5 seconds   CBC and Auto Differential   Result Value Ref Range    WBC 8.5 4.4 - 11.3 x10*3/uL    nRBC 0.0 0.0 - 0.0 /100 WBCs    RBC 2.62 (L) 4.00 - 5.20 x10*6/uL    Hemoglobin 8.0 (L) 12.0 - 16.0 g/dL    Hematocrit 24.5 (L) 36.0 - 46.0 %    MCV 94 80 - 100 fL    MCH 30.5 26.0 - 34.0 pg    MCHC 32.7 32.0 - 36.0 g/dL    RDW 20.1 (H) 11.5 - 14.5 %    Platelets 256 150 - 450 x10*3/uL    Neutrophils % 84.9 40.0 - 80.0 %    Immature Granulocytes %, Automated 1.1 (H) 0.0 - 0.9 %    Lymphocytes % 7.8 13.0 - 44.0 %    Monocytes % 4.9 2.0 - 10.0 %    Eosinophils % 0.8 0.0 - 6.0 %    Basophils % 0.5 0.0 - 2.0 %    Neutrophils Absolute 7.22 1.20 - 7.70 x10*3/uL    Immature Granulocytes Absolute, Automated 0.09 0.00 - 0.70 x10*3/uL    Lymphocytes Absolute 0.66 (L) 1.20 - 4.80 x10*3/uL    Monocytes Absolute 0.42 0.10 - 1.00 x10*3/uL    Eosinophils Absolute 0.07 0.00 - 0.70 x10*3/uL    Basophils Absolute 0.04 0.00 - 0.10 x10*3/uL   Hepatic function panel   Result Value Ref Range    Albumin 2.4 (L) 3.4 - 5.0 g/dL    Bilirubin, Total 0.5 0.0 - 1.2 mg/dL    Bilirubin, Direct 0.1 0.0 - 0.3 mg/dL    Alkaline Phosphatase 126 33 - 136  U/L    ALT 10 7 - 45 U/L    AST 12 9 - 39 U/L    Total Protein 5.9 (L) 6.4 - 8.2 g/dL   Magnesium   Result Value Ref Range    Magnesium 2.13 1.60 - 2.40 mg/dL   Phosphorus   Result Value Ref Range    Phosphorus 3.1 2.5 - 4.9 mg/dL   Basic Metabolic Panel   Result Value Ref Range    Glucose 161 (H) 74 - 99 mg/dL    Sodium 134 (L) 136 - 145 mmol/L    Potassium 4.0 3.5 - 5.3 mmol/L    Chloride 98 98 - 107 mmol/L    Bicarbonate 29 21 - 32 mmol/L    Anion Gap 11 10 - 20 mmol/L    Urea Nitrogen 29 (H) 6 - 23 mg/dL    Creatinine 1.88 (H) 0.50 - 1.05 mg/dL    eGFR 29 (L) >60 mL/min/1.73m*2    Calcium 7.8 (L) 8.6 - 10.3 mg/dL   POCT GLUCOSE   Result Value Ref Range    POCT Glucose 107 (H) 74 - 99 mg/dL   Morphology   Result Value Ref Range    RBC Morphology No significant RBC morphology present    Blood Gas Venous Full Panel   Result Value Ref Range    POCT pH, Venous 7.43 7.33 - 7.43 pH    POCT pCO2, Venous 44 41 - 51 mm Hg    POCT pO2, Venous 42 35 - 45 mm Hg    POCT SO2, Venous 76 (H) 45 - 75 %    POCT Oxy Hemoglobin, Venous 75.0 45.0 - 75.0 %    POCT Hematocrit Calculated, Venous 25.0 (L) 36.0 - 46.0 %    POCT Sodium, Venous 133 (L) 136 - 145 mmol/L    POCT Potassium, Venous 4.0 3.5 - 5.3 mmol/L    POCT Chloride, Venous 98 98 - 107 mmol/L    POCT Ionized Calicum, Venous 1.16 1.10 - 1.33 mmol/L    POCT Glucose, Venous 163 (H) 74 - 99 mg/dL    POCT Lactate, Venous 0.9 0.4 - 2.0 mmol/L    POCT Base Excess, Venous 4.4 (H) -2.0 - 3.0 mmol/L    POCT HCO3 Calculated, Venous 29.2 (H) 22.0 - 26.0 mmol/L    POCT Hemoglobin, Venous 8.2 (L) 12.0 - 16.0 g/dL    POCT Anion Gap, Venous 10.0 10.0 - 25.0 mmol/L    Patient Temperature 37.0 degrees Celsius    FiO2 40 %   POCT GLUCOSE   Result Value Ref Range    POCT Glucose 186 (H) 74 - 99 mg/dL   aPTT   Result Value Ref Range    aPTT 62.4 (H) 22.0 - 32.5 seconds   PST Top   Result Value Ref Range    Extra Tube Hold for add-ons.    Procalcitonin   Result Value Ref Range    Procalcitonin  0.42 (H) <=0.07 ng/mL   POCT GLUCOSE   Result Value Ref Range    POCT Glucose 146 (H) 74 - 99 mg/dL   POCT GLUCOSE   Result Value Ref Range    POCT Glucose 159 (H) 74 - 99 mg/dL   POCT GLUCOSE   Result Value Ref Range    POCT Glucose 183 (H) 74 - 99 mg/dL   POCT GLUCOSE   Result Value Ref Range    POCT Glucose 167 (H) 74 - 99 mg/dL   CBC and Auto Differential   Result Value Ref Range    WBC 7.3 4.4 - 11.3 x10*3/uL    nRBC 0.0 0.0 - 0.0 /100 WBCs    RBC 2.40 (L) 4.00 - 5.20 x10*6/uL    Hemoglobin 7.4 (L) 12.0 - 16.0 g/dL    Hematocrit 23.2 (L) 36.0 - 46.0 %    MCV 97 80 - 100 fL    MCH 30.8 26.0 - 34.0 pg    MCHC 31.9 (L) 32.0 - 36.0 g/dL    RDW 20.0 (H) 11.5 - 14.5 %    Platelets 249 150 - 450 x10*3/uL    Neutrophils % 81.5 40.0 - 80.0 %    Immature Granulocytes %, Automated 0.5 0.0 - 0.9 %    Lymphocytes % 11.6 13.0 - 44.0 %    Monocytes % 5.2 2.0 - 10.0 %    Eosinophils % 0.7 0.0 - 6.0 %    Basophils % 0.5 0.0 - 2.0 %    Neutrophils Absolute 5.98 1.20 - 7.70 x10*3/uL    Immature Granulocytes Absolute, Automated 0.04 0.00 - 0.70 x10*3/uL    Lymphocytes Absolute 0.85 (L) 1.20 - 4.80 x10*3/uL    Monocytes Absolute 0.38 0.10 - 1.00 x10*3/uL    Eosinophils Absolute 0.05 0.00 - 0.70 x10*3/uL    Basophils Absolute 0.04 0.00 - 0.10 x10*3/uL   Hepatic function panel   Result Value Ref Range    Albumin 2.3 (L) 3.4 - 5.0 g/dL    Bilirubin, Total 0.3 0.0 - 1.2 mg/dL    Bilirubin, Direct 0.1 0.0 - 0.3 mg/dL    Alkaline Phosphatase 116 33 - 136 U/L    ALT 9 7 - 45 U/L    AST 11 9 - 39 U/L    Total Protein 5.4 (L) 6.4 - 8.2 g/dL   Magnesium   Result Value Ref Range    Magnesium 1.97 1.60 - 2.40 mg/dL   Blood Gas Venous Full Panel   Result Value Ref Range    POCT pH, Venous 7.47 (H) 7.33 - 7.43 pH    POCT pCO2, Venous 44 41 - 51 mm Hg    POCT pO2, Venous 42 35 - 45 mm Hg    POCT SO2, Venous 78 (H) 45 - 75 %    POCT Oxy Hemoglobin, Venous 76.8 (H) 45.0 - 75.0 %    POCT Hematocrit Calculated, Venous 23.0 (L) 36.0 - 46.0 %    POCT  Sodium, Venous 134 (L) 136 - 145 mmol/L    POCT Potassium, Venous 3.8 3.5 - 5.3 mmol/L    POCT Chloride, Venous 101 98 - 107 mmol/L    POCT Ionized Calicum, Venous 1.15 1.10 - 1.33 mmol/L    POCT Glucose, Venous 92 74 - 99 mg/dL    POCT Lactate, Venous 1.0 0.4 - 2.0 mmol/L    POCT Base Excess, Venous 7.6 (H) -2.0 - 3.0 mmol/L    POCT HCO3 Calculated, Venous 32.0 (H) 22.0 - 26.0 mmol/L    POCT Hemoglobin, Venous 7.6 (L) 12.0 - 16.0 g/dL    POCT Anion Gap, Venous 5.0 (L) 10.0 - 25.0 mmol/L    Patient Temperature 37.0 degrees Celsius    FiO2 40 %   Phosphorus   Result Value Ref Range    Phosphorus 2.3 (L) 2.5 - 4.9 mg/dL   Basic Metabolic Panel   Result Value Ref Range    Glucose 95 74 - 99 mg/dL    Sodium 136 136 - 145 mmol/L    Potassium 3.7 3.5 - 5.3 mmol/L    Chloride 99 98 - 107 mmol/L    Bicarbonate 29 21 - 32 mmol/L    Anion Gap 12 10 - 20 mmol/L    Urea Nitrogen 17 6 - 23 mg/dL    Creatinine 1.34 (H) 0.50 - 1.05 mg/dL    eGFR 43 (L) >60 mL/min/1.73m*2    Calcium 7.9 (L) 8.6 - 10.3 mg/dL   POCT GLUCOSE   Result Value Ref Range    POCT Glucose 112 (H) 74 - 99 mg/dL   POCT GLUCOSE   Result Value Ref Range    POCT Glucose 111 (H) 74 - 99 mg/dL   Prepare RBC: 1 Units   Result Value Ref Range    PRODUCT CODE V4047S26     Unit Number [id number]     Unit ABO O     Unit RH NEG     XM INTEP COMP     Dispense Status IS     Blood Expiration Date 11/12/2024 11:59:00 PM EST     PRODUCT BLOOD TYPE 9500     UNIT VOLUME 350    Type and screen   Result Value Ref Range    ABO TYPE A     Rh TYPE NEG     ANTIBODY SCREEN NEG      *Note: Due to a large number of results and/or encounters for the requested time period, some results have not been displayed. A complete set of results can be found in Results Review.       Assessment/Plan   Acute kidney injury continues trending down however no accurate I's and O's my plan is to BladderScan if greater than 250 leave Gibson catheter and hold off dialysis tomorrow I will reevaluate  renal function and urine output tomorrow morning.  Edema much improved  Pneumonia continue with antibiotic therapy infectious disease  Diabetes mellitus type 2  Malnutrition continue with tube feeding  Lower back surgery site infection  Anemia transfuse when hemoglobin less than 7  Acute respiratory failure plan to continue the ventilator and I think there is a plan for a tracheostomy          Imer Cheung MD

## 2024-11-07 NOTE — OP NOTE
OP NOTE     Date: 2024  OR Location: [facility] OR    Name: Narda Malloy : 1955 Age: 69 y.o. MRN: [id number]  Sex: female      Diagnosis  Pre-op Diagnosis    Pre-Op Diagnosis Codes:      * Acute respiratory failure with hypoxia and hypercapnia (Multi) [J96.01, J96.02] Post-op Diagnosis     Same     Procedures    Creation Tracheostomy  23898 - [place] TRACHEOSTOMY PLANNED SEPARATE PROCEDURE       Surgeons      Estiven Hurst MD     Assistant:  Neeta Bernal CNP, SA  The listed physician assistant / nurse practitioner  served as the first surgical assistant as there is no available qualified resident.  The assistance in this case was critical for the key portions including patient positioning and prep, retraction, and wound closure.     Procedure Summary  Anesthesia: General  Anesthesia Staff: No responsible provider has been recorded for the case.   Estimated Blood Loss: Minimal      Specimen:   None    Findings: Upper portion of isthmus divided with thunderbeat.  #6 Shiley tracheostomy tube placed through the second tracheal ring    Indications: Narda Malloy is a 69 y.o. year old female with respiratory failure requiring tracheostomy      The patient was seen in the preoperative area. The risks, benefits, complications, treatment options, non-operative alternatives, expected recovery and outcomes were discussed with the patient. The possibilities of reaction to medication, pulmonary aspiration, injury to surrounding structures, bleeding, recurrent infection, the need for additional procedures, failure to diagnose a condition, and creating a complication requiring transfusion or operation were discussed with the patient. The patient concurred with the proposed plan, giving informed consent.  The site of surgery was properly noted/marked if necessary per policy. The patient has been actively warmed in preoperative area. Preoperative antibiotics are not indicated. Venous thrombosis prophylaxis have been ordered  including bilateral sequential compression devices and unilateral sequential compression device    Procedure Details: The patient was already intubated in the intensive care unit.  The procedure was carried out in the intensive care unit.  The patient was placed under general anesthesia.  The neck was extended with a shoulder roll and head donut.  A vertical midline incision was made down through skin and subcutaneous tissue.  The strap muscles were reflected laterally.  The isthmus was overlying the trachea.  This was partially divided with the thunder beat.  The thyroid lobes were retracted laterally exposing the underlying trachea.  A window of cartilage was removed from the anterior third of the second tracheal ring and the third tracheal ring was divided in the vertical midline.  The endotracheal tube was withdrawn and the #6 Shiley tracheostomy tube was inserted and quickly attached to the ventilator.  The patient had a smooth transition from endotracheal tube to tracheostomy without any hypoxia.  The Shiley faceplate was sutured to the patient's skin with Prolene suture.  Surgicel was placed into the depth of the wound superiorly and inferiorly with with drawstring's of silk suture.  Tracheal tie was placed around the neck.  The patient was allowed to awaken from anesthesia and was ventilating well  Complications:  None; patient tolerated the procedure well.    Disposition: PACU - hemodynamically stable.  Condition: stable             Estiven Hurst MD  Phone Number: [phone]

## 2024-11-08 ENCOUNTER — APPOINTMENT (OUTPATIENT)
Dept: DIALYSIS | Facility: HOSPITAL | Age: 69
End: 2024-11-08
Payer: MEDICARE

## 2024-11-08 ENCOUNTER — APPOINTMENT (OUTPATIENT)
Dept: RADIOLOGY | Facility: HOSPITAL | Age: 69
End: 2024-11-08
Payer: MEDICARE

## 2024-11-08 LAB
ALBUMIN SERPL BCP-MCNC: 2.4 G/DL (ref 3.4–5)
ALP SERPL-CCNC: 119 U/L (ref 33–136)
ALT SERPL W P-5'-P-CCNC: 10 U/L (ref 7–45)
ANION GAP BLDV CALCULATED.4IONS-SCNC: 8 MMOL/L (ref 10–25)
ANION GAP SERPL CALCULATED.3IONS-SCNC: 12 MMOL/L (ref 10–20)
APTT PPP: 55.3 SECONDS (ref 22–32.5)
AST SERPL W P-5'-P-CCNC: 11 U/L (ref 9–39)
BASE EXCESS BLDV CALC-SCNC: 5.1 MMOL/L (ref -2–3)
BASOPHILS # BLD AUTO: 0.04 X10*3/UL (ref 0–0.1)
BASOPHILS NFR BLD AUTO: 0.4 %
BILIRUB DIRECT SERPL-MCNC: 0.1 MG/DL (ref 0–0.3)
BILIRUB SERPL-MCNC: 0.4 MG/DL (ref 0–1.2)
BODY TEMPERATURE: 37 DEGREES CELSIUS
BUN SERPL-MCNC: 30 MG/DL (ref 6–23)
BURR CELLS BLD QL SMEAR: NORMAL
CA-I BLDV-SCNC: 1.12 MMOL/L (ref 1.1–1.33)
CALCIUM SERPL-MCNC: 7.9 MG/DL (ref 8.6–10.3)
CHLORIDE BLDV-SCNC: 98 MMOL/L (ref 98–107)
CHLORIDE SERPL-SCNC: 98 MMOL/L (ref 98–107)
CO2 SERPL-SCNC: 28 MMOL/L (ref 21–32)
CREAT SERPL-MCNC: 1.77 MG/DL (ref 0.5–1.05)
EGFRCR SERPLBLD CKD-EPI 2021: 31 ML/MIN/1.73M*2
EOSINOPHIL # BLD AUTO: 0.02 X10*3/UL (ref 0–0.7)
EOSINOPHIL NFR BLD AUTO: 0.2 %
ERYTHROCYTE [DISTWIDTH] IN BLOOD BY AUTOMATED COUNT: 21.4 % (ref 11.5–14.5)
GLUCOSE BLD MANUAL STRIP-MCNC: 141 MG/DL (ref 74–99)
GLUCOSE BLD MANUAL STRIP-MCNC: 141 MG/DL (ref 74–99)
GLUCOSE BLD MANUAL STRIP-MCNC: 155 MG/DL (ref 74–99)
GLUCOSE BLD MANUAL STRIP-MCNC: 156 MG/DL (ref 74–99)
GLUCOSE BLD MANUAL STRIP-MCNC: 168 MG/DL (ref 74–99)
GLUCOSE BLD MANUAL STRIP-MCNC: 175 MG/DL (ref 74–99)
GLUCOSE BLDV-MCNC: 143 MG/DL (ref 74–99)
GLUCOSE SERPL-MCNC: 148 MG/DL (ref 74–99)
HCO3 BLDV-SCNC: 29 MMOL/L (ref 22–26)
HCT VFR BLD AUTO: 26.4 % (ref 36–46)
HCT VFR BLD EST: 28 % (ref 36–46)
HGB BLD-MCNC: 8.8 G/DL (ref 12–16)
HGB BLDV-MCNC: 9.2 G/DL (ref 12–16)
IMM GRANULOCYTES # BLD AUTO: 0.1 X10*3/UL (ref 0–0.7)
IMM GRANULOCYTES NFR BLD AUTO: 1 % (ref 0–0.9)
INHALED O2 CONCENTRATION: 100 %
LACTATE BLDV-SCNC: 1.5 MMOL/L (ref 0.4–2)
LYMPHOCYTES # BLD AUTO: 0.79 X10*3/UL (ref 1.2–4.8)
LYMPHOCYTES NFR BLD AUTO: 8 %
MAGNESIUM SERPL-MCNC: 1.96 MG/DL (ref 1.6–2.4)
MCH RBC QN AUTO: 30.2 PG (ref 26–34)
MCHC RBC AUTO-ENTMCNC: 33.3 G/DL (ref 32–36)
MCV RBC AUTO: 91 FL (ref 80–100)
MONOCYTES # BLD AUTO: 0.58 X10*3/UL (ref 0.1–1)
MONOCYTES NFR BLD AUTO: 5.9 %
NEUTROPHILS # BLD AUTO: 8.3 X10*3/UL (ref 1.2–7.7)
NEUTROPHILS NFR BLD AUTO: 84.5 %
NRBC BLD-RTO: 0 /100 WBCS (ref 0–0)
OVALOCYTES BLD QL SMEAR: NORMAL
OXYHGB MFR BLDV: 96.9 % (ref 45–75)
PCO2 BLDV: 39 MM HG (ref 41–51)
PH BLDV: 7.48 PH (ref 7.33–7.43)
PHOSPHATE SERPL-MCNC: 3.8 MG/DL (ref 2.5–4.9)
PLATELET # BLD AUTO: 240 X10*3/UL (ref 150–450)
PO2 BLDV: 110 MM HG (ref 35–45)
POTASSIUM BLDV-SCNC: 3.9 MMOL/L (ref 3.5–5.3)
POTASSIUM SERPL-SCNC: 3.8 MMOL/L (ref 3.5–5.3)
PROT SERPL-MCNC: 5.6 G/DL (ref 6.4–8.2)
RBC # BLD AUTO: 2.91 X10*6/UL (ref 4–5.2)
RBC MORPH BLD: NORMAL
SAO2 % BLDV: 99 % (ref 45–75)
SODIUM BLDV-SCNC: 131 MMOL/L (ref 136–145)
SODIUM SERPL-SCNC: 134 MMOL/L (ref 136–145)
WBC # BLD AUTO: 9.8 X10*3/UL (ref 4.4–11.3)

## 2024-11-08 PROCEDURE — 2020000001 HC ICU ROOM DAILY

## 2024-11-08 PROCEDURE — 2500000001 HC RX 250 WO HCPCS SELF ADMINISTERED DRUGS (ALT 637 FOR MEDICARE OP)

## 2024-11-08 PROCEDURE — 2500000004 HC RX 250 GENERAL PHARMACY W/ HCPCS (ALT 636 FOR OP/ED): Performed by: INTERNAL MEDICINE

## 2024-11-08 PROCEDURE — 84100 ASSAY OF PHOSPHORUS: CPT

## 2024-11-08 PROCEDURE — 8010000001 HC DIALYSIS - HEMODIALYSIS PER DAY

## 2024-11-08 PROCEDURE — 83735 ASSAY OF MAGNESIUM: CPT

## 2024-11-08 PROCEDURE — 85730 THROMBOPLASTIN TIME PARTIAL: CPT

## 2024-11-08 PROCEDURE — 37799 UNLISTED PX VASCULAR SURGERY: CPT

## 2024-11-08 PROCEDURE — 82947 ASSAY GLUCOSE BLOOD QUANT: CPT

## 2024-11-08 PROCEDURE — 99291 CRITICAL CARE FIRST HOUR: CPT | Performed by: NURSE PRACTITIONER

## 2024-11-08 PROCEDURE — 9420000001 HC RT PATIENT EDUCATION 5 MIN

## 2024-11-08 PROCEDURE — 2500000002 HC RX 250 W HCPCS SELF ADMINISTERED DRUGS (ALT 637 FOR MEDICARE OP, ALT 636 FOR OP/ED)

## 2024-11-08 PROCEDURE — 2500000004 HC RX 250 GENERAL PHARMACY W/ HCPCS (ALT 636 FOR OP/ED)

## 2024-11-08 PROCEDURE — 2500000005 HC RX 250 GENERAL PHARMACY W/O HCPCS

## 2024-11-08 PROCEDURE — 2500000005 HC RX 250 GENERAL PHARMACY W/O HCPCS: Performed by: SURGERY

## 2024-11-08 PROCEDURE — 94003 VENT MGMT INPAT SUBQ DAY: CPT

## 2024-11-08 PROCEDURE — 71045 X-RAY EXAM CHEST 1 VIEW: CPT | Performed by: RADIOLOGY

## 2024-11-08 PROCEDURE — 71045 X-RAY EXAM CHEST 1 VIEW: CPT

## 2024-11-08 PROCEDURE — 82248 BILIRUBIN DIRECT: CPT

## 2024-11-08 PROCEDURE — 94640 AIRWAY INHALATION TREATMENT: CPT

## 2024-11-08 PROCEDURE — 84132 ASSAY OF SERUM POTASSIUM: CPT

## 2024-11-08 PROCEDURE — 85025 COMPLETE CBC W/AUTO DIFF WBC: CPT

## 2024-11-08 RX ORDER — LOPERAMIDE HCL 1MG/7.5ML
2 LIQUID (ML) ORAL 4 TIMES DAILY PRN
Status: DISCONTINUED | OUTPATIENT
Start: 2024-11-08 | End: 2024-11-08

## 2024-11-08 RX ORDER — LOPERAMIDE HCL 2 MG
2 TABLET ORAL 4 TIMES DAILY PRN
Status: DISCONTINUED | OUTPATIENT
Start: 2024-11-08 | End: 2024-11-08

## 2024-11-08 RX ORDER — HEPARIN SODIUM 1000 [USP'U]/ML
1000 INJECTION, SOLUTION INTRAVENOUS; SUBCUTANEOUS
Status: DISCONTINUED | OUTPATIENT
Start: 2024-11-08 | End: 2024-11-09

## 2024-11-08 RX ORDER — LOPERAMIDE HYDROCHLORIDE 2 MG/1
2 CAPSULE ORAL 4 TIMES DAILY PRN
Status: DISCONTINUED | OUTPATIENT
Start: 2024-11-08 | End: 2024-11-12

## 2024-11-08 ASSESSMENT — PAIN - FUNCTIONAL ASSESSMENT
PAIN_FUNCTIONAL_ASSESSMENT: CPOT (CRITICAL CARE PAIN OBSERVATION TOOL)

## 2024-11-08 ASSESSMENT — PAIN DESCRIPTION - LOCATION: LOCATION: BACK

## 2024-11-08 ASSESSMENT — PAIN SCALES - GENERAL: PAINLEVEL_OUTOF10: 0 - NO PAIN

## 2024-11-08 NOTE — CARE PLAN
Problem: Safety - Adult  Goal: Free from fall injury  Outcome: Progressing  Flowsheets (Taken 11/7/2024 2013)  Free from fall injury:   Instruct family/caregiver on patient safety   Based on caregiver fall risk screen, instruct family/caregiver to ask for assistance with transferring infant if caregiver noted to have fall risk factors   The patient's goals for the shift include unable to assess    The clinical goals for the shift include hemodynamically stable    Over the shift, the patient did not make progress toward the following goals. Barriers to progression include . Recommendations to address these barriers include .

## 2024-11-08 NOTE — CARE PLAN
Pt suctions for copious thick yellow  Problem: Respiratory  Goal: Clear secretions with interventions this shift  Outcome: Progressing

## 2024-11-08 NOTE — PROGRESS NOTES
"Narda Malloy is a 69 y.o. female on day 27 of admission presenting with Unresponsive.    Subjective   No events overnight.  Tracheostomy in place, remains on ventilator.    Objective     Physical Exam  Vitals and nursing note reviewed.   Constitutional:       General: She is not in acute distress.     Appearance: Normal appearance. She is obese. She is ill-appearing.      Interventions: She is sedated and intubated.   HENT:      Head: Normocephalic and atraumatic.   Neck:      Trachea: Tracheostomy present.      Comments: No bleeding noted around tracheostomy insertion site.  Sutures intact.  Cardiovascular:      Rate and Rhythm: Normal rate.   Pulmonary:      Effort: Pulmonary effort is normal. She is intubated.   Abdominal:      Palpations: Abdomen is soft.   Musculoskeletal:         General: No swelling or tenderness.   Skin:     General: Skin is warm and dry.   Neurological:      General: No focal deficit present.      Mental Status: She is alert.         Last Recorded Vitals  Blood pressure 107/60, pulse 75, temperature 36.7 °C (98.1 °F), temperature source Temporal, resp. rate 19, height 1.778 m (5' 10\"), weight 112 kg (246 lb 4.1 oz), SpO2 100%.  Intake/Output last 3 Shifts:  I/O last 3 completed shifts:  In: 2701 (24.2 mL/kg) [I.V.:501 (4.5 mL/kg); Blood:350; NG/GT:1100; IV Piggyback:750]  Out: 930 (8.3 mL/kg) [Urine:530 (0.1 mL/kg/hr); Stool:400]  Weight: 111.7 kg     Relevant Results  Lab Results   Component Value Date    GLUCOSE 148 (H) 11/08/2024    CALCIUM 7.9 (L) 11/08/2024     (L) 11/08/2024    K 3.8 11/08/2024    CO2 28 11/08/2024    CL 98 11/08/2024    BUN 30 (H) 11/08/2024    CREATININE 1.77 (H) 11/08/2024     Lab Results   Component Value Date    WBC 9.8 11/08/2024    HGB 8.8 (L) 11/08/2024    HCT 26.4 (L) 11/08/2024    MCV 91 11/08/2024     11/08/2024   === 10/12/24 ===    XR CHEST 1 VIEW    - Impression -  Status post tracheostomy tube placement.  No change in the infiltrates and " effusions bilaterally.    MACRO:  none    Signed by: Andrew Byrd 11/8/2024 8:31 AM  Dictation workstation:   [id number]        Assessment/Plan   Assessment & Plan  Unresponsive    Pain and swelling of upper extremity, left    Acute respiratory failure with hypoxia and hypercapnia (Multi)    11/8: POD Tracheostomy creation. I removed the surgicel today, patient tolerated well. She remains on 30% FiO2 on the vent. No changes overnight.   D/w Dr. Aris LIGHT spent 15 minutes in the professional and overall care of this patient.      Chaya Mahmood, APRN-CNP

## 2024-11-08 NOTE — PROGRESS NOTES
Patient not medically clear. Precert started on this day for LTACH admission. Will continue to follow.      11/08/24 1009   Discharge Planning   Home or Post Acute Services Post acute facilities (Rehab/SNF/etc)   Type of Post Acute Facility Services Rehab;Skilled nursing   Expected Discharge Disposition Long Term  ([facility])   Does the patient need discharge transport arranged? Yes   RoundTrip coordination needed? Yes

## 2024-11-08 NOTE — PROGRESS NOTES
INFECTIOUS DISEASES PROGRESS NOTE    Consulted / following patient for:  Respiratory failure/pneumonia/Pseudomonas in the sputum  Recent polymicrobial complicated postoperative lumbar wound infection, MRSA and Proteus  Acute kidney injury    Subjective   Interval History:   (Resting comfortably on the ventilator, not awakened)    Objective   PHYSICAL EXAMINATION  Vital signs: Afebrile, no vasopressor agents  General: Intubated.  Asleep  Lungs: Diminished, clear.  On ventilator with FiO2 30%  Heart:  S1, S2 normal  Abdomen:  Soft, nontender.  PEG tube in place and functioning  Extremities:  No cords, phlebitis, cellulitis.      Relevant Results  WBC: 9800  Creatinine: (Dialysis)  Pleural fluid: Transudate with 197 WBC, 56% neutrophils, protein 1.8, LDH 97, glucose 202  Microbiology:  Blood (11/3): Negative X2  Sputum (10/21): Normal neva, no MRSA  Sputum (10/28): Pseudomonas, pan-susceptible  Sputum (11/4): Pending   Pleural fluid (10/17): Negative  C. difficile PCR (10/30): Negative    Imaging:  CXR images (11/8) personally reviewed: Intubated.  No significant change in bilateral areas of infiltration and effusions    Assessment:  Sepsis -likely due to pneumonia, present on admission. Patient already on IV vancomycin and IV ceftriaxone at time of this admission for lumbar spinal infection.  Resolved with anti-Pseudomonas antimicrobial therapy.  Large transudative pleural effusion was tapped.  Had fever and purulent secretions in the evening of 10/28, Pseudomonas in sputum, treated for 5 days with aerosol tobramycin.  Briefly extubated, now reintubated without clinical or radiographic evidence for progressive pneumonia.  Remains on a schedule of thrice weekly hemodialysis.  Underwent PEG placement on 11/4, tracheostomy on 11/7.  Overall appears stable   Lumbar spine surgical site infection s/p I&D 9/28. Cultures + MRSA, Proteus.  Was to be on vanco/ceftriaxone through 11/12. Followed by Dr. Brant Juarez.  Vancomycin  has been changed to daptomycin because of AMY    Plan/Recommendations:  Continue daptomycin 700 mg every 48 hour until 11/12, dose after dialysis when a dose is due on a dialysis day  Continue ceftriaxone until 11/12     DR. TURNER COVERING 11/9-11/10 AND WILL SEE PRN YOUR CALL    Andrew Malagon MD  [facility]  Office:  [phone]

## 2024-11-08 NOTE — PROGRESS NOTES
Physical Therapy                 Therapy Communication Note    Patient Name: Narda Malloy  MRN: [id number]  Department: [facility] DIALYSIS  Room: 11/11-A  Today's Date: 11/8/2024     Discipline: Physical Therapy    Missed Visit Reason: Other (Comment)   (Patient receiving dialysis.)    Missed Time: Attempt    Comment:

## 2024-11-08 NOTE — PROGRESS NOTES
Occupational Therapy                 Therapy Communication Note    Patient Name: Narda Malloy  MRN: [id number]  Department: [facility] DIALYSIS  Room: 11/11-A  Today's Date: 11/8/2024     Discipline: Occupational Therapy    Missed Visit Reason: Missed Visit Reason: Other (Comment), Patient in a medical procedure (Pt currently having dialysis)    Missed Time: Cancel    Comment:

## 2024-11-08 NOTE — PROGRESS NOTES
"Narda Malloy is a 69 y.o. female on day 27 of admission presenting with Unresponsive.    Subjective   The patient is seen for acute kidney injury which is secondary to acute tubular necrosis patient underwent tracheostomy yesterday and she is awake and responsive urine output is not adequate is awake and responsive       Objective     Physical Exam  Constitutional:       Comments: On the vent through a tracheostomy   Neck:      Vascular: No carotid bruit.   Cardiovascular:      Rate and Rhythm: Normal rate and regular rhythm.      Heart sounds: No murmur heard.     No friction rub. No gallop.   Pulmonary:      Breath sounds: No wheezing, rhonchi or rales.   Chest:      Chest wall: No tenderness.   Abdominal:      General: There is no distension.      Tenderness: There is no abdominal tenderness. There is no guarding or rebound.   Musculoskeletal:         General: No swelling or tenderness.      Cervical back: Neck supple.      Right lower leg: Edema present.      Left lower leg: Edema present.   Lymphadenopathy:      Cervical: No cervical adenopathy.         Last Recorded Vitals  Blood pressure 114/60, pulse 65, temperature 36.9 °C (98.4 °F), temperature source Axillary, resp. rate 17, height 1.778 m (5' 10\"), weight 112 kg (246 lb 4.1 oz), SpO2 100%.    Intake/Output last 3 Shifts:  I/O last 3 completed shifts:  In: 2701 (24.2 mL/kg) [I.V.:501 (4.5 mL/kg); Blood:350; NG/GT:1100; IV Piggyback:750]  Out: 930 (8.3 mL/kg) [Urine:530 (0.1 mL/kg/hr); Stool:400]  Weight: 111.7 kg     Current Facility-Administered Medications:     acetaminophen (Tylenol) oral liquid 650 mg, 650 mg, oral, q6h, Kaleb Lam PA-C, 650 mg at 11/08/24 0920    acetylcysteine (Mucomyst) 200 mg/mL (20 %) nebulizer solution 600 mg, 3 mL, nebulization, 4x daily, Kaleb Lam PA-C, 600 mg at 11/08/24 0746    albuterol 2.5 mg /3 mL (0.083 %) nebulizer solution 3 mL, 3 mL, nebulization, 4x daily, Kaleb Lam PA-C, 3 mL at 11/08/24 0746   "  alteplase (Cathflo Activase) injection 2 mg, 2 mg, intra-catheter, PRN, Kaleb Lam PA-C    brimonidine (AlphaGAN) 0.2 % ophthalmic solution 1 drop, 1 drop, Both Eyes, BID, Kaleb Lam PA-C, 1 drop at 11/08/24 0920    calcium carbonate-vitamin D3 500 mg-5 mcg (200 unit) per tablet 1 tablet, 1 tablet, oral, Daily, Kaleb Lam PA-C, 1 tablet at 11/08/24 0919    cefTRIAXone (Rocephin) 2 g in dextrose (iso) IV 50 mL, 2 g, intravenous, q24h, Kaleb Lam PA-C, Last Rate: 100 mL/hr at 11/08/24 0918, 2 g at 11/08/24 0918    DAPTOmycin (Cubicin) 700 mg in sodium chloride 0.9%  mL, 700 mg, intravenous, q48h, Kaleb Lam PA-C, Stopped at 11/07/24 0937    dextrose 50 % injection 12.5 g, 12.5 g, intravenous, q15 min PRN, Kaleb Lam PA-C    dextrose 50 % injection 25 g, 25 g, intravenous, q15 min PRN, Kaleb Lam PA-C    ezetimibe (Zetia) tablet 10 mg, 10 mg, oral, Daily, Kaleb Lam PA-C, 10 mg at 11/08/24 0920    ferrous sulfate syrup 60 mg of iron, 60 mg of iron, oral, Daily, Kaleb Lam PA-C, 60 mg of iron at 11/08/24 0918    folic acid (Folvite) tablet 1 mg, 1 mg, oral, Daily, Kaleb Lam PA-C, 1 mg at 11/08/24 0920    glucagon (Glucagen) injection 1 mg, 1 mg, intramuscular, q15 min PRN, Kaleb Lam PA-C    glucagon (Glucagen) injection 1 mg, 1 mg, intramuscular, q15 min PRN, Kaleb Lam PA-C    heparin (porcine) injection 3,000-6,000 Units, 3,000-6,000 Units, intravenous, PRN, Kaleb Lam PA-C    heparin 25,000 Units in dextrose 5% 250 mL (100 Units/mL) infusion (premix), 0-4,500 Units/hr, intravenous, Continuous, Kaleb Lam PA-C, Last Rate: 18 mL/hr at 11/08/24 0900, 1,800 Units/hr at 11/08/24 0900    heparin flush 10 unit/mL syringe 50 Units, 50 Units, intra-catheter, PRN, Kaleb Lam PA-C, 50 Units at 10/19/24 2313    honey (Medihoney) topical gel, , Topical, Daily, Kaleb Lam PA-C, Given at 11/07/24 1125    HYDROmorphone (Dilaudid)  injection 0.4 mg, 0.4 mg, intravenous, q2h PRN, Kaleb Lam PA-C, 0.4 mg at 11/05/24 1739    insulin lispro (HumaLOG) injection 0-15 Units, 0-15 Units, subcutaneous, q4h, Kaleb aLm PA-C, 3 Units at 11/08/24 0913    latanoprost (Xalatan) 0.005 % ophthalmic solution 1 drop, 1 drop, Both Eyes, Nightly, Kaleb Lam PA-C, 1 drop at 11/07/24 2050    midodrine (Proamatine) tablet 10 mg, 10 mg, oral, q8h, Kaleb Lam PA-C, 10 mg at 11/08/24 0919    oxyCODONE (Roxicodone) solution 5 mg, 5 mg, oral, q4h PRN, Kaleb Lam PA-C    oxyCODONE (Roxicodone) solution 7.5 mg, 7.5 mg, oral, q4h PRN, Kaleb Lam PA-C    oxygen (O2) therapy, , inhalation, Continuous - Inhalation, Anna Marie Shook MD    pantoprazole (ProtoNix) EC tablet 40 mg, 40 mg, oral, Daily before breakfast **OR** pantoprazole (ProtoNix) injection 40 mg, 40 mg, intravenous, Daily before breakfast, Kaleb Lam PA-C, 40 mg at 11/08/24 0642    polyethylene glycol (Glycolax, Miralax) packet 17 g, 17 g, oral, Daily PRN, Kaleb Lam PA-C    potassium, sodium phosphates (Phos-NaK) 280-160-250 mg packet 1 packet, 1 packet, oral, With meals & nightly, Kaleb Lam PA-C, 1 packet at 11/08/24 0918    primidone (Mysoline) tablet 125 mg, 125 mg, oral, Nightly, Kaleb Lam PA-C, 125 mg at 11/07/24 2048    [Held by provider] propranolol LA (Inderal LA) 24 hr capsule 60 mg, 60 mg, oral, Daily, Kaleb Lam PA-C, 60 mg at 10/19/24 0643    sennosides-docusate sodium (Lucia-Colace) 8.6-50 mg per tablet 1 tablet, 1 tablet, oral, Nightly PRN, Kaleb Lam PA-C    sodium chloride 3 % nebulizer solution 3 mL, 3 mL, nebulization, 4x daily, Kaleb Lam PA-C, 3 mL at 11/08/24 0748   Relevant Results    Results for orders placed or performed during the hospital encounter of 10/12/24 (from the past 96 hours)   POCT GLUCOSE   Result Value Ref Range    POCT Glucose 170 (H) 74 - 99 mg/dL   CBC   Result Value Ref Range    WBC 5.7 4.4 -  11.3 x10*3/uL    nRBC 0.0 0.0 - 0.0 /100 WBCs    RBC 2.36 (L) 4.00 - 5.20 x10*6/uL    Hemoglobin 7.2 (L) 12.0 - 16.0 g/dL    Hematocrit 23.2 (L) 36.0 - 46.0 %    MCV 98 80 - 100 fL    MCH 30.5 26.0 - 34.0 pg    MCHC 31.0 (L) 32.0 - 36.0 g/dL    RDW 19.1 (H) 11.5 - 14.5 %    Platelets 240 150 - 450 x10*3/uL   Blood Gas Venous Full Panel   Result Value Ref Range    POCT pH, Venous 7.42 7.33 - 7.43 pH    POCT pCO2, Venous 44 41 - 51 mm Hg    POCT pO2, Venous 45 35 - 45 mm Hg    POCT SO2, Venous 81 (H) 45 - 75 %    POCT Oxy Hemoglobin, Venous 79.5 (H) 45.0 - 75.0 %    POCT Hematocrit Calculated, Venous 22.0 (L) 36.0 - 46.0 %    POCT Sodium, Venous 130 (L) 136 - 145 mmol/L    POCT Potassium, Venous 4.8 3.5 - 5.3 mmol/L    POCT Chloride, Venous 100 98 - 107 mmol/L    POCT Ionized Calicum, Venous 1.14 1.10 - 1.33 mmol/L    POCT Glucose, Venous 166 (H) 74 - 99 mg/dL    POCT Lactate, Venous 0.7 0.4 - 2.0 mmol/L    POCT Base Excess, Venous 3.6 (H) -2.0 - 3.0 mmol/L    POCT HCO3 Calculated, Venous 28.5 (H) 22.0 - 26.0 mmol/L    POCT Hemoglobin, Venous 7.3 (L) 12.0 - 16.0 g/dL    POCT Anion Gap, Venous 6.0 (L) 10.0 - 25.0 mmol/L    Patient Temperature 37.0 degrees Celsius    FiO2 40 %   POCT GLUCOSE   Result Value Ref Range    POCT Glucose 136 (H) 74 - 99 mg/dL   POCT GLUCOSE   Result Value Ref Range    POCT Glucose 117 (H) 74 - 99 mg/dL   POCT GLUCOSE   Result Value Ref Range    POCT Glucose 112 (H) 74 - 99 mg/dL   POCT GLUCOSE   Result Value Ref Range    POCT Glucose 121 (H) 74 - 99 mg/dL   CBC and Auto Differential   Result Value Ref Range    WBC 7.6 4.4 - 11.3 x10*3/uL    nRBC 0.0 0.0 - 0.0 /100 WBCs    RBC 2.25 (L) 4.00 - 5.20 x10*6/uL    Hemoglobin 6.9 (L) 12.0 - 16.0 g/dL    Hematocrit 21.6 (L) 36.0 - 46.0 %    MCV 96 80 - 100 fL    MCH 30.7 26.0 - 34.0 pg    MCHC 31.9 (L) 32.0 - 36.0 g/dL    RDW 19.3 (H) 11.5 - 14.5 %    Platelets 250 150 - 450 x10*3/uL    Neutrophils % 85.2 40.0 - 80.0 %    Immature Granulocytes %,  Automated 1.1 (H) 0.0 - 0.9 %    Lymphocytes % 7.8 13.0 - 44.0 %    Monocytes % 4.7 2.0 - 10.0 %    Eosinophils % 0.8 0.0 - 6.0 %    Basophils % 0.4 0.0 - 2.0 %    Neutrophils Absolute 6.49 1.20 - 7.70 x10*3/uL    Immature Granulocytes Absolute, Automated 0.08 0.00 - 0.70 x10*3/uL    Lymphocytes Absolute 0.59 (L) 1.20 - 4.80 x10*3/uL    Monocytes Absolute 0.36 0.10 - 1.00 x10*3/uL    Eosinophils Absolute 0.06 0.00 - 0.70 x10*3/uL    Basophils Absolute 0.03 0.00 - 0.10 x10*3/uL   Hepatic function panel   Result Value Ref Range    Albumin 2.3 (L) 3.4 - 5.0 g/dL    Bilirubin, Total 0.3 0.0 - 1.2 mg/dL    Bilirubin, Direct 0.1 0.0 - 0.3 mg/dL    Alkaline Phosphatase 111 33 - 136 U/L    ALT 9 7 - 45 U/L    AST 12 9 - 39 U/L    Total Protein 5.5 (L) 6.4 - 8.2 g/dL   Magnesium   Result Value Ref Range    Magnesium 2.07 1.60 - 2.40 mg/dL   Phosphorus   Result Value Ref Range    Phosphorus 2.6 2.5 - 4.9 mg/dL   Basic Metabolic Panel   Result Value Ref Range    Glucose 123 (H) 74 - 99 mg/dL    Sodium 133 (L) 136 - 145 mmol/L    Potassium 3.9 3.5 - 5.3 mmol/L    Chloride 97 (L) 98 - 107 mmol/L    Bicarbonate 28 21 - 32 mmol/L    Anion Gap 12 10 - 20 mmol/L    Urea Nitrogen 17 6 - 23 mg/dL    Creatinine 1.50 (H) 0.50 - 1.05 mg/dL    eGFR 38 (L) >60 mL/min/1.73m*2    Calcium 7.6 (L) 8.6 - 10.3 mg/dL   Morphology   Result Value Ref Range    RBC Morphology See Below     Polychromasia Mild     Ovalocytes Few    Iron and TIBC   Result Value Ref Range    Iron 23 (L) 35 - 150 ug/dL    UIBC 84 (L) 110 - 370 ug/dL    TIBC 107 (L) 240 - 445 ug/dL    % Saturation 21 (L) 25 - 45 %   Ferritin   Result Value Ref Range    Ferritin 479 (H) 8 - 150 ng/mL   Prepare RBC: 1 Units   Result Value Ref Range    PRODUCT CODE [id number]     Unit Number [id number]     Unit ABO O     Unit RH NEG     XM INTEP COMP     Dispense Status TR     Blood Expiration Date 11/11/2024 11:59:00 PM EST     PRODUCT BLOOD TYPE      UNIT VOLUME 280    POCT GLUCOSE    Result Value Ref Range    POCT Glucose 162 (H) 74 - 99 mg/dL   POCT GLUCOSE   Result Value Ref Range    POCT Glucose 150 (H) 74 - 99 mg/dL   POCT GLUCOSE   Result Value Ref Range    POCT Glucose 160 (H) 74 - 99 mg/dL   Lactate dehydrogenase   Result Value Ref Range     84 - 246 U/L   CBC and Auto Differential   Result Value Ref Range    WBC 7.2 4.4 - 11.3 x10*3/uL    nRBC 0.0 0.0 - 0.0 /100 WBCs    RBC 2.73 (L) 4.00 - 5.20 x10*6/uL    Hemoglobin 8.5 (L) 12.0 - 16.0 g/dL    Hematocrit 25.7 (L) 36.0 - 46.0 %    MCV 94 80 - 100 fL    MCH 31.1 26.0 - 34.0 pg    MCHC 33.1 32.0 - 36.0 g/dL    RDW 20.4 (H) 11.5 - 14.5 %    Platelets 252 150 - 450 x10*3/uL    Neutrophils % 83.6 40.0 - 80.0 %    Immature Granulocytes %, Automated 0.6 0.0 - 0.9 %    Lymphocytes % 9.6 13.0 - 44.0 %    Monocytes % 5.4 2.0 - 10.0 %    Eosinophils % 0.4 0.0 - 6.0 %    Basophils % 0.4 0.0 - 2.0 %    Neutrophils Absolute 6.03 1.20 - 7.70 x10*3/uL    Immature Granulocytes Absolute, Automated 0.04 0.00 - 0.70 x10*3/uL    Lymphocytes Absolute 0.69 (L) 1.20 - 4.80 x10*3/uL    Monocytes Absolute 0.39 0.10 - 1.00 x10*3/uL    Eosinophils Absolute 0.03 0.00 - 0.70 x10*3/uL    Basophils Absolute 0.03 0.00 - 0.10 x10*3/uL   Lactate   Result Value Ref Range    Lactate 0.8 0.4 - 2.0 mmol/L   Morphology   Result Value Ref Range    RBC Morphology No significant RBC morphology present    POCT GLUCOSE   Result Value Ref Range    POCT Glucose 146 (H) 74 - 99 mg/dL   aPTT   Result Value Ref Range    aPTT 28.8 22.0 - 32.5 seconds   CBC   Result Value Ref Range    WBC 8.1 4.4 - 11.3 x10*3/uL    nRBC 0.0 0.0 - 0.0 /100 WBCs    RBC 2.66 (L) 4.00 - 5.20 x10*6/uL    Hemoglobin 8.2 (L) 12.0 - 16.0 g/dL    Hematocrit 25.2 (L) 36.0 - 46.0 %    MCV 95 80 - 100 fL    MCH 30.8 26.0 - 34.0 pg    MCHC 32.5 32.0 - 36.0 g/dL    RDW 20.4 (H) 11.5 - 14.5 %    Platelets 257 150 - 450 x10*3/uL   Folate   Result Value Ref Range    Folate, Serum 15.5 >5.0 ng/mL   Vitamin B12    Result Value Ref Range    Vitamin B12 1,152 (H) 211 - 911 pg/mL   POCT GLUCOSE   Result Value Ref Range    POCT Glucose 194 (H) 74 - 99 mg/dL   aPTT   Result Value Ref Range    aPTT 79.1 (H) 22.0 - 32.5 seconds   CBC and Auto Differential   Result Value Ref Range    WBC 8.5 4.4 - 11.3 x10*3/uL    nRBC 0.0 0.0 - 0.0 /100 WBCs    RBC 2.62 (L) 4.00 - 5.20 x10*6/uL    Hemoglobin 8.0 (L) 12.0 - 16.0 g/dL    Hematocrit 24.5 (L) 36.0 - 46.0 %    MCV 94 80 - 100 fL    MCH 30.5 26.0 - 34.0 pg    MCHC 32.7 32.0 - 36.0 g/dL    RDW 20.1 (H) 11.5 - 14.5 %    Platelets 256 150 - 450 x10*3/uL    Neutrophils % 84.9 40.0 - 80.0 %    Immature Granulocytes %, Automated 1.1 (H) 0.0 - 0.9 %    Lymphocytes % 7.8 13.0 - 44.0 %    Monocytes % 4.9 2.0 - 10.0 %    Eosinophils % 0.8 0.0 - 6.0 %    Basophils % 0.5 0.0 - 2.0 %    Neutrophils Absolute 7.22 1.20 - 7.70 x10*3/uL    Immature Granulocytes Absolute, Automated 0.09 0.00 - 0.70 x10*3/uL    Lymphocytes Absolute 0.66 (L) 1.20 - 4.80 x10*3/uL    Monocytes Absolute 0.42 0.10 - 1.00 x10*3/uL    Eosinophils Absolute 0.07 0.00 - 0.70 x10*3/uL    Basophils Absolute 0.04 0.00 - 0.10 x10*3/uL   Hepatic function panel   Result Value Ref Range    Albumin 2.4 (L) 3.4 - 5.0 g/dL    Bilirubin, Total 0.5 0.0 - 1.2 mg/dL    Bilirubin, Direct 0.1 0.0 - 0.3 mg/dL    Alkaline Phosphatase 126 33 - 136 U/L    ALT 10 7 - 45 U/L    AST 12 9 - 39 U/L    Total Protein 5.9 (L) 6.4 - 8.2 g/dL   Magnesium   Result Value Ref Range    Magnesium 2.13 1.60 - 2.40 mg/dL   Phosphorus   Result Value Ref Range    Phosphorus 3.1 2.5 - 4.9 mg/dL   Basic Metabolic Panel   Result Value Ref Range    Glucose 161 (H) 74 - 99 mg/dL    Sodium 134 (L) 136 - 145 mmol/L    Potassium 4.0 3.5 - 5.3 mmol/L    Chloride 98 98 - 107 mmol/L    Bicarbonate 29 21 - 32 mmol/L    Anion Gap 11 10 - 20 mmol/L    Urea Nitrogen 29 (H) 6 - 23 mg/dL    Creatinine 1.88 (H) 0.50 - 1.05 mg/dL    eGFR 29 (L) >60 mL/min/1.73m*2    Calcium 7.8 (L) 8.6 -  10.3 mg/dL   POCT GLUCOSE   Result Value Ref Range    POCT Glucose 107 (H) 74 - 99 mg/dL   Morphology   Result Value Ref Range    RBC Morphology No significant RBC morphology present    Blood Gas Venous Full Panel   Result Value Ref Range    POCT pH, Venous 7.43 7.33 - 7.43 pH    POCT pCO2, Venous 44 41 - 51 mm Hg    POCT pO2, Venous 42 35 - 45 mm Hg    POCT SO2, Venous 76 (H) 45 - 75 %    POCT Oxy Hemoglobin, Venous 75.0 45.0 - 75.0 %    POCT Hematocrit Calculated, Venous 25.0 (L) 36.0 - 46.0 %    POCT Sodium, Venous 133 (L) 136 - 145 mmol/L    POCT Potassium, Venous 4.0 3.5 - 5.3 mmol/L    POCT Chloride, Venous 98 98 - 107 mmol/L    POCT Ionized Calicum, Venous 1.16 1.10 - 1.33 mmol/L    POCT Glucose, Venous 163 (H) 74 - 99 mg/dL    POCT Lactate, Venous 0.9 0.4 - 2.0 mmol/L    POCT Base Excess, Venous 4.4 (H) -2.0 - 3.0 mmol/L    POCT HCO3 Calculated, Venous 29.2 (H) 22.0 - 26.0 mmol/L    POCT Hemoglobin, Venous 8.2 (L) 12.0 - 16.0 g/dL    POCT Anion Gap, Venous 10.0 10.0 - 25.0 mmol/L    Patient Temperature 37.0 degrees Celsius    FiO2 40 %   POCT GLUCOSE   Result Value Ref Range    POCT Glucose 186 (H) 74 - 99 mg/dL   aPTT   Result Value Ref Range    aPTT 62.4 (H) 22.0 - 32.5 seconds   PST Top   Result Value Ref Range    Extra Tube Hold for add-ons.    Procalcitonin   Result Value Ref Range    Procalcitonin 0.42 (H) <=0.07 ng/mL   POCT GLUCOSE   Result Value Ref Range    POCT Glucose 146 (H) 74 - 99 mg/dL   POCT GLUCOSE   Result Value Ref Range    POCT Glucose 159 (H) 74 - 99 mg/dL   POCT GLUCOSE   Result Value Ref Range    POCT Glucose 183 (H) 74 - 99 mg/dL   POCT GLUCOSE   Result Value Ref Range    POCT Glucose 167 (H) 74 - 99 mg/dL   CBC and Auto Differential   Result Value Ref Range    WBC 7.3 4.4 - 11.3 x10*3/uL    nRBC 0.0 0.0 - 0.0 /100 WBCs    RBC 2.40 (L) 4.00 - 5.20 x10*6/uL    Hemoglobin 7.4 (L) 12.0 - 16.0 g/dL    Hematocrit 23.2 (L) 36.0 - 46.0 %    MCV 97 80 - 100 fL    MCH 30.8 26.0 - 34.0 pg     MCHC 31.9 (L) 32.0 - 36.0 g/dL    RDW 20.0 (H) 11.5 - 14.5 %    Platelets 249 150 - 450 x10*3/uL    Neutrophils % 81.5 40.0 - 80.0 %    Immature Granulocytes %, Automated 0.5 0.0 - 0.9 %    Lymphocytes % 11.6 13.0 - 44.0 %    Monocytes % 5.2 2.0 - 10.0 %    Eosinophils % 0.7 0.0 - 6.0 %    Basophils % 0.5 0.0 - 2.0 %    Neutrophils Absolute 5.98 1.20 - 7.70 x10*3/uL    Immature Granulocytes Absolute, Automated 0.04 0.00 - 0.70 x10*3/uL    Lymphocytes Absolute 0.85 (L) 1.20 - 4.80 x10*3/uL    Monocytes Absolute 0.38 0.10 - 1.00 x10*3/uL    Eosinophils Absolute 0.05 0.00 - 0.70 x10*3/uL    Basophils Absolute 0.04 0.00 - 0.10 x10*3/uL   Hepatic function panel   Result Value Ref Range    Albumin 2.3 (L) 3.4 - 5.0 g/dL    Bilirubin, Total 0.3 0.0 - 1.2 mg/dL    Bilirubin, Direct 0.1 0.0 - 0.3 mg/dL    Alkaline Phosphatase 116 33 - 136 U/L    ALT 9 7 - 45 U/L    AST 11 9 - 39 U/L    Total Protein 5.4 (L) 6.4 - 8.2 g/dL   Magnesium   Result Value Ref Range    Magnesium 1.97 1.60 - 2.40 mg/dL   Blood Gas Venous Full Panel   Result Value Ref Range    POCT pH, Venous 7.47 (H) 7.33 - 7.43 pH    POCT pCO2, Venous 44 41 - 51 mm Hg    POCT pO2, Venous 42 35 - 45 mm Hg    POCT SO2, Venous 78 (H) 45 - 75 %    POCT Oxy Hemoglobin, Venous 76.8 (H) 45.0 - 75.0 %    POCT Hematocrit Calculated, Venous 23.0 (L) 36.0 - 46.0 %    POCT Sodium, Venous 134 (L) 136 - 145 mmol/L    POCT Potassium, Venous 3.8 3.5 - 5.3 mmol/L    POCT Chloride, Venous 101 98 - 107 mmol/L    POCT Ionized Calicum, Venous 1.15 1.10 - 1.33 mmol/L    POCT Glucose, Venous 92 74 - 99 mg/dL    POCT Lactate, Venous 1.0 0.4 - 2.0 mmol/L    POCT Base Excess, Venous 7.6 (H) -2.0 - 3.0 mmol/L    POCT HCO3 Calculated, Venous 32.0 (H) 22.0 - 26.0 mmol/L    POCT Hemoglobin, Venous 7.6 (L) 12.0 - 16.0 g/dL    POCT Anion Gap, Venous 5.0 (L) 10.0 - 25.0 mmol/L    Patient Temperature 37.0 degrees Celsius    FiO2 40 %   Phosphorus   Result Value Ref Range    Phosphorus 2.3 (L) 2.5 -  4.9 mg/dL   Basic Metabolic Panel   Result Value Ref Range    Glucose 95 74 - 99 mg/dL    Sodium 136 136 - 145 mmol/L    Potassium 3.7 3.5 - 5.3 mmol/L    Chloride 99 98 - 107 mmol/L    Bicarbonate 29 21 - 32 mmol/L    Anion Gap 12 10 - 20 mmol/L    Urea Nitrogen 17 6 - 23 mg/dL    Creatinine 1.34 (H) 0.50 - 1.05 mg/dL    eGFR 43 (L) >60 mL/min/1.73m*2    Calcium 7.9 (L) 8.6 - 10.3 mg/dL   POCT GLUCOSE   Result Value Ref Range    POCT Glucose 112 (H) 74 - 99 mg/dL   POCT GLUCOSE   Result Value Ref Range    POCT Glucose 111 (H) 74 - 99 mg/dL   Prepare RBC: 1 Units   Result Value Ref Range    PRODUCT CODE [id number]     Unit Number [id number]     Unit ABO O     Unit RH NEG     XM INTEP COMP     Dispense Status TR     Blood Expiration Date 11/12/2024 11:59:00 PM EST     PRODUCT BLOOD TYPE 9500     UNIT VOLUME 350    Type and screen   Result Value Ref Range    ABO TYPE A     Rh TYPE NEG     ANTIBODY SCREEN NEG    POCT GLUCOSE   Result Value Ref Range    POCT Glucose 132 (H) 74 - 99 mg/dL   C. difficile, PCR    Specimen: Stool   Result Value Ref Range    C. difficile, PCR Not Detected Not Detected   Hemoglobin and hematocrit, blood   Result Value Ref Range    Hemoglobin 9.4 (L) 12.0 - 16.0 g/dL    Hematocrit 29.0 (L) 36.0 - 46.0 %   POCT GLUCOSE   Result Value Ref Range    POCT Glucose 149 (H) 74 - 99 mg/dL   aPTT   Result Value Ref Range    aPTT 22.6 22.0 - 32.5 seconds   POCT GLUCOSE   Result Value Ref Range    POCT Glucose 187 (H) 74 - 99 mg/dL   POCT GLUCOSE   Result Value Ref Range    POCT Glucose 179 (H) 74 - 99 mg/dL   CBC and Auto Differential   Result Value Ref Range    WBC 9.8 4.4 - 11.3 x10*3/uL    nRBC 0.0 0.0 - 0.0 /100 WBCs    RBC 2.91 (L) 4.00 - 5.20 x10*6/uL    Hemoglobin 8.8 (L) 12.0 - 16.0 g/dL    Hematocrit 26.4 (L) 36.0 - 46.0 %    MCV 91 80 - 100 fL    MCH 30.2 26.0 - 34.0 pg    MCHC 33.3 32.0 - 36.0 g/dL    RDW 21.4 (H) 11.5 - 14.5 %    Platelets 240 150 - 450 x10*3/uL    Neutrophils % 84.5 40.0  - 80.0 %    Immature Granulocytes %, Automated 1.0 (H) 0.0 - 0.9 %    Lymphocytes % 8.0 13.0 - 44.0 %    Monocytes % 5.9 2.0 - 10.0 %    Eosinophils % 0.2 0.0 - 6.0 %    Basophils % 0.4 0.0 - 2.0 %    Neutrophils Absolute 8.30 (H) 1.20 - 7.70 x10*3/uL    Immature Granulocytes Absolute, Automated 0.10 0.00 - 0.70 x10*3/uL    Lymphocytes Absolute 0.79 (L) 1.20 - 4.80 x10*3/uL    Monocytes Absolute 0.58 0.10 - 1.00 x10*3/uL    Eosinophils Absolute 0.02 0.00 - 0.70 x10*3/uL    Basophils Absolute 0.04 0.00 - 0.10 x10*3/uL   Hepatic function panel   Result Value Ref Range    Albumin 2.4 (L) 3.4 - 5.0 g/dL    Bilirubin, Total 0.4 0.0 - 1.2 mg/dL    Bilirubin, Direct 0.1 0.0 - 0.3 mg/dL    Alkaline Phosphatase 119 33 - 136 U/L    ALT 10 7 - 45 U/L    AST 11 9 - 39 U/L    Total Protein 5.6 (L) 6.4 - 8.2 g/dL   Magnesium   Result Value Ref Range    Magnesium 1.96 1.60 - 2.40 mg/dL   Blood Gas Venous Full Panel   Result Value Ref Range    POCT pH, Venous 7.48 (H) 7.33 - 7.43 pH    POCT pCO2, Venous 39 (L) 41 - 51 mm Hg    POCT pO2, Venous 110 (H) 35 - 45 mm Hg    POCT SO2, Venous 99 (H) 45 - 75 %    POCT Oxy Hemoglobin, Venous 96.9 (H) 45.0 - 75.0 %    POCT Hematocrit Calculated, Venous 28.0 (L) 36.0 - 46.0 %    POCT Sodium, Venous 131 (L) 136 - 145 mmol/L    POCT Potassium, Venous 3.9 3.5 - 5.3 mmol/L    POCT Chloride, Venous 98 98 - 107 mmol/L    POCT Ionized Calicum, Venous 1.12 1.10 - 1.33 mmol/L    POCT Glucose, Venous 143 (H) 74 - 99 mg/dL    POCT Lactate, Venous 1.5 0.4 - 2.0 mmol/L    POCT Base Excess, Venous 5.1 (H) -2.0 - 3.0 mmol/L    POCT HCO3 Calculated, Venous 29.0 (H) 22.0 - 26.0 mmol/L    POCT Hemoglobin, Venous 9.2 (L) 12.0 - 16.0 g/dL    POCT Anion Gap, Venous 8.0 (L) 10.0 - 25.0 mmol/L    Patient Temperature 37.0 degrees Celsius    FiO2 100 %   Phosphorus   Result Value Ref Range    Phosphorus 3.8 2.5 - 4.9 mg/dL   Basic Metabolic Panel   Result Value Ref Range    Glucose 148 (H) 74 - 99 mg/dL    Sodium 134  (L) 136 - 145 mmol/L    Potassium 3.8 3.5 - 5.3 mmol/L    Chloride 98 98 - 107 mmol/L    Bicarbonate 28 21 - 32 mmol/L    Anion Gap 12 10 - 20 mmol/L    Urea Nitrogen 30 (H) 6 - 23 mg/dL    Creatinine 1.77 (H) 0.50 - 1.05 mg/dL    eGFR 31 (L) >60 mL/min/1.73m*2    Calcium 7.9 (L) 8.6 - 10.3 mg/dL   aPTT   Result Value Ref Range    aPTT 55.3 (H) 22.0 - 32.5 seconds   Morphology   Result Value Ref Range    RBC Morphology See Below     Ovalocytes Few     [place] Cells Few    POCT GLUCOSE   Result Value Ref Range    POCT Glucose 155 (H) 74 - 99 mg/dL   POCT GLUCOSE   Result Value Ref Range    POCT Glucose 156 (H) 74 - 99 mg/dL       Assessment/Plan   Acute kidney injury continues urine output started to  however is not enough to take care of dialysis especially with her fluid overload plan is to dialyze her and do ultrafiltration today and monitor renal function over the weekend possible recovery  Edema much improved  Pneumonia continue with antibiotic therapy infectious disease  Diabetes mellitus type 2  Malnutrition continue with tube feeding  Lower back surgery site infection  Anemia transfuse when hemoglobin less than 7  Acute respiratory failure plan to continue the ventilator through a tracheostomy         Imer Cheung MD

## 2024-11-08 NOTE — PROGRESS NOTES
[facility] Critical Care Medicine       Date:  11/8/2024  Patient:  Narda Malloy  YOB: 1955  MRN:  [id number]   Admit Date:  10/12/2024    Chief Complaint   Patient presents with    Altered Mental Status     History of Present Illness:  Narda Malloy is a 69 y.o. year old female patient with Past Medical History of L1-L3 lumbar laminectomy, T4-S1 revision, and fusion August 26th, T2DM, HTN, essential tremor, HLD, glaucoma, sarcoidosis of the lung who presented to  ED 10/12 after being [facility] essentially un[facility]. Per report from her , she has had a significant decline in her health since July 15th when she had a fall and became significantly weak. She has also had multiple infection complications since her back surgery in August requiring multiple I&Ds and long term antibiotic therapy. She went to the OR most recently on 9/28 for lumbar site infection wash out. Per chart review, she was discharged on IV vancomycin 1g and IV ceftriaxone 2d q24hrs through 11/12.     ED Course: Initial vital signs: /104 (109), HR 68, RR 20, SpO2 95% on 6L NC, temp 34.5C. Give 0.4mg of narcan with no improvement in mentation. Lab work-up remarkable for mild hyperkalemia (5.5), AMY 42/1.46, elevated alk phos, normocytic anemia 10.4/33, turbid appearing urine with mild hematuria and proteinuria and + leuk esterase, >50 WBCs. Urine drug screen positive for barbiturates. Triggered sepsis timer so she was given 3L NS. She was intubated for airway protection with 20mg etomidate and 100mg succinylcholine. BP dropped post-intubated and fluid resuscitation and she was subsequently started on levophed.          Interval ICU Events:  10/12: Pt arrived to ICU intubated and lightly sedated on low-dose versed. Eyes open, minimally responsive.      10/13: Received K cocktail last night for K 6.0, corrected appropriately. Levophed requirements mildly up, UOP decreasing.  Ordered albumin x2 this am with improvements in UOP and SBP. Will likely trial lasix this afternoon d/t hypervolemia.     10/14: Remains intubated with decreased mentation, only responsive to noxious stimuli. Trialed lasix TID for volume overload. Remains on levophed 0.01.     10/15: Mentation much improved. SAT/SBT successful so extubated. Given lasix x3, bumex x1, metolazone x1 with net negative fluid balance of 500mL -> started on bumex gtt.      10/16: O2 requirements significantly increased, NRB -> HFNC 40L 100% likely 2/2 mucus plugging.     10/17: No acute events overnight. Bumex gtt increased to 1mg/hr. CXR this am showing complete opacification of left hemithorax related to atelectasis vs pleural effusion. Remains on HFNC 40L/80%. Pigtail catheter placed with 1.2L drained immediately. Given albumin for hypotension.      10/18: ~2L output from left sided pigtail catheter since placement. Kidney function worsening and UOP declining, about 20cc/hr overnight. Will place NG tube today and start enteral nutrition and appetite and oral intake remains poor.      10/19: Patient with worsening hypoxic, hypercapnic respiratory failure - now requiring BIPAP support. Remains grossly volume overloaded with low UO. Started on vasopressors to augment BP for diuresis. 40 IV lasix + gtt started. Remains with poor nutritional status. Will re attempt NG later if respiratory status improves.      10/20: Patient stable on vent. Nephrology consulted for renal failure. Cr continues to uptrend however UO is increasing. No issues overnight.     10/21: No issues overnight. Remains stable on vent. Levo down to 0.01 mcg/kg/min. UO remains low. Nephrology following. Possible CRRT today?     10/22: Patient received 80 lasix and metalozone with minimal UO. Levo @ .02 and prop @ 10. Vent settings: 20/450/10/40%. Plan for CRRT today. Will SAT/SBT after CRRT. May change propofol to precedex.     10/23: Had episodes of bradycardia where HR  went to 30's which resolved with heating the patient. CRRT yesterday with about 1L removed. Currently on 0.03 of levo and 10 of prop. Cont daptomycin and ceftriaxone per ID. CXR improved. SAT/SBT. EP consulted for episodes of bradycardia.     10/24: Bradycardic episodes of HR in 40s. Currently on 0.03 of Levo, 10 of prop and 50 fentanyl. Tolerating CRRT. Place PICC today.     10/25: Did well with the SBT yesterday, plan for another one today. Continue CRRT. Hgb 7.0 this am, still requiting Levo 0.03, will transfuse 1 unit PRBCs. Remove chest tube today.     10/26: Chest tube removed last night, CXR improving, patient appears overall lethargic on sbt/sat, not ready to extubate, will complete 4/4/4 sbt/rest/sbt-> rest today and reassess tomorrow     10/27: Patient failed afternoon SBT, placed on rate overnight, net - 2.5L over previous 24 hours, will place on wean today.     10/29: Patient with high residual tube feeds overnight.  She did have an episode of vomiting.  Tube feeds placed on hold.  She was also afebrile overnight.  Will obtain a KUB to rule out ileus.  Sputum culture with Pseudomonas, discussed antibiotic plans with ID.  Will not do SBT with patient today.  Did discuss the setback with her , he did state moving forward that if she does not reach extubation he would be agreeable to a tracheostomy.      10/30: No significant events overnight.  Tube feeds resumed at trickle rate yesterday, tolerating overnight.  Will increase to goal today.  Dialysis this morning.  Patient improved from yesterday.  Plan for SBT today.  Will attempt to sit patient on side of bed today.    10/31: No significant events overnight. Tolerating tube feeds, tube feeds on hold this AM for SBT. Patient awake and alert this morning, improved from yesterday.  Tolerating SBT, answering yes or no questions.  Will extubate today.    11/1: Pt extubated yesterday to HFNC. Had worsened respiratory distress requiring bipap, wore bipap  overnight. Will trial back on HFNC when more awake. Dialysis scheduled for today. Will remove spinal sutures.     11/2: Unable to wean from bipap yesterday without immediate desat in 50-60s, increasingly lethargic and weak. Decision made to reintubate and this was discussed with  which he was ok with. Pt will need trach and peg as this was her 3rd intubation this admission and she's having persistent respiratory failure due to recurrent pleural effusions, even with iHD fluid removal and recurrent atelectasis with lobar collapse. Consulted to palliative care for communication to family about GOC and communication of expectations, risks, benefits of tracheostomy creation, peg tube placement, and LTAC admission. Discussed with neph, will run tablo today for additional fluid removal d/t recurrent intubation. Plan for ENT and surgery consult for trach and peg pending family meeting tmrw at 1pm.     11/3: OVN: Unsuccessful removal of incisional sutures of posterior surgery d/t skin overgrowth. DAY: Reached out to ortho spine surgical team about suture removal, awaiting to hear back. GOC discussion today, patient will remain DNR-CCA and family wants to pursue Trach/PEG placement (consults ordered). Off sedation and fent, transitioned to enteral narcotics for pain control.     11/4: Plan for peg placement today at bedside, tracheostomy creation Thursday. Will receive scheduled dialysis after peg placement.     11/5: Peg-tube placed yesterday without issue. Removed majority of sutures yesterday but had an episode of desaturation when turned on her right side, will plan to remove the rest today. 1 unit PRBC ordered for Hgb 6.9.     11/6: No significant overnight events.      11/7: Drop in Hgb this morning to 7.4, 1 unit PRBC transfused pre-operatively, tracheostomy scheduled for this afternoon. Bladder scan this morning with 270cc, will place anders and hold off on dialysis tomorrow to assess renal recovery.    11/8: No  acute overnight events        Review of Systems:  Unable to obtain ROS patient somnolent, intermittently answers simple questions only with head nods.       Intake/Output Summary (Last 24 hours) at 11/8/2024 0857  Last data filed at 11/8/2024 0800  Gross per 24 hour   Intake 2176 ml   Output 940 ml   Net 1236 ml      Vitals:    11/08/24 0800   BP: 128/61   Pulse: 70   Resp: 26   Temp: 36.9 °C (98.4 °F)   SpO2: 100%        Vent Mode: Pressure regulated volume control/assist control  FiO2 (%):  [40 %] 40 %  S RR:  [16] 16  S VT:  [450 mL] 450 mL  PEEP/CPAP (cm H2O):  [5 cm H20] 5 cm H20  WV SUP:  [5 cm H20] 5 cm H20  MAP (cm H2O):  [7.7-9.3] 9.3     Scheduled medications:  acetaminophen, 650 mg, oral, q6h  acetylcysteine, 3 mL, nebulization, 4x daily  albuterol, 3 mL, nebulization, 4x daily  brimonidine, 1 drop, Both Eyes, BID  calcium carbonate-vitamin D3, 1 tablet, oral, Daily  cefTRIAXone, 2 g, intravenous, q24h  daptomycin, 700 mg, intravenous, q48h  ezetimibe, 10 mg, oral, Daily  ferrous sulfate, 60 mg of iron, oral, Daily  folic acid, 1 mg, oral, Daily  honey, , Topical, Daily  insulin lispro, 0-15 Units, subcutaneous, q4h  latanoprost, 1 drop, Both Eyes, Nightly  midodrine, 10 mg, oral, q8h  oxygen, , inhalation, Continuous - Inhalation  pantoprazole, 40 mg, oral, Daily before breakfast   Or  pantoprazole, 40 mg, intravenous, Daily before breakfast  potassium, sodium phosphates, 1 packet, oral, With meals & nightly  primidone, 125 mg, oral, Nightly  [Held by provider] propranolol LA, 60 mg, oral, Daily  sodium chloride, 3 mL, nebulization, 4x daily      PRN medications: alteplase, dextrose, dextrose, glucagon, glucagon, heparin (porcine), heparin flush, HYDROmorphone, oxyCODONE, oxyCODONE, polyethylene glycol, sennosides-docusate sodium   heparin, 0-4,500 Units/hr, Last Rate: 1,800 Units/hr (11/08/24 0800)    Objective:  All medications, laboratory results, and imaging pertinent for today's encounter  reviewed    Relevant results:  Results for orders placed or performed during the hospital encounter of 10/12/24 (from the past 24 hours)   C. difficile, PCR    Specimen: Stool   Result Value Ref Range    C. difficile, PCR Not Detected Not Detected   Hemoglobin and hematocrit, blood   Result Value Ref Range    Hemoglobin 9.4 (L) 12.0 - 16.0 g/dL    Hematocrit 29.0 (L) 36.0 - 46.0 %   POCT GLUCOSE   Result Value Ref Range    POCT Glucose 149 (H) 74 - 99 mg/dL   aPTT   Result Value Ref Range    aPTT 22.6 22.0 - 32.5 seconds   POCT GLUCOSE   Result Value Ref Range    POCT Glucose 187 (H) 74 - 99 mg/dL   POCT GLUCOSE   Result Value Ref Range    POCT Glucose 179 (H) 74 - 99 mg/dL   CBC and Auto Differential   Result Value Ref Range    WBC 9.8 4.4 - 11.3 x10*3/uL    nRBC 0.0 0.0 - 0.0 /100 WBCs    RBC 2.91 (L) 4.00 - 5.20 x10*6/uL    Hemoglobin 8.8 (L) 12.0 - 16.0 g/dL    Hematocrit 26.4 (L) 36.0 - 46.0 %    MCV 91 80 - 100 fL    MCH 30.2 26.0 - 34.0 pg    MCHC 33.3 32.0 - 36.0 g/dL    RDW 21.4 (H) 11.5 - 14.5 %    Platelets 240 150 - 450 x10*3/uL    Neutrophils % 84.5 40.0 - 80.0 %    Immature Granulocytes %, Automated 1.0 (H) 0.0 - 0.9 %    Lymphocytes % 8.0 13.0 - 44.0 %    Monocytes % 5.9 2.0 - 10.0 %    Eosinophils % 0.2 0.0 - 6.0 %    Basophils % 0.4 0.0 - 2.0 %    Neutrophils Absolute 8.30 (H) 1.20 - 7.70 x10*3/uL    Immature Granulocytes Absolute, Automated 0.10 0.00 - 0.70 x10*3/uL    Lymphocytes Absolute 0.79 (L) 1.20 - 4.80 x10*3/uL    Monocytes Absolute 0.58 0.10 - 1.00 x10*3/uL    Eosinophils Absolute 0.02 0.00 - 0.70 x10*3/uL    Basophils Absolute 0.04 0.00 - 0.10 x10*3/uL   Hepatic function panel   Result Value Ref Range    Albumin 2.4 (L) 3.4 - 5.0 g/dL    Bilirubin, Total 0.4 0.0 - 1.2 mg/dL    Bilirubin, Direct 0.1 0.0 - 0.3 mg/dL    Alkaline Phosphatase 119 33 - 136 U/L    ALT 10 7 - 45 U/L    AST 11 9 - 39 U/L    Total Protein 5.6 (L) 6.4 - 8.2 g/dL   Magnesium   Result Value Ref Range    Magnesium 1.96  1.60 - 2.40 mg/dL   Blood Gas Venous Full Panel   Result Value Ref Range    POCT pH, Venous 7.48 (H) 7.33 - 7.43 pH    POCT pCO2, Venous 39 (L) 41 - 51 mm Hg    POCT pO2, Venous 110 (H) 35 - 45 mm Hg    POCT SO2, Venous 99 (H) 45 - 75 %    POCT Oxy Hemoglobin, Venous 96.9 (H) 45.0 - 75.0 %    POCT Hematocrit Calculated, Venous 28.0 (L) 36.0 - 46.0 %    POCT Sodium, Venous 131 (L) 136 - 145 mmol/L    POCT Potassium, Venous 3.9 3.5 - 5.3 mmol/L    POCT Chloride, Venous 98 98 - 107 mmol/L    POCT Ionized Calicum, Venous 1.12 1.10 - 1.33 mmol/L    POCT Glucose, Venous 143 (H) 74 - 99 mg/dL    POCT Lactate, Venous 1.5 0.4 - 2.0 mmol/L    POCT Base Excess, Venous 5.1 (H) -2.0 - 3.0 mmol/L    POCT HCO3 Calculated, Venous 29.0 (H) 22.0 - 26.0 mmol/L    POCT Hemoglobin, Venous 9.2 (L) 12.0 - 16.0 g/dL    POCT Anion Gap, Venous 8.0 (L) 10.0 - 25.0 mmol/L    Patient Temperature 37.0 degrees Celsius    FiO2 100 %   Phosphorus   Result Value Ref Range    Phosphorus 3.8 2.5 - 4.9 mg/dL   Basic Metabolic Panel   Result Value Ref Range    Glucose 148 (H) 74 - 99 mg/dL    Sodium 134 (L) 136 - 145 mmol/L    Potassium 3.8 3.5 - 5.3 mmol/L    Chloride 98 98 - 107 mmol/L    Bicarbonate 28 21 - 32 mmol/L    Anion Gap 12 10 - 20 mmol/L    Urea Nitrogen 30 (H) 6 - 23 mg/dL    Creatinine 1.77 (H) 0.50 - 1.05 mg/dL    eGFR 31 (L) >60 mL/min/1.73m*2    Calcium 7.9 (L) 8.6 - 10.3 mg/dL   aPTT   Result Value Ref Range    aPTT 55.3 (H) 22.0 - 32.5 seconds   Morphology   Result Value Ref Range    RBC Morphology See Below     Ovalocytes Few     [place] Cells Few    POCT GLUCOSE   Result Value Ref Range    POCT Glucose 155 (H) 74 - 99 mg/dL   POCT GLUCOSE   Result Value Ref Range    POCT Glucose 156 (H) 74 - 99 mg/dL   POCT GLUCOSE   Result Value Ref Range    POCT Glucose 141 (H) 74 - 99 mg/dL     *Note: Due to a large number of results and/or encounters for the requested time period, some results have not been displayed. A complete set of results can  be found in Results Review.        Objective:  I have reviewed all medications, laboratory results, and imaging pertinent for today's encounter      Physical Exam  Vital signs reviewed.   Constitutional:       Appearance: Obese, ill-appearing     Interventions: Mecanically ventilated  HENT:      Head: Normocephalic and atraumatic.      Right Ear: External ear normal.      Left Ear: External ear normal.      Nose: Nose normal.      Mouth/Throat:      Mouth: Mucous membranes are dry.      Comments: Age related dental decay  Eyes:      Conjunctiva/sclera: Conjunctivae normal.      Pupils: Pupils are equal, round, and reactive to light.   Cardiovascular:      Rate and Rhythm: Normal rate and regular rhythm.      Pulses: Normal pulses.      Heart sounds: Normal heart sounds.      Comments: Bilateral upper and lower extremities with edema  Pulmonary:      Effort: Mechanically ventilated     Breath sounds: Examination of the right-lower field reveals decreased breath sounds. Examination of the left-lower field reveals decreased breath sounds. Decreased breath sounds present.      Comments: Lungs diminished but clear. Trach in place and midline.  Abdominal:      General: Bowel sounds are hypoactive     Palpations: Abdomen is soft.      Tenderness: There is no abdominal tenderness.   Musculoskeletal:      Right hand: Swelling present.      Left hand: Swelling present.      Right lower le+ Edema present.      Left lower le+ Edema present.   Skin:   General: Skin is warm.    Capillary Refill: Capillary refill takes less than 2 seconds.   Neurological:   Mental Status: Somnolent, opens eyes to verbal stimuli, follows commands, answers simple question with head nods.  GCS: GCS eye subscore: 3 GCS verbal subscore: 1 GCS motor subscore: 6      Relevant results:  Results for orders placed or performed during the hospital encounter of 10/12/24 (from the past 24 hours)   C. difficile, PCR    Specimen: Stool   Result Value Ref  Range    C. difficile, PCR Not Detected Not Detected   Hemoglobin and hematocrit, blood   Result Value Ref Range    Hemoglobin 9.4 (L) 12.0 - 16.0 g/dL    Hematocrit 29.0 (L) 36.0 - 46.0 %   POCT GLUCOSE   Result Value Ref Range    POCT Glucose 149 (H) 74 - 99 mg/dL   aPTT   Result Value Ref Range    aPTT 22.6 22.0 - 32.5 seconds   POCT GLUCOSE   Result Value Ref Range    POCT Glucose 187 (H) 74 - 99 mg/dL   POCT GLUCOSE   Result Value Ref Range    POCT Glucose 179 (H) 74 - 99 mg/dL   CBC and Auto Differential   Result Value Ref Range    WBC 9.8 4.4 - 11.3 x10*3/uL    nRBC 0.0 0.0 - 0.0 /100 WBCs    RBC 2.91 (L) 4.00 - 5.20 x10*6/uL    Hemoglobin 8.8 (L) 12.0 - 16.0 g/dL    Hematocrit 26.4 (L) 36.0 - 46.0 %    MCV 91 80 - 100 fL    MCH 30.2 26.0 - 34.0 pg    MCHC 33.3 32.0 - 36.0 g/dL    RDW 21.4 (H) 11.5 - 14.5 %    Platelets 240 150 - 450 x10*3/uL    Neutrophils % 84.5 40.0 - 80.0 %    Immature Granulocytes %, Automated 1.0 (H) 0.0 - 0.9 %    Lymphocytes % 8.0 13.0 - 44.0 %    Monocytes % 5.9 2.0 - 10.0 %    Eosinophils % 0.2 0.0 - 6.0 %    Basophils % 0.4 0.0 - 2.0 %    Neutrophils Absolute 8.30 (H) 1.20 - 7.70 x10*3/uL    Immature Granulocytes Absolute, Automated 0.10 0.00 - 0.70 x10*3/uL    Lymphocytes Absolute 0.79 (L) 1.20 - 4.80 x10*3/uL    Monocytes Absolute 0.58 0.10 - 1.00 x10*3/uL    Eosinophils Absolute 0.02 0.00 - 0.70 x10*3/uL    Basophils Absolute 0.04 0.00 - 0.10 x10*3/uL   Hepatic function panel   Result Value Ref Range    Albumin 2.4 (L) 3.4 - 5.0 g/dL    Bilirubin, Total 0.4 0.0 - 1.2 mg/dL    Bilirubin, Direct 0.1 0.0 - 0.3 mg/dL    Alkaline Phosphatase 119 33 - 136 U/L    ALT 10 7 - 45 U/L    AST 11 9 - 39 U/L    Total Protein 5.6 (L) 6.4 - 8.2 g/dL   Magnesium   Result Value Ref Range    Magnesium 1.96 1.60 - 2.40 mg/dL   Blood Gas Venous Full Panel   Result Value Ref Range    POCT pH, Venous 7.48 (H) 7.33 - 7.43 pH    POCT pCO2, Venous 39 (L) 41 - 51 mm Hg    POCT pO2, Venous 110 (H) 35 - 45  mm Hg    POCT SO2, Venous 99 (H) 45 - 75 %    POCT Oxy Hemoglobin, Venous 96.9 (H) 45.0 - 75.0 %    POCT Hematocrit Calculated, Venous 28.0 (L) 36.0 - 46.0 %    POCT Sodium, Venous 131 (L) 136 - 145 mmol/L    POCT Potassium, Venous 3.9 3.5 - 5.3 mmol/L    POCT Chloride, Venous 98 98 - 107 mmol/L    POCT Ionized Calicum, Venous 1.12 1.10 - 1.33 mmol/L    POCT Glucose, Venous 143 (H) 74 - 99 mg/dL    POCT Lactate, Venous 1.5 0.4 - 2.0 mmol/L    POCT Base Excess, Venous 5.1 (H) -2.0 - 3.0 mmol/L    POCT HCO3 Calculated, Venous 29.0 (H) 22.0 - 26.0 mmol/L    POCT Hemoglobin, Venous 9.2 (L) 12.0 - 16.0 g/dL    POCT Anion Gap, Venous 8.0 (L) 10.0 - 25.0 mmol/L    Patient Temperature 37.0 degrees Celsius    FiO2 100 %   Phosphorus   Result Value Ref Range    Phosphorus 3.8 2.5 - 4.9 mg/dL   Basic Metabolic Panel   Result Value Ref Range    Glucose 148 (H) 74 - 99 mg/dL    Sodium 134 (L) 136 - 145 mmol/L    Potassium 3.8 3.5 - 5.3 mmol/L    Chloride 98 98 - 107 mmol/L    Bicarbonate 28 21 - 32 mmol/L    Anion Gap 12 10 - 20 mmol/L    Urea Nitrogen 30 (H) 6 - 23 mg/dL    Creatinine 1.77 (H) 0.50 - 1.05 mg/dL    eGFR 31 (L) >60 mL/min/1.73m*2    Calcium 7.9 (L) 8.6 - 10.3 mg/dL   aPTT   Result Value Ref Range    aPTT 55.3 (H) 22.0 - 32.5 seconds   Morphology   Result Value Ref Range    RBC Morphology See Below     Ovalocytes Few     [place] Cells Few    POCT GLUCOSE   Result Value Ref Range    POCT Glucose 155 (H) 74 - 99 mg/dL   POCT GLUCOSE   Result Value Ref Range    POCT Glucose 156 (H) 74 - 99 mg/dL   POCT GLUCOSE   Result Value Ref Range    POCT Glucose 141 (H) 74 - 99 mg/dL      XR chest 1 view  Result Date: 11/8/2024  FINDINGS: The cardiac size is indeterminate in view of the AP projection. There is now a tracheostomy tube present with the tip at the level of the clavicles. There is no change in the right-sided central venous catheter or left-sided PICC line. There is no change in the bilateral infiltrates and effusions,  more marked on the left.   Status post tracheostomy tube placement. No change in the infiltrates and effusions bilaterally.     US guided percutaneous placement  Result Date: 11/6/2024  FINDINGS: Informed consent obtained from patient's . Procedure performed in ICU at bedside with patient on ventilator.  All elements of maximal sterile barrier were utilized including cap, mask, sterile gown, sterile gloves, large sterile drape, hand scrub, 2% chlorhexidine for skin cleaning and sterile ultrasound guidance. Ultrasound of right neck demonstrated partially thrombosed lower internal jugular vein associated with presence of non tunneled central venous catheter. Under ultrasound guidance, access into the vein was established with micro puncture and permanent image archived. Small dermatotomy made few cm below clavicle. A 14.5 [place] x 23 cm dual lumen cuffed hemodialysis catheter ([employer]) was tunneled from dermatotomy to venotomy. Introducer sheath exchanged for a peel-away sheath. Distal end of the tunneled catheter advanced centrally through the peel-away sheath. The catheter aspirated and flushed freely, locked with heparin, secured, and covered with dressing. Patient tolerated procedure well. Portable chest x-ray confirmed tip of catheter at level of mid-lower SVC. Insertion of tunneled central venous catheter for hemodialysis.       XR chest 1 view  Result Date: 11/5/2024  FINDINGS: The study is very limited due to rotation, respiratory motion, underpenetration, overlying artifacts obscuring some detail and poor inspiratory effort, with resultant crowding of the pulmonary vasculature. Endotracheal tube, left PICC line and dialysis catheter are again in position. The cardiomediastinal silhouette is within normal limits for the technique. Bilateral infiltrates and left pleural effusion are again noted. There is no pneumothorax. Hardware is again in position fusing the thoracic and lumbar spine posteriorly.    Very limited exam. Stable lines and tubes. Persistent bilateral infiltrates and left pleural effusion. Follow-up as needed.       XR chest 1 view  Result Date: 11/5/2024  FINDINGS: The cardiac size is indeterminate in view of the AP projection.  ET tube lies 4 cm above the michi. There is no change in right internal jugular venous catheter or PICC line. There is no change in the bilateral infiltrates and effusions. Patient is rotated towards the right.  There is no interval change when compared to the previous examination.     Vascular US upper extremity venous duplex right  Result Date: 11/4/2024  FINDINGS: There is nonocclusive thrombus within the right internal jugular vein in association with a catheter within this vessel. There is normal flow and compressibility within the right subclavian vein, axillary vein, and brachial vein of the deep venous system with normal compressibility and flow within the basilic and cephalic veins of the superficial system..   There is nonocclusive thrombus surrounding the catheter within the right internal jugular vein of the deep system. This appears acute.    XR chest 1 view  Result Date: 11/4/2024  FINDINGS: The cardiac size is indeterminate in view of the AP projection.  The tube and line placement is unchanged.  There is no change in the bilateral infiltrates and effusions more marked at the left base.  There is no interval change when compared to the previous examination.     XR chest 1 view  Result Date: 11/3/2024  FINDINGS: Triple-lumen catheter noted in the right neck. Endotracheal tube and nasogastric tube are again seen. Endotracheal tube tip obscured by spinal fusion hardware. Left subclavian PICC line noted overlying the superior vena cava. Spinal fusion rods in the thoracic and lumbar spine   Again seen is pulmonary venous congestion with bilateral perihilar airspace opacification possibly pulmonary edema. Suspected small left pleural effusion again noted.    Bilateral perihilar airspace opacification similar to the prior exam possibly due to pulmonary edema or pneumonia with life-support devices unchanged since the prior exam.       Transthoracic Echo (TTE) Complete  Result Date: 11/3/2024  CONCLUSIONS:  1. The left ventricular systolic function is normal, with a visually estimated ejection fraction of 60-65%.  2. There is normal right ventricular global systolic function.  3. Mild mitral valve regurgitation.  4. Mild to moderately elevated right ventricular systolic pressure.  5. Mild tricuspid regurgitation is visualized.  6. Aortic valve sclerosis.  7. Aneurysm of the ascending aorta.  8. 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     XR chest 1 view  Result Date: 11/2/2024  FINDINGS: CARDIOMEDIASTINAL SILHOUETTE: Cardiomediastinal silhouette is normal in size and configuration. There is a left PICC line with the tip in the superior vena cava. There is an endotracheal tube with the tip not seen because of upper thoracic dorsal fusion rods.   LUNGS: There is bibasilar pneumonia with improvement in aeration at the right lung base. There is less consolidation.   ABDOMEN: No remarkable upper abdominal findings. There is a nasogastric tube with the tip not seen but is well beyond the gastroesophageal junction.   BONES: No acute osseous changes. There are dorsal fusion rods bilaterally involving the in the tire thoracic and visualized upper lumbar spine.  1. Endotracheal tube tip not seen because of obscuration from upper thoracic dorsal fusion rods. 2. Bibasilar pneumonia with improved aeration at the right lung base.   MACRO: None   Signed by: Mayuri Velez 11/2/2024 12:42 PM Dictation workstation:   [id number]    XR chest 1 view  Result Date: 11/1/2024  Interpreted By:  Parmjit Mancini, STUDY: XR CHEST 1 VIEW   INDICATION: Signs/Symptoms:post intubation.   COMPARISON: Earlier the same day.   ACCESSION NUMBER(S): [id number]   ORDERING  CLINICIAN: SERINA BUTCHER   FINDINGS: Intubation again noted. Unfortunately the tip of the endotracheal tube not definitively visualized as it is obscured by some of the thoracic fusion hardware. It looks to be near the aortic knob.   Bilateral pleural effusions and basilar edema unchanged.   No evidence of pneumothorax.   Unchanged appearance of the chest as above.       XR chest 1 view  Result Date: 11/1/2024  FINDINGS: Hazy opacities identified within bilateral lung fields mid to lower lung zones with component of layering effusions and compressive atelectasis suggested. Right IJ line is demonstrated with tip in the proximal SVC. Left-sided PICC line with tip in the region of the distal SVC. NG tube is in place with interval removal of the ET tube.   Spinal fusion demonstrated. Cardiac silhouette within normal limits     1. Persistent hazy opacities mid to lower lung zones bilaterally with layering basilar effusions and compressive atelectasis suggested.   2. Interval removal of the ET tube. Remainder of the lines and tubes are unremarkable.      XR chest 1 view  Result Date: 10/31/2024  FINDINGS: The cardiac size is indeterminate in view of the AP projection.  The tube and line placement is unchanged.  There is no change in the bilateral infiltrates and effusions. Tube and line placement is somewhat difficult to assess due to overlying surgical hardware.  No change in the bilateral infiltrates and effusions.       XR chest 1 view  Result Date: 10/30/2024  FINDINGS: The cardiac size is indeterminate in view of the AP projection. There is no change in the bilateral infiltrates and effusions compared to the prior study. Tube and line placement is somewhat difficult to assess due to the extensive thoracic spinal hardware.   There is no interval change when compared to the previous examination.       Vascular US upper extremity venous duplex left  Result Date: 10/29/2024  CONCLUSIONS: Right Upper Venous: Acute,  non-occlusive, focal deep vein thrombus of right internal jugular vein around line placement. Visualized portions of subclavian and innominate veins appear patent. Left Upper Venous: No evidence of acute deep vein thrombus visualized in the left upper extremity. Acute, occlusive superficial vein thrombosis of left cephalic vein from mid-distal upper arm extending to approximately 2.0cm proximal to subclavian junction. There is a left subclavian vein catheter noted in place.  Imaging & Doppler Findings:  Right            Compressible      Thrombus              Flow Internal Jugular   Partial    Acute non-occlusive Subclavian                                        Spontaneous/Phasic  Left                Compress    Thrombus Internal Jugular      Yes         None Subclavian            Yes         None Subclavian Proximal   Yes         None Subclavian Mid        Yes         None Subclavian Distal     Yes         None Axillary              Yes         None Brachial              Yes         None Cephalic               No    Acute occlusive Basilic               Yes         None     XR abdomen 1 view  Result Date: 10/29/2024  FINDINGS: Bowel gas pattern is nonspecific and nonobstructive.  There is a nasogastric tube with the tip in the distal stomach. There is a 2nd nasogastric tube with the tip in the proximal stomach. There is no gastric distention.   There is partial atherosclerotic calcification of the abdominal aorta.   There is left basilar consolidation with small left pleural effusion.   Osseous structures demonstrate no acute bony changes.   There are lower thoracic and lumbar dorsal fusion rods.  Nonspecific, nonobstructive bowel gas pattern. There are 2 nasogastric tubes. The tip of one of the tubes as in the distal stomach and the tip of the 2nd tube is in the proximal stomach. Left basilar consolidation with a small left pleural effusion.       XR chest 1 view  Result Date: 10/29/2024  Interpreted By:  Live  Lily, STUDY: XR CHEST 1 VIEW;  FINDINGS: In size.   There is bilateral parenchymal infiltration. There may be bilateral pleural effusions.   There are calcified granulomas.   A nasogastric tube terminates below the diaphragm.   There appears to be a PICC line on the left with the tip in the region of superior vena cava.   There is also of jugular catheter on the right. The tip is not well seen.   The patient is status post spinal fusion.   COMPARISON OF FINDING: The chest is similar.  Bilateral parenchymal infiltrates. Bilateral pleural effusions.       XR chest 1 view  Result Date: 10/28/2024  FINDINGS: The cardiac size is indeterminate in view of the AP projection. Bilateral perihilar infiltrate is present. Bilateral effusions are present, greater than left. Increased density at the left base suggest consolidation within left lower lobe. The tube and line placement is unchanged.  Bilateral infiltrates and effusions. There is no interval change when compared to the previous examination.       Assessment/Plan:  Neuro/Psych/Pain Ctrl/Sedation: (Hx: essential tremor)  Acute encephalopathy - likely 2/2 hypercapnia, & infection- resolving   CT head: Negative for acute findings   Urine tox positive for barbiturates consistent with primidone intake   Pain Control: liquid oxy, scheduled acetaminophen, dilaudid for dressing changes PRN  Sedation/analgesia: none, keep sedation minimized as able   Home primidone continued. Will hold propanolol d/t hypotension  CAM ICU qshift, sleep-wake hygiene, delirium precautions    Respiratory/ENT:  Acute hypoxic/hypercapnic respiratory failure-likely multifactorial and 2/2 HCAP, pl effusions, volume overload  Healthcare-associated pneumonia   Recurrent atelectasis   Ventilator-dependence   Pleural effusion -s/p left-sided pigtail placement 10/17, removed 10/25  Intubated for 3 days extubated and re intubated on the 19 th  Supplemental O2: intubated x3 times this admission   Will wean O2  as tolerated to maintain SpO2 >92%  SBT every a.m.  Consider bronchoscopy, repeat thoracentesis if indicated   Albuterol, mucomyst, hypertonic saline    IPV per RT TID  Aspiration precautions   Respiratory culture with Pseudomonas, repeat culture with no growth   Trach care    Cardiovascular:  (Hx: diastolic heart failure, HTN, HLD)  Septic shock-resolved  Occlusive superficial venous thrombus of the left cephalic vein  Non-occlusive DVT right IJ vein   Acute on chronic diastolic heart failure  US duplex b/l upper extremities-occlusive superficial vein thrombosis of left cephalic vein   Continue midodrine   TTE: EF 60-65%, mild/mod AV valve regurg  Holding home propranolol (on for tremors)  Continue home Zetia  Continuous cardiac monitoring per ICU protocol  EKGs PRN for ACS symptoms, arrhythmias  Heparin drip resumed-transition to Eliquis closer to discharge   Repeat right IJ duplex showing presence of acute DVT     GI:  Hx GERD  Peg-dependent-placed 11/4  Diet: enteral feeding via NG at goal  BR: waqas-colace, miralax PRN  GI Prophylaxis: PPI  Patient had a few episodes of diarrhea, sample sent, C-Diff negative     /Volume Status/Electrolytes:  Acute kidney injury-possibly ATN +/- medication-induced (vancomycin)  Anasarca   Hypoalbuminemia   Hyponatremia  Baseline Cr 0.8-1.0. Cr 1.77 today   270 cc on bladder scan yesterday, anders placed for accurate I/Os  Dialysis placed on hold to assess for renal recovery  Urine output continues however is not efficient output.   Plan for dialysis and ultrafiltration today and monitor for possible recovery over the weekend  Nephrology follow-up  Replete electrolytes to maintain K >4.0 and Mg >2.0  Daily BMP, Mg, Phos    Heme/Onc: (Hx: normocytic anemia)  Occlusive LUE DVT  Non-occlusive R IJ DVT  Heparin  drip resumed  Can plan for transition to eliquis after tracheostomy if no more planned procedures   Repeat DVT US still showing presence of RIJ thrombus around catheter  "  Continue iron and folate  1 unit PRBC 11/7  EPO not indicated   Monitor for s/sx of bleeding   Plan to transfuse if Hgb <7.0   Daily CBC  T&S updated 11/7    Endocrine: (Hx: DMII)  SSI Q4  Hypoglycemia protocol PRN    Infectious Disease:  Pseudomonas-Respiratory culture 10/29  Thoracolumbar surgical site infection - s/p I&D x 2. Recent MRSA bacteremia on extended course of antibiotics; IV ceftriaxone, IV daptomycin  Healthcare-associated pneumonia   CT lumbar spine 10/12: Unable to r/o abscess. Unable to do MRI d/t spinal hardware, \"metal in head\" per patient  Sputum culture 10/16: + pseudomonas Infectious Disease  Continue Daptomycin every 48 hours to 11/12 and after dialysis when on a dialysis day  Continue ceftriaxone until 11/12  Infectious Disease follow-up  Cefepime and inhaled tobramycin courses completed   PICC placed 10/24  Monitor SIRS criteria    MSK:  PT/OT- no need to hold PT/OT patient was at a rehab facility after lumbar laminectomy for PT/OT before this admission   Spinal sutures removed     Ethics/Code Status:  DNR-CCA     :  DVT Prophylaxis: SCDs  GI Prophylaxis: PPI  Bowel Regimen: Lucia-colace and Miralax   Diet: Tube Feeds  CVC: PICC placed 10/24, permacath 11/5  Wanda: No  Gibson: yes 11/7  Discharge planning: [facility]    Restraints indicated to maintain lines/tubes.  Alternative therapies have been attempted and have been ineffective.  Restrain with soft wrist restraints and side rails up x4 until medical devices discontinued and/or patient able to participate with plan of care.         Krysta Gallegos APRN-CNP  Pulmonary and Critical Care Medicine     "

## 2024-11-08 NOTE — POST-PROCEDURE NOTE
Post Hemodialysis Report to Receiving RN:    Report To: Zoila/RN  Time Report Called: 1230   Hand-Off Communication: Verbal  Complications During Treatment: No, 2.5L fluid removed as ordered  Ultrafiltration Treatment: No  Medications Administered During Dialysis: Yes/Midodrine  Blood Products Administered During Dialysis: No  Labs Sent During Dialysis: No  Heparin Drip Rate Changes: No  Dialysis Catheter Dressing: No  Last Dressing Change: 11/7/24    Electronic Signatures:  Meagan/KAPIL    Last Updated: 3:26 PM by JAKE ESCOBAR

## 2024-11-08 NOTE — PROCEDURES
Vascular Access Team Procedure Note     Visit Date: 11/8/2024      Patient Name: Narda Malloy         MRN: [id number]             Procedure: Pt has a dual lumen picc line to L upper arm, drsg dry and intact, dated 11/1, due for routine drsg change today. Drsg has been changed using sterile technique, site without any redness, swelling or drainage, external length is 4cm as documented on insertion, both blue caps have been changed brisk blood return noted to both lumens and they both flush easily with NS, curos cap applied to the red lumen, the purple lumen reconnected to IV medication. Pt also has a R chest dialysis catheter drsg dry and intact (dated 11/7) without any redness, swelling or drainage.                           Sylvia Narayan RN  11/8/2024  4:12 PM

## 2024-11-08 NOTE — PRE-PROCEDURE NOTE
Pre Hemodialysis Report from Sending RN:    Report From: Zoila/RN  Recent Surgery of Procedure: Yes/Trach placement 11/7  Baseline Level of Consciousness (LOC): alert X1  Oxygen Use: Yes  Type: Vent  Diabetic: No  Last BP Med Given Day of Dialysis: See Mar  Last Pain Med Given: See Mar  Lab Tests to be Obtained with Dialysis: No  Blood Transfusion to be Given During Dialysis: No  Available IV Access: Yes  Medications to be Administered During Dialysis: Yes/Midodrine  Continuous IV Infusion Running: No  Restraints on Currently or in the Last 24 Hours: Yes  Hand-Off Communication: Verbal  Dialysis Catheter Dressing: No  Last Dressing Change: 11/7/24

## 2024-11-09 LAB
ALBUMIN SERPL BCP-MCNC: 2.4 G/DL (ref 3.4–5)
ALP SERPL-CCNC: 116 U/L (ref 33–136)
ALT SERPL W P-5'-P-CCNC: 9 U/L (ref 7–45)
ANION GAP BLDV CALCULATED.4IONS-SCNC: 8 MMOL/L (ref 10–25)
ANION GAP SERPL CALCULATED.3IONS-SCNC: 11 MMOL/L (ref 10–20)
APTT PPP: 51.3 SECONDS (ref 22–32.5)
APTT PPP: 76.4 SECONDS (ref 22–32.5)
APTT PPP: 78 SECONDS (ref 22–32.5)
AST SERPL W P-5'-P-CCNC: 9 U/L (ref 9–39)
BASE EXCESS BLDV CALC-SCNC: 6.3 MMOL/L (ref -2–3)
BASOPHILS # BLD AUTO: 0.04 X10*3/UL (ref 0–0.1)
BASOPHILS NFR BLD AUTO: 0.6 %
BILIRUB DIRECT SERPL-MCNC: 0 MG/DL (ref 0–0.3)
BILIRUB SERPL-MCNC: 0.3 MG/DL (ref 0–1.2)
BODY TEMPERATURE: 37 DEGREES CELSIUS
BUN SERPL-MCNC: 28 MG/DL (ref 6–23)
BURR CELLS BLD QL SMEAR: NORMAL
CA-I BLDV-SCNC: 1.16 MMOL/L (ref 1.1–1.33)
CALCIUM SERPL-MCNC: 8.1 MG/DL (ref 8.6–10.3)
CHLORIDE BLDV-SCNC: 96 MMOL/L (ref 98–107)
CHLORIDE SERPL-SCNC: 96 MMOL/L (ref 98–107)
CO2 SERPL-SCNC: 29 MMOL/L (ref 21–32)
CREAT SERPL-MCNC: 1.34 MG/DL (ref 0.5–1.05)
EGFRCR SERPLBLD CKD-EPI 2021: 43 ML/MIN/1.73M*2
EOSINOPHIL # BLD AUTO: 0.07 X10*3/UL (ref 0–0.7)
EOSINOPHIL NFR BLD AUTO: 1 %
ERYTHROCYTE [DISTWIDTH] IN BLOOD BY AUTOMATED COUNT: 20.5 % (ref 11.5–14.5)
GLUCOSE BLD MANUAL STRIP-MCNC: 166 MG/DL (ref 74–99)
GLUCOSE BLD MANUAL STRIP-MCNC: 167 MG/DL (ref 74–99)
GLUCOSE BLD MANUAL STRIP-MCNC: 174 MG/DL (ref 74–99)
GLUCOSE BLD MANUAL STRIP-MCNC: 177 MG/DL (ref 74–99)
GLUCOSE BLD MANUAL STRIP-MCNC: 193 MG/DL (ref 74–99)
GLUCOSE BLD MANUAL STRIP-MCNC: 197 MG/DL (ref 74–99)
GLUCOSE BLDV-MCNC: 171 MG/DL (ref 74–99)
GLUCOSE SERPL-MCNC: 175 MG/DL (ref 74–99)
HCO3 BLDV-SCNC: 30.6 MMOL/L (ref 22–26)
HCT VFR BLD AUTO: 27 % (ref 36–46)
HCT VFR BLD EST: 28 % (ref 36–46)
HGB BLD-MCNC: 9 G/DL (ref 12–16)
HGB BLDV-MCNC: 9.4 G/DL (ref 12–16)
HOLD SPECIMEN: NORMAL
HOLD SPECIMEN: NORMAL
IMM GRANULOCYTES # BLD AUTO: 0.11 X10*3/UL (ref 0–0.7)
IMM GRANULOCYTES NFR BLD AUTO: 1.6 % (ref 0–0.9)
INHALED O2 CONCENTRATION: 30 %
LACTATE BLDV-SCNC: 0.8 MMOL/L (ref 0.4–2)
LYMPHOCYTES # BLD AUTO: 0.81 X10*3/UL (ref 1.2–4.8)
LYMPHOCYTES NFR BLD AUTO: 11.6 %
MAGNESIUM SERPL-MCNC: 1.88 MG/DL (ref 1.6–2.4)
MCH RBC QN AUTO: 30.3 PG (ref 26–34)
MCHC RBC AUTO-ENTMCNC: 33.3 G/DL (ref 32–36)
MCV RBC AUTO: 91 FL (ref 80–100)
MONOCYTES # BLD AUTO: 0.37 X10*3/UL (ref 0.1–1)
MONOCYTES NFR BLD AUTO: 5.3 %
NEUTROPHILS # BLD AUTO: 5.59 X10*3/UL (ref 1.2–7.7)
NEUTROPHILS NFR BLD AUTO: 79.9 %
NRBC BLD-RTO: 0 /100 WBCS (ref 0–0)
OVALOCYTES BLD QL SMEAR: NORMAL
OXYHGB MFR BLDV: 77.3 % (ref 45–75)
PCO2 BLDV: 42 MM HG (ref 41–51)
PH BLDV: 7.47 PH (ref 7.33–7.43)
PHOSPHATE SERPL-MCNC: 2.9 MG/DL (ref 2.5–4.9)
PLATELET # BLD AUTO: 259 X10*3/UL (ref 150–450)
PO2 BLDV: 43 MM HG (ref 35–45)
POTASSIUM BLDV-SCNC: 4 MMOL/L (ref 3.5–5.3)
POTASSIUM SERPL-SCNC: 3.8 MMOL/L (ref 3.5–5.3)
PROT SERPL-MCNC: 5.7 G/DL (ref 6.4–8.2)
RBC # BLD AUTO: 2.97 X10*6/UL (ref 4–5.2)
RBC MORPH BLD: NORMAL
SAO2 % BLDV: 79 % (ref 45–75)
SODIUM BLDV-SCNC: 131 MMOL/L (ref 136–145)
SODIUM SERPL-SCNC: 132 MMOL/L (ref 136–145)
WBC # BLD AUTO: 7 X10*3/UL (ref 4.4–11.3)

## 2024-11-09 PROCEDURE — 2500000002 HC RX 250 W HCPCS SELF ADMINISTERED DRUGS (ALT 637 FOR MEDICARE OP, ALT 636 FOR OP/ED)

## 2024-11-09 PROCEDURE — 84132 ASSAY OF SERUM POTASSIUM: CPT

## 2024-11-09 PROCEDURE — 2500000004 HC RX 250 GENERAL PHARMACY W/ HCPCS (ALT 636 FOR OP/ED): Performed by: INTERNAL MEDICINE

## 2024-11-09 PROCEDURE — 94668 MNPJ CHEST WALL SBSQ: CPT

## 2024-11-09 PROCEDURE — 94003 VENT MGMT INPAT SUBQ DAY: CPT

## 2024-11-09 PROCEDURE — 37799 UNLISTED PX VASCULAR SURGERY: CPT

## 2024-11-09 PROCEDURE — 2500000004 HC RX 250 GENERAL PHARMACY W/ HCPCS (ALT 636 FOR OP/ED)

## 2024-11-09 PROCEDURE — 94640 AIRWAY INHALATION TREATMENT: CPT

## 2024-11-09 PROCEDURE — 99291 CRITICAL CARE FIRST HOUR: CPT

## 2024-11-09 PROCEDURE — 9420000001 HC RT PATIENT EDUCATION 5 MIN

## 2024-11-09 PROCEDURE — 82947 ASSAY GLUCOSE BLOOD QUANT: CPT

## 2024-11-09 PROCEDURE — 2500000001 HC RX 250 WO HCPCS SELF ADMINISTERED DRUGS (ALT 637 FOR MEDICARE OP)

## 2024-11-09 PROCEDURE — 84075 ASSAY ALKALINE PHOSPHATASE: CPT

## 2024-11-09 PROCEDURE — 2500000005 HC RX 250 GENERAL PHARMACY W/O HCPCS: Performed by: SURGERY

## 2024-11-09 PROCEDURE — 83735 ASSAY OF MAGNESIUM: CPT

## 2024-11-09 PROCEDURE — 84100 ASSAY OF PHOSPHORUS: CPT

## 2024-11-09 PROCEDURE — 2500000005 HC RX 250 GENERAL PHARMACY W/O HCPCS

## 2024-11-09 PROCEDURE — 2020000001 HC ICU ROOM DAILY

## 2024-11-09 PROCEDURE — 85025 COMPLETE CBC W/AUTO DIFF WBC: CPT

## 2024-11-09 PROCEDURE — 85730 THROMBOPLASTIN TIME PARTIAL: CPT

## 2024-11-09 RX ORDER — ACETAMINOPHEN 160 MG/5ML
650 SOLUTION ORAL EVERY 4 HOURS PRN
Status: DISCONTINUED | OUTPATIENT
Start: 2024-11-09 | End: 2024-11-14 | Stop reason: HOSPADM

## 2024-11-09 RX ORDER — FUROSEMIDE 10 MG/ML
40 INJECTION INTRAMUSCULAR; INTRAVENOUS ONCE
Status: COMPLETED | OUTPATIENT
Start: 2024-11-09 | End: 2024-11-09

## 2024-11-09 RX ORDER — MAGNESIUM SULFATE 1 G/100ML
1 INJECTION INTRAVENOUS ONCE
Status: COMPLETED | OUTPATIENT
Start: 2024-11-09 | End: 2024-11-09

## 2024-11-09 ASSESSMENT — PAIN SCALES - GENERAL
PAINLEVEL_OUTOF10: 0 - NO PAIN
PAINLEVEL_OUTOF10: 2
PAINLEVEL_OUTOF10: 0 - NO PAIN
PAINLEVEL_OUTOF10: 5 - MODERATE PAIN
PAINLEVEL_OUTOF10: 0 - NO PAIN
PAINLEVEL_OUTOF10: 4
PAINLEVEL_OUTOF10: 0 - NO PAIN
PAINLEVEL_OUTOF10: 6

## 2024-11-09 ASSESSMENT — PAIN - FUNCTIONAL ASSESSMENT
PAIN_FUNCTIONAL_ASSESSMENT: 0-10
PAIN_FUNCTIONAL_ASSESSMENT: CPOT (CRITICAL CARE PAIN OBSERVATION TOOL)
PAIN_FUNCTIONAL_ASSESSMENT: 0-10

## 2024-11-09 ASSESSMENT — PAIN DESCRIPTION - DESCRIPTORS: DESCRIPTORS: PATIENT UNABLE TO DESCRIBE

## 2024-11-09 NOTE — CARE PLAN
Problem: Safety - Adult  Goal: Free from fall injury  Outcome: Progressing     Problem: Discharge Planning  Goal: Discharge to home or other facility with appropriate resources  Outcome: Progressing     Problem: Chronic Conditions and Co-morbidities  Goal: Patient's chronic conditions and co-morbidity symptoms are monitored and maintained or improved  Outcome: Progressing     Problem: Diabetes  Goal: Increase stability of blood glucose readings by end of shift  Outcome: Progressing  Goal: Maintain electrolyte levels within acceptable range throughout shift  Outcome: Progressing  Goal: Maintain glucose levels >70mg/dl to <250mg/dl throughout shift  Outcome: Progressing  Goal: Learn about and adhere to nutrition recommendations by end of shift  Outcome: Progressing  Goal: Vital signs within normal range for age by end of shift  Outcome: Progressing  Goal: Increase self care and/or family involovement by end of shift  Outcome: Progressing  Goal: Receive DSME education by end of shift  Outcome: Progressing     Problem: Knowledge Deficit  Goal: Patient/family/caregiver demonstrates understanding of disease process, treatment plan, medications, and discharge instructions  Outcome: Progressing     Problem: Skin  Goal: Decreased wound size/increased tissue granulation at next dressing change  Outcome: Progressing  Goal: Participates in plan/prevention/treatment measures  Outcome: Progressing  Goal: Prevent/manage excess moisture  Outcome: Progressing  Goal: Prevent/minimize sheer/friction injuries  Outcome: Progressing  Goal: Promote/optimize nutrition  Outcome: Progressing  Goal: Promote skin healing  Outcome: Progressing     Problem: Nutrition  Goal: Consume prescribed supplement  Outcome: Progressing  Goal: Nutrition support goals are met within 48 hrs  Outcome: Progressing  Goal: Nutrition support is meeting 75% of nutrient needs  Outcome: Progressing  Goal: BG  mg/dL  Outcome: Progressing  Goal: Lab values  WNL  Outcome: Progressing  Goal: Electrolytes WNL  Outcome: Progressing  Goal: Promote healing  Outcome: Progressing  Goal: Maintain stable weight  Outcome: Progressing  Goal: Reduce weight from edema/fluid  Outcome: Progressing     Problem: Respiratory  Goal: No signs of respiratory distress (eg. Use of accessory muscles. Peds grunting)  Outcome: Progressing  Goal: Clear secretions with interventions this shift  Outcome: Progressing  Goal: Minimize anxiety/maximize coping throughout shift  Outcome: Progressing  Goal: Minimal/no exertional discomfort or dyspnea this shift  Outcome: Progressing  Goal: Patent airway maintained this shift  Outcome: Progressing  Goal: Tolerate mechanical ventilation evidenced by VS/agitation level this shift  Outcome: Progressing  Goal: Tolerate pulmonary toileting this shift  Outcome: Progressing  Goal: Verbalize decreased shortness of breath this shift  Outcome: Progressing  Goal: Wean oxygen to maintain O2 saturation per order/standard this shift  Outcome: Progressing  Goal: Increase self care and/or family involvement in next 24 hours  Outcome: Progressing     Problem: Pain  Goal: Takes deep breaths with improved pain control throughout the shift  Outcome: Progressing  Goal: Turns in bed with improved pain control throughout the shift  Outcome: Progressing  Goal: Walks with improved pain control throughout the shift  Outcome: Progressing  Goal: Performs ADL's with improved pain control throughout shift  Outcome: Progressing  Goal: Participates in PT with improved pain control throughout the shift  Outcome: Progressing  Goal: Free from opioid side effects throughout the shift  Outcome: Progressing  Goal: Free from acute confusion related to pain meds throughout the shift  Outcome: Progressing     Problem: Fall/Injury  Goal: Not fall by end of shift  Outcome: Progressing  Goal: Be free from injury by end of the shift  Outcome: Progressing  Goal: Verbalize understanding of personal  risk factors for fall in the hospital  Outcome: Progressing  Goal: Verbalize understanding of risk factor reduction measures to prevent injury from fall in the home  Outcome: Progressing  Goal: Use assistive devices by end of the shift  Outcome: Progressing  Goal: Pace activities to prevent fatigue by end of the shift  Outcome: Progressing     Problem: Mechanical Ventilation  Goal: Tracheostomy will be managed safely  Outcome: Progressing   The patient's goals for the shift include unable to assess    The clinical goals for the shift include Patient will remain hemodynamically stable    Over the shift, the patient did not make progress toward the following goals. Barriers to progression include . Recommendations to address these barriers include .

## 2024-11-09 NOTE — PROGRESS NOTES
"Narda Malloy is a 69 y.o. female on day 28 of admission presenting with Unresponsive.    Subjective   The patient is seen for acute kidney injury which is secondary to acute tubular necrosis patient was dialyzed yesterday 2 L of fluid was removed she looks less edematous remains on the ventilator through a tracheostomy no overnight events noted.       Objective     Physical Exam  Constitutional:       Comments: On the vent through a tracheostomy   Neck:      Vascular: No carotid bruit.   Cardiovascular:      Rate and Rhythm: Normal rate and regular rhythm.      Heart sounds: No murmur heard.     No friction rub. No gallop.   Pulmonary:      Breath sounds: No wheezing, rhonchi or rales.   Chest:      Chest wall: No tenderness.   Abdominal:      General: There is no distension.      Tenderness: There is no abdominal tenderness. There is no guarding or rebound.   Musculoskeletal:         General: No swelling or tenderness.      Cervical back: Neck supple.      Right lower leg: Edema present.      Left lower leg: Edema present.   Lymphadenopathy:      Cervical: No cervical adenopathy.         Last Recorded Vitals  Blood pressure 116/89, pulse 70, temperature 37 °C (98.6 °F), temperature source Axillary, resp. rate 19, height 1.778 m (5' 10\"), weight 113 kg (248 lb 10.9 oz), SpO2 100%.    Intake/Output last 3 Shifts:  I/O last 3 completed shifts:  In: 2667 (23.6 mL/kg) [I.V.:1167 (10.3 mL/kg); NG/GT:1450; IV Piggyback:50]  Out: 718 (6.4 mL/kg) [Urine:218 (0.1 mL/kg/hr); Stool:500]  Weight: 112.8 kg     Current Facility-Administered Medications:     acetaminophen (Tylenol) oral liquid 650 mg, 650 mg, oral, q6h, Kaleb Lam PA-C, 650 mg at 11/09/24 1110    acetylcysteine (Mucomyst) 200 mg/mL (20 %) nebulizer solution 600 mg, 3 mL, nebulization, 4x daily, Kaleb Lam PA-C, 600 mg at 11/09/24 0748    albuterol 2.5 mg /3 mL (0.083 %) nebulizer solution 3 mL, 3 mL, nebulization, 4x daily, Kaleb Lam PA-C, 3 " mL at 11/09/24 0748    alteplase (Cathflo Activase) injection 2 mg, 2 mg, intra-catheter, PRN, Kaleb Lam PA-C    brimonidine (AlphaGAN) 0.2 % ophthalmic solution 1 drop, 1 drop, Both Eyes, BID, Kaleb Lam PA-C, 1 drop at 11/09/24 0808    calcium carbonate-vitamin D3 500 mg-5 mcg (200 unit) per tablet 1 tablet, 1 tablet, oral, Daily, Kaleb Lam PA-C, 1 tablet at 11/09/24 0807    cefTRIAXone (Rocephin) 2 g in dextrose (iso) IV 50 mL, 2 g, intravenous, q24h, Kaleb Lam PA-C, Stopped at 11/09/24 0854    collagenase 250 unit/gram ointment, , Topical, Daily, Sabino Matos, APRN-CNP, Given at 11/09/24 1110    DAPTOmycin (Cubicin) 700 mg in sodium chloride 0.9%  mL, 700 mg, intravenous, q48h, Kaleb Lam PA-C, Stopped at 11/09/24 1010    dextrose 50 % injection 12.5 g, 12.5 g, intravenous, q15 min PRN, Kaleb Lam PA-C    dextrose 50 % injection 25 g, 25 g, intravenous, q15 min PRN, Kaleb Lam PA-C    ezetimibe (Zetia) tablet 10 mg, 10 mg, oral, Daily, Kaleb Lam PA-C, 10 mg at 11/09/24 0808    ferrous sulfate syrup 60 mg of iron, 60 mg of iron, oral, Daily, Kaleb Lam PA-C, 60 mg of iron at 11/09/24 0807    folic acid (Folvite) tablet 1 mg, 1 mg, oral, Daily, Kaleb Lam PA-C, 1 mg at 11/09/24 0807    glucagon (Glucagen) injection 1 mg, 1 mg, intramuscular, q15 min PRN, Kaleb Lam PA-C    glucagon (Glucagen) injection 1 mg, 1 mg, intramuscular, q15 min PRN, Kaleb Lam PA-C    heparin (porcine) injection 3,000-6,000 Units, 3,000-6,000 Units, intravenous, PRN, Kaleb Lam PA-C    heparin 1,000 unit/mL injection 1,000 Units, 1,000 Units, intra-catheter, After Dialysis, Imer Cheung MD, 1,000 Units at 11/08/24 1230    heparin 1,000 unit/mL injection 1,000 Units, 1,000 Units, intra-catheter, After Dialysis, Imer Cheung MD, 1,000 Units at 11/08/24 1230    heparin 25,000 Units in dextrose 5% 250 mL (100 Units/mL) infusion (premix), 0-4,500  Units/hr, intravenous, Continuous, Kaleb Lam PA-C, Last Rate: 14 mL/hr at 11/09/24 1028, 1,400 Units/hr at 11/09/24 1028    heparin flush 10 unit/mL syringe 50 Units, 50 Units, intra-catheter, PRN, Kaleb Lam PA-C, 50 Units at 10/19/24 2313    honey (Medihoney) topical gel, , Topical, Daily, Kaleb Lam PA-C, Given at 11/09/24 1110    HYDROmorphone (Dilaudid) injection 0.4 mg, 0.4 mg, intravenous, q2h PRN, Kaleb Lam PA-C, 0.4 mg at 11/08/24 1809    insulin lispro (HumaLOG) injection 0-15 Units, 0-15 Units, subcutaneous, q4h, Kaleb Lam PA-C, 3 Units at 11/09/24 0829    latanoprost (Xalatan) 0.005 % ophthalmic solution 1 drop, 1 drop, Both Eyes, Nightly, Kaleb Lam PA-C, 1 drop at 11/08/24 2002    loperamide (Imodium) capsule 2 mg, 2 mg, oral, 4x daily PRN, Sabino Matos, APRN-CNP, 2 mg at 11/08/24 1648    midodrine (Proamatine) tablet 10 mg, 10 mg, oral, q8h, Kaleb Lam PA-C, 10 mg at 11/09/24 0807    oxyCODONE (Roxicodone) solution 5 mg, 5 mg, oral, q4h PRN, Kaleb Lam PA-C, 5 mg at 11/09/24 0516    oxyCODONE (Roxicodone) solution 7.5 mg, 7.5 mg, oral, q4h PRN, Kaleb Lam PA-C    oxygen (O2) therapy, , inhalation, Continuous - Inhalation, Anna Marie Shook MD, 40 percent at 11/09/24 0749    pantoprazole (ProtoNix) EC tablet 40 mg, 40 mg, oral, Daily before breakfast **OR** pantoprazole (ProtoNix) injection 40 mg, 40 mg, intravenous, Daily before breakfast, Kaleb Lam PA-C, 40 mg at 11/09/24 0652    polyethylene glycol (Glycolax, Miralax) packet 17 g, 17 g, oral, Daily PRN, Kaleb Lam PA-C    potassium, sodium phosphates (Phos-NaK) 280-160-250 mg packet 1 packet, 1 packet, oral, With meals & nightly, Kaleb Lam PA-C, 1 packet at 11/09/24 1111    primidone (Mysoline) tablet 125 mg, 125 mg, oral, Nightly, Kaleb Lam PA-C, 125 mg at 11/08/24 2003    [Held by provider] propranolol LA (Inderal LA) 24 hr capsule 60 mg, 60 mg, oral, Daily,  Kaleb Lam PA-C, 60 mg at 10/19/24 0643    sennosides-docusate sodium (Lucia-Colace) 8.6-50 mg per tablet 1 tablet, 1 tablet, oral, Nightly PRN, Kaleb Lam PA-C    sodium chloride 0.9 % bolus 200 mL, 200 mL, intravenous, Once, Anna Marie Shook MD    sodium chloride 3 % nebulizer solution 3 mL, 3 mL, nebulization, 4x daily, Kaleb Lam PA-C, 3 mL at 11/08/24 2014   Relevant Results    Results for orders placed or performed during the hospital encounter of 10/12/24 (from the past 96 hours)   POCT GLUCOSE   Result Value Ref Range    POCT Glucose 150 (H) 74 - 99 mg/dL   POCT GLUCOSE   Result Value Ref Range    POCT Glucose 160 (H) 74 - 99 mg/dL   Lactate dehydrogenase   Result Value Ref Range     84 - 246 U/L   CBC and Auto Differential   Result Value Ref Range    WBC 7.2 4.4 - 11.3 x10*3/uL    nRBC 0.0 0.0 - 0.0 /100 WBCs    RBC 2.73 (L) 4.00 - 5.20 x10*6/uL    Hemoglobin 8.5 (L) 12.0 - 16.0 g/dL    Hematocrit 25.7 (L) 36.0 - 46.0 %    MCV 94 80 - 100 fL    MCH 31.1 26.0 - 34.0 pg    MCHC 33.1 32.0 - 36.0 g/dL    RDW 20.4 (H) 11.5 - 14.5 %    Platelets 252 150 - 450 x10*3/uL    Neutrophils % 83.6 40.0 - 80.0 %    Immature Granulocytes %, Automated 0.6 0.0 - 0.9 %    Lymphocytes % 9.6 13.0 - 44.0 %    Monocytes % 5.4 2.0 - 10.0 %    Eosinophils % 0.4 0.0 - 6.0 %    Basophils % 0.4 0.0 - 2.0 %    Neutrophils Absolute 6.03 1.20 - 7.70 x10*3/uL    Immature Granulocytes Absolute, Automated 0.04 0.00 - 0.70 x10*3/uL    Lymphocytes Absolute 0.69 (L) 1.20 - 4.80 x10*3/uL    Monocytes Absolute 0.39 0.10 - 1.00 x10*3/uL    Eosinophils Absolute 0.03 0.00 - 0.70 x10*3/uL    Basophils Absolute 0.03 0.00 - 0.10 x10*3/uL   Lactate   Result Value Ref Range    Lactate 0.8 0.4 - 2.0 mmol/L   Morphology   Result Value Ref Range    RBC Morphology No significant RBC morphology present    POCT GLUCOSE   Result Value Ref Range    POCT Glucose 146 (H) 74 - 99 mg/dL   aPTT   Result Value Ref Range    aPTT 28.8 22.0 -  32.5 seconds   CBC   Result Value Ref Range    WBC 8.1 4.4 - 11.3 x10*3/uL    nRBC 0.0 0.0 - 0.0 /100 WBCs    RBC 2.66 (L) 4.00 - 5.20 x10*6/uL    Hemoglobin 8.2 (L) 12.0 - 16.0 g/dL    Hematocrit 25.2 (L) 36.0 - 46.0 %    MCV 95 80 - 100 fL    MCH 30.8 26.0 - 34.0 pg    MCHC 32.5 32.0 - 36.0 g/dL    RDW 20.4 (H) 11.5 - 14.5 %    Platelets 257 150 - 450 x10*3/uL   Folate   Result Value Ref Range    Folate, Serum 15.5 >5.0 ng/mL   Vitamin B12   Result Value Ref Range    Vitamin B12 1,152 (H) 211 - 911 pg/mL   POCT GLUCOSE   Result Value Ref Range    POCT Glucose 194 (H) 74 - 99 mg/dL   aPTT   Result Value Ref Range    aPTT 79.1 (H) 22.0 - 32.5 seconds   CBC and Auto Differential   Result Value Ref Range    WBC 8.5 4.4 - 11.3 x10*3/uL    nRBC 0.0 0.0 - 0.0 /100 WBCs    RBC 2.62 (L) 4.00 - 5.20 x10*6/uL    Hemoglobin 8.0 (L) 12.0 - 16.0 g/dL    Hematocrit 24.5 (L) 36.0 - 46.0 %    MCV 94 80 - 100 fL    MCH 30.5 26.0 - 34.0 pg    MCHC 32.7 32.0 - 36.0 g/dL    RDW 20.1 (H) 11.5 - 14.5 %    Platelets 256 150 - 450 x10*3/uL    Neutrophils % 84.9 40.0 - 80.0 %    Immature Granulocytes %, Automated 1.1 (H) 0.0 - 0.9 %    Lymphocytes % 7.8 13.0 - 44.0 %    Monocytes % 4.9 2.0 - 10.0 %    Eosinophils % 0.8 0.0 - 6.0 %    Basophils % 0.5 0.0 - 2.0 %    Neutrophils Absolute 7.22 1.20 - 7.70 x10*3/uL    Immature Granulocytes Absolute, Automated 0.09 0.00 - 0.70 x10*3/uL    Lymphocytes Absolute 0.66 (L) 1.20 - 4.80 x10*3/uL    Monocytes Absolute 0.42 0.10 - 1.00 x10*3/uL    Eosinophils Absolute 0.07 0.00 - 0.70 x10*3/uL    Basophils Absolute 0.04 0.00 - 0.10 x10*3/uL   Hepatic function panel   Result Value Ref Range    Albumin 2.4 (L) 3.4 - 5.0 g/dL    Bilirubin, Total 0.5 0.0 - 1.2 mg/dL    Bilirubin, Direct 0.1 0.0 - 0.3 mg/dL    Alkaline Phosphatase 126 33 - 136 U/L    ALT 10 7 - 45 U/L    AST 12 9 - 39 U/L    Total Protein 5.9 (L) 6.4 - 8.2 g/dL   Magnesium   Result Value Ref Range    Magnesium 2.13 1.60 - 2.40 mg/dL    Phosphorus   Result Value Ref Range    Phosphorus 3.1 2.5 - 4.9 mg/dL   Basic Metabolic Panel   Result Value Ref Range    Glucose 161 (H) 74 - 99 mg/dL    Sodium 134 (L) 136 - 145 mmol/L    Potassium 4.0 3.5 - 5.3 mmol/L    Chloride 98 98 - 107 mmol/L    Bicarbonate 29 21 - 32 mmol/L    Anion Gap 11 10 - 20 mmol/L    Urea Nitrogen 29 (H) 6 - 23 mg/dL    Creatinine 1.88 (H) 0.50 - 1.05 mg/dL    eGFR 29 (L) >60 mL/min/1.73m*2    Calcium 7.8 (L) 8.6 - 10.3 mg/dL   POCT GLUCOSE   Result Value Ref Range    POCT Glucose 107 (H) 74 - 99 mg/dL   Morphology   Result Value Ref Range    RBC Morphology No significant RBC morphology present    Blood Gas Venous Full Panel   Result Value Ref Range    POCT pH, Venous 7.43 7.33 - 7.43 pH    POCT pCO2, Venous 44 41 - 51 mm Hg    POCT pO2, Venous 42 35 - 45 mm Hg    POCT SO2, Venous 76 (H) 45 - 75 %    POCT Oxy Hemoglobin, Venous 75.0 45.0 - 75.0 %    POCT Hematocrit Calculated, Venous 25.0 (L) 36.0 - 46.0 %    POCT Sodium, Venous 133 (L) 136 - 145 mmol/L    POCT Potassium, Venous 4.0 3.5 - 5.3 mmol/L    POCT Chloride, Venous 98 98 - 107 mmol/L    POCT Ionized Calicum, Venous 1.16 1.10 - 1.33 mmol/L    POCT Glucose, Venous 163 (H) 74 - 99 mg/dL    POCT Lactate, Venous 0.9 0.4 - 2.0 mmol/L    POCT Base Excess, Venous 4.4 (H) -2.0 - 3.0 mmol/L    POCT HCO3 Calculated, Venous 29.2 (H) 22.0 - 26.0 mmol/L    POCT Hemoglobin, Venous 8.2 (L) 12.0 - 16.0 g/dL    POCT Anion Gap, Venous 10.0 10.0 - 25.0 mmol/L    Patient Temperature 37.0 degrees Celsius    FiO2 40 %   POCT GLUCOSE   Result Value Ref Range    POCT Glucose 186 (H) 74 - 99 mg/dL   aPTT   Result Value Ref Range    aPTT 62.4 (H) 22.0 - 32.5 seconds   PST Top   Result Value Ref Range    Extra Tube Hold for add-ons.    Procalcitonin   Result Value Ref Range    Procalcitonin 0.42 (H) <=0.07 ng/mL   POCT GLUCOSE   Result Value Ref Range    POCT Glucose 146 (H) 74 - 99 mg/dL   POCT GLUCOSE   Result Value Ref Range    POCT Glucose 159  (H) 74 - 99 mg/dL   POCT GLUCOSE   Result Value Ref Range    POCT Glucose 183 (H) 74 - 99 mg/dL   POCT GLUCOSE   Result Value Ref Range    POCT Glucose 167 (H) 74 - 99 mg/dL   CBC and Auto Differential   Result Value Ref Range    WBC 7.3 4.4 - 11.3 x10*3/uL    nRBC 0.0 0.0 - 0.0 /100 WBCs    RBC 2.40 (L) 4.00 - 5.20 x10*6/uL    Hemoglobin 7.4 (L) 12.0 - 16.0 g/dL    Hematocrit 23.2 (L) 36.0 - 46.0 %    MCV 97 80 - 100 fL    MCH 30.8 26.0 - 34.0 pg    MCHC 31.9 (L) 32.0 - 36.0 g/dL    RDW 20.0 (H) 11.5 - 14.5 %    Platelets 249 150 - 450 x10*3/uL    Neutrophils % 81.5 40.0 - 80.0 %    Immature Granulocytes %, Automated 0.5 0.0 - 0.9 %    Lymphocytes % 11.6 13.0 - 44.0 %    Monocytes % 5.2 2.0 - 10.0 %    Eosinophils % 0.7 0.0 - 6.0 %    Basophils % 0.5 0.0 - 2.0 %    Neutrophils Absolute 5.98 1.20 - 7.70 x10*3/uL    Immature Granulocytes Absolute, Automated 0.04 0.00 - 0.70 x10*3/uL    Lymphocytes Absolute 0.85 (L) 1.20 - 4.80 x10*3/uL    Monocytes Absolute 0.38 0.10 - 1.00 x10*3/uL    Eosinophils Absolute 0.05 0.00 - 0.70 x10*3/uL    Basophils Absolute 0.04 0.00 - 0.10 x10*3/uL   Hepatic function panel   Result Value Ref Range    Albumin 2.3 (L) 3.4 - 5.0 g/dL    Bilirubin, Total 0.3 0.0 - 1.2 mg/dL    Bilirubin, Direct 0.1 0.0 - 0.3 mg/dL    Alkaline Phosphatase 116 33 - 136 U/L    ALT 9 7 - 45 U/L    AST 11 9 - 39 U/L    Total Protein 5.4 (L) 6.4 - 8.2 g/dL   Magnesium   Result Value Ref Range    Magnesium 1.97 1.60 - 2.40 mg/dL   Blood Gas Venous Full Panel   Result Value Ref Range    POCT pH, Venous 7.47 (H) 7.33 - 7.43 pH    POCT pCO2, Venous 44 41 - 51 mm Hg    POCT pO2, Venous 42 35 - 45 mm Hg    POCT SO2, Venous 78 (H) 45 - 75 %    POCT Oxy Hemoglobin, Venous 76.8 (H) 45.0 - 75.0 %    POCT Hematocrit Calculated, Venous 23.0 (L) 36.0 - 46.0 %    POCT Sodium, Venous 134 (L) 136 - 145 mmol/L    POCT Potassium, Venous 3.8 3.5 - 5.3 mmol/L    POCT Chloride, Venous 101 98 - 107 mmol/L    POCT Ionized Calicum,  Venous 1.15 1.10 - 1.33 mmol/L    POCT Glucose, Venous 92 74 - 99 mg/dL    POCT Lactate, Venous 1.0 0.4 - 2.0 mmol/L    POCT Base Excess, Venous 7.6 (H) -2.0 - 3.0 mmol/L    POCT HCO3 Calculated, Venous 32.0 (H) 22.0 - 26.0 mmol/L    POCT Hemoglobin, Venous 7.6 (L) 12.0 - 16.0 g/dL    POCT Anion Gap, Venous 5.0 (L) 10.0 - 25.0 mmol/L    Patient Temperature 37.0 degrees Celsius    FiO2 40 %   Phosphorus   Result Value Ref Range    Phosphorus 2.3 (L) 2.5 - 4.9 mg/dL   Basic Metabolic Panel   Result Value Ref Range    Glucose 95 74 - 99 mg/dL    Sodium 136 136 - 145 mmol/L    Potassium 3.7 3.5 - 5.3 mmol/L    Chloride 99 98 - 107 mmol/L    Bicarbonate 29 21 - 32 mmol/L    Anion Gap 12 10 - 20 mmol/L    Urea Nitrogen 17 6 - 23 mg/dL    Creatinine 1.34 (H) 0.50 - 1.05 mg/dL    eGFR 43 (L) >60 mL/min/1.73m*2    Calcium 7.9 (L) 8.6 - 10.3 mg/dL   POCT GLUCOSE   Result Value Ref Range    POCT Glucose 112 (H) 74 - 99 mg/dL   POCT GLUCOSE   Result Value Ref Range    POCT Glucose 111 (H) 74 - 99 mg/dL   Prepare RBC: 1 Units   Result Value Ref Range    PRODUCT CODE S2836D85     Unit Number [id number]     Unit ABO O     Unit RH NEG     XM INTEP COMP     Dispense Status TR     Blood Expiration Date 11/12/2024 11:59:00 PM EST     PRODUCT BLOOD TYPE 9500     UNIT VOLUME 350    Type and screen   Result Value Ref Range    ABO TYPE A     Rh TYPE NEG     ANTIBODY SCREEN NEG    POCT GLUCOSE   Result Value Ref Range    POCT Glucose 132 (H) 74 - 99 mg/dL   C. difficile, PCR    Specimen: Stool   Result Value Ref Range    C. difficile, PCR Not Detected Not Detected   Hemoglobin and hematocrit, blood   Result Value Ref Range    Hemoglobin 9.4 (L) 12.0 - 16.0 g/dL    Hematocrit 29.0 (L) 36.0 - 46.0 %   POCT GLUCOSE   Result Value Ref Range    POCT Glucose 149 (H) 74 - 99 mg/dL   aPTT   Result Value Ref Range    aPTT 22.6 22.0 - 32.5 seconds   POCT GLUCOSE   Result Value Ref Range    POCT Glucose 187 (H) 74 - 99 mg/dL   POCT GLUCOSE    Result Value Ref Range    POCT Glucose 179 (H) 74 - 99 mg/dL   CBC and Auto Differential   Result Value Ref Range    WBC 9.8 4.4 - 11.3 x10*3/uL    nRBC 0.0 0.0 - 0.0 /100 WBCs    RBC 2.91 (L) 4.00 - 5.20 x10*6/uL    Hemoglobin 8.8 (L) 12.0 - 16.0 g/dL    Hematocrit 26.4 (L) 36.0 - 46.0 %    MCV 91 80 - 100 fL    MCH 30.2 26.0 - 34.0 pg    MCHC 33.3 32.0 - 36.0 g/dL    RDW 21.4 (H) 11.5 - 14.5 %    Platelets 240 150 - 450 x10*3/uL    Neutrophils % 84.5 40.0 - 80.0 %    Immature Granulocytes %, Automated 1.0 (H) 0.0 - 0.9 %    Lymphocytes % 8.0 13.0 - 44.0 %    Monocytes % 5.9 2.0 - 10.0 %    Eosinophils % 0.2 0.0 - 6.0 %    Basophils % 0.4 0.0 - 2.0 %    Neutrophils Absolute 8.30 (H) 1.20 - 7.70 x10*3/uL    Immature Granulocytes Absolute, Automated 0.10 0.00 - 0.70 x10*3/uL    Lymphocytes Absolute 0.79 (L) 1.20 - 4.80 x10*3/uL    Monocytes Absolute 0.58 0.10 - 1.00 x10*3/uL    Eosinophils Absolute 0.02 0.00 - 0.70 x10*3/uL    Basophils Absolute 0.04 0.00 - 0.10 x10*3/uL   Hepatic function panel   Result Value Ref Range    Albumin 2.4 (L) 3.4 - 5.0 g/dL    Bilirubin, Total 0.4 0.0 - 1.2 mg/dL    Bilirubin, Direct 0.1 0.0 - 0.3 mg/dL    Alkaline Phosphatase 119 33 - 136 U/L    ALT 10 7 - 45 U/L    AST 11 9 - 39 U/L    Total Protein 5.6 (L) 6.4 - 8.2 g/dL   Magnesium   Result Value Ref Range    Magnesium 1.96 1.60 - 2.40 mg/dL   Blood Gas Venous Full Panel   Result Value Ref Range    POCT pH, Venous 7.48 (H) 7.33 - 7.43 pH    POCT pCO2, Venous 39 (L) 41 - 51 mm Hg    POCT pO2, Venous 110 (H) 35 - 45 mm Hg    POCT SO2, Venous 99 (H) 45 - 75 %    POCT Oxy Hemoglobin, Venous 96.9 (H) 45.0 - 75.0 %    POCT Hematocrit Calculated, Venous 28.0 (L) 36.0 - 46.0 %    POCT Sodium, Venous 131 (L) 136 - 145 mmol/L    POCT Potassium, Venous 3.9 3.5 - 5.3 mmol/L    POCT Chloride, Venous 98 98 - 107 mmol/L    POCT Ionized Calicum, Venous 1.12 1.10 - 1.33 mmol/L    POCT Glucose, Venous 143 (H) 74 - 99 mg/dL    POCT Lactate, Venous  1.5 0.4 - 2.0 mmol/L    POCT Base Excess, Venous 5.1 (H) -2.0 - 3.0 mmol/L    POCT HCO3 Calculated, Venous 29.0 (H) 22.0 - 26.0 mmol/L    POCT Hemoglobin, Venous 9.2 (L) 12.0 - 16.0 g/dL    POCT Anion Gap, Venous 8.0 (L) 10.0 - 25.0 mmol/L    Patient Temperature 37.0 degrees Celsius    FiO2 100 %   Phosphorus   Result Value Ref Range    Phosphorus 3.8 2.5 - 4.9 mg/dL   Basic Metabolic Panel   Result Value Ref Range    Glucose 148 (H) 74 - 99 mg/dL    Sodium 134 (L) 136 - 145 mmol/L    Potassium 3.8 3.5 - 5.3 mmol/L    Chloride 98 98 - 107 mmol/L    Bicarbonate 28 21 - 32 mmol/L    Anion Gap 12 10 - 20 mmol/L    Urea Nitrogen 30 (H) 6 - 23 mg/dL    Creatinine 1.77 (H) 0.50 - 1.05 mg/dL    eGFR 31 (L) >60 mL/min/1.73m*2    Calcium 7.9 (L) 8.6 - 10.3 mg/dL   aPTT   Result Value Ref Range    aPTT 55.3 (H) 22.0 - 32.5 seconds   Morphology   Result Value Ref Range    RBC Morphology See Below     Ovalocytes Few     [place] Cells Few    POCT GLUCOSE   Result Value Ref Range    POCT Glucose 155 (H) 74 - 99 mg/dL   POCT GLUCOSE   Result Value Ref Range    POCT Glucose 156 (H) 74 - 99 mg/dL   POCT GLUCOSE   Result Value Ref Range    POCT Glucose 141 (H) 74 - 99 mg/dL   POCT GLUCOSE   Result Value Ref Range    POCT Glucose 168 (H) 74 - 99 mg/dL   POCT GLUCOSE   Result Value Ref Range    POCT Glucose 141 (H) 74 - 99 mg/dL   POCT GLUCOSE   Result Value Ref Range    POCT Glucose 175 (H) 74 - 99 mg/dL   POCT GLUCOSE   Result Value Ref Range    POCT Glucose 177 (H) 74 - 99 mg/dL   CBC and Auto Differential   Result Value Ref Range    WBC 7.0 4.4 - 11.3 x10*3/uL    nRBC 0.0 0.0 - 0.0 /100 WBCs    RBC 2.97 (L) 4.00 - 5.20 x10*6/uL    Hemoglobin 9.0 (L) 12.0 - 16.0 g/dL    Hematocrit 27.0 (L) 36.0 - 46.0 %    MCV 91 80 - 100 fL    MCH 30.3 26.0 - 34.0 pg    MCHC 33.3 32.0 - 36.0 g/dL    RDW 20.5 (H) 11.5 - 14.5 %    Platelets 259 150 - 450 x10*3/uL    Neutrophils % 79.9 40.0 - 80.0 %    Immature Granulocytes %, Automated 1.6 (H) 0.0 - 0.9  %    Lymphocytes % 11.6 13.0 - 44.0 %    Monocytes % 5.3 2.0 - 10.0 %    Eosinophils % 1.0 0.0 - 6.0 %    Basophils % 0.6 0.0 - 2.0 %    Neutrophils Absolute 5.59 1.20 - 7.70 x10*3/uL    Immature Granulocytes Absolute, Automated 0.11 0.00 - 0.70 x10*3/uL    Lymphocytes Absolute 0.81 (L) 1.20 - 4.80 x10*3/uL    Monocytes Absolute 0.37 0.10 - 1.00 x10*3/uL    Eosinophils Absolute 0.07 0.00 - 0.70 x10*3/uL    Basophils Absolute 0.04 0.00 - 0.10 x10*3/uL   Hepatic function panel   Result Value Ref Range    Albumin 2.4 (L) 3.4 - 5.0 g/dL    Bilirubin, Total 0.3 0.0 - 1.2 mg/dL    Bilirubin, Direct 0.0 0.0 - 0.3 mg/dL    Alkaline Phosphatase 116 33 - 136 U/L    ALT 9 7 - 45 U/L    AST 9 9 - 39 U/L    Total Protein 5.7 (L) 6.4 - 8.2 g/dL   Magnesium   Result Value Ref Range    Magnesium 1.88 1.60 - 2.40 mg/dL   Blood Gas Venous Full Panel   Result Value Ref Range    POCT pH, Venous 7.47 (H) 7.33 - 7.43 pH    POCT pCO2, Venous 42 41 - 51 mm Hg    POCT pO2, Venous 43 35 - 45 mm Hg    POCT SO2, Venous 79 (H) 45 - 75 %    POCT Oxy Hemoglobin, Venous 77.3 (H) 45.0 - 75.0 %    POCT Hematocrit Calculated, Venous 28.0 (L) 36.0 - 46.0 %    POCT Sodium, Venous 131 (L) 136 - 145 mmol/L    POCT Potassium, Venous 4.0 3.5 - 5.3 mmol/L    POCT Chloride, Venous 96 (L) 98 - 107 mmol/L    POCT Ionized Calicum, Venous 1.16 1.10 - 1.33 mmol/L    POCT Glucose, Venous 171 (H) 74 - 99 mg/dL    POCT Lactate, Venous 0.8 0.4 - 2.0 mmol/L    POCT Base Excess, Venous 6.3 (H) -2.0 - 3.0 mmol/L    POCT HCO3 Calculated, Venous 30.6 (H) 22.0 - 26.0 mmol/L    POCT Hemoglobin, Venous 9.4 (L) 12.0 - 16.0 g/dL    POCT Anion Gap, Venous 8.0 (L) 10.0 - 25.0 mmol/L    Patient Temperature 37.0 degrees Celsius    FiO2 30 %   Phosphorus   Result Value Ref Range    Phosphorus 2.9 2.5 - 4.9 mg/dL   Basic Metabolic Panel   Result Value Ref Range    Glucose 175 (H) 74 - 99 mg/dL    Sodium 132 (L) 136 - 145 mmol/L    Potassium 3.8 3.5 - 5.3 mmol/L    Chloride 96 (L) 98  - 107 mmol/L    Bicarbonate 29 21 - 32 mmol/L    Anion Gap 11 10 - 20 mmol/L    Urea Nitrogen 28 (H) 6 - 23 mg/dL    Creatinine 1.34 (H) 0.50 - 1.05 mg/dL    eGFR 43 (L) >60 mL/min/1.73m*2    Calcium 8.1 (L) 8.6 - 10.3 mg/dL   aPTT   Result Value Ref Range    aPTT 76.4 (H) 22.0 - 32.5 seconds   Morphology   Result Value Ref Range    RBC Morphology See Below     Ovalocytes Few     Lexus Cells Few    Lavender Top   Result Value Ref Range    Extra Tube Hold for add-ons.    PST Top   Result Value Ref Range    Extra Tube Hold for add-ons.    aPTT   Result Value Ref Range    aPTT 78.0 (H) 22.0 - 32.5 seconds   POCT GLUCOSE   Result Value Ref Range    POCT Glucose 193 (H) 74 - 99 mg/dL     *Note: Due to a large number of results and/or encounters for the requested time period, some results have not been displayed. A complete set of results can be found in Results Review.       Assessment/Plan   Acute kidney injury my plan to dialyze her as needed however I will give her 1 dose of IV furosemide to induce urine output monitor renal function over the weekend  Edema much improved  Pneumonia continue with antibiotic therapy infectious disease  Diabetes mellitus type 2  Malnutrition continue with tube feeding  Lower back surgery site infection  Anemia transfuse when hemoglobin less than 7  Acute respiratory failure plan to continue the ventilator through a tracheostomy         Imer Cheung MD

## 2024-11-09 NOTE — PROGRESS NOTES
Physical Therapy                 Therapy Communication Note    Patient Name: Narda Malloy  MRN: [id number]  Department: [facility] 3 S ICU  Room: 11/11-A  Today's Date: 11/9/2024     Discipline: Physical Therapy    Missed Visit Reason: Missed Visit Reason: Parent refused (PT declined therapy despite encouragement to participate.)    Missed Time: Attempt

## 2024-11-09 NOTE — PROGRESS NOTES
Occupational Therapy                 Therapy Communication Note    Patient Name: Narda Malloy  MRN: [id number]  Department: [facility] 3 S ICU  Room: 11/11-A  Today's Date: 11/9/2024     Discipline: Occupational Therapy    Missed Visit Reason: Missed Visit Reason:  (pt declined therapy despite encouragement to participate.)    Missed Time: Attempt    Comment:

## 2024-11-09 NOTE — PROGRESS NOTES
[facility] Critical Care Medicine       Date:  11/9/2024  Patient:  Narda Malloy  YOB: 1955  MRN:  [id number]   Admit Date:  10/12/2024      Chief Complaint   Patient presents with    Altered Mental Status         History of Present Illness:  Narda Malloy is a 69 y.o. year old female patient with Past Medical History of  L1-L3 lumbar laminectomy, T4-S1 revision, and fusion August 26th, T2DM, HTN, essential tremor, HLD, glaucoma, sarcoidosis of the lung who presented to  ED 10/12 after being found essentially unresponsive at her nursing facility [facility]. Per report from her , she has had a significant decline in her health since July 15th when she had a fall and became significantly weak. She has also had multiple infection complications since her back surgery in August requiring multiple I&Ds and long term antibiotic therapy. She went to the OR most recently on 9/28 for lumbar site infection wash out. Per chart review, she was discharged on IV vancomycin 1g and IV ceftriaxone 2d q24hrs through 11/12.     ED Course: Initial vital signs: /104 (109), HR 68, RR 20, SpO2 95% on 6L NC, temp 34.5C. Give 0.4mg of narcan with no improvement in mentation. Lab work-up remarkable for mild hyperkalemia (5.5), AMY 42/1.46, elevated alk phos, normocytic anemia 10.4/33, turbid appearing urine with mild hematuria and proteinuria and + leuk esterase, >50 WBCs. Urine drug screen positive for barbiturates. Triggered sepsis timer so she was given 3L NS. She was intubated for airway protection with 20mg etomidate and 100mg succinylcholine. BP dropped post-intubated and fluid resuscitation and she was subsequently started on levophed.            Interval ICU Events:  10/12: Pt arrived to ICU intubated      10/13: Received K cocktail last night for K 6.0, corrected appropriately. Levophed requirements mildly up, UOP decreasing.      10/14: decreased mentation, only responsive to noxious  stimuli.     10/15: Mentation much improved. SAT/SBT successful so extubated.      10/16: O2 requirements significantly increased, NRB -> HFNC 40L 100% likely 2/2 mucus plugging.     10/17: CXR this am showing complete opacification of left hemithorax related to atelectasis vs pleural effusion. Remains on HFNC 40L/80%. Pigtail catheter placed with 1.2L drained immediately.      10/18: ~Kidney function worsening and UOP declining     10/19: Patient with worsening hypoxic, hypercapnic respiratory failure - now requiring BIPAP support. Remains grossly volume overloaded with low UO. Intubated     10/22: Patient received 80 lasix and metalozone with minimal UO. Plan for CRRT today.     10/24: Place PICC today.     10/25: Remove chest tube today.     10/29: She did have an episode of vomiting.     10/30: Will attempt to sit patient on side of bed today.     10/31:  Will extubate today.     11/1: Pt extubated yesterday to HFNC. Had worsened respiratory distress requiring bipap, wore bipap overnight.     11/2: Unable to wean from bipap yesterday without immediate desat in 50-60s, increasingly lethargic and weak. Decision made to reintubate and this was discussed with  which he was ok with.       11/3: OVN: Unsuccessful removal of incisional sutures of posterior surgery d/t skin overgrowth. DAY: Reached out to ortho spine surgical team about suture removal, awaiting to hear back. GOC discussion today, patient will remain DNR-CCA and family wants to pursue Trach/PEG placement (consults ordered). Off sedation and fent, transitioned to enteral narcotics for pain control.      11/4: Plan for peg placement today at bedside, tracheostomy creation Thursday. Will receive scheduled dialysis after peg placement.      11/5: Peg-tube placed yesterday without issue. Removed majority of sutures yesterday     11/7: Drop in Hgb this morning to 7.4, 1 unit PRBC transfused pre-operatively, tracheostomy scheduled for this afternoon.  Bladder scan this morning with 270cc, will place anders and hold off on dialysis tomorrow to assess renal recovery.       Medical History:  Past Medical History:   Diagnosis Date    Degenerative myopia, bilateral     Diabetic neuropathy (Multi)     Difficult intubation 08/26/2024    Mac 3, grade 3, 1 attempt.  Glidescope/videolaryngoscopy recommended for future attempts.    DM type 2 (diabetes mellitus, type 2) (Multi)     Dry eye syndrome of bilateral lacrimal glands     Essential hypertension     Essential tremor     Glaucoma     Hyperlipidemia     Long term (current) use of insulin (Multi)     Low back pain     PONV (postoperative nausea and vomiting)     Primary open angle glaucoma of both eyes, severe stage     Repeated falls     Sarcoidosis of lung (Multi)     Spinal stenosis, lumbar region without neurogenic claudication     Weakness      Past Surgical History:   Procedure Laterality Date    BLEPHAROPLASTY  07/2022    BREAST SURGERY  05/20/2022    Breast lift    CARPAL TUNNEL RELEASE      CATARACT EXTRACTION W/  INTRAOCULAR LENS IMPLANT Bilateral     OD 08/04/2011 +8.5D,OS 08/04/2011 +8.50D    FOOT SURGERY      INSERTION / REMOVAL CRANIAL DBS GENERATOR      Placed 2017.  Removed 2018. part of wire left in head when everything removed    LUMBAR FUSION      L3-S1    PANRETINAL PHOTOCOAGULATION  2014    THORACIC FUSION  08/26/2024    T4-S1 fusion    VITRECTOMY Right 2013     Medications Prior to Admission   Medication Sig Dispense Refill Last Dose/Taking    acetaminophen (Tylenol) 500 mg tablet Take 2 tablets (1,000 mg) by mouth 3 times a day.   Unknown    ascorbic acid (Vitamin C) 500 mg tablet as directed Orally   Unknown    brimonidine (AlphaGAN) 0.2 % ophthalmic solution Administer 1 drop into both eyes 2 times a day.   Unknown    calcium carbonate-vitamin D3 500 mg-5 mcg (200 unit) tablet Take 1 tablet by mouth once daily.   Unknown    cefTRIAXone (Rocephin) 2 gram/50 mL IV Infuse 50 mL (2 g) at 100 mL/hr  over 30 minutes into a venous catheter once every 24 hours. Once weekly labs CBC/diff, CMP, Vanc trough ESR, CRP fax to Dr. Juarez [phone]. Stop date 11/12/24. 1950 mL 0     dextrose 50 % injection Infuse 25 mL (12.5 g) into a venous catheter every 15 minutes if needed (For blood glucose 41 to 70 mg/dL).       dextrose 50 % injection Infuse 50 mL (25 g) into a venous catheter every 15 minutes if needed (For blood glucose less than or equal to 40 mg/dL).       docusate sodium (Colace) 100 mg capsule Take 1 capsule (100 mg) by mouth 2 times a day.   Unknown    ezetimibe (Zetia) 10 mg tablet Take 1 tablet (10 mg) by mouth once daily. 90 tablet 3 Unknown    FreeStyle Lite Strips strip USE TO TEST 3 TIMES A DAY AS DIRECTED 300 each 2     glucagon (Glucagen) 1 mg injection Inject 1 mg into the muscle every 15 minutes if needed for low blood sugar - see comments (Hypoglycemia).       glucagon (Glucagen) 1 mg injection Inject 1 mg into the muscle every 15 minutes if needed for low blood sugar - see comments (Hypoglycemia).       heparin sodium,porcine (heparin, porcine,) 5,000 unit/mL injection Inject 1 mL (5,000 Units) under the skin every 8 hours.       insulin lispro (HumaLOG) 100 unit/mL injection Inject 0-15 Units under the skin 3 times daily (morning, midday, late afternoon). Take as directed per insulin instructions.Do not hold when patient is not eating, continue order as scheduled for hyperglycemia management.  Insulin Lispro Corrective Scale #3     Hypoglycemia protocol Call LIP unit(s) if Blood Glucose is between 0 - 70 mg/dL     0 unit(s) if Blood glucose is between    3 unit(s) if Blood glucose is between 151-200   6 unit(s) if Blood glucose is between 201-250   9 unit(s) if Blood glucose is between 251-300   12 unit(s) if Blood glucose is between 301-350   15 unit(s) if Blood glucose is between 351-400    Notify provider unit(s) if Blood Glucose is greater than 400 mg/dL       Lactobacillus  "acidophilus 100 mg (1 billion cell) capsule Take 1 capsule by mouth 2 times a day.   Unknown    latanoprost (Xalatan) 0.005 % ophthalmic solution Administer 1 drop into both eyes once daily at bedtime. 2.5 mL 5 Unknown    melatonin 5 mg tablet Take 1 tablet (5 mg) by mouth as needed at bedtime for sleep.   Unknown    methocarbamol (Robaxin) 500 mg tablet Take 1 tablet (500 mg) by mouth 3 times a day.   Unknown    multivitamin tablet Take 1 tablet by mouth once daily.   Unknown    ondansetron (Zofran) 4 mg/2 mL injection Infuse 2 mL (4 mg) into a venous catheter every 6 hours if needed for nausea or vomiting.       oxyCODONE (Roxicodone) 5 mg immediate release tablet Take 1 tablet (5 mg) by mouth every 6 hours if needed for severe pain (7 - 10) or moderate pain (4 - 6).       oxygen (O2) gas therapy Inhale 1 each continuously.       pantoprazole (ProtoNix) 40 mg EC tablet Take 1 tablet (40 mg) by mouth once daily in the morning. Take before meals. Do not crush, chew, or split.       pantoprazole (ProtoNix) 40 mg injection Infuse 40 mg into a venous catheter once daily in the morning. Take before meals. If unable to take PO.       pen needle, diabetic (PEN NEEDLE MISC) BD Altagracia- 4 mm X 32 G needle - as directed 4x a day sc 4 times per day       polyethylene glycol (Glycolax, Miralax) 17 gram packet Take 17 g by mouth once daily.   Unknown    primidone 125 mg tablet Take 125 mg by mouth 3 times a day.   Unknown    propranolol LA (Inderal LA) 60 mg 24 hr capsule Take 1 capsule (60 mg) by mouth early in the morning.. Hold for SBP < 110 mmhg, HR < 60 bpm.   Unknown    sennosides (Senokot) 8.6 mg tablet Take 1 tablet (8.6 mg) by mouth every 12 hours if needed for constipation.   Unknown    Sure Comfort Pen Needle 32 gauge x 5/32\" needle AS DIRECTED DAILY FOR 90 DAYS 100 each 11 Unknown    traZODone (Desyrel) 25 MG split tablet Take 1 half tablet (25 mg) by mouth once daily at bedtime.   Unknown    vancomycin (Vancocin) 1 " gram/250 mL solution Infuse 250 mL (1 g) at 250 mL/hr over 60 minutes into a venous catheter every 12 hours. Once weekly labs CBC/diff, CMP, Vanc trough ESR, CRP fax to Dr. Juarez [phone]. Stop date 11/12/24. 17722 mL 0      Erythromycin, Morphine, and Rosuvastatin  Social History     Tobacco Use    Smoking status: Former     Types: Cigarettes     Passive exposure: Past    Smokeless tobacco: Never   Vaping Use    Vaping status: Never Used   Substance Use Topics    Alcohol use: Not Currently    Drug use: Not Currently     Family History   Problem Relation Name Age of Onset    Multiple myeloma Mother      Cancer Mother      Other (CABG) Father      Pulmonary embolism Father      Heart disease Father      Breast cancer Sister          Stage II    Hypertension Sister      Diabetes Sister      No Known Problems Sister          x5    No Known Problems Brother          x4    No Known Problems Daughter         Hospital Medications:    heparin, 0-4,500 Units/hr, Last Rate: 1,600 Units/hr (11/09/24 0643)          Current Facility-Administered Medications:     acetaminophen (Tylenol) oral liquid 650 mg, 650 mg, oral, q6h, Kaleb Lam PA-C, 650 mg at 11/09/24 0450    acetylcysteine (Mucomyst) 200 mg/mL (20 %) nebulizer solution 600 mg, 3 mL, nebulization, 4x daily, Kaleb Lam PA-C, 600 mg at 11/09/24 0748    albuterol 2.5 mg /3 mL (0.083 %) nebulizer solution 3 mL, 3 mL, nebulization, 4x daily, Kaleb Lam PA-C, 3 mL at 11/09/24 0748    alteplase (Cathflo Activase) injection 2 mg, 2 mg, intra-catheter, PRN, Kaleb Lam PA-C    brimonidine (AlphaGAN) 0.2 % ophthalmic solution 1 drop, 1 drop, Both Eyes, BID, Kaleb Lam PA-C, 1 drop at 11/09/24 0808    calcium carbonate-vitamin D3 500 mg-5 mcg (200 unit) per tablet 1 tablet, 1 tablet, oral, Daily, Kaleb Lam PA-C, 1 tablet at 11/09/24 0807    cefTRIAXone (Rocephin) 2 g in dextrose (iso) IV 50 mL, 2 g, intravenous, q24h, Kaleb Lam PA-C,  Stopped at 11/09/24 0854    collagenase 250 unit/gram ointment, , Topical, Daily, Sabino FRENCH Kiss, APRN-CNP, 1 Application at 11/08/24 1648    DAPTOmycin (Cubicin) 700 mg in sodium chloride 0.9%  mL, 700 mg, intravenous, q48h, Kaleb Lam PA-C, Last Rate: 200 mL/hr at 11/09/24 0904, 700 mg at 11/09/24 0904    dextrose 50 % injection 12.5 g, 12.5 g, intravenous, q15 min PRN, Kaleb Lam PA-C    dextrose 50 % injection 25 g, 25 g, intravenous, q15 min PRN, Kaleb Lam PA-C    ezetimibe (Zetia) tablet 10 mg, 10 mg, oral, Daily, Kaleb Lam PA-C, 10 mg at 11/09/24 0808    ferrous sulfate syrup 60 mg of iron, 60 mg of iron, oral, Daily, Kaleb Lam PA-C, 60 mg of iron at 11/09/24 0807    folic acid (Folvite) tablet 1 mg, 1 mg, oral, Daily, Kaleb Lam PA-C, 1 mg at 11/09/24 0807    glucagon (Glucagen) injection 1 mg, 1 mg, intramuscular, q15 min PRN, Kaleb Lam PA-C    glucagon (Glucagen) injection 1 mg, 1 mg, intramuscular, q15 min PRN, Kaleb Lam PA-C    heparin (porcine) injection 3,000-6,000 Units, 3,000-6,000 Units, intravenous, PRN, Kaleb Lam PA-C    heparin 1,000 unit/mL injection 1,000 Units, 1,000 Units, intra-catheter, After Dialysis, Imer Cheugn MD, 1,000 Units at 11/08/24 1230    heparin 1,000 unit/mL injection 1,000 Units, 1,000 Units, intra-catheter, After Dialysis, Imer Cheung MD, 1,000 Units at 11/08/24 1230    heparin 25,000 Units in dextrose 5% 250 mL (100 Units/mL) infusion (premix), 0-4,500 Units/hr, intravenous, Continuous, Kaleb Lam PA-C, Last Rate: 16 mL/hr at 11/09/24 0643, 1,600 Units/hr at 11/09/24 0643    heparin flush 10 unit/mL syringe 50 Units, 50 Units, intra-catheter, PRN, Kaleb Lam PA-C, 50 Units at 10/19/24 2313    honey (Medihoney) topical gel, , Topical, Daily, Kaleb Lam PA-C, Given at 11/08/24 0930    HYDROmorphone (Dilaudid) injection 0.4 mg, 0.4 mg, intravenous, q2h PRN, Kaleb Lam PA-C, 0.4 mg at  11/08/24 1809    insulin lispro (HumaLOG) injection 0-15 Units, 0-15 Units, subcutaneous, q4h, Kaleb Lam PA-C, 3 Units at 11/09/24 0829    latanoprost (Xalatan) 0.005 % ophthalmic solution 1 drop, 1 drop, Both Eyes, Nightly, Kaleb Lam PA-C, 1 drop at 11/08/24 2002    loperamide (Imodium) capsule 2 mg, 2 mg, oral, 4x daily PRN, LEONEL Salgado-CNP, 2 mg at 11/08/24 1648    midodrine (Proamatine) tablet 10 mg, 10 mg, oral, q8h, Kaleb Lam PA-C, 10 mg at 11/09/24 0807    oxyCODONE (Roxicodone) solution 5 mg, 5 mg, oral, q4h PRN, Kaleb Lam PA-C, 5 mg at 11/09/24 0516    oxyCODONE (Roxicodone) solution 7.5 mg, 7.5 mg, oral, q4h PRN, Kaleb Lam PA-C    oxygen (O2) therapy, , inhalation, Continuous - Inhalation, Anna Marie Shook MD, 40 percent at 11/09/24 0749    pantoprazole (ProtoNix) EC tablet 40 mg, 40 mg, oral, Daily before breakfast **OR** pantoprazole (ProtoNix) injection 40 mg, 40 mg, intravenous, Daily before breakfast, Kaleb Lam PA-C, 40 mg at 11/09/24 0652    polyethylene glycol (Glycolax, Miralax) packet 17 g, 17 g, oral, Daily PRN, Kaleb Lam PA-C    potassium, sodium phosphates (Phos-NaK) 280-160-250 mg packet 1 packet, 1 packet, oral, With meals & nightly, Kaleb Lam PA-C, 1 packet at 11/09/24 0807    primidone (Mysoline) tablet 125 mg, 125 mg, oral, Nightly, Kaleb Lam PA-C, 125 mg at 11/08/24 2003    [Held by provider] propranolol LA (Inderal LA) 24 hr capsule 60 mg, 60 mg, oral, Daily, Kaleb Lam PA-C, 60 mg at 10/19/24 0643    sennosides-docusate sodium (Lucia-Colace) 8.6-50 mg per tablet 1 tablet, 1 tablet, oral, Nightly PRN, Kaleb Lam PA-C    sodium chloride 0.9 % bolus 200 mL, 200 mL, intravenous, Once, Anna Marie Shook MD    sodium chloride 3 % nebulizer solution 3 mL, 3 mL, nebulization, 4x daily, Kaleb Lam PA-C, 3 mL at 11/08/24 2014    Review of Systems:  14 point review of systems was completed and negative except  for those specially mention in my HPI    Physical Exam:    Heart Rate:  [61-85]   Temp:  [36.7 °C (98.1 °F)-37 °C (98.6 °F)]   Resp:  [16-25]   BP: (102-148)/()   Weight:  [113 kg (248 lb 10.9 oz)]   SpO2:  [95 %-100 %]     Physical Exam  Constitutional:       Interventions: She is intubated.   HENT:      Mouth/Throat:      Mouth: Mucous membranes are moist.      Pharynx: Oropharynx is clear.   Eyes:      Pupils: Pupils are equal, round, and reactive to light.   Cardiovascular:      Rate and Rhythm: Normal rate and regular rhythm.      Pulses: Normal pulses.   Pulmonary:      Effort: Pulmonary effort is normal. She is intubated.      Breath sounds: Examination of the left-middle field reveals decreased breath sounds. Examination of the left-lower field reveals decreased breath sounds. Decreased breath sounds present.   Abdominal:      General: Abdomen is flat.      Palpations: Abdomen is soft.   Musculoskeletal:         General: Normal range of motion.   Skin:     General: Skin is warm and dry.      Capillary Refill: Capillary refill takes less than 2 seconds.      Comments: Spinal incision with dressing overtop, sutures removed, minimal drainage   Neurological:      General: No focal deficit present.      Mental Status: She is alert and oriented to person, place, and time. Mental status is at baseline.         Objective:    I have reviewed all medications, laboratory results, and imaging pertinent for today's encounter.    Vent Mode: Pressure regulated volume control/assist control  FiO2 (%):  [30 %] 30 %  S RR:  [16] 16  S VT:  [450 mL] 450 mL  PEEP/CPAP (cm H2O):  [5 cm H20] 5 cm H20  ID SUP:  [5 cm H20] 5 cm H20  MAP (cm H2O):  [8.2-9.4] 9.2      Intake/Output Summary (Last 24 hours) at 11/9/2024 0920  Last data filed at 11/9/2024 0854  Gross per 24 hour   Intake 1723 ml   Output 379 ml   Net 1344 ml         Assessment/Plan:    I am currently managing this critically ill patient for the following  problems:    Plan:  Neuro/Psych/Pain Ctrl/Sedation: (Hx: essential tremor)  Acute encephalopathy - likely 2/2 hypercapnia, & infection- resolving   CT head 10/12: Negative for acute findings   -Pain Control: liquid oxy, scheduled acetaminophen, dilaudid for dressing changes PRN  -Sedation/analgesia: none, keep sedation minimized as able   -Home primidone continued. Will hold propanolol d/t hypotension  -CAM ICU qshift, sleep-wake hygiene, delirium precautions     Respiratory/ENT:  Acute hypoxic/hypercapnic respiratory failure-likely multifactorial and 2/2 HCAP, pl effusions, volume overload  Healthcare-associated pneumonia   Recurrent atelectasis   Ventilator-dependence   Pleural effusion -s/p left-sided pigtail placement 10/17, removed 10/25  Trach placement 11/7  CXR 11/9 Stable bilateral infilitrates and effusions L>R  -Will wean O2 as tolerated to maintain SpO2 >92%  -SBT every a.m.  -Albuterol, hypertonic saline    -IPV per RT TID  -Aspiration precautions   -Trach care qshift     Cardiovascular:  (Hx: diastolic heart failure, HTN, HLD)  Septic shock-resolved  Occlusive superficial venous thrombus of the left cephalic vein  Non-occlusive DVT right IJ vein   Acute on chronic diastolic heart failure  US duplex 10/29 occlusive superficial vein thrombosis of left cephalic vein   Repeat duplex 11/4 nonocclusive thrombus to RIJ  TTE 11/2: EF 60-65%, mild/mod AV valve regurg  -Continue midodrine 10 q8  -Holding home propranolol (on for tremors)  -Continue home Zetia  -Continuous cardiac monitoring per ICU protocol  -EKGs PRN for ACS symptoms, arrhythmias  -Heparin drip resumed-transition to Eliquis closer to discharge      GI:  Hx GERD  Diarrhea  Peg-dependent-placed 11/4  Diet: Nepro @40ml/hr  BR: waqas-colace, miralax PRN  GI Prophylaxis: PPI  -C-Diff negative, started immodium     /Volume Status/Electrolytes:  Acute kidney injury-possibly ATN +/- medication-induced (vancomycin)  Anasarca   Hypoalbuminemia  "  Hyponatremia  -HD 11/9 with 2.5L removed, in consideration of HD break to test renal recovery  -[place] place for accurate I&O's   -Nephrology consulted will appreciate recs  -Replete electrolytes to maintain K >4.0 and Mg >2.0  -Daily BMP, Mg, Phos  -40mg lasix x1     Heme/Onc: (Hx: normocytic anemia)  Occlusive LUE DVT  Non-occlusive R IJ DVT  -Heparin gtt for RIJ dvt  Continue iron and folate  1 unit PRBC 11/7  -Monitor for s/sx of bleeding   -Plan to transfuse if Hgb <7.0   -Daily CBC  -T&S AM draw 11/10    Endocrine: (Hx: DMII)  SSI Q4  Hypoglycemia protocol PRN     Infectious Disease:  Pseudomonas-Respiratory culture 10/29  Thoracolumbar surgical site infection - s/p I&D x 2. Recent MRSA bacteremia on extended course of antibiotics; IV ceftriaxone, IV daptomycin  Healthcare-associated pneumonia   CT lumbar spine 10/12: Unable to r/o abscess. Unable to do MRI d/t spinal hardware, \"metal in head\" per patient  -Sputum culture 11/4 negative  -BC 11/3 negative  -Continue Daptomycin every 48 hours to 11/12 and after dialysis for surgical site infection  -Continue ceftriaxone until 11/12 for pneumonia  -Infectious Disease consult placed, will appreciate recs  -Monitor SIRS criteria     MSK:  PT/OT orders placed     Ethics/Code Status:  DNR-CCA      :  DVT Prophylaxis: SCDs heparin gtt  GI Prophylaxis: PPI home med  Bowel Regimen: Lucia-colace and Miralax   Diet: Nepro at 40  CVC: PICC placed 10/24, permacath 11/5  [place]: No  [place]: yes 11/7  Restraints: no  Discharge planning: [facility]    Critical Care Time:  35 minutes spent in preparing to see patient (I.e. review of medical records), evaluation of diagnostics (I.e. labs, imaging, etc.), documentation, discussing plan of care with patient/ family/ caregiver, and/ or coordination of care with multidisciplinary team. Time does not include completion of procedure time.       Sabino Matos, APRN-CNP   "

## 2024-11-09 NOTE — CARE PLAN
The patient's goals for the shift include unable to assess    The clinical goals for the shift include work with PT/OT, assess and manage pain level, remain hemodynamically stable    Over the shift, the patient made progress toward the following goals:      Problem: Safety - Adult  Goal: Free from fall injury  Outcome: Progressing  Flowsheets (Taken 11/8/2024 1946)  Free from fall injury: Instruct family/caregiver on patient safety     Problem: Discharge Planning  Goal: Discharge to home or other facility with appropriate resources  Outcome: Progressing  Flowsheets (Taken 11/8/2024 1946)  Discharge to home or other facility with appropriate resources:   Identify barriers to discharge with patient and caregiver   Arrange for needed discharge resources and transportation as appropriate   Identify discharge learning needs (meds, wound care, etc)   Arrange for interpreters to assist at discharge as needed   Refer to discharge planning if patient needs post-hospital services based on physician order or complex needs related to functional status, cognitive ability or social support system     Problem: Chronic Conditions and Co-morbidities  Goal: Patient's chronic conditions and co-morbidity symptoms are monitored and maintained or improved  Outcome: Progressing  Flowsheets (Taken 11/8/2024 1946)  Care Plan - Patient's Chronic Conditions and Co-Morbidity Symptoms are Monitored and Maintained or Improved:   Monitor and assess patient's chronic conditions and comorbid symptoms for stability, deterioration, or improvement   Collaborate with multidisciplinary team to address chronic and comorbid conditions and prevent exacerbation or deterioration   Update acute care plan with appropriate goals if chronic or comorbid symptoms are exacerbated and prevent overall improvement and discharge     Problem: Diabetes  Goal: Increase stability of blood glucose readings by end of shift  Outcome: Progressing  Flowsheets (Taken 11/8/2024  1946)  Increase stability of blood glucose readings by end of shift: Med administration/monitoring of effect  Goal: Maintain electrolyte levels within acceptable range throughout shift  Outcome: Progressing  Flowsheets (Taken 11/8/2024 1946)  Maintain electrolyte levels within acceptable range throughout shift: Monitor urine output  Goal: Maintain glucose levels >70mg/dl to <250mg/dl throughout shift  Outcome: Progressing  Flowsheets (Taken 11/8/2024 1946)  Maintain glucose levels >70mg/dl to <250mg/dl throughout shift: Med administration/monitoring of effect  Goal: Learn about and adhere to nutrition recommendations by end of shift  Outcome: Progressing  Flowsheets (Taken 11/7/2024 2013 by Barbara Jasmine, RN)  Learn about and adhere to nutrition recommendations by end of shift: Ensure/encourage compliance with appropriate diet  Goal: Vital signs within normal range for age by end of shift  Outcome: Progressing  Flowsheets (Taken 11/8/2024 1946)  Vital signs within normal range for age by end of shift: Med administration/monitoring of effect  Goal: Increase self care and/or family involovement by end of shift  Outcome: Progressing  Goal: Receive DSME education by end of shift  Outcome: Progressing  Flowsheets (Taken 11/7/2024 2013 by Barbara Jasmine, RN)  Receive DSME education by end of shift: Provide patient centered education on Diabetic Self Management Education     Problem: Knowledge Deficit  Goal: Patient/family/caregiver demonstrates understanding of disease process, treatment plan, medications, and discharge instructions  Outcome: Progressing  Flowsheets (Taken 11/8/2024 1946)  Patient/family/caregiver demonstrates understanding of disease process, treatment plan, medications, and discharge instructions:   Complete learning assessment and assess knowledge base   Provide teaching at level of understanding   Provide teaching via preferred learning methods     Problem: Skin  Goal: Decreased wound  size/increased tissue granulation at next dressing change  Outcome: Progressing  Flowsheets (Taken 11/8/2024 1946)  Decreased wound size/increased tissue granulation at next dressing change:   Promote sleep for wound healing   Protective dressings over bony prominences   Utilize specialty bed per algorithm  Goal: Participates in plan/prevention/treatment measures  Outcome: Progressing  Flowsheets (Taken 11/8/2024 1946)  Participates in plan/prevention/treatment measures: Elevate heels  Goal: Prevent/manage excess moisture  Outcome: Progressing  Flowsheets (Taken 11/8/2024 1946)  Prevent/manage excess moisture:   Cleanse incontinence/protect with barrier cream   Follow provider orders for dressing changes   Moisturize dry skin   Monitor for/manage infection if present  Goal: Prevent/minimize sheer/friction injuries  Outcome: Progressing  Flowsheets (Taken 11/8/2024 1946)  Prevent/minimize sheer/friction injuries:   HOB 30 degrees or less   Complete micro-shifts as needed if patient unable. Adjust patient position to relieve pressure points, not a full turn   Increase activity/out of bed for meals   Turn/reposition every 2 hours/use positioning/transfer devices   Use pull sheet   Utilize specialty bed per algorithm  Goal: Promote/optimize nutrition  Outcome: Progressing  Flowsheets (Taken 11/8/2024 1946)  Promote/optimize nutrition: Monitor/record intake including meals  Goal: Promote skin healing  Outcome: Progressing  Flowsheets (Taken 11/8/2024 1946)  Promote skin healing:   Assess skin/pad under line(s)/device(s)   Ensure correct size (line/device) and apply per  instructions   Protective dressings over bony prominences   Rotate device position/do not position patient on device   Turn/reposition every 2 hours/use positioning/transfer devices     Problem: Nutrition  Goal: Consume prescribed supplement  Outcome: Progressing  Goal: Nutrition support goals are met within 48 hrs  Outcome: Progressing  Goal:  Nutrition support is meeting 75% of nutrient needs  Outcome: Progressing  Goal: BG  mg/dL  Outcome: Progressing  Goal: Lab values WNL  Outcome: Progressing  Goal: Electrolytes WNL  Outcome: Progressing  Goal: Promote healing  Outcome: Progressing  Goal: Maintain stable weight  Outcome: Progressing  Goal: Reduce weight from edema/fluid  Outcome: Progressing     Problem: Respiratory  Goal: No signs of respiratory distress (eg. Use of accessory muscles. Peds grunting)  Outcome: Progressing  Goal: Clear secretions with interventions this shift  Outcome: Progressing  Flowsheets (Taken 11/7/2024 1846)  Clear secretions with interventions this shift:   Encourage/provide pulmonary hygiene/secretion clearance   Med administration/monitoring of effect   Suctioning  Goal: Minimize anxiety/maximize coping throughout shift  Outcome: Progressing  Flowsheets (Taken 11/7/2024 1846)  Minimize anxiety/maximize coping throughout shift:   Med administration/monitoring of effect   Monitor pain/anxiety level  Goal: Minimal/no exertional discomfort or dyspnea this shift  Outcome: Progressing  Flowsheets (Taken 11/7/2024 1846)  Minimal/no exertional discomfort or dyspnea this shift: Positioning to promote ventilation/comfort  Goal: Patent airway maintained this shift  Outcome: Progressing  Goal: Tolerate mechanical ventilation evidenced by VS/agitation level this shift  Outcome: Progressing  Flowsheets (Taken 11/8/2024 1946)  Tolerate mechanical ventilation evidenced by VS/agitation level this shift: Mechanical ventilation  Goal: Tolerate pulmonary toileting this shift  Outcome: Progressing  Flowsheets (Taken 11/7/2024 1846)  Tolerate pulmonary toileting this shift: Positioning to promote ventilation/comfort  Goal: Verbalize decreased shortness of breath this shift  Outcome: Progressing  Flowsheets (Taken 11/8/2024 1946)  Verbalize decreased shortness of breath this shift:   Encourage/provide pulmonary hygiene/secretion clearance    Suctioning  Goal: Wean oxygen to maintain O2 saturation per order/standard this shift  Outcome: Progressing  Goal: Increase self care and/or family involvement in next 24 hours  Outcome: Progressing  Flowsheets (Taken 11/8/2024 1946)  Increase self care and/or family involvement in next 24 hours: Encourage activity/mobility     Problem: Pain  Goal: Takes deep breaths with improved pain control throughout the shift  Outcome: Progressing  Goal: Turns in bed with improved pain control throughout the shift  Outcome: Progressing  Goal: Walks with improved pain control throughout the shift  Outcome: Progressing  Goal: Performs ADL's with improved pain control throughout shift  Outcome: Progressing  Goal: Participates in PT with improved pain control throughout the shift  Outcome: Progressing  Goal: Free from opioid side effects throughout the shift  Outcome: Progressing  Goal: Free from acute confusion related to pain meds throughout the shift  Outcome: Progressing     Problem: Fall/Injury  Goal: Not fall by end of shift  Outcome: Progressing  Goal: Be free from injury by end of the shift  Outcome: Progressing  Goal: Verbalize understanding of personal risk factors for fall in the hospital  Outcome: Progressing  Goal: Verbalize understanding of risk factor reduction measures to prevent injury from fall in the home  Outcome: Progressing  Goal: Use assistive devices by end of the shift  Outcome: Progressing  Goal: Pace activities to prevent fatigue by end of the shift  Outcome: Progressing     Problem: Mechanical Ventilation  Goal: Tracheostomy will be managed safely  Outcome: Progressing  Flowsheets (Taken 11/8/2024 1946)  Tracheostomy Will Be Managed Safely:   Utilize tracheostomy securing device and change as necessary to ensure tracheostomy is secured to patient   Support ventilator tubing to avoid pressure from drag of tubing   Keep ambu bag, mask, oxygen connection tubing, and extra tracheostomy at bedside and  accompanying patient at all times   Utilize trach securing device   Protect skin with moisture barrier cream

## 2024-11-09 NOTE — CARE PLAN
RT present at rounds. Stable on current vent settings. Has a 6 Shiley Trach secured with trach ties. Will continue daily SBTs.    RT to follow.    Pt weaned on 8/5 30% for 2 hours. Tolerated well. , RR 23.

## 2024-11-10 ENCOUNTER — APPOINTMENT (OUTPATIENT)
Dept: RADIOLOGY | Facility: HOSPITAL | Age: 69
End: 2024-11-10
Payer: MEDICARE

## 2024-11-10 VITALS
HEIGHT: 70 IN | BODY MASS INDEX: 35.48 KG/M2 | TEMPERATURE: 98.2 F | DIASTOLIC BLOOD PRESSURE: 126 MMHG | RESPIRATION RATE: 25 BRPM | HEART RATE: 91 BPM | WEIGHT: 247.8 LBS | SYSTOLIC BLOOD PRESSURE: 156 MMHG | OXYGEN SATURATION: 97 %

## 2024-11-10 LAB
ABO GROUP (TYPE) IN BLOOD: NORMAL
ALBUMIN SERPL BCP-MCNC: 2.3 G/DL (ref 3.4–5)
ALP SERPL-CCNC: 109 U/L (ref 33–136)
ALT SERPL W P-5'-P-CCNC: 8 U/L (ref 7–45)
ANION GAP BLDV CALCULATED.4IONS-SCNC: 4 MMOL/L (ref 10–25)
ANION GAP SERPL CALCULATED.3IONS-SCNC: 14 MMOL/L (ref 10–20)
ANTIBODY SCREEN: NORMAL
APTT PPP: 36.4 SECONDS (ref 22–32.5)
APTT PPP: 40 SECONDS (ref 22–32.5)
APTT PPP: >139 SECONDS (ref 22–32.5)
AST SERPL W P-5'-P-CCNC: 8 U/L (ref 9–39)
BASE EXCESS BLDV CALC-SCNC: 8.9 MMOL/L (ref -2–3)
BASOPHILS # BLD AUTO: 0.03 X10*3/UL (ref 0–0.1)
BASOPHILS NFR BLD AUTO: 0.5 %
BILIRUB DIRECT SERPL-MCNC: 0 MG/DL (ref 0–0.3)
BILIRUB SERPL-MCNC: 0.3 MG/DL (ref 0–1.2)
BODY TEMPERATURE: 37 DEGREES CELSIUS
BUN SERPL-MCNC: 35 MG/DL (ref 6–23)
CA-I BLDV-SCNC: 0.97 MMOL/L (ref 1.1–1.33)
CALCIUM SERPL-MCNC: 7.6 MG/DL (ref 8.6–10.3)
CHLORIDE BLDV-SCNC: 93 MMOL/L (ref 98–107)
CHLORIDE SERPL-SCNC: 90 MMOL/L (ref 98–107)
CO2 SERPL-SCNC: 28 MMOL/L (ref 21–32)
CREAT SERPL-MCNC: 1.59 MG/DL (ref 0.5–1.05)
EGFRCR SERPLBLD CKD-EPI 2021: 35 ML/MIN/1.73M*2
EOSINOPHIL # BLD AUTO: 0.04 X10*3/UL (ref 0–0.7)
EOSINOPHIL NFR BLD AUTO: 0.7 %
ERYTHROCYTE [DISTWIDTH] IN BLOOD BY AUTOMATED COUNT: 19.8 % (ref 11.5–14.5)
GLUCOSE BLD MANUAL STRIP-MCNC: 152 MG/DL (ref 74–99)
GLUCOSE BLD MANUAL STRIP-MCNC: 173 MG/DL (ref 74–99)
GLUCOSE BLD MANUAL STRIP-MCNC: 187 MG/DL (ref 74–99)
GLUCOSE BLD MANUAL STRIP-MCNC: 200 MG/DL (ref 74–99)
GLUCOSE BLD MANUAL STRIP-MCNC: 203 MG/DL (ref 74–99)
GLUCOSE BLDV-MCNC: 298 MG/DL (ref 74–99)
GLUCOSE SERPL-MCNC: 360 MG/DL (ref 74–99)
HCO3 BLDV-SCNC: 33.5 MMOL/L (ref 22–26)
HCT VFR BLD AUTO: 26.6 % (ref 36–46)
HCT VFR BLD EST: 26 % (ref 36–46)
HGB BLD-MCNC: 8.7 G/DL (ref 12–16)
HGB BLDV-MCNC: 8.8 G/DL (ref 12–16)
IMM GRANULOCYTES # BLD AUTO: 0.06 X10*3/UL (ref 0–0.7)
IMM GRANULOCYTES NFR BLD AUTO: 1 % (ref 0–0.9)
INHALED O2 CONCENTRATION: 30 %
LACTATE BLDV-SCNC: 0.8 MMOL/L (ref 0.4–2)
LYMPHOCYTES # BLD AUTO: 0.61 X10*3/UL (ref 1.2–4.8)
LYMPHOCYTES NFR BLD AUTO: 10.1 %
MAGNESIUM SERPL-MCNC: 1.92 MG/DL (ref 1.6–2.4)
MCH RBC QN AUTO: 30.5 PG (ref 26–34)
MCHC RBC AUTO-ENTMCNC: 32.7 G/DL (ref 32–36)
MCV RBC AUTO: 93 FL (ref 80–100)
MONOCYTES # BLD AUTO: 0.42 X10*3/UL (ref 0.1–1)
MONOCYTES NFR BLD AUTO: 6.9 %
NEUTROPHILS # BLD AUTO: 4.89 X10*3/UL (ref 1.2–7.7)
NEUTROPHILS NFR BLD AUTO: 80.8 %
NRBC BLD-RTO: 0 /100 WBCS (ref 0–0)
OXYHGB MFR BLDV: 72.8 % (ref 45–75)
PCO2 BLDV: 46 MM HG (ref 41–51)
PH BLDV: 7.47 PH (ref 7.33–7.43)
PHOSPHATE SERPL-MCNC: 3.6 MG/DL (ref 2.5–4.9)
PLATELET # BLD AUTO: 265 X10*3/UL (ref 150–450)
PO2 BLDV: 40 MM HG (ref 35–45)
POTASSIUM BLDV-SCNC: 4 MMOL/L (ref 3.5–5.3)
POTASSIUM SERPL-SCNC: 3.9 MMOL/L (ref 3.5–5.3)
PROT SERPL-MCNC: 5.2 G/DL (ref 6.4–8.2)
RBC # BLD AUTO: 2.85 X10*6/UL (ref 4–5.2)
RH FACTOR (ANTIGEN D): NORMAL
SAO2 % BLDV: 74 % (ref 45–75)
SODIUM BLDV-SCNC: 126 MMOL/L (ref 136–145)
SODIUM SERPL-SCNC: 128 MMOL/L (ref 136–145)
WBC # BLD AUTO: 6.1 X10*3/UL (ref 4.4–11.3)

## 2024-11-10 PROCEDURE — 99291 CRITICAL CARE FIRST HOUR: CPT

## 2024-11-10 PROCEDURE — 2500000004 HC RX 250 GENERAL PHARMACY W/ HCPCS (ALT 636 FOR OP/ED): Performed by: INTERNAL MEDICINE

## 2024-11-10 PROCEDURE — 2500000001 HC RX 250 WO HCPCS SELF ADMINISTERED DRUGS (ALT 637 FOR MEDICARE OP)

## 2024-11-10 PROCEDURE — 82947 ASSAY GLUCOSE BLOOD QUANT: CPT

## 2024-11-10 PROCEDURE — 2020000001 HC ICU ROOM DAILY

## 2024-11-10 PROCEDURE — 94003 VENT MGMT INPAT SUBQ DAY: CPT

## 2024-11-10 PROCEDURE — 83735 ASSAY OF MAGNESIUM: CPT

## 2024-11-10 PROCEDURE — 97530 THERAPEUTIC ACTIVITIES: CPT | Mod: GP

## 2024-11-10 PROCEDURE — 2500000002 HC RX 250 W HCPCS SELF ADMINISTERED DRUGS (ALT 637 FOR MEDICARE OP, ALT 636 FOR OP/ED)

## 2024-11-10 PROCEDURE — 71045 X-RAY EXAM CHEST 1 VIEW: CPT

## 2024-11-10 PROCEDURE — 84100 ASSAY OF PHOSPHORUS: CPT

## 2024-11-10 PROCEDURE — 2500000004 HC RX 250 GENERAL PHARMACY W/ HCPCS (ALT 636 FOR OP/ED)

## 2024-11-10 PROCEDURE — 71045 X-RAY EXAM CHEST 1 VIEW: CPT | Performed by: RADIOLOGY

## 2024-11-10 PROCEDURE — 2500000004 HC RX 250 GENERAL PHARMACY W/ HCPCS (ALT 636 FOR OP/ED): Mod: JZ

## 2024-11-10 PROCEDURE — 2500000005 HC RX 250 GENERAL PHARMACY W/O HCPCS: Performed by: SURGERY

## 2024-11-10 PROCEDURE — 37799 UNLISTED PX VASCULAR SURGERY: CPT

## 2024-11-10 PROCEDURE — 84132 ASSAY OF SERUM POTASSIUM: CPT

## 2024-11-10 PROCEDURE — 85025 COMPLETE CBC W/AUTO DIFF WBC: CPT

## 2024-11-10 PROCEDURE — 97110 THERAPEUTIC EXERCISES: CPT | Mod: GP

## 2024-11-10 PROCEDURE — 85730 THROMBOPLASTIN TIME PARTIAL: CPT

## 2024-11-10 PROCEDURE — 84075 ASSAY ALKALINE PHOSPHATASE: CPT

## 2024-11-10 PROCEDURE — 86901 BLOOD TYPING SEROLOGIC RH(D): CPT

## 2024-11-10 PROCEDURE — 9420000001 HC RT PATIENT EDUCATION 5 MIN

## 2024-11-10 PROCEDURE — 2500000005 HC RX 250 GENERAL PHARMACY W/O HCPCS

## 2024-11-10 PROCEDURE — 94640 AIRWAY INHALATION TREATMENT: CPT

## 2024-11-10 PROCEDURE — 94668 MNPJ CHEST WALL SBSQ: CPT

## 2024-11-10 RX ORDER — CALCIUM GLUCONATE 20 MG/ML
2 INJECTION, SOLUTION INTRAVENOUS ONCE
Status: COMPLETED | OUTPATIENT
Start: 2024-11-10 | End: 2024-11-10

## 2024-11-10 RX ORDER — LANOLIN ALCOHOL/MO/W.PET/CERES
400 CREAM (GRAM) TOPICAL 2 TIMES DAILY
Status: COMPLETED | OUTPATIENT
Start: 2024-11-10 | End: 2024-11-10

## 2024-11-10 RX ORDER — FUROSEMIDE 10 MG/ML
60 INJECTION INTRAMUSCULAR; INTRAVENOUS ONCE
Status: COMPLETED | OUTPATIENT
Start: 2024-11-10 | End: 2024-11-10

## 2024-11-10 ASSESSMENT — PAIN SCALES - GENERAL
PAINLEVEL_OUTOF10: 0 - NO PAIN
PAINLEVEL_OUTOF10: 7
PAINLEVEL_OUTOF10: 2
PAINLEVEL_OUTOF10: 0 - NO PAIN
PAINLEVEL_OUTOF10: 5 - MODERATE PAIN
PAINLEVEL_OUTOF10: 0 - NO PAIN
PAINLEVEL_OUTOF10: 0 - NO PAIN

## 2024-11-10 ASSESSMENT — COGNITIVE AND FUNCTIONAL STATUS - GENERAL
TURNING FROM BACK TO SIDE WHILE IN FLAT BAD: TOTAL
WALKING IN HOSPITAL ROOM: TOTAL
STANDING UP FROM CHAIR USING ARMS: TOTAL
MOBILITY SCORE: 6
MOVING FROM LYING ON BACK TO SITTING ON SIDE OF FLAT BED WITH BEDRAILS: TOTAL
MOVING TO AND FROM BED TO CHAIR: TOTAL
CLIMB 3 TO 5 STEPS WITH RAILING: TOTAL

## 2024-11-10 ASSESSMENT — PAIN - FUNCTIONAL ASSESSMENT
PAIN_FUNCTIONAL_ASSESSMENT: FLACC (FACE, LEGS, ACTIVITY, CRY, CONSOLABILITY)
PAIN_FUNCTIONAL_ASSESSMENT: 0-10
PAIN_FUNCTIONAL_ASSESSMENT: CPOT (CRITICAL CARE PAIN OBSERVATION TOOL)

## 2024-11-10 ASSESSMENT — PAIN DESCRIPTION - DESCRIPTORS
DESCRIPTORS: PATIENT UNABLE TO DESCRIBE
DESCRIPTORS: SHARP

## 2024-11-10 NOTE — PROGRESS NOTES
"Narda Malloy is a 69 y.o. female on day 29 of admission presenting with Unresponsive.    Subjective   The patient is seen for acute kidney injury which is secondary to acute tubular necrosis patient was given 1 dose of IV furosemide yesterday some affect he diuresed about 500 mL of urine after that she is awake and responsive on the vent through a tracheostomy       Objective     Physical Exam  Constitutional:       Comments: On the vent through a tracheostomy   Neck:      Vascular: No carotid bruit.   Cardiovascular:      Rate and Rhythm: Normal rate and regular rhythm.      Heart sounds: No murmur heard.     No friction rub. No gallop.   Pulmonary:      Breath sounds: No wheezing, rhonchi or rales.   Chest:      Chest wall: No tenderness.   Abdominal:      General: There is no distension.      Tenderness: There is no abdominal tenderness. There is no guarding or rebound.   Musculoskeletal:         General: No swelling or tenderness.      Cervical back: Neck supple.      Right lower leg: Edema present.      Left lower leg: Edema present.   Lymphadenopathy:      Cervical: No cervical adenopathy.         Last Recorded Vitals  Blood pressure 126/76, pulse 81, temperature 36.8 °C (98.2 °F), temperature source Axillary, resp. rate 24, height 1.778 m (5' 10\"), weight 112 kg (247 lb 12.8 oz), SpO2 97%.    Intake/Output last 3 Shifts:  I/O last 3 completed shifts:  In: 2111.8 (18.8 mL/kg) [I.V.:621.8 (5.5 mL/kg); NG/GT:1240; IV Piggyback:250]  Out: 1530 (13.6 mL/kg) [Urine:830 (0.2 mL/kg/hr); Stool:700]  Weight: 112.4 kg     Current Facility-Administered Medications:     acetaminophen (Tylenol) oral liquid 650 mg, 650 mg, oral, q4h PRN, LEONEL Salgado-CNP    albuterol 2.5 mg /3 mL (0.083 %) nebulizer solution 3 mL, 3 mL, nebulization, 4x daily, Kaleb Lam PA-C, 3 mL at 11/10/24 1120    alteplase (Cathflo Activase) injection 2 mg, 2 mg, intra-catheter, PRN, Kaleb Lam PA-C    brimonidine (AlphaGAN) 0.2 % " ophthalmic solution 1 drop, 1 drop, Both Eyes, BID, Kaleb Lam PA-C, 1 drop at 11/10/24 0843    calcium carbonate-vitamin D3 500 mg-5 mcg (200 unit) per tablet 1 tablet, 1 tablet, oral, Daily, Kaleb Lam PA-C, 1 tablet at 11/10/24 0842    cefTRIAXone (Rocephin) 2 g in dextrose (iso) IV 50 mL, 2 g, intravenous, q24h, Kaleb Lam PA-C, Stopped at 11/10/24 1046    collagenase 250 unit/gram ointment, , Topical, Daily, Sabino Matos, APRN-CNP, Given at 11/10/24 1013    DAPTOmycin (Cubicin) 700 mg in sodium chloride 0.9%  mL, 700 mg, intravenous, q48h, Kaleb Lam PA-C, Stopped at 11/09/24 1010    dextrose 50 % injection 12.5 g, 12.5 g, intravenous, q15 min PRN, Kaleb Lam PA-C    dextrose 50 % injection 25 g, 25 g, intravenous, q15 min PRN, Kaleb Lam PA-C    ezetimibe (Zetia) tablet 10 mg, 10 mg, oral, Daily, Kaleb Lam PA-C, 10 mg at 11/10/24 0842    ferrous sulfate syrup 60 mg of iron, 60 mg of iron, oral, Daily, Kaleb Lam PA-C, 60 mg of iron at 11/10/24 0843    folic acid (Folvite) tablet 1 mg, 1 mg, oral, Daily, Kaleb Lam PA-C, 1 mg at 11/10/24 0842    glucagon (Glucagen) injection 1 mg, 1 mg, intramuscular, q15 min PRN, Kaleb Lam PA-C    glucagon (Glucagen) injection 1 mg, 1 mg, intramuscular, q15 min PRN, Kaleb Lam PA-C    heparin (porcine) injection 3,000-6,000 Units, 3,000-6,000 Units, intravenous, PRN, Kaleb Lam PA-C    heparin 25,000 Units in dextrose 5% 250 mL (100 Units/mL) infusion (premix), 0-4,500 Units/hr, intravenous, Continuous, Kaleb Lam PA-C, Stopped at 11/10/24 0625    heparin flush 10 unit/mL syringe 50 Units, 50 Units, intra-catheter, PRN, Kaleb Lam PA-C, 50 Units at 10/19/24 2313    honey (Medihoney) topical gel, , Topical, Daily, Kaleb Lam PA-C, Given at 11/10/24 1013    HYDROmorphone (Dilaudid) injection 0.4 mg, 0.4 mg, intravenous, q2h PRN, Kaleb Lam PA-C, 0.4 mg at 11/10/24 1008     insulin lispro (HumaLOG) injection 0-15 Units, 0-15 Units, subcutaneous, q4h, Kaleb Lam PA-C, 6 Units at 11/10/24 0910    latanoprost (Xalatan) 0.005 % ophthalmic solution 1 drop, 1 drop, Both Eyes, Nightly, Kaleb Lam PA-C, 1 drop at 11/09/24 2114    loperamide (Imodium) capsule 2 mg, 2 mg, oral, 4x daily PRN, RUTH Salgado, 2 mg at 11/08/24 1648    magnesium oxide (Mag-Ox) tablet 400 mg, 400 mg, oral, BID, Tommy Amezcua PA-C, 400 mg at 11/10/24 0842    midodrine (Proamatine) tablet 10 mg, 10 mg, oral, q8h, Kaleb Lam PA-C, 10 mg at 11/10/24 0842    oxyCODONE (Roxicodone) solution 5 mg, 5 mg, oral, q4h PRN, Kaleb Lam PA-C, 5 mg at 11/10/24 0642    oxyCODONE (Roxicodone) solution 7.5 mg, 7.5 mg, oral, q4h PRN, Kaleb Lam PA-C    oxygen (O2) therapy, , inhalation, Continuous - Inhalation, Anna Marie Shook MD, 30 percent at 11/10/24 0704    pantoprazole (ProtoNix) EC tablet 40 mg, 40 mg, oral, Daily before breakfast **OR** pantoprazole (ProtoNix) injection 40 mg, 40 mg, intravenous, Daily before breakfast, Kaleb Lam PA-C, 40 mg at 11/10/24 0914    polyethylene glycol (Glycolax, Miralax) packet 17 g, 17 g, oral, Daily PRN, Kaleb Lam PA-C    potassium, sodium phosphates (Phos-NaK) 280-160-250 mg packet 1 packet, 1 packet, oral, With meals & nightly, Kaleb Lam PA-C, 1 packet at 11/10/24 0842    primidone (Mysoline) tablet 125 mg, 125 mg, oral, Nightly, Kaleb Lam PA-C, 125 mg at 11/09/24 2114    [Held by provider] propranolol LA (Inderal LA) 24 hr capsule 60 mg, 60 mg, oral, Daily, Kaleb Lam PA-C, 60 mg at 10/19/24 0643    sennosides-docusate sodium (Lucia-Colace) 8.6-50 mg per tablet 1 tablet, 1 tablet, oral, Nightly PRN, Kaleb Lam PA-C    sodium chloride 3 % nebulizer solution 3 mL, 3 mL, nebulization, 4x daily, Kaleb Lam PA-C, 3 mL at 11/10/24 1120   Relevant Results    Results for orders placed or performed during the hospital  encounter of 10/12/24 (from the past 96 hours)   Procalcitonin   Result Value Ref Range    Procalcitonin 0.42 (H) <=0.07 ng/mL   POCT GLUCOSE   Result Value Ref Range    POCT Glucose 146 (H) 74 - 99 mg/dL   POCT GLUCOSE   Result Value Ref Range    POCT Glucose 159 (H) 74 - 99 mg/dL   POCT GLUCOSE   Result Value Ref Range    POCT Glucose 183 (H) 74 - 99 mg/dL   POCT GLUCOSE   Result Value Ref Range    POCT Glucose 167 (H) 74 - 99 mg/dL   CBC and Auto Differential   Result Value Ref Range    WBC 7.3 4.4 - 11.3 x10*3/uL    nRBC 0.0 0.0 - 0.0 /100 WBCs    RBC 2.40 (L) 4.00 - 5.20 x10*6/uL    Hemoglobin 7.4 (L) 12.0 - 16.0 g/dL    Hematocrit 23.2 (L) 36.0 - 46.0 %    MCV 97 80 - 100 fL    MCH 30.8 26.0 - 34.0 pg    MCHC 31.9 (L) 32.0 - 36.0 g/dL    RDW 20.0 (H) 11.5 - 14.5 %    Platelets 249 150 - 450 x10*3/uL    Neutrophils % 81.5 40.0 - 80.0 %    Immature Granulocytes %, Automated 0.5 0.0 - 0.9 %    Lymphocytes % 11.6 13.0 - 44.0 %    Monocytes % 5.2 2.0 - 10.0 %    Eosinophils % 0.7 0.0 - 6.0 %    Basophils % 0.5 0.0 - 2.0 %    Neutrophils Absolute 5.98 1.20 - 7.70 x10*3/uL    Immature Granulocytes Absolute, Automated 0.04 0.00 - 0.70 x10*3/uL    Lymphocytes Absolute 0.85 (L) 1.20 - 4.80 x10*3/uL    Monocytes Absolute 0.38 0.10 - 1.00 x10*3/uL    Eosinophils Absolute 0.05 0.00 - 0.70 x10*3/uL    Basophils Absolute 0.04 0.00 - 0.10 x10*3/uL   Hepatic function panel   Result Value Ref Range    Albumin 2.3 (L) 3.4 - 5.0 g/dL    Bilirubin, Total 0.3 0.0 - 1.2 mg/dL    Bilirubin, Direct 0.1 0.0 - 0.3 mg/dL    Alkaline Phosphatase 116 33 - 136 U/L    ALT 9 7 - 45 U/L    AST 11 9 - 39 U/L    Total Protein 5.4 (L) 6.4 - 8.2 g/dL   Magnesium   Result Value Ref Range    Magnesium 1.97 1.60 - 2.40 mg/dL   Blood Gas Venous Full Panel   Result Value Ref Range    POCT pH, Venous 7.47 (H) 7.33 - 7.43 pH    POCT pCO2, Venous 44 41 - 51 mm Hg    POCT pO2, Venous 42 35 - 45 mm Hg    POCT SO2, Venous 78 (H) 45 - 75 %    POCT Oxy  Hemoglobin, Venous 76.8 (H) 45.0 - 75.0 %    POCT Hematocrit Calculated, Venous 23.0 (L) 36.0 - 46.0 %    POCT Sodium, Venous 134 (L) 136 - 145 mmol/L    POCT Potassium, Venous 3.8 3.5 - 5.3 mmol/L    POCT Chloride, Venous 101 98 - 107 mmol/L    POCT Ionized Calicum, Venous 1.15 1.10 - 1.33 mmol/L    POCT Glucose, Venous 92 74 - 99 mg/dL    POCT Lactate, Venous 1.0 0.4 - 2.0 mmol/L    POCT Base Excess, Venous 7.6 (H) -2.0 - 3.0 mmol/L    POCT HCO3 Calculated, Venous 32.0 (H) 22.0 - 26.0 mmol/L    POCT Hemoglobin, Venous 7.6 (L) 12.0 - 16.0 g/dL    POCT Anion Gap, Venous 5.0 (L) 10.0 - 25.0 mmol/L    Patient Temperature 37.0 degrees Celsius    FiO2 40 %   Phosphorus   Result Value Ref Range    Phosphorus 2.3 (L) 2.5 - 4.9 mg/dL   Basic Metabolic Panel   Result Value Ref Range    Glucose 95 74 - 99 mg/dL    Sodium 136 136 - 145 mmol/L    Potassium 3.7 3.5 - 5.3 mmol/L    Chloride 99 98 - 107 mmol/L    Bicarbonate 29 21 - 32 mmol/L    Anion Gap 12 10 - 20 mmol/L    Urea Nitrogen 17 6 - 23 mg/dL    Creatinine 1.34 (H) 0.50 - 1.05 mg/dL    eGFR 43 (L) >60 mL/min/1.73m*2    Calcium 7.9 (L) 8.6 - 10.3 mg/dL   POCT GLUCOSE   Result Value Ref Range    POCT Glucose 112 (H) 74 - 99 mg/dL   POCT GLUCOSE   Result Value Ref Range    POCT Glucose 111 (H) 74 - 99 mg/dL   Prepare RBC: 1 Units   Result Value Ref Range    PRODUCT CODE [id number]     Unit Number [id number]     Unit ABO O     Unit RH NEG     XM INTEP COMP     Dispense Status TR     Blood Expiration Date 11/12/2024 11:59:00 PM EST     PRODUCT BLOOD TYPE 9500     UNIT VOLUME 350    Type and screen   Result Value Ref Range    ABO TYPE A     Rh TYPE NEG     ANTIBODY SCREEN NEG    POCT GLUCOSE   Result Value Ref Range    POCT Glucose 132 (H) 74 - 99 mg/dL   C. difficile, PCR    Specimen: Stool   Result Value Ref Range    C. difficile, PCR Not Detected Not Detected   Hemoglobin and hematocrit, blood   Result Value Ref Range    Hemoglobin 9.4 (L) 12.0 - 16.0 g/dL     Hematocrit 29.0 (L) 36.0 - 46.0 %   POCT GLUCOSE   Result Value Ref Range    POCT Glucose 149 (H) 74 - 99 mg/dL   aPTT   Result Value Ref Range    aPTT 22.6 22.0 - 32.5 seconds   POCT GLUCOSE   Result Value Ref Range    POCT Glucose 187 (H) 74 - 99 mg/dL   POCT GLUCOSE   Result Value Ref Range    POCT Glucose 179 (H) 74 - 99 mg/dL   CBC and Auto Differential   Result Value Ref Range    WBC 9.8 4.4 - 11.3 x10*3/uL    nRBC 0.0 0.0 - 0.0 /100 WBCs    RBC 2.91 (L) 4.00 - 5.20 x10*6/uL    Hemoglobin 8.8 (L) 12.0 - 16.0 g/dL    Hematocrit 26.4 (L) 36.0 - 46.0 %    MCV 91 80 - 100 fL    MCH 30.2 26.0 - 34.0 pg    MCHC 33.3 32.0 - 36.0 g/dL    RDW 21.4 (H) 11.5 - 14.5 %    Platelets 240 150 - 450 x10*3/uL    Neutrophils % 84.5 40.0 - 80.0 %    Immature Granulocytes %, Automated 1.0 (H) 0.0 - 0.9 %    Lymphocytes % 8.0 13.0 - 44.0 %    Monocytes % 5.9 2.0 - 10.0 %    Eosinophils % 0.2 0.0 - 6.0 %    Basophils % 0.4 0.0 - 2.0 %    Neutrophils Absolute 8.30 (H) 1.20 - 7.70 x10*3/uL    Immature Granulocytes Absolute, Automated 0.10 0.00 - 0.70 x10*3/uL    Lymphocytes Absolute 0.79 (L) 1.20 - 4.80 x10*3/uL    Monocytes Absolute 0.58 0.10 - 1.00 x10*3/uL    Eosinophils Absolute 0.02 0.00 - 0.70 x10*3/uL    Basophils Absolute 0.04 0.00 - 0.10 x10*3/uL   Hepatic function panel   Result Value Ref Range    Albumin 2.4 (L) 3.4 - 5.0 g/dL    Bilirubin, Total 0.4 0.0 - 1.2 mg/dL    Bilirubin, Direct 0.1 0.0 - 0.3 mg/dL    Alkaline Phosphatase 119 33 - 136 U/L    ALT 10 7 - 45 U/L    AST 11 9 - 39 U/L    Total Protein 5.6 (L) 6.4 - 8.2 g/dL   Magnesium   Result Value Ref Range    Magnesium 1.96 1.60 - 2.40 mg/dL   Blood Gas Venous Full Panel   Result Value Ref Range    POCT pH, Venous 7.48 (H) 7.33 - 7.43 pH    POCT pCO2, Venous 39 (L) 41 - 51 mm Hg    POCT pO2, Venous 110 (H) 35 - 45 mm Hg    POCT SO2, Venous 99 (H) 45 - 75 %    POCT Oxy Hemoglobin, Venous 96.9 (H) 45.0 - 75.0 %    POCT Hematocrit Calculated, Venous 28.0 (L) 36.0 - 46.0  %    POCT Sodium, Venous 131 (L) 136 - 145 mmol/L    POCT Potassium, Venous 3.9 3.5 - 5.3 mmol/L    POCT Chloride, Venous 98 98 - 107 mmol/L    POCT Ionized Calicum, Venous 1.12 1.10 - 1.33 mmol/L    POCT Glucose, Venous 143 (H) 74 - 99 mg/dL    POCT Lactate, Venous 1.5 0.4 - 2.0 mmol/L    POCT Base Excess, Venous 5.1 (H) -2.0 - 3.0 mmol/L    POCT HCO3 Calculated, Venous 29.0 (H) 22.0 - 26.0 mmol/L    POCT Hemoglobin, Venous 9.2 (L) 12.0 - 16.0 g/dL    POCT Anion Gap, Venous 8.0 (L) 10.0 - 25.0 mmol/L    Patient Temperature 37.0 degrees Celsius    FiO2 100 %   Phosphorus   Result Value Ref Range    Phosphorus 3.8 2.5 - 4.9 mg/dL   Basic Metabolic Panel   Result Value Ref Range    Glucose 148 (H) 74 - 99 mg/dL    Sodium 134 (L) 136 - 145 mmol/L    Potassium 3.8 3.5 - 5.3 mmol/L    Chloride 98 98 - 107 mmol/L    Bicarbonate 28 21 - 32 mmol/L    Anion Gap 12 10 - 20 mmol/L    Urea Nitrogen 30 (H) 6 - 23 mg/dL    Creatinine 1.77 (H) 0.50 - 1.05 mg/dL    eGFR 31 (L) >60 mL/min/1.73m*2    Calcium 7.9 (L) 8.6 - 10.3 mg/dL   aPTT   Result Value Ref Range    aPTT 55.3 (H) 22.0 - 32.5 seconds   Morphology   Result Value Ref Range    RBC Morphology See Below     Ovalocytes Few     [place] Cells Few    POCT GLUCOSE   Result Value Ref Range    POCT Glucose 155 (H) 74 - 99 mg/dL   POCT GLUCOSE   Result Value Ref Range    POCT Glucose 156 (H) 74 - 99 mg/dL   POCT GLUCOSE   Result Value Ref Range    POCT Glucose 141 (H) 74 - 99 mg/dL   POCT GLUCOSE   Result Value Ref Range    POCT Glucose 168 (H) 74 - 99 mg/dL   POCT GLUCOSE   Result Value Ref Range    POCT Glucose 141 (H) 74 - 99 mg/dL   POCT GLUCOSE   Result Value Ref Range    POCT Glucose 175 (H) 74 - 99 mg/dL   POCT GLUCOSE   Result Value Ref Range    POCT Glucose 177 (H) 74 - 99 mg/dL   CBC and Auto Differential   Result Value Ref Range    WBC 7.0 4.4 - 11.3 x10*3/uL    nRBC 0.0 0.0 - 0.0 /100 WBCs    RBC 2.97 (L) 4.00 - 5.20 x10*6/uL    Hemoglobin 9.0 (L) 12.0 - 16.0 g/dL     Hematocrit 27.0 (L) 36.0 - 46.0 %    MCV 91 80 - 100 fL    MCH 30.3 26.0 - 34.0 pg    MCHC 33.3 32.0 - 36.0 g/dL    RDW 20.5 (H) 11.5 - 14.5 %    Platelets 259 150 - 450 x10*3/uL    Neutrophils % 79.9 40.0 - 80.0 %    Immature Granulocytes %, Automated 1.6 (H) 0.0 - 0.9 %    Lymphocytes % 11.6 13.0 - 44.0 %    Monocytes % 5.3 2.0 - 10.0 %    Eosinophils % 1.0 0.0 - 6.0 %    Basophils % 0.6 0.0 - 2.0 %    Neutrophils Absolute 5.59 1.20 - 7.70 x10*3/uL    Immature Granulocytes Absolute, Automated 0.11 0.00 - 0.70 x10*3/uL    Lymphocytes Absolute 0.81 (L) 1.20 - 4.80 x10*3/uL    Monocytes Absolute 0.37 0.10 - 1.00 x10*3/uL    Eosinophils Absolute 0.07 0.00 - 0.70 x10*3/uL    Basophils Absolute 0.04 0.00 - 0.10 x10*3/uL   Hepatic function panel   Result Value Ref Range    Albumin 2.4 (L) 3.4 - 5.0 g/dL    Bilirubin, Total 0.3 0.0 - 1.2 mg/dL    Bilirubin, Direct 0.0 0.0 - 0.3 mg/dL    Alkaline Phosphatase 116 33 - 136 U/L    ALT 9 7 - 45 U/L    AST 9 9 - 39 U/L    Total Protein 5.7 (L) 6.4 - 8.2 g/dL   Magnesium   Result Value Ref Range    Magnesium 1.88 1.60 - 2.40 mg/dL   Blood Gas Venous Full Panel   Result Value Ref Range    POCT pH, Venous 7.47 (H) 7.33 - 7.43 pH    POCT pCO2, Venous 42 41 - 51 mm Hg    POCT pO2, Venous 43 35 - 45 mm Hg    POCT SO2, Venous 79 (H) 45 - 75 %    POCT Oxy Hemoglobin, Venous 77.3 (H) 45.0 - 75.0 %    POCT Hematocrit Calculated, Venous 28.0 (L) 36.0 - 46.0 %    POCT Sodium, Venous 131 (L) 136 - 145 mmol/L    POCT Potassium, Venous 4.0 3.5 - 5.3 mmol/L    POCT Chloride, Venous 96 (L) 98 - 107 mmol/L    POCT Ionized Calicum, Venous 1.16 1.10 - 1.33 mmol/L    POCT Glucose, Venous 171 (H) 74 - 99 mg/dL    POCT Lactate, Venous 0.8 0.4 - 2.0 mmol/L    POCT Base Excess, Venous 6.3 (H) -2.0 - 3.0 mmol/L    POCT HCO3 Calculated, Venous 30.6 (H) 22.0 - 26.0 mmol/L    POCT Hemoglobin, Venous 9.4 (L) 12.0 - 16.0 g/dL    POCT Anion Gap, Venous 8.0 (L) 10.0 - 25.0 mmol/L    Patient Temperature 37.0  degrees Celsius    FiO2 30 %   Phosphorus   Result Value Ref Range    Phosphorus 2.9 2.5 - 4.9 mg/dL   Basic Metabolic Panel   Result Value Ref Range    Glucose 175 (H) 74 - 99 mg/dL    Sodium 132 (L) 136 - 145 mmol/L    Potassium 3.8 3.5 - 5.3 mmol/L    Chloride 96 (L) 98 - 107 mmol/L    Bicarbonate 29 21 - 32 mmol/L    Anion Gap 11 10 - 20 mmol/L    Urea Nitrogen 28 (H) 6 - 23 mg/dL    Creatinine 1.34 (H) 0.50 - 1.05 mg/dL    eGFR 43 (L) >60 mL/min/1.73m*2    Calcium 8.1 (L) 8.6 - 10.3 mg/dL   aPTT   Result Value Ref Range    aPTT 76.4 (H) 22.0 - 32.5 seconds   Morphology   Result Value Ref Range    RBC Morphology See Below     Ovalocytes Few     [place] Cells Few    [place] Top   Result Value Ref Range    Extra Tube Hold for add-ons.    PST Top   Result Value Ref Range    Extra Tube Hold for add-ons.    aPTT   Result Value Ref Range    aPTT 78.0 (H) 22.0 - 32.5 seconds   POCT GLUCOSE   Result Value Ref Range    POCT Glucose 193 (H) 74 - 99 mg/dL   POCT GLUCOSE   Result Value Ref Range    POCT Glucose 197 (H) 74 - 99 mg/dL   POCT GLUCOSE   Result Value Ref Range    POCT Glucose 174 (H) 74 - 99 mg/dL   aPTT   Result Value Ref Range    aPTT 51.3 (H) 22.0 - 32.5 seconds   POCT GLUCOSE   Result Value Ref Range    POCT Glucose 166 (H) 74 - 99 mg/dL   POCT GLUCOSE   Result Value Ref Range    POCT Glucose 167 (H) 74 - 99 mg/dL   POCT GLUCOSE   Result Value Ref Range    POCT Glucose 200 (H) 74 - 99 mg/dL   CBC and Auto Differential   Result Value Ref Range    WBC 6.1 4.4 - 11.3 x10*3/uL    nRBC 0.0 0.0 - 0.0 /100 WBCs    RBC 2.85 (L) 4.00 - 5.20 x10*6/uL    Hemoglobin 8.7 (L) 12.0 - 16.0 g/dL    Hematocrit 26.6 (L) 36.0 - 46.0 %    MCV 93 80 - 100 fL    MCH 30.5 26.0 - 34.0 pg    MCHC 32.7 32.0 - 36.0 g/dL    RDW 19.8 (H) 11.5 - 14.5 %    Platelets 265 150 - 450 x10*3/uL    Neutrophils % 80.8 40.0 - 80.0 %    Immature Granulocytes %, Automated 1.0 (H) 0.0 - 0.9 %    Lymphocytes % 10.1 13.0 - 44.0 %    Monocytes % 6.9 2.0 -  10.0 %    Eosinophils % 0.7 0.0 - 6.0 %    Basophils % 0.5 0.0 - 2.0 %    Neutrophils Absolute 4.89 1.20 - 7.70 x10*3/uL    Immature Granulocytes Absolute, Automated 0.06 0.00 - 0.70 x10*3/uL    Lymphocytes Absolute 0.61 (L) 1.20 - 4.80 x10*3/uL    Monocytes Absolute 0.42 0.10 - 1.00 x10*3/uL    Eosinophils Absolute 0.04 0.00 - 0.70 x10*3/uL    Basophils Absolute 0.03 0.00 - 0.10 x10*3/uL   Hepatic function panel   Result Value Ref Range    Albumin 2.3 (L) 3.4 - 5.0 g/dL    Bilirubin, Total 0.3 0.0 - 1.2 mg/dL    Bilirubin, Direct 0.0 0.0 - 0.3 mg/dL    Alkaline Phosphatase 109 33 - 136 U/L    ALT 8 7 - 45 U/L    AST 8 (L) 9 - 39 U/L    Total Protein 5.2 (L) 6.4 - 8.2 g/dL   Magnesium   Result Value Ref Range    Magnesium 1.92 1.60 - 2.40 mg/dL   Type and screen   Result Value Ref Range    ABO TYPE A     Rh TYPE NEG     ANTIBODY SCREEN NEG    Phosphorus   Result Value Ref Range    Phosphorus 3.6 2.5 - 4.9 mg/dL   Basic Metabolic Panel   Result Value Ref Range    Glucose 360 (H) 74 - 99 mg/dL    Sodium 128 (L) 136 - 145 mmol/L    Potassium 3.9 3.5 - 5.3 mmol/L    Chloride 90 (L) 98 - 107 mmol/L    Bicarbonate 28 21 - 32 mmol/L    Anion Gap 14 10 - 20 mmol/L    Urea Nitrogen 35 (H) 6 - 23 mg/dL    Creatinine 1.59 (H) 0.50 - 1.05 mg/dL    eGFR 35 (L) >60 mL/min/1.73m*2    Calcium 7.6 (L) 8.6 - 10.3 mg/dL   Blood Gas Venous Full Panel   Result Value Ref Range    POCT pH, Venous 7.47 (H) 7.33 - 7.43 pH    POCT pCO2, Venous 46 41 - 51 mm Hg    POCT pO2, Venous 40 35 - 45 mm Hg    POCT SO2, Venous 74 45 - 75 %    POCT Oxy Hemoglobin, Venous 72.8 45.0 - 75.0 %    POCT Hematocrit Calculated, Venous 26.0 (L) 36.0 - 46.0 %    POCT Sodium, Venous 126 (L) 136 - 145 mmol/L    POCT Potassium, Venous 4.0 3.5 - 5.3 mmol/L    POCT Chloride, Venous 93 (L) 98 - 107 mmol/L    POCT Ionized Calicum, Venous 0.97 (L) 1.10 - 1.33 mmol/L    POCT Glucose, Venous 298 (H) 74 - 99 mg/dL    POCT Lactate, Venous 0.8 0.4 - 2.0 mmol/L    POCT Base  Excess, Venous 8.9 (H) -2.0 - 3.0 mmol/L    POCT HCO3 Calculated, Venous 33.5 (H) 22.0 - 26.0 mmol/L    POCT Hemoglobin, Venous 8.8 (L) 12.0 - 16.0 g/dL    POCT Anion Gap, Venous 4.0 (L) 10.0 - 25.0 mmol/L    Patient Temperature 37.0 degrees Celsius    FiO2 30 %   aPTT   Result Value Ref Range    aPTT >139.0 (HH) 22.0 - 32.5 seconds   aPTT   Result Value Ref Range    aPTT 40.0 (H) 22.0 - 32.5 seconds   POCT GLUCOSE   Result Value Ref Range    POCT Glucose 203 (H) 74 - 99 mg/dL     *Note: Due to a large number of results and/or encounters for the requested time period, some results have not been displayed. A complete set of results can be found in Results Review.       Assessment/Plan   Acute kidney injur now he is nonoliguric creatinine stable my plan is to continue IV diuretics hold off on dialysis hopefully we can get her off dialysis therapy  Hyponatremia secondary to dilutional hyponatremia with her acute kidney injury should benefit from diuresis  Edema much improved  Pneumonia continue with antibiotic therapy infectious disease  Diabetes mellitus type 2  Malnutrition continue with tube feeding  Lower back surgery site infection  Anemia transfuse when hemoglobin less than 7  Acute respiratory failure plan to continue the ventilator through a tracheostomy         Imer Cheung MD

## 2024-11-10 NOTE — PROGRESS NOTES
11/10/24 1051   Discharge Planning   Expected Discharge Disposition Long Term   Does the patient need discharge transport arranged? Yes   RoundTrip coordination needed? Yes     Precert remains pending to [facility], [facility].

## 2024-11-10 NOTE — CARE PLAN
0722: Pt placed in SBT on PSV 5/5 30%. , RR 16.    0740: RT returned to room for vent alarming. VT<250 for >5 minutes. Increased settings to PSV 8/5. VT now 300-400.    1120: Pt tolerated wean well. Returned original vent settings to rest.     1509: Pt placed in SBT on PSV 8/5 30%. Plan to wean for about 4 hours, then rest on original vent settings for the night.

## 2024-11-10 NOTE — PROGRESS NOTES
Physical Therapy    Physical Therapy Treatment    Patient Name: Narda Malloy  MRN: [id number]  Department: [facility] S ICU  Room: 11/11-A  Today's Date: 11/10/2024  Time Calculation  Start Time: 1220  Stop Time: 1245  Time Calculation (min): 25 min         Assessment/Plan   PT Assessment  PT Assessment Results: Decreased strength, Decreased range of motion, Decreased endurance, Impaired balance, Decreased mobility, Decreased coordination, Impaired judgement, Decreased cognition, Decreased safety awareness, Impaired tone, Impaired sensation, Decreased skin integrity, Orthopedic restrictions  Rehab Prognosis: Good  Barriers to Discharge: assist of 2 persons for mobility, extreme weakness  Evaluation/Treatment Tolerance: Patient limited by fatigue  Medical Staff Made Aware: Yes  Strengths: Premorbid level of function  Barriers to Participation: Comorbidities  End of Session Communication: Bedside nurse  Assessment Comment: The patient is progressing slowly towards functional goals. Pt participating in ther ex in supine; trace contraction to Lt ankle dorsiflexor with no other trace contractions noted BLE. Pt able to assist with BUE ther ex; declining bed mobility this session but agreeable to repositioning with max to depA. Pt would continue to benefit from skilled therapy services to address functional deficits.  End of Session Patient Position: Bed, 3 rail up, Alarm off, not on at start of session (nsg aware)  PT Plan  Inpatient/Swing Bed or Outpatient: Inpatient  PT Plan  Treatment/Interventions: Bed mobility, Transfer training, Balance training, Strengthening, Endurance training, Range of motion, Therapeutic exercise, Therapeutic activity  PT Plan: Ongoing PT  PT Frequency: 6 times per week  PT Discharge Recommendations: Moderate intensity level of continued care (LTAC)  Equipment Recommended upon Discharge: Wheelchair  PT Recommended Transfer Status: Total assist  PT - OK to Discharge: Yes (with skilled physical  therapy services at next level of care)      General Visit Information:   PT  Visit  PT Received On: 11/10/24  Response to Previous Treatment: Patient with no complaints from previous session.  General  Family/Caregiver Present: No  Prior to Session Communication: Bedside nurse  Patient Position Received: Bed, 3 rail up, Alarm off, not on at start of session  Preferred Learning Style: auditory, verbal  General Comment: Patient cleared for therapy follow-up by nsg. Pt presented supine in bed and agreeable to treatment.    Subjective   Precautions:  Precautions  Hearing/Visual Limitations: h/o visual deficits, mildly [place]  Medical Precautions: Fall precautions, Oxygen therapy device and L/min  Post-Surgical Precautions: Spinal precautions  Precautions Comment: vent via trach, PEG tube, + IVs, +FMS, + anders catheter, + telemetry    Vital Signs Comment: HR 71 bpm, SpO2 100%, 25 RR, /57 (73) mmHg     Objective   Pain:  Pain Assessment  Pain Assessment: FLACC (Face, Legs, Activity, Cry, Consolability)  0-10 (Numeric) Pain Score: 2  Pain Type: Chronic pain, Surgical pain  Pain Location: Back  Pain Interventions: Repositioned  Cognition:  Cognition  Overall Cognitive Status: Impaired  Arousal/Alertness: Delayed responses to stimuli  Orientation Level: Disoriented to time  Following Commands: Follows one step commands with increased time  Cognition Comments: pt attempting to mouth words; able to nod yes/no  Coordination:  Upper Body Coordination: significant coordination deficits present.  Coordination Comment: poor movement quality, + weakness, slight delay in response to requests  Postural Control:  Static Sitting Balance  Static Sitting-Balance Support: Bilateral upper extremity supported  Static Sitting-Level of Assistance: Maximum assistance, Dependent  Static Sitting-Comment/Number of Minutes: Long sitting for approx. 30 sec  Extremity/Trunk Assessments:  RLE   RLE :  (grossly 0/5)  LLE   LLE :  (grossly 0/5;  non-purposeful dorsiflexion; involuntary)  Activity Tolerance:  Activity Tolerance  Endurance: Decreased tolerance for upright activites  Activity Tolerance Comments: high level of weakness  Rate of Perceived Exertion (RPE): 6/10  Treatments:  Therapeutic Exercise  Therapeutic Exercise Performed: Yes  Therapeutic Exercise Activity 1: AAROM in all planes x 15 reps BLE  Therapeutic Exercise Activity 2: TENA elbow flexion x10 AAROM  Therapeutic Exercise Activity 3: TENA wrist flexion x10 AAROM  Therapeutic Exercise Activity 4: TENA hand flexion x10 AAROM    Therapeutic Activity  Therapeutic Activity Performed: Yes  Therapeutic Activity 1: Education provided on importance of bed mobility to maintain endurance and promote strengthening; pt refusing to participate in bed mobility other than repositioning this session.    Bed Mobility  Bed Mobility: Yes  Bed Mobility 1  Bed Mobility 1: Long sit  Level of Assistance 1: Maximum assistance, Dependent  Bed Mobility Comments 1: Assist with repositioning in supine; pt declining transfer to EOB    Outcome Measures:  [facility] Basic Mobility  Turning from your back to your side while in a flat bed without using bedrails: Total  Moving from lying on your back to sitting on the side of a flat bed without using bedrails: Total  Moving to and from bed to chair (including a wheelchair): Total  Standing up from a chair using your arms (e.g. wheelchair or bedside chair): Total  To walk in hospital room: Total  Climbing 3-5 steps with railing: Total  Basic Mobility - Total Score: 6    FSS-ICU  Ambulation: Unable to attempt due to weakness  Rolling: Total assistance (performs 25% or requires another person)  Sitting: Unable to perform  Transfer Sit-to-Stand: Unable to perform  Transfer Supine-to-Sit: Unable to perform  Total Score: 1    Education Documentation  Precautions, taught by Hali Yu PT at 11/10/2024  3:06 PM.  Learner: Patient  Readiness: Acceptance  Method: Explanation  Response:  Verbalizes Understanding, Needs Reinforcement  Comment: Education provided on importance of continued mobility during hospital stay to assist with endurance and strength.    Body Mechanics, taught by Hali Yu PT at 11/10/2024  3:06 PM.  Learner: Patient  Readiness: Acceptance  Method: Explanation  Response: Verbalizes Understanding, Needs Reinforcement  Comment: Education provided on importance of continued mobility during hospital stay to assist with endurance and strength.    Mobility Training, taught by Hali Yu PT at 11/10/2024  3:06 PM.  Learner: Patient  Readiness: Acceptance  Method: Explanation  Response: Verbalizes Understanding, Needs Reinforcement  Comment: Education provided on importance of continued mobility during hospital stay to assist with endurance and strength.    Education Comments  No comments found.      Encounter Problems       Encounter Problems (Active)       PT STG Problem       Patient will roll right and left with minimal assist to facilitate mobility. (Progressing)       Start:  10/28/24    Expected End:  11/25/24            Patient will transfer supine to sit and sit to supine with moderate assist to facilitate mobility. (Progressing)       Start:  10/28/24    Expected End:  11/25/24            Patient will transfer sit to stand and stand to sit with maximal assist to facilitate mobility. (Not Progressing)       Start:  10/28/24    Expected End:  11/25/24            Patient will transfer bed to chair and chair to bed with maximal assist to facilitate mobility. (Not Progressing)       Start:  10/28/24    Expected End:  11/25/24            Patient will increase strength in B LEs by half grade throughout to improve functional mobility.  (Progressing)       Start:  10/28/24    Expected End:  11/25/24            Patient will tolerate 15 minutes of sitting on EOB to improve ability to perform functional transfers. (Not Progressing)       Start:  10/28/24    Expected End:   11/25/24

## 2024-11-10 NOTE — CARE PLAN
The patient's goals for the shift include unable to assess    The clinical goals for the shift include Patient will remain hemodynamically stable     Problem: Safety - Adult  Goal: Free from fall injury  Outcome: Progressing  Flowsheets (Taken 11/10/2024 0536)  Free from fall injury: Instruct family/caregiver on patient safety     Problem: Discharge Planning  Goal: Discharge to home or other facility with appropriate resources  Outcome: Progressing  Flowsheets (Taken 11/10/2024 0536)  Discharge to home or other facility with appropriate resources:   Identify barriers to discharge with patient and caregiver   Arrange for needed discharge resources and transportation as appropriate   Identify discharge learning needs (meds, wound care, etc)   Arrange for interpreters to assist at discharge as needed   Refer to discharge planning if patient needs post-hospital services based on physician order or complex needs related to functional status, cognitive ability or social support system     Problem: Chronic Conditions and Co-morbidities  Goal: Patient's chronic conditions and co-morbidity symptoms are monitored and maintained or improved  Outcome: Progressing  Flowsheets (Taken 11/10/2024 0536)  Care Plan - Patient's Chronic Conditions and Co-Morbidity Symptoms are Monitored and Maintained or Improved:   Monitor and assess patient's chronic conditions and comorbid symptoms for stability, deterioration, or improvement   Collaborate with multidisciplinary team to address chronic and comorbid conditions and prevent exacerbation or deterioration   Update acute care plan with appropriate goals if chronic or comorbid symptoms are exacerbated and prevent overall improvement and discharge     Problem: Diabetes  Goal: Increase stability of blood glucose readings by end of shift  Outcome: Progressing  Flowsheets (Taken 11/10/2024 0536)  Increase stability of blood glucose readings by end of shift: Med administration/monitoring of  effect  Goal: Maintain electrolyte levels within acceptable range throughout shift  Outcome: Progressing  Flowsheets (Taken 11/10/2024 0536)  Maintain electrolyte levels within acceptable range throughout shift: Monitor urine output  Goal: Maintain glucose levels >70mg/dl to <250mg/dl throughout shift  Outcome: Progressing  Flowsheets (Taken 11/10/2024 0536)  Maintain glucose levels >70mg/dl to <250mg/dl throughout shift: Med administration/monitoring of effect  Goal: Learn about and adhere to nutrition recommendations by end of shift  Outcome: Progressing  Flowsheets (Taken 11/10/2024 0536)  Learn about and adhere to nutrition recommendations by end of shift: Ensure/encourage compliance with appropriate diet  Goal: Vital signs within normal range for age by end of shift  Outcome: Progressing  Flowsheets (Taken 11/10/2024 0536)  Vital signs within normal range for age by end of shift: Med administration/monitoring of effect  Goal: Increase self care and/or family involovement by end of shift  Outcome: Progressing  Flowsheets (Taken 11/10/2024 0536)  Increase self care and/or family involovement by end of shift: Self monitor blood glucose with staff oversight  Goal: Receive DSME education by end of shift  Outcome: Progressing  Flowsheets (Taken 11/10/2024 0536)  Receive DSME education by end of shift: Provide patient centered education on Diabetic Self Management Education     Problem: Knowledge Deficit  Goal: Patient/family/caregiver demonstrates understanding of disease process, treatment plan, medications, and discharge instructions  Outcome: Progressing  Flowsheets (Taken 11/8/2024 1946 by Erica Baxter RN)  Patient/family/caregiver demonstrates understanding of disease process, treatment plan, medications, and discharge instructions:   Complete learning assessment and assess knowledge base   Provide teaching at level of understanding   Provide teaching via preferred learning methods     Problem:  Skin  Goal: Decreased wound size/increased tissue granulation at next dressing change  Outcome: Progressing  Flowsheets (Taken 11/10/2024 0536)  Decreased wound size/increased tissue granulation at next dressing change:   Promote sleep for wound healing   Utilize specialty bed per algorithm   Protective dressings over bony prominences  Goal: Participates in plan/prevention/treatment measures  Outcome: Progressing  Flowsheets (Taken 11/10/2024 0536)  Participates in plan/prevention/treatment measures: Elevate heels  Goal: Prevent/manage excess moisture  Outcome: Progressing  Flowsheets (Taken 11/10/2024 0536)  Prevent/manage excess moisture:   Cleanse incontinence/protect with barrier cream   Moisturize dry skin   Use wicking fabric (obtain order)   Follow provider orders for dressing changes  Goal: Prevent/minimize sheer/friction injuries  Outcome: Progressing  Flowsheets (Taken 11/10/2024 0536)  Prevent/minimize sheer/friction injuries:   Complete micro-shifts as needed if patient unable. Adjust patient position to relieve pressure points, not a full turn   Increase activity/out of bed for meals   Use pull sheet   HOB 30 degrees or less   Turn/reposition every 2 hours/use positioning/transfer devices   Utilize specialty bed per algorithm  Goal: Promote/optimize nutrition  Outcome: Progressing  Flowsheets (Taken 11/10/2024 0536)  Promote/optimize nutrition: Monitor/record intake including meals  Goal: Promote skin healing  Outcome: Progressing  Flowsheets (Taken 11/10/2024 0536)  Promote skin healing:   Assess skin/pad under line(s)/device(s)   Protective dressings over bony prominences   Turn/reposition every 2 hours/use positioning/transfer devices   Ensure correct size (line/device) and apply per  instructions   Rotate device position/do not position patient on device     Problem: Nutrition  Goal: Consume prescribed supplement  Outcome: Progressing  Goal: Nutrition support goals are met within 48  hrs  Outcome: Progressing  Goal: Nutrition support is meeting 75% of nutrient needs  Outcome: Progressing  Goal: BG  mg/dL  Outcome: Progressing  Goal: Lab values WNL  Outcome: Progressing  Goal: Electrolytes WNL  Outcome: Progressing  Goal: Promote healing  Outcome: Progressing  Goal: Maintain stable weight  Outcome: Progressing  Goal: Reduce weight from edema/fluid  Outcome: Progressing     Problem: Respiratory  Goal: No signs of respiratory distress (eg. Use of accessory muscles. Peds grunting)  Outcome: Progressing  Goal: Clear secretions with interventions this shift  Outcome: Progressing  Goal: Minimize anxiety/maximize coping throughout shift  Outcome: Progressing  Goal: Minimal/no exertional discomfort or dyspnea this shift  Outcome: Progressing  Flowsheets (Taken 11/10/2024 0536)  Minimal/no exertional discomfort or dyspnea this shift: Positioning to promote ventilation/comfort  Goal: Patent airway maintained this shift  Outcome: Progressing  Flowsheets (Taken 11/10/2024 0536)  Patent airway maintained this shift: Maintain ET tube tube position  Goal: Tolerate mechanical ventilation evidenced by VS/agitation level this shift  Outcome: Progressing  Flowsheets (Taken 11/10/2024 0536)  Tolerate mechanical ventilation evidenced by VS/agitation level this shift: Mechanical ventilation  Goal: Tolerate pulmonary toileting this shift  Outcome: Progressing  Flowsheets (Taken 11/10/2024 0536)  Tolerate pulmonary toileting this shift: Positioning to promote ventilation/comfort  Goal: Verbalize decreased shortness of breath this shift  Outcome: Progressing  Flowsheets (Taken 11/10/2024 0536)  Verbalize decreased shortness of breath this shift: Encourage/provide pulmonary hygiene/secretion clearance  Goal: Wean oxygen to maintain O2 saturation per order/standard this shift  Outcome: Progressing  Flowsheets (Taken 11/10/2024 0536)  Wean oxygen to maintain O2 saturation per order/standard this shift: Encourage  activity/mobility  Goal: Increase self care and/or family involvement in next 24 hours  Outcome: Progressing  Flowsheets (Taken 11/10/2024 0536)  Increase self care and/or family involvement in next 24 hours: Encourage activity/mobility     Problem: Pain  Goal: Takes deep breaths with improved pain control throughout the shift  Outcome: Progressing  Goal: Turns in bed with improved pain control throughout the shift  Outcome: Progressing  Goal: Walks with improved pain control throughout the shift  Outcome: Progressing  Goal: Performs ADL's with improved pain control throughout shift  Outcome: Progressing  Goal: Participates in PT with improved pain control throughout the shift  Outcome: Progressing  Goal: Free from opioid side effects throughout the shift  Outcome: Progressing  Goal: Free from acute confusion related to pain meds throughout the shift  Outcome: Progressing     Problem: Fall/Injury  Goal: Not fall by end of shift  Outcome: Progressing  Goal: Be free from injury by end of the shift  Outcome: Progressing  Goal: Verbalize understanding of personal risk factors for fall in the hospital  Outcome: Progressing  Goal: Verbalize understanding of risk factor reduction measures to prevent injury from fall in the home  Outcome: Progressing  Goal: Use assistive devices by end of the shift  Outcome: Progressing  Goal: Pace activities to prevent fatigue by end of the shift  Outcome: Progressing     Problem: Mechanical Ventilation  Goal: Tracheostomy will be managed safely  Outcome: Progressing  Flowsheets (Taken 11/10/2024 0536)  Tracheostomy Will Be Managed Safely:   Utilize tracheostomy securing device and change as necessary to ensure tracheostomy is secured to patient   Support ventilator tubing to avoid pressure from drag of tubing   Keep ambu bag, mask, oxygen connection tubing, and extra tracheostomy at bedside and accompanying patient at all times   Utilize trach securing device   Cleanse site with hydrogen  peroxide   Protect skin with moisture barrier cream

## 2024-11-10 NOTE — PROGRESS NOTES
[facility] Critical Care Medicine       Date:  11/10/2024  Patient:  Narda Malloy  YOB: 1955  MRN:  [id number]   Admit Date:  10/12/2024      Chief Complaint   Patient presents with    Altered Mental Status         History of Present Illness:  Narda Malloy is a 69 y.o. year old female patient with Past Medical History of  L1-L3 lumbar laminectomy, T4-S1 revision, and fusion August 26th, T2DM, HTN, essential tremor, HLD, glaucoma, sarcoidosis of the lung who presented to  ED 10/12 after being found essentially unresponsive at her nursing facility [facility]. Per report from her , she has had a significant decline in her health since July 15th when she had a fall and became significantly weak. She has also had multiple infection complications since her back surgery in August requiring multiple I&Ds and long term antibiotic therapy. She went to the OR most recently on 9/28 for lumbar site infection wash out. Per chart review, she was discharged on IV vancomycin 1g and IV ceftriaxone 2d q24hrs through 11/12.     ED Course: Initial vital signs: /104 (109), HR 68, RR 20, SpO2 95% on 6L NC, temp 34.5C. Give 0.4mg of narcan with no improvement in mentation. Lab work-up remarkable for mild hyperkalemia (5.5), AMY 42/1.46, elevated alk phos, normocytic anemia 10.4/33, turbid appearing urine with mild hematuria and proteinuria and + leuk esterase, >50 WBCs. Urine drug screen positive for barbiturates. Triggered sepsis timer so she was given 3L NS. She was intubated for airway protection with 20mg etomidate and 100mg succinylcholine. BP dropped post-intubated and fluid resuscitation and she was subsequently started on levophed.            Interval ICU Events:  Patient presented from the ED to the ICU intubated, received hyperkalemia cocktail and had noticeably decreasing urine output.  Mentation greatly improved on 10/15 on SAT/SBT were conducted and were successful.  Patient was done  after extubated.  Patient's oxygen requirements significantly increased requiring high flow nasal cannula 40L 100%.  On 10/17 chest x-ray showed complete opacification of left hemithorax most likely pleural effusion.  Subsequently pigtail catheter was then placed with immediate drainage of 1.2 L with cytology pending.  Kidney function and urine output continue to decline with worsening hypoxia and hypercapnia and patient was started on BiPAP therapy.  Patient was given high-dose of furosemide and metolazone to stimulate urine output but were unsuccessful.  Temporary dialysis catheter placed awaiting start for CRRT per nephrology.  Patient participated in physical therapy and Occupational Therapy while intubated. Venous jugular duplex noticed large thrombus on tube subsequent exams patient was placed on heparin gtt. Patient again passed SBT and SAT with minimal requirements and was subsequently extubated on 10/31 patient again developed acute respiratory failure the patient was reintubated on 11/2.  On 11/3 family meeting was held to discuss patient's goals of care and treatment model and it was decided to continue DNR CCA, and workup of tracheostomy and PEG tube placement.  On 11/4 patient had PEG tube placed by general surgery.  On 11/7 patient had tracheostomy placed by ENT and sedation was weaned.  Patient continuing physical therapy and Occupational Therapy at this time with difficulties weaning from vent support trach collar. Currently excepted at [facility].       Medical History:  Past Medical History:   Diagnosis Date    Degenerative myopia, bilateral     Diabetic neuropathy (Multi)     Difficult intubation 08/26/2024    Mac 3, grade 3, 1 attempt.  Glidescope/videolaryngoscopy recommended for future attempts.    DM type 2 (diabetes mellitus, type 2) (Multi)     Dry eye syndrome of bilateral lacrimal glands     Essential hypertension     Essential tremor     Glaucoma     Hyperlipidemia     Long term  (current) use of insulin (Multi)     Low back pain     PONV (postoperative nausea and vomiting)     Primary open angle glaucoma of both eyes, severe stage     Repeated falls     Sarcoidosis of lung (Multi)     Spinal stenosis, lumbar region without neurogenic claudication     Weakness      Past Surgical History:   Procedure Laterality Date    BLEPHAROPLASTY  07/2022    BREAST SURGERY  05/20/2022    Breast lift    CARPAL TUNNEL RELEASE      CATARACT EXTRACTION W/  INTRAOCULAR LENS IMPLANT Bilateral     OD 08/04/2011 +8.5D,OS 08/04/2011 +8.50D    FOOT SURGERY      INSERTION / REMOVAL CRANIAL DBS GENERATOR      Placed 2017.  Removed 2018. part of wire left in head when everything removed    LUMBAR FUSION      L3-S1    PANRETINAL PHOTOCOAGULATION  2014    THORACIC FUSION  08/26/2024    T4-S1 fusion    VITRECTOMY Right 2013     Medications Prior to Admission   Medication Sig Dispense Refill Last Dose/Taking    acetaminophen (Tylenol) 500 mg tablet Take 2 tablets (1,000 mg) by mouth 3 times a day.   Unknown    ascorbic acid (Vitamin C) 500 mg tablet as directed Orally   Unknown    brimonidine (AlphaGAN) 0.2 % ophthalmic solution Administer 1 drop into both eyes 2 times a day.   Unknown    calcium carbonate-vitamin D3 500 mg-5 mcg (200 unit) tablet Take 1 tablet by mouth once daily.   Unknown    cefTRIAXone (Rocephin) 2 gram/50 mL IV Infuse 50 mL (2 g) at 100 mL/hr over 30 minutes into a venous catheter once every 24 hours. Once weekly labs CBC/diff, CMP, Vanc trough ESR, CRP fax to Dr. Juarez [phone]. Stop date 11/12/24. 1950 mL 0     dextrose 50 % injection Infuse 25 mL (12.5 g) into a venous catheter every 15 minutes if needed (For blood glucose 41 to 70 mg/dL).       dextrose 50 % injection Infuse 50 mL (25 g) into a venous catheter every 15 minutes if needed (For blood glucose less than or equal to 40 mg/dL).       docusate sodium (Colace) 100 mg capsule Take 1 capsule (100 mg) by mouth 2 times a day.   Unknown     ezetimibe (Zetia) 10 mg tablet Take 1 tablet (10 mg) by mouth once daily. 90 tablet 3 Unknown    FreeStyle Lite Strips strip USE TO TEST 3 TIMES A DAY AS DIRECTED 300 each 2     glucagon (Glucagen) 1 mg injection Inject 1 mg into the muscle every 15 minutes if needed for low blood sugar - see comments (Hypoglycemia).       glucagon (Glucagen) 1 mg injection Inject 1 mg into the muscle every 15 minutes if needed for low blood sugar - see comments (Hypoglycemia).       heparin sodium,porcine (heparin, porcine,) 5,000 unit/mL injection Inject 1 mL (5,000 Units) under the skin every 8 hours.       insulin lispro (HumaLOG) 100 unit/mL injection Inject 0-15 Units under the skin 3 times daily (morning, midday, late afternoon). Take as directed per insulin instructions.Do not hold when patient is not eating, continue order as scheduled for hyperglycemia management.  Insulin Lispro Corrective Scale #3     Hypoglycemia protocol Call LIP unit(s) if Blood Glucose is between 0 - 70 mg/dL     0 unit(s) if Blood glucose is between    3 unit(s) if Blood glucose is between 151-200   6 unit(s) if Blood glucose is between 201-250   9 unit(s) if Blood glucose is between 251-300   12 unit(s) if Blood glucose is between 301-350   15 unit(s) if Blood glucose is between 351-400    Notify provider unit(s) if Blood Glucose is greater than 400 mg/dL       Lactobacillus acidophilus 100 mg (1 billion cell) capsule Take 1 capsule by mouth 2 times a day.   Unknown    latanoprost (Xalatan) 0.005 % ophthalmic solution Administer 1 drop into both eyes once daily at bedtime. 2.5 mL 5 Unknown    melatonin 5 mg tablet Take 1 tablet (5 mg) by mouth as needed at bedtime for sleep.   Unknown    methocarbamol (Robaxin) 500 mg tablet Take 1 tablet (500 mg) by mouth 3 times a day.   Unknown    multivitamin tablet Take 1 tablet by mouth once daily.   Unknown    ondansetron (Zofran) 4 mg/2 mL injection Infuse 2 mL (4 mg) into a venous catheter every 6  "hours if needed for nausea or vomiting.       oxyCODONE (Roxicodone) 5 mg immediate release tablet Take 1 tablet (5 mg) by mouth every 6 hours if needed for severe pain (7 - 10) or moderate pain (4 - 6).       oxygen (O2) gas therapy Inhale 1 each continuously.       pantoprazole (ProtoNix) 40 mg EC tablet Take 1 tablet (40 mg) by mouth once daily in the morning. Take before meals. Do not crush, chew, or split.       pantoprazole (ProtoNix) 40 mg injection Infuse 40 mg into a venous catheter once daily in the morning. Take before meals. If unable to take PO.       pen needle, diabetic (PEN NEEDLE MISC) BD Altagracia- 4 mm X 32 G needle - as directed 4x a day sc 4 times per day       polyethylene glycol (Glycolax, Miralax) 17 gram packet Take 17 g by mouth once daily.   Unknown    primidone 125 mg tablet Take 125 mg by mouth 3 times a day.   Unknown    propranolol LA (Inderal LA) 60 mg 24 hr capsule Take 1 capsule (60 mg) by mouth early in the morning.. Hold for SBP < 110 mmhg, HR < 60 bpm.   Unknown    sennosides (Senokot) 8.6 mg tablet Take 1 tablet (8.6 mg) by mouth every 12 hours if needed for constipation.   Unknown    Sure Comfort Pen Needle 32 gauge x 5/32\" needle AS DIRECTED DAILY FOR 90 DAYS 100 each 11 Unknown    traZODone (Desyrel) 25 MG split tablet Take 1 half tablet (25 mg) by mouth once daily at bedtime.   Unknown    vancomycin (Vancocin) 1 gram/250 mL solution Infuse 250 mL (1 g) at 250 mL/hr over 60 minutes into a venous catheter every 12 hours. Once weekly labs CBC/diff, CMP, Vanc trough ESR, CRP fax to Dr. Juarez [phone]. Stop date 11/12/24. 13593 mL 0      Erythromycin, Morphine, and Rosuvastatin  Social History     Tobacco Use    Smoking status: Former     Types: Cigarettes     Passive exposure: Past    Smokeless tobacco: Never   Vaping Use    Vaping status: Never Used   Substance Use Topics    Alcohol use: Not Currently    Drug use: Not Currently     Family History   Problem Relation Name Age of " Onset    Multiple myeloma Mother      Cancer Mother      Other (CABG) Father      Pulmonary embolism Father      Heart disease Father      Breast cancer Sister          Stage II    Hypertension Sister      Diabetes Sister      No Known Problems Sister          x5    No Known Problems Brother          x4    No Known Problems Daughter         Hospital Medications:    heparin, 0-4,500 Units/hr, Last Rate: Stopped (11/10/24 0625)          Current Facility-Administered Medications:     acetaminophen (Tylenol) oral liquid 650 mg, 650 mg, oral, q4h PRN, RUTH Salgado    albuterol 2.5 mg /3 mL (0.083 %) nebulizer solution 3 mL, 3 mL, nebulization, 4x daily, Kaleb Lam PA-C, 3 mL at 11/10/24 0704    alteplase (Cathflo Activase) injection 2 mg, 2 mg, intra-catheter, PRN, Kaleb Lam PA-C    brimonidine (AlphaGAN) 0.2 % ophthalmic solution 1 drop, 1 drop, Both Eyes, BID, Kaleb Lam PA-C, 1 drop at 11/10/24 0843    calcium carbonate-vitamin D3 500 mg-5 mcg (200 unit) per tablet 1 tablet, 1 tablet, oral, Daily, Kaleb Lam PA-C, 1 tablet at 11/10/24 0842    cefTRIAXone (Rocephin) 2 g in dextrose (iso) IV 50 mL, 2 g, intravenous, q24h, Kaleb Lam PA-C, Stopped at 11/09/24 0854    collagenase 250 unit/gram ointment, , Topical, Daily, URTH Salgado, Given at 11/09/24 1110    DAPTOmycin (Cubicin) 700 mg in sodium chloride 0.9%  mL, 700 mg, intravenous, q48h, Kaleb Lam PA-C, Stopped at 11/09/24 1010    dextrose 50 % injection 12.5 g, 12.5 g, intravenous, q15 min PRN, Kaleb Lam PA-C    dextrose 50 % injection 25 g, 25 g, intravenous, q15 min PRN, Kaleb Lam PA-C    ezetimibe (Zetia) tablet 10 mg, 10 mg, oral, Daily, Kaleb Lam PA-C, 10 mg at 11/10/24 0842    ferrous sulfate syrup 60 mg of iron, 60 mg of iron, oral, Daily, Kaleb Lam PA-C, 60 mg of iron at 11/10/24 0843    folic acid (Folvite) tablet 1 mg, 1 mg, oral, Daily, Kaleb Lam PA-C, 1  mg at 11/10/24 0842    glucagon (Glucagen) injection 1 mg, 1 mg, intramuscular, q15 min PRN, Kaleb Lam PA-C    glucagon (Glucagen) injection 1 mg, 1 mg, intramuscular, q15 min PRN, Kaleb Lam PA-C    heparin (porcine) injection 3,000-6,000 Units, 3,000-6,000 Units, intravenous, PRN, Kaleb Lam PA-C    heparin 25,000 Units in dextrose 5% 250 mL (100 Units/mL) infusion (premix), 0-4,500 Units/hr, intravenous, Continuous, Kaleb Lam PA-C, Stopped at 11/10/24 0625    heparin flush 10 unit/mL syringe 50 Units, 50 Units, intra-catheter, PRN, Kaleb Lam PA-C, 50 Units at 10/19/24 2313    honey (Medihoney) topical gel, , Topical, Daily, Kaleb Lam PA-C, Given at 11/09/24 1110    HYDROmorphone (Dilaudid) injection 0.4 mg, 0.4 mg, intravenous, q2h PRN, Kaleb Lam PA-C, 0.4 mg at 11/09/24 1610    insulin lispro (HumaLOG) injection 0-15 Units, 0-15 Units, subcutaneous, q4h, Kaleb Lam PA-C, 3 Units at 11/10/24 0453    latanoprost (Xalatan) 0.005 % ophthalmic solution 1 drop, 1 drop, Both Eyes, Nightly, Kaleb Lam PA-C, 1 drop at 11/09/24 2114    loperamide (Imodium) capsule 2 mg, 2 mg, oral, 4x daily PRN, Sabino Matos, APRN-CNP, 2 mg at 11/08/24 1648    magnesium oxide (Mag-Ox) tablet 400 mg, 400 mg, oral, BID, Tommy Amezcua PA-C, 400 mg at 11/10/24 0842    midodrine (Proamatine) tablet 10 mg, 10 mg, oral, q8h, Kaleb Lam PA-C, 10 mg at 11/10/24 0842    oxyCODONE (Roxicodone) solution 5 mg, 5 mg, oral, q4h PRN, Kaleb Lam PA-C, 5 mg at 11/10/24 0642    oxyCODONE (Roxicodone) solution 7.5 mg, 7.5 mg, oral, q4h PRN, Kaleb Lam PA-C    oxygen (O2) therapy, , inhalation, Continuous - Inhalation, Anna Marie Shook MD, 30 percent at 11/10/24 0704    pantoprazole (ProtoNix) EC tablet 40 mg, 40 mg, oral, Daily before breakfast **OR** pantoprazole (ProtoNix) injection 40 mg, 40 mg, intravenous, Daily before breakfast, Kaleb Lam PA-C, 40 mg at 11/09/24  0652    polyethylene glycol (Glycolax, Miralax) packet 17 g, 17 g, oral, Daily PRN, Kaleb Lam PA-C    potassium, sodium phosphates (Phos-NaK) 280-160-250 mg packet 1 packet, 1 packet, oral, With meals & nightly, Kaleb Lam PA-C, 1 packet at 11/10/24 0842    primidone (Mysoline) tablet 125 mg, 125 mg, oral, Nightly, Kaleb Lam PA-C, 125 mg at 11/09/24 2114    [Held by provider] propranolol LA (Inderal LA) 24 hr capsule 60 mg, 60 mg, oral, Daily, Kaleb Lam PA-C, 60 mg at 10/19/24 0643    sennosides-docusate sodium (Lucia-Colace) 8.6-50 mg per tablet 1 tablet, 1 tablet, oral, Nightly PRN, Kaleb Lam PA-C    sodium chloride 3 % nebulizer solution 3 mL, 3 mL, nebulization, 4x daily, Kaleb Lam PA-C, 3 mL at 11/10/24 0704    Physical Exam:    Heart Rate:  [64-76]   Temp:  [36.4 °C (97.5 °F)-36.7 °C (98.1 °F)]   Resp:  [15-31]   BP: (109-136)/(53-89)   SpO2:  [96 %-100 %]     Physical Exam  Constitutional:       Interventions: She is intubated.   HENT:      Mouth/Throat:      Mouth: Mucous membranes are moist.      Pharynx: Oropharynx is clear.   Eyes:      Pupils: Pupils are equal, round, and reactive to light.   Cardiovascular:      Rate and Rhythm: Normal rate and regular rhythm.      Pulses: Normal pulses.   Pulmonary:      Effort: Pulmonary effort is normal. She is intubated.      Breath sounds: Examination of the left-middle field reveals decreased breath sounds. Examination of the left-lower field reveals decreased breath sounds. Decreased breath sounds present.   Abdominal:      General: Abdomen is flat.      Palpations: Abdomen is soft.   Musculoskeletal:         General: Normal range of motion.   Skin:     General: Skin is warm and dry.      Capillary Refill: Capillary refill takes less than 2 seconds.      Comments: Spinal incision with dressing overtop, sutures removed, minimal drainage   Neurological:      General: No focal deficit present.      Mental Status: She is alert  and oriented to person, place, and time. Mental status is at baseline.         Objective:    I have reviewed all medications, laboratory results, and imaging pertinent for today's encounter.    Vent Mode: Pressure support  FiO2 (%):  [30 %] 30 %  S RR:  [16] 16  S VT:  [450 mL] 450 mL  PEEP/CPAP (cm H2O):  [5 cm H20] 5 cm H20  [place] SUP:  [5 cm H20-8 cm H20] 8 cm H20  MAP (cm H2O):  [6-9.2] 6      Intake/Output Summary (Last 24 hours) at 11/10/2024 0854  Last data filed at 11/10/2024 0647  Gross per 24 hour   Intake 1298.77 ml   Output 1290 ml   Net 8.77 ml         Assessment/Plan:    I am currently managing this critically ill patient for the following problems:    Plan:  Neuro/Psych/Pain Ctrl/Sedation: (Hx: essential tremor)  Acute encephalopathy - likely 2/2 hypercapnia, & infection- resolving   CT head 10/12: Negative for acute findings   -Pain Control: liquid oxy, scheduled acetaminophen, dilaudid for dressing changes PRN  -Home primidone continued. Will hold propanolol d/t hypotension  -CAM ICU qshift, sleep-wake hygiene, delirium precautions     Respiratory/ENT:  Acute hypoxic/hypercapnic respiratory failure-likely multifactorial and 2/2 HCAP, pl effusions, volume overload  Healthcare-associated pneumonia   Recurrent atelectasis   Ventilator-dependence   Pleural effusion -s/p left-sided pigtail placement 10/17, removed 10/25  Trach placement 11/7  CXR 11/9 Stable bilateral infilitrates and effusions L>R  -Will wean O2 as tolerated to maintain SpO2 >92%  -SBT every a.m.  -Albuterol, hypertonic saline    -IPV per RT TID  -Aspiration precautions   -Trach care qshift     Cardiovascular:  (Hx: diastolic heart failure, HTN, HLD)  Septic shock-resolved  Occlusive superficial venous thrombus of the left cephalic vein  Non-occlusive DVT right IJ vein   Acute on chronic diastolic heart failure  US duplex 10/29 occlusive superficial vein thrombosis of left cephalic vein   Repeat duplex 11/4 nonocclusive thrombus to  "RIJ  TTE 11/2: EF 60-65%, mild/mod AV valve regurg  -Continue midodrine 10 q8  -Holding home propranolol (on for tremors)  -Continue home Zetia  -Continuous cardiac monitoring per ICU protocol  -EKGs PRN for ACS symptoms, arrhythmias  -Heparin drip resumed-transition to Eliquis closer to discharge      GI:  Hx GERD  Diarrhea  Peg-dependent-placed 11/4  Diet: Nepro @40ml/hr  BR: waqas-colace, miralax PRN  GI Prophylaxis: PPI  -C-Diff negative, started immodium     /Volume Status/Electrolytes:  Acute kidney injury-possibly ATN +/- medication-induced (vancomycin)  Anasarca   Hypoalbuminemia   Hyponatremia  -HD 11/9 with 2.5L removed, in consideration of HD break to test renal recovery  -Gibson place for accurate I&O's   -Nephrology consulted will appreciate recs  -Replete electrolytes to maintain K >4.0 and Mg >2.0  -Daily BMP, Mg, Phos     Heme/Onc: (Hx: normocytic anemia)  Occlusive LUE DVT  Non-occlusive R IJ DVT  -Heparin gtt for RIJ dvt, will need to convert when closer to discharge   Continue iron and folate  1 unit PRBC 11/7  -Monitor for s/sx of bleeding   -Plan to transfuse if Hgb <7.0   -Daily CBC  -T&S AM draw 11/10    Endocrine: (Hx: DMII)  SSI Q4  Hypoglycemia protocol PRN     Infectious Disease:  Pseudomonas-Respiratory culture 10/29  Thoracolumbar surgical site infection - s/p I&D x 2. Recent MRSA bacteremia on extended course of antibiotics; IV ceftriaxone, IV daptomycin  Healthcare-associated pneumonia   CT lumbar spine 10/12: Unable to r/o abscess. Unable to do MRI d/t spinal hardware, \"metal in head\" per patient  -Sputum culture 11/4 negative  -BC 11/3 negative  -Continue Daptomycin every 48 hours to 11/12 and after dialysis for surgical site infection  -Continue ceftriaxone until 11/12 for pneumonia  -Infectious Disease consult placed, will appreciate recs  -Monitor SIRS criteria     MSK:  PT/OT orders placed     Ethics/Code Status:  DNR-CCA      :  DVT Prophylaxis: SCDs heparin " gtt  GI Prophylaxis: PPI home med  Bowel Regimen: Lucia-colace and Miralax   Diet: Nepro at 40  CVC: PICC placed 10/24, permacath 11/5  Wanda: No  Gibson: yes 11/7  Restraints: no  Discharge planning: [facility]    Critical Care Time:  35 minutes spent in preparing to see patient (I.e. review of medical records), evaluation of diagnostics (I.e. labs, imaging, etc.), documentation, discussing plan of care with patient/ family/ caregiver, and/ or coordination of care with multidisciplinary team. Time does not include completion of procedure time.       Tommy Amezcua PA-C

## 2024-11-11 ENCOUNTER — APPOINTMENT (OUTPATIENT)
Dept: RADIOLOGY | Facility: HOSPITAL | Age: 69
End: 2024-11-11
Payer: MEDICARE

## 2024-11-11 LAB
ALBUMIN SERPL BCP-MCNC: 2.4 G/DL (ref 3.4–5)
ALP SERPL-CCNC: 111 U/L (ref 33–136)
ALT SERPL W P-5'-P-CCNC: 8 U/L (ref 7–45)
ANION GAP SERPL CALCULATED.3IONS-SCNC: 12 MMOL/L (ref 10–20)
APTT PPP: 54.8 SECONDS (ref 22–32.5)
AST SERPL W P-5'-P-CCNC: 8 U/L (ref 9–39)
BASOPHILS # BLD AUTO: 0.05 X10*3/UL (ref 0–0.1)
BASOPHILS NFR BLD AUTO: 0.8 %
BILIRUB DIRECT SERPL-MCNC: 0 MG/DL (ref 0–0.3)
BILIRUB SERPL-MCNC: 0.3 MG/DL (ref 0–1.2)
BUN SERPL-MCNC: 41 MG/DL (ref 6–23)
CALCIUM SERPL-MCNC: 8.2 MG/DL (ref 8.6–10.3)
CHLORIDE SERPL-SCNC: 93 MMOL/L (ref 98–107)
CO2 SERPL-SCNC: 30 MMOL/L (ref 21–32)
CREAT SERPL-MCNC: 1.78 MG/DL (ref 0.5–1.05)
EGFRCR SERPLBLD CKD-EPI 2021: 31 ML/MIN/1.73M*2
EOSINOPHIL # BLD AUTO: 0.07 X10*3/UL (ref 0–0.7)
EOSINOPHIL NFR BLD AUTO: 1.1 %
ERYTHROCYTE [DISTWIDTH] IN BLOOD BY AUTOMATED COUNT: 19.3 % (ref 11.5–14.5)
GLUCOSE BLD MANUAL STRIP-MCNC: 136 MG/DL (ref 74–99)
GLUCOSE BLD MANUAL STRIP-MCNC: 153 MG/DL (ref 74–99)
GLUCOSE BLD MANUAL STRIP-MCNC: 167 MG/DL (ref 74–99)
GLUCOSE BLD MANUAL STRIP-MCNC: 169 MG/DL (ref 74–99)
GLUCOSE BLD MANUAL STRIP-MCNC: 169 MG/DL (ref 74–99)
GLUCOSE BLD MANUAL STRIP-MCNC: 177 MG/DL (ref 74–99)
GLUCOSE BLD MANUAL STRIP-MCNC: 181 MG/DL (ref 74–99)
GLUCOSE SERPL-MCNC: 128 MG/DL (ref 74–99)
HCT VFR BLD AUTO: 27.8 % (ref 36–46)
HGB BLD-MCNC: 9.1 G/DL (ref 12–16)
IMM GRANULOCYTES # BLD AUTO: 0.06 X10*3/UL (ref 0–0.7)
IMM GRANULOCYTES NFR BLD AUTO: 1 % (ref 0–0.9)
LYMPHOCYTES # BLD AUTO: 0.87 X10*3/UL (ref 1.2–4.8)
LYMPHOCYTES NFR BLD AUTO: 14.1 %
MAGNESIUM SERPL-MCNC: 1.97 MG/DL (ref 1.6–2.4)
MCH RBC QN AUTO: 30.2 PG (ref 26–34)
MCHC RBC AUTO-ENTMCNC: 32.7 G/DL (ref 32–36)
MCV RBC AUTO: 92 FL (ref 80–100)
MONOCYTES # BLD AUTO: 0.38 X10*3/UL (ref 0.1–1)
MONOCYTES NFR BLD AUTO: 6.2 %
NEUTROPHILS # BLD AUTO: 4.72 X10*3/UL (ref 1.2–7.7)
NEUTROPHILS NFR BLD AUTO: 76.8 %
NRBC BLD-RTO: 0 /100 WBCS (ref 0–0)
PHOSPHATE SERPL-MCNC: 4.4 MG/DL (ref 2.5–4.9)
PLATELET # BLD AUTO: 314 X10*3/UL (ref 150–450)
POTASSIUM SERPL-SCNC: 4.1 MMOL/L (ref 3.5–5.3)
PROT SERPL-MCNC: 5.6 G/DL (ref 6.4–8.2)
RBC # BLD AUTO: 3.01 X10*6/UL (ref 4–5.2)
SODIUM SERPL-SCNC: 131 MMOL/L (ref 136–145)
WBC # BLD AUTO: 6.2 X10*3/UL (ref 4.4–11.3)

## 2024-11-11 PROCEDURE — 2500000001 HC RX 250 WO HCPCS SELF ADMINISTERED DRUGS (ALT 637 FOR MEDICARE OP)

## 2024-11-11 PROCEDURE — 71045 X-RAY EXAM CHEST 1 VIEW: CPT

## 2024-11-11 PROCEDURE — 2500000004 HC RX 250 GENERAL PHARMACY W/ HCPCS (ALT 636 FOR OP/ED)

## 2024-11-11 PROCEDURE — 94668 MNPJ CHEST WALL SBSQ: CPT

## 2024-11-11 PROCEDURE — 85730 THROMBOPLASTIN TIME PARTIAL: CPT

## 2024-11-11 PROCEDURE — 97530 THERAPEUTIC ACTIVITIES: CPT | Mod: GP

## 2024-11-11 PROCEDURE — 99291 CRITICAL CARE FIRST HOUR: CPT

## 2024-11-11 PROCEDURE — 80053 COMPREHEN METABOLIC PANEL: CPT

## 2024-11-11 PROCEDURE — 2500000004 HC RX 250 GENERAL PHARMACY W/ HCPCS (ALT 636 FOR OP/ED): Performed by: INTERNAL MEDICINE

## 2024-11-11 PROCEDURE — 82947 ASSAY GLUCOSE BLOOD QUANT: CPT

## 2024-11-11 PROCEDURE — 94640 AIRWAY INHALATION TREATMENT: CPT

## 2024-11-11 PROCEDURE — 94003 VENT MGMT INPAT SUBQ DAY: CPT

## 2024-11-11 PROCEDURE — 71045 X-RAY EXAM CHEST 1 VIEW: CPT | Performed by: RADIOLOGY

## 2024-11-11 PROCEDURE — 84075 ASSAY ALKALINE PHOSPHATASE: CPT

## 2024-11-11 PROCEDURE — 2020000001 HC ICU ROOM DAILY

## 2024-11-11 PROCEDURE — 84100 ASSAY OF PHOSPHORUS: CPT

## 2024-11-11 PROCEDURE — 97530 THERAPEUTIC ACTIVITIES: CPT | Mod: GO

## 2024-11-11 PROCEDURE — 9420000001 HC RT PATIENT EDUCATION 5 MIN

## 2024-11-11 PROCEDURE — 37799 UNLISTED PX VASCULAR SURGERY: CPT

## 2024-11-11 PROCEDURE — 2500000002 HC RX 250 W HCPCS SELF ADMINISTERED DRUGS (ALT 637 FOR MEDICARE OP, ALT 636 FOR OP/ED)

## 2024-11-11 PROCEDURE — 97110 THERAPEUTIC EXERCISES: CPT | Mod: GO

## 2024-11-11 PROCEDURE — 2500000005 HC RX 250 GENERAL PHARMACY W/O HCPCS

## 2024-11-11 PROCEDURE — 85025 COMPLETE CBC W/AUTO DIFF WBC: CPT

## 2024-11-11 PROCEDURE — 2500000005 HC RX 250 GENERAL PHARMACY W/O HCPCS: Performed by: SURGERY

## 2024-11-11 PROCEDURE — 83735 ASSAY OF MAGNESIUM: CPT

## 2024-11-11 PROCEDURE — 97535 SELF CARE MNGMENT TRAINING: CPT | Mod: GO

## 2024-11-11 RX ORDER — FUROSEMIDE 10 MG/ML
40 INJECTION INTRAMUSCULAR; INTRAVENOUS ONCE
Status: COMPLETED | OUTPATIENT
Start: 2024-11-11 | End: 2024-11-11

## 2024-11-11 RX ORDER — PRIMIDONE 250 MG/1
125 TABLET ORAL 3 TIMES DAILY
Status: DISCONTINUED | OUTPATIENT
Start: 2024-11-11 | End: 2024-11-14 | Stop reason: HOSPADM

## 2024-11-11 ASSESSMENT — COGNITIVE AND FUNCTIONAL STATUS - GENERAL
MOVING FROM LYING ON BACK TO SITTING ON SIDE OF FLAT BED WITH BEDRAILS: TOTAL
DAILY ACTIVITIY SCORE: 8
DRESSING REGULAR UPPER BODY CLOTHING: A LOT
TURNING FROM BACK TO SIDE WHILE IN FLAT BAD: TOTAL
CLIMB 3 TO 5 STEPS WITH RAILING: TOTAL
STANDING UP FROM CHAIR USING ARMS: TOTAL
HELP NEEDED FOR BATHING: TOTAL
PERSONAL GROOMING: A LOT
WALKING IN HOSPITAL ROOM: TOTAL
MOVING TO AND FROM BED TO CHAIR: TOTAL
MOBILITY SCORE: 6
DRESSING REGULAR LOWER BODY CLOTHING: TOTAL
EATING MEALS: TOTAL
TOILETING: TOTAL

## 2024-11-11 ASSESSMENT — PAIN SCALES - GENERAL
PAINLEVEL_OUTOF10: 0 - NO PAIN
PAINLEVEL_OUTOF10: 5 - MODERATE PAIN
PAINLEVEL_OUTOF10: 0 - NO PAIN
PAINLEVEL_OUTOF10: 2
PAINLEVEL_OUTOF10: 0 - NO PAIN
PAINLEVEL_OUTOF10: 0 - NO PAIN

## 2024-11-11 ASSESSMENT — PAIN - FUNCTIONAL ASSESSMENT
PAIN_FUNCTIONAL_ASSESSMENT: 0-10
PAIN_FUNCTIONAL_ASSESSMENT: CPOT (CRITICAL CARE PAIN OBSERVATION TOOL)
PAIN_FUNCTIONAL_ASSESSMENT: 0-10
PAIN_FUNCTIONAL_ASSESSMENT: CPOT (CRITICAL CARE PAIN OBSERVATION TOOL)
PAIN_FUNCTIONAL_ASSESSMENT: CPOT (CRITICAL CARE PAIN OBSERVATION TOOL)
PAIN_FUNCTIONAL_ASSESSMENT: 0-10
PAIN_FUNCTIONAL_ASSESSMENT: 0-10

## 2024-11-11 ASSESSMENT — PAIN DESCRIPTION - ORIENTATION: ORIENTATION: MID

## 2024-11-11 ASSESSMENT — PAIN DESCRIPTION - LOCATION: LOCATION: BACK

## 2024-11-11 ASSESSMENT — PAIN DESCRIPTION - DESCRIPTORS
DESCRIPTORS: SHARP
DESCRIPTORS: DISCOMFORT
DESCRIPTORS: DISCOMFORT

## 2024-11-11 NOTE — PROGRESS NOTES
"INFECTIOUS DISEASES PROGRESS NOTE    Consulted / following patient for:  Respiratory failure/pneumonia/Pseudomonas in the sputum  Recent polymicrobial complicated postoperative lumbar wound infection, MRSA and Proteus  Acute kidney injury    Subjective   Interval History:   Denies pain or dyspnea, says she is \"doing well.\"    Objective   PHYSICAL EXAMINATION  Vital signs: Afebrile, no vasopressor agents  General: Alert, on ventilator/tracheostomy  Lungs: Diminished, clear.  On ventilator with FiO2 30%  Heart:  S1, S2 normal  Abdomen:  Soft, nontender.  PEG tube in place and functioning  Extremities:  No cords, phlebitis, cellulitis.      Relevant Results  WBC: 6200  Creatinine: (Dialysis)  Pleural fluid: Transudate with 197 WBC, 56% neutrophils, protein 1.8, LDH 97, glucose 202  Microbiology:  Blood (11/3): Negative X2  Sputum (10/21): Normal neva, no MRSA  Sputum (10/28): Pseudomonas, pan-susceptible  Sputum (11/4): Pending   Pleural fluid (10/17): Negative  C. difficile PCR (10/30): Negative    Imaging:  CXR images (11/11) personally reviewed: Intubated.  No significant change in bilateral areas of infiltration and effusions    Assessment:  Sepsis -likely due to pneumonia, present on admission. Patient already on IV vancomycin and IV ceftriaxone at time of this admission for lumbar spinal infection.  Resolved with anti-Pseudomonas antimicrobial therapy.  Large transudative pleural effusion was tapped.  Had fever and purulent secretions in the evening of 10/28, Pseudomonas in sputum, treated for 5 days with aerosol tobramycin.  Briefly extubated, now reintubated without clinical or radiographic evidence for progressive pneumonia.  Dialysis has been stopped and renal function is being monitored.  Underwent PEG placement on 11/4, tracheostomy on 11/7.  Overall appears improved  Lumbar spine surgical site infection s/p I&D 9/28. Cultures + MRSA, Proteus.  Was to be on vanco/ceftriaxone through 11/12. Followed by Dr." Brant Juarez.  Vancomycin has been changed to daptomycin because of AMY    Plan/Recommendations:  Continue daptomycin 700 mg every 48 hour until 11/12  Continue ceftriaxone until 11/12     DR. TURNER COVERING 11/9-11/10 AND WILL SEE PRN YOUR CALL    Andrew Malagon MD  [facility]  Office:  [phone]

## 2024-11-11 NOTE — CARE PLAN
The patient's goals for the shift include unable to assess    The clinical goals for the shift include Patient will remain hemodynamically stable, ;monitor pain level.      Problem: Safety - Adult  Goal: Free from fall injury  Outcome: Progressing     Problem: Discharge Planning  Goal: Discharge to home or other facility with appropriate resources  Outcome: Progressing     Problem: Chronic Conditions and Co-morbidities  Goal: Patient's chronic conditions and co-morbidity symptoms are monitored and maintained or improved  Outcome: Progressing     Problem: Diabetes  Goal: Increase stability of blood glucose readings by end of shift  Outcome: Progressing  Goal: Maintain electrolyte levels within acceptable range throughout shift  Outcome: Progressing  Goal: Maintain glucose levels >70mg/dl to <250mg/dl throughout shift  Outcome: Progressing  Goal: Learn about and adhere to nutrition recommendations by end of shift  Outcome: Progressing  Goal: Vital signs within normal range for age by end of shift  Outcome: Progressing  Goal: Increase self care and/or family involovement by end of shift  Outcome: Progressing  Goal: Receive DSME education by end of shift  Outcome: Progressing     Problem: Knowledge Deficit  Goal: Patient/family/caregiver demonstrates understanding of disease process, treatment plan, medications, and discharge instructions  Outcome: Progressing     Problem: Mechanical Ventilation  Goal: Tracheostomy will be managed safely  Outcome: Progressing     Problem: Skin  Goal: Decreased wound size/increased tissue granulation at next dressing change  Outcome: Progressing  Flowsheets (Taken 11/11/2024 0811)  Decreased wound size/increased tissue granulation at next dressing change:   Promote sleep for wound healing   Protective dressings over bony prominences   Utilize specialty bed per algorithm  Goal: Participates in plan/prevention/treatment measures  Outcome: Progressing  Flowsheets (Taken 11/11/2024  0811)  Participates in plan/prevention/treatment measures:   Discuss with provider PT/OT consult   Elevate heels   Increase activity/out of bed for meals  Goal: Prevent/manage excess moisture  Outcome: Progressing  Flowsheets (Taken 11/11/2024 0811)  Prevent/manage excess moisture:   Cleanse incontinence/protect with barrier cream   Monitor for/manage infection if present   Follow provider orders for dressing changes  Goal: Prevent/minimize sheer/friction injuries  Outcome: Progressing  Flowsheets (Taken 11/11/2024 0811)  Prevent/minimize sheer/friction injuries:   Increase activity/out of bed for meals   Use pull sheet   HOB 30 degrees or less   Turn/reposition every 2 hours/use positioning/transfer devices  Goal: Promote/optimize nutrition  Outcome: Progressing  Flowsheets (Taken 11/11/2024 0811)  Promote/optimize nutrition:   Monitor/record intake including meals   Consume > 50% meals/supplements   Offer water/supplements/favorite foods  Goal: Promote skin healing  Outcome: Progressing  Flowsheets (Taken 11/11/2024 0811)  Promote skin healing:   Protective dressings over bony prominences   Turn/reposition every 2 hours/use positioning/transfer devices     Problem: Nutrition  Goal: Consume prescribed supplement  Outcome: Progressing  Goal: Nutrition support goals are met within 48 hrs  Outcome: Progressing  Goal: Nutrition support is meeting 75% of nutrient needs  Outcome: Progressing  Goal: BG  mg/dL  Outcome: Progressing  Goal: Lab values WNL  Outcome: Progressing  Goal: Electrolytes WNL  Outcome: Progressing  Goal: Promote healing  Outcome: Progressing  Goal: Maintain stable weight  Outcome: Progressing  Goal: Reduce weight from edema/fluid  Outcome: Progressing     Problem: Respiratory  Goal: No signs of respiratory distress (eg. Use of accessory muscles. Peds grunting)  Outcome: Progressing  Goal: Clear secretions with interventions this shift  Outcome: Progressing  Goal: Minimize anxiety/maximize  coping throughout shift  Outcome: Progressing  Goal: Minimal/no exertional discomfort or dyspnea this shift  Outcome: Progressing  Goal: Patent airway maintained this shift  Outcome: Progressing  Goal: Tolerate mechanical ventilation evidenced by VS/agitation level this shift  Outcome: Progressing  Goal: Tolerate pulmonary toileting this shift  Outcome: Progressing  Goal: Verbalize decreased shortness of breath this shift  Outcome: Progressing  Goal: Wean oxygen to maintain O2 saturation per order/standard this shift  Outcome: Progressing  Goal: Increase self care and/or family involvement in next 24 hours  Outcome: Progressing     Problem: Pain  Goal: Takes deep breaths with improved pain control throughout the shift  Outcome: Progressing  Goal: Turns in bed with improved pain control throughout the shift  Outcome: Progressing  Goal: Walks with improved pain control throughout the shift  Outcome: Progressing  Goal: Performs ADL's with improved pain control throughout shift  Outcome: Progressing  Goal: Participates in PT with improved pain control throughout the shift  Outcome: Progressing  Goal: Free from opioid side effects throughout the shift  Outcome: Progressing  Goal: Free from acute confusion related to pain meds throughout the shift  Outcome: Progressing     Problem: Fall/Injury  Goal: Not fall by end of shift  Outcome: Progressing  Goal: Be free from injury by end of the shift  Outcome: Progressing  Goal: Verbalize understanding of personal risk factors for fall in the hospital  Outcome: Progressing  Goal: Verbalize understanding of risk factor reduction measures to prevent injury from fall in the home  Outcome: Progressing  Goal: Use assistive devices by end of the shift  Outcome: Progressing  Goal: Pace activities to prevent fatigue by end of the shift  Outcome: Progressing

## 2024-11-11 NOTE — CARE PLAN
The patient's goals for the shift include unable to assess    The clinical goals for the shift include Patient will remain hemodynamically stable      Problem: Safety - Adult  Goal: Free from fall injury  Outcome: Progressing  Flowsheets (Taken 11/10/2024 0536)  Free from fall injury: Instruct family/caregiver on patient safety     Problem: Discharge Planning  Goal: Discharge to home or other facility with appropriate resources  Outcome: Progressing  Flowsheets (Taken 11/11/2024 0206)  Discharge to home or other facility with appropriate resources:   Identify barriers to discharge with patient and caregiver   Identify discharge learning needs (meds, wound care, etc)   Arrange for needed discharge resources and transportation as appropriate   Arrange for interpreters to assist at discharge as needed   Refer to discharge planning if patient needs post-hospital services based on physician order or complex needs related to functional status, cognitive ability or social support system     Problem: Chronic Conditions and Co-morbidities  Goal: Patient's chronic conditions and co-morbidity symptoms are monitored and maintained or improved  Outcome: Progressing  Flowsheets (Taken 11/11/2024 0206)  Care Plan - Patient's Chronic Conditions and Co-Morbidity Symptoms are Monitored and Maintained or Improved:   Monitor and assess patient's chronic conditions and comorbid symptoms for stability, deterioration, or improvement   Collaborate with multidisciplinary team to address chronic and comorbid conditions and prevent exacerbation or deterioration   Update acute care plan with appropriate goals if chronic or comorbid symptoms are exacerbated and prevent overall improvement and discharge     Problem: Diabetes  Goal: Increase stability of blood glucose readings by end of shift  Outcome: Progressing  Flowsheets (Taken 11/10/2024 0536)  Increase stability of blood glucose readings by end of shift: Med administration/monitoring of  effect  Goal: Maintain electrolyte levels within acceptable range throughout shift  Outcome: Progressing  Flowsheets (Taken 11/10/2024 0536)  Maintain electrolyte levels within acceptable range throughout shift: Monitor urine output  Goal: Maintain glucose levels >70mg/dl to <250mg/dl throughout shift  Outcome: Progressing  Flowsheets (Taken 11/10/2024 0536)  Maintain glucose levels >70mg/dl to <250mg/dl throughout shift: Med administration/monitoring of effect  Goal: Learn about and adhere to nutrition recommendations by end of shift  Outcome: Progressing  Flowsheets (Taken 11/10/2024 0536)  Learn about and adhere to nutrition recommendations by end of shift: Ensure/encourage compliance with appropriate diet  Goal: Vital signs within normal range for age by end of shift  Outcome: Progressing  Flowsheets (Taken 11/10/2024 0536)  Vital signs within normal range for age by end of shift: Med administration/monitoring of effect  Goal: Increase self care and/or family involovement by end of shift  Outcome: Progressing  Flowsheets (Taken 11/10/2024 0536)  Increase self care and/or family involovement by end of shift: Self monitor blood glucose with staff oversight  Goal: Receive DSME education by end of shift  Outcome: Progressing  Flowsheets (Taken 11/10/2024 0536)  Receive DSME education by end of shift: Provide patient centered education on Diabetic Self Management Education     Problem: Knowledge Deficit  Goal: Patient/family/caregiver demonstrates understanding of disease process, treatment plan, medications, and discharge instructions  Outcome: Progressing  Flowsheets (Taken 11/11/2024 0206)  Patient/family/caregiver demonstrates understanding of disease process, treatment plan, medications, and discharge instructions:   Complete learning assessment and assess knowledge base   Provide teaching at level of understanding   Provide teaching via preferred learning methods     Problem: Skin  Goal: Decreased wound  size/increased tissue granulation at next dressing change  Outcome: Progressing  Flowsheets (Taken 11/11/2024 0206)  Decreased wound size/increased tissue granulation at next dressing change:   Promote sleep for wound healing   Utilize specialty bed per algorithm   Protective dressings over bony prominences  Goal: Participates in plan/prevention/treatment measures  Outcome: Progressing  Flowsheets (Taken 11/11/2024 0206)  Participates in plan/prevention/treatment measures: Elevate heels  Goal: Prevent/manage excess moisture  Outcome: Progressing  Flowsheets (Taken 11/11/2024 0206)  Prevent/manage excess moisture:   Cleanse incontinence/protect with barrier cream   Moisturize dry skin   Use wicking fabric (obtain order)   Follow provider orders for dressing changes   Monitor for/manage infection if present  Goal: Prevent/minimize sheer/friction injuries  Outcome: Progressing  Flowsheets (Taken 11/11/2024 0206)  Prevent/minimize sheer/friction injuries:   Complete micro-shifts as needed if patient unable. Adjust patient position to relieve pressure points, not a full turn   Increase activity/out of bed for meals   Use pull sheet   Turn/reposition every 2 hours/use positioning/transfer devices   Utilize specialty bed per algorithm   HOB 30 degrees or less  Goal: Promote/optimize nutrition  Outcome: Progressing  Flowsheets (Taken 11/11/2024 0206)  Promote/optimize nutrition: Monitor/record intake including meals  Goal: Promote skin healing  Outcome: Progressing  Flowsheets (Taken 11/11/2024 0206)  Promote skin healing:   Assess skin/pad under line(s)/device(s)   Protective dressings over bony prominences   Turn/reposition every 2 hours/use positioning/transfer devices   Ensure correct size (line/device) and apply per  instructions   Rotate device position/do not position patient on device     Problem: Nutrition  Goal: Consume prescribed supplement  Outcome: Progressing  Goal: Nutrition support goals are met  within 48 hrs  Outcome: Progressing  Goal: Nutrition support is meeting 75% of nutrient needs  Outcome: Progressing  Goal: BG  mg/dL  Outcome: Progressing  Goal: Lab values WNL  Outcome: Progressing  Goal: Electrolytes WNL  Outcome: Progressing  Goal: Promote healing  Outcome: Progressing  Goal: Maintain stable weight  Outcome: Progressing  Goal: Reduce weight from edema/fluid  Outcome: Progressing     Problem: Respiratory  Goal: No signs of respiratory distress (eg. Use of accessory muscles. Peds grunting)  Outcome: Progressing  Goal: Clear secretions with interventions this shift  Outcome: Progressing  Flowsheets (Taken 11/11/2024 0206)  Clear secretions with interventions this shift:   Encourage/provide pulmonary hygiene/secretion clearance   Med administration/monitoring of effect   Suctioning  Goal: Minimize anxiety/maximize coping throughout shift  Outcome: Progressing  Flowsheets (Taken 11/11/2024 0206)  Minimize anxiety/maximize coping throughout shift:   Med administration/monitoring of effect   Monitor pain/anxiety level  Goal: Minimal/no exertional discomfort or dyspnea this shift  Outcome: Progressing  Flowsheets (Taken 11/11/2024 0206)  Minimal/no exertional discomfort or dyspnea this shift: Positioning to promote ventilation/comfort  Goal: Patent airway maintained this shift  Outcome: Progressing  Flowsheets (Taken 11/11/2024 0206)  Patent airway maintained this shift: Maintain ET tube tube position  Goal: Tolerate mechanical ventilation evidenced by VS/agitation level this shift  Outcome: Progressing  Flowsheets (Taken 11/11/2024 0206)  Tolerate mechanical ventilation evidenced by VS/agitation level this shift: Mechanical ventilation  Goal: Tolerate pulmonary toileting this shift  Outcome: Progressing  Flowsheets (Taken 11/11/2024 0206)  Tolerate pulmonary toileting this shift: Positioning to promote ventilation/comfort  Goal: Verbalize decreased shortness of breath this shift  Outcome:  Progressing  Flowsheets (Taken 11/11/2024 0206)  Verbalize decreased shortness of breath this shift: Encourage/provide pulmonary hygiene/secretion clearance  Goal: Wean oxygen to maintain O2 saturation per order/standard this shift  Outcome: Progressing  Flowsheets (Taken 11/11/2024 0206)  Wean oxygen to maintain O2 saturation per order/standard this shift: Encourage activity/mobility  Goal: Increase self care and/or family involvement in next 24 hours  Outcome: Progressing  Flowsheets (Taken 11/11/2024 0206)  Increase self care and/or family involvement in next 24 hours: Encourage activity/mobility     Problem: Pain  Goal: Takes deep breaths with improved pain control throughout the shift  Outcome: Progressing  Goal: Turns in bed with improved pain control throughout the shift  Outcome: Progressing  Goal: Walks with improved pain control throughout the shift  Outcome: Progressing  Goal: Performs ADL's with improved pain control throughout shift  Outcome: Progressing  Goal: Participates in PT with improved pain control throughout the shift  Outcome: Progressing  Goal: Free from opioid side effects throughout the shift  Outcome: Progressing  Goal: Free from acute confusion related to pain meds throughout the shift  Outcome: Progressing     Problem: Fall/Injury  Goal: Not fall by end of shift  Outcome: Progressing  Goal: Be free from injury by end of the shift  Outcome: Progressing  Goal: Verbalize understanding of personal risk factors for fall in the hospital  Outcome: Progressing  Goal: Verbalize understanding of risk factor reduction measures to prevent injury from fall in the home  Outcome: Progressing  Goal: Use assistive devices by end of the shift  Outcome: Progressing  Goal: Pace activities to prevent fatigue by end of the shift  Outcome: Progressing     Problem: Mechanical Ventilation  Goal: Tracheostomy will be managed safely  Outcome: Progressing  Flowsheets (Taken 11/11/2024 0206)  Tracheostomy Will Be  Managed Safely:   Utilize tracheostomy securing device and change as necessary to ensure tracheostomy is secured to patient   Support ventilator tubing to avoid pressure from drag of tubing   Keep ambu bag, mask, oxygen connection tubing, and extra tracheostomy at bedside and accompanying patient at all times   Utilize trach securing device   Cleanse site with hydrogen peroxide   Protect skin with moisture barrier cream

## 2024-11-11 NOTE — SIGNIFICANT EVENT
At pt bedside with critical care team. The patient is currently in a spontaneous breathing trial.  The patient has a 3 hour goal.

## 2024-11-11 NOTE — PROGRESS NOTES
Patient ready for discharge to [facility]. Precert was started by [facility] on 11/8. Today the precert was escalated. Will follow.      11/11/24 8690   Discharge Planning   Home or Post Acute Services Post acute facilities (Rehab/SNF/etc)   Type of Post Acute Facility Services Rehab;Skilled nursing   Expected Discharge Disposition Long Term  ([facility])   Does the patient need discharge transport arranged? Yes   RoundTrip coordination needed? Yes

## 2024-11-11 NOTE — NURSING NOTE
Upon rounding right chest dialysis catheter with current CHG dressing dry and intact. Left upper arm dual lumen PICC also with current CHG dressing dry and intact, both lumens in use at this time.

## 2024-11-11 NOTE — PROGRESS NOTES
"Nutrition Follow up Note    Nutrition Assessment      Trach placed 11/7. Dialysis on hold, Nephrology notes hope to get patient off dialysis. [facility] Tube feed running at goal without issue.    Nutrition History:  Food and Nutrient History: Tube feed/vent     Food Allergies/Intolerances:  None  GI Symptoms: None  Oral Problems: None    Anthropometrics:  Ht: 177.8 cm (5' 10\"), Wt: 114 kg (251 lb 5.2 oz), BMI: 36.06  IBW/kg (Dietitian Calculated): 68.18 kg  Percent of IBW: 147 %     Weight Change:  Daily Weight  11/11/24 : 114 kg (251 lb 5.2 oz)  10/01/24 : 99.7 kg (219 lb 12.8 oz)  09/25/24 : 74.8 kg (165 lb)  08/27/24 : 75.8 kg (167 lb)  08/16/24 : 76.2 kg (168 lb)  08/12/24 : 76.4 kg (168 lb 6.4 oz)  07/15/24 : 76.2 kg (168 lb)  06/18/24 : 75.8 kg (167 lb 3.2 oz)  05/31/24 : 69.4 kg (153 lb)  09/29/23 : 69.4 kg (153 lb)      Nutrition Focused Physical Exam Findings:      Nutrition Significant Labs:  Lab Results   Component Value Date    WBC 6.2 11/11/2024    HGB 9.1 (L) 11/11/2024    HCT 27.8 (L) 11/11/2024     11/11/2024    CHOL 266 (H) 08/23/2023    TRIG 213 (H) 08/23/2023    HDL 58 08/23/2023    ALT 8 11/11/2024    AST 8 (L) 11/11/2024     (L) 11/11/2024    K 4.1 11/11/2024    CL 93 (L) 11/11/2024    CREATININE 1.78 (H) 11/11/2024    BUN 41 (H) 11/11/2024    CO2 30 11/11/2024    TSH 4.78 (H) 10/12/2024    INR 1.0 09/28/2024    HGBA1C 6.2 (H) 09/25/2024    ALBUR 40 (H) 03/31/2022     Nutrition Specific Medications:  albuterol, 3 mL, nebulization, 4x daily  brimonidine, 1 drop, Both Eyes, BID  calcium carbonate-vitamin D3, 1 tablet, oral, Daily  cefTRIAXone, 2 g, intravenous, q24h  collagenase, , Topical, Daily  daptomycin, 700 mg, intravenous, q48h  ezetimibe, 10 mg, oral, Daily  ferrous sulfate, 60 mg of iron, oral, Daily  folic acid, 1 mg, oral, Daily  honey, , Topical, Daily  insulin lispro, 0-15 Units, subcutaneous, q4h  latanoprost, 1 drop, Both Eyes, " Nightly  midodrine, 10 mg, oral, q8h  oxygen, , inhalation, Continuous - Inhalation  pantoprazole, 40 mg, oral, Daily before breakfast   Or  pantoprazole, 40 mg, intravenous, Daily before breakfast  potassium, sodium phosphates, 1 packet, oral, With meals & nightly  primidone, 125 mg, oral, Nightly  [Held by provider] propranolol LA, 60 mg, oral, Daily      heparin, 0-4,500 Units/hr, Last Rate: 1,600 Units/hr (11/11/24 0638)      Dietary Orders (From admission, onward)       Start     Ordered    11/09/24 0949  Enteral feeding with NPO 40; (Flush after meds only.)  Diet effective now        Question Answer Comment   Tube feeding formula: Nepro    Tube feeding continuous rate (mL/hr): 40    Tube feeding flush (mL):  Flush after meds only.       11/09/24 0949    10/24/24 1212  Oral nutritional supplements  Until discontinued        Comments: Unflavored via OG tube   Question Answer Comment   Deliver with Breakfast    Deliver with Dinner    Select supplement: Cade        10/24/24 1211    10/16/24 0448  May Participate in Room Service With Assistance  ( ROOM SERVICE MAY PARTICIPATE WITH ASSISTANCE)  Once        Question:  .  Answer:  Yes    10/16/24 0447                  Nutrition Support Intake provides:  Nepro @40 mL/Hr provides 1728 calories, 78 gm protein        Estimated Needs:   Estimated Energy Needs  Total Energy Estimated Needs (kCal): 1700 kCal  Total Estimated Energy Need per Day (kCal/kg): 25 kCal/kg  Method for Estimating Needs: IBW    Estimated Protein Needs  Total Protein Estimated Needs (g):  ()  Total Protein Estimated Needs (g/kg):  (1.2-2.0)  Method for Estimating Needs: IBW    Estimated Fluid Needs  Total Fluid Estimated Needs (mL): 1700 mL  Total Fluid Estimated Needs (mL/kg): 25 mL/kg  Method for Estimating Needs: Per Critical Care Team/Nephrology      Nutrition Diagnosis   Nutrition Diagnosis:     Nutrition Diagnosis  Patient has Nutrition Diagnosis: Yes  Diagnosis Status (1):  Resolved  Nutrition Diagnosis 1: Inadequate energy intake  Related to (1): decreased ability to consume sufficient energy  As Evidenced by (1): NPO  Additional Nutrition Diagnosis: Diagnosis 2  Diagnosis Status (2): Resolved  Nutrition Diagnosis 2: Excessive enteral nutrition infusion  Related to (2): current tube feed formula/rate  As Evidenced by (2): enteral formula/rate providing greater than estimated energy needs     Nutrition Interventions/Recommendations   Nutrition Interventions and Recommendations:    Nutrition Prescription:  Individualized Nutrition Prescription Provided for : 1700 calories,  gm protein to be provided via enteral nutrition    Nutrition Interventions:   Food and/or Nutrient Delivery Interventions  Interventions: Enteral intake  Enteral Intake: Other (Comment)  Goal: provide as ordered    Education Documentation  No documentation found.         Nutrition Monitoring and Evaluation   Monitoring/Evaluation:   Food/Nutrient Related History Monitoring  Monitoring and Evaluation Plan: Enteral and parenteral nutrition intake  Enteral and Parenteral Nutrition Intake: Enteral nutrition intake  Criteria: monitoring tube feed tolerance       Time Spent/Follow-up:   Follow Up  Time Spent (min): 25 minutes  Last Date of Nutrition Visit: 11/11/24  Nutrition Follow-Up Needed?: 5-7 days  Follow up Comment: 11/15/24

## 2024-11-11 NOTE — PROGRESS NOTES
[facility] Critical Care Medicine       Date:  11/11/2024  Patient:  Narda Malloy  YOB: 1955  MRN:  [id number]   Admit Date:  10/12/2024      Chief Complaint   Patient presents with    Altered Mental Status         History of Present Illness:  Narda Malloy is a 69 y.o. year old female patient with Past Medical History of  L1-L3 lumbar laminectomy, T4-S1 revision, and fusion August 26th, T2DM, HTN, essential tremor, HLD, glaucoma, sarcoidosis of the lung who presented to  ED 10/12 after being found essentially unresponsive at her nursing facility [facility]. Per report from her , she has had a significant decline in her health since July 15th when she had a fall and became significantly weak. She has also had multiple infection complications since her back surgery in August requiring multiple I&Ds and long term antibiotic therapy. She went to the OR most recently on 9/28 for lumbar site infection wash out. Per chart review, she was discharged on IV vancomycin 1g and IV ceftriaxone 2d q24hrs through 11/12.     ED Course: Initial vital signs: /104 (109), HR 68, RR 20, SpO2 95% on 6L NC, temp 34.5C. Give 0.4mg of narcan with no improvement in mentation. Lab work-up remarkable for mild hyperkalemia (5.5), AMY 42/1.46, elevated alk phos, normocytic anemia 10.4/33, turbid appearing urine with mild hematuria and proteinuria and + leuk esterase, >50 WBCs. Urine drug screen positive for barbiturates. Triggered sepsis timer so she was given 3L NS. She was intubated for airway protection with 20mg etomidate and 100mg succinylcholine. BP dropped post-intubated and fluid resuscitation and she was subsequently started on levophed.            Interval ICU Events:  Patient presented from the ED to the ICU intubated, received hyperkalemia cocktail and had noticeably decreasing urine output.  Mentation greatly improved on 10/15 on SAT/SBT were conducted and were successful.  Patient was done  after extubated.  Patient's oxygen requirements significantly increased requiring high flow nasal cannula 40L 100%.  On 10/17 chest x-ray showed complete opacification of left hemithorax most likely pleural effusion.  Subsequently pigtail catheter was then placed with immediate drainage of 1.2 L with cytology pending.  Kidney function and urine output continue to decline with worsening hypoxia and hypercapnia and patient was started on BiPAP therapy.  Patient was given high-dose of furosemide and metolazone to stimulate urine output but were unsuccessful.  Temporary dialysis catheter placed awaiting start for CRRT per nephrology.  Patient participated in physical therapy and Occupational Therapy while intubated. Venous jugular duplex noticed large thrombus on tube subsequent exams patient was placed on heparin gtt. Patient again passed SBT and SAT with minimal requirements and was subsequently extubated on 10/31 patient again developed acute respiratory failure the patient was reintubated on 11/2.  On 11/3 family meeting was held to discuss patient's goals of care and treatment model and it was decided to continue DNR CCA, and workup of tracheostomy and PEG tube placement.  On 11/4 patient had PEG tube placed by general surgery.  On 11/7 patient had tracheostomy placed by ENT and sedation was weaned.  Patient continuing physical therapy and Occupational Therapy at this time with difficulties weaning from vent support trach collar. Currently excepted at [facility].     11/11: No events overnight. Urine output increased to 1L yesterday. Nephrology following.     Medical History:  Past Medical History:   Diagnosis Date    Degenerative myopia, bilateral     Diabetic neuropathy (Multi)     Difficult intubation 08/26/2024    Mac 3, grade 3, 1 attempt.  Glidescope/videolaryngoscopy recommended for future attempts.    DM type 2 (diabetes mellitus, type 2) (Multi)     Dry eye syndrome of bilateral lacrimal glands      Essential hypertension     Essential tremor     Glaucoma     Hyperlipidemia     Long term (current) use of insulin (Multi)     Low back pain     PONV (postoperative nausea and vomiting)     Primary open angle glaucoma of both eyes, severe stage     Repeated falls     Sarcoidosis of lung (Multi)     Spinal stenosis, lumbar region without neurogenic claudication     Weakness      Past Surgical History:   Procedure Laterality Date    BLEPHAROPLASTY  07/2022    BREAST SURGERY  05/20/2022    Breast lift    CARPAL TUNNEL RELEASE      CATARACT EXTRACTION W/  INTRAOCULAR LENS IMPLANT Bilateral     OD 08/04/2011 +8.5D,OS 08/04/2011 +8.50D    FOOT SURGERY      INSERTION / REMOVAL CRANIAL DBS GENERATOR      Placed 2017.  Removed 2018. part of wire left in head when everything removed    LUMBAR FUSION      L3-S1    PANRETINAL PHOTOCOAGULATION  2014    THORACIC FUSION  08/26/2024    T4-S1 fusion    VITRECTOMY Right 2013     Medications Prior to Admission   Medication Sig Dispense Refill Last Dose/Taking    acetaminophen (Tylenol) 500 mg tablet Take 2 tablets (1,000 mg) by mouth 3 times a day.   Unknown    ascorbic acid (Vitamin C) 500 mg tablet as directed Orally   Unknown    brimonidine (AlphaGAN) 0.2 % ophthalmic solution Administer 1 drop into both eyes 2 times a day.   Unknown    calcium carbonate-vitamin D3 500 mg-5 mcg (200 unit) tablet Take 1 tablet by mouth once daily.   Unknown    cefTRIAXone (Rocephin) 2 gram/50 mL IV Infuse 50 mL (2 g) at 100 mL/hr over 30 minutes into a venous catheter once every 24 hours. Once weekly labs CBC/diff, CMP, Vanc trough ESR, CRP fax to Dr. Juarez [phone]. Stop date 11/12/24. 1950 mL 0     dextrose 50 % injection Infuse 25 mL (12.5 g) into a venous catheter every 15 minutes if needed (For blood glucose 41 to 70 mg/dL).       dextrose 50 % injection Infuse 50 mL (25 g) into a venous catheter every 15 minutes if needed (For blood glucose less than or equal to 40 mg/dL).       docusate  sodium (Colace) 100 mg capsule Take 1 capsule (100 mg) by mouth 2 times a day.   Unknown    ezetimibe (Zetia) 10 mg tablet Take 1 tablet (10 mg) by mouth once daily. 90 tablet 3 Unknown    FreeStyle Lite Strips strip USE TO TEST 3 TIMES A DAY AS DIRECTED 300 each 2     glucagon (Glucagen) 1 mg injection Inject 1 mg into the muscle every 15 minutes if needed for low blood sugar - see comments (Hypoglycemia).       glucagon (Glucagen) 1 mg injection Inject 1 mg into the muscle every 15 minutes if needed for low blood sugar - see comments (Hypoglycemia).       heparin sodium,porcine (heparin, porcine,) 5,000 unit/mL injection Inject 1 mL (5,000 Units) under the skin every 8 hours.       insulin lispro (HumaLOG) 100 unit/mL injection Inject 0-15 Units under the skin 3 times daily (morning, midday, late afternoon). Take as directed per insulin instructions.Do not hold when patient is not eating, continue order as scheduled for hyperglycemia management.  Insulin Lispro Corrective Scale #3     Hypoglycemia protocol Call LIP unit(s) if Blood Glucose is between 0 - 70 mg/dL     0 unit(s) if Blood glucose is between    3 unit(s) if Blood glucose is between 151-200   6 unit(s) if Blood glucose is between 201-250   9 unit(s) if Blood glucose is between 251-300   12 unit(s) if Blood glucose is between 301-350   15 unit(s) if Blood glucose is between 351-400    Notify provider unit(s) if Blood Glucose is greater than 400 mg/dL       Lactobacillus acidophilus 100 mg (1 billion cell) capsule Take 1 capsule by mouth 2 times a day.   Unknown    latanoprost (Xalatan) 0.005 % ophthalmic solution Administer 1 drop into both eyes once daily at bedtime. 2.5 mL 5 Unknown    melatonin 5 mg tablet Take 1 tablet (5 mg) by mouth as needed at bedtime for sleep.   Unknown    methocarbamol (Robaxin) 500 mg tablet Take 1 tablet (500 mg) by mouth 3 times a day.   Unknown    multivitamin tablet Take 1 tablet by mouth once daily.   Unknown     "ondansetron (Zofran) 4 mg/2 mL injection Infuse 2 mL (4 mg) into a venous catheter every 6 hours if needed for nausea or vomiting.       oxyCODONE (Roxicodone) 5 mg immediate release tablet Take 1 tablet (5 mg) by mouth every 6 hours if needed for severe pain (7 - 10) or moderate pain (4 - 6).       oxygen (O2) gas therapy Inhale 1 each continuously.       pantoprazole (ProtoNix) 40 mg EC tablet Take 1 tablet (40 mg) by mouth once daily in the morning. Take before meals. Do not crush, chew, or split.       pantoprazole (ProtoNix) 40 mg injection Infuse 40 mg into a venous catheter once daily in the morning. Take before meals. If unable to take PO.       pen needle, diabetic (PEN NEEDLE MISC) BD Altagracia- 4 mm X 32 G needle - as directed 4x a day sc 4 times per day       polyethylene glycol (Glycolax, Miralax) 17 gram packet Take 17 g by mouth once daily.   Unknown    primidone 125 mg tablet Take 125 mg by mouth 3 times a day.   Unknown    propranolol LA (Inderal LA) 60 mg 24 hr capsule Take 1 capsule (60 mg) by mouth early in the morning.. Hold for SBP < 110 mmhg, HR < 60 bpm.   Unknown    sennosides (Senokot) 8.6 mg tablet Take 1 tablet (8.6 mg) by mouth every 12 hours if needed for constipation.   Unknown    Sure Comfort Pen Needle 32 gauge x 5/32\" needle AS DIRECTED DAILY FOR 90 DAYS 100 each 11 Unknown    traZODone (Desyrel) 25 MG split tablet Take 1 half tablet (25 mg) by mouth once daily at bedtime.   Unknown    vancomycin (Vancocin) 1 gram/250 mL solution Infuse 250 mL (1 g) at 250 mL/hr over 60 minutes into a venous catheter every 12 hours. Once weekly labs CBC/diff, CMP, Vanc trough ESR, CRP fax to Dr. Juarez [phone]. Stop date 11/12/24. 35907 mL 0      Erythromycin, Morphine, and Rosuvastatin  Social History     Tobacco Use    Smoking status: Former     Types: Cigarettes     Passive exposure: Past    Smokeless tobacco: Never   Vaping Use    Vaping status: Never Used   Substance Use Topics    Alcohol use: " Not Currently    Drug use: Not Currently     Family History   Problem Relation Name Age of Onset    Multiple myeloma Mother      Cancer Mother      Other (CABG) Father      Pulmonary embolism Father      Heart disease Father      Breast cancer Sister          Stage II    Hypertension Sister      Diabetes Sister      No Known Problems Sister          x5    No Known Problems Brother          x4    No Known Problems Daughter         Hospital Medications:    heparin, 0-4,500 Units/hr, Last Rate: 1,600 Units/hr (11/11/24 0638)          Current Facility-Administered Medications:     acetaminophen (Tylenol) oral liquid 650 mg, 650 mg, oral, q4h PRN, RUTH Salgado    albuterol 2.5 mg /3 mL (0.083 %) nebulizer solution 3 mL, 3 mL, nebulization, 4x daily, Kaleb Lam PA-C, 3 mL at 11/11/24 0754    alteplase (Cathflo Activase) injection 2 mg, 2 mg, intra-catheter, PRN, Kaleb Lam PA-C    brimonidine (AlphaGAN) 0.2 % ophthalmic solution 1 drop, 1 drop, Both Eyes, BID, Kaleb Lam PA-C, 1 drop at 11/11/24 0844    calcium carbonate-vitamin D3 500 mg-5 mcg (200 unit) per tablet 1 tablet, 1 tablet, oral, Daily, Kaleb Lam PA-C, 1 tablet at 11/11/24 0843    cefTRIAXone (Rocephin) 2 g in dextrose (iso) IV 50 mL, 2 g, intravenous, q24h, Kaleb Lam PA-C, Last Rate: 100 mL/hr at 11/11/24 0842, 2 g at 11/11/24 0842    collagenase 250 unit/gram ointment, , Topical, Daily, RUTH Salgado, 1 Application at 11/11/24 0859    DAPTOmycin (Cubicin) 700 mg in sodium chloride 0.9%  mL, 700 mg, intravenous, q48h, Kaleb Lam PA-C, Stopped at 11/09/24 1010    dextrose 50 % injection 12.5 g, 12.5 g, intravenous, q15 min PRN, Kaleb Lam PA-C    dextrose 50 % injection 25 g, 25 g, intravenous, q15 min PRN, Kaleb Lam PA-C    ezetimibe (Zetia) tablet 10 mg, 10 mg, oral, Daily, Kaleb Lam PA-C, 10 mg at 11/11/24 0880    ferrous sulfate syrup 60 mg of iron, 60 mg of iron, oral,  Daily, Kaleb Lam PA-C, 60 mg of iron at 11/11/24 0844    folic acid (Folvite) tablet 1 mg, 1 mg, oral, Daily, Kaleb Lam PA-C, 1 mg at 11/11/24 0842    glucagon (Glucagen) injection 1 mg, 1 mg, intramuscular, q15 min PRN, Kaleb Lam PA-C    glucagon (Glucagen) injection 1 mg, 1 mg, intramuscular, q15 min PRN, Kaleb Lam PA-C    heparin (porcine) injection 3,000-6,000 Units, 3,000-6,000 Units, intravenous, PRN, Kaleb Lam PA-C    heparin 25,000 Units in dextrose 5% 250 mL (100 Units/mL) infusion (premix), 0-4,500 Units/hr, intravenous, Continuous, Kaleb Lam PA-C, Last Rate: 16 mL/hr at 11/11/24 0638, 1,600 Units/hr at 11/11/24 0638    heparin flush 10 unit/mL syringe 50 Units, 50 Units, intra-catheter, PRN, Kaleb Lam PA-C, 50 Units at 10/19/24 2313    honey (Medihoney) topical gel, , Topical, Daily, Kaleb Lam PA-C, 1 Application at 11/11/24 0900    HYDROmorphone (Dilaudid) injection 0.4 mg, 0.4 mg, intravenous, q2h PRN, Kaleb Lam PA-C, 0.4 mg at 11/11/24 0501    insulin lispro (HumaLOG) injection 0-15 Units, 0-15 Units, subcutaneous, q4h, Kaleb Lam PA-C, 3 Units at 11/11/24 0844    latanoprost (Xalatan) 0.005 % ophthalmic solution 1 drop, 1 drop, Both Eyes, Nightly, Kaleb Lam PA-C, 1 drop at 11/10/24 2117    loperamide (Imodium) capsule 2 mg, 2 mg, oral, 4x daily PRN, Sabino Matos APRN-CNP, 2 mg at 11/08/24 1648    midodrine (Proamatine) tablet 10 mg, 10 mg, oral, q8h, Kaleb Lam PA-C, 10 mg at 11/11/24 0842    oxyCODONE (Roxicodone) solution 5 mg, 5 mg, oral, q4h PRN, Kaleb Lam PA-C, 5 mg at 11/10/24 0642    oxyCODONE (Roxicodone) solution 7.5 mg, 7.5 mg, oral, q4h PRN, Kaleb Lam PA-C    oxygen (O2) therapy, , inhalation, Continuous - Inhalation, Anna Marie Shook MD, 30 percent at 11/10/24 1959    pantoprazole (ProtoNix) EC tablet 40 mg, 40 mg, oral, Daily before breakfast **OR** pantoprazole (ProtoNix) injection 40 mg,  40 mg, intravenous, Daily before breakfast, Kaleb Lam PA-C, 40 mg at 11/11/24 0856    polyethylene glycol (Glycolax, Miralax) packet 17 g, 17 g, oral, Daily PRN, Kaleb Lam PA-C    potassium, sodium phosphates (Phos-NaK) 280-160-250 mg packet 1 packet, 1 packet, oral, With meals & nightly, Kaleb Lam PA-C, 1 packet at 11/11/24 0843    primidone (Mysoline) tablet 125 mg, 125 mg, oral, Nightly, Kaleb Lam PA-C, 125 mg at 11/10/24 2117    [Held by provider] propranolol LA (Inderal LA) 24 hr capsule 60 mg, 60 mg, oral, Daily, Kaleb Lam PA-C, 60 mg at 10/19/24 0643    sennosides-docusate sodium (Lucia-Colace) 8.6-50 mg per tablet 1 tablet, 1 tablet, oral, Nightly PRN, Kaleb Lam PA-C    Physical Exam:    Heart Rate:  [64-91]   Temp:  [36.5 °C (97.7 °F)-36.8 °C (98.2 °F)]   Resp:  [16-30]   BP: (103-156)/()   SpO2:  [94 %-100 %]     Physical Exam  Constitutional:       Interventions: She is intubated.   HENT:      Mouth/Throat:      Mouth: Mucous membranes are moist.      Pharynx: Oropharynx is clear.   Eyes:      Pupils: Pupils are equal, round, and reactive to light.   Cardiovascular:      Rate and Rhythm: Normal rate and regular rhythm.      Pulses: Normal pulses.   Pulmonary:      Effort: Pulmonary effort is normal. She is intubated.      Breath sounds: Examination of the left-middle field reveals decreased breath sounds. Examination of the left-lower field reveals decreased breath sounds. Decreased breath sounds present.   Abdominal:      General: Abdomen is flat.      Palpations: Abdomen is soft.   Musculoskeletal:         General: Normal range of motion.   Skin:     General: Skin is warm and dry.      Capillary Refill: Capillary refill takes less than 2 seconds.      Comments: Spinal incision with dressing overtop, sutures removed, minimal drainage   Neurological:      General: No focal deficit present.      Mental Status: She is alert and oriented to person, place, and  time. Mental status is at baseline.         Objective:    I have reviewed all medications, laboratory results, and imaging pertinent for today's encounter.    Vent Mode: Pressure regulated volume control/assist control  FiO2 (%):  [30 %] 30 %  S RR:  [16] 16  S VT:  [450 mL] 450 mL  PEEP/CPAP (cm H2O):  [5 cm H20] 5 cm H20  DE SUP:  [8 cm H20] 8 cm H20  MAP (cm H2O):  [7.5-18] 9.2      Intake/Output Summary (Last 24 hours) at 11/11/2024 0915  Last data filed at 11/11/2024 0455  Gross per 24 hour   Intake 271.4 ml   Output 930 ml   Net -658.6 ml         Assessment/Plan:    I am currently managing this critically ill patient for the following problems:    Plan:  Neuro/Psych/Pain Ctrl/Sedation: (Hx: essential tremor)  Acute encephalopathy - likely 2/2 hypercapnia, & infection- resolving   CT head 10/12: Negative for acute findings   -Pain Control: liquid oxy, scheduled acetaminophen, dilaudid for dressing changes PRN  -Home primidone continued. Will hold propanolol d/t hypotension  -CAM ICU qshift, sleep-wake hygiene, delirium precautions     Respiratory/ENT:  Acute hypoxic/hypercapnic respiratory failure-likely multifactorial and 2/2 HCAP, pl effusions, volume overload  Healthcare-associated pneumonia   Recurrent atelectasis   Ventilator-dependence   Pleural effusion -s/p left-sided pigtail placement 10/17, removed 10/25  Trach placement 11/7  CXR 11/9 Stable bilateral infilitrates and effusions L>R  -Will wean O2 as tolerated to maintain SpO2 >92%  -SBT every a.m.  -Albuterol, hypertonic saline    -IPV per RT TID  -Aspiration precautions   -Trach care qshift     Cardiovascular:  (Hx: diastolic heart failure, HTN, HLD)  Septic shock-resolved  Occlusive superficial venous thrombus of the left cephalic vein  Non-occlusive DVT right IJ vein   Acute on chronic diastolic heart failure  US duplex 10/29 occlusive superficial vein thrombosis of left cephalic vein   Repeat duplex 11/4 nonocclusive thrombus to RIJ  TTE 11/2: EF  "60-65%, mild/mod AV valve regurg  -Continue midodrine 10 q8  -Holding home propranolol (on for tremors)  -Continue home Zetia  -Continuous cardiac monitoring per ICU protocol  -EKGs PRN for ACS symptoms, arrhythmias  -Heparin drip resumed-transition to Eliquis closer to discharge      GI:  Hx GERD  Diarrhea  Peg-dependent-placed 11/4  Diet: Nepro @40ml/hr  BR: waqas-colace, miralax PRN  GI Prophylaxis: PPI  -C-Diff negative, started immodium     /Volume Status/Electrolytes:  Acute kidney injury-possibly ATN +/- medication-induced (vancomycin)  Anasarca   Hypoalbuminemia   Hyponatremia  -HD 11/9 with 2.5L removed, in consideration of HD break to test renal recovery  -Gibson place for accurate I&O's   -Nephrology consulted will appreciate recs  -Replete electrolytes to maintain K >4.0 and Mg >2.0  -Daily BMP, Mg, Phos     Heme/Onc: (Hx: normocytic anemia)  Occlusive LUE DVT  Non-occlusive R IJ DVT  -Heparin gtt for RIJ dvt, will need to convert when closer to discharge   Continue iron and folate  1 unit PRBC 11/7  -Monitor for s/sx of bleeding   -Plan to transfuse if Hgb <7.0   -Daily CBC  -T&S AM draw 11/10    Endocrine: (Hx: DMII)  SSI Q4  Hypoglycemia protocol PRN     Infectious Disease:  Pseudomonas-Respiratory culture 10/29  Thoracolumbar surgical site infection - s/p I&D x 2. Recent MRSA bacteremia on extended course of antibiotics; IV ceftriaxone, IV daptomycin  Healthcare-associated pneumonia   CT lumbar spine 10/12: Unable to r/o abscess. Unable to do MRI d/t spinal hardware, \"metal in head\" per patient  -Sputum culture 11/4 negative  -BC 11/3 negative  -Continue Daptomycin every 48 hours to 11/12 and after dialysis for surgical site infection  -Continue ceftriaxone until 11/12 for pneumonia  -Infectious Disease consult placed, will appreciate recs  -Monitor SIRS criteria     MSK:  PT/OT orders placed     Ethics/Code Status:  DNR-CCA      :  DVT Prophylaxis: SCDs heparin gtt  GI Prophylaxis: " PPI home med  Bowel Regimen: Lucia-colace and Miralax   Diet: Nepro at 40  CVC: PICC placed 10/24, permacath 11/5  Wanda: No  Gibson: yes 11/7  Restraints: no  Discharge planning: [facility]    Critical Care Time:  35 minutes spent in preparing to see patient (I.e. review of medical records), evaluation of diagnostics (I.e. labs, imaging, etc.), documentation, discussing plan of care with patient/ family/ caregiver, and/ or coordination of care with multidisciplinary team. Time does not include completion of procedure time.       Tommy Amezcua PA-C

## 2024-11-11 NOTE — CARE PLAN
Problem: Respiratory  Goal: Clear secretions with interventions this shift  Outcome: Progressing  Goal: Patent airway maintained this shift  Outcome: Progressing

## 2024-11-11 NOTE — PROGRESS NOTES
"Narda Malloy is a 69 y.o. female on day 30 of admission presenting with Unresponsive.    Subjective   The patient is seen for acute kidney injury which is secondary to acute tubular necrosis he is off dialysis at this time she is responding well to IV diuretics urine output is adequate she is awake and responsive she is on the vent through tracheostomy she has no complaints       Objective     Physical Exam  Constitutional:       Comments: On the vent through a tracheostomy   Neck:      Vascular: No carotid bruit.   Cardiovascular:      Rate and Rhythm: Normal rate and regular rhythm.      Heart sounds: No murmur heard.     No friction rub. No gallop.   Pulmonary:      Breath sounds: No wheezing, rhonchi or rales.   Chest:      Chest wall: No tenderness.   Abdominal:      General: There is no distension.      Tenderness: There is no abdominal tenderness. There is no guarding or rebound.   Musculoskeletal:         General: No swelling or tenderness.      Cervical back: Neck supple.      Right lower leg: Edema present.      Left lower leg: Edema present.   Lymphadenopathy:      Cervical: No cervical adenopathy.         Last Recorded Vitals  Blood pressure 103/55, pulse 67, temperature 36.8 °C (98.2 °F), temperature source Axillary, resp. rate 16, height 1.778 m (5' 10\"), weight 114 kg (251 lb 5.2 oz), SpO2 97%.    Intake/Output last 3 Shifts:  I/O last 3 completed shifts:  In: 1005.2 (8.8 mL/kg) [I.V.:395.2 (3.5 mL/kg); NG/GT:460; IV Piggyback:150]  Out: 1320 (11.6 mL/kg) [Urine:1220 (0.3 mL/kg/hr); Stool:100]  Weight: 114 kg     Current Facility-Administered Medications:     acetaminophen (Tylenol) oral liquid 650 mg, 650 mg, oral, q4h PRN, Sabino Matos, APRN-CNP    albuterol 2.5 mg /3 mL (0.083 %) nebulizer solution 3 mL, 3 mL, nebulization, 4x daily, Kaleb Lam PA-C, 3 mL at 11/11/24 0754    alteplase (Cathflo Activase) injection 2 mg, 2 mg, intra-catheter, PRN, Kaleb Lam PA-C    brimonidine " (AlphaGAN) 0.2 % ophthalmic solution 1 drop, 1 drop, Both Eyes, BID, Kaleb Lam PA-C, 1 drop at 11/11/24 0844    calcium carbonate-vitamin D3 500 mg-5 mcg (200 unit) per tablet 1 tablet, 1 tablet, oral, Daily, Kaleb Lam PA-C, 1 tablet at 11/11/24 0843    cefTRIAXone (Rocephin) 2 g in dextrose (iso) IV 50 mL, 2 g, intravenous, q24h, Kaleb Lam PA-C, Stopped at 11/11/24 1008    collagenase 250 unit/gram ointment, , Topical, Daily, Sabino Matos, APRN-CNP, 1 Application at 11/11/24 0859    DAPTOmycin (Cubicin) 700 mg in sodium chloride 0.9%  mL, 700 mg, intravenous, q48h, Kaleb Lam PA-C, Last Rate: 200 mL/hr at 11/11/24 1006, 700 mg at 11/11/24 1006    dextrose 50 % injection 12.5 g, 12.5 g, intravenous, q15 min PRN, Kaleb Lam PA-C    dextrose 50 % injection 25 g, 25 g, intravenous, q15 min PRN, Kaleb Lam PA-C    ezetimibe (Zetia) tablet 10 mg, 10 mg, oral, Daily, Kaleb Lam PA-C, 10 mg at 11/11/24 0843    ferrous sulfate syrup 60 mg of iron, 60 mg of iron, oral, Daily, Kaleb Lam PA-C, 60 mg of iron at 11/11/24 0844    folic acid (Folvite) tablet 1 mg, 1 mg, oral, Daily, Kaleb Lam PA-C, 1 mg at 11/11/24 0842    glucagon (Glucagen) injection 1 mg, 1 mg, intramuscular, q15 min PRN, Kaleb Lam PA-C    glucagon (Glucagen) injection 1 mg, 1 mg, intramuscular, q15 min PRN, Kaleb Lam PA-C    heparin (porcine) injection 3,000-6,000 Units, 3,000-6,000 Units, intravenous, PRN, Kaleb Lam PA-C    heparin 25,000 Units in dextrose 5% 250 mL (100 Units/mL) infusion (premix), 0-4,500 Units/hr, intravenous, Continuous, Kaleb Lam PA-C, Last Rate: 16 mL/hr at 11/11/24 0638, 1,600 Units/hr at 11/11/24 0638    heparin flush 10 unit/mL syringe 50 Units, 50 Units, intra-catheter, PRN, Kaleb Lam PA-C, 50 Units at 10/19/24 2313    honey (Medihoney) topical gel, , Topical, Daily, Kaleb Lam PA-C, 1 Application at 11/11/24 0900     HYDROmorphone (Dilaudid) injection 0.4 mg, 0.4 mg, intravenous, q2h PRN, Kaleb Lam PA-C, 0.4 mg at 11/11/24 0501    insulin lispro (HumaLOG) injection 0-15 Units, 0-15 Units, subcutaneous, q4h, Kaleb Lam PA-C, 3 Units at 11/11/24 0844    latanoprost (Xalatan) 0.005 % ophthalmic solution 1 drop, 1 drop, Both Eyes, Nightly, Kaleb Lam PA-C, 1 drop at 11/10/24 2117    loperamide (Imodium) capsule 2 mg, 2 mg, oral, 4x daily PRN, RUTH Salgado, 2 mg at 11/08/24 1648    midodrine (Proamatine) tablet 10 mg, 10 mg, oral, q8h, Kaleb Lam PA-C, 10 mg at 11/11/24 0842    oxyCODONE (Roxicodone) solution 5 mg, 5 mg, oral, q4h PRN, Kaleb Lam PA-C, 5 mg at 11/10/24 0642    oxyCODONE (Roxicodone) solution 7.5 mg, 7.5 mg, oral, q4h PRN, Kaleb Lam PA-C    oxygen (O2) therapy, , inhalation, Continuous - Inhalation, Anna Marie Shook MD, 30 percent at 11/10/24 1959    pantoprazole (ProtoNix) EC tablet 40 mg, 40 mg, oral, Daily before breakfast **OR** pantoprazole (ProtoNix) injection 40 mg, 40 mg, intravenous, Daily before breakfast, Kaleb Lam PA-C, 40 mg at 11/11/24 0856    polyethylene glycol (Glycolax, Miralax) packet 17 g, 17 g, oral, Daily PRN, Kaleb Lam PA-C    potassium, sodium phosphates (Phos-NaK) 280-160-250 mg packet 1 packet, 1 packet, oral, With meals & nightly, Kaleb Lam PA-C, 1 packet at 11/11/24 0843    primidone (Mysoline) tablet 125 mg, 125 mg, oral, Nightly, Kaleb Lam PA-C, 125 mg at 11/10/24 2117    [Held by provider] propranolol LA (Inderal LA) 24 hr capsule 60 mg, 60 mg, oral, Daily, Kaleb Lam PA-C, 60 mg at 10/19/24 0643    sennosides-docusate sodium (Lucia-Colace) 8.6-50 mg per tablet 1 tablet, 1 tablet, oral, Nightly PRN, Kaleb Lam PA-C   Relevant Results    Results for orders placed or performed during the hospital encounter of 10/12/24 (from the past 96 hours)   POCT GLUCOSE   Result Value Ref Range    POCT Glucose 132  (H) 74 - 99 mg/dL   C. difficile, PCR    Specimen: Stool   Result Value Ref Range    C. difficile, PCR Not Detected Not Detected   Hemoglobin and hematocrit, blood   Result Value Ref Range    Hemoglobin 9.4 (L) 12.0 - 16.0 g/dL    Hematocrit 29.0 (L) 36.0 - 46.0 %   POCT GLUCOSE   Result Value Ref Range    POCT Glucose 149 (H) 74 - 99 mg/dL   aPTT   Result Value Ref Range    aPTT 22.6 22.0 - 32.5 seconds   POCT GLUCOSE   Result Value Ref Range    POCT Glucose 187 (H) 74 - 99 mg/dL   POCT GLUCOSE   Result Value Ref Range    POCT Glucose 179 (H) 74 - 99 mg/dL   CBC and Auto Differential   Result Value Ref Range    WBC 9.8 4.4 - 11.3 x10*3/uL    nRBC 0.0 0.0 - 0.0 /100 WBCs    RBC 2.91 (L) 4.00 - 5.20 x10*6/uL    Hemoglobin 8.8 (L) 12.0 - 16.0 g/dL    Hematocrit 26.4 (L) 36.0 - 46.0 %    MCV 91 80 - 100 fL    MCH 30.2 26.0 - 34.0 pg    MCHC 33.3 32.0 - 36.0 g/dL    RDW 21.4 (H) 11.5 - 14.5 %    Platelets 240 150 - 450 x10*3/uL    Neutrophils % 84.5 40.0 - 80.0 %    Immature Granulocytes %, Automated 1.0 (H) 0.0 - 0.9 %    Lymphocytes % 8.0 13.0 - 44.0 %    Monocytes % 5.9 2.0 - 10.0 %    Eosinophils % 0.2 0.0 - 6.0 %    Basophils % 0.4 0.0 - 2.0 %    Neutrophils Absolute 8.30 (H) 1.20 - 7.70 x10*3/uL    Immature Granulocytes Absolute, Automated 0.10 0.00 - 0.70 x10*3/uL    Lymphocytes Absolute 0.79 (L) 1.20 - 4.80 x10*3/uL    Monocytes Absolute 0.58 0.10 - 1.00 x10*3/uL    Eosinophils Absolute 0.02 0.00 - 0.70 x10*3/uL    Basophils Absolute 0.04 0.00 - 0.10 x10*3/uL   Hepatic function panel   Result Value Ref Range    Albumin 2.4 (L) 3.4 - 5.0 g/dL    Bilirubin, Total 0.4 0.0 - 1.2 mg/dL    Bilirubin, Direct 0.1 0.0 - 0.3 mg/dL    Alkaline Phosphatase 119 33 - 136 U/L    ALT 10 7 - 45 U/L    AST 11 9 - 39 U/L    Total Protein 5.6 (L) 6.4 - 8.2 g/dL   Magnesium   Result Value Ref Range    Magnesium 1.96 1.60 - 2.40 mg/dL   Blood Gas Venous Full Panel   Result Value Ref Range    POCT pH, Venous 7.48 (H) 7.33 - 7.43 pH     POCT pCO2, Venous 39 (L) 41 - 51 mm Hg    POCT pO2, Venous 110 (H) 35 - 45 mm Hg    POCT SO2, Venous 99 (H) 45 - 75 %    POCT Oxy Hemoglobin, Venous 96.9 (H) 45.0 - 75.0 %    POCT Hematocrit Calculated, Venous 28.0 (L) 36.0 - 46.0 %    POCT Sodium, Venous 131 (L) 136 - 145 mmol/L    POCT Potassium, Venous 3.9 3.5 - 5.3 mmol/L    POCT Chloride, Venous 98 98 - 107 mmol/L    POCT Ionized Calicum, Venous 1.12 1.10 - 1.33 mmol/L    POCT Glucose, Venous 143 (H) 74 - 99 mg/dL    POCT Lactate, Venous 1.5 0.4 - 2.0 mmol/L    POCT Base Excess, Venous 5.1 (H) -2.0 - 3.0 mmol/L    POCT HCO3 Calculated, Venous 29.0 (H) 22.0 - 26.0 mmol/L    POCT Hemoglobin, Venous 9.2 (L) 12.0 - 16.0 g/dL    POCT Anion Gap, Venous 8.0 (L) 10.0 - 25.0 mmol/L    Patient Temperature 37.0 degrees Celsius    FiO2 100 %   Phosphorus   Result Value Ref Range    Phosphorus 3.8 2.5 - 4.9 mg/dL   Basic Metabolic Panel   Result Value Ref Range    Glucose 148 (H) 74 - 99 mg/dL    Sodium 134 (L) 136 - 145 mmol/L    Potassium 3.8 3.5 - 5.3 mmol/L    Chloride 98 98 - 107 mmol/L    Bicarbonate 28 21 - 32 mmol/L    Anion Gap 12 10 - 20 mmol/L    Urea Nitrogen 30 (H) 6 - 23 mg/dL    Creatinine 1.77 (H) 0.50 - 1.05 mg/dL    eGFR 31 (L) >60 mL/min/1.73m*2    Calcium 7.9 (L) 8.6 - 10.3 mg/dL   aPTT   Result Value Ref Range    aPTT 55.3 (H) 22.0 - 32.5 seconds   Morphology   Result Value Ref Range    RBC Morphology See Below     Ovalocytes Few     Lexus Cells Few    POCT GLUCOSE   Result Value Ref Range    POCT Glucose 155 (H) 74 - 99 mg/dL   POCT GLUCOSE   Result Value Ref Range    POCT Glucose 156 (H) 74 - 99 mg/dL   POCT GLUCOSE   Result Value Ref Range    POCT Glucose 141 (H) 74 - 99 mg/dL   POCT GLUCOSE   Result Value Ref Range    POCT Glucose 168 (H) 74 - 99 mg/dL   POCT GLUCOSE   Result Value Ref Range    POCT Glucose 141 (H) 74 - 99 mg/dL   POCT GLUCOSE   Result Value Ref Range    POCT Glucose 175 (H) 74 - 99 mg/dL   POCT GLUCOSE   Result Value Ref Range     POCT Glucose 177 (H) 74 - 99 mg/dL   CBC and Auto Differential   Result Value Ref Range    WBC 7.0 4.4 - 11.3 x10*3/uL    nRBC 0.0 0.0 - 0.0 /100 WBCs    RBC 2.97 (L) 4.00 - 5.20 x10*6/uL    Hemoglobin 9.0 (L) 12.0 - 16.0 g/dL    Hematocrit 27.0 (L) 36.0 - 46.0 %    MCV 91 80 - 100 fL    MCH 30.3 26.0 - 34.0 pg    MCHC 33.3 32.0 - 36.0 g/dL    RDW 20.5 (H) 11.5 - 14.5 %    Platelets 259 150 - 450 x10*3/uL    Neutrophils % 79.9 40.0 - 80.0 %    Immature Granulocytes %, Automated 1.6 (H) 0.0 - 0.9 %    Lymphocytes % 11.6 13.0 - 44.0 %    Monocytes % 5.3 2.0 - 10.0 %    Eosinophils % 1.0 0.0 - 6.0 %    Basophils % 0.6 0.0 - 2.0 %    Neutrophils Absolute 5.59 1.20 - 7.70 x10*3/uL    Immature Granulocytes Absolute, Automated 0.11 0.00 - 0.70 x10*3/uL    Lymphocytes Absolute 0.81 (L) 1.20 - 4.80 x10*3/uL    Monocytes Absolute 0.37 0.10 - 1.00 x10*3/uL    Eosinophils Absolute 0.07 0.00 - 0.70 x10*3/uL    Basophils Absolute 0.04 0.00 - 0.10 x10*3/uL   Hepatic function panel   Result Value Ref Range    Albumin 2.4 (L) 3.4 - 5.0 g/dL    Bilirubin, Total 0.3 0.0 - 1.2 mg/dL    Bilirubin, Direct 0.0 0.0 - 0.3 mg/dL    Alkaline Phosphatase 116 33 - 136 U/L    ALT 9 7 - 45 U/L    AST 9 9 - 39 U/L    Total Protein 5.7 (L) 6.4 - 8.2 g/dL   Magnesium   Result Value Ref Range    Magnesium 1.88 1.60 - 2.40 mg/dL   Blood Gas Venous Full Panel   Result Value Ref Range    POCT pH, Venous 7.47 (H) 7.33 - 7.43 pH    POCT pCO2, Venous 42 41 - 51 mm Hg    POCT pO2, Venous 43 35 - 45 mm Hg    POCT SO2, Venous 79 (H) 45 - 75 %    POCT Oxy Hemoglobin, Venous 77.3 (H) 45.0 - 75.0 %    POCT Hematocrit Calculated, Venous 28.0 (L) 36.0 - 46.0 %    POCT Sodium, Venous 131 (L) 136 - 145 mmol/L    POCT Potassium, Venous 4.0 3.5 - 5.3 mmol/L    POCT Chloride, Venous 96 (L) 98 - 107 mmol/L    POCT Ionized Calicum, Venous 1.16 1.10 - 1.33 mmol/L    POCT Glucose, Venous 171 (H) 74 - 99 mg/dL    POCT Lactate, Venous 0.8 0.4 - 2.0 mmol/L    POCT Base  Excess, Venous 6.3 (H) -2.0 - 3.0 mmol/L    POCT HCO3 Calculated, Venous 30.6 (H) 22.0 - 26.0 mmol/L    POCT Hemoglobin, Venous 9.4 (L) 12.0 - 16.0 g/dL    POCT Anion Gap, Venous 8.0 (L) 10.0 - 25.0 mmol/L    Patient Temperature 37.0 degrees Celsius    FiO2 30 %   Phosphorus   Result Value Ref Range    Phosphorus 2.9 2.5 - 4.9 mg/dL   Basic Metabolic Panel   Result Value Ref Range    Glucose 175 (H) 74 - 99 mg/dL    Sodium 132 (L) 136 - 145 mmol/L    Potassium 3.8 3.5 - 5.3 mmol/L    Chloride 96 (L) 98 - 107 mmol/L    Bicarbonate 29 21 - 32 mmol/L    Anion Gap 11 10 - 20 mmol/L    Urea Nitrogen 28 (H) 6 - 23 mg/dL    Creatinine 1.34 (H) 0.50 - 1.05 mg/dL    eGFR 43 (L) >60 mL/min/1.73m*2    Calcium 8.1 (L) 8.6 - 10.3 mg/dL   aPTT   Result Value Ref Range    aPTT 76.4 (H) 22.0 - 32.5 seconds   Morphology   Result Value Ref Range    RBC Morphology See Below     Ovalocytes Few     Lexus Cells Few    Lavender Top   Result Value Ref Range    Extra Tube Hold for add-ons.    PST Top   Result Value Ref Range    Extra Tube Hold for add-ons.    aPTT   Result Value Ref Range    aPTT 78.0 (H) 22.0 - 32.5 seconds   POCT GLUCOSE   Result Value Ref Range    POCT Glucose 193 (H) 74 - 99 mg/dL   POCT GLUCOSE   Result Value Ref Range    POCT Glucose 197 (H) 74 - 99 mg/dL   POCT GLUCOSE   Result Value Ref Range    POCT Glucose 174 (H) 74 - 99 mg/dL   aPTT   Result Value Ref Range    aPTT 51.3 (H) 22.0 - 32.5 seconds   POCT GLUCOSE   Result Value Ref Range    POCT Glucose 166 (H) 74 - 99 mg/dL   POCT GLUCOSE   Result Value Ref Range    POCT Glucose 167 (H) 74 - 99 mg/dL   POCT GLUCOSE   Result Value Ref Range    POCT Glucose 200 (H) 74 - 99 mg/dL   CBC and Auto Differential   Result Value Ref Range    WBC 6.1 4.4 - 11.3 x10*3/uL    nRBC 0.0 0.0 - 0.0 /100 WBCs    RBC 2.85 (L) 4.00 - 5.20 x10*6/uL    Hemoglobin 8.7 (L) 12.0 - 16.0 g/dL    Hematocrit 26.6 (L) 36.0 - 46.0 %    MCV 93 80 - 100 fL    MCH 30.5 26.0 - 34.0 pg    MCHC 32.7 32.0  - 36.0 g/dL    RDW 19.8 (H) 11.5 - 14.5 %    Platelets 265 150 - 450 x10*3/uL    Neutrophils % 80.8 40.0 - 80.0 %    Immature Granulocytes %, Automated 1.0 (H) 0.0 - 0.9 %    Lymphocytes % 10.1 13.0 - 44.0 %    Monocytes % 6.9 2.0 - 10.0 %    Eosinophils % 0.7 0.0 - 6.0 %    Basophils % 0.5 0.0 - 2.0 %    Neutrophils Absolute 4.89 1.20 - 7.70 x10*3/uL    Immature Granulocytes Absolute, Automated 0.06 0.00 - 0.70 x10*3/uL    Lymphocytes Absolute 0.61 (L) 1.20 - 4.80 x10*3/uL    Monocytes Absolute 0.42 0.10 - 1.00 x10*3/uL    Eosinophils Absolute 0.04 0.00 - 0.70 x10*3/uL    Basophils Absolute 0.03 0.00 - 0.10 x10*3/uL   Hepatic function panel   Result Value Ref Range    Albumin 2.3 (L) 3.4 - 5.0 g/dL    Bilirubin, Total 0.3 0.0 - 1.2 mg/dL    Bilirubin, Direct 0.0 0.0 - 0.3 mg/dL    Alkaline Phosphatase 109 33 - 136 U/L    ALT 8 7 - 45 U/L    AST 8 (L) 9 - 39 U/L    Total Protein 5.2 (L) 6.4 - 8.2 g/dL   Magnesium   Result Value Ref Range    Magnesium 1.92 1.60 - 2.40 mg/dL   Type and screen   Result Value Ref Range    ABO TYPE A     Rh TYPE NEG     ANTIBODY SCREEN NEG    Phosphorus   Result Value Ref Range    Phosphorus 3.6 2.5 - 4.9 mg/dL   Basic Metabolic Panel   Result Value Ref Range    Glucose 360 (H) 74 - 99 mg/dL    Sodium 128 (L) 136 - 145 mmol/L    Potassium 3.9 3.5 - 5.3 mmol/L    Chloride 90 (L) 98 - 107 mmol/L    Bicarbonate 28 21 - 32 mmol/L    Anion Gap 14 10 - 20 mmol/L    Urea Nitrogen 35 (H) 6 - 23 mg/dL    Creatinine 1.59 (H) 0.50 - 1.05 mg/dL    eGFR 35 (L) >60 mL/min/1.73m*2    Calcium 7.6 (L) 8.6 - 10.3 mg/dL   Blood Gas Venous Full Panel   Result Value Ref Range    POCT pH, Venous 7.47 (H) 7.33 - 7.43 pH    POCT pCO2, Venous 46 41 - 51 mm Hg    POCT pO2, Venous 40 35 - 45 mm Hg    POCT SO2, Venous 74 45 - 75 %    POCT Oxy Hemoglobin, Venous 72.8 45.0 - 75.0 %    POCT Hematocrit Calculated, Venous 26.0 (L) 36.0 - 46.0 %    POCT Sodium, Venous 126 (L) 136 - 145 mmol/L    POCT Potassium, Venous  4.0 3.5 - 5.3 mmol/L    POCT Chloride, Venous 93 (L) 98 - 107 mmol/L    POCT Ionized Calicum, Venous 0.97 (L) 1.10 - 1.33 mmol/L    POCT Glucose, Venous 298 (H) 74 - 99 mg/dL    POCT Lactate, Venous 0.8 0.4 - 2.0 mmol/L    POCT Base Excess, Venous 8.9 (H) -2.0 - 3.0 mmol/L    POCT HCO3 Calculated, Venous 33.5 (H) 22.0 - 26.0 mmol/L    POCT Hemoglobin, Venous 8.8 (L) 12.0 - 16.0 g/dL    POCT Anion Gap, Venous 4.0 (L) 10.0 - 25.0 mmol/L    Patient Temperature 37.0 degrees Celsius    FiO2 30 %   aPTT   Result Value Ref Range    aPTT >139.0 (HH) 22.0 - 32.5 seconds   aPTT   Result Value Ref Range    aPTT 40.0 (H) 22.0 - 32.5 seconds   POCT GLUCOSE   Result Value Ref Range    POCT Glucose 203 (H) 74 - 99 mg/dL   POCT GLUCOSE   Result Value Ref Range    POCT Glucose 187 (H) 74 - 99 mg/dL   aPTT   Result Value Ref Range    aPTT 36.4 (H) 22.0 - 32.5 seconds   POCT GLUCOSE   Result Value Ref Range    POCT Glucose 173 (H) 74 - 99 mg/dL   POCT GLUCOSE   Result Value Ref Range    POCT Glucose 152 (H) 74 - 99 mg/dL   POCT GLUCOSE   Result Value Ref Range    POCT Glucose 153 (H) 74 - 99 mg/dL   POCT GLUCOSE   Result Value Ref Range    POCT Glucose 136 (H) 74 - 99 mg/dL   CBC and Auto Differential   Result Value Ref Range    WBC 6.2 4.4 - 11.3 x10*3/uL    nRBC 0.0 0.0 - 0.0 /100 WBCs    RBC 3.01 (L) 4.00 - 5.20 x10*6/uL    Hemoglobin 9.1 (L) 12.0 - 16.0 g/dL    Hematocrit 27.8 (L) 36.0 - 46.0 %    MCV 92 80 - 100 fL    MCH 30.2 26.0 - 34.0 pg    MCHC 32.7 32.0 - 36.0 g/dL    RDW 19.3 (H) 11.5 - 14.5 %    Platelets 314 150 - 450 x10*3/uL    Neutrophils % 76.8 40.0 - 80.0 %    Immature Granulocytes %, Automated 1.0 (H) 0.0 - 0.9 %    Lymphocytes % 14.1 13.0 - 44.0 %    Monocytes % 6.2 2.0 - 10.0 %    Eosinophils % 1.1 0.0 - 6.0 %    Basophils % 0.8 0.0 - 2.0 %    Neutrophils Absolute 4.72 1.20 - 7.70 x10*3/uL    Immature Granulocytes Absolute, Automated 0.06 0.00 - 0.70 x10*3/uL    Lymphocytes Absolute 0.87 (L) 1.20 - 4.80 x10*3/uL     Monocytes Absolute 0.38 0.10 - 1.00 x10*3/uL    Eosinophils Absolute 0.07 0.00 - 0.70 x10*3/uL    Basophils Absolute 0.05 0.00 - 0.10 x10*3/uL   Hepatic function panel   Result Value Ref Range    Albumin 2.4 (L) 3.4 - 5.0 g/dL    Bilirubin, Total 0.3 0.0 - 1.2 mg/dL    Bilirubin, Direct 0.0 0.0 - 0.3 mg/dL    Alkaline Phosphatase 111 33 - 136 U/L    ALT 8 7 - 45 U/L    AST 8 (L) 9 - 39 U/L    Total Protein 5.6 (L) 6.4 - 8.2 g/dL   Magnesium   Result Value Ref Range    Magnesium 1.97 1.60 - 2.40 mg/dL   Phosphorus   Result Value Ref Range    Phosphorus 4.4 2.5 - 4.9 mg/dL   Basic Metabolic Panel   Result Value Ref Range    Glucose 128 (H) 74 - 99 mg/dL    Sodium 131 (L) 136 - 145 mmol/L    Potassium 4.1 3.5 - 5.3 mmol/L    Chloride 93 (L) 98 - 107 mmol/L    Bicarbonate 30 21 - 32 mmol/L    Anion Gap 12 10 - 20 mmol/L    Urea Nitrogen 41 (H) 6 - 23 mg/dL    Creatinine 1.78 (H) 0.50 - 1.05 mg/dL    eGFR 31 (L) >60 mL/min/1.73m*2    Calcium 8.2 (L) 8.6 - 10.3 mg/dL   aPTT   Result Value Ref Range    aPTT 54.8 (H) 22.0 - 32.5 seconds   POCT GLUCOSE   Result Value Ref Range    POCT Glucose 169 (H) 74 - 99 mg/dL     *Note: Due to a large number of results and/or encounters for the requested time period, some results have not been displayed. A complete set of results can be found in Results Review.       Assessment/Plan   Acute kidney injury patient is now nonoliguric creatinine slightly elevated however it still stable my plan no further dialysis at this time we will continue to diurese as needed and monitor renal function very closely.  Hyponatremia sodium is much improved up to 131  Edema much improved to diuresis  Pneumonia continue with antibiotic therapy infectious disease  Diabetes mellitus type 2  Malnutrition continue with tube feeding  Lower back surgery site infection  Anemia transfuse when hemoglobin less than 7  Acute respiratory failure plan to continue the ventilator through a tracheostomy         Imer M  MD [place]

## 2024-11-11 NOTE — PROGRESS NOTES
Occupational Therapy    OT Treatment    Patient Name: Narda Malloy  MRN: [id number]  Department: [facility] S ICU  Room: 11/11-A  Today's Date: 11/11/2024  Time Calculation  Start Time: 0902  Stop Time: 0932  Time Calculation (min): 30 min        Assessment:  OT Assessment: Pt making progress towards established OT goals, cooperative in therapy session with encouragement. Pt would benefit from acute OT services to address deficits in ADLs, functional mobility, and transfers  End of Session Communication: Bedside nurse  End of Session Patient Position: Bed, 3 rail up, Alarm on (all needs in reach)  OT Assessment Results: Decreased ADL status, Decreased upper extremity range of motion, Decreased upper extremity strength, Decreased safe judgment during ADL, Decreased cognition, Decreased endurance, Decreased fine motor control, Decreased functional mobility, Decreased gross motor control, Decreased trunk control for functional activities  Plan:  Treatment Interventions: ADL retraining, UE strengthening/ROM, Functional transfer training, Endurance training, Cognitive reorientation, Patient/family training, Equipment evaluation/education, Compensatory technique education  OT Frequency: 5 times per week  OT Discharge Recommendations: Moderate intensity level of continued care  Equipment Recommended upon Discharge: Wheelchair  OT Recommended Transfer Status: Maximum assist, Assist of 2  OT - OK to Discharge: Yes  Treatment Interventions: ADL retraining, UE strengthening/ROM, Functional transfer training, Endurance training, Cognitive reorientation, Patient/family training, Equipment evaluation/education, Compensatory technique education    Subjective   Previous Visit Info:  OT Last Visit  OT Received On: 11/11/24  General:  General  Past Medical History Relevant to Rehab: L1-3 lumbar lami, T4-S1 revision/fusion, DM, HTN, essential tremor, glaucoma, sarcoidosis, arthritis, anxiety, depression  Missed Visit: No  Missed Visit  Reason: Other (Comment)  Family/Caregiver Present: No  Co-Treatment: PT  Prior to Session Communication: Bedside nurse  Patient Position Received: Bed, 3 rail up, Alarm off, not on at start of session  General Comment: Cleared by nursing. Pt agreeable to therapy  Precautions:  Hearing/Visual Limitations: h/o visual deficits, mildly [place]  Medical Precautions: Fall precautions, Oxygen therapy device and L/min, Spinal precautions  Precautions Comment: vent via trach, PEG tube, + IVs, +FMS, + anders catheter    Vital Signs Comment: HR 76, SpO2 100%, /56 map77     Pain:  Pain Assessment  Pain Assessment: 0-10  0-10 (Numeric) Pain Score: 0 - No pain    Objective    Cognition:  Cognition  Overall Cognitive Status: Within Functional Limits  Following Commands: Follows one step commands with increased time     Activities of Daily Living:      Grooming  Grooming Level of Assistance: Dependent, Maximum assistance  Grooming Where Assessed: Edge of bed, Bed level  Grooming Comments: dep A combing hair seated EOB; max A to apply chapstick, pt initially attempted, poor coordination noted unable to bring hand to mouth, required max A to complete     Bed Mobility/Transfers: Bed Mobility  Bed Mobility:  (max A x3 for trunk control and assist with BLEs supine<>seated EOB; max A x3 for scooting towards HOB with use of sheet)    Transfers  Transfer: No     Functional Mobility:  Functional Mobility  Functional Mobility Performed: No  Sitting Balance:  Static Sitting Balance  Static Sitting-Level of Assistance: Maximum assistance (x2)  Static Sitting-Comment/Number of Minutes: ~10 minutes (max A x2 to maintain seated balance, left lateral lean noted)       Therapy/Activity: Therapeutic Exercise  Therapeutic Exercise Performed:  (Pt performed 10 reps x1 set BUE exercises (bicep curls, wrist flexion/extension, digit flexion/extension) to improve strength and indep with ADLs/mobility. Impaired coordination observed this date, tremulous  BUEs)    Outcome Measures:[facility] Daily Activity  Putting on and taking off regular lower body clothing: Total  Bathing (including washing, rinsing, drying): Total  Putting on and taking off regular upper body clothing: A lot  Toileting, which includes using toilet, bedpan or urinal: Total  Taking care of personal grooming such as brushing teeth: A lot  Eating Meals: Total  Daily Activity - Total Score: 8      Education Documentation  ADL Training, taught by Rubi Dee OT at 11/11/2024  3:33 PM.  Learner: Patient  Readiness: Acceptance  Method: Explanation, Demonstration  Response: Verbalizes Understanding, Needs Reinforcement    Education Comments  No comments found.      Goals:  Encounter Problems       Encounter Problems (Active)       OT Goals       Pt will complete self-feeding with setup. (Not Progressing)       Start:  10/28/24    Expected End:  11/28/24            Pt will complete all grooming tasks with min A. (Progressing)       Start:  10/28/24    Expected End:  11/28/24            Pt will complete UB dressing/bathing with min A. (Not Progressing)       Start:  10/28/24    Expected End:  11/28/24            Pt will tolerate sitting EOB for at least 10-15 minutes with F+ balance in order to enhance ADL's. (Progressing)       Start:  10/28/24    Expected End:  11/28/24            Pt will complete all functional transfers and mobility with mod A using a RW. (Progressing)       Start:  10/28/24    Expected End:  11/28/24

## 2024-11-11 NOTE — PROGRESS NOTES
Physical Therapy    Physical Therapy Treatment    Patient Name: Narda Malloy  MRN: [id number]  Department: [address] ICU  Room: 11/11-A  Today's Date: 11/11/2024  Time Calculation  Start Time: 0901  Stop Time: 0932  Time Calculation (min): 31 min  Assessment/Plan   PT Assessment  End of Session Communication: Bedside nurse  Assessment Comment: pt cooperative and eager to mobilize this date, progression of activites in limited by no motor movement B LEs, pt with weak core requiring max A x2 for balance at EOB. pt will benefit from continued skilled PT services to address functional deficits.  End of Session Patient Position: Bed, 3 rail up, Alarm on (callbutton in left hand)  PT Plan  Inpatient/Swing Bed or Outpatient: Inpatient  PT Plan  Treatment/Interventions: Bed mobility, Transfer training, Balance training, Strengthening, Endurance training, Range of motion, Therapeutic exercise, Therapeutic activity  PT Plan: Ongoing PT  PT Frequency: 6 times per week  PT Discharge Recommendations: Moderate intensity level of continued care (LTAC)  Equipment Recommended upon Discharge: Wheelchair  PT Recommended Transfer Status: Total assist  PT - OK to Discharge: Yes    General Visit Information:   PT  Visit  PT Received On: 11/11/24  Response to Previous Treatment: Patient with no complaints from previous session. (pt mouthing words, states she feels good)  General  Missed Visit: No  Missed Visit Reason: Other (Comment)  Family/Caregiver Present: No  Co-Treatment: OT  Co-Treatment Reason: patient/caregiver safety and optimization of patient outcomes of a medically comples treatment in ICU  Prior to Session Communication: Bedside nurse  Patient Position Received: Bed, 3 rail up, Alarm off, not on at start of session  Preferred Learning Style: auditory, verbal  General Comment: pt agreeable to sitting on EOB    Subjective   Precautions:  Precautions  Hearing/Visual Limitations: h/o visual deficits, mildly [place]  Medical  Precautions: Fall precautions, Oxygen therapy device and L/min (ventilator via tracheostomy FiO2 30%, PEEP 5)  Precautions Comment: spinal precautions, ventilator via tracheostomy, PEG, FMS, Catheter, IVs    Vital Signs Comment: 77 bpm, 100%, 126/56 (77) mmHg, 17 breaths/min     Objective   Pain:  Pain Assessment  Pain Assessment: 0-10  0-10 (Numeric) Pain Score: 0 - No pain  Cognition:  Cognition  Overall Cognitive Status: Within Functional Limits  Arousal/Alertness: Appropriate responses to stimuli  Following Commands: Follows one step commands with increased time  Cognition Comments: pt is able to mouthe words for communication, able to make her needs known  Coordination:  Coordination Comment: B UEs tremulous with movement, B LEs flaccid  Postural Control:  Postural Control  Posture Comment: tends to extend head and back when sitting on EOB  Extremity/Trunk Assessments:  Strength RLE  RLE Overall Strength:  (0/5 except trace in hip adductor)  Strength LLE  LLE Overall Strength:  (0/5 except trace in hip adductor)  Activity Tolerance:  Activity Tolerance  Endurance: Tolerates less than 10 min exercise, no significant change in vital signs  Activity Tolerance Comments: poor, back discomfort seems to limit sitting time  Rate of Perceived Exertion (RPE): 4  Treatments:  Therapeutic Exercise  Therapeutic Exercise Performed:  (several reps B APs, heel slides, SAQ, hip abd/add/IR/ER: no assistnace or resistance from pt)    Balance/Neuromuscular Re-Education  Balance/Neuromuscular Re-Education Activity Performed: Yes  Balance/Neuromuscular Re-Education Activity 1: Sat EOB x 12 minutes, max A x 2 for support, external support L UE at the elbow to encourage extension to help support trunk. leaning heavily to the left. pt attempting to right herself with cues.    Bed Mobility  Bed Mobility: Yes  Bed Mobility 1  Bed Mobility 1: Supine to sitting  Level of Assistance 1: Dependent, x 3  Bed Mobility Comments 1: no movement in  B LEs, dependent x 3 to elevate trunk and square hips at EOB  Bed Mobility 2  Bed Mobility  2: Sitting to supine  Level of Assistance 2: Dependent, x 3  Bed Mobility Comments 2: dependent for trunk and B LE management  Bed Mobility 3  Bed Mobility 3: Rolling right, Rolling left  Level of Assistance 3: Maximum assistance, +2  Bed Mobility Comments 3: log rolling with max A x 2 for turning trunk, assist pt to reach UEs across body for bed rail.  Bed Mobility 4  Bed Mobility 4: Scooting  Level of Assistance 4: Dependent, +2  Bed Mobility Comments 4: boosted to HOB using the draw sheet.    Other Activity  Other Activity Performed:  (pt positioned with PRAFOs B feet, UEs elevated.)    Outcome Measures:  [facility] Basic Mobility  Turning from your back to your side while in a flat bed without using bedrails: Total  Moving from lying on your back to sitting on the side of a flat bed without using bedrails: Total  Moving to and from bed to chair (including a wheelchair): Total  Standing up from a chair using your arms (e.g. wheelchair or bedside chair): Total  To walk in hospital room: Total  Climbing 3-5 steps with railing: Total  Basic Mobility - Total Score: 6    FSS-ICU  Ambulation: Unable to attempt due to weakness  Rolling: Total assistance (performs 25% or requires another person)  Sitting: Total assistance (performs 25% or requires another person)  Transfer Sit-to-Stand: Unable to perform  Transfer Supine-to-Sit: Total assistance (performs 25% or requires another person)  Total Score: 3    Education Documentation  Precautions, taught by Kisha Hinton PT at 11/11/2024  4:52 PM.  Learner: Patient  Readiness: Acceptance  Method: Explanation  Response: Demonstrated Understanding    Body Mechanics, taught by Kisha Hinton PT at 11/11/2024  4:52 PM.  Learner: Patient  Readiness: Acceptance  Method: Explanation  Response: Demonstrated Understanding    Mobility Training, taught by Kisha Hinton PT at  11/11/2024  4:52 PM.  Learner: Patient  Readiness: Acceptance  Method: Explanation  Response: Demonstrated Understanding    Education Comments  No comments found.      Encounter Problems       Encounter Problems (Active)       PT STG Problem       Patient will roll right and left with minimal assist to facilitate mobility. (Progressing)       Start:  10/28/24    Expected End:  11/25/24            Patient will transfer supine to sit and sit to supine with moderate assist to facilitate mobility. (Progressing)       Start:  10/28/24    Expected End:  11/25/24            Patient will transfer sit to stand and stand to sit with maximal assist to facilitate mobility. (Not Progressing)       Start:  10/28/24    Expected End:  11/25/24            Patient will transfer bed to chair and chair to bed with maximal assist to facilitate mobility. (Not Progressing)       Start:  10/28/24    Expected End:  11/25/24            Patient will increase strength in B LEs by half grade throughout to improve functional mobility.  (Not Progressing)       Start:  10/28/24    Expected End:  11/25/24            Patient will tolerate 15 minutes of sitting on EOB to improve ability to perform functional transfers. (Progressing)       Start:  10/28/24    Expected End:  11/25/24

## 2024-11-12 ENCOUNTER — APPOINTMENT (OUTPATIENT)
Dept: CARDIOLOGY | Facility: HOSPITAL | Age: 69
End: 2024-11-12
Payer: MEDICARE

## 2024-11-12 LAB
ALBUMIN SERPL BCP-MCNC: 2.3 G/DL (ref 3.4–5)
ALBUMIN SERPL BCP-MCNC: 2.3 G/DL (ref 3.4–5)
ALP SERPL-CCNC: 108 U/L (ref 33–136)
ALT SERPL W P-5'-P-CCNC: 8 U/L (ref 7–45)
ANION GAP SERPL CALCULATED.3IONS-SCNC: 12 MMOL/L (ref 10–20)
AST SERPL W P-5'-P-CCNC: 8 U/L (ref 9–39)
BASOPHILS # BLD AUTO: 0.04 X10*3/UL (ref 0–0.1)
BASOPHILS NFR BLD AUTO: 0.6 %
BILIRUB DIRECT SERPL-MCNC: 0 MG/DL (ref 0–0.3)
BILIRUB SERPL-MCNC: 0.3 MG/DL (ref 0–1.2)
BUN SERPL-MCNC: 50 MG/DL (ref 6–23)
CALCIUM SERPL-MCNC: 8.1 MG/DL (ref 8.6–10.3)
CHLORIDE SERPL-SCNC: 93 MMOL/L (ref 98–107)
CO2 SERPL-SCNC: 30 MMOL/L (ref 21–32)
CREAT SERPL-MCNC: 1.77 MG/DL (ref 0.5–1.05)
EGFRCR SERPLBLD CKD-EPI 2021: 31 ML/MIN/1.73M*2
EOSINOPHIL # BLD AUTO: 0.06 X10*3/UL (ref 0–0.7)
EOSINOPHIL NFR BLD AUTO: 0.9 %
ERYTHROCYTE [DISTWIDTH] IN BLOOD BY AUTOMATED COUNT: 18.8 % (ref 11.5–14.5)
GLUCOSE BLD MANUAL STRIP-MCNC: 144 MG/DL (ref 74–99)
GLUCOSE BLD MANUAL STRIP-MCNC: 148 MG/DL (ref 74–99)
GLUCOSE BLD MANUAL STRIP-MCNC: 159 MG/DL (ref 74–99)
GLUCOSE BLD MANUAL STRIP-MCNC: 165 MG/DL (ref 74–99)
GLUCOSE BLD MANUAL STRIP-MCNC: 182 MG/DL (ref 74–99)
GLUCOSE BLD MANUAL STRIP-MCNC: 187 MG/DL (ref 74–99)
GLUCOSE SERPL-MCNC: 138 MG/DL (ref 74–99)
HCT VFR BLD AUTO: 26.6 % (ref 36–46)
HGB BLD-MCNC: 8.7 G/DL (ref 12–16)
IMM GRANULOCYTES # BLD AUTO: 0.07 X10*3/UL (ref 0–0.7)
IMM GRANULOCYTES NFR BLD AUTO: 1.1 % (ref 0–0.9)
LYMPHOCYTES # BLD AUTO: 0.72 X10*3/UL (ref 1.2–4.8)
LYMPHOCYTES NFR BLD AUTO: 10.9 %
MAGNESIUM SERPL-MCNC: 1.87 MG/DL (ref 1.6–2.4)
MCH RBC QN AUTO: 30.2 PG (ref 26–34)
MCHC RBC AUTO-ENTMCNC: 32.7 G/DL (ref 32–36)
MCV RBC AUTO: 92 FL (ref 80–100)
MONOCYTES # BLD AUTO: 0.4 X10*3/UL (ref 0.1–1)
MONOCYTES NFR BLD AUTO: 6.1 %
NEUTROPHILS # BLD AUTO: 5.32 X10*3/UL (ref 1.2–7.7)
NEUTROPHILS NFR BLD AUTO: 80.4 %
NRBC BLD-RTO: 0 /100 WBCS (ref 0–0)
PHOSPHATE SERPL-MCNC: 5 MG/DL (ref 2.5–4.9)
PLATELET # BLD AUTO: 340 X10*3/UL (ref 150–450)
POTASSIUM SERPL-SCNC: 4 MMOL/L (ref 3.5–5.3)
PROT SERPL-MCNC: 5.4 G/DL (ref 6.4–8.2)
RBC # BLD AUTO: 2.88 X10*6/UL (ref 4–5.2)
SODIUM SERPL-SCNC: 131 MMOL/L (ref 136–145)
WBC # BLD AUTO: 6.6 X10*3/UL (ref 4.4–11.3)

## 2024-11-12 PROCEDURE — 94003 VENT MGMT INPAT SUBQ DAY: CPT

## 2024-11-12 PROCEDURE — 2500000004 HC RX 250 GENERAL PHARMACY W/ HCPCS (ALT 636 FOR OP/ED)

## 2024-11-12 PROCEDURE — 99232 SBSQ HOSP IP/OBS MODERATE 35: CPT | Performed by: OTOLARYNGOLOGY

## 2024-11-12 PROCEDURE — 84155 ASSAY OF PROTEIN SERUM: CPT

## 2024-11-12 PROCEDURE — 9420000001 HC RT PATIENT EDUCATION 5 MIN

## 2024-11-12 PROCEDURE — 93971 EXTREMITY STUDY: CPT | Performed by: SURGERY

## 2024-11-12 PROCEDURE — 80069 RENAL FUNCTION PANEL: CPT

## 2024-11-12 PROCEDURE — 83735 ASSAY OF MAGNESIUM: CPT

## 2024-11-12 PROCEDURE — 36600 WITHDRAWAL OF ARTERIAL BLOOD: CPT

## 2024-11-12 PROCEDURE — 2500000001 HC RX 250 WO HCPCS SELF ADMINISTERED DRUGS (ALT 637 FOR MEDICARE OP)

## 2024-11-12 PROCEDURE — 2020000001 HC ICU ROOM DAILY

## 2024-11-12 PROCEDURE — 82947 ASSAY GLUCOSE BLOOD QUANT: CPT

## 2024-11-12 PROCEDURE — 37799 UNLISTED PX VASCULAR SURGERY: CPT

## 2024-11-12 PROCEDURE — 85025 COMPLETE CBC W/AUTO DIFF WBC: CPT

## 2024-11-12 PROCEDURE — 94640 AIRWAY INHALATION TREATMENT: CPT

## 2024-11-12 PROCEDURE — 99291 CRITICAL CARE FIRST HOUR: CPT

## 2024-11-12 PROCEDURE — 2500000001 HC RX 250 WO HCPCS SELF ADMINISTERED DRUGS (ALT 637 FOR MEDICARE OP): Performed by: INTERNAL MEDICINE

## 2024-11-12 PROCEDURE — 94669 MECHANICAL CHEST WALL OSCILL: CPT

## 2024-11-12 PROCEDURE — 94668 MNPJ CHEST WALL SBSQ: CPT

## 2024-11-12 PROCEDURE — 93971 EXTREMITY STUDY: CPT

## 2024-11-12 PROCEDURE — 2500000002 HC RX 250 W HCPCS SELF ADMINISTERED DRUGS (ALT 637 FOR MEDICARE OP, ALT 636 FOR OP/ED)

## 2024-11-12 PROCEDURE — 2500000005 HC RX 250 GENERAL PHARMACY W/O HCPCS: Performed by: SURGERY

## 2024-11-12 RX ORDER — DOXYCYCLINE 100 MG/1
100 CAPSULE ORAL EVERY 12 HOURS SCHEDULED
Status: DISCONTINUED | OUTPATIENT
Start: 2024-11-12 | End: 2024-11-14 | Stop reason: HOSPADM

## 2024-11-12 RX ORDER — DOXYCYCLINE 100 MG/1
100 CAPSULE ORAL 2 TIMES DAILY
Status: DISCONTINUED | OUTPATIENT
Start: 2024-11-12 | End: 2024-11-12

## 2024-11-12 RX ORDER — AMOXICILLIN 500 MG/1
500 CAPSULE ORAL DAILY
Status: DISCONTINUED | OUTPATIENT
Start: 2024-11-12 | End: 2024-11-14 | Stop reason: HOSPADM

## 2024-11-12 RX ORDER — LANOLIN ALCOHOL/MO/W.PET/CERES
400 CREAM (GRAM) TOPICAL ONCE
Status: COMPLETED | OUTPATIENT
Start: 2024-11-12 | End: 2024-11-12

## 2024-11-12 RX ORDER — MIDODRINE HYDROCHLORIDE 10 MG/1
10 TABLET ORAL
Status: DISCONTINUED | OUTPATIENT
Start: 2024-11-12 | End: 2024-11-12

## 2024-11-12 RX ORDER — MIDODRINE HYDROCHLORIDE 5 MG/1
5 TABLET ORAL
Status: DISCONTINUED | OUTPATIENT
Start: 2024-11-13 | End: 2024-11-14 | Stop reason: HOSPADM

## 2024-11-12 RX ORDER — ONDANSETRON HYDROCHLORIDE 2 MG/ML
4 INJECTION, SOLUTION INTRAVENOUS EVERY 4 HOURS PRN
Status: DISCONTINUED | OUTPATIENT
Start: 2024-11-12 | End: 2024-11-14 | Stop reason: HOSPADM

## 2024-11-12 ASSESSMENT — PAIN SCALES - GENERAL
PAINLEVEL_OUTOF10: 0 - NO PAIN
PAINLEVEL_OUTOF10: 5 - MODERATE PAIN
PAINLEVEL_OUTOF10: 0 - NO PAIN
PAINLEVEL_OUTOF10: 0 - NO PAIN
PAINLEVEL_OUTOF10: 2
PAINLEVEL_OUTOF10: 0 - NO PAIN

## 2024-11-12 ASSESSMENT — PAIN DESCRIPTION - DESCRIPTORS
DESCRIPTORS: DISCOMFORT
DESCRIPTORS: DISCOMFORT

## 2024-11-12 ASSESSMENT — PAIN - FUNCTIONAL ASSESSMENT
PAIN_FUNCTIONAL_ASSESSMENT: 0-10
PAIN_FUNCTIONAL_ASSESSMENT: CPOT (CRITICAL CARE PAIN OBSERVATION TOOL)
PAIN_FUNCTIONAL_ASSESSMENT: CPOT (CRITICAL CARE PAIN OBSERVATION TOOL)
PAIN_FUNCTIONAL_ASSESSMENT: 0-10
PAIN_FUNCTIONAL_ASSESSMENT: CPOT (CRITICAL CARE PAIN OBSERVATION TOOL)
PAIN_FUNCTIONAL_ASSESSMENT: 0-10

## 2024-11-12 ASSESSMENT — ACTIVITIES OF DAILY LIVING (ADL): EFFECT OF PAIN ON DAILY ACTIVITIES: YES

## 2024-11-12 ASSESSMENT — PAIN SCALES - WONG BAKER: WONGBAKER_NUMERICALRESPONSE: NO HURT

## 2024-11-12 NOTE — PROGRESS NOTES
"INFECTIOUS DISEASES PROGRESS NOTE    Consulted / following patient for:  Respiratory failure/pneumonia/Pseudomonas in the sputum  Recent polymicrobial complicated postoperative lumbar wound infection, MRSA and Proteus  Acute kidney injury    Subjective   Interval History:   Denies pain or dyspnea, she is pleased with her progress.    Objective   PHYSICAL EXAMINATION  Vital signs: Afebrile, no vasopressor agents  General: Alert, on ventilator/tracheostomy  Lungs: Diminished, clear.  On ventilator with FiO2 30%  Heart:  S1, S2 normal  Abdomen:  Soft, nontender.  PEG tube in place and functioning  Extremities:  No cords, phlebitis, cellulitis.      Relevant Results  WBC: 6600  Creatinine: (Dialysis)  Pleural fluid: Transudate with 197 WBC, 56% neutrophils, protein 1.8, LDH 97, glucose 202  Microbiology:  Blood (11/3): Negative X2  Sputum (10/21): Normal neva, no MRSA  Sputum (10/28): Pseudomonas, pan-susceptible  Sputum (11/4): Pending   Pleural fluid (10/17): Negative  C. difficile PCR (10/30): Negative    Imaging:  CXR images (11/11) personally reviewed: Intubated.  No significant change in bilateral areas of infiltration and effusions    Assessment:  Sepsis -resolved  Lumbar spine surgical site infection s/p I&D 9/28. Cultures + MRSA, Proteus.  Was to be on vanco/ceftriaxone through 11/12. Followed by Dr. Brant Juarez.  Vancomycin has been changed to daptomycin because of AMY.  This therapy ending today.  Given the presence of fixation hardware and the severity of the infection, we will maintain oral suppressive therapy with amoxicillin and doxycycline    Plan/Recommendations:  Discontinue systemic IV antibiotics  Amoxicillin 500 mg po daily plus doxycycline 100 mg po twice daily, for a long-term suppression  PICC/midline can be removed at the time of discharge    I will continue to follow the patient \"peripherally.\"  Please call me prn    Andrew Malagon MD  [facility] [employer]  Office:  [phone]        "

## 2024-11-12 NOTE — PROGRESS NOTES
Patient ready for discharge. Precert for admission to [facility] was started on 11/8, escalated on 11/11 and still waiting on answer. Will continue to follow.      11/12/24 4904   Discharge Planning   Home or Post Acute Services Post acute facilities (Rehab/SNF/etc)   Type of Post Acute Facility Services Rehab;Skilled nursing   Expected Discharge Disposition Long Term  ([facility])   Does the patient need discharge transport arranged? Yes   RoundTrip coordination needed? Yes

## 2024-11-12 NOTE — PROGRESS NOTES
Physical Therapy                 Therapy Communication Note    Patient Name: Narda Malloy  MRN: [id number]  Department: [address] ICU  Room: 11/11-A  Today's Date: 11/12/2024     Discipline: Physical Therapy    Missed Visit Reason: Missed Visit Reason: Other (Comment) (Attempted to see pt for follow-up. pt receiving an ultrasound L UE to r/o DVT.)    Missed Time: Cancel    Comment: PT deferred this date.

## 2024-11-12 NOTE — CARE PLAN
The patient's goals for the shift include unable to assess    The clinical goals for the shift include Patient will remain hemodynamically stable      Problem: Safety - Adult  Goal: Free from fall injury  Outcome: Progressing     Problem: Discharge Planning  Goal: Discharge to home or other facility with appropriate resources  Outcome: Progressing     Problem: Chronic Conditions and Co-morbidities  Goal: Patient's chronic conditions and co-morbidity symptoms are monitored and maintained or improved  Outcome: Progressing     Problem: Diabetes  Goal: Increase stability of blood glucose readings by end of shift  Outcome: Progressing  Goal: Maintain electrolyte levels within acceptable range throughout shift  Outcome: Progressing  Goal: Maintain glucose levels >70mg/dl to <250mg/dl throughout shift  Outcome: Progressing  Goal: Learn about and adhere to nutrition recommendations by end of shift  Outcome: Progressing  Goal: Vital signs within normal range for age by end of shift  Outcome: Progressing  Goal: Increase self care and/or family involovement by end of shift  Outcome: Progressing  Goal: Receive DSME education by end of shift  Outcome: Progressing     Problem: Knowledge Deficit  Goal: Patient/family/caregiver demonstrates understanding of disease process, treatment plan, medications, and discharge instructions  Outcome: Progressing     Problem: Mechanical Ventilation  Goal: Tracheostomy will be managed safely  Outcome: Progressing     Problem: Skin  Goal: Decreased wound size/increased tissue granulation at next dressing change  Outcome: Progressing  Flowsheets (Taken 11/12/2024 0811)  Decreased wound size/increased tissue granulation at next dressing change:   Promote sleep for wound healing   Protective dressings over bony prominences   Utilize specialty bed per algorithm  Goal: Participates in plan/prevention/treatment measures  Outcome: Progressing  Flowsheets (Taken 11/12/2024 0811)  Participates in  plan/prevention/treatment measures:   Discuss with provider PT/OT consult   Elevate heels   Increase activity/out of bed for meals  Goal: Prevent/manage excess moisture  Outcome: Progressing  Flowsheets (Taken 11/12/2024 0811)  Prevent/manage excess moisture:   Cleanse incontinence/protect with barrier cream   Monitor for/manage infection if present   Follow provider orders for dressing changes  Goal: Prevent/minimize sheer/friction injuries  Outcome: Progressing  Flowsheets (Taken 11/12/2024 0811)  Prevent/minimize sheer/friction injuries:   Increase activity/out of bed for meals   Use pull sheet   HOB 30 degrees or less   Turn/reposition every 2 hours/use positioning/transfer devices  Goal: Promote/optimize nutrition  Outcome: Progressing  Flowsheets (Taken 11/12/2024 0811)  Promote/optimize nutrition:   Monitor/record intake including meals   Consume > 50% meals/supplements   Offer water/supplements/favorite foods   Discuss with provider if NPO > 2 days  Goal: Promote skin healing  Outcome: Progressing  Flowsheets (Taken 11/12/2024 0811)  Promote skin healing:   Protective dressings over bony prominences   Turn/reposition every 2 hours/use positioning/transfer devices     Problem: Nutrition  Goal: Consume prescribed supplement  Outcome: Progressing  Goal: Nutrition support goals are met within 48 hrs  Outcome: Progressing  Goal: Nutrition support is meeting 75% of nutrient needs  Outcome: Progressing  Goal: BG  mg/dL  Outcome: Progressing  Goal: Lab values WNL  Outcome: Progressing  Goal: Electrolytes WNL  Outcome: Progressing  Goal: Promote healing  Outcome: Progressing  Goal: Maintain stable weight  Outcome: Progressing  Goal: Reduce weight from edema/fluid  Outcome: Progressing     Problem: Respiratory  Goal: No signs of respiratory distress (eg. Use of accessory muscles. Peds grunting)  Outcome: Progressing  Goal: Clear secretions with interventions this shift  Outcome: Progressing  Goal: Minimize  anxiety/maximize coping throughout shift  Outcome: Progressing  Goal: Minimal/no exertional discomfort or dyspnea this shift  Outcome: Progressing  Goal: Patent airway maintained this shift  Outcome: Progressing  Goal: Tolerate mechanical ventilation evidenced by VS/agitation level this shift  Outcome: Progressing  Goal: Tolerate pulmonary toileting this shift  Outcome: Progressing  Goal: Verbalize decreased shortness of breath this shift  Outcome: Progressing  Goal: Wean oxygen to maintain O2 saturation per order/standard this shift  Outcome: Progressing  Goal: Increase self care and/or family involvement in next 24 hours  Outcome: Progressing     Problem: Pain  Goal: Takes deep breaths with improved pain control throughout the shift  Outcome: Progressing  Goal: Turns in bed with improved pain control throughout the shift  Outcome: Progressing  Goal: Walks with improved pain control throughout the shift  Outcome: Progressing  Goal: Performs ADL's with improved pain control throughout shift  Outcome: Progressing  Goal: Participates in PT with improved pain control throughout the shift  Outcome: Progressing  Goal: Free from opioid side effects throughout the shift  Outcome: Progressing  Goal: Free from acute confusion related to pain meds throughout the shift  Outcome: Progressing     Problem: Fall/Injury  Goal: Not fall by end of shift  Outcome: Progressing  Goal: Be free from injury by end of the shift  Outcome: Progressing  Goal: Verbalize understanding of personal risk factors for fall in the hospital  Outcome: Progressing  Goal: Verbalize understanding of risk factor reduction measures to prevent injury from fall in the home  Outcome: Progressing  Goal: Use assistive devices by end of the shift  Outcome: Progressing  Goal: Pace activities to prevent fatigue by end of the shift  Outcome: Progressing

## 2024-11-12 NOTE — PROGRESS NOTES
Occupational Therapy                 Therapy Communication Note    Patient Name: Narda Malloy  MRN: [id number]  Department: [facility]  Room: 11/11-A  Today's Date: 11/12/2024     Discipline: Occupational Therapy    Missed Visit Reason: Missed Visit Reason: Other (Comment) (Attempted to see pt for follow-up. pt receiving an ultrasound L UE to r/o DVT.)    Missed Time: Attempt    Comment:

## 2024-11-12 NOTE — PROGRESS NOTES
"Narda Malloy is a 69 y.o. female on day 31 of admission presenting with Unresponsive.     Subjective   Postop day 5 status post tracheostomy tube.  Ventilating well.  No bleeding.       Objective     Physical Exam  Tracheostomy faceplate sutures removed.  No bleeding around tracheostomy tube.  Ventilating well  Last Recorded Vitals  Blood pressure 135/64, pulse 68, temperature 36.7 °C (98.1 °F), temperature source Temporal, resp. rate 17, height 1.778 m (5' 10\"), weight 115 kg (253 lb 15.5 oz), SpO2 100%.  Intake/Output last 3 Shifts:  I/O last 3 completed shifts:  In: 1740 (15.1 mL/kg) [NG/GT:1740]  Out: 1280 (11.1 mL/kg) [Urine:1180 (0.3 mL/kg/hr); Stool:100]  Weight: 115.2 kg     Relevant Results        Scheduled medications  albuterol, 3 mL, nebulization, 4x daily  apixaban, 5 mg, oral, BID  brimonidine, 1 drop, Both Eyes, BID  calcium carbonate-vitamin D3, 1 tablet, oral, Daily  cefTRIAXone, 2 g, intravenous, q24h  collagenase, , Topical, Daily  daptomycin, 700 mg, intravenous, q48h  ezetimibe, 10 mg, oral, Daily  ferrous sulfate, 60 mg of iron, oral, Daily  folic acid, 1 mg, oral, Daily  honey, , Topical, Daily  insulin lispro, 0-15 Units, subcutaneous, q4h  latanoprost, 1 drop, Both Eyes, Nightly  midodrine, 10 mg, oral, TID  oxygen, , inhalation, Continuous - Inhalation  pantoprazole, 40 mg, oral, Daily before breakfast   Or  pantoprazole, 40 mg, intravenous, Daily before breakfast  potassium, sodium phosphates, 1 packet, oral, With meals & nightly  primidone, 125 mg, oral, TID  [Held by provider] propranolol LA, 60 mg, oral, Daily      Continuous medications     PRN medications  PRN medications: acetaminophen, alteplase, dextrose, dextrose, glucagon, glucagon, heparin flush, HYDROmorphone, loperamide, ondansetron, oxyCODONE, oxyCODONE, polyethylene glycol, sennosides-docusate sodium         Assessment/Plan   Assessment & Plan  Unresponsive    Pain and swelling of upper extremity, left    Acute respiratory " failure with hypoxia and hypercapnia (Multi)    Status post tracheostomy postop day #5.  Ventilating well.  Sutures removed.  Follow-up as needed           Estiven Hurst MD

## 2024-11-12 NOTE — PROGRESS NOTES
[facility]       Date:  11/12/2024  Patient:  Narda Malloy  YOB: 1955  MRN:  [id number]   Admit Date:  10/12/2024      Chief Complaint   Patient presents with    Altered Mental Status         History of Present Illness:  Narda Malloy is a 69 y.o. year old female patient with Past Medical History of   L1-L3 lumbar laminectomy, T4-S1 revision, and fusion August 26th, T2DM, HTN, essential tremor, HLD, glaucoma, sarcoidosis of the lung who presented to  ED 10/12 after being found essentially unresponsive at her nursing facility [facility]. Per report from her , she has had a significant decline in her health since July 15th when she had a fall and became significantly weak. She has also had multiple infection complications since her back surgery in August requiring multiple I&Ds and long term antibiotic therapy. She went to the OR most recently on 9/28 for lumbar site infection wash out. Per chart review, she was discharged on IV vancomycin 1g and IV ceftriaxone 2d q24hrs through 11/12.     ED Course: Initial vital signs: /104 (109), HR 68, RR 20, SpO2 95% on 6L NC, temp 34.5C. Give 0.4mg of narcan with no improvement in mentation. Lab work-up remarkable for mild hyperkalemia (5.5), AMY 42/1.46, elevated alk phos, normocytic anemia 10.4/33, turbid appearing urine with mild hematuria and proteinuria and + leuk esterase, >50 WBCs. Urine drug screen positive for barbiturates. Triggered sepsis timer so she was given 3L NS. She was intubated for airway protection with 20mg etomidate and 100mg succinylcholine. BP dropped post-intubated and fluid resuscitation and she was subsequently started on levophed.          Interval ICU Events:  Patient presented from the ED to the ICU intubated, received hyperkalemia cocktail and had noticeably decreasing urine output.  Mentation greatly improved on 10/15 on SAT/SBT were conducted and were successful.  Patient was done  after extubated.  Patient's oxygen requirements significantly increased requiring high flow nasal cannula 40L 100%.  On 10/17 chest x-ray showed complete opacification of left hemithorax most likely pleural effusion.  Subsequently pigtail catheter was then placed with immediate drainage of 1.2 L with cytology pending.  Kidney function and urine output continue to decline with worsening hypoxia and hypercapnia and patient was started on BiPAP therapy.  Patient was given high-dose of furosemide and metolazone to stimulate urine output but were unsuccessful.  Temporary dialysis catheter placed awaiting start for CRRT per nephrology.  Patient participated in physical therapy and Occupational Therapy while intubated. Venous jugular duplex noticed large thrombus on tube subsequent exams patient was placed on heparin gtt. Patient again passed SBT and SAT with minimal requirements and was subsequently extubated on 10/31 patient again developed acute respiratory failure the patient was reintubated on 11/2.  On 11/3 family meeting was held to discuss patient's goals of care and treatment model and it was decided to continue DNR CCA, and workup of tracheostomy and PEG tube placement.  On 11/4 patient had PEG tube placed by general surgery.  On 11/7 patient had tracheostomy placed by ENT and sedation was weaned.  Patient continuing physical therapy and Occupational Therapy at this time with difficulties weaning from vent support trach collar. Currently excepted at [facility].      11/11: No events overnight. Urine output increased to 1L yesterday. Nephrology following.     Medical History:  Past Medical History:   Diagnosis Date    Degenerative myopia, bilateral     Diabetic neuropathy (Multi)     Difficult intubation 08/26/2024    Mac 3, grade 3, 1 attempt.  Glidescope/videolaryngoscopy recommended for future attempts.    DM type 2 (diabetes mellitus, type 2) (Multi)     Dry eye syndrome of bilateral lacrimal glands      Essential hypertension     Essential tremor     Glaucoma     Hyperlipidemia     Long term (current) use of insulin (Multi)     Low back pain     PONV (postoperative nausea and vomiting)     Primary open angle glaucoma of both eyes, severe stage     Repeated falls     Sarcoidosis of lung (Multi)     Spinal stenosis, lumbar region without neurogenic claudication     Weakness      Past Surgical History:   Procedure Laterality Date    BLEPHAROPLASTY  07/2022    BREAST SURGERY  05/20/2022    Breast lift    CARPAL TUNNEL RELEASE      CATARACT EXTRACTION W/  INTRAOCULAR LENS IMPLANT Bilateral     OD 08/04/2011 +8.5D,OS 08/04/2011 +8.50D    FOOT SURGERY      INSERTION / REMOVAL CRANIAL DBS GENERATOR      Placed 2017.  Removed 2018. part of wire left in head when everything removed    LUMBAR FUSION      L3-S1    PANRETINAL PHOTOCOAGULATION  2014    THORACIC FUSION  08/26/2024    T4-S1 fusion    VITRECTOMY Right 2013     Medications Prior to Admission   Medication Sig Dispense Refill Last Dose/Taking    acetaminophen (Tylenol) 500 mg tablet Take 2 tablets (1,000 mg) by mouth 3 times a day.   Unknown    ascorbic acid (Vitamin C) 500 mg tablet as directed Orally   Unknown    brimonidine (AlphaGAN) 0.2 % ophthalmic solution Administer 1 drop into both eyes 2 times a day.   Unknown    calcium carbonate-vitamin D3 500 mg-5 mcg (200 unit) tablet Take 1 tablet by mouth once daily.   Unknown    cefTRIAXone (Rocephin) 2 gram/50 mL IV Infuse 50 mL (2 g) at 100 mL/hr over 30 minutes into a venous catheter once every 24 hours. Once weekly labs CBC/diff, CMP, Vanc trough ESR, CRP fax to Dr. Juarez [phone]. Stop date 11/12/24. 1950 mL 0     dextrose 50 % injection Infuse 25 mL (12.5 g) into a venous catheter every 15 minutes if needed (For blood glucose 41 to 70 mg/dL).       dextrose 50 % injection Infuse 50 mL (25 g) into a venous catheter every 15 minutes if needed (For blood glucose less than or equal to 40 mg/dL).        docusate sodium (Colace) 100 mg capsule Take 1 capsule (100 mg) by mouth 2 times a day.   Unknown    ezetimibe (Zetia) 10 mg tablet Take 1 tablet (10 mg) by mouth once daily. 90 tablet 3 Unknown    FreeStyle Lite Strips strip USE TO TEST 3 TIMES A DAY AS DIRECTED 300 each 2     glucagon (Glucagen) 1 mg injection Inject 1 mg into the muscle every 15 minutes if needed for low blood sugar - see comments (Hypoglycemia).       glucagon (Glucagen) 1 mg injection Inject 1 mg into the muscle every 15 minutes if needed for low blood sugar - see comments (Hypoglycemia).       heparin sodium,porcine (heparin, porcine,) 5,000 unit/mL injection Inject 1 mL (5,000 Units) under the skin every 8 hours.       insulin lispro (HumaLOG) 100 unit/mL injection Inject 0-15 Units under the skin 3 times daily (morning, midday, late afternoon). Take as directed per insulin instructions.Do not hold when patient is not eating, continue order as scheduled for hyperglycemia management.  Insulin Lispro Corrective Scale #3     Hypoglycemia protocol Call LIP unit(s) if Blood Glucose is between 0 - 70 mg/dL     0 unit(s) if Blood glucose is between    3 unit(s) if Blood glucose is between 151-200   6 unit(s) if Blood glucose is between 201-250   9 unit(s) if Blood glucose is between 251-300   12 unit(s) if Blood glucose is between 301-350   15 unit(s) if Blood glucose is between 351-400    Notify provider unit(s) if Blood Glucose is greater than 400 mg/dL       Lactobacillus acidophilus 100 mg (1 billion cell) capsule Take 1 capsule by mouth 2 times a day.   Unknown    latanoprost (Xalatan) 0.005 % ophthalmic solution Administer 1 drop into both eyes once daily at bedtime. 2.5 mL 5 Unknown    melatonin 5 mg tablet Take 1 tablet (5 mg) by mouth as needed at bedtime for sleep.   Unknown    methocarbamol (Robaxin) 500 mg tablet Take 1 tablet (500 mg) by mouth 3 times a day.   Unknown    multivitamin tablet Take 1 tablet by mouth once daily.    "Unknown    ondansetron (Zofran) 4 mg/2 mL injection Infuse 2 mL (4 mg) into a venous catheter every 6 hours if needed for nausea or vomiting.       oxyCODONE (Roxicodone) 5 mg immediate release tablet Take 1 tablet (5 mg) by mouth every 6 hours if needed for severe pain (7 - 10) or moderate pain (4 - 6).       oxygen (O2) gas therapy Inhale 1 each continuously.       pantoprazole (ProtoNix) 40 mg EC tablet Take 1 tablet (40 mg) by mouth once daily in the morning. Take before meals. Do not crush, chew, or split.       pantoprazole (ProtoNix) 40 mg injection Infuse 40 mg into a venous catheter once daily in the morning. Take before meals. If unable to take PO.       pen needle, diabetic (PEN NEEDLE MISC) BD Altagracia- 4 mm X 32 G needle - as directed 4x a day sc 4 times per day       polyethylene glycol (Glycolax, Miralax) 17 gram packet Take 17 g by mouth once daily.   Unknown    primidone 125 mg tablet Take 125 mg by mouth 3 times a day.   Unknown    propranolol LA (Inderal LA) 60 mg 24 hr capsule Take 1 capsule (60 mg) by mouth early in the morning.. Hold for SBP < 110 mmhg, HR < 60 bpm.   Unknown    sennosides (Senokot) 8.6 mg tablet Take 1 tablet (8.6 mg) by mouth every 12 hours if needed for constipation.   Unknown    Sure Comfort Pen Needle 32 gauge x 5/32\" needle AS DIRECTED DAILY FOR 90 DAYS 100 each 11 Unknown    traZODone (Desyrel) 25 MG split tablet Take 1 half tablet (25 mg) by mouth once daily at bedtime.   Unknown    vancomycin (Vancocin) 1 gram/250 mL solution Infuse 250 mL (1 g) at 250 mL/hr over 60 minutes into a venous catheter every 12 hours. Once weekly labs CBC/diff, CMP, Vanc trough ESR, CRP fax to Dr. Juarez [phone]. Stop date 11/12/24. 34521 mL 0      Erythromycin, Morphine, and Rosuvastatin  Social History     Tobacco Use    Smoking status: Former     Types: Cigarettes     Passive exposure: Past    Smokeless tobacco: Never   Vaping Use    Vaping status: Never Used   Substance Use Topics    " Alcohol use: Not Currently    Drug use: Not Currently     Family History   Problem Relation Name Age of Onset    Multiple myeloma Mother      Cancer Mother      Other (CABG) Father      Pulmonary embolism Father      Heart disease Father      Breast cancer Sister          Stage II    Hypertension Sister      Diabetes Sister      No Known Problems Sister          x5    No Known Problems Brother          x4    No Known Problems Daughter         Hospital Medications:           Current Facility-Administered Medications:     acetaminophen (Tylenol) oral liquid 650 mg, 650 mg, oral, q4h PRN, Sabino Matos APRN-CNP    albuterol 2.5 mg /3 mL (0.083 %) nebulizer solution 3 mL, 3 mL, nebulization, 4x daily, Kaleb Lam PA-C, 3 mL at 11/12/24 0726    alteplase (Cathflo Activase) injection 2 mg, 2 mg, intra-catheter, PRN, Kaleb Lam PA-C    apixaban (Eliquis) tablet 5 mg, 5 mg, oral, BID, Tommy Amezcua PA-C, 5 mg at 11/11/24 2106    brimonidine (AlphaGAN) 0.2 % ophthalmic solution 1 drop, 1 drop, Both Eyes, BID, Kaleb Lam PA-C, 1 drop at 11/11/24 2105    calcium carbonate-vitamin D3 500 mg-5 mcg (200 unit) per tablet 1 tablet, 1 tablet, oral, Daily, Kaleb Lam PA-C, 1 tablet at 11/11/24 0843    cefTRIAXone (Rocephin) 2 g in dextrose (iso) IV 50 mL, 2 g, intravenous, q24h, Kaleb Lam PA-C, Stopped at 11/11/24 1008    collagenase 250 unit/gram ointment, , Topical, Daily, LEONEL Salgado-CNP, 1 Application at 11/11/24 0859    DAPTOmycin (Cubicin) 700 mg in sodium chloride 0.9%  mL, 700 mg, intravenous, q48h, Kaleb Lam PA-C, Stopped at 11/11/24 1040    dextrose 50 % injection 12.5 g, 12.5 g, intravenous, q15 min PRN, Kaleb Lam PA-C    dextrose 50 % injection 25 g, 25 g, intravenous, q15 min PRN, Kaleb Lam PA-C    ezetimibe (Zetia) tablet 10 mg, 10 mg, oral, Daily, Kaleb Lam PA-C, 10 mg at 11/11/24 0843    ferrous sulfate syrup 60 mg of iron, 60 mg of iron, oral,  Daily, Kaleb Lam PA-C, 60 mg of iron at 11/11/24 0844    folic acid (Folvite) tablet 1 mg, 1 mg, oral, Daily, Kaleb Lam PA-C, 1 mg at 11/11/24 0842    glucagon (Glucagen) injection 1 mg, 1 mg, intramuscular, q15 min PRN, Kaleb Lam PA-C    glucagon (Glucagen) injection 1 mg, 1 mg, intramuscular, q15 min PRN, Kaleb Lam PA-C    heparin flush 10 unit/mL syringe 50 Units, 50 Units, intra-catheter, PRN, Kaleb Lma PA-C, 50 Units at 10/19/24 2313    honey (Medihoney) topical gel, , Topical, Daily, Kaleb Lam PA-C, 1 Application at 11/11/24 0900    HYDROmorphone (Dilaudid) injection 0.4 mg, 0.4 mg, intravenous, q2h PRN, Kaleb Lam PA-C, 0.4 mg at 11/11/24 0501    insulin lispro (HumaLOG) injection 0-15 Units, 0-15 Units, subcutaneous, q4h, Kaleb Lam PA-C, 3 Units at 11/12/24 0012    latanoprost (Xalatan) 0.005 % ophthalmic solution 1 drop, 1 drop, Both Eyes, Nightly, Kaleb Lam PA-C, 1 drop at 11/11/24 2105    loperamide (Imodium) capsule 2 mg, 2 mg, oral, 4x daily PRN, LEONEL Salgado-CNP, 2 mg at 11/08/24 1648    midodrine (Proamatine) tablet 10 mg, 10 mg, oral, TID, Kaleb Lam PA-C    oxyCODONE (Roxicodone) solution 5 mg, 5 mg, oral, q4h PRN, Kaleb Lam PA-C, 5 mg at 11/11/24 1632    oxyCODONE (Roxicodone) solution 7.5 mg, 7.5 mg, oral, q4h PRN, Kaleb Lam PA-C    oxygen (O2) therapy, , inhalation, Continuous - Inhalation, Anna Marie Shook MD, 30 percent at 11/12/24 0726    pantoprazole (ProtoNix) EC tablet 40 mg, 40 mg, oral, Daily before breakfast **OR** pantoprazole (ProtoNix) injection 40 mg, 40 mg, intravenous, Daily before breakfast, Kaleb Lam PA-C, 40 mg at 11/11/24 0856    polyethylene glycol (Glycolax, Miralax) packet 17 g, 17 g, oral, Daily PRN, Kaleb Lam PA-C    potassium, sodium phosphates (Phos-NaK) 280-160-250 mg packet 1 packet, 1 packet, oral, With meals & nightly, Kaleb Lam PA-C, 1 packet at 11/11/24  2106    primidone (Mysoline) tablet 125 mg, 125 mg, oral, TID, Tommy Amezcua PA-C, 125 mg at 11/11/24 2106    [Held by provider] propranolol LA (Inderal LA) 24 hr capsule 60 mg, 60 mg, oral, Daily, Kaleb Lam PA-C, 60 mg at 10/19/24 0643    sennosides-docusate sodium (Lucia-Colace) 8.6-50 mg per tablet 1 tablet, 1 tablet, oral, Nightly PRN, Kaleb Lam PA-C    Review of Systems:  14 point review of systems was completed and negative except for those specially mention in my HPI    Physical Exam:    Heart Rate:  [62-93]   Temp:  [36.7 °C (98.1 °F)-36.9 °C (98.4 °F)]   Resp:  [16-30]   BP: (114-153)/(56-75)   Weight:  [114 kg (251 lb 5.2 oz)-115 kg (253 lb 15.5 oz)]   SpO2:  [95 %-100 %]     Physical Exam  Constitutional:       Comments: trach   HENT:      Mouth/Throat:      Mouth: Mucous membranes are moist.      Pharynx: Oropharynx is clear.   Eyes:      Pupils: Pupils are equal, round, and reactive to light.   Cardiovascular:      Rate and Rhythm: Normal rate and regular rhythm.      Pulses: Normal pulses.   Pulmonary:      Effort: Pulmonary effort is normal.   Abdominal:      General: Abdomen is flat.   Musculoskeletal:         General: Normal range of motion.   Skin:     General: Skin is warm and dry.      Capillary Refill: Capillary refill takes less than 2 seconds.   Neurological:      Mental Status: She is oriented to person, place, and time. Mental status is at baseline.   Psychiatric:         Mood and Affect: Mood normal.         Objective:    I have reviewed all medications, laboratory results, and imaging pertinent for today's encounter.    Vent Mode: Pressure regulated volume control/assist control  FiO2 (%):  [30 %] 30 %  S RR:  [16] 16  S VT:  [450 mL] 450 mL  PEEP/CPAP (cm H2O):  [5 cm H20] 5 cm H20  KY SUP:  [8 cm H20] 8 cm H20  MAP (cm H2O):  [8.6-10] 9.4      Intake/Output Summary (Last 24 hours) at 11/12/2024 0756  Last data filed at 11/12/2024 0600  Gross per 24 hour   Intake 1220 ml    Output 980 ml   Net 240 ml         Assessment/Plan:    I am currently managing this critically ill patient for the following problems:    Plan:  Neuro/Psych/Pain Ctrl/Sedation: (Hx: essential tremor)  Acute encephalopathy - resolved  CT head 10/12: Negative for acute findings   -Pain Control: liquid oxy, scheduled acetaminophen, dilaudid for dressing changes PRN  -Home primidone continued. Will hold propanolol d/t hypotension  -CAM ICU qshift, sleep-wake hygiene, delirium precautions     Respiratory/ENT:  Acute hypoxic/hypercapnic respiratory failure-likely multifactorial and 2/2 HCAP, pl effusions, volume overload  Healthcare-associated pneumonia   Recurrent atelectasis   Ventilator-dependence   Pleural effusion -s/p left-sided pigtail placement 10/17, removed 10/25  Trach placement 11/7  CXR 11/12: parenchymal infiltration, left pleural effusion  -Will wean O2 as tolerated to maintain SpO2 >92%  -SBT every a.m.  -Albuterol, hypertonic saline    -IPV per RT TID  -Aspiration precautions   -Trach care qshift  - Otolaryngology consulted for trach placement, will appreciate recs     Cardiovascular:  (Hx: diastolic heart failure, HTN, HLD)  Septic shock-resolved  Occlusive superficial venous thrombus of the left cephalic vein  Non-occlusive DVT right IJ vein   Acute on chronic diastolic heart failure  US duplex 10/29 occlusive superficial vein thrombosis of left cephalic vein   Repeat duplex 11/4 nonocclusive thrombus to RIJ  TTE 11/2: EF 60-65%, mild/mod AV valve regurg  US duplex LUE 11/12: No evidence of acute dvt  -Continue midodrine 10 q8-> weaned to 5mg q8  -Holding home propranolol (on for tremors)  -Continue home Zetia  -Continuous cardiac monitoring per ICU protocol  -EKGs PRN for ACS symptoms, arrhythmias  -Heparin drip resumed-transition to Eliquis closer to discharge      GI:  Hx GERD  Diarrhea  Peg-dependent-placed 11/4  Diet: Nepro @40ml/hr  BR: miralax PRN  GI Prophylaxis: PPI  -C-Diff negative    "  /Volume Status/Electrolytes:  Acute kidney injury-possibly ATN +/- medication-induced (vancomycin)  Anasarca   Hypoalbuminemia   Hyponatremia- improving  -HD 11/9 with 2.5L removed, Plan for no further HD and will diuresis with signs of renal recovery, received 40mg lasix  -- Plan to remove permacath by end of the week if no longer needing HD  -Gibson place for accurate I&O's   -Nephrology consulted will appreciate recs  -Replete electrolytes to maintain K >4.0 and Mg >2.0  -Daily BMP, Mg, Phos     Heme/Onc: (Hx: normocytic anemia)  Occlusive LUE DVT  Non-occlusive R IJ DVT  -Eliquis started 11/11  -Continue iron and folate  -Monitor for s/sx of bleeding   -Plan to transfuse if Hgb <7.0   -Daily CBC  -T&S AM draw 11/10    Endocrine: (Hx: DMII)  SSI Q4  Hypoglycemia protocol PRN     Infectious Disease:  Pseudomonas-Respiratory culture 10/29  Thoracolumbar surgical site infection - s/p I&D x 2. Recent MRSA bacteremia on extended course of antibiotics; IV ceftriaxone, IV daptomycin  Healthcare-associated pneumonia   CT lumbar spine 10/12: Unable to r/o abscess. Unable to do MRI d/t spinal hardware, \"metal in head\" per patient  -Sputum culture 11/4 negative  -BC 11/3 negative  -Cdiff negative 11/7  -Daptomycin and ceftriaxone will stop today 11/12,  - Transitioned to oral amoxicillin and doxycyline  -Infectious Disease consult placed, will appreciate recs  -Monitor SIRS criteria     MSK:  PT/OT orders placed     Ethics/Code Status:  DNR-CCA      :  DVT Prophylaxis: SCDs heparin gtt  GI Prophylaxis: PPI home med  Bowel Regimen: Miralax PRN  Diet: Nepro at 40  CVC: PICC placed 10/24, permacath 11/5  Wanda: No  Gibson: yes 11/7  Restraints: no  Discharge planning: [facility]  Critical Care Time:  35 minutes spent in preparing to see patient (I.e. review of medical records), evaluation of diagnostics (I.e. labs, imaging, etc.), documentation, discussing plan of care with patient/ family/ caregiver, " and/ or coordination of care with multidisciplinary team. Time does not include completion of procedure time.       Sabino Matos, APRN-CNP

## 2024-11-12 NOTE — NURSING NOTE
Pt has a R chest dialysis catheter, drsg dry and intact (dated 11/7) without any redness, swelling or drainage. Pt also has a dual lumen picc line to L upper arm, drsg dry and intact (dated 11/8) without any redness, swelling or drainage, the purple lumen has brisk blood return and flushes easily with NS, curos cap applied, the other lumen currently in use and infusing without any difficulty.

## 2024-11-12 NOTE — CARE PLAN
The patient's goals for the shift include unable to assess    The clinical goals for the shift include Patient will remain hemodynamically stable      Problem: Safety - Adult  Goal: Free from fall injury  Outcome: Progressing  Flowsheets (Taken 11/12/2024 0138)  Free from fall injury: Instruct family/caregiver on patient safety     Problem: Discharge Planning  Goal: Discharge to home or other facility with appropriate resources  Outcome: Progressing  Flowsheets (Taken 11/12/2024 0138)  Discharge to home or other facility with appropriate resources:   Identify barriers to discharge with patient and caregiver   Arrange for needed discharge resources and transportation as appropriate   Identify discharge learning needs (meds, wound care, etc)   Arrange for interpreters to assist at discharge as needed   Refer to discharge planning if patient needs post-hospital services based on physician order or complex needs related to functional status, cognitive ability or social support system     Problem: Chronic Conditions and Co-morbidities  Goal: Patient's chronic conditions and co-morbidity symptoms are monitored and maintained or improved  Outcome: Progressing  Flowsheets (Taken 11/12/2024 0138)  Care Plan - Patient's Chronic Conditions and Co-Morbidity Symptoms are Monitored and Maintained or Improved:   Monitor and assess patient's chronic conditions and comorbid symptoms for stability, deterioration, or improvement   Collaborate with multidisciplinary team to address chronic and comorbid conditions and prevent exacerbation or deterioration   Update acute care plan with appropriate goals if chronic or comorbid symptoms are exacerbated and prevent overall improvement and discharge     Problem: Diabetes  Goal: Increase stability of blood glucose readings by end of shift  Outcome: Progressing  Flowsheets (Taken 11/12/2024 0138)  Increase stability of blood glucose readings by end of shift: Med administration/monitoring of  effect  Goal: Maintain electrolyte levels within acceptable range throughout shift  Outcome: Progressing  Flowsheets (Taken 11/12/2024 0138)  Maintain electrolyte levels within acceptable range throughout shift: Monitor urine output  Goal: Maintain glucose levels >70mg/dl to <250mg/dl throughout shift  Outcome: Progressing  Flowsheets (Taken 11/12/2024 0138)  Maintain glucose levels >70mg/dl to <250mg/dl throughout shift: Med administration/monitoring of effect  Goal: Learn about and adhere to nutrition recommendations by end of shift  Outcome: Progressing  Flowsheets (Taken 11/12/2024 0138)  Learn about and adhere to nutrition recommendations by end of shift: Ensure/encourage compliance with appropriate diet  Goal: Vital signs within normal range for age by end of shift  Outcome: Progressing  Flowsheets (Taken 11/12/2024 0138)  Vital signs within normal range for age by end of shift: Med administration/monitoring of effect  Goal: Increase self care and/or family involovement by end of shift  Outcome: Progressing  Flowsheets (Taken 11/12/2024 0138)  Increase self care and/or family involovement by end of shift: Self monitor blood glucose with staff oversight  Goal: Receive DSME education by end of shift  Outcome: Progressing  Flowsheets (Taken 11/12/2024 0138)  Receive DSME education by end of shift: Provide patient centered education on Diabetic Self Management Education     Problem: Knowledge Deficit  Goal: Patient/family/caregiver demonstrates understanding of disease process, treatment plan, medications, and discharge instructions  Outcome: Progressing  Flowsheets (Taken 11/12/2024 0138)  Patient/family/caregiver demonstrates understanding of disease process, treatment plan, medications, and discharge instructions:   Complete learning assessment and assess knowledge base   Provide teaching via preferred learning methods   Provide teaching at level of understanding     Problem: Skin  Goal: Decreased wound  size/increased tissue granulation at next dressing change  Outcome: Progressing  Flowsheets (Taken 11/12/2024 0138)  Decreased wound size/increased tissue granulation at next dressing change:   Promote sleep for wound healing   Utilize specialty bed per algorithm   Protective dressings over bony prominences  Goal: Participates in plan/prevention/treatment measures  Outcome: Progressing  Flowsheets (Taken 11/12/2024 0138)  Participates in plan/prevention/treatment measures:   Discuss with provider PT/OT consult   Elevate heels  Goal: Prevent/manage excess moisture  Outcome: Progressing  Flowsheets (Taken 11/12/2024 0138)  Prevent/manage excess moisture:   Cleanse incontinence/protect with barrier cream   Moisturize dry skin   Use wicking fabric (obtain order)  Goal: Prevent/minimize sheer/friction injuries  Outcome: Progressing  Flowsheets (Taken 11/12/2024 0138)  Prevent/minimize sheer/friction injuries:   Increase activity/out of bed for meals   Use pull sheet   HOB 30 degrees or less   Turn/reposition every 2 hours/use positioning/transfer devices  Goal: Promote/optimize nutrition  Outcome: Progressing  Flowsheets (Taken 11/12/2024 0138)  Promote/optimize nutrition:   Assist with feeding   Consume > 50% meals/supplements  Goal: Promote skin healing  Outcome: Progressing  Flowsheets (Taken 11/12/2024 0138)  Promote skin healing:   Protective dressings over bony prominences   Turn/reposition every 2 hours/use positioning/transfer devices     Problem: Nutrition  Goal: Consume prescribed supplement  Outcome: Progressing  Goal: Nutrition support goals are met within 48 hrs  Outcome: Progressing  Goal: Nutrition support is meeting 75% of nutrient needs  Outcome: Progressing  Goal: BG  mg/dL  Outcome: Progressing  Goal: Lab values WNL  Outcome: Progressing  Goal: Electrolytes WNL  Outcome: Progressing  Goal: Promote healing  Outcome: Progressing  Goal: Maintain stable weight  Outcome: Progressing  Goal: Reduce  weight from edema/fluid  Outcome: Progressing     Problem: Respiratory  Goal: No signs of respiratory distress (eg. Use of accessory muscles. Peds grunting)  Outcome: Progressing  Goal: Clear secretions with interventions this shift  Outcome: Progressing  Flowsheets (Taken 11/12/2024 0138)  Clear secretions with interventions this shift:   Encourage/provide pulmonary hygiene/secretion clearance   Med administration/monitoring of effect   Suctioning  Goal: Minimize anxiety/maximize coping throughout shift  Outcome: Progressing  Flowsheets (Taken 11/12/2024 0138)  Minimize anxiety/maximize coping throughout shift:   Med administration/monitoring of effect   Monitor pain/anxiety level  Goal: Minimal/no exertional discomfort or dyspnea this shift  Outcome: Progressing  Flowsheets (Taken 11/12/2024 0138)  Minimal/no exertional discomfort or dyspnea this shift: Positioning to promote ventilation/comfort  Goal: Patent airway maintained this shift  Outcome: Progressing  Flowsheets (Taken 11/12/2024 0138)  Patent airway maintained this shift: Maintain ET tube tube position  Goal: Tolerate mechanical ventilation evidenced by VS/agitation level this shift  Outcome: Progressing  Flowsheets (Taken 11/12/2024 0138)  Tolerate mechanical ventilation evidenced by VS/agitation level this shift: Mechanical ventilation  Goal: Tolerate pulmonary toileting this shift  Outcome: Progressing  Flowsheets (Taken 11/12/2024 0138)  Tolerate pulmonary toileting this shift: Positioning to promote ventilation/comfort  Goal: Verbalize decreased shortness of breath this shift  Outcome: Progressing  Flowsheets (Taken 11/12/2024 0138)  Verbalize decreased shortness of breath this shift: Encourage/provide pulmonary hygiene/secretion clearance  Goal: Wean oxygen to maintain O2 saturation per order/standard this shift  Outcome: Progressing  Flowsheets (Taken 11/12/2024 0138)  Wean oxygen to maintain O2 saturation per order/standard this shift: Encourage  activity/mobility  Goal: Increase self care and/or family involvement in next 24 hours  Outcome: Progressing  Flowsheets (Taken 11/12/2024 0138)  Increase self care and/or family involvement in next 24 hours: Encourage activity/mobility     Problem: Pain  Goal: Takes deep breaths with improved pain control throughout the shift  Outcome: Progressing  Goal: Turns in bed with improved pain control throughout the shift  Outcome: Progressing  Goal: Walks with improved pain control throughout the shift  Outcome: Progressing  Goal: Performs ADL's with improved pain control throughout shift  Outcome: Progressing  Goal: Participates in PT with improved pain control throughout the shift  Outcome: Progressing  Goal: Free from opioid side effects throughout the shift  Outcome: Progressing  Goal: Free from acute confusion related to pain meds throughout the shift  Outcome: Progressing     Problem: Fall/Injury  Goal: Not fall by end of shift  Outcome: Progressing  Goal: Be free from injury by end of the shift  Outcome: Progressing  Goal: Verbalize understanding of personal risk factors for fall in the hospital  Outcome: Progressing  Goal: Verbalize understanding of risk factor reduction measures to prevent injury from fall in the home  Outcome: Progressing  Goal: Use assistive devices by end of the shift  Outcome: Progressing  Goal: Pace activities to prevent fatigue by end of the shift  Outcome: Progressing     Problem: Mechanical Ventilation  Goal: Tracheostomy will be managed safely  Outcome: Progressing  Flowsheets (Taken 11/12/2024 0138)  Tracheostomy Will Be Managed Safely:   Utilize tracheostomy securing device and change as necessary to ensure tracheostomy is secured to patient   Support ventilator tubing to avoid pressure from drag of tubing   Keep ambu bag, mask, oxygen connection tubing, and extra tracheostomy at bedside and accompanying patient at all times   Utilize trach securing device   Cleanse site with hydrogen  peroxide   Protect skin with moisture barrier cream

## 2024-11-13 ENCOUNTER — APPOINTMENT (OUTPATIENT)
Dept: RADIOLOGY | Facility: HOSPITAL | Age: 69
End: 2024-11-13
Payer: MEDICARE

## 2024-11-13 LAB
ACID FAST STN SPEC: NORMAL
ALBUMIN SERPL BCP-MCNC: 2.3 G/DL (ref 3.4–5)
ANION GAP SERPL CALCULATED.3IONS-SCNC: 13 MMOL/L (ref 10–20)
BASOPHILS # BLD AUTO: 0.06 X10*3/UL (ref 0–0.1)
BASOPHILS NFR BLD AUTO: 0.8 %
BUN SERPL-MCNC: 64 MG/DL (ref 6–23)
CALCIUM SERPL-MCNC: 8 MG/DL (ref 8.6–10.3)
CHLORIDE SERPL-SCNC: 91 MMOL/L (ref 98–107)
CO2 SERPL-SCNC: 30 MMOL/L (ref 21–32)
CREAT SERPL-MCNC: 1.72 MG/DL (ref 0.5–1.05)
EGFRCR SERPLBLD CKD-EPI 2021: 32 ML/MIN/1.73M*2
EOSINOPHIL # BLD AUTO: 0.06 X10*3/UL (ref 0–0.7)
EOSINOPHIL NFR BLD AUTO: 0.8 %
ERYTHROCYTE [DISTWIDTH] IN BLOOD BY AUTOMATED COUNT: 18.6 % (ref 11.5–14.5)
GLUCOSE BLD MANUAL STRIP-MCNC: 151 MG/DL (ref 74–99)
GLUCOSE BLD MANUAL STRIP-MCNC: 153 MG/DL (ref 74–99)
GLUCOSE BLD MANUAL STRIP-MCNC: 158 MG/DL (ref 74–99)
GLUCOSE BLD MANUAL STRIP-MCNC: 166 MG/DL (ref 74–99)
GLUCOSE BLD MANUAL STRIP-MCNC: 168 MG/DL (ref 74–99)
GLUCOSE BLD MANUAL STRIP-MCNC: 186 MG/DL (ref 74–99)
GLUCOSE SERPL-MCNC: 148 MG/DL (ref 74–99)
HCT VFR BLD AUTO: 25.8 % (ref 36–46)
HGB BLD-MCNC: 8.6 G/DL (ref 12–16)
IMM GRANULOCYTES # BLD AUTO: 0.07 X10*3/UL (ref 0–0.7)
IMM GRANULOCYTES NFR BLD AUTO: 0.9 % (ref 0–0.9)
LYMPHOCYTES # BLD AUTO: 1.04 X10*3/UL (ref 1.2–4.8)
LYMPHOCYTES NFR BLD AUTO: 13.9 %
MAGNESIUM SERPL-MCNC: 1.77 MG/DL (ref 1.6–2.4)
MCH RBC QN AUTO: 30.1 PG (ref 26–34)
MCHC RBC AUTO-ENTMCNC: 33.3 G/DL (ref 32–36)
MCV RBC AUTO: 90 FL (ref 80–100)
MONOCYTES # BLD AUTO: 0.38 X10*3/UL (ref 0.1–1)
MONOCYTES NFR BLD AUTO: 5.1 %
MYCOBACTERIUM SPEC CULT: NORMAL
NEUTROPHILS # BLD AUTO: 5.85 X10*3/UL (ref 1.2–7.7)
NEUTROPHILS NFR BLD AUTO: 78.5 %
NRBC BLD-RTO: 0 /100 WBCS (ref 0–0)
PHOSPHATE SERPL-MCNC: 5 MG/DL (ref 2.5–4.9)
PLATELET # BLD AUTO: 367 X10*3/UL (ref 150–450)
POTASSIUM SERPL-SCNC: 4 MMOL/L (ref 3.5–5.3)
RBC # BLD AUTO: 2.86 X10*6/UL (ref 4–5.2)
SODIUM SERPL-SCNC: 130 MMOL/L (ref 136–145)
WBC # BLD AUTO: 7.5 X10*3/UL (ref 4.4–11.3)

## 2024-11-13 PROCEDURE — 2500000004 HC RX 250 GENERAL PHARMACY W/ HCPCS (ALT 636 FOR OP/ED)

## 2024-11-13 PROCEDURE — 82947 ASSAY GLUCOSE BLOOD QUANT: CPT

## 2024-11-13 PROCEDURE — 2500000004 HC RX 250 GENERAL PHARMACY W/ HCPCS (ALT 636 FOR OP/ED): Performed by: INTERNAL MEDICINE

## 2024-11-13 PROCEDURE — 2500000002 HC RX 250 W HCPCS SELF ADMINISTERED DRUGS (ALT 637 FOR MEDICARE OP, ALT 636 FOR OP/ED)

## 2024-11-13 PROCEDURE — 2500000005 HC RX 250 GENERAL PHARMACY W/O HCPCS: Performed by: SURGERY

## 2024-11-13 PROCEDURE — 97110 THERAPEUTIC EXERCISES: CPT | Mod: GP

## 2024-11-13 PROCEDURE — 97110 THERAPEUTIC EXERCISES: CPT | Mod: GO

## 2024-11-13 PROCEDURE — 94640 AIRWAY INHALATION TREATMENT: CPT

## 2024-11-13 PROCEDURE — 2020000001 HC ICU ROOM DAILY

## 2024-11-13 PROCEDURE — 83735 ASSAY OF MAGNESIUM: CPT

## 2024-11-13 PROCEDURE — 2500000001 HC RX 250 WO HCPCS SELF ADMINISTERED DRUGS (ALT 637 FOR MEDICARE OP)

## 2024-11-13 PROCEDURE — 71045 X-RAY EXAM CHEST 1 VIEW: CPT | Performed by: RADIOLOGY

## 2024-11-13 PROCEDURE — 37799 UNLISTED PX VASCULAR SURGERY: CPT

## 2024-11-13 PROCEDURE — 99291 CRITICAL CARE FIRST HOUR: CPT

## 2024-11-13 PROCEDURE — 80069 RENAL FUNCTION PANEL: CPT

## 2024-11-13 PROCEDURE — 9420000001 HC RT PATIENT EDUCATION 5 MIN

## 2024-11-13 PROCEDURE — 94003 VENT MGMT INPAT SUBQ DAY: CPT

## 2024-11-13 PROCEDURE — 85025 COMPLETE CBC W/AUTO DIFF WBC: CPT

## 2024-11-13 PROCEDURE — 71045 X-RAY EXAM CHEST 1 VIEW: CPT

## 2024-11-13 PROCEDURE — 2500000001 HC RX 250 WO HCPCS SELF ADMINISTERED DRUGS (ALT 637 FOR MEDICARE OP): Performed by: INTERNAL MEDICINE

## 2024-11-13 RX ORDER — HEPARIN SODIUM 1000 [USP'U]/ML
5000 INJECTION, SOLUTION INTRAVENOUS; SUBCUTANEOUS
Status: DISCONTINUED | OUTPATIENT
Start: 2024-11-14 | End: 2024-11-13

## 2024-11-13 RX ORDER — HEPARIN SODIUM 1000 [USP'U]/ML
1000 INJECTION, SOLUTION INTRAVENOUS; SUBCUTANEOUS
Status: DISCONTINUED | OUTPATIENT
Start: 2024-11-13 | End: 2024-11-14 | Stop reason: HOSPADM

## 2024-11-13 RX ORDER — HEPARIN SODIUM 1000 [USP'U]/ML
1000 INJECTION, SOLUTION INTRAVENOUS; SUBCUTANEOUS
Status: DISCONTINUED | OUTPATIENT
Start: 2024-11-14 | End: 2024-11-13

## 2024-11-13 RX ORDER — FUROSEMIDE 10 MG/ML
60 INJECTION INTRAMUSCULAR; INTRAVENOUS ONCE
Status: COMPLETED | OUTPATIENT
Start: 2024-11-13 | End: 2024-11-13

## 2024-11-13 RX ORDER — MAGNESIUM SULFATE HEPTAHYDRATE 40 MG/ML
2 INJECTION, SOLUTION INTRAVENOUS ONCE
Status: COMPLETED | OUTPATIENT
Start: 2024-11-13 | End: 2024-11-13

## 2024-11-13 ASSESSMENT — PAIN - FUNCTIONAL ASSESSMENT
PAIN_FUNCTIONAL_ASSESSMENT: 0-10
PAIN_FUNCTIONAL_ASSESSMENT: CPOT (CRITICAL CARE PAIN OBSERVATION TOOL)
PAIN_FUNCTIONAL_ASSESSMENT: 0-10
PAIN_FUNCTIONAL_ASSESSMENT: CPOT (CRITICAL CARE PAIN OBSERVATION TOOL)
PAIN_FUNCTIONAL_ASSESSMENT: 0-10
PAIN_FUNCTIONAL_ASSESSMENT: 0-10
PAIN_FUNCTIONAL_ASSESSMENT: CPOT (CRITICAL CARE PAIN OBSERVATION TOOL)
PAIN_FUNCTIONAL_ASSESSMENT: 0-10

## 2024-11-13 ASSESSMENT — COGNITIVE AND FUNCTIONAL STATUS - GENERAL
MOVING TO AND FROM BED TO CHAIR: TOTAL
EATING MEALS: TOTAL
HELP NEEDED FOR BATHING: TOTAL
TOILETING: TOTAL
TURNING FROM BACK TO SIDE WHILE IN FLAT BAD: TOTAL
CLIMB 3 TO 5 STEPS WITH RAILING: TOTAL
DRESSING REGULAR UPPER BODY CLOTHING: TOTAL
MOVING FROM LYING ON BACK TO SITTING ON SIDE OF FLAT BED WITH BEDRAILS: TOTAL
MOBILITY SCORE: 6
PERSONAL GROOMING: TOTAL
WALKING IN HOSPITAL ROOM: TOTAL
DRESSING REGULAR LOWER BODY CLOTHING: TOTAL
DAILY ACTIVITIY SCORE: 6
STANDING UP FROM CHAIR USING ARMS: TOTAL

## 2024-11-13 ASSESSMENT — PAIN SCALES - WONG BAKER
WONGBAKER_NUMERICALRESPONSE: NO HURT
WONGBAKER_NUMERICALRESPONSE: NO HURT

## 2024-11-13 ASSESSMENT — PAIN SCALES - GENERAL
PAINLEVEL_OUTOF10: 0 - NO PAIN
PAINLEVEL_OUTOF10: 5 - MODERATE PAIN
PAINLEVEL_OUTOF10: 0 - NO PAIN
PAINLEVEL_OUTOF10: 0 - NO PAIN

## 2024-11-13 ASSESSMENT — PAIN DESCRIPTION - DESCRIPTORS: DESCRIPTORS: DISCOMFORT

## 2024-11-13 NOTE — PROGRESS NOTES
Narda Malloy is a 69 y.o. female on day 32 of admission presenting with Unresponsive.      Subjective   Patient doing well this morning with no acute issues.  Creatinine is stable.  Net positive 430 cc yesterday.       Objective          Vitals 24HR  Heart Rate:  [60-78]   Temp:  [36.8 °C (98.2 °F)-37.4 °C (99.3 °F)]   Resp:  [15-26]   BP: (108-154)/(48-80)   SpO2:  [92 %-100 %]       Intake/Output last 3 Shifts:    Intake/Output Summary (Last 24 hours) at 11/13/2024 1231  Last data filed at 11/13/2024 1100  Gross per 24 hour   Intake 1220 ml   Output 1410 ml   Net -190 ml       Physical Exam  Constitutional:       General: She is awake. She is not in acute distress.  Neck:      Comments: Trach present.  Right IJ tunneled dialysis catheter present  Cardiovascular:      Heart sounds:      No friction rub.   Pulmonary:      Comments: Diminished breath sounds more in R lung field  Abdominal:      General: Bowel sounds are normal.      Palpations: Abdomen is soft.      Tenderness: There is no guarding or rebound.   Musculoskeletal:      Comments: Mild peripheral edema greater in the right lower extremity   Neurological:      Mental Status: She is alert.         Relevant Results  Results for orders placed or performed during the hospital encounter of 10/12/24 (from the past 24 hours)   POCT GLUCOSE   Result Value Ref Range    POCT Glucose 144 (H) 74 - 99 mg/dL   POCT GLUCOSE   Result Value Ref Range    POCT Glucose 187 (H) 74 - 99 mg/dL   POCT GLUCOSE   Result Value Ref Range    POCT Glucose 165 (H) 74 - 99 mg/dL   CBC and Auto Differential   Result Value Ref Range    WBC 7.5 4.4 - 11.3 x10*3/uL    nRBC 0.0 0.0 - 0.0 /100 WBCs    RBC 2.86 (L) 4.00 - 5.20 x10*6/uL    Hemoglobin 8.6 (L) 12.0 - 16.0 g/dL    Hematocrit 25.8 (L) 36.0 - 46.0 %    MCV 90 80 - 100 fL    MCH 30.1 26.0 - 34.0 pg    MCHC 33.3 32.0 - 36.0 g/dL    RDW 18.6 (H) 11.5 - 14.5 %    Platelets 367 150 - 450 x10*3/uL    Neutrophils % 78.5 40.0 - 80.0 %     Immature Granulocytes %, Automated 0.9 0.0 - 0.9 %    Lymphocytes % 13.9 13.0 - 44.0 %    Monocytes % 5.1 2.0 - 10.0 %    Eosinophils % 0.8 0.0 - 6.0 %    Basophils % 0.8 0.0 - 2.0 %    Neutrophils Absolute 5.85 1.20 - 7.70 x10*3/uL    Immature Granulocytes Absolute, Automated 0.07 0.00 - 0.70 x10*3/uL    Lymphocytes Absolute 1.04 (L) 1.20 - 4.80 x10*3/uL    Monocytes Absolute 0.38 0.10 - 1.00 x10*3/uL    Eosinophils Absolute 0.06 0.00 - 0.70 x10*3/uL    Basophils Absolute 0.06 0.00 - 0.10 x10*3/uL   Magnesium   Result Value Ref Range    Magnesium 1.77 1.60 - 2.40 mg/dL   Renal function panel   Result Value Ref Range    Glucose 148 (H) 74 - 99 mg/dL    Sodium 130 (L) 136 - 145 mmol/L    Potassium 4.0 3.5 - 5.3 mmol/L    Chloride 91 (L) 98 - 107 mmol/L    Bicarbonate 30 21 - 32 mmol/L    Anion Gap 13 10 - 20 mmol/L    Urea Nitrogen 64 (H) 6 - 23 mg/dL    Creatinine 1.72 (H) 0.50 - 1.05 mg/dL    eGFR 32 (L) >60 mL/min/1.73m*2    Calcium 8.0 (L) 8.6 - 10.3 mg/dL    Phosphorus 5.0 (H) 2.5 - 4.9 mg/dL    Albumin 2.3 (L) 3.4 - 5.0 g/dL   POCT GLUCOSE   Result Value Ref Range    POCT Glucose 166 (H) 74 - 99 mg/dL   POCT GLUCOSE   Result Value Ref Range    POCT Glucose 151 (H) 74 - 99 mg/dL   POCT GLUCOSE   Result Value Ref Range    POCT Glucose 186 (H) 74 - 99 mg/dL     *Note: Due to a large number of results and/or encounters for the requested time period, some results have not been displayed. A complete set of results can be found in Results Review.            Assessment/Plan   Acute kidney injury - Cr stable, no need for dialysis therapy continue to monitor renal function, we will remove permacath at the end of the week if she remains in stable condition as far as the kidneys are concerned. Give 60 mg IV lasix x1 for volume management, discussed with ICU team  Hyponatremia mild, in the setting of fluid overload   Edema much improved, continue to diurese  Pneumonia continue with antibiotic therapy infectious  disease  Diabetes mellitus type 2  Malnutrition continue with tube feeding  Lower back surgery site infection  Anemia transfuse when hemoglobin less than 7  Acute respiratory failure plan to continue the ventilator through a tracheostomy    Nelia Cheung MD

## 2024-11-13 NOTE — CARE PLAN
The patient's goals for the shift include unable to assess    The clinical goals for the shift include stable hemodynamically, keep comfortable on the vent    Over the shift, the patient did not make progress toward the following goals. Barriers to progression include not being able to walk. Recommendations to address these barriers include reposition for comfort,range of motion  for extremities..    Problem: Fall/Injury  Goal: Use assistive devices by end of the shift  Outcome: Not Progressing  Goal: Pace activities to prevent fatigue by end of the shift  Outcome: Not Progressing     Problem: Safety - Adult  Goal: Free from fall injury  Outcome: Progressing     Problem: Discharge Planning  Goal: Discharge to home or other facility with appropriate resources  Outcome: Progressing     Problem: Chronic Conditions and Co-morbidities  Goal: Patient's chronic conditions and co-morbidity symptoms are monitored and maintained or improved  Outcome: Progressing     Problem: Diabetes  Goal: Increase stability of blood glucose readings by end of shift  Outcome: Progressing  Goal: Maintain electrolyte levels within acceptable range throughout shift  Outcome: Progressing  Goal: Maintain glucose levels >70mg/dl to <250mg/dl throughout shift  Outcome: Progressing  Goal: Learn about and adhere to nutrition recommendations by end of shift  Outcome: Progressing  Goal: Vital signs within normal range for age by end of shift  Outcome: Progressing  Goal: Increase self care and/or family involovement by end of shift  Outcome: Progressing  Goal: Receive DSME education by end of shift  Outcome: Progressing     Problem: Knowledge Deficit  Goal: Patient/family/caregiver demonstrates understanding of disease process, treatment plan, medications, and discharge instructions  Outcome: Progressing     Problem: Skin  Goal: Decreased wound size/increased tissue granulation at next dressing change  Outcome: Progressing  Flowsheets (Taken 11/13/2024  1749)  Decreased wound size/increased tissue granulation at next dressing change:   Promote sleep for wound healing   Utilize specialty bed per algorithm   Protective dressings over bony prominences  Goal: Participates in plan/prevention/treatment measures  Outcome: Progressing  Flowsheets (Taken 11/13/2024 1749)  Participates in plan/prevention/treatment measures:   Discuss with provider PT/OT consult   Elevate heels  Goal: Prevent/manage excess moisture  Outcome: Progressing  Flowsheets (Taken 11/13/2024 1749)  Prevent/manage excess moisture:   Cleanse incontinence/protect with barrier cream   Moisturize dry skin   Follow provider orders for dressing changes   Monitor for/manage infection if present  Goal: Prevent/minimize sheer/friction injuries  Outcome: Progressing  Flowsheets (Taken 11/13/2024 1749)  Prevent/minimize sheer/friction injuries:   HOB 30 degrees or less   Turn/reposition every 2 hours/use positioning/transfer devices   Use pull sheet   Utilize specialty bed per algorithm  Goal: Promote/optimize nutrition  Outcome: Progressing  Flowsheets (Taken 11/13/2024 1749)  Promote/optimize nutrition: Monitor/record intake including meals  Goal: Promote skin healing  Outcome: Progressing  Flowsheets (Taken 11/13/2024 1749)  Promote skin healing:   Assess skin/pad under line(s)/device(s)   Ensure correct size (line/device) and apply per  instructions   Protective dressings over bony prominences   Rotate device position/do not position patient on device   Turn/reposition every 2 hours/use positioning/transfer devices     Problem: Nutrition  Goal: Consume prescribed supplement  Outcome: Progressing  Goal: Nutrition support goals are met within 48 hrs  Outcome: Progressing  Goal: Nutrition support is meeting 75% of nutrient needs  Outcome: Progressing  Goal: BG  mg/dL  Outcome: Progressing  Goal: Lab values WNL  Outcome: Progressing  Goal: Electrolytes WNL  Outcome: Progressing  Goal: Promote  healing  Outcome: Progressing  Goal: Maintain stable weight  Outcome: Progressing  Goal: Reduce weight from edema/fluid  Outcome: Progressing     Problem: Respiratory  Goal: No signs of respiratory distress (eg. Use of accessory muscles. Peds grunting)  Outcome: Progressing  Goal: Clear secretions with interventions this shift  Outcome: Progressing  Goal: Minimize anxiety/maximize coping throughout shift  Outcome: Progressing  Goal: Minimal/no exertional discomfort or dyspnea this shift  Outcome: Progressing  Goal: Patent airway maintained this shift  Outcome: Progressing  Goal: Tolerate mechanical ventilation evidenced by VS/agitation level this shift  Outcome: Progressing  Goal: Tolerate pulmonary toileting this shift  Outcome: Progressing  Goal: Verbalize decreased shortness of breath this shift  Outcome: Progressing  Goal: Wean oxygen to maintain O2 saturation per order/standard this shift  Outcome: Progressing  Goal: Increase self care and/or family involvement in next 24 hours  Outcome: Progressing     Problem: Pain  Goal: Takes deep breaths with improved pain control throughout the shift  Outcome: Progressing  Goal: Turns in bed with improved pain control throughout the shift  Outcome: Progressing  Goal: Walks with improved pain control throughout the shift  Outcome: Progressing  Goal: Performs ADL's with improved pain control throughout shift  Outcome: Progressing  Goal: Participates in PT with improved pain control throughout the shift  Outcome: Progressing  Goal: Free from opioid side effects throughout the shift  Outcome: Progressing  Goal: Free from acute confusion related to pain meds throughout the shift  Outcome: Progressing     Problem: Fall/Injury  Goal: Not fall by end of shift  Outcome: Progressing  Goal: Be free from injury by end of the shift  Outcome: Progressing  Goal: Verbalize understanding of personal risk factors for fall in the hospital  Outcome: Progressing  Goal: Verbalize understanding  of risk factor reduction measures to prevent injury from fall in the home  Outcome: Progressing     Problem: Mechanical Ventilation  Goal: Tracheostomy will be managed safely  Outcome: Progressing

## 2024-11-13 NOTE — PROGRESS NOTES
[facility]       Date:  11/13/2024  Patient:  Narda Malloy  YOB: 1955  MRN:  [id number]   Admit Date:  10/12/2024      Chief Complaint   Patient presents with    Altered Mental Status         History of Present Illness:  Narda Malloy is a 69 y.o. year old female patient with Past Medical History of   L1-L3 lumbar laminectomy, T4-S1 revision, and fusion August 26th, T2DM, HTN, essential tremor, HLD, glaucoma, sarcoidosis of the lung who presented to  ED 10/12 after being found essentially unresponsive at her nursing facility [facility]. Per report from her , she has had a significant decline in her health since July 15th when she had a fall and became significantly weak. She has also had multiple infection complications since her back surgery in August requiring multiple I&Ds and long term antibiotic therapy. She went to the OR most recently on 9/28 for lumbar site infection wash out. Per chart review, she was discharged on IV vancomycin 1g and IV ceftriaxone 2d q24hrs through 11/12.     ED Course: Initial vital signs: /104 (109), HR 68, RR 20, SpO2 95% on 6L NC, temp 34.5C. Give 0.4mg of narcan with no improvement in mentation. Lab work-up remarkable for mild hyperkalemia (5.5), AMY 42/1.46, elevated alk phos, normocytic anemia 10.4/33, turbid appearing urine with mild hematuria and proteinuria and + leuk esterase, >50 WBCs. Urine drug screen positive for barbiturates. Triggered sepsis timer so she was given 3L NS. She was intubated for airway protection with 20mg etomidate and 100mg succinylcholine. BP dropped post-intubated and fluid resuscitation and she was subsequently started on levophed.           Interval ICU Events:  Patient presented from the ED to the ICU intubated, received hyperkalemia cocktail and had noticeably decreasing urine output.  Mentation greatly improved on 10/15 on SAT/SBT were conducted and were successful.  Patient was done  after extubated.  Patient's oxygen requirements significantly increased requiring high flow nasal cannula 40L 100%.  On 10/17 chest x-ray showed complete opacification of left hemithorax most likely pleural effusion.  Subsequently pigtail catheter was then placed with immediate drainage of 1.2 L with cytology pending.  Kidney function and urine output continue to decline with worsening hypoxia and hypercapnia and patient was started on BiPAP therapy.  Patient was given high-dose of furosemide and metolazone to stimulate urine output but were unsuccessful.  Temporary dialysis catheter placed awaiting start for CRRT per nephrology.  Patient participated in physical therapy and Occupational Therapy while intubated. Venous jugular duplex noticed large thrombus on tube subsequent exams patient was placed on heparin gtt. Patient again passed SBT and SAT with minimal requirements and was subsequently extubated on 10/31 patient again developed acute respiratory failure the patient was reintubated on 11/2.  On 11/3 family meeting was held to discuss patient's goals of care and treatment model and it was decided to continue DNR CCA, and workup of tracheostomy and PEG tube placement.  On 11/4 patient had PEG tube placed by general surgery.  On 11/7 patient had tracheostomy placed by ENT and sedation was weaned.  Patient continuing physical therapy and Occupational Therapy at this time with difficulties weaning from vent support trach collar. Currently excepted at [facility].      11/11: No events overnight. Urine output increased to 1L yesterday. Nephrology following.     Medical History:  Past Medical History:   Diagnosis Date    Degenerative myopia, bilateral     Diabetic neuropathy (Multi)     Difficult intubation 08/26/2024    Mac 3, grade 3, 1 attempt.  Glidescope/videolaryngoscopy recommended for future attempts.    DM type 2 (diabetes mellitus, type 2) (Multi)     Dry eye syndrome of bilateral lacrimal glands      Essential hypertension     Essential tremor     Glaucoma     Hyperlipidemia     Long term (current) use of insulin (Multi)     Low back pain     PONV (postoperative nausea and vomiting)     Primary open angle glaucoma of both eyes, severe stage     Repeated falls     Sarcoidosis of lung (Multi)     Spinal stenosis, lumbar region without neurogenic claudication     Weakness      Past Surgical History:   Procedure Laterality Date    BLEPHAROPLASTY  2022    BREAST SURGERY  2022    Breast lift    CARPAL TUNNEL RELEASE      CATARACT EXTRACTION W/  INTRAOCULAR LENS IMPLANT Bilateral     OD 2011 +8.5D,OS 2011 +8.50D    FOOT SURGERY      INSERTION / REMOVAL CRANIAL DBS GENERATOR      Placed 2017.  Removed . part of wire left in head when everything removed    LUMBAR FUSION      L3-S1    PANRETINAL PHOTOCOAGULATION  2014    THORACIC FUSION  2024    T4-S1 fusion    VITRECTOMY Right 2013     Medications Prior to Admission   Medication Sig Dispense Refill Last Dose/Taking    acetaminophen (Tylenol) 500 mg tablet Take 2 tablets (1,000 mg) by mouth 3 times a day.   Unknown    ascorbic acid (Vitamin C) 500 mg tablet as directed Orally   Unknown    brimonidine (AlphaGAN) 0.2 % ophthalmic solution Administer 1 drop into both eyes 2 times a day.   Unknown    calcium carbonate-vitamin D3 500 mg-5 mcg (200 unit) tablet Take 1 tablet by mouth once daily.   Unknown    [] cefTRIAXone (Rocephin) 2 gram/50 mL IV Infuse 50 mL (2 g) at 100 mL/hr over 30 minutes into a venous catheter once every 24 hours. Once weekly labs CBC/diff, CMP, Vanc trough ESR, CRP fax to Dr. Juarez [phone]. Stop date 24. 1950 mL 0     dextrose 50 % injection Infuse 25 mL (12.5 g) into a venous catheter every 15 minutes if needed (For blood glucose 41 to 70 mg/dL).       dextrose 50 % injection Infuse 50 mL (25 g) into a venous catheter every 15 minutes if needed (For blood glucose less than or equal to 40 mg/dL).        docusate sodium (Colace) 100 mg capsule Take 1 capsule (100 mg) by mouth 2 times a day.   Unknown    ezetimibe (Zetia) 10 mg tablet Take 1 tablet (10 mg) by mouth once daily. 90 tablet 3 Unknown    FreeStyle Lite Strips strip USE TO TEST 3 TIMES A DAY AS DIRECTED 300 each 2     glucagon (Glucagen) 1 mg injection Inject 1 mg into the muscle every 15 minutes if needed for low blood sugar - see comments (Hypoglycemia).       glucagon (Glucagen) 1 mg injection Inject 1 mg into the muscle every 15 minutes if needed for low blood sugar - see comments (Hypoglycemia).       heparin sodium,porcine (heparin, porcine,) 5,000 unit/mL injection Inject 1 mL (5,000 Units) under the skin every 8 hours.       insulin lispro (HumaLOG) 100 unit/mL injection Inject 0-15 Units under the skin 3 times daily (morning, midday, late afternoon). Take as directed per insulin instructions.Do not hold when patient is not eating, continue order as scheduled for hyperglycemia management.  Insulin Lispro Corrective Scale #3     Hypoglycemia protocol Call LIP unit(s) if Blood Glucose is between 0 - 70 mg/dL     0 unit(s) if Blood glucose is between    3 unit(s) if Blood glucose is between 151-200   6 unit(s) if Blood glucose is between 201-250   9 unit(s) if Blood glucose is between 251-300   12 unit(s) if Blood glucose is between 301-350   15 unit(s) if Blood glucose is between 351-400    Notify provider unit(s) if Blood Glucose is greater than 400 mg/dL       Lactobacillus acidophilus 100 mg (1 billion cell) capsule Take 1 capsule by mouth 2 times a day.   Unknown    latanoprost (Xalatan) 0.005 % ophthalmic solution Administer 1 drop into both eyes once daily at bedtime. 2.5 mL 5 Unknown    melatonin 5 mg tablet Take 1 tablet (5 mg) by mouth as needed at bedtime for sleep.   Unknown    methocarbamol (Robaxin) 500 mg tablet Take 1 tablet (500 mg) by mouth 3 times a day.   Unknown    multivitamin tablet Take 1 tablet by mouth once daily.    "Unknown    ondansetron (Zofran) 4 mg/2 mL injection Infuse 2 mL (4 mg) into a venous catheter every 6 hours if needed for nausea or vomiting.       oxyCODONE (Roxicodone) 5 mg immediate release tablet Take 1 tablet (5 mg) by mouth every 6 hours if needed for severe pain (7 - 10) or moderate pain (4 - 6).       oxygen (O2) gas therapy Inhale 1 each continuously.       pantoprazole (ProtoNix) 40 mg EC tablet Take 1 tablet (40 mg) by mouth once daily in the morning. Take before meals. Do not crush, chew, or split.       pantoprazole (ProtoNix) 40 mg injection Infuse 40 mg into a venous catheter once daily in the morning. Take before meals. If unable to take PO.       pen needle, diabetic (PEN NEEDLE MISC) BD Altagracia- 4 mm X 32 G needle - as directed 4x a day sc 4 times per day       polyethylene glycol (Glycolax, Miralax) 17 gram packet Take 17 g by mouth once daily.   Unknown    primidone 125 mg tablet Take 125 mg by mouth 3 times a day.   Unknown    propranolol LA (Inderal LA) 60 mg 24 hr capsule Take 1 capsule (60 mg) by mouth early in the morning.. Hold for SBP < 110 mmhg, HR < 60 bpm.   Unknown    sennosides (Senokot) 8.6 mg tablet Take 1 tablet (8.6 mg) by mouth every 12 hours if needed for constipation.   Unknown    Sure Comfort Pen Needle 32 gauge x 5/32\" needle AS DIRECTED DAILY FOR 90 DAYS 100 each 11 Unknown    traZODone (Desyrel) 25 MG split tablet Take 1 half tablet (25 mg) by mouth once daily at bedtime.   Unknown    [] vancomycin (Vancocin) 1 gram/250 mL solution Infuse 250 mL (1 g) at 250 mL/hr over 60 minutes into a venous catheter every 12 hours. Once weekly labs CBC/diff, CMP, Vanc trough ESR, CRP fax to Dr. Juarez [phone]. Stop date 24. 78312 mL 0      Erythromycin, Morphine, and Rosuvastatin  Social History     Tobacco Use    Smoking status: Former     Types: Cigarettes     Passive exposure: Past    Smokeless tobacco: Never   Vaping Use    Vaping status: Never Used   Substance Use " Topics    Alcohol use: Not Currently    Drug use: Not Currently     Family History   Problem Relation Name Age of Onset    Multiple myeloma Mother      Cancer Mother      Other (CABG) Father      Pulmonary embolism Father      Heart disease Father      Breast cancer Sister          Stage II    Hypertension Sister      Diabetes Sister      No Known Problems Sister          x5    No Known Problems Brother          x4    No Known Problems Daughter         Hospital Medications:           Current Facility-Administered Medications:     acetaminophen (Tylenol) oral liquid 650 mg, 650 mg, oral, q4h PRN, RUTH Salgado    albuterol 2.5 mg /3 mL (0.083 %) nebulizer solution 3 mL, 3 mL, nebulization, 4x daily, Kaleb Lam PA-C, 3 mL at 11/13/24 0735    alteplase (Cathflo Activase) injection 2 mg, 2 mg, intra-catheter, PRN, Kaleb Lam PA-C    amoxicillin (Amoxil) capsule 500 mg, 500 mg, oral, Daily, Andrew Malagon MD, 500 mg at 11/12/24 1309    apixaban (Eliquis) tablet 5 mg, 5 mg, oral, BID, Tommy Amezcua PA-C, 5 mg at 11/12/24 2059    brimonidine (AlphaGAN) 0.2 % ophthalmic solution 1 drop, 1 drop, Both Eyes, BID, Kaleb Lam PA-C, 1 drop at 11/12/24 2057    calcium carbonate-vitamin D3 500 mg-5 mcg (200 unit) per tablet 1 tablet, 1 tablet, oral, Daily, Kaleb Lam PA-C, 1 tablet at 11/12/24 0826    collagenase 250 unit/gram ointment, , Topical, Daily, RUTH Salgado, Given at 11/12/24 1017    dextrose 50 % injection 12.5 g, 12.5 g, intravenous, q15 min PRN, Kaleb Lam PA-C    dextrose 50 % injection 25 g, 25 g, intravenous, q15 min PRN, Kaleb Lam PA-C    doxycycline (Vibramycin) capsule 100 mg, 100 mg, oral, q12h LUCA, Andrew Malagon MD, 100 mg at 11/12/24 2058    ezetimibe (Zetia) tablet 10 mg, 10 mg, oral, Daily, Kaleb Lam PA-C, 10 mg at 11/12/24 0825    ferrous sulfate syrup 60 mg of iron, 60 mg of iron, oral, Daily, Kaleb Lam PA-C, 60 mg of iron at 11/12/24  0826    folic acid (Folvite) tablet 1 mg, 1 mg, oral, Daily, Kaleb Lam PA-C, 1 mg at 11/12/24 0825    glucagon (Glucagen) injection 1 mg, 1 mg, intramuscular, q15 min PRN, Kaleb Lam PA-C    glucagon (Glucagen) injection 1 mg, 1 mg, intramuscular, q15 min PRN, Kaleb Lam PA-C    heparin flush 10 unit/mL syringe 50 Units, 50 Units, intra-catheter, PRN, Kaleb Lam PA-C, 50 Units at 10/19/24 2313    honey (Medihoney) topical gel, , Topical, Daily, Kaleb Lam PA-C, Given at 11/12/24 1017    HYDROmorphone (Dilaudid) injection 0.4 mg, 0.4 mg, intravenous, q2h PRN, Kaleb Lam PA-C, 0.4 mg at 11/11/24 0501    insulin lispro (HumaLOG) injection 0-15 Units, 0-15 Units, subcutaneous, q4h, Kaleb Lam PA-C, 3 Units at 11/13/24 0408    latanoprost (Xalatan) 0.005 % ophthalmic solution 1 drop, 1 drop, Both Eyes, Nightly, Kaleb Lam PA-C, 1 drop at 11/12/24 2058    midodrine (Proamatine) tablet 5 mg, 5 mg, oral, TID, JENNIFER SalgadoCNP    ondansetron (Zofran) injection 4 mg, 4 mg, intravenous, q4h PRN, RUTH Salgado, 4 mg at 11/12/24 1017    oxyCODONE (Roxicodone) solution 5 mg, 5 mg, oral, q4h PRN, Kaleb Lam PA-C, 5 mg at 11/12/24 0833    oxyCODONE (Roxicodone) solution 7.5 mg, 7.5 mg, oral, q4h PRN, Kaleb Lam PA-C    oxygen (O2) therapy, , inhalation, Continuous - Inhalation, Anna Marie Shook MD, 30 percent at 11/13/24 0738    pantoprazole (ProtoNix) EC tablet 40 mg, 40 mg, oral, Daily before breakfast **OR** pantoprazole (ProtoNix) injection 40 mg, 40 mg, intravenous, Daily before breakfast, Kaleb Lam PA-C, 40 mg at 11/13/24 0741    polyethylene glycol (Glycolax, Miralax) packet 17 g, 17 g, oral, Daily PRN, Kaleb Lam PA-C    potassium, sodium phosphates (Phos-NaK) 280-160-250 mg packet 1 packet, 1 packet, oral, With meals & nightly, Kaleb Lam PA-C, 1 packet at 11/12/24 2058    primidone (Mysoline) tablet 125 mg, 125 mg, oral,  TID, Tommy Amezcua PA-C, 125 mg at 11/12/24 2058    [Held by provider] propranolol LA (Inderal LA) 24 hr capsule 60 mg, 60 mg, oral, Daily, Kaleb Lam PA-C, 60 mg at 10/19/24 0643    sennosides-docusate sodium (Lucia-Colace) 8.6-50 mg per tablet 1 tablet, 1 tablet, oral, Nightly PRN, Kaleb Lam PA-C    Review of Systems:  14 point review of systems was completed and negative except for those specially mention in my HPI    Physical Exam:    Heart Rate:  [60-77]   Temp:  [36.8 °C (98.2 °F)-37.4 °C (99.3 °F)]   Resp:  [15-27]   BP: (108-154)/(48-80)   SpO2:  [92 %-100 %]     Physical Exam  Constitutional:       Comments: trach   HENT:      Mouth/Throat:      Mouth: Mucous membranes are moist.      Pharynx: Oropharynx is clear.   Eyes:      Pupils: Pupils are equal, round, and reactive to light.   Cardiovascular:      Rate and Rhythm: Normal rate and regular rhythm.      Pulses: Normal pulses.   Pulmonary:      Effort: Pulmonary effort is normal.   Abdominal:      General: Abdomen is flat.      Palpations: Abdomen is soft.      Comments: Peg clean at insertion site, no drainage or erythema to site    Rectal tube in place   Musculoskeletal:         General: Normal range of motion.   Skin:     General: Skin is warm and dry.      Capillary Refill: Capillary refill takes less than 2 seconds.   Neurological:      General: No focal deficit present.      Mental Status: She is alert and oriented to person, place, and time. Mental status is at baseline.   Psychiatric:         Mood and Affect: Mood normal.         Objective:    I have reviewed all medications, laboratory results, and imaging pertinent for today's encounter.    Vent Mode: Pressure support  FiO2 (%):  [30 %] 30 %  S RR:  [16] 16  S VT:  [450 mL] 450 mL  PEEP/CPAP (cm H2O):  [5 cm H20] 5 cm H20  WI SUP:  [10 cm H20] 10 cm H20  MAP (cm H2O):  [8.4-13] 12      Intake/Output Summary (Last 24 hours) at 11/13/2024 0801  Last data filed at 11/13/2024 0700  Gross  per 24 hour   Intake 1530 ml   Output 1185 ml   Net 345 ml         Assessment/Plan:    I am currently managing this critically ill patient for the following problems:    Plan:  Neuro/Psych/Pain Ctrl/Sedation: (Hx: essential tremor)  Acute encephalopathy - resolved  CT head 10/12: Negative for acute findings   -Pain Control: liquid oxy, scheduled acetaminophen, dilaudid for dressing changes PRN  -Home primidone continued. Will hold propanolol d/t hypotension  -CAM ICU qshift, sleep-wake hygiene, delirium precautions     Respiratory/ENT:  Acute hypoxic/hypercapnic respiratory failure-likely multifactorial and 2/2 HCAP, pl effusions, volume overload  Healthcare-associated pneumonia   Recurrent atelectasis   Ventilator-dependence   Pleural effusion -s/p left-sided pigtail placement 10/17, removed 10/25  Trach placement 11/7  CXR 11/13: parenchymal infiltration, bilateral pleural effusion L>R  -Will wean O2 as tolerated to maintain SpO2 >92%  -SBT every a.m.  -Albuterol, hypertonic saline    -IPV per RT TID  -Aspiration precautions   -Trach care qshift  - Otolaryngology consulted for trach placement, will appreciate recs     Cardiovascular:  (Hx: diastolic heart failure, HTN, HLD)  Septic shock-resolved  Occlusive superficial venous thrombus of the left cephalic vein  Non-occlusive DVT right IJ vein   Acute on chronic diastolic heart failure  US duplex 10/29 occlusive superficial vein thrombosis of left cephalic vein   Repeat duplex 11/4 nonocclusive thrombus to RIJ  TTE 11/2: EF 60-65%, mild/mod AV valve regurg  US duplex LUE 11/12: No evidence of acute dvt  -Continue midodrine 10 q8-> weaned to 5mg q8  -Holding home propranolol (on for tremors)  -Continue home Zetia  -Continuous cardiac monitoring per ICU protocol  -EKGs PRN for ACS symptoms, arrhythmias  -Heparin drip resumed-transition to Eliquis closer to discharge      GI:  Hx GERD  Diarrhea  Peg-dependent-placed 11/4  Diet: Nepro @40ml/hr  BR: miralax PRN  GI  "Prophylaxis: PPI  -C-Diff negative     /Volume Status/Electrolytes:  Acute kidney injury-possibly ATN +/- medication-induced (vancomycin)  Anasarca   Hypoalbuminemia   Hyponatremia- improving  -HD 11/9 with 2.5L removed, Plan for no further HD and will diuresis with signs of renal recovery, received 40mg lasix  -- Plan to remove permacath by end of the week if no longer needing HD  -Gibson place for accurate I&O's   -Nephrology consulted will appreciate recs  -Replete electrolytes to maintain K >4.0 and Mg >2.0  -Daily BMP, Mg, Phos  -Lasix x1     Heme/Onc: (Hx: normocytic anemia)  Occlusive LUE DVT  Non-occlusive R IJ DVT  -Eliquis started 11/11  -Continue iron and folate  -Monitor for s/sx of bleeding   -Plan to transfuse if Hgb <7.0   -Daily CBC  -T&S AM draw 11/10    Endocrine: (Hx: DMII)  SSI Q4  Hypoglycemia protocol PRN     Infectious Disease:  Pseudomonas-Respiratory culture 10/29  Thoracolumbar surgical site infection - s/p I&D x 2. Recent MRSA bacteremia on extended course of antibiotics; IV ceftriaxone, IV daptomycin  Healthcare-associated pneumonia   CT lumbar spine 10/12: Unable to r/o abscess. Unable to do MRI d/t spinal hardware, \"metal in head\" per patient  -Sputum culture 11/4 negative  -BC 11/3 negative  -Cdiff negative 11/7  -Daptomycin and ceftriaxone will stop today 11/12,  - Transitioned to oral amoxicillin and doxycyline  -Infectious Disease consult placed, will appreciate recs  -Monitor SIRS criteria     MSK:  PT/OT orders placed     Ethics/Code Status:  DNR-CCA      :  DVT Prophylaxis: SCDs heparin gtt  GI Prophylaxis: PPI home med  Bowel Regimen: Miralax PRN  Diet: Nepro at 40  CVC: PICC placed 10/24, permacath 11/5  Wanda: No  Gibson: yes 11/7  Restraints: no  Discharge planning: [facility], Peer to Peer successful, possible discharge as early ast 11/14    Critical Care Time:  40 minutes spent in preparing to see patient (I.e. review of medical records), evaluation of " diagnostics (I.e. labs, imaging, etc.), documentation, discussing plan of care with patient/ family/ caregiver, and/ or coordination of care with multidisciplinary team. Time does not include completion of procedure time.       Sabino Matos, APRN-CNP

## 2024-11-13 NOTE — PROGRESS NOTES
Physical Therapy    Physical Therapy Treatment    Patient Name: Narda Malloy  MRN: [id number]  Department: [address] ICU  Room: 11/11-A  Today's Date: 11/13/2024  Time Calculation  Start Time: 1351  Stop Time: 1414  Time Calculation (min): 23 min    Assessment/Plan   PT Assessment  End of Session Communication: Bedside nurse  Assessment Comment: pt fatigued from earlier weaning trial this date, agreeable to ther ex, unable to assist with LE movement, no volitional movement noted.  End of Session Patient Position: Bed, 3 rail up, Alarm off, not on at start of session  PT Plan  Inpatient/Swing Bed or Outpatient: Inpatient  PT Plan  Treatment/Interventions: Bed mobility, Transfer training, Balance training, Strengthening, Endurance training, Range of motion, Therapeutic exercise, Therapeutic activity  PT Plan: Ongoing PT  PT Frequency: 5 times per week  PT Discharge Recommendations: Moderate intensity level of continued care   Equipment Recommended upon Discharge: Wheelchair  PT Recommended Transfer Status: Total assist  PT - OK to Discharge: Yes    General Visit Information:   PT  Visit  PT Received On: 11/13/24  Response to Previous Treatment: Patient reporting fatigue but able to participate.  General  Missed Visit: No  Missed Visit Reason: Other (Comment)  Family/Caregiver Present: Yes  Caregiver Feedback: Spouse at bedside.  Co-Treatment: OT  Co-Treatment Reason: seen together in preparation for mobility however pt did decline sitting EOB  Prior to Session Communication: Bedside nurse  Patient Position Received: Bed, 3 rail up, Alarm off, not on at start of session  Preferred Learning Style: auditory, verbal  General Comment: pt agreeable to ROM B UEs/LEs. per RN, pt had been on a weaning trial until 15 minutes before my arrival. pt reporting she is very tired.    Subjective   Precautions:  Precautions  Medical Precautions: Fall precautions, Oxygen therapy device and L/min, Spinal precautions (ventilator via  tracheostomy)    Vital Signs (Past 2hrs)        Date/Time Vitals Session Patient Position Pulse Resp SpO2 BP MAP (mmHg)    11/13/24 1328 --  --  102  20  86 %  --  --     11/13/24 1329 --  --  100  36  85 %  --  --     11/13/24 1330 --  --  103  28  96 %  --  --     11/13/24 1400 --  --  86  18  98 %  96/65  73     11/13/24 1442 --  --  --  16  --  --  --                   Vital Signs Comment: 80 bpm, 98%, 115/50 (70) mmHg, 16 breaths/min     Objective   Pain:  Pain Assessment  Pain Assessment: 0-10  0-10 (Numeric) Pain Score: 0 - No pain  Cognition:  Cognition  Overall Cognitive Status: Within Functional Limits  Cognition Comments: pt able to mouth words to communicate  Activity Tolerance:  Activity Tolerance  Endurance: Tolerates less than 10 min exercise, no significant change in vital signs  Activity Tolerance Comments: poor, fatigued this date  Rate of Perceived Exertion (RPE): 3  Treatments:  Therapeutic Exercise  Therapeutic Exercise Performed: Yes  Therapeutic Exercise Activity 1: 10 reps B LE supine ther ex: ankle pumps, heel slides, SAQ, hip abd/add/ER/IR  Therapeutic Exercise Activity 2: no assistance or resistance from patient, no volitional movement B LEs    Bed Mobility  Bed Mobility: No (pt declined this date, reporting increased fatigue form weaning trial, wants to sleep.)    Outcome Measures:  [facility] Basic Mobility  Turning from your back to your side while in a flat bed without using bedrails: Total  Moving from lying on your back to sitting on the side of a flat bed without using bedrails: Total  Moving to and from bed to chair (including a wheelchair): Total  Standing up from a chair using your arms (e.g. wheelchair or bedside chair): Total  To walk in hospital room: Total  Climbing 3-5 steps with railing: Total  Basic Mobility - Total Score: 6    FSS-ICU  Ambulation: Unable to attempt due to weakness  Rolling: Unable to perform  Sitting: Unable to perform  Transfer Sit-to-Stand: Unable to  perform  Transfer Supine-to-Sit: Unable to perform  Total Score: 0    Education Documentation  Mobility Training, taught by Kisha Hinton, PT at 11/13/2024  2:59 PM.  Learner: Significant Other, Patient  Readiness: Acceptance  Method: Explanation  Response: Demonstrated Understanding, Verbalizes Understanding    Education Comments  No comments found.      Encounter Problems       Encounter Problems (Active)       PT STG Problem       Patient will roll right and left with minimal assist to facilitate mobility. (Not Progressing)       Start:  10/28/24    Expected End:  11/25/24            Patient will transfer supine to sit and sit to supine with moderate assist to facilitate mobility. (Not Progressing)       Start:  10/28/24    Expected End:  11/25/24            Patient will transfer sit to stand and stand to sit with maximal assist to facilitate mobility. (Not Progressing)       Start:  10/28/24    Expected End:  11/25/24            Patient will transfer bed to chair and chair to bed with maximal assist to facilitate mobility. (Not Progressing)       Start:  10/28/24    Expected End:  11/25/24            Patient will increase strength in B LEs by half grade throughout to improve functional mobility.  (Not Progressing)       Start:  10/28/24    Expected End:  11/25/24            Patient will tolerate 15 minutes of sitting on EOB to improve ability to perform functional transfers. (Not Progressing)       Start:  10/28/24    Expected End:  11/25/24

## 2024-11-13 NOTE — NURSING NOTE
Vascular Access Nurse Note    VAN rounding complete. Patient Right chest HD catheter and left arm PICC evaluated. Dressings X 2 CDI and occlusive. Left PICC Flushed X 2, positive Blood return noted. Curos cap applied. HD Catheter with scant blood noted in both lumens, bedside nurse aware.

## 2024-11-13 NOTE — PROGRESS NOTES
Patient's insurance requesting a peer to peer to be completed today by 3PM. Sabino Matos CNP aware. Will follow.     11/13/24 4601   Discharge Planning   Home or Post Acute Services Post acute facilities (Rehab/SNF/etc)   Type of Post Acute Facility Services Rehab;Skilled nursing   Expected Discharge Disposition Long Term  ([facility])

## 2024-11-13 NOTE — CARE PLAN
The patient's goals for the shift include unable to assess    The clinical goals for the shift include Patient will remain hemodynamically stable    Over the shift, the patient did not make progress toward the following goals. Barriers to progression include. Recommendations to address these barriers include  Problem: Safety - Adult  Goal: Free from fall injury  Outcome: Progressing     Problem: Discharge Planning  Goal: Discharge to home or other facility with appropriate resources  Outcome: Progressing     Problem: Chronic Conditions and Co-morbidities  Goal: Patient's chronic conditions and co-morbidity symptoms are monitored and maintained or improved  Outcome: Progressing     Problem: Diabetes  Goal: Increase stability of blood glucose readings by end of shift  Outcome: Progressing  Goal: Maintain electrolyte levels within acceptable range throughout shift  Outcome: Progressing  Goal: Maintain glucose levels >70mg/dl to <250mg/dl throughout shift  Outcome: Progressing  Goal: Learn about and adhere to nutrition recommendations by end of shift  Outcome: Progressing  Goal: Vital signs within normal range for age by end of shift  Outcome: Progressing  Goal: Increase self care and/or family involovement by end of shift  Outcome: Progressing  Goal: Receive DSME education by end of shift  Outcome: Progressing     Problem: Knowledge Deficit  Goal: Patient/family/caregiver demonstrates understanding of disease process, treatment plan, medications, and discharge instructions  Outcome: Progressing     Problem: Mechanical Ventilation  Goal: Tracheostomy will be managed safely  Outcome: Progressing     Problem: Skin  Goal: Decreased wound size/increased tissue granulation at next dressing change  Outcome: Progressing  Flowsheets (Taken 11/13/2024 0313)  Decreased wound size/increased tissue granulation at next dressing change:   Promote sleep for wound healing   Utilize specialty bed per algorithm   Protective dressings  over bony prominences  Goal: Participates in plan/prevention/treatment measures  Outcome: Progressing  Flowsheets (Taken 11/13/2024 0313)  Participates in plan/prevention/treatment measures: Elevate heels  Goal: Prevent/manage excess moisture  Outcome: Progressing  Flowsheets (Taken 11/13/2024 0313)  Prevent/manage excess moisture:   Cleanse incontinence/protect with barrier cream   Moisturize dry skin   Follow provider orders for dressing changes   Monitor for/manage infection if present  Goal: Prevent/minimize sheer/friction injuries  Outcome: Progressing  Flowsheets (Taken 11/13/2024 0313)  Prevent/minimize sheer/friction injuries:   Complete micro-shifts as needed if patient unable. Adjust patient position to relieve pressure points, not a full turn   Use pull sheet   HOB 30 degrees or less   Turn/reposition every 2 hours/use positioning/transfer devices   Utilize specialty bed per algorithm  Goal: Promote/optimize nutrition  Outcome: Progressing  Flowsheets (Taken 11/13/2024 0313)  Promote/optimize nutrition:   Assist with feeding   Offer water/supplements/favorite foods   Monitor/record intake including meals  Goal: Promote skin healing  Outcome: Progressing  Flowsheets (Taken 11/13/2024 0313)  Promote skin healing:   Turn/reposition every 2 hours/use positioning/transfer devices   Protective dressings over bony prominences   Rotate device position/do not position patient on device     Problem: Nutrition  Goal: Consume prescribed supplement  Outcome: Progressing  Goal: Nutrition support goals are met within 48 hrs  Outcome: Progressing  Goal: Nutrition support is meeting 75% of nutrient needs  Outcome: Progressing  Goal: BG  mg/dL  Outcome: Progressing  Goal: Lab values WNL  Outcome: Progressing  Goal: Electrolytes WNL  Outcome: Progressing  Goal: Promote healing  Outcome: Progressing  Goal: Maintain stable weight  Outcome: Progressing  Goal: Reduce weight from edema/fluid  Outcome: Progressing      Problem: Pain  Goal: Takes deep breaths with improved pain control throughout the shift  Outcome: Progressing  Goal: Turns in bed with improved pain control throughout the shift  Outcome: Progressing  Goal: Walks with improved pain control throughout the shift  Outcome: Progressing  Goal: Performs ADL's with improved pain control throughout shift  Outcome: Progressing  Goal: Participates in PT with improved pain control throughout the shift  Outcome: Progressing  Goal: Free from opioid side effects throughout the shift  Outcome: Progressing  Goal: Free from acute confusion related to pain meds throughout the shift  Outcome: Progressing     Problem: Fall/Injury  Goal: Not fall by end of shift  Outcome: Progressing  Goal: Be free from injury by end of the shift  Outcome: Progressing  Goal: Verbalize understanding of personal risk factors for fall in the hospital  Outcome: Progressing  Goal: Verbalize understanding of risk factor reduction measures to prevent injury from fall in the home  Outcome: Progressing  Goal: Use assistive devices by end of the shift  Outcome: Progressing  Goal: Pace activities to prevent fatigue by end of the shift  Outcome: Progressing   .

## 2024-11-13 NOTE — PROGRESS NOTES
Occupational Therapy    Occupational Therapy Treatment    Name: Narda Malloy  MRN: [id number]  Department: [facility] ICU  Room: 11/11-A  Date: 11/13/24  Time Calculation  Start Time: 1358  Stop Time: 1409  Time Calculation (min): 11 min    Assessment:  OT Assessment: Patient tolerated ROM exercises , limited with fatigue, needing rest breaks between, limited with decreased coordination, ataxia, edema in BUE/hands. Patient declined completing ADL tasks or sitting at the EOB this date. Patient agreeable to sit at the EOB next session. Continue to POC.  Prognosis: Good  Barriers to Discharge: Decreased caregiver support  Evaluation/Treatment Tolerance: Patient limited by fatigue  Medical Staff Made Aware: Yes  End of Session Communication: Bedside nurse  End of Session Patient Position: Bed, 3 rail up, Alarm off, caregiver present  Plan:  Treatment Interventions: ADL retraining, Functional transfer training, UE strengthening/ROM, Endurance training, Cognitive reorientation, Patient/family training, Fine motor coordination activities, Neuromuscular reeducation, Compensatory technique education  OT Frequency: 5 times per week  OT Discharge Recommendations: Moderate intensity level of continued care  Equipment Recommended upon Discharge: Wheelchair  OT Recommended Transfer Status: Assist of 2  OT - OK to Discharge: Yes    Subjective   Previous Visit Info:     General:  General  Reason for Referral: OT F/U for weakness, decerased ADL function.  Family/Caregiver Present: Yes  Caregiver Feedback: Spouse at bedside.  Co-Treatment: PT  Co-Treatment Reason: patient needs 2 skilled persons to for safe transfers, but patient declined sitting at the EOB this date.  Prior to Session Communication: Bedside nurse  Patient Position Received: Bed, 3 rail up, Alarm off, not on at start of session  Preferred Learning Style: auditory, verbal  General Comment: Cleared by nurse to see patient for OT ,patient agreeable to UE AROM only this  date, declined sitting at the EOB or doing ADL tasks.  Precautions:  Hearing/Visual Limitations: h/o visual deficits, mildly Andreafski  Medical Precautions: Fall precautions, Oxygen therapy device and L/min, Spinal precautions, Other (comment) (trach via vent, rectal tube, peg)  Post-Surgical Precautions: Spinal precautions    Vital Signs (Past 2hrs)        Date/Time Vitals Session Patient Position Pulse Resp SpO2 BP MAP (mmHg)    11/13/24 1328 --  --  102  20  86 %  --  --     11/13/24 1329 --  --  100  36  85 %  --  --     11/13/24 1330 --  --  103  28  96 %  --  --     11/13/24 1358 --  --  86  --  98 %  --  --     11/13/24 1400 --  --  86  18  98 %  96/65  73     11/13/24 1442 --  --  --  16  --  --  --                        Pain Assessment:  Pain Assessment  Pain Assessment: 0-10     Objective   Cognition:  Overall Cognitive Status: Impaired  Arousal/Alertness: Appropriate responses to stimuli  Following Commands: Follows one step commands with repetition  Cognition Comments: Patient is able to mouth words to answer questions this date.  Attention: Within Functional Limits  Activities of Daily Living:      Grooming  Grooming Comments: Declined , reports brushed her teeth 2x earlier.    Bed Mobility/Transfers: Bed Mobility  Bed Mobility: No      Therapy/Activity: Therapeutic Exercise  Therapeutic Exercise Performed: Yes (Patient was able to complete 10 rep x2 AAROM to Both  shoulders for flexion and extension, circumduction and AROM to both elbows for flexion/extension,  wrist and fingers flexion/extension 10 rep. Patient needing multiple rest breaks between,)    Outcome Measures:  [facility] Daily Activity  Putting on and taking off regular lower body clothing: Total  Bathing (including washing, rinsing, drying): Total  Putting on and taking off regular upper body clothing: Total  Toileting, which includes using toilet, bedpan or urinal: Total  Taking care of personal grooming such as brushing teeth: Total  Eating Meals:  Total  Daily Activity - Total Score: 6        Education Documentation  No documentation found.  Education Comments  No comments found.      Goals:  Encounter Problems       Encounter Problems (Active)       OT Goals       Pt will complete self-feeding with setup. (Progressing)       Start:  10/28/24    Expected End:  11/28/24            Pt will complete all grooming tasks with min A. (Progressing)       Start:  10/28/24    Expected End:  11/28/24            Pt will complete UB dressing/bathing with min A. (Progressing)       Start:  10/28/24    Expected End:  11/28/24            Pt will tolerate sitting EOB for at least 10-15 minutes with F+ balance in order to enhance ADL's. (Progressing)       Start:  10/28/24    Expected End:  11/28/24            Pt will complete all functional transfers and mobility with mod A using a RW. (Progressing)       Start:  10/28/24    Expected End:  11/28/24

## 2024-11-14 ENCOUNTER — APPOINTMENT (OUTPATIENT)
Dept: RADIOLOGY | Facility: HOSPITAL | Age: 69
End: 2024-11-14
Payer: MEDICARE

## 2024-11-14 VITALS
DIASTOLIC BLOOD PRESSURE: 60 MMHG | OXYGEN SATURATION: 99 % | HEIGHT: 70 IN | TEMPERATURE: 98.2 F | WEIGHT: 250.66 LBS | SYSTOLIC BLOOD PRESSURE: 122 MMHG | RESPIRATION RATE: 16 BRPM | HEART RATE: 76 BPM | BODY MASS INDEX: 35.89 KG/M2

## 2024-11-14 LAB
ALBUMIN SERPL BCP-MCNC: 2.3 G/DL (ref 3.4–5)
ANION GAP SERPL CALCULATED.3IONS-SCNC: 13 MMOL/L (ref 10–20)
BASOPHILS # BLD AUTO: 0.04 X10*3/UL (ref 0–0.1)
BASOPHILS NFR BLD AUTO: 0.6 %
BUN SERPL-MCNC: 65 MG/DL (ref 6–23)
CALCIUM SERPL-MCNC: 8 MG/DL (ref 8.6–10.3)
CHLORIDE SERPL-SCNC: 92 MMOL/L (ref 98–107)
CO2 SERPL-SCNC: 30 MMOL/L (ref 21–32)
CREAT SERPL-MCNC: 1.59 MG/DL (ref 0.5–1.05)
EGFRCR SERPLBLD CKD-EPI 2021: 35 ML/MIN/1.73M*2
EOSINOPHIL # BLD AUTO: 0.06 X10*3/UL (ref 0–0.7)
EOSINOPHIL NFR BLD AUTO: 0.9 %
ERYTHROCYTE [DISTWIDTH] IN BLOOD BY AUTOMATED COUNT: 18.2 % (ref 11.5–14.5)
GLUCOSE BLD MANUAL STRIP-MCNC: 106 MG/DL (ref 74–99)
GLUCOSE BLD MANUAL STRIP-MCNC: 122 MG/DL (ref 74–99)
GLUCOSE BLD MANUAL STRIP-MCNC: 150 MG/DL (ref 74–99)
GLUCOSE SERPL-MCNC: 106 MG/DL (ref 74–99)
HCT VFR BLD AUTO: 24.1 % (ref 36–46)
HGB BLD-MCNC: 8.2 G/DL (ref 12–16)
IMM GRANULOCYTES # BLD AUTO: 0.06 X10*3/UL (ref 0–0.7)
IMM GRANULOCYTES NFR BLD AUTO: 0.9 % (ref 0–0.9)
LYMPHOCYTES # BLD AUTO: 0.93 X10*3/UL (ref 1.2–4.8)
LYMPHOCYTES NFR BLD AUTO: 14 %
MAGNESIUM SERPL-MCNC: 1.94 MG/DL (ref 1.6–2.4)
MCH RBC QN AUTO: 30.6 PG (ref 26–34)
MCHC RBC AUTO-ENTMCNC: 34 G/DL (ref 32–36)
MCV RBC AUTO: 90 FL (ref 80–100)
MONOCYTES # BLD AUTO: 0.39 X10*3/UL (ref 0.1–1)
MONOCYTES NFR BLD AUTO: 5.9 %
NEUTROPHILS # BLD AUTO: 5.17 X10*3/UL (ref 1.2–7.7)
NEUTROPHILS NFR BLD AUTO: 77.7 %
NRBC BLD-RTO: 0 /100 WBCS (ref 0–0)
PHOSPHATE SERPL-MCNC: 4.6 MG/DL (ref 2.5–4.9)
PLATELET # BLD AUTO: 343 X10*3/UL (ref 150–450)
POTASSIUM SERPL-SCNC: 3.4 MMOL/L (ref 3.5–5.3)
RBC # BLD AUTO: 2.68 X10*6/UL (ref 4–5.2)
SODIUM SERPL-SCNC: 132 MMOL/L (ref 136–145)
WBC # BLD AUTO: 6.7 X10*3/UL (ref 4.4–11.3)

## 2024-11-14 PROCEDURE — 85025 COMPLETE CBC W/AUTO DIFF WBC: CPT

## 2024-11-14 PROCEDURE — 2500000001 HC RX 250 WO HCPCS SELF ADMINISTERED DRUGS (ALT 637 FOR MEDICARE OP)

## 2024-11-14 PROCEDURE — 37799 UNLISTED PX VASCULAR SURGERY: CPT

## 2024-11-14 PROCEDURE — 36589 REMOVAL TUNNELED CV CATH: CPT | Performed by: RADIOLOGY

## 2024-11-14 PROCEDURE — 2500000004 HC RX 250 GENERAL PHARMACY W/ HCPCS (ALT 636 FOR OP/ED)

## 2024-11-14 PROCEDURE — 2500000001 HC RX 250 WO HCPCS SELF ADMINISTERED DRUGS (ALT 637 FOR MEDICARE OP): Performed by: INTERNAL MEDICINE

## 2024-11-14 PROCEDURE — 97530 THERAPEUTIC ACTIVITIES: CPT | Mod: GO

## 2024-11-14 PROCEDURE — 02PYX3Z REMOVAL OF INFUSION DEVICE FROM GREAT VESSEL, EXTERNAL APPROACH: ICD-10-PCS | Performed by: RADIOLOGY

## 2024-11-14 PROCEDURE — 82947 ASSAY GLUCOSE BLOOD QUANT: CPT

## 2024-11-14 PROCEDURE — 36589 REMOVAL TUNNELED CV CATH: CPT

## 2024-11-14 PROCEDURE — 83735 ASSAY OF MAGNESIUM: CPT

## 2024-11-14 PROCEDURE — 99238 HOSP IP/OBS DSCHRG MGMT 30/<: CPT

## 2024-11-14 PROCEDURE — 9420000001 HC RT PATIENT EDUCATION 5 MIN

## 2024-11-14 PROCEDURE — 94003 VENT MGMT INPAT SUBQ DAY: CPT

## 2024-11-14 PROCEDURE — 2500000002 HC RX 250 W HCPCS SELF ADMINISTERED DRUGS (ALT 637 FOR MEDICARE OP, ALT 636 FOR OP/ED)

## 2024-11-14 PROCEDURE — 97530 THERAPEUTIC ACTIVITIES: CPT | Mod: GP

## 2024-11-14 PROCEDURE — 99221 1ST HOSP IP/OBS SF/LOW 40: CPT | Performed by: SURGERY

## 2024-11-14 PROCEDURE — 94640 AIRWAY INHALATION TREATMENT: CPT

## 2024-11-14 PROCEDURE — 2500000005 HC RX 250 GENERAL PHARMACY W/O HCPCS: Performed by: SURGERY

## 2024-11-14 PROCEDURE — 99291 CRITICAL CARE FIRST HOUR: CPT

## 2024-11-14 PROCEDURE — 97110 THERAPEUTIC EXERCISES: CPT | Mod: GO

## 2024-11-14 PROCEDURE — 80069 RENAL FUNCTION PANEL: CPT

## 2024-11-14 PROCEDURE — 97110 THERAPEUTIC EXERCISES: CPT | Mod: GP

## 2024-11-14 RX ORDER — FERROUS SULFATE 300 MG/5ML
60 LIQUID (ML) ORAL DAILY
Qty: 30 ML | Refills: 1 | Status: SHIPPED | OUTPATIENT
Start: 2024-11-15

## 2024-11-14 RX ORDER — POTASSIUM CHLORIDE 20 MEQ/15ML
20 SOLUTION ORAL DAILY
Qty: 473 ML | Refills: 0 | Status: SHIPPED | OUTPATIENT
Start: 2024-11-14 | End: 2024-12-14

## 2024-11-14 RX ORDER — DOXYCYCLINE 100 MG/1
100 CAPSULE ORAL EVERY 12 HOURS SCHEDULED
Qty: 60 CAPSULE | Refills: 0 | Status: SHIPPED | OUTPATIENT
Start: 2024-11-14

## 2024-11-14 RX ORDER — MAGNESIUM SULFATE 1 G/100ML
1 INJECTION INTRAVENOUS ONCE
Status: COMPLETED | OUTPATIENT
Start: 2024-11-14 | End: 2024-11-14

## 2024-11-14 RX ORDER — FUROSEMIDE 20 MG/1
60 TABLET ORAL DAILY
Qty: 30 TABLET | Refills: 0 | Status: SHIPPED | OUTPATIENT
Start: 2024-11-14

## 2024-11-14 RX ORDER — FOLIC ACID 1 MG/1
1 TABLET ORAL DAILY
Qty: 90 TABLET | Refills: 0 | Status: SHIPPED | OUTPATIENT
Start: 2024-11-15

## 2024-11-14 RX ORDER — MIDODRINE HYDROCHLORIDE 5 MG/1
5 TABLET ORAL
Qty: 30 TABLET | Refills: 0 | Status: SHIPPED | OUTPATIENT
Start: 2024-11-14

## 2024-11-14 RX ORDER — POTASSIUM CHLORIDE 1.5 G/1.58G
40 POWDER, FOR SOLUTION ORAL ONCE
Status: COMPLETED | OUTPATIENT
Start: 2024-11-14 | End: 2024-11-14

## 2024-11-14 RX ORDER — POTASSIUM CHLORIDE 1.5 G/1.58G
20 POWDER, FOR SOLUTION ORAL DAILY
Status: DISCONTINUED | OUTPATIENT
Start: 2024-11-14 | End: 2024-11-14 | Stop reason: HOSPADM

## 2024-11-14 RX ORDER — AMOXICILLIN 500 MG/1
500 CAPSULE ORAL DAILY
Qty: 30 CAPSULE | Refills: 0 | Status: SHIPPED | OUTPATIENT
Start: 2024-11-15

## 2024-11-14 ASSESSMENT — PAIN SCALES - GENERAL
PAINLEVEL_OUTOF10: 0 - NO PAIN
PAINLEVEL_OUTOF10: 0 - NO PAIN
PAINLEVEL_OUTOF10: 7
PAINLEVEL_OUTOF10: 0 - NO PAIN
PAINLEVEL_OUTOF10: 7
PAINLEVEL_OUTOF10: 7
PAINLEVEL_OUTOF10: 4

## 2024-11-14 ASSESSMENT — COGNITIVE AND FUNCTIONAL STATUS - GENERAL
TURNING FROM BACK TO SIDE WHILE IN FLAT BAD: TOTAL
WALKING IN HOSPITAL ROOM: TOTAL
DRESSING REGULAR UPPER BODY CLOTHING: A LOT
DRESSING REGULAR LOWER BODY CLOTHING: TOTAL
MOBILITY SCORE: 6
MOVING TO AND FROM BED TO CHAIR: TOTAL
PERSONAL GROOMING: A LOT
CLIMB 3 TO 5 STEPS WITH RAILING: TOTAL
STANDING UP FROM CHAIR USING ARMS: TOTAL
MOVING FROM LYING ON BACK TO SITTING ON SIDE OF FLAT BED WITH BEDRAILS: TOTAL
DAILY ACTIVITIY SCORE: 8
EATING MEALS: TOTAL
HELP NEEDED FOR BATHING: TOTAL
TOILETING: TOTAL

## 2024-11-14 ASSESSMENT — PAIN - FUNCTIONAL ASSESSMENT
PAIN_FUNCTIONAL_ASSESSMENT: 0-10
PAIN_FUNCTIONAL_ASSESSMENT: 0-10
PAIN_FUNCTIONAL_ASSESSMENT: CPOT (CRITICAL CARE PAIN OBSERVATION TOOL)
PAIN_FUNCTIONAL_ASSESSMENT: 0-10
PAIN_FUNCTIONAL_ASSESSMENT: 0-10
PAIN_FUNCTIONAL_ASSESSMENT: CPOT (CRITICAL CARE PAIN OBSERVATION TOOL)
PAIN_FUNCTIONAL_ASSESSMENT: 0-10

## 2024-11-14 ASSESSMENT — PAIN DESCRIPTION - DESCRIPTORS: DESCRIPTORS: BURNING

## 2024-11-14 ASSESSMENT — PAIN DESCRIPTION - LOCATION: LOCATION: BACK

## 2024-11-14 NOTE — PROGRESS NOTES
[facility] Critical Care Medicine       Date:  11/14/2024  Patient:  Narda Malloy  YOB: 1955  MRN:  [id number]   Admit Date:  10/12/2024      Chief Complaint   Patient presents with    Altered Mental Status         History of Present Illness:  Narda Malloy is a 69 y.o. year old female patient with Past Medical History of   L1-L3 lumbar laminectomy, T4-S1 revision, and fusion August 26th, T2DM, HTN, essential tremor, HLD, glaucoma, sarcoidosis of the lung who presented to  ED 10/12 after being found essentially unresponsive at her nursing facility Located within [facility]. Per report from her , she has had a significant decline in her health since July 15th when she had a fall and became significantly weak. She has also had multiple infection complications since her back surgery in August requiring multiple I&Ds and long term antibiotic therapy. She went to the OR most recently on 9/28 for lumbar site infection wash out. Per chart review, she was discharged on IV vancomycin 1g and IV ceftriaxone 2d q24hrs through 11/12.     ED Course: Initial vital signs: /104 (109), HR 68, RR 20, SpO2 95% on 6L NC, temp 34.5C. Give 0.4mg of narcan with no improvement in mentation. Lab work-up remarkable for mild hyperkalemia (5.5), AMY 42/1.46, elevated alk phos, normocytic anemia 10.4/33, turbid appearing urine with mild hematuria and proteinuria and + leuk esterase, >50 WBCs. Urine drug screen positive for barbiturates. Triggered sepsis timer so she was given 3L NS. She was intubated for airway protection with 20mg etomidate and 100mg succinylcholine. BP dropped post-intubated and fluid resuscitation and she was subsequently started on levophed.           Interval ICU Events:  Patient presented from the ED to the ICU intubated, received hyperkalemia cocktail and had noticeably decreasing urine output.  Mentation greatly improved on 10/15 on SAT/SBT were conducted and were successful.  Patient was done  after extubated.  Patient's oxygen requirements significantly increased requiring high flow nasal cannula 40L 100%.  On 10/17 chest x-ray showed complete opacification of left hemithorax most likely pleural effusion.  Subsequently pigtail catheter was then placed with immediate drainage of 1.2 L with cytology pending.  Kidney function and urine output continue to decline with worsening hypoxia and hypercapnia and patient was started on BiPAP therapy.  Patient was given high-dose of furosemide and metolazone to stimulate urine output but were unsuccessful.  Temporary dialysis catheter placed awaiting start for CRRT per nephrology.  Patient participated in physical therapy and Occupational Therapy while intubated. Venous jugular duplex noticed large thrombus on tube subsequent exams patient was placed on heparin gtt. Patient again passed SBT and SAT with minimal requirements and was subsequently extubated on 10/31 patient again developed acute respiratory failure the patient was reintubated on 11/2.  On 11/3 family meeting was held to discuss patient's goals of care and treatment model and it was decided to continue DNR CCA, and workup of tracheostomy and PEG tube placement.  On 11/4 patient had PEG tube placed by general surgery.  On 11/7 patient had tracheostomy placed by ENT and sedation was weaned.  Patient continuing physical therapy and Occupational Therapy at this time with difficulties weaning from vent support trach collar. Currently excepted at [facility].      11/11: No events overnight. Urine output increased to 1L yesterday. Nephrology following.     11/14: NAEO. Patient states TF make her nauseous at night, will discuss with dietary. [facility]. Tunneled dialysis cath was removed. Gen surg to evaluate sacral ulcer.     Medical History:  Past Medical History:   Diagnosis Date    Degenerative myopia, bilateral     Diabetic neuropathy (Multi)     Difficult intubation 08/26/2024     Mac 3, grade 3, 1 attempt.  Glidescope/videolaryngoscopy recommended for future attempts.    DM type 2 (diabetes mellitus, type 2) (Multi)     Dry eye syndrome of bilateral lacrimal glands     Essential hypertension     Essential tremor     Glaucoma     Hyperlipidemia     Long term (current) use of insulin (Multi)     Low back pain     PONV (postoperative nausea and vomiting)     Primary open angle glaucoma of both eyes, severe stage     Repeated falls     Sarcoidosis of lung (Multi)     Spinal stenosis, lumbar region without neurogenic claudication     Weakness      Past Surgical History:   Procedure Laterality Date    BLEPHAROPLASTY  2022    BREAST SURGERY  2022    Breast lift    CARPAL TUNNEL RELEASE      CATARACT EXTRACTION W/  INTRAOCULAR LENS IMPLANT Bilateral     OD 2011 +8.5D,OS 2011 +8.50D    FOOT SURGERY      INSERTION / REMOVAL CRANIAL DBS GENERATOR      Placed .  Removed . part of wire left in head when everything removed    LUMBAR FUSION      L3-S1    PANRETINAL PHOTOCOAGULATION  2014    THORACIC FUSION  2024    T4-S1 fusion    VITRECTOMY Right 2013     Medications Prior to Admission   Medication Sig Dispense Refill Last Dose/Taking    acetaminophen (Tylenol) 500 mg tablet Take 2 tablets (1,000 mg) by mouth 3 times a day.   Unknown    ascorbic acid (Vitamin C) 500 mg tablet as directed Orally   Unknown    brimonidine (AlphaGAN) 0.2 % ophthalmic solution Administer 1 drop into both eyes 2 times a day.   Unknown    calcium carbonate-vitamin D3 500 mg-5 mcg (200 unit) tablet Take 1 tablet by mouth once daily.   Unknown    [] cefTRIAXone (Rocephin) 2 gram/50 mL IV Infuse 50 mL (2 g) at 100 mL/hr over 30 minutes into a venous catheter once every 24 hours. Once weekly labs CBC/diff, CMP, Vanc trough ESR, CRP fax to Dr. Juarez [phone]. Stop date 24. 1950 mL 0     dextrose 50 % injection Infuse 25 mL (12.5 g) into a venous catheter every 15 minutes if  needed (For blood glucose 41 to 70 mg/dL).       dextrose 50 % injection Infuse 50 mL (25 g) into a venous catheter every 15 minutes if needed (For blood glucose less than or equal to 40 mg/dL).       docusate sodium (Colace) 100 mg capsule Take 1 capsule (100 mg) by mouth 2 times a day.   Unknown    ezetimibe (Zetia) 10 mg tablet Take 1 tablet (10 mg) by mouth once daily. 90 tablet 3 Unknown    FreeStyle Lite Strips strip USE TO TEST 3 TIMES A DAY AS DIRECTED 300 each 2     glucagon (Glucagen) 1 mg injection Inject 1 mg into the muscle every 15 minutes if needed for low blood sugar - see comments (Hypoglycemia).       glucagon (Glucagen) 1 mg injection Inject 1 mg into the muscle every 15 minutes if needed for low blood sugar - see comments (Hypoglycemia).       heparin sodium,porcine (heparin, porcine,) 5,000 unit/mL injection Inject 1 mL (5,000 Units) under the skin every 8 hours.       insulin lispro (HumaLOG) 100 unit/mL injection Inject 0-15 Units under the skin 3 times daily (morning, midday, late afternoon). Take as directed per insulin instructions.Do not hold when patient is not eating, continue order as scheduled for hyperglycemia management.  Insulin Lispro Corrective Scale #3     Hypoglycemia protocol Call LIP unit(s) if Blood Glucose is between 0 - 70 mg/dL     0 unit(s) if Blood glucose is between    3 unit(s) if Blood glucose is between 151-200   6 unit(s) if Blood glucose is between 201-250   9 unit(s) if Blood glucose is between 251-300   12 unit(s) if Blood glucose is between 301-350   15 unit(s) if Blood glucose is between 351-400    Notify provider unit(s) if Blood Glucose is greater than 400 mg/dL       Lactobacillus acidophilus 100 mg (1 billion cell) capsule Take 1 capsule by mouth 2 times a day.   Unknown    latanoprost (Xalatan) 0.005 % ophthalmic solution Administer 1 drop into both eyes once daily at bedtime. 2.5 mL 5 Unknown    melatonin 5 mg tablet Take 1 tablet (5 mg) by mouth as  "needed at bedtime for sleep.   Unknown    methocarbamol (Robaxin) 500 mg tablet Take 1 tablet (500 mg) by mouth 3 times a day.   Unknown    multivitamin tablet Take 1 tablet by mouth once daily.   Unknown    ondansetron (Zofran) 4 mg/2 mL injection Infuse 2 mL (4 mg) into a venous catheter every 6 hours if needed for nausea or vomiting.       oxyCODONE (Roxicodone) 5 mg immediate release tablet Take 1 tablet (5 mg) by mouth every 6 hours if needed for severe pain (7 - 10) or moderate pain (4 - 6).       oxygen (O2) gas therapy Inhale 1 each continuously.       pantoprazole (ProtoNix) 40 mg EC tablet Take 1 tablet (40 mg) by mouth once daily in the morning. Take before meals. Do not crush, chew, or split.       pantoprazole (ProtoNix) 40 mg injection Infuse 40 mg into a venous catheter once daily in the morning. Take before meals. If unable to take PO.       pen needle, diabetic (PEN NEEDLE MISC) BD Altagracia- 4 mm X 32 G needle - as directed 4x a day sc 4 times per day       polyethylene glycol (Glycolax, Miralax) 17 gram packet Take 17 g by mouth once daily.   Unknown    primidone 125 mg tablet Take 125 mg by mouth 3 times a day.   Unknown    propranolol LA (Inderal LA) 60 mg 24 hr capsule Take 1 capsule (60 mg) by mouth early in the morning.. Hold for SBP < 110 mmhg, HR < 60 bpm.   Unknown    sennosides (Senokot) 8.6 mg tablet Take 1 tablet (8.6 mg) by mouth every 12 hours if needed for constipation.   Unknown    Sure Comfort Pen Needle 32 gauge x 5/32\" needle AS DIRECTED DAILY FOR 90 DAYS 100 each 11 Unknown    traZODone (Desyrel) 25 MG split tablet Take 1 half tablet (25 mg) by mouth once daily at bedtime.   Unknown    [] vancomycin (Vancocin) 1 gram/250 mL solution Infuse 250 mL (1 g) at 250 mL/hr over 60 minutes into a venous catheter every 12 hours. Once weekly labs CBC/diff, CMP, Vanc trough ESR, CRP fax to Dr. Juarez [phone]. Stop date 24. 67328 mL 0      Erythromycin, Morphine, and " Rosuvastatin  Social History     Tobacco Use    Smoking status: Former     Types: Cigarettes     Passive exposure: Past    Smokeless tobacco: Never   Vaping Use    Vaping status: Never Used   Substance Use Topics    Alcohol use: Not Currently    Drug use: Not Currently     Family History   Problem Relation Name Age of Onset    Multiple myeloma Mother      Cancer Mother      Other (CABG) Father      Pulmonary embolism Father      Heart disease Father      Breast cancer Sister          Stage II    Hypertension Sister      Diabetes Sister      No Known Problems Sister          x5    No Known Problems Brother          x4    No Known Problems Daughter         Hospital Medications:           Current Facility-Administered Medications:     acetaminophen (Tylenol) oral liquid 650 mg, 650 mg, oral, q4h PRN, LEONEL Salgado-CNP, 650 mg at 11/13/24 1611    albuterol 2.5 mg /3 mL (0.083 %) nebulizer solution 3 mL, 3 mL, nebulization, 4x daily, Kaleb Lam PA-C, 3 mL at 11/14/24 0753    alteplase (Cathflo Activase) injection 2 mg, 2 mg, intra-catheter, PRN, Kalbe Lam PA-C    amoxicillin (Amoxil) capsule 500 mg, 500 mg, oral, Daily, Andrew Malagon MD, 500 mg at 11/13/24 0858    apixaban (Eliquis) tablet 5 mg, 5 mg, oral, BID, Tommy Amezcua PA-C, 5 mg at 11/13/24 2050    brimonidine (AlphaGAN) 0.2 % ophthalmic solution 1 drop, 1 drop, Both Eyes, BID, Kaleb Lam PA-C, 1 drop at 11/13/24 2050    calcium carbonate-vitamin D3 500 mg-5 mcg (200 unit) per tablet 1 tablet, 1 tablet, oral, Daily, Kaleb Lam PA-C, 1 tablet at 11/13/24 0857    collagenase 250 unit/gram ointment, , Topical, Daily, RUTH Salgado, Given at 11/13/24 1253    dextrose 50 % injection 12.5 g, 12.5 g, intravenous, q15 min PRN, Kaleb Lam PA-C    dextrose 50 % injection 25 g, 25 g, intravenous, q15 min PRN, Kaleb Lam PA-C    doxycycline (Vibramycin) capsule 100 mg, 100 mg, oral, q12h LUCA, Andrew Malagon MD, 100 mg at  11/13/24 2050    ezetimibe (Zetia) tablet 10 mg, 10 mg, oral, Daily, Kaleb Lam PA-C, 10 mg at 11/13/24 0857    ferrous sulfate syrup 60 mg of iron, 60 mg of iron, oral, Daily, Kaleb Lam PA-C, 60 mg of iron at 11/13/24 0859    folic acid (Folvite) tablet 1 mg, 1 mg, oral, Daily, Kaleb Lam PA-C, 1 mg at 11/13/24 0856    glucagon (Glucagen) injection 1 mg, 1 mg, intramuscular, q15 min PRN, Kaleb Lam PA-C    glucagon (Glucagen) injection 1 mg, 1 mg, intramuscular, q15 min PRN, Kaleb Lam PA-C    heparin 1,000 unit/mL injection 1,000 Units, 1,000 Units, intra-catheter, After Dialysis, Neeta Ramirez, 1,000 Units at 11/13/24 1058    heparin 1,000 unit/mL injection 1,000 Units, 1,000 Units, intra-catheter, After Dialysis, Neeta Ramirez, 1,000 Units at 11/13/24 1056    heparin flush 10 unit/mL syringe 50 Units, 50 Units, intra-catheter, PRN, Kaleb Lam PA-C, 50 Units at 10/19/24 2313    honey (Medihoney) topical gel, , Topical, Daily, Kaelb Lam PA-C, Given at 11/13/24 1252    HYDROmorphone (Dilaudid) injection 0.4 mg, 0.4 mg, intravenous, q2h PRN, Kaleb Lam PA-C, 0.4 mg at 11/11/24 0501    insulin lispro (HumaLOG) injection 0-15 Units, 0-15 Units, subcutaneous, q4h, Kaleb Lam PA-C, 3 Units at 11/14/24 0030    latanoprost (Xalatan) 0.005 % ophthalmic solution 1 drop, 1 drop, Both Eyes, Nightly, Kaleb Lam PA-C, 1 drop at 11/13/24 2050    midodrine (Proamatine) tablet 5 mg, 5 mg, oral, TID, Sabino Matos, LEONEL-CNP, 5 mg at 11/13/24 1611    ondansetron (Zofran) injection 4 mg, 4 mg, intravenous, q4h PRN, LEONEL Salgado-CNP, 4 mg at 11/12/24 1017    oxyCODONE (Roxicodone) solution 5 mg, 5 mg, oral, q4h PRN, Kaleb Lam PA-C, 5 mg at 11/13/24 2306    oxyCODONE (Roxicodone) solution 7.5 mg, 7.5 mg, oral, q4h PRN, Kaleb Lam PA-C    oxygen (O2) therapy, , inhalation, Continuous - Inhalation, Anna Marie Shook MD, 30 percent at 11/14/24 4560     pantoprazole (ProtoNix) EC tablet 40 mg, 40 mg, oral, Daily before breakfast **OR** pantoprazole (ProtoNix) injection 40 mg, 40 mg, intravenous, Daily before breakfast, Kaleb Lam PA-C, 40 mg at 11/14/24 0649    polyethylene glycol (Glycolax, Miralax) packet 17 g, 17 g, oral, Daily PRN, Kaleb Lam PA-C    primidone (Mysoline) tablet 125 mg, 125 mg, oral, TID, Tommy Amezcua PA-C, 125 mg at 11/13/24 2050    [Held by provider] propranolol LA (Inderal LA) 24 hr capsule 60 mg, 60 mg, oral, Daily, Kaleb Lam PA-C, 60 mg at 10/19/24 0643    sennosides-docusate sodium (Lucia-Colace) 8.6-50 mg per tablet 1 tablet, 1 tablet, oral, Nightly PRN, Kaleb Lam PA-C    Review of Systems:  14 point review of systems was completed and negative except for those specially mention in my HPI    Physical Exam:    Heart Rate:  []   Temp:  [36.9 °C (98.4 °F)-37.9 °C (100.2 °F)]   Resp:  [16-36]   BP: ()/(44-65)   Weight:  [114 kg (250 lb 10.6 oz)]   SpO2:  [85 %-100 %]     Physical Exam  Constitutional:       Comments: trach   HENT:      Mouth/Throat:      Mouth: Mucous membranes are moist.      Pharynx: Oropharynx is clear.   Eyes:      Pupils: Pupils are equal, round, and reactive to light.   Cardiovascular:      Rate and Rhythm: Normal rate and regular rhythm.      Pulses: Normal pulses.   Pulmonary:      Effort: Pulmonary effort is normal.   Abdominal:      General: Abdomen is flat.      Palpations: Abdomen is soft.      Comments: Peg clean at insertion site, no drainage or erythema to site    Rectal tube in place   Musculoskeletal:         General: Normal range of motion.   Skin:     General: Skin is warm and dry.      Capillary Refill: Capillary refill takes less than 2 seconds.   Neurological:      General: No focal deficit present.      Mental Status: She is alert and oriented to person, place, and time. Mental status is at baseline.   Psychiatric:         Mood and Affect: Mood normal.          Objective:    I have reviewed all medications, laboratory results, and imaging pertinent for today's encounter.    Vent Mode: Pressure regulated volume control/assist control  FiO2 (%):  [30 %] 30 %  S RR:  [16] 16  S VT:  [450 mL] 450 mL  PEEP/CPAP (cm H2O):  [5 cm H20] 5 cm H20  MAP (cm H2O):  [8.2-8.5] 8.5      Intake/Output Summary (Last 24 hours) at 11/14/2024 0756  Last data filed at 11/14/2024 0624  Gross per 24 hour   Intake 690 ml   Output 2100 ml   Net -1410 ml         Assessment/Plan:    I am currently managing this critically ill patient for the following problems:    Plan:  Neuro/Psych/Pain Ctrl/Sedation: (Hx: essential tremor)  Acute encephalopathy - resolved  CT head 10/12: Negative for acute findings   -Pain Control: liquid oxy, scheduled acetaminophen, dilaudid for dressing changes PRN  -Home primidone continued. Will hold propanolol d/t hypotension  -CAM ICU qshift, sleep-wake hygiene, delirium precautions     Respiratory/ENT:  Acute hypoxic/hypercapnic respiratory failure-likely multifactorial and 2/2 HCAP, pl effusions, volume overload  Healthcare-associated pneumonia   Recurrent atelectasis   Ventilator-dependence   Pleural effusion -s/p left-sided pigtail placement 10/17, removed 10/25  Trach placement 11/7  CXR 11/13: parenchymal infiltration, bilateral pleural effusion L>R  -Will wean O2 as tolerated to maintain SpO2 >92%  -SBT every a.m.  -Albuterol, hypertonic saline    -IPV per RT TID  -Aspiration precautions   -Trach care qshift  - Otolaryngology consulted for trach placement, will appreciate recs  - Vent settings: 16/450/5/30%  - Cont to SBT and attempt to wean patient to trach collar     Cardiovascular:  (Hx: diastolic heart failure, HTN, HLD)  Septic shock-resolved  Occlusive superficial venous thrombus of the left cephalic vein  Non-occlusive DVT right IJ vein   Acute on chronic diastolic heart failure  US duplex 10/29 occlusive superficial vein thrombosis of left cephalic  "vein   Repeat duplex 11/4 nonocclusive thrombus to RIJ  TTE 11/2: EF 60-65%, mild/mod AV valve regurg  US duplex LUE 11/12: No evidence of acute dvt  -Continue midodrine 10 q8-> weaned to 5mg q8  -- Wean off as BP will allow  -Holding home propranolol (on for tremors)  -Continue home Zetia  -Continuous cardiac monitoring per ICU protocol  -EKGs PRN for ACS symptoms, arrhythmias  -Cont eliquis     GI:  Hx GERD  Diarrhea  Peg-dependent-placed 11/4  Diet: Vital AF  BR: miralax PRN  GI Prophylaxis: PPI  -C-Diff negative     /Volume Status/Electrolytes:  Acute kidney injury-possibly ATN +/- medication-induced (vancomycin)  Anasarca   Hypoalbuminemia   Hyponatremia- improving  -HD 11/9 with 2.5L removed, Plan for no further HD and will diuresis with signs of renal recovery, received 40mg lasix  -- Plan to remove permacath by end of the week if no longer needing HD  -[place] place for accurate I&O's   -Nephrology consulted will appreciate recs  -Replete electrolytes to maintain K >4.0 and Mg >2.0  -Daily BMP, Mg, Phos  - I/O: -1.5L   - Cr: 1.59     Heme/Onc: (Hx: normocytic anemia)  Occlusive LUE DVT  Non-occlusive R IJ DVT  -Eliquis started 11/11  -Continue iron and folate  -Monitor for s/sx of bleeding   -Plan to transfuse if Hgb <7.0   -Daily CBC  -T&S AM draw 11/10  - Hgb: 8.2    Endocrine: (Hx: DMII)  SSI Q4  Hypoglycemia protocol PRN     Infectious Disease:  Pseudomonas-Respiratory culture 10/29  Thoracolumbar surgical site infection - s/p I&D x 2. Recent MRSA bacteremia on extended course of antibiotics; IV ceftriaxone, IV daptomycin  Healthcare-associated pneumonia   CT lumbar spine 10/12: Unable to r/o abscess. Unable to do MRI d/t spinal hardware, \"metal in head\" per patient  -Sputum culture 11/4 negative  -BC 11/3 negative  -Cdiff negative 11/7  -Daptomycin and ceftriaxone will stop today 11/12,  - Transitioned to oral amoxicillin and doxycyline  -Infectious Disease consult placed, will appreciate recs  -Monitor " SIRS criteria  - WBC: 6.7     MSK:  Sacral ulcer  - PT/OT orders placed  - Wound care consulted  - Gen surg consulted     Ethics/Code Status:  DNR-CCA      :  DVT Prophylaxis: SCDs, eliquis  GI Prophylaxis: PPI home med  Bowel Regimen: Miralax PRN  Diet: Vital AF  CVC: PICC placed 10/24, permacath 11/5  [place]: No  [place]: yes 11/7, replaced 11/14  Restraints: no  Discharge planning: [facility], Peer to Peer successful, possible discharge as early ast 11/14    Critical Care Time:  40 minutes spent in preparing to see patient (I.e. review of medical records), evaluation of diagnostics (I.e. labs, imaging, etc.), documentation, discussing plan of care with patient/ family/ caregiver, and/ or coordination of care with multidisciplinary team. Time does not include completion of procedure time.       Nimco Weathers PA-C

## 2024-11-14 NOTE — PROGRESS NOTES
Patient to be discharged today to [facility]. Transport set up for 4PM. Nurse and respiratory notified to call report. Will follow as needed.      11/14/24 1034   Discharge Planning   Home or Post Acute Services Post acute facilities (Rehab/SNF/etc)   Type of Post Acute Facility Services Rehab;Skilled nursing   Expected Discharge Disposition Long Term  ([facility])   Does the patient need discharge transport arranged? Yes   RoundTrip coordination needed? Yes

## 2024-11-14 NOTE — NURSING NOTE
Pts' right upper chest TDC removed by Dr Jeff without difficulty. Pressure applied for 5 minutes and DSD applied. Pt tolerated well.

## 2024-11-14 NOTE — CARE PLAN
The patient's goals for the shift include unable to assess    The clinical goals for the shift include Patient will remain hemodynamically stable      Problem: Safety - Adult  Goal: Free from fall injury  Outcome: Progressing  Flowsheets (Taken 11/14/2024 0600)  Free from fall injury: Instruct family/caregiver on patient safety     Problem: Discharge Planning  Goal: Discharge to home or other facility with appropriate resources  Outcome: Progressing  Flowsheets (Taken 11/14/2024 0600)  Discharge to home or other facility with appropriate resources:   Identify barriers to discharge with patient and caregiver   Arrange for needed discharge resources and transportation as appropriate   Identify discharge learning needs (meds, wound care, etc)   Arrange for interpreters to assist at discharge as needed   Refer to discharge planning if patient needs post-hospital services based on physician order or complex needs related to functional status, cognitive ability or social support system     Problem: Chronic Conditions and Co-morbidities  Goal: Patient's chronic conditions and co-morbidity symptoms are monitored and maintained or improved  Outcome: Progressing  Flowsheets (Taken 11/14/2024 0600)  Care Plan - Patient's Chronic Conditions and Co-Morbidity Symptoms are Monitored and Maintained or Improved:   Monitor and assess patient's chronic conditions and comorbid symptoms for stability, deterioration, or improvement   Collaborate with multidisciplinary team to address chronic and comorbid conditions and prevent exacerbation or deterioration   Update acute care plan with appropriate goals if chronic or comorbid symptoms are exacerbated and prevent overall improvement and discharge     Problem: Diabetes  Goal: Increase stability of blood glucose readings by end of shift  Outcome: Progressing  Flowsheets (Taken 11/14/2024 0600)  Increase stability of blood glucose readings by end of shift: Med administration/monitoring of  effect  Goal: Maintain electrolyte levels within acceptable range throughout shift  Outcome: Progressing  Flowsheets (Taken 11/14/2024 0600)  Maintain electrolyte levels within acceptable range throughout shift: Monitor urine output  Goal: Maintain glucose levels >70mg/dl to <250mg/dl throughout shift  Outcome: Progressing  Flowsheets (Taken 11/14/2024 0600)  Maintain glucose levels >70mg/dl to <250mg/dl throughout shift: Med administration/monitoring of effect  Goal: Learn about and adhere to nutrition recommendations by end of shift  Outcome: Progressing  Flowsheets (Taken 11/14/2024 0600)  Learn about and adhere to nutrition recommendations by end of shift: Ensure/encourage compliance with appropriate diet  Goal: Vital signs within normal range for age by end of shift  Outcome: Progressing  Flowsheets (Taken 11/14/2024 0600)  Vital signs within normal range for age by end of shift: Med administration/monitoring of effect  Goal: Increase self care and/or family involovement by end of shift  Outcome: Progressing  Flowsheets (Taken 11/14/2024 0600)  Increase self care and/or family involovement by end of shift: Self monitor blood glucose with staff oversight  Goal: Receive DSME education by end of shift  Outcome: Progressing  Flowsheets (Taken 11/14/2024 0600)  Receive DSME education by end of shift: Provide patient centered education on Diabetic Self Management Education     Problem: Knowledge Deficit  Goal: Patient/family/caregiver demonstrates understanding of disease process, treatment plan, medications, and discharge instructions  Outcome: Progressing  Flowsheets (Taken 11/14/2024 0600)  Patient/family/caregiver demonstrates understanding of disease process, treatment plan, medications, and discharge instructions:   Complete learning assessment and assess knowledge base   Provide teaching at level of understanding   Provide teaching via preferred learning methods     Problem: Skin  Goal: Participates in  plan/prevention/treatment measures  Outcome: Progressing  Flowsheets (Taken 11/14/2024 0600)  Participates in plan/prevention/treatment measures:   Discuss with provider PT/OT consult   Elevate heels  Goal: Prevent/manage excess moisture  Outcome: Progressing  Flowsheets (Taken 11/14/2024 0600)  Prevent/manage excess moisture:   Cleanse incontinence/protect with barrier cream   Moisturize dry skin   Use wicking fabric (obtain order)   Follow provider orders for dressing changes   Monitor for/manage infection if present  Goal: Prevent/minimize sheer/friction injuries  Outcome: Progressing  Flowsheets (Taken 11/14/2024 0600)  Prevent/minimize sheer/friction injuries:   HOB 30 degrees or less   Turn/reposition every 2 hours/use positioning/transfer devices   Utilize specialty bed per algorithm   Use pull sheet  Goal: Promote/optimize nutrition  Outcome: Progressing  Flowsheets (Taken 11/14/2024 0600)  Promote/optimize nutrition: Monitor/record intake including meals  Goal: Promote skin healing  Outcome: Progressing  Flowsheets (Taken 11/14/2024 0600)  Promote skin healing:   Assess skin/pad under line(s)/device(s)   Protective dressings over bony prominences   Turn/reposition every 2 hours/use positioning/transfer devices   Ensure correct size (line/device) and apply per  instructions   Rotate device position/do not position patient on device     Problem: Nutrition  Goal: Consume prescribed supplement  Outcome: Progressing  Goal: Nutrition support goals are met within 48 hrs  Outcome: Progressing  Goal: Nutrition support is meeting 75% of nutrient needs  Outcome: Progressing  Goal: BG  mg/dL  Outcome: Progressing  Goal: Lab values WNL  Outcome: Progressing  Goal: Electrolytes WNL  Outcome: Progressing  Goal: Promote healing  Outcome: Progressing  Goal: Maintain stable weight  Outcome: Progressing  Goal: Reduce weight from edema/fluid  Outcome: Progressing     Problem: Respiratory  Goal: No signs of  respiratory distress (eg. Use of accessory muscles. Peds grunting)  Outcome: Progressing  Goal: Clear secretions with interventions this shift  Outcome: Progressing  Flowsheets (Taken 11/14/2024 0600)  Clear secretions with interventions this shift: Encourage/provide pulmonary hygiene/secretion clearance  Goal: Minimize anxiety/maximize coping throughout shift  Outcome: Progressing  Flowsheets (Taken 11/14/2024 0600)  Minimize anxiety/maximize coping throughout shift:   Med administration/monitoring of effect   Monitor pain/anxiety level  Goal: Minimal/no exertional discomfort or dyspnea this shift  Outcome: Progressing  Flowsheets (Taken 11/14/2024 0600)  Minimal/no exertional discomfort or dyspnea this shift: Positioning to promote ventilation/comfort  Goal: Patent airway maintained this shift  Outcome: Progressing  Flowsheets (Taken 11/14/2024 0600)  Patent airway maintained this shift: Maintain ET tube tube position  Goal: Tolerate mechanical ventilation evidenced by VS/agitation level this shift  Outcome: Progressing  Flowsheets (Taken 11/14/2024 0600)  Tolerate mechanical ventilation evidenced by VS/agitation level this shift: Mechanical ventilation  Goal: Tolerate pulmonary toileting this shift  Outcome: Progressing  Flowsheets (Taken 11/14/2024 0600)  Tolerate pulmonary toileting this shift: Positioning to promote ventilation/comfort  Goal: Verbalize decreased shortness of breath this shift  Outcome: Progressing  Flowsheets (Taken 11/14/2024 0600)  Verbalize decreased shortness of breath this shift: Encourage/provide pulmonary hygiene/secretion clearance  Goal: Wean oxygen to maintain O2 saturation per order/standard this shift  Outcome: Progressing  Flowsheets (Taken 11/14/2024 0600)  Wean oxygen to maintain O2 saturation per order/standard this shift: Encourage activity/mobility  Goal: Increase self care and/or family involvement in next 24 hours  Outcome: Progressing  Flowsheets (Taken 11/14/2024  0600)  Increase self care and/or family involvement in next 24 hours: Encourage activity/mobility     Problem: Pain  Goal: Takes deep breaths with improved pain control throughout the shift  Outcome: Progressing  Goal: Turns in bed with improved pain control throughout the shift  Outcome: Progressing  Goal: Walks with improved pain control throughout the shift  Outcome: Progressing  Goal: Performs ADL's with improved pain control throughout shift  Outcome: Progressing  Goal: Participates in PT with improved pain control throughout the shift  Outcome: Progressing  Goal: Free from opioid side effects throughout the shift  Outcome: Progressing  Goal: Free from acute confusion related to pain meds throughout the shift  Outcome: Progressing     Problem: Fall/Injury  Goal: Not fall by end of shift  Outcome: Progressing  Goal: Be free from injury by end of the shift  Outcome: Progressing  Goal: Verbalize understanding of personal risk factors for fall in the hospital  Outcome: Progressing  Goal: Verbalize understanding of risk factor reduction measures to prevent injury from fall in the home  Outcome: Progressing  Goal: Use assistive devices by end of the shift  Outcome: Progressing  Goal: Pace activities to prevent fatigue by end of the shift  Outcome: Progressing     Problem: Mechanical Ventilation  Goal: Tracheostomy will be managed safely  Outcome: Progressing  Flowsheets (Taken 11/14/2024 0600)  Tracheostomy Will Be Managed Safely:   Utilize tracheostomy securing device and change as necessary to ensure tracheostomy is secured to patient   Support ventilator tubing to avoid pressure from drag of tubing   Keep ambu bag, mask, oxygen connection tubing, and extra tracheostomy at bedside and accompanying patient at all times   Utilize trach securing device   Cleanse site with hydrogen peroxide   Protect skin with moisture barrier cream

## 2024-11-14 NOTE — DISCHARGE SUMMARY
Discharge Diagnosis  Unresponsive  Sepsis  Acute resp failure  Renal Failure    Issues Requiring Follow-Up  Wound care follow for sacral wound  PCP follow for attempting to wean off vent to trach collar, also to wean of midodrine if stable BP. Consider psych consult or start antidepressant if depression persists  Nephrology- CKD started on 60 lasix daily and potassium 20 daily    Test Results Pending At Discharge  Pending Labs       Order Current Status    AFB Culture/Smear Preliminary result            Hospital Course    History of Present Illness:  Narda Malloy is a 69 y.o. year old female patient with Past Medical History of   L1-L3 lumbar laminectomy, T4-S1 revision, and fusion August 26th, T2DM, HTN, essential tremor, HLD, glaucoma, sarcoidosis of the lung who presented to  ED 10/12 after being found essentially unresponsive at her nursing facility [facility]. Per report from her , she has had a significant decline in her health since July 15th when she had a fall and became significantly weak. She has also had multiple infection complications since her back surgery in August requiring multiple I&Ds and long term antibiotic therapy. She went to the OR most recently on 9/28 for lumbar site infection wash out. Per chart review, she was discharged on IV vancomycin 1g and IV ceftriaxone 2d q24hrs through 11/12.     ED Course: Initial vital signs: /104 (109), HR 68, RR 20, SpO2 95% on 6L NC, temp 34.5C. Give 0.4mg of narcan with no improvement in mentation. Lab work-up remarkable for mild hyperkalemia (5.5), AMY 42/1.46, elevated alk phos, normocytic anemia 10.4/33, turbid appearing urine with mild hematuria and proteinuria and + leuk esterase, >50 WBCs. Urine drug screen positive for barbiturates. Triggered sepsis timer so she was given 3L NS. She was intubated for airway protection with 20mg etomidate and 100mg succinylcholine. BP dropped post-intubated and fluid resuscitation and she  was subsequently started on levophed.          Interval ICU Events:  Patient presented from the ED to the ICU intubated, received hyperkalemia cocktail and had noticeably decreasing urine output.  Mentation greatly improved on 10/15 on SAT/SBT were conducted and were successful.  Patient was done after extubated.  Patient's oxygen requirements significantly increased requiring high flow nasal cannula 40L 100%.  On 10/17 chest x-ray showed complete opacification of left hemithorax most likely pleural effusion.  Subsequently pigtail catheter was then placed with immediate drainage of 1.2 L with cytology pending.  Kidney function and urine output continue to decline with worsening hypoxia and hypercapnia and patient was started on BiPAP therapy.  Patient was given high-dose of furosemide and metolazone to stimulate urine output but were unsuccessful.  Temporary dialysis catheter placed awaiting start for CRRT per nephrology.  Patient participated in physical therapy and Occupational Therapy while intubated. Venous jugular duplex noticed large thrombus on tube subsequent exams patient was placed on heparin gtt. Patient again passed SBT and SAT with minimal requirements and was subsequently extubated on 10/31 patient again developed acute respiratory failure the patient was reintubated on 11/2.  On 11/3 family meeting was held to discuss patient's goals of care and treatment model and it was decided to continue DNR CCA, and workup of tracheostomy and PEG tube placement.  On 11/4 patient had PEG tube placed by general surgery.  On 11/7 patient had tracheostomy placed by ENT and sedation was weaned.  Patient continuing physical therapy and Occupational Therapy at this time with difficulties weaning from vent support trach collar. Currently excepted at [facility].      11/11: No events overnight. Urine output increased to 1L yesterday. Nephrology following.      11/14: NAEO. Patient states TF make her nauseous at  night, will discuss with dietary. Possible transfer to [facility] today. Tunneled dialysis cath was removed. Gen surg to evaluate sacral ulcer. Cont amoxicillin and doxy.     Pertinent Physical Exam At Time of Discharge  Physical Exam  Constitutional:       General: She is not in acute distress.  HENT:      Head: Normocephalic and atraumatic.   Eyes:      Pupils: Pupils are equal, round, and reactive to light.   Cardiovascular:      Rate and Rhythm: Normal rate and regular rhythm.      Pulses: Normal pulses.      Heart sounds: Normal heart sounds.   Pulmonary:      Effort: Pulmonary effort is normal.      Breath sounds: Rhonchi present.      Comments: Trach present  Abdominal:      Palpations: Abdomen is soft.      Tenderness: There is no abdominal tenderness.      Comments: PEG present   Genitourinary:     Comments: Gibson present  Musculoskeletal:         General: Swelling present.   Skin:     General: Skin is warm.      Capillary Refill: Capillary refill takes less than 2 seconds.      Comments: Sacral wound   Neurological:      Mental Status: She is alert.         Home Medications     Medication List      START taking these medications     amoxicillin 500 mg capsule; Commonly known as: Amoxil; Take 1 capsule   (500 mg) by mouth once daily.; Start taking on: November 15, 2024   apixaban 5 mg tablet; Commonly known as: Eliquis; Take 1 tablet (5 mg)   by mouth 2 times a day.   doxycycline 100 mg capsule; Commonly known as: Vibramycin; Take 1   capsule (100 mg) by mouth every 12 hours. Take with at least 8 ounces   (large glass) of water, do not lie down for 30 minutes after   ferrous sulfate 300 mg (60 mg iron)/5 mL syrup; Take 5 mL (60 mg of   iron) by mouth once daily.; Start taking on: November 15, 2024   folic acid 1 mg tablet; Commonly known as: Folvite; Take 1 tablet (1 mg)   by mouth once daily.; Start taking on: November 15, 2024   midodrine 5 mg tablet; Commonly known as: Proamatine; Take 1 tablet (5   mg) by  mouth 3 times daily (morning, midday, late afternoon).     CONTINUE taking these medications     acetaminophen 500 mg tablet; Commonly known as: Tylenol   brimonidine 0.2 % ophthalmic solution; Commonly known as: AlphaGAN   calcium carbonate-vitamin D3 500 mg-5 mcg (200 unit) tablet   * dextrose 50 % injection; Infuse 25 mL (12.5 g) into a venous catheter   every 15 minutes if needed (For blood glucose 41 to 70 mg/dL).   * dextrose 50 % injection; Infuse 50 mL (25 g) into a venous catheter   every 15 minutes if needed (For blood glucose less than or equal to 40   mg/dL).   docusate sodium 100 mg capsule; Commonly known as: Colace   ezetimibe 10 mg tablet; Commonly known as: Zetia; Take 1 tablet (10 mg)   by mouth once daily.   FreeStyle Lite Strips strip; Generic drug: blood sugar diagnostic; USE   TO TEST 3 TIMES A DAY AS DIRECTED   * glucagon 1 mg injection; Commonly known as: Glucagen; Inject 1 mg into   the muscle every 15 minutes if needed for low blood sugar - see comments   (Hypoglycemia).   * glucagon 1 mg injection; Commonly known as: Glucagen; Inject 1 mg into   the muscle every 15 minutes if needed for low blood sugar - see comments   (Hypoglycemia).   heparin (porcine) 5,000 unit/mL injection; Inject 1 mL (5,000 Units)   under the skin every 8 hours.   insulin lispro 100 unit/mL injection; Inject 0-15 Units under the skin 3   times daily (morning, midday, late afternoon). Take as directed per   insulin instructions.Do not hold when patient is not eating, continue   order as scheduled for hyperglycemia management. Insulin Lispro Corrective   Scale #3   Hypoglycemia protocol Call LIP unit(s) if Blood Glucose is   between 0 - 70 mg/dL   0 unit(s) if Blood glucose is between   3   unit(s) if Blood glucose is between 151-200  6 unit(s) if Blood glucose is   between 201-250  9 unit(s) if Blood glucose is between 251-300  12 unit(s)   if Blood glucose is between 301-350  15 unit(s) if Blood glucose is  "  between 351-400  Notify provider unit(s) if Blood Glucose is greater than   400 mg/dL   Lactobacillus acidophilus 100 mg (1 billion cell) capsule   latanoprost 0.005 % ophthalmic solution; Commonly known as: Xalatan;   Administer 1 drop into both eyes once daily at bedtime.   melatonin 5 mg tablet   methocarbamol 500 mg tablet; Commonly known as: Robaxin   multivitamin tablet   ondansetron 4 mg/2 mL injection; Commonly known as: Zofran; Infuse 2 mL   (4 mg) into a venous catheter every 6 hours if needed for nausea or   vomiting.   oxyCODONE 5 mg immediate release tablet; Commonly known as: Roxicodone   oxygen gas therapy; Commonly known as: O2; Inhale 1 each continuously.   * pantoprazole 40 mg EC tablet; Commonly known as: ProtoNix; Take 1   tablet (40 mg) by mouth once daily in the morning. Take before meals. Do   not crush, chew, or split.   * pantoprazole 40 mg injection; Commonly known as: ProtoNix; Infuse 40   mg into a venous catheter once daily in the morning. Take before meals. If   unable to take PO.   * PEN NEEDLE MISC   * Sure Comfort Pen Needle 32 gauge x 5/32\" needle; Generic drug: pen   needle, diabetic; AS DIRECTED DAILY FOR 90 DAYS   polyethylene glycol 17 gram packet; Commonly known as: Glycolax,   Miralax; Take 17 g by mouth once daily.   primidone 125 mg tablet; Take 125 mg by mouth 3 times a day.   propranolol LA 60 mg 24 hr capsule; Commonly known as: Inderal LA; Take   1 capsule (60 mg) by mouth early in the morning.. Hold for SBP < 110 mmhg,   HR < 60 bpm.   sennosides 8.6 mg tablet; Commonly known as: Senokot   traZODone 25 MG split tablet; Commonly known as: Desyrel   Vitamin C 500 mg tablet; Generic drug: ascorbic acid  * This list has 8 medication(s) that are the same as other medications   prescribed for you. Read the directions carefully, and ask your doctor or   other care provider to review them with you.     STOP taking these medications     cefTRIAXone 2 gram/50 mL IV; Commonly " known as: Rocephin   vancomycin 1 gram/250 mL solution; Commonly known as: Vancocin       Outpatient Follow-Up  Future Appointments   Date Time Provider Department Center   11/22/2024  8:45 AM Luiz Fisher MD [id number] [facility]   12/3/2024 11:00 AM Jonathan Burrell MD [id number] [facility]   12/4/2024  1:00 PM Barbara Samuels DO [id number] [facility]   1/17/2025 10:30 AM Jose Juan MD [id number] [facility]       Nimco Weathers PA-C

## 2024-11-14 NOTE — PROGRESS NOTES
Physical Therapy    Physical Therapy Treatment    Patient Name: Narda Malloy  MRN: [id number]  Department: [address] ICU  Room: 11/11-A  Today's Date: 11/14/2024  Time Calculation  Start Time: 1034  Stop Time: 1100  Time Calculation (min): 26 min    Assessment/Plan   PT Assessment  End of Session Communication: Bedside nurse  Assessment Comment: pt alert, able to make her needs known, participatory with her therapy.  End of Session Patient Position: Bed, 3 rail up, Alarm on (call button in reach)  PT Plan  Inpatient/Swing Bed or Outpatient: Inpatient  PT Plan  Treatment/Interventions: Bed mobility, Transfer training, Balance training, Strengthening, Endurance training, Range of motion, Therapeutic exercise, Therapeutic activity  PT Plan: Ongoing PT  PT Frequency: 5 times per week  PT Discharge Recommendations: Moderate intensity level of continued care (LTAC)  Equipment Recommended upon Discharge: Wheelchair  PT Recommended Transfer Status: Total assist  PT - OK to Discharge: Yes    General Visit Information:   PT  Visit  PT Received On: 11/14/24  Response to Previous Treatment: Patient with no complaints from previous session.  General  Family/Caregiver Present: No  Co-Treatment: OT  Co-Treatment Reason: patient/cergiver safety and optimization of patient outcomes for mobilization of a patient in ICU on the ventilator via trache  Prior to Session Communication: Bedside nurse  Patient Position Received: Bed, 3 rail up, Alarm off, not on at start of session  Preferred Learning Style: auditory, verbal  General Comment: pt agreeable to therapy    Subjective   Precautions:  Precautions  Hearing/Visual Limitations: h/o visual deficits, mildly Stebbins  Medical Precautions: Fall precautions, Oxygen therapy device and L/min, Spinal precautions (ventilator FiO2 30%, PEEP 5)    Vital Signs Comment: HR 76, SpO2 99%; /49 map 66     Objective   Pain:  Pain Assessment  Pain Assessment: 0-10  0-10 (Numeric) Pain Score: 7  (initially denies pain upon therapist's arrival, then requests pain medication prior to mobility.)  Pain Type: Chronic pain, Surgical pain  Pain Location: Back  Pain Descriptors: Burning  Pain Interventions: Repositioned (pt received pain medication via IV form RN)  Response to Interventions: Decrease in pain  Cognition:  Cognition  Overall Cognitive Status: Within Functional Limits  Postural Control:  Postural Control  Posture Comment: leaning to the left in sitting  Static Sitting Balance  Static Sitting-Balance Support: Bilateral upper extremity supported, Feet supported  Static Sitting-Level of Assistance: Maximum assistance  Static Sitting-Comment/Number of Minutes: pt using her L UE more than right when sitting on EOB x 10 minutes. max A for turnk support and encouraging left elbow extension.  Treatments:  Therapeutic Exercise  Therapeutic Exercise Performed: Yes  Therapeutic Exercise Activity 1: 10 reps B LE supine ther ex: ankle pumps, heel slides, SAQ, hip abd/add/ER/IR  Therapeutic Exercise Activity 2: pt noted to have involuntary muscle spasms B LEs, knee/hip/ankle flexion, rhythmic repetitions    Balance/Neuromuscular Re-Education  Balance/Neuromuscular Re-Education Activity 1: sat on EOB x 10 minutes, encouraged UE usage to help in supporting trunk.    Bed Mobility  Bed Mobility: Yes  Bed Mobility 1  Bed Mobility 1: Supine to sitting  Level of Assistance 1: Maximum assistance, x 3  Bed Mobility Comments 1: dependent to move B LEs off EOB, max A x 2-3 to elevate trunk and square hips on EOB using the draw sheet.  Bed Mobility 2  Bed Mobility  2: Sitting to supine  Level of Assistance 2: Dependent, x 3  Bed Mobility Comments 2: B UEs placed on pt's lap, dependent x 3 for turnk and B LEs management to transfer to supine and boost to HOB using the draw sheet.    Outcome Measures:  [facility] Basic Mobility  Turning from your back to your side while in a flat bed without using bedrails: Total  Moving from lying  on your back to sitting on the side of a flat bed without using bedrails: Total  Moving to and from bed to chair (including a wheelchair): Total  Standing up from a chair using your arms (e.g. wheelchair or bedside chair): Total  To walk in hospital room: Total  Climbing 3-5 steps with railing: Total  Basic Mobility - Total Score: 6    FSS-ICU  Ambulation: Unable to attempt due to weakness  Rolling: Total assistance (performs 25% or requires another person)  Sitting: Total assistance (performs 25% or requires another person)  Transfer Sit-to-Stand: Unable to perform  Transfer Supine-to-Sit: Total assistance (performs 25% or requires another person)  Total Score: 3    Encounter Problems       Encounter Problems (Active)       PT STG Problem       Patient will roll right and left with minimal assist to facilitate mobility. (Progressing)       Start:  10/28/24    Expected End:  11/25/24            Patient will transfer supine to sit and sit to supine with moderate assist to facilitate mobility. (Progressing)       Start:  10/28/24    Expected End:  11/25/24            Patient will transfer sit to stand and stand to sit with maximal assist to facilitate mobility. (Not Progressing)       Start:  10/28/24    Expected End:  11/25/24            Patient will transfer bed to chair and chair to bed with maximal assist to facilitate mobility. (Not Progressing)       Start:  10/28/24    Expected End:  11/25/24            Patient will increase strength in B LEs by half grade throughout to improve functional mobility.  (Progressing)       Start:  10/28/24    Expected End:  11/25/24            Patient will tolerate 15 minutes of sitting on EOB to improve ability to perform functional transfers. (Progressing)       Start:  10/28/24    Expected End:  11/25/24

## 2024-11-14 NOTE — CONSULTS
"ACUTE CARE SURGERY CONSULT NOTE  11/14/2024   [id number]     Reason for Consult:  Decubitus wound assessment    HPI: Per chart review- Narda Malloy is a 69 y.o. year old female patient with Past Medical History of   L1-L3 lumbar laminectomy, T4-S1 revision, and fusion August 26th, T2DM, HTN, essential tremor, HLD, glaucoma, sarcoidosis of the lung who presented to  ED 10/12 after being found essentially unresponsive at her nursing facility [facility]. Per report from her , she has had a significant decline in her health since July 15th when she had a fall and became significantly weak. She has also had multiple infection complications since her back surgery in August requiring multiple I&Ds and long term antibiotic therapy. She went to the OR most recently on 9/28 for lumbar site infection wash out. Per chart review, she was discharged on IV vancomycin 1g and IV ceftriaxone 2d q24hrs through 11/12.     ED Course: Initial vital signs: /104 (109), HR 68, RR 20, SpO2 95% on 6L NC, temp 34.5C. Give 0.4mg of narcan with no improvement in mentation. Lab work-up remarkable for mild hyperkalemia (5.5), AMY 42/1.46, elevated alk phos, normocytic anemia 10.4/33, turbid appearing urine with mild hematuria and proteinuria and + leuk esterase, >50 WBCs. Urine drug screen positive for barbiturates. Triggered sepsis timer so she was given 3L NS. She was intubated for airway protection with 20mg etomidate and 100mg succinylcholine. BP dropped post-intubated and fluid resuscitation and she was subsequently started on levophed.       Since that time, the patient has had a prolonged ICU course, which included trach and PEG. Patient's  had previously reported that she had a decubitus ulcer with dark (eschar) covering. Patient has been on topical collaginase for therapy.    ASSESSMENT & PLAN:  /82   Pulse 95   Temp 36.8 °C (98.2 °F) (Temporal)   Resp 20   Ht 1.778 m (5' 10\")   Wt 114 kg (250 lb " 10.6 oz)   LMP  (LMP Unknown)   SpO2 (!) 87%   BMI 35.97 kg/m²   Problem/Injury Exam and Plan Resolved   Sacral decubitus wound with eschar Approximately 4.5cm diameter. No erythema or pus. There is an eschar covering the wound. The edges are epithelialized.  Continue Collagenase topical and Mepilex  Continued pressure reduction  Outpatient wound care  No surgical intervention at this time.    Left buttock stage II wound Approximately 3.5 cm diameter. No erythema or pus. Very superficial stage II.   Continue Mepilex.  Continued pressure reduction  Outpatient wound care  No surgical intervention at this time.    Mid line spine surgical wound Courses along most of the spine. Two areas of nonviable tissue, one about 25% of the way from the top of the wound. The second is very small and about  75% of the way down from the top of the incision. The wound is closed. There is no drainage or cellulitis. No pus.   Continue dry dressing daily.   No surgical intervention at this time.                                            Heart Rate:  [58-95]   Temp:  [36.1 °C (97 °F)-37.9 °C (100.2 °F)]   Resp:  [12-20]   BP: ()/(44-82)   Weight:  [114 kg (250 lb 10.6 oz)]   SpO2:  [87 %-100 %]      Results from last 72 hours   Lab Units 11/14/24  0417 11/13/24  0219 11/12/24  0406   WBC AUTO x10*3/uL 6.7 7.5 6.6   HEMOGLOBIN g/dL 8.2* 8.6* 8.7*   HEMATOCRIT % 24.1* 25.8* 26.6*   PLATELETS AUTO x10*3/uL 343 367 340   NEUTROS PCT AUTO % 77.7 78.5 80.4   LYMPHS PCT AUTO % 14.0 13.9 10.9   MONOS PCT AUTO % 5.9 5.1 6.1   EOS PCT AUTO % 0.9 0.8 0.9       Review of Systems    Physical Exam     Allergies   Allergen Reactions    Erythromycin Nausea And Vomiting    Morphine GI Upset     nausea and vomiting    Rosuvastatin Other and Myalgia         Current Facility-Administered Medications:     acetaminophen (Tylenol) oral liquid 650 mg, 650 mg, oral, q4h PRN, Sabino Matos, APRN-CNP, 650 mg at 11/13/24 1611    albuterol 2.5 mg /3 mL  (0.083 %) nebulizer solution 3 mL, 3 mL, nebulization, 4x daily, Kaleb Lam PA-C, 3 mL at 11/14/24 1429    alteplase (Cathflo Activase) injection 2 mg, 2 mg, intra-catheter, PRN, Kaleb Lam PA-C    amoxicillin (Amoxil) capsule 500 mg, 500 mg, oral, Daily, Andrew Malagon MD, 500 mg at 11/14/24 0822    apixaban (Eliquis) tablet 5 mg, 5 mg, oral, BID, Tommy Amezcua PA-C, 5 mg at 11/14/24 0821    brimonidine (AlphaGAN) 0.2 % ophthalmic solution 1 drop, 1 drop, Both Eyes, BID, Kaleb Lam PA-C, 1 drop at 11/14/24 0820    calcium carbonate-vitamin D3 500 mg-5 mcg (200 unit) per tablet 1 tablet, 1 tablet, oral, Daily, Kaleb Lam PA-C, 1 tablet at 11/14/24 0821    collagenase 250 unit/gram ointment, , Topical, Daily, Sabino Matos, APRN-CNP, Given at 11/14/24 1122    dextrose 50 % injection 12.5 g, 12.5 g, intravenous, q15 min PRN, Kaleb Lam PA-C    dextrose 50 % injection 25 g, 25 g, intravenous, q15 min PRN, Kaleb Lam PA-C    doxycycline (Vibramycin) capsule 100 mg, 100 mg, oral, q12h LUCA, Andrew Malagon MD, 100 mg at 11/14/24 0821    ezetimibe (Zetia) tablet 10 mg, 10 mg, oral, Daily, Kaleb Lam PA-C, 10 mg at 11/14/24 0821    ferrous sulfate syrup 60 mg of iron, 60 mg of iron, oral, Daily, Kaleb Lam PA-C, 60 mg of iron at 11/14/24 0821    folic acid (Folvite) tablet 1 mg, 1 mg, oral, Daily, Kaleb Lam PA-C, 1 mg at 11/14/24 0821    furosemide (Lasix) tablet 60 mg, 60 mg, oral, Daily, Nimco Weathers PA-C    glucagon (Glucagen) injection 1 mg, 1 mg, intramuscular, q15 min PRN, Kaleb Lam PA-C    glucagon (Glucagen) injection 1 mg, 1 mg, intramuscular, q15 min PRN, Kaleb Lam PA-C    heparin 1,000 unit/mL injection 1,000 Units, 1,000 Units, intra-catheter, After Dialysis, Neeta Ramirez, 1,000 Units at 11/13/24 1058    heparin 1,000 unit/mL injection 1,000 Units, 1,000 Units, intra-catheter, After Dialysis, Neeta Ramirez, 1,000 Units at 11/13/24 1056     heparin flush 10 unit/mL syringe 50 Units, 50 Units, intra-catheter, PRN, Kaleb Lam PA-C, 50 Units at 10/19/24 2313    honey (Medihoney) topical gel, , Topical, Daily, Kaleb Lam PA-C, Given at 11/14/24 1122    HYDROmorphone (Dilaudid) injection 0.4 mg, 0.4 mg, intravenous, q2h PRN, Kaleb Lam PA-C, 0.4 mg at 11/14/24 1044    insulin lispro (HumaLOG) injection 0-15 Units, 0-15 Units, subcutaneous, q4h, Kaleb Lam PA-C, 3 Units at 11/14/24 0030    latanoprost (Xalatan) 0.005 % ophthalmic solution 1 drop, 1 drop, Both Eyes, Nightly, Kaleb Lam PA-C, 1 drop at 11/13/24 2050    midodrine (Proamatine) tablet 5 mg, 5 mg, oral, TID, Sabino Matos APRN-CNP, 5 mg at 11/14/24 0821    ondansetron (Zofran) injection 4 mg, 4 mg, intravenous, q4h PRN, LEONEL Salgado-CNP, 4 mg at 11/12/24 1017    oxyCODONE (Roxicodone) solution 5 mg, 5 mg, oral, q4h PRN, Kaleb Lam PA-C, 5 mg at 11/13/24 2306    oxyCODONE (Roxicodone) solution 7.5 mg, 7.5 mg, oral, q4h PRN, Kaleb Lam PA-C    oxygen (O2) therapy, , inhalation, Continuous - Inhalation, Anna Marie Shook MD, 30 percent at 11/14/24 0754    pantoprazole (ProtoNix) EC tablet 40 mg, 40 mg, oral, Daily before breakfast **OR** pantoprazole (ProtoNix) injection 40 mg, 40 mg, intravenous, Daily before breakfast, Kaleb Lam PA-C, 40 mg at 11/14/24 0649    polyethylene glycol (Glycolax, Miralax) packet 17 g, 17 g, oral, Daily PRN, Kaleb M Genaro, PA-C    potassium chloride (Klor-Con) packet 20 mEq, 20 mEq, oral, Daily, Nimco Weathers PA-C    primidone (Mysoline) tablet 125 mg, 125 mg, oral, TID, Tommy Amezcua PA-C, 125 mg at 11/14/24 0822    [Held by provider] propranolol LA (Inderal LA) 24 hr capsule 60 mg, 60 mg, oral, Daily, Kaleb Lam PA-C, 60 mg at 10/19/24 0643    sennosides-docusate sodium (Lucia-Colace) 8.6-50 mg per tablet 1 tablet, 1 tablet, oral, Nightly PRN, Kaleb Lam PA-C    Family History   Problem Relation  Name Age of Onset    Multiple myeloma Mother      Cancer Mother      Other (CABG) Father      Pulmonary embolism Father      Heart disease Father      Breast cancer Sister          Stage II    Hypertension Sister      Diabetes Sister      No Known Problems Sister          x5    No Known Problems Brother          x4    No Known Problems Daughter          Past Medical History:   Diagnosis Date    Degenerative myopia, bilateral     Diabetic neuropathy (Multi)     Difficult intubation 08/26/2024    Mac 3, grade 3, 1 attempt.  Glidescope/videolaryngoscopy recommended for future attempts.    DM type 2 (diabetes mellitus, type 2) (Multi)     Dry eye syndrome of bilateral lacrimal glands     Essential hypertension     Essential tremor     Glaucoma     Hyperlipidemia     Long term (current) use of insulin (Multi)     Low back pain     PONV (postoperative nausea and vomiting)     Primary open angle glaucoma of both eyes, severe stage     Repeated falls     Sarcoidosis of lung (Multi)     Spinal stenosis, lumbar region without neurogenic claudication     Weakness         Past Surgical History:   Procedure Laterality Date    BLEPHAROPLASTY  07/2022    BREAST SURGERY  05/20/2022    Breast lift    CARPAL TUNNEL RELEASE      CATARACT EXTRACTION W/  INTRAOCULAR LENS IMPLANT Bilateral     OD 08/04/2011 +8.5D,OS 08/04/2011 +8.50D    FOOT SURGERY      INSERTION / REMOVAL CRANIAL DBS GENERATOR      Placed 2017.  Removed 2018. part of wire left in head when everything removed    LUMBAR FUSION      L3-S1    PANRETINAL PHOTOCOAGULATION  2014    THORACIC FUSION  08/26/2024    T4-S1 fusion    VITRECTOMY Right 2013        Social History     Socioeconomic History    Marital status:    Tobacco Use    Smoking status: Former     Types: Cigarettes     Passive exposure: Past    Smokeless tobacco: Never   Vaping Use    Vaping status: Never Used   Substance and Sexual Activity    Alcohol use: Not Currently    Drug use: Not Currently    Sexual  activity: Not Currently     Partners: Male     Social Drivers of Health     Financial Resource Strain: Low Risk  (10/12/2024)    Overall Financial Resource Strain (CARDIA)     Difficulty of Paying Living Expenses: Not very hard   Recent Concern: Financial Resource Strain - High Risk (9/27/2024)    Overall Financial Resource Strain (CARDIA)     Difficulty of Paying Living Expenses: Hard   Food Insecurity: Patient Unable To Answer (10/12/2024)    Hunger Vital Sign     Worried About Running Out of Food in the Last Year: Patient unable to answer     Ran Out of Food in the Last Year: Patient unable to answer   Transportation Needs: Patient Unable To Answer (10/12/2024)    PRAPARE - Transportation     Lack of Transportation (Medical): Patient unable to answer     Lack of Transportation (Non-Medical): Patient unable to answer   Physical Activity: Sufficiently Active (9/27/2024)    Exercise Vital Sign     Days of Exercise per Week: 5 days     Minutes of Exercise per Session: 30 min   Recent Concern: Physical Activity - Inactive (8/27/2024)    Exercise Vital Sign     Days of Exercise per Week: 0 days     Minutes of Exercise per Session: 0 min   Stress: Patient Unable To Answer (10/12/2024)    [employer] - Occupational Stress Questionnaire     Feeling of Stress : Patient unable to answer   Social Connections: Patient Unable To Answer (10/12/2024)    Social Connection and Isolation Panel [NHANES]     Frequency of Communication with Friends and Family: Patient unable to answer     Frequency of Social Gatherings with Friends and Family: Patient unable to answer     Attends Mandaeism Services: Patient unable to answer     Active Member of Clubs or Organizations: Patient unable to answer     Attends Club or Organization Meetings: Patient unable to answer     Marital Status: Patient unable to answer   Recent Concern: Social Connections - Moderately Isolated (9/27/2024)    Social Connection and  Isolation Panel [NHANES]     Frequency of Communication with Friends and Family: More than three times a week     Frequency of Social Gatherings with Friends and Family: More than three times a week     Attends Lutheran Services: Never     Active Member of Clubs or Organizations: No     Attends Club or Organization Meetings: Never     Marital Status:    Intimate Partner Violence: Patient Unable To Answer (10/12/2024)    Humiliation, Afraid, Rape, and Kick questionnaire     Fear of Current or Ex-Partner: Patient unable to answer     Emotionally Abused: Patient unable to answer     Physically Abused: Patient unable to answer     Sexually Abused: Patient unable to answer   Housing Stability: Patient Unable To Answer (10/12/2024)    Housing Stability Vital Sign     Unable to Pay for Housing in the Last Year: Patient unable to answer     Number of Times Moved in the Last Year: 0     Homeless in the Last Year: Patient unable to answer        Results from last 72 hours   Lab Units 11/14/24  0417 11/13/24  0219 11/12/24  0406   SODIUM mmol/L 132* 130* 131*   POTASSIUM mmol/L 3.4* 4.0 4.0   CHLORIDE mmol/L 92* 91* 93*   CO2 mmol/L 30 30 30   BUN mg/dL 65* 64* 50*   CREATININE mg/dL 1.59* 1.72* 1.77*   GLUCOSE mg/dL 106* 148* 138*   CALCIUM mg/dL 8.0* 8.0* 8.1*   ANION GAP mmol/L 13 13 12   EGFR mL/min/1.73m*2 35* 32* 31*   PHOSPHORUS mg/dL 4.6 5.0* 5.0*      @LABBRIEF(CBCDIF:3,NA:3,K:3,CL:3,KVR0JDJ:3,BUN:3,CCRAT:3,BUNCREATRAT:3,MG:3,PHOS:3)@           Lab Results   Component Value Date    URINECULTURE 20,000 - 80,000 Candida lusitaniae (A) 10/12/2024    BLOODCULT No growth at 4 days -  FINAL REPORT 11/03/2024    BLOODCULT No growth at 4 days -  FINAL REPORT 11/03/2024         Anna Marie Shook MD   11/14/2024, 3:16 PM

## 2024-11-14 NOTE — NURSING NOTE
Report called to Amanda from [facility], all questions answered.  Attempted to call pt's , Godfrey vieira,  no answer.  Pt refusing dressing changes at this time.

## 2024-11-14 NOTE — PROGRESS NOTES
"Nutrition Follow up Note    Nutrition Assessment      Dialysis stopped, permacath removed. Tube feed stopped due to nausea. Spoke with PA. Will restart tube feed with peptide-based formula for easier digestion. Anticipating discharge to LTC facility pending bed availability.    Nutrition History:  Food and Nutrient History: Tube feed/vent     Food Allergies/Intolerances:  None  GI Symptoms: Nausea  Oral Problems: None    Anthropometrics:  Ht: 177.8 cm (5' 10\"), Wt: 114 kg (250 lb 10.6 oz), BMI: 36.06  IBW/kg (Dietitian Calculated): 68.18 kg  Percent of IBW: 147 %     Weight Change:  Daily Weight  11/14/24 : 114 kg (250 lb 10.6 oz)  10/01/24 : 99.7 kg (219 lb 12.8 oz)  09/25/24 : 74.8 kg (165 lb)  08/27/24 : 75.8 kg (167 lb)  08/16/24 : 76.2 kg (168 lb)  08/12/24 : 76.4 kg (168 lb 6.4 oz)  07/15/24 : 76.2 kg (168 lb)  06/18/24 : 75.8 kg (167 lb 3.2 oz)  05/31/24 : 69.4 kg (153 lb)  09/29/23 : 69.4 kg (153 lb)     Nutrition Focused Physical Exam Findings:      Nutrition Significant Labs:  Lab Results   Component Value Date    WBC 6.7 11/14/2024    HGB 8.2 (L) 11/14/2024    HCT 24.1 (L) 11/14/2024     11/14/2024    CHOL 266 (H) 08/23/2023    TRIG 213 (H) 08/23/2023    HDL 58 08/23/2023    ALT 8 11/12/2024    AST 8 (L) 11/12/2024     (L) 11/14/2024    K 3.4 (L) 11/14/2024    CL 92 (L) 11/14/2024    CREATININE 1.59 (H) 11/14/2024    BUN 65 (H) 11/14/2024    CO2 30 11/14/2024    TSH 4.78 (H) 10/12/2024    INR 1.0 09/28/2024    HGBA1C 6.2 (H) 09/25/2024    ALBUR 40 (H) 03/31/2022     Nutrition Specific Medications:  albuterol, 3 mL, nebulization, 4x daily  amoxicillin, 500 mg, oral, Daily  apixaban, 5 mg, oral, BID  brimonidine, 1 drop, Both Eyes, BID  calcium carbonate-vitamin D3, 1 tablet, oral, Daily  collagenase, , Topical, Daily  doxycycline, 100 mg, oral, q12h LUCA  ezetimibe, 10 mg, oral, Daily  ferrous sulfate, 60 mg of iron, oral, Daily  folic acid, 1 mg, oral, Daily  heparin, 1,000 Units, " intra-catheter, After Dialysis  heparin, 1,000 Units, intra-catheter, After Dialysis  honey, , Topical, Daily  insulin lispro, 0-15 Units, subcutaneous, q4h  latanoprost, 1 drop, Both Eyes, Nightly  midodrine, 5 mg, oral, TID  oxygen, , inhalation, Continuous - Inhalation  pantoprazole, 40 mg, oral, Daily before breakfast   Or  pantoprazole, 40 mg, intravenous, Daily before breakfast  primidone, 125 mg, oral, TID  [Held by provider] propranolol LA, 60 mg, oral, Daily         Dietary Orders (From admission, onward)       Start     Ordered    11/14/24 1300  Enteral feeding with NPO 60; (Flush after meds only.)  Diet effective now        Question Answer Comment   Tube feeding formula: Vital AF 1.2    Tube feeding continuous rate (mL/hr): 60    Tube feeding flush (mL):  Flush after meds only.       11/14/24 1259    10/24/24 1212  Oral nutritional supplements  Until discontinued        Comments: Unflavored via OG tube   Question Answer Comment   Deliver with Breakfast    Deliver with Dinner    Select supplement: Cade        10/24/24 1211    10/16/24 0448  May Participate in Room Service With Assistance  ( ROOM SERVICE MAY PARTICIPATE WITH ASSISTANCE)  Once        Question:  .  Answer:  Yes    10/16/24 0447                  Nutrition Support Intake provides: tube feed stopped d/t nausea      Estimated Needs:   Estimated Energy Needs  Total Energy Estimated Needs (kCal): 1700 kCal  Total Estimated Energy Need per Day (kCal/kg): 25 kCal/kg  Method for Estimating Needs: IBW    Estimated Protein Needs  Total Protein Estimated Needs (g):  ()  Total Protein Estimated Needs (g/kg):  (1.2-2.0)  Method for Estimating Needs: IBW    Estimated Fluid Needs  Total Fluid Estimated Needs (mL): 1700 mL  Total Fluid Estimated Needs (mL/kg): 25 mL/kg  Method for Estimating Needs: Per Critical Care Team/Nephrology      Nutrition Diagnosis   Nutrition Diagnosis:     Nutrition Diagnosis  Patient has Nutrition Diagnosis: Yes  Diagnosis  Status (1): Ongoing  Nutrition Diagnosis 1: Inadequate energy intake  Related to (1): decreased ability to consume sufficient energy  As Evidenced by (1): NPO  Additional Nutrition Diagnosis: Diagnosis 2  Diagnosis Status (2): Resolved  Nutrition Diagnosis 2: Excessive enteral nutrition infusion  Related to (2): current tube feed formula/rate  As Evidenced by (2): enteral formula/rate providing greater than estimated energy needs     Nutrition Interventions/Recommendations   Nutrition Interventions and Recommendations:    Nutrition Prescription:  Individualized Nutrition Prescription Provided for : 1700 calories,  gm protein to be provided via enteral nutrition    Nutrition Interventions:   Food and/or Nutrient Delivery Interventions  Interventions: Enteral intake  Enteral Intake: Modify composition of enteral nutrition, Modify rate of enteral nutrition  Goal: Change tube feed to: Vital AF 1.2 goal rate @60 mL/Hr to provide 1728 calories, 108 gm protein, 1168 mL free water    Education Documentation  No documentation found.         Nutrition Monitoring and Evaluation   Monitoring/Evaluation:   Food/Nutrient Related History Monitoring  Monitoring and Evaluation Plan: Enteral and parenteral nutrition intake  Enteral and Parenteral Nutrition Intake: Enteral nutrition intake  Criteria: monitoring tube feed tolerance       Time Spent/Follow-up:   Follow Up  Time Spent (min): 25 minutes  Last Date of Nutrition Visit: 11/14/24  Nutrition Follow-Up Needed?: 5-7 days  Follow up Comment: 11/19/24

## 2024-11-14 NOTE — PROGRESS NOTES
Occupational Therapy    OT Treatment    Patient Name: Narda Malloy  MRN: [id number]  Department: [facility]  Room: 11/11-A  Today's Date: 11/14/2024  Time Calculation  Start Time: 1033  Stop Time: 1100  Time Calculation (min): 27 min        Assessment:  OT Assessment: Pt making progress towards established OT goals, cooperative in therapy session with encouragement. Pt would benefit from acute OT services to address deficits in ADLs, functional mobility, and transfers  Evaluation/Treatment Tolerance: Patient limited by fatigue, Patient limited by pain  End of Session Communication: Bedside nurse  End of Session Patient Position: Bed, 3 rail up, Alarm on (all needs in reach)  OT Assessment Results: Decreased ADL status, Decreased upper extremity range of motion, Decreased upper extremity strength, Decreased safe judgment during ADL, Decreased cognition, Decreased endurance, Decreased fine motor control, Decreased functional mobility, Decreased gross motor control, Decreased trunk control for functional activities  Evaluation/Treatment Tolerance: Patient limited by fatigue, Patient limited by pain  Plan:  Treatment Interventions: ADL retraining, Functional transfer training, UE strengthening/ROM, Endurance training, Cognitive reorientation, Patient/family training, Fine motor coordination activities, Neuromuscular reeducation, Compensatory technique education  OT Frequency: 5 times per week  OT Discharge Recommendations: Moderate intensity level of continued care  Equipment Recommended upon Discharge: Wheelchair  OT Recommended Transfer Status: Assist of 2  OT - OK to Discharge: Yes  Treatment Interventions: ADL retraining, Functional transfer training, UE strengthening/ROM, Endurance training, Cognitive reorientation, Patient/family training, Fine motor coordination activities, Neuromuscular reeducation, Compensatory technique education    Subjective   Previous Visit Info:  OT Last Visit  OT Received On:  11/14/24  General:  General  Past Medical History Relevant to Rehab: L1-3 lumbar lami, T4-S1 revision/fusion, DM, HTN, essential tremor, glaucoma, sarcoidosis, arthritis, anxiety, depression  Missed Visit: No  Missed Visit Reason: Other (Comment)  Family/Caregiver Present: No  Co-Treatment: PT  Prior to Session Communication: Bedside nurse  Patient Position Received: Bed, 3 rail up, Alarm off, not on at start of session  General Comment: Cleared by nursing. Pt agreeable to therapy.  Precautions:  Hearing/Visual Limitations: h/o visual deficits, mildly [place]  Medical Precautions: Fall precautions, Oxygen therapy device and L/min, Spinal precautions  Precautions Comment: vent via trach, PEG tube, +FMS, + anders catheter    Vital Signs Comment: HR 76, SpO2 99%; /49 map 66     Pain:  Pain Assessment  Pain Assessment: 0-10  0-10 (Numeric) Pain Score: 7  Pain Type: Acute pain, Surgical pain  Pain Location: Back  Pain Interventions: Repositioned (medication per nsg)    Objective    Cognition:  Cognition  Overall Cognitive Status: Within Functional Limits  Activities of Daily Living:      Grooming  Grooming Level of Assistance: Maximum assistance, Dependent  Grooming Where Assessed: Edge of bed  Grooming Comments: dep A to comb hair seated EOB, required max A x2 to maintain seated balance       Bed Mobility/Transfers: Bed Mobility  Bed Mobility:  (max A x3 for trunk control and assist with BLEs supine<>seated EOB; max A x3 for scooting towards HOB with use of sheet)          Sitting Balance:  Static Sitting Balance  Static Sitting-Level of Assistance: Maximum assistance (x2)  Static Sitting-Comment/Number of Minutes: ~5-7minutes (max A x2 to maintain seated balance, left lateral lean noted)    Therapy/Activity: Therapeutic Exercise  Therapeutic Exercise Performed:  (Pt performed 10-15 reps x1 set BUE exercises (shoulder flexion, bicep curls, wrist flexion/extension,digit flexion/extension) to improve strength and indep  with ADLs/mobility. improved coordination observed this date, tremulous BUEs)       Outcome Measures:[facility] Daily Activity  Putting on and taking off regular lower body clothing: Total  Bathing (including washing, rinsing, drying): Total  Putting on and taking off regular upper body clothing: A lot  Toileting, which includes using toilet, bedpan or urinal: Total  Taking care of personal grooming such as brushing teeth: A lot  Eating Meals: Total  Daily Activity - Total Score: 8      Education Documentation  Body Mechanics, taught by Rubi Dee OT at 11/14/2024  1:35 PM.  Learner: Patient  Readiness: Acceptance  Method: Explanation, Demonstration  Response: Verbalizes Understanding    Precautions, taught by Rubi Dee OT at 11/14/2024  1:35 PM.  Learner: Patient  Readiness: Acceptance  Method: Explanation, Demonstration  Response: Verbalizes Understanding    ADL Training, taught by Rubi Dee OT at 11/14/2024  1:35 PM.  Learner: Patient  Readiness: Acceptance  Method: Explanation, Demonstration  Response: Verbalizes Understanding    Education Comments  No comments found.      Goals:  Encounter Problems       Encounter Problems (Active)       OT Goals       Pt will complete self-feeding with setup. (Not Progressing)       Start:  10/28/24    Expected End:  11/28/24            Pt will complete all grooming tasks with min A. (Progressing)       Start:  10/28/24    Expected End:  11/28/24            Pt will complete UB dressing/bathing with min A. (Not Progressing)       Start:  10/28/24    Expected End:  11/28/24            Pt will tolerate sitting EOB for at least 10-15 minutes with F+ balance in order to enhance ADL's. (Progressing)       Start:  10/28/24    Expected End:  11/28/24            Pt will complete all functional transfers and mobility with mod A using a RW. (Progressing)       Start:  10/28/24    Expected End:  11/28/24

## 2024-11-14 NOTE — CARE PLAN
The patient's goals for the shift include unable to assess    The clinical goals for the shift include Titrate o2 as needed    Problem: Safety - Adult  Goal: Free from fall injury  Outcome: Progressing     Problem: Discharge Planning  Goal: Discharge to home or other facility with appropriate resources  Outcome: Progressing     Problem: Chronic Conditions and Co-morbidities  Goal: Patient's chronic conditions and co-morbidity symptoms are monitored and maintained or improved  Outcome: Progressing     Problem: Diabetes  Goal: Increase stability of blood glucose readings by end of shift  Outcome: Progressing  Goal: Maintain electrolyte levels within acceptable range throughout shift  Outcome: Progressing  Goal: Maintain glucose levels >70mg/dl to <250mg/dl throughout shift  Outcome: Progressing  Goal: Learn about and adhere to nutrition recommendations by end of shift  Outcome: Progressing  Goal: Vital signs within normal range for age by end of shift  Outcome: Progressing  Goal: Increase self care and/or family involovement by end of shift  Outcome: Progressing  Goal: Receive DSME education by end of shift  Outcome: Progressing     Problem: Knowledge Deficit  Goal: Patient/family/caregiver demonstrates understanding of disease process, treatment plan, medications, and discharge instructions  Outcome: Progressing     Problem: Mechanical Ventilation  Goal: Tracheostomy will be managed safely  Outcome: Progressing     Problem: Skin  Goal: Decreased wound size/increased tissue granulation at next dressing change  11/14/2024 1049 by Radha Infante, RN  Flowsheets (Taken 11/14/2024 1049)  Decreased wound size/increased tissue granulation at next dressing change:   Promote sleep for wound healing   Utilize specialty bed per algorithm  11/14/2024 1049 by Radha Infante, RN  Outcome: Progressing  Flowsheets (Taken 11/14/2024 1049)  Decreased wound size/increased tissue granulation at next dressing change:   Promote sleep  for wound healing   Utilize specialty bed per algorithm  Goal: Participates in plan/prevention/treatment measures  11/14/2024 1049 by Radha Infante RN  Flowsheets (Taken 11/14/2024 1049)  Participates in plan/prevention/treatment measures:   Discuss with provider PT/OT consult   Elevate heels  11/14/2024 1049 by Radha Infante RN  Outcome: Progressing  Flowsheets (Taken 11/14/2024 1049)  Participates in plan/prevention/treatment measures:   Discuss with provider PT/OT consult   Elevate heels  Goal: Prevent/manage excess moisture  11/14/2024 1049 by Radha Infante RN  Flowsheets (Taken 11/14/2024 1049)  Prevent/manage excess moisture:   Cleanse incontinence/protect with barrier cream   Moisturize dry skin   Follow provider orders for dressing changes  11/14/2024 1049 by Radha Infante RN  Outcome: Progressing  Flowsheets (Taken 11/14/2024 1049)  Prevent/manage excess moisture:   Cleanse incontinence/protect with barrier cream   Moisturize dry skin   Follow provider orders for dressing changes  Goal: Prevent/minimize sheer/friction injuries  11/14/2024 1049 by Radha Infante RN  Flowsheets (Taken 11/14/2024 1049)  Prevent/minimize sheer/friction injuries:   Complete micro-shifts as needed if patient unable. Adjust patient position to relieve pressure points, not a full turn   Increase activity/out of bed for meals   Use pull sheet  11/14/2024 1049 by Radha Infante RN  Outcome: Progressing  Flowsheets (Taken 11/14/2024 1049)  Prevent/minimize sheer/friction injuries:   Complete micro-shifts as needed if patient unable. Adjust patient position to relieve pressure points, not a full turn   Increase activity/out of bed for meals   Use pull sheet  Goal: Promote/optimize nutrition  11/14/2024 1049 by Radha Infante RN  Flowsheets (Taken 11/14/2024 1049)  Promote/optimize nutrition:   Assist with feeding   Monitor/record intake including meals   Consume > 50% meals/supplements  11/14/2024 1049 by  Radha Infante RN  Outcome: Progressing  Flowsheets (Taken 11/14/2024 1049)  Promote/optimize nutrition:   Assist with feeding   Monitor/record intake including meals   Consume > 50% meals/supplements  Goal: Promote skin healing  11/14/2024 1049 by Radha Infante RN  Flowsheets (Taken 11/14/2024 1049)  Promote skin healing:   Assess skin/pad under line(s)/device(s)   Protective dressings over bony prominences   Turn/reposition every 2 hours/use positioning/transfer devices   Ensure correct size (line/device) and apply per  instructions  11/14/2024 1049 by Radha Infante RN  Outcome: Progressing  Flowsheets (Taken 11/14/2024 1049)  Promote skin healing:   Assess skin/pad under line(s)/device(s)   Protective dressings over bony prominences   Turn/reposition every 2 hours/use positioning/transfer devices   Ensure correct size (line/device) and apply per  instructions     Problem: Nutrition  Goal: Consume prescribed supplement  Outcome: Progressing  Goal: Nutrition support goals are met within 48 hrs  Outcome: Progressing  Goal: Nutrition support is meeting 75% of nutrient needs  Outcome: Progressing  Goal: BG  mg/dL  Outcome: Progressing  Goal: Lab values WNL  Outcome: Progressing  Goal: Electrolytes WNL  Outcome: Progressing  Goal: Promote healing  Outcome: Progressing  Goal: Maintain stable weight  Outcome: Progressing  Goal: Reduce weight from edema/fluid  Outcome: Progressing     Problem: Respiratory  Goal: No signs of respiratory distress (eg. Use of accessory muscles. Peds grunting)  Outcome: Progressing  Goal: Clear secretions with interventions this shift  Outcome: Progressing  Goal: Minimize anxiety/maximize coping throughout shift  Outcome: Progressing  Goal: Minimal/no exertional discomfort or dyspnea this shift  Outcome: Progressing  Goal: Patent airway maintained this shift  Outcome: Progressing  Goal: Tolerate mechanical ventilation evidenced by VS/agitation level  this shift  Outcome: Progressing  Goal: Tolerate pulmonary toileting this shift  Outcome: Progressing  Goal: Verbalize decreased shortness of breath this shift  Outcome: Progressing  Goal: Wean oxygen to maintain O2 saturation per order/standard this shift  Outcome: Progressing  Goal: Increase self care and/or family involvement in next 24 hours  Outcome: Progressing     Problem: Pain  Goal: Takes deep breaths with improved pain control throughout the shift  Outcome: Progressing  Goal: Turns in bed with improved pain control throughout the shift  Outcome: Progressing  Goal: Walks with improved pain control throughout the shift  Outcome: Progressing  Goal: Performs ADL's with improved pain control throughout shift  Outcome: Progressing  Goal: Participates in PT with improved pain control throughout the shift  Outcome: Progressing  Goal: Free from opioid side effects throughout the shift  Outcome: Progressing  Goal: Free from acute confusion related to pain meds throughout the shift  Outcome: Progressing     Problem: Fall/Injury  Goal: Not fall by end of shift  Outcome: Progressing  Goal: Be free from injury by end of the shift  Outcome: Progressing  Goal: Verbalize understanding of personal risk factors for fall in the hospital  Outcome: Progressing  Goal: Verbalize understanding of risk factor reduction measures to prevent injury from fall in the home  Outcome: Progressing  Goal: Use assistive devices by end of the shift  Outcome: Progressing  Goal: Pace activities to prevent fatigue by end of the shift  Outcome: Progressing

## 2024-11-14 NOTE — CONSULTS
Inpatient consult to Acute Care Surgery  Consult performed by: LEONEL Melo-CNP  Consult ordered by: Nimco Weathers PA-C      See 11/14 consult done by Dr Shook.

## 2024-11-14 NOTE — PROCEDURES
Vascular Access Team Procedure Note     Visit Date: 11/14/2024      Patient Name: Narda Malloy         MRN: [id number]             Procedure: Pt has a R chest dialysis catheter, drsg dry and intact, due for routine drsg change, drsg has been changed using sterile technique, site without any redness, swelling or drainage. Pt also has a dual lumen picc line to L upper arm, drsg dry and intact (dated 11/8) without any redness, swelling or drainage, both lumens have brisk blood return and flush easily with NS, curos caps applied.                           Sylvia Narayan RN  11/14/2024  3:38 PM

## 2024-11-20 LAB
ACID FAST STN SPEC: NORMAL
MYCOBACTERIUM SPEC CULT: NORMAL

## 2024-11-21 ENCOUNTER — TELEPHONE (OUTPATIENT)
Dept: ENDOCRINOLOGY | Facility: CLINIC | Age: 69
End: 2024-11-21
Payer: MEDICARE

## 2024-11-21 PROBLEM — Z99.3 DEPENDENCE ON WHEELCHAIR: Status: ACTIVE | Noted: 2024-07-17

## 2024-11-21 PROBLEM — Z86.79 HISTORY OF HYPERTENSION: Status: ACTIVE | Noted: 2024-11-21

## 2024-11-21 PROBLEM — H52.7 DISORDER OF REFRACTION: Status: ACTIVE | Noted: 2024-11-21

## 2024-11-21 PROBLEM — R26.2 DIFFICULTY IN WALKING, NOT ELSEWHERE CLASSIFIED: Status: ACTIVE | Noted: 2024-07-17

## 2024-11-21 PROBLEM — E86.0 DEHYDRATION: Status: ACTIVE | Noted: 2024-07-17

## 2024-11-21 PROBLEM — Z86.39 HISTORY OF ELEVATED LIPIDS: Status: ACTIVE | Noted: 2024-11-21

## 2024-11-21 PROBLEM — R60.0 LOCALIZED EDEMA: Status: ACTIVE | Noted: 2024-10-09

## 2024-11-21 PROBLEM — J96.90 RESPIRATORY FAILURE (MULTI): Status: ACTIVE | Noted: 2024-11-14

## 2024-11-21 PROBLEM — R53.1 WEAKNESS: Status: ACTIVE | Noted: 2024-07-17

## 2024-11-21 PROBLEM — A49.01 METHICILLIN SUSCEPTIBLE STAPHYLOCOCCUS AUREUS INFECTION: Status: ACTIVE | Noted: 2024-11-21

## 2024-11-21 PROBLEM — H44.23 DEGENERATIVE MYOPIA, BILATERAL: Status: ACTIVE | Noted: 2024-07-17

## 2024-11-21 PROBLEM — G89.29 OTHER CHRONIC PAIN: Status: ACTIVE | Noted: 2024-07-17

## 2024-11-21 PROBLEM — M43.10 DEGENERATIVE SPONDYLOLISTHESIS: Status: ACTIVE | Noted: 2024-11-21

## 2024-11-21 PROBLEM — K04.7 DENTAL ABSCESS: Status: ACTIVE | Noted: 2024-11-21

## 2024-11-21 PROBLEM — E11.40 TYPE 2 DIABETES MELLITUS WITH DIABETIC NEUROPATHY, UNSPECIFIED (MULTI): Status: ACTIVE | Noted: 2024-07-17

## 2024-11-21 PROBLEM — K82.8 MASS OF GALLBLADDER: Status: ACTIVE | Noted: 2024-11-21

## 2024-11-21 PROBLEM — R29.6 REPEATED FALLS: Status: ACTIVE | Noted: 2024-07-17

## 2024-11-21 PROBLEM — M48.061 SPINAL STENOSIS OF LUMBAR REGION: Status: ACTIVE | Noted: 2023-05-15

## 2024-11-21 PROBLEM — H44.23 BILATERAL DEGENERATIVE PROGRESSIVE HIGH MYOPIA: Status: ACTIVE | Noted: 2024-11-21

## 2024-11-21 NOTE — TELEPHONE ENCOUNTER
Patient's  cancelled her jacobo. On Dec. 3 she is in a rehab facility doing a little better hopefully she will go home soon and they will reschedule

## 2024-11-22 ENCOUNTER — APPOINTMENT (OUTPATIENT)
Dept: CARDIOLOGY | Facility: CLINIC | Age: 69
End: 2024-11-22
Payer: MEDICARE

## 2024-11-27 LAB
ACID FAST STN SPEC: NORMAL
MYCOBACTERIUM SPEC CULT: NORMAL

## 2024-12-02 ENCOUNTER — APPOINTMENT (OUTPATIENT)
Dept: OBSTETRICS AND GYNECOLOGY | Facility: CLINIC | Age: 69
End: 2024-12-02
Payer: MEDICARE

## 2024-12-03 ENCOUNTER — APPOINTMENT (OUTPATIENT)
Dept: ENDOCRINOLOGY | Facility: CLINIC | Age: 69
End: 2024-12-03
Payer: MEDICARE

## 2024-12-04 ENCOUNTER — APPOINTMENT (OUTPATIENT)
Dept: OBSTETRICS AND GYNECOLOGY | Facility: CLINIC | Age: 69
End: 2024-12-04
Payer: MEDICARE

## 2024-12-04 LAB
ACID FAST STN SPEC: NORMAL
MYCOBACTERIUM SPEC CULT: NORMAL

## 2024-12-10 LAB
ACID FAST STN SPEC: NORMAL
MYCOBACTERIUM SPEC CULT: NORMAL

## (undated) DEVICE — Device

## (undated) DEVICE — SOLUTION, IRRIGATION, SODIUM CHLORIDE 0.9%, 1000 ML, POUR BOTTLE

## (undated) DEVICE — WOUND SYSTEM, DEBRIDEMENT & CLEANING, O.R DUOPAK

## (undated) DEVICE — SUTURE, PDS II, 2-0, 27 IN, CT2, VIOLET

## (undated) DEVICE — SUTURE, MONOCRYL, 3-0, 18 IN, PS2, UNDYED

## (undated) DEVICE — DRAPE, INCISE, ANTIMICROBIAL, IOBAN 2, STERI DRAPE, 23 X 33 IN, DISPOSABLE, STERILE

## (undated) DEVICE — DRAIN, WOUND, FLAT, JACKSON-PRATT, FULL PERFORATION, 7 MM, SILICONE

## (undated) DEVICE — SUTURE, VICRYL, 1, 36 IN, CTX, VIOLET

## (undated) DEVICE — PREP TRAY, VAGINAL

## (undated) DEVICE — SUTURE, PDS II, 0, 27 IN, CT1, VIOLET

## (undated) DEVICE — ADHESIVE, SKIN, MASTISOL, 2/3 CC VIAL

## (undated) DEVICE — DRESSING, ASSY, PREVENA CUSTOMIZABLE

## (undated) DEVICE — CORD, CAUTERY, BIPOLAR, STERILE

## (undated) DEVICE — EVACUATOR, WOUND, CLOSED, 3 SPRING, 400 CC, Y CONNECTING TUBE

## (undated) DEVICE — TOWEL, SURGICAL, NEURO, O/R, 16 X 26, BLUE, STERILE

## (undated) DEVICE — INTERPULSE HANDPIECE SET W/ 10FT SUCTION TUBING

## (undated) DEVICE — SOLUTION, IRRIGATION, X RX SODIUM CHL 0.9%, 1000ML BTL

## (undated) DEVICE — SUTURE, SILK, 2-0, 18 IN, BLACK

## (undated) DEVICE — SUTURE, SILK, 2-0, 30 IN, SH, BLACK

## (undated) DEVICE — SPONGE, HEMOSTATIC, CELLULOSE, SURGICEL, 4 X 8 IN

## (undated) DEVICE — GLOVE, SURGICAL, PROTEXIS PI ORTHO, 8.0, PF, LF

## (undated) DEVICE — THUNDERBEAT, 5MM, 10CM, INLINE GRIP

## (undated) DEVICE — STRIP, SKIN CLOSURE, STERI STRIP, REINFORCED, 0.5 X 4 IN

## (undated) DEVICE — DRAPE COVER, C ARM, FLOUROSCAN IMAGING SYS

## (undated) DEVICE — HIGH FLOW TIP FOR INTERPULSE HANDPIECE SET

## (undated) DEVICE — KIT, PATIENT CARE, JACKSON TABLE W/PRONE-SAFE HEADREST

## (undated) DEVICE — SUTURE, PDS II, 2-0, 27 IN, SH, VIOLET

## (undated) DEVICE — SYRINGE, 10 CC, LUER LOCK

## (undated) DEVICE — GOWN, SURGICAL, SIRUS, NON REINFORCED, LARGE

## (undated) DEVICE — EVACUATOR, WOUND, SUCTION, CLOSED, JACKSON-PRATT, 100 CC, SILICONE

## (undated) DEVICE — NEEDLE, HYPODERMIC, NEEDLE PRO, 25G X 1.5, ORANGE

## (undated) DEVICE — DRAPE, PAD, INSTRUMENT, MAGNETIC, MEDIUM, 10 X 16 IN, DISPOSABLE

## (undated) DEVICE — GLOVE, SURGICAL, PROTEXIS PI MICRO, 7.5, PF, LF

## (undated) DEVICE — SUTURE, ETHILON, 2-0, FSLX 30, BLACK

## (undated) DEVICE — DIFFUSER, MAESTRO, CORE

## (undated) DEVICE — ADHESIVE, SKIN, LIQUIBAND EXCEED

## (undated) DEVICE — DRILL, NEURO, PRECISION, 3MM X 3.8MM, CARBIDE

## (undated) DEVICE — SUTURE, PDSII, 1, TP-1, VIL, MONO, 48LP

## (undated) DEVICE — STAPLER, SKIN PROXIMATE, 35 WIDE

## (undated) DEVICE — THERAPY UNIT, 14-DAY PREVENA PLUS 125

## (undated) DEVICE — SUTURE, PROLENE, 2-0, 30 IN, SH, BLUE

## (undated) DEVICE — ELECTRODE, ELECTROSURGICAL, BLADE EXT 4 INCH, INSULATED

## (undated) DEVICE — CARTRIDGE, MAESTRO OIL, CORE

## (undated) DEVICE — NEEDLE, ELECTRODE, ELECTROSURGICAL, INSULATED

## (undated) DEVICE — COVER, C-ARM W/CLIPS, OEC GE

## (undated) DEVICE — CEMENT CANNULA

## (undated) DEVICE — DRAIN, WOUND, ROUND, W/TROCAR, HOLE PATTERN, 10 IN, MEDIUM/LARGE, 3/16 X 49 IN

## (undated) DEVICE — DRAIN, WOUND, ROUND, W/TROCAR, HOLE PATTERN, 10 IN, MEDIUM, 1/8 X 49 IN

## (undated) DEVICE — SEALANT, HEMOSTATIC, FLOSEAL, 10 ML